# Patient Record
Sex: FEMALE | Race: WHITE | NOT HISPANIC OR LATINO | Employment: UNEMPLOYED | URBAN - METROPOLITAN AREA
[De-identification: names, ages, dates, MRNs, and addresses within clinical notes are randomized per-mention and may not be internally consistent; named-entity substitution may affect disease eponyms.]

---

## 2017-12-21 RX ORDER — FENOFIBRATE 160 MG/1
160 TABLET ORAL DAILY
Status: ON HOLD | COMMUNITY
End: 2022-10-13 | Stop reason: HOSPADM

## 2017-12-21 RX ORDER — ERGOCALCIFEROL 1.25 MG/1
50000 CAPSULE ORAL
Status: ON HOLD | COMMUNITY
End: 2022-01-24 | Stop reason: HOSPADM

## 2017-12-21 RX ORDER — OMEPRAZOLE 40 MG/1
40 CAPSULE, DELAYED RELEASE ORAL DAILY
COMMUNITY

## 2017-12-21 RX ORDER — DULOXETIN HYDROCHLORIDE 60 MG/1
120 CAPSULE, DELAYED RELEASE ORAL DAILY
Status: ON HOLD | COMMUNITY
End: 2021-12-07 | Stop reason: HOSPADM

## 2017-12-21 RX ORDER — ATORVASTATIN CALCIUM 80 MG/1
80 TABLET, FILM COATED ORAL DAILY
Status: ON HOLD | COMMUNITY
End: 2022-10-14 | Stop reason: HOSPADM

## 2017-12-21 RX ORDER — VARENICLINE TARTRATE 0.5 MG/1
1 TABLET, FILM COATED ORAL 2 TIMES DAILY
Status: ON HOLD | COMMUNITY
End: 2021-11-28 | Stop reason: ALTCHOICE

## 2017-12-21 RX ORDER — QUETIAPINE FUMARATE 200 MG/1
200 TABLET, FILM COATED ORAL NIGHTLY
COMMUNITY

## 2017-12-21 RX ORDER — NITROGLYCERIN 0.4 MG/1
0.4 TABLET SUBLINGUAL EVERY 5 MIN PRN
Status: ON HOLD | COMMUNITY
End: 2021-11-28 | Stop reason: ALTCHOICE

## 2017-12-21 RX ORDER — MONTELUKAST SODIUM 10 MG/1
10 TABLET ORAL DAILY
Status: ON HOLD | COMMUNITY
End: 2018-01-12

## 2017-12-21 RX ORDER — BUDESONIDE AND FORMOTEROL FUMARATE DIHYDRATE 160; 4.5 UG/1; UG/1
2 AEROSOL RESPIRATORY (INHALATION) EVERY 12 HOURS
Status: ON HOLD | COMMUNITY
End: 2021-11-28 | Stop reason: ALTCHOICE

## 2017-12-21 RX ORDER — HYDROCODONE BITARTRATE AND ACETAMINOPHEN 10; 325 MG/1; MG/1
1 TABLET ORAL EVERY 6 HOURS PRN
Status: ON HOLD | COMMUNITY
End: 2021-11-28 | Stop reason: ALTCHOICE

## 2017-12-21 RX ORDER — CARVEDILOL 6.25 MG/1
6.25 TABLET ORAL 2 TIMES DAILY WITH MEALS
Status: ON HOLD | COMMUNITY
End: 2018-01-12

## 2017-12-21 RX ORDER — TRAZODONE HYDROCHLORIDE 150 MG/1
150 TABLET ORAL NIGHTLY
Status: ON HOLD | COMMUNITY
End: 2018-01-12

## 2017-12-21 RX ORDER — GABAPENTIN 400 MG/1
400 CAPSULE ORAL 3 TIMES DAILY
Status: ON HOLD | COMMUNITY
End: 2021-11-28 | Stop reason: ALTCHOICE

## 2017-12-21 RX ORDER — NITROGLYCERIN 80 MG/1
1 PATCH TRANSDERMAL DAILY
Status: ON HOLD | COMMUNITY
End: 2021-11-28 | Stop reason: ALTCHOICE

## 2017-12-21 RX ORDER — LEFLUNOMIDE 20 MG/1
20 TABLET ORAL DAILY
COMMUNITY
End: 2022-02-16

## 2017-12-21 RX ORDER — SOTALOL HYDROCHLORIDE 120 MG/1
120 TABLET ORAL 2 TIMES DAILY
Status: ON HOLD | COMMUNITY
End: 2021-12-07 | Stop reason: HOSPADM

## 2017-12-21 RX ORDER — ASPIRIN 81 MG/1
81 TABLET ORAL DAILY
Status: ON HOLD | COMMUNITY
End: 2021-12-07 | Stop reason: SDUPTHER

## 2017-12-21 RX ORDER — DIAZEPAM 10 MG/1
10 TABLET ORAL 2 TIMES DAILY PRN
COMMUNITY

## 2017-12-21 RX ORDER — CYCLOBENZAPRINE HCL 10 MG
10 TABLET ORAL 2 TIMES DAILY PRN
Status: ON HOLD | COMMUNITY
End: 2021-12-07 | Stop reason: HOSPADM

## 2017-12-22 ENCOUNTER — OFFICE VISIT (OUTPATIENT)
Dept: CARDIOLOGY | Facility: CLINIC | Age: 47
End: 2017-12-22
Payer: COMMERCIAL

## 2017-12-22 VITALS — WEIGHT: 176 LBS | SYSTOLIC BLOOD PRESSURE: 112 MMHG | DIASTOLIC BLOOD PRESSURE: 79 MMHG | HEART RATE: 141 BPM

## 2017-12-22 DIAGNOSIS — M06.9 RHEUMATOID ARTHRITIS INVOLVING MULTIPLE SITES, UNSPECIFIED RHEUMATOID FACTOR PRESENCE: ICD-10-CM

## 2017-12-22 DIAGNOSIS — Z79.01 LONG TERM CURRENT USE OF ANTICOAGULANT: ICD-10-CM

## 2017-12-22 DIAGNOSIS — I47.10 SVT (SUPRAVENTRICULAR TACHYCARDIA): ICD-10-CM

## 2017-12-22 DIAGNOSIS — Z98.890 HISTORY OF LOOP RECORDER: ICD-10-CM

## 2017-12-22 DIAGNOSIS — R55 SYNCOPE, UNSPECIFIED SYNCOPE TYPE: ICD-10-CM

## 2017-12-22 DIAGNOSIS — Z79.899 LONG TERM CURRENT USE OF ANTIARRHYTHMIC DRUG: ICD-10-CM

## 2017-12-22 DIAGNOSIS — E78.2 MIXED HYPERLIPIDEMIA: ICD-10-CM

## 2017-12-22 DIAGNOSIS — G45.8 OTHER SPECIFIED TRANSIENT CEREBRAL ISCHEMIAS: ICD-10-CM

## 2017-12-22 DIAGNOSIS — I48.0 PAROXYSMAL ATRIAL FIBRILLATION: ICD-10-CM

## 2017-12-22 PROBLEM — E78.5 HYPERLIPIDEMIA: Status: ACTIVE | Noted: 2017-12-22

## 2017-12-22 PROBLEM — G45.9 TIA (TRANSIENT ISCHEMIC ATTACK): Status: ACTIVE | Noted: 2017-12-22

## 2017-12-22 PROCEDURE — 99205 OFFICE O/P NEW HI 60 MIN: CPT | Mod: S$GLB,,, | Performed by: INTERNAL MEDICINE

## 2017-12-22 NOTE — PROGRESS NOTES
"Subjective:     HPI    Cardiologist: Ale Gonzalez MD    I had the pleasure of seeing Deepti Gonzalez in consultation at your request for the evaluation of atrial fibrillation and SVT. She is a 47 year old female with a past medical history of HTN, HLD, paroxysmal atrial fibrillation for which she was on Xarelto at one time, and active tobacco use, who was admitted to [facility] in 9/2017 for TIA symptoms. This prompted a work-up which included a CT head/neck which were essentially normal, and an echo revealing a normal EF. In 10/2017, an ILR was placed given her history of cryptogenic TIAs (manifesting as left facial droop, confusion, and dysarthria). Although no AF has been seen on device checks to date, she has had documented SVT. At a recent cardiology visit, Coreg 6.25 mg bid was replaced with Sotalol 80 mg bid. She has noted an overall improvement in her palpitations, but admits to intermittent symptomatic bradycardia. She presents to me today to discuss arrhythmia management options.    Recent cardiac studies include an echo performed in 6/2016 which showed an EF of 60-65% with normal LA size, and no significant valvular disease. Carotid dopplers in 6/2016 were unremarkable.    I reviewed all ILR strips since implant which are in the EMR. An episode from 10/23/2017 at 730 pm showed a >7 second episode of sinus arrest (possibly as long as 30 seconds based on device rep although no "offset" strips are available), with no ventricular escape. Ms. Gonzalez was resting at the time. On 11/15/2017 at 11 pm showed SVT with an average rate of 181 bpm (atrial flutter and sinus tachycardia cannot be excluded). No AF has been detected since implant.    I reviewed today's ECG tracing, which shows sinus tachycardia vs atrial tachycardia at 118 bpm. The P-wave axis is changed compared to her ECG on 12/18/2017 when she was sinus rhythm at 83 bpm.    Review of Systems   Constitution: Negative for decreased appetite, " malaise/fatigue, weight gain and weight loss.   HENT: Negative for sore throat.    Eyes: Negative for blurred vision.   Cardiovascular: Positive for chest pain, irregular heartbeat, palpitations and syncope. Negative for dyspnea on exertion, leg swelling, near-syncope, orthopnea and paroxysmal nocturnal dyspnea.   Respiratory: Positive for shortness of breath.    Skin: Negative for rash.   Musculoskeletal: Positive for back pain and joint pain. Negative for arthritis.   Gastrointestinal: Negative for abdominal pain.   Neurological: Negative for focal weakness.   Psychiatric/Behavioral: Negative for altered mental status.        Objective:    Physical Exam   Constitutional: She is oriented to person, place, and time. She appears well-developed and well-nourished. No distress.   HENT:   Head: Normocephalic and atraumatic.   Mouth/Throat: Oropharynx is clear and moist.   Eyes: Conjunctivae are normal. Pupils are equal, round, and reactive to light. No scleral icterus.   Neck: Normal range of motion. Neck supple. No JVD present. No thyromegaly present.   Cardiovascular: Normal rate, regular rhythm, normal heart sounds and intact distal pulses.  Exam reveals no gallop and no friction rub.    No murmur heard.  Pulmonary/Chest: Effort normal and breath sounds normal. No respiratory distress. She has no rales.   Abdominal: Soft. Bowel sounds are normal. She exhibits no distension.   Musculoskeletal: She exhibits no edema.   Neurological: She is alert and oriented to person, place, and time.   Skin: Skin is warm and dry.   Psychiatric: She has a normal mood and affect. Her behavior is normal.   Vitals reviewed.        Assessment:       1. Paroxysmal atrial fibrillation    2. Long term current use of anticoagulant    3. Mixed hyperlipidemia    4. Other specified transient cerebral ischemias    5. Long term current use of antiarrhythmic drug    6. History of loop recorder    7. SVT (supraventricular tachycardia)         Plan:      In summary, Deepti Gonzalez is a 47 year old female with a complicated medical history including TIAs, possible PAF, RA, marked sinus pauses, syncope, and possible atrial tachycardia. We discussed the risks, benefits, indications, and alternatives to invasive EPS and possible AT ablation. She expressed understanding and wishes to proceed.    With regard to her prolonged pause, this is evidence of sick sinus syndrome and I would recommend dual chamber pacemaker implantation, which would be done locally. I will discuss this further with Dr. Gonzalez. This should be performed after her and not before her EPS.    Thank you for allowing me to participate in the care of this patient. Please do not hesitate to call me with any questions or concerns.

## 2017-12-22 NOTE — LETTER
December 22, 2017      Ale Gonzalez MD  [address] [place] [address] [place] [id number]           [facility] - Arrhythmia  [address] [place] [address] [place] [id number]  Phone: [phone]  Fax: [phone]          Patient: Deepti Gonzalez   MR Number: [id number]   YOB: 1970   Date of Visit: 12/22/2017       Dear Dr. Ale Gonzalez:    Thank you for referring Deepti Gonzalez to me for evaluation. Attached you will find relevant portions of my assessment and plan of care.    If you have questions, please do not hesitate to call me. I look forward to following Deepti Gonzalez along with you.    Sincerely,    Tapan Colón MD    Enclosure  CC:  No Recipients    If you would like to receive this communication electronically, please contact [email or web address] or [phone] to request more information on [employer] Link access.    For providers and/or their staff who would like to refer a patient to [facility], please contact us through our one-stop-shop provider referral line, [facility], at [phone].    If you feel you have received this communication in error or would no longer like to receive these types of communications, please e-mail [email or web address]

## 2018-01-04 DIAGNOSIS — I47.19 ATRIAL TACHYCARDIA: Primary | ICD-10-CM

## 2018-01-05 ENCOUNTER — TELEPHONE (OUTPATIENT)
Dept: ELECTROPHYSIOLOGY | Facility: CLINIC | Age: 48
End: 2018-01-05

## 2018-01-05 NOTE — PROGRESS NOTES
ABLATION EDUCATION CHECKLIST      1-05-18 - LAB WORK   PRE - PROCEDURE LABS HAVE BEEN ORDERED FOR YOU @ [facility]  BE SURE TO ARRIVE AT YOUR SCHEDULED TIME FOR THIS LAB WORK!  (YOU DO NOT HAVE TO FAST FOR THIS LABWORK!!!!)    1-12-18 @ 9:30 AM - POSSIBLE CARDIAC ABLATION  Report to Cardiology Waiting Room on 3rd floor of the Hospital    (Do not report to clinic)  Directions for Reporting to Cardiology Waiting Area in the Hospital  If you park in the Parking Garage:  Take elevators to the 2nd floor  Walk up ramp and turn right by Gold Elevators  Take elevator to the 3rd floor  Upon exiting the elevator, turn away from the clinic areas  Walk long lewis around to front of hospital to area with windows overlooking [facility]  Check in at Reception Desk  OR  If family is dropping you off:  Have them drop you off at the front of the Hospital  (Near the ER, where all the flags are hung).  Take the E elevators to the 3rd floor.  Check in at the Reception Desk in the waiting room.        Do not eat or drink anything after: 12 mn on the night before your procedure    Medications:   HOLD SOTALOL 5 DAYS PRIOR TO PROCEDURE. LAST DOSE 1/06/17.  HOLD XARELTO NIGHT PRIOR TO PROCEDURE. LAST DOSE 1/10/17  You may take ALL OTHER morning medications with a sip of water    You will be spending the night after your procedure  You will need someone to drive you home the day after your procedure.    Your pain during your procedure will be managed by the anesthesia team.     THE ABOVE INSTRUCTIONS WERE GIVEN TO THE PATIENT VERBALLY AND THEY VERBALIZED UNDERSTANDING.  THEY DO NOT REQUIRE ANY SPECIAL NEEDS AND DO NOT HAVE ANY LEARNING BARRIERS.    Any need to reschedule or cancel procedures, or any questions regarding your procedures should be addressed directly with the Arrhythmia Department Nurses at the following phone number: [phone]

## 2018-01-05 NOTE — TELEPHONE ENCOUNTER
Returned Pt's call. Just wanted to let us know she didn't make it to the lab today. She will be going on Monday.    ----- Message from Cydney Cho MA sent at 1/5/2018  3:39 PM CST -----  Contact: patient called  Please call the patient at [phone] she would like to talk to you about her blood work she is having done Monday. Thank you.

## 2018-01-12 ENCOUNTER — HOSPITAL ENCOUNTER (OUTPATIENT)
Facility: HOSPITAL | Age: 48
Discharge: HOME | End: 2018-01-12
Attending: INTERNAL MEDICINE | Admitting: INTERNAL MEDICINE
Payer: COMMERCIAL

## 2018-01-12 ENCOUNTER — ANESTHESIA (OUTPATIENT)
Dept: MEDSURG UNIT | Facility: HOSPITAL | Age: 48
End: 2018-01-12
Payer: COMMERCIAL

## 2018-01-12 ENCOUNTER — SURGERY (OUTPATIENT)
Age: 48
End: 2018-01-12

## 2018-01-12 ENCOUNTER — ANESTHESIA EVENT (OUTPATIENT)
Dept: MEDSURG UNIT | Facility: HOSPITAL | Age: 48
End: 2018-01-12
Payer: COMMERCIAL

## 2018-01-12 VITALS
RESPIRATION RATE: 19 BRPM | TEMPERATURE: 97 F | HEART RATE: 108 BPM | WEIGHT: 172 LBS | DIASTOLIC BLOOD PRESSURE: 59 MMHG | BODY MASS INDEX: 29.37 KG/M2 | HEIGHT: 64 IN | OXYGEN SATURATION: 96 % | SYSTOLIC BLOOD PRESSURE: 118 MMHG

## 2018-01-12 DIAGNOSIS — I48.0 PAROXYSMAL ATRIAL FIBRILLATION: ICD-10-CM

## 2018-01-12 DIAGNOSIS — I47.19 ATRIAL TACHYCARDIA: ICD-10-CM

## 2018-01-12 DIAGNOSIS — I47.10 SVT (SUPRAVENTRICULAR TACHYCARDIA): Primary | ICD-10-CM

## 2018-01-12 LAB
INR PPP: 1
PROTHROMBIN TIME: 11 SEC

## 2018-01-12 PROCEDURE — 25000003 PHARM REV CODE 250: Performed by: NURSE PRACTITIONER

## 2018-01-12 PROCEDURE — 93010 ELECTROCARDIOGRAM REPORT: CPT | Mod: ,,, | Performed by: INTERNAL MEDICINE

## 2018-01-12 PROCEDURE — 63600175 PHARM REV CODE 636 W HCPCS

## 2018-01-12 PROCEDURE — 37000008 HC ANESTHESIA 1ST 15 MINUTES: Performed by: INTERNAL MEDICINE

## 2018-01-12 PROCEDURE — 37000009 HC ANESTHESIA EA ADD 15 MINS: Performed by: INTERNAL MEDICINE

## 2018-01-12 PROCEDURE — D9220A PRA ANESTHESIA: Mod: ANES,,, | Performed by: ANESTHESIOLOGY

## 2018-01-12 PROCEDURE — 93621 COMP EP EVL L PAC&REC C SINS: CPT

## 2018-01-12 PROCEDURE — 27200049 EP LAB PROCEDURE

## 2018-01-12 PROCEDURE — 93623 PRGRMD STIMJ&PACG IV RX NFS: CPT | Mod: 26,,, | Performed by: INTERNAL MEDICINE

## 2018-01-12 PROCEDURE — 93005 ELECTROCARDIOGRAM TRACING: CPT

## 2018-01-12 PROCEDURE — 85610 PROTHROMBIN TIME: CPT

## 2018-01-12 PROCEDURE — 93620 COMP EP EVL R AT VEN PAC&REC: CPT | Mod: 26,,, | Performed by: INTERNAL MEDICINE

## 2018-01-12 PROCEDURE — D9220A PRA ANESTHESIA: Mod: CRNA,,, | Performed by: NURSE ANESTHETIST, CERTIFIED REGISTERED

## 2018-01-12 PROCEDURE — 25000003 PHARM REV CODE 250: Performed by: NURSE ANESTHETIST, CERTIFIED REGISTERED

## 2018-01-12 PROCEDURE — 93010 ELECTROCARDIOGRAM REPORT: CPT | Mod: 76,,, | Performed by: INTERNAL MEDICINE

## 2018-01-12 PROCEDURE — 25000003 PHARM REV CODE 250

## 2018-01-12 PROCEDURE — 63600175 PHARM REV CODE 636 W HCPCS: Performed by: NURSE ANESTHETIST, CERTIFIED REGISTERED

## 2018-01-12 PROCEDURE — 93621 COMP EP EVL L PAC&REC C SINS: CPT | Mod: 26,,, | Performed by: INTERNAL MEDICINE

## 2018-01-12 RX ORDER — DIAZEPAM 5 MG/1
5 TABLET ORAL ONCE
Status: COMPLETED | OUTPATIENT
Start: 2018-01-12 | End: 2018-01-12

## 2018-01-12 RX ORDER — SOTALOL HYDROCHLORIDE 80 MG/1
80 TABLET ORAL 2 TIMES DAILY
Status: DISCONTINUED | OUTPATIENT
Start: 2018-01-12 | End: 2018-01-12

## 2018-01-12 RX ORDER — PROPOFOL 10 MG/ML
VIAL (ML) INTRAVENOUS CONTINUOUS PRN
Status: DISCONTINUED | OUTPATIENT
Start: 2018-01-12 | End: 2018-01-12

## 2018-01-12 RX ORDER — ASPIRIN 81 MG/1
81 TABLET ORAL DAILY
Status: DISCONTINUED | OUTPATIENT
Start: 2018-01-12 | End: 2018-01-12 | Stop reason: HOSPADM

## 2018-01-12 RX ORDER — GABAPENTIN 400 MG/1
400 CAPSULE ORAL 3 TIMES DAILY
Status: DISCONTINUED | OUTPATIENT
Start: 2018-01-12 | End: 2018-01-12 | Stop reason: HOSPADM

## 2018-01-12 RX ORDER — SOTALOL HYDROCHLORIDE 80 MG/1
80 TABLET ORAL 2 TIMES DAILY
Status: DISCONTINUED | OUTPATIENT
Start: 2018-01-12 | End: 2018-01-12 | Stop reason: HOSPADM

## 2018-01-12 RX ORDER — PHENYLEPHRINE HYDROCHLORIDE 10 MG/ML
INJECTION INTRAVENOUS
Status: DISCONTINUED | OUTPATIENT
Start: 2018-01-12 | End: 2018-01-12

## 2018-01-12 RX ORDER — NITROGLYCERIN 80 MG/1
1 PATCH TRANSDERMAL DAILY
Status: DISCONTINUED | OUTPATIENT
Start: 2018-01-12 | End: 2018-01-12

## 2018-01-12 RX ORDER — PANTOPRAZOLE SODIUM 40 MG/1
40 TABLET, DELAYED RELEASE ORAL DAILY
Status: DISCONTINUED | OUTPATIENT
Start: 2018-01-13 | End: 2018-01-12 | Stop reason: HOSPADM

## 2018-01-12 RX ORDER — DULOXETIN HYDROCHLORIDE 60 MG/1
60 CAPSULE, DELAYED RELEASE ORAL DAILY
Status: DISCONTINUED | OUTPATIENT
Start: 2018-01-12 | End: 2018-01-12 | Stop reason: HOSPADM

## 2018-01-12 RX ORDER — LIDOCAINE HCL/PF 100 MG/5ML
SYRINGE (ML) INTRAVENOUS
Status: DISCONTINUED | OUTPATIENT
Start: 2018-01-12 | End: 2018-01-12

## 2018-01-12 RX ORDER — FENTANYL CITRATE 50 UG/ML
25 INJECTION, SOLUTION INTRAMUSCULAR; INTRAVENOUS EVERY 5 MIN PRN
Status: CANCELLED | OUTPATIENT
Start: 2018-01-12

## 2018-01-12 RX ORDER — LEFLUNOMIDE 10 MG/1
10 TABLET ORAL DAILY
Status: DISCONTINUED | OUTPATIENT
Start: 2018-01-12 | End: 2018-01-12 | Stop reason: HOSPADM

## 2018-01-12 RX ORDER — MIDAZOLAM HYDROCHLORIDE 1 MG/ML
INJECTION INTRAMUSCULAR; INTRAVENOUS
Status: DISCONTINUED | OUTPATIENT
Start: 2018-01-12 | End: 2018-01-12

## 2018-01-12 RX ORDER — SODIUM CHLORIDE 0.9 % (FLUSH) 0.9 %
3 SYRINGE (ML) INJECTION
Status: CANCELLED | OUTPATIENT
Start: 2018-01-12

## 2018-01-12 RX ORDER — QUETIAPINE FUMARATE 25 MG/1
50 TABLET, FILM COATED ORAL NIGHTLY
Status: DISCONTINUED | OUTPATIENT
Start: 2018-01-12 | End: 2018-01-12 | Stop reason: HOSPADM

## 2018-01-12 RX ORDER — SODIUM CHLORIDE 9 MG/ML
INJECTION, SOLUTION INTRAVENOUS CONTINUOUS
Status: DISCONTINUED | OUTPATIENT
Start: 2018-01-12 | End: 2018-01-12 | Stop reason: HOSPADM

## 2018-01-12 RX ORDER — PROPOFOL 10 MG/ML
VIAL (ML) INTRAVENOUS
Status: DISCONTINUED | OUTPATIENT
Start: 2018-01-12 | End: 2018-01-12

## 2018-01-12 RX ORDER — HYDROMORPHONE HYDROCHLORIDE 2 MG/ML
0.2 INJECTION, SOLUTION INTRAMUSCULAR; INTRAVENOUS; SUBCUTANEOUS EVERY 5 MIN PRN
Status: CANCELLED | OUTPATIENT
Start: 2018-01-12

## 2018-01-12 RX ORDER — FENOFIBRATE 160 MG/1
160 TABLET ORAL DAILY
Status: DISCONTINUED | OUTPATIENT
Start: 2018-01-12 | End: 2018-01-12 | Stop reason: HOSPADM

## 2018-01-12 RX ORDER — ACETAMINOPHEN 325 MG/1
650 TABLET ORAL EVERY 6 HOURS PRN
Status: DISCONTINUED | OUTPATIENT
Start: 2018-01-12 | End: 2018-01-12 | Stop reason: HOSPADM

## 2018-01-12 RX ADMIN — LIDOCAINE HYDROCHLORIDE 100 MG: 20 INJECTION, SOLUTION INTRAVENOUS at 11:01

## 2018-01-12 RX ADMIN — PROPOFOL 200 MCG/KG/MIN: 10 INJECTION, EMULSION INTRAVENOUS at 11:01

## 2018-01-12 RX ADMIN — DIAZEPAM 5 MG: 5 TABLET ORAL at 05:01

## 2018-01-12 RX ADMIN — ISOPROTERENOL HYDROCHLORIDE 1 MCG/MIN: 0.2 INJECTION, SOLUTION INTRACARDIAC; INTRAMUSCULAR; INTRAVENOUS; SUBCUTANEOUS at 12:01

## 2018-01-12 RX ADMIN — PHENYLEPHRINE HYDROCHLORIDE 100 MCG: 10 INJECTION INTRAVENOUS at 12:01

## 2018-01-12 RX ADMIN — SODIUM CHLORIDE 1000 ML: 0.9 INJECTION, SOLUTION INTRAVENOUS at 08:01

## 2018-01-12 RX ADMIN — SOTALOL HYDROCHLORIDE 80 MG: 80 TABLET ORAL at 05:01

## 2018-01-12 RX ADMIN — MIDAZOLAM HYDROCHLORIDE 2 MG: 1 INJECTION, SOLUTION INTRAMUSCULAR; INTRAVENOUS at 11:01

## 2018-01-12 RX ADMIN — ACETAMINOPHEN 650 MG: 325 TABLET ORAL at 04:01

## 2018-01-12 RX ADMIN — PROPOFOL 50 MG: 10 INJECTION, EMULSION INTRAVENOUS at 12:01

## 2018-01-12 NOTE — NURSING TRANSFER
Nursing Transfer Note      1/12/2018     Transfer To: short stay    Transfer via stretcher    Transfer with cardiac monitoring    Transported by rn    Medicines sent: none    Chart send with patient: Yes    Notified: nurse    Patient reassessed at: see epic (date, time)

## 2018-01-12 NOTE — ANESTHESIA POSTPROCEDURE EVALUATION
"Anesthesia Post Evaluation    Patient: Deepti Gonzalez    Procedure(s) Performed: Procedure(s) (LRB):  ABLATION (N/A)    Final Anesthesia Type: general  Patient location during evaluation: PACU  Patient participation: Yes- Able to Participate  Level of consciousness: awake and alert  Post-procedure vital signs: reviewed and stable  Pain management: adequate  Airway patency: patent  PONV status at discharge: No PONV  Anesthetic complications: no      Cardiovascular status: blood pressure returned to baseline  Respiratory status: unassisted  Hydration status: euvolemic  Follow-up not needed.        Visit Vitals  BP (!) 121/55 (BP Location: Left arm, Patient Position: Lying)   Pulse 101   Temp 36.8 °C (98.3 °F) (Oral)   Resp 18   Ht 5' 4" (1.626 m)   Wt 78 kg (172 lb)   SpO2 (!) 94%   Breastfeeding? No   BMI 29.52 kg/m²       Pain/Nicolas Score: Pain Assessment Performed: Yes (1/12/2018  2:00 PM)  Presence of Pain: denies (1/12/2018  2:00 PM)  Nicolas Score: 10 (1/12/2018  2:00 PM)      "

## 2018-01-12 NOTE — DISCHARGE SUMMARY
[facility]  Cardiac Electrophysiology  Discharge Summary      Patient Name: Deepti Gonzalez  MRN: [id number]  Admission Date: 1/12/2018  Hospital Length of Stay: 0 days  Discharge Date and Time:  01/12/2018 3:25 PM  Attending Physician: Tapan Colón MD    Discharging Provider: Simeon Dumont NP  Primary Care Physician: Wen Oliveros MD    HPI:47 year old female with a past medical history of HTN, HLD,  Past TIAs, possible paroxysmal atrial fibrillation, pt currently on  on sotalol and xarelto, s/p ILR was placed given her history of cryptogenic TIAs (manifesting as left facial droop, confusion, and dysarthria). Although no AF has been seen on device checks to date, she has had documented SVT. Pt referred to EP  MD Colón by cardiologist MD Dr. Gonzalez.  Pt planned for outpatient EPS and possible ablation.  Pt denied any acute symptoms on admission       Procedure(s) (LRB): EPS     Hospital Course: Pt underwent  EPS/ Noninducible for supraventricular arrhythmias.  ( see procedure note for details)  , tolerated procedure with no acute complications.  Bilateral groins with no bleeding or hematoma. Instructed patient to resume home xarelto starting tomorrow 1/13/18 with dinner.   . Pt to follow up with MD Katarzyna Phelan in 6- 8   weeks.   Discharge plans/instructions discussed with patient and family members ( daughter, spouse and pt's mother)   who verbalized understanding  and agreement of plans of care.  No further questions or concerns  voiced at this time     Consults:  Anesthesia        Final Active Diagnoses:    Diagnosis Date Noted POA    PRINCIPAL PROBLEM:  Atrial tachycardia [I47.1] 01/12/2018 Yes    TIA (transient ischemic attack) [G45.9] 12/22/2017 Yes    SVT (supraventricular tachycardia) [I47.1] 12/22/2017 Yes      Problems Resolved During this Admission:    Diagnosis Date Noted Date Resolved POA       Discharged Condition: good    Disposition: Home or Self Care    Follow  Up:  Follow-up Information     Tapan Colón MD In 6 weeks.    Specialties:  Electrophysiology, Cardiovascular Disease  Why:  follow up in 6- 8 weeks   Contact information:  Dave BUTLER  [facility] [id number] [phone]                 Patient Instructions:     Diet Cardiac     Lifting restrictions   Scheduling Instructions: No lifting more than 5- 10 pounds in 1 week     Notify your health care provider if you experience any of the following:  temperature >100.4     Notify your health care provider if you experience any of the following:  persistent nausea and vomiting or diarrhea     Notify your health care provider if you experience any of the following:  severe uncontrolled pain     Notify your health care provider if you experience any of the following:  redness, tenderness, or signs of infection (pain, swelling, redness, odor or green/yellow discharge around incision site)     Notify your health care provider if you experience any of the following:  difficulty breathing or increased cough     Notify your health care provider if you experience any of the following:  severe persistent headache     Notify your health care provider if you experience any of the following:  worsening rash     Notify your health care provider if you experience any of the following:  persistent dizziness, light-headedness, or visual disturbances     Notify your health care provider if you experience any of the following:  increased confusion or weakness     Notify your health care provider if you experience any of the following:   Scheduling Instructions: For any concerning medical symptoms     Remove dressing in 24 hours     Other restrictions (specify):   Scheduling Instructions: Resume blood thinner xarelto starting tomorrow ( 1/13/18) evening with dinner       Medications:  Reconciled Home Medications:   Current Discharge Medication List      CONTINUE these medications which have NOT CHANGED    Details   aspirin  (ECOTRIN) 81 MG EC tablet Take 81 mg by mouth once daily.      atorvastatin (LIPITOR) 40 MG tablet Take 40 mg by mouth once daily.      cyclobenzaprine (FLEXERIL) 10 MG tablet Take 10 mg by mouth 2 (two) times daily as needed for Muscle spasms.      diazePAM (VALIUM) 10 MG Tab Take 10 mg by mouth 2 (two) times daily as needed.      DULoxetine (CYMBALTA) 60 MG capsule Take 60 mg by mouth once daily.      fenofibrate 160 MG Tab Take 160 mg by mouth once daily.      gabapentin (NEURONTIN) 400 MG capsule Take 400 mg by mouth 3 (three) times daily.      hydrocodone-acetaminophen 10-325mg (NORCO)  mg Tab Take 1 tablet by mouth every 6 (six) hours as needed for Pain.      leflunomide (ARAVA) 10 MG Tab Take 10 mg by mouth once daily.      multivit,min52-folic-vitK-cQ10 (AQUADEKS) 100-700-10 mcg-mcg-mg Cap cap Take 1 capsule by mouth once daily.      omeprazole (PRILOSEC) 20 MG capsule Take 20 mg by mouth once daily.      QUEtiapine (SEROQUEL) 50 MG tablet Take 50 mg by mouth every evening.      varenicline (CHANTIX) 0.5 MG Tab Take 1 mg by mouth 2 (two) times daily.      budesonide-formoterol 160-4.5 mcg (SYMBICORT) 160-4.5 mcg/actuation HFAA Inhale 2 puffs into the lungs every 12 (twelve) hours. Controller      ergocalciferol (VITAMIN D2) 50,000 unit Cap Take 50,000 Units by mouth every 7 days.      ipratropium-albuterol (COMBIVENT RESPIMAT)  mcg/actuation inhaler Inhale 1 puff into the lungs every 6 (six) hours as needed for Wheezing. Rescue      nitroGLYCERIN (NITROSTAT) 0.4 MG SL tablet Place 0.4 mg under the tongue every 5 (five) minutes as needed for Chest pain.      nitroGLYCERIN 0.4 MG/HR TD PT24 (NITRODUR) 0.4 mg/hr Place 1 patch onto the skin once daily.      rivaroxaban (XARELTO) 20 mg Tab Take 20 mg by mouth daily with dinner or evening meal. > resume medication  starting  tomorrow 1/13/18       sotalol (SOTALOL AF) 80 MG tablet Take 80 mg by mouth 2 (two) times daily.         STOP taking these  medications       carvedilol (COREG) 6.25 MG tablet Comments:   Reason for Stopping: state no longer on this medication         montelukast (SINGULAIR) 10 mg tablet Comments:   Reason for Stopping: states no longer on this medication         traZODone (DESYREL) 150 MG tablet Comments:   Reason for Stopping: state no longer on this medication                 Simeon Dumont NP  Cardiac Electrophysiology  [facility]    STAFF: MD Tapan Colón

## 2018-01-12 NOTE — PROGRESS NOTES
Pt s/p negative EPS.   Called by RN, as patient was complaining chest discomfort.  Assessed patient  > per patient, she felt anxious while ordering her food. Per patient, she began to feel similar episodes  of chest discomfort ( as her prior episodes),  Following anxious event while on the phone.   Pt states her heart felt like it was fast.  CP does not radiate, nor having any shortness of breath, or other breathing issues. Groins with no bleeding or hematoma.     Vitals obtained and were stable > noted sinus  tachycardia low 100's BPM . Stable 02 sats 98 percent.     Physical Exam:   Gen: no acute distress, pleasant patient answering questions appropriately  CVS:tachycardia , no murmurs/rubs/gallops  CHEST: clear to auscultation bilaterally, no wheezes/rales/ronchi  ABD: Soft, non-tender, nondistended, bowel sounds present  GROINS: bilateral Soft, non tender, no bleeding no hematoma   EXT: No Edema  NEURO: awake, alert, and oriented      Plan: will give PRN valium ( reports  chronic daily use), and will resume her  Home sotalol now ( QTC on  ms)   - If VS remains stable, and no worsening symptoms > plan on DC home     -Case discussed with MD Katarzyna Phelan, who agrees with plans      SRAA Ruffin-BC  Cardiology Electrophysiology NP   [facility]     Attending: MD. Katarzyna Phelan

## 2018-01-12 NOTE — TRANSFER OF CARE
"Anesthesia Transfer of Care Note    Patient: Deepti Gonzalez    Procedure(s) Performed: Procedure(s) (LRB):  ABLATION (N/A)    Patient location: PACU    Anesthesia Type: general    Transport from OR: Transported from OR on room air with adequate spontaneous ventilation    Post pain: adequate analgesia    Post assessment: no apparent anesthetic complications    Post vital signs: stable    Level of consciousness: awake    Nausea/Vomiting: no nausea/vomiting    Complications: none    Transfer of care protocol was followed      Last vitals:   Visit Vitals  /60 (BP Location: Left arm, Patient Position: Lying)   Pulse 102   Temp 36.1 °C (97 °F) (Oral)   Resp 18   Ht 5' 4" (1.626 m)   Wt 78 kg (172 lb)   SpO2 96%   Breastfeeding? No   BMI 29.52 kg/m²     "

## 2018-01-12 NOTE — PLAN OF CARE
"Pt co chest "ache, squeezing, pressure", pain on a scale 1-10 at 5-6.Constant. VS as charted in epic. Pt states was agitated "about 30 min ago". Tim NP notified and at pt bedside. Will monitor.   "

## 2018-01-12 NOTE — ANESTHESIA PREPROCEDURE EVALUATION
01/12/2018  Deepti Gonzalez is a 47 y.o., female here for SVT ablation    Patient Active Problem List   Diagnosis    Paroxysmal atrial fibrillation    Long term current use of anticoagulant    Hyperlipidemia    TIA (transient ischemic attack)    Long term current use of antiarrhythmic drug    History of loop recorder    SVT (supraventricular tachycardia)    Rheumatoid arthritis involving multiple sites    Syncope    Atrial tachycardia         Anesthesia Evaluation    I have reviewed the Patient Summary Reports.    I have reviewed the Nursing Notes.   I have reviewed the Medications.     Review of Systems  Anesthesia Hx:  No problems with previous Anesthesia    Cardiovascular:   Hypertension Dysrhythmias atrial fibrillation    Pulmonary:   COPD    Hepatic/GI:  Hepatic/GI Normal    Neurological:   TIA, CVA    Endocrine:  Endocrine Normal        Physical Exam  General:  Well nourished    Airway/Jaw/Neck:  Airway Findings: Mouth Opening: Normal Tongue: Normal  General Airway Assessment: Adult  Mallampati: II  TM Distance: Normal, at least 6 cm       Chest/Lungs:  Chest/Lungs Findings: Clear to auscultation, Normal Respiratory Rate     Heart/Vascular:  Heart Findings: Rate: Normal  Rhythm: Regular Rhythm             Anesthesia Plan  Type of Anesthesia, risks & benefits discussed:  Anesthesia Type:  general, MAC  Patient's Preference:   Intra-op Monitoring Plan: standard ASA monitors  Intra-op Monitoring Plan Comments:   Post Op Pain Control Plan: IV/PO Opioids PRN  Post Op Pain Control Plan Comments:   Induction:    Beta Blocker:  Patient is not currently on a Beta-Blocker (No further documentation required).       Informed Consent: Patient understands risks and agrees with Anesthesia plan.  Questions answered. Anesthesia consent signed with patient.  ASA Score: 3     Day of Surgery Review of History &  Physical:            Ready For Surgery From Anesthesia Perspective.

## 2018-01-12 NOTE — PLAN OF CARE
Received report from WOO Howell. Patient s/p eps, AAOx3. VSS, no c/o pain or discomfort at this time, resp even and unlabored. Gauze/tegaderm dressing to sophie groin is CDI. No active bleeding. No hematoma noted. Post procedure protocol reviewed with patient and patient's family. Understanding verbalized. Family members at bedside. Nurse call bell within reach. Will continue to monitor per post procedure protocol.

## 2018-01-12 NOTE — INTERVAL H&P NOTE
The patient has been examined and the H&P has been reviewed:    I concur with the findings and no changes have occurred since H&P was written. pt denies any acute symptoms at present, such as fevers chills, bleeding, overt shortness of breath . EKG in NSR. Labs from [facility] reviewed from 1/8/18 >medications were held prior to procedure > sotalol last dose was on 1/6/18, and xarelto last dose was on 1/10/18.     EPS/ possible AT Ablation   Anesthesia for sedation     Prior to procedure, extensive discussion with patient by STAFF regarding risks and benefits of   EPS/ possible ablation, and patient  would like to proceed.  The patient/ spouse  voices understanding and all questions have been answered. No further questions/concerns voiced at this time.       SARA Ruffin-BC  Cardiology Electrophysiology  NP   [facility]     Attending: MD Tapan Coóln           Active Hospital Problems    Diagnosis  POA    *Atrial tachycardia [I47.1]  Yes    TIA (transient ischemic attack) [G45.9]  Yes    SVT (supraventricular tachycardia) [I47.1]  Yes      Resolved Hospital Problems    Diagnosis Date Resolved POA   No resolved problems to display.

## 2018-01-12 NOTE — PLAN OF CARE
Patient discharged per MD orders. Instructions given on medications, wound care, activity, signs of infection, when to call MD, and follow up appointments. Pt verbalized understanding. Telemetry and PIV removed. Patient and family rused wc to private vehicle.

## 2018-01-12 NOTE — H&P (VIEW-ONLY)
"Subjective:     HPI    Cardiologist: Ale Gonzalez MD    I had the pleasure of seeing Deepti Gonzalez in consultation at your request for the evaluation of atrial fibrillation and SVT. She is a 47 year old female with a past medical history of HTN, HLD, paroxysmal atrial fibrillation for which she was on Xarelto at one time, and active tobacco use, who was admitted to [facility] in 9/2017 for TIA symptoms. This prompted a work-up which included a CT head/neck which were essentially normal, and an echo revealing a normal EF. In 10/2017, an ILR was placed given her history of cryptogenic TIAs (manifesting as left facial droop, confusion, and dysarthria). Although no AF has been seen on device checks to date, she has had documented SVT. At a recent cardiology visit, Coreg 6.25 mg bid was replaced with Sotalol 80 mg bid. She has noted an overall improvement in her palpitations, but admits to intermittent symptomatic bradycardia. She presents to me today to discuss arrhythmia management options.    Recent cardiac studies include an echo performed in 6/2016 which showed an EF of 60-65% with normal LA size, and no significant valvular disease. Carotid dopplers in 6/2016 were unremarkable.    I reviewed all ILR strips since implant which are in the EMR. An episode from 10/23/2017 at 730 pm showed a >7 second episode of sinus arrest (possibly as long as 30 seconds based on device rep although no "offset" strips are available), with no ventricular escape. Ms. Gonzalez was resting at the time. On 11/15/2017 at 11 pm showed SVT with an average rate of 181 bpm (atrial flutter and sinus tachycardia cannot be excluded). No AF has been detected since implant.    I reviewed today's ECG tracing, which shows sinus tachycardia vs atrial tachycardia at 118 bpm. The P-wave axis is changed compared to her ECG on 12/18/2017 when she was sinus rhythm at 83 bpm.    Review of Systems   Constitution: Negative for decreased appetite, " malaise/fatigue, weight gain and weight loss.   HENT: Negative for sore throat.    Eyes: Negative for blurred vision.   Cardiovascular: Positive for chest pain, irregular heartbeat, palpitations and syncope. Negative for dyspnea on exertion, leg swelling, near-syncope, orthopnea and paroxysmal nocturnal dyspnea.   Respiratory: Positive for shortness of breath.    Skin: Negative for rash.   Musculoskeletal: Positive for back pain and joint pain. Negative for arthritis.   Gastrointestinal: Negative for abdominal pain.   Neurological: Negative for focal weakness.   Psychiatric/Behavioral: Negative for altered mental status.        Objective:    Physical Exam   Constitutional: She is oriented to person, place, and time. She appears well-developed and well-nourished. No distress.   HENT:   Head: Normocephalic and atraumatic.   Mouth/Throat: Oropharynx is clear and moist.   Eyes: Conjunctivae are normal. Pupils are equal, round, and reactive to light. No scleral icterus.   Neck: Normal range of motion. Neck supple. No JVD present. No thyromegaly present.   Cardiovascular: Normal rate, regular rhythm, normal heart sounds and intact distal pulses.  Exam reveals no gallop and no friction rub.    No murmur heard.  Pulmonary/Chest: Effort normal and breath sounds normal. No respiratory distress. She has no rales.   Abdominal: Soft. Bowel sounds are normal. She exhibits no distension.   Musculoskeletal: She exhibits no edema.   Neurological: She is alert and oriented to person, place, and time.   Skin: Skin is warm and dry.   Psychiatric: She has a normal mood and affect. Her behavior is normal.   Vitals reviewed.        Assessment:       1. Paroxysmal atrial fibrillation    2. Long term current use of anticoagulant    3. Mixed hyperlipidemia    4. Other specified transient cerebral ischemias    5. Long term current use of antiarrhythmic drug    6. History of loop recorder    7. SVT (supraventricular tachycardia)         Plan:      In summary, Deepti Gonzalez is a 47 year old female with a complicated medical history including TIAs, possible PAF, RA, marked sinus pauses, syncope, and possible atrial tachycardia. We discussed the risks, benefits, indications, and alternatives to invasive EPS and possible AT ablation. She expressed understanding and wishes to proceed.    With regard to her prolonged pause, this is evidence of sick sinus syndrome and I would recommend dual chamber pacemaker implantation, which would be done locally. I will discuss this further with Dr. Gonzalez. This should be performed after her and not before her EPS.    Thank you for allowing me to participate in the care of this patient. Please do not hesitate to call me with any questions or concerns.

## 2018-01-12 NOTE — PROGRESS NOTES
Patient admitted to recovery see [facility] for complete assessment pacu bcg's maintained safety measures verified patient instructed on pain scale and patient verbalized understanding. Called for ekg and called and updated  on patient location with the permission of patient.

## 2018-03-02 ENCOUNTER — OFFICE VISIT (OUTPATIENT)
Dept: CARDIOLOGY | Facility: CLINIC | Age: 48
End: 2018-03-02
Payer: COMMERCIAL

## 2018-03-02 VITALS — HEART RATE: 85 BPM | SYSTOLIC BLOOD PRESSURE: 123 MMHG | DIASTOLIC BLOOD PRESSURE: 75 MMHG

## 2018-03-02 DIAGNOSIS — I47.10 SVT (SUPRAVENTRICULAR TACHYCARDIA): Primary | ICD-10-CM

## 2018-03-02 DIAGNOSIS — I47.19 ATRIAL TACHYCARDIA: ICD-10-CM

## 2018-03-02 DIAGNOSIS — Z79.899 LONG TERM CURRENT USE OF ANTIARRHYTHMIC DRUG: ICD-10-CM

## 2018-03-02 DIAGNOSIS — M06.9 RHEUMATOID ARTHRITIS INVOLVING MULTIPLE SITES, UNSPECIFIED RHEUMATOID FACTOR PRESENCE: ICD-10-CM

## 2018-03-02 DIAGNOSIS — Z79.01 LONG TERM CURRENT USE OF ANTICOAGULANT: ICD-10-CM

## 2018-03-02 DIAGNOSIS — Z98.890 HISTORY OF LOOP RECORDER: ICD-10-CM

## 2018-03-02 DIAGNOSIS — G45.8 OTHER SPECIFIED TRANSIENT CEREBRAL ISCHEMIAS: ICD-10-CM

## 2018-03-02 DIAGNOSIS — E78.2 MIXED HYPERLIPIDEMIA: ICD-10-CM

## 2018-03-02 DIAGNOSIS — I48.0 PAROXYSMAL ATRIAL FIBRILLATION: ICD-10-CM

## 2018-03-02 DIAGNOSIS — Z95.0 PACEMAKER: ICD-10-CM

## 2018-03-02 DIAGNOSIS — R55 SYNCOPE, UNSPECIFIED SYNCOPE TYPE: ICD-10-CM

## 2018-03-02 PROCEDURE — 99214 OFFICE O/P EST MOD 30 MIN: CPT | Mod: S$GLB,,, | Performed by: INTERNAL MEDICINE

## 2018-03-02 NOTE — PROGRESS NOTES
"Subjective:     HPI    Cardiologist: Ale Gonzalez MD    I had the pleasure of seeing Deepti Gonzalez in follow up for her history of atrial fibrillation and SVT. She is a 47 year old female with HTN, HLD, paroxysmal atrial fibrillation for which she was on Xarelto at one time, and active tobacco use, who was admitted to [facility] in 9/2017 for TIA symptoms. This prompted a work-up which included a CT head/neck which was essentially normal, and an echo revealing a normal EF. In 10/2017, an ILR was placed given her history of cryptogenic TIAs (manifesting as left facial droop, confusion, and dysarthria). Although no AF had been seen on device checks to date, she did have documented SVT. At a recent cardiology visit, Coreg 6.25 mg bid was replaced with Sotalol 80 mg bid. She noted an overall improvement in her palpitations, but admitted to intermittent symptomatic bradycardia.   Recent cardiac studies include an echo performed in 6/2016 which showed an EF of 60-65% with normal LA size, and no significant valvular disease. Carotid dopplers in 6/2016 were unremarkable.    At her initial visitI reviewed all ILR strips which were in the EMR. An episode from 10/23/2017 at 730 pm showed a >7 second episode of sinus arrest (possibly as long as 30 seconds based on device rep although no "offset" strips are available), with no ventricular escape. Ms. Gonzalez was resting at the time. A strip from 11/15/2017 at 11 pm showed SVT with an average rate of 181 bpm (atrial flutter and sinus tachycardia could not be excluded). No AF was seen. At her initial visit with me, she was in sinus tachycardia vs AT at 118 bpm. This prompted an EP study done in 1/2018, where she was noninducible for SVT or AT. At my recommendation, a dual chamber pacemaker was implanted in 1/2018 for SSS. She is currently on Sotalol 80 mg bid, and Xarelto 20 mg daily.    I reviewed today's ECG Tracing, which shows sinus rhythm at 81 bpm.    Review of " Systems   Constitution: Negative for decreased appetite, malaise/fatigue, weight gain and weight loss.   HENT: Negative for sore throat.    Eyes: Negative for blurred vision.   Cardiovascular: Positive for chest pain, irregular heartbeat, palpitations and syncope. Negative for dyspnea on exertion, leg swelling, near-syncope, orthopnea and paroxysmal nocturnal dyspnea.   Respiratory: Positive for shortness of breath.    Skin: Negative for rash.   Musculoskeletal: Positive for back pain and joint pain. Negative for arthritis.   Gastrointestinal: Negative for abdominal pain.   Neurological: Negative for focal weakness.   Psychiatric/Behavioral: Negative for altered mental status.        Objective:    Physical Exam   Constitutional: She is oriented to person, place, and time. She appears well-developed and well-nourished. No distress.   HENT:   Head: Normocephalic and atraumatic.   Mouth/Throat: Oropharynx is clear and moist.   Eyes: Conjunctivae are normal. Pupils are equal, round, and reactive to light. No scleral icterus.   Neck: Normal range of motion. Neck supple. No JVD present. No thyromegaly present.   Cardiovascular: Normal rate, regular rhythm, normal heart sounds and intact distal pulses.  Exam reveals no gallop and no friction rub.    No murmur heard.  Pulmonary/Chest: Effort normal and breath sounds normal. No respiratory distress. She has no rales.   Abdominal: Soft. Bowel sounds are normal. She exhibits no distension.   Musculoskeletal: She exhibits no edema.   Neurological: She is alert and oriented to person, place, and time.   Skin: Skin is warm and dry.   Psychiatric: She has a normal mood and affect. Her behavior is normal.   Vitals reviewed.        Assessment:       1. SVT (supraventricular tachycardia)    2. Atrial tachycardia    3. Other specified transient cerebral ischemias    4. Paroxysmal atrial fibrillation    5. Mixed hyperlipidemia    6. Long term current use of anticoagulant    7. Long  term current use of antiarrhythmic drug    8. History of loop recorder    9. Rheumatoid arthritis involving multiple sites, unspecified rheumatoid factor presence    10. Syncope, unspecified syncope type         Plan:     In summary, Deepti Gonzalez is a 47 year old female with a complicated medical history including TIAs, possible PAF, RA, marked sinus pauses, syncope, and possible atrial tachycardia. She recently had a negative EP study, and remains on Sotalol and Xarelto. She is status-post dual chamber pacemaker implantation for SSS and a syncope history.    The plan is for regular device checks, and to see me again in 1 year.    Thank you for allowing me to participate in the care of this patient. Please do not hesitate to call me with any questions or concerns.

## 2019-01-09 ENCOUNTER — TELEPHONE (OUTPATIENT)
Dept: CARDIOLOGY | Facility: CLINIC | Age: 49
End: 2019-01-09

## 2019-01-09 NOTE — TELEPHONE ENCOUNTER
----- Message from Diamond Up sent at 1/9/2019  2:12 PM CST -----  Contact: Patient  The Pt is calling to see when does she see him again in [place]? Please call her back @ [phone]. Thanks, Diamond

## 2019-01-09 NOTE — TELEPHONE ENCOUNTER
Called pt back re message.  Adv pt Dr. Colón wants to see again in March;  Dr. Colón is @ [facility] on 3/15/19; which I scheduled pt then to see Dr. Colón.

## 2019-03-13 PROBLEM — I49.5 SICK SINUS SYNDROME: Status: ACTIVE | Noted: 2019-03-13

## 2019-03-13 PROBLEM — Z98.890 HISTORY OF LOOP RECORDER: Status: RESOLVED | Noted: 2017-12-22 | Resolved: 2019-03-13

## 2019-03-13 NOTE — PROGRESS NOTES
"Subjective:     HPI    I had the pleasure of seeing Deepti Gonzalez in follow up for her history of atrial fibrillation and SVT. She is a 48 year old female with HTN, HLD, paroxysmal atrial fibrillation for which she was on Xarelto at one time, and active tobacco use, who was admitted to [facility] in 9/2017 for TIA symptoms. This prompted a work-up which included a CT head/neck which was essentially normal, and an echo revealing a normal EF. In 10/2017, an ILR was placed given her history of cryptogenic TIAs (manifesting as left facial droop, confusion, and dysarthria). Although no AF had been seen on device checks to date, she did have documented SVT, and Sotalol 80 mg bid was started. She noted an overall improvement in her palpitations, but admitted to intermittent symptomatic bradycardia.     Recent cardiac studies include an echo performed in 6/2016 which showed an EF of 60-65% with normal LA size, and no significant valvular disease. Carotid dopplers in 6/2016 were unremarkable.    At her initial visit I reviewed all ILR strips which were in the EMR. An episode from 10/23/2017 at 730 pm showed a >7 second episode of sinus arrest (possibly as long as 30 seconds based on device rep although no "offset" strips are available), with no ventricular escape. Ms. Gonzalez was resting at the time. A strip from 11/15/2017 at 11 pm showed SVT with an average rate of 181 bpm (atrial flutter and sinus tachycardia could not be excluded). No AF was seen. At her initial visit with me, she was in sinus tachycardia vs AT at 118 bpm. This prompted an EP study done in 1/2018, where she was noninducible for SVT or AT. At my recommendation, a dual chamber pacemaker was implanted in 1/2018 for SSS. At her 3/2018 visit she was feeling well, and remained on Sotalol and Xarelto.    Today in the office Ms. Gonzalez reports overall doing well. She has been diagnosed with a seizure disorder, and is now on anti-seizure " medications.    My interpretation of today's ECG is sinus rhythm at 79 bpm.    Review of Systems   Constitution: Positive for malaise/fatigue. Negative for decreased appetite, weight gain and weight loss.   HENT: Negative for sore throat.    Eyes: Negative for blurred vision.   Cardiovascular: Positive for chest pain. Negative for dyspnea on exertion, irregular heartbeat, leg swelling, near-syncope, orthopnea, palpitations, paroxysmal nocturnal dyspnea and syncope.   Respiratory: Negative for shortness of breath.    Skin: Negative for rash.   Musculoskeletal: Positive for back pain and joint pain. Negative for arthritis.   Gastrointestinal: Negative for abdominal pain.   Neurological: Positive for seizures. Negative for focal weakness.   Psychiatric/Behavioral: Negative for altered mental status.        Objective:    Physical Exam   Constitutional: She is oriented to person, place, and time. She appears well-developed and well-nourished. No distress.   HENT:   Head: Normocephalic and atraumatic.   Mouth/Throat: Oropharynx is clear and moist.   Eyes: Conjunctivae are normal. Pupils are equal, round, and reactive to light. No scleral icterus.   Neck: Normal range of motion. Neck supple. No JVD present. No thyromegaly present.   Cardiovascular: Normal rate, regular rhythm, normal heart sounds and intact distal pulses. Exam reveals no gallop and no friction rub.   No murmur heard.  Pulmonary/Chest: Effort normal and breath sounds normal. No respiratory distress. She has no rales.   Abdominal: Soft. Bowel sounds are normal. She exhibits no distension.   Musculoskeletal: She exhibits no edema.   Neurological: She is alert and oriented to person, place, and time.   Skin: Skin is warm and dry.   Psychiatric: She has a normal mood and affect. Her behavior is normal.   Vitals reviewed.        Assessment:       1. SVT (supraventricular tachycardia)    2. Paroxysmal atrial fibrillation    3. Mixed hyperlipidemia    4. TIA  (transient ischemic attack)    5. Long term current use of anticoagulant    6. Long term current use of antiarrhythmic drug    7. Sick sinus syndrome    8. Pacemaker    9. Atrial tachycardia         Plan:     In summary, Deepti Gonzalez is a 47 year old female with a complicated medical history including TIAs, possible PAF, RA, marked sinus pauses, syncope, and possible atrial tachycardia. She had a negative EP study, and remains on Sotalol and Xarelto. She is status-post dual chamber pacemaker implantation for SSS and a syncope history.    The plan is to continue seeing her cardiologists at [facility], and to see me again as needed.    Thank you for allowing me to participate in the care of this patient. Please do not hesitate to call me with any questions or concerns.

## 2019-03-15 ENCOUNTER — OFFICE VISIT (OUTPATIENT)
Dept: CARDIOLOGY | Facility: CLINIC | Age: 49
End: 2019-03-15
Payer: COMMERCIAL

## 2019-03-15 DIAGNOSIS — Z79.899 LONG TERM CURRENT USE OF ANTIARRHYTHMIC DRUG: ICD-10-CM

## 2019-03-15 DIAGNOSIS — I48.0 PAROXYSMAL ATRIAL FIBRILLATION: ICD-10-CM

## 2019-03-15 DIAGNOSIS — G45.9 TIA (TRANSIENT ISCHEMIC ATTACK): ICD-10-CM

## 2019-03-15 DIAGNOSIS — Z95.0 PACEMAKER: ICD-10-CM

## 2019-03-15 DIAGNOSIS — Z79.01 LONG TERM CURRENT USE OF ANTICOAGULANT: ICD-10-CM

## 2019-03-15 DIAGNOSIS — I47.19 ATRIAL TACHYCARDIA: ICD-10-CM

## 2019-03-15 DIAGNOSIS — I49.5 SICK SINUS SYNDROME: ICD-10-CM

## 2019-03-15 DIAGNOSIS — I47.10 SVT (SUPRAVENTRICULAR TACHYCARDIA): Primary | ICD-10-CM

## 2019-03-15 DIAGNOSIS — E78.2 MIXED HYPERLIPIDEMIA: ICD-10-CM

## 2019-03-15 PROCEDURE — 99214 PR OFFICE/OUTPT VISIT, EST, LEVL IV, 30-39 MIN: ICD-10-PCS | Mod: ,,, | Performed by: INTERNAL MEDICINE

## 2019-03-15 PROCEDURE — 99214 OFFICE O/P EST MOD 30 MIN: CPT | Mod: ,,, | Performed by: INTERNAL MEDICINE

## 2021-11-24 ENCOUNTER — HOSPITAL ENCOUNTER (INPATIENT)
Facility: HOSPITAL | Age: 51
LOS: 13 days | Discharge: HOME HEALTH | DRG: 834 | End: 2021-12-07
Attending: INTERNAL MEDICINE | Admitting: INTERNAL MEDICINE
Payer: COMMERCIAL

## 2021-11-24 DIAGNOSIS — J81.1 PULMONARY EDEMA: ICD-10-CM

## 2021-11-24 DIAGNOSIS — Z51.81 THERAPEUTIC DRUG MONITORING: ICD-10-CM

## 2021-11-24 DIAGNOSIS — C91.00 B-CELL ACUTE LYMPHOBLASTIC LEUKEMIA: ICD-10-CM

## 2021-11-24 DIAGNOSIS — C95.00 ACUTE LEUKEMIA NOT HAVING ACHIEVED REMISSION: ICD-10-CM

## 2021-11-24 DIAGNOSIS — R07.9 CHEST PAIN: ICD-10-CM

## 2021-11-24 DIAGNOSIS — R79.89 ELEVATED LFTS: ICD-10-CM

## 2021-11-24 DIAGNOSIS — Z91.89 AT RISK FOR PROLONGED QT INTERVAL SYNDROME: ICD-10-CM

## 2021-11-24 DIAGNOSIS — E87.6 HYPOKALEMIA: ICD-10-CM

## 2021-11-24 DIAGNOSIS — R06.02 SOB (SHORTNESS OF BREATH): ICD-10-CM

## 2021-11-24 DIAGNOSIS — C95.90 LEUKEMIA: Primary | ICD-10-CM

## 2021-11-24 DIAGNOSIS — I49.9 ARRHYTHMIA: ICD-10-CM

## 2021-11-24 DIAGNOSIS — R78.81 BACTEREMIA: ICD-10-CM

## 2021-11-24 PROBLEM — F17.200 TOBACCO USE DISORDER: Status: ACTIVE | Noted: 2021-11-24

## 2021-11-24 PROBLEM — K21.9 GERD (GASTROESOPHAGEAL REFLUX DISEASE): Status: ACTIVE | Noted: 2021-11-24

## 2021-11-24 PROBLEM — G40.909 SEIZURE DISORDER: Status: ACTIVE | Noted: 2021-11-24

## 2021-11-24 PROBLEM — F41.9 ANXIETY: Status: ACTIVE | Noted: 2021-11-24

## 2021-11-24 PROBLEM — D69.6 THROMBOCYTOPENIA: Status: ACTIVE | Noted: 2021-11-24

## 2021-11-24 PROBLEM — G47.00 INSOMNIA: Status: ACTIVE | Noted: 2021-11-24

## 2021-11-24 PROBLEM — C92.10 CML (CHRONIC MYELOCYTIC LEUKEMIA): Status: ACTIVE | Noted: 2021-11-24

## 2021-11-24 PROBLEM — E11.9 TYPE 2 DIABETES MELLITUS, WITHOUT LONG-TERM CURRENT USE OF INSULIN: Status: ACTIVE | Noted: 2021-11-24

## 2021-11-24 PROBLEM — I10 HYPERTENSION: Status: ACTIVE | Noted: 2021-11-24

## 2021-11-24 PROBLEM — M79.10 MUSCLE PAIN: Status: ACTIVE | Noted: 2021-11-24

## 2021-11-24 LAB
ALBUMIN SERPL BCP-MCNC: 3.4 G/DL (ref 3.5–5.2)
ALP SERPL-CCNC: 140 U/L (ref 55–135)
ALT SERPL W/O P-5'-P-CCNC: 134 U/L (ref 10–44)
ANION GAP SERPL CALC-SCNC: 12 MMOL/L (ref 8–16)
ANISOCYTOSIS BLD QL SMEAR: SLIGHT
APTT BLDCRRT: 22.6 SEC (ref 21–32)
ASCENDING AORTA: 3.46 CM
AST SERPL-CCNC: 110 U/L (ref 10–40)
AV INDEX (PROSTH): 1.07
AV MEAN GRADIENT: 5 MMHG
AV PEAK GRADIENT: 9 MMHG
AV VALVE AREA: 3.35 CM2
AV VELOCITY RATIO: 0.82
B-HCG UR QL: NEGATIVE
BASOPHILS NFR BLD: 0 % (ref 0–1.9)
BILIRUB SERPL-MCNC: 0.4 MG/DL (ref 0.1–1)
BSA FOR ECHO PROCEDURE: 1.82 M2
BUN SERPL-MCNC: 7 MG/DL (ref 6–20)
CALCIUM SERPL-MCNC: 9.3 MG/DL (ref 8.7–10.5)
CHLORIDE SERPL-SCNC: 101 MMOL/L (ref 95–110)
CK SERPL-CCNC: 29 U/L (ref 20–180)
CO2 SERPL-SCNC: 24 MMOL/L (ref 23–29)
CREAT SERPL-MCNC: 0.6 MG/DL (ref 0.5–1.4)
CV ECHO LV RWT: 0.35 CM
DIFFERENTIAL METHOD: ABNORMAL
DOP CALC AO PEAK VEL: 1.48 M/S
DOP CALC AO VTI: 24.81 CM
DOP CALC LVOT AREA: 3.1 CM2
DOP CALC LVOT DIAMETER: 2 CM
DOP CALC LVOT PEAK VEL: 1.21 M/S
DOP CALC LVOT STROKE VOLUME: 83.21 CM3
DOP CALCLVOT PEAK VEL VTI: 26.5 CM
E WAVE DECELERATION TIME: 178.1 MSEC
E/A RATIO: 0.89
E/E' RATIO: 10 M/S
ECHO LV POSTERIOR WALL: 0.81 CM (ref 0.6–1.1)
EJECTION FRACTION: 65 %
EOSINOPHIL NFR BLD: 0.5 % (ref 0–8)
ERYTHROCYTE [DISTWIDTH] IN BLOOD BY AUTOMATED COUNT: 14.1 % (ref 11.5–14.5)
EST. GFR  (AFRICAN AMERICAN): >60 ML/MIN/1.73 M^2
EST. GFR  (NON AFRICAN AMERICAN): >60 ML/MIN/1.73 M^2
ESTIMATED AVG GLUCOSE: 105 MG/DL (ref 68–131)
FRACTIONAL SHORTENING: 35 % (ref 28–44)
GLUCOSE SERPL-MCNC: 95 MG/DL (ref 70–110)
HBA1C MFR BLD: 5.3 % (ref 4–5.6)
HBV CORE AB SERPL QL IA: NEGATIVE
HBV SURFACE AB SER-ACNC: NEGATIVE M[IU]/ML
HBV SURFACE AG SERPL QL IA: NEGATIVE
HCT VFR BLD AUTO: 34.2 % (ref 37–48.5)
HGB BLD-MCNC: 11.3 G/DL (ref 12–16)
HIV 1+2 AB+HIV1 P24 AG SERPL QL IA: NEGATIVE
HYPOCHROMIA BLD QL SMEAR: ABNORMAL
IMM GRANULOCYTES # BLD AUTO: ABNORMAL K/UL (ref 0–0.04)
IMM GRANULOCYTES NFR BLD AUTO: ABNORMAL % (ref 0–0.5)
INR PPP: 1.2 (ref 0.8–1.2)
INTERVENTRICULAR SEPTUM: 0.74 CM (ref 0.6–1.1)
LA MAJOR: 4.22 CM
LA MINOR: 4.86 CM
LA WIDTH: 3.45 CM
LACTATE SERPL-SCNC: 1.4 MMOL/L (ref 0.5–2.2)
LDH SERPL L TO P-CCNC: 3745 U/L (ref 110–260)
LEFT ATRIUM SIZE: 2.41 CM
LEFT ATRIUM VOLUME INDEX MOD: 19.5 ML/M2
LEFT ATRIUM VOLUME INDEX: 17.8 ML/M2
LEFT ATRIUM VOLUME MOD: 34.96 CM3
LEFT ATRIUM VOLUME: 31.93 CM3
LEFT INTERNAL DIMENSION IN SYSTOLE: 2.97 CM (ref 2.1–4)
LEFT VENTRICLE DIASTOLIC VOLUME INDEX: 54.5 ML/M2
LEFT VENTRICLE DIASTOLIC VOLUME: 97.56 ML
LEFT VENTRICLE MASS INDEX: 63 G/M2
LEFT VENTRICLE SYSTOLIC VOLUME INDEX: 19 ML/M2
LEFT VENTRICLE SYSTOLIC VOLUME: 34.03 ML
LEFT VENTRICULAR INTERNAL DIMENSION IN DIASTOLE: 4.6 CM (ref 3.5–6)
LEFT VENTRICULAR MASS: 113.15 G
LV LATERAL E/E' RATIO: 8.5 M/S
LV SEPTAL E/E' RATIO: 12.14 M/S
LYMPHOCYTES NFR BLD: 6 % (ref 18–48)
MAGNESIUM SERPL-MCNC: 1.6 MG/DL (ref 1.6–2.6)
MCH RBC QN AUTO: 29.4 PG (ref 27–31)
MCHC RBC AUTO-ENTMCNC: 33 G/DL (ref 32–36)
MCV RBC AUTO: 89 FL (ref 82–98)
MONOCYTES NFR BLD: 2 % (ref 4–15)
MV A" WAVE DURATION": 9.99 MSEC
MV PEAK A VEL: 0.96 M/S
MV PEAK E VEL: 0.85 M/S
MV STENOSIS PRESSURE HALF TIME: 51.65 MS
MV VALVE AREA P 1/2 METHOD: 4.26 CM2
NEUTROPHILS NFR BLD: 6.5 % (ref 38–73)
NEUTS BAND NFR BLD MANUAL: 0.5 %
NRBC BLD-RTO: 0 /100 WBC
PATH REV BLD -IMP: NORMAL
PHOSPHATE SERPL-MCNC: 4.9 MG/DL (ref 2.7–4.5)
PLATELET # BLD AUTO: 60 K/UL (ref 150–450)
PLATELET BLD QL SMEAR: ABNORMAL
PMV BLD AUTO: 9.7 FL (ref 9.2–12.9)
POCT GLUCOSE: 111 MG/DL (ref 70–110)
POCT GLUCOSE: 115 MG/DL (ref 70–110)
POCT GLUCOSE: 115 MG/DL (ref 70–110)
POCT GLUCOSE: 120 MG/DL (ref 70–110)
POTASSIUM SERPL-SCNC: 3.4 MMOL/L (ref 3.5–5.1)
PROCALCITONIN SERPL IA-MCNC: 0.07 NG/ML
PROT SERPL-MCNC: 6 G/DL (ref 6–8.4)
PROTHROMBIN TIME: 12.8 SEC (ref 9–12.5)
PULM VEIN S/D RATIO: 1.28
PV PEAK D VEL: 0.53 M/S
PV PEAK S VEL: 0.68 M/S
RA MAJOR: 3.98 CM
RA PRESSURE: 3 MMHG
RA WIDTH: 2.9 CM
RBC # BLD AUTO: 3.84 M/UL (ref 4–5.4)
RV TISSUE DOPPLER FREE WALL SYSTOLIC VELOCITY 1 (APICAL 4 CHAMBER VIEW): 15.02 CM/S
SINUS: 3.68 CM
SMUDGE CELLS BLD QL SMEAR: PRESENT
SODIUM SERPL-SCNC: 137 MMOL/L (ref 136–145)
STJ: 3.01 CM
T4 FREE SERPL-MCNC: 0.78 NG/DL (ref 0.71–1.51)
TDI LATERAL: 0.1 M/S
TDI SEPTAL: 0.07 M/S
TDI: 0.09 M/S
TRICUSPID ANNULAR PLANE SYSTOLIC EXCURSION: 1.65 CM
TSH SERPL DL<=0.005 MIU/L-ACNC: 7.04 UIU/ML (ref 0.4–4)
URATE SERPL-MCNC: 3.3 MG/DL (ref 2.4–5.7)
VANCOMYCIN SERPL-MCNC: 8 UG/ML
VANCOMYCIN SERPL-MCNC: <1.4 UG/ML
WBC # BLD AUTO: 111.1 K/UL (ref 3.9–12.7)
WBC OTHER NFR BLD MANUAL: 85 %

## 2021-11-24 PROCEDURE — 38222 PR BONE MARROW BIOPSY(IES) W/ASPIRATION(S); DIAGNOSTIC: ICD-10-PCS | Mod: RT,,, | Performed by: NURSE PRACTITIONER

## 2021-11-24 PROCEDURE — 85027 COMPLETE CBC AUTOMATED: CPT | Performed by: STUDENT IN AN ORGANIZED HEALTH CARE EDUCATION/TRAINING PROGRAM

## 2021-11-24 PROCEDURE — 82550 ASSAY OF CK (CPK): CPT | Performed by: STUDENT IN AN ORGANIZED HEALTH CARE EDUCATION/TRAINING PROGRAM

## 2021-11-24 PROCEDURE — 81382 HLA II TYPING 1 LOC HR: CPT | Mod: 91 | Performed by: INTERNAL MEDICINE

## 2021-11-24 PROCEDURE — 99223 1ST HOSP IP/OBS HIGH 75: CPT | Mod: ,,, | Performed by: INTERNAL MEDICINE

## 2021-11-24 PROCEDURE — 87389 HIV-1 AG W/HIV-1&-2 AB AG IA: CPT | Performed by: INTERNAL MEDICINE

## 2021-11-24 PROCEDURE — 81380 HLA I TYPING 1 LOCUS HR: CPT | Performed by: INTERNAL MEDICINE

## 2021-11-24 PROCEDURE — 36415 COLL VENOUS BLD VENIPUNCTURE: CPT | Performed by: INTERNAL MEDICINE

## 2021-11-24 PROCEDURE — 88185 FLOWCYTOMETRY/TC ADD-ON: CPT | Mod: 59 | Performed by: PATHOLOGY

## 2021-11-24 PROCEDURE — 88305 TISSUE EXAM BY PATHOLOGIST: CPT | Mod: 59 | Performed by: PATHOLOGY

## 2021-11-24 PROCEDURE — 83615 LACTATE (LD) (LDH) ENZYME: CPT | Performed by: STUDENT IN AN ORGANIZED HEALTH CARE EDUCATION/TRAINING PROGRAM

## 2021-11-24 PROCEDURE — 93010 ELECTROCARDIOGRAM REPORT: CPT | Mod: ,,, | Performed by: INTERNAL MEDICINE

## 2021-11-24 PROCEDURE — 25000003 PHARM REV CODE 250: Performed by: NURSE PRACTITIONER

## 2021-11-24 PROCEDURE — 83036 HEMOGLOBIN GLYCOSYLATED A1C: CPT | Performed by: STUDENT IN AN ORGANIZED HEALTH CARE EDUCATION/TRAINING PROGRAM

## 2021-11-24 PROCEDURE — 80053 COMPREHEN METABOLIC PANEL: CPT | Performed by: STUDENT IN AN ORGANIZED HEALTH CARE EDUCATION/TRAINING PROGRAM

## 2021-11-24 PROCEDURE — 87340 HEPATITIS B SURFACE AG IA: CPT | Performed by: INTERNAL MEDICINE

## 2021-11-24 PROCEDURE — 84100 ASSAY OF PHOSPHORUS: CPT | Performed by: STUDENT IN AN ORGANIZED HEALTH CARE EDUCATION/TRAINING PROGRAM

## 2021-11-24 PROCEDURE — 94760 N-INVAS EAR/PLS OXIMETRY 1: CPT

## 2021-11-24 PROCEDURE — 81025 URINE PREGNANCY TEST: CPT | Performed by: INTERNAL MEDICINE

## 2021-11-24 PROCEDURE — 38222 DX BONE MARROW BX & ASPIR: CPT | Mod: RT,,, | Performed by: NURSE PRACTITIONER

## 2021-11-24 PROCEDURE — 88299 UNLISTED CYTOGENETIC STUDY: CPT | Performed by: NURSE PRACTITIONER

## 2021-11-24 PROCEDURE — 88305 TISSUE EXAM BY PATHOLOGIST: CPT | Mod: 26,,, | Performed by: PATHOLOGY

## 2021-11-24 PROCEDURE — 81380 HLA I TYPING 1 LOCUS HR: CPT | Mod: 91 | Performed by: INTERNAL MEDICINE

## 2021-11-24 PROCEDURE — 80202 ASSAY OF VANCOMYCIN: CPT | Performed by: STUDENT IN AN ORGANIZED HEALTH CARE EDUCATION/TRAINING PROGRAM

## 2021-11-24 PROCEDURE — 63600175 PHARM REV CODE 636 W HCPCS: Performed by: STUDENT IN AN ORGANIZED HEALTH CARE EDUCATION/TRAINING PROGRAM

## 2021-11-24 PROCEDURE — 85060 PATHOLOGIST REVIEW: ICD-10-PCS | Mod: ,,, | Performed by: PATHOLOGY

## 2021-11-24 PROCEDURE — 81245 FLT3 GENE: CPT | Performed by: NURSE PRACTITIONER

## 2021-11-24 PROCEDURE — 88311 DECALCIFY TISSUE: CPT | Performed by: PATHOLOGY

## 2021-11-24 PROCEDURE — 80202 ASSAY OF VANCOMYCIN: CPT | Mod: 91 | Performed by: INTERNAL MEDICINE

## 2021-11-24 PROCEDURE — 88305 TISSUE EXAM BY PATHOLOGIST: ICD-10-PCS | Mod: 26,,, | Performed by: PATHOLOGY

## 2021-11-24 PROCEDURE — 63600175 PHARM REV CODE 636 W HCPCS: Performed by: INTERNAL MEDICINE

## 2021-11-24 PROCEDURE — 85730 THROMBOPLASTIN TIME PARTIAL: CPT | Performed by: STUDENT IN AN ORGANIZED HEALTH CARE EDUCATION/TRAINING PROGRAM

## 2021-11-24 PROCEDURE — 85060 BLOOD SMEAR INTERPRETATION: CPT | Mod: ,,, | Performed by: PATHOLOGY

## 2021-11-24 PROCEDURE — 88189 PR  FLOWCYTOMETRY/READ, 16 & > MARKERS: ICD-10-PCS | Mod: ,,, | Performed by: PATHOLOGY

## 2021-11-24 PROCEDURE — 38221 DX BONE MARROW BIOPSIES: CPT

## 2021-11-24 PROCEDURE — 81450 HL NEO GSAP 5-50DNA/DNA&RNA: CPT | Performed by: NURSE PRACTITIONER

## 2021-11-24 PROCEDURE — 93005 ELECTROCARDIOGRAM TRACING: CPT

## 2021-11-24 PROCEDURE — 99223 PR INITIAL HOSPITAL CARE,LEVL III: ICD-10-PCS | Mod: ,,, | Performed by: INTERNAL MEDICINE

## 2021-11-24 PROCEDURE — 87040 BLOOD CULTURE FOR BACTERIA: CPT | Performed by: STUDENT IN AN ORGANIZED HEALTH CARE EDUCATION/TRAINING PROGRAM

## 2021-11-24 PROCEDURE — 85007 BL SMEAR W/DIFF WBC COUNT: CPT | Performed by: STUDENT IN AN ORGANIZED HEALTH CARE EDUCATION/TRAINING PROGRAM

## 2021-11-24 PROCEDURE — 36415 COLL VENOUS BLD VENIPUNCTURE: CPT | Performed by: STUDENT IN AN ORGANIZED HEALTH CARE EDUCATION/TRAINING PROGRAM

## 2021-11-24 PROCEDURE — 85097 BONE MARROW INTERPRETATION: CPT | Mod: ,,, | Performed by: PATHOLOGY

## 2021-11-24 PROCEDURE — 81207 BCR/ABL1 GENE MINOR BP: CPT | Performed by: NURSE PRACTITIONER

## 2021-11-24 PROCEDURE — 88189 FLOWCYTOMETRY/READ 16 & >: CPT | Mod: ,,, | Performed by: PATHOLOGY

## 2021-11-24 PROCEDURE — 25000003 PHARM REV CODE 250: Performed by: INTERNAL MEDICINE

## 2021-11-24 PROCEDURE — 84443 ASSAY THYROID STIM HORMONE: CPT | Performed by: STUDENT IN AN ORGANIZED HEALTH CARE EDUCATION/TRAINING PROGRAM

## 2021-11-24 PROCEDURE — 88313 SPECIAL STAINS GROUP 2: CPT | Mod: 59 | Performed by: PATHOLOGY

## 2021-11-24 PROCEDURE — 86704 HEP B CORE ANTIBODY TOTAL: CPT | Performed by: INTERNAL MEDICINE

## 2021-11-24 PROCEDURE — 93010 EKG 12-LEAD: ICD-10-PCS | Mod: ,,, | Performed by: INTERNAL MEDICINE

## 2021-11-24 PROCEDURE — 84145 PROCALCITONIN (PCT): CPT | Performed by: STUDENT IN AN ORGANIZED HEALTH CARE EDUCATION/TRAINING PROGRAM

## 2021-11-24 PROCEDURE — 85610 PROTHROMBIN TIME: CPT | Performed by: STUDENT IN AN ORGANIZED HEALTH CARE EDUCATION/TRAINING PROGRAM

## 2021-11-24 PROCEDURE — 25000003 PHARM REV CODE 250: Performed by: STUDENT IN AN ORGANIZED HEALTH CARE EDUCATION/TRAINING PROGRAM

## 2021-11-24 PROCEDURE — 88313 SPECIAL STAINS GROUP 2: CPT | Mod: 26,,, | Performed by: PATHOLOGY

## 2021-11-24 PROCEDURE — 85097 PR  BONE MARROW,SMEAR INTERPRETATION: ICD-10-PCS | Mod: ,,, | Performed by: PATHOLOGY

## 2021-11-24 PROCEDURE — 20600001 HC STEP DOWN PRIVATE ROOM

## 2021-11-24 PROCEDURE — 88237 TISSUE CULTURE BONE MARROW: CPT | Performed by: NURSE PRACTITIONER

## 2021-11-24 PROCEDURE — 88311 DECALCIFY TISSUE: CPT | Mod: 26,,, | Performed by: PATHOLOGY

## 2021-11-24 PROCEDURE — 81208 BCR/ABL1 GENE OTHER BP: CPT | Performed by: NURSE PRACTITIONER

## 2021-11-24 PROCEDURE — 84439 ASSAY OF FREE THYROXINE: CPT | Performed by: STUDENT IN AN ORGANIZED HEALTH CARE EDUCATION/TRAINING PROGRAM

## 2021-11-24 PROCEDURE — 88271 CYTOGENETICS DNA PROBE: CPT | Mod: 59 | Performed by: NURSE PRACTITIONER

## 2021-11-24 PROCEDURE — 86706 HEP B SURFACE ANTIBODY: CPT | Performed by: INTERNAL MEDICINE

## 2021-11-24 PROCEDURE — 84550 ASSAY OF BLOOD/URIC ACID: CPT | Performed by: STUDENT IN AN ORGANIZED HEALTH CARE EDUCATION/TRAINING PROGRAM

## 2021-11-24 PROCEDURE — 83605 ASSAY OF LACTIC ACID: CPT | Performed by: STUDENT IN AN ORGANIZED HEALTH CARE EDUCATION/TRAINING PROGRAM

## 2021-11-24 PROCEDURE — 99900035 HC TECH TIME PER 15 MIN (STAT)

## 2021-11-24 PROCEDURE — 88311 PR  DECALCIFY TISSUE: ICD-10-PCS | Mod: 26,,, | Performed by: PATHOLOGY

## 2021-11-24 PROCEDURE — 88313 PR  SPECIAL STAINS,GROUP II: ICD-10-PCS | Mod: 26,,, | Performed by: PATHOLOGY

## 2021-11-24 PROCEDURE — 88184 FLOWCYTOMETRY/ TC 1 MARKER: CPT | Performed by: PATHOLOGY

## 2021-11-24 PROCEDURE — 83735 ASSAY OF MAGNESIUM: CPT | Performed by: STUDENT IN AN ORGANIZED HEALTH CARE EDUCATION/TRAINING PROGRAM

## 2021-11-24 PROCEDURE — 81382 HLA II TYPING 1 LOC HR: CPT | Performed by: INTERNAL MEDICINE

## 2021-11-24 RX ORDER — SODIUM CHLORIDE 9 MG/ML
INJECTION, SOLUTION INTRAVENOUS CONTINUOUS
Status: DISCONTINUED | OUTPATIENT
Start: 2021-11-24 | End: 2021-11-28

## 2021-11-24 RX ORDER — NALOXONE HCL 0.4 MG/ML
0.02 VIAL (ML) INJECTION
Status: DISCONTINUED | OUTPATIENT
Start: 2021-11-24 | End: 2021-12-07 | Stop reason: HOSPADM

## 2021-11-24 RX ORDER — ONDANSETRON 4 MG/1
4 TABLET, ORALLY DISINTEGRATING ORAL EVERY 8 HOURS PRN
Status: DISCONTINUED | OUTPATIENT
Start: 2021-11-24 | End: 2021-12-07 | Stop reason: HOSPADM

## 2021-11-24 RX ORDER — SODIUM CHLORIDE 0.9 % (FLUSH) 0.9 %
10 SYRINGE (ML) INJECTION EVERY 12 HOURS PRN
Status: DISCONTINUED | OUTPATIENT
Start: 2021-11-24 | End: 2021-12-05

## 2021-11-24 RX ORDER — GABAPENTIN 300 MG/1
300 CAPSULE ORAL 3 TIMES DAILY
Status: DISCONTINUED | OUTPATIENT
Start: 2021-11-24 | End: 2021-11-28

## 2021-11-24 RX ORDER — OXCARBAZEPINE 600 MG/1
600 TABLET, FILM COATED ORAL 2 TIMES DAILY
Status: DISCONTINUED | OUTPATIENT
Start: 2021-11-24 | End: 2021-12-07 | Stop reason: HOSPADM

## 2021-11-24 RX ORDER — POLYETHYLENE GLYCOL 3350 17 G/17G
17 POWDER, FOR SOLUTION ORAL 2 TIMES DAILY PRN
Status: DISCONTINUED | OUTPATIENT
Start: 2021-11-24 | End: 2021-11-27

## 2021-11-24 RX ORDER — HYDROCODONE BITARTRATE AND ACETAMINOPHEN 10; 325 MG/1; MG/1
1 TABLET ORAL EVERY 6 HOURS PRN
Status: DISCONTINUED | OUTPATIENT
Start: 2021-11-24 | End: 2021-11-24

## 2021-11-24 RX ORDER — SOTALOL HYDROCHLORIDE 120 MG/1
120 TABLET ORAL 2 TIMES DAILY
Status: DISCONTINUED | OUTPATIENT
Start: 2021-11-24 | End: 2021-11-26

## 2021-11-24 RX ORDER — LIDOCAINE HYDROCHLORIDE 20 MG/ML
10 INJECTION, SOLUTION EPIDURAL; INFILTRATION; INTRACAUDAL; PERINEURAL ONCE
Status: COMPLETED | OUTPATIENT
Start: 2021-11-24 | End: 2021-11-24

## 2021-11-24 RX ORDER — ACETAMINOPHEN 325 MG/1
650 TABLET ORAL EVERY 8 HOURS PRN
Status: DISCONTINUED | OUTPATIENT
Start: 2021-11-24 | End: 2021-11-26

## 2021-11-24 RX ORDER — GLUCAGON 1 MG
1 KIT INJECTION
Status: DISCONTINUED | OUTPATIENT
Start: 2021-11-24 | End: 2021-12-07 | Stop reason: HOSPADM

## 2021-11-24 RX ORDER — PANTOPRAZOLE SODIUM 40 MG/1
40 TABLET, DELAYED RELEASE ORAL DAILY
Status: DISCONTINUED | OUTPATIENT
Start: 2021-11-24 | End: 2021-11-28

## 2021-11-24 RX ORDER — HYDROXYUREA 500 MG/1
2500 CAPSULE ORAL 2 TIMES DAILY
Status: DISCONTINUED | OUTPATIENT
Start: 2021-11-24 | End: 2021-11-26

## 2021-11-24 RX ORDER — SOTALOL HYDROCHLORIDE 80 MG/1
80 TABLET ORAL 2 TIMES DAILY
Status: DISCONTINUED | OUTPATIENT
Start: 2021-11-24 | End: 2021-11-24

## 2021-11-24 RX ORDER — INSULIN ASPART 100 [IU]/ML
0-5 INJECTION, SOLUTION INTRAVENOUS; SUBCUTANEOUS
Status: DISCONTINUED | OUTPATIENT
Start: 2021-11-24 | End: 2021-11-29

## 2021-11-24 RX ORDER — TALC
6 POWDER (GRAM) TOPICAL NIGHTLY PRN
Status: DISCONTINUED | OUTPATIENT
Start: 2021-11-24 | End: 2021-12-07 | Stop reason: HOSPADM

## 2021-11-24 RX ORDER — MORPHINE SULFATE 2 MG/ML
1 INJECTION, SOLUTION INTRAMUSCULAR; INTRAVENOUS EVERY 6 HOURS PRN
Status: DISCONTINUED | OUTPATIENT
Start: 2021-11-24 | End: 2021-11-25

## 2021-11-24 RX ORDER — IBUPROFEN 200 MG
16 TABLET ORAL
Status: DISCONTINUED | OUTPATIENT
Start: 2021-11-24 | End: 2021-12-07 | Stop reason: HOSPADM

## 2021-11-24 RX ORDER — QUETIAPINE FUMARATE 25 MG/1
50 TABLET, FILM COATED ORAL NIGHTLY
Status: DISCONTINUED | OUTPATIENT
Start: 2021-11-24 | End: 2021-11-28

## 2021-11-24 RX ORDER — LORAZEPAM 2 MG/ML
1 INJECTION INTRAMUSCULAR ONCE AS NEEDED
Status: COMPLETED | OUTPATIENT
Start: 2021-11-24 | End: 2021-11-24

## 2021-11-24 RX ORDER — OXYCODONE HYDROCHLORIDE 5 MG/1
5 TABLET ORAL EVERY 4 HOURS PRN
Status: DISCONTINUED | OUTPATIENT
Start: 2021-11-24 | End: 2021-11-27

## 2021-11-24 RX ORDER — DIAZEPAM 5 MG/1
10 TABLET ORAL 2 TIMES DAILY PRN
Status: DISCONTINUED | OUTPATIENT
Start: 2021-11-24 | End: 2021-11-27

## 2021-11-24 RX ORDER — PANTOPRAZOLE SODIUM 40 MG/1
40 TABLET, DELAYED RELEASE ORAL DAILY
Status: DISCONTINUED | OUTPATIENT
Start: 2021-11-24 | End: 2021-11-24

## 2021-11-24 RX ORDER — POTASSIUM CHLORIDE 20 MEQ/1
40 TABLET, EXTENDED RELEASE ORAL ONCE
Status: COMPLETED | OUTPATIENT
Start: 2021-11-24 | End: 2021-11-24

## 2021-11-24 RX ORDER — GABAPENTIN 400 MG/1
400 CAPSULE ORAL 3 TIMES DAILY
Status: DISCONTINUED | OUTPATIENT
Start: 2021-11-24 | End: 2021-11-24

## 2021-11-24 RX ORDER — MUPIROCIN 20 MG/G
OINTMENT TOPICAL 2 TIMES DAILY
Status: COMPLETED | OUTPATIENT
Start: 2021-11-24 | End: 2021-11-28

## 2021-11-24 RX ORDER — HYDROXYUREA 500 MG/1
2000 CAPSULE ORAL 2 TIMES DAILY
Status: DISCONTINUED | OUTPATIENT
Start: 2021-11-24 | End: 2021-11-24

## 2021-11-24 RX ORDER — IBUPROFEN 200 MG
24 TABLET ORAL
Status: DISCONTINUED | OUTPATIENT
Start: 2021-11-24 | End: 2021-12-07 | Stop reason: HOSPADM

## 2021-11-24 RX ORDER — HYDROXYCHLOROQUINE SULFATE 200 MG/1
TABLET, FILM COATED ORAL
Status: ON HOLD | COMMUNITY
End: 2021-11-28 | Stop reason: ALTCHOICE

## 2021-11-24 RX ADMIN — HYDROXYUREA 2500 MG: 500 CAPSULE ORAL at 08:11

## 2021-11-24 RX ADMIN — GABAPENTIN 300 MG: 300 CAPSULE ORAL at 08:11

## 2021-11-24 RX ADMIN — OXCARBAZEPINE 600 MG: 600 TABLET, FILM COATED ORAL at 08:11

## 2021-11-24 RX ADMIN — QUETIAPINE FUMARATE 50 MG: 25 TABLET ORAL at 08:11

## 2021-11-24 RX ADMIN — OXYCODONE 5 MG: 5 TABLET ORAL at 08:11

## 2021-11-24 RX ADMIN — POTASSIUM BICARBONATE 40 MEQ: 391 TABLET, EFFERVESCENT ORAL at 05:11

## 2021-11-24 RX ADMIN — SOTALOL HYDROCHLORIDE 120 MG: 120 TABLET ORAL at 09:11

## 2021-11-24 RX ADMIN — SOTALOL HYDROCHLORIDE 120 MG: 120 TABLET ORAL at 08:11

## 2021-11-24 RX ADMIN — MUPIROCIN: 20 OINTMENT TOPICAL at 09:11

## 2021-11-24 RX ADMIN — OXYCODONE 5 MG: 5 TABLET ORAL at 02:11

## 2021-11-24 RX ADMIN — GABAPENTIN 300 MG: 300 CAPSULE ORAL at 02:11

## 2021-11-24 RX ADMIN — LIDOCAINE HYDROCHLORIDE 200 MG: 20 INJECTION, SOLUTION EPIDURAL; INFILTRATION; INTRACAUDAL at 02:11

## 2021-11-24 RX ADMIN — GABAPENTIN 300 MG: 300 CAPSULE ORAL at 09:11

## 2021-11-24 RX ADMIN — MUPIROCIN: 20 OINTMENT TOPICAL at 08:11

## 2021-11-24 RX ADMIN — GABAPENTIN 300 MG: 300 CAPSULE ORAL at 04:11

## 2021-11-24 RX ADMIN — POTASSIUM CHLORIDE 40 MEQ: 1500 TABLET, EXTENDED RELEASE ORAL at 09:11

## 2021-11-24 RX ADMIN — HYDROCODONE BITARTRATE AND ACETAMINOPHEN 1 TABLET: 10; 325 TABLET ORAL at 05:11

## 2021-11-24 RX ADMIN — OXCARBAZEPINE 600 MG: 600 TABLET, FILM COATED ORAL at 10:11

## 2021-11-24 RX ADMIN — PANTOPRAZOLE SODIUM 40 MG: 40 TABLET, DELAYED RELEASE ORAL at 09:11

## 2021-11-24 RX ADMIN — SODIUM CHLORIDE: 0.9 INJECTION, SOLUTION INTRAVENOUS at 09:11

## 2021-11-24 RX ADMIN — LORAZEPAM: 2 INJECTION INTRAMUSCULAR; INTRAVENOUS at 02:11

## 2021-11-24 RX ADMIN — VANCOMYCIN HYDROCHLORIDE 1250 MG: 1.25 INJECTION, POWDER, LYOPHILIZED, FOR SOLUTION INTRAVENOUS at 06:11

## 2021-11-24 RX ADMIN — HYDROXYUREA 2500 MG: 500 CAPSULE ORAL at 09:11

## 2021-11-24 RX ADMIN — VANCOMYCIN HYDROCHLORIDE 2000 MG: 10 INJECTION, POWDER, LYOPHILIZED, FOR SOLUTION INTRAVENOUS at 05:11

## 2021-11-25 PROBLEM — C95.00 ACUTE LEUKEMIA: Status: ACTIVE | Noted: 2021-11-24

## 2021-11-25 LAB
ABO + RH BLD: NORMAL
ALBUMIN SERPL BCP-MCNC: 3 G/DL (ref 3.5–5.2)
ALP SERPL-CCNC: 146 U/L (ref 55–135)
ALT SERPL W/O P-5'-P-CCNC: 117 U/L (ref 10–44)
ANION GAP SERPL CALC-SCNC: 11 MMOL/L (ref 8–16)
ANISOCYTOSIS BLD QL SMEAR: SLIGHT
APTT BLDCRRT: 21.6 SEC (ref 21–32)
AST SERPL-CCNC: 65 U/L (ref 10–40)
BASOPHILS # BLD AUTO: ABNORMAL K/UL (ref 0–0.2)
BASOPHILS NFR BLD: 0 % (ref 0–1.9)
BILIRUB SERPL-MCNC: 0.4 MG/DL (ref 0.1–1)
BLD GP AB SCN CELLS X3 SERPL QL: NORMAL
BLD PROD TYP BPU: NORMAL
BLOOD UNIT EXPIRATION DATE: NORMAL
BLOOD UNIT TYPE CODE: 5100
BLOOD UNIT TYPE: NORMAL
BUN SERPL-MCNC: 9 MG/DL (ref 6–20)
CALCIUM SERPL-MCNC: 8.7 MG/DL (ref 8.7–10.5)
CHLORIDE SERPL-SCNC: 101 MMOL/L (ref 95–110)
CO2 SERPL-SCNC: 23 MMOL/L (ref 23–29)
CODING SYSTEM: NORMAL
CREAT SERPL-MCNC: 0.7 MG/DL (ref 0.5–1.4)
DIFFERENTIAL METHOD: ABNORMAL
DISPENSE STATUS: NORMAL
EOSINOPHIL # BLD AUTO: ABNORMAL K/UL (ref 0–0.5)
EOSINOPHIL NFR BLD: 2 % (ref 0–8)
ERYTHROCYTE [DISTWIDTH] IN BLOOD BY AUTOMATED COUNT: 14.2 % (ref 11.5–14.5)
EST. GFR  (AFRICAN AMERICAN): >60 ML/MIN/1.73 M^2
EST. GFR  (NON AFRICAN AMERICAN): >60 ML/MIN/1.73 M^2
GLUCOSE SERPL-MCNC: 109 MG/DL (ref 70–110)
HCT VFR BLD AUTO: 31 % (ref 37–48.5)
HGB BLD-MCNC: 10.4 G/DL (ref 12–16)
HYPOCHROMIA BLD QL SMEAR: ABNORMAL
IMM GRANULOCYTES # BLD AUTO: ABNORMAL K/UL (ref 0–0.04)
IMM GRANULOCYTES NFR BLD AUTO: ABNORMAL % (ref 0–0.5)
INR PPP: 1.1 (ref 0.8–1.2)
LYMPHOCYTES # BLD AUTO: ABNORMAL K/UL (ref 1–4.8)
LYMPHOCYTES NFR BLD: 18 % (ref 18–48)
MAGNESIUM SERPL-MCNC: 1.7 MG/DL (ref 1.6–2.6)
MCH RBC QN AUTO: 29.2 PG (ref 27–31)
MCHC RBC AUTO-ENTMCNC: 33.5 G/DL (ref 32–36)
MCV RBC AUTO: 87 FL (ref 82–98)
MONOCYTES # BLD AUTO: ABNORMAL K/UL (ref 0.3–1)
MONOCYTES NFR BLD: 1 % (ref 4–15)
NEUTROPHILS NFR BLD: 10 % (ref 38–73)
NEUTS BAND NFR BLD MANUAL: 3 %
NRBC BLD-RTO: 0 /100 WBC
PHOSPHATE SERPL-MCNC: 4.6 MG/DL (ref 2.7–4.5)
PLATELET # BLD AUTO: 40 K/UL (ref 150–450)
PLATELET BLD QL SMEAR: ABNORMAL
PMV BLD AUTO: 9.4 FL (ref 9.2–12.9)
POCT GLUCOSE: 118 MG/DL (ref 70–110)
POCT GLUCOSE: 187 MG/DL (ref 70–110)
POTASSIUM SERPL-SCNC: 3.6 MMOL/L (ref 3.5–5.1)
PROT SERPL-MCNC: 5.4 G/DL (ref 6–8.4)
PROTHROMBIN TIME: 11.7 SEC (ref 9–12.5)
RBC # BLD AUTO: 3.56 M/UL (ref 4–5.4)
SODIUM SERPL-SCNC: 135 MMOL/L (ref 136–145)
UNIT NUMBER: NORMAL
VANCOMYCIN TROUGH SERPL-MCNC: 7.6 UG/ML (ref 10–22)
WBC # BLD AUTO: 71.61 K/UL (ref 3.9–12.7)
WBC OTHER NFR BLD MANUAL: 66 %

## 2021-11-25 PROCEDURE — 36415 COLL VENOUS BLD VENIPUNCTURE: CPT | Performed by: INTERNAL MEDICINE

## 2021-11-25 PROCEDURE — 63600175 PHARM REV CODE 636 W HCPCS: Performed by: STUDENT IN AN ORGANIZED HEALTH CARE EDUCATION/TRAINING PROGRAM

## 2021-11-25 PROCEDURE — 80053 COMPREHEN METABOLIC PANEL: CPT | Performed by: STUDENT IN AN ORGANIZED HEALTH CARE EDUCATION/TRAINING PROGRAM

## 2021-11-25 PROCEDURE — 20600001 HC STEP DOWN PRIVATE ROOM

## 2021-11-25 PROCEDURE — 85027 COMPLETE CBC AUTOMATED: CPT | Mod: 91 | Performed by: STUDENT IN AN ORGANIZED HEALTH CARE EDUCATION/TRAINING PROGRAM

## 2021-11-25 PROCEDURE — 36415 COLL VENOUS BLD VENIPUNCTURE: CPT | Performed by: STUDENT IN AN ORGANIZED HEALTH CARE EDUCATION/TRAINING PROGRAM

## 2021-11-25 PROCEDURE — 93010 EKG 12-LEAD: ICD-10-PCS | Mod: ,,, | Performed by: INTERNAL MEDICINE

## 2021-11-25 PROCEDURE — 25000003 PHARM REV CODE 250: Performed by: STUDENT IN AN ORGANIZED HEALTH CARE EDUCATION/TRAINING PROGRAM

## 2021-11-25 PROCEDURE — 83880 ASSAY OF NATRIURETIC PEPTIDE: CPT | Performed by: STUDENT IN AN ORGANIZED HEALTH CARE EDUCATION/TRAINING PROGRAM

## 2021-11-25 PROCEDURE — P9037 PLATE PHERES LEUKOREDU IRRAD: HCPCS | Performed by: STUDENT IN AN ORGANIZED HEALTH CARE EDUCATION/TRAINING PROGRAM

## 2021-11-25 PROCEDURE — 83735 ASSAY OF MAGNESIUM: CPT | Mod: 91 | Performed by: STUDENT IN AN ORGANIZED HEALTH CARE EDUCATION/TRAINING PROGRAM

## 2021-11-25 PROCEDURE — 99233 PR SUBSEQUENT HOSPITAL CARE,LEVL III: ICD-10-PCS | Mod: ,,, | Performed by: INTERNAL MEDICINE

## 2021-11-25 PROCEDURE — 80048 BASIC METABOLIC PNL TOTAL CA: CPT | Performed by: STUDENT IN AN ORGANIZED HEALTH CARE EDUCATION/TRAINING PROGRAM

## 2021-11-25 PROCEDURE — 99223 PR INITIAL HOSPITAL CARE,LEVL III: ICD-10-PCS | Mod: ,,, | Performed by: INTERNAL MEDICINE

## 2021-11-25 PROCEDURE — 94760 N-INVAS EAR/PLS OXIMETRY 1: CPT

## 2021-11-25 PROCEDURE — 85610 PROTHROMBIN TIME: CPT | Performed by: STUDENT IN AN ORGANIZED HEALTH CARE EDUCATION/TRAINING PROGRAM

## 2021-11-25 PROCEDURE — 93005 ELECTROCARDIOGRAM TRACING: CPT

## 2021-11-25 PROCEDURE — 94761 N-INVAS EAR/PLS OXIMETRY MLT: CPT

## 2021-11-25 PROCEDURE — 85730 THROMBOPLASTIN TIME PARTIAL: CPT | Performed by: STUDENT IN AN ORGANIZED HEALTH CARE EDUCATION/TRAINING PROGRAM

## 2021-11-25 PROCEDURE — 25000003 PHARM REV CODE 250: Performed by: INTERNAL MEDICINE

## 2021-11-25 PROCEDURE — 80202 ASSAY OF VANCOMYCIN: CPT | Performed by: INTERNAL MEDICINE

## 2021-11-25 PROCEDURE — 84484 ASSAY OF TROPONIN QUANT: CPT | Performed by: STUDENT IN AN ORGANIZED HEALTH CARE EDUCATION/TRAINING PROGRAM

## 2021-11-25 PROCEDURE — 99900035 HC TECH TIME PER 15 MIN (STAT)

## 2021-11-25 PROCEDURE — 99233 SBSQ HOSP IP/OBS HIGH 50: CPT | Mod: ,,, | Performed by: INTERNAL MEDICINE

## 2021-11-25 PROCEDURE — 85007 BL SMEAR W/DIFF WBC COUNT: CPT | Performed by: STUDENT IN AN ORGANIZED HEALTH CARE EDUCATION/TRAINING PROGRAM

## 2021-11-25 PROCEDURE — 25000242 PHARM REV CODE 250 ALT 637 W/ HCPCS: Performed by: STUDENT IN AN ORGANIZED HEALTH CARE EDUCATION/TRAINING PROGRAM

## 2021-11-25 PROCEDURE — 94640 AIRWAY INHALATION TREATMENT: CPT

## 2021-11-25 PROCEDURE — 84100 ASSAY OF PHOSPHORUS: CPT | Mod: 91 | Performed by: STUDENT IN AN ORGANIZED HEALTH CARE EDUCATION/TRAINING PROGRAM

## 2021-11-25 PROCEDURE — 86900 BLOOD TYPING SEROLOGIC ABO: CPT | Performed by: STUDENT IN AN ORGANIZED HEALTH CARE EDUCATION/TRAINING PROGRAM

## 2021-11-25 PROCEDURE — 87040 BLOOD CULTURE FOR BACTERIA: CPT | Performed by: STUDENT IN AN ORGANIZED HEALTH CARE EDUCATION/TRAINING PROGRAM

## 2021-11-25 PROCEDURE — 63600175 PHARM REV CODE 636 W HCPCS: Performed by: INTERNAL MEDICINE

## 2021-11-25 PROCEDURE — 87040 BLOOD CULTURE FOR BACTERIA: CPT | Mod: 59 | Performed by: STUDENT IN AN ORGANIZED HEALTH CARE EDUCATION/TRAINING PROGRAM

## 2021-11-25 PROCEDURE — 99223 1ST HOSP IP/OBS HIGH 75: CPT | Mod: ,,, | Performed by: INTERNAL MEDICINE

## 2021-11-25 PROCEDURE — 93010 ELECTROCARDIOGRAM REPORT: CPT | Mod: ,,, | Performed by: INTERNAL MEDICINE

## 2021-11-25 PROCEDURE — 27000221 HC OXYGEN, UP TO 24 HOURS

## 2021-11-25 RX ORDER — METRONIDAZOLE 500 MG/100ML
500 INJECTION, SOLUTION INTRAVENOUS
Status: CANCELLED | OUTPATIENT
Start: 2021-11-26

## 2021-11-25 RX ORDER — MAGNESIUM SULFATE HEPTAHYDRATE 40 MG/ML
2 INJECTION, SOLUTION INTRAVENOUS ONCE
Status: DISCONTINUED | OUTPATIENT
Start: 2021-11-25 | End: 2021-11-25

## 2021-11-25 RX ORDER — MAGNESIUM SULFATE HEPTAHYDRATE 40 MG/ML
2 INJECTION, SOLUTION INTRAVENOUS ONCE
Status: COMPLETED | OUTPATIENT
Start: 2021-11-25 | End: 2021-11-25

## 2021-11-25 RX ORDER — ONDANSETRON 2 MG/ML
4 INJECTION INTRAMUSCULAR; INTRAVENOUS EVERY 6 HOURS PRN
Status: DISCONTINUED | OUTPATIENT
Start: 2021-11-26 | End: 2021-12-05

## 2021-11-25 RX ORDER — HYDROMORPHONE HYDROCHLORIDE 1 MG/ML
0.5 INJECTION, SOLUTION INTRAMUSCULAR; INTRAVENOUS; SUBCUTANEOUS ONCE
Status: COMPLETED | OUTPATIENT
Start: 2021-11-25 | End: 2021-11-25

## 2021-11-25 RX ORDER — IPRATROPIUM BROMIDE AND ALBUTEROL SULFATE 2.5; .5 MG/3ML; MG/3ML
3 SOLUTION RESPIRATORY (INHALATION) ONCE
Status: COMPLETED | OUTPATIENT
Start: 2021-11-25 | End: 2021-11-25

## 2021-11-25 RX ORDER — HYDROCODONE BITARTRATE AND ACETAMINOPHEN 500; 5 MG/1; MG/1
TABLET ORAL
Status: DISCONTINUED | OUTPATIENT
Start: 2021-11-25 | End: 2021-12-01

## 2021-11-25 RX ORDER — VANCOMYCIN HCL IN 5 % DEXTROSE 1G/250ML
1000 PLASTIC BAG, INJECTION (ML) INTRAVENOUS
Status: DISCONTINUED | OUTPATIENT
Start: 2021-11-26 | End: 2021-11-27

## 2021-11-25 RX ORDER — HYDROMORPHONE HYDROCHLORIDE 1 MG/ML
1 INJECTION, SOLUTION INTRAMUSCULAR; INTRAVENOUS; SUBCUTANEOUS EVERY 6 HOURS PRN
Status: DISCONTINUED | OUTPATIENT
Start: 2021-11-25 | End: 2021-11-26

## 2021-11-25 RX ORDER — CEFEPIME HYDROCHLORIDE 2 G/1
2 INJECTION, POWDER, FOR SOLUTION INTRAVENOUS EVERY 8 HOURS
Status: DISCONTINUED | OUTPATIENT
Start: 2021-11-25 | End: 2021-11-29

## 2021-11-25 RX ADMIN — GABAPENTIN 300 MG: 300 CAPSULE ORAL at 02:11

## 2021-11-25 RX ADMIN — SOTALOL HYDROCHLORIDE 120 MG: 120 TABLET ORAL at 08:11

## 2021-11-25 RX ADMIN — PANTOPRAZOLE SODIUM 40 MG: 40 TABLET, DELAYED RELEASE ORAL at 09:11

## 2021-11-25 RX ADMIN — HYDROXYUREA 2500 MG: 500 CAPSULE ORAL at 08:11

## 2021-11-25 RX ADMIN — HYDROXYUREA 2500 MG: 500 CAPSULE ORAL at 09:11

## 2021-11-25 RX ADMIN — HYDROMORPHONE HYDROCHLORIDE 1 MG: 1 INJECTION, SOLUTION INTRAMUSCULAR; INTRAVENOUS; SUBCUTANEOUS at 08:11

## 2021-11-25 RX ADMIN — MUPIROCIN: 20 OINTMENT TOPICAL at 09:11

## 2021-11-25 RX ADMIN — VANCOMYCIN HYDROCHLORIDE 1250 MG: 1.25 INJECTION, POWDER, LYOPHILIZED, FOR SOLUTION INTRAVENOUS at 05:11

## 2021-11-25 RX ADMIN — GABAPENTIN 300 MG: 300 CAPSULE ORAL at 08:11

## 2021-11-25 RX ADMIN — ACETAMINOPHEN 650 MG: 325 TABLET ORAL at 11:11

## 2021-11-25 RX ADMIN — SOTALOL HYDROCHLORIDE 120 MG: 120 TABLET ORAL at 09:11

## 2021-11-25 RX ADMIN — CEFEPIME 2 G: 2 INJECTION, POWDER, FOR SOLUTION INTRAVENOUS at 10:11

## 2021-11-25 RX ADMIN — SODIUM CHLORIDE: 0.9 INJECTION, SOLUTION INTRAVENOUS at 11:11

## 2021-11-25 RX ADMIN — MUPIROCIN: 20 OINTMENT TOPICAL at 08:11

## 2021-11-25 RX ADMIN — IPRATROPIUM BROMIDE AND ALBUTEROL SULFATE 3 ML: 2.5; .5 SOLUTION RESPIRATORY (INHALATION) at 11:11

## 2021-11-25 RX ADMIN — ACETAMINOPHEN 650 MG: 325 TABLET ORAL at 03:11

## 2021-11-25 RX ADMIN — QUETIAPINE FUMARATE 50 MG: 25 TABLET ORAL at 08:11

## 2021-11-25 RX ADMIN — SODIUM CHLORIDE: 0.9 INJECTION, SOLUTION INTRAVENOUS at 09:11

## 2021-11-25 RX ADMIN — HYDROMORPHONE HYDROCHLORIDE 0.5 MG: 1 INJECTION, SOLUTION INTRAMUSCULAR; INTRAVENOUS; SUBCUTANEOUS at 09:11

## 2021-11-25 RX ADMIN — OXYCODONE 5 MG: 5 TABLET ORAL at 05:11

## 2021-11-25 RX ADMIN — MAGNESIUM SULFATE 2 G: 2 INJECTION INTRAVENOUS at 09:11

## 2021-11-25 RX ADMIN — OXCARBAZEPINE 600 MG: 600 TABLET, FILM COATED ORAL at 09:11

## 2021-11-25 RX ADMIN — ONDANSETRON 4 MG: 2 INJECTION INTRAMUSCULAR; INTRAVENOUS at 11:11

## 2021-11-25 RX ADMIN — OXCARBAZEPINE 600 MG: 600 TABLET, FILM COATED ORAL at 08:11

## 2021-11-25 RX ADMIN — HYDROMORPHONE HYDROCHLORIDE 1 MG: 1 INJECTION, SOLUTION INTRAMUSCULAR; INTRAVENOUS; SUBCUTANEOUS at 02:11

## 2021-11-25 RX ADMIN — POTASSIUM BICARBONATE 50 MEQ: 978 TABLET, EFFERVESCENT ORAL at 09:11

## 2021-11-25 RX ADMIN — CEFEPIME 2 G: 2 INJECTION, POWDER, FOR SOLUTION INTRAVENOUS at 02:11

## 2021-11-25 RX ADMIN — GABAPENTIN 300 MG: 300 CAPSULE ORAL at 09:11

## 2021-11-26 PROBLEM — J44.9 COPD (CHRONIC OBSTRUCTIVE PULMONARY DISEASE): Status: ACTIVE | Noted: 2021-11-26

## 2021-11-26 LAB
ALBUMIN SERPL BCP-MCNC: 2.9 G/DL (ref 3.5–5.2)
ALP SERPL-CCNC: 188 U/L (ref 55–135)
ALT SERPL W/O P-5'-P-CCNC: 136 U/L (ref 10–44)
ANION GAP SERPL CALC-SCNC: 11 MMOL/L (ref 8–16)
ANION GAP SERPL CALC-SCNC: 12 MMOL/L (ref 8–16)
ANISOCYTOSIS BLD QL SMEAR: SLIGHT
AST SERPL-CCNC: 86 U/L (ref 10–40)
BASOPHILS # BLD AUTO: ABNORMAL K/UL (ref 0–0.2)
BASOPHILS # BLD AUTO: ABNORMAL K/UL (ref 0–0.2)
BASOPHILS NFR BLD: 0 % (ref 0–1.9)
BASOPHILS NFR BLD: 0 % (ref 0–1.9)
BILIRUB SERPL-MCNC: 0.5 MG/DL (ref 0.1–1)
BNP SERPL-MCNC: 32 PG/ML (ref 0–99)
BODY SITE - BONE MARROW: NORMAL
BUN SERPL-MCNC: 7 MG/DL (ref 6–20)
BUN SERPL-MCNC: 9 MG/DL (ref 6–20)
CALCIUM SERPL-MCNC: 8.1 MG/DL (ref 8.7–10.5)
CALCIUM SERPL-MCNC: 9.1 MG/DL (ref 8.7–10.5)
CHLORIDE SERPL-SCNC: 96 MMOL/L (ref 95–110)
CHLORIDE SERPL-SCNC: 98 MMOL/L (ref 95–110)
CLINICAL DIAGNOSIS - BONE MARROW: NORMAL
CO2 SERPL-SCNC: 22 MMOL/L (ref 23–29)
CO2 SERPL-SCNC: 22 MMOL/L (ref 23–29)
CREAT SERPL-MCNC: 0.6 MG/DL (ref 0.5–1.4)
CREAT SERPL-MCNC: 0.7 MG/DL (ref 0.5–1.4)
DIFFERENTIAL METHOD: ABNORMAL
DIFFERENTIAL METHOD: ABNORMAL
DNA/RNA EXTRACT AND HOLD RESULT: NORMAL
DNA/RNA EXTRACTION: NORMAL
EOSINOPHIL # BLD AUTO: ABNORMAL K/UL (ref 0–0.5)
EOSINOPHIL # BLD AUTO: ABNORMAL K/UL (ref 0–0.5)
EOSINOPHIL NFR BLD: 2 % (ref 0–8)
EOSINOPHIL NFR BLD: 2 % (ref 0–8)
ERYTHROCYTE [DISTWIDTH] IN BLOOD BY AUTOMATED COUNT: 13.9 % (ref 11.5–14.5)
ERYTHROCYTE [DISTWIDTH] IN BLOOD BY AUTOMATED COUNT: 14.1 % (ref 11.5–14.5)
EST. GFR  (AFRICAN AMERICAN): >60 ML/MIN/1.73 M^2
EST. GFR  (AFRICAN AMERICAN): >60 ML/MIN/1.73 M^2
EST. GFR  (NON AFRICAN AMERICAN): >60 ML/MIN/1.73 M^2
EST. GFR  (NON AFRICAN AMERICAN): >60 ML/MIN/1.73 M^2
EXHR SPECIMEN TYPE: NORMAL
FLOW CYTOMETRY ANTIBODIES ANALYZED - BONE MARROW: NORMAL
FLOW CYTOMETRY COMMENT - BONE MARROW: NORMAL
FLOW CYTOMETRY INTERPRETATION - BONE MARROW: NORMAL
GLUCOSE SERPL-MCNC: 122 MG/DL (ref 70–110)
GLUCOSE SERPL-MCNC: 124 MG/DL (ref 70–110)
HCT VFR BLD AUTO: 28.6 % (ref 37–48.5)
HCT VFR BLD AUTO: 31.5 % (ref 37–48.5)
HGB BLD-MCNC: 10.5 G/DL (ref 12–16)
HGB BLD-MCNC: 9.5 G/DL (ref 12–16)
IMM GRANULOCYTES # BLD AUTO: ABNORMAL K/UL (ref 0–0.04)
IMM GRANULOCYTES # BLD AUTO: ABNORMAL K/UL (ref 0–0.04)
IMM GRANULOCYTES NFR BLD AUTO: ABNORMAL % (ref 0–0.5)
IMM GRANULOCYTES NFR BLD AUTO: ABNORMAL % (ref 0–0.5)
LYMPHOCYTES # BLD AUTO: ABNORMAL K/UL (ref 1–4.8)
LYMPHOCYTES # BLD AUTO: ABNORMAL K/UL (ref 1–4.8)
LYMPHOCYTES NFR BLD: 30 % (ref 18–48)
LYMPHOCYTES NFR BLD: 75.5 % (ref 18–48)
MAGNESIUM SERPL-MCNC: 2.1 MG/DL (ref 1.6–2.6)
MAGNESIUM SERPL-MCNC: 2.2 MG/DL (ref 1.6–2.6)
MCH RBC QN AUTO: 29.6 PG (ref 27–31)
MCH RBC QN AUTO: 29.6 PG (ref 27–31)
MCHC RBC AUTO-ENTMCNC: 33.2 G/DL (ref 32–36)
MCHC RBC AUTO-ENTMCNC: 33.3 G/DL (ref 32–36)
MCV RBC AUTO: 89 FL (ref 82–98)
MCV RBC AUTO: 89 FL (ref 82–98)
METAMYELOCYTES NFR BLD MANUAL: 1 %
MONOCYTES # BLD AUTO: ABNORMAL K/UL (ref 0.3–1)
MONOCYTES # BLD AUTO: ABNORMAL K/UL (ref 0.3–1)
MONOCYTES NFR BLD: 2 % (ref 4–15)
MONOCYTES NFR BLD: 3 % (ref 4–15)
NEUTROPHILS NFR BLD: 11 % (ref 38–73)
NEUTROPHILS NFR BLD: 3 % (ref 38–73)
NEUTS BAND NFR BLD MANUAL: 2 %
NEUTS BAND NFR BLD MANUAL: 2 %
NRBC BLD-RTO: 0 /100 WBC
NRBC BLD-RTO: 0 /100 WBC
PHOSPHATE SERPL-MCNC: 3.1 MG/DL (ref 2.7–4.5)
PHOSPHATE SERPL-MCNC: 3.8 MG/DL (ref 2.7–4.5)
PLATELET # BLD AUTO: 65 K/UL (ref 150–450)
PLATELET # BLD AUTO: 75 K/UL (ref 150–450)
PLATELET BLD QL SMEAR: ABNORMAL
PLATELET BLD QL SMEAR: ABNORMAL
PMV BLD AUTO: 11.1 FL (ref 9.2–12.9)
PMV BLD AUTO: 11.4 FL (ref 9.2–12.9)
POCT GLUCOSE: 130 MG/DL (ref 70–110)
POCT GLUCOSE: 143 MG/DL (ref 70–110)
POCT GLUCOSE: 158 MG/DL (ref 70–110)
POCT GLUCOSE: 211 MG/DL (ref 70–110)
POTASSIUM SERPL-SCNC: 3.8 MMOL/L (ref 3.5–5.1)
POTASSIUM SERPL-SCNC: 4.2 MMOL/L (ref 3.5–5.1)
PROMYELOCYTES NFR BLD MANUAL: 0.5 %
PROMYELOCYTES NFR BLD MANUAL: 1 %
PROT SERPL-MCNC: 5.3 G/DL (ref 6–8.4)
RBC # BLD AUTO: 3.21 M/UL (ref 4–5.4)
RBC # BLD AUTO: 3.55 M/UL (ref 4–5.4)
SMUDGE CELLS BLD QL SMEAR: PRESENT
SODIUM SERPL-SCNC: 130 MMOL/L (ref 136–145)
SODIUM SERPL-SCNC: 131 MMOL/L (ref 136–145)
TROPONIN I SERPL DL<=0.01 NG/ML-MCNC: <0.006 NG/ML (ref 0–0.03)
URATE SERPL-MCNC: 3.6 MG/DL (ref 2.4–5.7)
WBC # BLD AUTO: 71.38 K/UL (ref 3.9–12.7)
WBC # BLD AUTO: 89.93 K/UL (ref 3.9–12.7)
WBC OTHER NFR BLD MANUAL: 15 %
WBC OTHER NFR BLD MANUAL: 50 %

## 2021-11-26 PROCEDURE — 84100 ASSAY OF PHOSPHORUS: CPT | Performed by: INTERNAL MEDICINE

## 2021-11-26 PROCEDURE — 97530 THERAPEUTIC ACTIVITIES: CPT

## 2021-11-26 PROCEDURE — 94760 N-INVAS EAR/PLS OXIMETRY 1: CPT

## 2021-11-26 PROCEDURE — 25000003 PHARM REV CODE 250: Performed by: NURSE PRACTITIONER

## 2021-11-26 PROCEDURE — 84550 ASSAY OF BLOOD/URIC ACID: CPT | Performed by: INTERNAL MEDICINE

## 2021-11-26 PROCEDURE — 63600175 PHARM REV CODE 636 W HCPCS: Performed by: INTERNAL MEDICINE

## 2021-11-26 PROCEDURE — 25000003 PHARM REV CODE 250: Performed by: INTERNAL MEDICINE

## 2021-11-26 PROCEDURE — 99233 SBSQ HOSP IP/OBS HIGH 50: CPT | Mod: ,,, | Performed by: INTERNAL MEDICINE

## 2021-11-26 PROCEDURE — 97165 OT EVAL LOW COMPLEX 30 MIN: CPT

## 2021-11-26 PROCEDURE — 97161 PT EVAL LOW COMPLEX 20 MIN: CPT

## 2021-11-26 PROCEDURE — 63600175 PHARM REV CODE 636 W HCPCS: Performed by: STUDENT IN AN ORGANIZED HEALTH CARE EDUCATION/TRAINING PROGRAM

## 2021-11-26 PROCEDURE — 94640 AIRWAY INHALATION TREATMENT: CPT

## 2021-11-26 PROCEDURE — 36415 COLL VENOUS BLD VENIPUNCTURE: CPT | Performed by: INTERNAL MEDICINE

## 2021-11-26 PROCEDURE — 94761 N-INVAS EAR/PLS OXIMETRY MLT: CPT

## 2021-11-26 PROCEDURE — 25500020 PHARM REV CODE 255: Performed by: INTERNAL MEDICINE

## 2021-11-26 PROCEDURE — S0030 INJECTION, METRONIDAZOLE: HCPCS | Performed by: STUDENT IN AN ORGANIZED HEALTH CARE EDUCATION/TRAINING PROGRAM

## 2021-11-26 PROCEDURE — 20600001 HC STEP DOWN PRIVATE ROOM

## 2021-11-26 PROCEDURE — 99233 PR SUBSEQUENT HOSPITAL CARE,LEVL III: ICD-10-PCS | Mod: ,,, | Performed by: INTERNAL MEDICINE

## 2021-11-26 PROCEDURE — 63700000 PHARM REV CODE 250 ALT 637 W/O HCPCS: Performed by: STUDENT IN AN ORGANIZED HEALTH CARE EDUCATION/TRAINING PROGRAM

## 2021-11-26 PROCEDURE — 25000242 PHARM REV CODE 250 ALT 637 W/ HCPCS: Performed by: STUDENT IN AN ORGANIZED HEALTH CARE EDUCATION/TRAINING PROGRAM

## 2021-11-26 PROCEDURE — 27000221 HC OXYGEN, UP TO 24 HOURS

## 2021-11-26 PROCEDURE — 25000003 PHARM REV CODE 250: Performed by: STUDENT IN AN ORGANIZED HEALTH CARE EDUCATION/TRAINING PROGRAM

## 2021-11-26 PROCEDURE — 85025 COMPLETE CBC W/AUTO DIFF WBC: CPT | Performed by: INTERNAL MEDICINE

## 2021-11-26 PROCEDURE — 99900035 HC TECH TIME PER 15 MIN (STAT)

## 2021-11-26 PROCEDURE — 80053 COMPREHEN METABOLIC PANEL: CPT | Performed by: INTERNAL MEDICINE

## 2021-11-26 PROCEDURE — 83735 ASSAY OF MAGNESIUM: CPT | Performed by: INTERNAL MEDICINE

## 2021-11-26 RX ORDER — DILTIAZEM HYDROCHLORIDE 30 MG/1
30 TABLET, FILM COATED ORAL EVERY 6 HOURS
Status: DISCONTINUED | OUTPATIENT
Start: 2021-11-26 | End: 2021-11-28

## 2021-11-26 RX ORDER — AZITHROMYCIN 250 MG/1
500 TABLET, FILM COATED ORAL DAILY
Status: DISCONTINUED | OUTPATIENT
Start: 2021-11-26 | End: 2021-11-26

## 2021-11-26 RX ORDER — DIAZEPAM 5 MG/1
5 TABLET ORAL ONCE
Status: COMPLETED | OUTPATIENT
Start: 2021-11-26 | End: 2021-11-26

## 2021-11-26 RX ORDER — HYDROMORPHONE HYDROCHLORIDE 1 MG/ML
1 INJECTION, SOLUTION INTRAMUSCULAR; INTRAVENOUS; SUBCUTANEOUS EVERY 4 HOURS PRN
Status: DISCONTINUED | OUTPATIENT
Start: 2021-11-26 | End: 2021-11-27

## 2021-11-26 RX ORDER — METRONIDAZOLE 500 MG/100ML
500 INJECTION, SOLUTION INTRAVENOUS
Status: DISCONTINUED | OUTPATIENT
Start: 2021-11-26 | End: 2021-11-26

## 2021-11-26 RX ORDER — DEXAMETHASONE SODIUM PHOSPHATE 4 MG/ML
20 INJECTION, SOLUTION INTRA-ARTICULAR; INTRALESIONAL; INTRAMUSCULAR; INTRAVENOUS; SOFT TISSUE DAILY
Status: DISCONTINUED | OUTPATIENT
Start: 2021-11-26 | End: 2021-11-26

## 2021-11-26 RX ORDER — AZITHROMYCIN 250 MG/1
250 TABLET, FILM COATED ORAL DAILY
Status: COMPLETED | OUTPATIENT
Start: 2021-11-27 | End: 2021-11-30

## 2021-11-26 RX ORDER — IPRATROPIUM BROMIDE AND ALBUTEROL SULFATE 2.5; .5 MG/3ML; MG/3ML
3 SOLUTION RESPIRATORY (INHALATION) EVERY 6 HOURS PRN
Status: DISCONTINUED | OUTPATIENT
Start: 2021-11-26 | End: 2021-11-26

## 2021-11-26 RX ORDER — AZITHROMYCIN 250 MG/1
500 TABLET, FILM COATED ORAL DAILY
Status: COMPLETED | OUTPATIENT
Start: 2021-11-26 | End: 2021-11-26

## 2021-11-26 RX ORDER — ACETAMINOPHEN 325 MG/1
650 TABLET ORAL EVERY 6 HOURS PRN
Status: DISCONTINUED | OUTPATIENT
Start: 2021-11-26 | End: 2021-12-07 | Stop reason: HOSPADM

## 2021-11-26 RX ORDER — IPRATROPIUM BROMIDE AND ALBUTEROL SULFATE 2.5; .5 MG/3ML; MG/3ML
3 SOLUTION RESPIRATORY (INHALATION)
Status: DISCONTINUED | OUTPATIENT
Start: 2021-11-26 | End: 2021-11-26

## 2021-11-26 RX ORDER — DEXAMETHASONE SODIUM PHOSPHATE 4 MG/ML
20 INJECTION, SOLUTION INTRA-ARTICULAR; INTRALESIONAL; INTRAMUSCULAR; INTRAVENOUS; SOFT TISSUE DAILY
Status: DISCONTINUED | OUTPATIENT
Start: 2021-11-26 | End: 2021-11-28

## 2021-11-26 RX ORDER — IPRATROPIUM BROMIDE AND ALBUTEROL SULFATE 2.5; .5 MG/3ML; MG/3ML
3 SOLUTION RESPIRATORY (INHALATION) EVERY 4 HOURS
Status: DISCONTINUED | OUTPATIENT
Start: 2021-11-26 | End: 2021-12-07

## 2021-11-26 RX ORDER — METRONIDAZOLE 500 MG/1
500 TABLET ORAL EVERY 8 HOURS
Status: DISCONTINUED | OUTPATIENT
Start: 2021-11-26 | End: 2021-11-27

## 2021-11-26 RX ORDER — HYDROMORPHONE HYDROCHLORIDE 1 MG/ML
1 INJECTION, SOLUTION INTRAMUSCULAR; INTRAVENOUS; SUBCUTANEOUS ONCE
Status: COMPLETED | OUTPATIENT
Start: 2021-11-26 | End: 2021-11-26

## 2021-11-26 RX ADMIN — HYDROMORPHONE HYDROCHLORIDE 1 MG: 1 INJECTION, SOLUTION INTRAMUSCULAR; INTRAVENOUS; SUBCUTANEOUS at 08:11

## 2021-11-26 RX ADMIN — GABAPENTIN 300 MG: 300 CAPSULE ORAL at 02:11

## 2021-11-26 RX ADMIN — DILTIAZEM HYDROCHLORIDE 30 MG: 30 TABLET, FILM COATED ORAL at 11:11

## 2021-11-26 RX ADMIN — MUPIROCIN: 20 OINTMENT TOPICAL at 09:11

## 2021-11-26 RX ADMIN — DIAZEPAM 10 MG: 5 TABLET ORAL at 09:11

## 2021-11-26 RX ADMIN — INSULIN ASPART 1 UNITS: 100 INJECTION, SOLUTION INTRAVENOUS; SUBCUTANEOUS at 09:11

## 2021-11-26 RX ADMIN — IPRATROPIUM BROMIDE AND ALBUTEROL SULFATE 3 ML: 2.5; .5 SOLUTION RESPIRATORY (INHALATION) at 03:11

## 2021-11-26 RX ADMIN — IPRATROPIUM BROMIDE AND ALBUTEROL SULFATE 3 ML: 2.5; .5 SOLUTION RESPIRATORY (INHALATION) at 08:11

## 2021-11-26 RX ADMIN — DIAZEPAM 5 MG: 5 TABLET ORAL at 05:11

## 2021-11-26 RX ADMIN — PANTOPRAZOLE SODIUM 40 MG: 40 TABLET, DELAYED RELEASE ORAL at 08:11

## 2021-11-26 RX ADMIN — Medication 6 MG: at 09:11

## 2021-11-26 RX ADMIN — QUETIAPINE FUMARATE 50 MG: 25 TABLET ORAL at 09:11

## 2021-11-26 RX ADMIN — AZITHROMYCIN MONOHYDRATE 500 MG: 250 TABLET ORAL at 03:11

## 2021-11-26 RX ADMIN — HYDROMORPHONE HYDROCHLORIDE 1 MG: 1 INJECTION, SOLUTION INTRAMUSCULAR; INTRAVENOUS; SUBCUTANEOUS at 04:11

## 2021-11-26 RX ADMIN — METRONIDAZOLE 500 MG: 500 TABLET ORAL at 09:11

## 2021-11-26 RX ADMIN — DIAZEPAM 10 MG: 5 TABLET ORAL at 11:11

## 2021-11-26 RX ADMIN — IPRATROPIUM BROMIDE AND ALBUTEROL SULFATE 3 ML: 2.5; .5 SOLUTION RESPIRATORY (INHALATION) at 09:11

## 2021-11-26 RX ADMIN — SOTALOL HYDROCHLORIDE 120 MG: 120 TABLET ORAL at 08:11

## 2021-11-26 RX ADMIN — IOHEXOL 100 ML: 350 INJECTION, SOLUTION INTRAVENOUS at 01:11

## 2021-11-26 RX ADMIN — ONDANSETRON 4 MG: 4 TABLET, ORALLY DISINTEGRATING ORAL at 08:11

## 2021-11-26 RX ADMIN — VANCOMYCIN HYDROCHLORIDE 1000 MG: 1 INJECTION, POWDER, LYOPHILIZED, FOR SOLUTION INTRAVENOUS at 01:11

## 2021-11-26 RX ADMIN — HYDROMORPHONE HYDROCHLORIDE 1 MG: 1 INJECTION, SOLUTION INTRAMUSCULAR; INTRAVENOUS; SUBCUTANEOUS at 03:11

## 2021-11-26 RX ADMIN — DEXAMETHASONE SODIUM PHOSPHATE 20 MG: 4 INJECTION INTRA-ARTICULAR; INTRALESIONAL; INTRAMUSCULAR; INTRAVENOUS; SOFT TISSUE at 03:11

## 2021-11-26 RX ADMIN — OXYCODONE 5 MG: 5 TABLET ORAL at 11:11

## 2021-11-26 RX ADMIN — OXYCODONE 5 MG: 5 TABLET ORAL at 09:11

## 2021-11-26 RX ADMIN — HYDROXYUREA 2500 MG: 500 CAPSULE ORAL at 08:11

## 2021-11-26 RX ADMIN — OXCARBAZEPINE 600 MG: 600 TABLET, FILM COATED ORAL at 09:11

## 2021-11-26 RX ADMIN — GABAPENTIN 300 MG: 300 CAPSULE ORAL at 08:11

## 2021-11-26 RX ADMIN — DILTIAZEM HYDROCHLORIDE 30 MG: 30 TABLET, FILM COATED ORAL at 05:11

## 2021-11-26 RX ADMIN — CEFEPIME 2 G: 2 INJECTION, POWDER, FOR SOLUTION INTRAVENOUS at 09:11

## 2021-11-26 RX ADMIN — HYDROMORPHONE HYDROCHLORIDE 1 MG: 1 INJECTION, SOLUTION INTRAMUSCULAR; INTRAVENOUS; SUBCUTANEOUS at 07:11

## 2021-11-26 RX ADMIN — VANCOMYCIN HYDROCHLORIDE 1000 MG: 1 INJECTION, POWDER, LYOPHILIZED, FOR SOLUTION INTRAVENOUS at 05:11

## 2021-11-26 RX ADMIN — VANCOMYCIN HYDROCHLORIDE 1000 MG: 1 INJECTION, POWDER, LYOPHILIZED, FOR SOLUTION INTRAVENOUS at 09:11

## 2021-11-26 RX ADMIN — METRONIDAZOLE 500 MG: 500 INJECTION, SOLUTION INTRAVENOUS at 03:11

## 2021-11-26 RX ADMIN — CEFEPIME 2 G: 2 INJECTION, POWDER, FOR SOLUTION INTRAVENOUS at 05:11

## 2021-11-26 RX ADMIN — POTASSIUM BICARBONATE 25 MEQ: 978 TABLET, EFFERVESCENT ORAL at 08:11

## 2021-11-26 RX ADMIN — CEFEPIME 2 G: 2 INJECTION, POWDER, FOR SOLUTION INTRAVENOUS at 02:11

## 2021-11-26 RX ADMIN — GABAPENTIN 300 MG: 300 CAPSULE ORAL at 09:11

## 2021-11-26 RX ADMIN — ACETAMINOPHEN 650 MG: 325 TABLET ORAL at 08:11

## 2021-11-26 RX ADMIN — OXCARBAZEPINE 600 MG: 600 TABLET, FILM COATED ORAL at 08:11

## 2021-11-26 RX ADMIN — OXYCODONE 5 MG: 5 TABLET ORAL at 03:11

## 2021-11-27 PROBLEM — M54.9 BACK PAIN: Status: ACTIVE | Noted: 2021-11-24

## 2021-11-27 PROBLEM — C91.00 B-CELL ACUTE LYMPHOBLASTIC LEUKEMIA: Status: ACTIVE | Noted: 2021-11-24

## 2021-11-27 LAB
ALBUMIN SERPL BCP-MCNC: 2.9 G/DL (ref 3.5–5.2)
ALP SERPL-CCNC: 223 U/L (ref 55–135)
ALT SERPL W/O P-5'-P-CCNC: 136 U/L (ref 10–44)
ANION GAP SERPL CALC-SCNC: 15 MMOL/L (ref 8–16)
ANISOCYTOSIS BLD QL SMEAR: SLIGHT
APTT BLDCRRT: 23.6 SEC (ref 21–32)
AST SERPL-CCNC: 90 U/L (ref 10–40)
BASOPHILS # BLD AUTO: ABNORMAL K/UL (ref 0–0.2)
BASOPHILS NFR BLD: 0 % (ref 0–1.9)
BILIRUB SERPL-MCNC: 0.4 MG/DL (ref 0.1–1)
BUN SERPL-MCNC: 9 MG/DL (ref 6–20)
CALCIUM SERPL-MCNC: 8.6 MG/DL (ref 8.7–10.5)
CHLORIDE SERPL-SCNC: 95 MMOL/L (ref 95–110)
CO2 SERPL-SCNC: 21 MMOL/L (ref 23–29)
CREAT SERPL-MCNC: 0.7 MG/DL (ref 0.5–1.4)
DIFFERENTIAL METHOD: ABNORMAL
EOSINOPHIL # BLD AUTO: ABNORMAL K/UL (ref 0–0.5)
EOSINOPHIL NFR BLD: 2 % (ref 0–8)
ERYTHROCYTE [DISTWIDTH] IN BLOOD BY AUTOMATED COUNT: 13.7 % (ref 11.5–14.5)
EST. GFR  (AFRICAN AMERICAN): >60 ML/MIN/1.73 M^2
EST. GFR  (NON AFRICAN AMERICAN): >60 ML/MIN/1.73 M^2
FIBRINOGEN PPP-MCNC: 663 MG/DL (ref 182–400)
FLT3 RESULT: NORMAL
GLUCOSE SERPL-MCNC: 171 MG/DL (ref 70–110)
HCT VFR BLD AUTO: 30.4 % (ref 37–48.5)
HGB BLD-MCNC: 10 G/DL (ref 12–16)
HYPOCHROMIA BLD QL SMEAR: ABNORMAL
IMM GRANULOCYTES # BLD AUTO: ABNORMAL K/UL (ref 0–0.04)
IMM GRANULOCYTES NFR BLD AUTO: ABNORMAL % (ref 0–0.5)
INR PPP: 1.1 (ref 0.8–1.2)
LDH SERPL L TO P-CCNC: 2747 U/L (ref 110–260)
LYMPHOCYTES # BLD AUTO: ABNORMAL K/UL (ref 1–4.8)
LYMPHOCYTES NFR BLD: 26 % (ref 18–48)
MCH RBC QN AUTO: 29.5 PG (ref 27–31)
MCHC RBC AUTO-ENTMCNC: 32.9 G/DL (ref 32–36)
MCV RBC AUTO: 90 FL (ref 82–98)
MONOCYTES # BLD AUTO: ABNORMAL K/UL (ref 0.3–1)
MONOCYTES NFR BLD: 2 % (ref 4–15)
MYELOCYTES NFR BLD MANUAL: 1 %
NEUTROPHILS NFR BLD: 3 % (ref 38–73)
NRBC BLD-RTO: 0 /100 WBC
OVALOCYTES BLD QL SMEAR: ABNORMAL
PATH REPORT.FINAL DX SPEC: NORMAL
PHOSPHATE SERPL-MCNC: 3.3 MG/DL (ref 2.7–4.5)
PLATELET # BLD AUTO: 54 K/UL (ref 150–450)
PLATELET BLD QL SMEAR: ABNORMAL
PMV BLD AUTO: 11 FL (ref 9.2–12.9)
POCT GLUCOSE: 162 MG/DL (ref 70–110)
POCT GLUCOSE: 186 MG/DL (ref 70–110)
POCT GLUCOSE: 190 MG/DL (ref 70–110)
POCT GLUCOSE: 289 MG/DL (ref 70–110)
POIKILOCYTOSIS BLD QL SMEAR: SLIGHT
POTASSIUM SERPL-SCNC: 4.5 MMOL/L (ref 3.5–5.1)
PROMYELOCYTES NFR BLD MANUAL: 1 %
PROT SERPL-MCNC: 6.1 G/DL (ref 6–8.4)
PROTHROMBIN TIME: 12.4 SEC (ref 9–12.5)
RBC # BLD AUTO: 3.39 M/UL (ref 4–5.4)
SMUDGE CELLS BLD QL SMEAR: PRESENT
SODIUM SERPL-SCNC: 131 MMOL/L (ref 136–145)
SPECIMEN TYPE: NORMAL
URATE SERPL-MCNC: 3.4 MG/DL (ref 2.4–5.7)
VANCOMYCIN TROUGH SERPL-MCNC: 9.7 UG/ML (ref 10–22)
WBC # BLD AUTO: 39.69 K/UL (ref 3.9–12.7)
WBC OTHER NFR BLD MANUAL: 65 %

## 2021-11-27 PROCEDURE — 25000003 PHARM REV CODE 250: Performed by: STUDENT IN AN ORGANIZED HEALTH CARE EDUCATION/TRAINING PROGRAM

## 2021-11-27 PROCEDURE — 25000003 PHARM REV CODE 250: Performed by: INTERNAL MEDICINE

## 2021-11-27 PROCEDURE — 63600175 PHARM REV CODE 636 W HCPCS: Performed by: STUDENT IN AN ORGANIZED HEALTH CARE EDUCATION/TRAINING PROGRAM

## 2021-11-27 PROCEDURE — 84550 ASSAY OF BLOOD/URIC ACID: CPT | Performed by: STUDENT IN AN ORGANIZED HEALTH CARE EDUCATION/TRAINING PROGRAM

## 2021-11-27 PROCEDURE — 80053 COMPREHEN METABOLIC PANEL: CPT | Performed by: STUDENT IN AN ORGANIZED HEALTH CARE EDUCATION/TRAINING PROGRAM

## 2021-11-27 PROCEDURE — 93005 ELECTROCARDIOGRAM TRACING: CPT

## 2021-11-27 PROCEDURE — 85730 THROMBOPLASTIN TIME PARTIAL: CPT | Performed by: STUDENT IN AN ORGANIZED HEALTH CARE EDUCATION/TRAINING PROGRAM

## 2021-11-27 PROCEDURE — 63600175 PHARM REV CODE 636 W HCPCS: Performed by: INTERNAL MEDICINE

## 2021-11-27 PROCEDURE — 94760 N-INVAS EAR/PLS OXIMETRY 1: CPT

## 2021-11-27 PROCEDURE — 93010 ELECTROCARDIOGRAM REPORT: CPT | Mod: ,,, | Performed by: INTERNAL MEDICINE

## 2021-11-27 PROCEDURE — 85007 BL SMEAR W/DIFF WBC COUNT: CPT | Performed by: STUDENT IN AN ORGANIZED HEALTH CARE EDUCATION/TRAINING PROGRAM

## 2021-11-27 PROCEDURE — 20600001 HC STEP DOWN PRIVATE ROOM

## 2021-11-27 PROCEDURE — 27000221 HC OXYGEN, UP TO 24 HOURS

## 2021-11-27 PROCEDURE — 84100 ASSAY OF PHOSPHORUS: CPT | Performed by: STUDENT IN AN ORGANIZED HEALTH CARE EDUCATION/TRAINING PROGRAM

## 2021-11-27 PROCEDURE — 36415 COLL VENOUS BLD VENIPUNCTURE: CPT | Performed by: INTERNAL MEDICINE

## 2021-11-27 PROCEDURE — 85027 COMPLETE CBC AUTOMATED: CPT | Performed by: STUDENT IN AN ORGANIZED HEALTH CARE EDUCATION/TRAINING PROGRAM

## 2021-11-27 PROCEDURE — 85384 FIBRINOGEN ACTIVITY: CPT | Performed by: STUDENT IN AN ORGANIZED HEALTH CARE EDUCATION/TRAINING PROGRAM

## 2021-11-27 PROCEDURE — 99900035 HC TECH TIME PER 15 MIN (STAT)

## 2021-11-27 PROCEDURE — 94761 N-INVAS EAR/PLS OXIMETRY MLT: CPT

## 2021-11-27 PROCEDURE — 99233 SBSQ HOSP IP/OBS HIGH 50: CPT | Mod: ,,, | Performed by: INTERNAL MEDICINE

## 2021-11-27 PROCEDURE — 63700000 PHARM REV CODE 250 ALT 637 W/O HCPCS: Performed by: STUDENT IN AN ORGANIZED HEALTH CARE EDUCATION/TRAINING PROGRAM

## 2021-11-27 PROCEDURE — 99232 PR SUBSEQUENT HOSPITAL CARE,LEVL II: ICD-10-PCS | Mod: ,,, | Performed by: INTERNAL MEDICINE

## 2021-11-27 PROCEDURE — 99233 PR SUBSEQUENT HOSPITAL CARE,LEVL III: ICD-10-PCS | Mod: ,,, | Performed by: INTERNAL MEDICINE

## 2021-11-27 PROCEDURE — 94640 AIRWAY INHALATION TREATMENT: CPT

## 2021-11-27 PROCEDURE — 85610 PROTHROMBIN TIME: CPT | Performed by: STUDENT IN AN ORGANIZED HEALTH CARE EDUCATION/TRAINING PROGRAM

## 2021-11-27 PROCEDURE — 25000242 PHARM REV CODE 250 ALT 637 W/ HCPCS: Performed by: STUDENT IN AN ORGANIZED HEALTH CARE EDUCATION/TRAINING PROGRAM

## 2021-11-27 PROCEDURE — 93010 EKG 12-LEAD: ICD-10-PCS | Mod: ,,, | Performed by: INTERNAL MEDICINE

## 2021-11-27 PROCEDURE — 80202 ASSAY OF VANCOMYCIN: CPT | Performed by: INTERNAL MEDICINE

## 2021-11-27 PROCEDURE — 99232 SBSQ HOSP IP/OBS MODERATE 35: CPT | Mod: ,,, | Performed by: INTERNAL MEDICINE

## 2021-11-27 PROCEDURE — 83615 LACTATE (LD) (LDH) ENZYME: CPT | Performed by: STUDENT IN AN ORGANIZED HEALTH CARE EDUCATION/TRAINING PROGRAM

## 2021-11-27 RX ORDER — OXYCODONE HYDROCHLORIDE 10 MG/1
10 TABLET ORAL EVERY 4 HOURS PRN
Status: DISCONTINUED | OUTPATIENT
Start: 2021-11-27 | End: 2021-11-27

## 2021-11-27 RX ORDER — DIAZEPAM 5 MG/1
10 TABLET ORAL 2 TIMES DAILY
Status: DISCONTINUED | OUTPATIENT
Start: 2021-11-27 | End: 2021-11-28

## 2021-11-27 RX ORDER — AMOXICILLIN 250 MG
1 CAPSULE ORAL 2 TIMES DAILY
Status: DISCONTINUED | OUTPATIENT
Start: 2021-11-27 | End: 2021-12-07 | Stop reason: HOSPADM

## 2021-11-27 RX ORDER — HYDROMORPHONE HYDROCHLORIDE 1 MG/ML
1 INJECTION, SOLUTION INTRAMUSCULAR; INTRAVENOUS; SUBCUTANEOUS EVERY 4 HOURS PRN
Status: DISCONTINUED | OUTPATIENT
Start: 2021-11-27 | End: 2021-12-05

## 2021-11-27 RX ORDER — POLYETHYLENE GLYCOL 3350 17 G/17G
17 POWDER, FOR SOLUTION ORAL DAILY
Status: DISCONTINUED | OUTPATIENT
Start: 2021-11-27 | End: 2021-11-28

## 2021-11-27 RX ORDER — MORPHINE SULFATE 30 MG/1
30 TABLET, FILM COATED, EXTENDED RELEASE ORAL EVERY 12 HOURS
Status: DISCONTINUED | OUTPATIENT
Start: 2021-11-27 | End: 2021-11-28

## 2021-11-27 RX ORDER — OXYCODONE HYDROCHLORIDE 5 MG/1
5 TABLET ORAL ONCE
Status: COMPLETED | OUTPATIENT
Start: 2021-11-27 | End: 2021-11-27

## 2021-11-27 RX ADMIN — METRONIDAZOLE 500 MG: 500 TABLET ORAL at 05:11

## 2021-11-27 RX ADMIN — QUETIAPINE FUMARATE 50 MG: 25 TABLET ORAL at 08:11

## 2021-11-27 RX ADMIN — OXYCODONE 5 MG: 5 TABLET ORAL at 03:11

## 2021-11-27 RX ADMIN — MUPIROCIN: 20 OINTMENT TOPICAL at 08:11

## 2021-11-27 RX ADMIN — OXYCODONE 5 MG: 5 TABLET ORAL at 08:11

## 2021-11-27 RX ADMIN — DILTIAZEM HYDROCHLORIDE 30 MG: 30 TABLET, FILM COATED ORAL at 11:11

## 2021-11-27 RX ADMIN — OXCARBAZEPINE 600 MG: 600 TABLET, FILM COATED ORAL at 08:11

## 2021-11-27 RX ADMIN — HYDROMORPHONE HYDROCHLORIDE 1 MG: 1 INJECTION, SOLUTION INTRAMUSCULAR; INTRAVENOUS; SUBCUTANEOUS at 12:11

## 2021-11-27 RX ADMIN — ONDANSETRON 4 MG: 4 TABLET, ORALLY DISINTEGRATING ORAL at 08:11

## 2021-11-27 RX ADMIN — DIAZEPAM 10 MG: 5 TABLET ORAL at 08:11

## 2021-11-27 RX ADMIN — DILTIAZEM HYDROCHLORIDE 30 MG: 30 TABLET, FILM COATED ORAL at 05:11

## 2021-11-27 RX ADMIN — PANTOPRAZOLE SODIUM 40 MG: 40 TABLET, DELAYED RELEASE ORAL at 08:11

## 2021-11-27 RX ADMIN — HYDROMORPHONE HYDROCHLORIDE 1 MG: 1 INJECTION, SOLUTION INTRAMUSCULAR; INTRAVENOUS; SUBCUTANEOUS at 09:11

## 2021-11-27 RX ADMIN — HYDROMORPHONE HYDROCHLORIDE 1 MG: 1 INJECTION, SOLUTION INTRAMUSCULAR; INTRAVENOUS; SUBCUTANEOUS at 02:11

## 2021-11-27 RX ADMIN — ONDANSETRON 4 MG: 4 TABLET, ORALLY DISINTEGRATING ORAL at 12:11

## 2021-11-27 RX ADMIN — AZITHROMYCIN MONOHYDRATE 250 MG: 250 TABLET ORAL at 08:11

## 2021-11-27 RX ADMIN — INSULIN ASPART 1 UNITS: 100 INJECTION, SOLUTION INTRAVENOUS; SUBCUTANEOUS at 08:11

## 2021-11-27 RX ADMIN — IPRATROPIUM BROMIDE AND ALBUTEROL SULFATE 3 ML: 2.5; .5 SOLUTION RESPIRATORY (INHALATION) at 12:11

## 2021-11-27 RX ADMIN — MORPHINE SULFATE 30 MG: 30 TABLET, FILM COATED, EXTENDED RELEASE ORAL at 08:11

## 2021-11-27 RX ADMIN — IPRATROPIUM BROMIDE AND ALBUTEROL SULFATE 3 ML: 2.5; .5 SOLUTION RESPIRATORY (INHALATION) at 05:11

## 2021-11-27 RX ADMIN — IPRATROPIUM BROMIDE AND ALBUTEROL SULFATE 3 ML: 2.5; .5 SOLUTION RESPIRATORY (INHALATION) at 11:11

## 2021-11-27 RX ADMIN — SODIUM CHLORIDE: 0.9 INJECTION, SOLUTION INTRAVENOUS at 03:11

## 2021-11-27 RX ADMIN — VANCOMYCIN HYDROCHLORIDE 1250 MG: 1.25 INJECTION, POWDER, LYOPHILIZED, FOR SOLUTION INTRAVENOUS at 09:11

## 2021-11-27 RX ADMIN — VANCOMYCIN HYDROCHLORIDE 1000 MG: 1 INJECTION, POWDER, LYOPHILIZED, FOR SOLUTION INTRAVENOUS at 02:11

## 2021-11-27 RX ADMIN — GABAPENTIN 300 MG: 300 CAPSULE ORAL at 08:11

## 2021-11-27 RX ADMIN — DEXAMETHASONE SODIUM PHOSPHATE 20 MG: 4 INJECTION INTRA-ARTICULAR; INTRALESIONAL; INTRAMUSCULAR; INTRAVENOUS; SOFT TISSUE at 05:11

## 2021-11-27 RX ADMIN — Medication 6 MG: at 08:11

## 2021-11-27 RX ADMIN — CEFEPIME 2 G: 2 INJECTION, POWDER, FOR SOLUTION INTRAVENOUS at 05:11

## 2021-11-27 RX ADMIN — IPRATROPIUM BROMIDE AND ALBUTEROL SULFATE 3 ML: 2.5; .5 SOLUTION RESPIRATORY (INHALATION) at 08:11

## 2021-11-27 RX ADMIN — VANCOMYCIN HYDROCHLORIDE 1250 MG: 1.25 INJECTION, POWDER, LYOPHILIZED, FOR SOLUTION INTRAVENOUS at 05:11

## 2021-11-27 RX ADMIN — MORPHINE SULFATE 30 MG: 30 TABLET, FILM COATED, EXTENDED RELEASE ORAL at 11:11

## 2021-11-27 RX ADMIN — OXYCODONE 5 MG: 5 TABLET ORAL at 09:11

## 2021-11-27 RX ADMIN — IPRATROPIUM BROMIDE AND ALBUTEROL SULFATE 3 ML: 2.5; .5 SOLUTION RESPIRATORY (INHALATION) at 03:11

## 2021-11-27 RX ADMIN — HYDROMORPHONE HYDROCHLORIDE 1 MG: 1 INJECTION, SOLUTION INTRAMUSCULAR; INTRAVENOUS; SUBCUTANEOUS at 05:11

## 2021-11-27 RX ADMIN — ACETAMINOPHEN 650 MG: 325 TABLET ORAL at 11:11

## 2021-11-27 RX ADMIN — CEFEPIME 2 G: 2 INJECTION, POWDER, FOR SOLUTION INTRAVENOUS at 09:11

## 2021-11-27 RX ADMIN — SENNOSIDES AND DOCUSATE SODIUM 1 TABLET: 50; 8.6 TABLET ORAL at 08:11

## 2021-11-27 RX ADMIN — GABAPENTIN 300 MG: 300 CAPSULE ORAL at 02:11

## 2021-11-27 RX ADMIN — CEFEPIME 2 G: 2 INJECTION, POWDER, FOR SOLUTION INTRAVENOUS at 02:11

## 2021-11-27 RX ADMIN — DILTIAZEM HYDROCHLORIDE 30 MG: 30 TABLET, FILM COATED ORAL at 12:11

## 2021-11-28 ENCOUNTER — ANESTHESIA EVENT (OUTPATIENT)
Dept: ONCOLOGY | Facility: HOSPITAL | Age: 51
End: 2021-11-28

## 2021-11-28 ENCOUNTER — ANESTHESIA (OUTPATIENT)
Dept: ONCOLOGY | Facility: HOSPITAL | Age: 51
End: 2021-11-28

## 2021-11-28 PROBLEM — J96.01 ACUTE HYPOXEMIC RESPIRATORY FAILURE: Status: ACTIVE | Noted: 2021-11-28

## 2021-11-28 LAB
ALBUMIN SERPL BCP-MCNC: 2.4 G/DL (ref 3.5–5.2)
ALBUMIN SERPL BCP-MCNC: 2.5 G/DL (ref 3.5–5.2)
ALLENS TEST: ABNORMAL
ALP SERPL-CCNC: 187 U/L (ref 55–135)
ALP SERPL-CCNC: 213 U/L (ref 55–135)
ALT SERPL W/O P-5'-P-CCNC: 50 U/L (ref 10–44)
ALT SERPL W/O P-5'-P-CCNC: 65 U/L (ref 10–44)
AML FISH ADDITIONAL INFORMATION (BM): NORMAL
AML FISH DISCLAIMER (BM): NORMAL
AML FISH REASON FOR REFERRAL (BM): NORMAL
AML FISH RELEASED BY (BM): NORMAL
AML FISH RESULT (BM): NORMAL
AML FISH RESULT SUMMARY (BM): NORMAL
AML FISH RESULT TABLE (BM): NORMAL
ANION GAP SERPL CALC-SCNC: 10 MMOL/L (ref 8–16)
ANION GAP SERPL CALC-SCNC: 14 MMOL/L (ref 8–16)
ANISOCYTOSIS BLD QL SMEAR: SLIGHT
ANISOCYTOSIS BLD QL SMEAR: SLIGHT
APTT BLDCRRT: 22.5 SEC (ref 21–32)
AST SERPL-CCNC: 21 U/L (ref 10–40)
AST SERPL-CCNC: 29 U/L (ref 10–40)
BASOPHILS # BLD AUTO: ABNORMAL K/UL (ref 0–0.2)
BASOPHILS # BLD AUTO: ABNORMAL K/UL (ref 0–0.2)
BASOPHILS NFR BLD: 0 % (ref 0–1.9)
BASOPHILS NFR BLD: 0 % (ref 0–1.9)
BILIRUB SERPL-MCNC: 0.4 MG/DL (ref 0.1–1)
BILIRUB SERPL-MCNC: 0.5 MG/DL (ref 0.1–1)
BLASTS NFR BLD MANUAL: 60 %
BLASTS NFR BLD MANUAL: 60.5 %
BUN SERPL-MCNC: 11 MG/DL (ref 6–20)
BUN SERPL-MCNC: 7 MG/DL (ref 6–20)
CALCIUM SERPL-MCNC: 8.3 MG/DL (ref 8.7–10.5)
CALCIUM SERPL-MCNC: 9 MG/DL (ref 8.7–10.5)
CHLORIDE SERPL-SCNC: 91 MMOL/L (ref 95–110)
CHLORIDE SERPL-SCNC: 97 MMOL/L (ref 95–110)
CLINICAL CYTOGENETICIST REVIEW: NORMAL
CO2 SERPL-SCNC: 24 MMOL/L (ref 23–29)
CO2 SERPL-SCNC: 26 MMOL/L (ref 23–29)
CREAT SERPL-MCNC: 0.6 MG/DL (ref 0.5–1.4)
CREAT SERPL-MCNC: 0.7 MG/DL (ref 0.5–1.4)
DELSYS: ABNORMAL
DIFFERENTIAL METHOD: ABNORMAL
DIFFERENTIAL METHOD: ABNORMAL
EOSINOPHIL # BLD AUTO: ABNORMAL K/UL (ref 0–0.5)
EOSINOPHIL # BLD AUTO: ABNORMAL K/UL (ref 0–0.5)
EOSINOPHIL NFR BLD: 0.5 % (ref 0–8)
EOSINOPHIL NFR BLD: 1.5 % (ref 0–8)
EP: 5
ERYTHROCYTE [DISTWIDTH] IN BLOOD BY AUTOMATED COUNT: 13.7 % (ref 11.5–14.5)
ERYTHROCYTE [DISTWIDTH] IN BLOOD BY AUTOMATED COUNT: 13.9 % (ref 11.5–14.5)
ERYTHROCYTE [SEDIMENTATION RATE] IN BLOOD BY WESTERGREN METHOD: 12 MM/H
EST. GFR  (AFRICAN AMERICAN): >60 ML/MIN/1.73 M^2
EST. GFR  (AFRICAN AMERICAN): >60 ML/MIN/1.73 M^2
EST. GFR  (NON AFRICAN AMERICAN): >60 ML/MIN/1.73 M^2
EST. GFR  (NON AFRICAN AMERICAN): >60 ML/MIN/1.73 M^2
FAMLB SPECIMEN: NORMAL
FIBRINOGEN PPP-MCNC: 636 MG/DL (ref 182–400)
FIO2: 75
GLUCOSE SERPL-MCNC: 228 MG/DL (ref 70–110)
GLUCOSE SERPL-MCNC: 237 MG/DL (ref 70–110)
HCO3 UR-SCNC: 27.9 MMOL/L (ref 24–28)
HCT VFR BLD AUTO: 24.1 % (ref 37–48.5)
HCT VFR BLD AUTO: 28.2 % (ref 37–48.5)
HGB BLD-MCNC: 8.3 G/DL (ref 12–16)
HGB BLD-MCNC: 9.6 G/DL (ref 12–16)
IMM GRANULOCYTES # BLD AUTO: ABNORMAL K/UL (ref 0–0.04)
IMM GRANULOCYTES # BLD AUTO: ABNORMAL K/UL (ref 0–0.04)
IMM GRANULOCYTES NFR BLD AUTO: ABNORMAL % (ref 0–0.5)
IMM GRANULOCYTES NFR BLD AUTO: ABNORMAL % (ref 0–0.5)
INR PPP: 1.2 (ref 0.8–1.2)
IP: 14
LDH SERPL L TO P-CCNC: 2042 U/L (ref 110–260)
LYMPHOCYTES # BLD AUTO: ABNORMAL K/UL (ref 1–4.8)
LYMPHOCYTES # BLD AUTO: ABNORMAL K/UL (ref 1–4.8)
LYMPHOCYTES NFR BLD: 29.5 % (ref 18–48)
LYMPHOCYTES NFR BLD: 31.5 % (ref 18–48)
MAGNESIUM SERPL-MCNC: 2 MG/DL (ref 1.6–2.6)
MCH RBC QN AUTO: 29.6 PG (ref 27–31)
MCH RBC QN AUTO: 29.8 PG (ref 27–31)
MCHC RBC AUTO-ENTMCNC: 34 G/DL (ref 32–36)
MCHC RBC AUTO-ENTMCNC: 34.4 G/DL (ref 32–36)
MCV RBC AUTO: 86 FL (ref 82–98)
MCV RBC AUTO: 88 FL (ref 82–98)
METAMYELOCYTES NFR BLD MANUAL: 0.5 %
METAMYELOCYTES NFR BLD MANUAL: 0.5 %
MODE: ABNORMAL
MONOCYTES # BLD AUTO: ABNORMAL K/UL (ref 0.3–1)
MONOCYTES # BLD AUTO: ABNORMAL K/UL (ref 0.3–1)
MONOCYTES NFR BLD: 0.5 % (ref 4–15)
MONOCYTES NFR BLD: 1 % (ref 4–15)
MYELOCYTES NFR BLD MANUAL: 0.5 %
NEUTROPHILS # BLD AUTO: ABNORMAL K/UL (ref 1.8–7.7)
NEUTROPHILS NFR BLD: 4.5 % (ref 38–73)
NEUTROPHILS NFR BLD: 6 % (ref 38–73)
NEUTS BAND NFR BLD MANUAL: 1 %
NEUTS BAND NFR BLD MANUAL: 2 %
NRBC BLD-RTO: 0 /100 WBC
NRBC BLD-RTO: 0 /100 WBC
PCO2 BLDA: 41.4 MMHG (ref 35–45)
PH SMN: 7.44 [PH] (ref 7.35–7.45)
PHOSPHATE SERPL-MCNC: 2.5 MG/DL (ref 2.7–4.5)
PHOSPHATE SERPL-MCNC: 2.8 MG/DL (ref 2.7–4.5)
PLATELET # BLD AUTO: 25 K/UL (ref 150–450)
PLATELET # BLD AUTO: 28 K/UL (ref 150–450)
PLATELET BLD QL SMEAR: ABNORMAL
PLATELET BLD QL SMEAR: ABNORMAL
PMV BLD AUTO: 10.7 FL (ref 9.2–12.9)
PMV BLD AUTO: 11.3 FL (ref 9.2–12.9)
PO2 BLDA: 90 MMHG (ref 80–100)
POC BE: 4 MMOL/L
POC SATURATED O2: 97 % (ref 95–100)
POC TCO2: 29 MMOL/L (ref 23–27)
POCT GLUCOSE: 191 MG/DL (ref 70–110)
POCT GLUCOSE: 205 MG/DL (ref 70–110)
POCT GLUCOSE: 263 MG/DL (ref 70–110)
POCT GLUCOSE: 269 MG/DL (ref 70–110)
POCT GLUCOSE: 301 MG/DL (ref 70–110)
POCT GLUCOSE: >500 MG/DL (ref 70–110)
POTASSIUM SERPL-SCNC: 3.6 MMOL/L (ref 3.5–5.1)
POTASSIUM SERPL-SCNC: 4.7 MMOL/L (ref 3.5–5.1)
PROCALCITONIN SERPL IA-MCNC: 0.9 NG/ML
PROT SERPL-MCNC: 5.8 G/DL (ref 6–8.4)
PROT SERPL-MCNC: 6.3 G/DL (ref 6–8.4)
PROTHROMBIN TIME: 12.6 SEC (ref 9–12.5)
RBC # BLD AUTO: 2.8 M/UL (ref 4–5.4)
RBC # BLD AUTO: 3.22 M/UL (ref 4–5.4)
REF LAB TEST METHOD: NORMAL
SAMPLE: ABNORMAL
SARS-COV-2 RDRP RESP QL NAA+PROBE: NEGATIVE
SITE: ABNORMAL
SMUDGE CELLS BLD QL SMEAR: PRESENT
SODIUM SERPL-SCNC: 131 MMOL/L (ref 136–145)
SODIUM SERPL-SCNC: 131 MMOL/L (ref 136–145)
SPECIMEN SOURCE: NORMAL
TROPONIN I SERPL DL<=0.01 NG/ML-MCNC: 0.01 NG/ML (ref 0–0.03)
URATE SERPL-MCNC: 2.7 MG/DL (ref 2.4–5.7)
VANCOMYCIN TROUGH SERPL-MCNC: 18.5 UG/ML (ref 10–22)
WBC # BLD AUTO: 15.38 K/UL (ref 3.9–12.7)
WBC # BLD AUTO: 23.76 K/UL (ref 3.9–12.7)

## 2021-11-28 PROCEDURE — 84100 ASSAY OF PHOSPHORUS: CPT | Performed by: STUDENT IN AN ORGANIZED HEALTH CARE EDUCATION/TRAINING PROGRAM

## 2021-11-28 PROCEDURE — U0002 COVID-19 LAB TEST NON-CDC: HCPCS

## 2021-11-28 PROCEDURE — 84145 PROCALCITONIN (PCT): CPT | Performed by: STUDENT IN AN ORGANIZED HEALTH CARE EDUCATION/TRAINING PROGRAM

## 2021-11-28 PROCEDURE — 25000003 PHARM REV CODE 250: Performed by: INTERNAL MEDICINE

## 2021-11-28 PROCEDURE — 27100171 HC OXYGEN HIGH FLOW UP TO 24 HOURS

## 2021-11-28 PROCEDURE — 85007 BL SMEAR W/DIFF WBC COUNT: CPT | Performed by: INTERNAL MEDICINE

## 2021-11-28 PROCEDURE — 99233 SBSQ HOSP IP/OBS HIGH 50: CPT | Mod: ,,, | Performed by: INTERNAL MEDICINE

## 2021-11-28 PROCEDURE — 99291 PR CRITICAL CARE, E/M 30-74 MINUTES: ICD-10-PCS | Mod: ,,, | Performed by: INTERNAL MEDICINE

## 2021-11-28 PROCEDURE — 36600 WITHDRAWAL OF ARTERIAL BLOOD: CPT

## 2021-11-28 PROCEDURE — C9113 INJ PANTOPRAZOLE SODIUM, VIA: HCPCS

## 2021-11-28 PROCEDURE — 25000003 PHARM REV CODE 250

## 2021-11-28 PROCEDURE — 63600175 PHARM REV CODE 636 W HCPCS

## 2021-11-28 PROCEDURE — 93010 EKG 12-LEAD: ICD-10-PCS | Mod: ,,, | Performed by: INTERNAL MEDICINE

## 2021-11-28 PROCEDURE — 80053 COMPREHEN METABOLIC PANEL: CPT | Performed by: STUDENT IN AN ORGANIZED HEALTH CARE EDUCATION/TRAINING PROGRAM

## 2021-11-28 PROCEDURE — 80053 COMPREHEN METABOLIC PANEL: CPT | Mod: 91 | Performed by: INTERNAL MEDICINE

## 2021-11-28 PROCEDURE — 87449 NOS EACH ORGANISM AG IA: CPT

## 2021-11-28 PROCEDURE — 63600175 PHARM REV CODE 636 W HCPCS: Performed by: STUDENT IN AN ORGANIZED HEALTH CARE EDUCATION/TRAINING PROGRAM

## 2021-11-28 PROCEDURE — 85027 COMPLETE CBC AUTOMATED: CPT | Mod: 91 | Performed by: STUDENT IN AN ORGANIZED HEALTH CARE EDUCATION/TRAINING PROGRAM

## 2021-11-28 PROCEDURE — 25000242 PHARM REV CODE 250 ALT 637 W/ HCPCS: Performed by: STUDENT IN AN ORGANIZED HEALTH CARE EDUCATION/TRAINING PROGRAM

## 2021-11-28 PROCEDURE — 63600175 PHARM REV CODE 636 W HCPCS: Performed by: INTERNAL MEDICINE

## 2021-11-28 PROCEDURE — 36415 COLL VENOUS BLD VENIPUNCTURE: CPT | Performed by: STUDENT IN AN ORGANIZED HEALTH CARE EDUCATION/TRAINING PROGRAM

## 2021-11-28 PROCEDURE — 85384 FIBRINOGEN ACTIVITY: CPT | Performed by: STUDENT IN AN ORGANIZED HEALTH CARE EDUCATION/TRAINING PROGRAM

## 2021-11-28 PROCEDURE — 99233 PR SUBSEQUENT HOSPITAL CARE,LEVL III: ICD-10-PCS | Mod: ,,, | Performed by: INTERNAL MEDICINE

## 2021-11-28 PROCEDURE — 85027 COMPLETE CBC AUTOMATED: CPT | Performed by: INTERNAL MEDICINE

## 2021-11-28 PROCEDURE — 83735 ASSAY OF MAGNESIUM: CPT | Performed by: INTERNAL MEDICINE

## 2021-11-28 PROCEDURE — 99291 CRITICAL CARE FIRST HOUR: CPT | Mod: ,,, | Performed by: INTERNAL MEDICINE

## 2021-11-28 PROCEDURE — 25000003 PHARM REV CODE 250: Performed by: STUDENT IN AN ORGANIZED HEALTH CARE EDUCATION/TRAINING PROGRAM

## 2021-11-28 PROCEDURE — 99900035 HC TECH TIME PER 15 MIN (STAT)

## 2021-11-28 PROCEDURE — 94760 N-INVAS EAR/PLS OXIMETRY 1: CPT

## 2021-11-28 PROCEDURE — 27000221 HC OXYGEN, UP TO 24 HOURS

## 2021-11-28 PROCEDURE — 27000190 HC CPAP FULL FACE MASK W/VALVE

## 2021-11-28 PROCEDURE — 80202 ASSAY OF VANCOMYCIN: CPT | Performed by: INTERNAL MEDICINE

## 2021-11-28 PROCEDURE — 83615 LACTATE (LD) (LDH) ENZYME: CPT | Performed by: STUDENT IN AN ORGANIZED HEALTH CARE EDUCATION/TRAINING PROGRAM

## 2021-11-28 PROCEDURE — 82803 BLOOD GASES ANY COMBINATION: CPT

## 2021-11-28 PROCEDURE — 84484 ASSAY OF TROPONIN QUANT: CPT | Performed by: STUDENT IN AN ORGANIZED HEALTH CARE EDUCATION/TRAINING PROGRAM

## 2021-11-28 PROCEDURE — 94640 AIRWAY INHALATION TREATMENT: CPT

## 2021-11-28 PROCEDURE — 94761 N-INVAS EAR/PLS OXIMETRY MLT: CPT

## 2021-11-28 PROCEDURE — 99900031 HC PATIENT EDUCATION (STAT)

## 2021-11-28 PROCEDURE — S0039 INJECTION, SULFAMETHOXAZOLE: HCPCS | Performed by: STUDENT IN AN ORGANIZED HEALTH CARE EDUCATION/TRAINING PROGRAM

## 2021-11-28 PROCEDURE — 94660 CPAP INITIATION&MGMT: CPT

## 2021-11-28 PROCEDURE — 85610 PROTHROMBIN TIME: CPT | Performed by: STUDENT IN AN ORGANIZED HEALTH CARE EDUCATION/TRAINING PROGRAM

## 2021-11-28 PROCEDURE — 84550 ASSAY OF BLOOD/URIC ACID: CPT | Performed by: STUDENT IN AN ORGANIZED HEALTH CARE EDUCATION/TRAINING PROGRAM

## 2021-11-28 PROCEDURE — 20000000 HC ICU ROOM

## 2021-11-28 PROCEDURE — 85730 THROMBOPLASTIN TIME PARTIAL: CPT | Performed by: STUDENT IN AN ORGANIZED HEALTH CARE EDUCATION/TRAINING PROGRAM

## 2021-11-28 PROCEDURE — 63700000 PHARM REV CODE 250 ALT 637 W/O HCPCS: Performed by: STUDENT IN AN ORGANIZED HEALTH CARE EDUCATION/TRAINING PROGRAM

## 2021-11-28 PROCEDURE — 85007 BL SMEAR W/DIFF WBC COUNT: CPT | Mod: 91 | Performed by: STUDENT IN AN ORGANIZED HEALTH CARE EDUCATION/TRAINING PROGRAM

## 2021-11-28 PROCEDURE — 84100 ASSAY OF PHOSPHORUS: CPT | Mod: 91 | Performed by: INTERNAL MEDICINE

## 2021-11-28 PROCEDURE — 93005 ELECTROCARDIOGRAM TRACING: CPT

## 2021-11-28 PROCEDURE — 25500020 PHARM REV CODE 255: Performed by: INTERNAL MEDICINE

## 2021-11-28 PROCEDURE — 93010 ELECTROCARDIOGRAM REPORT: CPT | Mod: ,,, | Performed by: INTERNAL MEDICINE

## 2021-11-28 RX ORDER — IBUPROFEN 200 MG
24 TABLET ORAL
Status: CANCELLED | OUTPATIENT
Start: 2021-11-28

## 2021-11-28 RX ORDER — OXCARBAZEPINE 600 MG/1
1200 TABLET, FILM COATED ORAL 2 TIMES DAILY
Status: CANCELLED | OUTPATIENT
Start: 2021-11-28

## 2021-11-28 RX ORDER — EMPAGLIFLOZIN 10 MG/1
10 TABLET, FILM COATED ORAL EVERY MORNING
COMMUNITY
Start: 2021-11-12 | End: 2022-02-16

## 2021-11-28 RX ORDER — LOSARTAN POTASSIUM 25 MG/1
25 TABLET ORAL DAILY
Status: ON HOLD | COMMUNITY
Start: 2021-11-12 | End: 2022-10-14 | Stop reason: HOSPADM

## 2021-11-28 RX ORDER — METFORMIN HYDROCHLORIDE 500 MG/1
1000 TABLET, EXTENDED RELEASE ORAL 2 TIMES DAILY
COMMUNITY
Start: 2021-10-28 | End: 2022-02-16

## 2021-11-28 RX ORDER — SODIUM,POTASSIUM PHOSPHATES 280-250MG
2 POWDER IN PACKET (EA) ORAL EVERY 4 HOURS
Status: COMPLETED | OUTPATIENT
Start: 2021-11-28 | End: 2021-11-28

## 2021-11-28 RX ORDER — PANTOPRAZOLE SODIUM 40 MG/10ML
40 INJECTION, POWDER, LYOPHILIZED, FOR SOLUTION INTRAVENOUS DAILY
Status: DISCONTINUED | OUTPATIENT
Start: 2021-11-28 | End: 2021-12-03

## 2021-11-28 RX ORDER — FUROSEMIDE 10 MG/ML
40 INJECTION INTRAMUSCULAR; INTRAVENOUS
Status: DISCONTINUED | OUTPATIENT
Start: 2021-11-28 | End: 2021-11-28

## 2021-11-28 RX ORDER — SODIUM CHLORIDE 9 MG/ML
INJECTION, SOLUTION INTRAVENOUS
Status: DISCONTINUED | OUTPATIENT
Start: 2021-11-28 | End: 2021-12-07 | Stop reason: HOSPADM

## 2021-11-28 RX ORDER — PROPOFOL 10 MG/ML
100 VIAL (ML) INTRAVENOUS ONCE
Status: DISCONTINUED | OUTPATIENT
Start: 2021-11-28 | End: 2021-11-28

## 2021-11-28 RX ORDER — INSULIN ASPART 100 [IU]/ML
1-10 INJECTION, SOLUTION INTRAVENOUS; SUBCUTANEOUS
Status: CANCELLED | OUTPATIENT
Start: 2021-11-28

## 2021-11-28 RX ORDER — GLUCAGON 1 MG
1 KIT INJECTION
Status: CANCELLED | OUTPATIENT
Start: 2021-11-28

## 2021-11-28 RX ORDER — DEXMEDETOMIDINE HYDROCHLORIDE 4 UG/ML
0-1.4 INJECTION, SOLUTION INTRAVENOUS CONTINUOUS
Status: DISCONTINUED | OUTPATIENT
Start: 2021-11-28 | End: 2021-12-04

## 2021-11-28 RX ORDER — TRAMADOL HYDROCHLORIDE AND ACETAMINOPHEN 37.5; 325 MG/1; MG/1
1-2 TABLET, FILM COATED ORAL EVERY 4 HOURS PRN
Status: ON HOLD | COMMUNITY
Start: 2021-11-19 | End: 2021-12-07 | Stop reason: HOSPADM

## 2021-11-28 RX ORDER — IBUPROFEN 200 MG
16 TABLET ORAL
Status: CANCELLED | OUTPATIENT
Start: 2021-11-28

## 2021-11-28 RX ORDER — MIDAZOLAM HYDROCHLORIDE 1 MG/ML
2 INJECTION INTRAMUSCULAR; INTRAVENOUS ONCE
Status: DISCONTINUED | OUTPATIENT
Start: 2021-11-28 | End: 2021-11-28

## 2021-11-28 RX ORDER — ONDANSETRON 4 MG/1
4 TABLET, FILM COATED ORAL 3 TIMES DAILY PRN
COMMUNITY
Start: 2021-11-19 | End: 2021-12-17

## 2021-11-28 RX ORDER — FUROSEMIDE 10 MG/ML
40 INJECTION INTRAMUSCULAR; INTRAVENOUS ONCE
Status: COMPLETED | OUTPATIENT
Start: 2021-11-28 | End: 2021-11-28

## 2021-11-28 RX ORDER — PHENYLEPHRINE HCL IN 0.9% NACL 1 MG/10 ML
100 SYRINGE (ML) INTRAVENOUS ONCE
Status: DISCONTINUED | OUTPATIENT
Start: 2021-11-28 | End: 2021-11-28

## 2021-11-28 RX ORDER — OXCARBAZEPINE 600 MG/1
1200 TABLET, FILM COATED ORAL 2 TIMES DAILY
COMMUNITY
Start: 2021-10-26

## 2021-11-28 RX ORDER — FUROSEMIDE 10 MG/ML
40 INJECTION INTRAMUSCULAR; INTRAVENOUS 3 TIMES DAILY
Status: DISCONTINUED | OUTPATIENT
Start: 2021-11-28 | End: 2021-12-04

## 2021-11-28 RX ADMIN — HYDROMORPHONE HYDROCHLORIDE 1 MG: 1 INJECTION, SOLUTION INTRAMUSCULAR; INTRAVENOUS; SUBCUTANEOUS at 09:11

## 2021-11-28 RX ADMIN — PANTOPRAZOLE SODIUM 40 MG: 40 INJECTION, POWDER, FOR SOLUTION INTRAVENOUS at 11:11

## 2021-11-28 RX ADMIN — FUROSEMIDE 40 MG: 40 INJECTION, SOLUTION INTRAMUSCULAR; INTRAVENOUS at 08:11

## 2021-11-28 RX ADMIN — SULFAMETHOXAZOLE AND TRIMETHOPRIM 487 MG: 80; 16 INJECTION, SOLUTION, CONCENTRATE INTRAVENOUS at 12:11

## 2021-11-28 RX ADMIN — HYDROMORPHONE HYDROCHLORIDE 0.5 MG: 1 INJECTION, SOLUTION INTRAMUSCULAR; INTRAVENOUS; SUBCUTANEOUS at 03:11

## 2021-11-28 RX ADMIN — VANCOMYCIN HYDROCHLORIDE 1250 MG: 1.25 INJECTION, POWDER, LYOPHILIZED, FOR SOLUTION INTRAVENOUS at 07:11

## 2021-11-28 RX ADMIN — IPRATROPIUM BROMIDE AND ALBUTEROL SULFATE 3 ML: 2.5; .5 SOLUTION RESPIRATORY (INHALATION) at 08:11

## 2021-11-28 RX ADMIN — OXCARBAZEPINE 600 MG: 600 TABLET, FILM COATED ORAL at 08:11

## 2021-11-28 RX ADMIN — AZITHROMYCIN MONOHYDRATE 250 MG: 250 TABLET ORAL at 11:11

## 2021-11-28 RX ADMIN — HYDROMORPHONE HYDROCHLORIDE 0.5 MG: 1 INJECTION, SOLUTION INTRAMUSCULAR; INTRAVENOUS; SUBCUTANEOUS at 12:11

## 2021-11-28 RX ADMIN — FUROSEMIDE 40 MG: 10 INJECTION, SOLUTION INTRAVENOUS at 03:11

## 2021-11-28 RX ADMIN — CEFEPIME 2 G: 2 INJECTION, POWDER, FOR SOLUTION INTRAVENOUS at 02:11

## 2021-11-28 RX ADMIN — INSULIN ASPART 2 UNITS: 100 INJECTION, SOLUTION INTRAVENOUS; SUBCUTANEOUS at 08:11

## 2021-11-28 RX ADMIN — CEFEPIME 2 G: 2 INJECTION, POWDER, FOR SOLUTION INTRAVENOUS at 05:11

## 2021-11-28 RX ADMIN — POTASSIUM & SODIUM PHOSPHATES POWDER PACK 280-160-250 MG 2 PACKET: 280-160-250 PACK at 08:11

## 2021-11-28 RX ADMIN — SULFAMETHOXAZOLE AND TRIMETHOPRIM 487 MG: 80; 16 INJECTION, SOLUTION, CONCENTRATE INTRAVENOUS at 09:11

## 2021-11-28 RX ADMIN — VANCOMYCIN HYDROCHLORIDE 1250 MG: 1.25 INJECTION, POWDER, LYOPHILIZED, FOR SOLUTION INTRAVENOUS at 12:11

## 2021-11-28 RX ADMIN — SODIUM CHLORIDE: 0.9 INJECTION, SOLUTION INTRAVENOUS at 07:11

## 2021-11-28 RX ADMIN — DILTIAZEM HYDROCHLORIDE 30 MG: 30 TABLET, FILM COATED ORAL at 05:11

## 2021-11-28 RX ADMIN — FUROSEMIDE 40 MG: 40 INJECTION, SOLUTION INTRAMUSCULAR; INTRAVENOUS at 02:11

## 2021-11-28 RX ADMIN — OXCARBAZEPINE 600 MG: 600 TABLET, FILM COATED ORAL at 11:11

## 2021-11-28 RX ADMIN — DEXAMETHASONE SODIUM PHOSPHATE 20 MG: 4 INJECTION INTRA-ARTICULAR; INTRALESIONAL; INTRAMUSCULAR; INTRAVENOUS; SOFT TISSUE at 05:11

## 2021-11-28 RX ADMIN — IPRATROPIUM BROMIDE AND ALBUTEROL SULFATE 3 ML: 2.5; .5 SOLUTION RESPIRATORY (INHALATION) at 11:11

## 2021-11-28 RX ADMIN — IPRATROPIUM BROMIDE AND ALBUTEROL SULFATE 3 ML: 2.5; .5 SOLUTION RESPIRATORY (INHALATION) at 03:11

## 2021-11-28 RX ADMIN — INSULIN ASPART 2 UNITS: 100 INJECTION, SOLUTION INTRAVENOUS; SUBCUTANEOUS at 09:11

## 2021-11-28 RX ADMIN — POTASSIUM & SODIUM PHOSPHATES POWDER PACK 280-160-250 MG 2 PACKET: 280-160-250 PACK at 06:11

## 2021-11-28 RX ADMIN — MUPIROCIN: 20 OINTMENT TOPICAL at 08:11

## 2021-11-28 RX ADMIN — SENNOSIDES AND DOCUSATE SODIUM 1 TABLET: 50; 8.6 TABLET ORAL at 08:11

## 2021-11-28 RX ADMIN — SULFAMETHOXAZOLE AND TRIMETHOPRIM 487 MG: 80; 16 INJECTION, SOLUTION, CONCENTRATE INTRAVENOUS at 05:11

## 2021-11-28 RX ADMIN — IOHEXOL 100 ML: 350 INJECTION, SOLUTION INTRAVENOUS at 01:11

## 2021-11-28 RX ADMIN — VANCOMYCIN HYDROCHLORIDE 1250 MG: 1.25 INJECTION, POWDER, LYOPHILIZED, FOR SOLUTION INTRAVENOUS at 02:11

## 2021-11-28 RX ADMIN — DEXMEDETOMIDINE HYDROCHLORIDE 0.2 MCG/KG/HR: 4 INJECTION INTRAVENOUS at 04:11

## 2021-11-28 RX ADMIN — CEFEPIME 2 G: 2 INJECTION, POWDER, FOR SOLUTION INTRAVENOUS at 08:11

## 2021-11-28 RX ADMIN — SENNOSIDES AND DOCUSATE SODIUM 1 TABLET: 50; 8.6 TABLET ORAL at 11:11

## 2021-11-28 RX ADMIN — INSULIN ASPART 3 UNITS: 100 INJECTION, SOLUTION INTRAVENOUS; SUBCUTANEOUS at 04:11

## 2021-11-28 RX ADMIN — INSULIN ASPART 1 UNITS: 100 INJECTION, SOLUTION INTRAVENOUS; SUBCUTANEOUS at 04:11

## 2021-11-29 PROBLEM — F32.1 MODERATE MAJOR DEPRESSION: Status: ACTIVE | Noted: 2021-11-29

## 2021-11-29 LAB
ABO + RH BLD: NORMAL
ALBUMIN SERPL BCP-MCNC: 2.1 G/DL (ref 3.5–5.2)
ALL DISCLAIMER: NORMAL
ALL RELEASED BY: NORMAL
ALL RESULT SUMMARY: NORMAL
ALL RESULT TABLE: NORMAL
ALLENS TEST: ABNORMAL
ALLENS TEST: ABNORMAL
ALP SERPL-CCNC: 172 U/L (ref 55–135)
ALT SERPL W/O P-5'-P-CCNC: 44 U/L (ref 10–44)
ANION GAP SERPL CALC-SCNC: 11 MMOL/L (ref 8–16)
ANION GAP SERPL CALC-SCNC: 12 MMOL/L (ref 8–16)
ANION GAP SERPL CALC-SCNC: 13 MMOL/L (ref 8–16)
ANISOCYTOSIS BLD QL SMEAR: SLIGHT
APTT BLDCRRT: 24.3 SEC (ref 21–32)
AST SERPL-CCNC: 27 U/L (ref 10–40)
AV INDEX (PROSTH): 0.7
AV MEAN GRADIENT: 4 MMHG
AV PEAK GRADIENT: 8 MMHG
AV VALVE AREA: 2.23 CM2
AV VELOCITY RATIO: 0.7
BACTERIA BLD CULT: NORMAL
BACTERIA BLD CULT: NORMAL
BASOPHILS # BLD AUTO: ABNORMAL K/UL (ref 0–0.2)
BASOPHILS NFR BLD: 0 % (ref 0–1.9)
BILIRUB SERPL-MCNC: 0.3 MG/DL (ref 0.1–1)
BLASTS NFR BLD MANUAL: 40 %
BLD GP AB SCN CELLS X3 SERPL QL: NORMAL
BSA FOR ECHO PROCEDURE: 1.82 M2
BUN SERPL-MCNC: 12 MG/DL (ref 6–20)
BUN SERPL-MCNC: 14 MG/DL (ref 6–20)
BUN SERPL-MCNC: 14 MG/DL (ref 6–20)
CALCIUM SERPL-MCNC: 7.4 MG/DL (ref 8.7–10.5)
CALCIUM SERPL-MCNC: 7.7 MG/DL (ref 8.7–10.5)
CALCIUM SERPL-MCNC: 7.8 MG/DL (ref 8.7–10.5)
CHLORIDE SERPL-SCNC: 87 MMOL/L (ref 95–110)
CHLORIDE SERPL-SCNC: 90 MMOL/L (ref 95–110)
CHLORIDE SERPL-SCNC: 95 MMOL/L (ref 95–110)
CO2 SERPL-SCNC: 23 MMOL/L (ref 23–29)
CO2 SERPL-SCNC: 24 MMOL/L (ref 23–29)
CO2 SERPL-SCNC: 26 MMOL/L (ref 23–29)
CREAT SERPL-MCNC: 0.6 MG/DL (ref 0.5–1.4)
CREAT SERPL-MCNC: 0.6 MG/DL (ref 0.5–1.4)
CREAT SERPL-MCNC: 0.7 MG/DL (ref 0.5–1.4)
CREAT UR-MCNC: 39 MG/DL (ref 15–325)
CV ECHO LV RWT: 0.27 CM
DELSYS: ABNORMAL
DELSYS: ABNORMAL
DIFFERENTIAL METHOD: ABNORMAL
DOP CALC AO PEAK VEL: 1.42 M/S
DOP CALC AO VTI: 26.41 CM
DOP CALC LVOT AREA: 3.2 CM2
DOP CALC LVOT DIAMETER: 2.02 CM
DOP CALC LVOT PEAK VEL: 0.99 M/S
DOP CALC LVOT STROKE VOLUME: 58.81 CM3
DOP CALCLVOT PEAK VEL VTI: 18.36 CM
E WAVE DECELERATION TIME: 155.04 MSEC
E/A RATIO: 1.92
E/E' RATIO: 18.31 M/S
ECHO LV POSTERIOR WALL: 0.65 CM (ref 0.6–1.1)
EJECTION FRACTION: 55 %
EOSINOPHIL # BLD AUTO: ABNORMAL K/UL (ref 0–0.5)
EOSINOPHIL NFR BLD: 0 % (ref 0–8)
EP: 8
ERYTHROCYTE [DISTWIDTH] IN BLOOD BY AUTOMATED COUNT: 13.4 % (ref 11.5–14.5)
ERYTHROCYTE [SEDIMENTATION RATE] IN BLOOD BY WESTERGREN METHOD: 12 MM/H
EST. GFR  (AFRICAN AMERICAN): >60 ML/MIN/1.73 M^2
EST. GFR  (NON AFRICAN AMERICAN): >60 ML/MIN/1.73 M^2
FBALL INTERPRETATION: NORMAL
FBALL REASON FOR REFERRAL: NORMAL
FBALL RESULT: NORMAL
FBALL SPECIMEN: NORMAL
FIBRINOGEN PPP-MCNC: 355 MG/DL (ref 182–400)
FIO2: 80
FRACTIONAL SHORTENING: 28 % (ref 28–44)
GLUCOSE SERPL-MCNC: 122 MG/DL (ref 70–110)
GLUCOSE SERPL-MCNC: 232 MG/DL (ref 70–110)
GLUCOSE SERPL-MCNC: 285 MG/DL (ref 70–110)
HCO3 UR-SCNC: 30.2 MMOL/L (ref 24–28)
HCO3 UR-SCNC: 32.1 MMOL/L (ref 24–28)
HCT VFR BLD AUTO: 25.6 % (ref 37–48.5)
HCT VFR BLD CALC: 27 %PCV (ref 36–54)
HGB BLD-MCNC: 9 G/DL (ref 12–16)
HYPOCHROMIA BLD QL SMEAR: ABNORMAL
IMM GRANULOCYTES # BLD AUTO: ABNORMAL K/UL (ref 0–0.04)
IMM GRANULOCYTES NFR BLD AUTO: ABNORMAL % (ref 0–0.5)
INR PPP: 1.1 (ref 0.8–1.2)
INTERVENTRICULAR SEPTUM: 0.88 CM (ref 0.6–1.1)
IP: 16
LA MAJOR: 5.19 CM
LA MINOR: 5.66 CM
LA WIDTH: 3.93 CM
LDH SERPL L TO P-CCNC: 1476 U/L (ref 110–260)
LEFT ATRIUM SIZE: 3.79 CM
LEFT ATRIUM VOLUME INDEX MOD: 42.6 ML/M2
LEFT ATRIUM VOLUME INDEX: 38.5 ML/M2
LEFT ATRIUM VOLUME MOD: 75.75 CM3
LEFT ATRIUM VOLUME: 68.55 CM3
LEFT INTERNAL DIMENSION IN SYSTOLE: 3.52 CM (ref 2.1–4)
LEFT VENTRICLE DIASTOLIC VOLUME INDEX: 62.3 ML/M2
LEFT VENTRICLE DIASTOLIC VOLUME: 110.9 ML
LEFT VENTRICLE MASS INDEX: 69 G/M2
LEFT VENTRICLE SYSTOLIC VOLUME INDEX: 29.1 ML/M2
LEFT VENTRICLE SYSTOLIC VOLUME: 51.71 ML
LEFT VENTRICULAR INTERNAL DIMENSION IN DIASTOLE: 4.86 CM (ref 3.5–6)
LEFT VENTRICULAR MASS: 122.18 G
LV LATERAL E/E' RATIO: 17 M/S
LV SEPTAL E/E' RATIO: 19.83 M/S
LYMPHOCYTES # BLD AUTO: ABNORMAL K/UL (ref 1–4.8)
LYMPHOCYTES NFR BLD: 51 % (ref 18–48)
MAGNESIUM SERPL-MCNC: 2.1 MG/DL (ref 1.6–2.6)
MCH RBC QN AUTO: 30.2 PG (ref 27–31)
MCHC RBC AUTO-ENTMCNC: 35.2 G/DL (ref 32–36)
MCV RBC AUTO: 86 FL (ref 82–98)
MODE: ABNORMAL
MODE: ABNORMAL
MONOCYTES # BLD AUTO: ABNORMAL K/UL (ref 0.3–1)
MONOCYTES NFR BLD: 0 % (ref 4–15)
MV PEAK A VEL: 0.62 M/S
MV PEAK E VEL: 1.19 M/S
MV STENOSIS PRESSURE HALF TIME: 44.96 MS
MV VALVE AREA P 1/2 METHOD: 4.89 CM2
NEUTROPHILS NFR BLD: 9 % (ref 38–73)
NRBC BLD-RTO: 0 /100 WBC
OSMOLALITY UR: 408 MOSM/KG (ref 50–1200)
OVALOCYTES BLD QL SMEAR: ABNORMAL
PCO2 BLDA: 40.6 MMHG (ref 35–45)
PCO2 BLDA: 46.2 MMHG (ref 35–45)
PH SMN: 7.42 [PH] (ref 7.35–7.45)
PH SMN: 7.51 [PH] (ref 7.35–7.45)
PHOSPHATE SERPL-MCNC: 3.3 MG/DL (ref 2.7–4.5)
PHOSPHATE SERPL-MCNC: 3.8 MG/DL (ref 2.7–4.5)
PISA TR MAX VEL: 2.6 M/S
PLATELET # BLD AUTO: 17 K/UL (ref 150–450)
PLATELET BLD QL SMEAR: ABNORMAL
PMV BLD AUTO: 12.5 FL (ref 9.2–12.9)
PO2 BLDA: 35 MMHG (ref 40–60)
PO2 BLDA: 56 MMHG (ref 40–60)
POC BE: 6 MMOL/L
POC BE: 9 MMOL/L
POC SATURATED O2: 67 % (ref 95–100)
POC SATURATED O2: 91 % (ref 95–100)
POC TCO2: 32 MMOL/L (ref 24–29)
POC TCO2: 33 MMOL/L (ref 24–29)
POCT GLUCOSE: 195 MG/DL (ref 70–110)
POCT GLUCOSE: 224 MG/DL (ref 70–110)
POCT GLUCOSE: 309 MG/DL (ref 70–110)
POCT GLUCOSE: 361 MG/DL (ref 70–110)
POIKILOCYTOSIS BLD QL SMEAR: SLIGHT
POLYCHROMASIA BLD QL SMEAR: ABNORMAL
POTASSIUM SERPL-SCNC: 4.1 MMOL/L (ref 3.5–5.1)
POTASSIUM SERPL-SCNC: 4.2 MMOL/L (ref 3.5–5.1)
POTASSIUM SERPL-SCNC: 4.3 MMOL/L (ref 3.5–5.1)
PROT SERPL-MCNC: 5.5 G/DL (ref 6–8.4)
PROTHROMBIN TIME: 12.3 SEC (ref 9–12.5)
PULM VEIN S/D RATIO: 0.66
PV PEAK D VEL: 0.88 M/S
PV PEAK S VEL: 0.58 M/S
RA MAJOR: 4.74 CM
RA PRESSURE: 8 MMHG
RA WIDTH: 3.71 CM
RBC # BLD AUTO: 2.98 M/UL (ref 4–5.4)
REF LAB TEST METHOD: NORMAL
RIGHT VENTRICULAR END-DIASTOLIC DIMENSION: 2.58 CM
RV TISSUE DOPPLER FREE WALL SYSTOLIC VELOCITY 1 (APICAL 4 CHAMBER VIEW): 11.44 CM/S
SAMPLE: ABNORMAL
SAMPLE: ABNORMAL
SERVICE CMNT-IMP: NORMAL
SINUS: 3.21 CM
SITE: ABNORMAL
SITE: ABNORMAL
SODIUM SERPL-SCNC: 123 MMOL/L (ref 136–145)
SODIUM SERPL-SCNC: 127 MMOL/L (ref 136–145)
SODIUM SERPL-SCNC: 131 MMOL/L (ref 136–145)
SODIUM UR-SCNC: 54 MMOL/L (ref 20–250)
SPECIMEN SOURCE: NORMAL
STJ: 2.35 CM
TDI LATERAL: 0.07 M/S
TDI SEPTAL: 0.06 M/S
TDI: 0.07 M/S
TR MAX PG: 27 MMHG
TRICUSPID ANNULAR PLANE SYSTOLIC EXCURSION: 2.25 CM
TROPONIN I SERPL DL<=0.01 NG/ML-MCNC: <0.006 NG/ML (ref 0–0.03)
TV REST PULMONARY ARTERY PRESSURE: 35 MMHG
URATE SERPL-MCNC: 2.4 MG/DL (ref 2.4–5.7)
VANCOMYCIN TROUGH SERPL-MCNC: 19.4 UG/ML (ref 10–22)
WBC # BLD AUTO: 10.28 K/UL (ref 3.9–12.7)

## 2021-11-29 PROCEDURE — 85027 COMPLETE CBC AUTOMATED: CPT | Performed by: STUDENT IN AN ORGANIZED HEALTH CARE EDUCATION/TRAINING PROGRAM

## 2021-11-29 PROCEDURE — 80202 ASSAY OF VANCOMYCIN: CPT | Performed by: INTERNAL MEDICINE

## 2021-11-29 PROCEDURE — 84300 ASSAY OF URINE SODIUM: CPT

## 2021-11-29 PROCEDURE — 25000003 PHARM REV CODE 250: Performed by: STUDENT IN AN ORGANIZED HEALTH CARE EDUCATION/TRAINING PROGRAM

## 2021-11-29 PROCEDURE — 80053 COMPREHEN METABOLIC PANEL: CPT | Performed by: STUDENT IN AN ORGANIZED HEALTH CARE EDUCATION/TRAINING PROGRAM

## 2021-11-29 PROCEDURE — 94761 N-INVAS EAR/PLS OXIMETRY MLT: CPT

## 2021-11-29 PROCEDURE — 99900035 HC TECH TIME PER 15 MIN (STAT)

## 2021-11-29 PROCEDURE — 63700000 PHARM REV CODE 250 ALT 637 W/O HCPCS: Performed by: STUDENT IN AN ORGANIZED HEALTH CARE EDUCATION/TRAINING PROGRAM

## 2021-11-29 PROCEDURE — 85384 FIBRINOGEN ACTIVITY: CPT | Performed by: STUDENT IN AN ORGANIZED HEALTH CARE EDUCATION/TRAINING PROGRAM

## 2021-11-29 PROCEDURE — 63600175 PHARM REV CODE 636 W HCPCS: Performed by: INTERNAL MEDICINE

## 2021-11-29 PROCEDURE — 27000221 HC OXYGEN, UP TO 24 HOURS

## 2021-11-29 PROCEDURE — 90791 PR PSYCHIATRIC DIAGNOSTIC EVALUATION: ICD-10-PCS | Mod: ,,, | Performed by: PSYCHOLOGIST

## 2021-11-29 PROCEDURE — 82803 BLOOD GASES ANY COMBINATION: CPT

## 2021-11-29 PROCEDURE — S0039 INJECTION, SULFAMETHOXAZOLE: HCPCS | Performed by: STUDENT IN AN ORGANIZED HEALTH CARE EDUCATION/TRAINING PROGRAM

## 2021-11-29 PROCEDURE — 85007 BL SMEAR W/DIFF WBC COUNT: CPT | Performed by: STUDENT IN AN ORGANIZED HEALTH CARE EDUCATION/TRAINING PROGRAM

## 2021-11-29 PROCEDURE — 83935 ASSAY OF URINE OSMOLALITY: CPT

## 2021-11-29 PROCEDURE — 85730 THROMBOPLASTIN TIME PARTIAL: CPT | Performed by: STUDENT IN AN ORGANIZED HEALTH CARE EDUCATION/TRAINING PROGRAM

## 2021-11-29 PROCEDURE — 86900 BLOOD TYPING SEROLOGIC ABO: CPT | Performed by: INTERNAL MEDICINE

## 2021-11-29 PROCEDURE — 99291 CRITICAL CARE FIRST HOUR: CPT | Mod: ,,, | Performed by: INTERNAL MEDICINE

## 2021-11-29 PROCEDURE — 25000003 PHARM REV CODE 250

## 2021-11-29 PROCEDURE — 84550 ASSAY OF BLOOD/URIC ACID: CPT | Performed by: STUDENT IN AN ORGANIZED HEALTH CARE EDUCATION/TRAINING PROGRAM

## 2021-11-29 PROCEDURE — 94760 N-INVAS EAR/PLS OXIMETRY 1: CPT

## 2021-11-29 PROCEDURE — 63600175 PHARM REV CODE 636 W HCPCS: Performed by: STUDENT IN AN ORGANIZED HEALTH CARE EDUCATION/TRAINING PROGRAM

## 2021-11-29 PROCEDURE — 83735 ASSAY OF MAGNESIUM: CPT

## 2021-11-29 PROCEDURE — 20000000 HC ICU ROOM

## 2021-11-29 PROCEDURE — 27100171 HC OXYGEN HIGH FLOW UP TO 24 HOURS

## 2021-11-29 PROCEDURE — 27000190 HC CPAP FULL FACE MASK W/VALVE

## 2021-11-29 PROCEDURE — 99291 PR CRITICAL CARE, E/M 30-74 MINUTES: ICD-10-PCS | Mod: ,,, | Performed by: INTERNAL MEDICINE

## 2021-11-29 PROCEDURE — 94660 CPAP INITIATION&MGMT: CPT

## 2021-11-29 PROCEDURE — 25000003 PHARM REV CODE 250: Performed by: INTERNAL MEDICINE

## 2021-11-29 PROCEDURE — 90791 PSYCH DIAGNOSTIC EVALUATION: CPT | Mod: ,,, | Performed by: PSYCHOLOGIST

## 2021-11-29 PROCEDURE — 25000242 PHARM REV CODE 250 ALT 637 W/ HCPCS: Performed by: STUDENT IN AN ORGANIZED HEALTH CARE EDUCATION/TRAINING PROGRAM

## 2021-11-29 PROCEDURE — 85610 PROTHROMBIN TIME: CPT | Performed by: STUDENT IN AN ORGANIZED HEALTH CARE EDUCATION/TRAINING PROGRAM

## 2021-11-29 PROCEDURE — 84100 ASSAY OF PHOSPHORUS: CPT | Performed by: STUDENT IN AN ORGANIZED HEALTH CARE EDUCATION/TRAINING PROGRAM

## 2021-11-29 PROCEDURE — 84484 ASSAY OF TROPONIN QUANT: CPT

## 2021-11-29 PROCEDURE — 83615 LACTATE (LD) (LDH) ENZYME: CPT | Performed by: STUDENT IN AN ORGANIZED HEALTH CARE EDUCATION/TRAINING PROGRAM

## 2021-11-29 PROCEDURE — 99233 PR SUBSEQUENT HOSPITAL CARE,LEVL III: ICD-10-PCS | Mod: ,,, | Performed by: INTERNAL MEDICINE

## 2021-11-29 PROCEDURE — 84100 ASSAY OF PHOSPHORUS: CPT | Mod: 91

## 2021-11-29 PROCEDURE — 99233 SBSQ HOSP IP/OBS HIGH 50: CPT | Mod: ,,, | Performed by: INTERNAL MEDICINE

## 2021-11-29 PROCEDURE — 93010 ELECTROCARDIOGRAM REPORT: CPT | Mod: ,,, | Performed by: INTERNAL MEDICINE

## 2021-11-29 PROCEDURE — C9399 UNCLASSIFIED DRUGS OR BIOLOG: HCPCS

## 2021-11-29 PROCEDURE — 93010 EKG 12-LEAD: ICD-10-PCS | Mod: ,,, | Performed by: INTERNAL MEDICINE

## 2021-11-29 PROCEDURE — 99900031 HC PATIENT EDUCATION (STAT)

## 2021-11-29 PROCEDURE — C9113 INJ PANTOPRAZOLE SODIUM, VIA: HCPCS

## 2021-11-29 PROCEDURE — 94640 AIRWAY INHALATION TREATMENT: CPT

## 2021-11-29 PROCEDURE — 63600175 PHARM REV CODE 636 W HCPCS

## 2021-11-29 PROCEDURE — 93005 ELECTROCARDIOGRAM TRACING: CPT

## 2021-11-29 PROCEDURE — 80048 BASIC METABOLIC PNL TOTAL CA: CPT | Mod: 91

## 2021-11-29 PROCEDURE — 82570 ASSAY OF URINE CREATININE: CPT

## 2021-11-29 RX ORDER — QUETIAPINE FUMARATE 200 MG/1
200 TABLET, FILM COATED ORAL NIGHTLY
Status: DISCONTINUED | OUTPATIENT
Start: 2021-11-29 | End: 2021-12-07 | Stop reason: HOSPADM

## 2021-11-29 RX ORDER — IPRATROPIUM BROMIDE AND ALBUTEROL SULFATE 2.5; .5 MG/3ML; MG/3ML
3 SOLUTION RESPIRATORY (INHALATION) EVERY 4 HOURS PRN
Status: DISCONTINUED | OUTPATIENT
Start: 2021-11-29 | End: 2021-12-07 | Stop reason: HOSPADM

## 2021-11-29 RX ORDER — DILTIAZEM HYDROCHLORIDE 30 MG/1
60 TABLET, FILM COATED ORAL EVERY 6 HOURS
Status: DISCONTINUED | OUTPATIENT
Start: 2021-11-29 | End: 2021-12-06

## 2021-11-29 RX ORDER — HALOPERIDOL 5 MG/ML
5 INJECTION INTRAMUSCULAR EVERY 6 HOURS PRN
Status: DISCONTINUED | OUTPATIENT
Start: 2021-11-30 | End: 2021-12-05

## 2021-11-29 RX ORDER — INSULIN ASPART 100 [IU]/ML
1-10 INJECTION, SOLUTION INTRAVENOUS; SUBCUTANEOUS
Status: DISCONTINUED | OUTPATIENT
Start: 2021-11-29 | End: 2021-11-30

## 2021-11-29 RX ADMIN — SULFAMETHOXAZOLE AND TRIMETHOPRIM 487 MG: 80; 16 INJECTION, SOLUTION, CONCENTRATE INTRAVENOUS at 06:11

## 2021-11-29 RX ADMIN — QUETIAPINE FUMARATE 200 MG: 200 TABLET ORAL at 09:11

## 2021-11-29 RX ADMIN — IPRATROPIUM BROMIDE AND ALBUTEROL SULFATE 3 ML: 2.5; .5 SOLUTION RESPIRATORY (INHALATION) at 08:11

## 2021-11-29 RX ADMIN — SENNOSIDES AND DOCUSATE SODIUM 1 TABLET: 50; 8.6 TABLET ORAL at 08:11

## 2021-11-29 RX ADMIN — SULFAMETHOXAZOLE AND TRIMETHOPRIM 487 MG: 80; 16 INJECTION, SOLUTION, CONCENTRATE INTRAVENOUS at 10:11

## 2021-11-29 RX ADMIN — SENNOSIDES AND DOCUSATE SODIUM 1 TABLET: 50; 8.6 TABLET ORAL at 09:11

## 2021-11-29 RX ADMIN — HYDROMORPHONE HYDROCHLORIDE 1 MG: 1 INJECTION, SOLUTION INTRAMUSCULAR; INTRAVENOUS; SUBCUTANEOUS at 03:11

## 2021-11-29 RX ADMIN — FUROSEMIDE 40 MG: 40 INJECTION, SOLUTION INTRAMUSCULAR; INTRAVENOUS at 03:11

## 2021-11-29 RX ADMIN — DEXAMETHASONE SODIUM PHOSPHATE 20 MG: 4 INJECTION INTRA-ARTICULAR; INTRALESIONAL; INTRAMUSCULAR; INTRAVENOUS; SOFT TISSUE at 04:11

## 2021-11-29 RX ADMIN — INSULIN ASPART 4 UNITS: 100 INJECTION, SOLUTION INTRAVENOUS; SUBCUTANEOUS at 04:11

## 2021-11-29 RX ADMIN — INSULIN ASPART 4 UNITS: 100 INJECTION, SOLUTION INTRAVENOUS; SUBCUTANEOUS at 09:11

## 2021-11-29 RX ADMIN — DEXMEDETOMIDINE HYDROCHLORIDE 0.5 MCG/KG/HR: 4 INJECTION INTRAVENOUS at 02:11

## 2021-11-29 RX ADMIN — IPRATROPIUM BROMIDE AND ALBUTEROL SULFATE 3 ML: 2.5; .5 SOLUTION RESPIRATORY (INHALATION) at 04:11

## 2021-11-29 RX ADMIN — IPRATROPIUM BROMIDE AND ALBUTEROL SULFATE 3 ML: 2.5; .5 SOLUTION RESPIRATORY (INHALATION) at 12:11

## 2021-11-29 RX ADMIN — FUROSEMIDE 40 MG: 40 INJECTION, SOLUTION INTRAMUSCULAR; INTRAVENOUS at 09:11

## 2021-11-29 RX ADMIN — VANCOMYCIN HYDROCHLORIDE 1250 MG: 1.25 INJECTION, POWDER, LYOPHILIZED, FOR SOLUTION INTRAVENOUS at 05:11

## 2021-11-29 RX ADMIN — DEXMEDETOMIDINE HYDROCHLORIDE 1.2 MCG/KG/HR: 4 INJECTION INTRAVENOUS at 08:11

## 2021-11-29 RX ADMIN — INSULIN DETEMIR 5 UNITS: 100 INJECTION, SOLUTION SUBCUTANEOUS at 09:11

## 2021-11-29 RX ADMIN — PANTOPRAZOLE SODIUM 40 MG: 40 INJECTION, POWDER, FOR SOLUTION INTRAVENOUS at 08:11

## 2021-11-29 RX ADMIN — VANCOMYCIN HYDROCHLORIDE 1250 MG: 1.25 INJECTION, POWDER, LYOPHILIZED, FOR SOLUTION INTRAVENOUS at 08:11

## 2021-11-29 RX ADMIN — INSULIN ASPART 5 UNITS: 100 INJECTION, SOLUTION INTRAVENOUS; SUBCUTANEOUS at 07:11

## 2021-11-29 RX ADMIN — HYDROMORPHONE HYDROCHLORIDE 1 MG: 1 INJECTION, SOLUTION INTRAMUSCULAR; INTRAVENOUS; SUBCUTANEOUS at 09:11

## 2021-11-29 RX ADMIN — OXCARBAZEPINE 600 MG: 600 TABLET, FILM COATED ORAL at 09:11

## 2021-11-29 RX ADMIN — FUROSEMIDE 40 MG: 40 INJECTION, SOLUTION INTRAMUSCULAR; INTRAVENOUS at 08:11

## 2021-11-29 RX ADMIN — OXCARBAZEPINE 600 MG: 600 TABLET, FILM COATED ORAL at 08:11

## 2021-11-29 RX ADMIN — IPRATROPIUM BROMIDE AND ALBUTEROL SULFATE 3 ML: 2.5; .5 SOLUTION RESPIRATORY (INHALATION) at 07:11

## 2021-11-29 RX ADMIN — CEFEPIME 2 G: 2 INJECTION, POWDER, FOR SOLUTION INTRAVENOUS at 05:11

## 2021-11-29 RX ADMIN — AZITHROMYCIN MONOHYDRATE 250 MG: 250 TABLET ORAL at 08:11

## 2021-11-29 RX ADMIN — INSULIN ASPART 2 UNITS: 100 INJECTION, SOLUTION INTRAVENOUS; SUBCUTANEOUS at 11:11

## 2021-11-29 RX ADMIN — DILTIAZEM HYDROCHLORIDE 60 MG: 60 TABLET, FILM COATED ORAL at 11:11

## 2021-11-29 RX ADMIN — SULFAMETHOXAZOLE AND TRIMETHOPRIM 487 MG: 80; 16 INJECTION, SOLUTION, CONCENTRATE INTRAVENOUS at 01:11

## 2021-11-29 RX ADMIN — VANCOMYCIN HYDROCHLORIDE 1250 MG: 1.25 INJECTION, POWDER, LYOPHILIZED, FOR SOLUTION INTRAVENOUS at 12:11

## 2021-11-30 PROBLEM — R79.89 ELEVATED LFTS: Status: RESOLVED | Noted: 2021-11-24 | Resolved: 2021-11-30

## 2021-11-30 LAB
1,3 BETA GLUCAN SER-MCNC: <31 PG/ML
ALBUMIN SERPL BCP-MCNC: 2 G/DL (ref 3.5–5.2)
ALP SERPL-CCNC: 149 U/L (ref 55–135)
ALT SERPL W/O P-5'-P-CCNC: 27 U/L (ref 10–44)
ANION GAP SERPL CALC-SCNC: 12 MMOL/L (ref 8–16)
ANION GAP SERPL CALC-SCNC: 12 MMOL/L (ref 8–16)
ANISOCYTOSIS BLD QL SMEAR: SLIGHT
APTT BLDCRRT: <21 SEC (ref 21–32)
AST SERPL-CCNC: 15 U/L (ref 10–40)
BACTERIA BLD CULT: NORMAL
BACTERIA BLD CULT: NORMAL
BASOPHILS # BLD AUTO: ABNORMAL K/UL (ref 0–0.2)
BASOPHILS NFR BLD: 0 % (ref 0–1.9)
BILIRUB SERPL-MCNC: 0.2 MG/DL (ref 0.1–1)
BLASTS NFR BLD MANUAL: 31 %
BLD PROD TYP BPU: NORMAL
BLOOD UNIT EXPIRATION DATE: NORMAL
BLOOD UNIT TYPE CODE: 5100
BLOOD UNIT TYPE: NORMAL
BUN SERPL-MCNC: 16 MG/DL (ref 6–20)
BUN SERPL-MCNC: 18 MG/DL (ref 6–20)
CALCIUM SERPL-MCNC: 7.1 MG/DL (ref 8.7–10.5)
CALCIUM SERPL-MCNC: 8.3 MG/DL (ref 8.7–10.5)
CHLORIDE SERPL-SCNC: 87 MMOL/L (ref 95–110)
CHLORIDE SERPL-SCNC: 93 MMOL/L (ref 95–110)
CHROM BANDING METHOD: NORMAL
CHROMOSOME ANALYSIS BM ADDITIONAL INFORMATION: NORMAL
CHROMOSOME ANALYSIS BM RELEASED BY: NORMAL
CHROMOSOME ANALYSIS BM RESULT SUMMARY: NORMAL
CLINICAL CYTOGENETICIST REVIEW: NORMAL
CO2 SERPL-SCNC: 22 MMOL/L (ref 23–29)
CO2 SERPL-SCNC: 28 MMOL/L (ref 23–29)
CODING SYSTEM: NORMAL
CREAT SERPL-MCNC: 0.6 MG/DL (ref 0.5–1.4)
CREAT SERPL-MCNC: 0.7 MG/DL (ref 0.5–1.4)
DIAGNOSTIC BCR/ABL 1 RESULT: NORMAL
DIFFERENTIAL METHOD: ABNORMAL
DISPENSE STATUS: NORMAL
EOSINOPHIL # BLD AUTO: ABNORMAL K/UL (ref 0–0.5)
EOSINOPHIL NFR BLD: 0 % (ref 0–8)
ERYTHROCYTE [DISTWIDTH] IN BLOOD BY AUTOMATED COUNT: 12.8 % (ref 11.5–14.5)
EST. GFR  (AFRICAN AMERICAN): >60 ML/MIN/1.73 M^2
EST. GFR  (AFRICAN AMERICAN): >60 ML/MIN/1.73 M^2
EST. GFR  (NON AFRICAN AMERICAN): >60 ML/MIN/1.73 M^2
EST. GFR  (NON AFRICAN AMERICAN): >60 ML/MIN/1.73 M^2
FUNGITELL COMMENTS: NEGATIVE
GLUCOSE SERPL-MCNC: 110 MG/DL (ref 70–110)
GLUCOSE SERPL-MCNC: 262 MG/DL (ref 70–110)
HCT VFR BLD AUTO: 23.9 % (ref 37–48.5)
HGB BLD-MCNC: 8.5 G/DL (ref 12–16)
IMM GRANULOCYTES # BLD AUTO: ABNORMAL K/UL (ref 0–0.04)
IMM GRANULOCYTES NFR BLD AUTO: ABNORMAL % (ref 0–0.5)
INR PPP: 1.1 (ref 0.8–1.2)
KARYOTYP MAR: NORMAL
LDH SERPL L TO P-CCNC: 1108 U/L (ref 110–260)
LYMPHOCYTES # BLD AUTO: ABNORMAL K/UL (ref 1–4.8)
LYMPHOCYTES NFR BLD: 41 % (ref 18–48)
MAGNESIUM SERPL-MCNC: 1.9 MG/DL (ref 1.6–2.6)
MAGNESIUM SERPL-MCNC: 2.1 MG/DL (ref 1.6–2.6)
MCH RBC QN AUTO: 29.5 PG (ref 27–31)
MCHC RBC AUTO-ENTMCNC: 35.6 G/DL (ref 32–36)
MCV RBC AUTO: 83 FL (ref 82–98)
MONOCYTES # BLD AUTO: ABNORMAL K/UL (ref 0.3–1)
MONOCYTES NFR BLD: 1 % (ref 4–15)
NARRATIVE DIAGNOSTIC REPORT-IMP: NORMAL
NEUTROPHILS NFR BLD: 27 % (ref 38–73)
NRBC BLD-RTO: 0 /100 WBC
PHOSPHATE SERPL-MCNC: 3.5 MG/DL (ref 2.7–4.5)
PHOSPHATE SERPL-MCNC: 4.3 MG/DL (ref 2.7–4.5)
PLATELET # BLD AUTO: 11 K/UL (ref 150–450)
PLATELET BLD QL SMEAR: ABNORMAL
PMV BLD AUTO: 11.9 FL (ref 9.2–12.9)
POCT GLUCOSE: 100 MG/DL (ref 70–110)
POCT GLUCOSE: 123 MG/DL (ref 70–110)
POCT GLUCOSE: 126 MG/DL (ref 70–110)
POCT GLUCOSE: 127 MG/DL (ref 70–110)
POCT GLUCOSE: 133 MG/DL (ref 70–110)
POCT GLUCOSE: 205 MG/DL (ref 70–110)
POCT GLUCOSE: 378 MG/DL (ref 70–110)
POCT GLUCOSE: 98 MG/DL (ref 70–110)
POLYCHROMASIA BLD QL SMEAR: ABNORMAL
POTASSIUM SERPL-SCNC: 4.1 MMOL/L (ref 3.5–5.1)
POTASSIUM SERPL-SCNC: 4.2 MMOL/L (ref 3.5–5.1)
PROT SERPL-MCNC: 5.1 G/DL (ref 6–8.4)
PROTHROMBIN TIME: 12.4 SEC (ref 9–12.5)
RBC # BLD AUTO: 2.88 M/UL (ref 4–5.4)
REASON FOR REFERRAL (NARRATIVE): NORMAL
REF LAB TEST METHOD: NORMAL
SODIUM SERPL-SCNC: 127 MMOL/L (ref 136–145)
SODIUM SERPL-SCNC: 127 MMOL/L (ref 136–145)
SPECIMEN SOURCE: NORMAL
SPECIMEN SOURCE: NORMAL
SPECIMEN: NORMAL
UNIT NUMBER: NORMAL
URATE SERPL-MCNC: 2.6 MG/DL (ref 2.4–5.7)
WBC # BLD AUTO: 5.32 K/UL (ref 3.9–12.7)

## 2021-11-30 PROCEDURE — 76937 US GUIDE VASCULAR ACCESS: CPT

## 2021-11-30 PROCEDURE — 87040 BLOOD CULTURE FOR BACTERIA: CPT

## 2021-11-30 PROCEDURE — 27100171 HC OXYGEN HIGH FLOW UP TO 24 HOURS

## 2021-11-30 PROCEDURE — 99233 PR SUBSEQUENT HOSPITAL CARE,LEVL III: ICD-10-PCS | Mod: ,,, | Performed by: INTERNAL MEDICINE

## 2021-11-30 PROCEDURE — 86682 HELMINTH ANTIBODY: CPT

## 2021-11-30 PROCEDURE — C9399 UNCLASSIFIED DRUGS OR BIOLOG: HCPCS

## 2021-11-30 PROCEDURE — 86403 PARTICLE AGGLUT ANTBDY SCRN: CPT | Performed by: STUDENT IN AN ORGANIZED HEALTH CARE EDUCATION/TRAINING PROGRAM

## 2021-11-30 PROCEDURE — 99900031 HC PATIENT EDUCATION (STAT)

## 2021-11-30 PROCEDURE — 63600175 PHARM REV CODE 636 W HCPCS

## 2021-11-30 PROCEDURE — 83735 ASSAY OF MAGNESIUM: CPT | Mod: 91 | Performed by: INTERNAL MEDICINE

## 2021-11-30 PROCEDURE — 20000000 HC ICU ROOM

## 2021-11-30 PROCEDURE — 36573 INSJ PICC RS&I 5 YR+: CPT

## 2021-11-30 PROCEDURE — 27000221 HC OXYGEN, UP TO 24 HOURS

## 2021-11-30 PROCEDURE — 86635 COCCIDIOIDES ANTIBODY: CPT | Mod: 91

## 2021-11-30 PROCEDURE — 99233 SBSQ HOSP IP/OBS HIGH 50: CPT | Mod: ,,, | Performed by: INTERNAL MEDICINE

## 2021-11-30 PROCEDURE — 27100092 HC HIGH FLOW DELIVERY CANNULA

## 2021-11-30 PROCEDURE — 87385 HISTOPLASMA CAPSUL AG IA: CPT | Performed by: STUDENT IN AN ORGANIZED HEALTH CARE EDUCATION/TRAINING PROGRAM

## 2021-11-30 PROCEDURE — 85027 COMPLETE CBC AUTOMATED: CPT | Performed by: STUDENT IN AN ORGANIZED HEALTH CARE EDUCATION/TRAINING PROGRAM

## 2021-11-30 PROCEDURE — S0039 INJECTION, SULFAMETHOXAZOLE: HCPCS | Performed by: STUDENT IN AN ORGANIZED HEALTH CARE EDUCATION/TRAINING PROGRAM

## 2021-11-30 PROCEDURE — A4216 STERILE WATER/SALINE, 10 ML: HCPCS | Performed by: INTERNAL MEDICINE

## 2021-11-30 PROCEDURE — 84100 ASSAY OF PHOSPHORUS: CPT | Mod: 91 | Performed by: INTERNAL MEDICINE

## 2021-11-30 PROCEDURE — 25000003 PHARM REV CODE 250: Performed by: STUDENT IN AN ORGANIZED HEALTH CARE EDUCATION/TRAINING PROGRAM

## 2021-11-30 PROCEDURE — 85610 PROTHROMBIN TIME: CPT | Performed by: STUDENT IN AN ORGANIZED HEALTH CARE EDUCATION/TRAINING PROGRAM

## 2021-11-30 PROCEDURE — 87449 NOS EACH ORGANISM AG IA: CPT | Performed by: STUDENT IN AN ORGANIZED HEALTH CARE EDUCATION/TRAINING PROGRAM

## 2021-11-30 PROCEDURE — 63600175 PHARM REV CODE 636 W HCPCS: Performed by: STUDENT IN AN ORGANIZED HEALTH CARE EDUCATION/TRAINING PROGRAM

## 2021-11-30 PROCEDURE — 99291 CRITICAL CARE FIRST HOUR: CPT | Mod: ,,, | Performed by: INTERNAL MEDICINE

## 2021-11-30 PROCEDURE — 25000003 PHARM REV CODE 250: Performed by: INTERNAL MEDICINE

## 2021-11-30 PROCEDURE — C1751 CATH, INF, PER/CENT/MIDLINE: HCPCS

## 2021-11-30 PROCEDURE — 94760 N-INVAS EAR/PLS OXIMETRY 1: CPT

## 2021-11-30 PROCEDURE — 99223 1ST HOSP IP/OBS HIGH 75: CPT | Mod: ,,, | Performed by: INTERNAL MEDICINE

## 2021-11-30 PROCEDURE — 25000242 PHARM REV CODE 250 ALT 637 W/ HCPCS: Performed by: STUDENT IN AN ORGANIZED HEALTH CARE EDUCATION/TRAINING PROGRAM

## 2021-11-30 PROCEDURE — P9037 PLATE PHERES LEUKOREDU IRRAD: HCPCS

## 2021-11-30 PROCEDURE — 25000003 PHARM REV CODE 250

## 2021-11-30 PROCEDURE — 94761 N-INVAS EAR/PLS OXIMETRY MLT: CPT

## 2021-11-30 PROCEDURE — 85730 THROMBOPLASTIN TIME PARTIAL: CPT | Performed by: STUDENT IN AN ORGANIZED HEALTH CARE EDUCATION/TRAINING PROGRAM

## 2021-11-30 PROCEDURE — 63600175 PHARM REV CODE 636 W HCPCS: Performed by: INTERNAL MEDICINE

## 2021-11-30 PROCEDURE — 84550 ASSAY OF BLOOD/URIC ACID: CPT | Performed by: STUDENT IN AN ORGANIZED HEALTH CARE EDUCATION/TRAINING PROGRAM

## 2021-11-30 PROCEDURE — 83615 LACTATE (LD) (LDH) ENZYME: CPT | Performed by: STUDENT IN AN ORGANIZED HEALTH CARE EDUCATION/TRAINING PROGRAM

## 2021-11-30 PROCEDURE — 99291 PR CRITICAL CARE, E/M 30-74 MINUTES: ICD-10-PCS | Mod: ,,, | Performed by: INTERNAL MEDICINE

## 2021-11-30 PROCEDURE — 87449 NOS EACH ORGANISM AG IA: CPT | Mod: 91

## 2021-11-30 PROCEDURE — 80048 BASIC METABOLIC PNL TOTAL CA: CPT | Performed by: INTERNAL MEDICINE

## 2021-11-30 PROCEDURE — 99223 PR INITIAL HOSPITAL CARE,LEVL III: ICD-10-PCS | Mod: ,,, | Performed by: INTERNAL MEDICINE

## 2021-11-30 PROCEDURE — 83735 ASSAY OF MAGNESIUM: CPT | Performed by: STUDENT IN AN ORGANIZED HEALTH CARE EDUCATION/TRAINING PROGRAM

## 2021-11-30 PROCEDURE — 63700000 PHARM REV CODE 250 ALT 637 W/O HCPCS: Performed by: STUDENT IN AN ORGANIZED HEALTH CARE EDUCATION/TRAINING PROGRAM

## 2021-11-30 PROCEDURE — 94660 CPAP INITIATION&MGMT: CPT

## 2021-11-30 PROCEDURE — 87305 ASPERGILLUS AG IA: CPT | Performed by: STUDENT IN AN ORGANIZED HEALTH CARE EDUCATION/TRAINING PROGRAM

## 2021-11-30 PROCEDURE — 80053 COMPREHEN METABOLIC PANEL: CPT | Performed by: STUDENT IN AN ORGANIZED HEALTH CARE EDUCATION/TRAINING PROGRAM

## 2021-11-30 PROCEDURE — 85007 BL SMEAR W/DIFF WBC COUNT: CPT | Performed by: STUDENT IN AN ORGANIZED HEALTH CARE EDUCATION/TRAINING PROGRAM

## 2021-11-30 PROCEDURE — 84100 ASSAY OF PHOSPHORUS: CPT | Performed by: STUDENT IN AN ORGANIZED HEALTH CARE EDUCATION/TRAINING PROGRAM

## 2021-11-30 PROCEDURE — 94799 UNLISTED PULMONARY SVC/PX: CPT

## 2021-11-30 PROCEDURE — 99900035 HC TECH TIME PER 15 MIN (STAT)

## 2021-11-30 PROCEDURE — 94640 AIRWAY INHALATION TREATMENT: CPT

## 2021-11-30 PROCEDURE — C9113 INJ PANTOPRAZOLE SODIUM, VIA: HCPCS

## 2021-11-30 PROCEDURE — 27000190 HC CPAP FULL FACE MASK W/VALVE

## 2021-11-30 RX ORDER — SULFAMETHOXAZOLE AND TRIMETHOPRIM 400; 80 MG/1; MG/1
1 TABLET ORAL DAILY
Status: DISCONTINUED | OUTPATIENT
Start: 2021-12-01 | End: 2021-12-01

## 2021-11-30 RX ORDER — HEPARIN 100 UNIT/ML
500 SYRINGE INTRAVENOUS
Status: DISCONTINUED | OUTPATIENT
Start: 2021-11-30 | End: 2021-12-07

## 2021-11-30 RX ORDER — SULFAMETHOXAZOLE AND TRIMETHOPRIM 200; 40 MG/5ML; MG/5ML
20 SUSPENSION ORAL DAILY
Status: DISCONTINUED | OUTPATIENT
Start: 2021-12-01 | End: 2021-11-30

## 2021-11-30 RX ORDER — SODIUM CHLORIDE 0.9 % (FLUSH) 0.9 %
10 SYRINGE (ML) INJECTION EVERY 6 HOURS
Status: DISCONTINUED | OUTPATIENT
Start: 2021-11-30 | End: 2021-12-07 | Stop reason: HOSPADM

## 2021-11-30 RX ORDER — SULFAMETHOXAZOLE AND TRIMETHOPRIM 200; 40 MG/5ML; MG/5ML
60 SUSPENSION ORAL EVERY 8 HOURS
Status: DISCONTINUED | OUTPATIENT
Start: 2021-11-30 | End: 2021-11-30

## 2021-11-30 RX ORDER — SODIUM CHLORIDE 0.9 % (FLUSH) 0.9 %
10 SYRINGE (ML) INJECTION
Status: DISCONTINUED | OUTPATIENT
Start: 2021-11-30 | End: 2021-12-07 | Stop reason: HOSPADM

## 2021-11-30 RX ORDER — HYDROCODONE BITARTRATE AND ACETAMINOPHEN 500; 5 MG/1; MG/1
TABLET ORAL
Status: DISCONTINUED | OUTPATIENT
Start: 2021-11-30 | End: 2021-12-05

## 2021-11-30 RX ORDER — SODIUM CHLORIDE 0.9 % (FLUSH) 0.9 %
10 SYRINGE (ML) INJECTION
Status: DISCONTINUED | OUTPATIENT
Start: 2021-11-30 | End: 2021-12-06

## 2021-11-30 RX ADMIN — IPRATROPIUM BROMIDE AND ALBUTEROL SULFATE 3 ML: 2.5; .5 SOLUTION RESPIRATORY (INHALATION) at 04:11

## 2021-11-30 RX ADMIN — FUROSEMIDE 40 MG: 40 INJECTION, SOLUTION INTRAMUSCULAR; INTRAVENOUS at 02:11

## 2021-11-30 RX ADMIN — PANTOPRAZOLE SODIUM 40 MG: 40 INJECTION, POWDER, FOR SOLUTION INTRAVENOUS at 08:11

## 2021-11-30 RX ADMIN — VANCOMYCIN HYDROCHLORIDE 1250 MG: 1.25 INJECTION, POWDER, LYOPHILIZED, FOR SOLUTION INTRAVENOUS at 04:11

## 2021-11-30 RX ADMIN — DEXAMETHASONE SODIUM PHOSPHATE 40 MG: 10 INJECTION INTRAMUSCULAR; INTRAVENOUS at 08:11

## 2021-11-30 RX ADMIN — INSULIN HUMAN 2.5 UNITS/HR: 1 INJECTION, SOLUTION INTRAVENOUS at 11:11

## 2021-11-30 RX ADMIN — PIPERACILLIN SODIUM AND TAZOBACTAM SODIUM 4.5 G: 4; .5 INJECTION, POWDER, FOR SOLUTION INTRAVENOUS at 07:11

## 2021-11-30 RX ADMIN — IPRATROPIUM BROMIDE AND ALBUTEROL SULFATE 3 ML: 2.5; .5 SOLUTION RESPIRATORY (INHALATION) at 12:11

## 2021-11-30 RX ADMIN — HYDROMORPHONE HYDROCHLORIDE 1 MG: 1 INJECTION, SOLUTION INTRAMUSCULAR; INTRAVENOUS; SUBCUTANEOUS at 08:11

## 2021-11-30 RX ADMIN — SULFAMETHOXAZOLE AND TRIMETHOPRIM 60 ML: 200; 40 SUSPENSION ORAL at 05:11

## 2021-11-30 RX ADMIN — DEXMEDETOMIDINE HYDROCHLORIDE 1.4 MCG/KG/HR: 4 INJECTION INTRAVENOUS at 12:11

## 2021-11-30 RX ADMIN — VANCOMYCIN HYDROCHLORIDE 1250 MG: 1.25 INJECTION, POWDER, LYOPHILIZED, FOR SOLUTION INTRAVENOUS at 10:11

## 2021-11-30 RX ADMIN — VINCRISTINE SULFATE 2 MG: 1 INJECTION, SOLUTION INTRAVENOUS at 09:11

## 2021-11-30 RX ADMIN — DEXMEDETOMIDINE HYDROCHLORIDE 0.9 MCG/KG/HR: 4 INJECTION INTRAVENOUS at 09:11

## 2021-11-30 RX ADMIN — FUROSEMIDE 40 MG: 40 INJECTION, SOLUTION INTRAMUSCULAR; INTRAVENOUS at 08:11

## 2021-11-30 RX ADMIN — INSULIN ASPART 10 UNITS: 100 INJECTION, SOLUTION INTRAVENOUS; SUBCUTANEOUS at 08:11

## 2021-11-30 RX ADMIN — INSULIN DETEMIR 10 UNITS: 100 INJECTION, SOLUTION SUBCUTANEOUS at 08:11

## 2021-11-30 RX ADMIN — OXCARBAZEPINE 600 MG: 600 TABLET, FILM COATED ORAL at 08:11

## 2021-11-30 RX ADMIN — Medication 6 MG: at 11:11

## 2021-11-30 RX ADMIN — SENNOSIDES AND DOCUSATE SODIUM 1 TABLET: 50; 8.6 TABLET ORAL at 08:11

## 2021-11-30 RX ADMIN — HALOPERIDOL LACTATE 5 MG: 5 INJECTION, SOLUTION INTRAMUSCULAR at 12:11

## 2021-11-30 RX ADMIN — IPRATROPIUM BROMIDE AND ALBUTEROL SULFATE 3 ML: 2.5; .5 SOLUTION RESPIRATORY (INHALATION) at 08:11

## 2021-11-30 RX ADMIN — QUETIAPINE FUMARATE 200 MG: 200 TABLET ORAL at 08:11

## 2021-11-30 RX ADMIN — SULFAMETHOXAZOLE AND TRIMETHOPRIM 487 MG: 80; 16 INJECTION, SOLUTION, CONCENTRATE INTRAVENOUS at 05:11

## 2021-11-30 RX ADMIN — Medication 10 ML: at 06:11

## 2021-11-30 RX ADMIN — AZITHROMYCIN MONOHYDRATE 250 MG: 250 TABLET ORAL at 09:11

## 2021-11-30 RX ADMIN — DEXMEDETOMIDINE HYDROCHLORIDE 1.3 MCG/KG/HR: 4 INJECTION INTRAVENOUS at 04:11

## 2021-11-30 RX ADMIN — DEXMEDETOMIDINE HYDROCHLORIDE 1 MCG/KG/HR: 4 INJECTION INTRAVENOUS at 08:11

## 2021-11-30 RX ADMIN — PIPERACILLIN SODIUM AND TAZOBACTAM SODIUM 4.5 G: 4; .5 INJECTION, POWDER, FOR SOLUTION INTRAVENOUS at 12:11

## 2021-11-30 RX ADMIN — Medication 10 ML: at 12:11

## 2021-11-30 RX ADMIN — INSULIN DETEMIR 5 UNITS: 100 INJECTION, SOLUTION SUBCUTANEOUS at 08:11

## 2021-11-30 RX ADMIN — VANCOMYCIN HYDROCHLORIDE 1250 MG: 1.25 INJECTION, POWDER, LYOPHILIZED, FOR SOLUTION INTRAVENOUS at 01:11

## 2021-12-01 LAB
ALBUMIN SERPL BCP-MCNC: 2.4 G/DL (ref 3.5–5.2)
ALP SERPL-CCNC: 173 U/L (ref 55–135)
ALT SERPL W/O P-5'-P-CCNC: 31 U/L (ref 10–44)
ANION GAP SERPL CALC-SCNC: 13 MMOL/L (ref 8–16)
ANION GAP SERPL CALC-SCNC: 15 MMOL/L (ref 8–16)
APTT BLDCRRT: 21.2 SEC (ref 21–32)
AST SERPL-CCNC: 25 U/L (ref 10–40)
B DERMAT AG UR QL IA: NOT DETECTED
BACTERIA BLD CULT: NORMAL
BACTERIA BLD CULT: NORMAL
BASOPHILS NFR BLD: 0 % (ref 0–1.9)
BILIRUB SERPL-MCNC: 0.4 MG/DL (ref 0.1–1)
BLASTOMYCES AG RESULT: NOT DETECTED NG/ML
BLASTS NFR BLD MANUAL: 6 %
BUN SERPL-MCNC: 18 MG/DL (ref 6–20)
BUN SERPL-MCNC: 20 MG/DL (ref 6–20)
CALCIUM SERPL-MCNC: 8 MG/DL (ref 8.7–10.5)
CALCIUM SERPL-MCNC: 8.1 MG/DL (ref 8.7–10.5)
CHLORIDE SERPL-SCNC: 89 MMOL/L (ref 95–110)
CHLORIDE SERPL-SCNC: 91 MMOL/L (ref 95–110)
CO2 SERPL-SCNC: 22 MMOL/L (ref 23–29)
CO2 SERPL-SCNC: 24 MMOL/L (ref 23–29)
CREAT SERPL-MCNC: 0.6 MG/DL (ref 0.5–1.4)
CREAT SERPL-MCNC: 0.7 MG/DL (ref 0.5–1.4)
CRYPTOC AG SER QL LA: NEGATIVE
DACRYOCYTES BLD QL SMEAR: ABNORMAL
DIFFERENTIAL METHOD: ABNORMAL
EOSINOPHIL NFR BLD: 0 % (ref 0–8)
ERYTHROCYTE [DISTWIDTH] IN BLOOD BY AUTOMATED COUNT: 12.8 % (ref 11.5–14.5)
EST. GFR  (AFRICAN AMERICAN): >60 ML/MIN/1.73 M^2
EST. GFR  (AFRICAN AMERICAN): >60 ML/MIN/1.73 M^2
EST. GFR  (NON AFRICAN AMERICAN): >60 ML/MIN/1.73 M^2
EST. GFR  (NON AFRICAN AMERICAN): >60 ML/MIN/1.73 M^2
FIBRINOGEN PPP-MCNC: 325 MG/DL (ref 182–400)
GALACTOMANNAN AG SERPL IA-ACNC: <0.5 INDEX
GLUCOSE SERPL-MCNC: 117 MG/DL (ref 70–110)
GLUCOSE SERPL-MCNC: 272 MG/DL (ref 70–110)
HCT VFR BLD AUTO: 24.2 % (ref 37–48.5)
HGB BLD-MCNC: 8.6 G/DL (ref 12–16)
IMM GRANULOCYTES # BLD AUTO: ABNORMAL K/UL (ref 0–0.04)
IMM GRANULOCYTES NFR BLD AUTO: ABNORMAL % (ref 0–0.5)
INR PPP: 1.1 (ref 0.8–1.2)
LDH SERPL L TO P-CCNC: 1098 U/L (ref 110–260)
LYMPHOCYTES NFR BLD: 42 % (ref 18–48)
MAGNESIUM SERPL-MCNC: 2.1 MG/DL (ref 1.6–2.6)
MCH RBC QN AUTO: 29.8 PG (ref 27–31)
MCHC RBC AUTO-ENTMCNC: 35.5 G/DL (ref 32–36)
MCV RBC AUTO: 84 FL (ref 82–98)
METAMYELOCYTES NFR BLD MANUAL: 1 %
MONOCYTES NFR BLD: 4 % (ref 4–15)
MYELOCYTES NFR BLD MANUAL: 2 %
NEUTROPHILS NFR BLD: 39 % (ref 38–73)
NEUTS BAND NFR BLD MANUAL: 5 %
NRBC BLD-RTO: 0 /100 WBC
OVALOCYTES BLD QL SMEAR: ABNORMAL
PHOSPHATE SERPL-MCNC: 4.1 MG/DL (ref 2.7–4.5)
PLATELET # BLD AUTO: 14 K/UL (ref 150–450)
PLATELET BLD QL SMEAR: ABNORMAL
PMV BLD AUTO: 11.4 FL (ref 9.2–12.9)
POCT GLUCOSE: 129 MG/DL (ref 70–110)
POCT GLUCOSE: 159 MG/DL (ref 70–110)
POCT GLUCOSE: 176 MG/DL (ref 70–110)
POCT GLUCOSE: 289 MG/DL (ref 70–110)
POCT GLUCOSE: 320 MG/DL (ref 70–110)
POCT GLUCOSE: 97 MG/DL (ref 70–110)
POTASSIUM SERPL-SCNC: 4.5 MMOL/L (ref 3.5–5.1)
POTASSIUM SERPL-SCNC: 5.5 MMOL/L (ref 3.5–5.1)
PROMYELOCYTES NFR BLD MANUAL: 1 %
PROT SERPL-MCNC: 5.7 G/DL (ref 6–8.4)
PROTHROMBIN TIME: 11.7 SEC (ref 9–12.5)
RBC # BLD AUTO: 2.89 M/UL (ref 4–5.4)
SODIUM SERPL-SCNC: 126 MMOL/L (ref 136–145)
SODIUM SERPL-SCNC: 128 MMOL/L (ref 136–145)
URATE SERPL-MCNC: 2.6 MG/DL (ref 2.4–5.7)
VANCOMYCIN TROUGH SERPL-MCNC: 20.6 UG/ML (ref 10–22)
WBC # BLD AUTO: 3.89 K/UL (ref 3.9–12.7)

## 2021-12-01 PROCEDURE — 27100171 HC OXYGEN HIGH FLOW UP TO 24 HOURS

## 2021-12-01 PROCEDURE — 25000003 PHARM REV CODE 250: Performed by: STUDENT IN AN ORGANIZED HEALTH CARE EDUCATION/TRAINING PROGRAM

## 2021-12-01 PROCEDURE — 94640 AIRWAY INHALATION TREATMENT: CPT

## 2021-12-01 PROCEDURE — 85007 BL SMEAR W/DIFF WBC COUNT: CPT | Performed by: STUDENT IN AN ORGANIZED HEALTH CARE EDUCATION/TRAINING PROGRAM

## 2021-12-01 PROCEDURE — 25000242 PHARM REV CODE 250 ALT 637 W/ HCPCS: Performed by: STUDENT IN AN ORGANIZED HEALTH CARE EDUCATION/TRAINING PROGRAM

## 2021-12-01 PROCEDURE — 63600175 PHARM REV CODE 636 W HCPCS: Performed by: INTERNAL MEDICINE

## 2021-12-01 PROCEDURE — 94799 UNLISTED PULMONARY SVC/PX: CPT

## 2021-12-01 PROCEDURE — 25000003 PHARM REV CODE 250: Performed by: PHYSICIAN ASSISTANT

## 2021-12-01 PROCEDURE — C9399 UNCLASSIFIED DRUGS OR BIOLOG: HCPCS | Performed by: STUDENT IN AN ORGANIZED HEALTH CARE EDUCATION/TRAINING PROGRAM

## 2021-12-01 PROCEDURE — 99233 SBSQ HOSP IP/OBS HIGH 50: CPT | Mod: ,,, | Performed by: INTERNAL MEDICINE

## 2021-12-01 PROCEDURE — 99900035 HC TECH TIME PER 15 MIN (STAT)

## 2021-12-01 PROCEDURE — 25000003 PHARM REV CODE 250: Performed by: INTERNAL MEDICINE

## 2021-12-01 PROCEDURE — 80053 COMPREHEN METABOLIC PANEL: CPT | Performed by: STUDENT IN AN ORGANIZED HEALTH CARE EDUCATION/TRAINING PROGRAM

## 2021-12-01 PROCEDURE — 63600175 PHARM REV CODE 636 W HCPCS

## 2021-12-01 PROCEDURE — 20000000 HC ICU ROOM

## 2021-12-01 PROCEDURE — 27100092 HC HIGH FLOW DELIVERY CANNULA

## 2021-12-01 PROCEDURE — 25000003 PHARM REV CODE 250

## 2021-12-01 PROCEDURE — 83735 ASSAY OF MAGNESIUM: CPT | Performed by: STUDENT IN AN ORGANIZED HEALTH CARE EDUCATION/TRAINING PROGRAM

## 2021-12-01 PROCEDURE — 80202 ASSAY OF VANCOMYCIN: CPT | Performed by: INTERNAL MEDICINE

## 2021-12-01 PROCEDURE — 85384 FIBRINOGEN ACTIVITY: CPT | Performed by: STUDENT IN AN ORGANIZED HEALTH CARE EDUCATION/TRAINING PROGRAM

## 2021-12-01 PROCEDURE — 84100 ASSAY OF PHOSPHORUS: CPT | Performed by: STUDENT IN AN ORGANIZED HEALTH CARE EDUCATION/TRAINING PROGRAM

## 2021-12-01 PROCEDURE — 63600175 PHARM REV CODE 636 W HCPCS: Performed by: PHYSICIAN ASSISTANT

## 2021-12-01 PROCEDURE — 99233 PR SUBSEQUENT HOSPITAL CARE,LEVL III: ICD-10-PCS | Mod: ,,, | Performed by: INTERNAL MEDICINE

## 2021-12-01 PROCEDURE — 85730 THROMBOPLASTIN TIME PARTIAL: CPT | Performed by: STUDENT IN AN ORGANIZED HEALTH CARE EDUCATION/TRAINING PROGRAM

## 2021-12-01 PROCEDURE — 84550 ASSAY OF BLOOD/URIC ACID: CPT | Performed by: STUDENT IN AN ORGANIZED HEALTH CARE EDUCATION/TRAINING PROGRAM

## 2021-12-01 PROCEDURE — 80048 BASIC METABOLIC PNL TOTAL CA: CPT | Performed by: PHYSICIAN ASSISTANT

## 2021-12-01 PROCEDURE — A4216 STERILE WATER/SALINE, 10 ML: HCPCS | Performed by: INTERNAL MEDICINE

## 2021-12-01 PROCEDURE — 85610 PROTHROMBIN TIME: CPT | Performed by: STUDENT IN AN ORGANIZED HEALTH CARE EDUCATION/TRAINING PROGRAM

## 2021-12-01 PROCEDURE — 85027 COMPLETE CBC AUTOMATED: CPT | Performed by: STUDENT IN AN ORGANIZED HEALTH CARE EDUCATION/TRAINING PROGRAM

## 2021-12-01 PROCEDURE — 83615 LACTATE (LD) (LDH) ENZYME: CPT | Performed by: STUDENT IN AN ORGANIZED HEALTH CARE EDUCATION/TRAINING PROGRAM

## 2021-12-01 PROCEDURE — 94761 N-INVAS EAR/PLS OXIMETRY MLT: CPT

## 2021-12-01 PROCEDURE — C9113 INJ PANTOPRAZOLE SODIUM, VIA: HCPCS

## 2021-12-01 RX ORDER — OXCARBAZEPINE 600 MG/1
1200 TABLET, FILM COATED ORAL 2 TIMES DAILY
Status: CANCELLED | OUTPATIENT
Start: 2021-12-01

## 2021-12-01 RX ORDER — SULFAMETHOXAZOLE AND TRIMETHOPRIM 800; 160 MG/1; MG/1
2 TABLET ORAL 3 TIMES DAILY
Status: DISCONTINUED | OUTPATIENT
Start: 2021-12-01 | End: 2021-12-01

## 2021-12-01 RX ORDER — SULFAMETHOXAZOLE AND TRIMETHOPRIM 400; 80 MG/1; MG/1
1 TABLET ORAL DAILY
Status: DISCONTINUED | OUTPATIENT
Start: 2021-12-02 | End: 2021-12-07 | Stop reason: HOSPADM

## 2021-12-01 RX ORDER — INSULIN ASPART 100 [IU]/ML
1-10 INJECTION, SOLUTION INTRAVENOUS; SUBCUTANEOUS EVERY 6 HOURS PRN
Status: DISCONTINUED | OUTPATIENT
Start: 2021-12-01 | End: 2021-12-07 | Stop reason: HOSPADM

## 2021-12-01 RX ADMIN — PIPERACILLIN SODIUM AND TAZOBACTAM SODIUM 4.5 G: 4; .5 INJECTION, POWDER, FOR SOLUTION INTRAVENOUS at 07:12

## 2021-12-01 RX ADMIN — VANCOMYCIN HYDROCHLORIDE 1250 MG: 1.25 INJECTION, POWDER, LYOPHILIZED, FOR SOLUTION INTRAVENOUS at 04:12

## 2021-12-01 RX ADMIN — IPRATROPIUM BROMIDE AND ALBUTEROL SULFATE 3 ML: 2.5; .5 SOLUTION RESPIRATORY (INHALATION) at 07:12

## 2021-12-01 RX ADMIN — DILTIAZEM HYDROCHLORIDE 60 MG: 60 TABLET, FILM COATED ORAL at 11:12

## 2021-12-01 RX ADMIN — FUROSEMIDE 40 MG: 40 INJECTION, SOLUTION INTRAMUSCULAR; INTRAVENOUS at 08:12

## 2021-12-01 RX ADMIN — DEXAMETHASONE SODIUM PHOSPHATE 40 MG: 10 INJECTION INTRAMUSCULAR; INTRAVENOUS at 08:12

## 2021-12-01 RX ADMIN — Medication 10 ML: at 06:12

## 2021-12-01 RX ADMIN — DILTIAZEM HYDROCHLORIDE 60 MG: 60 TABLET, FILM COATED ORAL at 06:12

## 2021-12-01 RX ADMIN — PANTOPRAZOLE SODIUM 40 MG: 40 INJECTION, POWDER, FOR SOLUTION INTRAVENOUS at 08:12

## 2021-12-01 RX ADMIN — DEXMEDETOMIDINE HYDROCHLORIDE 0.5 MCG/KG/HR: 4 INJECTION INTRAVENOUS at 06:12

## 2021-12-01 RX ADMIN — SULFAMETHOXAZOLE AND TRIMETHOPRIM 1 TABLET: 400; 80 TABLET ORAL at 08:12

## 2021-12-01 RX ADMIN — IPRATROPIUM BROMIDE AND ALBUTEROL SULFATE 3 ML: 2.5; .5 SOLUTION RESPIRATORY (INHALATION) at 04:12

## 2021-12-01 RX ADMIN — SENNOSIDES AND DOCUSATE SODIUM 1 TABLET: 50; 8.6 TABLET ORAL at 08:12

## 2021-12-01 RX ADMIN — FUROSEMIDE 40 MG: 40 INJECTION, SOLUTION INTRAMUSCULAR; INTRAVENOUS at 03:12

## 2021-12-01 RX ADMIN — IPRATROPIUM BROMIDE AND ALBUTEROL SULFATE 3 ML: 2.5; .5 SOLUTION RESPIRATORY (INHALATION) at 11:12

## 2021-12-01 RX ADMIN — QUETIAPINE FUMARATE 200 MG: 200 TABLET ORAL at 08:12

## 2021-12-01 RX ADMIN — OXCARBAZEPINE 600 MG: 600 TABLET, FILM COATED ORAL at 08:12

## 2021-12-01 RX ADMIN — Medication 6 MG: at 09:12

## 2021-12-01 RX ADMIN — PIPERACILLIN SODIUM AND TAZOBACTAM SODIUM 4.5 G: 4; .5 INJECTION, POWDER, FOR SOLUTION INTRAVENOUS at 03:12

## 2021-12-01 RX ADMIN — INSULIN ASPART 2 UNITS: 100 INJECTION, SOLUTION INTRAVENOUS; SUBCUTANEOUS at 06:12

## 2021-12-01 RX ADMIN — Medication 10 ML: at 12:12

## 2021-12-01 RX ADMIN — VANCOMYCIN HYDROCHLORIDE 1250 MG: 1.25 INJECTION, POWDER, LYOPHILIZED, FOR SOLUTION INTRAVENOUS at 09:12

## 2021-12-01 RX ADMIN — DEXMEDETOMIDINE HYDROCHLORIDE 0.8 MCG/KG/HR: 4 INJECTION INTRAVENOUS at 03:12

## 2021-12-01 RX ADMIN — VANCOMYCIN HYDROCHLORIDE 1250 MG: 1.25 INJECTION, POWDER, LYOPHILIZED, FOR SOLUTION INTRAVENOUS at 02:12

## 2021-12-01 RX ADMIN — INSULIN DETEMIR 5 UNITS: 100 INJECTION, SOLUTION SUBCUTANEOUS at 08:12

## 2021-12-01 RX ADMIN — IPRATROPIUM BROMIDE AND ALBUTEROL SULFATE 3 ML: 2.5; .5 SOLUTION RESPIRATORY (INHALATION) at 12:12

## 2021-12-01 RX ADMIN — INSULIN ASPART 8 UNITS: 100 INJECTION, SOLUTION INTRAVENOUS; SUBCUTANEOUS at 04:12

## 2021-12-01 RX ADMIN — PIPERACILLIN SODIUM AND TAZOBACTAM SODIUM 4.5 G: 4; .5 INJECTION, POWDER, FOR SOLUTION INTRAVENOUS at 11:12

## 2021-12-02 PROBLEM — D61.818 PANCYTOPENIA: Status: ACTIVE | Noted: 2021-11-24

## 2021-12-02 PROBLEM — I49.5 SICK SINUS SYNDROME: Status: RESOLVED | Noted: 2019-03-13 | Resolved: 2021-12-02

## 2021-12-02 LAB
ALBUMIN SERPL BCP-MCNC: 2.5 G/DL (ref 3.5–5.2)
ALP SERPL-CCNC: 168 U/L (ref 55–135)
ALT SERPL W/O P-5'-P-CCNC: 19 U/L (ref 10–44)
ANION GAP SERPL CALC-SCNC: 15 MMOL/L (ref 8–16)
ANISOCYTOSIS BLD QL SMEAR: SLIGHT
APTT BLDCRRT: <21 SEC (ref 21–32)
AST SERPL-CCNC: 15 U/L (ref 10–40)
BASOPHILS NFR BLD: 0 % (ref 0–1.9)
BILIRUB SERPL-MCNC: 0.6 MG/DL (ref 0.1–1)
BLASTS NFR BLD MANUAL: 2 %
BUN SERPL-MCNC: 21 MG/DL (ref 6–20)
CALCIUM SERPL-MCNC: 8.1 MG/DL (ref 8.7–10.5)
CHLORIDE SERPL-SCNC: 88 MMOL/L (ref 95–110)
CO2 SERPL-SCNC: 22 MMOL/L (ref 23–29)
CREAT SERPL-MCNC: 0.7 MG/DL (ref 0.5–1.4)
DIFFERENTIAL METHOD: ABNORMAL
EOSINOPHIL NFR BLD: 1 % (ref 0–8)
ERYTHROCYTE [DISTWIDTH] IN BLOOD BY AUTOMATED COUNT: 12.6 % (ref 11.5–14.5)
EST. GFR  (AFRICAN AMERICAN): >60 ML/MIN/1.73 M^2
EST. GFR  (NON AFRICAN AMERICAN): >60 ML/MIN/1.73 M^2
GLUCOSE SERPL-MCNC: 288 MG/DL (ref 70–110)
H CAPSUL AG UR-MCNC: NOT DETECTED NG/ML
HCT VFR BLD AUTO: 24.6 % (ref 37–48.5)
HGB BLD-MCNC: 8.7 G/DL (ref 12–16)
HISTOPLASMA ANTIGEN URINE: NOT DETECTED
HYPOCHROMIA BLD QL SMEAR: ABNORMAL
IMM GRANULOCYTES # BLD AUTO: ABNORMAL K/UL (ref 0–0.04)
IMM GRANULOCYTES NFR BLD AUTO: ABNORMAL % (ref 0–0.5)
INR PPP: 1.1 (ref 0.8–1.2)
LDH SERPL L TO P-CCNC: 1042 U/L (ref 110–260)
LYMPHOCYTES NFR BLD: 45 % (ref 18–48)
MCH RBC QN AUTO: 29.6 PG (ref 27–31)
MCHC RBC AUTO-ENTMCNC: 35.4 G/DL (ref 32–36)
MCV RBC AUTO: 84 FL (ref 82–98)
MONOCYTES NFR BLD: 1 % (ref 4–15)
NEUTROPHILS NFR BLD: 48 % (ref 38–73)
NEUTS BAND NFR BLD MANUAL: 3 %
NRBC BLD-RTO: 0 /100 WBC
PHOSPHATE SERPL-MCNC: 4.5 MG/DL (ref 2.7–4.5)
PLATELET # BLD AUTO: 12 K/UL (ref 150–450)
PLATELET BLD QL SMEAR: ABNORMAL
PMV BLD AUTO: 11.9 FL (ref 9.2–12.9)
POCT GLUCOSE: 137 MG/DL (ref 70–110)
POCT GLUCOSE: 141 MG/DL (ref 70–110)
POCT GLUCOSE: 165 MG/DL (ref 70–110)
POCT GLUCOSE: 173 MG/DL (ref 70–110)
POCT GLUCOSE: 264 MG/DL (ref 70–110)
POCT GLUCOSE: 292 MG/DL (ref 70–110)
POCT GLUCOSE: 343 MG/DL (ref 70–110)
POIKILOCYTOSIS BLD QL SMEAR: SLIGHT
POLYCHROMASIA BLD QL SMEAR: ABNORMAL
POTASSIUM SERPL-SCNC: 4.5 MMOL/L (ref 3.5–5.1)
PROT SERPL-MCNC: 6 G/DL (ref 6–8.4)
PROTHROMBIN TIME: 11.9 SEC (ref 9–12.5)
RBC # BLD AUTO: 2.94 M/UL (ref 4–5.4)
SODIUM SERPL-SCNC: 125 MMOL/L (ref 136–145)
URATE SERPL-MCNC: 3.1 MG/DL (ref 2.4–5.7)
WBC # BLD AUTO: 2.67 K/UL (ref 3.9–12.7)

## 2021-12-02 PROCEDURE — C9113 INJ PANTOPRAZOLE SODIUM, VIA: HCPCS

## 2021-12-02 PROCEDURE — 85610 PROTHROMBIN TIME: CPT | Performed by: STUDENT IN AN ORGANIZED HEALTH CARE EDUCATION/TRAINING PROGRAM

## 2021-12-02 PROCEDURE — 63600175 PHARM REV CODE 636 W HCPCS

## 2021-12-02 PROCEDURE — 83615 LACTATE (LD) (LDH) ENZYME: CPT | Performed by: STUDENT IN AN ORGANIZED HEALTH CARE EDUCATION/TRAINING PROGRAM

## 2021-12-02 PROCEDURE — 25000003 PHARM REV CODE 250: Performed by: INTERNAL MEDICINE

## 2021-12-02 PROCEDURE — 85730 THROMBOPLASTIN TIME PARTIAL: CPT | Performed by: STUDENT IN AN ORGANIZED HEALTH CARE EDUCATION/TRAINING PROGRAM

## 2021-12-02 PROCEDURE — 25000003 PHARM REV CODE 250

## 2021-12-02 PROCEDURE — 99900035 HC TECH TIME PER 15 MIN (STAT)

## 2021-12-02 PROCEDURE — 99233 PR SUBSEQUENT HOSPITAL CARE,LEVL III: ICD-10-PCS | Mod: ,,, | Performed by: INTERNAL MEDICINE

## 2021-12-02 PROCEDURE — 63600175 PHARM REV CODE 636 W HCPCS: Performed by: INTERNAL MEDICINE

## 2021-12-02 PROCEDURE — 94760 N-INVAS EAR/PLS OXIMETRY 1: CPT

## 2021-12-02 PROCEDURE — 27000221 HC OXYGEN, UP TO 24 HOURS

## 2021-12-02 PROCEDURE — 84550 ASSAY OF BLOOD/URIC ACID: CPT | Performed by: STUDENT IN AN ORGANIZED HEALTH CARE EDUCATION/TRAINING PROGRAM

## 2021-12-02 PROCEDURE — 85025 COMPLETE CBC W/AUTO DIFF WBC: CPT | Performed by: STUDENT IN AN ORGANIZED HEALTH CARE EDUCATION/TRAINING PROGRAM

## 2021-12-02 PROCEDURE — 94761 N-INVAS EAR/PLS OXIMETRY MLT: CPT

## 2021-12-02 PROCEDURE — 94640 AIRWAY INHALATION TREATMENT: CPT

## 2021-12-02 PROCEDURE — 99233 SBSQ HOSP IP/OBS HIGH 50: CPT | Mod: ,,, | Performed by: INTERNAL MEDICINE

## 2021-12-02 PROCEDURE — 25000003 PHARM REV CODE 250: Performed by: STUDENT IN AN ORGANIZED HEALTH CARE EDUCATION/TRAINING PROGRAM

## 2021-12-02 PROCEDURE — 84100 ASSAY OF PHOSPHORUS: CPT | Performed by: STUDENT IN AN ORGANIZED HEALTH CARE EDUCATION/TRAINING PROGRAM

## 2021-12-02 PROCEDURE — 25000242 PHARM REV CODE 250 ALT 637 W/ HCPCS: Performed by: STUDENT IN AN ORGANIZED HEALTH CARE EDUCATION/TRAINING PROGRAM

## 2021-12-02 PROCEDURE — 27100171 HC OXYGEN HIGH FLOW UP TO 24 HOURS

## 2021-12-02 PROCEDURE — 94799 UNLISTED PULMONARY SVC/PX: CPT

## 2021-12-02 PROCEDURE — 20000000 HC ICU ROOM

## 2021-12-02 PROCEDURE — A4216 STERILE WATER/SALINE, 10 ML: HCPCS | Performed by: INTERNAL MEDICINE

## 2021-12-02 PROCEDURE — 27100092 HC HIGH FLOW DELIVERY CANNULA

## 2021-12-02 PROCEDURE — 80053 COMPREHEN METABOLIC PANEL: CPT | Performed by: STUDENT IN AN ORGANIZED HEALTH CARE EDUCATION/TRAINING PROGRAM

## 2021-12-02 RX ORDER — DIAZEPAM 5 MG/1
5 TABLET ORAL 2 TIMES DAILY
Status: DISCONTINUED | OUTPATIENT
Start: 2021-12-02 | End: 2021-12-07 | Stop reason: HOSPADM

## 2021-12-02 RX ORDER — DIAZEPAM 5 MG/1
10 TABLET ORAL 2 TIMES DAILY
Status: DISCONTINUED | OUTPATIENT
Start: 2021-12-02 | End: 2021-12-02

## 2021-12-02 RX ADMIN — IPRATROPIUM BROMIDE AND ALBUTEROL SULFATE 3 ML: 2.5; .5 SOLUTION RESPIRATORY (INHALATION) at 11:12

## 2021-12-02 RX ADMIN — Medication 10 ML: at 06:12

## 2021-12-02 RX ADMIN — VANCOMYCIN HYDROCHLORIDE 1250 MG: 1.25 INJECTION, POWDER, LYOPHILIZED, FOR SOLUTION INTRAVENOUS at 09:12

## 2021-12-02 RX ADMIN — DIAZEPAM 5 MG: 5 TABLET ORAL at 08:12

## 2021-12-02 RX ADMIN — FUROSEMIDE 40 MG: 40 INJECTION, SOLUTION INTRAMUSCULAR; INTRAVENOUS at 08:12

## 2021-12-02 RX ADMIN — IPRATROPIUM BROMIDE AND ALBUTEROL SULFATE 3 ML: 2.5; .5 SOLUTION RESPIRATORY (INHALATION) at 03:12

## 2021-12-02 RX ADMIN — VANCOMYCIN HYDROCHLORIDE 1250 MG: 1.25 INJECTION, POWDER, LYOPHILIZED, FOR SOLUTION INTRAVENOUS at 01:12

## 2021-12-02 RX ADMIN — SENNOSIDES AND DOCUSATE SODIUM 1 TABLET: 50; 8.6 TABLET ORAL at 08:12

## 2021-12-02 RX ADMIN — FUROSEMIDE 40 MG: 40 INJECTION, SOLUTION INTRAMUSCULAR; INTRAVENOUS at 09:12

## 2021-12-02 RX ADMIN — PIPERACILLIN SODIUM AND TAZOBACTAM SODIUM 4.5 G: 4; .5 INJECTION, POWDER, FOR SOLUTION INTRAVENOUS at 03:12

## 2021-12-02 RX ADMIN — OXCARBAZEPINE 600 MG: 600 TABLET, FILM COATED ORAL at 08:12

## 2021-12-02 RX ADMIN — QUETIAPINE FUMARATE 200 MG: 200 TABLET ORAL at 08:12

## 2021-12-02 RX ADMIN — SULFAMETHOXAZOLE AND TRIMETHOPRIM 1 TABLET: 400; 80 TABLET ORAL at 09:12

## 2021-12-02 RX ADMIN — DIAZEPAM 10 MG: 5 TABLET ORAL at 09:12

## 2021-12-02 RX ADMIN — PIPERACILLIN SODIUM AND TAZOBACTAM SODIUM 4.5 G: 4; .5 INJECTION, POWDER, FOR SOLUTION INTRAVENOUS at 08:12

## 2021-12-02 RX ADMIN — FUROSEMIDE 40 MG: 40 INJECTION, SOLUTION INTRAMUSCULAR; INTRAVENOUS at 03:12

## 2021-12-02 RX ADMIN — SODIUM CHLORIDE: 0.9 INJECTION, SOLUTION INTRAVENOUS at 09:12

## 2021-12-02 RX ADMIN — IPRATROPIUM BROMIDE AND ALBUTEROL SULFATE 3 ML: 2.5; .5 SOLUTION RESPIRATORY (INHALATION) at 07:12

## 2021-12-02 RX ADMIN — Medication 10 ML: at 12:12

## 2021-12-02 RX ADMIN — VANCOMYCIN HYDROCHLORIDE 1250 MG: 1.25 INJECTION, POWDER, LYOPHILIZED, FOR SOLUTION INTRAVENOUS at 06:12

## 2021-12-02 RX ADMIN — DILTIAZEM HYDROCHLORIDE 60 MG: 60 TABLET, FILM COATED ORAL at 11:12

## 2021-12-02 RX ADMIN — DILTIAZEM HYDROCHLORIDE 60 MG: 60 TABLET, FILM COATED ORAL at 06:12

## 2021-12-02 RX ADMIN — INSULIN ASPART 1 UNITS: 100 INJECTION, SOLUTION INTRAVENOUS; SUBCUTANEOUS at 11:12

## 2021-12-02 RX ADMIN — PIPERACILLIN SODIUM AND TAZOBACTAM SODIUM 4.5 G: 4; .5 INJECTION, POWDER, FOR SOLUTION INTRAVENOUS at 11:12

## 2021-12-02 RX ADMIN — INSULIN ASPART 6 UNITS: 100 INJECTION, SOLUTION INTRAVENOUS; SUBCUTANEOUS at 06:12

## 2021-12-02 RX ADMIN — INSULIN ASPART 4 UNITS: 100 INJECTION, SOLUTION INTRAVENOUS; SUBCUTANEOUS at 12:12

## 2021-12-02 RX ADMIN — PANTOPRAZOLE SODIUM 40 MG: 40 INJECTION, POWDER, FOR SOLUTION INTRAVENOUS at 09:12

## 2021-12-02 RX ADMIN — INSULIN ASPART 2 UNITS: 100 INJECTION, SOLUTION INTRAVENOUS; SUBCUTANEOUS at 11:12

## 2021-12-03 LAB
ALBUMIN SERPL BCP-MCNC: 2.6 G/DL (ref 3.5–5.2)
ALBUMIN SERPL BCP-MCNC: 2.8 G/DL (ref 3.5–5.2)
ALP SERPL-CCNC: 154 U/L (ref 55–135)
ALP SERPL-CCNC: 165 U/L (ref 55–135)
ALT SERPL W/O P-5'-P-CCNC: 17 U/L (ref 10–44)
ALT SERPL W/O P-5'-P-CCNC: 17 U/L (ref 10–44)
ANION GAP SERPL CALC-SCNC: 14 MMOL/L (ref 8–16)
ANION GAP SERPL CALC-SCNC: 16 MMOL/L (ref 8–16)
ANISOCYTOSIS BLD QL SMEAR: SLIGHT
APTT BLDCRRT: <21 SEC (ref 21–32)
AST SERPL-CCNC: 11 U/L (ref 10–40)
AST SERPL-CCNC: 13 U/L (ref 10–40)
BASOPHILS # BLD AUTO: 0 K/UL (ref 0–0.2)
BASOPHILS NFR BLD: 0 % (ref 0–1.9)
BILIRUB SERPL-MCNC: 0.5 MG/DL (ref 0.1–1)
BILIRUB SERPL-MCNC: 0.6 MG/DL (ref 0.1–1)
BUN SERPL-MCNC: 18 MG/DL (ref 6–20)
BUN SERPL-MCNC: 22 MG/DL (ref 6–20)
CALCIUM SERPL-MCNC: 8.4 MG/DL (ref 8.7–10.5)
CALCIUM SERPL-MCNC: 8.4 MG/DL (ref 8.7–10.5)
CHLORIDE SERPL-SCNC: 93 MMOL/L (ref 95–110)
CHLORIDE SERPL-SCNC: 94 MMOL/L (ref 95–110)
CO2 SERPL-SCNC: 23 MMOL/L (ref 23–29)
CO2 SERPL-SCNC: 23 MMOL/L (ref 23–29)
CREAT SERPL-MCNC: 0.6 MG/DL (ref 0.5–1.4)
CREAT SERPL-MCNC: 0.6 MG/DL (ref 0.5–1.4)
DIFFERENTIAL METHOD: ABNORMAL
EOSINOPHIL # BLD AUTO: 0.1 K/UL (ref 0–0.5)
EOSINOPHIL NFR BLD: 1.5 % (ref 0–8)
ERYTHROCYTE [DISTWIDTH] IN BLOOD BY AUTOMATED COUNT: 12.7 % (ref 11.5–14.5)
EST. GFR  (AFRICAN AMERICAN): >60 ML/MIN/1.73 M^2
EST. GFR  (AFRICAN AMERICAN): >60 ML/MIN/1.73 M^2
EST. GFR  (NON AFRICAN AMERICAN): >60 ML/MIN/1.73 M^2
EST. GFR  (NON AFRICAN AMERICAN): >60 ML/MIN/1.73 M^2
FIBRINOGEN PPP-MCNC: 305 MG/DL (ref 182–400)
GLUCOSE SERPL-MCNC: 126 MG/DL (ref 70–110)
GLUCOSE SERPL-MCNC: 149 MG/DL (ref 70–110)
HCT VFR BLD AUTO: 25.4 % (ref 37–48.5)
HGB BLD-MCNC: 9 G/DL (ref 12–16)
HYPOCHROMIA BLD QL SMEAR: ABNORMAL
IMM GRANULOCYTES # BLD AUTO: 0.05 K/UL (ref 0–0.04)
IMM GRANULOCYTES NFR BLD AUTO: 1.5 % (ref 0–0.5)
INR PPP: 1.1 (ref 0.8–1.2)
L PNEUMO AG UR QL IA: NEGATIVE
LDH SERPL L TO P-CCNC: 858 U/L (ref 110–260)
LYMPHOCYTES # BLD AUTO: 1.9 K/UL (ref 1–4.8)
LYMPHOCYTES NFR BLD: 56.1 % (ref 18–48)
MAGNESIUM SERPL-MCNC: 2.1 MG/DL (ref 1.6–2.6)
MCH RBC QN AUTO: 29.9 PG (ref 27–31)
MCHC RBC AUTO-ENTMCNC: 35.4 G/DL (ref 32–36)
MCV RBC AUTO: 84 FL (ref 82–98)
MONOCYTES # BLD AUTO: 0.1 K/UL (ref 0.3–1)
MONOCYTES NFR BLD: 2.4 % (ref 4–15)
NEUTROPHILS # BLD AUTO: 1.3 K/UL (ref 1.8–7.7)
NEUTROPHILS NFR BLD: 38.5 % (ref 38–73)
NRBC BLD-RTO: 0 /100 WBC
PHOSPHATE SERPL-MCNC: 5.2 MG/DL (ref 2.7–4.5)
PLATELET # BLD AUTO: 14 K/UL (ref 150–450)
PLATELET BLD QL SMEAR: ABNORMAL
PMV BLD AUTO: 11.9 FL (ref 9.2–12.9)
POCT GLUCOSE: 171 MG/DL (ref 70–110)
POCT GLUCOSE: 198 MG/DL (ref 70–110)
POCT GLUCOSE: 228 MG/DL (ref 70–110)
POTASSIUM SERPL-SCNC: 2.8 MMOL/L (ref 3.5–5.1)
POTASSIUM SERPL-SCNC: 3.8 MMOL/L (ref 3.5–5.1)
PROT SERPL-MCNC: 5.9 G/DL (ref 6–8.4)
PROT SERPL-MCNC: 6.2 G/DL (ref 6–8.4)
PROTHROMBIN TIME: 12.4 SEC (ref 9–12.5)
RBC # BLD AUTO: 3.01 M/UL (ref 4–5.4)
SODIUM SERPL-SCNC: 131 MMOL/L (ref 136–145)
SODIUM SERPL-SCNC: 132 MMOL/L (ref 136–145)
STRONGYLOIDES ANTIBODY IGG: NEGATIVE
URATE SERPL-MCNC: 3.6 MG/DL (ref 2.4–5.7)
VANCOMYCIN TROUGH SERPL-MCNC: 20.9 UG/ML (ref 10–22)
WBC # BLD AUTO: 3.35 K/UL (ref 3.9–12.7)

## 2021-12-03 PROCEDURE — 99233 PR SUBSEQUENT HOSPITAL CARE,LEVL III: ICD-10-PCS | Mod: ,,, | Performed by: INTERNAL MEDICINE

## 2021-12-03 PROCEDURE — 94799 UNLISTED PULMONARY SVC/PX: CPT

## 2021-12-03 PROCEDURE — 63600175 PHARM REV CODE 636 W HCPCS: Performed by: INTERNAL MEDICINE

## 2021-12-03 PROCEDURE — 63600175 PHARM REV CODE 636 W HCPCS: Performed by: STUDENT IN AN ORGANIZED HEALTH CARE EDUCATION/TRAINING PROGRAM

## 2021-12-03 PROCEDURE — 94640 AIRWAY INHALATION TREATMENT: CPT

## 2021-12-03 PROCEDURE — 85384 FIBRINOGEN ACTIVITY: CPT | Performed by: STUDENT IN AN ORGANIZED HEALTH CARE EDUCATION/TRAINING PROGRAM

## 2021-12-03 PROCEDURE — 27100171 HC OXYGEN HIGH FLOW UP TO 24 HOURS

## 2021-12-03 PROCEDURE — 94761 N-INVAS EAR/PLS OXIMETRY MLT: CPT

## 2021-12-03 PROCEDURE — 93005 ELECTROCARDIOGRAM TRACING: CPT

## 2021-12-03 PROCEDURE — 85610 PROTHROMBIN TIME: CPT | Performed by: STUDENT IN AN ORGANIZED HEALTH CARE EDUCATION/TRAINING PROGRAM

## 2021-12-03 PROCEDURE — C9113 INJ PANTOPRAZOLE SODIUM, VIA: HCPCS

## 2021-12-03 PROCEDURE — 25000242 PHARM REV CODE 250 ALT 637 W/ HCPCS: Performed by: STUDENT IN AN ORGANIZED HEALTH CARE EDUCATION/TRAINING PROGRAM

## 2021-12-03 PROCEDURE — 80202 ASSAY OF VANCOMYCIN: CPT | Performed by: INTERNAL MEDICINE

## 2021-12-03 PROCEDURE — 83615 LACTATE (LD) (LDH) ENZYME: CPT | Performed by: STUDENT IN AN ORGANIZED HEALTH CARE EDUCATION/TRAINING PROGRAM

## 2021-12-03 PROCEDURE — 25000003 PHARM REV CODE 250: Performed by: STUDENT IN AN ORGANIZED HEALTH CARE EDUCATION/TRAINING PROGRAM

## 2021-12-03 PROCEDURE — 85025 COMPLETE CBC W/AUTO DIFF WBC: CPT | Performed by: STUDENT IN AN ORGANIZED HEALTH CARE EDUCATION/TRAINING PROGRAM

## 2021-12-03 PROCEDURE — 20000000 HC ICU ROOM

## 2021-12-03 PROCEDURE — 63600175 PHARM REV CODE 636 W HCPCS

## 2021-12-03 PROCEDURE — 25000003 PHARM REV CODE 250

## 2021-12-03 PROCEDURE — 25000003 PHARM REV CODE 250: Performed by: INTERNAL MEDICINE

## 2021-12-03 PROCEDURE — 84100 ASSAY OF PHOSPHORUS: CPT | Performed by: STUDENT IN AN ORGANIZED HEALTH CARE EDUCATION/TRAINING PROGRAM

## 2021-12-03 PROCEDURE — A4216 STERILE WATER/SALINE, 10 ML: HCPCS | Performed by: INTERNAL MEDICINE

## 2021-12-03 PROCEDURE — 80053 COMPREHEN METABOLIC PANEL: CPT | Performed by: STUDENT IN AN ORGANIZED HEALTH CARE EDUCATION/TRAINING PROGRAM

## 2021-12-03 PROCEDURE — 83735 ASSAY OF MAGNESIUM: CPT | Performed by: INTERNAL MEDICINE

## 2021-12-03 PROCEDURE — 27000221 HC OXYGEN, UP TO 24 HOURS

## 2021-12-03 PROCEDURE — 99900035 HC TECH TIME PER 15 MIN (STAT)

## 2021-12-03 PROCEDURE — 94760 N-INVAS EAR/PLS OXIMETRY 1: CPT

## 2021-12-03 PROCEDURE — 80053 COMPREHEN METABOLIC PANEL: CPT | Mod: 91

## 2021-12-03 PROCEDURE — 93010 ELECTROCARDIOGRAM REPORT: CPT | Mod: ,,, | Performed by: INTERNAL MEDICINE

## 2021-12-03 PROCEDURE — 84550 ASSAY OF BLOOD/URIC ACID: CPT | Performed by: STUDENT IN AN ORGANIZED HEALTH CARE EDUCATION/TRAINING PROGRAM

## 2021-12-03 PROCEDURE — 99233 SBSQ HOSP IP/OBS HIGH 50: CPT | Mod: ,,, | Performed by: INTERNAL MEDICINE

## 2021-12-03 PROCEDURE — 93010 EKG 12-LEAD: ICD-10-PCS | Mod: ,,, | Performed by: INTERNAL MEDICINE

## 2021-12-03 PROCEDURE — 85730 THROMBOPLASTIN TIME PARTIAL: CPT | Performed by: STUDENT IN AN ORGANIZED HEALTH CARE EDUCATION/TRAINING PROGRAM

## 2021-12-03 RX ORDER — SEVELAMER CARBONATE 800 MG/1
800 TABLET, FILM COATED ORAL
Status: COMPLETED | OUTPATIENT
Start: 2021-12-03 | End: 2021-12-04

## 2021-12-03 RX ORDER — POTASSIUM CHLORIDE 29.8 MG/ML
40 INJECTION INTRAVENOUS ONCE
Status: COMPLETED | OUTPATIENT
Start: 2021-12-03 | End: 2021-12-03

## 2021-12-03 RX ORDER — PANTOPRAZOLE SODIUM 40 MG/1
40 TABLET, DELAYED RELEASE ORAL DAILY
Status: DISCONTINUED | OUTPATIENT
Start: 2021-12-04 | End: 2021-12-07 | Stop reason: HOSPADM

## 2021-12-03 RX ORDER — VANCOMYCIN HCL IN 5 % DEXTROSE 1G/250ML
1000 PLASTIC BAG, INJECTION (ML) INTRAVENOUS
Status: DISCONTINUED | OUTPATIENT
Start: 2021-12-03 | End: 2021-12-05

## 2021-12-03 RX ORDER — POTASSIUM CHLORIDE 20 MEQ/1
40 TABLET, EXTENDED RELEASE ORAL ONCE
Status: COMPLETED | OUTPATIENT
Start: 2021-12-03 | End: 2021-12-03

## 2021-12-03 RX ADMIN — SENNOSIDES AND DOCUSATE SODIUM 1 TABLET: 50; 8.6 TABLET ORAL at 08:12

## 2021-12-03 RX ADMIN — IPRATROPIUM BROMIDE AND ALBUTEROL SULFATE 3 ML: 2.5; .5 SOLUTION RESPIRATORY (INHALATION) at 07:12

## 2021-12-03 RX ADMIN — Medication 10 ML: at 06:12

## 2021-12-03 RX ADMIN — IPRATROPIUM BROMIDE AND ALBUTEROL SULFATE 3 ML: 2.5; .5 SOLUTION RESPIRATORY (INHALATION) at 05:12

## 2021-12-03 RX ADMIN — VANCOMYCIN HYDROCHLORIDE 1250 MG: 1.25 INJECTION, POWDER, LYOPHILIZED, FOR SOLUTION INTRAVENOUS at 04:12

## 2021-12-03 RX ADMIN — FUROSEMIDE 40 MG: 40 INJECTION, SOLUTION INTRAMUSCULAR; INTRAVENOUS at 08:12

## 2021-12-03 RX ADMIN — DILTIAZEM HYDROCHLORIDE 60 MG: 60 TABLET, FILM COATED ORAL at 05:12

## 2021-12-03 RX ADMIN — DILTIAZEM HYDROCHLORIDE 60 MG: 60 TABLET, FILM COATED ORAL at 11:12

## 2021-12-03 RX ADMIN — OXCARBAZEPINE 600 MG: 600 TABLET, FILM COATED ORAL at 08:12

## 2021-12-03 RX ADMIN — ONDANSETRON 4 MG: 2 INJECTION INTRAMUSCULAR; INTRAVENOUS at 08:12

## 2021-12-03 RX ADMIN — INSULIN ASPART 2 UNITS: 100 INJECTION, SOLUTION INTRAVENOUS; SUBCUTANEOUS at 11:12

## 2021-12-03 RX ADMIN — FUROSEMIDE 40 MG: 40 INJECTION, SOLUTION INTRAMUSCULAR; INTRAVENOUS at 03:12

## 2021-12-03 RX ADMIN — QUETIAPINE FUMARATE 200 MG: 200 TABLET ORAL at 08:12

## 2021-12-03 RX ADMIN — SEVELAMER CARBONATE 800 MG: 800 TABLET, FILM COATED ORAL at 05:12

## 2021-12-03 RX ADMIN — SEVELAMER CARBONATE 800 MG: 800 TABLET, FILM COATED ORAL at 11:12

## 2021-12-03 RX ADMIN — Medication 10 ML: at 12:12

## 2021-12-03 RX ADMIN — IPRATROPIUM BROMIDE AND ALBUTEROL SULFATE 3 ML: 2.5; .5 SOLUTION RESPIRATORY (INHALATION) at 12:12

## 2021-12-03 RX ADMIN — PIPERACILLIN SODIUM AND TAZOBACTAM SODIUM 4.5 G: 4; .5 INJECTION, POWDER, FOR SOLUTION INTRAVENOUS at 03:12

## 2021-12-03 RX ADMIN — Medication 6 MG: at 08:12

## 2021-12-03 RX ADMIN — VANCOMYCIN HYDROCHLORIDE 1250 MG: 1.25 INJECTION, POWDER, LYOPHILIZED, FOR SOLUTION INTRAVENOUS at 01:12

## 2021-12-03 RX ADMIN — PIPERACILLIN SODIUM AND TAZOBACTAM SODIUM 4.5 G: 4; .5 INJECTION, POWDER, FOR SOLUTION INTRAVENOUS at 11:12

## 2021-12-03 RX ADMIN — DIAZEPAM 5 MG: 5 TABLET ORAL at 08:12

## 2021-12-03 RX ADMIN — POTASSIUM CHLORIDE 40 MEQ: 400 INJECTION, SOLUTION INTRAVENOUS at 05:12

## 2021-12-03 RX ADMIN — IPRATROPIUM BROMIDE AND ALBUTEROL SULFATE 3 ML: 2.5; .5 SOLUTION RESPIRATORY (INHALATION) at 11:12

## 2021-12-03 RX ADMIN — VANCOMYCIN HYDROCHLORIDE 1000 MG: 1 INJECTION, POWDER, LYOPHILIZED, FOR SOLUTION INTRAVENOUS at 08:12

## 2021-12-03 RX ADMIN — HYDROMORPHONE HYDROCHLORIDE 1 MG: 1 INJECTION, SOLUTION INTRAMUSCULAR; INTRAVENOUS; SUBCUTANEOUS at 08:12

## 2021-12-03 RX ADMIN — IPRATROPIUM BROMIDE AND ALBUTEROL SULFATE 3 ML: 2.5; .5 SOLUTION RESPIRATORY (INHALATION) at 04:12

## 2021-12-03 RX ADMIN — Medication 10 ML: at 11:12

## 2021-12-03 RX ADMIN — POTASSIUM CHLORIDE 40 MEQ: 1500 TABLET, EXTENDED RELEASE ORAL at 05:12

## 2021-12-03 RX ADMIN — SULFAMETHOXAZOLE AND TRIMETHOPRIM 1 TABLET: 400; 80 TABLET ORAL at 08:12

## 2021-12-03 RX ADMIN — PANTOPRAZOLE SODIUM 40 MG: 40 INJECTION, POWDER, FOR SOLUTION INTRAVENOUS at 08:12

## 2021-12-03 RX ADMIN — INSULIN ASPART 2 UNITS: 100 INJECTION, SOLUTION INTRAVENOUS; SUBCUTANEOUS at 05:12

## 2021-12-03 RX ADMIN — PIPERACILLIN SODIUM AND TAZOBACTAM SODIUM 4.5 G: 4; .5 INJECTION, POWDER, FOR SOLUTION INTRAVENOUS at 08:12

## 2021-12-03 RX ADMIN — HYDROMORPHONE HYDROCHLORIDE 1 MG: 1 INJECTION, SOLUTION INTRAMUSCULAR; INTRAVENOUS; SUBCUTANEOUS at 04:12

## 2021-12-04 LAB
ALBUMIN SERPL BCP-MCNC: 2.7 G/DL (ref 3.5–5.2)
ALP SERPL-CCNC: 145 U/L (ref 55–135)
ALT SERPL W/O P-5'-P-CCNC: 14 U/L (ref 10–44)
ANION GAP SERPL CALC-SCNC: 13 MMOL/L (ref 8–16)
ANISOCYTOSIS BLD QL SMEAR: SLIGHT
APTT BLDCRRT: <21 SEC (ref 21–32)
AST SERPL-CCNC: 8 U/L (ref 10–40)
BASOPHILS # BLD AUTO: 0.01 K/UL (ref 0–0.2)
BASOPHILS NFR BLD: 0.4 % (ref 0–1.9)
BILIRUB SERPL-MCNC: 0.6 MG/DL (ref 0.1–1)
BUN SERPL-MCNC: 18 MG/DL (ref 6–20)
CALCIUM SERPL-MCNC: 8.6 MG/DL (ref 8.7–10.5)
CHLORIDE SERPL-SCNC: 92 MMOL/L (ref 95–110)
CO2 SERPL-SCNC: 25 MMOL/L (ref 23–29)
CREAT SERPL-MCNC: 0.7 MG/DL (ref 0.5–1.4)
DIFFERENTIAL METHOD: ABNORMAL
EOSINOPHIL # BLD AUTO: 0 K/UL (ref 0–0.5)
EOSINOPHIL NFR BLD: 0.8 % (ref 0–8)
ERYTHROCYTE [DISTWIDTH] IN BLOOD BY AUTOMATED COUNT: 12.9 % (ref 11.5–14.5)
EST. GFR  (AFRICAN AMERICAN): >60 ML/MIN/1.73 M^2
EST. GFR  (NON AFRICAN AMERICAN): >60 ML/MIN/1.73 M^2
GLUCOSE SERPL-MCNC: 160 MG/DL (ref 70–110)
HCT VFR BLD AUTO: 24.1 % (ref 37–48.5)
HGB BLD-MCNC: 8.3 G/DL (ref 12–16)
HYPOCHROMIA BLD QL SMEAR: ABNORMAL
IMM GRANULOCYTES # BLD AUTO: 0.02 K/UL (ref 0–0.04)
IMM GRANULOCYTES NFR BLD AUTO: 0.8 % (ref 0–0.5)
INR PPP: 1.1 (ref 0.8–1.2)
LDH SERPL L TO P-CCNC: 648 U/L (ref 110–260)
LYMPHOCYTES # BLD AUTO: 1.7 K/UL (ref 1–4.8)
LYMPHOCYTES NFR BLD: 62 % (ref 18–48)
MCH RBC QN AUTO: 29.9 PG (ref 27–31)
MCHC RBC AUTO-ENTMCNC: 34.4 G/DL (ref 32–36)
MCV RBC AUTO: 87 FL (ref 82–98)
MONOCYTES # BLD AUTO: 0.1 K/UL (ref 0.3–1)
MONOCYTES NFR BLD: 2.6 % (ref 4–15)
NEUTROPHILS # BLD AUTO: 0.9 K/UL (ref 1.8–7.7)
NEUTROPHILS NFR BLD: 33.4 % (ref 38–73)
NRBC BLD-RTO: 0 /100 WBC
PHOSPHATE SERPL-MCNC: 5.5 MG/DL (ref 2.7–4.5)
PLATELET # BLD AUTO: 11 K/UL (ref 150–450)
PLATELET BLD QL SMEAR: ABNORMAL
PMV BLD AUTO: 12 FL (ref 9.2–12.9)
POCT GLUCOSE: 160 MG/DL (ref 70–110)
POCT GLUCOSE: 179 MG/DL (ref 70–110)
POCT GLUCOSE: 201 MG/DL (ref 70–110)
POCT GLUCOSE: 222 MG/DL (ref 70–110)
POCT GLUCOSE: 258 MG/DL (ref 70–110)
POCT GLUCOSE: 267 MG/DL (ref 70–110)
POCT GLUCOSE: >500 MG/DL (ref 70–110)
POTASSIUM SERPL-SCNC: 3.5 MMOL/L (ref 3.5–5.1)
PROT SERPL-MCNC: 5.9 G/DL (ref 6–8.4)
PROTHROMBIN TIME: 12 SEC (ref 9–12.5)
RBC # BLD AUTO: 2.78 M/UL (ref 4–5.4)
RESPIRATORY INFECTION PANEL SOURCE: NORMAL
SODIUM SERPL-SCNC: 130 MMOL/L (ref 136–145)
URATE SERPL-MCNC: 3.1 MG/DL (ref 2.4–5.7)
WBC # BLD AUTO: 2.66 K/UL (ref 3.9–12.7)

## 2021-12-04 PROCEDURE — 63600175 PHARM REV CODE 636 W HCPCS: Performed by: INTERNAL MEDICINE

## 2021-12-04 PROCEDURE — 27000221 HC OXYGEN, UP TO 24 HOURS

## 2021-12-04 PROCEDURE — 99900035 HC TECH TIME PER 15 MIN (STAT)

## 2021-12-04 PROCEDURE — 63600175 PHARM REV CODE 636 W HCPCS: Performed by: STUDENT IN AN ORGANIZED HEALTH CARE EDUCATION/TRAINING PROGRAM

## 2021-12-04 PROCEDURE — 97164 PT RE-EVAL EST PLAN CARE: CPT

## 2021-12-04 PROCEDURE — 83615 LACTATE (LD) (LDH) ENZYME: CPT | Performed by: STUDENT IN AN ORGANIZED HEALTH CARE EDUCATION/TRAINING PROGRAM

## 2021-12-04 PROCEDURE — 94799 UNLISTED PULMONARY SVC/PX: CPT

## 2021-12-04 PROCEDURE — 25000003 PHARM REV CODE 250: Performed by: STUDENT IN AN ORGANIZED HEALTH CARE EDUCATION/TRAINING PROGRAM

## 2021-12-04 PROCEDURE — 99233 PR SUBSEQUENT HOSPITAL CARE,LEVL III: ICD-10-PCS | Mod: ,,, | Performed by: INTERNAL MEDICINE

## 2021-12-04 PROCEDURE — 25000003 PHARM REV CODE 250: Performed by: INTERNAL MEDICINE

## 2021-12-04 PROCEDURE — 80053 COMPREHEN METABOLIC PANEL: CPT | Performed by: STUDENT IN AN ORGANIZED HEALTH CARE EDUCATION/TRAINING PROGRAM

## 2021-12-04 PROCEDURE — 94640 AIRWAY INHALATION TREATMENT: CPT

## 2021-12-04 PROCEDURE — 97116 GAIT TRAINING THERAPY: CPT

## 2021-12-04 PROCEDURE — 85025 COMPLETE CBC W/AUTO DIFF WBC: CPT | Performed by: STUDENT IN AN ORGANIZED HEALTH CARE EDUCATION/TRAINING PROGRAM

## 2021-12-04 PROCEDURE — 85730 THROMBOPLASTIN TIME PARTIAL: CPT | Performed by: STUDENT IN AN ORGANIZED HEALTH CARE EDUCATION/TRAINING PROGRAM

## 2021-12-04 PROCEDURE — 25000003 PHARM REV CODE 250

## 2021-12-04 PROCEDURE — 94761 N-INVAS EAR/PLS OXIMETRY MLT: CPT

## 2021-12-04 PROCEDURE — 85610 PROTHROMBIN TIME: CPT | Performed by: STUDENT IN AN ORGANIZED HEALTH CARE EDUCATION/TRAINING PROGRAM

## 2021-12-04 PROCEDURE — 99233 SBSQ HOSP IP/OBS HIGH 50: CPT | Mod: ,,, | Performed by: INTERNAL MEDICINE

## 2021-12-04 PROCEDURE — 25000242 PHARM REV CODE 250 ALT 637 W/ HCPCS: Performed by: STUDENT IN AN ORGANIZED HEALTH CARE EDUCATION/TRAINING PROGRAM

## 2021-12-04 PROCEDURE — 84100 ASSAY OF PHOSPHORUS: CPT | Performed by: STUDENT IN AN ORGANIZED HEALTH CARE EDUCATION/TRAINING PROGRAM

## 2021-12-04 PROCEDURE — 20000000 HC ICU ROOM

## 2021-12-04 PROCEDURE — 84550 ASSAY OF BLOOD/URIC ACID: CPT | Performed by: STUDENT IN AN ORGANIZED HEALTH CARE EDUCATION/TRAINING PROGRAM

## 2021-12-04 PROCEDURE — A4216 STERILE WATER/SALINE, 10 ML: HCPCS | Performed by: INTERNAL MEDICINE

## 2021-12-04 RX ORDER — FUROSEMIDE 10 MG/ML
40 INJECTION INTRAMUSCULAR; INTRAVENOUS ONCE
Status: DISCONTINUED | OUTPATIENT
Start: 2021-12-04 | End: 2021-12-04

## 2021-12-04 RX ORDER — FUROSEMIDE 10 MG/ML
40 INJECTION INTRAMUSCULAR; INTRAVENOUS 2 TIMES DAILY
Status: DISCONTINUED | OUTPATIENT
Start: 2021-12-04 | End: 2021-12-04

## 2021-12-04 RX ORDER — SEVELAMER CARBONATE 800 MG/1
800 TABLET, FILM COATED ORAL
Status: COMPLETED | OUTPATIENT
Start: 2021-12-04 | End: 2021-12-05

## 2021-12-04 RX ORDER — POTASSIUM CHLORIDE 20 MEQ/1
40 TABLET, EXTENDED RELEASE ORAL ONCE
Status: COMPLETED | OUTPATIENT
Start: 2021-12-04 | End: 2021-12-04

## 2021-12-04 RX ADMIN — DILTIAZEM HYDROCHLORIDE 60 MG: 60 TABLET, FILM COATED ORAL at 11:12

## 2021-12-04 RX ADMIN — QUETIAPINE FUMARATE 200 MG: 200 TABLET ORAL at 08:12

## 2021-12-04 RX ADMIN — DILTIAZEM HYDROCHLORIDE 60 MG: 60 TABLET, FILM COATED ORAL at 05:12

## 2021-12-04 RX ADMIN — IPRATROPIUM BROMIDE AND ALBUTEROL SULFATE 3 ML: 2.5; .5 SOLUTION RESPIRATORY (INHALATION) at 07:12

## 2021-12-04 RX ADMIN — IPRATROPIUM BROMIDE AND ALBUTEROL SULFATE 3 ML: 2.5; .5 SOLUTION RESPIRATORY (INHALATION) at 12:12

## 2021-12-04 RX ADMIN — INSULIN ASPART 4 UNITS: 100 INJECTION, SOLUTION INTRAVENOUS; SUBCUTANEOUS at 09:12

## 2021-12-04 RX ADMIN — SEVELAMER CARBONATE 800 MG: 800 TABLET, FILM COATED ORAL at 05:12

## 2021-12-04 RX ADMIN — PIPERACILLIN SODIUM AND TAZOBACTAM SODIUM 4.5 G: 4; .5 INJECTION, POWDER, FOR SOLUTION INTRAVENOUS at 08:12

## 2021-12-04 RX ADMIN — PANTOPRAZOLE SODIUM 40 MG: 40 TABLET, DELAYED RELEASE ORAL at 08:12

## 2021-12-04 RX ADMIN — VANCOMYCIN HYDROCHLORIDE 1000 MG: 1 INJECTION, POWDER, LYOPHILIZED, FOR SOLUTION INTRAVENOUS at 02:12

## 2021-12-04 RX ADMIN — OXCARBAZEPINE 600 MG: 600 TABLET, FILM COATED ORAL at 08:12

## 2021-12-04 RX ADMIN — FUROSEMIDE 40 MG: 40 INJECTION, SOLUTION INTRAMUSCULAR; INTRAVENOUS at 08:12

## 2021-12-04 RX ADMIN — INSULIN ASPART 6 UNITS: 100 INJECTION, SOLUTION INTRAVENOUS; SUBCUTANEOUS at 05:12

## 2021-12-04 RX ADMIN — VANCOMYCIN HYDROCHLORIDE 1000 MG: 1 INJECTION, POWDER, LYOPHILIZED, FOR SOLUTION INTRAVENOUS at 08:12

## 2021-12-04 RX ADMIN — Medication 10 ML: at 05:12

## 2021-12-04 RX ADMIN — PIPERACILLIN SODIUM AND TAZOBACTAM SODIUM 4.5 G: 4; .5 INJECTION, POWDER, FOR SOLUTION INTRAVENOUS at 04:12

## 2021-12-04 RX ADMIN — PIPERACILLIN SODIUM AND TAZOBACTAM SODIUM 4.5 G: 4; .5 INJECTION, POWDER, FOR SOLUTION INTRAVENOUS at 11:12

## 2021-12-04 RX ADMIN — POTASSIUM CHLORIDE 40 MEQ: 1500 TABLET, EXTENDED RELEASE ORAL at 05:12

## 2021-12-04 RX ADMIN — SENNOSIDES AND DOCUSATE SODIUM 1 TABLET: 50; 8.6 TABLET ORAL at 08:12

## 2021-12-04 RX ADMIN — Medication 10 ML: at 11:12

## 2021-12-04 RX ADMIN — OXCARBAZEPINE 600 MG: 600 TABLET, FILM COATED ORAL at 09:12

## 2021-12-04 RX ADMIN — HYDROMORPHONE HYDROCHLORIDE 1 MG: 1 INJECTION, SOLUTION INTRAMUSCULAR; INTRAVENOUS; SUBCUTANEOUS at 03:12

## 2021-12-04 RX ADMIN — DIAZEPAM 5 MG: 5 TABLET ORAL at 08:12

## 2021-12-04 RX ADMIN — IPRATROPIUM BROMIDE AND ALBUTEROL SULFATE 3 ML: 2.5; .5 SOLUTION RESPIRATORY (INHALATION) at 04:12

## 2021-12-04 RX ADMIN — SEVELAMER CARBONATE 800 MG: 800 TABLET, FILM COATED ORAL at 08:12

## 2021-12-04 RX ADMIN — HYDROMORPHONE HYDROCHLORIDE 1 MG: 1 INJECTION, SOLUTION INTRAMUSCULAR; INTRAVENOUS; SUBCUTANEOUS at 08:12

## 2021-12-04 RX ADMIN — IPRATROPIUM BROMIDE AND ALBUTEROL SULFATE 3 ML: 2.5; .5 SOLUTION RESPIRATORY (INHALATION) at 11:12

## 2021-12-04 RX ADMIN — HYDROMORPHONE HYDROCHLORIDE 1 MG: 1 INJECTION, SOLUTION INTRAMUSCULAR; INTRAVENOUS; SUBCUTANEOUS at 09:12

## 2021-12-04 RX ADMIN — SULFAMETHOXAZOLE AND TRIMETHOPRIM 1 TABLET: 400; 80 TABLET ORAL at 08:12

## 2021-12-04 RX ADMIN — VANCOMYCIN HYDROCHLORIDE 1000 MG: 1 INJECTION, POWDER, LYOPHILIZED, FOR SOLUTION INTRAVENOUS at 04:12

## 2021-12-04 RX ADMIN — INSULIN ASPART 6 UNITS: 100 INJECTION, SOLUTION INTRAVENOUS; SUBCUTANEOUS at 11:12

## 2021-12-04 RX ADMIN — INSULIN ASPART 2 UNITS: 100 INJECTION, SOLUTION INTRAVENOUS; SUBCUTANEOUS at 08:12

## 2021-12-04 RX ADMIN — IPRATROPIUM BROMIDE AND ALBUTEROL SULFATE 3 ML: 2.5; .5 SOLUTION RESPIRATORY (INHALATION) at 03:12

## 2021-12-04 RX ADMIN — SEVELAMER CARBONATE 800 MG: 800 TABLET, FILM COATED ORAL at 11:12

## 2021-12-05 PROBLEM — M54.9 BACK PAIN: Status: RESOLVED | Noted: 2021-11-24 | Resolved: 2021-12-05

## 2021-12-05 LAB
ABO + RH BLD: NORMAL
ALBUMIN SERPL BCP-MCNC: 2.4 G/DL (ref 3.5–5.2)
ALP SERPL-CCNC: 138 U/L (ref 55–135)
ALT SERPL W/O P-5'-P-CCNC: 13 U/L (ref 10–44)
ANION GAP SERPL CALC-SCNC: 11 MMOL/L (ref 8–16)
ANISOCYTOSIS BLD QL SMEAR: SLIGHT
APTT BLDCRRT: <21 SEC (ref 21–32)
AST SERPL-CCNC: 8 U/L (ref 10–40)
BACTERIA BLD CULT: NORMAL
BACTERIA BLD CULT: NORMAL
BASOPHILS # BLD AUTO: 0 K/UL (ref 0–0.2)
BASOPHILS NFR BLD: 0 % (ref 0–1.9)
BILIRUB SERPL-MCNC: 0.4 MG/DL (ref 0.1–1)
BLD GP AB SCN CELLS X3 SERPL QL: NORMAL
BLD PROD TYP BPU: NORMAL
BLOOD UNIT EXPIRATION DATE: NORMAL
BLOOD UNIT TYPE CODE: 6200
BLOOD UNIT TYPE: NORMAL
BUN SERPL-MCNC: 11 MG/DL (ref 6–20)
C IMMITIS AB TITR SER CF: NEGATIVE {TITER}
C IMMITIS IGG SER QL: NEGATIVE
C IMMITIS IGM SER QL: NEGATIVE
CALCIUM SERPL-MCNC: 8.7 MG/DL (ref 8.7–10.5)
CHLORIDE SERPL-SCNC: 96 MMOL/L (ref 95–110)
CO2 SERPL-SCNC: 23 MMOL/L (ref 23–29)
CODING SYSTEM: NORMAL
CREAT SERPL-MCNC: 0.7 MG/DL (ref 0.5–1.4)
DIFFERENTIAL METHOD: ABNORMAL
DISPENSE STATUS: NORMAL
EOSINOPHIL # BLD AUTO: 0 K/UL (ref 0–0.5)
EOSINOPHIL NFR BLD: 0.9 % (ref 0–8)
ERYTHROCYTE [DISTWIDTH] IN BLOOD BY AUTOMATED COUNT: 12.9 % (ref 11.5–14.5)
EST. GFR  (AFRICAN AMERICAN): >60 ML/MIN/1.73 M^2
EST. GFR  (NON AFRICAN AMERICAN): >60 ML/MIN/1.73 M^2
FIBRINOGEN PPP-MCNC: 265 MG/DL (ref 182–400)
GLUCOSE SERPL-MCNC: 135 MG/DL (ref 70–110)
HCT VFR BLD AUTO: 20.7 % (ref 37–48.5)
HGB BLD-MCNC: 7.1 G/DL (ref 12–16)
IMM GRANULOCYTES # BLD AUTO: 0.01 K/UL (ref 0–0.04)
IMM GRANULOCYTES NFR BLD AUTO: 0.4 % (ref 0–0.5)
INR PPP: 1.1 (ref 0.8–1.2)
LDH SERPL L TO P-CCNC: 522 U/L (ref 110–260)
LYMPHOCYTES # BLD AUTO: 1.4 K/UL (ref 1–4.8)
LYMPHOCYTES NFR BLD: 59.6 % (ref 18–48)
MCH RBC QN AUTO: 29.2 PG (ref 27–31)
MCHC RBC AUTO-ENTMCNC: 34.3 G/DL (ref 32–36)
MCV RBC AUTO: 85 FL (ref 82–98)
MONOCYTES # BLD AUTO: 0.1 K/UL (ref 0.3–1)
MONOCYTES NFR BLD: 3.5 % (ref 4–15)
NEUTROPHILS # BLD AUTO: 0.8 K/UL (ref 1.8–7.7)
NEUTROPHILS NFR BLD: 35.6 % (ref 38–73)
NRBC BLD-RTO: 0 /100 WBC
PHOSPHATE SERPL-MCNC: 4 MG/DL (ref 2.7–4.5)
PLATELET # BLD AUTO: 10 K/UL (ref 150–450)
PLATELET BLD QL SMEAR: ABNORMAL
PMV BLD AUTO: 10.9 FL (ref 9.2–12.9)
POCT GLUCOSE: 158 MG/DL (ref 70–110)
POCT GLUCOSE: 183 MG/DL (ref 70–110)
POCT GLUCOSE: 201 MG/DL (ref 70–110)
POTASSIUM SERPL-SCNC: 3.6 MMOL/L (ref 3.5–5.1)
PROT SERPL-MCNC: 5.4 G/DL (ref 6–8.4)
PROTHROMBIN TIME: 11.5 SEC (ref 9–12.5)
RBC # BLD AUTO: 2.43 M/UL (ref 4–5.4)
SODIUM SERPL-SCNC: 130 MMOL/L (ref 136–145)
UNIT NUMBER: NORMAL
URATE SERPL-MCNC: 2.3 MG/DL (ref 2.4–5.7)
VANCOMYCIN TROUGH SERPL-MCNC: 24.2 UG/ML (ref 10–22)
WBC # BLD AUTO: 2.28 K/UL (ref 3.9–12.7)

## 2021-12-05 PROCEDURE — 25000003 PHARM REV CODE 250: Performed by: INTERNAL MEDICINE

## 2021-12-05 PROCEDURE — 63600175 PHARM REV CODE 636 W HCPCS: Performed by: STUDENT IN AN ORGANIZED HEALTH CARE EDUCATION/TRAINING PROGRAM

## 2021-12-05 PROCEDURE — 25000003 PHARM REV CODE 250: Performed by: STUDENT IN AN ORGANIZED HEALTH CARE EDUCATION/TRAINING PROGRAM

## 2021-12-05 PROCEDURE — 20600001 HC STEP DOWN PRIVATE ROOM

## 2021-12-05 PROCEDURE — 25000003 PHARM REV CODE 250

## 2021-12-05 PROCEDURE — 99233 PR SUBSEQUENT HOSPITAL CARE,LEVL III: ICD-10-PCS | Mod: ,,, | Performed by: INTERNAL MEDICINE

## 2021-12-05 PROCEDURE — P9037 PLATE PHERES LEUKOREDU IRRAD: HCPCS | Performed by: STUDENT IN AN ORGANIZED HEALTH CARE EDUCATION/TRAINING PROGRAM

## 2021-12-05 PROCEDURE — 99233 SBSQ HOSP IP/OBS HIGH 50: CPT | Mod: ,,, | Performed by: INTERNAL MEDICINE

## 2021-12-05 PROCEDURE — 94640 AIRWAY INHALATION TREATMENT: CPT

## 2021-12-05 PROCEDURE — 80053 COMPREHEN METABOLIC PANEL: CPT | Performed by: STUDENT IN AN ORGANIZED HEALTH CARE EDUCATION/TRAINING PROGRAM

## 2021-12-05 PROCEDURE — 85025 COMPLETE CBC W/AUTO DIFF WBC: CPT | Performed by: STUDENT IN AN ORGANIZED HEALTH CARE EDUCATION/TRAINING PROGRAM

## 2021-12-05 PROCEDURE — 83615 LACTATE (LD) (LDH) ENZYME: CPT | Performed by: STUDENT IN AN ORGANIZED HEALTH CARE EDUCATION/TRAINING PROGRAM

## 2021-12-05 PROCEDURE — 63600175 PHARM REV CODE 636 W HCPCS: Performed by: INTERNAL MEDICINE

## 2021-12-05 PROCEDURE — 85384 FIBRINOGEN ACTIVITY: CPT | Performed by: STUDENT IN AN ORGANIZED HEALTH CARE EDUCATION/TRAINING PROGRAM

## 2021-12-05 PROCEDURE — 94799 UNLISTED PULMONARY SVC/PX: CPT

## 2021-12-05 PROCEDURE — 85730 THROMBOPLASTIN TIME PARTIAL: CPT | Performed by: STUDENT IN AN ORGANIZED HEALTH CARE EDUCATION/TRAINING PROGRAM

## 2021-12-05 PROCEDURE — 25000242 PHARM REV CODE 250 ALT 637 W/ HCPCS: Performed by: STUDENT IN AN ORGANIZED HEALTH CARE EDUCATION/TRAINING PROGRAM

## 2021-12-05 PROCEDURE — 86920 COMPATIBILITY TEST SPIN: CPT | Performed by: STUDENT IN AN ORGANIZED HEALTH CARE EDUCATION/TRAINING PROGRAM

## 2021-12-05 PROCEDURE — 94761 N-INVAS EAR/PLS OXIMETRY MLT: CPT

## 2021-12-05 PROCEDURE — 84550 ASSAY OF BLOOD/URIC ACID: CPT | Performed by: STUDENT IN AN ORGANIZED HEALTH CARE EDUCATION/TRAINING PROGRAM

## 2021-12-05 PROCEDURE — 84100 ASSAY OF PHOSPHORUS: CPT | Performed by: STUDENT IN AN ORGANIZED HEALTH CARE EDUCATION/TRAINING PROGRAM

## 2021-12-05 PROCEDURE — 99900035 HC TECH TIME PER 15 MIN (STAT)

## 2021-12-05 PROCEDURE — 85610 PROTHROMBIN TIME: CPT | Performed by: STUDENT IN AN ORGANIZED HEALTH CARE EDUCATION/TRAINING PROGRAM

## 2021-12-05 PROCEDURE — 80202 ASSAY OF VANCOMYCIN: CPT | Performed by: INTERNAL MEDICINE

## 2021-12-05 PROCEDURE — A4216 STERILE WATER/SALINE, 10 ML: HCPCS | Performed by: INTERNAL MEDICINE

## 2021-12-05 PROCEDURE — 86900 BLOOD TYPING SEROLOGIC ABO: CPT | Performed by: STUDENT IN AN ORGANIZED HEALTH CARE EDUCATION/TRAINING PROGRAM

## 2021-12-05 RX ORDER — IMATINIB MESYLATE 400 MG/1
400 TABLET, FILM COATED ORAL DAILY
Qty: 90 TABLET | Refills: 3 | Status: SHIPPED | OUTPATIENT
Start: 2021-12-05 | End: 2022-03-29 | Stop reason: SDUPTHER

## 2021-12-05 RX ORDER — METHOCARBAMOL 500 MG/1
500 TABLET, FILM COATED ORAL 3 TIMES DAILY PRN
Status: DISCONTINUED | OUTPATIENT
Start: 2021-12-05 | End: 2021-12-07 | Stop reason: HOSPADM

## 2021-12-05 RX ORDER — POTASSIUM CHLORIDE 20 MEQ/1
40 TABLET, EXTENDED RELEASE ORAL ONCE
Status: COMPLETED | OUTPATIENT
Start: 2021-12-05 | End: 2021-12-05

## 2021-12-05 RX ORDER — GABAPENTIN 300 MG/1
300 CAPSULE ORAL 3 TIMES DAILY
Status: DISCONTINUED | OUTPATIENT
Start: 2021-12-05 | End: 2021-12-07 | Stop reason: HOSPADM

## 2021-12-05 RX ORDER — HYDROCODONE BITARTRATE AND ACETAMINOPHEN 500; 5 MG/1; MG/1
TABLET ORAL
Status: DISCONTINUED | OUTPATIENT
Start: 2021-12-05 | End: 2021-12-07 | Stop reason: HOSPADM

## 2021-12-05 RX ORDER — IMATINIB MESYLATE 100 MG/1
200 TABLET, FILM COATED ORAL DAILY
Qty: 180 TABLET | Refills: 3 | Status: SHIPPED | OUTPATIENT
Start: 2021-12-05 | End: 2022-03-29 | Stop reason: SDUPTHER

## 2021-12-05 RX ADMIN — GABAPENTIN 300 MG: 300 CAPSULE ORAL at 11:12

## 2021-12-05 RX ADMIN — IPRATROPIUM BROMIDE AND ALBUTEROL SULFATE 3 ML: 2.5; .5 SOLUTION RESPIRATORY (INHALATION) at 11:12

## 2021-12-05 RX ADMIN — OXCARBAZEPINE 600 MG: 600 TABLET, FILM COATED ORAL at 09:12

## 2021-12-05 RX ADMIN — IPRATROPIUM BROMIDE AND ALBUTEROL SULFATE 3 ML: 2.5; .5 SOLUTION RESPIRATORY (INHALATION) at 09:12

## 2021-12-05 RX ADMIN — SENNOSIDES AND DOCUSATE SODIUM 1 TABLET: 50; 8.6 TABLET ORAL at 09:12

## 2021-12-05 RX ADMIN — POTASSIUM CHLORIDE 40 MEQ: 1500 TABLET, EXTENDED RELEASE ORAL at 06:12

## 2021-12-05 RX ADMIN — INSULIN ASPART 2 UNITS: 100 INJECTION, SOLUTION INTRAVENOUS; SUBCUTANEOUS at 12:12

## 2021-12-05 RX ADMIN — INSULIN ASPART 2 UNITS: 100 INJECTION, SOLUTION INTRAVENOUS; SUBCUTANEOUS at 08:12

## 2021-12-05 RX ADMIN — HYDROMORPHONE HYDROCHLORIDE 1 MG: 1 INJECTION, SOLUTION INTRAMUSCULAR; INTRAVENOUS; SUBCUTANEOUS at 07:12

## 2021-12-05 RX ADMIN — IPRATROPIUM BROMIDE AND ALBUTEROL SULFATE 3 ML: 2.5; .5 SOLUTION RESPIRATORY (INHALATION) at 03:12

## 2021-12-05 RX ADMIN — Medication 10 ML: at 12:12

## 2021-12-05 RX ADMIN — VANCOMYCIN HYDROCHLORIDE 1000 MG: 1 INJECTION, POWDER, LYOPHILIZED, FOR SOLUTION INTRAVENOUS at 04:12

## 2021-12-05 RX ADMIN — DILTIAZEM HYDROCHLORIDE 60 MG: 60 TABLET, FILM COATED ORAL at 06:12

## 2021-12-05 RX ADMIN — QUETIAPINE FUMARATE 200 MG: 200 TABLET ORAL at 09:12

## 2021-12-05 RX ADMIN — PANTOPRAZOLE SODIUM 40 MG: 40 TABLET, DELAYED RELEASE ORAL at 08:12

## 2021-12-05 RX ADMIN — GABAPENTIN 300 MG: 300 CAPSULE ORAL at 02:12

## 2021-12-05 RX ADMIN — PIPERACILLIN SODIUM AND TAZOBACTAM SODIUM 4.5 G: 4; .5 INJECTION, POWDER, FOR SOLUTION INTRAVENOUS at 12:12

## 2021-12-05 RX ADMIN — DILTIAZEM HYDROCHLORIDE 60 MG: 60 TABLET, FILM COATED ORAL at 05:12

## 2021-12-05 RX ADMIN — SULFAMETHOXAZOLE AND TRIMETHOPRIM 1 TABLET: 400; 80 TABLET ORAL at 09:12

## 2021-12-05 RX ADMIN — PIPERACILLIN SODIUM AND TAZOBACTAM SODIUM 4.5 G: 4; .5 INJECTION, POWDER, FOR SOLUTION INTRAVENOUS at 04:12

## 2021-12-05 RX ADMIN — Medication 10 ML: at 06:12

## 2021-12-05 RX ADMIN — DIAZEPAM 5 MG: 5 TABLET ORAL at 09:12

## 2021-12-05 RX ADMIN — DIAZEPAM 5 MG: 5 TABLET ORAL at 08:12

## 2021-12-05 RX ADMIN — SENNOSIDES AND DOCUSATE SODIUM 1 TABLET: 50; 8.6 TABLET ORAL at 08:12

## 2021-12-05 RX ADMIN — DILTIAZEM HYDROCHLORIDE 60 MG: 60 TABLET, FILM COATED ORAL at 11:12

## 2021-12-05 RX ADMIN — SEVELAMER CARBONATE 800 MG: 800 TABLET, FILM COATED ORAL at 08:12

## 2021-12-05 RX ADMIN — GABAPENTIN 300 MG: 300 CAPSULE ORAL at 09:12

## 2021-12-05 RX ADMIN — PIPERACILLIN SODIUM AND TAZOBACTAM SODIUM 4.5 G: 4; .5 INJECTION, POWDER, FOR SOLUTION INTRAVENOUS at 09:12

## 2021-12-05 RX ADMIN — METHOCARBAMOL 500 MG: 500 TABLET ORAL at 02:12

## 2021-12-05 RX ADMIN — IPRATROPIUM BROMIDE AND ALBUTEROL SULFATE 3 ML: 2.5; .5 SOLUTION RESPIRATORY (INHALATION) at 07:12

## 2021-12-05 RX ADMIN — INSULIN ASPART 2 UNITS: 100 INJECTION, SOLUTION INTRAVENOUS; SUBCUTANEOUS at 03:12

## 2021-12-05 RX ADMIN — INSULIN ASPART 2 UNITS: 100 INJECTION, SOLUTION INTRAVENOUS; SUBCUTANEOUS at 09:12

## 2021-12-05 RX ADMIN — IPRATROPIUM BROMIDE AND ALBUTEROL SULFATE 3 ML: 2.5; .5 SOLUTION RESPIRATORY (INHALATION) at 04:12

## 2021-12-06 LAB
ALBUMIN SERPL BCP-MCNC: 2.5 G/DL (ref 3.5–5.2)
ALP SERPL-CCNC: 135 U/L (ref 55–135)
ALT SERPL W/O P-5'-P-CCNC: 12 U/L (ref 10–44)
ANION GAP SERPL CALC-SCNC: 11 MMOL/L (ref 8–16)
ANISOCYTOSIS BLD QL SMEAR: SLIGHT
ANISOCYTOSIS BLD QL SMEAR: SLIGHT
APTT BLDCRRT: <21 SEC (ref 21–32)
AST SERPL-CCNC: 9 U/L (ref 10–40)
BASOPHILS # BLD AUTO: 0 K/UL (ref 0–0.2)
BASOPHILS # BLD AUTO: 0.01 K/UL (ref 0–0.2)
BASOPHILS NFR BLD: 0 % (ref 0–1.9)
BASOPHILS NFR BLD: 0.4 % (ref 0–1.9)
BILIRUB SERPL-MCNC: 0.4 MG/DL (ref 0.1–1)
BLD PROD TYP BPU: NORMAL
BLOOD UNIT EXPIRATION DATE: NORMAL
BLOOD UNIT TYPE CODE: 1700
BLOOD UNIT TYPE: NORMAL
BUN SERPL-MCNC: 11 MG/DL (ref 6–20)
CALCIUM SERPL-MCNC: 8.8 MG/DL (ref 8.7–10.5)
CHLORIDE SERPL-SCNC: 100 MMOL/L (ref 95–110)
CO2 SERPL-SCNC: 24 MMOL/L (ref 23–29)
CODING SYSTEM: NORMAL
CREAT SERPL-MCNC: 0.8 MG/DL (ref 0.5–1.4)
DIFFERENTIAL METHOD: ABNORMAL
DIFFERENTIAL METHOD: ABNORMAL
DISPENSE STATUS: NORMAL
EOSINOPHIL # BLD AUTO: 0 K/UL (ref 0–0.5)
EOSINOPHIL # BLD AUTO: 0 K/UL (ref 0–0.5)
EOSINOPHIL NFR BLD: 0.7 % (ref 0–8)
EOSINOPHIL NFR BLD: 1.2 % (ref 0–8)
ERYTHROCYTE [DISTWIDTH] IN BLOOD BY AUTOMATED COUNT: 13 % (ref 11.5–14.5)
ERYTHROCYTE [DISTWIDTH] IN BLOOD BY AUTOMATED COUNT: 13.6 % (ref 11.5–14.5)
EST. GFR  (AFRICAN AMERICAN): >60 ML/MIN/1.73 M^2
EST. GFR  (NON AFRICAN AMERICAN): >60 ML/MIN/1.73 M^2
GLUCOSE SERPL-MCNC: 126 MG/DL (ref 70–110)
HCT VFR BLD AUTO: 19.4 % (ref 37–48.5)
HCT VFR BLD AUTO: 22.8 % (ref 37–48.5)
HGB BLD-MCNC: 6.4 G/DL (ref 12–16)
HGB BLD-MCNC: 7.8 G/DL (ref 12–16)
HYPOCHROMIA BLD QL SMEAR: ABNORMAL
IMM GRANULOCYTES # BLD AUTO: 0.02 K/UL (ref 0–0.04)
IMM GRANULOCYTES # BLD AUTO: 0.02 K/UL (ref 0–0.04)
IMM GRANULOCYTES NFR BLD AUTO: 0.7 % (ref 0–0.5)
IMM GRANULOCYTES NFR BLD AUTO: 0.8 % (ref 0–0.5)
INR PPP: 1 (ref 0.8–1.2)
LDH SERPL L TO P-CCNC: 462 U/L (ref 110–260)
LYMPHOCYTES # BLD AUTO: 1.7 K/UL (ref 1–4.8)
LYMPHOCYTES # BLD AUTO: 1.8 K/UL (ref 1–4.8)
LYMPHOCYTES NFR BLD: 65.2 % (ref 18–48)
LYMPHOCYTES NFR BLD: 66 % (ref 18–48)
MCH RBC QN AUTO: 29.1 PG (ref 27–31)
MCH RBC QN AUTO: 29.2 PG (ref 27–31)
MCHC RBC AUTO-ENTMCNC: 33 G/DL (ref 32–36)
MCHC RBC AUTO-ENTMCNC: 34.2 G/DL (ref 32–36)
MCV RBC AUTO: 85 FL (ref 82–98)
MCV RBC AUTO: 88 FL (ref 82–98)
MONOCYTES # BLD AUTO: 0 K/UL (ref 0.3–1)
MONOCYTES # BLD AUTO: 0.1 K/UL (ref 0.3–1)
MONOCYTES NFR BLD: 1.6 % (ref 4–15)
MONOCYTES NFR BLD: 1.9 % (ref 4–15)
NEUTROPHILS # BLD AUTO: 0.8 K/UL (ref 1.8–7.7)
NEUTROPHILS # BLD AUTO: 0.8 K/UL (ref 1.8–7.7)
NEUTROPHILS NFR BLD: 30.7 % (ref 38–73)
NEUTROPHILS NFR BLD: 30.8 % (ref 38–73)
NRBC BLD-RTO: 0 /100 WBC
NRBC BLD-RTO: 0 /100 WBC
NUM UNITS TRANS PACKED RBC: NORMAL
OVALOCYTES BLD QL SMEAR: ABNORMAL
PHOSPHATE SERPL-MCNC: 4.4 MG/DL (ref 2.7–4.5)
PLATELET # BLD AUTO: 19 K/UL (ref 150–450)
PLATELET # BLD AUTO: 22 K/UL (ref 150–450)
PLATELET BLD QL SMEAR: ABNORMAL
PLATELET BLD QL SMEAR: ABNORMAL
PMV BLD AUTO: 10.7 FL (ref 9.2–12.9)
PMV BLD AUTO: 11.2 FL (ref 9.2–12.9)
POCT GLUCOSE: 119 MG/DL (ref 70–110)
POCT GLUCOSE: 158 MG/DL (ref 70–110)
POCT GLUCOSE: 163 MG/DL (ref 70–110)
POCT GLUCOSE: 195 MG/DL (ref 70–110)
POCT GLUCOSE: 196 MG/DL (ref 70–110)
POIKILOCYTOSIS BLD QL SMEAR: SLIGHT
POLYCHROMASIA BLD QL SMEAR: ABNORMAL
POLYCHROMASIA BLD QL SMEAR: ABNORMAL
POTASSIUM SERPL-SCNC: 4.2 MMOL/L (ref 3.5–5.1)
PROT SERPL-MCNC: 5.3 G/DL (ref 6–8.4)
PROTHROMBIN TIME: 11.4 SEC (ref 9–12.5)
RBC # BLD AUTO: 2.2 M/UL (ref 4–5.4)
RBC # BLD AUTO: 2.67 M/UL (ref 4–5.4)
SMUDGE CELLS BLD QL SMEAR: ABNORMAL
SODIUM SERPL-SCNC: 135 MMOL/L (ref 136–145)
URATE SERPL-MCNC: 2.1 MG/DL (ref 2.4–5.7)
VANCOMYCIN SERPL-MCNC: 9.1 UG/ML
WBC # BLD AUTO: 2.56 K/UL (ref 3.9–12.7)
WBC # BLD AUTO: 2.68 K/UL (ref 3.9–12.7)

## 2021-12-06 PROCEDURE — 85610 PROTHROMBIN TIME: CPT | Performed by: STUDENT IN AN ORGANIZED HEALTH CARE EDUCATION/TRAINING PROGRAM

## 2021-12-06 PROCEDURE — 83615 LACTATE (LD) (LDH) ENZYME: CPT | Performed by: STUDENT IN AN ORGANIZED HEALTH CARE EDUCATION/TRAINING PROGRAM

## 2021-12-06 PROCEDURE — 84550 ASSAY OF BLOOD/URIC ACID: CPT | Performed by: STUDENT IN AN ORGANIZED HEALTH CARE EDUCATION/TRAINING PROGRAM

## 2021-12-06 PROCEDURE — 94799 UNLISTED PULMONARY SVC/PX: CPT

## 2021-12-06 PROCEDURE — A4216 STERILE WATER/SALINE, 10 ML: HCPCS | Performed by: INTERNAL MEDICINE

## 2021-12-06 PROCEDURE — 81206 BCR/ABL1 GENE MAJOR BP: CPT | Performed by: STUDENT IN AN ORGANIZED HEALTH CARE EDUCATION/TRAINING PROGRAM

## 2021-12-06 PROCEDURE — 85025 COMPLETE CBC W/AUTO DIFF WBC: CPT | Performed by: STUDENT IN AN ORGANIZED HEALTH CARE EDUCATION/TRAINING PROGRAM

## 2021-12-06 PROCEDURE — 25000003 PHARM REV CODE 250: Performed by: STUDENT IN AN ORGANIZED HEALTH CARE EDUCATION/TRAINING PROGRAM

## 2021-12-06 PROCEDURE — 25000003 PHARM REV CODE 250

## 2021-12-06 PROCEDURE — 25000242 PHARM REV CODE 250 ALT 637 W/ HCPCS: Performed by: STUDENT IN AN ORGANIZED HEALTH CARE EDUCATION/TRAINING PROGRAM

## 2021-12-06 PROCEDURE — 20600001 HC STEP DOWN PRIVATE ROOM

## 2021-12-06 PROCEDURE — 63600175 PHARM REV CODE 636 W HCPCS: Performed by: STUDENT IN AN ORGANIZED HEALTH CARE EDUCATION/TRAINING PROGRAM

## 2021-12-06 PROCEDURE — 25000003 PHARM REV CODE 250: Performed by: INTERNAL MEDICINE

## 2021-12-06 PROCEDURE — 80202 ASSAY OF VANCOMYCIN: CPT | Performed by: INTERNAL MEDICINE

## 2021-12-06 PROCEDURE — 63600175 PHARM REV CODE 636 W HCPCS: Performed by: INTERNAL MEDICINE

## 2021-12-06 PROCEDURE — 81207 BCR/ABL1 GENE MINOR BP: CPT | Performed by: STUDENT IN AN ORGANIZED HEALTH CARE EDUCATION/TRAINING PROGRAM

## 2021-12-06 PROCEDURE — 99233 SBSQ HOSP IP/OBS HIGH 50: CPT | Mod: ,,, | Performed by: INTERNAL MEDICINE

## 2021-12-06 PROCEDURE — P9040 RBC LEUKOREDUCED IRRADIATED: HCPCS | Performed by: STUDENT IN AN ORGANIZED HEALTH CARE EDUCATION/TRAINING PROGRAM

## 2021-12-06 PROCEDURE — 36430 TRANSFUSION BLD/BLD COMPNT: CPT

## 2021-12-06 PROCEDURE — 97530 THERAPEUTIC ACTIVITIES: CPT | Mod: CQ

## 2021-12-06 PROCEDURE — 97168 OT RE-EVAL EST PLAN CARE: CPT

## 2021-12-06 PROCEDURE — 94761 N-INVAS EAR/PLS OXIMETRY MLT: CPT

## 2021-12-06 PROCEDURE — 94640 AIRWAY INHALATION TREATMENT: CPT

## 2021-12-06 PROCEDURE — 97116 GAIT TRAINING THERAPY: CPT | Mod: CQ

## 2021-12-06 PROCEDURE — 85730 THROMBOPLASTIN TIME PARTIAL: CPT | Performed by: STUDENT IN AN ORGANIZED HEALTH CARE EDUCATION/TRAINING PROGRAM

## 2021-12-06 PROCEDURE — 84100 ASSAY OF PHOSPHORUS: CPT | Performed by: STUDENT IN AN ORGANIZED HEALTH CARE EDUCATION/TRAINING PROGRAM

## 2021-12-06 PROCEDURE — 97530 THERAPEUTIC ACTIVITIES: CPT

## 2021-12-06 PROCEDURE — 80053 COMPREHEN METABOLIC PANEL: CPT | Performed by: STUDENT IN AN ORGANIZED HEALTH CARE EDUCATION/TRAINING PROGRAM

## 2021-12-06 PROCEDURE — 99233 PR SUBSEQUENT HOSPITAL CARE,LEVL III: ICD-10-PCS | Mod: ,,, | Performed by: INTERNAL MEDICINE

## 2021-12-06 PROCEDURE — 85025 COMPLETE CBC W/AUTO DIFF WBC: CPT | Mod: 91 | Performed by: STUDENT IN AN ORGANIZED HEALTH CARE EDUCATION/TRAINING PROGRAM

## 2021-12-06 PROCEDURE — 99900035 HC TECH TIME PER 15 MIN (STAT)

## 2021-12-06 RX ORDER — DILTIAZEM HYDROCHLORIDE 30 MG/1
90 TABLET, FILM COATED ORAL EVERY 6 HOURS
Status: DISCONTINUED | OUTPATIENT
Start: 2021-12-06 | End: 2021-12-07 | Stop reason: HOSPADM

## 2021-12-06 RX ORDER — HYDROMORPHONE HYDROCHLORIDE 1 MG/ML
1 INJECTION, SOLUTION INTRAMUSCULAR; INTRAVENOUS; SUBCUTANEOUS ONCE
Status: COMPLETED | OUTPATIENT
Start: 2021-12-06 | End: 2021-12-06

## 2021-12-06 RX ORDER — IMATINIB MESYLATE 100 MG/1
600 TABLET, FILM COATED ORAL DAILY
Status: DISCONTINUED | OUTPATIENT
Start: 2021-12-06 | End: 2021-12-07 | Stop reason: HOSPADM

## 2021-12-06 RX ORDER — POLYETHYLENE GLYCOL 3350 17 G/17G
17 POWDER, FOR SOLUTION ORAL DAILY PRN
Status: DISCONTINUED | OUTPATIENT
Start: 2021-12-06 | End: 2021-12-07 | Stop reason: HOSPADM

## 2021-12-06 RX ADMIN — DILTIAZEM HYDROCHLORIDE 60 MG: 60 TABLET, FILM COATED ORAL at 06:12

## 2021-12-06 RX ADMIN — IPRATROPIUM BROMIDE AND ALBUTEROL SULFATE 3 ML: 2.5; .5 SOLUTION RESPIRATORY (INHALATION) at 11:12

## 2021-12-06 RX ADMIN — IPRATROPIUM BROMIDE AND ALBUTEROL SULFATE 3 ML: 2.5; .5 SOLUTION RESPIRATORY (INHALATION) at 02:12

## 2021-12-06 RX ADMIN — GABAPENTIN 300 MG: 300 CAPSULE ORAL at 08:12

## 2021-12-06 RX ADMIN — IPRATROPIUM BROMIDE AND ALBUTEROL SULFATE 3 ML: 2.5; .5 SOLUTION RESPIRATORY (INHALATION) at 07:12

## 2021-12-06 RX ADMIN — IMATINIB MESYLATE 600 MG: 100 TABLET, FILM COATED ORAL at 10:12

## 2021-12-06 RX ADMIN — DILTIAZEM HYDROCHLORIDE 60 MG: 60 TABLET, FILM COATED ORAL at 12:12

## 2021-12-06 RX ADMIN — OXCARBAZEPINE 600 MG: 600 TABLET, FILM COATED ORAL at 08:12

## 2021-12-06 RX ADMIN — IPRATROPIUM BROMIDE AND ALBUTEROL SULFATE 3 ML: 2.5; .5 SOLUTION RESPIRATORY (INHALATION) at 03:12

## 2021-12-06 RX ADMIN — Medication 10 ML: at 12:12

## 2021-12-06 RX ADMIN — DIAZEPAM 5 MG: 5 TABLET ORAL at 08:12

## 2021-12-06 RX ADMIN — ACETAMINOPHEN 650 MG: 325 TABLET ORAL at 12:12

## 2021-12-06 RX ADMIN — INSULIN ASPART 2 UNITS: 100 INJECTION, SOLUTION INTRAVENOUS; SUBCUTANEOUS at 05:12

## 2021-12-06 RX ADMIN — DILTIAZEM HYDROCHLORIDE 90 MG: 60 TABLET, FILM COATED ORAL at 05:12

## 2021-12-06 RX ADMIN — SENNOSIDES AND DOCUSATE SODIUM 1 TABLET: 50; 8.6 TABLET ORAL at 08:12

## 2021-12-06 RX ADMIN — INSULIN ASPART 2 UNITS: 100 INJECTION, SOLUTION INTRAVENOUS; SUBCUTANEOUS at 06:12

## 2021-12-06 RX ADMIN — QUETIAPINE FUMARATE 200 MG: 200 TABLET ORAL at 08:12

## 2021-12-06 RX ADMIN — Medication 6 MG: at 08:12

## 2021-12-06 RX ADMIN — VANCOMYCIN HYDROCHLORIDE 750 MG: 750 INJECTION, POWDER, LYOPHILIZED, FOR SOLUTION INTRAVENOUS at 05:12

## 2021-12-06 RX ADMIN — SULFAMETHOXAZOLE AND TRIMETHOPRIM 1 TABLET: 400; 80 TABLET ORAL at 08:12

## 2021-12-06 RX ADMIN — PIPERACILLIN SODIUM AND TAZOBACTAM SODIUM 4.5 G: 4; .5 INJECTION, POWDER, FOR SOLUTION INTRAVENOUS at 12:12

## 2021-12-06 RX ADMIN — METHOCARBAMOL 500 MG: 500 TABLET ORAL at 09:12

## 2021-12-06 RX ADMIN — DILTIAZEM HYDROCHLORIDE 90 MG: 60 TABLET, FILM COATED ORAL at 12:12

## 2021-12-06 RX ADMIN — VANCOMYCIN HYDROCHLORIDE 750 MG: 750 INJECTION, POWDER, LYOPHILIZED, FOR SOLUTION INTRAVENOUS at 10:12

## 2021-12-06 RX ADMIN — PANTOPRAZOLE SODIUM 40 MG: 40 TABLET, DELAYED RELEASE ORAL at 08:12

## 2021-12-06 RX ADMIN — Medication 10 ML: at 06:12

## 2021-12-06 RX ADMIN — HYDROMORPHONE HYDROCHLORIDE 1 MG: 1 INJECTION, SOLUTION INTRAMUSCULAR; INTRAVENOUS; SUBCUTANEOUS at 08:12

## 2021-12-06 RX ADMIN — PIPERACILLIN SODIUM AND TAZOBACTAM SODIUM 4.5 G: 4; .5 INJECTION, POWDER, FOR SOLUTION INTRAVENOUS at 09:12

## 2021-12-06 RX ADMIN — METHOCARBAMOL 500 MG: 500 TABLET ORAL at 08:12

## 2021-12-06 RX ADMIN — PIPERACILLIN SODIUM AND TAZOBACTAM SODIUM 4.5 G: 4; .5 INJECTION, POWDER, FOR SOLUTION INTRAVENOUS at 05:12

## 2021-12-06 RX ADMIN — GABAPENTIN 300 MG: 300 CAPSULE ORAL at 03:12

## 2021-12-07 ENCOUNTER — TELEPHONE (OUTPATIENT)
Dept: HEMATOLOGY/ONCOLOGY | Facility: CLINIC | Age: 51
End: 2021-12-07
Payer: COMMERCIAL

## 2021-12-07 ENCOUNTER — SPECIALTY PHARMACY (OUTPATIENT)
Dept: PHARMACY | Facility: CLINIC | Age: 51
End: 2021-12-07
Payer: COMMERCIAL

## 2021-12-07 VITALS
DIASTOLIC BLOOD PRESSURE: 63 MMHG | WEIGHT: 158.31 LBS | BODY MASS INDEX: 27.03 KG/M2 | RESPIRATION RATE: 20 BRPM | HEART RATE: 98 BPM | HEIGHT: 64 IN | OXYGEN SATURATION: 98 % | TEMPERATURE: 98 F | SYSTOLIC BLOOD PRESSURE: 130 MMHG

## 2021-12-07 LAB
ALBUMIN SERPL BCP-MCNC: 2.2 G/DL (ref 3.5–5.2)
ALP SERPL-CCNC: 113 U/L (ref 55–135)
ALT SERPL W/O P-5'-P-CCNC: 15 U/L (ref 10–44)
ANION GAP SERPL CALC-SCNC: 9 MMOL/L (ref 8–16)
ANISOCYTOSIS BLD QL SMEAR: SLIGHT
APTT BLDCRRT: <21 SEC (ref 21–32)
AST SERPL-CCNC: 10 U/L (ref 10–40)
BASOPHILS NFR BLD: 0 % (ref 0–1.9)
BILIRUB SERPL-MCNC: 0.3 MG/DL (ref 0.1–1)
BUN SERPL-MCNC: 13 MG/DL (ref 6–20)
CALCIUM SERPL-MCNC: 8 MG/DL (ref 8.7–10.5)
CHLORIDE SERPL-SCNC: 101 MMOL/L (ref 95–110)
CO2 SERPL-SCNC: 23 MMOL/L (ref 23–29)
CREAT SERPL-MCNC: 0.8 MG/DL (ref 0.5–1.4)
DIFFERENTIAL METHOD: ABNORMAL
EOSINOPHIL NFR BLD: 1 % (ref 0–8)
ERYTHROCYTE [DISTWIDTH] IN BLOOD BY AUTOMATED COUNT: 14.6 % (ref 11.5–14.5)
EST. GFR  (AFRICAN AMERICAN): >60 ML/MIN/1.73 M^2
EST. GFR  (NON AFRICAN AMERICAN): >60 ML/MIN/1.73 M^2
FIBRINOGEN PPP-MCNC: 382 MG/DL (ref 182–400)
GLUCOSE SERPL-MCNC: 137 MG/DL (ref 70–110)
HCT VFR BLD AUTO: 21 % (ref 37–48.5)
HGB BLD-MCNC: 7.1 G/DL (ref 12–16)
HYPOCHROMIA BLD QL SMEAR: ABNORMAL
IMM GRANULOCYTES # BLD AUTO: ABNORMAL K/UL (ref 0–0.04)
IMM GRANULOCYTES NFR BLD AUTO: ABNORMAL % (ref 0–0.5)
INR PPP: 1.1 (ref 0.8–1.2)
LDH SERPL L TO P-CCNC: 441 U/L (ref 110–260)
LYMPHOCYTES NFR BLD: 67 % (ref 18–48)
MCH RBC QN AUTO: 28.9 PG (ref 27–31)
MCHC RBC AUTO-ENTMCNC: 33.8 G/DL (ref 32–36)
MCV RBC AUTO: 85 FL (ref 82–98)
MONOCYTES NFR BLD: 1 % (ref 4–15)
MYELOCYTES NFR BLD MANUAL: 1 %
NEUTROPHILS NFR BLD: 30 % (ref 38–73)
NRBC BLD-RTO: 0 /100 WBC
OVALOCYTES BLD QL SMEAR: ABNORMAL
PHOSPHATE SERPL-MCNC: 4.7 MG/DL (ref 2.7–4.5)
PLATELET # BLD AUTO: 17 K/UL (ref 150–450)
PLATELET BLD QL SMEAR: ABNORMAL
PMV BLD AUTO: 12.4 FL (ref 9.2–12.9)
POCT GLUCOSE: 136 MG/DL (ref 70–110)
POCT GLUCOSE: 203 MG/DL (ref 70–110)
POIKILOCYTOSIS BLD QL SMEAR: SLIGHT
POLYCHROMASIA BLD QL SMEAR: ABNORMAL
POTASSIUM SERPL-SCNC: 4.2 MMOL/L (ref 3.5–5.1)
PROT SERPL-MCNC: 4.8 G/DL (ref 6–8.4)
PROTHROMBIN TIME: 11.9 SEC (ref 9–12.5)
RBC # BLD AUTO: 2.46 M/UL (ref 4–5.4)
SODIUM SERPL-SCNC: 133 MMOL/L (ref 136–145)
URATE SERPL-MCNC: 3.4 MG/DL (ref 2.4–5.7)
WBC # BLD AUTO: 1.96 K/UL (ref 3.9–12.7)

## 2021-12-07 PROCEDURE — 85027 COMPLETE CBC AUTOMATED: CPT | Performed by: STUDENT IN AN ORGANIZED HEALTH CARE EDUCATION/TRAINING PROGRAM

## 2021-12-07 PROCEDURE — 88264 CHROMOSOME ANALYSIS 20-25: CPT | Performed by: NURSE PRACTITIONER

## 2021-12-07 PROCEDURE — 85384 FIBRINOGEN ACTIVITY: CPT | Performed by: STUDENT IN AN ORGANIZED HEALTH CARE EDUCATION/TRAINING PROGRAM

## 2021-12-07 PROCEDURE — 99238 PR HOSPITAL DISCHARGE DAY,<30 MIN: ICD-10-PCS | Mod: ,,, | Performed by: INTERNAL MEDICINE

## 2021-12-07 PROCEDURE — 80053 COMPREHEN METABOLIC PANEL: CPT | Performed by: STUDENT IN AN ORGANIZED HEALTH CARE EDUCATION/TRAINING PROGRAM

## 2021-12-07 PROCEDURE — 88271 CYTOGENETICS DNA PROBE: CPT | Performed by: NURSE PRACTITIONER

## 2021-12-07 PROCEDURE — 97116 GAIT TRAINING THERAPY: CPT | Mod: CQ

## 2021-12-07 PROCEDURE — 25000003 PHARM REV CODE 250: Performed by: INTERNAL MEDICINE

## 2021-12-07 PROCEDURE — 85730 THROMBOPLASTIN TIME PARTIAL: CPT | Performed by: STUDENT IN AN ORGANIZED HEALTH CARE EDUCATION/TRAINING PROGRAM

## 2021-12-07 PROCEDURE — 25000003 PHARM REV CODE 250: Performed by: STUDENT IN AN ORGANIZED HEALTH CARE EDUCATION/TRAINING PROGRAM

## 2021-12-07 PROCEDURE — 83615 LACTATE (LD) (LDH) ENZYME: CPT | Performed by: STUDENT IN AN ORGANIZED HEALTH CARE EDUCATION/TRAINING PROGRAM

## 2021-12-07 PROCEDURE — 85007 BL SMEAR W/DIFF WBC COUNT: CPT | Performed by: STUDENT IN AN ORGANIZED HEALTH CARE EDUCATION/TRAINING PROGRAM

## 2021-12-07 PROCEDURE — 25000242 PHARM REV CODE 250 ALT 637 W/ HCPCS: Performed by: STUDENT IN AN ORGANIZED HEALTH CARE EDUCATION/TRAINING PROGRAM

## 2021-12-07 PROCEDURE — 63600175 PHARM REV CODE 636 W HCPCS: Performed by: INTERNAL MEDICINE

## 2021-12-07 PROCEDURE — 88275 CYTOGENETICS 100-300: CPT | Mod: 59 | Performed by: NURSE PRACTITIONER

## 2021-12-07 PROCEDURE — 99238 HOSP IP/OBS DSCHRG MGMT 30/<: CPT | Mod: ,,, | Performed by: INTERNAL MEDICINE

## 2021-12-07 PROCEDURE — 94640 AIRWAY INHALATION TREATMENT: CPT

## 2021-12-07 PROCEDURE — 99900035 HC TECH TIME PER 15 MIN (STAT)

## 2021-12-07 PROCEDURE — 84100 ASSAY OF PHOSPHORUS: CPT | Performed by: STUDENT IN AN ORGANIZED HEALTH CARE EDUCATION/TRAINING PROGRAM

## 2021-12-07 PROCEDURE — 84550 ASSAY OF BLOOD/URIC ACID: CPT | Performed by: STUDENT IN AN ORGANIZED HEALTH CARE EDUCATION/TRAINING PROGRAM

## 2021-12-07 PROCEDURE — 25000003 PHARM REV CODE 250

## 2021-12-07 PROCEDURE — A4216 STERILE WATER/SALINE, 10 ML: HCPCS | Performed by: INTERNAL MEDICINE

## 2021-12-07 PROCEDURE — 94761 N-INVAS EAR/PLS OXIMETRY MLT: CPT

## 2021-12-07 PROCEDURE — 85610 PROTHROMBIN TIME: CPT | Performed by: STUDENT IN AN ORGANIZED HEALTH CARE EDUCATION/TRAINING PROGRAM

## 2021-12-07 RX ORDER — METHOCARBAMOL 500 MG/1
500 TABLET, FILM COATED ORAL 3 TIMES DAILY PRN
Qty: 90 TABLET | Refills: 2 | Status: ON HOLD | OUTPATIENT
Start: 2021-12-07 | End: 2022-04-13

## 2021-12-07 RX ORDER — ACYCLOVIR 400 MG/1
400 TABLET ORAL 2 TIMES DAILY
Qty: 60 TABLET | Refills: 11 | Status: ON HOLD | OUTPATIENT
Start: 2021-12-07 | End: 2022-10-14 | Stop reason: HOSPADM

## 2021-12-07 RX ORDER — DEXAMETHASONE 4 MG/1
40 TABLET ORAL
Status: COMPLETED | OUTPATIENT
Start: 2021-12-07 | End: 2021-12-07

## 2021-12-07 RX ORDER — LEVOFLOXACIN 250 MG/1
250 TABLET ORAL DAILY
Qty: 30 TABLET | Refills: 11 | Status: SHIPPED | OUTPATIENT
Start: 2021-12-07 | End: 2022-01-03

## 2021-12-07 RX ORDER — DEXAMETHASONE 4 MG/1
TABLET ORAL
Qty: 50 TABLET | Refills: 0 | Status: SHIPPED | OUTPATIENT
Start: 2021-12-07 | End: 2022-01-19 | Stop reason: ALTCHOICE

## 2021-12-07 RX ORDER — FLUCONAZOLE 200 MG/1
400 TABLET ORAL DAILY
Qty: 60 TABLET | Refills: 11 | Status: SHIPPED | OUTPATIENT
Start: 2021-12-07 | End: 2022-01-03

## 2021-12-07 RX ORDER — VORTIOXETINE 10 MG/1
1 TABLET, FILM COATED ORAL DAILY
Status: ON HOLD | COMMUNITY
Start: 2021-10-20 | End: 2022-04-13

## 2021-12-07 RX ORDER — DILTIAZEM HYDROCHLORIDE 90 MG/1
90 TABLET, FILM COATED ORAL EVERY 6 HOURS
Qty: 120 TABLET | Refills: 11 | Status: SHIPPED | OUTPATIENT
Start: 2021-12-07 | End: 2022-12-07

## 2021-12-07 RX ORDER — GABAPENTIN 300 MG/1
300 CAPSULE ORAL 3 TIMES DAILY
Qty: 90 CAPSULE | Refills: 11 | Status: SHIPPED | OUTPATIENT
Start: 2021-12-07 | End: 2022-12-07

## 2021-12-07 RX ORDER — SODIUM CHLORIDE 0.9 % (FLUSH) 0.9 %
10 SYRINGE (ML) INJECTION
Status: DISCONTINUED | OUTPATIENT
Start: 2021-12-07 | End: 2021-12-07

## 2021-12-07 RX ORDER — IPRATROPIUM BROMIDE AND ALBUTEROL SULFATE 2.5; .5 MG/3ML; MG/3ML
3 SOLUTION RESPIRATORY (INHALATION) EVERY 8 HOURS
Status: DISCONTINUED | OUTPATIENT
Start: 2021-12-07 | End: 2021-12-07

## 2021-12-07 RX ORDER — SULFAMETHOXAZOLE AND TRIMETHOPRIM 800; 160 MG/1; MG/1
1 TABLET ORAL
Qty: 12 TABLET | Refills: 11 | Status: SHIPPED | OUTPATIENT
Start: 2021-12-08 | End: 2022-01-03

## 2021-12-07 RX ORDER — AMOXICILLIN 250 MG
1 CAPSULE ORAL 2 TIMES DAILY
Qty: 60 TABLET | Refills: 3 | Status: ON HOLD | OUTPATIENT
Start: 2021-12-07 | End: 2022-03-19 | Stop reason: SDUPTHER

## 2021-12-07 RX ORDER — HEPARIN 100 UNIT/ML
500 SYRINGE INTRAVENOUS
Status: DISCONTINUED | OUTPATIENT
Start: 2021-12-07 | End: 2021-12-07 | Stop reason: HOSPADM

## 2021-12-07 RX ORDER — OXYCODONE HYDROCHLORIDE 5 MG/1
5 TABLET ORAL EVERY 6 HOURS PRN
Qty: 21 TABLET | Refills: 0 | Status: SHIPPED | OUTPATIENT
Start: 2021-12-07 | End: 2021-12-14

## 2021-12-07 RX ORDER — POLYETHYLENE GLYCOL 3350 17 G/17G
17 POWDER, FOR SOLUTION ORAL DAILY PRN
Qty: 510 G | Refills: 0 | Status: SHIPPED | OUTPATIENT
Start: 2021-12-07

## 2021-12-07 RX ORDER — ASPIRIN 81 MG/1
81 TABLET ORAL DAILY
Refills: 0 | Status: ON HOLD
Start: 2021-12-07 | End: 2022-02-21 | Stop reason: SDUPTHER

## 2021-12-07 RX ORDER — DEXAMETHASONE 4 MG/1
40 TABLET ORAL
Status: CANCELLED
Start: 2021-12-07

## 2021-12-07 RX ADMIN — VINCRISTINE SULFATE 2 MG: 1 INJECTION, SOLUTION INTRAVENOUS at 03:12

## 2021-12-07 RX ADMIN — OXCARBAZEPINE 600 MG: 600 TABLET, FILM COATED ORAL at 06:12

## 2021-12-07 RX ADMIN — IPRATROPIUM BROMIDE AND ALBUTEROL SULFATE 3 ML: 2.5; .5 SOLUTION RESPIRATORY (INHALATION) at 04:12

## 2021-12-07 RX ADMIN — IMATINIB MESYLATE 600 MG: 100 TABLET, FILM COATED ORAL at 09:12

## 2021-12-07 RX ADMIN — SULFAMETHOXAZOLE AND TRIMETHOPRIM 1 TABLET: 400; 80 TABLET ORAL at 09:12

## 2021-12-07 RX ADMIN — GABAPENTIN 300 MG: 300 CAPSULE ORAL at 09:12

## 2021-12-07 RX ADMIN — ONDANSETRON 4 MG: 4 TABLET, ORALLY DISINTEGRATING ORAL at 05:12

## 2021-12-07 RX ADMIN — DILTIAZEM HYDROCHLORIDE 90 MG: 60 TABLET, FILM COATED ORAL at 06:12

## 2021-12-07 RX ADMIN — GABAPENTIN 300 MG: 300 CAPSULE ORAL at 02:12

## 2021-12-07 RX ADMIN — OXCARBAZEPINE 600 MG: 600 TABLET, FILM COATED ORAL at 09:12

## 2021-12-07 RX ADMIN — INSULIN ASPART 4 UNITS: 100 INJECTION, SOLUTION INTRAVENOUS; SUBCUTANEOUS at 06:12

## 2021-12-07 RX ADMIN — DIAZEPAM 5 MG: 5 TABLET ORAL at 09:12

## 2021-12-07 RX ADMIN — Medication 10 ML: at 12:12

## 2021-12-07 RX ADMIN — DILTIAZEM HYDROCHLORIDE 90 MG: 60 TABLET, FILM COATED ORAL at 01:12

## 2021-12-07 RX ADMIN — METHOCARBAMOL 500 MG: 500 TABLET ORAL at 06:12

## 2021-12-07 RX ADMIN — DILTIAZEM HYDROCHLORIDE 90 MG: 60 TABLET, FILM COATED ORAL at 12:12

## 2021-12-07 RX ADMIN — VANCOMYCIN HYDROCHLORIDE 750 MG: 750 INJECTION, POWDER, LYOPHILIZED, FOR SOLUTION INTRAVENOUS at 01:12

## 2021-12-07 RX ADMIN — PANTOPRAZOLE SODIUM 40 MG: 40 TABLET, DELAYED RELEASE ORAL at 09:12

## 2021-12-07 RX ADMIN — SENNOSIDES AND DOCUSATE SODIUM 1 TABLET: 50; 8.6 TABLET ORAL at 09:12

## 2021-12-07 RX ADMIN — DEXAMETHASONE 40 MG: 4 TABLET ORAL at 02:12

## 2021-12-08 ENCOUNTER — TELEPHONE (OUTPATIENT)
Dept: HEMATOLOGY/ONCOLOGY | Facility: CLINIC | Age: 51
End: 2021-12-08
Payer: COMMERCIAL

## 2021-12-08 LAB
BCR/ABL,P210 RESULT: NORMAL
PATH REPORT.FINAL DX SPEC: NORMAL
SPECIMEN TYPE: NORMAL

## 2021-12-09 ENCOUNTER — TELEPHONE (OUTPATIENT)
Dept: HEMATOLOGY/ONCOLOGY | Facility: CLINIC | Age: 51
End: 2021-12-09
Payer: COMMERCIAL

## 2021-12-09 DIAGNOSIS — C91.00 B-CELL ACUTE LYMPHOBLASTIC LEUKEMIA: Primary | ICD-10-CM

## 2021-12-09 LAB
BCR/ABL RESULT, P190, QUANT, BLD: NORMAL
HLA HI RES SEQUNCE BASED TYPING TEST DATE: NORMAL
HLA HR DPA1: NORMAL
HLA HR DPA2: NORMAL
HLA HR DPB1: NORMAL
HLA HR DPB2: NORMAL
HLA HR DQA1: NORMAL
HLA HR DQA2: NORMAL
HLA-A1 HI RES: NORMAL
HLA-A2 HI RES: NORMAL
HLA-B1 HI RES: NORMAL
HLA-B2 HI RES: NORMAL
HLA-C1 HI RES: NORMAL
HLA-C2 HI RES: NORMAL
HLA-DQB1 1 HI RES: NORMAL
HLA-DQB1 2 HI RES: NORMAL
HLA-DRB1 1 HI RES: NORMAL
HLA-DRB1 2 HI RES: NORMAL
HLA-DRB3 1 HI RES: NORMAL
HLA-DRB3 2 HI RES: NORMAL
HLA-DRB4 1 HI RES: NORMAL
HLA-DRB4 2 HI RES: NORMAL
HLA-DRB5 1 HI RES: NORMAL
HLA-DRB5 2 HI RES: NORMAL
PATH REPORT.FINAL DX SPEC: NORMAL
SPECIMEN TYPE, P190, QUANT, BLD: NORMAL

## 2021-12-09 RX ORDER — HEPARIN 100 UNIT/ML
500 SYRINGE INTRAVENOUS
Status: CANCELLED | OUTPATIENT
Start: 2021-12-14

## 2021-12-09 RX ORDER — SODIUM CHLORIDE 0.9 % (FLUSH) 0.9 %
10 SYRINGE (ML) INJECTION
Status: CANCELLED | OUTPATIENT
Start: 2021-12-14

## 2021-12-10 ENCOUNTER — TELEPHONE (OUTPATIENT)
Dept: HEMATOLOGY/ONCOLOGY | Facility: CLINIC | Age: 51
End: 2021-12-10
Payer: COMMERCIAL

## 2021-12-14 LAB
ANNOTATION COMMENT IMP: NORMAL
FINAL PATHOLOGIC DIAGNOSIS: NORMAL
GROSS: NORMAL
Lab: NORMAL
MICROSCOPIC EXAM: NORMAL
NGS CLINCIAL TRIALS: NORMAL
NGS INDICATION OF TEST: NORMAL
NGS INTERPRETATION: NORMAL
NGS ONCOHEME PANEL GENE LIST: NORMAL
NGS PATHOGENIC MUTATIONS DETECTED: NORMAL
NGS REVIEWED BY:: NORMAL
NGS VARIANTS OF UNKNOWN SIGNIFICANCE: NORMAL
NGSHM RESULT, BONE MARROW: NORMAL
REF LAB TEST METHOD: NORMAL
SPECIMEN SOURCE: NORMAL
SUPPLEMENTAL DIAGNOSIS: NORMAL
TEST PERFORMANCE INFO SPEC: NORMAL

## 2021-12-15 ENCOUNTER — DOCUMENTATION ONLY (OUTPATIENT)
Dept: HEMATOLOGY/ONCOLOGY | Facility: CLINIC | Age: 51
End: 2021-12-15
Payer: COMMERCIAL

## 2021-12-15 ENCOUNTER — SPECIALTY PHARMACY (OUTPATIENT)
Dept: PHARMACY | Facility: CLINIC | Age: 51
End: 2021-12-15
Payer: COMMERCIAL

## 2021-12-15 ENCOUNTER — TELEPHONE (OUTPATIENT)
Dept: HEMATOLOGY/ONCOLOGY | Facility: CLINIC | Age: 51
End: 2021-12-15
Payer: COMMERCIAL

## 2021-12-17 ENCOUNTER — INFUSION (OUTPATIENT)
Dept: INFUSION THERAPY | Facility: HOSPITAL | Age: 51
End: 2021-12-17
Payer: COMMERCIAL

## 2021-12-17 ENCOUNTER — LAB VISIT (OUTPATIENT)
Dept: LAB | Facility: HOSPITAL | Age: 51
End: 2021-12-17
Payer: COMMERCIAL

## 2021-12-17 VITALS
OXYGEN SATURATION: 96 % | HEART RATE: 109 BPM | RESPIRATION RATE: 18 BRPM | TEMPERATURE: 99 F | SYSTOLIC BLOOD PRESSURE: 128 MMHG | DIASTOLIC BLOOD PRESSURE: 64 MMHG

## 2021-12-17 DIAGNOSIS — C91.00 B-CELL ACUTE LYMPHOBLASTIC LEUKEMIA: Primary | ICD-10-CM

## 2021-12-17 DIAGNOSIS — C91.00 B-CELL ACUTE LYMPHOBLASTIC LEUKEMIA: ICD-10-CM

## 2021-12-17 LAB
ABO + RH BLD: NORMAL
ALBUMIN SERPL BCP-MCNC: 3.4 G/DL (ref 3.5–5.2)
ALP SERPL-CCNC: 87 U/L (ref 55–135)
ALT SERPL W/O P-5'-P-CCNC: 19 U/L (ref 10–44)
ANION GAP SERPL CALC-SCNC: 10 MMOL/L (ref 8–16)
AST SERPL-CCNC: 18 U/L (ref 10–40)
BASOPHILS # BLD AUTO: 0.01 K/UL (ref 0–0.2)
BASOPHILS NFR BLD: 0.2 % (ref 0–1.9)
BILIRUB SERPL-MCNC: 0.4 MG/DL (ref 0.1–1)
BLD GP AB SCN CELLS X3 SERPL QL: NORMAL
BUN SERPL-MCNC: 10 MG/DL (ref 6–20)
CALCIUM SERPL-MCNC: 9.8 MG/DL (ref 8.7–10.5)
CHLORIDE SERPL-SCNC: 103 MMOL/L (ref 95–110)
CO2 SERPL-SCNC: 27 MMOL/L (ref 23–29)
CREAT SERPL-MCNC: 0.8 MG/DL (ref 0.5–1.4)
DIFFERENTIAL METHOD: ABNORMAL
EOSINOPHIL # BLD AUTO: 0 K/UL (ref 0–0.5)
EOSINOPHIL NFR BLD: 0.2 % (ref 0–8)
ERYTHROCYTE [DISTWIDTH] IN BLOOD BY AUTOMATED COUNT: 17.3 % (ref 11.5–14.5)
EST. GFR  (AFRICAN AMERICAN): >60 ML/MIN/1.73 M^2
EST. GFR  (NON AFRICAN AMERICAN): >60 ML/MIN/1.73 M^2
GLUCOSE SERPL-MCNC: 149 MG/DL (ref 70–110)
HCT VFR BLD AUTO: 27.8 % (ref 37–48.5)
HGB BLD-MCNC: 8.8 G/DL (ref 12–16)
IMM GRANULOCYTES # BLD AUTO: 0.06 K/UL (ref 0–0.04)
IMM GRANULOCYTES NFR BLD AUTO: 1.2 % (ref 0–0.5)
LDH SERPL L TO P-CCNC: 373 U/L (ref 110–260)
LYMPHOCYTES # BLD AUTO: 1 K/UL (ref 1–4.8)
LYMPHOCYTES NFR BLD: 20.6 % (ref 18–48)
MAGNESIUM SERPL-MCNC: 1.9 MG/DL (ref 1.6–2.6)
MCH RBC QN AUTO: 29.6 PG (ref 27–31)
MCHC RBC AUTO-ENTMCNC: 31.7 G/DL (ref 32–36)
MCV RBC AUTO: 94 FL (ref 82–98)
MONOCYTES # BLD AUTO: 0.1 K/UL (ref 0.3–1)
MONOCYTES NFR BLD: 1.8 % (ref 4–15)
NEUTROPHILS # BLD AUTO: 3.7 K/UL (ref 1.8–7.7)
NEUTROPHILS NFR BLD: 76 % (ref 38–73)
NRBC BLD-RTO: 0 /100 WBC
PHOSPHATE SERPL-MCNC: 3.2 MG/DL (ref 2.7–4.5)
PLATELET # BLD AUTO: 267 K/UL (ref 150–450)
PMV BLD AUTO: 9.2 FL (ref 9.2–12.9)
POTASSIUM SERPL-SCNC: 5.1 MMOL/L (ref 3.5–5.1)
PROT SERPL-MCNC: 7.1 G/DL (ref 6–8.4)
RBC # BLD AUTO: 2.97 M/UL (ref 4–5.4)
SODIUM SERPL-SCNC: 140 MMOL/L (ref 136–145)
URATE SERPL-MCNC: 2 MG/DL (ref 2.4–5.7)
WBC # BLD AUTO: 4.9 K/UL (ref 3.9–12.7)

## 2021-12-17 PROCEDURE — 83735 ASSAY OF MAGNESIUM: CPT | Performed by: INTERNAL MEDICINE

## 2021-12-17 PROCEDURE — 63600175 PHARM REV CODE 636 W HCPCS: Performed by: INTERNAL MEDICINE

## 2021-12-17 PROCEDURE — 96409 CHEMO IV PUSH SNGL DRUG: CPT

## 2021-12-17 PROCEDURE — 36415 COLL VENOUS BLD VENIPUNCTURE: CPT | Performed by: INTERNAL MEDICINE

## 2021-12-17 PROCEDURE — 25000003 PHARM REV CODE 250: Performed by: INTERNAL MEDICINE

## 2021-12-17 PROCEDURE — 85025 COMPLETE CBC W/AUTO DIFF WBC: CPT | Performed by: INTERNAL MEDICINE

## 2021-12-17 PROCEDURE — 86900 BLOOD TYPING SEROLOGIC ABO: CPT | Performed by: INTERNAL MEDICINE

## 2021-12-17 PROCEDURE — 84550 ASSAY OF BLOOD/URIC ACID: CPT | Performed by: INTERNAL MEDICINE

## 2021-12-17 PROCEDURE — 84100 ASSAY OF PHOSPHORUS: CPT | Performed by: INTERNAL MEDICINE

## 2021-12-17 PROCEDURE — 83615 LACTATE (LD) (LDH) ENZYME: CPT | Performed by: INTERNAL MEDICINE

## 2021-12-17 PROCEDURE — A4216 STERILE WATER/SALINE, 10 ML: HCPCS | Performed by: INTERNAL MEDICINE

## 2021-12-17 PROCEDURE — 80053 COMPREHEN METABOLIC PANEL: CPT | Performed by: INTERNAL MEDICINE

## 2021-12-17 RX ORDER — ONDANSETRON 8 MG/1
8 TABLET, ORALLY DISINTEGRATING ORAL EVERY 12 HOURS PRN
Qty: 30 TABLET | Refills: 1 | Status: SHIPPED | OUTPATIENT
Start: 2021-12-17 | End: 2022-03-29 | Stop reason: SDUPTHER

## 2021-12-17 RX ORDER — HEPARIN 100 UNIT/ML
500 SYRINGE INTRAVENOUS
Status: DISCONTINUED | OUTPATIENT
Start: 2021-12-17 | End: 2021-12-17 | Stop reason: HOSPADM

## 2021-12-17 RX ORDER — SODIUM CHLORIDE 0.9 % (FLUSH) 0.9 %
10 SYRINGE (ML) INJECTION
Status: DISCONTINUED | OUTPATIENT
Start: 2021-12-17 | End: 2021-12-17 | Stop reason: HOSPADM

## 2021-12-17 RX ADMIN — VINCRISTINE SULFATE 2 MG: 1 INJECTION, SOLUTION INTRAVENOUS at 03:12

## 2021-12-17 RX ADMIN — Medication 10 ML: at 04:12

## 2021-12-20 ENCOUNTER — TELEPHONE (OUTPATIENT)
Dept: HEMATOLOGY/ONCOLOGY | Facility: CLINIC | Age: 51
End: 2021-12-20
Payer: COMMERCIAL

## 2021-12-23 ENCOUNTER — PATIENT MESSAGE (OUTPATIENT)
Dept: INFUSION THERAPY | Facility: HOSPITAL | Age: 51
End: 2021-12-23

## 2021-12-23 ENCOUNTER — INFUSION (OUTPATIENT)
Dept: INFUSION THERAPY | Facility: HOSPITAL | Age: 51
End: 2021-12-23
Payer: COMMERCIAL

## 2021-12-23 VITALS
OXYGEN SATURATION: 97 % | SYSTOLIC BLOOD PRESSURE: 123 MMHG | TEMPERATURE: 99 F | HEART RATE: 106 BPM | RESPIRATION RATE: 18 BRPM | DIASTOLIC BLOOD PRESSURE: 58 MMHG

## 2021-12-23 DIAGNOSIS — C91.00 B-CELL ACUTE LYMPHOBLASTIC LEUKEMIA: Primary | ICD-10-CM

## 2021-12-23 PROCEDURE — A4216 STERILE WATER/SALINE, 10 ML: HCPCS | Performed by: INTERNAL MEDICINE

## 2021-12-23 PROCEDURE — 63600175 PHARM REV CODE 636 W HCPCS: Performed by: INTERNAL MEDICINE

## 2021-12-23 PROCEDURE — 25000003 PHARM REV CODE 250: Performed by: INTERNAL MEDICINE

## 2021-12-23 PROCEDURE — 96409 CHEMO IV PUSH SNGL DRUG: CPT

## 2021-12-23 PROCEDURE — 96413 CHEMO IV INFUSION 1 HR: CPT

## 2021-12-23 RX ORDER — HEPARIN 100 UNIT/ML
500 SYRINGE INTRAVENOUS
Status: CANCELLED | OUTPATIENT
Start: 2021-12-23

## 2021-12-23 RX ORDER — SODIUM CHLORIDE 0.9 % (FLUSH) 0.9 %
10 SYRINGE (ML) INJECTION
Status: CANCELLED | OUTPATIENT
Start: 2021-12-23

## 2021-12-23 RX ORDER — SODIUM CHLORIDE 0.9 % (FLUSH) 0.9 %
10 SYRINGE (ML) INJECTION
Status: DISCONTINUED | OUTPATIENT
Start: 2021-12-23 | End: 2021-12-23 | Stop reason: HOSPADM

## 2021-12-23 RX ORDER — HEPARIN 100 UNIT/ML
500 SYRINGE INTRAVENOUS
Status: DISCONTINUED | OUTPATIENT
Start: 2021-12-23 | End: 2021-12-23 | Stop reason: HOSPADM

## 2021-12-23 RX ADMIN — Medication 10 ML: at 04:12

## 2021-12-23 RX ADMIN — VINCRISTINE SULFATE 2 MG: 1 INJECTION, SOLUTION INTRAVENOUS at 03:12

## 2021-12-28 ENCOUNTER — TELEPHONE (OUTPATIENT)
Dept: HEMATOLOGY/ONCOLOGY | Facility: CLINIC | Age: 51
End: 2021-12-28
Payer: COMMERCIAL

## 2021-12-28 DIAGNOSIS — C91.00 B-CELL ACUTE LYMPHOBLASTIC LEUKEMIA: Primary | ICD-10-CM

## 2021-12-28 RX ORDER — OXYCODONE HYDROCHLORIDE 5 MG/1
5 TABLET ORAL EVERY 8 HOURS PRN
Qty: 20 TABLET | Refills: 0 | OUTPATIENT
Start: 2021-12-28 | End: 2022-12-28

## 2021-12-29 ENCOUNTER — PATIENT MESSAGE (OUTPATIENT)
Dept: HEMATOLOGY/ONCOLOGY | Facility: CLINIC | Age: 51
End: 2021-12-29
Payer: COMMERCIAL

## 2021-12-29 NOTE — TELEPHONE ENCOUNTER
----- Message from Nithya Johnson sent at 12/29/2021 10:41 AM CST -----  Who Called:  Mannie      Refill or New Rx: refill     RX Name and Strength: oxyCODONE (ROXICODONE) 5 MG immediate release tablet     How is the patient currently taking it? (ex. 1XDay)     Is this a 30 day or 90 day RX:     Preferred Pharmacy with phone number:      [id number] - [place], [place] - [address] AT [address]  [address] [address]  Phone: [phone] Fax: [phone]           Best Call Back Number: [phone]     Additional Information:

## 2021-12-30 ENCOUNTER — SPECIALTY PHARMACY (OUTPATIENT)
Dept: PHARMACY | Facility: CLINIC | Age: 51
End: 2021-12-30
Payer: COMMERCIAL

## 2022-01-03 ENCOUNTER — PROCEDURE VISIT (OUTPATIENT)
Dept: HEMATOLOGY/ONCOLOGY | Facility: CLINIC | Age: 52
End: 2022-01-03
Payer: COMMERCIAL

## 2022-01-03 ENCOUNTER — OFFICE VISIT (OUTPATIENT)
Dept: HEMATOLOGY/ONCOLOGY | Facility: CLINIC | Age: 52
End: 2022-01-03
Payer: COMMERCIAL

## 2022-01-03 ENCOUNTER — CLINICAL SUPPORT (OUTPATIENT)
Dept: HEMATOLOGY/ONCOLOGY | Facility: CLINIC | Age: 52
End: 2022-01-03
Payer: COMMERCIAL

## 2022-01-03 ENCOUNTER — HOSPITAL ENCOUNTER (OUTPATIENT)
Dept: RADIOLOGY | Facility: HOSPITAL | Age: 52
Discharge: HOME | End: 2022-01-03
Attending: INTERNAL MEDICINE
Payer: COMMERCIAL

## 2022-01-03 VITALS
TEMPERATURE: 99 F | BODY MASS INDEX: 27.49 KG/M2 | TEMPERATURE: 99 F | HEART RATE: 110 BPM | DIASTOLIC BLOOD PRESSURE: 67 MMHG | SYSTOLIC BLOOD PRESSURE: 155 MMHG | WEIGHT: 160.19 LBS | RESPIRATION RATE: 18 BRPM | OXYGEN SATURATION: 99 % | OXYGEN SATURATION: 99 % | DIASTOLIC BLOOD PRESSURE: 67 MMHG | SYSTOLIC BLOOD PRESSURE: 155 MMHG | WEIGHT: 160.19 LBS | RESPIRATION RATE: 18 BRPM | HEART RATE: 110 BPM | BODY MASS INDEX: 27.49 KG/M2

## 2022-01-03 DIAGNOSIS — T45.1X5A ANEMIA ASSOCIATED WITH CHEMOTHERAPY: ICD-10-CM

## 2022-01-03 DIAGNOSIS — D64.81 ANEMIA ASSOCIATED WITH CHEMOTHERAPY: ICD-10-CM

## 2022-01-03 DIAGNOSIS — C91.00 B-CELL ACUTE LYMPHOBLASTIC LEUKEMIA: Primary | ICD-10-CM

## 2022-01-03 DIAGNOSIS — R07.81 RIB PAIN: ICD-10-CM

## 2022-01-03 DIAGNOSIS — C91.00 B-CELL ACUTE LYMPHOBLASTIC LEUKEMIA: ICD-10-CM

## 2022-01-03 PROCEDURE — 88342 IMHCHEM/IMCYTCHM 1ST ANTB: CPT | Mod: 26,59,, | Performed by: PATHOLOGY

## 2022-01-03 PROCEDURE — 71110 X-RAY EXAM RIBS BIL 3 VIEWS: CPT | Mod: 26,,, | Performed by: RADIOLOGY

## 2022-01-03 PROCEDURE — 88275 CYTOGENETICS 100-300: CPT | Performed by: INTERNAL MEDICINE

## 2022-01-03 PROCEDURE — 88305 TISSUE EXAM BY PATHOLOGIST: CPT | Mod: 26,,, | Performed by: PATHOLOGY

## 2022-01-03 PROCEDURE — 88311 DECALCIFY TISSUE: CPT | Mod: 26,,, | Performed by: PATHOLOGY

## 2022-01-03 PROCEDURE — 88342 IMHCHEM/IMCYTCHM 1ST ANTB: CPT | Performed by: PATHOLOGY

## 2022-01-03 PROCEDURE — 3078F DIAST BP <80 MM HG: CPT | Mod: CPTII,S$GLB,, | Performed by: INTERNAL MEDICINE

## 2022-01-03 PROCEDURE — 3008F BODY MASS INDEX DOCD: CPT | Mod: CPTII,S$GLB,, | Performed by: INTERNAL MEDICINE

## 2022-01-03 PROCEDURE — 99215 OFFICE O/P EST HI 40 MIN: CPT | Mod: S$GLB,,, | Performed by: INTERNAL MEDICINE

## 2022-01-03 PROCEDURE — 71110 XR RIBS 3 VIEWS BILATERAL: ICD-10-PCS | Mod: 26,,, | Performed by: RADIOLOGY

## 2022-01-03 PROCEDURE — 88311 DECALCIFY TISSUE: CPT | Performed by: PATHOLOGY

## 2022-01-03 PROCEDURE — 88313 SPECIAL STAINS GROUP 2: CPT | Performed by: PATHOLOGY

## 2022-01-03 PROCEDURE — 88305 TISSUE EXAM BY PATHOLOGIST: CPT | Mod: 59 | Performed by: PATHOLOGY

## 2022-01-03 PROCEDURE — 1159F MED LIST DOCD IN RCRD: CPT | Mod: CPTII,S$GLB,, | Performed by: INTERNAL MEDICINE

## 2022-01-03 PROCEDURE — 81207 BCR/ABL1 GENE MINOR BP: CPT | Performed by: INTERNAL MEDICINE

## 2022-01-03 PROCEDURE — 88184 FLOWCYTOMETRY/ TC 1 MARKER: CPT | Performed by: PATHOLOGY

## 2022-01-03 PROCEDURE — 3078F PR MOST RECENT DIASTOLIC BLOOD PRESSURE < 80 MM HG: ICD-10-PCS | Mod: CPTII,S$GLB,, | Performed by: INTERNAL MEDICINE

## 2022-01-03 PROCEDURE — 88189 FLOWCYTOMETRY/READ 16 & >: CPT | Mod: ,,, | Performed by: PATHOLOGY

## 2022-01-03 PROCEDURE — 3077F SYST BP >= 140 MM HG: CPT | Mod: CPTII,S$GLB,, | Performed by: INTERNAL MEDICINE

## 2022-01-03 PROCEDURE — 85097 PR  BONE MARROW,SMEAR INTERPRETATION: ICD-10-PCS | Mod: ,,, | Performed by: PATHOLOGY

## 2022-01-03 PROCEDURE — 1111F DSCHRG MED/CURRENT MED MERGE: CPT | Mod: CPTII,S$GLB,, | Performed by: INTERNAL MEDICINE

## 2022-01-03 PROCEDURE — 99999 PR PBB SHADOW E&M-EST. PATIENT-LVL III: CPT | Mod: PBBFAC,,, | Performed by: INTERNAL MEDICINE

## 2022-01-03 PROCEDURE — 1160F RVW MEDS BY RX/DR IN RCRD: CPT | Mod: CPTII,S$GLB,, | Performed by: INTERNAL MEDICINE

## 2022-01-03 PROCEDURE — 88305 TISSUE EXAM BY PATHOLOGIST: ICD-10-PCS | Mod: 26,,, | Performed by: PATHOLOGY

## 2022-01-03 PROCEDURE — 38222 BONE MARROW: ICD-10-PCS | Mod: LT,S$GLB,, | Performed by: NURSE PRACTITIONER

## 2022-01-03 PROCEDURE — 99215 PR OFFICE/OUTPT VISIT, EST, LEVL V, 40-54 MIN: ICD-10-PCS | Mod: S$GLB,,, | Performed by: INTERNAL MEDICINE

## 2022-01-03 PROCEDURE — 88264 CHROMOSOME ANALYSIS 20-25: CPT | Performed by: INTERNAL MEDICINE

## 2022-01-03 PROCEDURE — 1111F PR DISCHARGE MEDS RECONCILED W/ CURRENT OUTPATIENT MED LIST: ICD-10-PCS | Mod: CPTII,S$GLB,, | Performed by: INTERNAL MEDICINE

## 2022-01-03 PROCEDURE — 85097 BONE MARROW INTERPRETATION: CPT | Mod: ,,, | Performed by: PATHOLOGY

## 2022-01-03 PROCEDURE — U0002 COVID-19 LAB TEST NON-CDC: HCPCS | Performed by: INTERNAL MEDICINE

## 2022-01-03 PROCEDURE — 3077F PR MOST RECENT SYSTOLIC BLOOD PRESSURE >= 140 MM HG: ICD-10-PCS | Mod: CPTII,S$GLB,, | Performed by: INTERNAL MEDICINE

## 2022-01-03 PROCEDURE — 1159F PR MEDICATION LIST DOCUMENTED IN MEDICAL RECORD: ICD-10-PCS | Mod: CPTII,S$GLB,, | Performed by: INTERNAL MEDICINE

## 2022-01-03 PROCEDURE — 88189 PR  FLOWCYTOMETRY/READ, 16 & > MARKERS: ICD-10-PCS | Mod: ,,, | Performed by: PATHOLOGY

## 2022-01-03 PROCEDURE — 88185 FLOWCYTOMETRY/TC ADD-ON: CPT | Mod: 59 | Performed by: PATHOLOGY

## 2022-01-03 PROCEDURE — 71110 X-RAY EXAM RIBS BIL 3 VIEWS: CPT | Mod: TC

## 2022-01-03 PROCEDURE — 1160F PR REVIEW ALL MEDS BY PRESCRIBER/CLIN PHARMACIST DOCUMENTED: ICD-10-PCS | Mod: CPTII,S$GLB,, | Performed by: INTERNAL MEDICINE

## 2022-01-03 PROCEDURE — 88299 UNLISTED CYTOGENETIC STUDY: CPT | Performed by: INTERNAL MEDICINE

## 2022-01-03 PROCEDURE — 88275 CYTOGENETICS 100-300: CPT | Mod: 59 | Performed by: INTERNAL MEDICINE

## 2022-01-03 PROCEDURE — 88311 PR  DECALCIFY TISSUE: ICD-10-PCS | Mod: 26,,, | Performed by: PATHOLOGY

## 2022-01-03 PROCEDURE — 88313 SPECIAL STAINS GROUP 2: CPT | Mod: 26,,, | Performed by: PATHOLOGY

## 2022-01-03 PROCEDURE — 38222 DX BONE MARROW BX & ASPIR: CPT | Mod: LT,S$GLB,, | Performed by: NURSE PRACTITIONER

## 2022-01-03 PROCEDURE — 88313 PR  SPECIAL STAINS,GROUP II: ICD-10-PCS | Mod: 26,,, | Performed by: PATHOLOGY

## 2022-01-03 PROCEDURE — 88237 TISSUE CULTURE BONE MARROW: CPT | Performed by: INTERNAL MEDICINE

## 2022-01-03 PROCEDURE — 88342 CHG IMMUNOCYTOCHEMISTRY: ICD-10-PCS | Mod: 26,59,, | Performed by: PATHOLOGY

## 2022-01-03 PROCEDURE — 99999 PR PBB SHADOW E&M-EST. PATIENT-LVL III: ICD-10-PCS | Mod: PBBFAC,,, | Performed by: INTERNAL MEDICINE

## 2022-01-03 PROCEDURE — 3008F PR BODY MASS INDEX (BMI) DOCUMENTED: ICD-10-PCS | Mod: CPTII,S$GLB,, | Performed by: INTERNAL MEDICINE

## 2022-01-03 RX ORDER — LIDOCAINE HYDROCHLORIDE 20 MG/ML
10 INJECTION, SOLUTION INFILTRATION; PERINEURAL
Status: COMPLETED | OUTPATIENT
Start: 2022-01-03 | End: 2022-01-03

## 2022-01-03 RX ADMIN — LIDOCAINE HYDROCHLORIDE 10 ML: 20 INJECTION, SOLUTION INFILTRATION; PERINEURAL at 02:01

## 2022-01-03 NOTE — PROCEDURES
Bone marrow    Date/Time: 1/3/2022 2:30 PM  Performed by: Shanta Love NP  Authorized by: Shanta Love NP     Consent Done?: Yes (Written)      Position: prone  Aspiration?: Yes   Biopsy?: Yes        PROCEDURE NOTE:  Date of Procedure: 01/03/2022  Bone Marrow Biopsy and Aspiration  Indication: ALL s/p EWALL induction  Consent: Informed consent was obtained from patient.  Timeout: Done and documented.  Position: prone  Site: Left posterior illiac crest.  Prep: Betadine.  Needle used: 11 gauge Jamshidi needle.  Anesthetic: 2% lidocaine 10 cc.  Biopsy: The biopsy needle was introduced into the marrow cavity and an aspirate was obtained without complications and sent for flow cytometry, ALL fish, BCR ABL p190, DNA/RNA hold, and cytogenetics. Core biopsy obtained without difficulty and sent for routine histologic examination.  Complications: None.  Disposition: The patient was placed supine for 15min following procedure. RN to assess bandaid for bleeding prior to discharge home.  Blood loss: Minimal.     Shanta Love NP  Hematology/Oncology/BMT

## 2022-01-03 NOTE — PROGRESS NOTES
HEMATOLOGIC MALIGNANCIES PROGRESS NOTE    IDENTIFYING STATEMENT   Deepti Gonzalez (Deepti) is a 51 y.o. female with a  of 1970 from [place], [place] with the diagnosis of B-ALL.      ONCOLOGY HISTORY:    1. Precursor B-cell acute lymphoblastic leukemia (Ph+)   A. 2021: Transferred to [facility] [place], [place] from outside hospital for evaluation for acute leukemia   B. 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    C. 2021 - 2021: Vincristine + imatinib induction; hospital course was complicated by acute hypoxemic respiratory failure which resolved with diuresis and treatment of ALL    2. History of TIA  3. Seizure disorder  4. Anxiety and depression  5. Chronic obstructive pulmonary disease  6. Paroxysmal atrial fibrillation  7. Implantable loop recorder and pacemaker in place  8. Peripheral artery disease  9. Diabetes mellitus, type 2  10. Gastroesophageal reflux disease  11. Gastroparesis  12. Rheumatoid arthritis  13. Osteoporosis  14. History of tobacco use     INTERVAL HISTORY:      Ms. Gonzalez comes to clinic in hospital follow-up of recently diagnosed B-ALL. She had a complicated hospital course involving neutropenic fever, acute hypoxemic respiratory failure, and prolonged immobilization due to illness. She is at home and ambulating around the house, but she is struggling with swelling in her feet and ankles, rib pain, and what she describes as stiffness of the abdomen. No recurrent fever or chills. She states she feels winded with relatively little activity.     Past Medical History, Past Social History and Past Family History have been reviewed and are unchanged except as noted in the interval history.    MEDICATIONS:      Current Outpatient Medications on File Prior to Visit   Medication Sig Dispense Refill    acyclovir (ZOVIRAX) 400 MG tablet Take 1 tablet (400 mg total) by mouth 2 (two) times daily. 60 tablet 11    aspirin (ECOTRIN) 81 MG EC tablet Take 1 tablet (81 mg total) by mouth once daily. HOLD until okayed to resume during chemotherapy.  0    atorvastatin (LIPITOR) 80 MG tablet Take 80 mg by mouth once daily.      dexAMETHasone (DECADRON) 4 MG Tab Take 10 tablets (40mg) by mouth on days 9, 15, 16, 22, and 23 of chemotherapy cycle.   [Day 15 and 22 will be infusion chemo day.] 50 tablet 0    diazePAM (VALIUM) 10 MG Tab Take 10 mg by mouth 2 (two) times daily as needed.      diltiaZEM (CARDIZEM) 90 MG tablet Take 1 tablet (90 mg total) by mouth every 6 (six) hours. 120 tablet 11    fenofibrate 160 MG Tab Take 160 mg by mouth once daily.      gabapentin (NEURONTIN) 300 MG capsule Take 1 capsule (300 mg total) by mouth 3 (three) times daily. 90 capsule 11    imatinib (GLEEVEC) 100 MG Tab Take 2 tablets (200 mg total) by mouth once daily with 1 other imatinib prescription for 600 mg total.Take with a meal and large glass of water 180 tablet 3    imatinib (GLEEVEC) 400 MG Tab Take 1 tablet (400 mg total) by mouth once daily with 1 other imatinib prescription for 600 mg total. Take with a meal and large glass of water 90 tablet 3    JARDIANCE 10 mg tablet Take 10 mg by mouth every morning.      leflunomide (ARAVA) 20 MG Tab Take 20 mg by mouth once daily.      losartan (COZAAR) 25 MG tablet Take 25 mg by mouth once daily.      metFORMIN (GLUCOPHAGE-XR) 500 MG ER 24hr tablet Take 1,000 mg by mouth 2 (two) times daily.      methocarbamoL (ROBAXIN) 500 MG Tab Take 1 tablet (500 mg total) by mouth 3 (three) times daily as needed (muscle spasms). 90 tablet 2    omeprazole (PRILOSEC) 40 MG capsule Take 40 mg by mouth once daily.      ondansetron (ZOFRAN-ODT) 8 MG TbDL Dissolve 1 tablet (8 mg total) by mouth every 12  (twelve) hours as needed (in case of chemo induced nausea). 30 tablet 1    OXcarbazepine (TRILEPTAL) 600 MG Tab Take 1,200 mg by mouth 2 (two) times daily.      polyethylene glycol (GLYCOLAX) 17 gram/dose powder Dissolve one capful (17 g) in liquid and take by mouth daily as needed (constipation). 510 g 0    QUEtiapine (SEROQUEL) 200 MG Tab Take 200 mg by mouth every evening.      rivaroxaban (XARELTO) 20 mg Tab Take 1 tablet (20 mg total) by mouth daily with dinner or evening meal. HOLD until okayed to resume while platelets are low.      senna-docusate 8.6-50 mg (PERICOLACE) 8.6-50 mg per tablet Take 1 tablet by mouth 2 (two) times daily. 60 tablet 3    ergocalciferol (ERGOCALCIFEROL) 50,000 unit Cap Take 50,000 Units by mouth every 7 days.      multivit,min52-folic-vitK-cQ10 (AQUADEKS) 100-700-10 mcg-mcg-mg Cap cap Take 1 capsule by mouth once daily.      TRINTELLIX 10 mg Tab Take 1 tablet by mouth once daily.       Current Facility-Administered Medications on File Prior to Visit   Medication Dose Route Frequency Provider Last Rate Last Admin    [COMPLETED] LIDOcaine HCL 20 mg/ml (2%) injection 10 mL  10 mL Intradermal 1 time in Clinic/HOD Shanta Love NP   10 mL at 01/03/22 0444       ALLERGIES:   Review of patient's allergies indicates:   Allergen Reactions    Levetiracetam      Other reaction(s): Unknown        ROS:       Review of Systems   Constitutional: Positive for fatigue. Negative for diaphoresis, fever and unexpected weight change.   HENT:   Negative for lump/mass and sore throat.    Eyes: Negative for icterus.   Respiratory: Positive for shortness of breath (with exertion). Negative for cough.    Cardiovascular: Positive for chest pain (in ribs). Negative for palpitations.   Gastrointestinal: Positive for abdominal pain. Negative for abdominal distention, constipation, diarrhea, nausea and vomiting.   Genitourinary: Negative for dysuria and frequency.    Musculoskeletal: Negative for  arthralgias, gait problem and myalgias.        Muscle weakness - diffuse   Skin: Negative for rash.   Neurological: Negative for dizziness, gait problem and headaches.   Hematological: Negative for adenopathy. Does not bruise/bleed easily.   Psychiatric/Behavioral: The patient is not nervous/anxious.        PHYSICAL EXAM:  Vitals:    01/03/22 1348   BP: (!) 155/67   Pulse: 110   Resp: 18   Temp: 98.5 °F (36.9 °C)   SpO2: 99%   Weight: 72.7 kg (160 lb 2.6 oz)   PainSc:   9   PainLoc: Abdomen       KARNOFSKY PERFORMANCE STATUS 50%  ECOG 3    Physical Exam  Constitutional:       General: She is not in acute distress.     Appearance: She is well-developed and well-nourished.   HENT:      Head: Normocephalic and atraumatic.      Mouth/Throat:      Mouth: Mucous membranes are normal. No oral lesions.   Eyes:      Conjunctiva/sclera: Conjunctivae normal.   Neck:      Thyroid: No thyromegaly.   Cardiovascular:      Rate and Rhythm: Normal rate and regular rhythm.      Heart sounds: Normal heart sounds. No murmur heard.      Pulmonary:      Breath sounds: Wheezing (end-expiratory wheeze) present. No rales.   Abdominal:      General: There is no distension.      Palpations: Abdomen is soft. There is no hepatomegaly, splenomegaly or mass.      Tenderness: There is no abdominal tenderness.   Lymphadenopathy:      Cervical: No cervical adenopathy.      Right cervical: No deep cervical adenopathy.     Left cervical: No deep cervical adenopathy.      Upper Body:   No axillary adenopathy present.     Lower Body: No right inguinal adenopathy. No left inguinal adenopathy.   Skin:     Findings: No rash.   Neurological:      Mental Status: She is alert and oriented to person, place, and time.      Cranial Nerves: No cranial nerve deficit.      Coordination: Coordination normal.      Deep Tendon Reflexes: Strength normal and reflexes are normal and symmetric.         LAB:   Results for orders placed or performed in visit on 01/03/22    COVID-19 Rapid Screening   Result Value Ref Range    SARS-CoV-2 RNA, Amplification, Qual Negative Negative       PROBLEMS ASSESSED THIS VISIT:    1. B-cell acute lymphoblastic leukemia    2. Rib pain    3. Anemia associated with chemotherapy        PLAN:       Ph+ B-ALL  Ms. Gonzalez has normalization of her WBC and platelet count after induction therapy with vincristine and imatinib for Ph+ B-ALL. She has moderate-severe anemia that will need close monitoring.     We will obtain post-induction bone marrow biopsy to assess response. If she is in remission, we will proceed to consolidation as per EWALL-Ph-01 protocol.     Anemia  Likely secondary to chemotherapy and imatinib at this point. Monitor for transfusion need.    Rib pain  Plain films obtained and negative for fracture. Possibly musculoskeletal pain from imatinib.     Follow-up  Pending bone marrow biopsy results.     Dayron Jenkins MD  Hematology and Stem Cell Transplant

## 2022-01-03 NOTE — PROGRESS NOTES
Ms. Gonzalez is a 51 y.o. female, patient of Dr. Jenkins, who presents today for a bone marrow biopsy to evaluate her ALL s/p EWALL induction chemotherapy. Procedure explained, consent obtained. See procedure note.    Picc line dressing changed today in clinic by ROLANDO Marie RN    Patient to have chest xray following bone marrow to evaluate rib pain    Shanta Love NP  Hematology/Oncology/BMT

## 2022-01-04 PROBLEM — K44.9 HIATAL HERNIA: Status: ACTIVE | Noted: 2022-01-04

## 2022-01-04 PROBLEM — I47.19 ATRIAL TACHYCARDIA: Status: RESOLVED | Noted: 2018-01-12 | Resolved: 2022-01-04

## 2022-01-04 PROBLEM — M81.0 OSTEOPOROSIS: Status: ACTIVE | Noted: 2022-01-04

## 2022-01-04 PROBLEM — I77.9 PERIPHERAL ARTERIAL OCCLUSIVE DISEASE: Status: ACTIVE | Noted: 2022-01-04

## 2022-01-04 PROBLEM — J96.01 ACUTE HYPOXEMIC RESPIRATORY FAILURE: Status: RESOLVED | Noted: 2021-11-28 | Resolved: 2022-01-04

## 2022-01-04 PROBLEM — Z86.73 HISTORY OF TIA (TRANSIENT ISCHEMIC ATTACK): Status: ACTIVE | Noted: 2017-12-22

## 2022-01-04 PROBLEM — Q21.10 ATRIAL SEPTAL DEFECT WITHIN OVAL FOSSA: Status: ACTIVE | Noted: 2022-01-04

## 2022-01-04 PROBLEM — D61.818 PANCYTOPENIA: Status: RESOLVED | Noted: 2021-11-24 | Resolved: 2022-01-04

## 2022-01-04 PROBLEM — R78.81 BACTEREMIA: Status: RESOLVED | Noted: 2021-11-24 | Resolved: 2022-01-04

## 2022-01-04 PROBLEM — K58.9 IRRITABLE BOWEL SYNDROME: Status: ACTIVE | Noted: 2022-01-04

## 2022-01-04 PROBLEM — I47.10 SVT (SUPRAVENTRICULAR TACHYCARDIA): Status: RESOLVED | Noted: 2017-12-22 | Resolved: 2022-01-04

## 2022-01-04 PROBLEM — K31.84 GASTROPARESIS: Status: ACTIVE | Noted: 2022-01-04

## 2022-01-04 LAB — SARS-COV-2 RDRP RESP QL NAA+PROBE: NEGATIVE

## 2022-01-05 ENCOUNTER — PATIENT MESSAGE (OUTPATIENT)
Dept: ADMINISTRATIVE | Facility: OTHER | Age: 52
End: 2022-01-05
Payer: COMMERCIAL

## 2022-01-05 ENCOUNTER — PATIENT MESSAGE (OUTPATIENT)
Dept: HEMATOLOGY/ONCOLOGY | Facility: CLINIC | Age: 52
End: 2022-01-05
Payer: COMMERCIAL

## 2022-01-05 LAB
DNA/RNA EXTRACT AND HOLD RESULT: NORMAL
DNA/RNA EXTRACTION: NORMAL
EXHR SPECIMEN TYPE: NORMAL

## 2022-01-06 LAB
ALL (BM) RESULT TABLE: NORMAL
ANNOTATION COMMENT IMP: NORMAL
CHROM ANALY RESULT (ISCN): NORMAL
FBALB INTERPRETATION: NORMAL
FBALB REASON FOR REFERRAL: NORMAL
MOL DX INTERP BLD/T QL: NORMAL
PROVIDER SIGNING NAME: NORMAL
REF LAB TEST METHOD: NORMAL
SPECIMEN SOURCE: NORMAL
TEST PERFORMANCE INFO SPEC: NORMAL

## 2022-01-07 LAB
BCR/ABL RESULT, P190, QUANT, BM: NORMAL
BODY SITE - BONE MARROW: NORMAL
CLINICAL DIAGNOSIS - BONE MARROW: NORMAL
FLOW CYTOMETRY ANTIBODIES ANALYZED - BONE MARROW: NORMAL
FLOW CYTOMETRY COMMENT - BONE MARROW: NORMAL
FLOW CYTOMETRY INTERPRETATION - BONE MARROW: NORMAL
PATH REPORT.FINAL DX SPEC: NORMAL
SPECIMEN TYPE, P190, QUANT, BM: NORMAL

## 2022-01-10 ENCOUNTER — TELEPHONE (OUTPATIENT)
Dept: HEMATOLOGY/ONCOLOGY | Facility: CLINIC | Age: 52
End: 2022-01-10
Payer: COMMERCIAL

## 2022-01-10 DIAGNOSIS — C91.00 B-CELL ACUTE LYMPHOBLASTIC LEUKEMIA: Primary | ICD-10-CM

## 2022-01-10 LAB
CHROM BANDING METHOD: NORMAL
CHROMOSOME ANALYSIS BM ADDITIONAL INFORMATION: NORMAL
CHROMOSOME ANALYSIS BM RELEASED BY: NORMAL
CHROMOSOME ANALYSIS BM RESULT SUMMARY: NORMAL
CLINICAL CYTOGENETICIST REVIEW: NORMAL
KARYOTYP MAR: NORMAL
REASON FOR REFERRAL (NARRATIVE): NORMAL
REF LAB TEST METHOD: NORMAL
SPECIMEN SOURCE: NORMAL
SPECIMEN: NORMAL

## 2022-01-11 ENCOUNTER — DOCUMENTATION ONLY (OUTPATIENT)
Dept: HEMATOLOGY/ONCOLOGY | Facility: CLINIC | Age: 52
End: 2022-01-11
Payer: COMMERCIAL

## 2022-01-11 ENCOUNTER — SPECIALTY PHARMACY (OUTPATIENT)
Dept: PHARMACY | Facility: CLINIC | Age: 52
End: 2022-01-11
Payer: COMMERCIAL

## 2022-01-11 DIAGNOSIS — C91.00 B-CELL ACUTE LYMPHOBLASTIC LEUKEMIA: Primary | ICD-10-CM

## 2022-01-11 LAB
COMMENT: NORMAL
FINAL PATHOLOGIC DIAGNOSIS: NORMAL
GROSS: NORMAL
Lab: NORMAL
MICROSCOPIC EXAM: NORMAL
SUPPLEMENTAL DIAGNOSIS: NORMAL

## 2022-01-11 NOTE — TELEPHONE ENCOUNTER
Specialty Pharmacy - Refill Coordination    Specialty Medication Orders Linked to Encounter    Flowsheet Row Most Recent Value   Medication #1 imatinib (GLEEVEC) 100 MG Tab (Order#[id number], Rx#[id number])   Medication #2 imatinib (GLEEVEC) 400 MG Tab (Order#[id number], Rx#[id number])          Refill Questions - Documented Responses    Flowsheet Row Most Recent Value   Patient Availability and HIPAA Verification    Does patient want to proceed with activity? Yes   HIPAA/medical authority confirmed? Yes   Relationship to patient of person spoken to? Spouse/Significant Other   Refill Screening Questions    Changes to allergies? No   Changes to medications? No   New conditions since last clinic visit? No   Unplanned office visit, urgent care, ED, or hospital admission in the last 4 weeks? No   How does patient/caregiver feel medication is working? Good   Financial problems or insurance changes? No   How many doses of your specialty medications were missed in the last 4 weeks? 1   Why were doses missed? Felt ill or sick   Would patient like to speak to a pharmacist? No   When does the patient need to receive the medication? 01/16/22   Refill Delivery Questions    How will the patient receive the medication? Mail   When does the patient need to receive the medication? 01/16/22   Shipping Address Home   Address in [facility] confirmed and updated if neccessary? Yes   Expected Copay ($) 201.7   Is the patient able to afford the medication copay? Yes   Payment Method invoice (approval required)  [[employer]I requested]   Days supply of Refill 30   Supplies needed? No supplies needed   Refill activity completed? Yes   Refill activity plan Refill scheduled   Shipment/Pickup Date: 01/13/22          Current Outpatient Medications   Medication Sig    acyclovir (ZOVIRAX) 400 MG tablet Take 1 tablet (400 mg total) by mouth 2 (two) times daily.    aspirin (ECOTRIN) 81 MG EC tablet Take 1 tablet (81 mg total) by mouth once daily.  HOLD until okayed to resume during chemotherapy.    atorvastatin (LIPITOR) 80 MG tablet Take 80 mg by mouth once daily.    dexAMETHasone (DECADRON) 4 MG Tab Take 10 tablets (40mg) by mouth on days 9, 15, 16, 22, and 23 of chemotherapy cycle.   [Day 15 and 22 will be infusion chemo day.]    diazePAM (VALIUM) 10 MG Tab Take 10 mg by mouth 2 (two) times daily as needed.    diltiaZEM (CARDIZEM) 90 MG tablet Take 1 tablet (90 mg total) by mouth every 6 (six) hours.    ergocalciferol (ERGOCALCIFEROL) 50,000 unit Cap Take 50,000 Units by mouth every 7 days.    fenofibrate 160 MG Tab Take 160 mg by mouth once daily.    gabapentin (NEURONTIN) 300 MG capsule Take 1 capsule (300 mg total) by mouth 3 (three) times daily.    imatinib (GLEEVEC) 100 MG Tab Take 2 tablets (200 mg total) by mouth once daily with 1 other imatinib prescription for 600 mg total.Take with a meal and large glass of water    imatinib (GLEEVEC) 400 MG Tab Take 1 tablet (400 mg total) by mouth once daily with 1 other imatinib prescription for 600 mg total. Take with a meal and large glass of water    JARDIANCE 10 mg tablet Take 10 mg by mouth every morning.    leflunomide (ARAVA) 20 MG Tab Take 20 mg by mouth once daily.    losartan (COZAAR) 25 MG tablet Take 25 mg by mouth once daily.    metFORMIN (GLUCOPHAGE-XR) 500 MG ER 24hr tablet Take 1,000 mg by mouth 2 (two) times daily.    methocarbamoL (ROBAXIN) 500 MG Tab Take 1 tablet (500 mg total) by mouth 3 (three) times daily as needed (muscle spasms).    multivit,min52-folic-vitK-cQ10 (AQUADEKS) 100-700-10 mcg-mcg-mg Cap cap Take 1 capsule by mouth once daily.    omeprazole (PRILOSEC) 40 MG capsule Take 40 mg by mouth once daily.    ondansetron (ZOFRAN-ODT) 8 MG TbDL Dissolve 1 tablet (8 mg total) by mouth every 12 (twelve) hours as needed (in case of chemo induced nausea).    OXcarbazepine (TRILEPTAL) 600 MG Tab Take 1,200 mg by mouth 2 (two) times daily.    polyethylene glycol  (GLYCOLAX) 17 gram/dose powder Dissolve one capful (17 g) in liquid and take by mouth daily as needed (constipation).    QUEtiapine (SEROQUEL) 200 MG Tab Take 200 mg by mouth every evening.    rivaroxaban (XARELTO) 20 mg Tab Take 1 tablet (20 mg total) by mouth daily with dinner or evening meal. HOLD until okayed to resume while platelets are low.    senna-docusate 8.6-50 mg (PERICOLACE) 8.6-50 mg per tablet Take 1 tablet by mouth 2 (two) times daily.    TRINTELLIX 10 mg Tab Take 1 tablet by mouth once daily.   Last reviewed on 1/11/2022  2:55 PM by Tameka eVlez, Danielle    Review of patient's allergies indicates:   Allergen Reactions    Levetiracetam      Other reaction(s): Unknown    Last reviewed on  1/11/2022 2:55 PM by Tameka Velez      Tasks added this encounter   No tasks added.   Tasks due within next 3 months   1/9/2022 - Refill Call (Auto Added)  3/9/2022 - Clinical - Follow Up Assesement (90 day)     Tameka Velez, PharmD  [facility]  [address] [place], [place] [address]  [facility] [address]  Phone: [phone]  Fax: [phone]

## 2022-01-11 NOTE — PROGRESS NOTES
Received request to assist with pharmacy costs for Imatinib.  Cost transfer for $201.70 sent to Tameka in specialty pharmacy.  Patient has $540.91 remaining in regular [employer] patient assistance.  Will notify following Robby Melo.

## 2022-01-13 NOTE — TELEPHONE ENCOUNTER
[employer]I approved cost transfer in the amount of $201.70 for patients Gleevec refill. AR form received.

## 2022-01-19 ENCOUNTER — PATIENT MESSAGE (OUTPATIENT)
Dept: ADMINISTRATIVE | Facility: OTHER | Age: 52
End: 2022-01-19
Payer: COMMERCIAL

## 2022-01-19 ENCOUNTER — OFFICE VISIT (OUTPATIENT)
Dept: HEMATOLOGY/ONCOLOGY | Facility: CLINIC | Age: 52
End: 2022-01-19
Payer: COMMERCIAL

## 2022-01-19 ENCOUNTER — CLINICAL SUPPORT (OUTPATIENT)
Dept: HEMATOLOGY/ONCOLOGY | Facility: CLINIC | Age: 52
DRG: 838 | End: 2022-01-19
Payer: COMMERCIAL

## 2022-01-19 ENCOUNTER — HOSPITAL ENCOUNTER (INPATIENT)
Facility: HOSPITAL | Age: 52
LOS: 5 days | Discharge: HOME | DRG: 838 | End: 2022-01-24
Attending: INTERNAL MEDICINE | Admitting: INTERNAL MEDICINE
Payer: COMMERCIAL

## 2022-01-19 VITALS
WEIGHT: 159.63 LBS | TEMPERATURE: 99 F | RESPIRATION RATE: 18 BRPM | BODY MASS INDEX: 27.25 KG/M2 | HEIGHT: 64 IN | DIASTOLIC BLOOD PRESSURE: 70 MMHG | SYSTOLIC BLOOD PRESSURE: 144 MMHG | OXYGEN SATURATION: 99 % | HEART RATE: 112 BPM

## 2022-01-19 DIAGNOSIS — C91.00 B-CELL ACUTE LYMPHOBLASTIC LEUKEMIA: Primary | ICD-10-CM

## 2022-01-19 DIAGNOSIS — C91.00 ALL (ACUTE LYMPHOBLASTIC LEUKEMIA): ICD-10-CM

## 2022-01-19 DIAGNOSIS — I48.0 PAROXYSMAL ATRIAL FIBRILLATION: ICD-10-CM

## 2022-01-19 DIAGNOSIS — K31.84 GASTROPARESIS: ICD-10-CM

## 2022-01-19 DIAGNOSIS — D63.0 ANEMIA IN NEOPLASTIC DISEASE: ICD-10-CM

## 2022-01-19 LAB
APTT BLDCRRT: 24.8 SEC (ref 21–32)
BILIRUB SERPL-MCNC: NORMAL MG/DL
BLOOD URINE, POC: NORMAL
COLOR, POC UA: NORMAL
FIBRINOGEN PPP-MCNC: 271 MG/DL (ref 182–400)
GLUCOSE UR QL STRIP: NORMAL
INR PPP: 1 (ref 0.8–1.2)
KETONES UR QL STRIP: NORMAL
LEUKOCYTE ESTERASE URINE, POC: NORMAL
LIPASE SERPL-CCNC: 21 U/L (ref 4–60)
NITRITE, POC UA: NORMAL
PH, POC UA: 7
POCT GLUCOSE: 97 MG/DL (ref 70–110)
PROTEIN, POC: NORMAL
PROTHROMBIN TIME: 11.4 SEC (ref 9–12.5)
SARS-COV-2 RDRP RESP QL NAA+PROBE: NEGATIVE
SPECIFIC GRAVITY, POC UA: NORMAL
UROBILINOGEN, POC UA: NORMAL

## 2022-01-19 PROCEDURE — 99223 PR INITIAL HOSPITAL CARE,LEVL III: ICD-10-PCS | Mod: ,,, | Performed by: INTERNAL MEDICINE

## 2022-01-19 PROCEDURE — 1159F MED LIST DOCD IN RCRD: CPT | Mod: CPTII,S$GLB,, | Performed by: NURSE PRACTITIONER

## 2022-01-19 PROCEDURE — 3077F PR MOST RECENT SYSTOLIC BLOOD PRESSURE >= 140 MM HG: ICD-10-PCS | Mod: CPTII,S$GLB,, | Performed by: NURSE PRACTITIONER

## 2022-01-19 PROCEDURE — 25000003 PHARM REV CODE 250: Performed by: INTERNAL MEDICINE

## 2022-01-19 PROCEDURE — 20600001 HC STEP DOWN PRIVATE ROOM

## 2022-01-19 PROCEDURE — 25000003 PHARM REV CODE 250: Performed by: NURSE PRACTITIONER

## 2022-01-19 PROCEDURE — 85730 THROMBOPLASTIN TIME PARTIAL: CPT | Performed by: NURSE PRACTITIONER

## 2022-01-19 PROCEDURE — 99999 PR PBB SHADOW E&M-EST. PATIENT-LVL V: ICD-10-PCS | Mod: PBBFAC,,, | Performed by: NURSE PRACTITIONER

## 2022-01-19 PROCEDURE — 99999 PR PBB SHADOW E&M-EST. PATIENT-LVL I: ICD-10-PCS | Mod: PBBFAC,,,

## 2022-01-19 PROCEDURE — 99499 NO LOS: ICD-10-PCS | Mod: S$GLB,,, | Performed by: NURSE PRACTITIONER

## 2022-01-19 PROCEDURE — 3077F SYST BP >= 140 MM HG: CPT | Mod: CPTII,S$GLB,, | Performed by: NURSE PRACTITIONER

## 2022-01-19 PROCEDURE — 25000003 PHARM REV CODE 250: Performed by: STUDENT IN AN ORGANIZED HEALTH CARE EDUCATION/TRAINING PROGRAM

## 2022-01-19 PROCEDURE — 99999 PR PBB SHADOW E&M-EST. PATIENT-LVL I: CPT | Mod: PBBFAC,,,

## 2022-01-19 PROCEDURE — 1160F PR REVIEW ALL MEDS BY PRESCRIBER/CLIN PHARMACIST DOCUMENTED: ICD-10-PCS | Mod: CPTII,S$GLB,, | Performed by: NURSE PRACTITIONER

## 2022-01-19 PROCEDURE — 1160F RVW MEDS BY RX/DR IN RCRD: CPT | Mod: CPTII,S$GLB,, | Performed by: NURSE PRACTITIONER

## 2022-01-19 PROCEDURE — 3008F PR BODY MASS INDEX (BMI) DOCUMENTED: ICD-10-PCS | Mod: CPTII,S$GLB,, | Performed by: NURSE PRACTITIONER

## 2022-01-19 PROCEDURE — 3078F PR MOST RECENT DIASTOLIC BLOOD PRESSURE < 80 MM HG: ICD-10-PCS | Mod: CPTII,S$GLB,, | Performed by: NURSE PRACTITIONER

## 2022-01-19 PROCEDURE — 99223 1ST HOSP IP/OBS HIGH 75: CPT | Mod: ,,, | Performed by: INTERNAL MEDICINE

## 2022-01-19 PROCEDURE — 36415 COLL VENOUS BLD VENIPUNCTURE: CPT | Performed by: NURSE PRACTITIONER

## 2022-01-19 PROCEDURE — 99999 PR PBB SHADOW E&M-EST. PATIENT-LVL V: CPT | Mod: PBBFAC,,, | Performed by: NURSE PRACTITIONER

## 2022-01-19 PROCEDURE — 85384 FIBRINOGEN ACTIVITY: CPT | Performed by: NURSE PRACTITIONER

## 2022-01-19 PROCEDURE — 3008F BODY MASS INDEX DOCD: CPT | Mod: CPTII,S$GLB,, | Performed by: NURSE PRACTITIONER

## 2022-01-19 PROCEDURE — 1159F PR MEDICATION LIST DOCUMENTED IN MEDICAL RECORD: ICD-10-PCS | Mod: CPTII,S$GLB,, | Performed by: NURSE PRACTITIONER

## 2022-01-19 PROCEDURE — 99499 UNLISTED E&M SERVICE: CPT | Mod: S$GLB,,, | Performed by: NURSE PRACTITIONER

## 2022-01-19 PROCEDURE — 83690 ASSAY OF LIPASE: CPT | Performed by: NURSE PRACTITIONER

## 2022-01-19 PROCEDURE — U0002 COVID-19 LAB TEST NON-CDC: HCPCS | Performed by: INTERNAL MEDICINE

## 2022-01-19 PROCEDURE — 3078F DIAST BP <80 MM HG: CPT | Mod: CPTII,S$GLB,, | Performed by: NURSE PRACTITIONER

## 2022-01-19 PROCEDURE — 85610 PROTHROMBIN TIME: CPT | Performed by: NURSE PRACTITIONER

## 2022-01-19 RX ORDER — PROCHLORPERAZINE EDISYLATE 5 MG/ML
10 INJECTION INTRAMUSCULAR; INTRAVENOUS EVERY 6 HOURS PRN
Status: DISCONTINUED | OUTPATIENT
Start: 2022-01-19 | End: 2022-01-24 | Stop reason: HOSPADM

## 2022-01-19 RX ORDER — METHYLPREDNISOLONE SOD SUCC 125 MG
125 VIAL (EA) INJECTION DAILY PRN
Status: ACTIVE | OUTPATIENT
Start: 2022-01-21 | End: 2022-01-22

## 2022-01-19 RX ORDER — ACETAMINOPHEN 325 MG/1
650 TABLET ORAL
Status: COMPLETED | OUTPATIENT
Start: 2022-01-21 | End: 2022-01-21

## 2022-01-19 RX ORDER — GABAPENTIN 300 MG/1
300 CAPSULE ORAL 3 TIMES DAILY
Status: DISCONTINUED | OUTPATIENT
Start: 2022-01-19 | End: 2022-01-24 | Stop reason: HOSPADM

## 2022-01-19 RX ORDER — CYCLOBENZAPRINE HCL 10 MG
10 TABLET ORAL 3 TIMES DAILY PRN
COMMUNITY
Start: 2022-01-10 | End: 2022-03-16

## 2022-01-19 RX ORDER — QUETIAPINE FUMARATE 200 MG/1
200 TABLET, FILM COATED ORAL NIGHTLY
Status: DISCONTINUED | OUTPATIENT
Start: 2022-01-19 | End: 2022-01-24 | Stop reason: HOSPADM

## 2022-01-19 RX ORDER — ENOXAPARIN SODIUM 100 MG/ML
40 INJECTION SUBCUTANEOUS EVERY 24 HOURS
Status: DISCONTINUED | OUTPATIENT
Start: 2022-01-19 | End: 2022-01-19

## 2022-01-19 RX ORDER — PROCHLORPERAZINE EDISYLATE 5 MG/ML
10 INJECTION INTRAMUSCULAR; INTRAVENOUS EVERY 6 HOURS PRN
Status: CANCELLED | OUTPATIENT
Start: 2022-01-19

## 2022-01-19 RX ORDER — DIPHENHYDRAMINE HYDROCHLORIDE 50 MG/ML
25 INJECTION INTRAMUSCULAR; INTRAVENOUS EVERY 10 MIN PRN
Status: ACTIVE | OUTPATIENT
Start: 2022-01-21 | End: 2022-01-22

## 2022-01-19 RX ORDER — EPINEPHRINE 0.3 MG/.3ML
0.3 INJECTION SUBCUTANEOUS DAILY PRN
Status: CANCELLED | OUTPATIENT
Start: 2022-01-20

## 2022-01-19 RX ORDER — EPINEPHRINE 1 MG/ML
0.3 INJECTION, SOLUTION INTRACARDIAC; INTRAMUSCULAR; INTRAVENOUS; SUBCUTANEOUS DAILY PRN
Status: ACTIVE | OUTPATIENT
Start: 2022-01-21 | End: 2022-01-22

## 2022-01-19 RX ORDER — ONDANSETRON 2 MG/ML
8 INJECTION INTRAMUSCULAR; INTRAVENOUS
Status: CANCELLED | OUTPATIENT
Start: 2022-01-19

## 2022-01-19 RX ORDER — PANTOPRAZOLE SODIUM 40 MG/1
40 TABLET, DELAYED RELEASE ORAL DAILY
Status: DISCONTINUED | OUTPATIENT
Start: 2022-01-19 | End: 2022-01-19

## 2022-01-19 RX ORDER — HEPARIN 100 UNIT/ML
500 SYRINGE INTRAVENOUS
Status: DISCONTINUED | OUTPATIENT
Start: 2022-01-19 | End: 2022-01-24 | Stop reason: HOSPADM

## 2022-01-19 RX ORDER — DILTIAZEM HYDROCHLORIDE 30 MG/1
90 TABLET, FILM COATED ORAL EVERY 6 HOURS
Status: DISCONTINUED | OUTPATIENT
Start: 2022-01-20 | End: 2022-01-24 | Stop reason: HOSPADM

## 2022-01-19 RX ORDER — POLYETHYLENE GLYCOL 3350 17 G/17G
17 POWDER, FOR SOLUTION ORAL DAILY PRN
Status: DISCONTINUED | OUTPATIENT
Start: 2022-01-19 | End: 2022-01-24 | Stop reason: HOSPADM

## 2022-01-19 RX ORDER — SODIUM CHLORIDE 0.9 % (FLUSH) 0.9 %
10 SYRINGE (ML) INJECTION EVERY 12 HOURS PRN
Status: DISCONTINUED | OUTPATIENT
Start: 2022-01-19 | End: 2022-01-24 | Stop reason: HOSPADM

## 2022-01-19 RX ORDER — HEPARIN 100 UNIT/ML
500 SYRINGE INTRAVENOUS
Status: CANCELLED | OUTPATIENT
Start: 2022-01-19

## 2022-01-19 RX ORDER — NALOXONE HCL 0.4 MG/ML
0.02 VIAL (ML) INJECTION
Status: DISCONTINUED | OUTPATIENT
Start: 2022-01-19 | End: 2022-01-24 | Stop reason: HOSPADM

## 2022-01-19 RX ORDER — LEUCOVORIN CALCIUM 25 MG/1
25 TABLET ORAL
Status: CANCELLED | OUTPATIENT
Start: 2022-01-20

## 2022-01-19 RX ORDER — FAMOTIDINE 20 MG/1
20 TABLET, FILM COATED ORAL 2 TIMES DAILY
Status: DISCONTINUED | OUTPATIENT
Start: 2022-01-19 | End: 2022-01-24 | Stop reason: HOSPADM

## 2022-01-19 RX ORDER — SODIUM BICARBONATE IN D5W 150/1000ML
PLASTIC BAG, INJECTION (ML) INTRAVENOUS CONTINUOUS
Status: DISPENSED | OUTPATIENT
Start: 2022-01-19 | End: 2022-01-21

## 2022-01-19 RX ORDER — ONDANSETRON 8 MG/1
8 TABLET, ORALLY DISINTEGRATING ORAL EVERY 8 HOURS PRN
Status: CANCELLED | OUTPATIENT
Start: 2022-01-19

## 2022-01-19 RX ORDER — IBUPROFEN 200 MG
24 TABLET ORAL
Status: DISCONTINUED | OUTPATIENT
Start: 2022-01-19 | End: 2022-01-24 | Stop reason: HOSPADM

## 2022-01-19 RX ORDER — SODIUM BICARBONATE IN D5W 150/1000ML
PLASTIC BAG, INJECTION (ML) INTRAVENOUS CONTINUOUS
Status: CANCELLED | OUTPATIENT
Start: 2022-01-19

## 2022-01-19 RX ORDER — DEXAMETHASONE 4 MG/1
8 TABLET ORAL
Status: CANCELLED | OUTPATIENT
Start: 2022-01-20

## 2022-01-19 RX ORDER — CYCLOBENZAPRINE HCL 5 MG
10 TABLET ORAL 3 TIMES DAILY PRN
Status: DISCONTINUED | OUTPATIENT
Start: 2022-01-19 | End: 2022-01-22

## 2022-01-19 RX ORDER — ACYCLOVIR 200 MG/1
400 CAPSULE ORAL 2 TIMES DAILY
Status: DISCONTINUED | OUTPATIENT
Start: 2022-01-19 | End: 2022-01-24 | Stop reason: HOSPADM

## 2022-01-19 RX ORDER — ONDANSETRON 2 MG/ML
8 INJECTION INTRAMUSCULAR; INTRAVENOUS
Status: COMPLETED | OUTPATIENT
Start: 2022-01-20 | End: 2022-01-20

## 2022-01-19 RX ORDER — LEUCOVORIN CALCIUM 25 MG/1
25 TABLET ORAL
Status: DISCONTINUED | OUTPATIENT
Start: 2022-01-21 | End: 2022-01-24 | Stop reason: HOSPADM

## 2022-01-19 RX ORDER — IBUPROFEN 200 MG
16 TABLET ORAL
Status: DISCONTINUED | OUTPATIENT
Start: 2022-01-19 | End: 2022-01-19

## 2022-01-19 RX ORDER — ACETAMINOPHEN 325 MG/1
650 TABLET ORAL
Status: CANCELLED | OUTPATIENT
Start: 2022-01-20

## 2022-01-19 RX ORDER — ONDANSETRON 8 MG/1
8 TABLET, ORALLY DISINTEGRATING ORAL EVERY 12 HOURS PRN
Status: DISCONTINUED | OUTPATIENT
Start: 2022-01-19 | End: 2022-01-24 | Stop reason: HOSPADM

## 2022-01-19 RX ORDER — OXCARBAZEPINE 600 MG/1
1200 TABLET, FILM COATED ORAL 2 TIMES DAILY
Status: DISCONTINUED | OUTPATIENT
Start: 2022-01-19 | End: 2022-01-24 | Stop reason: HOSPADM

## 2022-01-19 RX ORDER — IBUPROFEN 200 MG
16 TABLET ORAL
Status: DISCONTINUED | OUTPATIENT
Start: 2022-01-19 | End: 2022-01-24 | Stop reason: HOSPADM

## 2022-01-19 RX ORDER — AMOXICILLIN 250 MG
1 CAPSULE ORAL 2 TIMES DAILY
Status: DISCONTINUED | OUTPATIENT
Start: 2022-01-19 | End: 2022-01-19

## 2022-01-19 RX ORDER — DIAZEPAM 5 MG/1
10 TABLET ORAL 2 TIMES DAILY PRN
Status: DISCONTINUED | OUTPATIENT
Start: 2022-01-19 | End: 2022-01-24 | Stop reason: HOSPADM

## 2022-01-19 RX ORDER — SODIUM CHLORIDE 0.9 % (FLUSH) 0.9 %
10 SYRINGE (ML) INJECTION
Status: CANCELLED | OUTPATIENT
Start: 2022-01-19

## 2022-01-19 RX ORDER — DIPHENHYDRAMINE HYDROCHLORIDE 50 MG/ML
25 INJECTION INTRAMUSCULAR; INTRAVENOUS EVERY 10 MIN PRN
Status: CANCELLED | OUTPATIENT
Start: 2022-01-20

## 2022-01-19 RX ORDER — IBUPROFEN 200 MG
24 TABLET ORAL
Status: DISCONTINUED | OUTPATIENT
Start: 2022-01-19 | End: 2022-01-19

## 2022-01-19 RX ORDER — LOSARTAN POTASSIUM 25 MG/1
25 TABLET ORAL DAILY
Status: DISCONTINUED | OUTPATIENT
Start: 2022-01-20 | End: 2022-01-24 | Stop reason: HOSPADM

## 2022-01-19 RX ORDER — INSULIN ASPART 100 [IU]/ML
0-5 INJECTION, SOLUTION INTRAVENOUS; SUBCUTANEOUS
Status: DISCONTINUED | OUTPATIENT
Start: 2022-01-19 | End: 2022-01-24 | Stop reason: HOSPADM

## 2022-01-19 RX ORDER — ONDANSETRON 8 MG/1
8 TABLET, ORALLY DISINTEGRATING ORAL EVERY 8 HOURS PRN
Status: DISCONTINUED | OUTPATIENT
Start: 2022-01-19 | End: 2022-01-24 | Stop reason: HOSPADM

## 2022-01-19 RX ORDER — DEXAMETHASONE 4 MG/1
8 TABLET ORAL
Status: COMPLETED | OUTPATIENT
Start: 2022-01-21 | End: 2022-01-22

## 2022-01-19 RX ORDER — LOPERAMIDE HYDROCHLORIDE 2 MG/1
2 CAPSULE ORAL 4 TIMES DAILY PRN
Status: DISCONTINUED | OUTPATIENT
Start: 2022-01-19 | End: 2022-01-20

## 2022-01-19 RX ORDER — GLUCAGON 1 MG
1 KIT INJECTION
Status: DISCONTINUED | OUTPATIENT
Start: 2022-01-19 | End: 2022-01-19

## 2022-01-19 RX ORDER — GLUCAGON 1 MG
1 KIT INJECTION
Status: DISCONTINUED | OUTPATIENT
Start: 2022-01-19 | End: 2022-01-24 | Stop reason: HOSPADM

## 2022-01-19 RX ORDER — METHYLPREDNISOLONE SOD SUCC 125 MG
125 VIAL (EA) INJECTION DAILY PRN
Status: CANCELLED | OUTPATIENT
Start: 2022-01-20

## 2022-01-19 RX ORDER — MUPIROCIN 20 MG/G
OINTMENT TOPICAL 2 TIMES DAILY
Status: COMPLETED | OUTPATIENT
Start: 2022-01-19 | End: 2022-01-24

## 2022-01-19 RX ADMIN — LOPERAMIDE HYDROCHLORIDE 2 MG: 2 CAPSULE ORAL at 11:01

## 2022-01-19 RX ADMIN — MUPIROCIN: 20 OINTMENT TOPICAL at 09:01

## 2022-01-19 RX ADMIN — QUETIAPINE FUMARATE 200 MG: 200 TABLET ORAL at 09:01

## 2022-01-19 RX ADMIN — GABAPENTIN 300 MG: 300 CAPSULE ORAL at 09:01

## 2022-01-19 RX ADMIN — ACYCLOVIR 400 MG: 200 CAPSULE ORAL at 09:01

## 2022-01-19 RX ADMIN — FAMOTIDINE 20 MG: 20 TABLET ORAL at 09:01

## 2022-01-19 RX ADMIN — SODIUM BICARBONATE: 84 INJECTION, SOLUTION INTRAVENOUS at 05:01

## 2022-01-19 RX ADMIN — OXCARBAZEPINE 1200 MG: 600 TABLET, FILM COATED ORAL at 09:01

## 2022-01-19 NOTE — ASSESSMENT & PLAN NOTE
- hold home PPI until MTX clearance as it can interfere with MTX clearance  - receive famotidine while inpatient

## 2022-01-19 NOTE — H&P
[facility] ([facility])  Hematology  Bone Marrow Transplant  H&P    Subjective:     Principal Problem: B-cell acute lymphoblastic leukemia    HPI: Ms. Gonzalez is a 51-y-o patient of Dr. Jenkins with ALL. Other medical history includes COPD, HTN, HLD, DM2, TIA, anxiety, depression, GERD, seizure disorder PAD, gastroparesis, RA, osteoporosis, and pacermaker placement. She is admitting today for C1 of consolidation with EWALL. She is s/p induction with Vincristine + imatinib, which she recieved inpatient at [facility] [place]. That hospitalization was complicated by acute hypoxic respiratory failure requiring ICU transfer. Post induction BMBx was performed 1/3/22. Showing CR. She is feeling well today. Denies fevers, chills, aches, cough, SOB, chest pain, bleeding or bruising, and n/v/d/c. She denies any recent sick contacts. She is COVID neg.      Patient information was obtained from patient and past medical records.     Oncology History (From Dr. Jenkins's most recent clinic note):    Precursor B-cell acute lymphoblastic leukemia (Ph+)              A. 11/23/2021: Transferred to [facility] [place] from outside hospital for evaluation for acute leukemia              B. 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               C. 11/30/2021 - 12/23/2021: Vincristine + imatinib induction; hospital course was complicated by acute hypoxemic respiratory failure which resolved with diuresis and treatment of ALL     No medications prior to admission.       Levetiracetam     Past Medical History:   Diagnosis Date    Arthritis     COPD (chronic obstructive pulmonary disease)     Hypertension     Stroke     TIA x 4     Past Surgical History:   Procedure Laterality Date     APPENDECTOMY      HYSTERECTOMY      ROTATOR CUFF REPAIR Bilateral     TONSILLECTOMY      TUBAL LIGATION       Family History    None       Tobacco Use    Smoking status: Current Every Day Smoker     Packs/day: 1.00     Types: Cigarettes    Smokeless tobacco: Never Used   Substance and Sexual Activity    Alcohol use: No    Drug use: Not on file    Sexual activity: Not on file       Review of Systems   Constitutional: Positive for fatigue. Negative for activity change, appetite change, chills, fever and unexpected weight change.   HENT: Negative for congestion, mouth sores, nosebleeds, rhinorrhea, sinus pressure, sinus pain, sore throat and trouble swallowing.    Eyes: Negative for photophobia, discharge, redness, itching and visual disturbance.   Respiratory: Negative for cough, chest tightness, shortness of breath and wheezing.    Cardiovascular: Negative for chest pain, palpitations and leg swelling.   Gastrointestinal: Negative for abdominal distention, abdominal pain, constipation, diarrhea, nausea and vomiting.   Endocrine: Negative for cold intolerance, heat intolerance, polydipsia, polyphagia and polyuria.   Genitourinary: Negative for decreased urine volume, difficulty urinating, dysuria, frequency, hematuria, urgency, vaginal bleeding and vaginal discharge.   Musculoskeletal: Negative for arthralgias, back pain, gait problem, joint swelling, myalgias, neck pain and neck stiffness.   Skin: Negative for pallor, rash and wound.   Allergic/Immunologic: Negative for environmental allergies, food allergies and immunocompromised state.   Neurological: Negative for dizziness, tremors, seizures, syncope, weakness, light-headedness, numbness and headaches.   Hematological: Negative for adenopathy. Does not bruise/bleed easily.   Psychiatric/Behavioral: Negative for agitation, confusion, hallucinations, sleep disturbance and suicidal ideas. The patient is not nervous/anxious.      Objective:     Vital Signs  (Most Recent):    Vital Signs (24h Range):  Temp:  [98.5 °F (36.9 °C)] 98.5 °F (36.9 °C)  Pulse:  [112] 112  Resp:  [18] 18  SpO2:  [99 %] 99 %  BP: (144)/(70) 144/70        There is no height or weight on file to calculate BMI.  There is no height or weight on file to calculate BSA.    ECOG SCORE         [unfilled]    Lines/Drains/Airways     Peripherally Inserted Central Catheter Line            PICC Double Lumen 11/30/21 1033 right basilic 50 days                Physical Exam  Constitutional:       Appearance: She is well-developed and well-nourished.   HENT:      Head: Normocephalic and atraumatic.      Mouth/Throat:      Pharynx: No oropharyngeal exudate.   Eyes:      Conjunctiva/sclera: Conjunctivae normal.      Pupils: Pupils are equal, round, and reactive to light.   Cardiovascular:      Rate and Rhythm: Normal rate and regular rhythm.      Heart sounds: Normal heart sounds. No murmur heard.      Pulmonary:      Effort: Pulmonary effort is normal.      Breath sounds: Normal breath sounds.   Abdominal:      General: Bowel sounds are normal. There is no distension.      Palpations: Abdomen is soft.      Tenderness: There is no abdominal tenderness.   Musculoskeletal:         General: No deformity. Normal range of motion.      Cervical back: Normal range of motion and neck supple.      Right lower leg: Edema (+1 pedal edema) present.      Left lower leg: Edema (+1 pedal edema) present.   Skin:     General: Skin is warm and dry.      Findings: No erythema or rash.      Comments: RUE PICC. Dressing outdated.   Neurological:      Mental Status: She is alert and oriented to person, place, and time.   Psychiatric:         Mood and Affect: Mood and affect normal.         Behavior: Behavior normal.         Thought Content: Thought content normal.         Judgment: Judgment normal.         Significant Labs:   CBC:   Recent Labs   Lab 01/19/22  1202   WBC 8.80   HGB 9.8*   HCT 31.5*       and CMP:   Recent Labs    Lab 01/19/22  1202      K 4.1      CO2 25   *   BUN 7   CREATININE 0.9   CALCIUM 9.6   PROT 6.6   ALBUMIN 3.8   BILITOT 0.3   ALKPHOS 96   AST 15   ALT 15   ANIONGAP 12   EGFRNONAA >60.0       Diagnostic Results:  I have reviewed all pertinent imaging results/findings within the past 24 hours.    Assessment/Plan:     * B-cell acute lymphoblastic leukemia  From Dr. Jenkins's most recent clinic note  Precursor B-cell acute lymphoblastic leukemia (Ph+)              A. 11/23/2021: Transferred to [facility] [place] outside hospital for evaluation for acute leukemia              B. 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               C. 11/30/2021 - 12/23/2021: Vincristine + imatinib induction; hospital course was complicated by acute hypoxemic respiratory failure which resolved with diuresis and treatment of ALL    - BMBx from 1/3/22 showing CR  - admitting today for C1 of consolidation with EWALL  - take home Gleevec days 15-28 of consolidation cycles  - will check baseline lipase, PT/INR, aptt, and fibrinogen prior to patient receiving Pegaspariginase. Will need to monitor closely for lipase and DIC while receiving PEG  - will get IT triple therapy during this admission    Moderate major depression  - continue home trintellix while inpatient    Insomnia  - continue home seroquel while inpatient    Anxiety  - continue home trintellix and valium while inpatient    GERD (gastroesophageal reflux disease)  - hold home PPI until MTX clearance as it can interfere with MTX clearance  - receive famotidine while inpatient    Hypertension  - continue home losartan while inpatient    Seizure disorder  - continue home oxcarbazepine while  inpatient    Type 2 diabetes mellitus, without long-term current use of insulin  - hold home jardiance and metformin while inpatient. Can resume at discharge.  - low dose SSI, achs blood glucose monitoring, and diabetic diet while inpatient    Rheumatoid arthritis involving multiple sites  - hold home leflunomide until MTX clearance as this can increase hepatotoxicity    History of TIA (transient ischemic attack)  - hold home ASA unitl MTX clearance as it can interfere with MTX clearance    Hyperlipidemia  - hold home statin and fenofribrate while receiving chemo    Long term current use of anticoagulant  - continue home xarelto. Hold with plts < 50K.    Paroxysmal atrial fibrillation  - continue home diltiazem and xarelto. Hold xarelto with plt count < 50K.        VTE Risk Mitigation (From admission, onward)         Ordered     IP VTE HIGH RISK PATIENT  Once         01/19/22 1555     Place sequential compression device  Until discontinued         01/19/22 1555                Disposition: Inpatient for chemo.    Mirtha Antonio, NP  Bone Marrow Transplant  Hematology  [facility] - Oncology ([facility])

## 2022-01-19 NOTE — HPI
Ms. Gonzalez is a 51-y-o patient of Dr. Jenkins with ALL. Other medical history includes COPD, HTN, HLD, DM2, TIA, anxiety, depression, GERD, seizure disorder PAD, gastroparesis, RA, osteoporosis, and pacermaker placement. She is admitting today for C1 of consolidation with EWALL. She is s/p induction with Vincristine + imatinib, which she recieved inpatient at [facility]. That hospitalization was complicated by acute hypoxic respiratory failure requiring ICU transfer. Post induction BMBx was performed 1/3/22. Showing CR. She is feeling well today. Denies fevers, chills, aches, cough, SOB, chest pain, bleeding or bruising, and n/v/d/c. She denies any recent sick contacts. She is COVID neg.

## 2022-01-19 NOTE — ASSESSMENT & PLAN NOTE
From Dr. Jenkins's most recent clinic note  Precursor B-cell acute lymphoblastic leukemia (Ph+)              A. 11/23/2021: Transferred to [facility] [place] [place] outside hospital for evaluation for acute leukemia              B. 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               C. 11/30/2021 - 12/23/2021: Vincristine + imatinib induction; hospital course was complicated by acute hypoxemic respiratory failure which resolved with diuresis and treatment of ALL    - BMBx from 1/3/22 showing CR  - admitting today for C1 of consolidation with EWALL  - take home Gleevec days 15-28 of consolidation cycles  - will check baseline lipase, PT/INR, aptt, and fibrinogen prior to patient receiving Pegaspariginase. Will need to monitor closely for lipase and DIC while receiving PEG  - will get IT triple therapy during this admission

## 2022-01-19 NOTE — PROGRESS NOTES
"Subjective   Dalila Valerio is a 68 y.o. female who is here for swelling in her left wrist/hand. Patient was also seen at the clinic for dental infection and was told to follow up with you.     History of Present Illness   L hand pain, occ swelling and pain radial wrist, wrist wll snap and thumb base gets painful. Applying a cream to address the pain. Dr Velazco recently gave TAVON for low back    On clindamycin for L cheek dental infection, seen in [facility] yesterday. Plans to be [facility]. Seen for parotiditis yesterday in ER [facility]  The following portions of the patient's history were reviewed and updated as appropriate: allergies, current medications, past family history, past medical history, past social history, past surgical history and problem list.    Review of Systems   Constitutional: Negative for chills and fever.   HENT: Positive for dental problem.    Eyes: Negative.    Respiratory: Negative.    Cardiovascular: Negative.    Gastrointestinal: Negative.    Musculoskeletal: Positive for arthralgias (NSAIDS cause esophagus and GI inflammation).   Hematological: Negative.    Psychiatric/Behavioral: Negative.      Visit Vitals   • /68 (BP Location: Right arm, Patient Position: Sitting, Cuff Size: Adult)   • Pulse 59   • Temp 98.6 °F (37 °C) (Oral)   • Ht 62\" (157.5 cm)   • Wt 186 lb (84.4 kg)   • SpO2 98%   • BMI 34.02 kg/m2       Objective   Physical Exam   Constitutional: She appears well-developed and well-nourished.   HENT:   Mouth/Throat:       Musculoskeletal:        Left wrist: She exhibits tenderness and crepitus. She exhibits normal range of motion, no bony tenderness, no swelling, no effusion, no deformity and no laceration.        Left hand: She exhibits bony tenderness (thenar base tenderness). She exhibits normal capillary refill. Normal sensation noted. Normal strength noted.   Skin: Skin is warm and dry. No erythema.   Psychiatric: She has a normal mood and affect. Her behavior is " Placed chemo care enrollment orders.    Valerie Slade NP  Hematology/Oncology/BMT       normal. Judgment and thought content normal.   Nursing note and vitals reviewed.    Assessment/Plan   Problems Addressed this Visit     None      Visit Diagnoses     Gingivitis    -  Primary    Primary osteoarthritis of first carpometacarpal joint of left hand        Other bursal cyst of wrist, left            Favor thumb arthritis, apply cream to wrist for soft cyst possible ganglion. Continue clindamycin for gum infection. FU if not settling. Schedule with dental for gum repair for retraction

## 2022-01-19 NOTE — PROGRESS NOTES
HEMATOLOGIC MALIGNANCIES PROGRESS NOTE    IDENTIFYING STATEMENT   Deepti Gonzalez (Deepti) is a 51 y.o. female with a  of 1970 from [place], [place] with the diagnosis of B-ALL.      ONCOLOGY HISTORY:    1. Precursor B-cell acute lymphoblastic leukemia (Ph+)   A. 2021: Transferred to [place] [facility] [place] from outside hospital for evaluation for acute leukemia   B. 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    C. 2021 - 2021: Vincristine + imatinib induction; hospital course was complicated by acute hypoxemic respiratory failure which resolved with diuresis and treatment of ALL    2. History of TIA  3. Seizure disorder  4. Anxiety and depression  5. Chronic obstructive pulmonary disease  6. Paroxysmal atrial fibrillation  7. Implantable loop recorder and pacemaker in place  8. Peripheral artery disease  9. Diabetes mellitus, type 2  10. Gastroesophageal reflux disease  11. Gastroparesis  12. Rheumatoid arthritis  13. Osteoporosis  14. History of tobacco use     INTERVAL HISTORY:      Ms. Gonzalez comes to clinic prior to her hospital admission for consolidation. Admitting today for EWALL consolidation for B-ALL. Post induction marrow wnl, no evidence of disease.   She had a complicated hospital course involving neutropenic fever, acute hypoxemic respiratory failure, and prolonged immobilization due to illness.  She is feeling better now, walking with minimal support of cane.    No recurrent fever or chills. Her exertional dyspnea significantly improved. Patient was discharged with picc line, missing home health f/u. Require dressing change at inpatient. Today's covid test is negative.     Past Medical History, Past Social History and  Past Family History have been reviewed and are unchanged except as noted in the interval history.    MEDICATIONS:     Current Outpatient Medications on File Prior to Visit   Medication Sig Dispense Refill    acyclovir (ZOVIRAX) 400 MG tablet Take 1 tablet (400 mg total) by mouth 2 (two) times daily. 60 tablet 11    aspirin (ECOTRIN) 81 MG EC tablet Take 1 tablet (81 mg total) by mouth once daily. HOLD until okayed to resume during chemotherapy.  0    atorvastatin (LIPITOR) 80 MG tablet Take 80 mg by mouth once daily.      cyclobenzaprine (FLEXERIL) 10 MG tablet Take 10 mg by mouth 3 (three) times daily as needed.      diazePAM (VALIUM) 10 MG Tab Take 10 mg by mouth 2 (two) times daily as needed.      diltiaZEM (CARDIZEM) 90 MG tablet Take 1 tablet (90 mg total) by mouth every 6 (six) hours. 120 tablet 11    ergocalciferol (ERGOCALCIFEROL) 50,000 unit Cap Take 50,000 Units by mouth every 7 days.      fenofibrate 160 MG Tab Take 160 mg by mouth once daily.      gabapentin (NEURONTIN) 300 MG capsule Take 1 capsule (300 mg total) by mouth 3 (three) times daily. 90 capsule 11    imatinib (GLEEVEC) 100 MG Tab Take 2 tablets (200 mg total) by mouth once daily with 1 other imatinib prescription for 600 mg total.Take with a meal and large glass of water 180 tablet 3    imatinib (GLEEVEC) 400 MG Tab Take 1 tablet (400 mg total) by mouth once daily with 1 other imatinib prescription for 600 mg total. Take with a meal and large glass of water 90 tablet 3    JARDIANCE 10 mg tablet Take 10 mg by mouth every morning.      leflunomide (ARAVA) 20 MG Tab Take 20 mg by mouth once daily.      losartan (COZAAR) 25 MG tablet Take 25 mg by mouth once daily.      metFORMIN (GLUCOPHAGE-XR) 500 MG ER 24hr tablet Take 1,000 mg by mouth 2 (two) times daily.      methocarbamoL (ROBAXIN) 500 MG Tab Take 1 tablet (500 mg total) by mouth 3 (three) times daily as needed (muscle spasms). 90 tablet 2    multivit,min52-folic-vitK-cQ10  (AQUADEKS) 100-700-10 mcg-mcg-mg Cap cap Take 1 capsule by mouth once daily.      omeprazole (PRILOSEC) 40 MG capsule Take 40 mg by mouth once daily.      ondansetron (ZOFRAN-ODT) 8 MG TbDL Dissolve 1 tablet (8 mg total) by mouth every 12 (twelve) hours as needed (in case of chemo induced nausea). 30 tablet 1    OXcarbazepine (TRILEPTAL) 600 MG Tab Take 1,200 mg by mouth 2 (two) times daily.      polyethylene glycol (GLYCOLAX) 17 gram/dose powder Dissolve one capful (17 g) in liquid and take by mouth daily as needed (constipation). 510 g 0    QUEtiapine (SEROQUEL) 200 MG Tab Take 200 mg by mouth every evening.      rivaroxaban (XARELTO) 20 mg Tab Take 1 tablet (20 mg total) by mouth daily with dinner or evening meal. HOLD until okayed to resume while platelets are low.      senna-docusate 8.6-50 mg (PERICOLACE) 8.6-50 mg per tablet Take 1 tablet by mouth 2 (two) times daily. 60 tablet 3    TRINTELLIX 10 mg Tab Take 1 tablet by mouth once daily.      [DISCONTINUED] dexAMETHasone (DECADRON) 4 MG Tab Take 10 tablets (40mg) by mouth on days 9, 15, 16, 22, and 23 of chemotherapy cycle.   [Day 15 and 22 will be infusion chemo day.] 50 tablet 0     Current Facility-Administered Medications on File Prior to Visit   Medication Dose Route Frequency Provider Last Rate Last Admin    [DISCONTINUED] enoxaparin injection 40 mg  40 mg Subcutaneous Daily Mirtha Antonio NP        [DISCONTINUED] pantoprazole EC tablet 40 mg  40 mg Oral Daily Mirtha Antonio NP           ALLERGIES:   Review of patient's allergies indicates:   Allergen Reactions    Levetiracetam      Other reaction(s): Unknown        ROS:       Review of Systems   Constitutional: Positive for fatigue (improved significantly). Negative for diaphoresis, fever and unexpected weight change.   HENT:   Negative for lump/mass and sore throat.    Eyes: Negative for icterus.   Respiratory: Positive for shortness of breath (exertional dyspnea at times). Negative for  "cough.    Cardiovascular: Negative for palpitations.   Gastrointestinal: Positive for abdominal pain (resolved). Negative for abdominal distention, constipation, diarrhea, nausea and vomiting.   Genitourinary: Negative for dysuria and frequency.    Musculoskeletal: Negative for arthralgias, gait problem and myalgias.        Muscle weakness - diffuse   Skin: Negative for rash.   Neurological: Negative for dizziness, gait problem and headaches.   Hematological: Negative for adenopathy. Does not bruise/bleed easily.   Psychiatric/Behavioral: The patient is not nervous/anxious.        PHYSICAL EXAM:  Vitals:    01/19/22 1258   BP: (!) 144/70   Pulse: (!) 112   Resp: 18   Temp: 98.5 °F (36.9 °C)   SpO2: 99%   Weight: 72.4 kg (159 lb 9.8 oz)   Height: 5' 4" (1.626 m)   PainSc: 0-No pain       KARNOFSKY PERFORMANCE STATUS 50%  ECOG 3    Physical Exam  Constitutional:       General: She is not in acute distress.     Appearance: She is well-developed.   HENT:      Head: Normocephalic and atraumatic.      Mouth/Throat:      Mouth: No oral lesions.   Eyes:      Conjunctiva/sclera: Conjunctivae normal.   Neck:      Thyroid: No thyromegaly.   Cardiovascular:      Rate and Rhythm: Normal rate and regular rhythm.      Heart sounds: Normal heart sounds. No murmur heard.      Pulmonary:      Breath sounds: No wheezing or rales.   Abdominal:      General: There is no distension.      Palpations: Abdomen is soft. There is no hepatomegaly, splenomegaly or mass.      Tenderness: There is no abdominal tenderness.   Lymphadenopathy:      Cervical: No cervical adenopathy.      Right cervical: No deep cervical adenopathy.     Left cervical: No deep cervical adenopathy.      Lower Body: No right inguinal adenopathy. No left inguinal adenopathy.   Skin:     Findings: No rash.      Comments: RUE [facility]   Neurological:      Mental Status: She is alert and oriented to person, place, and time.      Cranial Nerves: No cranial nerve deficit.      " Coordination: Coordination normal.      Deep Tendon Reflexes: Reflexes are normal and symmetric.         LAB:   Results for orders placed or performed in visit on 01/19/22   CBC auto differential   Result Value Ref Range    WBC 8.80 3.90 - 12.70 K/uL    RBC 3.01 (L) 4.00 - 5.40 M/uL    Hemoglobin 9.8 (L) 12.0 - 16.0 g/dL    Hematocrit 31.5 (L) 37.0 - 48.5 %     (H) 82 - 98 fL    MCH 32.6 (H) 27.0 - 31.0 pg    MCHC 31.1 (L) 32.0 - 36.0 g/dL    RDW 19.0 (H) 11.5 - 14.5 %    Platelets 395 150 - 450 K/uL    MPV 8.9 (L) 9.2 - 12.9 fL    Immature Granulocytes 0.5 0.0 - 0.5 %    Gran # (ANC) 5.0 1.8 - 7.7 K/uL    Immature Grans (Abs) 0.04 0.00 - 0.04 K/uL    Lymph # 3.1 1.0 - 4.8 K/uL    Mono # 0.6 0.3 - 1.0 K/uL    Eos # 0.1 0.0 - 0.5 K/uL    Baso # 0.01 0.00 - 0.20 K/uL    nRBC 0 0 /100 WBC    Gran % 56.8 38.0 - 73.0 %    Lymph % 35.2 18.0 - 48.0 %    Mono % 6.8 4.0 - 15.0 %    Eosinophil % 0.6 0.0 - 8.0 %    Basophil % 0.1 0.0 - 1.9 %    Differential Method Automated    Comprehensive Metabolic Panel   Result Value Ref Range    Sodium 142 136 - 145 mmol/L    Potassium 4.1 3.5 - 5.1 mmol/L    Chloride 105 95 - 110 mmol/L    CO2 25 23 - 29 mmol/L    Glucose 111 (H) 70 - 110 mg/dL    BUN 7 6 - 20 mg/dL    Creatinine 0.9 0.5 - 1.4 mg/dL    Calcium 9.6 8.7 - 10.5 mg/dL    Total Protein 6.6 6.0 - 8.4 g/dL    Albumin 3.8 3.5 - 5.2 g/dL    Total Bilirubin 0.3 0.1 - 1.0 mg/dL    Alkaline Phosphatase 96 55 - 135 U/L    AST 15 10 - 40 U/L    ALT 15 10 - 44 U/L    Anion Gap 12 8 - 16 mmol/L    eGFR if African American >60.0 >60 mL/min/1.73 m^2    eGFR if non African American >60.0 >60 mL/min/1.73 m^2       PROBLEMS ASSESSED THIS VISIT:    1. B-cell acute lymphoblastic leukemia    2. Anemia in neoplastic disease        PLAN:       Ph+ B-ALL  Ms. Gonzalez has normalization of her WBC and platelet count after induction therapy with vincristine and imatinib for Ph+ B-ALL. She has moderate-severe anemia that will need close  monitoring.      Post-induction bone marrow biopsy on 01/03/22 in remission, no residual disease  Admitting today for consolidation as per EWALL-Ph-01 protocol.   Day 2 - pegasparagase  Discharge once methotrexate levels are cleared  Day 15-28 - imatinib daily as prescribed  PICC line on RUE - dressing need to be changed at inpatient. Need home health care support at discharge.   Chemo education given and consent scanned to media.    Anemia  Likely secondary to chemotherapy and imatinib at this point. Monitor for transfusion need.    Rib pain  Plain films obtained and negative for fracture. Possibly musculoskeletal pain from imatinib.   Resolved    Disposition: Admit to inpatient for consolidation    Valerie Slade NP  Hematology and Stem Cell Transplant    Last Picc changed on 01/03  Xarelto was on hold since last admission, never resumed back

## 2022-01-19 NOTE — SUBJECTIVE & OBJECTIVE
Patient information was obtained from patient and past medical records.     Oncology History (From Dr. Jenkins's most recent clinic note):    Precursor B-cell acute lymphoblastic leukemia (Ph+)              A. 11/23/2021: [place] [facility] [place] for acute leukemia              B. 11/24/2021: Bone marrow biopsy shows % cellular marrow nearly completely replaced by B-ALL; ALL FISH shows bcr-abl fusion and monosomy 9; cytogenetics 45,XX,-9,t(9;22)(q34;q11.2)[3]/46,sl,+isaisa(22)t(9;22)[15]/46,XX[2]; BCR/ABL1 PCR shows p190 kD protein (e1-a2 fusion form), estiamted to represent 57.7% of total abl.               C. 11/30/2021 - 12/23/2021: Vincristine + imatinib induction; hospital course was complicated by acute hypoxemic respiratory failure which resolved with diuresis and treatment of ALL     No medications prior to admission.       Levetiracetam     Past Medical History:   Diagnosis Date    Arthritis     COPD (chronic obstructive pulmonary disease)     Hypertension     Stroke     TIA x 4     Past Surgical History:   Procedure Laterality Date    APPENDECTOMY      HYSTERECTOMY      ROTATOR CUFF REPAIR Bilateral     TONSILLECTOMY      TUBAL LIGATION       Family History    None       Tobacco Use    Smoking status: Current Every Day Smoker     Packs/day: 1.00     Types: Cigarettes    Smokeless tobacco: Never Used   Substance and Sexual Activity    Alcohol use: No    Drug use: Not on file    Sexual activity: Not on file       Review of Systems   Constitutional: Positive for fatigue. Negative for activity change, appetite change, chills, fever and unexpected weight change.   HENT: Negative for congestion, mouth sores, nosebleeds, rhinorrhea, sinus pressure, sinus pain, sore throat and trouble swallowing.    Eyes: Negative for photophobia, discharge, redness, itching and visual disturbance.   Respiratory: Negative for cough, chest tightness, shortness of breath and wheezing.     Cardiovascular: Negative for chest pain, palpitations and leg swelling.   Gastrointestinal: Negative for abdominal distention, abdominal pain, constipation, diarrhea, nausea and vomiting.   Endocrine: Negative for cold intolerance, heat intolerance, polydipsia, polyphagia and polyuria.   Genitourinary: Negative for decreased urine volume, difficulty urinating, dysuria, frequency, hematuria, urgency, vaginal bleeding and vaginal discharge.   Musculoskeletal: Negative for arthralgias, back pain, gait problem, joint swelling, myalgias, neck pain and neck stiffness.   Skin: Negative for pallor, rash and wound.   Allergic/Immunologic: Negative for environmental allergies, food allergies and immunocompromised state.   Neurological: Negative for dizziness, tremors, seizures, syncope, weakness, light-headedness, numbness and headaches.   Hematological: Negative for adenopathy. Does not bruise/bleed easily.   Psychiatric/Behavioral: Negative for agitation, confusion, hallucinations, sleep disturbance and suicidal ideas. The patient is not nervous/anxious.      Objective:     Vital Signs (Most Recent):    Vital Signs (24h Range):  Temp:  [98.5 °F (36.9 °C)] 98.5 °F (36.9 °C)  Pulse:  [112] 112  Resp:  [18] 18  SpO2:  [99 %] 99 %  BP: (144)/(70) 144/70        There is no height or weight on file to calculate BMI.  There is no height or weight on file to calculate BSA.    ECOG SCORE         [unfilled]    Lines/Drains/Airways     Peripherally Inserted Central Catheter Line            PICC Double Lumen 11/30/21 1033 right basilic 50 days                Physical Exam  Constitutional:       Appearance: She is well-developed and well-nourished.   HENT:      Head: Normocephalic and atraumatic.      Mouth/Throat:      Pharynx: No oropharyngeal exudate.   Eyes:      Conjunctiva/sclera: Conjunctivae normal.      Pupils: Pupils are equal, round, and reactive to light.   Cardiovascular:      Rate and Rhythm: Normal rate and regular  rhythm.      Heart sounds: Normal heart sounds. No murmur heard.      Pulmonary:      Effort: Pulmonary effort is normal.      Breath sounds: Normal breath sounds.   Abdominal:      General: Bowel sounds are normal. There is no distension.      Palpations: Abdomen is soft.      Tenderness: There is no abdominal tenderness.   Musculoskeletal:         General: No deformity. Normal range of motion.      Cervical back: Normal range of motion and neck supple.      Right lower leg: Edema (+1 pedal edema) present.      Left lower leg: Edema (+1 pedal edema) present.   Skin:     General: Skin is warm and dry.      Findings: No erythema or rash.      Comments: RUE PICC. Dressing outdated.   Neurological:      Mental Status: She is alert and oriented to person, place, and time.   Psychiatric:         Mood and Affect: Mood and affect normal.         Behavior: Behavior normal.         Thought Content: Thought content normal.         Judgment: Judgment normal.         Significant Labs:   CBC:   Recent Labs   Lab 01/19/22  1202   WBC 8.80   HGB 9.8*   HCT 31.5*       and CMP:   Recent Labs   Lab 01/19/22  1202      K 4.1      CO2 25   *   BUN 7   CREATININE 0.9   CALCIUM 9.6   PROT 6.6   ALBUMIN 3.8   BILITOT 0.3   ALKPHOS 96   AST 15   ALT 15   ANIONGAP 12   EGFRNONAA >60.0       Diagnostic Results:  I have reviewed all pertinent imaging results/findings within the past 24 hours.

## 2022-01-19 NOTE — ASSESSMENT & PLAN NOTE
- hold home jardiance and metformin while inpatient. Can resume at discharge.  - low dose SSI, achs blood glucose monitoring, and diabetic diet while inpatient

## 2022-01-20 PROBLEM — R19.7 DIARRHEA: Status: ACTIVE | Noted: 2022-01-20

## 2022-01-20 LAB
ALBUMIN SERPL BCP-MCNC: 3 G/DL (ref 3.5–5.2)
ALP SERPL-CCNC: 90 U/L (ref 55–135)
ALT SERPL W/O P-5'-P-CCNC: 14 U/L (ref 10–44)
ANION GAP SERPL CALC-SCNC: 11 MMOL/L (ref 8–16)
AST SERPL-CCNC: 13 U/L (ref 10–40)
BASOPHILS # BLD AUTO: 0.01 K/UL (ref 0–0.2)
BASOPHILS NFR BLD: 0.2 % (ref 0–1.9)
BILIRUB SERPL-MCNC: 0.2 MG/DL (ref 0.1–1)
BILIRUB SERPL-MCNC: NORMAL MG/DL
BLOOD URINE, POC: NORMAL
BUN SERPL-MCNC: 9 MG/DL (ref 6–20)
CALCIUM SERPL-MCNC: 8 MG/DL (ref 8.7–10.5)
CHLORIDE SERPL-SCNC: 105 MMOL/L (ref 95–110)
CO2 SERPL-SCNC: 23 MMOL/L (ref 23–29)
COLOR, POC UA: NORMAL
CREAT SERPL-MCNC: 0.7 MG/DL (ref 0.5–1.4)
DIFFERENTIAL METHOD: ABNORMAL
EOSINOPHIL # BLD AUTO: 0 K/UL (ref 0–0.5)
EOSINOPHIL NFR BLD: 0.6 % (ref 0–8)
ERYTHROCYTE [DISTWIDTH] IN BLOOD BY AUTOMATED COUNT: 18.2 % (ref 11.5–14.5)
EST. GFR  (AFRICAN AMERICAN): >60 ML/MIN/1.73 M^2
EST. GFR  (NON AFRICAN AMERICAN): >60 ML/MIN/1.73 M^2
GLUCOSE SERPL-MCNC: 161 MG/DL (ref 70–110)
GLUCOSE UR QL STRIP: NORMAL
HCT VFR BLD AUTO: 28.7 % (ref 37–48.5)
HGB BLD-MCNC: 8.7 G/DL (ref 12–16)
IMM GRANULOCYTES # BLD AUTO: 0.02 K/UL (ref 0–0.04)
IMM GRANULOCYTES NFR BLD AUTO: 0.4 % (ref 0–0.5)
KETONES UR QL STRIP: NORMAL
LEUKOCYTE ESTERASE URINE, POC: NORMAL
LYMPHOCYTES # BLD AUTO: 1.1 K/UL (ref 1–4.8)
LYMPHOCYTES NFR BLD: 20.7 % (ref 18–48)
MAGNESIUM SERPL-MCNC: 1.4 MG/DL (ref 1.6–2.6)
MCH RBC QN AUTO: 32.3 PG (ref 27–31)
MCHC RBC AUTO-ENTMCNC: 30.3 G/DL (ref 32–36)
MCV RBC AUTO: 107 FL (ref 82–98)
MONOCYTES # BLD AUTO: 0.2 K/UL (ref 0.3–1)
MONOCYTES NFR BLD: 2.9 % (ref 4–15)
NEUTROPHILS # BLD AUTO: 3.9 K/UL (ref 1.8–7.7)
NEUTROPHILS NFR BLD: 75.2 % (ref 38–73)
NITRITE, POC UA: NORMAL
NRBC BLD-RTO: 0 /100 WBC
PH, POC UA: 7
PH, POC UA: 8
PHOSPHATE SERPL-MCNC: 2 MG/DL (ref 2.7–4.5)
PLATELET # BLD AUTO: 341 K/UL (ref 150–450)
PMV BLD AUTO: 8.8 FL (ref 9.2–12.9)
POCT GLUCOSE: 209 MG/DL (ref 70–110)
POCT GLUCOSE: 220 MG/DL (ref 70–110)
POCT GLUCOSE: 226 MG/DL (ref 70–110)
POCT GLUCOSE: 250 MG/DL (ref 70–110)
POTASSIUM SERPL-SCNC: 3.6 MMOL/L (ref 3.5–5.1)
PROT SERPL-MCNC: 5.3 G/DL (ref 6–8.4)
PROTEIN, POC: NORMAL
RBC # BLD AUTO: 2.69 M/UL (ref 4–5.4)
SODIUM SERPL-SCNC: 139 MMOL/L (ref 136–145)
SPECIFIC GRAVITY, POC UA: NORMAL
UROBILINOGEN, POC UA: NORMAL
WBC # BLD AUTO: 5.21 K/UL (ref 3.9–12.7)

## 2022-01-20 PROCEDURE — 99232 SBSQ HOSP IP/OBS MODERATE 35: CPT | Mod: ,,, | Performed by: INTERNAL MEDICINE

## 2022-01-20 PROCEDURE — 63600175 PHARM REV CODE 636 W HCPCS: Performed by: NURSE PRACTITIONER

## 2022-01-20 PROCEDURE — 99232 PR SUBSEQUENT HOSPITAL CARE,LEVL II: ICD-10-PCS | Mod: ,,, | Performed by: INTERNAL MEDICINE

## 2022-01-20 PROCEDURE — 80053 COMPREHEN METABOLIC PANEL: CPT | Performed by: NURSE PRACTITIONER

## 2022-01-20 PROCEDURE — 85025 COMPLETE CBC W/AUTO DIFF WBC: CPT | Performed by: NURSE PRACTITIONER

## 2022-01-20 PROCEDURE — 20600001 HC STEP DOWN PRIVATE ROOM

## 2022-01-20 PROCEDURE — 84100 ASSAY OF PHOSPHORUS: CPT | Performed by: NURSE PRACTITIONER

## 2022-01-20 PROCEDURE — 25000003 PHARM REV CODE 250: Performed by: NURSE PRACTITIONER

## 2022-01-20 PROCEDURE — 25000003 PHARM REV CODE 250: Performed by: STUDENT IN AN ORGANIZED HEALTH CARE EDUCATION/TRAINING PROGRAM

## 2022-01-20 PROCEDURE — 63600175 PHARM REV CODE 636 W HCPCS: Performed by: INTERNAL MEDICINE

## 2022-01-20 PROCEDURE — 25000003 PHARM REV CODE 250: Performed by: INTERNAL MEDICINE

## 2022-01-20 PROCEDURE — 83735 ASSAY OF MAGNESIUM: CPT | Performed by: NURSE PRACTITIONER

## 2022-01-20 RX ORDER — EPINEPHRINE 0.1 MG/ML
INJECTION INTRAVENOUS
Status: DISPENSED
Start: 2022-01-20 | End: 2022-01-21

## 2022-01-20 RX ORDER — METHYLPREDNISOLONE SOD SUCC 125 MG
VIAL (EA) INJECTION
Status: DISPENSED
Start: 2022-01-20 | End: 2022-01-21

## 2022-01-20 RX ADMIN — OXCARBAZEPINE 1200 MG: 600 TABLET, FILM COATED ORAL at 08:01

## 2022-01-20 RX ADMIN — DILTIAZEM HYDROCHLORIDE 90 MG: 30 TABLET, FILM COATED ORAL at 11:01

## 2022-01-20 RX ADMIN — GABAPENTIN 300 MG: 300 CAPSULE ORAL at 08:01

## 2022-01-20 RX ADMIN — ACYCLOVIR 400 MG: 200 CAPSULE ORAL at 08:01

## 2022-01-20 RX ADMIN — QUETIAPINE FUMARATE 200 MG: 200 TABLET ORAL at 08:01

## 2022-01-20 RX ADMIN — INSULIN ASPART 2 UNITS: 100 INJECTION, SOLUTION INTRAVENOUS; SUBCUTANEOUS at 05:01

## 2022-01-20 RX ADMIN — DILTIAZEM HYDROCHLORIDE 90 MG: 30 TABLET, FILM COATED ORAL at 05:01

## 2022-01-20 RX ADMIN — LOSARTAN POTASSIUM 25 MG: 25 TABLET, FILM COATED ORAL at 08:01

## 2022-01-20 RX ADMIN — FAMOTIDINE 20 MG: 20 TABLET ORAL at 08:01

## 2022-01-20 RX ADMIN — INSULIN ASPART 2 UNITS: 100 INJECTION, SOLUTION INTRAVENOUS; SUBCUTANEOUS at 08:01

## 2022-01-20 RX ADMIN — INSULIN ASPART 1 UNITS: 100 INJECTION, SOLUTION INTRAVENOUS; SUBCUTANEOUS at 08:01

## 2022-01-20 RX ADMIN — SODIUM BICARBONATE: 84 INJECTION, SOLUTION INTRAVENOUS at 03:01

## 2022-01-20 RX ADMIN — METHOTREXATE 1820 MG: 25 INJECTION INTRA-ARTERIAL; INTRAMUSCULAR; INTRATHECAL; INTRAVENOUS at 01:01

## 2022-01-20 RX ADMIN — GABAPENTIN 300 MG: 300 CAPSULE ORAL at 12:01

## 2022-01-20 RX ADMIN — SODIUM BICARBONATE: 84 INJECTION, SOLUTION INTRAVENOUS at 08:01

## 2022-01-20 RX ADMIN — MUPIROCIN: 20 OINTMENT TOPICAL at 08:01

## 2022-01-20 RX ADMIN — DEXAMETHASONE SODIUM PHOSPHATE 12 MG: 4 INJECTION INTRA-ARTICULAR; INTRALESIONAL; INTRAMUSCULAR; INTRAVENOUS; SOFT TISSUE at 12:01

## 2022-01-20 RX ADMIN — SODIUM BICARBONATE: 84 INJECTION, SOLUTION INTRAVENOUS at 10:01

## 2022-01-20 RX ADMIN — DILTIAZEM HYDROCHLORIDE 90 MG: 30 TABLET, FILM COATED ORAL at 12:01

## 2022-01-20 RX ADMIN — ONDANSETRON 8 MG: 2 INJECTION INTRAMUSCULAR; INTRAVENOUS at 12:01

## 2022-01-20 RX ADMIN — INSULIN ASPART 2 UNITS: 100 INJECTION, SOLUTION INTRAVENOUS; SUBCUTANEOUS at 12:01

## 2022-01-20 RX ADMIN — LOPERAMIDE HYDROCHLORIDE 2 MG: 2 CAPSULE ORAL at 08:01

## 2022-01-20 NOTE — ASSESSMENT & PLAN NOTE
- continue home diltiazem and xarelto. Hold xarelto with plt count < 50K.  - Xarelto held today in anticipation of LP tomorrow, will resume following IT chemo

## 2022-01-20 NOTE — NURSING
Administered methotrexate (PF) 1,820 mg in sodium chloride 0.9% 1,000 mL chemo infusion over 4 hours at this time through JANY PICC noted for having positive blood return. Chemotherapy consent signed and on chart. Chemotherapy dosage and checked by two chemotherapy certified nurses prior to administration. Premedications Zofran and Prednisone given prior to infusion. Chemotherapy education performed with pt and spouse.  They verbalized understanding.  Chemotherapeutic precautions in place throughout therapy.  Will continue to monitor.

## 2022-01-20 NOTE — HOSPITAL COURSE
01/20/2022 Today is C1D2 of EWALL regimen for B cell ALL. Tolerated chemo well yesterday. +1.7L today, complaining of 'eye puffiness' this AM with blurry vision, pt/ report this is typical when gabapentin and oxcarbazepine are given concurrently. Eye puffiness/blurry vision self resolved, not associated with any other symptoms. Will monitor closely. Diarrhea overnight which pt reports is normal for her, was given Imodium, pt reports 1x dose and no further BMs. Will hold and if diarrhea returns will r/o C Diff. Otherwise, AF and VSS.   01/21/2022: C1D3 of consolidation with EWALL for B-ALL. Tolerating chemo well thus far. Afebrile. VSS. MTX level 1.67 today. Will discharge home with MTX clearance. She will get weekly lab monitoring and PICC dressing changes at Dr. Garcia's office in [place] between chemo cycles. Electrolytes deranged, likely 2/2 Na+bicarb gtt. Weight up 3 lbs since admission. Also likely 2/2 IVF. Will give 20 mg IV lasix and initiate prn electrolytes replacement order set.  01/22/2022 no events overnight. 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 MTX levels down to 0.31.   01/24/2022: C1D5 of EWALL. Continues to be afebrile. VSS. C/o mild buccal mucositis today. [place] ordered. Otherwise, tolerating chemo well. No c/o abdominal pain. No evidence of bleeding. MTX level 0.08 today. Will discharge home. She will follow locally with Dr. Garcia for lab monitoring and PICC line care and will f/u with Dr. Jenkins on 2/16/22 prior to readmission for C2.

## 2022-01-20 NOTE — ASSESSMENT & PLAN NOTE
-Precursor B-cell acute lymphoblastic leukemia (Ph+)              A. 11/23/2021: Transferred to [facility] [place] from outside hospital for evaluation for acute leukemia              B. 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               C. 11/30/2021 - 12/23/2021: Vincristine + imatinib induction; hospital course was complicated by acute hypoxemic respiratory failure which resolved with diuresis and treatment of ALL    - BMBx from 1/3/22 showing CR  - Admitted 1/19 for C1 of consolidation with EWALL, today is C1D2  - Plan to take home Gleevec days 15-28 of consolidation cycles  - Tolerated chemo well yesterday, lipase/coags WNL.   - Scheduled for IT triple therapy tomorrow, held Xarelto

## 2022-01-20 NOTE — PROGRESS NOTES
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 Tolerated chemo well yesterday. +1.7L today, complaining of 'eye puffiness' this AM with blurry vision, pt/ report this is typical when gabapentin and oxcarbazepine are given concurrently. Eye puffiness/blurry vision self resolved, not associated with any other symptoms. Will monitor closely. Diarrhea overnight which pt reports is normal for her, was given Imodium, pt reports 1x dose and no further BMs. Will hold and if diarrhea returns will r/o C Diff. Otherwise, AF and VSS.     Objective:     Vital Signs (Most Recent):  Temp: 98.7 °F (37.1 °C) (01/20/22 1114)  Pulse: 106 (01/20/22 1114)  Resp: 19 (01/20/22 1114)  BP: 136/71 (01/20/22 1114)  SpO2: 97 % (01/20/22 1114) Vital Signs (24h Range):  Temp:  [97.7 °F (36.5 °C)-99.5 °F (37.5 °C)] 98.7 °F (37.1 °C)  Pulse:  [] 106  Resp:  [17-20] 19  SpO2:  [92 %-99 %] 97 %  BP: (109-145)/(59-71) 136/71     Weight: 72.6 kg (160 lb 0.9 oz)  Body mass index is 27.47 kg/m².  Body surface area is 1.81 meters squared.    ECOG SCORE 1          Intake/Output - Last 3 Shifts       01/18 0700  01/19 0659 01/19 0700 01/20 0659 01/20 0700  01/21 0659    P.O.  120 480    I.V. (mL/kg)  1704.5 (23.5) 720 (9.9)    IV Piggyback  638.8 405    Total Intake(mL/kg)  2463.3 (33.9) 1605 (22.1)    Urine (mL/kg/hr)  500 1600 (5)    Stool  0 0    Total Output  500 1600    Net  +1963.3 +5           Urine Occurrence  1 x     Stool Occurrence  4 x 1 x          Physical Exam  Vitals and nursing note reviewed.   Constitutional:       General: She is not in acute distress.     Appearance: She is well-developed.   HENT:      Head: Normocephalic and atraumatic.      Mouth/Throat:      Pharynx: No oropharyngeal  exudate.      Comments: Poor dentition  Eyes:      Extraocular Movements: Extraocular movements intact.      Conjunctiva/sclera: Conjunctivae normal.      Pupils: Pupils are equal, round, and reactive to light.   Cardiovascular:      Rate and Rhythm: Normal rate and regular rhythm.      Heart sounds: Normal heart sounds. No murmur heard.      Pulmonary:      Effort: Pulmonary effort is normal.      Breath sounds: Normal breath sounds.   Abdominal:      General: Bowel sounds are normal. There is no distension.      Palpations: Abdomen is soft.      Tenderness: There is no abdominal tenderness.   Musculoskeletal:         General: No deformity. Normal range of motion.      Cervical back: Normal range of motion and neck supple.      Right lower leg: Edema (+1 pedal edema) present.      Left lower leg: Edema (+1 pedal edema) present.   Skin:     General: Skin is warm and dry.      Findings: No erythema or rash.      Comments: RUE PICC. Dressing outdated.   Neurological:      General: No focal deficit present.      Mental Status: She is alert and oriented to person, place, and time.   Psychiatric:         Behavior: Behavior normal.         Thought Content: Thought content normal.         Judgment: Judgment normal.         Significant Labs:   CBC:   Recent Labs   Lab 01/19/22  1202 01/20/22  0528   WBC 8.80 5.21   HGB 9.8* 8.7*   HCT 31.5* 28.7*    341    and CMP:   Recent Labs   Lab 01/19/22  1202 01/20/22  0528    139   K 4.1 3.6    105   CO2 25 23   * 161*   BUN 7 9   CREATININE 0.9 0.7   CALCIUM 9.6 8.0*   PROT 6.6 5.3*   ALBUMIN 3.8 3.0*   BILITOT 0.3 0.2   ALKPHOS 96 90   AST 15 13   ALT 15 14   ANIONGAP 12 11   EGFRNONAA >60.0 >60.0       Diagnostic Results:  None    Assessment/Plan:     * B-cell acute lymphoblastic leukemia  -Precursor B-cell acute lymphoblastic leukemia (Ph+)              A. 11/23/2021: Transferred to [facility] [place] from outside hospital for evaluation for acute leukemia               B. 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               C. 11/30/2021 - 12/23/2021: Vincristine + imatinib induction; hospital course was complicated by acute hypoxemic respiratory failure which resolved with diuresis and treatment of ALL    - BMBx from 1/3/22 showing CR  - Admitted 1/19 for C1 of consolidation with EWALL, today is C1D2  - Plan to take home Gleevec days 15-28 of consolidation cycles  - Tolerated chemo well yesterday, lipase/coags WNL.   - Scheduled for IT triple therapy tomorrow, held Xarelto    Diarrhea  - Pt with diarrhea, evident prior to admission  - 4 episodes overnight, was started on imodium  - Will hold imodium, if diarrhea persists will r/o C Diff    Moderate major depression  - continue home trintellix while inpatient    Insomnia  - continue home seroquel while inpatient    Anxiety  - continue home trintellix and valium while inpatient    GERD (gastroesophageal reflux disease)  - hold home PPI until MTX clearance as it can interfere with MTX clearance  - receive famotidine while inpatient    Hypertension  - continue home losartan while inpatient    Seizure disorder  - continue home oxcarbazepine while inpatient    Type 2 diabetes mellitus, without long-term current use of insulin  - hold home jardiance and metformin while inpatient. Can resume at discharge.  - low dose SSI, achs blood glucose monitoring, and diabetic diet while inpatient    Rheumatoid arthritis involving multiple sites  - hold home leflunomide until MTX clearance as this can increase hepatotoxicity    History of TIA (transient ischemic attack)  - hold home ASA unitl MTX clearance as it can interfere with MTX clearance    Hyperlipidemia  - hold  home statin and fenofribrate while receiving chemo    Long term current use of anticoagulant  - continue home xarelto. Hold with plts < 50K.  - Holding for LP as above    Paroxysmal atrial fibrillation  - continue home diltiazem and xarelto. Hold xarelto with plt count < 50K.  - Xarelto held today in anticipation of LP tomorrow, will resume following IT chemo        VTE Risk Mitigation (From admission, onward)         Ordered     heparin, porcine (PF) 100 unit/mL injection flush 500 Units  As needed (PRN)         01/19/22 1644     IP VTE HIGH RISK PATIENT  Once         01/19/22 1555     Place sequential compression device  Until discontinued         01/19/22 1555                Disposition: To remain inpatient.    Jackie Guardado PA-C  Bone Marrow Transplant  [facility] - Oncology ([facility])

## 2022-01-20 NOTE — PLAN OF CARE
POC reviewed w patient and spouse at beginning of shift and PRN. Day 1 of Chemo initiated overnight w/o complication to central line noted + for blood return.Na+Bicarb infusing at 150 cc/h. MTX  initiated thisd morning. Independent, voids per hat in BR. No acute events overnight. Glucose monitoring continued. Bed locked in low position call light in reach. Will CTM.

## 2022-01-20 NOTE — ASSESSMENT & PLAN NOTE
- Pt with diarrhea, evident prior to admission  - 4 episodes overnight, was started on imodium  - Will hold imodium, if diarrhea persists will r/o C Diff

## 2022-01-21 LAB
ALBUMIN SERPL BCP-MCNC: 2.9 G/DL (ref 3.5–5.2)
ALP SERPL-CCNC: 78 U/L (ref 55–135)
ALT SERPL W/O P-5'-P-CCNC: 12 U/L (ref 10–44)
ANION GAP SERPL CALC-SCNC: 12 MMOL/L (ref 8–16)
AST SERPL-CCNC: 14 U/L (ref 10–40)
BASOPHILS # BLD AUTO: 0.01 K/UL (ref 0–0.2)
BASOPHILS NFR BLD: 0.2 % (ref 0–1.9)
BILIRUB SERPL-MCNC: 0.2 MG/DL (ref 0.1–1)
BILIRUB SERPL-MCNC: NORMAL MG/DL
BLOOD URINE, POC: NORMAL
BUN SERPL-MCNC: 9 MG/DL (ref 6–20)
CALCIUM SERPL-MCNC: 7.6 MG/DL (ref 8.7–10.5)
CHLORIDE SERPL-SCNC: 97 MMOL/L (ref 95–110)
CLARITY CSF: CLEAR
CO2 SERPL-SCNC: 29 MMOL/L (ref 23–29)
COLOR CSF: COLORLESS
COLOR, POC UA: NORMAL
CREAT SERPL-MCNC: 0.7 MG/DL (ref 0.5–1.4)
DIFFERENTIAL METHOD: ABNORMAL
EOSINOPHIL # BLD AUTO: 0 K/UL (ref 0–0.5)
EOSINOPHIL NFR BLD: 0.2 % (ref 0–8)
ERYTHROCYTE [DISTWIDTH] IN BLOOD BY AUTOMATED COUNT: 18.2 % (ref 11.5–14.5)
EST. GFR  (AFRICAN AMERICAN): >60 ML/MIN/1.73 M^2
EST. GFR  (NON AFRICAN AMERICAN): >60 ML/MIN/1.73 M^2
GLUCOSE CSF-MCNC: 124 MG/DL (ref 40–70)
GLUCOSE SERPL-MCNC: 339 MG/DL (ref 70–110)
GLUCOSE UR QL STRIP: NORMAL
HCT VFR BLD AUTO: 23.3 % (ref 37–48.5)
HGB BLD-MCNC: 7.6 G/DL (ref 12–16)
IMM GRANULOCYTES # BLD AUTO: 0.01 K/UL (ref 0–0.04)
IMM GRANULOCYTES NFR BLD AUTO: 0.2 % (ref 0–0.5)
KETONES UR QL STRIP: NORMAL
LEUKOCYTE ESTERASE URINE, POC: NORMAL
LYMPHOCYTES # BLD AUTO: 1 K/UL (ref 1–4.8)
LYMPHOCYTES NFR BLD: 21.7 % (ref 18–48)
LYMPHOCYTES NFR CSF MANUAL: 23 % (ref 40–80)
MAGNESIUM SERPL-MCNC: 1.5 MG/DL (ref 1.6–2.6)
MCH RBC QN AUTO: 33.3 PG (ref 27–31)
MCHC RBC AUTO-ENTMCNC: 32.6 G/DL (ref 32–36)
MCV RBC AUTO: 102 FL (ref 82–98)
MONOCYTES # BLD AUTO: 0.2 K/UL (ref 0.3–1)
MONOCYTES NFR BLD: 4.6 % (ref 4–15)
MONOS+MACROS NFR CSF MANUAL: 77 % (ref 15–45)
MTX SERPL-SCNC: 1.67 UMOL/L (ref 0.5–5)
NEUTROPHILS # BLD AUTO: 3.5 K/UL (ref 1.8–7.7)
NEUTROPHILS NFR BLD: 73.1 % (ref 38–73)
NITRITE, POC UA: NORMAL
NRBC BLD-RTO: 0 /100 WBC
PH, POC UA: 8
PHOSPHATE SERPL-MCNC: 2.2 MG/DL (ref 2.7–4.5)
PLATELET # BLD AUTO: 277 K/UL (ref 150–450)
PMV BLD AUTO: 9.3 FL (ref 9.2–12.9)
POCT GLUCOSE: 142 MG/DL (ref 70–110)
POCT GLUCOSE: 175 MG/DL (ref 70–110)
POCT GLUCOSE: 178 MG/DL (ref 70–110)
POCT GLUCOSE: 299 MG/DL (ref 70–110)
POTASSIUM SERPL-SCNC: 3 MMOL/L (ref 3.5–5.1)
PROT CSF-MCNC: 54 MG/DL (ref 15–40)
PROT SERPL-MCNC: 5 G/DL (ref 6–8.4)
PROTEIN, POC: NORMAL
RBC # BLD AUTO: 2.28 M/UL (ref 4–5.4)
RBC # CSF: 0 /CU MM
SODIUM SERPL-SCNC: 138 MMOL/L (ref 136–145)
SPECIFIC GRAVITY, POC UA: NORMAL
SPECIMEN VOL CSF: 2 ML
UROBILINOGEN, POC UA: NORMAL
WBC # BLD AUTO: 4.79 K/UL (ref 3.9–12.7)
WBC # CSF: 1 /CU MM (ref 0–5)

## 2022-01-21 PROCEDURE — 99233 SBSQ HOSP IP/OBS HIGH 50: CPT | Mod: 25,,, | Performed by: INTERNAL MEDICINE

## 2022-01-21 PROCEDURE — 25000003 PHARM REV CODE 250: Performed by: NURSE PRACTITIONER

## 2022-01-21 PROCEDURE — 63600175 PHARM REV CODE 636 W HCPCS: Mod: JG | Performed by: INTERNAL MEDICINE

## 2022-01-21 PROCEDURE — 99233 PR SUBSEQUENT HOSPITAL CARE,LEVL III: ICD-10-PCS | Mod: 25,,, | Performed by: INTERNAL MEDICINE

## 2022-01-21 PROCEDURE — 96450 PR CHEMOTHER,CNS,W/LUMBAR PUNCTURE: ICD-10-PCS | Mod: 52,,, | Performed by: NURSE PRACTITIONER

## 2022-01-21 PROCEDURE — 80204 DRUG ASSAY METHOTREXATE: CPT | Performed by: INTERNAL MEDICINE

## 2022-01-21 PROCEDURE — 25000003 PHARM REV CODE 250: Performed by: INTERNAL MEDICINE

## 2022-01-21 PROCEDURE — 84100 ASSAY OF PHOSPHORUS: CPT | Performed by: NURSE PRACTITIONER

## 2022-01-21 PROCEDURE — 25000003 PHARM REV CODE 250: Performed by: PHYSICIAN ASSISTANT

## 2022-01-21 PROCEDURE — 89051 BODY FLUID CELL COUNT: CPT | Performed by: NURSE PRACTITIONER

## 2022-01-21 PROCEDURE — 80053 COMPREHEN METABOLIC PANEL: CPT | Performed by: NURSE PRACTITIONER

## 2022-01-21 PROCEDURE — 63600175 PHARM REV CODE 636 W HCPCS: Performed by: INTERNAL MEDICINE

## 2022-01-21 PROCEDURE — 96450 CHEMOTHERAPY INTO CNS: CPT | Mod: 52,,, | Performed by: NURSE PRACTITIONER

## 2022-01-21 PROCEDURE — 20600001 HC STEP DOWN PRIVATE ROOM

## 2022-01-21 PROCEDURE — 83615 LACTATE (LD) (LDH) ENZYME: CPT | Performed by: NURSE PRACTITIONER

## 2022-01-21 PROCEDURE — 83735 ASSAY OF MAGNESIUM: CPT | Performed by: NURSE PRACTITIONER

## 2022-01-21 PROCEDURE — 63600175 PHARM REV CODE 636 W HCPCS: Performed by: NURSE PRACTITIONER

## 2022-01-21 PROCEDURE — 84157 ASSAY OF PROTEIN OTHER: CPT | Performed by: NURSE PRACTITIONER

## 2022-01-21 PROCEDURE — 85025 COMPLETE CBC W/AUTO DIFF WBC: CPT | Performed by: NURSE PRACTITIONER

## 2022-01-21 PROCEDURE — 82945 GLUCOSE OTHER FLUID: CPT | Performed by: NURSE PRACTITIONER

## 2022-01-21 RX ORDER — POTASSIUM CHLORIDE 20 MEQ/1
20 TABLET, EXTENDED RELEASE ORAL
Status: DISCONTINUED | OUTPATIENT
Start: 2022-01-21 | End: 2022-01-24 | Stop reason: HOSPADM

## 2022-01-21 RX ORDER — SODIUM BICARBONATE 650 MG/1
1300 TABLET ORAL
Status: DISCONTINUED | OUTPATIENT
Start: 2022-01-21 | End: 2022-01-24 | Stop reason: HOSPADM

## 2022-01-21 RX ORDER — INSULIN ASPART 100 [IU]/ML
1-10 INJECTION, SOLUTION INTRAVENOUS; SUBCUTANEOUS
Status: DISCONTINUED | OUTPATIENT
Start: 2022-01-21 | End: 2022-01-24 | Stop reason: HOSPADM

## 2022-01-21 RX ORDER — LANOLIN ALCOHOL/MO/W.PET/CERES
400 CREAM (GRAM) TOPICAL EVERY 4 HOURS PRN
Status: DISCONTINUED | OUTPATIENT
Start: 2022-01-21 | End: 2022-01-24 | Stop reason: HOSPADM

## 2022-01-21 RX ORDER — DIPHENHYDRAMINE HYDROCHLORIDE 50 MG/ML
INJECTION INTRAMUSCULAR; INTRAVENOUS
Status: DISPENSED
Start: 2022-01-21 | End: 2022-01-21

## 2022-01-21 RX ORDER — LANOLIN ALCOHOL/MO/W.PET/CERES
800 CREAM (GRAM) TOPICAL EVERY 4 HOURS PRN
Status: DISCONTINUED | OUTPATIENT
Start: 2022-01-21 | End: 2022-01-24 | Stop reason: HOSPADM

## 2022-01-21 RX ORDER — SODIUM,POTASSIUM PHOSPHATES 280-250MG
1 POWDER IN PACKET (EA) ORAL EVERY 4 HOURS PRN
Status: DISCONTINUED | OUTPATIENT
Start: 2022-01-21 | End: 2022-01-24 | Stop reason: HOSPADM

## 2022-01-21 RX ORDER — FUROSEMIDE 10 MG/ML
20 INJECTION INTRAMUSCULAR; INTRAVENOUS ONCE
Status: COMPLETED | OUTPATIENT
Start: 2022-01-21 | End: 2022-01-21

## 2022-01-21 RX ORDER — SODIUM,POTASSIUM PHOSPHATES 280-250MG
2 POWDER IN PACKET (EA) ORAL EVERY 4 HOURS PRN
Status: DISCONTINUED | OUTPATIENT
Start: 2022-01-21 | End: 2022-01-24 | Stop reason: HOSPADM

## 2022-01-21 RX ORDER — ACETAMINOPHEN 325 MG/1
650 TABLET ORAL ONCE
Status: COMPLETED | OUTPATIENT
Start: 2022-01-21 | End: 2022-01-21

## 2022-01-21 RX ORDER — SODIUM CHLORIDE, SODIUM LACTATE, POTASSIUM CHLORIDE, CALCIUM CHLORIDE 600; 310; 30; 20 MG/100ML; MG/100ML; MG/100ML; MG/100ML
INJECTION, SOLUTION INTRAVENOUS CONTINUOUS
Status: DISCONTINUED | OUTPATIENT
Start: 2022-01-21 | End: 2022-01-24 | Stop reason: HOSPADM

## 2022-01-21 RX ADMIN — DILTIAZEM HYDROCHLORIDE 90 MG: 30 TABLET, FILM COATED ORAL at 05:01

## 2022-01-21 RX ADMIN — Medication 400 MG: at 05:01

## 2022-01-21 RX ADMIN — ACYCLOVIR 400 MG: 200 CAPSULE ORAL at 09:01

## 2022-01-21 RX ADMIN — ACETAMINOPHEN 650 MG: 325 TABLET ORAL at 05:01

## 2022-01-21 RX ADMIN — INSULIN ASPART 3 UNITS: 100 INJECTION, SOLUTION INTRAVENOUS; SUBCUTANEOUS at 09:01

## 2022-01-21 RX ADMIN — PEGASPARGASE 2730 UNITS: 750 INJECTION, SOLUTION INTRAMUSCULAR; INTRAVENOUS at 05:01

## 2022-01-21 RX ADMIN — FUROSEMIDE 20 MG: 10 INJECTION, SOLUTION INTRAVENOUS at 12:01

## 2022-01-21 RX ADMIN — GABAPENTIN 300 MG: 300 CAPSULE ORAL at 05:01

## 2022-01-21 RX ADMIN — LOSARTAN POTASSIUM 25 MG: 25 TABLET, FILM COATED ORAL at 08:01

## 2022-01-21 RX ADMIN — Medication 400 MG: at 12:01

## 2022-01-21 RX ADMIN — LEUCOVORIN CALCIUM 25 MG: 25 TABLET ORAL at 09:01

## 2022-01-21 RX ADMIN — POTASSIUM CHLORIDE 20 MEQ: 1500 TABLET, EXTENDED RELEASE ORAL at 05:01

## 2022-01-21 RX ADMIN — SODIUM CHLORIDE, SODIUM LACTATE, POTASSIUM CHLORIDE, AND CALCIUM CHLORIDE: .6; .31; .03; .02 INJECTION, SOLUTION INTRAVENOUS at 12:01

## 2022-01-21 RX ADMIN — SODIUM CHLORIDE, SODIUM LACTATE, POTASSIUM CHLORIDE, AND CALCIUM CHLORIDE: .6; .31; .03; .02 INJECTION, SOLUTION INTRAVENOUS at 10:01

## 2022-01-21 RX ADMIN — OXCARBAZEPINE 1200 MG: 600 TABLET, FILM COATED ORAL at 08:01

## 2022-01-21 RX ADMIN — POTASSIUM CHLORIDE 20 MEQ: 1500 TABLET, EXTENDED RELEASE ORAL at 12:01

## 2022-01-21 RX ADMIN — LEUCOVORIN CALCIUM 25 MG: 25 TABLET ORAL at 12:01

## 2022-01-21 RX ADMIN — POTASSIUM & SODIUM PHOSPHATES POWDER PACK 280-160-250 MG 1 PACKET: 280-160-250 PACK at 05:01

## 2022-01-21 RX ADMIN — MUPIROCIN: 20 OINTMENT TOPICAL at 09:01

## 2022-01-21 RX ADMIN — ALTEPLASE 2 MG: 2.2 INJECTION, POWDER, LYOPHILIZED, FOR SOLUTION INTRAVENOUS at 05:01

## 2022-01-21 RX ADMIN — MUPIROCIN: 20 OINTMENT TOPICAL at 08:01

## 2022-01-21 RX ADMIN — FAMOTIDINE 20 MG: 20 TABLET ORAL at 09:01

## 2022-01-21 RX ADMIN — GABAPENTIN 300 MG: 300 CAPSULE ORAL at 12:01

## 2022-01-21 RX ADMIN — SODIUM BICARBONATE 650 MG TABLET 1300 MG: at 07:01

## 2022-01-21 RX ADMIN — QUETIAPINE FUMARATE 200 MG: 200 TABLET ORAL at 09:01

## 2022-01-21 RX ADMIN — GABAPENTIN 300 MG: 300 CAPSULE ORAL at 09:01

## 2022-01-21 RX ADMIN — DEXAMETHASONE 8 MG: 4 TABLET ORAL at 12:01

## 2022-01-21 RX ADMIN — OXCARBAZEPINE 1200 MG: 600 TABLET, FILM COATED ORAL at 09:01

## 2022-01-21 RX ADMIN — ACYCLOVIR 400 MG: 200 CAPSULE ORAL at 08:01

## 2022-01-21 RX ADMIN — FAMOTIDINE 20 MG: 20 TABLET ORAL at 08:01

## 2022-01-21 RX ADMIN — POTASSIUM & SODIUM PHOSPHATES POWDER PACK 280-160-250 MG 1 PACKET: 280-160-250 PACK at 09:01

## 2022-01-21 RX ADMIN — SODIUM BICARBONATE 650 MG TABLET 1300 MG: at 12:01

## 2022-01-21 RX ADMIN — DILTIAZEM HYDROCHLORIDE 90 MG: 30 TABLET, FILM COATED ORAL at 12:01

## 2022-01-21 RX ADMIN — POTASSIUM & SODIUM PHOSPHATES POWDER PACK 280-160-250 MG 1 PACKET: 280-160-250 PACK at 12:01

## 2022-01-21 RX ADMIN — SODIUM BICARBONATE: 84 INJECTION, SOLUTION INTRAVENOUS at 05:01

## 2022-01-21 RX ADMIN — CYTARABINE 1 SYRINGE: 20 INJECTION, SOLUTION INTRATHECAL; INTRAVENOUS; SUBCUTANEOUS at 01:01

## 2022-01-21 RX ADMIN — DIPHENHYDRAMINE HYDROCHLORIDE 50 MG: 50 INJECTION INTRAMUSCULAR; INTRAVENOUS at 05:01

## 2022-01-21 RX ADMIN — Medication 400 MG: at 09:01

## 2022-01-21 NOTE — PLAN OF CARE
[facility] - Oncology (Hospital)      HOME HEALTH ORDERS  FACE TO FACE ENCOUNTER    Patient Name: Deepti Gonzalez  YOB: 1970    PCP: Wen Oliveros MD   PCP Address: [address] [address] / [place] [address]  PCP Phone Number: [phone]  PCP Fax: [phone]    Encounter Date: 1/14/22    Admit to Home Health    Diagnoses:  Active Hospital Problems    Diagnosis  POA    *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]  Yes     Priority: 1 - High    Diarrhea [R19.7]  Yes    Moderate major depression [F32.1]  Yes     Chronic depression x 8-10 years  Many years on Cymbalta, without recent perceived benefit      Type 2 diabetes mellitus, without long-term current use of insulin [E11.9]  Yes    Seizure disorder [G40.909]  Yes    Hypertension [I10]  Yes    GERD (gastroesophageal reflux disease) [K21.9]  Yes    Anxiety [F41.9]  Yes     Long-term generalized anxiety disorder  Potential PTSD (not comprehensively assessed)      Insomnia [G47.00]  Yes    Paroxysmal atrial fibrillation [I48.0]  Yes    Long term current use of anticoagulant [Z79.01]  Not Applicable    Hyperlipidemia [E78.5]  Yes    History of TIA (transient ischemic attack) [Z86.73]  Not Applicable    Rheumatoid arthritis involving multiple sites [M06.9]  Yes     Hx of seronegative RA. Previously on Adalimumab / HCQ / Leflunomab.        Resolved Hospital Problems   No resolved problems to display.       Follow Up Appointments:  Future Appointments   Date Time Provider Department Center   1/21/2022  1:00 PM [facility] FLIP2 500 LB LIMIT NOM XRAY IP [facility]       Allergies:  Review of patient's allergies indicates:   Allergen Reactions    Levetiracetam      Other reaction(s): Unknown       Medications: Review discharge medications with patient and family and provide education.    Current Facility-Administered Medications   Medication Dose Route Frequency Provider Last Rate Last Admin    acyclovir capsule 400 mg  400 mg Oral BID Mirtha Antonio,  NP   400 mg at 01/21/22 0815    alteplase injection 2 mg  2 mg Intra-Catheter PRN Dayron Jenkins MD        cyclobenzaprine tablet 10 mg  10 mg Oral TID PRN Mirtha Antonio NP        dexAMETHasone tablet 8 mg  8 mg Oral Q24H Dayron Jenkins MD   8 mg at 01/21/22 0021    dextrose 50% injection 12.5 g  12.5 g Intravenous PRN Mirtha Antonio, NP        dextrose 50% injection 25 g  25 g Intravenous PRN Mirtha Antonio, NP        diazePAM tablet 10 mg  10 mg Oral BID PRN Mirtha Antonio, NP        diltiaZEM tablet 90 mg  90 mg Oral Q6H Mirtha Antonio NP   90 mg at 01/21/22 0516    diphenhydrAMINE (BENADRYL) 50 mg/mL injection             diphenhydrAMINE injection 25 mg  25 mg Intravenous Q10 Min PRN Dayron Jenkins MD        EPINEPHrine injection 0.3 mg  0.3 mg Intramuscular Daily PRN Dayron Jenkins MD        famotidine tablet 20 mg  20 mg Oral BID Mirtha Antonio, TERRY   20 mg at 01/21/22 0815    gabapentin capsule 300 mg  300 mg Oral TID Mirtha Antonio NP   300 mg at 01/21/22 0516    glucagon (human recombinant) injection 1 mg  1 mg Intramuscular PRN Mirtha Antonio, NP        glucose chewable tablet 16 g  16 g Oral PRN Mirtha Antonio, NP        glucose chewable tablet 24 g  24 g Oral PRN Mirtha Antonio, TERRY        heparin, porcine (PF) 100 unit/mL injection flush 500 Units  500 Units Intravenous PRN Dayron Jenkins MD        hydrocortisone sod succ (PF) 40 mg, cytarabine (PF) (REED-C) 40 mg, methotrexate (PF) 15 mg in sodium chloride 0.9% 6 mL INTRATHECAL chemo injection   Intrathecal Q24H Loco Armendariz MD        insulin aspart U-100 pen 0-5 Units  0-5 Units Subcutaneous QID (AC + HS) PRN Mirtha Antonio NP   3 Units at 01/21/22 0923    lactated ringers infusion   Intravenous Continuous Loco Armendariz MD        leucovorin tablet 25 mg  25 mg Oral Q6H Dayron Jenkins MD        losartan tablet 25 mg  25 mg Oral Daily Mirtha Antonio, TERRY   25 mg at 01/21/22 0815    methylPREDNISolone  sodium succinate injection 125 mg  125 mg Intravenous Daily PRN Dayron Jenkins MD        mupirocin 2 % ointment   Nasal BID Loco Armendariz MD   Given at 01/21/22 0815    naloxone 0.4 mg/mL injection 0.02 mg  0.02 mg Intravenous PRN Mirtha Antonio NP        ondansetron disintegrating tablet 8 mg  8 mg Oral Q8H PRN Dayron Jenkins MD        ondansetron disintegrating tablet 8 mg  8 mg Oral Q12H PRN Mirtha Antonio NP        OXcarbazepine tablet 1,200 mg  1,200 mg Oral BID Mirtha Antonio NP   1,200 mg at 01/21/22 0815    polyethylene glycol packet 17 g  17 g Oral Daily PRN Mirtha Antonio NP        prochlorperazine injection Soln 10 mg  10 mg Intravenous Q6H PRN Dayron Jenkins MD        QUEtiapine tablet 200 mg  200 mg Oral QHS Mirtha Antonio NP   200 mg at 01/20/22 2039    sodium bicarbonate 8.4% 150 meq in dextrose 5% 1,000 mL drip   Intravenous Continuous Loco Armendariz  mL/hr at 01/21/22 0531 New Bag at 01/21/22 0531    sodium bicarbonate tablet 1,300 mg  1,300 mg Oral Q6H Loco Armendariz MD        sodium chloride 0.9% flush 10 mL  10 mL Intravenous Q12H PRN Mirtha Antonio NP         Current Discharge Medication List      CONTINUE these medications which have NOT CHANGED    Details   acyclovir (ZOVIRAX) 400 MG tablet Take 1 tablet (400 mg total) by mouth 2 (two) times daily.  Qty: 60 tablet, Refills: 11      aspirin (ECOTRIN) 81 MG EC tablet Take 1 tablet (81 mg total) by mouth once daily. HOLD until okayed to resume during chemotherapy.  Refills: 0      atorvastatin (LIPITOR) 80 MG tablet Take 80 mg by mouth once daily.      cyclobenzaprine (FLEXERIL) 10 MG tablet Take 10 mg by mouth 3 (three) times daily as needed.      diazePAM (VALIUM) 10 MG Tab Take 10 mg by mouth 2 (two) times daily as needed.      diltiaZEM (CARDIZEM) 90 MG tablet Take 1 tablet (90 mg total) by mouth every 6 (six) hours.  Qty: 120 tablet, Refills: 11      ergocalciferol (ERGOCALCIFEROL) 50,000 unit Cap  Take 50,000 Units by mouth every 7 days.      fenofibrate 160 MG Tab Take 160 mg by mouth once daily.      gabapentin (NEURONTIN) 300 MG capsule Take 1 capsule (300 mg total) by mouth 3 (three) times daily.  Qty: 90 capsule, Refills: 11      !! imatinib (GLEEVEC) 100 MG Tab Take 2 tablets (200 mg total) by mouth once daily with 1 other imatinib prescription for 600 mg total.Take with a meal and large glass of water  Qty: 180 tablet, Refills: 3    Associated Diagnoses: Acute leukemia not having achieved remission      !! imatinib (GLEEVEC) 400 MG Tab Take 1 tablet (400 mg total) by mouth once daily with 1 other imatinib prescription for 600 mg total. Take with a meal and large glass of water  Qty: 90 tablet, Refills: 3    Associated Diagnoses: Acute leukemia not having achieved remission      JARDIANCE 10 mg tablet Take 10 mg by mouth every morning.      leflunomide (ARAVA) 20 MG Tab Take 20 mg by mouth once daily.      losartan (COZAAR) 25 MG tablet Take 25 mg by mouth once daily.      metFORMIN (GLUCOPHAGE-XR) 500 MG ER 24hr tablet Take 1,000 mg by mouth 2 (two) times daily.      methocarbamoL (ROBAXIN) 500 MG Tab Take 1 tablet (500 mg total) by mouth 3 (three) times daily as needed (muscle spasms).  Qty: 90 tablet, Refills: 2      multivit,min52-folic-vitK-cQ10 (AQUADEKS) 100-700-10 mcg-mcg-mg Cap cap Take 1 capsule by mouth once daily.      omeprazole (PRILOSEC) 40 MG capsule Take 40 mg by mouth once daily.      ondansetron (ZOFRAN-ODT) 8 MG TbDL Dissolve 1 tablet (8 mg total) by mouth every 12 (twelve) hours as needed (in case of chemo induced nausea).  Qty: 30 tablet, Refills: 1    Associated Diagnoses: B-cell acute lymphoblastic leukemia      OXcarbazepine (TRILEPTAL) 600 MG Tab Take 1,200 mg by mouth 2 (two) times daily.      polyethylene glycol (GLYCOLAX) 17 gram/dose powder Dissolve one capful (17 g) in liquid and take by mouth daily as needed (constipation).  Qty: 510 g, Refills: 0      QUEtiapine  (SEROQUEL) 200 MG Tab Take 200 mg by mouth every evening.      rivaroxaban (XARELTO) 20 mg Tab Take 1 tablet (20 mg total) by mouth daily with dinner or evening meal. HOLD until okayed to resume while platelets are low.      senna-docusate 8.6-50 mg (PERICOLACE) 8.6-50 mg per tablet Take 1 tablet by mouth 2 (two) times daily.  Qty: 60 tablet, Refills: 3      TRINTELLIX 10 mg Tab Take 1 tablet by mouth once daily.       !! - Potential duplicate medications found. Please discuss with provider.            I have seen and examined this patient within the last 30 days. My clinical findings that support the need for the home health skilled services and home bound status are the following:no   Weakness/numbness causing balance and gait disturbance due to Malignancy/Cancer making it taxing to leave home.     Diet:   diabetic diet 2000 calorie    Activities:   activity as tolerated    Nursing:   Agency to admit patient within 24 hours of hospital discharge unless specified on physician order or at patient request    SN to complete comprehensive assessment including routine vital signs. Instruct on disease process and s/s of complications to report to MD. Review/verify medication list sent home with the patient at time of discharge  and instruct patient/caregiver as needed. Frequency may be adjusted depending on start of care date.     Skilled nurse to perform up to 3 visits PRN for symptoms related to diagnosis    Notify MD if SBP > 160 or < 90; DBP > 90 or < 50; HR > 120 or < 50; Temp > 100.4; O2 < 88%    Ok to schedule additional visits based on staff availability and patient request on consecutive days within the home health episode.    When multiple disciplines ordered:    Start of Care occurs on Sunday - Wednesday schedule remaining discipline evaluations as ordered on separate consecutive days following the start of care.    Thursday SOC -schedule subsequent evaluations Friday and Monday the following week.     Friday  - Saturday SOC - schedule subsequent discipline evaluations on consecutive days starting Monday of the following week.    For all post-discharge communication and subsequent orders please contact patient's primary care physician. If unable to reach primary care physician or do not receive response within 30 minutes, please contact [facility] inpatient BMT for clinical staff order clarification    Miscellaneous   Home Infusion Therapy:   SN to perform Infusion Therapy/Central Line Care.  Review Central Line Care & Central Line Flush with patient.    Scrub the Hub: Prior to accessing the line, always perform a 30 second alcohol scrub  Each lumen of the central line is to be flushed at least daily with 10 mL Normal Saline and 3 mL Heparin flush (10 units/mL)  Skilled Nurse (SN) may draw blood from IV access  Blood Draw Procedure:   - Aspirate at least 5 mL of blood   - Discard   - Obtain specimen   - Change injection cap   - Flush with 20 mL Normal Saline followed by a                 3-5 mL Heparin flush (10 units/mL)  Central :   - Sterile dressing changes are done weekly and as needed.   - Use chlor-hexadine scrub to cleanse site, apply Biopatch to insertion site,       apply securement device dressing   - Injection caps are changed weekly and after EVERY lab draw.   - If sterile gauze is under dressing to control oozing,                 dressing change must be performed every 24 hours until gauze is not needed.    Home Health Aide:  Nursing Weekly    I certify that this patient is confined to her home and needs intermittent skilled nursing care.

## 2022-01-21 NOTE — ASSESSMENT & PLAN NOTE
- Pt with diarrhea, evident prior to admission  - 4 episodes overnight, was started on imodium  - Will hold imodium, if diarrhea persists will r/o C Diff. No indication to r/o c-diff at this time.

## 2022-01-21 NOTE — ASSESSMENT & PLAN NOTE
- hold home jardiance and metformin while inpatient. Can resume at discharge.  - started on low dose SSI, achs blood glucose monitoring, and diabetic diet on admission  - blood glucose in 200s. Received dexamethasone with chemo, so may be contributing to hyperglycemia  - increased to moderate dose SSI

## 2022-01-21 NOTE — PROGRESS NOTES
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 Tolerating chemo well thus far. Afebrile. VSS. MTX level 1.67 today. Will discharge home with MTX clearance. She will get weekly lab monitoring and PICC dressing changes at Dr. Garcia's office in [place] between chemo cycles. Electrolytes deranged, likely 2/2 Na+bicarb gtt. Weight up 3 lbs since admission. Also likely 2/2 IVF. Will give 20 mg IV lasix and initiate prn electrolytes replacement order set.    Objective:     Vital Signs (Most Recent):  Temp: 98.6 °F (37 °C) (01/21/22 1115)  Pulse: 92 (01/21/22 1115)  Resp: 19 (01/21/22 1115)  BP: 132/60 (01/21/22 1115)  SpO2: 98 % (01/21/22 1115) Vital Signs (24h Range):  Temp:  [98.1 °F (36.7 °C)-98.8 °F (37.1 °C)] 98.6 °F (37 °C)  Pulse:  [] 92  Resp:  [16-20] 19  SpO2:  [92 %-98 %] 98 %  BP: (118-162)/(56-76) 132/60     Weight: 73.9 kg (162 lb 14.7 oz)  Body mass index is 27.97 kg/m².  Body surface area is 1.83 meters squared.    ECOG SCORE         [unfilled]    Intake/Output - Last 3 Shifts       01/19 0700  01/20 0659 01/20 0700 01/21 0659 01/21 0700 01/22 0659    P.O. 120 480     I.V. (mL/kg) 1704.5 (23.5) 3616.4 (48.9)     IV Piggyback 638.8 405     Total Intake(mL/kg) 2463.3 (33.9) 4501.4 (60.9)     Urine (mL/kg/hr) 500 3900 (2.2)     Stool 0 0     Total Output 500 3900     Net +1963.3 +601.4            Urine Occurrence 1 x      Stool Occurrence 4 x 1 x           Physical Exam  Constitutional:       Appearance: She is well-developed and well-nourished.   HENT:      Head: Normocephalic and atraumatic.      Mouth/Throat:      Pharynx: No oropharyngeal exudate.   Eyes:      Conjunctiva/sclera: Conjunctivae normal.      Pupils: Pupils are equal, round, and reactive to light.    Cardiovascular:      Rate and Rhythm: Normal rate and regular rhythm.      Heart sounds: Normal heart sounds. No murmur heard.      Pulmonary:      Effort: Pulmonary effort is normal.      Breath sounds: Normal breath sounds.      Comments: Clr/diminished breath sounds  Abdominal:      General: Bowel sounds are normal. There is no distension.      Palpations: Abdomen is soft.      Tenderness: There is no abdominal tenderness.   Musculoskeletal:         General: No deformity. Normal range of motion.      Cervical back: Normal range of motion and neck supple.      Right lower leg: Edema (trace pedal edema) present.      Left lower leg: Edema (trace pedal edema) present.   Skin:     General: Skin is warm and dry.      Findings: No erythema or rash.      Comments: RUE PICC. Dressing c/d/i. No sign of infection to site.   Neurological:      Mental Status: She is alert and oriented to person, place, and time.   Psychiatric:         Mood and Affect: Mood and affect normal.         Behavior: Behavior normal.         Thought Content: Thought content normal.         Judgment: Judgment normal.         Significant Labs:   CBC:   Recent Labs   Lab 01/20/22  0528 01/21/22  0508   WBC 5.21 4.79   HGB 8.7* 7.6*   HCT 28.7* 23.3*    277    and CMP:   Recent Labs   Lab 01/20/22  0528 01/21/22  0508    138   K 3.6 3.0*    97   CO2 23 29   * 339*   BUN 9 9   CREATININE 0.7 0.7   CALCIUM 8.0* 7.6*   PROT 5.3* 5.0*   ALBUMIN 3.0* 2.9*   BILITOT 0.2 0.2   ALKPHOS 90 78   AST 13 14   ALT 14 12   ANIONGAP 11 12   EGFRNONAA >60.0 >60.0       Diagnostic Results:  I have reviewed all pertinent imaging results/findings within the past 24 hours.    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 11/23/2021: Transferred to [facility] from outside hospital for evaluation for acute leukemia              B. 11/24/2021: Bone marrow biopsy shows  % cellular marrow nearly completely replaced by B-ALL; ALL FISH shows bcr-abl fusion and monosomy 9; cytogenetics 45,XX,-9,t(9;22)(q34;q11.2)[3]/46,sl,+isaias(22)t(9;22)[15]/46,XX[2]; BCR/ABL1 PCR shows p190 kD protein (e1-a2 fusion form), estiamted to represent 57.7% of total abl.               C. 11/30/2021 - 12/23/2021: Vincristine + imatinib induction; hospital course was complicated by acute hypoxemic respiratory failure which resolved with diuresis and treatment of ALL    - BMBx from 1/3/22 showing CR  - Admitted 1/19 for C1 of consolidation with EWALL, today is C1D3  - Plan to take home Gleevec days 15-28 of consolidation cycles  - Baseline DIC and lipase levels checked prior to receiving PEG. Levels were wnl  - Scheduled for IT triple therapy in [place] today, held Xarelto    Diarrhea  - Pt with diarrhea, evident prior to admission  - 4 episodes overnight, was started on imodium  - Will hold imodium, if diarrhea persists will r/o C Diff. No indication to r/o c-diff at this time.    Moderate major depression  - continue home trintellix while inpatient    Insomnia  - continue home seroquel while inpatient    Anxiety  - continue home trintellix and valium while inpatient    GERD (gastroesophageal reflux disease)  - hold home PPI until MTX clearance as it can interfere with MTX clearance  - receiving famotidine while inpatient    Hypertension  - continue home losartan while inpatient    Seizure disorder  - continue home oxcarbazepine while inpatient    Type 2 diabetes mellitus, without long-term current use of insulin  - hold home jardiance and metformin while inpatient. Can resume at discharge.  - started on low dose SSI, achs blood glucose monitoring, and diabetic diet on admission  - blood glucose in 200s. Received dexamethasone with chemo, so may be contributing to hyperglycemia  - increased to moderate dose SSI    Rheumatoid arthritis involving multiple sites  - hold home leflunomide until MTX clearance  as this can increase hepatotoxicity    History of TIA (transient ischemic attack)  - hold home ASA unitl MTX clearance as it can interfere with MTX clearance    Hyperlipidemia  - hold home statin and fenofribrate while receiving chemo    Long term current use of anticoagulant  - continue home xarelto. Hold with plts < 50K.  - Holding for LP as above    Paroxysmal atrial fibrillation  - continue home diltiazem and xarelto. Hold xarelto with plt count < 50K.  - Xarelto held in anticipation of LP today. Will resume following IT chemo.        VTE Risk Mitigation (From admission, onward)         Ordered     heparin, porcine (PF) 100 unit/mL injection flush 500 Units  As needed (PRN)         01/19/22 1644     IP VTE HIGH RISK PATIENT  Once         01/19/22 1555     Place sequential compression device  Until discontinued         01/19/22 1555                Disposition: Inpatient for chemo.    Mirtha Antonio, NP  Bone Marrow Transplant  Roberto Yepez - Oncology ([facility])

## 2022-01-21 NOTE — PROGRESS NOTES
received the home health orders for the patient to include nursing comprehensive assessment. Home Infusion therapy/central line care & central line flushes.   Patient's spouse stated that he can perform the daily flushes on his own and denied the need for home health services.  referred the patient to several home infusion agencies in [place] that cited  there is a nation wide shortage on Sterle Saline flushes. [facility] would not accept the referral for only line care. [facility] would not accept the referral due to the inability to send Sterile saline across state lines.   located, [facility] ([phone]) that were willing to provide the supplies provided that the patient would have weekly dressing changes by a clinic or other facility.  was able to provide the agency with the name for the physician that the treatment team arranged weekly dressing changes. The physician is Dr. Royal Garcia MD. He can be reached by calling [phone].   messaged Mirtha Antonio, if the patient discharges on Sunday, will the nursing staff be able to go over the teaching with Mr. Gonzalez. The Infusion company ask if [facility] can provide enough supplies to get the patient through a couple of days before they can get the deliveries of supplies started. Mirtha asked the  to contact the charge nurse at [phone].  will comply.  The Infusion company called back to state that they spoke to Mr. Gonzalez and that  Mr. Gonzalez and he refused their service and stated that he can get the flushes at his local pharmacy.  spoke with the charge nurse Carlos. Carlos agreed to provide the patient with a couple of days of PICC-Line supplies and flushes until the patient can secure a supply through his resources.  No other needs noted.

## 2022-01-21 NOTE — SUBJECTIVE & OBJECTIVE
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 Tolerating chemo well thus far. Afebrile. VSS. MTX level 1.67 today. Will discharge home with MTX clearance. She will get weekly lab monitoring and PICC dressing changes at Dr. Garcia's office in [place] between chemo cycles. Electrolytes deranged, likely 2/2 Na+bicarb gtt. Weight up 3 lbs since admission. Also likely 2/2 IVF. Will give 20 mg IV lasix and initiate prn electrolytes replacement order set.    Objective:     Vital Signs (Most Recent):  Temp: 98.6 °F (37 °C) (01/21/22 1115)  Pulse: 92 (01/21/22 1115)  Resp: 19 (01/21/22 1115)  BP: 132/60 (01/21/22 1115)  SpO2: 98 % (01/21/22 1115) Vital Signs (24h Range):  Temp:  [98.1 °F (36.7 °C)-98.8 °F (37.1 °C)] 98.6 °F (37 °C)  Pulse:  [] 92  Resp:  [16-20] 19  SpO2:  [92 %-98 %] 98 %  BP: (118-162)/(56-76) 132/60     Weight: 73.9 kg (162 lb 14.7 oz)  Body mass index is 27.97 kg/m².  Body surface area is 1.83 meters squared.    ECOG SCORE         [unfilled]    Intake/Output - Last 3 Shifts       01/19 0700  01/20 0659 01/20 0700  01/21 0659 01/21 0700  01/22 0659    P.O. 120 480     I.V. (mL/kg) 1704.5 (23.5) 3616.4 (48.9)     IV Piggyback 638.8 405     Total Intake(mL/kg) 2463.3 (33.9) 4501.4 (60.9)     Urine (mL/kg/hr) 500 3900 (2.2)     Stool 0 0     Total Output 500 3900     Net +1963.3 +601.4            Urine Occurrence 1 x      Stool Occurrence 4 x 1 x           Physical Exam  Constitutional:       Appearance: She is well-developed and well-nourished.   HENT:      Head: Normocephalic and atraumatic.      Mouth/Throat:      Pharynx: No oropharyngeal exudate.   Eyes:      Conjunctiva/sclera: Conjunctivae normal.      Pupils: Pupils are equal, round, and reactive to light.   Cardiovascular:      Rate and Rhythm: Normal rate and regular rhythm.      Heart sounds: Normal heart sounds. No murmur heard.      Pulmonary:      Effort: Pulmonary effort is normal.      Breath sounds:  Normal breath sounds.      Comments: Clr/diminished breath sounds  Abdominal:      General: Bowel sounds are normal. There is no distension.      Palpations: Abdomen is soft.      Tenderness: There is no abdominal tenderness.   Musculoskeletal:         General: No deformity. Normal range of motion.      Cervical back: Normal range of motion and neck supple.      Right lower leg: Edema (trace pedal edema) present.      Left lower leg: Edema (trace pedal edema) present.   Skin:     General: Skin is warm and dry.      Findings: No erythema or rash.      Comments: RUE PICC. Dressing c/d/i. No sign of infection to site.   Neurological:      Mental Status: She is alert and oriented to person, place, and time.   Psychiatric:         Mood and Affect: Mood and affect normal.         Behavior: Behavior normal.         Thought Content: Thought content normal.         Judgment: Judgment normal.         Significant Labs:   CBC:   Recent Labs   Lab 01/20/22  0528 01/21/22  0508   WBC 5.21 4.79   HGB 8.7* 7.6*   HCT 28.7* 23.3*    277    and CMP:   Recent Labs   Lab 01/20/22  0528 01/21/22  0508    138   K 3.6 3.0*    97   CO2 23 29   * 339*   BUN 9 9   CREATININE 0.7 0.7   CALCIUM 8.0* 7.6*   PROT 5.3* 5.0*   ALBUMIN 3.0* 2.9*   BILITOT 0.2 0.2   ALKPHOS 90 78   AST 13 14   ALT 14 12   ANIONGAP 11 12   EGFRNONAA >60.0 >60.0       Diagnostic Results:  I have reviewed all pertinent imaging results/findings within the past 24 hours.

## 2022-01-21 NOTE — ASSESSMENT & PLAN NOTE
- continue home diltiazem and xarelto. Hold xarelto with plt count < 50K.  - Xarelto held in anticipation of LP today. Will resume following IT chemo.

## 2022-01-21 NOTE — ASSESSMENT & PLAN NOTE
-Precursor B-cell acute lymphoblastic leukemia (Ph+)              A. 11/23/2021: Transferred [place] [facility] [place] outside hospital for evaluation for acute leukemia              B. 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               C. 11/30/2021 - 12/23/2021: Vincristine + imatinib induction; hospital course was complicated by acute hypoxemic respiratory failure which resolved with diuresis and treatment of ALL    - BMBx from 1/3/22 showing CR  - Admitted 1/19 for C1 of consolidation with EWALL, today is C1D3  - Plan to take home Gleevec days 15-28 of consolidation cycles  - Baseline DIC and lipase levels checked prior to receiving PEG. Levels were wnl  - Scheduled for IT triple therapy in [facility] today, held Xarelto

## 2022-01-21 NOTE — ASSESSMENT & PLAN NOTE
- hold home PPI until MTX clearance as it can interfere with MTX clearance  - receiving famotidine while inpatient

## 2022-01-21 NOTE — PROCEDURES
Radiology Post-Procedure Note    Pre Op Diagnosis: B Cell ALL    Post Op Diagnosis: Same    Procedure: lumbar puncture    Procedure performed by: Renzo Noel MD    Written Informed Consent Obtained: Yes    Specimen Removed: 10 mL CSF    Estimated Blood Loss: Minimal    Findings:   Following written informed consent and sterile prep and drape, a 22 gauge spinal needle was inserted at L3 - L4 intralaminar space under fluoroscopic surveillance.  10 mL clear CSF removed and sent to the lab for further analysis. A member of the Oncology service then injected intrathecal chemotherapy.    Opening pressure: 23 cmH2O  Closing pressure not measured after chemo administration.    There were no complications.  Patient tolerated procedure well.    Lorne Garduno MD PGYII  Radiology  [facility]

## 2022-01-21 NOTE — PLAN OF CARE
POC reviewed w patient and spouse at beginning of shift and PRN. Day 2 of Chemo this morning w/o complication to central line noted + for blood return.Na+Bicarb infusing at 150. Standby assist to aMBULATE USES WALKER. voids per hat in BR. No acute events overnight. Glucose monitoring continued. Bed locked in low position call light in reach. Will CTM.

## 2022-01-21 NOTE — PLAN OF CARE
Administered pegaspargase (ONCASPAR) 1,500 Units/m2 = 2,730 Units in sodium chloride 0.9% 100 mL chemo infusion over hours at this time through JANY PICC noted for having positive blood return. Chemotherapy consent signed and on chart. Chemotherapy dosage checked by two chemotherapy certified nurses prior to administration. Premedication w PO tylenol and IVPB Benadryl prior to infusion. Chemotherapy education performed with pt and family.  They verbalized understanding.  Chemotherapeutic precautions in place throughout therapy.  Will continue to monitor.

## 2022-01-21 NOTE — H&P
Inpatient Radiology Pre-procedure Note    History of Present Illness:  Deepti Gonzalez is a 51 y.o. female who presents for LP with intrathecal chemo.  Admission H&P reviewed.  Past Medical History:   Diagnosis Date    Arthritis     COPD (chronic obstructive pulmonary disease)     Hypertension     Stroke     TIA x 4     Past Surgical History:   Procedure Laterality Date    APPENDECTOMY      HYSTERECTOMY      ROTATOR CUFF REPAIR Bilateral     TONSILLECTOMY      TUBAL LIGATION         Review of Systems:   As documented in primary team H&P    Home Meds:   Prior to Admission medications    Medication Sig Start Date End Date Taking? Authorizing Provider   acyclovir (ZOVIRAX) 400 MG tablet Take 1 tablet (400 mg total) by mouth 2 (two) times daily. 12/7/21   Kathrine Posey MD   aspirin (ECOTRIN) 81 MG EC tablet Take 1 tablet (81 mg total) by mouth once daily. HOLD until okayed to resume during chemotherapy. 12/7/21   Mary Max DO   atorvastatin (LIPITOR) 80 MG tablet Take 80 mg by mouth once daily.    Historical Provider   cyclobenzaprine (FLEXERIL) 10 MG tablet Take 10 mg by mouth 3 (three) times daily as needed. 1/10/22   Historical Provider   diazePAM (VALIUM) 10 MG Tab Take 10 mg by mouth 2 (two) times daily as needed.    Historical Provider   diltiaZEM (CARDIZEM) 90 MG tablet Take 1 tablet (90 mg total) by mouth every 6 (six) hours. 12/7/21 12/7/22  Kathrine Posey MD   ergocalciferol (ERGOCALCIFEROL) 50,000 unit Cap Take 50,000 Units by mouth every 7 days.    Historical Provider   fenofibrate 160 MG Tab Take 160 mg by mouth once daily.    Historical Provider   gabapentin (NEURONTIN) 300 MG capsule Take 1 capsule (300 mg total) by mouth 3 (three) times daily. 12/7/21 12/7/22  Kathrine Posey MD   imatinib (GLEEVEC) 100 MG Tab Take 2 tablets (200 mg total) by mouth once daily with 1 other imatinib prescription for 600 mg total.Take with a meal and large glass of water 12/5/21   Daina Gonzales MD    imatinib (GLEEVEC) 400 MG Tab Take 1 tablet (400 mg total) by mouth once daily with 1 other imatinib prescription for 600 mg total. Take with a meal and large glass of water 12/5/21   Daina Gonzales MD   JARDIANCE 10 mg tablet Take 10 mg by mouth every morning. 11/12/21   Historical Provider   leflunomide (ARAVA) 20 MG Tab Take 20 mg by mouth once daily.    Historical Provider   losartan (COZAAR) 25 MG tablet Take 25 mg by mouth once daily. 11/12/21   Historical Provider   metFORMIN (GLUCOPHAGE-XR) 500 MG ER 24hr tablet Take 1,000 mg by mouth 2 (two) times daily. 10/28/21   Historical Provider   methocarbamoL (ROBAXIN) 500 MG Tab Take 1 tablet (500 mg total) by mouth 3 (three) times daily as needed (muscle spasms). 12/7/21   Kathrine Posey MD   multivit,min52-folic-vitK-cQ10 (AQUADEKS) 100-700-10 mcg-mcg-mg Cap cap Take 1 capsule by mouth once daily.    Historical Provider   omeprazole (PRILOSEC) 40 MG capsule Take 40 mg by mouth once daily.    Historical Provider   ondansetron (ZOFRAN-ODT) 8 MG TbDL Dissolve 1 tablet (8 mg total) by mouth every 12 (twelve) hours as needed (in case of chemo induced nausea). 12/17/21 12/17/22  Dayron Jenkins MD   OXcarbazepine (TRILEPTAL) 600 MG Tab Take 1,200 mg by mouth 2 (two) times daily. 10/26/21   Historical Provider   polyethylene glycol (GLYCOLAX) 17 gram/dose powder Dissolve one capful (17 g) in liquid and take by mouth daily as needed (constipation). 12/7/21   Kathrine Posey MD   QUEtiapine (SEROQUEL) 200 MG Tab Take 200 mg by mouth every evening.    Historical Provider   rivaroxaban (XARELTO) 20 mg Tab Take 1 tablet (20 mg total) by mouth daily with dinner or evening meal. HOLD until okayed to resume while platelets are low. 12/7/21   Mary Max, DO   senna-docusate 8.6-50 mg (PERICOLACE) 8.6-50 mg per tablet Take 1 tablet by mouth 2 (two) times daily. 12/7/21   Mary Max DO   TRINTELLIX 10 mg Tab Take 1 tablet by mouth once daily. 10/20/21   Historical  Provider     Scheduled Meds:    acyclovir  400 mg Oral BID    dexAMETHasone  8 mg Oral Q24H    diltiaZEM  90 mg Oral Q6H    diphenhydrAMINE        famotidine  20 mg Oral BID    gabapentin  300 mg Oral TID    cytarabine with hydrocortisone and methotrexate INTRATHECAL chemo injection (PEDS)   Intrathecal Q24H    leucovorin  25 mg Oral Q6H    losartan  25 mg Oral Daily    mupirocin   Nasal BID    OXcarbazepine  1,200 mg Oral BID    QUEtiapine  200 mg Oral QHS    sodium bicarbonate  1,300 mg Oral Q6H     Continuous Infusions:    lactated ringers 100 mL/hr at 01/21/22 1220     PRN Meds:alteplase, cyclobenzaprine, dextrose 50%, dextrose 50%, diazePAM, diphenhydrAMINE, EPINEPHrine, glucagon (human recombinant), glucose, glucose, heparin, porcine (PF), insulin aspart U-100, insulin aspart U-100, magnesium oxide, magnesium oxide, magnesium oxide, methylPREDNISolone sodium succinate, naloxone, ondansetron, ondansetron, polyethylene glycol, potassium chloride, potassium chloride, potassium chloride, potassium, sodium phosphates, potassium, sodium phosphates, prochlorperazine, sodium chloride 0.9%  Anticoagulants/Antiplatelets: Rivaroxaban, being held    Allergies:   Review of patient's allergies indicates:   Allergen Reactions    Levetiracetam      Other reaction(s): Unknown     Sedation Hx: have not been any systemic reactions    Labs:  Recent Labs   Lab 01/19/22  1651   INR 1.0       Recent Labs   Lab 01/21/22  0508   WBC 4.79   HGB 7.6*   HCT 23.3*   *         Recent Labs   Lab 01/21/22  0508   *      K 3.0*   CL 97   CO2 29   BUN 9   CREATININE 0.7   CALCIUM 7.6*   MG 1.5*   ALT 12   AST 14   ALBUMIN 2.9*   BILITOT 0.2         Vitals:  Temp: 98.6 °F (37 °C) (01/21/22 1115)  Pulse: 92 (01/21/22 1115)  Resp: 19 (01/21/22 1115)  BP: 132/60 (01/21/22 1115)  SpO2: 98 % (01/21/22 1115)     Physical Exam:  ASA: 3  Mallampati: 2    General: no acute distress  Mental Status: alert and  oriented to person, place and time  HEENT: normocephalic, atraumatic  Chest: unlabored breathing  Heart: regular heart rate  Abdomen: nondistended  Extremity: moves all extremities    Plan: LP with chemotherapy   Sedation Plan: Local anesthesia    Lorne Garduno MD PGYII  Radiology  [facility]

## 2022-01-21 NOTE — PROGRESS NOTES
Patient seen at Fluoroscopy. LP done per radiology. CSF studies sent. Timeout done. Chemo checked with MD. 15 mg Methotrexate, 40 mg hydrocortisone, and 40 mg cytarabine given intrathecally without difficulty.    Mirtha Antonio, SKIPP  Hematology/Oncology/Bone Marrow Transplant

## 2022-01-22 LAB
ABO + RH BLD: NORMAL
ALBUMIN SERPL BCP-MCNC: 3 G/DL (ref 3.5–5.2)
ALP SERPL-CCNC: 81 U/L (ref 55–135)
ALT SERPL W/O P-5'-P-CCNC: 23 U/L (ref 10–44)
ANION GAP SERPL CALC-SCNC: 10 MMOL/L (ref 8–16)
AST SERPL-CCNC: 21 U/L (ref 10–40)
BASOPHILS # BLD AUTO: 0 K/UL (ref 0–0.2)
BASOPHILS NFR BLD: 0 % (ref 0–1.9)
BILIRUB SERPL-MCNC: 0.2 MG/DL (ref 0.1–1)
BILIRUB SERPL-MCNC: NORMAL MG/DL
BILIRUB SERPL-MCNC: NORMAL MG/DL
BLD GP AB SCN CELLS X3 SERPL QL: NORMAL
BLOOD URINE, POC: NORMAL
BLOOD URINE, POC: NORMAL
BUN SERPL-MCNC: 21 MG/DL (ref 6–20)
CALCIUM SERPL-MCNC: 8.3 MG/DL (ref 8.7–10.5)
CHLORIDE SERPL-SCNC: 105 MMOL/L (ref 95–110)
CO2 SERPL-SCNC: 22 MMOL/L (ref 23–29)
COLOR, POC UA: NORMAL
COLOR, POC UA: NORMAL
CREAT SERPL-MCNC: 0.6 MG/DL (ref 0.5–1.4)
DIFFERENTIAL METHOD: ABNORMAL
EOSINOPHIL # BLD AUTO: 0 K/UL (ref 0–0.5)
EOSINOPHIL NFR BLD: 0 % (ref 0–8)
ERYTHROCYTE [DISTWIDTH] IN BLOOD BY AUTOMATED COUNT: 18.2 % (ref 11.5–14.5)
EST. GFR  (AFRICAN AMERICAN): >60 ML/MIN/1.73 M^2
EST. GFR  (NON AFRICAN AMERICAN): >60 ML/MIN/1.73 M^2
GLUCOSE SERPL-MCNC: 137 MG/DL (ref 70–110)
GLUCOSE UR QL STRIP: NORMAL
GLUCOSE UR QL STRIP: NORMAL
HCT VFR BLD AUTO: 23.5 % (ref 37–48.5)
HGB BLD-MCNC: 7.5 G/DL (ref 12–16)
IMM GRANULOCYTES # BLD AUTO: 0.02 K/UL (ref 0–0.04)
IMM GRANULOCYTES NFR BLD AUTO: 0.4 % (ref 0–0.5)
KETONES UR QL STRIP: NORMAL
KETONES UR QL STRIP: NORMAL
LACTATE DEHYDROGENASE FLUID: 30 U/L
LEUKOCYTE ESTERASE URINE, POC: NORMAL
LEUKOCYTE ESTERASE URINE, POC: NORMAL
LYMPHOCYTES # BLD AUTO: 1.2 K/UL (ref 1–4.8)
LYMPHOCYTES NFR BLD: 21.6 % (ref 18–48)
MAGNESIUM SERPL-MCNC: 1.8 MG/DL (ref 1.6–2.6)
MCH RBC QN AUTO: 32.5 PG (ref 27–31)
MCHC RBC AUTO-ENTMCNC: 31.9 G/DL (ref 32–36)
MCV RBC AUTO: 102 FL (ref 82–98)
MONOCYTES # BLD AUTO: 0.1 K/UL (ref 0.3–1)
MONOCYTES NFR BLD: 2.4 % (ref 4–15)
MTX SERPL-SCNC: 0.31 UMOL/L (ref 0.5–5)
NEUTROPHILS # BLD AUTO: 4.1 K/UL (ref 1.8–7.7)
NEUTROPHILS NFR BLD: 75.6 % (ref 38–73)
NITRITE, POC UA: NORMAL
NITRITE, POC UA: NORMAL
NRBC BLD-RTO: 0 /100 WBC
PH, POC UA: 7
PH, POC UA: 7
PHOSPHATE SERPL-MCNC: 3.5 MG/DL (ref 2.7–4.5)
PLATELET # BLD AUTO: 270 K/UL (ref 150–450)
PMV BLD AUTO: 9 FL (ref 9.2–12.9)
POCT GLUCOSE: 177 MG/DL (ref 70–110)
POCT GLUCOSE: 222 MG/DL (ref 70–110)
POCT GLUCOSE: 238 MG/DL (ref 70–110)
POCT GLUCOSE: 299 MG/DL (ref 70–110)
POTASSIUM SERPL-SCNC: 4.1 MMOL/L (ref 3.5–5.1)
PROT SERPL-MCNC: 5.1 G/DL (ref 6–8.4)
PROTEIN, POC: NORMAL
PROTEIN, POC: NORMAL
RBC # BLD AUTO: 2.31 M/UL (ref 4–5.4)
SODIUM SERPL-SCNC: 137 MMOL/L (ref 136–145)
SPECIFIC GRAVITY, POC UA: NORMAL
SPECIFIC GRAVITY, POC UA: NORMAL
UROBILINOGEN, POC UA: NORMAL
UROBILINOGEN, POC UA: NORMAL
WBC # BLD AUTO: 5.37 K/UL (ref 3.9–12.7)

## 2022-01-22 PROCEDURE — 25000003 PHARM REV CODE 250: Performed by: INTERNAL MEDICINE

## 2022-01-22 PROCEDURE — 20600001 HC STEP DOWN PRIVATE ROOM

## 2022-01-22 PROCEDURE — 25000003 PHARM REV CODE 250: Performed by: NURSE PRACTITIONER

## 2022-01-22 PROCEDURE — 85025 COMPLETE CBC W/AUTO DIFF WBC: CPT | Performed by: NURSE PRACTITIONER

## 2022-01-22 PROCEDURE — 86901 BLOOD TYPING SEROLOGIC RH(D): CPT | Performed by: NURSE PRACTITIONER

## 2022-01-22 PROCEDURE — 80053 COMPREHEN METABOLIC PANEL: CPT | Performed by: NURSE PRACTITIONER

## 2022-01-22 PROCEDURE — 80204 DRUG ASSAY METHOTREXATE: CPT | Performed by: INTERNAL MEDICINE

## 2022-01-22 PROCEDURE — 99233 PR SUBSEQUENT HOSPITAL CARE,LEVL III: ICD-10-PCS | Mod: ,,, | Performed by: INTERNAL MEDICINE

## 2022-01-22 PROCEDURE — 83735 ASSAY OF MAGNESIUM: CPT | Performed by: NURSE PRACTITIONER

## 2022-01-22 PROCEDURE — 63600175 PHARM REV CODE 636 W HCPCS: Performed by: INTERNAL MEDICINE

## 2022-01-22 PROCEDURE — 94761 N-INVAS EAR/PLS OXIMETRY MLT: CPT

## 2022-01-22 PROCEDURE — 99233 SBSQ HOSP IP/OBS HIGH 50: CPT | Mod: ,,, | Performed by: INTERNAL MEDICINE

## 2022-01-22 PROCEDURE — 84100 ASSAY OF PHOSPHORUS: CPT | Performed by: NURSE PRACTITIONER

## 2022-01-22 RX ADMIN — Medication 400 MG: at 06:01

## 2022-01-22 RX ADMIN — SODIUM BICARBONATE 650 MG TABLET 1300 MG: at 12:01

## 2022-01-22 RX ADMIN — FAMOTIDINE 20 MG: 20 TABLET ORAL at 09:01

## 2022-01-22 RX ADMIN — RIVAROXABAN 20 MG: 20 TABLET, FILM COATED ORAL at 05:01

## 2022-01-22 RX ADMIN — Medication 400 MG: at 09:01

## 2022-01-22 RX ADMIN — ACYCLOVIR 400 MG: 200 CAPSULE ORAL at 09:01

## 2022-01-22 RX ADMIN — MUPIROCIN 1 TUBE: 20 OINTMENT TOPICAL at 09:01

## 2022-01-22 RX ADMIN — DILTIAZEM HYDROCHLORIDE 90 MG: 30 TABLET, FILM COATED ORAL at 07:01

## 2022-01-22 RX ADMIN — GABAPENTIN 300 MG: 300 CAPSULE ORAL at 12:01

## 2022-01-22 RX ADMIN — OXCARBAZEPINE 1200 MG: 600 TABLET, FILM COATED ORAL at 09:01

## 2022-01-22 RX ADMIN — DILTIAZEM HYDROCHLORIDE 90 MG: 30 TABLET, FILM COATED ORAL at 11:01

## 2022-01-22 RX ADMIN — LEUCOVORIN CALCIUM 25 MG: 25 TABLET ORAL at 06:01

## 2022-01-22 RX ADMIN — DILTIAZEM HYDROCHLORIDE 90 MG: 30 TABLET, FILM COATED ORAL at 06:01

## 2022-01-22 RX ADMIN — GABAPENTIN 300 MG: 300 CAPSULE ORAL at 06:01

## 2022-01-22 RX ADMIN — LOSARTAN POTASSIUM 25 MG: 25 TABLET, FILM COATED ORAL at 09:01

## 2022-01-22 RX ADMIN — DEXAMETHASONE 8 MG: 4 TABLET ORAL at 12:01

## 2022-01-22 RX ADMIN — SODIUM CHLORIDE, SODIUM LACTATE, POTASSIUM CHLORIDE, AND CALCIUM CHLORIDE: .6; .31; .03; .02 INJECTION, SOLUTION INTRAVENOUS at 07:01

## 2022-01-22 RX ADMIN — POTASSIUM & SODIUM PHOSPHATES POWDER PACK 280-160-250 MG 1 PACKET: 280-160-250 PACK at 12:01

## 2022-01-22 RX ADMIN — LEUCOVORIN CALCIUM 25 MG: 25 TABLET ORAL at 12:01

## 2022-01-22 RX ADMIN — SODIUM BICARBONATE 650 MG TABLET 1300 MG: at 06:01

## 2022-01-22 RX ADMIN — QUETIAPINE FUMARATE 200 MG: 200 TABLET ORAL at 09:01

## 2022-01-22 RX ADMIN — INSULIN ASPART 2 UNITS: 100 INJECTION, SOLUTION INTRAVENOUS; SUBCUTANEOUS at 05:01

## 2022-01-22 RX ADMIN — DILTIAZEM HYDROCHLORIDE 90 MG: 30 TABLET, FILM COATED ORAL at 12:01

## 2022-01-22 RX ADMIN — GABAPENTIN 300 MG: 300 CAPSULE ORAL at 09:01

## 2022-01-22 RX ADMIN — LEUCOVORIN CALCIUM 25 MG: 25 TABLET ORAL at 07:01

## 2022-01-22 RX ADMIN — LEUCOVORIN CALCIUM 25 MG: 25 TABLET ORAL at 02:01

## 2022-01-22 RX ADMIN — SODIUM CHLORIDE, SODIUM LACTATE, POTASSIUM CHLORIDE, AND CALCIUM CHLORIDE: .6; .31; .03; .02 INJECTION, SOLUTION INTRAVENOUS at 09:01

## 2022-01-22 RX ADMIN — SODIUM BICARBONATE 650 MG TABLET 1300 MG: at 05:01

## 2022-01-22 RX ADMIN — MUPIROCIN: 20 OINTMENT TOPICAL at 08:01

## 2022-01-22 NOTE — ASSESSMENT & PLAN NOTE
-Precursor B-cell acute lymphoblastic leukemia (Ph+)              A. 11/23/2021: [place] [facility] [place] for [place] leukemia              B. 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               C. 11/30/2021 - 12/23/2021: Vincristine + imatinib induction; hospital course was complicated by acute hypoxemic respiratory failure which resolved with diuresis and treatment of ALL    - BMBx from 1/3/22 showing CR  - Admitted 1/19 for C1 of consolidation with EWALL, today is C1D4  - Plan to take home Gleevec days 15-28 of consolidation cycles  - Baseline DIC and lipase levels checked prior to receiving PEG. Levels were wnl  - Scheduled for IT triple therapy in [facility] today, held Xarelto

## 2022-01-22 NOTE — PLAN OF CARE
POC reviewed with patient. VS stable. Ambulates with stand by assist to the bathroom.  is at the bedside assisting her. No complaints during the night. Urine is tested each time for pH levels which has been 7/8 during the night. PT still on fluids and getting meds to clear the methotrexate. Pt has remained free from injury this shift. Bed in low locked position. Call light and personal belongings within reach. Side rails up x2. Nonskid socks in place. Pt instructed to call for any needs. No acute events during the night. Will continue to monitor.     This morning the patient is complaining of LUQ abdomen pain. Will let day shift RN know.

## 2022-01-22 NOTE — ASSESSMENT & PLAN NOTE
- continue home diltiazem and xarelto. Hold xarelto with plt count < 50K.  - resuming xarelto 1/22

## 2022-01-22 NOTE — CONSULTS
Consultation Report  Ophthalmology Service    Date: 01/22/2022    Chief complaint/Reason for Consult: Diplopia     History of Present Illness: Deepti Gonzalez is a 51 y.o. female with PMHx significant for COPD, HTN, HLD, DM2, TIA, anxiety, depression, GERD, seizure disorder PAD, gastroparesis, RA, and osteoporosis admitted to bone marrow team for C1 of consolidation with EWALL. Patient complained of double vision. Ophthalmology consulted to evaluate. Patient states that this happens occasionally. Patient states that it has improved from this morning. Patient states that when symptoms first begin it occurs in both eyes individually (with one eye closed) and as the symptoms improve it happens only with both eyes open. Patient states that the objects are nearly overlapping, but lie side by side. After instillation of eye drops during examination she reported improvement in symptoms. Patient states that symptoms seem to occur when medications are taken all at once rather than individually.     Patient denies any visual changes, visual disturbances, such as flashes, floaters, or curtain-veil in visual field, and ocular discomfort OU.    POcularHx: Hx of traumatic hyphema, unknown eye    Current eye gtts: Denies     Family Hx: Denies family history of glaucoma, macular degeneration, or blindness. family history is not on file.     PMHx:  has a past medical history of Arthritis, COPD (chronic obstructive pulmonary disease), Hypertension, and Stroke.     PSurgHx:  has a past surgical history that includes Appendectomy; Tonsillectomy; Rotator cuff repair (Bilateral); Tubal ligation; and Hysterectomy.     Home Medications:   Prior to Admission medications    Medication Sig Start Date End Date Taking? Authorizing Provider   acyclovir (ZOVIRAX) 400 MG tablet Take 1 tablet (400 mg total) by mouth 2 (two) times daily. 12/7/21   Kathrine Posey MD   aspirin (ECOTRIN) 81 MG EC tablet Take 1 tablet (81 mg total) by mouth once daily.  HOLD until okayed to resume during chemotherapy. 12/7/21   Mary Max DO   atorvastatin (LIPITOR) 80 MG tablet Take 80 mg by mouth once daily.    Historical Provider   cyclobenzaprine (FLEXERIL) 10 MG tablet Take 10 mg by mouth 3 (three) times daily as needed. 1/10/22   Historical Provider   diazePAM (VALIUM) 10 MG Tab Take 10 mg by mouth 2 (two) times daily as needed.    Historical Provider   diltiaZEM (CARDIZEM) 90 MG tablet Take 1 tablet (90 mg total) by mouth every 6 (six) hours. 12/7/21 12/7/22  Kathrine Posey MD   ergocalciferol (ERGOCALCIFEROL) 50,000 unit Cap Take 50,000 Units by mouth every 7 days.    Historical Provider   fenofibrate 160 MG Tab Take 160 mg by mouth once daily.    Historical Provider   gabapentin (NEURONTIN) 300 MG capsule Take 1 capsule (300 mg total) by mouth 3 (three) times daily. 12/7/21 12/7/22  Kathrine Posey MD   imatinib (GLEEVEC) 100 MG Tab Take 2 tablets (200 mg total) by mouth once daily with 1 other imatinib prescription for 600 mg total.Take with a meal and large glass of water 12/5/21   Daina Gonzales MD   imatinib (GLEEVEC) 400 MG Tab Take 1 tablet (400 mg total) by mouth once daily with 1 other imatinib prescription for 600 mg total. Take with a meal and large glass of water 12/5/21   Daina Gonzales MD   JARDIANCE 10 mg tablet Take 10 mg by mouth every morning. 11/12/21   Historical Provider   leflunomide (ARAVA) 20 MG Tab Take 20 mg by mouth once daily.    Historical Provider   losartan (COZAAR) 25 MG tablet Take 25 mg by mouth once daily. 11/12/21   Historical Provider   metFORMIN (GLUCOPHAGE-XR) 500 MG ER 24hr tablet Take 1,000 mg by mouth 2 (two) times daily. 10/28/21   Historical Provider   methocarbamoL (ROBAXIN) 500 MG Tab Take 1 tablet (500 mg total) by mouth 3 (three) times daily as needed (muscle spasms). 12/7/21   Kathrine Posey MD   multivit,min52-folic-vitK-cQ10 (AQUADEKS) 100-700-10 mcg-mcg-mg Cap cap Take 1 capsule by mouth once daily.    Historical  Provider   omeprazole (PRILOSEC) 40 MG capsule Take 40 mg by mouth once daily.    Historical Provider   ondansetron (ZOFRAN-ODT) 8 MG TbDL Dissolve 1 tablet (8 mg total) by mouth every 12 (twelve) hours as needed (in case of chemo induced nausea). 12/17/21 12/17/22  Dayron Jenkins MD   OXcarbazepine (TRILEPTAL) 600 MG Tab Take 1,200 mg by mouth 2 (two) times daily. 10/26/21   Historical Provider   polyethylene glycol (GLYCOLAX) 17 gram/dose powder Dissolve one capful (17 g) in liquid and take by mouth daily as needed (constipation). 12/7/21   Kathrine Posey MD   QUEtiapine (SEROQUEL) 200 MG Tab Take 200 mg by mouth every evening.    Historical Provider   rivaroxaban (XARELTO) 20 mg Tab Take 1 tablet (20 mg total) by mouth daily with dinner or evening meal. HOLD until okayed to resume while platelets are low. 12/7/21   Mary Max DO   senna-docusate 8.6-50 mg (PERICOLACE) 8.6-50 mg per tablet Take 1 tablet by mouth 2 (two) times daily. 12/7/21   Mary Max DO   TRINTELLIX 10 mg Tab Take 1 tablet by mouth once daily. 10/20/21   Historical Provider        Medications this encounter:    acyclovir  400 mg Oral BID    diltiaZEM  90 mg Oral Q6H    famotidine  20 mg Oral BID    gabapentin  300 mg Oral TID    leucovorin  25 mg Oral Q6H    losartan  25 mg Oral Daily    mupirocin   Nasal BID    OXcarbazepine  1,200 mg Oral BID    QUEtiapine  200 mg Oral QHS    rivaroxaban  20 mg Oral Daily with dinner    sodium bicarbonate  1,300 mg Oral Q6H       Allergies: is allergic to levetiracetam.     Social:  reports that she has been smoking cigarettes. She has been smoking about 1.00 pack per day. She has never used smokeless tobacco. She reports that she does not drink alcohol.     ROS: As per HPI    Ocular examination/Dilated fundus examination:  Base Eye Exam     Visual Acuity (Snellen - Linear)       Right Left    Dist cc 20/25 20/25          Tonometry (Tonopen, 3:17 PM)       Right Left    Pressure 20 21           Pupils       Pupils Dark Light Shape React APD    Right PERRL 4 2 Round Brisk None    Left PERRL 4 2 Round Brisk None          Visual Fields       Right Left     Full Full          Extraocular Movement       Right Left     Full, Ortho Full, Ortho          Dilation     Both eyes: 1% Mydriacyl, 2.5% Phenylephrine @ 3:13 PM            Slit Lamp and Fundus Exam     External Exam       Right Left    External Normal Normal          Pen Light Exam       Right Left    Lids/Lashes Normal Normal    Conjunctiva/Sclera White and quiet White and quiet    Cornea Clear Clear    Anterior Chamber Deep and quiet Deep and quiet    Iris Round and reactive Round and reactive    Lens Clear Clear    Vitreous Normal Normal          Fundus Exam       Right Left    Disc pink and sharp pink and sharp    C/D Ratio 0.2 0.2    Macula flat, no heme or exudates flat, no heme or exudates    Vessels Normal Normal    Periphery flat 360, no h/t/d flat 360, no h/t/d                  Assessment/Plan:     1. Monocular Diplopia  - etiology likely 2/2 to dry eye rather than cataract or other refractive error.  - unremarkable eye exam  - No need for imaging at this time.  - No ophthalmic intervention necessary at this time.  - Start artificial tears QID in both eyes.      Paco Youssef MD PGY-2  [facility] Ophthalmology Resident  01/22/2022  3:18 PM

## 2022-01-22 NOTE — SUBJECTIVE & OBJECTIVE
Subjective:     Interval History:no events overnight. 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 MTX levels down to 0.31.     Objective:     Vital Signs (Most Recent):  Temp: 98.4 °F (36.9 °C) (01/22/22 0756)  Pulse: 93 (01/22/22 0756)  Resp: 18 (01/22/22 0756)  BP: (!) 140/68 (01/22/22 0756)  SpO2: 97 % (01/22/22 0756) Vital Signs (24h Range):  Temp:  [97.9 °F (36.6 °C)-99 °F (37.2 °C)] 98.4 °F (36.9 °C)  Pulse:  [82-97] 93  Resp:  [18-19] 18  SpO2:  [94 %-99 %] 97 %  BP: (117-159)/(60-75) 140/68     Weight: 73.2 kg (161 lb 6 oz)  Body mass index is 27.7 kg/m².  Body surface area is 1.82 meters squared.    ECOG SCORE         [unfilled]    Intake/Output - Last 3 Shifts       01/20 0700  01/21 0659 01/21 0700  01/22 0659 01/22 0700  01/23 0659    P.O. 480      I.V. (mL/kg) 3616.4 (48.9)      IV Piggyback 405      Total Intake(mL/kg) 4501.4 (60.9)      Urine (mL/kg/hr) 3900 (2.2) 4600 (2.6)     Stool 0      Total Output 3900 4600     Net +601.4 -4600            Stool Occurrence 1 x            Physical Exam  Constitutional:       Appearance: She is well-developed.   HENT:      Head: Normocephalic and atraumatic.      Mouth/Throat:      Pharynx: No oropharyngeal exudate.   Eyes:      Conjunctiva/sclera: Conjunctivae normal.      Pupils: Pupils are equal, round, and reactive to light.   Cardiovascular:      Rate and Rhythm: Normal rate and regular rhythm.      Heart sounds: Normal heart sounds. No murmur heard.      Pulmonary:      Effort: Pulmonary effort is normal.      Breath sounds: Normal breath sounds.      Comments: Clr/diminished breath sounds  Abdominal:      General: Bowel sounds are normal. There is no distension.      Palpations: Abdomen is soft.      Tenderness: There is no abdominal tenderness.   Musculoskeletal:         General: No deformity. Normal range of motion.      Cervical back: Normal range of motion and neck supple.      Right lower leg: Edema (trace pedal edema) present.      Left lower leg: Edema  (trace pedal edema) present.   Skin:     General: Skin is warm and dry.      Findings: No erythema or rash.      Comments: RUE PICC. Dressing c/d/i. No sign of infection to site.   Neurological:      Mental Status: She is alert and oriented to person, place, and time.   Psychiatric:         Behavior: Behavior normal.         Thought Content: Thought content normal.         Judgment: Judgment normal.         Significant Labs:   CBC:   Recent Labs   Lab 01/21/22  0508 01/22/22  0457   WBC 4.79 5.37   HGB 7.6* 7.5*   HCT 23.3* 23.5*    270    and CMP:   Recent Labs   Lab 01/21/22  0508 01/22/22  0457    137   K 3.0* 4.1   CL 97 105   CO2 29 22*   * 137*   BUN 9 21*   CREATININE 0.7 0.6   CALCIUM 7.6* 8.3*   PROT 5.0* 5.1*   ALBUMIN 2.9* 3.0*   BILITOT 0.2 0.2   ALKPHOS 78 81   AST 14 21   ALT 12 23   ANIONGAP 12 10   EGFRNONAA >60.0 >60.0       Diagnostic Results:  I have reviewed all pertinent imaging results/findings within the past 24 hours.

## 2022-01-22 NOTE — PLAN OF CARE
No acute events overnight. Na bicarb changed to PO. Started LR @ 100. 20 mg lasix admin IV. Pt voiding without difficulty. Urine pH remains >7. K, mag, and phos replacements given. Pt underwent LP with IT chemo this shift. Pt with c/o HA, given tylenol x1 for relief.  Accuchecks AC HS. SSI admin as ordered. Pt remains afebrile and VSS. WCTM.

## 2022-01-22 NOTE — PROGRESS NOTES
[facility] ([facility])  Hematology  Bone Marrow Transplant  Progress Note    Patient Name: Deepti Gonzalez  Admission Date: 1/19/2022  Hospital Length of Stay: 3 days  Code Status: Full Code    Subjective:     Interval History:no events overnight. 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 MTX levels down to 0.31.     Objective:     Vital Signs (Most Recent):  Temp: 98.4 °F (36.9 °C) (01/22/22 0756)  Pulse: 93 (01/22/22 0756)  Resp: 18 (01/22/22 0756)  BP: (!) 140/68 (01/22/22 0756)  SpO2: 97 % (01/22/22 0756) Vital Signs (24h Range):  Temp:  [97.9 °F (36.6 °C)-99 °F (37.2 °C)] 98.4 °F (36.9 °C)  Pulse:  [82-97] 93  Resp:  [18-19] 18  SpO2:  [94 %-99 %] 97 %  BP: (117-159)/(60-75) 140/68     Weight: 73.2 kg (161 lb 6 oz)  Body mass index is 27.7 kg/m².  Body surface area is 1.82 meters squared.    ECOG SCORE         [unfilled]    Intake/Output - Last 3 Shifts       01/20 0700  01/21 0659 01/21 0700  01/22 0659 01/22 0700  01/23 0659    P.O. 480      I.V. (mL/kg) 3616.4 (48.9)      IV Piggyback 405      Total Intake(mL/kg) 4501.4 (60.9)      Urine (mL/kg/hr) 3900 (2.2) 4600 (2.6)     Stool 0      Total Output 3900 4600     Net +601.4 -4600            Stool Occurrence 1 x            Physical Exam  Constitutional:       Appearance: She is well-developed.   HENT:      Head: Normocephalic and atraumatic.      Mouth/Throat:      Pharynx: No oropharyngeal exudate.   Eyes:      Conjunctiva/sclera: Conjunctivae normal.      Pupils: Pupils are equal, round, and reactive to light.   Cardiovascular:      Rate and Rhythm: Normal rate and regular rhythm.      Heart sounds: Normal heart sounds. No murmur heard.      Pulmonary:      Effort: Pulmonary effort is normal.      Breath sounds: Normal breath sounds.      Comments: Clr/diminished breath sounds  Abdominal:      General: Bowel sounds are normal. There is no distension.      Palpations: Abdomen is soft.      Tenderness: There is no abdominal tenderness.    Musculoskeletal:         General: No deformity. Normal range of motion.      Cervical back: Normal range of motion and neck supple.      Right lower leg: Edema (trace pedal edema) present.      Left lower leg: Edema (trace pedal edema) present.   Skin:     General: Skin is warm and dry.      Findings: No erythema or rash.      Comments: RUE PICC. Dressing c/d/i. No sign of infection to site.   Neurological:      Mental Status: She is alert and oriented to person, place, and time.   Psychiatric:         Behavior: Behavior normal.         Thought Content: Thought content normal.         Judgment: Judgment normal.         Significant Labs:   CBC:   Recent Labs   Lab 01/21/22  0508 01/22/22 0457   WBC 4.79 5.37   HGB 7.6* 7.5*   HCT 23.3* 23.5*    270    and CMP:   Recent Labs   Lab 01/21/22  0508 01/22/22 0457    137   K 3.0* 4.1   CL 97 105   CO2 29 22*   * 137*   BUN 9 21*   CREATININE 0.7 0.6   CALCIUM 7.6* 8.3*   PROT 5.0* 5.1*   ALBUMIN 2.9* 3.0*   BILITOT 0.2 0.2   ALKPHOS 78 81   AST 14 21   ALT 12 23   ANIONGAP 12 10   EGFRNONAA >60.0 >60.0       Diagnostic Results:  I have reviewed all pertinent imaging results/findings within the past 24 hours.    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 11/23/2021: Transferred to [facility] from outside hospital for evaluation for acute leukemia              B. 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               C. 11/30/2021 - 12/23/2021: Vincristine + imatinib induction; hospital course was complicated by acute hypoxemic respiratory failure which resolved with diuresis and treatment of  ALL    - BMBx from 1/3/22 showing CR  - Admitted 1/19 for C1 of consolidation with EWALL, today is C1D4  - Plan to take home Gleevec days 15-28 of consolidation cycles  - Baseline DIC and lipase levels checked prior to receiving PEG. Levels were wnl  - Scheduled for IT triple therapy in fluoro today, held Xarelto    Diarrhea  - Pt with diarrhea, evident prior to admission  - immodium prn    Moderate major depression  - continue home trintellix while inpatient    Insomnia  - continue home seroquel while inpatient    Anxiety  - continue home trintellix and valium while inpatient    GERD (gastroesophageal reflux disease)  - hold home PPI until MTX clearance as it can interfere with MTX clearance  - receiving famotidine while inpatient    Hypertension  - continue home losartan while inpatient    Seizure disorder  - continue home oxcarbazepine while inpatient    Type 2 diabetes mellitus, without long-term current use of insulin  - hold home jardiance and metformin while inpatient. Can resume at discharge.  - started on low dose SSI, achs blood glucose monitoring, and diabetic diet on admission  - blood glucose in 200s. Received dexamethasone with chemo, so may be contributing to hyperglycemia  - increased to moderate dose SSI    Rheumatoid arthritis involving multiple sites  - hold home leflunomide until MTX clearance as this can increase hepatotoxicity    History of TIA (transient ischemic attack)  - hold home ASA unitl MTX clearance as it can interfere with MTX clearance    Hyperlipidemia  - hold home statin and fenofribrate while receiving chemo    Long term current use of anticoagulant  - continue home xarelto. Hold with plts < 50K.      Paroxysmal atrial fibrillation  - continue home diltiazem and xarelto. Hold xarelto with plt count < 50K.  - resuming xarelto 1/22        VTE Risk Mitigation (From admission, onward)         Ordered     rivaroxaban tablet 20 mg  With dinner         01/22/22 0655     heparin, porcine  (PF) 100 unit/mL injection flush 500 Units  As needed (PRN)         01/19/22 1644     IP VTE HIGH RISK PATIENT  Once         01/19/22 1555     Place sequential compression device  Until discontinued         01/19/22 1555                  Sunil Amos MD  Bone Marrow Transplant  [facility] - Oncology ([facility])

## 2022-01-23 LAB
BASOPHILS # BLD AUTO: 0 K/UL (ref 0–0.2)
BASOPHILS NFR BLD: 0 % (ref 0–1.9)
BILIRUB SERPL-MCNC: NORMAL MG/DL
BLOOD URINE, POC: NORMAL
COLOR, POC UA: NORMAL
DIFFERENTIAL METHOD: ABNORMAL
EOSINOPHIL # BLD AUTO: 0 K/UL (ref 0–0.5)
EOSINOPHIL NFR BLD: 0.4 % (ref 0–8)
ERYTHROCYTE [DISTWIDTH] IN BLOOD BY AUTOMATED COUNT: 17.8 % (ref 11.5–14.5)
GLUCOSE UR QL STRIP: NORMAL
HCT VFR BLD AUTO: 23.2 % (ref 37–48.5)
HGB BLD-MCNC: 7.6 G/DL (ref 12–16)
IMM GRANULOCYTES # BLD AUTO: 0.02 K/UL (ref 0–0.04)
IMM GRANULOCYTES NFR BLD AUTO: 0.3 % (ref 0–0.5)
KETONES UR QL STRIP: NORMAL
LEUKOCYTE ESTERASE URINE, POC: NORMAL
LYMPHOCYTES # BLD AUTO: 2.6 K/UL (ref 1–4.8)
LYMPHOCYTES NFR BLD: 39.1 % (ref 18–48)
MCH RBC QN AUTO: 32.8 PG (ref 27–31)
MCHC RBC AUTO-ENTMCNC: 32.8 G/DL (ref 32–36)
MCV RBC AUTO: 100 FL (ref 82–98)
MONOCYTES # BLD AUTO: 0.1 K/UL (ref 0.3–1)
MONOCYTES NFR BLD: 0.9 % (ref 4–15)
MTX SERPL-SCNC: 0.13 UMOL/L (ref 0.5–5)
MTX SERPL-SCNC: 0.13 UMOL/L (ref 0.5–5)
NEUTROPHILS # BLD AUTO: 4 K/UL (ref 1.8–7.7)
NEUTROPHILS NFR BLD: 59.3 % (ref 38–73)
NITRITE, POC UA: NORMAL
NRBC BLD-RTO: 0 /100 WBC
PH, POC UA: 7
PLATELET # BLD AUTO: 296 K/UL (ref 150–450)
PMV BLD AUTO: 9 FL (ref 9.2–12.9)
POCT GLUCOSE: 127 MG/DL (ref 70–110)
POCT GLUCOSE: 185 MG/DL (ref 70–110)
POCT GLUCOSE: 216 MG/DL (ref 70–110)
POCT GLUCOSE: 223 MG/DL (ref 70–110)
PROTEIN, POC: NORMAL
RBC # BLD AUTO: 2.32 M/UL (ref 4–5.4)
SPECIFIC GRAVITY, POC UA: NORMAL
UROBILINOGEN, POC UA: NORMAL
WBC # BLD AUTO: 6.67 K/UL (ref 3.9–12.7)

## 2022-01-23 PROCEDURE — 25000003 PHARM REV CODE 250: Performed by: NURSE PRACTITIONER

## 2022-01-23 PROCEDURE — 20600001 HC STEP DOWN PRIVATE ROOM

## 2022-01-23 PROCEDURE — 80204 DRUG ASSAY METHOTREXATE: CPT | Mod: 91 | Performed by: INTERNAL MEDICINE

## 2022-01-23 PROCEDURE — 99238 PR HOSPITAL DISCHARGE DAY,<30 MIN: ICD-10-PCS | Mod: ,,, | Performed by: INTERNAL MEDICINE

## 2022-01-23 PROCEDURE — 99238 HOSP IP/OBS DSCHRG MGMT 30/<: CPT | Mod: ,,, | Performed by: INTERNAL MEDICINE

## 2022-01-23 PROCEDURE — 80204 DRUG ASSAY METHOTREXATE: CPT | Performed by: INTERNAL MEDICINE

## 2022-01-23 PROCEDURE — 25000003 PHARM REV CODE 250: Performed by: INTERNAL MEDICINE

## 2022-01-23 PROCEDURE — 85025 COMPLETE CBC W/AUTO DIFF WBC: CPT | Performed by: INTERNAL MEDICINE

## 2022-01-23 PROCEDURE — 94761 N-INVAS EAR/PLS OXIMETRY MLT: CPT

## 2022-01-23 PROCEDURE — 63600175 PHARM REV CODE 636 W HCPCS: Performed by: INTERNAL MEDICINE

## 2022-01-23 RX ADMIN — SODIUM BICARBONATE 650 MG TABLET 1300 MG: at 05:01

## 2022-01-23 RX ADMIN — ACYCLOVIR 400 MG: 200 CAPSULE ORAL at 08:01

## 2022-01-23 RX ADMIN — MUPIROCIN 1 TUBE: 20 OINTMENT TOPICAL at 08:01

## 2022-01-23 RX ADMIN — MUPIROCIN: 20 OINTMENT TOPICAL at 08:01

## 2022-01-23 RX ADMIN — QUETIAPINE FUMARATE 200 MG: 200 TABLET ORAL at 08:01

## 2022-01-23 RX ADMIN — LEUCOVORIN CALCIUM 25 MG: 25 TABLET ORAL at 12:01

## 2022-01-23 RX ADMIN — GABAPENTIN 300 MG: 300 CAPSULE ORAL at 12:01

## 2022-01-23 RX ADMIN — LEUCOVORIN CALCIUM 25 MG: 25 TABLET ORAL at 01:01

## 2022-01-23 RX ADMIN — FAMOTIDINE 20 MG: 20 TABLET ORAL at 08:01

## 2022-01-23 RX ADMIN — SODIUM CHLORIDE, SODIUM LACTATE, POTASSIUM CHLORIDE, AND CALCIUM CHLORIDE: .6; .31; .03; .02 INJECTION, SOLUTION INTRAVENOUS at 04:01

## 2022-01-23 RX ADMIN — GABAPENTIN 300 MG: 300 CAPSULE ORAL at 08:01

## 2022-01-23 RX ADMIN — GABAPENTIN 300 MG: 300 CAPSULE ORAL at 05:01

## 2022-01-23 RX ADMIN — SODIUM BICARBONATE 650 MG TABLET 1300 MG: at 12:01

## 2022-01-23 RX ADMIN — DILTIAZEM HYDROCHLORIDE 90 MG: 30 TABLET, FILM COATED ORAL at 12:01

## 2022-01-23 RX ADMIN — DILTIAZEM HYDROCHLORIDE 90 MG: 30 TABLET, FILM COATED ORAL at 05:01

## 2022-01-23 RX ADMIN — LEUCOVORIN CALCIUM 25 MG: 25 TABLET ORAL at 07:01

## 2022-01-23 RX ADMIN — LOSARTAN POTASSIUM 25 MG: 25 TABLET, FILM COATED ORAL at 09:01

## 2022-01-23 RX ADMIN — OXCARBAZEPINE 1200 MG: 600 TABLET, FILM COATED ORAL at 08:01

## 2022-01-23 RX ADMIN — RIVAROXABAN 20 MG: 20 TABLET, FILM COATED ORAL at 05:01

## 2022-01-23 RX ADMIN — INSULIN ASPART 2 UNITS: 100 INJECTION, SOLUTION INTRAVENOUS; SUBCUTANEOUS at 12:01

## 2022-01-23 RX ADMIN — LEUCOVORIN CALCIUM 25 MG: 25 TABLET ORAL at 08:01

## 2022-01-23 NOTE — PLAN OF CARE
POC reviewed with patient; understanding verbalized. MTX at 0.13. Pt remains up with assist. Diabetic diet with good appetite; ACHS with coverage required. She voids clear, yellow urine; urine pH at 7. LR at 100.  to remain at the bedside. Pt. with nonskid footwear on, bed in lowest position, and locked with bed rails up x2.  Pt. instructed to call prior to getting OOB.  Pt. has call light and personal items within reach. VSS and afebrile this shift. All questions and concerns addressed at this time. Will continue to monitor.

## 2022-01-23 NOTE — DISCHARGE SUMMARY
[facility] (Hospital)  Discharge Summary      Admit Date: 1/19/2022    Discharge Date and Time:  01/23/2022 2:13 PM    Attending Physician: Yusuf Joseph MD     Reason for Admission: chemotherapy     Procedures Performed: * No surgery found *    Hospital Course (synopsis of major diagnoses, care, treatment, and services provided during the course of the hospital stay): tolerated EWAL chemotherapy with no issues.   mtx cleared and dc'dSydnie  Has local fu with Dr. Garcia this week for lab check.      Goals of Care Treatment Preferences:  Code Status: Full Code      Consults: ophthalmology for diplopia resolved with artifical tears    Significant Diagnostic Studies: see results tab    Final Diagnoses:    Principal Problem: B-cell acute lymphoblastic leukemia        Discharged Condition: good    Disposition: Home or Self Care    Follow Up/Patient Instructions:     Medications:  Reconciled Home Medications:      Medication List      ASK your doctor about these medications    acyclovir 400 MG tablet  Commonly known as: ZOVIRAX  Take 1 tablet (400 mg total) by mouth 2 (two) times daily.     aspirin 81 MG EC tablet  Commonly known as: ECOTRIN  Take 1 tablet (81 mg total) by mouth once daily. HOLD until okayed to resume during chemotherapy.     atorvastatin 80 MG tablet  Commonly known as: LIPITOR  Take 80 mg by mouth once daily.     cyclobenzaprine 10 MG tablet  Commonly known as: FLEXERIL  Take 10 mg by mouth 3 (three) times daily as needed.     diazePAM 10 MG Tab  Commonly known as: VALIUM  Take 10 mg by mouth 2 (two) times daily as needed.     diltiaZEM 90 MG tablet  Commonly known as: CARDIZEM  Take 1 tablet (90 mg total) by mouth every 6 (six) hours.     ergocalciferol 50,000 unit Cap  Commonly known as: ERGOCALCIFEROL  Take 50,000 Units by mouth every 7 days.     fenofibrate 160 MG Tab  Take 160 mg by mouth once daily.     gabapentin 300 MG capsule  Commonly known as: NEURONTIN  Take 1 capsule (300 mg  total) by mouth 3 (three) times daily.     * imatinib 100 MG Tab  Commonly known as: GLEEVEC  Take 2 tablets (200 mg total) by mouth once daily with 1 other imatinib prescription for 600 mg total.Take with a meal and large glass of water     * imatinib 400 MG Tab  Commonly known as: GLEEVEC  Take 1 tablet (400 mg total) by mouth once daily with 1 other imatinib prescription for 600 mg total. Take with a meal and large glass of water     JARDIANCE 10 mg tablet  Generic drug: empagliflozin  Take 10 mg by mouth every morning.     leflunomide 20 MG Tab  Commonly known as: ARAVA  Take 20 mg by mouth once daily.     losartan 25 MG tablet  Commonly known as: COZAAR  Take 25 mg by mouth once daily.     metFORMIN 500 MG ER 24hr tablet  Commonly known as: GLUCOPHAGE-XR  Take 1,000 mg by mouth 2 (two) times daily.     methocarbamoL 500 MG Tab  Commonly known as: ROBAXIN  Take 1 tablet (500 mg total) by mouth 3 (three) times daily as needed (muscle spasms).     multivit,min52-folic-vitK-cQ10 100-700-10 mcg-mcg-mg Cap cap  Commonly known as: AQUADEKS  Take 1 capsule by mouth once daily.     omeprazole 40 MG capsule  Commonly known as: PRILOSEC  Take 40 mg by mouth once daily.     ondansetron 8 MG Tbdl  Commonly known as: ZOFRAN-ODT  Dissolve 1 tablet (8 mg total) by mouth every 12 (twelve) hours as needed (in case of chemo induced nausea).     OXcarbazepine 600 MG Tab  Commonly known as: TRILEPTAL  Take 1,200 mg by mouth 2 (two) times daily.     polyethylene glycol 17 gram/dose powder  Commonly known as: GLYCOLAX  Dissolve one capful (17 g) in liquid and take by mouth daily as needed (constipation).     QUEtiapine 200 MG Tab  Commonly known as: SEROQUEL  Take 200 mg by mouth every evening.     rivaroxaban 20 mg Tab  Commonly known as: XARELTO  Take 1 tablet (20 mg total) by mouth daily with dinner or evening meal. HOLD until okayed to resume while platelets are low.     SENNA PLUS 8.6-50 mg per tablet  Generic drug:  senna-docusate 8.6-50 mg  Take 1 tablet by mouth 2 (two) times daily.     TRINTELLIX 10 mg Tab  Generic drug: vortioxetine  Take 1 tablet by mouth once daily.         * This list has 2 medication(s) that are the same as other medications prescribed for you. Read the directions carefully, and ask your doctor or other care provider to review them with you.              Discharge Procedure Orders   CBC Auto Differential   Standing Status: Future Standing Exp. Date: 03/22/23   Order Comments:       Comprehensive Metabolic Panel   Standing Status: Future Standing Exp. Date: 03/22/23   Order Comments:       Magnesium   Standing Status: Future Standing Exp. Date: 03/22/23   Order Comments:       Phosphorus   Standing Status: Future Standing Exp. Date: 03/22/23   Order Comments:       Type & Screen   Standing Status: Future Standing Exp. Date: 03/22/23

## 2022-01-23 NOTE — PLAN OF CARE
Patient A/O x4, POC reviewed with patient spouse, questions answered and concerns addressed. Patient denied pain at this time. No acute events present, urine PH remains within range, Vs stable, will continue to monitor.

## 2022-01-24 VITALS
TEMPERATURE: 99 F | OXYGEN SATURATION: 97 % | DIASTOLIC BLOOD PRESSURE: 77 MMHG | HEART RATE: 80 BPM | BODY MASS INDEX: 27.63 KG/M2 | SYSTOLIC BLOOD PRESSURE: 147 MMHG | HEIGHT: 64 IN | RESPIRATION RATE: 16 BRPM | WEIGHT: 161.81 LBS

## 2022-01-24 LAB — MTX SERPL-SCNC: 0.08 UMOL/L (ref 0.5–5)

## 2022-01-24 PROCEDURE — 25000003 PHARM REV CODE 250: Performed by: INTERNAL MEDICINE

## 2022-01-24 PROCEDURE — 99238 HOSP IP/OBS DSCHRG MGMT 30/<: CPT | Mod: ,,, | Performed by: INTERNAL MEDICINE

## 2022-01-24 PROCEDURE — 99238 PR HOSPITAL DISCHARGE DAY,<30 MIN: ICD-10-PCS | Mod: ,,, | Performed by: INTERNAL MEDICINE

## 2022-01-24 PROCEDURE — 25000003 PHARM REV CODE 250: Performed by: NURSE PRACTITIONER

## 2022-01-24 PROCEDURE — 80204 DRUG ASSAY METHOTREXATE: CPT | Performed by: INTERNAL MEDICINE

## 2022-01-24 RX ORDER — LEUCOVORIN CALCIUM 25 MG/1
25 TABLET ORAL EVERY 6 HOURS
Qty: 12 TABLET | Refills: 0 | Status: SHIPPED | OUTPATIENT
Start: 2022-01-24 | End: 2022-01-27

## 2022-01-24 RX ADMIN — SODIUM BICARBONATE 650 MG TABLET 1300 MG: at 06:01

## 2022-01-24 RX ADMIN — LEUCOVORIN CALCIUM 25 MG: 25 TABLET ORAL at 01:01

## 2022-01-24 RX ADMIN — ACYCLOVIR 400 MG: 200 CAPSULE ORAL at 09:01

## 2022-01-24 RX ADMIN — FAMOTIDINE 20 MG: 20 TABLET ORAL at 09:01

## 2022-01-24 RX ADMIN — DILTIAZEM HYDROCHLORIDE 90 MG: 30 TABLET, FILM COATED ORAL at 09:01

## 2022-01-24 RX ADMIN — GABAPENTIN 300 MG: 300 CAPSULE ORAL at 06:01

## 2022-01-24 RX ADMIN — MUPIROCIN: 20 OINTMENT TOPICAL at 09:01

## 2022-01-24 RX ADMIN — ALUMINUM HYDROXIDE, MAGNESIUM HYDROXIDE, AND DIMETHICONE 10 ML: 400; 400; 40 SUSPENSION ORAL at 10:01

## 2022-01-24 RX ADMIN — SODIUM BICARBONATE 650 MG TABLET 1300 MG: at 01:01

## 2022-01-24 RX ADMIN — DILTIAZEM HYDROCHLORIDE 90 MG: 30 TABLET, FILM COATED ORAL at 01:01

## 2022-01-24 RX ADMIN — LEUCOVORIN CALCIUM 25 MG: 25 TABLET ORAL at 09:01

## 2022-01-24 RX ADMIN — OXCARBAZEPINE 1200 MG: 600 TABLET, FILM COATED ORAL at 09:01

## 2022-01-24 RX ADMIN — LOSARTAN POTASSIUM 25 MG: 25 TABLET, FILM COATED ORAL at 09:01

## 2022-01-24 NOTE — ASSESSMENT & PLAN NOTE
-Precursor B-cell acute lymphoblastic leukemia (Ph+)              A. 11/23/2021: Transferred to [facility] from outside hospital for evaluation for acute leukemia              B. 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               C. 11/30/2021 - 12/23/2021: Vincristine + imatinib induction; hospital course was complicated by acute hypoxemic respiratory failure which resolved with diuresis and treatment of ALL    - BMBx from 1/3/22 showing CR  - Admitted 1/19 for C1 of consolidation with EWALL, today is C1D5  - Plan to take home Gleevec days 15-28 of consolidation cycles  - Baseline DIC and lipase levels checked prior to receiving PEG. Levels were wnl  - Received IT triple therapy in fluoro 1/21/22

## 2022-01-24 NOTE — PROGRESS NOTES
Roberto Yepez - Oncology ([facility])  Hematology  Bone Marrow Transplant  Progress Note    Patient Name: Deepti Gonzalez  Admission Date: 1/19/2022  Hospital Length of Stay: 5 days  Code Status: Full Code    Subjective:     Interval History: C1D5 of EWALL. Continues to be afebrile. VSS. C/o mild buccal mucositis today. [place] ordered. Otherwise, tolerating chemo well. No c/o abdominal pain. No evidence of bleeding. MTX level 0.08 today. Will discharge home. She will follow locally with Dr. Garcia for lab monitoring and PICC line care and will f/u with Dr. Jenkins on 2/16/22 prior to readmission for C2.    Objective:     Vital Signs (Most Recent):  Temp: 98.8 °F (37.1 °C) (01/24/22 0802)  Pulse: 80 (01/24/22 0802)  Resp: 16 (01/24/22 0802)  BP: (!) 147/77 (01/24/22 0802)  SpO2: 97 % (01/24/22 0802) Vital Signs (24h Range):  Temp:  [98.2 °F (36.8 °C)-100.1 °F (37.8 °C)] 98.8 °F (37.1 °C)  Pulse:  [] 80  Resp:  [16-20] 16  SpO2:  [94 %-99 %] 97 %  BP: (107-147)/(51-77) 147/77     Weight: 73.4 kg (161 lb 13.1 oz)  Body mass index is 27.78 kg/m².  Body surface area is 1.82 meters squared.    ECOG SCORE         [unfilled]    Intake/Output - Last 3 Shifts       01/22 0700  01/23 0659 01/23 0700  01/24 0659 01/24 0700  01/25 0659    P.O.  1000     I.V. (mL/kg) 3062 (41.7) 852 (11.6)     Total Intake(mL/kg) 3062 (41.7) 1852 (25.2)     Urine (mL/kg/hr) 4400 (2.5) 2900 (1.6)     Stool  0     Total Output 4400 2900     Net -1338 -1048            Stool Occurrence  0 x           Physical Exam  Constitutional:       Appearance: She is well-developed and well-nourished.   HENT:      Head: Normocephalic and atraumatic.      Mouth/Throat:      Pharynx: No oropharyngeal exudate.      Comments: Mild buccal mucositis  Eyes:      Conjunctiva/sclera: Conjunctivae normal.      Pupils: Pupils are equal, round, and reactive to light.   Cardiovascular:      Rate and Rhythm: Normal rate and regular rhythm.      Heart sounds: Normal heart sounds.  No murmur heard.      Pulmonary:      Effort: Pulmonary effort is normal.      Breath sounds: Normal breath sounds.   Abdominal:      General: Bowel sounds are normal. There is no distension.      Palpations: Abdomen is soft.      Tenderness: There is no abdominal tenderness.   Musculoskeletal:         General: No deformity. Normal range of motion.      Cervical back: Normal range of motion and neck supple.      Right lower leg: Edema (trace pedal edema) present.      Left lower leg: Edema (trace pedal edema) present.   Skin:     General: Skin is warm and dry.      Findings: No erythema or rash.   Neurological:      Mental Status: She is alert and oriented to person, place, and time.   Psychiatric:         Mood and Affect: Mood and affect normal.         Behavior: Behavior normal.         Thought Content: Thought content normal.         Judgment: Judgment normal.         Significant Labs:   CBC:   Recent Labs   Lab 01/23/22  0434   WBC 6.67   HGB 7.6*   HCT 23.2*         Diagnostic Results:  I have reviewed all pertinent imaging results/findings within the past 24 hours.    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 [place]23[place]20[place] [facility] [place] for [place]              B. 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               C. 11/30/2021 - 12/23/2021: Vincristine + imatinib induction; hospital course was complicated by acute hypoxemic respiratory failure which resolved with diuresis and treatment of ALL    - BMBx from 1/3/22 showing CR  - Admitted 1/19 for C1 of consolidation with EWALL, today is  C1D5  - Plan to take home Gleevec days 15-28 of consolidation cycles  - Baseline DIC and lipase levels checked prior to receiving PEG. Levels were wnl  - Received IT triple therapy in fluoro 1/21/22    Diarrhea  - Pt with diarrhea, evident prior to admission  - immodium prn    Moderate major depression  - continue home trintellix while inpatient    Insomnia  - continue home seroquel while inpatient    Anxiety  - continue home trintellix and valium while inpatient    GERD (gastroesophageal reflux disease)  - hold home PPI until MTX clearance as it can interfere with MTX clearance  - receiving famotidine while inpatient    Hypertension  - continue home losartan while inpatient    Seizure disorder  - continue home oxcarbazepine while inpatient    Type 2 diabetes mellitus, without long-term current use of insulin  - hold home jardiance and metformin while inpatient. Can resume at discharge.  - started on low dose SSI, achs blood glucose monitoring, and diabetic diet on admission  - blood glucose in 200s. Received dexamethasone with chemo, so may be contributing to hyperglycemia  - increased to moderate dose SSI  - resuming home meds at discharge    Rheumatoid arthritis involving multiple sites  - hold home leflunomide until MTX clearance as this can increase hepatotoxicity    History of TIA (transient ischemic attack)  - hold home ASA unitl MTX clearance as it can interfere with MTX clearance    Hyperlipidemia  - hold home statin and fenofribrate while receiving chemo    Long term current use of anticoagulant  - continue home xarelto. Hold with plts < 50K.      Paroxysmal atrial fibrillation  - continue home diltiazem and xarelto. Hold xarelto with plt count < 50K.          VTE Risk Mitigation (From admission, onward)         Ordered     rivaroxaban tablet 20 mg  With dinner         01/22/22 0655     heparin, porcine (PF) 100 unit/mL injection flush 500 Units  As needed (PRN)         01/19/22 1644     IP VTE HIGH RISK  PATIENT  Once         01/19/22 1555     Place sequential compression device  Until discontinued         01/19/22 1555                Disposition: Inpatient for chemo. Will discharge home today.    Mirtha Antonio, NP  Bone Marrow Transplant  Roberto britta - Oncology ([facility])

## 2022-01-24 NOTE — PLAN OF CARE
POC and discharge instructions reviewed with patient and  at bedside. PICC left in place, per orders. AVS reviewed; discharge education provided; prescriptions delivered to bedside prior to d/c home. Transportation provided by ; assisted to garage via wheelchair by hospital transport. All questions answered and needs addressed.

## 2022-01-24 NOTE — PLAN OF CARE
POC reviewed with patient; all questions answered and addressed. Pt denied pain at this time; voiding adequately clear, yellow urine. Urine  PH remains 7,LR infusing at 100 ml/hr. Patient ambulates with assistance,;non skid socks applied, bed in low locked position; all personal items and call bell in reach. Patient instructed to call for assistance prior getting OOB, understanding verbalized. Vs stable at this time-will continue to monitor. At approximately 0630 when reported to patient room to administer AM medications and noted patient to be drowsy and writer mentioned current  Diazepam dose of 90 mg TID with patient . According to ,  patient is taking Diazepam 10 twice  daily as needed. Medication clarified with  and bottle label matches the  information. 0600 dose held BMT team notified.

## 2022-01-24 NOTE — SUBJECTIVE & OBJECTIVE
Subjective:     Interval History: C1D5 of EWALL. Continues to be afebrile. VSS. C/o mild buccal mucositis today. [place] ordered. Otherwise, tolerating chemo well. No c/o abdominal pain. No evidence of bleeding. MTX level 0.08 today. Will discharge home. She will follow locally with Dr. Garcia for lab monitoring and PICC line care and will f/u with Dr. Jenkins on 2/16/22 prior to readmission for C2.    Objective:     Vital Signs (Most Recent):  Temp: 98.8 °F (37.1 °C) (01/24/22 0802)  Pulse: 80 (01/24/22 0802)  Resp: 16 (01/24/22 0802)  BP: (!) 147/77 (01/24/22 0802)  SpO2: 97 % (01/24/22 0802) Vital Signs (24h Range):  Temp:  [98.2 °F (36.8 °C)-100.1 °F (37.8 °C)] 98.8 °F (37.1 °C)  Pulse:  [] 80  Resp:  [16-20] 16  SpO2:  [94 %-99 %] 97 %  BP: (107-147)/(51-77) 147/77     Weight: 73.4 kg (161 lb 13.1 oz)  Body mass index is 27.78 kg/m².  Body surface area is 1.82 meters squared.    ECOG SCORE         [unfilled]    Intake/Output - Last 3 Shifts       01/22 0700  01/23 0659 01/23 0700  01/24 0659 01/24 0700  01/25 0659    P.O.  1000     I.V. (mL/kg) 3062 (41.7) 852 (11.6)     Total Intake(mL/kg) 3062 (41.7) 1852 (25.2)     Urine (mL/kg/hr) 4400 (2.5) 2900 (1.6)     Stool  0     Total Output 4400 2900     Net -1338 -1048            Stool Occurrence  0 x           Physical Exam  Constitutional:       Appearance: She is well-developed and well-nourished.   HENT:      Head: Normocephalic and atraumatic.      Mouth/Throat:      Pharynx: No oropharyngeal exudate.      Comments: Mild buccal mucositis  Eyes:      Conjunctiva/sclera: Conjunctivae normal.      Pupils: Pupils are equal, round, and reactive to light.   Cardiovascular:      Rate and Rhythm: Normal rate and regular rhythm.      Heart sounds: Normal heart sounds. No murmur heard.      Pulmonary:      Effort: Pulmonary effort is normal.      Breath sounds: Normal breath sounds.   Abdominal:      General: Bowel sounds are normal. There is no distension.       Palpations: Abdomen is soft.      Tenderness: There is no abdominal tenderness.   Musculoskeletal:         General: No deformity. Normal range of motion.      Cervical back: Normal range of motion and neck supple.      Right lower leg: Edema (trace pedal edema) present.      Left lower leg: Edema (trace pedal edema) present.   Skin:     General: Skin is warm and dry.      Findings: No erythema or rash.   Neurological:      Mental Status: She is alert and oriented to person, place, and time.   Psychiatric:         Mood and Affect: Mood and affect normal.         Behavior: Behavior normal.         Thought Content: Thought content normal.         Judgment: Judgment normal.         Significant Labs:   CBC:   Recent Labs   Lab 01/23/22  0434   WBC 6.67   HGB 7.6*   HCT 23.2*         Diagnostic Results:  I have reviewed all pertinent imaging results/findings within the past 24 hours.

## 2022-01-24 NOTE — ASSESSMENT & PLAN NOTE
- hold home jardiance and metformin while inpatient. Can resume at discharge.  - started on low dose SSI, achs blood glucose monitoring, and diabetic diet on admission  - blood glucose in 200s. Received dexamethasone with chemo, so may be contributing to hyperglycemia  - increased to moderate dose SSI  - resuming home meds at discharge

## 2022-01-24 NOTE — DISCHARGE SUMMARY
Roberto Yepez - Oncology ([facility])  Hematology  Bone Marrow Transplant  Discharge Summary      Patient Name: Deepti Gonzalez  MRN: [id number]  Admission Date: 1/19/2022  Hospital Length of Stay: 5 days  Discharge Date and Time: 1/24/2022 11:59 AM  Attending Physician: No att. providers found   Discharging Provider: Mirtha Antonio NP  Primary Care Provider: Wen Oliveros MD    HPI: Ms. Gonzalez is a 51-y-o patient of Dr. Jenkins with ALL. Other medical history includes COPD, HTN, HLD, DM2, TIA, anxiety, depression, GERD, seizure disorder PAD, gastroparesis, RA, osteoporosis, and pacermaker placement. She is admitting today for C1 of consolidation with EWALL. She is s/p induction with Vincristine + imatinib, which she recieved inpatient at [facility] [place]. That hospitalization was complicated by acute hypoxic respiratory failure requiring ICU transfer. Post induction BMBx was performed 1/3/22. Showing CR. She is feeling well today. Denies fevers, chills, aches, cough, SOB, chest pain, bleeding or bruising, and n/v/d/c. She denies any recent sick contacts. She is COVID neg.      * No surgery found *     Hospital Course: Admitted 1/24/22 for C1 of consolidation with EWALL for B-ALL. Lipase and coags normal prior to receiving Peg. Tolerated chemo well. No evidence of pancreatitis or DIC post Peg administration. Admission complicated by chronic diarrhea relieved by imodium, blurry vision, which resolved with natural tears per ophtho rec, mild fluid overload likely 2/2 IV fluids and chemo for which she was diuresed, and mild buccal mucositis, likely 2/2 HD MTX. Discharged with prn [facility]. Discharged home 1/24/22 with MTX clearance. She will continue leukovorin for 72 hours post discharge. Labs will be monitored weekly and line care will be performed locally with Dr. Garcia in [place]. She will f/u with Dr. Jenkins on 2/16/21 prior to admission for C2 of EWALL.     B-cell acute lymphoblastic leukemia  -Precursor B-cell acute lymphoblastic  leukemia (Ph+)              A. 11/23/2021: [place] [facility] [place] for acute leukemia              B. 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               C. 11/30/2021 - 12/23/2021: Vincristine + imatinib induction; hospital course was complicated by acute hypoxemic respiratory failure which resolved with diuresis and treatment of ALL     - BMBx from 1/3/22 showing CR  - Admitted 1/19 for C1 of consolidation with EWALL, today is C1D5  - Plan to take home Gleevec days 15-28 of consolidation cycles  - Baseline DIC and lipase levels checked prior to receiving PEG. Levels were wnl  - Received IT triple therapy in fluoro 1/21/22     Diarrhea  - Pt with diarrhea, evident prior to admission  - immodium prn     Moderate major depression  - continue home trintellix while inpatient     Insomnia  - continue home seroquel while inpatient     Anxiety  - continue home trintellix and valium while inpatient     GERD (gastroesophageal reflux disease)  - hold home PPI until MTX clearance as it can interfere with MTX clearance  - receiving famotidine while inpatient     Hypertension  - continue home losartan while inpatient     Seizure disorder  - continue home oxcarbazepine while inpatient     Type 2 diabetes mellitus, without long-term current use of insulin  - hold home jardiance and metformin while inpatient. Can resume at discharge.  - started on low dose SSI, achs blood glucose monitoring, and diabetic diet on admission  - blood glucose in 200s. Received dexamethasone with chemo, so may be contributing to hyperglycemia  - increased to moderate dose SSI  - resuming home meds at discharge     Rheumatoid arthritis  involving multiple sites  - hold home leflunomide until MTX clearance as this can increase hepatotoxicity     History of TIA (transient ischemic attack)  - hold home ASA unitl MTX clearance as it can interfere with MTX clearance     Hyperlipidemia  - hold home statin and fenofribrate while receiving chemo     Long term current use of anticoagulant  - continue home xarelto. Hold with plts < 50K.      Paroxysmal atrial fibrillation  - continue home diltiazem and xarelto. Hold xarelto with plt count < 50K.    Goals of Care Treatment Preferences:  Code Status: Full Code      Consults (From admission, onward)        Status Ordering Provider     Inpatient consult to Ophthalmology  Once        Provider:  (Not yet assigned)    Completed TY MCKEE          Significant Diagnostic Studies: Labs:   CBC   Recent Labs   Lab 01/23/22  0434   WBC 6.67   HGB 7.6*   HCT 23.2*          Pending Diagnostic Studies:     Procedure Component Value Units Date/Time    Comprehensive metabolic panel [[id number]] Collected: 01/23/22 0430    Order Status: Sent Lab Status: In process Updated: 01/23/22 0447    Specimen: Blood     FL Chemo Administration Intrathecal With LP, HEMONC Injected [[id number]] Resulted: 01/21/22 1456    Order Status: Sent Lab Status: In process Updated: 01/21/22 1621    Flow Cytometry Analysis (CSF) [[id number]] Collected: 01/21/22 1523    Order Status: Sent Lab Status: In process Updated: 01/22/22 0625    Specimen: CSF (Spinal Fluid) from Cerebrospinal Fluid         Final Active Diagnoses:    Diagnosis Date Noted POA    PRINCIPAL PROBLEM:  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] 11/24/2021 Yes    Diarrhea [R19.7] 01/20/2022 Yes    Moderate major depression [F32.1] 11/29/2021 Yes    Type 2 diabetes mellitus, without long-term current use of insulin [E11.9] 11/24/2021 Yes    Seizure disorder [G40.909] 11/24/2021 Yes    Hypertension [I10] 11/24/2021 Yes    GERD (gastroesophageal reflux disease)  [K21.9] 11/24/2021 Yes    Anxiety [F41.9] 11/24/2021 Yes    Insomnia [G47.00] 11/24/2021 Yes    Paroxysmal atrial fibrillation [I48.0] 12/22/2017 Yes    Long term current use of anticoagulant [Z79.01] 12/22/2017 Not Applicable    Hyperlipidemia [E78.5] 12/22/2017 Yes    History of TIA (transient ischemic attack) [Z86.73] 12/22/2017 Not Applicable    Rheumatoid arthritis involving multiple sites [M06.9] 12/22/2017 Yes      Problems Resolved During this Admission:      Discharged Condition: stable    Disposition: Home or Self Care    Follow Up:    Patient Instructions:      CBC Auto Differential   Standing Status: Future Standing Exp. Date: 03/22/23   Order Comments:       Comprehensive Metabolic Panel   Standing Status: Future Standing Exp. Date: 03/22/23   Order Comments:       Magnesium   Standing Status: Future Standing Exp. Date: 03/22/23   Order Comments:       Phosphorus   Standing Status: Future Standing Exp. Date: 03/22/23   Order Comments:       Diet diabetic     Notify your health care provider if you experience any of the following:  temperature >100.4     Notify your health care provider if you experience any of the following:  persistent nausea and vomiting or diarrhea     Notify your health care provider if you experience any of the following:  severe uncontrolled pain     Notify your health care provider if you experience any of the following:  redness, tenderness, or signs of infection (pain, swelling, redness, odor or green/yellow discharge around incision site)     Notify your health care provider if you experience any of the following:  difficulty breathing or increased cough     Notify your health care provider if you experience any of the following:  severe persistent headache     Notify your health care provider if you experience any of the following:  persistent dizziness, light-headedness, or visual disturbances     Notify your health care provider if you experience any of the  following:  increased confusion or weakness     Type & Screen   Standing Status: Future Standing Exp. Date: 03/22/23     Activity as tolerated     Medications:  Reconciled Home Medications:      Medication List      START taking these medications    artificial tears 0.5 % ophthalmic solution  Commonly known as: ISOPTO TEARS  Place 1 drop into both eyes 4 (four) times daily as needed.     DUKE'S SOLUTION (BENADRYL 30 ML, MYLANTA 30 ML, LIDOCAINE 30 ML, NYSTATIN 30 ML)  Take 10 mLs by mouth 4 (four) times daily.     leucovorin 25 MG Tab  Commonly known as: WELLCOVORIN  Take 1 tablet (25 mg total) by mouth every 6 (six) hours. for 3 days        CONTINUE taking these medications    acyclovir 400 MG tablet  Commonly known as: ZOVIRAX  Take 1 tablet (400 mg total) by mouth 2 (two) times daily.     aspirin 81 MG EC tablet  Commonly known as: ECOTRIN  Take 1 tablet (81 mg total) by mouth once daily. HOLD until okayed to resume during chemotherapy.     atorvastatin 80 MG tablet  Commonly known as: LIPITOR  Take 80 mg by mouth once daily.     cyclobenzaprine 10 MG tablet  Commonly known as: FLEXERIL  Take 10 mg by mouth 3 (three) times daily as needed.     diazePAM 10 MG Tab  Commonly known as: VALIUM  Take 10 mg by mouth 2 (two) times daily as needed.     diltiaZEM 90 MG tablet  Commonly known as: CARDIZEM  Take 1 tablet (90 mg total) by mouth every 6 (six) hours.     fenofibrate 160 MG Tab  Take 160 mg by mouth once daily.     gabapentin 300 MG capsule  Commonly known as: NEURONTIN  Take 1 capsule (300 mg total) by mouth 3 (three) times daily.     * imatinib 100 MG Tab  Commonly known as: GLEEVEC  Take 2 tablets (200 mg total) by mouth once daily with 1 other imatinib prescription for 600 mg total.Take with a meal and large glass of water     * imatinib 400 MG Tab  Commonly known as: GLEEVEC  Take 1 tablet (400 mg total) by mouth once daily with 1 other imatinib prescription for 600 mg total. Take with a meal and large  glass of water     JARDIANCE 10 mg tablet  Generic drug: empagliflozin  Take 10 mg by mouth every morning.     leflunomide 20 MG Tab  Commonly known as: ARAVA  Take 20 mg by mouth once daily.     losartan 25 MG tablet  Commonly known as: COZAAR  Take 25 mg by mouth once daily.     metFORMIN 500 MG ER 24hr tablet  Commonly known as: GLUCOPHAGE-XR  Take 1,000 mg by mouth 2 (two) times daily.     methocarbamoL 500 MG Tab  Commonly known as: ROBAXIN  Take 1 tablet (500 mg total) by mouth 3 (three) times daily as needed (muscle spasms).     omeprazole 40 MG capsule  Commonly known as: PRILOSEC  Take 40 mg by mouth once daily.     ondansetron 8 MG Tbdl  Commonly known as: ZOFRAN-ODT  Dissolve 1 tablet (8 mg total) by mouth every 12 (twelve) hours as needed (in case of chemo induced nausea).     OXcarbazepine 600 MG Tab  Commonly known as: TRILEPTAL  Take 1,200 mg by mouth 2 (two) times daily.     polyethylene glycol 17 gram/dose powder  Commonly known as: GLYCOLAX  Dissolve one capful (17 g) in liquid and take by mouth daily as needed (constipation).     QUEtiapine 200 MG Tab  Commonly known as: SEROQUEL  Take 200 mg by mouth every evening.     rivaroxaban 20 mg Tab  Commonly known as: XARELTO  Take 1 tablet (20 mg total) by mouth daily with dinner or evening meal. HOLD until okayed to resume while platelets are low.     SENNA PLUS 8.6-50 mg per tablet  Generic drug: senna-docusate 8.6-50 mg  Take 1 tablet by mouth 2 (two) times daily.     TRINTELLIX 10 mg Tab  Generic drug: vortioxetine  Take 1 tablet by mouth once daily.         * This list has 2 medication(s) that are the same as other medications prescribed for you. Read the directions carefully, and ask your doctor or other care provider to review them with you.            STOP taking these medications    ergocalciferol 50,000 unit Cap  Commonly known as: ERGOCALCIFEROL     multivit,min52-folic-vitK-cQ10 100-700-10 mcg-mcg-mg Cap cap  Commonly known as: ZHANG             Mirtha Antonio, NP  Bone Marrow Transplant  Roberto britta - Oncology ([facility])

## 2022-02-08 ENCOUNTER — TELEPHONE (OUTPATIENT)
Dept: HEMATOLOGY/ONCOLOGY | Facility: CLINIC | Age: 52
End: 2022-02-08
Payer: COMMERCIAL

## 2022-02-08 NOTE — TELEPHONE ENCOUNTER
spoke to spouse, pt c/o clear sinus drainage. -fever/chills/cough. Also c/o nausea being more frequent. Will f/u with patient. Planning to try otc meds and zofran

## 2022-02-08 NOTE — TELEPHONE ENCOUNTER
"----- Message from Nithya Johnson sent at 2/8/2022  2:37 PM CST -----         Consult/Advisory:      Name Of Caller:Mannie Ceballos Preference?:[phone]      Provider Name:   Does patient feel the need to be seen today?      What is the nature of the call?:He wants to know what the patient can use over the counter for sinus       Additional Notes:  "Thank you for all that you do for our patients'"                           "

## 2022-02-09 ENCOUNTER — SPECIALTY PHARMACY (OUTPATIENT)
Dept: PHARMACY | Facility: CLINIC | Age: 52
End: 2022-02-09
Payer: COMMERCIAL

## 2022-02-09 NOTE — TELEPHONE ENCOUNTER
Contacted pt's  Mannie. He states patient recently held imatinib due to hospitalization. Patient restarted on 02/02/2022. She has ~20 day supply on hand. He agreed to be contacted for refill on 02/21/2022.

## 2022-02-11 DIAGNOSIS — C91.00 B-CELL ACUTE LYMPHOBLASTIC LEUKEMIA: ICD-10-CM

## 2022-02-11 NOTE — TELEPHONE ENCOUNTER
----- Message from April Marlow sent at 2/11/2022 12:59 PM CST -----  Patient Call Back    Who Called: Mannie Gonzalez (Spouse)    What is the request in detail: Pt  calling to speak with someone regarding the medication she was prescribed for the nausea and vomiting. The pt  stated that the medication is not helping. Please call the pt  regarding his concerns.    Can the clinic reply by [facility]?    Best Call Back Number: [phone]

## 2022-02-16 ENCOUNTER — HOSPITAL ENCOUNTER (INPATIENT)
Facility: HOSPITAL | Age: 52
LOS: 5 days | Discharge: HOME | DRG: 838 | End: 2022-02-21
Attending: INTERNAL MEDICINE | Admitting: INTERNAL MEDICINE
Payer: COMMERCIAL

## 2022-02-16 ENCOUNTER — OFFICE VISIT (OUTPATIENT)
Dept: HEMATOLOGY/ONCOLOGY | Facility: CLINIC | Age: 52
DRG: 838 | End: 2022-02-16
Payer: COMMERCIAL

## 2022-02-16 VITALS
HEART RATE: 106 BPM | SYSTOLIC BLOOD PRESSURE: 134 MMHG | OXYGEN SATURATION: 100 % | DIASTOLIC BLOOD PRESSURE: 87 MMHG | TEMPERATURE: 99 F | WEIGHT: 152.25 LBS | BODY MASS INDEX: 25.99 KG/M2 | HEIGHT: 64 IN | RESPIRATION RATE: 16 BRPM

## 2022-02-16 DIAGNOSIS — D64.81 ANEMIA ASSOCIATED WITH CHEMOTHERAPY: ICD-10-CM

## 2022-02-16 DIAGNOSIS — R11.0 NAUSEA: ICD-10-CM

## 2022-02-16 DIAGNOSIS — T45.1X5A ANEMIA ASSOCIATED WITH CHEMOTHERAPY: ICD-10-CM

## 2022-02-16 DIAGNOSIS — R07.9 CHEST PAIN: ICD-10-CM

## 2022-02-16 DIAGNOSIS — C91.00 B-CELL ACUTE LYMPHOBLASTIC LEUKEMIA: Primary | ICD-10-CM

## 2022-02-16 DIAGNOSIS — C91.00 ACUTE LYMPHOBLASTIC LEUKEMIA (ALL): ICD-10-CM

## 2022-02-16 DIAGNOSIS — R19.7 DIARRHEA, UNSPECIFIED TYPE: ICD-10-CM

## 2022-02-16 LAB
POCT GLUCOSE: 120 MG/DL (ref 70–110)
POCT GLUCOSE: 129 MG/DL (ref 70–110)
POCT GLUCOSE: 221 MG/DL (ref 70–110)
SARS-COV-2 RDRP RESP QL NAA+PROBE: NEGATIVE

## 2022-02-16 PROCEDURE — 27000207 HC ISOLATION

## 2022-02-16 PROCEDURE — 99233 PR SUBSEQUENT HOSPITAL CARE,LEVL III: ICD-10-PCS | Mod: ,,, | Performed by: INTERNAL MEDICINE

## 2022-02-16 PROCEDURE — 97530 THERAPEUTIC ACTIVITIES: CPT

## 2022-02-16 PROCEDURE — 3075F PR MOST RECENT SYSTOLIC BLOOD PRESS GE 130-139MM HG: ICD-10-PCS | Mod: CPTII,S$GLB,, | Performed by: INTERNAL MEDICINE

## 2022-02-16 PROCEDURE — 99999 PR PBB SHADOW E&M-EST. PATIENT-LVL V: CPT | Mod: PBBFAC,,, | Performed by: INTERNAL MEDICINE

## 2022-02-16 PROCEDURE — 20600001 HC STEP DOWN PRIVATE ROOM

## 2022-02-16 PROCEDURE — 99999 PR PBB SHADOW E&M-EST. PATIENT-LVL V: ICD-10-PCS | Mod: PBBFAC,,, | Performed by: INTERNAL MEDICINE

## 2022-02-16 PROCEDURE — 3008F PR BODY MASS INDEX (BMI) DOCUMENTED: ICD-10-PCS | Mod: CPTII,S$GLB,, | Performed by: INTERNAL MEDICINE

## 2022-02-16 PROCEDURE — 1160F RVW MEDS BY RX/DR IN RCRD: CPT | Mod: CPTII,S$GLB,, | Performed by: INTERNAL MEDICINE

## 2022-02-16 PROCEDURE — 99233 SBSQ HOSP IP/OBS HIGH 50: CPT | Mod: ,,, | Performed by: INTERNAL MEDICINE

## 2022-02-16 PROCEDURE — 63600175 PHARM REV CODE 636 W HCPCS: Performed by: INTERNAL MEDICINE

## 2022-02-16 PROCEDURE — 25000003 PHARM REV CODE 250: Performed by: INTERNAL MEDICINE

## 2022-02-16 PROCEDURE — 1159F PR MEDICATION LIST DOCUMENTED IN MEDICAL RECORD: ICD-10-PCS | Mod: CPTII,S$GLB,, | Performed by: INTERNAL MEDICINE

## 2022-02-16 PROCEDURE — 99499 UNLISTED E&M SERVICE: CPT | Mod: S$GLB,,, | Performed by: INTERNAL MEDICINE

## 2022-02-16 PROCEDURE — 3079F DIAST BP 80-89 MM HG: CPT | Mod: CPTII,S$GLB,, | Performed by: INTERNAL MEDICINE

## 2022-02-16 PROCEDURE — 1111F DSCHRG MED/CURRENT MED MERGE: CPT | Mod: CPTII,S$GLB,, | Performed by: INTERNAL MEDICINE

## 2022-02-16 PROCEDURE — U0002 COVID-19 LAB TEST NON-CDC: HCPCS | Performed by: INTERNAL MEDICINE

## 2022-02-16 PROCEDURE — 25000003 PHARM REV CODE 250: Performed by: NURSE PRACTITIONER

## 2022-02-16 PROCEDURE — 1160F PR REVIEW ALL MEDS BY PRESCRIBER/CLIN PHARMACIST DOCUMENTED: ICD-10-PCS | Mod: CPTII,S$GLB,, | Performed by: INTERNAL MEDICINE

## 2022-02-16 PROCEDURE — 1111F PR DISCHARGE MEDS RECONCILED W/ CURRENT OUTPATIENT MED LIST: ICD-10-PCS | Mod: CPTII,S$GLB,, | Performed by: INTERNAL MEDICINE

## 2022-02-16 PROCEDURE — 97165 OT EVAL LOW COMPLEX 30 MIN: CPT

## 2022-02-16 PROCEDURE — 1159F MED LIST DOCD IN RCRD: CPT | Mod: CPTII,S$GLB,, | Performed by: INTERNAL MEDICINE

## 2022-02-16 PROCEDURE — 3079F PR MOST RECENT DIASTOLIC BLOOD PRESSURE 80-89 MM HG: ICD-10-PCS | Mod: CPTII,S$GLB,, | Performed by: INTERNAL MEDICINE

## 2022-02-16 PROCEDURE — 99499 NO LOS: ICD-10-PCS | Mod: S$GLB,,, | Performed by: INTERNAL MEDICINE

## 2022-02-16 PROCEDURE — 3008F BODY MASS INDEX DOCD: CPT | Mod: CPTII,S$GLB,, | Performed by: INTERNAL MEDICINE

## 2022-02-16 PROCEDURE — 3075F SYST BP GE 130 - 139MM HG: CPT | Mod: CPTII,S$GLB,, | Performed by: INTERNAL MEDICINE

## 2022-02-16 PROCEDURE — 63600175 PHARM REV CODE 636 W HCPCS: Performed by: NURSE PRACTITIONER

## 2022-02-16 PROCEDURE — 25000242 PHARM REV CODE 250 ALT 637 W/ HCPCS: Performed by: NURSE PRACTITIONER

## 2022-02-16 RX ORDER — AMOXICILLIN 250 MG
1 CAPSULE ORAL 2 TIMES DAILY
Status: DISCONTINUED | OUTPATIENT
Start: 2022-02-16 | End: 2022-02-16

## 2022-02-16 RX ORDER — ONDANSETRON 2 MG/ML
8 INJECTION INTRAMUSCULAR; INTRAVENOUS
Status: COMPLETED | OUTPATIENT
Start: 2022-02-20 | End: 2022-02-21

## 2022-02-16 RX ORDER — DIAZEPAM 5 MG/1
10 TABLET ORAL 2 TIMES DAILY PRN
Status: DISCONTINUED | OUTPATIENT
Start: 2022-02-16 | End: 2022-02-21 | Stop reason: HOSPADM

## 2022-02-16 RX ORDER — LOSARTAN POTASSIUM 25 MG/1
25 TABLET ORAL DAILY
Status: DISCONTINUED | OUTPATIENT
Start: 2022-02-16 | End: 2022-02-21 | Stop reason: HOSPADM

## 2022-02-16 RX ORDER — CYCLOBENZAPRINE HCL 5 MG
10 TABLET ORAL 3 TIMES DAILY PRN
Status: DISCONTINUED | OUTPATIENT
Start: 2022-02-16 | End: 2022-02-21 | Stop reason: HOSPADM

## 2022-02-16 RX ORDER — GABAPENTIN 300 MG/1
300 CAPSULE ORAL 3 TIMES DAILY
Status: DISCONTINUED | OUTPATIENT
Start: 2022-02-16 | End: 2022-02-21 | Stop reason: HOSPADM

## 2022-02-16 RX ORDER — ACYCLOVIR 200 MG/1
400 CAPSULE ORAL 2 TIMES DAILY
Status: DISCONTINUED | OUTPATIENT
Start: 2022-02-16 | End: 2022-02-21 | Stop reason: HOSPADM

## 2022-02-16 RX ORDER — ONDANSETRON 8 MG/1
8 TABLET, ORALLY DISINTEGRATING ORAL EVERY 8 HOURS PRN
Status: CANCELLED | OUTPATIENT
Start: 2022-02-16

## 2022-02-16 RX ORDER — DAPAGLIFLOZIN 10 MG/1
10 TABLET, FILM COATED ORAL DAILY
Status: ON HOLD | COMMUNITY
End: 2022-10-14 | Stop reason: HOSPADM

## 2022-02-16 RX ORDER — QUETIAPINE FUMARATE 200 MG/1
200 TABLET, FILM COATED ORAL NIGHTLY
Status: DISCONTINUED | OUTPATIENT
Start: 2022-02-16 | End: 2022-02-21 | Stop reason: HOSPADM

## 2022-02-16 RX ORDER — PANTOPRAZOLE SODIUM 40 MG/1
40 TABLET, DELAYED RELEASE ORAL DAILY
Status: DISCONTINUED | OUTPATIENT
Start: 2022-02-16 | End: 2022-02-21 | Stop reason: HOSPADM

## 2022-02-16 RX ORDER — SODIUM CHLORIDE 9 MG/ML
INJECTION, SOLUTION INTRAVENOUS CONTINUOUS
Status: DISCONTINUED | OUTPATIENT
Start: 2022-02-16 | End: 2022-02-21 | Stop reason: HOSPADM

## 2022-02-16 RX ORDER — HYDROXYCHLOROQUINE SULFATE 200 MG/1
200 TABLET, FILM COATED ORAL DAILY
COMMUNITY
End: 2022-05-13

## 2022-02-16 RX ORDER — HEPARIN 100 UNIT/ML
500 SYRINGE INTRAVENOUS
Status: DISCONTINUED | OUTPATIENT
Start: 2022-02-16 | End: 2022-02-21 | Stop reason: HOSPADM

## 2022-02-16 RX ORDER — ONDANSETRON 8 MG/1
8 TABLET, ORALLY DISINTEGRATING ORAL EVERY 8 HOURS PRN
Status: DISCONTINUED | OUTPATIENT
Start: 2022-02-16 | End: 2022-02-21 | Stop reason: HOSPADM

## 2022-02-16 RX ORDER — FENOFIBRATE 160 MG/1
160 TABLET ORAL DAILY
Status: DISCONTINUED | OUTPATIENT
Start: 2022-02-16 | End: 2022-02-21 | Stop reason: HOSPADM

## 2022-02-16 RX ORDER — POLYETHYLENE GLYCOL 3350 17 G/17G
17 POWDER, FOR SOLUTION ORAL DAILY PRN
Status: DISCONTINUED | OUTPATIENT
Start: 2022-02-16 | End: 2022-02-21 | Stop reason: HOSPADM

## 2022-02-16 RX ORDER — SODIUM CHLORIDE 0.9 % (FLUSH) 0.9 %
10 SYRINGE (ML) INJECTION
Status: CANCELLED | OUTPATIENT
Start: 2022-02-16

## 2022-02-16 RX ORDER — DEXAMETHASONE SODIUM PHOSPHATE 4 MG/ML
8 INJECTION, SOLUTION INTRA-ARTICULAR; INTRALESIONAL; INTRAMUSCULAR; INTRAVENOUS; SOFT TISSUE
Status: CANCELLED | OUTPATIENT
Start: 2022-02-20

## 2022-02-16 RX ORDER — LEFLUNOMIDE 20 MG/1
20 TABLET ORAL DAILY
Status: DISCONTINUED | OUTPATIENT
Start: 2022-02-16 | End: 2022-02-16

## 2022-02-16 RX ORDER — PROCHLORPERAZINE EDISYLATE 5 MG/ML
10 INJECTION INTRAMUSCULAR; INTRAVENOUS EVERY 6 HOURS PRN
Status: CANCELLED | OUTPATIENT
Start: 2022-02-16

## 2022-02-16 RX ORDER — IBUPROFEN 200 MG
24 TABLET ORAL
Status: DISCONTINUED | OUTPATIENT
Start: 2022-02-16 | End: 2022-02-21 | Stop reason: HOSPADM

## 2022-02-16 RX ORDER — INSULIN ASPART 100 [IU]/ML
0-5 INJECTION, SOLUTION INTRAVENOUS; SUBCUTANEOUS
Status: DISCONTINUED | OUTPATIENT
Start: 2022-02-16 | End: 2022-02-21 | Stop reason: HOSPADM

## 2022-02-16 RX ORDER — NALOXONE HCL 0.4 MG/ML
0.02 VIAL (ML) INJECTION
Status: DISCONTINUED | OUTPATIENT
Start: 2022-02-16 | End: 2022-02-21 | Stop reason: HOSPADM

## 2022-02-16 RX ORDER — ONDANSETRON 2 MG/ML
8 INJECTION INTRAMUSCULAR; INTRAVENOUS
Status: CANCELLED | OUTPATIENT
Start: 2022-02-20

## 2022-02-16 RX ORDER — DEXAMETHASONE SODIUM PHOSPHATE 4 MG/ML
8 INJECTION, SOLUTION INTRA-ARTICULAR; INTRALESIONAL; INTRAMUSCULAR; INTRAVENOUS; SOFT TISSUE
Status: COMPLETED | OUTPATIENT
Start: 2022-02-16 | End: 2022-02-17

## 2022-02-16 RX ORDER — DILTIAZEM HYDROCHLORIDE 30 MG/1
90 TABLET, FILM COATED ORAL EVERY 6 HOURS
Status: DISCONTINUED | OUTPATIENT
Start: 2022-02-16 | End: 2022-02-21 | Stop reason: HOSPADM

## 2022-02-16 RX ORDER — SODIUM CHLORIDE 9 MG/ML
INJECTION, SOLUTION INTRAVENOUS CONTINUOUS
Status: CANCELLED | OUTPATIENT
Start: 2022-02-16

## 2022-02-16 RX ORDER — ASPIRIN 81 MG/1
81 TABLET ORAL DAILY
Status: DISCONTINUED | OUTPATIENT
Start: 2022-02-16 | End: 2022-02-21 | Stop reason: HOSPADM

## 2022-02-16 RX ORDER — PROCHLORPERAZINE EDISYLATE 5 MG/ML
10 INJECTION INTRAMUSCULAR; INTRAVENOUS EVERY 6 HOURS PRN
Status: DISCONTINUED | OUTPATIENT
Start: 2022-02-16 | End: 2022-02-21 | Stop reason: HOSPADM

## 2022-02-16 RX ORDER — DEXAMETHASONE SODIUM PHOSPHATE 1 MG/ML
1 SOLUTION/ DROPS OPHTHALMIC
Status: DISCONTINUED | OUTPATIENT
Start: 2022-02-16 | End: 2022-02-21 | Stop reason: HOSPADM

## 2022-02-16 RX ORDER — OXCARBAZEPINE 600 MG/1
1200 TABLET, FILM COATED ORAL 2 TIMES DAILY
Status: DISCONTINUED | OUTPATIENT
Start: 2022-02-16 | End: 2022-02-21 | Stop reason: HOSPADM

## 2022-02-16 RX ORDER — ONDANSETRON 2 MG/ML
8 INJECTION INTRAMUSCULAR; INTRAVENOUS
Status: CANCELLED | OUTPATIENT
Start: 2022-02-16

## 2022-02-16 RX ORDER — ONDANSETRON 2 MG/ML
8 INJECTION INTRAMUSCULAR; INTRAVENOUS
Status: COMPLETED | OUTPATIENT
Start: 2022-02-16 | End: 2022-02-17

## 2022-02-16 RX ORDER — ONDANSETRON 2 MG/ML
8 INJECTION INTRAMUSCULAR; INTRAVENOUS
Status: COMPLETED | OUTPATIENT
Start: 2022-02-18 | End: 2022-02-19

## 2022-02-16 RX ORDER — DEXAMETHASONE SODIUM PHOSPHATE 1 MG/ML
1 SOLUTION/ DROPS OPHTHALMIC
Status: CANCELLED | OUTPATIENT
Start: 2022-02-16

## 2022-02-16 RX ORDER — DEXAMETHASONE SODIUM PHOSPHATE 4 MG/ML
8 INJECTION, SOLUTION INTRA-ARTICULAR; INTRALESIONAL; INTRAMUSCULAR; INTRAVENOUS; SOFT TISSUE
Status: COMPLETED | OUTPATIENT
Start: 2022-02-18 | End: 2022-02-19

## 2022-02-16 RX ORDER — ONDANSETRON 8 MG/1
8 TABLET, ORALLY DISINTEGRATING ORAL EVERY 12 HOURS PRN
Status: DISCONTINUED | OUTPATIENT
Start: 2022-02-16 | End: 2022-02-18

## 2022-02-16 RX ORDER — GLUCAGON 1 MG
1 KIT INJECTION
Status: DISCONTINUED | OUTPATIENT
Start: 2022-02-16 | End: 2022-02-21 | Stop reason: HOSPADM

## 2022-02-16 RX ORDER — HYDROXYCHLOROQUINE SULFATE 200 MG/1
200 TABLET, FILM COATED ORAL DAILY
Status: DISCONTINUED | OUTPATIENT
Start: 2022-02-16 | End: 2022-02-21 | Stop reason: HOSPADM

## 2022-02-16 RX ORDER — IBUPROFEN 200 MG
16 TABLET ORAL
Status: DISCONTINUED | OUTPATIENT
Start: 2022-02-16 | End: 2022-02-21 | Stop reason: HOSPADM

## 2022-02-16 RX ORDER — HEPARIN 100 UNIT/ML
500 SYRINGE INTRAVENOUS
Status: CANCELLED | OUTPATIENT
Start: 2022-02-16

## 2022-02-16 RX ORDER — DEXAMETHASONE SODIUM PHOSPHATE 4 MG/ML
8 INJECTION, SOLUTION INTRA-ARTICULAR; INTRALESIONAL; INTRAMUSCULAR; INTRAVENOUS; SOFT TISSUE
Status: COMPLETED | OUTPATIENT
Start: 2022-02-20 | End: 2022-02-21

## 2022-02-16 RX ORDER — DEXAMETHASONE SODIUM PHOSPHATE 4 MG/ML
8 INJECTION, SOLUTION INTRA-ARTICULAR; INTRALESIONAL; INTRAMUSCULAR; INTRAVENOUS; SOFT TISSUE
Status: CANCELLED | OUTPATIENT
Start: 2022-02-16

## 2022-02-16 RX ORDER — ONDANSETRON 2 MG/ML
8 INJECTION INTRAMUSCULAR; INTRAVENOUS
Status: CANCELLED | OUTPATIENT
Start: 2022-02-18

## 2022-02-16 RX ORDER — SODIUM CHLORIDE 0.9 % (FLUSH) 0.9 %
10 SYRINGE (ML) INJECTION EVERY 12 HOURS PRN
Status: DISCONTINUED | OUTPATIENT
Start: 2022-02-16 | End: 2022-02-21 | Stop reason: HOSPADM

## 2022-02-16 RX ORDER — DEXAMETHASONE SODIUM PHOSPHATE 4 MG/ML
8 INJECTION, SOLUTION INTRA-ARTICULAR; INTRALESIONAL; INTRAMUSCULAR; INTRAVENOUS; SOFT TISSUE
Status: CANCELLED | OUTPATIENT
Start: 2022-02-18

## 2022-02-16 RX ADMIN — RIVAROXABAN 20 MG: 20 TABLET, FILM COATED ORAL at 04:02

## 2022-02-16 RX ADMIN — LOSARTAN POTASSIUM 25 MG: 25 TABLET, FILM COATED ORAL at 02:02

## 2022-02-16 RX ADMIN — DILTIAZEM HYDROCHLORIDE 90 MG: 30 TABLET, FILM COATED ORAL at 05:02

## 2022-02-16 RX ADMIN — ACYCLOVIR 400 MG: 200 CAPSULE ORAL at 08:02

## 2022-02-16 RX ADMIN — GABAPENTIN 300 MG: 300 CAPSULE ORAL at 02:02

## 2022-02-16 RX ADMIN — SODIUM CHLORIDE: 0.9 INJECTION, SOLUTION INTRAVENOUS at 03:02

## 2022-02-16 RX ADMIN — DEXAMETHASONE 1 DROP: 1 SUSPENSION OPHTHALMIC at 03:02

## 2022-02-16 RX ADMIN — CYTARABINE 1770 MG: 100 INJECTION, SOLUTION INTRATHECAL; INTRAVENOUS; SUBCUTANEOUS at 04:02

## 2022-02-16 RX ADMIN — DEXAMETHASONE SODIUM PHOSPHATE 8 MG: 4 INJECTION INTRA-ARTICULAR; INTRALESIONAL; INTRAMUSCULAR; INTRAVENOUS; SOFT TISSUE at 03:02

## 2022-02-16 RX ADMIN — GABAPENTIN 300 MG: 300 CAPSULE ORAL at 08:02

## 2022-02-16 RX ADMIN — QUETIAPINE FUMARATE 200 MG: 200 TABLET ORAL at 08:02

## 2022-02-16 RX ADMIN — ASPIRIN 81 MG: 81 TABLET, COATED ORAL at 02:02

## 2022-02-16 RX ADMIN — DEXAMETHASONE 1 DROP: 1 SUSPENSION OPHTHALMIC at 08:02

## 2022-02-16 RX ADMIN — OXCARBAZEPINE 1200 MG: 600 TABLET, FILM COATED ORAL at 08:02

## 2022-02-16 RX ADMIN — FENOFIBRATE 160 MG: 160 TABLET ORAL at 02:02

## 2022-02-16 RX ADMIN — CYCLOBENZAPRINE HYDROCHLORIDE 10 MG: 5 TABLET, FILM COATED ORAL at 08:02

## 2022-02-16 RX ADMIN — PANTOPRAZOLE SODIUM 40 MG: 40 TABLET, DELAYED RELEASE ORAL at 02:02

## 2022-02-16 RX ADMIN — INSULIN ASPART 1 UNITS: 100 INJECTION, SOLUTION INTRAVENOUS; SUBCUTANEOUS at 09:02

## 2022-02-16 RX ADMIN — ONDANSETRON 8 MG: 2 INJECTION INTRAMUSCULAR; INTRAVENOUS at 03:02

## 2022-02-16 RX ADMIN — VORTIOXETINE 10 MG: 10 TABLET, FILM COATED ORAL at 02:02

## 2022-02-16 RX ADMIN — HYDROXYCHLOROQUINE SULFATE 200 MG: 200 TABLET, FILM COATED ORAL at 02:02

## 2022-02-16 NOTE — ASSESSMENT & PLAN NOTE
- hold jardiance and metformin recently discontinued by endocrinologist.   - hold home farxiga while inpatient. Can resume at discharge.  - low dose SSI, achs blood glucose monitoring, and diabetic diet while inpatient

## 2022-02-16 NOTE — ASSESSMENT & PLAN NOTE
From Dr. Jenkins's most recent clinic note  Precursor B-cell acute lymphoblastic leukemia (Ph+)              A. 11/23/2021: Transferred to [facility] [place] from outside hospital for evaluation for acute leukemia              B. 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               C. 11/30/2021 - 12/23/2021: Vincristine + imatinib induction; hospital course was complicated by acute hypoxemic respiratory failure which resolved with diuresis and treatment of ALL     - BMBx from 1/3/22 showing CR  - admitting today for C2 of consolidation with EWALL (IDAC)  - tolerated C1 of consolidation well  - take home Gleevec days 15-28 of consolidation cycles  - monitor closely for neuro toxicity with cytarabine

## 2022-02-16 NOTE — PROGRESS NOTES
Pharmacist Patient Education Note    Cytarabine chemotherapy regimen was discussed with the patient and her . Medication handouts for each agent were provided to the patient.    Administration instructions were discussed including Cytarabine IV over 2 hours every 12 hours on day 1, 3, and 5 (Cycles 2, 4, 6).    Supportive Care:  · Acyclovir 400mg by mouth twice daily  · Fluconazole 400mg by mouth daily  · Levofloxacin 500mg by mouth daily    The following side effects were reviewed:   - Bone marrow suppression and infection prevention  - Prevention and treatment of nausea/vomiting  - Mucositis and diarrhea  - Neurotoxicity and cerebellar syndrome  - Chemical conjunctivitis and use of steroid eye drops  - Monitoring and potential changes of hepatic function while receiving chemotherapy   - Cytarabine syndrome (muscle and bone pain, rash, eye irritation, fever, chest pain) occurring 6-12 hours after infusion    Drug-drug interactions: none    The patient's concerns of diarrhea and mucositis and nausea/vomiting were addressed. All questions were answered.    Kaylah Molina PharmD  PGY2 Oncology Pharmacy Resident  Ext. [phone]

## 2022-02-16 NOTE — SUBJECTIVE & OBJECTIVE
Patient information was obtained from patient, spouse/SO and past medical records.     Oncology History (From Dr. Jenkins's most recent clinic note):   Precursor B-cell acute lymphoblastic leukemia (Ph+)              A. 11/23/2021: Transferred to [facility] [place] from outside hospital for evaluation for acute leukemia              B. 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               C. 11/30/2021 - 12/23/2021: Vincristine + imatinib induction; hospital course was complicated by acute hypoxemic respiratory failure which resolved with diuresis and treatment of ALL     Medications Prior to Admission   Medication Sig Dispense Refill Last Dose    acyclovir (ZOVIRAX) 400 MG tablet Take 1 tablet (400 mg total) by mouth 2 (two) times daily. 60 tablet 11     artificial tears (ISOPTO TEARS) 0.5 % ophthalmic solution Place 1 drop into both eyes 4 (four) times daily as needed.       aspirin (ECOTRIN) 81 MG EC tablet Take 1 tablet (81 mg total) by mouth once daily. HOLD until okayed to resume during chemotherapy.  0     atorvastatin (LIPITOR) 80 MG tablet Take 80 mg by mouth once daily.       cyclobenzaprine (FLEXERIL) 10 MG tablet Take 10 mg by mouth 3 (three) times daily as needed.       dapagliflozin (FARXIGA) 10 mg tablet Take 10 mg by mouth once daily.       diazePAM (VALIUM) 10 MG Tab Take 10 mg by mouth 2 (two) times daily as needed.       diltiaZEM (CARDIZEM) 90 MG tablet Take 1 tablet (90 mg total) by mouth every 6 (six) hours. 120 tablet 11     duke's soln (benadryl 30 mL, mylanta 30 mL, LIDOcaine 30 mL, nystatin 30 mL) 120mL Take 10 mLs by mouth 4 (four) times daily. (Patient not taking: Reported on 2/16/2022) 120 mL 0     fenofibrate 160 MG  Tab Take 160 mg by mouth once daily.       gabapentin (NEURONTIN) 300 MG capsule Take 1 capsule (300 mg total) by mouth 3 (three) times daily. 90 capsule 11     hydrOXYchloroQUINE (PLAQUENIL) 200 mg tablet Take 200 mg by mouth once daily.       imatinib (GLEEVEC) 100 MG Tab Take 2 tablets (200 mg total) by mouth once daily with 1 other imatinib prescription for 600 mg total.Take with a meal and large glass of water (Patient not taking: Reported on 2/16/2022.) 180 tablet 3     imatinib (GLEEVEC) 400 MG Tab Take 1 tablet (400 mg total) by mouth once daily with 1 other imatinib prescription for 600 mg total. Take with a meal and large glass of water 90 tablet 3     losartan (COZAAR) 25 MG tablet Take 25 mg by mouth once daily.       methocarbamoL (ROBAXIN) 500 MG Tab Take 1 tablet (500 mg total) by mouth 3 (three) times daily as needed (muscle spasms). 90 tablet 2     omeprazole (PRILOSEC) 40 MG capsule Take 40 mg by mouth once daily.       ondansetron (ZOFRAN-ODT) 8 MG TbDL Dissolve 1 tablet (8 mg total) by mouth every 12 (twelve) hours as needed (in case of chemo induced nausea). 30 tablet 1     OXcarbazepine (TRILEPTAL) 600 MG Tab Take 1,200 mg by mouth 2 (two) times daily.       polyethylene glycol (GLYCOLAX) 17 gram/dose powder Dissolve one capful (17 g) in liquid and take by mouth daily as needed (constipation). (Patient not taking: Reported on 2/16/2022) 510 g 0     QUEtiapine (SEROQUEL) 200 MG Tab Take 200 mg by mouth every evening.       rivaroxaban (XARELTO) 20 mg Tab Take 1 tablet (20 mg total) by mouth daily with dinner or evening meal. HOLD until okayed to resume while platelets are low.       senna-docusate 8.6-50 mg (PERICOLACE) 8.6-50 mg per tablet Take 1 tablet by mouth 2 (two) times daily. 60 tablet 3     TRINTELLIX 10 mg Tab Take 1 tablet by mouth once daily.          Levetiracetam     Past Medical History:   Diagnosis Date    Arthritis     COPD (chronic obstructive pulmonary disease)      Hypertension     Stroke     TIA x 4     Past Surgical History:   Procedure Laterality Date    APPENDECTOMY      HYSTERECTOMY      ROTATOR CUFF REPAIR Bilateral     TONSILLECTOMY      TUBAL LIGATION       Family History    None       Tobacco Use    Smoking status: Current Every Day Smoker     Packs/day: 1.00     Types: Cigarettes    Smokeless tobacco: Never Used   Substance and Sexual Activity    Alcohol use: No    Drug use: Not on file    Sexual activity: Not on file       Review of Systems   Constitutional: Positive for fatigue. Negative for activity change, appetite change, chills, fever and unexpected weight change.   HENT: Negative for congestion, mouth sores, nosebleeds, rhinorrhea, sinus pressure, sinus pain, sore throat and trouble swallowing.    Eyes: Negative for photophobia, discharge, redness, itching and visual disturbance.   Respiratory: Negative for cough, chest tightness, shortness of breath and wheezing.    Cardiovascular: Negative for chest pain, palpitations and leg swelling.   Gastrointestinal: Positive for diarrhea, nausea and vomiting. Negative for abdominal distention, abdominal pain and constipation.   Endocrine: Negative for cold intolerance, heat intolerance, polydipsia, polyphagia and polyuria.   Genitourinary: Negative for decreased urine volume, difficulty urinating, dysuria, frequency, hematuria, urgency, vaginal bleeding and vaginal discharge.   Musculoskeletal: Negative for arthralgias, back pain, gait problem, joint swelling, myalgias, neck pain and neck stiffness.   Skin: Negative for pallor, rash and wound.   Allergic/Immunologic: Negative for environmental allergies, food allergies and immunocompromised state.   Neurological: Negative for dizziness, tremors, seizures, syncope, weakness, light-headedness, numbness and headaches.   Hematological: Negative for adenopathy. Does not bruise/bleed easily.   Psychiatric/Behavioral: Negative for agitation, confusion,  hallucinations, sleep disturbance and suicidal ideas. The patient is not nervous/anxious.      Objective:     Vital Signs (Most Recent):  Temp: 98.4 °F (36.9 °C) (02/16/22 1142)  Pulse: (!) 114 (02/16/22 1142)  Resp: 16 (02/16/22 1142)  BP: (!) 162/72 (02/16/22 1142)  SpO2: 96 % (02/16/22 1142) Vital Signs (24h Range):  Temp:  [98.4 °F (36.9 °C)-98.9 °F (37.2 °C)] 98.4 °F (36.9 °C)  Pulse:  [106-114] 114  Resp:  [16] 16  SpO2:  [96 %-100 %] 96 %  BP: (134-162)/(72-87) 162/72     Weight: 64.4 kg (141 lb 15.6 oz)  Body mass index is 24.37 kg/m².  Body surface area is 1.71 meters squared.    ECOG SCORE         [unfilled]    Lines/Drains/Airways     Peripherally Inserted Central Catheter Line            PICC Double Lumen 11/30/21 1033 right basilic 78 days                Physical Exam  Constitutional:       Appearance: She is well-developed and well-nourished.   HENT:      Head: Normocephalic and atraumatic.      Mouth/Throat:      Pharynx: No oropharyngeal exudate.   Eyes:      Conjunctiva/sclera: Conjunctivae normal.      Pupils: Pupils are equal, round, and reactive to light.   Cardiovascular:      Rate and Rhythm: Normal rate and regular rhythm.      Heart sounds: Normal heart sounds. No murmur heard.      Pulmonary:      Effort: Pulmonary effort is normal.      Breath sounds: Normal breath sounds.   Abdominal:      General: Bowel sounds are normal. There is no distension.      Palpations: Abdomen is soft.      Tenderness: There is no abdominal tenderness.   Musculoskeletal:         General: No deformity. Normal range of motion.      Cervical back: Normal range of motion and neck supple.      Right lower leg: Edema (trace to +1 to ankle and foot) present.      Left lower leg: Edema (trace to +1 to ankle and foot) present.   Skin:     General: Skin is warm and dry.      Findings: No erythema or rash.      Comments: RUE PICC. Dressing c/d/i. No sign of infection to site.   Neurological:      Mental Status: She is alert  and oriented to person, place, and time.   Psychiatric:         Mood and Affect: Mood and affect normal.         Behavior: Behavior normal.         Thought Content: Thought content normal.         Judgment: Judgment normal.         Significant Labs:   CBC:   Recent Labs   Lab 02/16/22  0816   WBC 13.91*   HGB 10.0*   HCT 32.1*       and CMP:   Recent Labs   Lab 02/16/22  0816      K 3.6      CO2 26      BUN 7   CREATININE 0.7   CALCIUM 8.7   PROT 5.4*   ALBUMIN 2.6*   BILITOT 0.3   ALKPHOS 151*   AST 26   ALT 30   ANIONGAP 11   EGFRNONAA >60.0       Diagnostic Results:  I have reviewed all pertinent imaging results/findings within the past 24 hours.

## 2022-02-16 NOTE — PLAN OF CARE
Plan of care reviewed with patient and family this shift.  VS stable. Maintaining saturations on room air. IVFs @50ml/hr. Cytarabine infusing. blood return noted on PICC. C-Diff collection pending. All questions and concerns addressed. Will continue to monitor.

## 2022-02-16 NOTE — PROGRESS NOTES
HEMATOLOGIC MALIGNANCIES PROGRESS NOTE    IDENTIFYING STATEMENT   Deepti Gonzalez (Deepti) is a 51 y.o. female with a  of 1970 from [place], [place] with the diagnosis of B-ALL.      ONCOLOGY HISTORY:    1. Precursor B-cell acute lymphoblastic leukemia (Ph+)   A. 2021: Transferred to [facility] [place] from outside hospital for evaluation for acute leukemia   B. 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    C. 2021 - 2021: Vincristine + imatinib induction; hospital course was complicated by acute hypoxemic respiratory failure which resolved with diuresis and treatment of ALL    2. History of TIA  3. Seizure disorder  4. Anxiety and depression  5. Chronic obstructive pulmonary disease  6. Paroxysmal atrial fibrillation  7. Implantable loop recorder and pacemaker in place  8. Peripheral artery disease  9. Diabetes mellitus, type 2  10. Gastroesophageal reflux disease  11. Gastroparesis  12. Rheumatoid arthritis  13. Osteoporosis  14. History of tobacco use     INTERVAL HISTORY:      Ms. Gonzalez returns to clinic for follow-up of Ph+ B-ALL. She reports the following symptoms:  - Had fever two nights ago (100.7) and none since  - Nausea x 4 days  - Diarrhea  - Unclear if related to imatinib  - Blood in stool at 2AM (diverticulosis as cause?)  - Stopped metformin yesterday after seeing endocrinologist    Past Medical History, Past Social History and Past Family History have been reviewed and are unchanged except as noted in the interval history.    MEDICATIONS:     Current Outpatient Medications on File Prior to Visit   Medication Sig Dispense Refill    acyclovir (ZOVIRAX) 400 MG tablet Take 1 tablet (400 mg total) by mouth 2 (two)  times daily. 60 tablet 11    artificial tears (ISOPTO TEARS) 0.5 % ophthalmic solution Place 1 drop into both eyes 4 (four) times daily as needed.      aspirin (ECOTRIN) 81 MG EC tablet Take 1 tablet (81 mg total) by mouth once daily. HOLD until okayed to resume during chemotherapy.  0    atorvastatin (LIPITOR) 80 MG tablet Take 80 mg by mouth once daily.      dapagliflozin (FARXIGA) 10 mg tablet Take 10 mg by mouth once daily.      diazePAM (VALIUM) 10 MG Tab Take 10 mg by mouth 2 (two) times daily as needed.      diltiaZEM (CARDIZEM) 90 MG tablet Take 1 tablet (90 mg total) by mouth every 6 (six) hours. 120 tablet 11    fenofibrate 160 MG Tab Take 160 mg by mouth once daily.      gabapentin (NEURONTIN) 300 MG capsule Take 1 capsule (300 mg total) by mouth 3 (three) times daily. 90 capsule 11    hydrOXYchloroQUINE (PLAQUENIL) 200 mg tablet Take 200 mg by mouth once daily.      imatinib (GLEEVEC) 400 MG Tab Take 1 tablet (400 mg total) by mouth once daily with 1 other imatinib prescription for 600 mg total. Take with a meal and large glass of water 90 tablet 3    losartan (COZAAR) 25 MG tablet Take 25 mg by mouth once daily.      methocarbamoL (ROBAXIN) 500 MG Tab Take 1 tablet (500 mg total) by mouth 3 (three) times daily as needed (muscle spasms). 90 tablet 2    omeprazole (PRILOSEC) 40 MG capsule Take 40 mg by mouth once daily.      ondansetron (ZOFRAN-ODT) 8 MG TbDL Dissolve 1 tablet (8 mg total) by mouth every 12 (twelve) hours as needed (in case of chemo induced nausea). 30 tablet 1    OXcarbazepine (TRILEPTAL) 600 MG Tab Take 1,200 mg by mouth 2 (two) times daily.      QUEtiapine (SEROQUEL) 200 MG Tab Take 200 mg by mouth every evening.      rivaroxaban (XARELTO) 20 mg Tab Take 1 tablet (20 mg total) by mouth daily with dinner or evening meal. HOLD until okayed to resume while platelets are low.      senna-docusate 8.6-50 mg (PERICOLACE) 8.6-50 mg per tablet Take 1 tablet by mouth 2 (two)  times daily. 60 tablet 3    TRINTELLIX 10 mg Tab Take 1 tablet by mouth once daily.      cyclobenzaprine (FLEXERIL) 10 MG tablet Take 10 mg by mouth 3 (three) times daily as needed.      duke's soln (benadryl 30 mL, mylanta 30 mL, LIDOcaine 30 mL, nystatin 30 mL) 120mL Take 10 mLs by mouth 4 (four) times daily. (Patient not taking: Reported on 2/16/2022) 120 mL 0    imatinib (GLEEVEC) 100 MG Tab Take 2 tablets (200 mg total) by mouth once daily with 1 other imatinib prescription for 600 mg total.Take with a meal and large glass of water (Patient not taking: Reported on 2/16/2022.) 180 tablet 3    JARDIANCE 10 mg tablet Take 10 mg by mouth every morning.      leflunomide (ARAVA) 20 MG Tab Take 20 mg by mouth once daily.      metFORMIN (GLUCOPHAGE-XR) 500 MG ER 24hr tablet Take 1,000 mg by mouth 2 (two) times daily.      polyethylene glycol (GLYCOLAX) 17 gram/dose powder Dissolve one capful (17 g) in liquid and take by mouth daily as needed (constipation). (Patient not taking: Reported on 2/16/2022) 510 g 0     No current facility-administered medications on file prior to visit.       ALLERGIES:   Review of patient's allergies indicates:   Allergen Reactions    Levetiracetam      Other reaction(s): Unknown        ROS:       Review of Systems   Constitutional: Positive for fatigue (improved significantly) and fever. Negative for diaphoresis and unexpected weight change.   HENT:   Negative for lump/mass and sore throat.    Eyes: Negative for icterus.   Respiratory: Negative for cough and shortness of breath.    Cardiovascular: Negative for palpitations.   Gastrointestinal: Positive for abdominal pain, diarrhea and nausea. Negative for abdominal distention, constipation and vomiting.   Genitourinary: Negative for dysuria and frequency.    Musculoskeletal: Negative for arthralgias, gait problem and myalgias.        Muscle weakness - diffuse   Skin: Negative for rash.   Neurological: Negative for dizziness, gait  "problem and headaches.   Hematological: Negative for adenopathy. Bruises/bleeds easily.   Psychiatric/Behavioral: The patient is not nervous/anxious.        PHYSICAL EXAM:  Vitals:    02/16/22 0827   BP: 134/87   Pulse: 106   Resp: 16   Temp: 98.9 °F (37.2 °C)   TempSrc: Oral   SpO2: 100%   Weight: 69 kg (152 lb 3.6 oz)   Height: 5' 4" (1.626 m)   PainSc: 0-No pain       KARNOFSKY PERFORMANCE STATUS 60%  ECOG 2    Physical Exam  Constitutional:       General: She is not in acute distress.     Appearance: She is well-developed.   HENT:      Head: Normocephalic and atraumatic.      Mouth/Throat:      Mouth: No oral lesions.   Eyes:      Conjunctiva/sclera: Conjunctivae normal.   Neck:      Thyroid: No thyromegaly.   Cardiovascular:      Rate and Rhythm: Normal rate and regular rhythm.      Heart sounds: Normal heart sounds. No murmur heard.      Pulmonary:      Breath sounds: No wheezing or rales.   Abdominal:      General: There is no distension.      Palpations: Abdomen is soft. There is no hepatomegaly, splenomegaly or mass.      Tenderness: There is no abdominal tenderness.   Lymphadenopathy:      Cervical: No cervical adenopathy.      Right cervical: No deep cervical adenopathy.     Left cervical: No deep cervical adenopathy.      Lower Body: No right inguinal adenopathy. No left inguinal adenopathy.   Skin:     Findings: No rash.      Comments: RUE pic   Neurological:      Mental Status: She is alert and oriented to person, place, and time.      Cranial Nerves: No cranial nerve deficit.      Coordination: Coordination normal.      Deep Tendon Reflexes: Reflexes are normal and symmetric.         LAB:   Results for orders placed or performed in visit on 02/16/22   CBC Auto Differential   Result Value Ref Range    WBC 13.91 (H) 3.90 - 12.70 K/uL    RBC 2.97 (L) 4.00 - 5.40 M/uL    Hemoglobin 10.0 (L) 12.0 - 16.0 g/dL    Hematocrit 32.1 (L) 37.0 - 48.5 %     (H) 82 - 98 fL    MCH 33.7 (H) 27.0 - 31.0 pg    " MCHC 31.2 (L) 32.0 - 36.0 g/dL    RDW 14.9 (H) 11.5 - 14.5 %    Platelets 271 150 - 450 K/uL    MPV 9.6 9.2 - 12.9 fL   Comprehensive Metabolic Panel   Result Value Ref Range    Sodium 140 136 - 145 mmol/L    Potassium 3.6 3.5 - 5.1 mmol/L    Chloride 103 95 - 110 mmol/L    CO2 26 23 - 29 mmol/L    Glucose 109 70 - 110 mg/dL    BUN 7 6 - 20 mg/dL    Creatinine 0.7 0.5 - 1.4 mg/dL    Calcium 8.7 8.7 - 10.5 mg/dL    Total Protein 5.4 (L) 6.0 - 8.4 g/dL    Albumin 2.6 (L) 3.5 - 5.2 g/dL    Total Bilirubin 0.3 0.1 - 1.0 mg/dL    Alkaline Phosphatase 151 (H) 55 - 135 U/L    AST 26 10 - 40 U/L    ALT 30 10 - 44 U/L    Anion Gap 11 8 - 16 mmol/L    eGFR if African American >60.0 >60 mL/min/1.73 m^2    eGFR if non African American >60.0 >60 mL/min/1.73 m^2   Magnesium   Result Value Ref Range    Magnesium 1.5 (L) 1.6 - 2.6 mg/dL   Phosphorus   Result Value Ref Range    Phosphorus 2.7 2.7 - 4.5 mg/dL       PROBLEMS ASSESSED THIS VISIT:    1. B-cell acute lymphoblastic leukemia        PLAN:       Ph+ B-ALL  Ms. Gonzalez achieved MRD-negative CR after induction chemotherapy on [facility]-Ph-01 protocol (with imatinib substituted for dasatinib). She presents for admission for Cycle 2 of consolidation therapy.     WBC is elevated, and this appears reactive in nature.     Otherwise, she is well and appropriate to proceed with consolidation therapy.     Anemia  Likely secondary to chemotherapy and imatinib at this point. Improving. Monitor for transfusion need.    Nausea  Diarrhea  Possibly related to imatinib, though endocrinologist stopped metformin to rule this out as a confounding factor.    She denies these symptoms in the office today. Monitor while admitted.    Follow-up  Will need twice weekly labs with Dr. Garcia in [place] (inpatient team to ensure this is set up).  Return here for admission for Cycle 3 consolidation    Dayron Jenkins MD  Hematology and Stem Cell Transplant

## 2022-02-16 NOTE — H&P
Roberto Yepez - Oncology ([facility])  Hematology  Bone Marrow Transplant  H&P    Subjective:     Principal Problem: B-cell acute lymphoblastic leukemia    HPI: Ms. 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 Other medical history includes COPD, HTN, HLD, DM2, TIA, anxiety, depression, GERD, seizure disorder, PAD, gastroparesis, RA, osteoporosis, and pacermaker placement. She is admitting today for C2 of consolidation with EWALL (IDA). She was induced with Vincristine + imatinib, which she recieved [place] [facility] [place]. That hospitalization was complicated by acute hypoxic respiratory failure requiring ICU transfer. Post induction BMBx was performed 1/3/22. Showing CR. She tolerated C1 of consolidation well. She is feeling well today. Denies fevers, chills, aches, cough, SOB, chest pain, bleeding or bruising, and constipation. She denies any recent sick contacts. She is COVID neg. Reports fatigue, and c/o n/v/d since starting Gleevec. Also reports fall without injury at home.      Patient information was obtained from patient, spouse/SO and past medical records.     Oncology History (From Dr. Jenkins's most recent clinic note):   Precursor B-cell acute lymphoblastic leukemia (Ph+)              A. 11/23/2021: [place] [facility] [place] hospital for evaluation for acute leukemia              B. 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               C. 11/30/2021 - 12/23/2021: Vincristine + imatinib induction; hospital course was complicated by acute hypoxemic respiratory failure which resolved with diuresis and treatment of ALL     Medications Prior to Admission   Medication Sig Dispense Refill Last Dose    acyclovir (ZOVIRAX) 400 MG tablet  Take 1 tablet (400 mg total) by mouth 2 (two) times daily. 60 tablet 11     artificial tears (ISOPTO TEARS) 0.5 % ophthalmic solution Place 1 drop into both eyes 4 (four) times daily as needed.       aspirin (ECOTRIN) 81 MG EC tablet Take 1 tablet (81 mg total) by mouth once daily. HOLD until okayed to resume during chemotherapy.  0     atorvastatin (LIPITOR) 80 MG tablet Take 80 mg by mouth once daily.       cyclobenzaprine (FLEXERIL) 10 MG tablet Take 10 mg by mouth 3 (three) times daily as needed.       dapagliflozin (FARXIGA) 10 mg tablet Take 10 mg by mouth once daily.       diazePAM (VALIUM) 10 MG Tab Take 10 mg by mouth 2 (two) times daily as needed.       diltiaZEM (CARDIZEM) 90 MG tablet Take 1 tablet (90 mg total) by mouth every 6 (six) hours. 120 tablet 11     duke's soln (benadryl 30 mL, mylanta 30 mL, LIDOcaine 30 mL, nystatin 30 mL) 120mL Take 10 mLs by mouth 4 (four) times daily. (Patient not taking: Reported on 2/16/2022) 120 mL 0     fenofibrate 160 MG Tab Take 160 mg by mouth once daily.       gabapentin (NEURONTIN) 300 MG capsule Take 1 capsule (300 mg total) by mouth 3 (three) times daily. 90 capsule 11     hydrOXYchloroQUINE (PLAQUENIL) 200 mg tablet Take 200 mg by mouth once daily.       imatinib (GLEEVEC) 100 MG Tab Take 2 tablets (200 mg total) by mouth once daily with 1 other imatinib prescription for 600 mg total.Take with a meal and large glass of water (Patient not taking: Reported on 2/16/2022.) 180 tablet 3     imatinib (GLEEVEC) 400 MG Tab Take 1 tablet (400 mg total) by mouth once daily with 1 other imatinib prescription for 600 mg total. Take with a meal and large glass of water 90 tablet 3     losartan (COZAAR) 25 MG tablet Take 25 mg by mouth once daily.       methocarbamoL (ROBAXIN) 500 MG Tab Take 1 tablet (500 mg total) by mouth 3 (three) times daily as needed (muscle spasms). 90 tablet 2     omeprazole (PRILOSEC) 40 MG capsule Take 40 mg by mouth once daily.        ondansetron (ZOFRAN-ODT) 8 MG TbDL Dissolve 1 tablet (8 mg total) by mouth every 12 (twelve) hours as needed (in case of chemo induced nausea). 30 tablet 1     OXcarbazepine (TRILEPTAL) 600 MG Tab Take 1,200 mg by mouth 2 (two) times daily.       polyethylene glycol (GLYCOLAX) 17 gram/dose powder Dissolve one capful (17 g) in liquid and take by mouth daily as needed (constipation). (Patient not taking: Reported on 2/16/2022) 510 g 0     QUEtiapine (SEROQUEL) 200 MG Tab Take 200 mg by mouth every evening.       rivaroxaban (XARELTO) 20 mg Tab Take 1 tablet (20 mg total) by mouth daily with dinner or evening meal. HOLD until okayed to resume while platelets are low.       senna-docusate 8.6-50 mg (PERICOLACE) 8.6-50 mg per tablet Take 1 tablet by mouth 2 (two) times daily. 60 tablet 3     TRINTELLIX 10 mg Tab Take 1 tablet by mouth once daily.          Levetiracetam     Past Medical History:   Diagnosis Date    Arthritis     COPD (chronic obstructive pulmonary disease)     Hypertension     Stroke     TIA x 4     Past Surgical History:   Procedure Laterality Date    APPENDECTOMY      HYSTERECTOMY      ROTATOR CUFF REPAIR Bilateral     TONSILLECTOMY      TUBAL LIGATION       Family History    None       Tobacco Use    Smoking status: Current Every Day Smoker     Packs/day: 1.00     Types: Cigarettes    Smokeless tobacco: Never Used   Substance and Sexual Activity    Alcohol use: No    Drug use: Not on file    Sexual activity: Not on file       Review of Systems   Constitutional: Positive for fatigue. Negative for activity change, appetite change, chills, fever and unexpected weight change.   HENT: Negative for congestion, mouth sores, nosebleeds, rhinorrhea, sinus pressure, sinus pain, sore throat and trouble swallowing.    Eyes: Negative for photophobia, discharge, redness, itching and visual disturbance.   Respiratory: Negative for cough, chest tightness, shortness of breath and wheezing.     Cardiovascular: Negative for chest pain, palpitations and leg swelling.   Gastrointestinal: Positive for diarrhea, nausea and vomiting. Negative for abdominal distention, abdominal pain and constipation.   Endocrine: Negative for cold intolerance, heat intolerance, polydipsia, polyphagia and polyuria.   Genitourinary: Negative for decreased urine volume, difficulty urinating, dysuria, frequency, hematuria, urgency, vaginal bleeding and vaginal discharge.   Musculoskeletal: Negative for arthralgias, back pain, gait problem, joint swelling, myalgias, neck pain and neck stiffness.   Skin: Negative for pallor, rash and wound.   Allergic/Immunologic: Negative for environmental allergies, food allergies and immunocompromised state.   Neurological: Negative for dizziness, tremors, seizures, syncope, weakness, light-headedness, numbness and headaches.   Hematological: Negative for adenopathy. Does not bruise/bleed easily.   Psychiatric/Behavioral: Negative for agitation, confusion, hallucinations, sleep disturbance and suicidal ideas. The patient is not nervous/anxious.      Objective:     Vital Signs (Most Recent):  Temp: 98.4 °F (36.9 °C) (02/16/22 1142)  Pulse: (!) 114 (02/16/22 1142)  Resp: 16 (02/16/22 1142)  BP: (!) 162/72 (02/16/22 1142)  SpO2: 96 % (02/16/22 1142) Vital Signs (24h Range):  Temp:  [98.4 °F (36.9 °C)-98.9 °F (37.2 °C)] 98.4 °F (36.9 °C)  Pulse:  [106-114] 114  Resp:  [16] 16  SpO2:  [96 %-100 %] 96 %  BP: (134-162)/(72-87) 162/72     Weight: 64.4 kg (141 lb 15.6 oz)  Body mass index is 24.37 kg/m².  Body surface area is 1.71 meters squared.    ECOG SCORE         [unfilled]    Lines/Drains/Airways     Peripherally Inserted Central Catheter Line            PICC Double Lumen 11/30/21 1033 right basilic 78 days                Physical Exam  Constitutional:       Appearance: She is well-developed and well-nourished.   HENT:      Head: Normocephalic and atraumatic.      Mouth/Throat:      Pharynx: No  oropharyngeal exudate.   Eyes:      Conjunctiva/sclera: Conjunctivae normal.      Pupils: Pupils are equal, round, and reactive to light.   Cardiovascular:      Rate and Rhythm: Normal rate and regular rhythm.      Heart sounds: Normal heart sounds. No murmur heard.      Pulmonary:      Effort: Pulmonary effort is normal.      Breath sounds: Normal breath sounds.   Abdominal:      General: Bowel sounds are normal. There is no distension.      Palpations: Abdomen is soft.      Tenderness: There is no abdominal tenderness.   Musculoskeletal:         General: No deformity. Normal range of motion.      Cervical back: Normal range of motion and neck supple.      Right lower leg: Edema (trace to +1 to ankle and foot) present.      Left lower leg: Edema (trace to +1 to ankle and foot) present.   Skin:     General: Skin is warm and dry.      Findings: No erythema or rash.      Comments: RUE PICC. Dressing c/d/i. No sign of infection to site.   Neurological:      Mental Status: She is alert and oriented to person, place, and time.   Psychiatric:         Mood and Affect: Mood and affect normal.         Behavior: Behavior normal.         Thought Content: Thought content normal.         Judgment: Judgment normal.         Significant Labs:   CBC:   Recent Labs   Lab 02/16/22  0816   WBC 13.91*   HGB 10.0*   HCT 32.1*       and CMP:   Recent Labs   Lab 02/16/22  0816      K 3.6      CO2 26      BUN 7   CREATININE 0.7   CALCIUM 8.7   PROT 5.4*   ALBUMIN 2.6*   BILITOT 0.3   ALKPHOS 151*   AST 26   ALT 30   ANIONGAP 11   EGFRNONAA >60.0       Diagnostic Results:  I have reviewed all pertinent imaging results/findings within the past 24 hours.    Assessment/Plan:     * B-cell acute lymphoblastic leukemia  From Dr. Jenkins's most recent clinic note  Precursor B-cell acute lymphoblastic leukemia (Ph+)              A. [place]23[place]2021[place] [facility] [place]uk[place]              B. 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               C. 11/30/2021 - 12/23/2021: Vincristine + imatinib induction; hospital course was complicated by acute hypoxemic respiratory failure which resolved with diuresis and treatment of ALL     - BMBx from 1/3/22 showing CR  - admitting today for C2 of consolidation with EWALL (IDAC)  - tolerated C1 of consolidation well  - take home Gleevec days 15-28 of consolidation cycles  - monitor closely for neuro toxicity with cytarabine         Moderate major depression  - continue home trintellix while inpatient    Insomnia  - continue home seroquel while inpatient    Anxiety  - continue home trintellix and valium while inpatient    GERD (gastroesophageal reflux disease)  - home prilosec not on formulary. Will receive protonix while inpatient.    Hypertension  - continue home losartan while inpatient    Seizure disorder  - continue home oxcarbazepine while inpatient    Type 2 diabetes mellitus, without long-term current use of insulin  - hold jardiance and metformin recently discontinued by endocrinologist.   - hold home farxiga while inpatient. Can resume at discharge.  - low dose SSI, achs blood glucose monitoring, and diabetic diet while inpatient    Rheumatoid arthritis involving multiple sites  - continue home plaquenil.   - home leflunomide was recently discontinued by her rhemotologist    History of TIA (transient ischemic attack)  - Continue home ASA and fenofibrate. Holding home statin while inpatient.    Hyperlipidemia  - holding home statin while inpatient    Long term current use of anticoagulant  - continue home xarelto. Hold with plts < 50K.    Paroxysmal  atrial fibrillation  - continue home diltiazem and xarelto. Hold xarelto with plt count < 50K.        VTE Risk Mitigation (From admission, onward)         Ordered     rivaroxaban tablet 20 mg  With dinner         02/16/22 1301     IP VTE HIGH RISK PATIENT  Once         02/16/22 1206     Place sequential compression device  Until discontinued         02/16/22 1206                Disposition: Inpatient for chemo.    Mirtha Antonio, NP  Bone Marrow Transplant  Hematology  [facility] ([facility])

## 2022-02-16 NOTE — HPI
Ms. 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 Other medical history includes COPD, HTN, HLD, DM2, TIA, anxiety, depression, GERD, seizure disorder, PAD, gastroparesis, RA, osteoporosis, and pacermaker placement. She is admitting today for C2 of consolidation with EWALL ([facility]). She was induced with Vincristine + imatinib, which she recieved inpatient at [facility] [place]. That hospitalization was complicated by acute hypoxic respiratory failure requiring ICU transfer. Post induction BMBx was performed 1/3/22. Showing CR. She tolerated C1 of consolidation well. She is feeling well today. Denies fevers, chills, aches, cough, SOB, chest pain, bleeding or bruising, and constipation. She denies any recent sick contacts. She is COVID neg. Reports fatigue, and c/o n/v/d since starting Gleevec. Also reports fall without injury at home.

## 2022-02-16 NOTE — Clinical Note
Please schedule follow-up MD/DANUTA visit, labs (CBC, CMP, type and screen, mag, phos, LDH, uric acid, bcr-abl p190), and hospital admission appt for 3/16.

## 2022-02-17 PROBLEM — E83.39 HYPOPHOSPHATEMIA: Status: ACTIVE | Noted: 2022-02-17

## 2022-02-17 PROBLEM — R51.9 HEADACHE: Status: ACTIVE | Noted: 2022-02-17

## 2022-02-17 LAB
ALBUMIN SERPL BCP-MCNC: 2.3 G/DL (ref 3.5–5.2)
ALP SERPL-CCNC: 108 U/L (ref 55–135)
ALT SERPL W/O P-5'-P-CCNC: 26 U/L (ref 10–44)
ANION GAP SERPL CALC-SCNC: 9 MMOL/L (ref 8–16)
ANISOCYTOSIS BLD QL SMEAR: SLIGHT
AST SERPL-CCNC: 21 U/L (ref 10–40)
BASOPHILS # BLD AUTO: 0 K/UL (ref 0–0.2)
BASOPHILS NFR BLD: 0 % (ref 0–1.9)
BILIRUB SERPL-MCNC: 0.2 MG/DL (ref 0.1–1)
BUN SERPL-MCNC: 4 MG/DL (ref 6–20)
CALCIUM SERPL-MCNC: 8.1 MG/DL (ref 8.7–10.5)
CHLORIDE SERPL-SCNC: 105 MMOL/L (ref 95–110)
CO2 SERPL-SCNC: 27 MMOL/L (ref 23–29)
CREAT SERPL-MCNC: 0.6 MG/DL (ref 0.5–1.4)
DIFFERENTIAL METHOD: ABNORMAL
EOSINOPHIL # BLD AUTO: 0 K/UL (ref 0–0.5)
EOSINOPHIL NFR BLD: 0 % (ref 0–8)
ERYTHROCYTE [DISTWIDTH] IN BLOOD BY AUTOMATED COUNT: 14.8 % (ref 11.5–14.5)
EST. GFR  (AFRICAN AMERICAN): >60 ML/MIN/1.73 M^2
EST. GFR  (NON AFRICAN AMERICAN): >60 ML/MIN/1.73 M^2
GLUCOSE SERPL-MCNC: 183 MG/DL (ref 70–110)
HCT VFR BLD AUTO: 26.4 % (ref 37–48.5)
HGB BLD-MCNC: 8.3 G/DL (ref 12–16)
HYPOCHROMIA BLD QL SMEAR: ABNORMAL
IMM GRANULOCYTES # BLD AUTO: 0.07 K/UL (ref 0–0.04)
IMM GRANULOCYTES NFR BLD AUTO: 1.3 % (ref 0–0.5)
LYMPHOCYTES # BLD AUTO: 1.5 K/UL (ref 1–4.8)
LYMPHOCYTES NFR BLD: 27.9 % (ref 18–48)
MAGNESIUM SERPL-MCNC: 1.6 MG/DL (ref 1.6–2.6)
MCH RBC QN AUTO: 33.6 PG (ref 27–31)
MCHC RBC AUTO-ENTMCNC: 31.4 G/DL (ref 32–36)
MCV RBC AUTO: 107 FL (ref 82–98)
MONOCYTES # BLD AUTO: 0.1 K/UL (ref 0.3–1)
MONOCYTES NFR BLD: 2.1 % (ref 4–15)
NEUTROPHILS # BLD AUTO: 3.6 K/UL (ref 1.8–7.7)
NEUTROPHILS NFR BLD: 68.7 % (ref 38–73)
NRBC BLD-RTO: 0 /100 WBC
OVALOCYTES BLD QL SMEAR: ABNORMAL
PHOSPHATE SERPL-MCNC: 2.2 MG/DL (ref 2.7–4.5)
PLATELET # BLD AUTO: 210 K/UL (ref 150–450)
PMV BLD AUTO: 10 FL (ref 9.2–12.9)
POCT GLUCOSE: 181 MG/DL (ref 70–110)
POCT GLUCOSE: 217 MG/DL (ref 70–110)
POCT GLUCOSE: 255 MG/DL (ref 70–110)
POCT GLUCOSE: 261 MG/DL (ref 70–110)
POIKILOCYTOSIS BLD QL SMEAR: SLIGHT
POLYCHROMASIA BLD QL SMEAR: ABNORMAL
POTASSIUM SERPL-SCNC: 3.9 MMOL/L (ref 3.5–5.1)
PROT SERPL-MCNC: 4.7 G/DL (ref 6–8.4)
RBC # BLD AUTO: 2.47 M/UL (ref 4–5.4)
SODIUM SERPL-SCNC: 141 MMOL/L (ref 136–145)
WBC # BLD AUTO: 5.23 K/UL (ref 3.9–12.7)

## 2022-02-17 PROCEDURE — 83735 ASSAY OF MAGNESIUM: CPT | Performed by: NURSE PRACTITIONER

## 2022-02-17 PROCEDURE — 25000003 PHARM REV CODE 250: Performed by: INTERNAL MEDICINE

## 2022-02-17 PROCEDURE — 99233 SBSQ HOSP IP/OBS HIGH 50: CPT | Mod: ,,, | Performed by: INTERNAL MEDICINE

## 2022-02-17 PROCEDURE — 25000242 PHARM REV CODE 250 ALT 637 W/ HCPCS: Performed by: NURSE PRACTITIONER

## 2022-02-17 PROCEDURE — 25000003 PHARM REV CODE 250: Performed by: NURSE PRACTITIONER

## 2022-02-17 PROCEDURE — 84100 ASSAY OF PHOSPHORUS: CPT | Performed by: NURSE PRACTITIONER

## 2022-02-17 PROCEDURE — 85025 COMPLETE CBC W/AUTO DIFF WBC: CPT | Performed by: NURSE PRACTITIONER

## 2022-02-17 PROCEDURE — 97530 THERAPEUTIC ACTIVITIES: CPT

## 2022-02-17 PROCEDURE — 99233 PR SUBSEQUENT HOSPITAL CARE,LEVL III: ICD-10-PCS | Mod: ,,, | Performed by: INTERNAL MEDICINE

## 2022-02-17 PROCEDURE — 20600001 HC STEP DOWN PRIVATE ROOM

## 2022-02-17 PROCEDURE — 63600175 PHARM REV CODE 636 W HCPCS: Performed by: INTERNAL MEDICINE

## 2022-02-17 PROCEDURE — 97162 PT EVAL MOD COMPLEX 30 MIN: CPT

## 2022-02-17 PROCEDURE — 80053 COMPREHEN METABOLIC PANEL: CPT | Performed by: NURSE PRACTITIONER

## 2022-02-17 RX ORDER — POTASSIUM CHLORIDE 20 MEQ/1
20 TABLET, EXTENDED RELEASE ORAL
Status: DISCONTINUED | OUTPATIENT
Start: 2022-02-17 | End: 2022-02-21 | Stop reason: HOSPADM

## 2022-02-17 RX ORDER — LANOLIN ALCOHOL/MO/W.PET/CERES
400 CREAM (GRAM) TOPICAL EVERY 4 HOURS PRN
Status: DISCONTINUED | OUTPATIENT
Start: 2022-02-17 | End: 2022-02-21 | Stop reason: HOSPADM

## 2022-02-17 RX ORDER — LANOLIN ALCOHOL/MO/W.PET/CERES
800 CREAM (GRAM) TOPICAL EVERY 4 HOURS PRN
Status: DISCONTINUED | OUTPATIENT
Start: 2022-02-17 | End: 2022-02-21 | Stop reason: HOSPADM

## 2022-02-17 RX ORDER — SODIUM,POTASSIUM PHOSPHATES 280-250MG
1 POWDER IN PACKET (EA) ORAL EVERY 4 HOURS PRN
Status: DISCONTINUED | OUTPATIENT
Start: 2022-02-17 | End: 2022-02-21 | Stop reason: HOSPADM

## 2022-02-17 RX ORDER — SODIUM,POTASSIUM PHOSPHATES 280-250MG
2 POWDER IN PACKET (EA) ORAL EVERY 4 HOURS PRN
Status: DISCONTINUED | OUTPATIENT
Start: 2022-02-17 | End: 2022-02-21 | Stop reason: HOSPADM

## 2022-02-17 RX ORDER — IBUPROFEN 400 MG/1
800 TABLET ORAL ONCE
Status: COMPLETED | OUTPATIENT
Start: 2022-02-17 | End: 2022-02-17

## 2022-02-17 RX ADMIN — VORTIOXETINE 10 MG: 10 TABLET, FILM COATED ORAL at 08:02

## 2022-02-17 RX ADMIN — DEXAMETHASONE SODIUM PHOSPHATE 8 MG: 4 INJECTION INTRA-ARTICULAR; INTRALESIONAL; INTRAMUSCULAR; INTRAVENOUS; SOFT TISSUE at 03:02

## 2022-02-17 RX ADMIN — FENOFIBRATE 160 MG: 160 TABLET ORAL at 08:02

## 2022-02-17 RX ADMIN — LOSARTAN POTASSIUM 25 MG: 25 TABLET, FILM COATED ORAL at 08:02

## 2022-02-17 RX ADMIN — DILTIAZEM HYDROCHLORIDE 90 MG: 30 TABLET, FILM COATED ORAL at 12:02

## 2022-02-17 RX ADMIN — QUETIAPINE FUMARATE 200 MG: 200 TABLET ORAL at 09:02

## 2022-02-17 RX ADMIN — DEXAMETHASONE 1 DROP: 1 SUSPENSION OPHTHALMIC at 09:02

## 2022-02-17 RX ADMIN — DILTIAZEM HYDROCHLORIDE 90 MG: 30 TABLET, FILM COATED ORAL at 05:02

## 2022-02-17 RX ADMIN — CYTARABINE 1770 MG: 100 INJECTION, SOLUTION INTRATHECAL; INTRAVENOUS; SUBCUTANEOUS at 04:02

## 2022-02-17 RX ADMIN — OXCARBAZEPINE 1200 MG: 600 TABLET, FILM COATED ORAL at 08:02

## 2022-02-17 RX ADMIN — ONDANSETRON 8 MG: 2 INJECTION INTRAMUSCULAR; INTRAVENOUS at 03:02

## 2022-02-17 RX ADMIN — ASPIRIN 81 MG: 81 TABLET, COATED ORAL at 08:02

## 2022-02-17 RX ADMIN — DIAZEPAM 10 MG: 5 TABLET ORAL at 09:02

## 2022-02-17 RX ADMIN — DILTIAZEM HYDROCHLORIDE 90 MG: 30 TABLET, FILM COATED ORAL at 11:02

## 2022-02-17 RX ADMIN — CYCLOBENZAPRINE HYDROCHLORIDE 10 MG: 5 TABLET, FILM COATED ORAL at 09:02

## 2022-02-17 RX ADMIN — HYDROXYCHLOROQUINE SULFATE 200 MG: 200 TABLET, FILM COATED ORAL at 08:02

## 2022-02-17 RX ADMIN — DEXAMETHASONE 1 DROP: 1 SUSPENSION OPHTHALMIC at 03:02

## 2022-02-17 RX ADMIN — DILTIAZEM HYDROCHLORIDE 90 MG: 30 TABLET, FILM COATED ORAL at 06:02

## 2022-02-17 RX ADMIN — Medication 400 MG: at 11:02

## 2022-02-17 RX ADMIN — INSULIN ASPART 3 UNITS: 100 INJECTION, SOLUTION INTRAVENOUS; SUBCUTANEOUS at 05:02

## 2022-02-17 RX ADMIN — IBUPROFEN 800 MG: 400 TABLET, FILM COATED ORAL at 10:02

## 2022-02-17 RX ADMIN — GABAPENTIN 300 MG: 300 CAPSULE ORAL at 03:02

## 2022-02-17 RX ADMIN — DEXAMETHASONE 1 DROP: 1 SUSPENSION OPHTHALMIC at 08:02

## 2022-02-17 RX ADMIN — INSULIN ASPART 1 UNITS: 100 INJECTION, SOLUTION INTRAVENOUS; SUBCUTANEOUS at 09:02

## 2022-02-17 RX ADMIN — INSULIN ASPART 3 UNITS: 100 INJECTION, SOLUTION INTRAVENOUS; SUBCUTANEOUS at 11:02

## 2022-02-17 RX ADMIN — PANTOPRAZOLE SODIUM 40 MG: 40 TABLET, DELAYED RELEASE ORAL at 08:02

## 2022-02-17 RX ADMIN — ACYCLOVIR 400 MG: 200 CAPSULE ORAL at 08:02

## 2022-02-17 RX ADMIN — GABAPENTIN 300 MG: 300 CAPSULE ORAL at 08:02

## 2022-02-17 RX ADMIN — ACYCLOVIR 400 MG: 200 CAPSULE ORAL at 09:02

## 2022-02-17 RX ADMIN — OXCARBAZEPINE 1200 MG: 600 TABLET, FILM COATED ORAL at 09:02

## 2022-02-17 RX ADMIN — RIVAROXABAN 20 MG: 20 TABLET, FILM COATED ORAL at 05:02

## 2022-02-17 RX ADMIN — SODIUM CHLORIDE: 0.9 INJECTION, SOLUTION INTRAVENOUS at 03:02

## 2022-02-17 RX ADMIN — POTASSIUM & SODIUM PHOSPHATES POWDER PACK 280-160-250 MG 1 PACKET: 280-160-250 PACK at 10:02

## 2022-02-17 RX ADMIN — GABAPENTIN 300 MG: 300 CAPSULE ORAL at 09:02

## 2022-02-17 NOTE — SUBJECTIVE & OBJECTIVE
Subjective:     Interval History: Day 2 of consolidation 2 of EWALL. Tolerating IDAC well thus far. No evidence of neurotoxicity on exam. Replacing phos per prn electrolyte replacement order set. C-diff stool sample collection discontinued per protocol as stool is not liquid. C/o headache and RA pain. Giving one time dose fo ibuprofen. SW assisting with insurance issues.     Objective:     Vital Signs (Most Recent):  Temp: 98 °F (36.7 °C) (02/17/22 0820)  Pulse: 100 (02/17/22 0820)  Resp: 16 (02/17/22 0820)  BP: (!) 119/59 (02/17/22 0820)  SpO2: (!) 94 % (02/17/22 0820) Vital Signs (24h Range):  Temp:  [98 °F (36.7 °C)-98.6 °F (37 °C)] 98 °F (36.7 °C)  Pulse:  [] 100  Resp:  [16-18] 16  SpO2:  [94 %-97 %] 94 %  BP: (106-162)/(55-72) 119/59     Weight: 64.4 kg (141 lb 15.6 oz)  Body mass index is 24.37 kg/m².  Body surface area is 1.71 meters squared.    ECOG SCORE         [unfilled]    Intake/Output - Last 3 Shifts       02/15 0700  02/16 0659 02/16 0700  02/17 0659 02/17 0700  02/18 0659    I.V. (mL/kg)  55.9 (0.9)     IV Piggyback  240.9     Total Intake(mL/kg)  296.8 (4.6)     Net  +296.8            Urine Occurrence  1 x     Stool Occurrence  1 x           Physical Exam  Constitutional:       Appearance: She is well-developed and well-nourished.   HENT:      Head: Normocephalic and atraumatic.      Mouth/Throat:      Pharynx: No oropharyngeal exudate.   Eyes:      Conjunctiva/sclera: Conjunctivae normal.      Pupils: Pupils are equal, round, and reactive to light.   Cardiovascular:      Rate and Rhythm: Normal rate and regular rhythm.      Heart sounds: Normal heart sounds. No murmur heard.      Pulmonary:      Effort: Pulmonary effort is normal.      Breath sounds: Normal breath sounds.   Abdominal:      General: Bowel sounds are normal. There is no distension.      Palpations: Abdomen is soft.      Tenderness: There is no abdominal tenderness.   Musculoskeletal:         General: No deformity or edema.  Normal range of motion.      Cervical back: Normal range of motion and neck supple.   Skin:     General: Skin is warm and dry.      Findings: Bruising present. No erythema or rash.      Comments: RUE PICC. Dressing c/d/i. No sign of infection to site.   Neurological:      Mental Status: She is alert and oriented to person, place, and time.   Psychiatric:         Mood and Affect: Mood and affect normal.         Behavior: Behavior normal.         Thought Content: Thought content normal.         Judgment: Judgment normal.         Significant Labs:   CBC:   Recent Labs   Lab 02/16/22  0816 02/17/22  0333   WBC 13.91* 5.23   HGB 10.0* 8.3*   HCT 32.1* 26.4*    210    and CMP:   Recent Labs   Lab 02/16/22  0816 02/17/22  0333    141   K 3.6 3.9    105   CO2 26 27    183*   BUN 7 4*   CREATININE 0.7 0.6   CALCIUM 8.7 8.1*   PROT 5.4* 4.7*   ALBUMIN 2.6* 2.3*   BILITOT 0.3 0.2   ALKPHOS 151* 108   AST 26 21   ALT 30 26   ANIONGAP 11 9   EGFRNONAA >60.0 >60.0       Diagnostic Results:  I have reviewed all pertinent imaging results/findings within the past 24 hours.

## 2022-02-17 NOTE — ASSESSMENT & PLAN NOTE
From Dr. Jenkins's most recent clinic note  Precursor B-cell acute lymphoblastic leukemia (Ph+)              A. 11/23/2021: Transferred to [facility] [place] from outside hospital for evaluation for acute leukemia              B. 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               C. 11/30/2021 - 12/23/2021: Vincristine + imatinib induction; hospital course was complicated by acute hypoxemic respiratory failure which resolved with diuresis and treatment of ALL     - BMBx from 1/3/22 showing CR  - admitted for C2 of consolidation with EWALL (IDAC). Today is day 2. Will complete chemo and discharge home on 2/21/22.  - tolerated C1 of consolidation well  - take home Gleevec days 15-28 of consolidation cycles  - monitor closely for neuro toxicity with cytarabine. None evident on exam today.

## 2022-02-17 NOTE — PLAN OF CARE
Problem: Occupational Therapy Goal  Goal: Occupational Therapy Goal  Description: Goals to be met by: 3/3/2022    Patient will increase functional independence with ADLs by performing:    UE Dressing with [place].  LE Dressing with Modified [place].  Grooming while standing at sink with Stand-by Assistance.  Toileting from toilet with Stand-by Assistance for hygiene and clothing management.     Outcome: Ongoing, Progressing   BELÉN Cueva

## 2022-02-17 NOTE — ASSESSMENT & PLAN NOTE
- hold jardiance and metformin recently discontinued by endocrinologist.   - hold home farxiga while inpatient. Can resume at discharge.  - low dose SSI, achs blood glucose monitoring, and diabetic diet while inpatient  - will add basal insulin if indicated

## 2022-02-17 NOTE — PLAN OF CARE
Problem: Physical Therapy Goal  Goal: Physical Therapy Goal  Description: Goals to be met by: 22    Patient will increase functional independence with mobility by performin. Pt will perform supine<>sit with independence to improve independence with mobility  2. Pt will perform functional transfers with supervision   3. Pt will ambulate 150 ft feet with LRAD and supervision to safely perform household distance ambulation   Outcome: Ongoing, Progressing     Evaluation completed and goals currently appropriate at this time.    Shanta Whitehead, PT, DPT, GCS  2022

## 2022-02-17 NOTE — PT/OT/SLP EVAL
"Physical Therapy Evaluation and Treatment    Patient Name:  Deepti Gonzalez   MRN:  [id number]    Recommendations:     Discharge Recommendations:  home health PT   Discharge Equipment Recommendations: none   Barriers to discharge: None    Assessment:       Deepti Gonzalez is a 51 y.o. female admitted with a medical diagnosis of B-cell acute lymphoblastic leukemia.  She presents with the following impairments/functional limitations:  weakness,impaired endurance,impaired functional mobilty,gait instability,impaired balance,decreased safety awareness.  Pt participated in functional mobility training on this date, able to perform bed mobility, transfers, ADLs, and ambulate ~150 ft with RW. Pt requires CGA at all times for safety and had at least 5 significant posterior LOB throughout various tasks with impaired righting reactions. Pt continues to present below their independent prior functional baseline. Pt would benefit from continued, skilled PT while in house to address the above listed impairments, further progress mobility as able, return towards highest level of function.      Rehab Prognosis: Good; patient would benefit from acute skilled PT services 3 x/week to address these deficits and reach maximum level of function.  Patient is most appropriate to go to home health PT .  Recent Surgery: * No surgery found *      Plan:     During this hospitalization, patient to be seen 3 x/week to address the identified rehab impairments via gait training,therapeutic activities,therapeutic exercises,neuromuscular re-education and progress toward the following goals:    · Plan of Care Expires:  03/17/22    Subjective     Subjective:"I lose my balance all the time, really I do"  Pt goal: d/c home  Pain/Comfort:  · Pain Rating 1: 0/10    Patients cultural, spiritual, [employer] conflicts given the current situation: no    Living Environment: Pt has been staying with her brother as she is able to have 24/7 supervision at his " house since he works from home. Home is a [facility] with no LUZ  Prior level of function:  Reports independence, owns a RW however she says she 'is too prideful to use it all the time', does not drive due to concern for seizures  Falls within the last 90 days: endorse fall last week when attempting to get up from a chair, in which she wasn't able to and slid out of the chair. Required total assistance from brother to recover.  Equipment owned: walker, rolling  Support available upon discharge: family    Objective:     Communicated with nursing prior to session.  Patient found supine with peripheral IV  upon PT entry to room.     General Precautions: Standard, fall   Orthopedic Precautions:N/A   Braces: N/A     Exams:  · RLE ROM: WFL  · RLE Strength: WFL  · LLE ROM: WFL  · LLE Strength: WFL  · Cognitive Exam: appropriate and cooperative  · Integumentary: intact  · Sensation: grossly intact  · Coordination: impaired finger opposition, finger to nose    Functional Mobility:  · Bed Mobility: modified independence for supine<>sit with HOB elevated ~30 degrees  · Transfers: CGA/stand by assist for sit<>stand from EOB and toilet. Upon standing from EOB, pt had immediate posterior LOB, requiring total assistance and use of furniture in close proximity to assist with recovery.   · Gait: Pt ambulated ~150 ft with RW and CGA/stand by assist. Pt demo's inconsistent and chaotic use of RW with impaired foot clearance, safety awareness, and RW management. Pt frequently letting go of RW and subsequently had small LOB with each step. Cueing to maintain attention to task at hand to improve overall safety awareness.   · Balance:   · Sitting: supervision for safety  · Standing: significantly impaired; pt had immediate posterior LOB when standing from EOB and toilet with delayed righting reactions, ultimately requiring mod to max assist from therapist to assist with recovery. Pt demo's frequent LOB in all directions throughout all standing  tasks, no consistent recovery options despite cueing and external assistance at all times.    Therapeutic activities and education:  Education provided to pt re: d/c planning, PT POC, safety in mobility, benefits of mobility, use of RW. Pt endorses understanding.     AM-PAC 6 CLICK MOBILITY  Total Score:19     Patient left supine with all lines intact and call button in reach.    GOALS:   Multidisciplinary Problems     Physical Therapy Goals        Problem: Physical Therapy Goal    Goal Priority Disciplines Outcome Goal Variances Interventions   Physical Therapy Goal     PT, PT/OT Ongoing, Progressing     Description: Goals to be met by: 22    Patient will increase functional independence with mobility by performin. Pt will perform supine<>sit with independence to improve independence with mobility  2. Pt will perform functional transfers with supervision   3. Pt will ambulate 150 ft feet with LRAD and supervision to safely perform household distance ambulation                    History:     Past Medical History:   Diagnosis Date    Arthritis     COPD (chronic obstructive pulmonary disease)     Hypertension     Stroke     TIA x 4       Past Surgical History:   Procedure Laterality Date    APPENDECTOMY      HYSTERECTOMY      ROTATOR CUFF REPAIR Bilateral     TONSILLECTOMY      TUBAL LIGATION         Time Tracking:     PT Received On: 22  PT Start Time: 1006     PT Stop Time: 1025  PT Total Time (min): 19 min     Billable Minutes: Evaluation 10 min and Therapeutic Activity 9 min      Shanta Whitehead, PT, DPT, GCS  2022

## 2022-02-17 NOTE — PROGRESS NOTES
[facility] - Oncology ([facility])  Hematology  Bone Marrow Transplant  Progress Note    Patient Name: Deepti Gonzalez  Admission Date: 2/16/2022  Hospital Length of Stay: 1 days  Code Status: Full Code    Subjective:     Interval History: Day 2 of consolidation 2 of EWALL. Tolerating IDAC well thus far. No evidence of neurotoxicity on exam. Replacing phos per prn electrolyte replacement order set. C-diff stool sample collection discontinued per protocol as stool is not liquid. C/o headache and RA pain. Giving one time dose fo ibuprofen. SW assisting with insurance issues.     Objective:     Vital Signs (Most Recent):  Temp: 98 °F (36.7 °C) (02/17/22 0820)  Pulse: 100 (02/17/22 0820)  Resp: 16 (02/17/22 0820)  BP: (!) 119/59 (02/17/22 0820)  SpO2: (!) 94 % (02/17/22 0820) Vital Signs (24h Range):  Temp:  [98 °F (36.7 °C)-98.6 °F (37 °C)] 98 °F (36.7 °C)  Pulse:  [] 100  Resp:  [16-18] 16  SpO2:  [94 %-97 %] 94 %  BP: (106-162)/(55-72) 119/59     Weight: 64.4 kg (141 lb 15.6 oz)  Body mass index is 24.37 kg/m².  Body surface area is 1.71 meters squared.    ECOG SCORE         [unfilled]    Intake/Output - Last 3 Shifts       02/15 0700  02/16 0659 02/16 0700  02/17 0659 02/17 0700  02/18 0659    I.V. (mL/kg)  55.9 (0.9)     IV Piggyback  240.9     Total Intake(mL/kg)  296.8 (4.6)     Net  +296.8            Urine Occurrence  1 x     Stool Occurrence  1 x           Physical Exam  Constitutional:       Appearance: She is well-developed and well-nourished.   HENT:      Head: Normocephalic and atraumatic.      Mouth/Throat:      Pharynx: No oropharyngeal exudate.   Eyes:      Conjunctiva/sclera: Conjunctivae normal.      Pupils: Pupils are equal, round, and reactive to light.   Cardiovascular:      Rate and Rhythm: Normal rate and regular rhythm.      Heart sounds: Normal heart sounds. No murmur heard.      Pulmonary:      Effort: Pulmonary effort is normal.      Breath sounds: Normal breath sounds.   Abdominal:       General: Bowel sounds are normal. There is no distension.      Palpations: Abdomen is soft.      Tenderness: There is no abdominal tenderness.   Musculoskeletal:         General: No deformity or edema. Normal range of motion.      Cervical back: Normal range of motion and neck supple.   Skin:     General: Skin is warm and dry.      Findings: Bruising present. No erythema or rash.      Comments: RUE PICC. Dressing c/d/i. No sign of infection to site.   Neurological:      Mental Status: She is alert and oriented to person, place, and time.   Psychiatric:         Mood and Affect: Mood and affect normal.         Behavior: Behavior normal.         Thought Content: Thought content normal.         Judgment: Judgment normal.         Significant Labs:   CBC:   Recent Labs   Lab 02/16/22  0816 02/17/22  0333   WBC 13.91* 5.23   HGB 10.0* 8.3*   HCT 32.1* 26.4*    210    and CMP:   Recent Labs   Lab 02/16/22  0816 02/17/22  0333    141   K 3.6 3.9    105   CO2 26 27    183*   BUN 7 4*   CREATININE 0.7 0.6   CALCIUM 8.7 8.1*   PROT 5.4* 4.7*   ALBUMIN 2.6* 2.3*   BILITOT 0.3 0.2   ALKPHOS 151* 108   AST 26 21   ALT 30 26   ANIONGAP 11 9   EGFRNONAA >60.0 >60.0       Diagnostic Results:  I have reviewed all pertinent imaging results/findings within the past 24 hours.    Assessment/Plan:     * B-cell acute lymphoblastic leukemia  From Dr. Jenkins's most recent clinic note  Precursor B-cell acute lymphoblastic leukemia (Ph+)              A. [place]2021[place] [facility] [place] for [place]              B. 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               C. 11/30/2021 - 12/23/2021:  Vincristine + imatinib induction; hospital course was complicated by acute hypoxemic respiratory failure which resolved with diuresis and treatment of ALL     - BMBx from 1/3/22 showing CR  - admitted for C2 of consolidation with EWALL (IDAC). Today is day 2. Will complete chemo and discharge home on 2/21/22.  - tolerated C1 of consolidation well  - take home Gleevec days 15-28 of consolidation cycles  - monitor closely for neuro toxicity with cytarabine. None evident on exam today.         Headache  - c/o of headache this morning (2/17)  - one time dose of ibuprofen ordered as patient is currently not neutropenic nor thrombocytopenic    Hypophosphatemia  - phos 2.2  - replace per prn electrolyte order set  - daily phos level while inpatient      Moderate major depression  - continue home trintellix while inpatient    Insomnia  - continue home seroquel while inpatient    Anxiety  - continue home trintellix and valium while inpatient    GERD (gastroesophageal reflux disease)  - home prilosec not on formulary. Will receive protonix while inpatient.    Hypertension  - continue home losartan while inpatient    Seizure disorder  - continue home oxcarbazepine while inpatient    Type 2 diabetes mellitus, without long-term current use of insulin  - hold jardiance and metformin recently discontinued by endocrinologist.   - hold home farxiga while inpatient. Can resume at discharge.  - low dose SSI, achs blood glucose monitoring, and diabetic diet while inpatient  - will add basal insulin if indicated    Rheumatoid arthritis involving multiple sites  - continue home plaquenil.   - home leflunomide was recently discontinued by her rhemotologist    History of TIA (transient ischemic attack)  - Continue home ASA and fenofibrate. Holding home statin while inpatient.    Hyperlipidemia  - holding home statin while inpatient    Long term current use of anticoagulant  - continue home xarelto. Hold with plts < 50K.    Paroxysmal  atrial fibrillation  - continue home diltiazem and xarelto. Hold xarelto with plt count < 50K.        VTE Risk Mitigation (From admission, onward)         Ordered     rivaroxaban tablet 20 mg  With dinner         02/16/22 1301     heparin, porcine (PF) 100 unit/mL injection flush 500 Units  As needed (PRN)         02/16/22 1438     IP VTE HIGH RISK PATIENT  Once         02/16/22 1206     Place sequential compression device  Until discontinued         02/16/22 1206                Disposition: Inpatient for chemo.    Mirtha Antonio, NP  Bone Marrow Transplant  [facility] - Oncology ([facility])

## 2022-02-17 NOTE — PT/OT/SLP EVAL
Occupational Therapy   Evaluation    Name: Deepti Gonzalez  MRN: [id number]  Admitting Diagnosis:  B-cell acute lymphoblastic leukemia  Recent Surgery: * No surgery found *      Recommendations:     Discharge Recommendations: home health OT  Discharge Equipment Recommendations:  bedside commode,bath bench  Barriers to discharge:  None    Assessment:     Deepti Gonzalez is a 51 y.o. female with a medical diagnosis of B-cell acute lymphoblastic leukemia.  She presents with limitations listed below. She was agreeable and pleasant throughout session. She was able to perform bed mobility, LB dressing, walk functional household distances with RW. She required CGA for walking due to LE weakness. She is currently limited in independence in ADLs due to global weakness and low task tolerance. She would benefit from additional therapy services to build strength, maintain functional mobility and increase independence in ADLs.  Performance deficits affecting function: weakness,impaired self care skills,impaired functional mobilty,impaired balance,decreased lower extremity function,edema,impaired cardiopulmonary response to activity.      Rehab Prognosis: Good; patient would benefit from acute skilled OT services to address these deficits and reach maximum level of function.       Plan:     Patient to be seen 3 x/week to address the above listed problems via self-care/home management,therapeutic activities,therapeutic exercises  · Plan of Care Expires: 03/17/22  · Plan of Care Reviewed with: spouse    Subjective     Chief Complaint: weakness  Patient/Family Comments/goals: PLOF    Occupational Profile:  Living Environment: [facility] no stairs.   Previous level of function: Independent prior to November. Since November, pt requires increased assistance from  for transfers and used RW for functional mobility.  Roles and Routines: wife, sister, mother  Equipment Used at Home:  none,walker, rolling  Assistance upon Discharge:  , brother, daughter    Pain/Comfort:  No pain reported    Patients cultural, spiritual, Syn[employer] conflicts given the current situation: no    Objective:     Communicated with: RN prior to session.  Patient found supine with peripheral IV upon OT entry to room.    General Precautions: Standard, fall   Orthopedic Precautions:    Braces:    Respiratory Status: Room air    Occupational Performance:    Bed Mobility:    · Patient completed Scooting/Bridging with independence  · Patient completed Supine to Sit with independence    Functional Mobility/Transfers:  · Patient completed Sit <> Stand Transfer with stand by assistance  with  rolling walker   · Functional Mobility: Pt ambulated functional household distances with RW with CGA.    Activities of Daily Living:  · Lower Body Dressing: stand by assistance  to don socks/slippers while laying in bed    Cognitive/Visual Perceptual:  Cognitive/Psychosocial Skills:     -       Oriented to: Person, Place, Time and Situation   -       Follows Commands/attention:Follows multistep  commands  -       Communication: clear/fluent  -       Memory: No Deficits noted  -       Safety awareness/insight to disability: intact   -       Mood/Affect/Coping skills/emotional control: Appropriate to situation, Cooperative and Pleasant  Visual/Perceptual:      -Intact      Physical Exam:  Balance:    -       intact  Edema:  Mild on BLE  Sensation: -       Impaired  pt stated slight imapirement in sensation when touching hands. She stated she has RA which might be the cause. RUE more than LUE  Motor Planning:    -       Intact  Dominant hand:    -       R  Upper Extremity Range of Motion:     -       Right Upper Extremity: WNL  -       Left Upper Extremity: WNL  Upper Extremity Strength:    -       Right Upper Extremity: WNL  -       Left Upper Extremity: WNL   Strength:    -       Right Upper Extremity: Weakness ntoed  -       Left Upper Extremity: Weakness noted  Fine Motor  Coordination: -       Impaired  not assessed but pt reports slight trouble with FM tasks due to RA  Gross motor coordination:   WFL  Neurological:    -       Intact  BLE hip MMT 3/5. Knee and ankle WNL.    AMPAC 6 Click ADL:  AMPAC Total Score:      Treatment & Education:  Pt and spouse educated on OT POC/goals for session  Education:    Patient left supine with all lines intact and  present    GOALS:   Multidisciplinary Problems     Occupational Therapy Goals        Problem: Occupational Therapy Goal    Goal Priority Disciplines Outcome Interventions   Occupational Therapy Goal     OT, PT/OT Ongoing, Progressing    Description: Goals to be met by: 3/3/2022    Patient will increase functional independence with ADLs by performing:    UE Dressing with [place].  LE Dressing with Modified [place].  Grooming while standing at sink with Stand-by Assistance.  Toileting from toilet with Stand-by Assistance for hygiene and clothing management.                      History:     Past Medical History:   Diagnosis Date    Arthritis     COPD (chronic obstructive pulmonary disease)     Hypertension     Stroke     TIA x 4         Past Surgical History:   Procedure Laterality Date    APPENDECTOMY      HYSTERECTOMY      ROTATOR CUFF REPAIR Bilateral     TONSILLECTOMY      TUBAL LIGATION         Time Tracking:     OT Date of Treatment: 2/16/2022  OT Start Time: 1625  OT Stop Time: 1647  OT Total Time (min): 22 minutes    Billable Minutes:Evaluation 10  Self Care/Home Management 12    2/17/2022

## 2022-02-17 NOTE — PLAN OF CARE
Day 2 of IDAC. Pt tolerated Cytarabine well. Patient is progressing and involved with plan of care, communicating needs throughout shift. Up ad osito. Tolerating diet, voiding without difficulty. No c/o pain. Pt w/ sm formed BM this shift. C-Diff precautions removed.  CBG checked at HS. SSI given as ordered. All vitals stable; no acute events this shift. Pt. Remaining free from falls or injury throughout shift; bed in lowest position; side rails up X2; call light within reach; pt instructed to call for assistance as needed - verbalized understanding. Q2h rounding on patient. Will continue to monitor.

## 2022-02-17 NOTE — ASSESSMENT & PLAN NOTE
- c/o of headache this morning (2/17)  - one time dose of ibuprofen ordered as patient is currently not neutropenic nor thrombocytopenic

## 2022-02-18 DIAGNOSIS — C91.00 B-CELL ACUTE LYMPHOBLASTIC LEUKEMIA: Primary | ICD-10-CM

## 2022-02-18 PROBLEM — E83.42 HYPOMAGNESEMIA: Status: ACTIVE | Noted: 2022-02-18

## 2022-02-18 LAB
ALBUMIN SERPL BCP-MCNC: 2.4 G/DL (ref 3.5–5.2)
ALP SERPL-CCNC: 104 U/L (ref 55–135)
ALT SERPL W/O P-5'-P-CCNC: 20 U/L (ref 10–44)
ANION GAP SERPL CALC-SCNC: 10 MMOL/L (ref 8–16)
AST SERPL-CCNC: 14 U/L (ref 10–40)
BASOPHILS # BLD AUTO: 0.01 K/UL (ref 0–0.2)
BASOPHILS NFR BLD: 0.1 % (ref 0–1.9)
BILIRUB SERPL-MCNC: 0.3 MG/DL (ref 0.1–1)
BUN SERPL-MCNC: 8 MG/DL (ref 6–20)
CALCIUM SERPL-MCNC: 8 MG/DL (ref 8.7–10.5)
CHLORIDE SERPL-SCNC: 106 MMOL/L (ref 95–110)
CO2 SERPL-SCNC: 24 MMOL/L (ref 23–29)
CREAT SERPL-MCNC: 0.6 MG/DL (ref 0.5–1.4)
DIFFERENTIAL METHOD: ABNORMAL
EOSINOPHIL # BLD AUTO: 0 K/UL (ref 0–0.5)
EOSINOPHIL NFR BLD: 0 % (ref 0–8)
ERYTHROCYTE [DISTWIDTH] IN BLOOD BY AUTOMATED COUNT: 15.1 % (ref 11.5–14.5)
EST. GFR  (AFRICAN AMERICAN): >60 ML/MIN/1.73 M^2
EST. GFR  (NON AFRICAN AMERICAN): >60 ML/MIN/1.73 M^2
GLUCOSE SERPL-MCNC: 160 MG/DL (ref 70–110)
HCT VFR BLD AUTO: 25.4 % (ref 37–48.5)
HGB BLD-MCNC: 7.9 G/DL (ref 12–16)
IMM GRANULOCYTES # BLD AUTO: 0.06 K/UL (ref 0–0.04)
IMM GRANULOCYTES NFR BLD AUTO: 0.6 % (ref 0–0.5)
LYMPHOCYTES # BLD AUTO: 2.2 K/UL (ref 1–4.8)
LYMPHOCYTES NFR BLD: 23.4 % (ref 18–48)
MAGNESIUM SERPL-MCNC: 1.5 MG/DL (ref 1.6–2.6)
MCH RBC QN AUTO: 33.3 PG (ref 27–31)
MCHC RBC AUTO-ENTMCNC: 31.1 G/DL (ref 32–36)
MCV RBC AUTO: 107 FL (ref 82–98)
MONOCYTES # BLD AUTO: 0.5 K/UL (ref 0.3–1)
MONOCYTES NFR BLD: 5 % (ref 4–15)
NEUTROPHILS # BLD AUTO: 6.7 K/UL (ref 1.8–7.7)
NEUTROPHILS NFR BLD: 70.9 % (ref 38–73)
NRBC BLD-RTO: 0 /100 WBC
PHOSPHATE SERPL-MCNC: 2.4 MG/DL (ref 2.7–4.5)
PLATELET # BLD AUTO: 209 K/UL (ref 150–450)
PMV BLD AUTO: 10 FL (ref 9.2–12.9)
POCT GLUCOSE: 143 MG/DL (ref 70–110)
POCT GLUCOSE: 146 MG/DL (ref 70–110)
POCT GLUCOSE: 245 MG/DL (ref 70–110)
POCT GLUCOSE: 91 MG/DL (ref 70–110)
POTASSIUM SERPL-SCNC: 4.2 MMOL/L (ref 3.5–5.1)
PROT SERPL-MCNC: 4.7 G/DL (ref 6–8.4)
RBC # BLD AUTO: 2.37 M/UL (ref 4–5.4)
SODIUM SERPL-SCNC: 140 MMOL/L (ref 136–145)
WBC # BLD AUTO: 9.42 K/UL (ref 3.9–12.7)

## 2022-02-18 PROCEDURE — 63600175 PHARM REV CODE 636 W HCPCS: Performed by: INTERNAL MEDICINE

## 2022-02-18 PROCEDURE — 97530 THERAPEUTIC ACTIVITIES: CPT

## 2022-02-18 PROCEDURE — 25000242 PHARM REV CODE 250 ALT 637 W/ HCPCS: Performed by: NURSE PRACTITIONER

## 2022-02-18 PROCEDURE — 25000003 PHARM REV CODE 250: Performed by: INTERNAL MEDICINE

## 2022-02-18 PROCEDURE — 20600001 HC STEP DOWN PRIVATE ROOM

## 2022-02-18 PROCEDURE — 99233 SBSQ HOSP IP/OBS HIGH 50: CPT | Mod: ,,, | Performed by: INTERNAL MEDICINE

## 2022-02-18 PROCEDURE — 84100 ASSAY OF PHOSPHORUS: CPT | Performed by: NURSE PRACTITIONER

## 2022-02-18 PROCEDURE — 25000003 PHARM REV CODE 250: Performed by: NURSE PRACTITIONER

## 2022-02-18 PROCEDURE — 80053 COMPREHEN METABOLIC PANEL: CPT | Performed by: NURSE PRACTITIONER

## 2022-02-18 PROCEDURE — 97535 SELF CARE MNGMENT TRAINING: CPT

## 2022-02-18 PROCEDURE — 99233 PR SUBSEQUENT HOSPITAL CARE,LEVL III: ICD-10-PCS | Mod: ,,, | Performed by: INTERNAL MEDICINE

## 2022-02-18 PROCEDURE — 85025 COMPLETE CBC W/AUTO DIFF WBC: CPT | Performed by: NURSE PRACTITIONER

## 2022-02-18 PROCEDURE — 83735 ASSAY OF MAGNESIUM: CPT | Performed by: NURSE PRACTITIONER

## 2022-02-18 RX ADMIN — CYTARABINE 1770 MG: 100 INJECTION, SOLUTION INTRATHECAL; INTRAVENOUS; SUBCUTANEOUS at 04:02

## 2022-02-18 RX ADMIN — ACYCLOVIR 400 MG: 200 CAPSULE ORAL at 09:02

## 2022-02-18 RX ADMIN — DEXAMETHASONE 1 DROP: 1 SUSPENSION OPHTHALMIC at 03:02

## 2022-02-18 RX ADMIN — POTASSIUM & SODIUM PHOSPHATES POWDER PACK 280-160-250 MG 1 PACKET: 280-160-250 PACK at 05:02

## 2022-02-18 RX ADMIN — GABAPENTIN 300 MG: 300 CAPSULE ORAL at 09:02

## 2022-02-18 RX ADMIN — ONDANSETRON 8 MG: 2 INJECTION INTRAMUSCULAR; INTRAVENOUS at 03:02

## 2022-02-18 RX ADMIN — Medication 400 MG: at 03:02

## 2022-02-18 RX ADMIN — DILTIAZEM HYDROCHLORIDE 90 MG: 30 TABLET, FILM COATED ORAL at 05:02

## 2022-02-18 RX ADMIN — Medication 400 MG: at 11:02

## 2022-02-18 RX ADMIN — GABAPENTIN 300 MG: 300 CAPSULE ORAL at 03:02

## 2022-02-18 RX ADMIN — POTASSIUM & SODIUM PHOSPHATES POWDER PACK 280-160-250 MG 1 PACKET: 280-160-250 PACK at 09:02

## 2022-02-18 RX ADMIN — OXCARBAZEPINE 1200 MG: 600 TABLET, FILM COATED ORAL at 09:02

## 2022-02-18 RX ADMIN — DEXAMETHASONE 1 DROP: 1 SUSPENSION OPHTHALMIC at 09:02

## 2022-02-18 RX ADMIN — QUETIAPINE FUMARATE 200 MG: 200 TABLET ORAL at 09:02

## 2022-02-18 RX ADMIN — DILTIAZEM HYDROCHLORIDE 90 MG: 30 TABLET, FILM COATED ORAL at 11:02

## 2022-02-18 RX ADMIN — POTASSIUM & SODIUM PHOSPHATES POWDER PACK 280-160-250 MG 1 PACKET: 280-160-250 PACK at 03:02

## 2022-02-18 RX ADMIN — LOSARTAN POTASSIUM 25 MG: 25 TABLET, FILM COATED ORAL at 09:02

## 2022-02-18 RX ADMIN — HYDROXYCHLOROQUINE SULFATE 200 MG: 200 TABLET, FILM COATED ORAL at 10:02

## 2022-02-18 RX ADMIN — INSULIN ASPART 1 UNITS: 100 INJECTION, SOLUTION INTRAVENOUS; SUBCUTANEOUS at 09:02

## 2022-02-18 RX ADMIN — DIAZEPAM 10 MG: 5 TABLET ORAL at 10:02

## 2022-02-18 RX ADMIN — Medication 400 MG: at 07:02

## 2022-02-18 RX ADMIN — RIVAROXABAN 20 MG: 20 TABLET, FILM COATED ORAL at 05:02

## 2022-02-18 RX ADMIN — ASPIRIN 81 MG: 81 TABLET, COATED ORAL at 09:02

## 2022-02-18 RX ADMIN — DEXAMETHASONE SODIUM PHOSPHATE 8 MG: 4 INJECTION INTRA-ARTICULAR; INTRALESIONAL; INTRAMUSCULAR; INTRAVENOUS; SOFT TISSUE at 03:02

## 2022-02-18 RX ADMIN — VORTIOXETINE 10 MG: 10 TABLET, FILM COATED ORAL at 09:02

## 2022-02-18 RX ADMIN — FENOFIBRATE 160 MG: 160 TABLET ORAL at 09:02

## 2022-02-18 RX ADMIN — PANTOPRAZOLE SODIUM 40 MG: 40 TABLET, DELAYED RELEASE ORAL at 09:02

## 2022-02-18 RX ADMIN — DIAZEPAM 10 MG: 5 TABLET ORAL at 09:02

## 2022-02-18 NOTE — PROGRESS NOTES
received a call from [facility], their response is that they have a very short supply and can not accept the referral.  is waiting on a response from [facility].   will provide an update as the information becomes available.

## 2022-02-18 NOTE — PLAN OF CARE
POC reviewed w pt at beginning of shift and PRN. Day 3 HiDAC begins w 4pm dose.   Patient participating incare, communicating needs throughout shift. Ind.  voiding without difficulty. Denies pain. CBG checked at HS. SSI given as ordered. VSS; no acute events this shift. Remains free from falls or injury throughout shift; bed in lowest position; side rails up X2; call light within reach; pt instructed to call for assistance as needed - verbalized understanding.  Will continue to monitor

## 2022-02-18 NOTE — ASSESSMENT & PLAN NOTE
- c/o of headache morning of 2/17  - one time dose of ibuprofen given as patient is currently not neutropenic nor thrombocytopenic

## 2022-02-18 NOTE — PT/OT/SLP PROGRESS
Occupational Therapy   Treatment    Name: Deepti Gonzalez  MRN: [id number]  Admitting Diagnosis:  B-cell acute lymphoblastic leukemia       Recommendations:     Discharge Recommendations: home health OT  Discharge Equipment Recommendations:  raised toilet,rollator,hip kit, rollator   Barriers to discharge:  None    Assessment:     Deepti Gonzalez is a 51 y.o. female with a medical diagnosis of B-cell acute lymphoblastic leukemia.  She presents decline in ADLs and functional mobility. Pt was educated on use of hip kit for LB dressing due to minimal ability to lift her legs for LB dressing from hip weakness. Following skilled instruction pt was able to ziggy underwear and socks using AE. Pt discussed barriers to d/c and DME needs.  Agree that pt needs a tub transfer bench in order to use her tub. Her LE strength is not adequate to clear the step over her tub. Pt also needs a raised toilet seat with handles and a 3:1 will not work due to small space in bathroom.  Pt would benefit from a rollator walker due to low endurance and standing tolerance.  Provided resources for where she can obtain the DME recommended and collaborated with  as CM was not assigned at the time of visit. Performance deficits affecting function are weakness,impaired self care skills,impaired functional mobilty,impaired endurance,gait instability,impaired balance,decreased lower extremity function,decreased ROM.     Rehab Prognosis:  Good; patient would benefit from acute skilled OT services to address these deficits and reach maximum level of function.       Plan:     Patient to be seen 3 x/week to address the above listed problems via self-care/home management,therapeutic activities,therapeutic exercises,neuromuscular re-education  · Plan of Care Expires: 03/17/22  · Plan of Care Reviewed with: spouse    Subjective     Pain/Comfort:  · Pain Rating 1: 0/10    Objective:     Communicated with: RN prior to session.  Patient found HOB  elevated with peripheral IV upon OT entry to room.    General Precautions: Standard, fall   Orthopedic Precautions:N/A   Braces: N/A  Respiratory Status: Room air     Occupational Performance:     Bed Mobility:    · Patient completed Supine to Sit with modified independence  · Patient completed Sit to Supine with modified independence     Functional Mobility/Transfers:  · Functional Mobility:  Pt declined secondary to just completing PT and walking in the hallway.    Activities of Daily Living:  · Feeding:  independence    · Grooming: independence    · Bathing: independence    · Upper Body Dressing: independence    · Lower Body Dressing: modified independence and sock aide and LHR    · Toileting: DNT but pt cannot stand from low surfaces would benefit from 3:1 commode        [facility] 6 Click ADL: 20    Treatment & Education:  · Pt educated on role of OT in acute care setting.   · Assisted with ADLs and functional mobility with assist levels noted above    Patient left HOB elevated with all lines intactEducation:      GOALS:   Multidisciplinary Problems     Occupational Therapy Goals        Problem: Occupational Therapy Goal    Goal Priority Disciplines Outcome Interventions   Occupational Therapy Goal     OT, PT/OT Ongoing, Progressing    Description: Goals to be met by: 3/3/2022    Patient will increase functional independence with ADLs by performing:    UE Dressing with [place].  LE Dressing with Modified [place].  Grooming while standing at sink with Stand-by Assistance.  Toileting from toilet with Stand-by Assistance for hygiene and clothing management.                      Time Tracking:     OT Date of Treatment: 02/18/22  OT Start Time: 1422 (1414)  OT Stop Time: 1453 (1421)  OT Total Time (min): 31 min    Billable Minutes:Self Care/Home Management 31               2/18/2022

## 2022-02-18 NOTE — PROGRESS NOTES
delivered to the patient 3 months of their hospital bills for the month of November, December and January.  was informed by Edson Paredes, financial counselor that he will contact the patient and her spouse for financial assistance.   faxed the patient's supply order to [facility] at [phone] and [facility] order to [facility] for supplies and other home health services.

## 2022-02-18 NOTE — PROGRESS NOTES
received a update that the patient's  would accept home health services for his spouse.  will search the home health agencies that accepts the patient's insurance in [place] and has available staff.

## 2022-02-18 NOTE — PT/OT/SLP PROGRESS
"Physical Therapy Treatment    Patient Name:  Deepti Gonzalez   MRN:  [id number]    Recommendations:     Discharge Recommendations:  home health PT   Discharge Equipment Recommendations: rollator   Barriers to discharge: None    Assessment:     Deepti Gonzalez is a 51 y.o. female admitted with a medical diagnosis of B-cell acute lymphoblastic leukemia.  She presents with the following impairments/functional limitations:  weakness,impaired balance,decreased safety awareness,impaired endurance,impaired functional mobilty,gait instability.  Pt demo's improving overall mobility on this date, able to ambulate further distances and have significantly less LOB than prior PT session. Pt continues to benefit from stand by assist for safety at all times when standing. Significant education provided to pt and spouse re: DME recommendations as they have multiple questions and have reported limited ability to obtain HH therapies once discharged for further home assessments. Pt continues to present below their independent prior functional baseline. Pt would benefit from continued, skilled PT while in house to address the above listed impairments, further progress mobility as able, return towards highest level of function.      Rehab Prognosis: Good; patient continues to benefit from acute skilled PT services to address these deficits and reach maximum level of function.  Patient remains most appropriate to discharge to home health PT  Recent Surgery: * No surgery found *      Plan:     During this hospitalization, patient to be seen 3 x/week to address the identified rehab impairments via gait training,therapeutic activities,therapeutic exercises,neuromuscular re-education and progress toward the following goals:    · Plan of Care Expires:  03/17/22    Subjective     Subjective: "I've been up since like 4!"   Pain/Comfort:   · Pain Rating 1: 0/10      Objective:     Communicated with RN prior to session.  Patient found supine with " peripheral IV upon PT entry to room.      General Precautions: Standard, fall   Orthopedic Precautions:N/A   Braces: N/A     Functional Mobility:  · Bed Mobility: supervision for supine<>sit with HOB elevated  · Transfers: supervision for sit<>stand with RW anteriorly  · Gait: Patient ambulated ~190 ft with RW and stand by assist, regressing to CGA with further distances as pt reported having 'jelly legs'. Pt demo's conversational pace, however also distracted throughout gait. Pt with inconsistent gait pathway, veering both left and right frequently. Cueing for RW management and problem solving throughout.  · Balance:   · Sitting: intact  · Standing: stand by assist with use of RW, slight trunk sway when pt having variable/limited UE support for standing balance, no overt LOB noted on this date      AM-PAC 6 CLICK MOBILITY  Turning over in bed (including adjusting bedclothes, sheets and blankets)?: 4  Sitting down on and standing up from a chair with arms (e.g., wheelchair, bedside commode, etc.): 3  Moving from lying on back to sitting on the side of the bed?: 4  Moving to and from a bed to a chair (including a wheelchair)?: 3  Need to walk in hospital room?: 3  Climbing 3-5 steps with a railing?: 3  Basic Mobility Total Score: 20       Education:  Education provided re: d/c planning, PT POC, DME recommendations (rollator, grab bars, transport chair if needed for longer distances), walking program to continue to improve balance, activity tolerance, pt and spouse concerns re: mobility and eventual d/c. Pt endorsed understanding.     Patient left supine with all lines intact and call button in reach.    GOALS:   Multidisciplinary Problems     Physical Therapy Goals        Problem: Physical Therapy Goal    Goal Priority Disciplines Outcome Goal Variances Interventions   Physical Therapy Goal     PT, PT/OT Ongoing, Progressing     Description: Goals to be met by: 2/27/22    Patient will increase functional independence  with mobility by performin. Pt will perform supine<>sit with independence to improve independence with mobility  2. Pt will perform functional transfers with supervision   3. Pt will ambulate 150 ft feet with LRAD and supervision to safely perform household distance ambulation                    Time Tracking:     PT Received On: 22  PT Start Time: 0754     PT Stop Time: 0820  PT Total Time (min): 26 min     Billable Minutes: Therapeutic Activity 25 min    Treatment Type: Treatment  PT/PTA: PT           Shanta Whitehead PT, DPT  2022

## 2022-02-18 NOTE — PROGRESS NOTES
Patient is accepted for Home Health services through [facility], formally known as [facility].  The home health agency does not provide the supplies. Usually, the agency get the supplies from [facility]. In my previous note [facility] could not accept the referral due to their short supply.  will suggest that the patient obtain the PICC-line supplies, as he did, previously by obtaining a prescription form the treatment team and getting the supplies from his local pharmacy. Mr. Gonzalez stated that he currently has supplies on hand but it would be a good idea if he obtained a prescription for more. Mr. Gonzalez agreed to a plan to be reimbursed for the $100.00 cost of the supplies through patient and family assistance.   spoke with Mr. Gonzalez and he is agreeable to accepting home health services and to obtain a prescription for his wife's supplies and flushes and to be reimbursed, for the costs.

## 2022-02-18 NOTE — PROGRESS NOTES
received patient's Medicaid letter and forwarded the letter to Edson Paredes, the financial counselor for [facility]. Patient also requested a form UB04 or HCFA 1500 for his bills for the month of November, December and January from the financial department.  emailed this request to the financial counselor.

## 2022-02-18 NOTE — SUBJECTIVE & OBJECTIVE
Subjective:     Interval History: Day 3 of consolidation 2 of SHAYAN. Continues to tolerate IDAC well. Afebrile. VSS. Will attempt to get port placement for future cycles given patient's inability to afford PICC line supplies in the long term. SW to pursue patient assistance for PICC supplies following this cycle, but this is no a viable long term solution. Replacing mag and phos.    Objective:     Vital Signs (Most Recent):  Temp: 98 °F (36.7 °C) (02/18/22 0434)  Pulse: 101 (02/18/22 0434)  Resp: 17 (02/18/22 0434)  BP: (!) 124/56 (02/18/22 0434)  SpO2: 100 % (02/18/22 0434) Vital Signs (24h Range):  Temp:  [97.9 °F (36.6 °C)-98.6 °F (37 °C)] 98 °F (36.7 °C)  Pulse:  [] 101  Resp:  [16-19] 17  SpO2:  [95 %-100 %] 100 %  BP: (120-137)/(56-75) 124/56     Weight: 64.6 kg (142 lb 6.7 oz)  Body mass index is 24.45 kg/m².  Body surface area is 1.71 meters squared.    ECOG SCORE         [unfilled]    Intake/Output - Last 3 Shifts       02/16 0700  02/17 0659 02/17 0700  02/18 0659 02/18 0700  02/19 0659    P.O.  1180     I.V. (mL/kg) 55.9 (0.9) 1451.8 (22.5) 245.3 (3.8)    IV Piggyback 240.9 249.2     Total Intake(mL/kg) 296.8 (4.6) 2881 (44.6) 245.3 (3.8)    Urine (mL/kg/hr)  1200 (0.8)     Total Output  1200     Net +296.8 +1681 +245.3           Urine Occurrence 1 x 1 x     Stool Occurrence 1 x            Physical Exam  Constitutional:       Appearance: She is well-developed.   HENT:      Head: Normocephalic and atraumatic.      Mouth/Throat:      Pharynx: No oropharyngeal exudate.   Eyes:      Conjunctiva/sclera: Conjunctivae normal.      Pupils: Pupils are equal, round, and reactive to light.   Cardiovascular:      Rate and Rhythm: Normal rate and regular rhythm.      Heart sounds: Normal heart sounds. No murmur heard.      Pulmonary:      Effort: Pulmonary effort is normal.      Breath sounds: Normal breath sounds.   Abdominal:      General: Bowel sounds are normal. There is no distension.      Palpations:  Abdomen is soft.      Tenderness: There is no abdominal tenderness.   Musculoskeletal:         General: No deformity. Normal range of motion.      Cervical back: Normal range of motion and neck supple.   Skin:     General: Skin is warm and dry.      Findings: Bruising present. No erythema or rash.      Comments: RUE PICC. Dressing c/d/i. No sign of infection to site.   Neurological:      Mental Status: She is alert and oriented to person, place, and time.   Psychiatric:         Behavior: Behavior normal.         Thought Content: Thought content normal.         Judgment: Judgment normal.         Significant Labs:   CBC:   Recent Labs   Lab 02/17/22 0333 02/18/22 0439   WBC 5.23 9.42   HGB 8.3* 7.9*   HCT 26.4* 25.4*    209    and CMP:   Recent Labs   Lab 02/17/22 0333 02/18/22 0439    140   K 3.9 4.2    106   CO2 27 24   * 160*   BUN 4* 8   CREATININE 0.6 0.6   CALCIUM 8.1* 8.0*   PROT 4.7* 4.7*   ALBUMIN 2.3* 2.4*   BILITOT 0.2 0.3   ALKPHOS 108 104   AST 21 14   ALT 26 20   ANIONGAP 9 10   EGFRNONAA >60.0 >60.0       Diagnostic Results:  I have reviewed all pertinent imaging results/findings within the past 24 hours.

## 2022-02-18 NOTE — PROGRESS NOTES
[facility] ([facility])  Hematology  Bone Marrow Transplant  Progress Note    Patient Name: Deepti Gonzalez  Admission Date: 2/16/2022  Hospital Length of Stay: 2 days  Code Status: Full Code    Subjective:     Interval History: Day 3 of consolidation 2 of EWALL. Continues to tolerate IDAC well. Afebrile. VSS. Will attempt to get port placement for future cycles given patient's inability to afford PICC line supplies in the long term. SW to pursue patient assistance for PICC supplies following this cycle, but this is no a viable long term solution. Replacing mag and phos.    Objective:     Vital Signs (Most Recent):  Temp: 98 °F (36.7 °C) (02/18/22 0434)  Pulse: 101 (02/18/22 0434)  Resp: 17 (02/18/22 0434)  BP: (!) 124/56 (02/18/22 0434)  SpO2: 100 % (02/18/22 0434) Vital Signs (24h Range):  Temp:  [97.9 °F (36.6 °C)-98.6 °F (37 °C)] 98 °F (36.7 °C)  Pulse:  [] 101  Resp:  [16-19] 17  SpO2:  [95 %-100 %] 100 %  BP: (120-137)/(56-75) 124/56     Weight: 64.6 kg (142 lb 6.7 oz)  Body mass index is 24.45 kg/m².  Body surface area is 1.71 meters squared.    ECOG SCORE         [unfilled]    Intake/Output - Last 3 Shifts       02/16 0700  02/17 0659 02/17 0700  02/18 0659 02/18 0700  02/19 0659    P.O.  1180     I.V. (mL/kg) 55.9 (0.9) 1451.8 (22.5) 245.3 (3.8)    IV Piggyback 240.9 249.2     Total Intake(mL/kg) 296.8 (4.6) 2881 (44.6) 245.3 (3.8)    Urine (mL/kg/hr)  1200 (0.8)     Total Output  1200     Net +296.8 +1681 +245.3           Urine Occurrence 1 x 1 x     Stool Occurrence 1 x            Physical Exam  Constitutional:       Appearance: She is well-developed.   HENT:      Head: Normocephalic and atraumatic.      Mouth/Throat:      Pharynx: No oropharyngeal exudate.   Eyes:      Conjunctiva/sclera: Conjunctivae normal.      Pupils: Pupils are equal, round, and reactive to light.   Cardiovascular:      Rate and Rhythm: Normal rate and regular rhythm.      Heart sounds: Normal heart sounds. No murmur  heard.      Pulmonary:      Effort: Pulmonary effort is normal.      Breath sounds: Normal breath sounds.   Abdominal:      General: Bowel sounds are normal. There is no distension.      Palpations: Abdomen is soft.      Tenderness: There is no abdominal tenderness.   Musculoskeletal:         General: No deformity. Normal range of motion.      Cervical back: Normal range of motion and neck supple.   Skin:     General: Skin is warm and dry.      Findings: Bruising present. No erythema or rash.      Comments: RUE PICC. Dressing c/d/i. No sign of infection to site.   Neurological:      Mental Status: She is alert and oriented to person, place, and time.   Psychiatric:         Behavior: Behavior normal.         Thought Content: Thought content normal.         Judgment: Judgment normal.         Significant Labs:   CBC:   Recent Labs   Lab 02/17/22 0333 02/18/22 0439   WBC 5.23 9.42   HGB 8.3* 7.9*   HCT 26.4* 25.4*    209    and CMP:   Recent Labs   Lab 02/17/22 0333 02/18/22 0439    140   K 3.9 4.2    106   CO2 27 24   * 160*   BUN 4* 8   CREATININE 0.6 0.6   CALCIUM 8.1* 8.0*   PROT 4.7* 4.7*   ALBUMIN 2.3* 2.4*   BILITOT 0.2 0.3   ALKPHOS 108 104   AST 21 14   ALT 26 20   ANIONGAP 9 10   EGFRNONAA >60.0 >60.0       Diagnostic Results:  I have reviewed all pertinent imaging results/findings within the past 24 hours.    Assessment/Plan:     * B-cell acute lymphoblastic leukemia  From Dr. Jenkins's most recent clinic note  Precursor B-cell acute lymphoblastic leukemia (Ph+)              A. 11/23/2021: [place] [facility] [place] hospital for evaluation for acute leukemia              B. 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               C. 11/30/2021 - 12/23/2021: Vincristine + imatinib induction; hospital course was complicated by acute hypoxemic respiratory failure which resolved with diuresis and treatment of ALL     - BMBx from 1/3/22 showing CR  - admitted for C2 of consolidation with EWALL (IDAC). Today is day 2. Will complete chemo and discharge home on 2/21/22.  - tolerated C1 of consolidation well  - take home Gleevec days 15-28 of consolidation cycles  - monitor closely for neuro toxicity with cytarabine. None evident on exam today.  - will attempt to get port placement for future cycles  - will follow with Dr. Garcia for twice weekly labs prior to next cycle  - will need to discharge on ppx LVQ and fluc          Hypomagnesemia  - mag 1.5  - replace per prn electrolyte order set  - daily phos level while inpatient    Headache  - c/o of headache morning of 2/17  - one time dose of ibuprofen given as patient is currently not neutropenic nor thrombocytopenic    Hypophosphatemia  - phos 2.4  - replace per prn electrolyte order set  - daily phos level while inpatient      Moderate major depression  - continue home trintellix while inpatient    Insomnia  - continue home seroquel while inpatient    Anxiety  - continue home trintellix and valium while inpatient    GERD (gastroesophageal reflux disease)  - home prilosec not on formulary. Will receive protonix while inpatient.    Hypertension  - continue home losartan while inpatient    Seizure disorder  - continue home oxcarbazepine while inpatient    Type 2 diabetes mellitus, without long-term current use of insulin  - hold jardiance and metformin recently discontinued by endocrinologist.   - hold home farxiga while inpatient. Can resume at discharge.  - low dose SSI, achs blood glucose monitoring, and diabetic diet while inpatient  - will add basal insulin if indicated    Rheumatoid arthritis involving multiple sites  - continue home plaquenil.   - home leflunomide  was recently discontinued by her rhemotologist    History of TIA (transient ischemic attack)  - Continue home ASA and fenofibrate. Holding home statin while inpatient.    Hyperlipidemia  - holding home statin while inpatient    Long term current use of anticoagulant  - continue home xarelto. Hold with plts < 50K.    Paroxysmal atrial fibrillation  - continue home diltiazem and xarelto. Hold xarelto with plt count < 50K.        VTE Risk Mitigation (From admission, onward)         Ordered     rivaroxaban tablet 20 mg  With dinner         02/16/22 1301     heparin, porcine (PF) 100 unit/mL injection flush 500 Units  As needed (PRN)         02/16/22 1438     IP VTE HIGH RISK PATIENT  Once         02/16/22 1206     Place sequential compression device  Until discontinued         02/16/22 1206                Disposition: Inpatient for chemo.    Mirtha Antonio, NP  Bone Marrow Transplant  [facility] - On[facility] ([facility])

## 2022-02-18 NOTE — PLAN OF CARE
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]  Yes     Priority: 1 - High    Hypophosphatemia [E83.39]  No    Headache [R51.9]  No    Moderate major depression [F32.1]  Yes     Chronic depression x 8-10 years  Many years on Cymbalta, without recent perceived benefit      Insomnia [G47.00]  Yes    Anxiety [F41.9]  Yes     Long-term generalized anxiety disorder  Potential PTSD (not comprehensively assessed)      GERD (gastroesophageal reflux disease) [K21.9]  Yes    Hypertension [I10]  Yes    Seizure disorder [G40.909]  Yes    Type 2 diabetes mellitus, without long-term current use of insulin [E11.9]  Yes    History of TIA (transient ischemic attack) [Z86.73]  Not Applicable    Long term current use of anticoagulant [Z79.01]  Not Applicable    Paroxysmal atrial fibrillation [I48.0]  Yes    Rheumatoid arthritis involving multiple sites [M06.9]  Yes     Hx of seronegative RA. Previously on Adalimumab / HCQ / Leflunomab.      Hyperlipidemia [E78.5]  Yes      Resolved Hospital Problems   No resolved problems to display.       Follow Up Appointments:  Future Appointments   Date Time Provider Department Center   3/16/2022  1:00 PM [facility] LAB [facility] LAB[facility] Alex Naranjo   3/16/2022  2:00 PM Valerie Slade NP [facility] BMT Johnson Cance       Allergies:  Review of patient's allergies indicates:   Allergen Reactions    Levetiracetam      Other reaction(s): Unknown       Medications: Review discharge medications with patient and family and provide education.    Current Facility-Administered Medications   Medication Dose Route Frequency  Provider Last Rate Last Admin    0.9%  NaCl infusion   Intravenous Continuous Dayron Jenkins MD 50 mL/hr at 02/17/22 1816 Rate Verify at 02/17/22 1816    acyclovir capsule 400 mg  400 mg Oral BID Mirtha Antonio NP   400 mg at 02/17/22 2110    alteplase injection 2 mg  2 mg Intra-Catheter PRN Dayron Jenkins MD        artificial tears 0.5 % ophthalmic solution 1 drop  1 drop Both Eyes QID PRN Mirtha Antonio NP        aspirin EC tablet 81 mg  81 mg Oral Daily Mirtha Antonio NP   81 mg at 02/17/22 0858    cyclobenzaprine tablet 10 mg  10 mg Oral TID PRN Mirtha Antonio NP   10 mg at 02/17/22 2108    cytarabine (PF) (REED-C) 1,000 mg/m2 = 1,770 mg in sodium chloride 0.9% 250 mL chemo infusion  1,000 mg/m2 (Order-Specific) Intravenous Q12H Dayron Jenkins MD        [START ON 2/20/2022] cytarabine (PF) (REED-C) 1,000 mg/m2 = 1,770 mg in sodium chloride 0.9% 250 mL chemo infusion  1,000 mg/m2 (Order-Specific) Intravenous Q12H Dayron Jenkins MD        dexamethasone 0.1 % ophthalmic solution 1 drop  1 drop Both Eyes Q6H Dayron Jenkins MD   1 drop at 02/18/22 0359    dexamethasone injection 8 mg  8 mg Intravenous Q12H Dayron Jenkins MD        [START ON 2/20/2022] dexamethasone injection 8 mg  8 mg Intravenous Q12H Dayron Jenkins MD        dextrose 10% bolus 125 mL  12.5 g Intravenous PRN Mirtha Antonio NP        dextrose 10% bolus 250 mL  25 g Intravenous PRN Mirtha Antonio NP        diazePAM tablet 10 mg  10 mg Oral BID PRN Mirtha Antonio NP   10 mg at 02/17/22 2108    diltiaZEM tablet 90 mg  90 mg Oral Q6H Mirtha Antonio NP   90 mg at 02/18/22 0550    fenofibrate tablet 160 mg  160 mg Oral Daily Mirtha Antonio NP   160 mg at 02/17/22 0858    gabapentin capsule 300 mg  300 mg Oral TID Mirtha Antonio NP   300 mg at 02/17/22 2111    glucagon (human recombinant) injection 1 mg  1 mg Intramuscular PRN Mirtha Antonio NP        glucose chewable tablet 16 g  16 g Oral PRN Mirtha Antonio, TERRY         glucose chewable tablet 24 g  24 g Oral PRN Mirtha Antonio NP        heparin, porcine (PF) 100 unit/mL injection flush 500 Units  500 Units Intravenous PRN Dayron Jenkins MD        hydrOXYchloroQUINE tablet 200 mg  200 mg Oral Daily Mirtha Antonio NP   200 mg at 02/17/22 0858    insulin aspart U-100 pen 0-5 Units  0-5 Units Subcutaneous QID (AC + HS) PRN Mirtha Antonio NP   1 Units at 02/17/22 2118    losartan tablet 25 mg  25 mg Oral Daily Mirtha Antonio NP   25 mg at 02/17/22 0858    magnesium oxide tablet 400 mg  400 mg Oral Q4H PRN Mirtha Antonio NP   400 mg at 02/17/22 2308    magnesium oxide tablet 400 mg  400 mg Oral Q4H PRN Mirtha Antonio NP   400 mg at 02/18/22 0710    magnesium oxide tablet 800 mg  800 mg Oral Q4H PRN Mirtha Antonio NP        naloxone 0.4 mg/mL injection 0.02 mg  0.02 mg Intravenous PRN Mirtha Antonio NP        ondansetron disintegrating tablet 8 mg  8 mg Oral Q12H PRN Mirtha Antonio NP        ondansetron disintegrating tablet 8 mg  8 mg Oral Q8H PRN Dayron Jenkins MD        ondansetron injection 8 mg  8 mg Intravenous Q12H Dayron Jenkins MD        [START ON 2/20/2022] ondansetron injection 8 mg  8 mg Intravenous Q12H Dayron Jenkins MD        OXcarbazepine tablet 1,200 mg  1,200 mg Oral BID Mirtha Antonio NP   1,200 mg at 02/17/22 2110    pantoprazole EC tablet 40 mg  40 mg Oral Daily Mirtha Antonio NP   40 mg at 02/17/22 0858    polyethylene glycol packet 17 g  17 g Oral Daily PRN Mirtha Antonio NP        potassium chloride SA CR tablet 20 mEq  20 mEq Oral PRN Mirtha Antonio NP        potassium chloride SA CR tablet 20 mEq  20 mEq Oral Q2H PRN Mirtha Antonio NP        potassium chloride SA CR tablet 20 mEq  20 mEq Oral Q2H PRN Mirtha Antonio, TERRY        potassium, sodium phosphates 280-160-250 mg packet 1 packet  1 packet Oral Q4H PRN Mirtha Antonio NP   1 packet at 02/18/22 0515    potassium, sodium phosphates 280-160-250 mg packet 2 packet   2 packet Oral Q4H PRN Mirtha Antonio NP        prochlorperazine injection Soln 10 mg  10 mg Intravenous Q6H PRN Dayron Jenkins MD        QUEtiapine tablet 200 mg  200 mg Oral QHS Mirtha Antonio NP   200 mg at 02/17/22 2108    rivaroxaban tablet 20 mg  20 mg Oral Daily with dinner Mirtha Antonio NP   20 mg at 02/17/22 1739    sodium chloride 0.9% flush 10 mL  10 mL Intravenous Q12H PRN Mirtha Antonio NP        vortioxetine Tab 10 mg  10 mg Oral Daily Mirtha Antonio NP   10 mg at 02/17/22 0857     Current Discharge Medication List      CONTINUE these medications which have NOT CHANGED    Details   acyclovir (ZOVIRAX) 400 MG tablet Take 1 tablet (400 mg total) by mouth 2 (two) times daily.  Qty: 60 tablet, Refills: 11      artificial tears (ISOPTO TEARS) 0.5 % ophthalmic solution Place 1 drop into both eyes 4 (four) times daily as needed.      aspirin (ECOTRIN) 81 MG EC tablet Take 1 tablet (81 mg total) by mouth once daily. HOLD until okayed to resume during chemotherapy.  Refills: 0      atorvastatin (LIPITOR) 80 MG tablet Take 80 mg by mouth once daily.      cyclobenzaprine (FLEXERIL) 10 MG tablet Take 10 mg by mouth 3 (three) times daily as needed.      dapagliflozin (FARXIGA) 10 mg tablet Take 10 mg by mouth once daily.      diazePAM (VALIUM) 10 MG Tab Take 10 mg by mouth 2 (two) times daily as needed.      diltiaZEM (CARDIZEM) 90 MG tablet Take 1 tablet (90 mg total) by mouth every 6 (six) hours.  Qty: 120 tablet, Refills: 11      duke's soln (benadryl 30 mL, mylanta 30 mL, LIDOcaine 30 mL, nystatin 30 mL) 120mL Take 10 mLs by mouth 4 (four) times daily.  Qty: 120 mL, Refills: 0      fenofibrate 160 MG Tab Take 160 mg by mouth once daily.      gabapentin (NEURONTIN) 300 MG capsule Take 1 capsule (300 mg total) by mouth 3 (three) times daily.  Qty: 90 capsule, Refills: 11      hydrOXYchloroQUINE (PLAQUENIL) 200 mg tablet Take 200 mg by mouth once daily.      !! imatinib (GLEEVEC) 100 MG Tab Take 2  tablets (200 mg total) by mouth once daily with 1 other imatinib prescription for 600 mg total.Take with a meal and large glass of water  Qty: 180 tablet, Refills: 3    Associated Diagnoses: Acute leukemia not having achieved remission      !! imatinib (GLEEVEC) 400 MG Tab Take 1 tablet (400 mg total) by mouth once daily with 1 other imatinib prescription for 600 mg total. Take with a meal and large glass of water  Qty: 90 tablet, Refills: 3    Associated Diagnoses: Acute leukemia not having achieved remission      losartan (COZAAR) 25 MG tablet Take 25 mg by mouth once daily.      methocarbamoL (ROBAXIN) 500 MG Tab Take 1 tablet (500 mg total) by mouth 3 (three) times daily as needed (muscle spasms).  Qty: 90 tablet, Refills: 2      omeprazole (PRILOSEC) 40 MG capsule Take 40 mg by mouth once daily.      ondansetron (ZOFRAN-ODT) 8 MG TbDL Dissolve 1 tablet (8 mg total) by mouth every 12 (twelve) hours as needed (in case of chemo induced nausea).  Qty: 30 tablet, Refills: 1    Associated Diagnoses: B-cell acute lymphoblastic leukemia      OXcarbazepine (TRILEPTAL) 600 MG Tab Take 1,200 mg by mouth 2 (two) times daily.      polyethylene glycol (GLYCOLAX) 17 gram/dose powder Dissolve one capful (17 g) in liquid and take by mouth daily as needed (constipation).  Qty: 510 g, Refills: 0      QUEtiapine (SEROQUEL) 200 MG Tab Take 200 mg by mouth every evening.      rivaroxaban (XARELTO) 20 mg Tab Take 1 tablet (20 mg total) by mouth daily with dinner or evening meal. HOLD until okayed to resume while platelets are low.      senna-docusate 8.6-50 mg (PERICOLACE) 8.6-50 mg per tablet Take 1 tablet by mouth 2 (two) times daily.  Qty: 60 tablet, Refills: 3      TRINTELLIX 10 mg Tab Take 1 tablet by mouth once daily.       !! - Potential duplicate medications found. Please discuss with provider.      STOP taking these medications       JARDIANCE 10 mg tablet Comments:   Reason for Stopping:         leflunomide (ARAVA) 20 MG  Tab Comments:   Reason for Stopping:         metFORMIN (GLUCOPHAGE-XR) 500 MG ER 24hr tablet Comments:   Reason for Stopping:                 I have seen and examined this patient within the last 30 days. My clinical findings that support the need for the home health skilled services and home bound status are the following:no   Weakness/numbness causing balance and gait disturbance due to Malignancy/Cancer making it taxing to leave home.     Diet:   diabetic diet 2000 calorie    Referrals/ Consults  Physical Therapy to evaluate and treat. Evaluate for home safety and equipment needs; Establish/upgrade home exercise program. Perform / instruct on therapeutic exercises, gait training, transfer training, and Range of Motion.  Occupational Therapy to evaluate and treat. Evaluate home environment for safety and equipment needs. Perform/Instruct on transfers, ADL training, ROM, and therapeutic exercises.    Activities:   activity as tolerated    Nursing:   Agency to admit patient within 24 hours of hospital discharge unless specified on physician order or at patient request    SN to complete comprehensive assessment including routine vital signs. Instruct on disease process and s/s of complications to report to MD. Review/verify medication list sent home with the patient at time of discharge  and instruct patient/caregiver as needed. Frequency may be adjusted depending on start of care date.     Skilled nurse to perform up to 3 visits PRN for symptoms related to diagnosis    Notify MD if SBP > 160 or < 90; DBP > 90 or < 50; HR > 120 or < 50; Temp > 100.4; O2 < 88%  Ok to schedule additional visits based on staff availability and patient request on consecutive days within the home health episode.    When multiple disciplines ordered:    Start of Care occurs on Sunday - Wednesday schedule remaining discipline evaluations as ordered on separate consecutive days following the start of care.    Thursday SOC -schedule  subsequent evaluations Friday and Monday the following week.     Friday - Saturday SOC - schedule subsequent discipline evaluations on consecutive days starting Monday of the following week.    For all post-discharge communication and subsequent orders please contact patient's primary care physician. If unable to reach primary care physician or do not receive response within 30 minutes, please contact [facility] for clinical staff order clarification    Miscellaneous   Home Infusion Therapy:   SN to perform Infusion Therapy/Central Line Care.  Review Central Line Care & Central Line Flush with patient.    Scrub the Hub: Prior to accessing the line, always perform a 30 second alcohol scrub  Each lumen of the central line is to be flushed at least daily with 10 mL Normal Saline and 3 mL Heparin flush (10 units/mL)  Skilled Nurse (SN) may draw blood from IV access  Blood Draw Procedure:   - Aspirate at least 5 mL of blood   - Discard   - Obtain specimen   - Change injection cap   - Flush with 20 mL Normal Saline followed by a                 3-5 mL Heparin flush (10 units/mL)  Central :   - Sterile dressing changes are done weekly and as needed.   - Use chlor-hexadine scrub to cleanse site, apply Biopatch to insertion site,       apply securement device dressing   - Injection caps are changed weekly and after EVERY lab draw.   - If sterile gauze is under dressing to control oozing,                 dressing change must be performed every 24 hours until gauze is not needed.    I certify that this patient is confined to her home and needs intermittent skilled nursing care, physical therapy and occupational therapy.

## 2022-02-18 NOTE — NURSING
Ibuprofen given for HA. Pt instructed not to take Ibuprofen when at home. Pt verbalized understanding.

## 2022-02-18 NOTE — PROGRESS NOTES
sent a referral to [facility] to determine the cost of the patients' PICC-Line supplies and flushes in the event that the patient's insurance does not fully cover the supplies or only covers the cost partially.   will provide an update as the information becomes available

## 2022-02-18 NOTE — PLAN OF CARE
Side rails up x2; call bell in place; bed in lowest, locked position; skid proof socks on; no evidence of skin breakdown; care plan explained to patient; pt remains free of injury.  Pt tolerated po, voids, ambulates with walker in room. Pt worked with PT/OT. C/o anxiety valium given, c/o joint pain and HA, Ibuprofen given. Pt on day 2 of IDAC, IVF infusing, CBG monitored insulin given. VSS and afebrile.

## 2022-02-18 NOTE — PROGRESS NOTES
spoke with the patient's  to ask if there were a preference for a home health provider.   Mr. Gonzalez declined Home Health Services. He stated that he has adequate family at home during the day to assist with his wife's needs. He stated that the lab draws will be set up by Mirtha Cruz NP twice weekly in [place]. The only thing that the patient wants is a supply of line care needs and flushes prior to discharge until he gets the prescription for the line care supplies filled by his local [facility]. He wants assistance with the cost of getting the prescription filled for$100.00 which we can do.

## 2022-02-18 NOTE — ASSESSMENT & PLAN NOTE
From Dr. Jenkins's most recent clinic note  Precursor B-cell acute lymphoblastic leukemia (Ph+)              A. 11/23/2021: Transferred to [facility] from outside hospital for evaluation for acute leukemia              B. 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               C. 11/30/2021 - 12/23/2021: Vincristine + imatinib induction; hospital course was complicated by acute hypoxemic respiratory failure which resolved with diuresis and treatment of ALL     - BMBx from 1/3/22 showing CR  - admitted for C2 of consolidation with EWALL (IDAC). Today is day 2. Will complete chemo and discharge home on 2/21/22.  - tolerated C1 of consolidation well  - take home Gleevec days 15-28 of consolidation cycles  - monitor closely for neuro toxicity with cytarabine. None evident on exam today.  - will attempt to get port placement for future cycles  - will follow with Dr. Garcia for twice weekly labs prior to next cycle  - will need to discharge on ppx LVQ and fluc

## 2022-02-19 LAB
ALBUMIN SERPL BCP-MCNC: 2.3 G/DL (ref 3.5–5.2)
ALP SERPL-CCNC: 71 U/L (ref 55–135)
ALT SERPL W/O P-5'-P-CCNC: 17 U/L (ref 10–44)
ANION GAP SERPL CALC-SCNC: 8 MMOL/L (ref 8–16)
AST SERPL-CCNC: 12 U/L (ref 10–40)
BASOPHILS # BLD AUTO: 0 K/UL (ref 0–0.2)
BASOPHILS NFR BLD: 0 % (ref 0–1.9)
BILIRUB SERPL-MCNC: 0.2 MG/DL (ref 0.1–1)
BUN SERPL-MCNC: 7 MG/DL (ref 6–20)
CALCIUM SERPL-MCNC: 7.6 MG/DL (ref 8.7–10.5)
CHLORIDE SERPL-SCNC: 108 MMOL/L (ref 95–110)
CO2 SERPL-SCNC: 25 MMOL/L (ref 23–29)
CREAT SERPL-MCNC: 0.6 MG/DL (ref 0.5–1.4)
DIFFERENTIAL METHOD: ABNORMAL
EOSINOPHIL # BLD AUTO: 0 K/UL (ref 0–0.5)
EOSINOPHIL NFR BLD: 0 % (ref 0–8)
ERYTHROCYTE [DISTWIDTH] IN BLOOD BY AUTOMATED COUNT: 14.9 % (ref 11.5–14.5)
EST. GFR  (AFRICAN AMERICAN): >60 ML/MIN/1.73 M^2
EST. GFR  (NON AFRICAN AMERICAN): >60 ML/MIN/1.73 M^2
GLUCOSE SERPL-MCNC: 193 MG/DL (ref 70–110)
HCT VFR BLD AUTO: 24.2 % (ref 37–48.5)
HGB BLD-MCNC: 7.5 G/DL (ref 12–16)
IMM GRANULOCYTES # BLD AUTO: 0.04 K/UL (ref 0–0.04)
IMM GRANULOCYTES NFR BLD AUTO: 0.8 % (ref 0–0.5)
LYMPHOCYTES # BLD AUTO: 0.8 K/UL (ref 1–4.8)
LYMPHOCYTES NFR BLD: 16.7 % (ref 18–48)
MAGNESIUM SERPL-MCNC: 1.6 MG/DL (ref 1.6–2.6)
MCH RBC QN AUTO: 33.9 PG (ref 27–31)
MCHC RBC AUTO-ENTMCNC: 31 G/DL (ref 32–36)
MCV RBC AUTO: 110 FL (ref 82–98)
MONOCYTES # BLD AUTO: 0.1 K/UL (ref 0.3–1)
MONOCYTES NFR BLD: 2 % (ref 4–15)
NEUTROPHILS # BLD AUTO: 4 K/UL (ref 1.8–7.7)
NEUTROPHILS NFR BLD: 80.5 % (ref 38–73)
NRBC BLD-RTO: 0 /100 WBC
PHOSPHATE SERPL-MCNC: 2.4 MG/DL (ref 2.7–4.5)
PLATELET # BLD AUTO: 197 K/UL (ref 150–450)
PMV BLD AUTO: 9.2 FL (ref 9.2–12.9)
POCT GLUCOSE: 153 MG/DL (ref 70–110)
POCT GLUCOSE: 169 MG/DL (ref 70–110)
POCT GLUCOSE: 184 MG/DL (ref 70–110)
POCT GLUCOSE: 208 MG/DL (ref 70–110)
POTASSIUM SERPL-SCNC: 4 MMOL/L (ref 3.5–5.1)
PROT SERPL-MCNC: 4.5 G/DL (ref 6–8.4)
RBC # BLD AUTO: 2.21 M/UL (ref 4–5.4)
SODIUM SERPL-SCNC: 141 MMOL/L (ref 136–145)
WBC # BLD AUTO: 4.92 K/UL (ref 3.9–12.7)

## 2022-02-19 PROCEDURE — 63600175 PHARM REV CODE 636 W HCPCS: Performed by: INTERNAL MEDICINE

## 2022-02-19 PROCEDURE — 99233 SBSQ HOSP IP/OBS HIGH 50: CPT | Mod: ,,, | Performed by: INTERNAL MEDICINE

## 2022-02-19 PROCEDURE — 83735 ASSAY OF MAGNESIUM: CPT | Performed by: NURSE PRACTITIONER

## 2022-02-19 PROCEDURE — 99233 PR SUBSEQUENT HOSPITAL CARE,LEVL III: ICD-10-PCS | Mod: ,,, | Performed by: INTERNAL MEDICINE

## 2022-02-19 PROCEDURE — 85025 COMPLETE CBC W/AUTO DIFF WBC: CPT | Performed by: NURSE PRACTITIONER

## 2022-02-19 PROCEDURE — 25000003 PHARM REV CODE 250: Performed by: NURSE PRACTITIONER

## 2022-02-19 PROCEDURE — 25000242 PHARM REV CODE 250 ALT 637 W/ HCPCS: Performed by: NURSE PRACTITIONER

## 2022-02-19 PROCEDURE — 25000003 PHARM REV CODE 250: Performed by: INTERNAL MEDICINE

## 2022-02-19 PROCEDURE — 84100 ASSAY OF PHOSPHORUS: CPT | Performed by: NURSE PRACTITIONER

## 2022-02-19 PROCEDURE — 80053 COMPREHEN METABOLIC PANEL: CPT | Performed by: NURSE PRACTITIONER

## 2022-02-19 PROCEDURE — 20600001 HC STEP DOWN PRIVATE ROOM

## 2022-02-19 RX ADMIN — DIAZEPAM 10 MG: 5 TABLET ORAL at 09:02

## 2022-02-19 RX ADMIN — DILTIAZEM HYDROCHLORIDE 90 MG: 30 TABLET, FILM COATED ORAL at 11:02

## 2022-02-19 RX ADMIN — DEXAMETHASONE 1 DROP: 1 SUSPENSION OPHTHALMIC at 03:02

## 2022-02-19 RX ADMIN — VORTIOXETINE 10 MG: 10 TABLET, FILM COATED ORAL at 11:02

## 2022-02-19 RX ADMIN — HYDROXYCHLOROQUINE SULFATE 200 MG: 200 TABLET, FILM COATED ORAL at 08:02

## 2022-02-19 RX ADMIN — RIVAROXABAN 20 MG: 20 TABLET, FILM COATED ORAL at 05:02

## 2022-02-19 RX ADMIN — CYTARABINE 1770 MG: 100 INJECTION, SOLUTION INTRATHECAL; INTRAVENOUS; SUBCUTANEOUS at 04:02

## 2022-02-19 RX ADMIN — ASPIRIN 81 MG: 81 TABLET, COATED ORAL at 08:02

## 2022-02-19 RX ADMIN — GABAPENTIN 300 MG: 300 CAPSULE ORAL at 08:02

## 2022-02-19 RX ADMIN — CYCLOBENZAPRINE HYDROCHLORIDE 10 MG: 5 TABLET, FILM COATED ORAL at 08:02

## 2022-02-19 RX ADMIN — DIAZEPAM 10 MG: 5 TABLET ORAL at 08:02

## 2022-02-19 RX ADMIN — QUETIAPINE FUMARATE 200 MG: 200 TABLET ORAL at 08:02

## 2022-02-19 RX ADMIN — PANTOPRAZOLE SODIUM 40 MG: 40 TABLET, DELAYED RELEASE ORAL at 09:02

## 2022-02-19 RX ADMIN — SODIUM CHLORIDE 50 ML/HR: 0.9 INJECTION, SOLUTION INTRAVENOUS at 02:02

## 2022-02-19 RX ADMIN — DEXAMETHASONE 1 DROP: 1 SUSPENSION OPHTHALMIC at 09:02

## 2022-02-19 RX ADMIN — DILTIAZEM HYDROCHLORIDE 90 MG: 30 TABLET, FILM COATED ORAL at 05:02

## 2022-02-19 RX ADMIN — FENOFIBRATE 160 MG: 160 TABLET ORAL at 08:02

## 2022-02-19 RX ADMIN — ACYCLOVIR 400 MG: 200 CAPSULE ORAL at 08:02

## 2022-02-19 RX ADMIN — OXCARBAZEPINE 1200 MG: 600 TABLET, FILM COATED ORAL at 08:02

## 2022-02-19 RX ADMIN — LOSARTAN POTASSIUM 25 MG: 25 TABLET, FILM COATED ORAL at 08:02

## 2022-02-19 RX ADMIN — Medication 400 MG: at 11:02

## 2022-02-19 RX ADMIN — DEXAMETHASONE SODIUM PHOSPHATE 8 MG: 4 INJECTION INTRA-ARTICULAR; INTRALESIONAL; INTRAMUSCULAR; INTRAVENOUS; SOFT TISSUE at 03:02

## 2022-02-19 RX ADMIN — DILTIAZEM HYDROCHLORIDE 90 MG: 30 TABLET, FILM COATED ORAL at 12:02

## 2022-02-19 RX ADMIN — GABAPENTIN 300 MG: 300 CAPSULE ORAL at 03:02

## 2022-02-19 RX ADMIN — Medication 400 MG: at 08:02

## 2022-02-19 RX ADMIN — DILTIAZEM HYDROCHLORIDE 90 MG: 30 TABLET, FILM COATED ORAL at 06:02

## 2022-02-19 RX ADMIN — ONDANSETRON 8 MG: 2 INJECTION INTRAMUSCULAR; INTRAVENOUS at 03:02

## 2022-02-19 RX ADMIN — INSULIN ASPART 1 UNITS: 100 INJECTION, SOLUTION INTRAVENOUS; SUBCUTANEOUS at 10:02

## 2022-02-19 NOTE — PROGRESS NOTES
[facility] - Oncology ([facility])  Hematology  Bone Marrow Transplant  Progress Note    Patient Name: Deepti Gonzalez  Admission Date: 2/16/2022  Hospital Length of Stay: 3 days  Code Status: Full Code    Subjective:     Interval History: Day 4 of Consolidation 2 of RODGER MADSEN. She has no complaints today. Denies headaches, dizziness, or unstable gait. VSS, afebrile.    Objective:     Vital Signs (Most Recent):  Temp: 98.3 °F (36.8 °C) (02/19/22 0415)  Pulse: (!) 115 (02/19/22 0415)  Resp: 16 (02/19/22 0415)  BP: 128/61 (02/19/22 0415)  SpO2: 98 % (02/19/22 0415) Vital Signs (24h Range):  Temp:  [98 °F (36.7 °C)-99 °F (37.2 °C)] 98.3 °F (36.8 °C)  Pulse:  [] 115  Resp:  [16-19] 16  SpO2:  [94 %-98 %] 98 %  BP: (115-133)/(56-85) 128/61     Weight: 71.5 kg (157 lb 8.3 oz)  Body mass index is 27.04 kg/m².  Body surface area is 1.8 meters squared.    ECOG SCORE           [unfilled]    Intake/Output - Last 3 Shifts         02/17 0700  02/18 0659 02/18 0700  02/19 0659 02/19 0700  02/20 0659    P.O. 1180 2220     I.V. (mL/kg) 1451.8 (19.5) 1156.6 (16.2)     IV Piggyback 249.2 339.8     Total Intake(mL/kg) 2881 (38.6) 3716.3 (52)     Urine (mL/kg/hr) 1200 (0.7) 5500 (3.2)     Total Output 1200 5500     Net +1681 -1783.7            Urine Occurrence 1 x              Physical Exam  Constitutional:       Appearance: She is well-developed.   HENT:      Head: Normocephalic and atraumatic.      Mouth/Throat:      Pharynx: No oropharyngeal exudate.   Eyes:      Conjunctiva/sclera: Conjunctivae normal.      Pupils: Pupils are equal, round, and reactive to light.   Cardiovascular:      Rate and Rhythm: Normal rate and regular rhythm.      Heart sounds: Normal heart sounds. No murmur heard.  Pulmonary:      Effort: Pulmonary effort is normal.      Breath sounds: Normal breath sounds.   Abdominal:      General: Bowel sounds are normal. There is no distension.      Palpations: Abdomen is soft.      Tenderness: There is no abdominal  tenderness.   Musculoskeletal:         General: No deformity. Normal range of motion.      Cervical back: Normal range of motion and neck supple.   Skin:     General: Skin is warm and dry.      Findings: Bruising present. No erythema or rash.      Comments: RUE PICC. Dressing c/d/i. No sign of infection to site.   Neurological:      Mental Status: She is alert and oriented to person, place, and time.   Psychiatric:         Behavior: Behavior normal.         Thought Content: Thought content normal.         Judgment: Judgment normal.       Significant Labs:   CBC:   Recent Labs   Lab 02/18/22 0439 02/19/22 0351   WBC 9.42 4.92   HGB 7.9* 7.5*   HCT 25.4* 24.2*    197      and CMP:   Recent Labs   Lab 02/18/22 0439 02/19/22 0351    141   K 4.2 4.0    108   CO2 24 25   * 193*   BUN 8 7   CREATININE 0.6 0.6   CALCIUM 8.0* 7.6*   PROT 4.7* 4.5*   ALBUMIN 2.4* 2.3*   BILITOT 0.3 0.2   ALKPHOS 104 71   AST 14 12   ALT 20 17   ANIONGAP 10 8   EGFRNONAA >60.0 >60.0         Diagnostic Results:  I have reviewed all pertinent imaging results/findings within the past 24 hours.    Assessment/Plan:     * B-cell acute lymphoblastic leukemia  From Dr. Jenkins's most recent clinic note  Precursor B-cell acute lymphoblastic leukemia (Ph+)              A. 11/23/2021: Transferred to [facility] from outside hospital for evaluation for acute leukemia              B. 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               C. 11/30/2021 - 12/23/2021: Vincristine + imatinib induction; hospital course was complicated by acute hypoxemic respiratory failure which resolved with diuresis and treatment of ALL     - BMBx from 1/3/22 showing CR  -  admitted for C2 of consolidation with SHAYAN (IDAC). Today is day 4. Will complete chemo and discharge home on 2/21/22.  - tolerated C1 of consolidation well  - take home Gleevec days 15-28 of consolidation cycles  - monitor closely for neuro toxicity with cytarabine. None evident on exam today.  - will attempt to get port placement for future cycles  - will follow with Dr. Garcia for twice weekly labs prior to next cycle  - will need to discharge on ppx LVQ and fluc          Hypomagnesemia  - replace per prn electrolyte order set  - daily Mg level while inpatient    Headache  - c/o of headache morning of 2/17  - one time dose of ibuprofen given as patient is currently not neutropenic nor thrombocytopenic  Resolved now    Hypophosphatemia  - replace per prn electrolyte order set  - daily phos level while inpatient      Moderate major depression  - continue home trintellix while inpatient    Insomnia  - continue home seroquel while inpatient    Anxiety  - continue home trintellix and valium while inpatient    GERD (gastroesophageal reflux disease)  - home prilosec not on formulary. Will receive protonix while inpatient.    Hypertension  - continue home losartan while inpatient    Seizure disorder  - continue home oxcarbazepine while inpatient    Type 2 diabetes mellitus, without long-term current use of insulin  - hold jardiance and metformin recently discontinued by endocrinologist.   - hold home farxiga while inpatient. Can resume at discharge.  - low dose SSI, achs blood glucose monitoring, and diabetic diet while inpatient  - will add basal insulin if indicated    Rheumatoid arthritis involving multiple sites  - continue home plaquenil.   - home leflunomide was recently discontinued by her rhemotologist    History of TIA (transient ischemic attack)  - Continue home ASA and fenofibrate. Holding home statin while inpatient.    Hyperlipidemia  - holding home statin while inpatient    Long term current use of  anticoagulant  - continue home xarelto. Hold with plts < 50K.    Paroxysmal atrial fibrillation  - continue home diltiazem and xarelto. Hold xarelto with plt count < 50K.        VTE Risk Mitigation (From admission, onward)         Ordered     rivaroxaban tablet 20 mg  With dinner         02/16/22 1301     heparin, porcine (PF) 100 unit/mL injection flush 500 Units  As needed (PRN)         02/16/22 1438     IP VTE HIGH RISK PATIENT  Once         02/16/22 1206     Place sequential compression device  Until discontinued         02/16/22 1206                Disposition: Home on 02/21    Mason Murillo MD  Bone Marrow Transplant  [facility] - Oncology ([facility])

## 2022-02-19 NOTE — PLAN OF CARE
POC reviewed w pt at beginning of shift and PRN. Day 3 HiDAC complete w 4am dose.   Patient participating in care, communicating needs throughout shift.   voiding without difficulty. Denies pain. CBG checked at HS. SSI given as ordered. VSS; no acute events this shift.Neuro at Baseline. Remains free from falls or injury throughout shift; bed in lowest position; side rails up X2; call light within reach; pt instructed to call for assistance as needed - verbalized understanding.  Will continue to monitor

## 2022-02-19 NOTE — SUBJECTIVE & OBJECTIVE
Subjective:     Interval History: Day 4 of Consolidation 2 of RODGER MADSEN. She has no complaints today. Denies headaches, dizziness, or unstable gait. VSS, afebrile.    Objective:     Vital Signs (Most Recent):  Temp: 98.3 °F (36.8 °C) (02/19/22 0415)  Pulse: (!) 115 (02/19/22 0415)  Resp: 16 (02/19/22 0415)  BP: 128/61 (02/19/22 0415)  SpO2: 98 % (02/19/22 0415) Vital Signs (24h Range):  Temp:  [98 °F (36.7 °C)-99 °F (37.2 °C)] 98.3 °F (36.8 °C)  Pulse:  [] 115  Resp:  [16-19] 16  SpO2:  [94 %-98 %] 98 %  BP: (115-133)/(56-85) 128/61     Weight: 71.5 kg (157 lb 8.3 oz)  Body mass index is 27.04 kg/m².  Body surface area is 1.8 meters squared.    ECOG SCORE           [unfilled]    Intake/Output - Last 3 Shifts         02/17 0700  02/18 0659 02/18 0700  02/19 0659 02/19 0700  02/20 0659    P.O. 1180 2220     I.V. (mL/kg) 1451.8 (19.5) 1156.6 (16.2)     IV Piggyback 249.2 339.8     Total Intake(mL/kg) 2881 (38.6) 3716.3 (52)     Urine (mL/kg/hr) 1200 (0.7) 5500 (3.2)     Total Output 1200 5500     Net +1681 -1783.7            Urine Occurrence 1 x              Physical Exam  Constitutional:       Appearance: She is well-developed.   HENT:      Head: Normocephalic and atraumatic.      Mouth/Throat:      Pharynx: No oropharyngeal exudate.   Eyes:      Conjunctiva/sclera: Conjunctivae normal.      Pupils: Pupils are equal, round, and reactive to light.   Cardiovascular:      Rate and Rhythm: Normal rate and regular rhythm.      Heart sounds: Normal heart sounds. No murmur heard.  Pulmonary:      Effort: Pulmonary effort is normal.      Breath sounds: Normal breath sounds.   Abdominal:      General: Bowel sounds are normal. There is no distension.      Palpations: Abdomen is soft.      Tenderness: There is no abdominal tenderness.   Musculoskeletal:         General: No deformity. Normal range of motion.      Cervical back: Normal range of motion and neck supple.   Skin:     General: Skin is warm and dry.       Findings: Bruising present. No erythema or rash.      Comments: RUE PICC. Dressing c/d/i. No sign of infection to site.   Neurological:      Mental Status: She is alert and oriented to person, place, and time.   Psychiatric:         Behavior: Behavior normal.         Thought Content: Thought content normal.         Judgment: Judgment normal.       Significant Labs:   CBC:   Recent Labs   Lab 02/18/22 0439 02/19/22  0351   WBC 9.42 4.92   HGB 7.9* 7.5*   HCT 25.4* 24.2*    197      and CMP:   Recent Labs   Lab 02/18/22 0439 02/19/22  0351    141   K 4.2 4.0    108   CO2 24 25   * 193*   BUN 8 7   CREATININE 0.6 0.6   CALCIUM 8.0* 7.6*   PROT 4.7* 4.5*   ALBUMIN 2.4* 2.3*   BILITOT 0.3 0.2   ALKPHOS 104 71   AST 14 12   ALT 20 17   ANIONGAP 10 8   EGFRNONAA >60.0 >60.0         Diagnostic Results:  I have reviewed all pertinent imaging results/findings within the past 24 hours.

## 2022-02-19 NOTE — PLAN OF CARE
Side rails up x2; call bell in place; bed in lowest, locked position; skid proof socks on; no evidence of skin breakdown; care plan explained to patient; pt remains free of injury.  Pt tolerated po, voids, ambulates, worked with PT/OT. Pt with c/o anxiety valium po given pt stated she had relief. IVF infusing 50 cc/hr. Pt is on day 3 of IDAC. Premedications given. Cytarabine verified with 2 nurses. PICC with good blood return, all connections secured. Chemo precautions maintained. Pt encouraged to call as needed. VSS and afebrile.

## 2022-02-19 NOTE — ASSESSMENT & PLAN NOTE
From Dr. Jenkins's most recent clinic note  Precursor B-cell acute lymphoblastic leukemia (Ph+)              A. 11/23/2021: [place] [facility] [place] hospital for evaluation for acute leukemia              B. 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               C. 11/30/2021 - 12/23/2021: Vincristine + imatinib induction; hospital course was complicated by acute hypoxemic respiratory failure which resolved with diuresis and treatment of ALL     - BMBx from 1/3/22 showing CR  - admitted for C2 of consolidation with EWALL (IDAC). Today is day 4. Will complete chemo and discharge home on 2/21/22.  - tolerated C1 of consolidation well  - take home Gleevec days 15-28 of consolidation cycles  - monitor closely for neuro toxicity with cytarabine. None evident on exam today.  - will attempt to get port placement for future cycles  - will follow with Dr. Garcia for twice weekly labs prior to next cycle  - will need to discharge on ppx LVQ and fluc

## 2022-02-19 NOTE — ASSESSMENT & PLAN NOTE
- c/o of headache morning of 2/17  - one time dose of ibuprofen given as patient is currently not neutropenic nor thrombocytopenic  Resolved now

## 2022-02-20 LAB
ALBUMIN SERPL BCP-MCNC: 2.6 G/DL (ref 3.5–5.2)
ALP SERPL-CCNC: 76 U/L (ref 55–135)
ALT SERPL W/O P-5'-P-CCNC: 19 U/L (ref 10–44)
ANION GAP SERPL CALC-SCNC: 8 MMOL/L (ref 8–16)
AST SERPL-CCNC: 11 U/L (ref 10–40)
BASOPHILS # BLD AUTO: 0 K/UL (ref 0–0.2)
BASOPHILS NFR BLD: 0 % (ref 0–1.9)
BILIRUB SERPL-MCNC: 0.3 MG/DL (ref 0.1–1)
BUN SERPL-MCNC: 12 MG/DL (ref 6–20)
CALCIUM SERPL-MCNC: 8.5 MG/DL (ref 8.7–10.5)
CHLORIDE SERPL-SCNC: 99 MMOL/L (ref 95–110)
CO2 SERPL-SCNC: 29 MMOL/L (ref 23–29)
CREAT SERPL-MCNC: 0.6 MG/DL (ref 0.5–1.4)
DIFFERENTIAL METHOD: ABNORMAL
EOSINOPHIL # BLD AUTO: 0 K/UL (ref 0–0.5)
EOSINOPHIL NFR BLD: 0 % (ref 0–8)
ERYTHROCYTE [DISTWIDTH] IN BLOOD BY AUTOMATED COUNT: 14.3 % (ref 11.5–14.5)
EST. GFR  (AFRICAN AMERICAN): >60 ML/MIN/1.73 M^2
EST. GFR  (NON AFRICAN AMERICAN): >60 ML/MIN/1.73 M^2
GLUCOSE SERPL-MCNC: 117 MG/DL (ref 70–110)
HCT VFR BLD AUTO: 23.2 % (ref 37–48.5)
HGB BLD-MCNC: 7.4 G/DL (ref 12–16)
IMM GRANULOCYTES # BLD AUTO: 0.04 K/UL (ref 0–0.04)
IMM GRANULOCYTES NFR BLD AUTO: 0.6 % (ref 0–0.5)
LYMPHOCYTES # BLD AUTO: 1.8 K/UL (ref 1–4.8)
LYMPHOCYTES NFR BLD: 25.7 % (ref 18–48)
MAGNESIUM SERPL-MCNC: 1.6 MG/DL (ref 1.6–2.6)
MCH RBC QN AUTO: 32.7 PG (ref 27–31)
MCHC RBC AUTO-ENTMCNC: 31.9 G/DL (ref 32–36)
MCV RBC AUTO: 103 FL (ref 82–98)
MONOCYTES # BLD AUTO: 0.2 K/UL (ref 0.3–1)
MONOCYTES NFR BLD: 2.7 % (ref 4–15)
NEUTROPHILS # BLD AUTO: 5 K/UL (ref 1.8–7.7)
NEUTROPHILS NFR BLD: 71 % (ref 38–73)
NRBC BLD-RTO: 0 /100 WBC
PHOSPHATE SERPL-MCNC: 2.9 MG/DL (ref 2.7–4.5)
PLATELET # BLD AUTO: 207 K/UL (ref 150–450)
PMV BLD AUTO: 9 FL (ref 9.2–12.9)
POCT GLUCOSE: 138 MG/DL (ref 70–110)
POCT GLUCOSE: 177 MG/DL (ref 70–110)
POCT GLUCOSE: 265 MG/DL (ref 70–110)
POCT GLUCOSE: 91 MG/DL (ref 70–110)
POTASSIUM SERPL-SCNC: 4 MMOL/L (ref 3.5–5.1)
PROT SERPL-MCNC: 5.1 G/DL (ref 6–8.4)
RBC # BLD AUTO: 2.26 M/UL (ref 4–5.4)
SODIUM SERPL-SCNC: 136 MMOL/L (ref 136–145)
WBC # BLD AUTO: 7.04 K/UL (ref 3.9–12.7)

## 2022-02-20 PROCEDURE — 25000003 PHARM REV CODE 250: Performed by: STUDENT IN AN ORGANIZED HEALTH CARE EDUCATION/TRAINING PROGRAM

## 2022-02-20 PROCEDURE — 80053 COMPREHEN METABOLIC PANEL: CPT | Performed by: NURSE PRACTITIONER

## 2022-02-20 PROCEDURE — 25000242 PHARM REV CODE 250 ALT 637 W/ HCPCS: Performed by: NURSE PRACTITIONER

## 2022-02-20 PROCEDURE — 25000003 PHARM REV CODE 250: Performed by: NURSE PRACTITIONER

## 2022-02-20 PROCEDURE — 99233 PR SUBSEQUENT HOSPITAL CARE,LEVL III: ICD-10-PCS | Mod: ,,, | Performed by: INTERNAL MEDICINE

## 2022-02-20 PROCEDURE — 63600175 PHARM REV CODE 636 W HCPCS: Performed by: INTERNAL MEDICINE

## 2022-02-20 PROCEDURE — 25000003 PHARM REV CODE 250: Performed by: INTERNAL MEDICINE

## 2022-02-20 PROCEDURE — 99233 SBSQ HOSP IP/OBS HIGH 50: CPT | Mod: ,,, | Performed by: INTERNAL MEDICINE

## 2022-02-20 PROCEDURE — 83735 ASSAY OF MAGNESIUM: CPT | Performed by: NURSE PRACTITIONER

## 2022-02-20 PROCEDURE — 20600001 HC STEP DOWN PRIVATE ROOM

## 2022-02-20 PROCEDURE — 85025 COMPLETE CBC W/AUTO DIFF WBC: CPT | Performed by: NURSE PRACTITIONER

## 2022-02-20 PROCEDURE — 84100 ASSAY OF PHOSPHORUS: CPT | Performed by: NURSE PRACTITIONER

## 2022-02-20 RX ORDER — HYDROCODONE BITARTRATE AND ACETAMINOPHEN 5; 325 MG/1; MG/1
1 TABLET ORAL ONCE
Status: COMPLETED | OUTPATIENT
Start: 2022-02-20 | End: 2022-02-20

## 2022-02-20 RX ORDER — IBUPROFEN 400 MG/1
400 TABLET ORAL ONCE AS NEEDED
Status: COMPLETED | OUTPATIENT
Start: 2022-02-20 | End: 2022-02-20

## 2022-02-20 RX ORDER — ACETAMINOPHEN 500 MG
1000 TABLET ORAL ONCE AS NEEDED
Status: COMPLETED | OUTPATIENT
Start: 2022-02-20 | End: 2022-02-20

## 2022-02-20 RX ADMIN — DEXAMETHASONE 1 DROP: 1 SUSPENSION OPHTHALMIC at 08:02

## 2022-02-20 RX ADMIN — FENOFIBRATE 160 MG: 160 TABLET ORAL at 08:02

## 2022-02-20 RX ADMIN — DEXAMETHASONE 1 DROP: 1 SUSPENSION OPHTHALMIC at 03:02

## 2022-02-20 RX ADMIN — DILTIAZEM HYDROCHLORIDE 90 MG: 30 TABLET, FILM COATED ORAL at 11:02

## 2022-02-20 RX ADMIN — GABAPENTIN 300 MG: 300 CAPSULE ORAL at 08:02

## 2022-02-20 RX ADMIN — ACYCLOVIR 400 MG: 200 CAPSULE ORAL at 08:02

## 2022-02-20 RX ADMIN — ONDANSETRON 8 MG: 2 INJECTION INTRAMUSCULAR; INTRAVENOUS at 03:02

## 2022-02-20 RX ADMIN — Medication 400 MG: at 09:02

## 2022-02-20 RX ADMIN — ASPIRIN 81 MG: 81 TABLET, COATED ORAL at 08:02

## 2022-02-20 RX ADMIN — SODIUM CHLORIDE 50 ML/HR: 0.9 INJECTION, SOLUTION INTRAVENOUS at 09:02

## 2022-02-20 RX ADMIN — Medication 400 MG: at 05:02

## 2022-02-20 RX ADMIN — VORTIOXETINE 10 MG: 10 TABLET, FILM COATED ORAL at 08:02

## 2022-02-20 RX ADMIN — OXCARBAZEPINE 1200 MG: 600 TABLET, FILM COATED ORAL at 08:02

## 2022-02-20 RX ADMIN — Medication 400 MG: at 11:02

## 2022-02-20 RX ADMIN — INSULIN ASPART 1 UNITS: 100 INJECTION, SOLUTION INTRAVENOUS; SUBCUTANEOUS at 08:02

## 2022-02-20 RX ADMIN — DIAZEPAM 10 MG: 5 TABLET ORAL at 08:02

## 2022-02-20 RX ADMIN — RIVAROXABAN 20 MG: 20 TABLET, FILM COATED ORAL at 05:02

## 2022-02-20 RX ADMIN — CYCLOBENZAPRINE HYDROCHLORIDE 10 MG: 5 TABLET, FILM COATED ORAL at 08:02

## 2022-02-20 RX ADMIN — HYDROCODONE BITARTRATE AND ACETAMINOPHEN 1 TABLET: 5; 325 TABLET ORAL at 05:02

## 2022-02-20 RX ADMIN — QUETIAPINE FUMARATE 200 MG: 200 TABLET ORAL at 08:02

## 2022-02-20 RX ADMIN — DILTIAZEM HYDROCHLORIDE 90 MG: 30 TABLET, FILM COATED ORAL at 05:02

## 2022-02-20 RX ADMIN — GABAPENTIN 300 MG: 300 CAPSULE ORAL at 03:02

## 2022-02-20 RX ADMIN — HYDROXYCHLOROQUINE SULFATE 200 MG: 200 TABLET, FILM COATED ORAL at 08:02

## 2022-02-20 RX ADMIN — IBUPROFEN 400 MG: 400 TABLET, FILM COATED ORAL at 09:02

## 2022-02-20 RX ADMIN — LOSARTAN POTASSIUM 25 MG: 25 TABLET, FILM COATED ORAL at 08:02

## 2022-02-20 RX ADMIN — DEXAMETHASONE SODIUM PHOSPHATE 8 MG: 4 INJECTION INTRA-ARTICULAR; INTRALESIONAL; INTRAMUSCULAR; INTRAVENOUS; SOFT TISSUE at 03:02

## 2022-02-20 RX ADMIN — HYPROMELLOSE 2910 1 DROP: 5 SOLUTION OPHTHALMIC at 03:02

## 2022-02-20 RX ADMIN — PANTOPRAZOLE SODIUM 40 MG: 40 TABLET, DELAYED RELEASE ORAL at 08:02

## 2022-02-20 RX ADMIN — DILTIAZEM HYDROCHLORIDE 90 MG: 30 TABLET, FILM COATED ORAL at 06:02

## 2022-02-20 RX ADMIN — ACETAMINOPHEN 1000 MG: 500 TABLET ORAL at 10:02

## 2022-02-20 RX ADMIN — Medication 400 MG: at 03:02

## 2022-02-20 RX ADMIN — CYTARABINE 1770 MG: 100 INJECTION, SOLUTION INTRATHECAL; INTRAVENOUS; SUBCUTANEOUS at 04:02

## 2022-02-20 NOTE — PLAN OF CARE
Problem: Adult Inpatient Plan of Care  Goal: Plan of Care Review  Outcome: Ongoing, Progressing  POC reviewed with patient Today was rest day for IDAC plan for next dose on 2/21. Blood sugars ae stable , vss and afebrile No complaints of pain or nausea  Dex drops given  IV fluids remain as ordered. Bed is in low locked position , spouse is at bedside  and attentive to patient

## 2022-02-20 NOTE — ASSESSMENT & PLAN NOTE
From Dr. Jenkins's most recent clinic note  Precursor B-cell acute lymphoblastic leukemia (Ph+)              A. 11/23/2021: Transferred to [facility] [place] from outside hospital for evaluation for acute leukemia              B. 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               C. 11/30/2021 - 12/23/2021: Vincristine + imatinib induction; hospital course was complicated by acute hypoxemic respiratory failure which resolved with diuresis and treatment of ALL     - BMBx from 1/3/22 showing CR  - admitted for C2 of consolidation with EWALL (IDAC). Today is day 5. Will complete chemo and discharge home on 2/21/22.  - tolerated C1 of consolidation well  - take home Gleevec days 15-28 of consolidation cycles  - monitor closely for neuro toxicity with cytarabine. None evident on exam today.  - will attempt to get port placement for future cycles  - will follow with Dr. Garcia for twice weekly labs prior to next cycle  - will need to discharge on ppx LVQ and fluc

## 2022-02-20 NOTE — PLAN OF CARE
Plan of care reviewed with patient and  this shift. New new complaints. VS stable. Maintaining saturations on room air. Electrolyte replacement initiated. NS @ 50/hr. BG monitoring, 1u Aspart at bedtime. Good clear, yellow output. qShift neuro check.  All questions and concerns addressed. Will continue to monitor.

## 2022-02-20 NOTE — NURSING
Cytarabine  started through PICC noted for having positive blood return over 2 hours.  Chemotherapy dosage and BSA checked by two chemotherapy certified nurses prior to administration.  Chemotherapy education done prior to hanging of chemotherapy.  Chemotherapeutic precautions in place throughout therapy.  Will continue to monitor.

## 2022-02-20 NOTE — PROGRESS NOTES
[facility] ([facility])  Hematology  Bone Marrow Transplant  Progress Note    Patient Name: Deepti Gonzalez  Admission Date: 2/16/2022  Hospital Length of Stay: 4 days  Code Status: Full Code    Subjective:     Interval History: Day 5 of consolidation 2 of RODGER MADSEN. Has no complaints. Will receive D5 of IDAC this evening. Anticipate discharge tomorrow  morning after her last dose.     Objective:     Vital Signs (Most Recent):  Temp: 99 °F (37.2 °C) (02/20/22 0737)  Pulse: 91 (02/20/22 0737)  Resp: 14 (02/20/22 0737)  BP: 135/70 (02/20/22 0737)  SpO2: 98 % (02/20/22 0737) Vital Signs (24h Range):  Temp:  [98.3 °F (36.8 °C)-99 °F (37.2 °C)] 99 °F (37.2 °C)  Pulse:  [] 91  Resp:  [14-22] 14  SpO2:  [96 %-99 %] 98 %  BP: (122-152)/(60-70) 135/70     Weight: 71.7 kg (158 lb 2.9 oz)  Body mass index is 27.15 kg/m².  Body surface area is 1.8 meters squared.    ECOG SCORE           [unfilled]    Intake/Output - Last 3 Shifts         02/18 0700  02/19 0659 02/19 0700  02/20 0659 02/20 0700  02/21 0659    P.O. 2220  120    I.V. (mL/kg) 1156.6 (16.2)      IV Piggyback 339.8      Total Intake(mL/kg) 3716.3 (52)  120 (1.7)    Urine (mL/kg/hr) 5500 (3.2) 3600 (2.1) 500 (4.7)    Stool  0     Total Output 5500 3600 500    Net -1783.7 -3600 -380           Stool Occurrence  1 x             Physical Exam  Constitutional:       Appearance: She is well-developed.   HENT:      Head: Normocephalic and atraumatic.      Mouth/Throat:      Pharynx: No oropharyngeal exudate.   Eyes:      Conjunctiva/sclera: Conjunctivae normal.      Pupils: Pupils are equal, round, and reactive to light.   Cardiovascular:      Rate and Rhythm: Normal rate and regular rhythm.      Heart sounds: Normal heart sounds. No murmur heard.  Pulmonary:      Effort: Pulmonary effort is normal.      Breath sounds: Normal breath sounds.   Abdominal:      General: Bowel sounds are normal. There is no distension.      Palpations: Abdomen is soft.       Tenderness: There is no abdominal tenderness.   Musculoskeletal:         General: No deformity. Normal range of motion.      Cervical back: Normal range of motion and neck supple.   Skin:     General: Skin is warm and dry.      Findings: Bruising present. No erythema or rash.      Comments: RUE PICC. Dressing c/d/i. No sign of infection to site.   Neurological:      Mental Status: She is alert and oriented to person, place, and time.   Psychiatric:         Behavior: Behavior normal.         Thought Content: Thought content normal.         Judgment: Judgment normal.       Significant Labs:   CBC:   Recent Labs   Lab 02/19/22  0351 02/20/22  0400   WBC 4.92 7.04   HGB 7.5* 7.4*   HCT 24.2* 23.2*    207      and CMP:   Recent Labs   Lab 02/19/22  0351 02/20/22  0400    136   K 4.0 4.0    99   CO2 25 29   * 117*   BUN 7 12   CREATININE 0.6 0.6   CALCIUM 7.6* 8.5*   PROT 4.5* 5.1*   ALBUMIN 2.3* 2.6*   BILITOT 0.2 0.3   ALKPHOS 71 76   AST 12 11   ALT 17 19   ANIONGAP 8 8   EGFRNONAA >60.0 >60.0         Diagnostic Results:  I have reviewed all pertinent imaging results/findings within the past 24 hours.    Assessment/Plan:     * B-cell acute lymphoblastic leukemia  From Dr. Jenkins's most recent clinic note  Precursor B-cell acute lymphoblastic leukemia (Ph+)              A. 11/23[place]2021: [place] [facility] [place]              B. 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               C. 11/30/2021 - 12/23/2021: Vincristine + imatinib induction; hospital course was complicated by acute hypoxemic respiratory failure which resolved with diuresis and treatment of ALL     - BMBx  from 1/3/22 showing CR  - admitted for C2 of consolidation with EWALL (IDAC). Today is day 5. Will complete chemo and discharge home on 2/21/22.  - tolerated C1 of consolidation well  - take home Gleevec days 15-28 of consolidation cycles  - monitor closely for neuro toxicity with cytarabine. None evident on exam today.  - will attempt to get port placement for future cycles  - will follow with Dr. Garcia for twice weekly labs prior to next cycle  - will need to discharge on ppx LVQ and fluc          Hypomagnesemia  - replace per prn electrolyte order set  - daily Mg level while inpatient    Headache  - c/o of headache morning of 2/17  - one time dose of ibuprofen given as patient is currently not neutropenic nor thrombocytopenic  Resolved now    Hypophosphatemia  - replace per prn electrolyte order set  - daily phos level while inpatient      Moderate major depression  - continue home trintellix while inpatient    Insomnia  - continue home seroquel while inpatient    Anxiety  - continue home trintellix and valium while inpatient    GERD (gastroesophageal reflux disease)  - home prilosec not on formulary. Will receive protonix while inpatient.    Hypertension  - continue home losartan while inpatient    Seizure disorder  - continue home oxcarbazepine while inpatient    Type 2 diabetes mellitus, without long-term current use of insulin  - hold jardiance and metformin recently discontinued by endocrinologist.   - hold home farxiga while inpatient. Can resume at discharge.  - low dose SSI, achs blood glucose monitoring, and diabetic diet while inpatient  - will add basal insulin if indicated    Rheumatoid arthritis involving multiple sites  - continue home plaquenil.   - home leflunomide was recently discontinued by her rhemotologist    History of TIA (transient ischemic attack)  - Continue home ASA and fenofibrate. Holding home statin while inpatient.    Hyperlipidemia  - holding home statin while inpatient    Long  term current use of anticoagulant  - continue home xarelto. Hold with plts < 50K.    Paroxysmal atrial fibrillation  - continue home diltiazem and xarelto. Hold xarelto with plt count < 50K.        VTE Risk Mitigation (From admission, onward)         Ordered     rivaroxaban tablet 20 mg  With dinner         02/16/22 1301     heparin, porcine (PF) 100 unit/mL injection flush 500 Units  As needed (PRN)         02/16/22 1438     IP VTE HIGH RISK PATIENT  Once         02/16/22 1206     Place sequential compression device  Until discontinued         02/16/22 1206                Disposition: Home after chemotherapy tomorrow morning     Mason Murillo MD  Bone Marrow Transplant  [facility] - Oncology ([facility])

## 2022-02-20 NOTE — SUBJECTIVE & OBJECTIVE
Subjective:     Interval History: Day 5 of consolidation 2 of RODGER PA Has no complaints. Will receive D5 of IDAC this evening. Anticipate discharge tomorrow  morning after her last dose.     Objective:     Vital Signs (Most Recent):  Temp: 99 °F (37.2 °C) (02/20/22 0737)  Pulse: 91 (02/20/22 0737)  Resp: 14 (02/20/22 0737)  BP: 135/70 (02/20/22 0737)  SpO2: 98 % (02/20/22 0737) Vital Signs (24h Range):  Temp:  [98.3 °F (36.8 °C)-99 °F (37.2 °C)] 99 °F (37.2 °C)  Pulse:  [] 91  Resp:  [14-22] 14  SpO2:  [96 %-99 %] 98 %  BP: (122-152)/(60-70) 135/70     Weight: 71.7 kg (158 lb 2.9 oz)  Body mass index is 27.15 kg/m².  Body surface area is 1.8 meters squared.    ECOG SCORE           [unfilled]    Intake/Output - Last 3 Shifts         02/18 0700  02/19 0659 02/19 0700  02/20 0659 02/20 0700  02/21 0659    P.O. 2220  120    I.V. (mL/kg) 1156.6 (16.2)      IV Piggyback 339.8      Total Intake(mL/kg) 3716.3 (52)  120 (1.7)    Urine (mL/kg/hr) 5500 (3.2) 3600 (2.1) 500 (4.7)    Stool  0     Total Output 5500 3600 500    Net -1783.7 -3600 -380           Stool Occurrence  1 x             Physical Exam  Constitutional:       Appearance: She is well-developed.   HENT:      Head: Normocephalic and atraumatic.      Mouth/Throat:      Pharynx: No oropharyngeal exudate.   Eyes:      Conjunctiva/sclera: Conjunctivae normal.      Pupils: Pupils are equal, round, and reactive to light.   Cardiovascular:      Rate and Rhythm: Normal rate and regular rhythm.      Heart sounds: Normal heart sounds. No murmur heard.  Pulmonary:      Effort: Pulmonary effort is normal.      Breath sounds: Normal breath sounds.   Abdominal:      General: Bowel sounds are normal. There is no distension.      Palpations: Abdomen is soft.      Tenderness: There is no abdominal tenderness.   Musculoskeletal:         General: No deformity. Normal range of motion.      Cervical back: Normal range of motion and neck supple.   Skin:     General: Skin  is warm and dry.      Findings: Bruising present. No erythema or rash.      Comments: RUE PICC. Dressing c/d/i. No sign of infection to site.   Neurological:      Mental Status: She is alert and oriented to person, place, and time.   Psychiatric:         Behavior: Behavior normal.         Thought Content: Thought content normal.         Judgment: Judgment normal.       Significant Labs:   CBC:   Recent Labs   Lab 02/19/22  0351 02/20/22  0400   WBC 4.92 7.04   HGB 7.5* 7.4*   HCT 24.2* 23.2*    207      and CMP:   Recent Labs   Lab 02/19/22  0351 02/20/22  0400    136   K 4.0 4.0    99   CO2 25 29   * 117*   BUN 7 12   CREATININE 0.6 0.6   CALCIUM 7.6* 8.5*   PROT 4.5* 5.1*   ALBUMIN 2.3* 2.6*   BILITOT 0.2 0.3   ALKPHOS 71 76   AST 12 11   ALT 17 19   ANIONGAP 8 8   EGFRNONAA >60.0 >60.0         Diagnostic Results:  I have reviewed all pertinent imaging results/findings within the past 24 hours.

## 2022-02-21 VITALS
TEMPERATURE: 99 F | SYSTOLIC BLOOD PRESSURE: 108 MMHG | WEIGHT: 156.19 LBS | RESPIRATION RATE: 16 BRPM | BODY MASS INDEX: 26.67 KG/M2 | HEART RATE: 110 BPM | HEIGHT: 64 IN | OXYGEN SATURATION: 96 % | DIASTOLIC BLOOD PRESSURE: 61 MMHG

## 2022-02-21 PROBLEM — D64.81 ANTINEOPLASTIC CHEMOTHERAPY INDUCED ANEMIA: Status: ACTIVE | Noted: 2022-02-21

## 2022-02-21 PROBLEM — T45.1X5A ANTINEOPLASTIC CHEMOTHERAPY INDUCED ANEMIA: Status: ACTIVE | Noted: 2022-02-21

## 2022-02-21 LAB
ALBUMIN SERPL BCP-MCNC: 2.8 G/DL (ref 3.5–5.2)
ALP SERPL-CCNC: 94 U/L (ref 55–135)
ALT SERPL W/O P-5'-P-CCNC: 17 U/L (ref 10–44)
ANION GAP SERPL CALC-SCNC: 10 MMOL/L (ref 8–16)
AST SERPL-CCNC: 12 U/L (ref 10–40)
BASOPHILS # BLD AUTO: 0.01 K/UL (ref 0–0.2)
BASOPHILS NFR BLD: 0.1 % (ref 0–1.9)
BILIRUB SERPL-MCNC: 0.2 MG/DL (ref 0.1–1)
BUN SERPL-MCNC: 15 MG/DL (ref 6–20)
CALCIUM SERPL-MCNC: 8.7 MG/DL (ref 8.7–10.5)
CHLORIDE SERPL-SCNC: 99 MMOL/L (ref 95–110)
CO2 SERPL-SCNC: 29 MMOL/L (ref 23–29)
CREAT SERPL-MCNC: 0.8 MG/DL (ref 0.5–1.4)
DIFFERENTIAL METHOD: ABNORMAL
EOSINOPHIL # BLD AUTO: 0 K/UL (ref 0–0.5)
EOSINOPHIL NFR BLD: 0 % (ref 0–8)
ERYTHROCYTE [DISTWIDTH] IN BLOOD BY AUTOMATED COUNT: 14.4 % (ref 11.5–14.5)
EST. GFR  (AFRICAN AMERICAN): >60 ML/MIN/1.73 M^2
EST. GFR  (NON AFRICAN AMERICAN): >60 ML/MIN/1.73 M^2
GLUCOSE SERPL-MCNC: 173 MG/DL (ref 70–110)
HCT VFR BLD AUTO: 24.2 % (ref 37–48.5)
HGB BLD-MCNC: 7.8 G/DL (ref 12–16)
IMM GRANULOCYTES # BLD AUTO: 0.04 K/UL (ref 0–0.04)
IMM GRANULOCYTES NFR BLD AUTO: 0.5 % (ref 0–0.5)
LYMPHOCYTES # BLD AUTO: 1.1 K/UL (ref 1–4.8)
LYMPHOCYTES NFR BLD: 12.6 % (ref 18–48)
MAGNESIUM SERPL-MCNC: 2 MG/DL (ref 1.6–2.6)
MCH RBC QN AUTO: 33.8 PG (ref 27–31)
MCHC RBC AUTO-ENTMCNC: 32.2 G/DL (ref 32–36)
MCV RBC AUTO: 105 FL (ref 82–98)
MONOCYTES # BLD AUTO: 0.1 K/UL (ref 0.3–1)
MONOCYTES NFR BLD: 1.4 % (ref 4–15)
NEUTROPHILS # BLD AUTO: 7.3 K/UL (ref 1.8–7.7)
NEUTROPHILS NFR BLD: 85.4 % (ref 38–73)
NRBC BLD-RTO: 0 /100 WBC
PHOSPHATE SERPL-MCNC: 3.2 MG/DL (ref 2.7–4.5)
PLATELET # BLD AUTO: 211 K/UL (ref 150–450)
PMV BLD AUTO: 8.9 FL (ref 9.2–12.9)
POCT GLUCOSE: 284 MG/DL (ref 70–110)
POTASSIUM SERPL-SCNC: 4.1 MMOL/L (ref 3.5–5.1)
PROT SERPL-MCNC: 5.6 G/DL (ref 6–8.4)
RBC # BLD AUTO: 2.31 M/UL (ref 4–5.4)
SODIUM SERPL-SCNC: 138 MMOL/L (ref 136–145)
WBC # BLD AUTO: 8.5 K/UL (ref 3.9–12.7)

## 2022-02-21 PROCEDURE — 63600175 PHARM REV CODE 636 W HCPCS: Performed by: INTERNAL MEDICINE

## 2022-02-21 PROCEDURE — 84100 ASSAY OF PHOSPHORUS: CPT | Performed by: NURSE PRACTITIONER

## 2022-02-21 PROCEDURE — 83735 ASSAY OF MAGNESIUM: CPT | Performed by: NURSE PRACTITIONER

## 2022-02-21 PROCEDURE — 85025 COMPLETE CBC W/AUTO DIFF WBC: CPT | Performed by: NURSE PRACTITIONER

## 2022-02-21 PROCEDURE — 80053 COMPREHEN METABOLIC PANEL: CPT | Performed by: NURSE PRACTITIONER

## 2022-02-21 PROCEDURE — 94761 N-INVAS EAR/PLS OXIMETRY MLT: CPT

## 2022-02-21 PROCEDURE — 25000003 PHARM REV CODE 250: Performed by: INTERNAL MEDICINE

## 2022-02-21 PROCEDURE — 99233 PR SUBSEQUENT HOSPITAL CARE,LEVL III: ICD-10-PCS | Mod: ,,, | Performed by: INTERNAL MEDICINE

## 2022-02-21 PROCEDURE — 25000242 PHARM REV CODE 250 ALT 637 W/ HCPCS: Performed by: NURSE PRACTITIONER

## 2022-02-21 PROCEDURE — 99233 SBSQ HOSP IP/OBS HIGH 50: CPT | Mod: ,,, | Performed by: INTERNAL MEDICINE

## 2022-02-21 PROCEDURE — 25000003 PHARM REV CODE 250: Performed by: NURSE PRACTITIONER

## 2022-02-21 RX ORDER — ASPIRIN 81 MG/1
81 TABLET ORAL DAILY
Refills: 0 | Status: ON HOLD
Start: 2022-02-21 | End: 2022-04-19 | Stop reason: HOSPADM

## 2022-02-21 RX ORDER — LEVOFLOXACIN 500 MG/1
500 TABLET, FILM COATED ORAL DAILY
Qty: 30 TABLET | Refills: 2 | Status: SHIPPED | OUTPATIENT
Start: 2022-02-21 | End: 2022-06-11

## 2022-02-21 RX ORDER — FLUCONAZOLE 200 MG/1
400 TABLET ORAL DAILY
Qty: 60 TABLET | Refills: 2 | Status: SHIPPED | OUTPATIENT
Start: 2022-02-21 | End: 2022-08-03 | Stop reason: SDUPTHER

## 2022-02-21 RX ORDER — PROCHLORPERAZINE MALEATE 5 MG
5 TABLET ORAL EVERY 6 HOURS PRN
Qty: 30 TABLET | Refills: 2 | Status: SHIPPED | OUTPATIENT
Start: 2022-02-21 | End: 2022-07-11 | Stop reason: SDUPTHER

## 2022-02-21 RX ORDER — DEXAMETHASONE SODIUM PHOSPHATE 1 MG/ML
1 SOLUTION/ DROPS OPHTHALMIC EVERY 6 HOURS
Qty: 5 ML | Refills: 0
Start: 2022-02-21 | End: 2022-02-24

## 2022-02-21 RX ORDER — FLUCONAZOLE 200 MG/1
200 TABLET ORAL DAILY
Qty: 30 TABLET | Refills: 2 | Status: SHIPPED | OUTPATIENT
Start: 2022-02-21 | End: 2022-02-21 | Stop reason: SDUPTHER

## 2022-02-21 RX ADMIN — DILTIAZEM HYDROCHLORIDE 90 MG: 30 TABLET, FILM COATED ORAL at 06:02

## 2022-02-21 RX ADMIN — DEXAMETHASONE 1 DROP: 1 SUSPENSION OPHTHALMIC at 03:02

## 2022-02-21 RX ADMIN — DIAZEPAM 10 MG: 5 TABLET ORAL at 10:02

## 2022-02-21 RX ADMIN — ACYCLOVIR 400 MG: 200 CAPSULE ORAL at 09:02

## 2022-02-21 RX ADMIN — OXCARBAZEPINE 1200 MG: 600 TABLET, FILM COATED ORAL at 09:02

## 2022-02-21 RX ADMIN — HYDROXYCHLOROQUINE SULFATE 200 MG: 200 TABLET, FILM COATED ORAL at 09:02

## 2022-02-21 RX ADMIN — DEXAMETHASONE SODIUM PHOSPHATE 8 MG: 4 INJECTION INTRA-ARTICULAR; INTRALESIONAL; INTRAMUSCULAR; INTRAVENOUS; SOFT TISSUE at 03:02

## 2022-02-21 RX ADMIN — PANTOPRAZOLE SODIUM 40 MG: 40 TABLET, DELAYED RELEASE ORAL at 09:02

## 2022-02-21 RX ADMIN — FENOFIBRATE 160 MG: 160 TABLET ORAL at 09:02

## 2022-02-21 RX ADMIN — ASPIRIN 81 MG: 81 TABLET, COATED ORAL at 09:02

## 2022-02-21 RX ADMIN — ONDANSETRON 8 MG: 2 INJECTION INTRAMUSCULAR; INTRAVENOUS at 03:02

## 2022-02-21 RX ADMIN — VORTIOXETINE 10 MG: 10 TABLET, FILM COATED ORAL at 09:02

## 2022-02-21 RX ADMIN — LOSARTAN POTASSIUM 25 MG: 25 TABLET, FILM COATED ORAL at 09:02

## 2022-02-21 RX ADMIN — DEXAMETHASONE 1 DROP: 1 SUSPENSION OPHTHALMIC at 09:02

## 2022-02-21 RX ADMIN — GABAPENTIN 300 MG: 300 CAPSULE ORAL at 09:02

## 2022-02-21 RX ADMIN — CYTARABINE 1770 MG: 100 INJECTION, SOLUTION INTRATHECAL; INTRAVENOUS; SUBCUTANEOUS at 04:02

## 2022-02-21 RX ADMIN — INSULIN ASPART 3 UNITS: 100 INJECTION, SOLUTION INTRAVENOUS; SUBCUTANEOUS at 08:02

## 2022-02-21 NOTE — ASSESSMENT & PLAN NOTE
From Dr. Jenkins's most recent clinic note  Precursor B-cell acute lymphoblastic leukemia (Ph+)              A. 11/23/2021: [place] [facility] [place] hospital for evaluation for acute leukemia              B. 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               C. 11/30/2021 - 12/23/2021: Vincristine + imatinib induction; hospital course was complicated by acute hypoxemic respiratory failure which resolved with diuresis and treatment of ALL     - BMBx from 1/3/22 showing CR  - admitted for C2 of consolidation with EWALL (IDAC). Today is day 6. Completed chemo and will discharge home today.  - tolerated C1 of consolidation well  - take home Gleevec days 15-28 of consolidation cycles  - monitor closely for neuro toxicity with cytarabine. None evident on exam today.  - will attempt to get port placement for future cycles  - will follow with Dr. Garcia for twice weekly labs prior to next cycle  - discharging on ppx LVQ and fluc

## 2022-02-21 NOTE — DISCHARGE SUMMARY
Roberto Yepez - Oncology ([facility])  Hematology  Bone Marrow Transplant  Discharge Summary      Patient Name: Deepti Gonzalez  MRN: [id number]  Admission Date: 2/16/2022  Hospital Length of Stay: 5 days  Discharge Date and Time: 2/21/2022  3:21 PM  Attending Physician: No att. providers found   Discharging Provider: Mirtha Antonio NP  Primary Care Provider: Wen Oliveros MD    HPI: Ms. 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 Other medical history includes COPD, HTN, HLD, DM2, TIA, anxiety, depression, GERD, seizure disorder, PAD, gastroparesis, RA, osteoporosis, and pacermaker placement. She is admitting today for C2 of consolidation with EWALL ([facility]). She was induced with Vincristine + imatinib, which she recieved inpatient at [facility]. That hospitalization was complicated by acute hypoxic respiratory failure requiring ICU transfer. Post induction BMBx was performed 1/3/22. Showing CR. She tolerated C1 of consolidation well. She is feeling well today. Denies fevers, chills, aches, cough, SOB, chest pain, bleeding or bruising, and constipation. She denies any recent sick contacts. She is COVID neg. Reports fatigue, and c/o n/v/d since starting Gleevec. Also reports fall without injury at home.      * No surgery found *     Hospital Course: Admitted 2/16/22 for consolidation #2 of EWALL ([facility]). Tolerated chemo well. No evidence of neurologic toxicities. C/o intermittent headaches relieved with sleep and pain medications. Discharged home 2/21/22. PICC left in place. Patient assistance for line care supplies arranged by [facility]. Order for outpatient port placement at [facility] entered prior to discharged. Requested placement on 3/16/22 since she will be here for readmission on that date. Had not been scheduled by time of discharge. Labs will be monitored twice weekly outpatient with Dr. Garcia. Asked Dr. Garcia's nurse to stop ASA and Xarelto once plts are < 50K. Discharged on dex eye drops x 3 days and antimicrobial ppx. She  will readmit 3/16/22 for consolidation #3.    B-cell acute lymphoblastic leukemia  From Dr. Jenkins's most recent clinic note  Precursor B-cell acute lymphoblastic leukemia (Ph+)              A. 11/23/2021: Transferred to [facility] [place] outside hospital for evaluation for acute leukemia              B. 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               C. 11/30/2021 - 12/23/2021: Vincristine + imatinib induction; hospital course was complicated by acute hypoxemic respiratory failure which resolved with diuresis and treatment of ALL     - BMBx from 1/3/22 showing CR  - admitted for C2 of consolidation with EWALL (IDAC). Today is day 6. Completed chemo and will discharge home today.  - tolerated C1 of consolidation well  - take home Gleevec days 15-28 of consolidation cycles  - monitor closely for neuro toxicity with cytarabine. None evident on exam today.  - will attempt to get port placement for future cycles  - will follow with Dr. Garcia for twice weekly labs prior to next cycle  - discharging on ppx LVQ and fluc            Antineoplastic chemotherapy induced anemia  - 2/2 chemo and ALL  - transfuse for hgb < 7.8  - twice weekly lab monitoring arranged with Dr. Garcia outpatient     Hypomagnesemia  - replace per prn electrolyte order set  - daily Mg level while inpatient     Headache  - c/o of headache morning of 2/17  - ibuprofen and tylenol given as patient is currently not neutropenic nor thrombocytopenic. Patient understands that she cannot take these meds after discharged due to anticipated drop in blood counts.        Hypophosphatemia  - replace per prn electrolyte order set  - daily phos level while inpatient        Moderate major  depression  - continue home trintellix while inpatient     Insomnia  - continue home seroquel while inpatient     Anxiety  - continue home trintellix and valium while inpatient     GERD (gastroesophageal reflux disease)  - home prilosec not on formulary. Will receive protonix while inpatient.     Hypertension  - continue home losartan while inpatient     Seizure disorder  - continue home oxcarbazepine while inpatient     Type 2 diabetes mellitus, without long-term current use of insulin  - hold jardiance and metformin recently discontinued by endocrinologist.   - hold home farxiga while inpatient. Can resume at discharge.  - low dose SSI, achs blood glucose monitoring, and diabetic diet while inpatient  - will add basal insulin if indicated     Rheumatoid arthritis involving multiple sites  - continue home plaquenil.   - home leflunomide was recently discontinued by her rhemotologist     History of TIA (transient ischemic attack)  - Continue home ASA and fenofibrate. Holding home statin while inpatient.     Hyperlipidemia  - holding home statin while inpatient     Long term current use of anticoagulant  - continue home xarelto. Hold with plts < 50K.     Paroxysmal atrial fibrillation  - continue home diltiazem and xarelto. Hold xarelto with plt count < 50K.    Goals of Care Treatment Preferences:  Code Status: Full Code      Significant Diagnostic Studies: Labs:   CMP   Recent Labs   Lab 02/20/22  0400 02/21/22  0320    138   K 4.0 4.1   CL 99 99   CO2 29 29   * 173*   BUN 12 15   CREATININE 0.6 0.8   CALCIUM 8.5* 8.7   PROT 5.1* 5.6*   ALBUMIN 2.6* 2.8*   BILITOT 0.3 0.2   ALKPHOS 76 94   AST 11 12   ALT 19 17   ANIONGAP 8 10   ESTGFRAFRICA >60.0 >60.0   EGFRNONAA >60.0 >60.0    and CBC   Recent Labs   Lab 02/20/22  0400 02/21/22  0320   WBC 7.04 8.50   HGB 7.4* 7.8*   HCT 23.2* 24.2*    211       Pending Diagnostic Studies:     Procedure Component Value Units Date/Time    IR Tunneled Cath  "Placement With Port [[id number]]     Order Status: Sent Lab Status: No result         Final Active Diagnoses:    Diagnosis Date Noted POA    PRINCIPAL PROBLEM:  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] 11/24/2021 Yes    Antineoplastic chemotherapy induced anemia [D64.81, T45.1X5A] 02/21/2022 No    Hypomagnesemia [E83.42] 02/18/2022 No    Hypophosphatemia [E83.39] 02/17/2022 No    Headache [R51.9] 02/17/2022 No    Moderate major depression [F32.1] 11/29/2021 Yes    Insomnia [G47.00] 11/24/2021 Yes    Anxiety [F41.9] 11/24/2021 Yes    GERD (gastroesophageal reflux disease) [K21.9] 11/24/2021 Yes    Hypertension [I10] 11/24/2021 Yes    Seizure disorder [G40.909] 11/24/2021 Yes    Type 2 diabetes mellitus, without long-term current use of insulin [E11.9] 11/24/2021 Yes    History of TIA (transient ischemic attack) [Z86.73] 12/22/2017 Not Applicable    Long term current use of anticoagulant [Z79.01] 12/22/2017 Not Applicable    Paroxysmal atrial fibrillation [I48.0] 12/22/2017 Yes    Rheumatoid arthritis involving multiple sites [M06.9] 12/22/2017 Yes    Hyperlipidemia [E78.5] 12/22/2017 Yes      Problems Resolved During this Admission:      Discharged Condition: stable    Disposition: Home or Self Care    Follow Up:   Follow-up Information     Wen Oliveros MD Follow up on 2/24/2022.    Specialty: Family Medicine  Why: Scheduled appointment on 2/24/22 @ 9:30am. Please bring your discharge summary, insurance card, ID, and medication list.  Contact information:  [address]  [place] [address] [phone]                       Patient Instructions:      WALKER FOR HOME USE     Order Specific Question Answer Comments   Type of Walker: Adult (5'4"-6'6")    With wheels? Yes    Height: 5' 4" (1.626 m)    Weight: 70.8 kg (156 lb 3.1 oz)    Length of need (1-99 months): 99    Does patient have medical equipment at home? walker, rolling    Please check all that apply: Patient's condition impairs ambulation.  " "  Please check all that apply: Patient needs help to get in and out of chair.    Please check all that apply: Patient is unable to safely ambulate without equipment.      HIP KIT FOR HOME USE     Order Specific Question Answer Comments   Height: 5' 4" (1.626 m)    Weight: 70.8 kg (156 lb 3.1 oz)    Does patient have medical equipment at home? walker, rolling    Length of need (1-99 months): 99    Type: [place] Hip Kit      Ambulatory referral/consult to Interventional Radiology   Standing Status: Future   Referral Priority: Routine Referral Type: Interventional Radiology   Referral Reason: Specialty Services Required   Requested Specialty: Interventional Radiology   Number of Visits Requested: 1     Diet diabetic     Notify your health care provider if you experience any of the following:  temperature >100.4     Notify your health care provider if you experience any of the following:  persistent nausea and vomiting or diarrhea     Notify your health care provider if you experience any of the following:  severe uncontrolled pain     Notify your health care provider if you experience any of the following:  redness, tenderness, or signs of infection (pain, swelling, redness, odor or green/yellow discharge around incision site)     Notify your health care provider if you experience any of the following:  difficulty breathing or increased cough     Notify your health care provider if you experience any of the following:  severe persistent headache     Notify your health care provider if you experience any of the following:  persistent dizziness, light-headedness, or visual disturbances     Notify your health care provider if you experience any of the following:  increased confusion or weakness     Activity as tolerated     Medications:  Reconciled Home Medications:      Medication List      START taking these medications    dexamethasone 0.1 % ophthalmic solution  Commonly known as: DECADRON  Place 1 drop into both " eyes every 6 (six) hours. for 3 days     fluconazole 200 MG Tab  Commonly known as: DIFLUCAN  Take 2 tablets (400 mg total) by mouth once daily.     levoFLOXacin 500 MG tablet  Commonly known as: LEVAQUIN  Take 1 tablet (500 mg total) by mouth once daily.     prochlorperazine 5 MG tablet  Commonly known as: COMPAZINE  Take 1 tablet (5 mg total) by mouth every 6 (six) hours as needed for Nausea.        CHANGE how you take these medications    aspirin 81 MG EC tablet  Commonly known as: ECOTRIN  Take 1 tablet (81 mg total) by mouth once daily. Hold for platelet count less than 50 thousand.  What changed: additional instructions     rivaroxaban 20 mg Tab  Commonly known as: XARELTO  Take 1 tablet (20 mg total) by mouth daily with dinner or evening meal. Hold for platelets less than 50 thousand.  What changed: additional instructions        CONTINUE taking these medications    acyclovir 400 MG tablet  Commonly known as: ZOVIRAX  Take 1 tablet (400 mg total) by mouth 2 (two) times daily.     artificial tears 0.5 % ophthalmic solution  Commonly known as: ISOPTO TEARS  Place 1 drop into both eyes 4 (four) times daily as needed.     atorvastatin 80 MG tablet  Commonly known as: LIPITOR  Take 80 mg by mouth once daily.     cyclobenzaprine 10 MG tablet  Commonly known as: FLEXERIL  Take 10 mg by mouth 3 (three) times daily as needed.     diazePAM 10 MG Tab  Commonly known as: VALIUM  Take 10 mg by mouth 2 (two) times daily as needed.     diltiaZEM 90 MG tablet  Commonly known as: CARDIZEM  Take 1 tablet (90 mg total) by mouth every 6 (six) hours.     DUKE'S SOLUTION (BENADRYL 30 ML, MYLANTA 30 ML, LIDOCAINE 30 ML, NYSTATIN 30 ML)  Take 10 mLs by mouth 4 (four) times daily.     FARXIGA 10 mg tablet  Generic drug: dapagliflozin  Take 10 mg by mouth once daily.     fenofibrate 160 MG Tab  Take 160 mg by mouth once daily.     gabapentin 300 MG capsule  Commonly known as: NEURONTIN  Take 1 capsule (300 mg total) by mouth 3 (three)  times daily.     hydrOXYchloroQUINE 200 mg tablet  Commonly known as: PLAQUENIL  Take 200 mg by mouth once daily.     * imatinib 100 MG Tab  Commonly known as: GLEEVEC  Take 2 tablets (200 mg total) by mouth once daily with 1 other imatinib prescription for 600 mg total.Take with a meal and large glass of water     * imatinib 400 MG Tab  Commonly known as: GLEEVEC  Take 1 tablet (400 mg total) by mouth once daily with 1 other imatinib prescription for 600 mg total. Take with a meal and large glass of water     losartan 25 MG tablet  Commonly known as: COZAAR  Take 25 mg by mouth once daily.     methocarbamoL 500 MG Tab  Commonly known as: ROBAXIN  Take 1 tablet (500 mg total) by mouth 3 (three) times daily as needed (muscle spasms).     omeprazole 40 MG capsule  Commonly known as: PRILOSEC  Take 40 mg by mouth once daily.     ondansetron 8 MG Tbdl  Commonly known as: ZOFRAN-ODT  Dissolve 1 tablet (8 mg total) by mouth every 12 (twelve) hours as needed (in case of chemo induced nausea).     OXcarbazepine 600 MG Tab  Commonly known as: TRILEPTAL  Take 1,200 mg by mouth 2 (two) times daily.     polyethylene glycol 17 gram/dose powder  Commonly known as: GLYCOLAX  Dissolve one capful (17 g) in liquid and take by mouth daily as needed (constipation).     QUEtiapine 200 MG Tab  Commonly known as: SEROQUEL  Take 200 mg by mouth every evening.     SENNA PLUS 8.6-50 mg per tablet  Generic drug: senna-docusate 8.6-50 mg  Take 1 tablet by mouth 2 (two) times daily.     TRINTELLIX 10 mg Tab  Generic drug: vortioxetine  Take 1 tablet by mouth once daily.         * This list has 2 medication(s) that are the same as other medications prescribed for you. Read the directions carefully, and ask your doctor or other care provider to review them with you.            STOP taking these medications    JARDIANCE 10 mg tablet  Generic drug: empagliflozin     leflunomide 20 MG Tab  Commonly known as: ARAVA     metFORMIN 500 MG ER 24hr  tablet  Commonly known as: GLUCOPHAGE-XR            Mirtha Antonio NP  Bone Marrow Transplant  Roberto Yepez - Oncology ([facility])

## 2022-02-21 NOTE — PLAN OF CARE
Plan of care reviewed with patient and  this shift. Pt c/o persistent HA pain and had 400mg Ibuprofen and 1000mg Tylenol. VS stable. Maintaining saturations on room air. NS @ 50/hr. Cytarabine @ 125/hr over 2hrs. Signature sheet signed, no remarkable changes. BG monitoring, Aspart at bedtime. Good clear, yellow output. qShift neuro check.  All questions and concerns addressed. Expected D/C today.  Will continue to monitor.

## 2022-02-21 NOTE — PROGRESS NOTES
Admit Assessment    Patient Identification  Deepti Gonzalez   :  1970  Admit Date:  2022  Attending Provider:  Yusuf Joseph MD              Referral:   Pt was admitted to the BMT-Unit with a diagnosis of B-cell acute lymphoblastic leukemia, and was admitted this hospital stay due to Acute lymphoblastic leukemia (ALL) [C91.00].     is involved by a routine referral to the Social Work Department.    Patient presents as a 51 y.o. year old  female. Patient stated that this is the second time she and her  are  to each other. This time time they have been remarried for 7.5 years and they have been together for a total of 32 years. Patient stated that she has a 12 year old daughter and a 30 year old daughter. The 30 year old lives independently but resides very close and is very supportive.    Persons interviewed: Patient and spouse with the patient's permission.    Living Situation:    Patient lives with her  and their 12 year old daughter in a single family home. Patient resides at [address] [place] [address] [facility] [address],     Patient's phone: [phone] (home).    Phone: [phone]     (RETIRED) Functional Status Prior  Ambulation Prior: 1-->assistive equipment  Transferrin-->assistive equipment  Toiletin-->assistive equipment  Bathin-->independent  Dressin-->independent  Eatin-->independent  Communication: understands/communicates without difficulty  Swallowing: swallows foods/liquids without difficulty    Current or Past Agencies and Description of Services/Supplies    DME  Agency Name: [facility]  Agency Phone Number: [phone]  Equipment Currently Used at Home: walker, rolling    Home Health  Agency Name:  [facility]  Agency Phone Number:  [phone]  Services: Nursing, PT/OT    IV Infusion  Agency Name: None  Agency Phone Number: None    Nutrition: Diabetic  diet.    Outpatient Pharmacy:     [facility]  [address]  [facility] [address]  Phone: [phone] Fax: [phone]    [id number] [place], [place] - [address] AT [address] OF [address]  [address] [place] [address]  Phone: [phone] Fax: [phone]      Patient Preference of agencies include: None    Patient/Caregiver informed of right to choose providers or agencies.  Yes. Patient provides permission to release any necessary information to [facility] and to Non-[facility] agencies as needed to facilitate patient care, treatment planning, and patient discharge planning. Yes. Written and verbal resources provided. Yes.      Coping:  discussed with the patient her score on the Distress Screening. Patient stated that her stress was related to financial issues. The issues that she was concerned with are in the process of being resolved and the patient feels less stress regarding those concerns.    Adjustment to Diagnosis and Treatment: Patient stated that she has multiple medical issues, and is becoming more adjusted to her diagnosis and treatment .      Emotional: Emotions have improved.    Behavioral: None reported    Cognitive Issues: Patient stated that she is having issues with her short-term memory but currently can't attribute it to anything specific.    Employment: Currently unemployed but in the past she worked as a  for a . Patient stated that she had to stop due to heart problems. Patient stated that she has not worked since 2006.    Finance: Patient reports that they were having difficulty paying the hospital bills.  connected the patient to the appropriate services for assistance.      Housing: Stable    Support System: Patient reports having a good support system with her  and her 30 year old daughter.    Recreation/Leisure: Patient reports, watching her daughter play baseball,  watching movies, and spending time with the family.    [place]: [employer] (Denominational).     Transportation: Patient's  will provide transportation whenever it is needed.    History/Current Symptoms of Anxiety/Depression:  No  History/Current Substance Use:   Social History     Tobacco Use    Smoking status: Current Every Day Smoker     Packs/day: 1.00     Types: Cigarettes    Smokeless tobacco: Never Used   Substance and Sexual Activity    Alcohol use: No    Drug use: Not on file    Sexual activity: Not on file       Indications of Abuse/Neglect: No  Abuse Screen (yes response referral indicated)  Feels Unsafe at Home or Work/School: No  Feels Threatened by Someone: No  Does Anyone Try to Keep You From Having Contact with Others or Doing Things Outside Your Home?: No  Physical Signs of Abuse Present: No    Financial:  Payer/Plan Subscr  Sex Relation Sub. Ins. ID Effective Group Num   1. BLUE CROSS BL* SHIRLENE NARANJO 1900 Male Spouse [id number]* 3/22/[birth year] [id number]                                   [address] [address]       Other identified concerns/needs: PICC -Line Supplies    Plan: Discharge the patient with home health services with a prescription for additional PICC-Line supplies with (DME) brought to the patient's room prior to discharge.  Transportation home by spouse.    Interventions/Referrals: [facility] health [facility].    Patient/caregiver engaged in treatment planning process. Yes.     providing psychosocial and supportive counseling, resources, education, assistance and discharge planning as appropriate. Yes.  Patient/caregiver state understanding of  available resources,  following, remains available. Yes.

## 2022-02-21 NOTE — PLAN OF CARE
"  Problem: Adult Inpatient Plan of Care  Goal: Plan of Care Review  Outcome: Ongoing, Progressing patient  care plan reviewed at the beginning of shift . Today is day 5 of her chemo therapy. She received cytarabine per order. Complaints of feeling" more tired and sleepy" this shift . Vss and afebrile One dose of pain med given for generalized pain /soreness. Bed is in low locked position call light is in place , spouse is at bedside and attentive to her .       "

## 2022-02-21 NOTE — NURSING
Patient disharged home. PICC line supplies given for care for a few days. All prescriptions from pharmacy delivered to bedside. AVS reviewed with patient  and spouse. [facility]

## 2022-02-21 NOTE — ASSESSMENT & PLAN NOTE
- 2/2 chemo and ALL  - transfuse for hgb < 7.8  - twice weekly lab monitoring arranged with Dr. Jose mclean

## 2022-02-21 NOTE — PROGRESS NOTES
[facility] - Oncology ([facility])  Hematology  Bone Marrow Transplant  Progress Note    Patient Name: Deepti Gonzalez  Admission Date: 2/16/2022  Hospital Length of Stay: 5 days  Code Status: Full Code    Subjective:     Interval History: C2D6 of consolidation #2 of EWALL (IDAC). She completed chemo early this morning and will discharge home today. Tolerating chemo well thus far. Main complaint was headache over night for which she was given Norco, followed by Ibuprofen, followed by Tylenol. Patient states that headache resolved after sleeping. Explained to her that she cannont take any of these meds after discharge given anticipated neutropenia and thrombocytopenia. Afebrile. VSS. She will get twice weekly labs and dressing changes outpatient with Dr. Garcia.    Objective:     Vital Signs (Most Recent):  Temp: 98.9 °F (37.2 °C) (02/21/22 0800)  Pulse: 110 (02/21/22 0800)  Resp: 16 (02/21/22 0800)  BP: 108/61 (02/21/22 0800)  SpO2: 96 % (02/21/22 0800) Vital Signs (24h Range):  Temp:  [98.1 °F (36.7 °C)-99 °F (37.2 °C)] 98.9 °F (37.2 °C)  Pulse:  [] 110  Resp:  [16-22] 16  SpO2:  [94 %-99 %] 96 %  BP: (108-138)/(59-94) 108/61     Weight: 70.8 kg (156 lb 3.1 oz)  Body mass index is 26.81 kg/m².  Body surface area is 1.79 meters squared.    ECOG SCORE           [unfilled]    Intake/Output - Last 3 Shifts         02/19 0700  02/20 0659 02/20 0700 02/21 0659 02/21 0700 02/22 0659    P.O.  120     I.V. (mL/kg)       IV Piggyback       Total Intake(mL/kg)  120 (1.7)     Urine (mL/kg/hr) 3600 (2.1) 6050 (3.6) 600 (2.3)    Stool 0 0     Total Output 3600 6050 600    Net -3600 -5930 -600           Stool Occurrence 1 x 1 x             Physical Exam  Constitutional:       Appearance: She is well-developed.   HENT:      Head: Normocephalic and atraumatic.      Mouth/Throat:      Pharynx: No oropharyngeal exudate.   Eyes:      Conjunctiva/sclera: Conjunctivae normal.      Pupils: Pupils are equal, round, and reactive to  light.   Cardiovascular:      Rate and Rhythm: Normal rate and regular rhythm.      Heart sounds: Normal heart sounds. No murmur heard.  Pulmonary:      Effort: Pulmonary effort is normal.      Breath sounds: Normal breath sounds.   Abdominal:      General: Bowel sounds are normal. There is no distension.      Palpations: Abdomen is soft.      Tenderness: There is no abdominal tenderness.   Musculoskeletal:         General: No deformity. Normal range of motion.      Cervical back: Normal range of motion and neck supple.   Skin:     General: Skin is warm and dry.      Findings: Bruising present. No erythema or rash.      Comments: RUE PICC. Dressing c/d/i. No sign of infection to site.   Neurological:      Mental Status: She is alert and oriented to person, place, and time.   Psychiatric:         Behavior: Behavior normal.         Thought Content: Thought content normal.         Judgment: Judgment normal.       Significant Labs:   CBC:   Recent Labs   Lab 02/20/22  0400 02/21/22  0320   WBC 7.04 8.50   HGB 7.4* 7.8*   HCT 23.2* 24.2*    211      and CMP:   Recent Labs   Lab 02/20/22  0400 02/21/22  0320    138   K 4.0 4.1   CL 99 99   CO2 29 29   * 173*   BUN 12 15   CREATININE 0.6 0.8   CALCIUM 8.5* 8.7   PROT 5.1* 5.6*   ALBUMIN 2.6* 2.8*   BILITOT 0.3 0.2   ALKPHOS 76 94   AST 11 12   ALT 19 17   ANIONGAP 8 10   EGFRNONAA >60.0 >60.0         Diagnostic Results:  I have reviewed all pertinent imaging results/findings within the past 24 hours.    Assessment/Plan:     * B-cell acute lymphoblastic leukemia  From Dr. Jenkins's most recent clinic note  Precursor B-cell acute lymphoblastic leukemia (Ph+)              A. 11/23/2021: Transferred to [facility] [place] from outside hospital for evaluation for acute leukemia              B. 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               C. 11/30/2021 - 12/23/2021: Vincristine + imatinib induction; hospital course was complicated by acute hypoxemic respiratory failure which resolved with diuresis and treatment of ALL     - BMBx from 1/3/22 showing CR  - admitted for C2 of consolidation with EWALL (IDAC). Today is day 6. Completed chemo and will discharge home today.  - tolerated C1 of consolidation well  - take home Gleevec days 15-28 of consolidation cycles  - monitor closely for neuro toxicity with cytarabine. None evident on exam today.  - will attempt to get port placement for future cycles  - will follow with Dr. Garcia for twice weekly labs prior to next cycle  - discharging on ppx LVQ and fluc          Antineoplastic chemotherapy induced anemia  - 2/2 chemo and ALL  - transfuse for hgb < 7.8  - twice weekly lab monitoring arranged with Dr. Garcia outpatient    Hypomagnesemia  - replace per prn electrolyte order set  - daily Mg level while inpatient    Headache  - c/o of headache morning of 2/17  - ibuprofen and tylenol given as patient is currently not neutropenic nor thrombocytopenic. Patient understands that she cannot take these meds after discharged due to anticipated drop in blood counts.      Hypophosphatemia  - replace per prn electrolyte order set  - daily phos level while inpatient      Moderate major depression  - continue home trintellix while inpatient    Insomnia  - continue home seroquel while inpatient    Anxiety  - continue home trintellix and valium while inpatient    GERD (gastroesophageal reflux disease)  - home prilosec not on formulary. Will receive protonix while inpatient.    Hypertension  - continue home losartan while inpatient    Seizure disorder  - continue home oxcarbazepine while inpatient    Type 2 diabetes mellitus, without long-term  current use of insulin  - hold jardiance and metformin recently discontinued by endocrinologist.   - hold home farxiga while inpatient. Can resume at discharge.  - low dose SSI, achs blood glucose monitoring, and diabetic diet while inpatient  - will add basal insulin if indicated    Rheumatoid arthritis involving multiple sites  - continue home plaquenil.   - home leflunomide was recently discontinued by her rhemotologist    History of TIA (transient ischemic attack)  - Continue home ASA and fenofibrate. Holding home statin while inpatient.    Hyperlipidemia  - holding home statin while inpatient    Long term current use of anticoagulant  - continue home xarelto. Hold with plts < 50K.    Paroxysmal atrial fibrillation  - continue home diltiazem and xarelto. Hold xarelto with plt count < 50K.        VTE Risk Mitigation (From admission, onward)         Ordered     rivaroxaban tablet 20 mg  With dinner         02/16/22 1301     heparin, porcine (PF) 100 unit/mL injection flush 500 Units  As needed (PRN)         02/16/22 1438     IP VTE HIGH RISK PATIENT  Once         02/16/22 1206     Place sequential compression device  Until discontinued         02/16/22 1206                Disposition: Inpatient for chemo. Plan to discharge home today.    Mirtha Antonio, NP  Bone Marrow Transplant  [facility] ([facility])

## 2022-02-21 NOTE — ASSESSMENT & PLAN NOTE
- c/o of headache morning of 2/17  - ibuprofen and tylenol given as patient is currently not neutropenic nor thrombocytopenic. Patient understands that she cannot take these meds after discharged due to anticipated drop in blood counts.

## 2022-02-21 NOTE — NURSING
Cytarabine started through PICC noted for having positive blood return over 2hrs.  Chemotherapy dosage and BSA checked by two chemotherapy certified nurses prior to administration.  Chemotherapy education done prior to hanging of chemotherapy. Signature sheet signed with unremarkable changes. Chemotherapeutic precautions in place throughout therapy.  Will continue to monitor.

## 2022-02-21 NOTE — SUBJECTIVE & OBJECTIVE
Subjective:     Interval History: C2D6 of consolidation #2 of SHAYAN (IDAC). She completed chemo early this morning and will discharge home today. Tolerating chemo well thus far. Main complaint was headache over night for which she was given Norco, followed by Ibuprofen, followed by Tylenol. Patient states that headache resolved after sleeping. Explained to her that she cannont take any of these meds after discharge given anticipated neutropenia and thrombocytopenia. Afebrile. VSS. She will get twice weekly labs and dressing changes outpatient with Dr. Garcia.    Objective:     Vital Signs (Most Recent):  Temp: 98.9 °F (37.2 °C) (02/21/22 0800)  Pulse: 110 (02/21/22 0800)  Resp: 16 (02/21/22 0800)  BP: 108/61 (02/21/22 0800)  SpO2: 96 % (02/21/22 0800) Vital Signs (24h Range):  Temp:  [98.1 °F (36.7 °C)-99 °F (37.2 °C)] 98.9 °F (37.2 °C)  Pulse:  [] 110  Resp:  [16-22] 16  SpO2:  [94 %-99 %] 96 %  BP: (108-138)/(59-94) 108/61     Weight: 70.8 kg (156 lb 3.1 oz)  Body mass index is 26.81 kg/m².  Body surface area is 1.79 meters squared.    ECOG SCORE           [unfilled]    Intake/Output - Last 3 Shifts         02/19 0700 02/20 0659 02/20 0700 02/21 0659 02/21 0700 02/22 0659    P.O.  120     I.V. (mL/kg)       IV Piggyback       Total Intake(mL/kg)  120 (1.7)     Urine (mL/kg/hr) 3600 (2.1) 6050 (3.6) 600 (2.3)    Stool 0 0     Total Output 3600 6050 600    Net -3600 -5930 -600           Stool Occurrence 1 x 1 x             Physical Exam  Constitutional:       Appearance: She is well-developed.   HENT:      Head: Normocephalic and atraumatic.      Mouth/Throat:      Pharynx: No oropharyngeal exudate.   Eyes:      Conjunctiva/sclera: Conjunctivae normal.      Pupils: Pupils are equal, round, and reactive to light.   Cardiovascular:      Rate and Rhythm: Normal rate and regular rhythm.      Heart sounds: Normal heart sounds. No murmur heard.  Pulmonary:      Effort: Pulmonary effort is normal.      Breath  sounds: Normal breath sounds.   Abdominal:      General: Bowel sounds are normal. There is no distension.      Palpations: Abdomen is soft.      Tenderness: There is no abdominal tenderness.   Musculoskeletal:         General: No deformity. Normal range of motion.      Cervical back: Normal range of motion and neck supple.   Skin:     General: Skin is warm and dry.      Findings: Bruising present. No erythema or rash.      Comments: RUE PICC. Dressing c/d/i. No sign of infection to site.   Neurological:      Mental Status: She is alert and oriented to person, place, and time.   Psychiatric:         Behavior: Behavior normal.         Thought Content: Thought content normal.         Judgment: Judgment normal.       Significant Labs:   CBC:   Recent Labs   Lab 02/20/22  0400 02/21/22  0320   WBC 7.04 8.50   HGB 7.4* 7.8*   HCT 23.2* 24.2*    211      and CMP:   Recent Labs   Lab 02/20/22  0400 02/21/22  0320    138   K 4.0 4.1   CL 99 99   CO2 29 29   * 173*   BUN 12 15   CREATININE 0.6 0.8   CALCIUM 8.5* 8.7   PROT 5.1* 5.6*   ALBUMIN 2.6* 2.8*   BILITOT 0.3 0.2   ALKPHOS 76 94   AST 11 12   ALT 19 17   ANIONGAP 8 10   EGFRNONAA >60.0 >60.0         Diagnostic Results:  I have reviewed all pertinent imaging results/findings within the past 24 hours.

## 2022-02-21 NOTE — ASSESSMENT & PLAN NOTE
History and Physical       Patient:  Sandra Dalton   Date:   9/1/2019  Adm. Date: 9/1/2019  MRN:   [id number]   Attending: Hanna Sandra MD  PCP:  Verify Pcp    Chief Complaint:  Chief Complaint   Patient presents with   • Vomiting   • Cough        HPI:  The patient is a 60 year old female with a PMHx significant for squamous cell carcinoma of head and neck, small bowel obstruction  presenting with fever, cough, vomiting, shaking, R lower ribs hurting for last few days. Also complaining of diffuse abdominal pain The patient denies chest pain or palpitations. No dyspnea, cough or sputum production. No abdominal pain, nausea, vomiting, diarrhea or constipation. No urinary frequency, urgency or dysuria. No fever or chills.   Previous medical record reviewed, history of squamous cell carcinoma of head and neck of unknown etiology status post treatment.  History of bowel obstruction in the past status post exploratory laparotomy    REVIEW OF SYSTEMS:   Constitutional. The patient has fever and chills.  Feeling dizzy  Eyes. The patient denies any blurry vision or double vision.   Ears, nose, throat. The patient denies any soreness in his throat or ear ache.   Neck. The patient denies any neck ache.   Cardiac. As above.    Respiratory. As above   GI. As above  . The patient denies any dysuria or burning micturition.   Musculoskeletal. Generalized body aches and pain.   Endocrine. The patient denies any excessive sensation of warmth or cold.   Psychiatric. The patient denies any mood swings.   Skin. The patient denies any skin rashes.       PMH:  Past Medical History:   Diagnosis Date   • Adenomyosis    • Cervicalgia    • Chronic bronchitis (CMS/HCC)    • Constipation    • Depression    • Generalized headaches    • GERD (gastroesophageal reflux disease)    • HTN (hypertension)    • Hypertension    • Intramural leiomyoma of uterus     benign, s/p total lap hysterectomy and right  - replace per prn electrolyte order set  - daily Mg level while inpatient   SO with retention of normal left ovary   • Low back pain    • Migraines    • Rotator cuff syndrome of left shoulder    • SBO (small bowel obstruction) (CMS/HCC)     partial   • 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)    • Sinus congestion    • TB (tuberculosis)    • Tobacco user        PSH:  Past Surgical History:   Procedure Laterality Date   • Abdominal hernia repair     • Appendectomy     • Partial hysterectomy  2012   • Shoulder surg proc unlisted Left 2011    rotator cuff   • Tonsillectomy  2016       MEDICATIONS:  As per medication list    ALLERGIES:  ALLERGIES:   Allergen Reactions   • Morphine DIZZINESS, PRURITUS and Nausea & Vomiting       FAMILY HISTORY:  Family History   Problem Relation Age of Onset   • Cancer Brother    • Cancer Brother    • Cancer Brother    • Cirrhosis Mother    • * Father          from MVA   • Diabetes Sister    • Myocardial Infarction Sister    • * Sister          from gunshot   • Heart Other    • Hypertension Other        SOCIAL HISTORY:  Social History     Tobacco Use   • Smoking status: Former Smoker     Packs/day: 0.05     Types: Cigarettes     Last attempt to quit: 10/17/2016     Years since quittin.8   • Smokeless tobacco: Never Used   • Tobacco comment: Down to 1/2 of cigarette per day   Substance Use Topics   • Alcohol use: No     Frequency: Never     Drinks per session: 1 or 2     Binge frequency: Never   • Drug use: No       CODE STATUS: Prior    PHYSICALEXAM:      Vital Last Value 24 Hour Range   Temperature 101.2 °F (38.4 °C) (190) Temp  Min: 101.2 °F (38.4 °C)  Max: 101.2 °F (38.4 °C)   Pulse 93 (19) Pulse  Min: 93  Max: 109   Respiratory 16 (19) Resp  Min: 16  Max: 24   Non-Invasive  Blood Pressure (!) 135/93 (19) BP  Min: 135/93  Max: 189/112   Pulse Oximetry 98 % (19) SpO2  Min: 97 %  Max: 98 %   Arterial   Blood Pressure   No data recorded         General:  NAD, lethargic appearing female no cute distress  HEENT:  PERRL, conjunctivae clear, ear canals clear,  no nasal discharge, turbinates normal, mouth mucosa moist, no tonsillar enlargement.  Neck:  No masses, no thyromegaly.  Lungs:  Decrease air entry to the bases no dullness to percussion.  Cardiovascular:  Normal S 1 and S 2, no S 3, S 4, no murmurs or gallops.  Abdomen:   +BS, soft, mild tenderness in the midline, non-distended, no hepatosplenomegaly. Scar of previous surgery  Musculoskeletal:   Muscle strength.4/5 all 4 extremities with normal ROM.   Skin:   No rashes or open sores.  Neuro:  CN 2-12 intact sensation intact in all 4 extremities and face.  Psych:  A+O X 3, mood and affect anxious.  Lymph:  No cervical, supraclavicular or axillary lymphadenopathy.    LABS:  Recent Labs   Lab 09/01/19  1841   WBC 9.6   HGB 15.4   HCT 47.0*      SEG 79   SODIUM 138   POTASSIUM 3.7   CHLORIDE 100   BUN 8   CREATININE 0.89   GLUCOSE 100*   ALBUMIN 4.6   AST 26   BILIRUBIN 0.7       IMAGING:  CT abdomen  IMPRESSION:      1. Multiple new pulmonary nodules in the right lung base and new  tree-in-bud type infiltrative changes at the left lung base. Several  nodules are prominent measuring between 1 and 1.5 cm. Metastatic disease  should be considered. A site of primary with malignancy is not identified  in the abdomen or pelvis.  2. Fatty infiltrative changes liver. No hepatic masses.  3. Essentially normal adrenal glands kidneys pancreas and spleen.  4. Mildly prominent small bowel loops throughout the abdomen without overt  obstruction. No free air. No ascites. No inflammatory change the bowel  seen..  EKG: Sinus tachycardia     ASSESSMENT AND PLAN:  This patient is a 60 year old female       #1 fever, cough, likely CAP pneumonia in a patient with multiple lung nodules. Admit, telemetry, sputum and blood culture, viral respiratory panel. IV antibiotics  #2 multiple lung nodules concern secondary to  metastasis, oncology consult  #3 squamous cell carcinoma of head and neck region status post treatment   #4 sepsis likely secondary to #1 repeat lactate level normal        Diet General    DVT Prophylaxis SCD, heparin    I discussed the patient's medical condition, proposed management plan, risks, benefits and alternatives with the patient in detail and is agreeable. In my assessment patient will require more than 2 midnight stay in the hospital         Jerald Olguin MD  9/1/2019

## 2022-02-22 ENCOUNTER — PATIENT OUTREACH (OUTPATIENT)
Dept: ADMINISTRATIVE | Facility: CLINIC | Age: 52
End: 2022-02-22
Payer: COMMERCIAL

## 2022-02-22 DIAGNOSIS — C91.00 B-CELL ACUTE LYMPHOBLASTIC LEUKEMIA: Primary | ICD-10-CM

## 2022-02-22 NOTE — TELEPHONE ENCOUNTER
Outgoing call to patient's  for refill. He states he is at work. He is unsure of how many doses of Imatinib Ms. Gonzalez has on hand. He agreed to contact us once he gets home with an exact on hand qty. Patient received C2D6 of consolidation #2 of SHAYAN (IDA). Notes indicate she should take Gleevec days 15-28 of consolidation cycles. Pt's  states she was instructed to restart imatinib on 03/02/2022.    I will follow up on 02/25 to ensure refill is appropriate.

## 2022-02-23 LAB
ALL (BM) RESULT TABLE: NORMAL
ANNOTATION COMMENT IMP: NORMAL
CHROM ANALY RESULT (ISCN): NORMAL
CLINICAL CYTOGENETICIST REVIEW: NORMAL
FBALB REASON FOR REFERRAL: NORMAL
LAB TEST METHOD: NORMAL
MOL DX INTERP BLD/T QL: NORMAL
PROVIDER SIGNING NAME: NORMAL
SPECIMEN SOURCE: NORMAL
TEST PERFORMANCE INFO SPEC: NORMAL

## 2022-02-24 DIAGNOSIS — C91.00 B-CELL ACUTE LYMPHOBLASTIC LEUKEMIA: Primary | ICD-10-CM

## 2022-02-25 ENCOUNTER — TELEPHONE (OUTPATIENT)
Dept: HEMATOLOGY/ONCOLOGY | Facility: CLINIC | Age: 52
End: 2022-02-25
Payer: COMMERCIAL

## 2022-02-25 DIAGNOSIS — M25.50 ARTHRALGIA, UNSPECIFIED JOINT: Primary | ICD-10-CM

## 2022-02-25 DIAGNOSIS — R51.9 INTRACTABLE EPISODIC HEADACHE, UNSPECIFIED HEADACHE TYPE: ICD-10-CM

## 2022-02-25 DIAGNOSIS — R51.9 INTRACTABLE EPISODIC HEADACHE, UNSPECIFIED HEADACHE TYPE: Primary | ICD-10-CM

## 2022-02-25 RX ORDER — SUMATRIPTAN 50 MG/1
50 TABLET, FILM COATED ORAL DAILY PRN
Qty: 30 TABLET | Refills: 0 | OUTPATIENT
Start: 2022-02-25 | End: 2022-02-25 | Stop reason: SDUPTHER

## 2022-02-25 RX ORDER — SUMATRIPTAN 50 MG/1
50 TABLET, FILM COATED ORAL DAILY PRN
Qty: 30 TABLET | Refills: 0 | Status: SHIPPED | OUTPATIENT
Start: 2022-02-25 | End: 2022-04-21 | Stop reason: SDUPTHER

## 2022-02-25 RX ORDER — TRAMADOL HYDROCHLORIDE 50 MG/1
50 TABLET ORAL EVERY 6 HOURS PRN
Qty: 30 TABLET | Refills: 0 | Status: SHIPPED | OUTPATIENT
Start: 2022-02-25 | End: 2022-05-13

## 2022-02-25 NOTE — TELEPHONE ENCOUNTER
"----- Message from Milli Flores sent at 2/25/2022  4:12 PM CST -----  Regarding: Consult/Advisory:  Name Of Caller: Deepti    Contact Preference?: [phone]    What is the nature of the call?: inquiring about prescription for headaches that was supposed to be called in yesterday           Additional Notes:  "Thank you for all that you do for our patients'"     "

## 2022-02-26 NOTE — TELEPHONE ENCOUNTER
Patient's  confirmed she has about 15 days on hand. She will restart on 03/02 as advised by her provider.I reached out to [facility] to confirm there has not been any changes to Ms. Gonzalez script for Gleevec. Her current order is written to take daily. According to the patient's  and chart notes, she may be taking the medication differently. Notes indicate she should take Gleevec on days 15-28 of consolidation cycles.     Awaiting response.  Dennis

## 2022-02-27 ENCOUNTER — PATIENT MESSAGE (OUTPATIENT)
Dept: HEMATOLOGY/ONCOLOGY | Facility: CLINIC | Age: 52
End: 2022-02-27
Payer: COMMERCIAL

## 2022-03-03 NOTE — TELEPHONE ENCOUNTER
Outgoing call to pt's . He states pt started Gleevec on yesterday. He states she will take 400 mg by mouth once daily until her appt on 03/16.     I explained to him that I have reached out Dr. Jenkins to request a new order with directions to take Gleevec once daily on days 15-28 of consolidation cycles. Awaiting response.     Dr. Jenkins is out so I will follow up in 1 week.

## 2022-03-09 RX ORDER — MIDAZOLAM HYDROCHLORIDE 1 MG/ML
2 INJECTION INTRAMUSCULAR; INTRAVENOUS ONCE
Status: CANCELLED | OUTPATIENT
Start: 2022-03-09 | End: 2022-03-09

## 2022-03-09 RX ORDER — FENTANYL CITRATE 50 UG/ML
100 INJECTION, SOLUTION INTRAMUSCULAR; INTRAVENOUS ONCE
Status: CANCELLED | OUTPATIENT
Start: 2022-03-09 | End: 2022-03-09

## 2022-03-10 ENCOUNTER — DOCUMENTATION ONLY (OUTPATIENT)
Dept: HEMATOLOGY/ONCOLOGY | Facility: CLINIC | Age: 52
End: 2022-03-10
Payer: COMMERCIAL

## 2022-03-10 NOTE — PROGRESS NOTES
receive a request for a one night stay at the [facility] for the patient.  emailed the authorization to [facility] for a one night stay from 03/15/2022 and check-out 03/16/2022. No other needs noted.

## 2022-03-15 ENCOUNTER — TELEPHONE (OUTPATIENT)
Dept: HEMATOLOGY/ONCOLOGY | Facility: CLINIC | Age: 52
End: 2022-03-15
Payer: COMMERCIAL

## 2022-03-15 ENCOUNTER — TELEPHONE (OUTPATIENT)
Dept: INTERVENTIONAL RADIOLOGY/VASCULAR | Facility: HOSPITAL | Age: 52
End: 2022-03-15
Payer: COMMERCIAL

## 2022-03-15 NOTE — NURSING
Pre-procedure call complete.  Pt instructed not to eat or drink anything after midnight the night before procedure.  Pt aware will need someone to provide transport home and monitor pt 8 hours post procedure.  No driving for at least 24 hours after procedure.   Medications reviewed.  Arrival time and location given.  Expected length of stay reviewed.  Covid screening completed.  Pt verbalized understanding.  
44.4

## 2022-03-15 NOTE — TELEPHONE ENCOUNTER
Phoned pt to reschedule nutrition appt 3/16, provider out of clinic. Spoke to pts , confirmed virtual appt 3/23 at 330.

## 2022-03-15 NOTE — PROGRESS NOTES
HEMATOLOGIC MALIGNANCIES PROGRESS NOTE    IDENTIFYING STATEMENT   Deepti Gonzalez (Deepti) is a 51 y.o. female with a  of 1970 from [place], [place] with the diagnosis of B-ALL.      ONCOLOGY HISTORY:    1. Precursor B-cell acute lymphoblastic leukemia (Ph+)   A. [place]: [place] [facility] [place] for [place] leukemia   B. 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    C. 2021 - 2021: Vincristine + imatinib induction; hospital course was complicated by acute hypoxemic respiratory failure which resolved with diuresis and treatment of ALL    2. History of TIA  3. Seizure disorder  4. Anxiety and depression  5. Chronic obstructive pulmonary disease  6. Paroxysmal atrial fibrillation  7. Implantable loop recorder and pacemaker in place  8. Peripheral artery disease  9. Diabetes mellitus, type 2  10. Gastroesophageal reflux disease  11. Gastroparesis  12. Rheumatoid arthritis  13. Osteoporosis  14. History of tobacco use     INTERVAL HISTORY:    Ms. Gonzalez returns to clinic for follow-up of Ph+ B-ALL. She is admitting today for C3 of consolidation with EWALL (IDAC). She was induced with Vincristine + imatinib, which [place] [facility] [place] hospitalization was complicated by acute hypoxic respiratory failure requiring ICU transfer. Post induction BMBx was performed 1/3/22. Showing CR. She tolerated C1 and C2 of consolidation well. port placed prior to today's visit. Soreness at insertion site. Will keep PICC now. She received transfusion support following last cycle chemo. Her ASA/Xarelto currently on hold per . Denies fevers, chills, aches, cough, SOB, chest pain, bleeding  or bruising, and constipation. She denies any recent sick contacts. She is COVID neg.     Past Medical History, Past Social History and Past Family History have been reviewed and are unchanged except as noted in the interval history.    MEDICATIONS:     Current Outpatient Medications on File Prior to Visit   Medication Sig Dispense Refill    acyclovir (ZOVIRAX) 400 MG tablet Take 1 tablet (400 mg total) by mouth 2 (two) times daily. 60 tablet 11    artificial tears (ISOPTO TEARS) 0.5 % ophthalmic solution Place 1 drop into both eyes 4 (four) times daily as needed.      atorvastatin (LIPITOR) 80 MG tablet Take 80 mg by mouth once daily.      dapagliflozin (FARXIGA) 10 mg tablet Take 10 mg by mouth once daily.      diltiaZEM (CARDIZEM) 90 MG tablet Take 1 tablet (90 mg total) by mouth every 6 (six) hours. 120 tablet 11    fenofibrate 160 MG Tab Take 160 mg by mouth once daily.      fluconazole (DIFLUCAN) 200 MG Tab Take 2 tablets (400 mg total) by mouth once daily. 60 tablet 2    gabapentin (NEURONTIN) 300 MG capsule Take 1 capsule (300 mg total) by mouth 3 (three) times daily. 90 capsule 11    hydrOXYchloroQUINE (PLAQUENIL) 200 mg tablet Take 200 mg by mouth once daily.      imatinib (GLEEVEC) 100 MG Tab Take 2 tablets (200 mg total) by mouth once daily with 1 other imatinib prescription for 600 mg total.Take with a meal and large glass of water 180 tablet 3    imatinib (GLEEVEC) 400 MG Tab Take 1 tablet (400 mg total) by mouth once daily with 1 other imatinib prescription for 600 mg total. Take with a meal and large glass of water 90 tablet 3    levoFLOXacin (LEVAQUIN) 500 MG tablet Take 1 tablet (500 mg total) by mouth once daily. 30 tablet 2    losartan (COZAAR) 25 MG tablet Take 25 mg by mouth once daily.      omeprazole (PRILOSEC) 40 MG capsule Take 40 mg by mouth once daily.      OXcarbazepine (TRILEPTAL) 600 MG Tab Take 1,200 mg by mouth 2 (two) times daily.      potassium chloride SA  (K-DUR,KLOR-CON) 20 MEQ tablet Take 20 mEq by mouth once daily.      prochlorperazine (COMPAZINE) 5 MG tablet Take 1 tablet (5 mg total) by mouth every 6 (six) hours as needed for Nausea. 30 tablet 2    QUEtiapine (SEROQUEL) 200 MG Tab Take 200 mg by mouth every evening.      senna-docusate 8.6-50 mg (PERICOLACE) 8.6-50 mg per tablet Take 1 tablet by mouth 2 (two) times daily. 60 tablet 3    sumatriptan (IMITREX) 50 MG tablet Take 1 tablet (50 mg total) by mouth daily as needed (headache). 30 tablet 0    traMADoL (ULTRAM) 50 mg tablet Take 1 tablet (50 mg total) by mouth every 6 (six) hours as needed for Pain. 30 tablet 0    TRINTELLIX 10 mg Tab Take 1 tablet by mouth once daily.      VISTARIL 50 mg Cap Take by mouth.      aspirin (ECOTRIN) 81 MG EC tablet Take 1 tablet (81 mg total) by mouth once daily. Hold for platelet count less than 50 thousand. (Patient not taking: Reported on 3/16/2022)  0    diazePAM (VALIUM) 10 MG Tab Take 10 mg by mouth 2 (two) times daily as needed.      duke's soln (benadryl 30 mL, mylanta 30 mL, LIDOcaine 30 mL, nystatin 30 mL) 120mL Take 10 mLs by mouth 4 (four) times daily. (Patient not taking: No sig reported) 120 mL 0    methocarbamoL (ROBAXIN) 500 MG Tab Take 1 tablet (500 mg total) by mouth 3 (three) times daily as needed (muscle spasms). (Patient not taking: Reported on 3/16/2022) 90 tablet 2    ondansetron (ZOFRAN-ODT) 8 MG TbDL Dissolve 1 tablet (8 mg total) by mouth every 12 (twelve) hours as needed (in case of chemo induced nausea). (Patient not taking: Reported on 3/16/2022) 30 tablet 1    polyethylene glycol (GLYCOLAX) 17 gram/dose powder Dissolve one capful (17 g) in liquid and take by mouth daily as needed (constipation). (Patient not taking: Reported on 3/16/2022) 510 g 0    rivaroxaban (XARELTO) 20 mg Tab Take 1 tablet (20 mg total) by mouth daily with dinner or evening meal. Hold for platelets less than 50 thousand. (Patient not taking: Reported on  "3/16/2022)      [DISCONTINUED] cyclobenzaprine (FLEXERIL) 10 MG tablet Take 10 mg by mouth 3 (three) times daily as needed.       No current facility-administered medications on file prior to visit.       ALLERGIES:   Review of patient's allergies indicates:   Allergen Reactions    Levetiracetam      Other reaction(s): Unknown        ROS:       Review of Systems   Constitutional: Positive for fatigue (improved significantly). Negative for diaphoresis, fever and unexpected weight change.   HENT:   Negative for lump/mass and sore throat.    Eyes: Negative for icterus.   Respiratory: Negative for cough and shortness of breath.    Cardiovascular: Negative for palpitations.   Gastrointestinal: Negative for abdominal distention, abdominal pain, constipation, diarrhea, nausea and vomiting.   Genitourinary: Negative for dysuria and frequency.    Musculoskeletal: Negative for arthralgias, gait problem and myalgias.        Muscle weakness - diffuse   Skin: Negative for rash.   Neurological: Negative for dizziness, gait problem and headaches.   Hematological: Negative for adenopathy. Bruises/bleeds easily.   Psychiatric/Behavioral: The patient is not nervous/anxious.        PHYSICAL EXAM:  Vitals:    03/16/22 1400   BP: 131/70   Pulse: 108   Resp: 16   Temp: 98.6 °F (37 °C)   TempSrc: Oral   SpO2: 97%   Weight: 69.2 kg (152 lb 7.2 oz)   Height: 5' 4" (1.626 m)   PainSc:   6   PainLoc: Leg       KARNOFSKY PERFORMANCE STATUS 60%  ECOG 2    Physical Exam  Constitutional:       General: She is not in acute distress.     Appearance: She is well-developed.   HENT:      Head: Normocephalic and atraumatic.      Mouth/Throat:      Mouth: No oral lesions.   Eyes:      Conjunctiva/sclera: Conjunctivae normal.   Neck:      Thyroid: No thyromegaly.   Cardiovascular:      Rate and Rhythm: Normal rate and regular rhythm.      Heart sounds: Normal heart sounds. No murmur heard.  Pulmonary:      Breath sounds: No wheezing or rales. "   Abdominal:      General: There is no distension.      Palpations: Abdomen is soft. There is no hepatomegaly, splenomegaly or mass.      Tenderness: There is no abdominal tenderness.   Lymphadenopathy:      Cervical: No cervical adenopathy.      Right cervical: No deep cervical adenopathy.     Left cervical: No deep cervical adenopathy.      Lower Body: No right inguinal adenopathy. No left inguinal adenopathy.   Skin:     Findings: No rash.      Comments: RUE picc   Neurological:      Mental Status: She is alert and oriented to person, place, and time.      Cranial Nerves: No cranial nerve deficit.      Coordination: Coordination normal.      Deep Tendon Reflexes: Reflexes are normal and symmetric.         LAB:   Results for orders placed or performed in visit on 03/16/22   COVID-19 Rapid Screening   Result Value Ref Range    SARS-CoV-2 RNA, Amplification, Qual Negative Negative       PROBLEMS ASSESSED THIS VISIT:    1. B-cell acute lymphoblastic leukemia    2. Anemia in neoplastic disease    3. Nausea    4. Diarrhea, unspecified type    5. Thrombocytopenia    6. Moderate major depression    7. Anxiety    8. Gastroesophageal reflux disease with esophagitis without hemorrhage    9. Essential hypertension    10. Seizure disorder    11. Arthralgia, unspecified joint    12. History of TIA (transient ischemic attack)    13. Type 2 diabetes mellitus with hyperosmolarity without coma, without long-term current use of insulin    14. Paroxysmal atrial fibrillation        PLAN:       Ph+ B-ALL  Ms. Gonzalez achieved MRD-negative CR after induction chemotherapy on [facility] protocol (with imatinib substituted for dasatinib). She presents for admission for Cycle 3 of consolidation therapy.     Port placed today, soreness present. Requested to keep picc now.   Platelet  is elevated, and this appears reactive in nature.     Otherwise, she is well and appropriate to proceed with consolidation therapy.     Anti neoplastic therapy  related Anemia  Likely secondary to chemotherapy and imatinib at this point. Improving. Monitor for transfusion need.      Moderate major depression  - continue home trintellix while inpatient     Insomnia  - continue home seroquel while inpatient     Anxiety  - continue home trintellix and valium while inpatient     GERD (gastroesophageal reflux disease)  - home prilosec not on formulary. Will receive protonix while inpatient.     Hypertension  - continue home losartan while inpatient     Seizure disorder  - continue home oxcarbazepine while inpatient     Type 2 diabetes mellitus, without long-term current use of insulin  - hold jardiance and metformin recently discontinued by endocrinologist.   - hold home farxiga while inpatient. Can resume at discharge.  - low dose SSI, achs blood glucose monitoring, and diabetic diet while inpatient  - will add basal insulin if indicated     Rheumatoid arthritis involving multiple sites  - continue home plaquenil.   - home leflunomide was recently discontinued by her rheumatologist     History of TIA (transient ischemic attack)  - Continue home ASA and fenofibrate. Holding home statin while inpatient.     Hyperlipidemia  - holding home statin while inpatient     Long term current use of anticoagulant  - continue home xarelto. Hold with plts < 50K.     Paroxysmal atrial fibrillation  - continue home diltiazem and resume xarelto. Hold xarelto with plt count < 50K.       Disposition : Admitting inpatient for C3 Consolidation  Will need twice weekly labs with Dr. Garcia in [place] (inpatient team to ensure this is set up).  Return here for admission for Cycle 4 consolidation      Valerie Slade NP  Hematology and Stem Cell Transplant

## 2022-03-16 ENCOUNTER — HOSPITAL ENCOUNTER (OUTPATIENT)
Dept: INTERVENTIONAL RADIOLOGY/VASCULAR | Facility: HOSPITAL | Age: 52
Discharge: HOME | DRG: 838 | End: 2022-03-16
Attending: FAMILY MEDICINE
Payer: COMMERCIAL

## 2022-03-16 ENCOUNTER — HOSPITAL ENCOUNTER (INPATIENT)
Facility: HOSPITAL | Age: 52
LOS: 3 days | Discharge: HOME | DRG: 838 | End: 2022-03-19
Attending: INTERNAL MEDICINE | Admitting: INTERNAL MEDICINE
Payer: COMMERCIAL

## 2022-03-16 ENCOUNTER — OFFICE VISIT (OUTPATIENT)
Dept: HEMATOLOGY/ONCOLOGY | Facility: CLINIC | Age: 52
End: 2022-03-16
Payer: COMMERCIAL

## 2022-03-16 ENCOUNTER — CLINICAL SUPPORT (OUTPATIENT)
Dept: HEMATOLOGY/ONCOLOGY | Facility: CLINIC | Age: 52
DRG: 838 | End: 2022-03-16
Payer: COMMERCIAL

## 2022-03-16 VITALS
DIASTOLIC BLOOD PRESSURE: 70 MMHG | TEMPERATURE: 99 F | HEIGHT: 64 IN | BODY MASS INDEX: 26.03 KG/M2 | HEART RATE: 108 BPM | RESPIRATION RATE: 16 BRPM | WEIGHT: 152.44 LBS | SYSTOLIC BLOOD PRESSURE: 131 MMHG | OXYGEN SATURATION: 97 %

## 2022-03-16 VITALS
RESPIRATION RATE: 18 BRPM | WEIGHT: 150 LBS | OXYGEN SATURATION: 96 % | TEMPERATURE: 97 F | HEART RATE: 97 BPM | BODY MASS INDEX: 25.61 KG/M2 | HEIGHT: 64 IN | SYSTOLIC BLOOD PRESSURE: 116 MMHG | DIASTOLIC BLOOD PRESSURE: 56 MMHG

## 2022-03-16 DIAGNOSIS — M25.50 ARTHRALGIA, UNSPECIFIED JOINT: ICD-10-CM

## 2022-03-16 DIAGNOSIS — C91.00 B-CELL ACUTE LYMPHOBLASTIC LEUKEMIA (ALL): Primary | ICD-10-CM

## 2022-03-16 DIAGNOSIS — F32.1 MODERATE MAJOR DEPRESSION: ICD-10-CM

## 2022-03-16 DIAGNOSIS — K21.00 GASTROESOPHAGEAL REFLUX DISEASE WITH ESOPHAGITIS WITHOUT HEMORRHAGE: ICD-10-CM

## 2022-03-16 DIAGNOSIS — I48.0 PAROXYSMAL ATRIAL FIBRILLATION: ICD-10-CM

## 2022-03-16 DIAGNOSIS — C91.00 B-CELL ACUTE LYMPHOBLASTIC LEUKEMIA: ICD-10-CM

## 2022-03-16 DIAGNOSIS — C91.00 B-CELL ACUTE LYMPHOBLASTIC LEUKEMIA: Primary | ICD-10-CM

## 2022-03-16 DIAGNOSIS — R19.7 DIARRHEA, UNSPECIFIED TYPE: ICD-10-CM

## 2022-03-16 DIAGNOSIS — Z51.5 PALLIATIVE CARE ENCOUNTER: ICD-10-CM

## 2022-03-16 DIAGNOSIS — R52 PAIN: ICD-10-CM

## 2022-03-16 DIAGNOSIS — E11.00 TYPE 2 DIABETES MELLITUS WITH HYPEROSMOLARITY WITHOUT COMA, WITHOUT LONG-TERM CURRENT USE OF INSULIN: ICD-10-CM

## 2022-03-16 DIAGNOSIS — E87.6 HYPOKALEMIA: ICD-10-CM

## 2022-03-16 DIAGNOSIS — R11.0 NAUSEA: ICD-10-CM

## 2022-03-16 DIAGNOSIS — G40.909 SEIZURE DISORDER: ICD-10-CM

## 2022-03-16 DIAGNOSIS — F41.9 ANXIETY: ICD-10-CM

## 2022-03-16 DIAGNOSIS — I10 ESSENTIAL HYPERTENSION: ICD-10-CM

## 2022-03-16 DIAGNOSIS — D63.0 ANEMIA IN NEOPLASTIC DISEASE: ICD-10-CM

## 2022-03-16 DIAGNOSIS — D69.6 THROMBOCYTOPENIA: ICD-10-CM

## 2022-03-16 DIAGNOSIS — Z86.73 HISTORY OF TIA (TRANSIENT ISCHEMIC ATTACK): ICD-10-CM

## 2022-03-16 LAB
BILIRUB SERPL-MCNC: NORMAL MG/DL
BLOOD URINE, POC: NORMAL
COLOR, POC UA: NORMAL
GLUCOSE UR QL STRIP: NORMAL
KETONES UR QL STRIP: NORMAL
LEUKOCYTE ESTERASE URINE, POC: NORMAL
NITRITE, POC UA: NORMAL
PH, POC UA: 7
POCT GLUCOSE: 119 MG/DL (ref 70–110)
PROTEIN, POC: NORMAL
SARS-COV-2 RDRP RESP QL NAA+PROBE: NEGATIVE
SPECIFIC GRAVITY, POC UA: NORMAL
UROBILINOGEN, POC UA: NORMAL

## 2022-03-16 PROCEDURE — 99153 MOD SED SAME PHYS/QHP EA: CPT | Performed by: RADIOLOGY

## 2022-03-16 PROCEDURE — 99152 MOD SED SAME PHYS/QHP 5/>YRS: CPT | Performed by: RADIOLOGY

## 2022-03-16 PROCEDURE — 1159F PR MEDICATION LIST DOCUMENTED IN MEDICAL RECORD: ICD-10-PCS | Mod: CPTII,S$GLB,, | Performed by: NURSE PRACTITIONER

## 2022-03-16 PROCEDURE — 4010F PR ACE/ARB THEARPY RXD/TAKEN: ICD-10-PCS | Mod: CPTII,S$GLB,, | Performed by: NURSE PRACTITIONER

## 2022-03-16 PROCEDURE — 3078F DIAST BP <80 MM HG: CPT | Mod: CPTII,S$GLB,, | Performed by: NURSE PRACTITIONER

## 2022-03-16 PROCEDURE — 1111F DSCHRG MED/CURRENT MED MERGE: CPT | Mod: CPTII,S$GLB,, | Performed by: NURSE PRACTITIONER

## 2022-03-16 PROCEDURE — 1111F PR DISCHARGE MEDS RECONCILED W/ CURRENT OUTPATIENT MED LIST: ICD-10-PCS | Mod: CPTII,S$GLB,, | Performed by: NURSE PRACTITIONER

## 2022-03-16 PROCEDURE — 20600001 HC STEP DOWN PRIVATE ROOM

## 2022-03-16 PROCEDURE — 25000003 PHARM REV CODE 250: Performed by: STUDENT IN AN ORGANIZED HEALTH CARE EDUCATION/TRAINING PROGRAM

## 2022-03-16 PROCEDURE — 99215 OFFICE O/P EST HI 40 MIN: CPT | Mod: S$GLB,,, | Performed by: NURSE PRACTITIONER

## 2022-03-16 PROCEDURE — 3075F PR MOST RECENT SYSTOLIC BLOOD PRESS GE 130-139MM HG: ICD-10-PCS | Mod: CPTII,S$GLB,, | Performed by: NURSE PRACTITIONER

## 2022-03-16 PROCEDURE — 36561 INSERT TUNNELED CV CATH: CPT | Mod: RT,,, | Performed by: RADIOLOGY

## 2022-03-16 PROCEDURE — 76937 PR  US GUIDE, VASCULAR ACCESS: ICD-10-PCS | Mod: 26,,, | Performed by: RADIOLOGY

## 2022-03-16 PROCEDURE — 25000003 PHARM REV CODE 250: Performed by: INTERNAL MEDICINE

## 2022-03-16 PROCEDURE — 77001 FLUOROGUIDE FOR VEIN DEVICE: CPT | Mod: 26,,, | Performed by: RADIOLOGY

## 2022-03-16 PROCEDURE — 3008F PR BODY MASS INDEX (BMI) DOCUMENTED: ICD-10-PCS | Mod: CPTII,S$GLB,, | Performed by: NURSE PRACTITIONER

## 2022-03-16 PROCEDURE — 1160F RVW MEDS BY RX/DR IN RCRD: CPT | Mod: CPTII,S$GLB,, | Performed by: NURSE PRACTITIONER

## 2022-03-16 PROCEDURE — 99999 PR PBB SHADOW E&M-EST. PATIENT-LVL V: CPT | Mod: PBBFAC,,, | Performed by: NURSE PRACTITIONER

## 2022-03-16 PROCEDURE — 76937 US GUIDE VASCULAR ACCESS: CPT | Mod: TC | Performed by: RADIOLOGY

## 2022-03-16 PROCEDURE — U0002 COVID-19 LAB TEST NON-CDC: HCPCS | Performed by: INTERNAL MEDICINE

## 2022-03-16 PROCEDURE — 3008F BODY MASS INDEX DOCD: CPT | Mod: CPTII,S$GLB,, | Performed by: NURSE PRACTITIONER

## 2022-03-16 PROCEDURE — 77001 FLUOROGUIDE FOR VEIN DEVICE: CPT | Mod: TC | Performed by: RADIOLOGY

## 2022-03-16 PROCEDURE — 77001 CHG FLUOROGUIDE CNTRL VEN ACCESS,PLACE,REPLACE,REMOVE: ICD-10-PCS | Mod: 26,,, | Performed by: RADIOLOGY

## 2022-03-16 PROCEDURE — 1160F PR REVIEW ALL MEDS BY PRESCRIBER/CLIN PHARMACIST DOCUMENTED: ICD-10-PCS | Mod: CPTII,S$GLB,, | Performed by: NURSE PRACTITIONER

## 2022-03-16 PROCEDURE — A4550 SURGICAL TRAYS: HCPCS

## 2022-03-16 PROCEDURE — 76937 US GUIDE VASCULAR ACCESS: CPT | Mod: 26,,, | Performed by: RADIOLOGY

## 2022-03-16 PROCEDURE — 3078F PR MOST RECENT DIASTOLIC BLOOD PRESSURE < 80 MM HG: ICD-10-PCS | Mod: CPTII,S$GLB,, | Performed by: NURSE PRACTITIONER

## 2022-03-16 PROCEDURE — 36561 IR TUNNELED CATH PLACEMENT WITH PORT: ICD-10-PCS | Mod: RT,,, | Performed by: RADIOLOGY

## 2022-03-16 PROCEDURE — 36561 INSERT TUNNELED CV CATH: CPT | Mod: RT | Performed by: RADIOLOGY

## 2022-03-16 PROCEDURE — 25000003 PHARM REV CODE 250: Performed by: RADIOLOGY

## 2022-03-16 PROCEDURE — 99999 PR PBB SHADOW E&M-EST. PATIENT-LVL I: CPT | Mod: PBBFAC,,,

## 2022-03-16 PROCEDURE — 25000003 PHARM REV CODE 250: Performed by: NURSE PRACTITIONER

## 2022-03-16 PROCEDURE — 99215 PR OFFICE/OUTPT VISIT, EST, LEVL V, 40-54 MIN: ICD-10-PCS | Mod: S$GLB,,, | Performed by: NURSE PRACTITIONER

## 2022-03-16 PROCEDURE — 1159F MED LIST DOCD IN RCRD: CPT | Mod: CPTII,S$GLB,, | Performed by: NURSE PRACTITIONER

## 2022-03-16 PROCEDURE — 99152 PR MOD CONSCIOUS SEDATION, SAME PHYS, 5+ YRS, FIRST 15 MIN: ICD-10-PCS | Mod: ,,, | Performed by: RADIOLOGY

## 2022-03-16 PROCEDURE — 3075F SYST BP GE 130 - 139MM HG: CPT | Mod: CPTII,S$GLB,, | Performed by: NURSE PRACTITIONER

## 2022-03-16 PROCEDURE — 99999 PR PBB SHADOW E&M-EST. PATIENT-LVL V: ICD-10-PCS | Mod: PBBFAC,,, | Performed by: NURSE PRACTITIONER

## 2022-03-16 PROCEDURE — 99152 MOD SED SAME PHYS/QHP 5/>YRS: CPT | Mod: ,,, | Performed by: RADIOLOGY

## 2022-03-16 PROCEDURE — 99999 PR PBB SHADOW E&M-EST. PATIENT-LVL I: ICD-10-PCS | Mod: PBBFAC,,,

## 2022-03-16 PROCEDURE — 4010F ACE/ARB THERAPY RXD/TAKEN: CPT | Mod: CPTII,S$GLB,, | Performed by: NURSE PRACTITIONER

## 2022-03-16 PROCEDURE — 63600175 PHARM REV CODE 636 W HCPCS: Performed by: RADIOLOGY

## 2022-03-16 RX ORDER — AMOXICILLIN 250 MG
1 CAPSULE ORAL 2 TIMES DAILY PRN
Status: DISCONTINUED | OUTPATIENT
Start: 2022-03-16 | End: 2022-03-19

## 2022-03-16 RX ORDER — IBUPROFEN 200 MG
24 TABLET ORAL
Status: DISCONTINUED | OUTPATIENT
Start: 2022-03-16 | End: 2022-03-19 | Stop reason: HOSPADM

## 2022-03-16 RX ORDER — ONDANSETRON 2 MG/ML
8 INJECTION INTRAMUSCULAR; INTRAVENOUS
Status: COMPLETED | OUTPATIENT
Start: 2022-03-17 | End: 2022-03-17

## 2022-03-16 RX ORDER — POTASSIUM CHLORIDE 20 MEQ/1
20 TABLET, EXTENDED RELEASE ORAL
Status: DISCONTINUED | OUTPATIENT
Start: 2022-03-16 | End: 2022-03-19 | Stop reason: HOSPADM

## 2022-03-16 RX ORDER — ACETAMINOPHEN 325 MG/1
650 TABLET ORAL
Status: COMPLETED | OUTPATIENT
Start: 2022-03-18 | End: 2022-03-18

## 2022-03-16 RX ORDER — IBUPROFEN 200 MG
16 TABLET ORAL
Status: DISCONTINUED | OUTPATIENT
Start: 2022-03-16 | End: 2022-03-19 | Stop reason: HOSPADM

## 2022-03-16 RX ORDER — ENOXAPARIN SODIUM 100 MG/ML
40 INJECTION SUBCUTANEOUS EVERY 24 HOURS
Status: DISCONTINUED | OUTPATIENT
Start: 2022-03-16 | End: 2022-03-16

## 2022-03-16 RX ORDER — ONDANSETRON 8 MG/1
8 TABLET, ORALLY DISINTEGRATING ORAL EVERY 12 HOURS PRN
Status: DISCONTINUED | OUTPATIENT
Start: 2022-03-16 | End: 2022-03-16

## 2022-03-16 RX ORDER — POLYETHYLENE GLYCOL 3350 17 G/17G
17 POWDER, FOR SOLUTION ORAL DAILY PRN
Status: DISCONTINUED | OUTPATIENT
Start: 2022-03-16 | End: 2022-03-19 | Stop reason: HOSPADM

## 2022-03-16 RX ORDER — PROCHLORPERAZINE MALEATE 5 MG
5 TABLET ORAL EVERY 6 HOURS PRN
Status: DISCONTINUED | OUTPATIENT
Start: 2022-03-16 | End: 2022-03-16

## 2022-03-16 RX ORDER — HEPARIN 100 UNIT/ML
500 SYRINGE INTRAVENOUS
Status: CANCELLED | OUTPATIENT
Start: 2022-03-16

## 2022-03-16 RX ORDER — OXCARBAZEPINE 600 MG/1
1200 TABLET, FILM COATED ORAL 2 TIMES DAILY
Status: DISCONTINUED | OUTPATIENT
Start: 2022-03-16 | End: 2022-03-19 | Stop reason: HOSPADM

## 2022-03-16 RX ORDER — ONDANSETRON 2 MG/ML
8 INJECTION INTRAMUSCULAR; INTRAVENOUS
Status: CANCELLED | OUTPATIENT
Start: 2022-03-16

## 2022-03-16 RX ORDER — ACYCLOVIR 200 MG/1
400 CAPSULE ORAL 2 TIMES DAILY
Status: DISCONTINUED | OUTPATIENT
Start: 2022-03-16 | End: 2022-03-19 | Stop reason: HOSPADM

## 2022-03-16 RX ORDER — METHYLPREDNISOLONE SOD SUCC 125 MG
125 VIAL (EA) INJECTION DAILY PRN
Status: DISPENSED | OUTPATIENT
Start: 2022-03-18 | End: 2022-03-19

## 2022-03-16 RX ORDER — EPINEPHRINE 0.3 MG/.3ML
0.3 INJECTION SUBCUTANEOUS DAILY PRN
Status: CANCELLED | OUTPATIENT
Start: 2022-03-17

## 2022-03-16 RX ORDER — PROCHLORPERAZINE EDISYLATE 5 MG/ML
10 INJECTION INTRAMUSCULAR; INTRAVENOUS EVERY 6 HOURS PRN
Status: CANCELLED | OUTPATIENT
Start: 2022-03-16

## 2022-03-16 RX ORDER — ACETAMINOPHEN 325 MG/1
650 TABLET ORAL
Status: CANCELLED | OUTPATIENT
Start: 2022-03-17

## 2022-03-16 RX ORDER — LIDOCAINE HYDROCHLORIDE 10 MG/ML
INJECTION INFILTRATION; PERINEURAL CODE/TRAUMA/SEDATION MEDICATION
Status: COMPLETED | OUTPATIENT
Start: 2022-03-16 | End: 2022-03-16

## 2022-03-16 RX ORDER — ONDANSETRON 4 MG/1
4 TABLET, FILM COATED ORAL ONCE
Status: COMPLETED | OUTPATIENT
Start: 2022-03-16 | End: 2022-03-16

## 2022-03-16 RX ORDER — NALOXONE HCL 0.4 MG/ML
0.02 VIAL (ML) INJECTION
Status: DISCONTINUED | OUTPATIENT
Start: 2022-03-16 | End: 2022-03-19 | Stop reason: HOSPADM

## 2022-03-16 RX ORDER — DILTIAZEM HYDROCHLORIDE 30 MG/1
90 TABLET, FILM COATED ORAL EVERY 6 HOURS
Status: DISCONTINUED | OUTPATIENT
Start: 2022-03-16 | End: 2022-03-19 | Stop reason: HOSPADM

## 2022-03-16 RX ORDER — SUMATRIPTAN 50 MG/1
50 TABLET, FILM COATED ORAL DAILY PRN
Status: DISCONTINUED | OUTPATIENT
Start: 2022-03-16 | End: 2022-03-19 | Stop reason: HOSPADM

## 2022-03-16 RX ORDER — SODIUM BICARBONATE IN D5W 150/1000ML
PLASTIC BAG, INJECTION (ML) INTRAVENOUS CONTINUOUS
Status: DISCONTINUED | OUTPATIENT
Start: 2022-03-16 | End: 2022-03-17

## 2022-03-16 RX ORDER — EPINEPHRINE 1 MG/ML
0.3 INJECTION, SOLUTION INTRACARDIAC; INTRAMUSCULAR; INTRAVENOUS; SUBCUTANEOUS DAILY PRN
Status: DISPENSED | OUTPATIENT
Start: 2022-03-18 | End: 2022-03-19

## 2022-03-16 RX ORDER — DEXAMETHASONE 4 MG/1
8 TABLET ORAL
Status: CANCELLED | OUTPATIENT
Start: 2022-03-17

## 2022-03-16 RX ORDER — HYDROXYCHLOROQUINE SULFATE 200 MG/1
200 TABLET, FILM COATED ORAL DAILY
Status: DISCONTINUED | OUTPATIENT
Start: 2022-03-17 | End: 2022-03-19 | Stop reason: HOSPADM

## 2022-03-16 RX ORDER — LANOLIN ALCOHOL/MO/W.PET/CERES
400 CREAM (GRAM) TOPICAL EVERY 4 HOURS PRN
Status: DISCONTINUED | OUTPATIENT
Start: 2022-03-16 | End: 2022-03-19 | Stop reason: HOSPADM

## 2022-03-16 RX ORDER — FENTANYL CITRATE 50 UG/ML
INJECTION, SOLUTION INTRAMUSCULAR; INTRAVENOUS CODE/TRAUMA/SEDATION MEDICATION
Status: COMPLETED | OUTPATIENT
Start: 2022-03-16 | End: 2022-03-16

## 2022-03-16 RX ORDER — DIPHENHYDRAMINE HYDROCHLORIDE 50 MG/ML
25 INJECTION INTRAMUSCULAR; INTRAVENOUS EVERY 10 MIN PRN
Status: CANCELLED | OUTPATIENT
Start: 2022-03-17

## 2022-03-16 RX ORDER — TRAMADOL HYDROCHLORIDE 50 MG/1
50 TABLET ORAL EVERY 6 HOURS PRN
Status: DISCONTINUED | OUTPATIENT
Start: 2022-03-16 | End: 2022-03-19 | Stop reason: HOSPADM

## 2022-03-16 RX ORDER — BUSPIRONE HYDROCHLORIDE 10 MG/1
10 TABLET ORAL 2 TIMES DAILY
Status: DISCONTINUED | OUTPATIENT
Start: 2022-03-16 | End: 2022-03-19 | Stop reason: HOSPADM

## 2022-03-16 RX ORDER — GABAPENTIN 300 MG/1
300 CAPSULE ORAL 3 TIMES DAILY
Status: DISCONTINUED | OUTPATIENT
Start: 2022-03-16 | End: 2022-03-19 | Stop reason: HOSPADM

## 2022-03-16 RX ORDER — LEUCOVORIN CALCIUM 25 MG/1
25 TABLET ORAL
Status: CANCELLED | OUTPATIENT
Start: 2022-03-17

## 2022-03-16 RX ORDER — METHOCARBAMOL 500 MG/1
500 TABLET, FILM COATED ORAL 3 TIMES DAILY PRN
Status: DISCONTINUED | OUTPATIENT
Start: 2022-03-16 | End: 2022-03-19 | Stop reason: HOSPADM

## 2022-03-16 RX ORDER — POTASSIUM CHLORIDE 20 MEQ/1
20 TABLET, EXTENDED RELEASE ORAL DAILY
Status: ON HOLD | COMMUNITY
Start: 2022-03-10 | End: 2022-04-19 | Stop reason: HOSPADM

## 2022-03-16 RX ORDER — MUPIROCIN 20 MG/G
OINTMENT TOPICAL 2 TIMES DAILY
Status: DISCONTINUED | OUTPATIENT
Start: 2022-03-16 | End: 2022-03-19 | Stop reason: HOSPADM

## 2022-03-16 RX ORDER — LIDOCAINE HYDROCHLORIDE 10 MG/ML
1 INJECTION, SOLUTION EPIDURAL; INFILTRATION; INTRACAUDAL; PERINEURAL ONCE
Status: DISCONTINUED | OUTPATIENT
Start: 2022-03-16 | End: 2022-03-17 | Stop reason: HOSPADM

## 2022-03-16 RX ORDER — ONDANSETRON 8 MG/1
8 TABLET, ORALLY DISINTEGRATING ORAL EVERY 8 HOURS PRN
Status: DISCONTINUED | OUTPATIENT
Start: 2022-03-16 | End: 2022-03-19 | Stop reason: HOSPADM

## 2022-03-16 RX ORDER — MIDAZOLAM HYDROCHLORIDE 1 MG/ML
INJECTION INTRAMUSCULAR; INTRAVENOUS CODE/TRAUMA/SEDATION MEDICATION
Status: COMPLETED | OUTPATIENT
Start: 2022-03-16 | End: 2022-03-16

## 2022-03-16 RX ORDER — GLUCAGON 1 MG
1 KIT INJECTION
Status: DISCONTINUED | OUTPATIENT
Start: 2022-03-16 | End: 2022-03-19 | Stop reason: HOSPADM

## 2022-03-16 RX ORDER — METHYLPREDNISOLONE SOD SUCC 125 MG
125 VIAL (EA) INJECTION DAILY PRN
Status: CANCELLED | OUTPATIENT
Start: 2022-03-17

## 2022-03-16 RX ORDER — INSULIN ASPART 100 [IU]/ML
0-5 INJECTION, SOLUTION INTRAVENOUS; SUBCUTANEOUS
Status: DISCONTINUED | OUTPATIENT
Start: 2022-03-16 | End: 2022-03-18

## 2022-03-16 RX ORDER — HEPARIN 100 UNIT/ML
500 SYRINGE INTRAVENOUS
Status: DISCONTINUED | OUTPATIENT
Start: 2022-03-16 | End: 2022-03-19 | Stop reason: HOSPADM

## 2022-03-16 RX ORDER — CEFAZOLIN SODIUM 1 G/50ML
SOLUTION INTRAVENOUS
Status: COMPLETED | OUTPATIENT
Start: 2022-03-16 | End: 2022-03-16

## 2022-03-16 RX ORDER — DIAZEPAM 5 MG/1
10 TABLET ORAL 2 TIMES DAILY PRN
Status: DISCONTINUED | OUTPATIENT
Start: 2022-03-16 | End: 2022-03-19 | Stop reason: HOSPADM

## 2022-03-16 RX ORDER — SODIUM CHLORIDE 0.9 % (FLUSH) 0.9 %
10 SYRINGE (ML) INJECTION
Status: CANCELLED | OUTPATIENT
Start: 2022-03-16

## 2022-03-16 RX ORDER — ONDANSETRON 8 MG/1
8 TABLET, ORALLY DISINTEGRATING ORAL EVERY 8 HOURS PRN
Status: CANCELLED | OUTPATIENT
Start: 2022-03-16

## 2022-03-16 RX ORDER — HYDROXYZINE PAMOATE 50 MG/1
CAPSULE ORAL
Status: ON HOLD | COMMUNITY
Start: 2022-03-08 | End: 2022-04-19 | Stop reason: HOSPADM

## 2022-03-16 RX ORDER — DIPHENHYDRAMINE HYDROCHLORIDE 50 MG/ML
25 INJECTION INTRAMUSCULAR; INTRAVENOUS EVERY 10 MIN PRN
Status: DISPENSED | OUTPATIENT
Start: 2022-03-18 | End: 2022-03-19

## 2022-03-16 RX ORDER — PROCHLORPERAZINE EDISYLATE 5 MG/ML
10 INJECTION INTRAMUSCULAR; INTRAVENOUS EVERY 6 HOURS PRN
Status: DISCONTINUED | OUTPATIENT
Start: 2022-03-16 | End: 2022-03-19 | Stop reason: HOSPADM

## 2022-03-16 RX ORDER — LEUCOVORIN CALCIUM 25 MG/1
25 TABLET ORAL
Status: DISCONTINUED | OUTPATIENT
Start: 2022-03-18 | End: 2022-03-19 | Stop reason: HOSPADM

## 2022-03-16 RX ORDER — SODIUM BICARBONATE IN D5W 150/1000ML
PLASTIC BAG, INJECTION (ML) INTRAVENOUS CONTINUOUS
Status: CANCELLED | OUTPATIENT
Start: 2022-03-16

## 2022-03-16 RX ORDER — ASPIRIN 81 MG/1
81 TABLET ORAL DAILY
Status: DISCONTINUED | OUTPATIENT
Start: 2022-03-17 | End: 2022-03-17

## 2022-03-16 RX ORDER — BUSPIRONE HYDROCHLORIDE 10 MG/1
10 TABLET ORAL 2 TIMES DAILY
COMMUNITY

## 2022-03-16 RX ORDER — SODIUM,POTASSIUM PHOSPHATES 280-250MG
1 POWDER IN PACKET (EA) ORAL EVERY 4 HOURS PRN
Status: DISCONTINUED | OUTPATIENT
Start: 2022-03-16 | End: 2022-03-19 | Stop reason: HOSPADM

## 2022-03-16 RX ORDER — LOSARTAN POTASSIUM 25 MG/1
25 TABLET ORAL DAILY
Status: DISCONTINUED | OUTPATIENT
Start: 2022-03-17 | End: 2022-03-19 | Stop reason: HOSPADM

## 2022-03-16 RX ORDER — SODIUM CHLORIDE 9 MG/ML
INJECTION, SOLUTION INTRAVENOUS CONTINUOUS
Status: DISCONTINUED | OUTPATIENT
Start: 2022-03-16 | End: 2022-03-17 | Stop reason: HOSPADM

## 2022-03-16 RX ORDER — SODIUM,POTASSIUM PHOSPHATES 280-250MG
2 POWDER IN PACKET (EA) ORAL EVERY 4 HOURS PRN
Status: DISCONTINUED | OUTPATIENT
Start: 2022-03-16 | End: 2022-03-19 | Stop reason: HOSPADM

## 2022-03-16 RX ORDER — SODIUM CHLORIDE 0.9 % (FLUSH) 0.9 %
10 SYRINGE (ML) INJECTION EVERY 12 HOURS PRN
Status: DISCONTINUED | OUTPATIENT
Start: 2022-03-16 | End: 2022-03-19 | Stop reason: HOSPADM

## 2022-03-16 RX ORDER — HYDROXYZINE HYDROCHLORIDE 25 MG/1
50 TABLET, FILM COATED ORAL 2 TIMES DAILY
Status: DISCONTINUED | OUTPATIENT
Start: 2022-03-16 | End: 2022-03-19 | Stop reason: HOSPADM

## 2022-03-16 RX ORDER — DEXAMETHASONE 4 MG/1
8 TABLET ORAL
Status: COMPLETED | OUTPATIENT
Start: 2022-03-18 | End: 2022-03-19

## 2022-03-16 RX ORDER — QUETIAPINE FUMARATE 200 MG/1
200 TABLET, FILM COATED ORAL NIGHTLY
Status: DISCONTINUED | OUTPATIENT
Start: 2022-03-16 | End: 2022-03-19 | Stop reason: HOSPADM

## 2022-03-16 RX ORDER — HYDROXYZINE HYDROCHLORIDE 50 MG/1
50 TABLET, FILM COATED ORAL 2 TIMES DAILY
Status: ON HOLD | COMMUNITY
End: 2022-05-19 | Stop reason: HOSPADM

## 2022-03-16 RX ORDER — SODIUM CHLORIDE 0.9 % (FLUSH) 0.9 %
10 SYRINGE (ML) INJECTION
Status: DISCONTINUED | OUTPATIENT
Start: 2022-03-16 | End: 2022-03-17

## 2022-03-16 RX ORDER — LANOLIN ALCOHOL/MO/W.PET/CERES
800 CREAM (GRAM) TOPICAL EVERY 4 HOURS PRN
Status: DISCONTINUED | OUTPATIENT
Start: 2022-03-16 | End: 2022-03-19 | Stop reason: HOSPADM

## 2022-03-16 RX ADMIN — MUPIROCIN: 20 OINTMENT TOPICAL at 09:03

## 2022-03-16 RX ADMIN — ONDANSETRON 8 MG: 8 TABLET, ORALLY DISINTEGRATING ORAL at 06:03

## 2022-03-16 RX ADMIN — FENTANYL CITRATE 25 MCG: 50 INJECTION, SOLUTION INTRAMUSCULAR; INTRAVENOUS at 11:03

## 2022-03-16 RX ADMIN — RIVAROXABAN 20 MG: 20 TABLET, FILM COATED ORAL at 05:03

## 2022-03-16 RX ADMIN — BUSPIRONE HYDROCHLORIDE 10 MG: 10 TABLET ORAL at 09:03

## 2022-03-16 RX ADMIN — MIDAZOLAM HYDROCHLORIDE 0.5 MG: 1 INJECTION INTRAMUSCULAR; INTRAVENOUS at 11:03

## 2022-03-16 RX ADMIN — OXCARBAZEPINE 1200 MG: 600 TABLET, FILM COATED ORAL at 09:03

## 2022-03-16 RX ADMIN — QUETIAPINE FUMARATE 200 MG: 200 TABLET ORAL at 09:03

## 2022-03-16 RX ADMIN — ACYCLOVIR 400 MG: 200 CAPSULE ORAL at 09:03

## 2022-03-16 RX ADMIN — FENTANYL CITRATE 50 MCG: 50 INJECTION, SOLUTION INTRAMUSCULAR; INTRAVENOUS at 11:03

## 2022-03-16 RX ADMIN — DILTIAZEM HYDROCHLORIDE 90 MG: 30 TABLET, FILM COATED ORAL at 11:03

## 2022-03-16 RX ADMIN — Medication: at 06:03

## 2022-03-16 RX ADMIN — TRAMADOL HYDROCHLORIDE 50 MG: 50 TABLET, COATED ORAL at 06:03

## 2022-03-16 RX ADMIN — DILTIAZEM HYDROCHLORIDE 90 MG: 30 TABLET, FILM COATED ORAL at 05:03

## 2022-03-16 RX ADMIN — Medication: at 11:03

## 2022-03-16 RX ADMIN — CEFAZOLIN SODIUM 1 G: 1 SOLUTION INTRAVENOUS at 10:03

## 2022-03-16 RX ADMIN — HYDROXYZINE HYDROCHLORIDE 50 MG: 25 TABLET, FILM COATED ORAL at 09:03

## 2022-03-16 RX ADMIN — MIDAZOLAM HYDROCHLORIDE 1 MG: 1 INJECTION INTRAMUSCULAR; INTRAVENOUS at 11:03

## 2022-03-16 RX ADMIN — TRAMADOL HYDROCHLORIDE 50 MG: 50 TABLET, COATED ORAL at 11:03

## 2022-03-16 RX ADMIN — ONDANSETRON HYDROCHLORIDE 4 MG: 4 TABLET, FILM COATED ORAL at 08:03

## 2022-03-16 RX ADMIN — LIDOCAINE HYDROCHLORIDE 5 ML: 10 INJECTION, SOLUTION INFILTRATION; PERINEURAL at 11:03

## 2022-03-16 NOTE — PROGRESS NOTES
Pt sitting up in bed AAO w/ NAD / called  and will P/U pt on 3rd floor garage / pt review D/C inst and S&S of infections / pt is going to apt today and then plan for admit later / questions answered and pt has very good understanding of when to contact MD or go to ED due to medical history / waiting on transport

## 2022-03-16 NOTE — H&P
Radiology History & Physical      SUBJECTIVE:     History of Present Illness:  Deepti Gonzalez is a 51 y.o. female w/ pmhx of COPD, HTN, and B-ALL who presents for Port placement.     Past Medical History:   Diagnosis Date    Arthritis     COPD (chronic obstructive pulmonary disease)     Hypertension     Stroke     TIA x 4     Past Surgical History:   Procedure Laterality Date    APPENDECTOMY      HYSTERECTOMY      ROTATOR CUFF REPAIR Bilateral     TONSILLECTOMY      TUBAL LIGATION         Home Meds:   Prior to Admission medications    Medication Sig Start Date End Date Taking? Authorizing Provider   acyclovir (ZOVIRAX) 400 MG tablet Take 1 tablet (400 mg total) by mouth 2 (two) times daily. 12/7/21   Kathrine Posey MD   artificial tears (ISOPTO TEARS) 0.5 % ophthalmic solution Place 1 drop into both eyes 4 (four) times daily as needed. 1/24/22   Mirtha Antonio NP   aspirin (ECOTRIN) 81 MG EC tablet Take 1 tablet (81 mg total) by mouth once daily. Hold for platelet count less than 50 thousand. 2/21/22   Mirtha Antonio NP   atorvastatin (LIPITOR) 80 MG tablet Take 80 mg by mouth once daily.    Historical Provider   cyclobenzaprine (FLEXERIL) 10 MG tablet Take 10 mg by mouth 3 (three) times daily as needed. 1/10/22   Historical Provider   dapagliflozin (FARXIGA) 10 mg tablet Take 10 mg by mouth once daily.    Historical Provider   diazePAM (VALIUM) 10 MG Tab Take 10 mg by mouth 2 (two) times daily as needed.    Historical Provider   diltiaZEM (CARDIZEM) 90 MG tablet Take 1 tablet (90 mg total) by mouth every 6 (six) hours. 12/7/21 12/7/22  Kathrine Posey MD   Story's soln (benadryl 30 mL, mylanta 30 mL, LIDOcaine 30 mL, nystatin 30 mL) 120mL Take 10 mLs by mouth 4 (four) times daily.  Patient not taking: Reported on 2/16/2022 1/24/22   Mirtha Antonio NP   fenofibrate 160 MG Tab Take 160 mg by mouth once daily.    Historical Provider   fluconazole (DIFLUCAN) 200 MG Tab Take 2 tablets (400 mg total) by mouth  once daily. 2/21/22   Mirtha Antonio NP   gabapentin (NEURONTIN) 300 MG capsule Take 1 capsule (300 mg total) by mouth 3 (three) times daily. 12/7/21 12/7/22  Kathrine Posey MD   hydrOXYchloroQUINE (PLAQUENIL) 200 mg tablet Take 200 mg by mouth once daily.    Historical Provider   imatinib (GLEEVEC) 100 MG Tab Take 2 tablets (200 mg total) by mouth once daily with 1 other imatinib prescription for 600 mg total.Take with a meal and large glass of water  Patient not taking: Reported on 2/16/2022. 12/5/21   Daina Gonzales MD   imatinib (GLEEVEC) 400 MG Tab Take 1 tablet (400 mg total) by mouth once daily with 1 other imatinib prescription for 600 mg total. Take with a meal and large glass of water 12/5/21   Daina Gonzales MD   levoFLOXacin (LEVAQUIN) 500 MG tablet Take 1 tablet (500 mg total) by mouth once daily. 2/21/22   Mirtha Antonio NP   losartan (COZAAR) 25 MG tablet Take 25 mg by mouth once daily. 11/12/21   Historical Provider   methocarbamoL (ROBAXIN) 500 MG Tab Take 1 tablet (500 mg total) by mouth 3 (three) times daily as needed (muscle spasms). 12/7/21   Kathrine Posey MD   omeprazole (PRILOSEC) 40 MG capsule Take 40 mg by mouth once daily.    Historical Provider   ondansetron (ZOFRAN-ODT) 8 MG TbDL Dissolve 1 tablet (8 mg total) by mouth every 12 (twelve) hours as needed (in case of chemo induced nausea). 12/17/21 12/17/22  Dayron Jenkins MD   OXcarbazepine (TRILEPTAL) 600 MG Tab Take 1,200 mg by mouth 2 (two) times daily. 10/26/21   Historical Provider   polyethylene glycol (GLYCOLAX) 17 gram/dose powder Dissolve one capful (17 g) in liquid and take by mouth daily as needed (constipation).  Patient not taking: Reported on 2/16/2022 12/7/21   Kathrine Posey MD   prochlorperazine (COMPAZINE) 5 MG tablet Take 1 tablet (5 mg total) by mouth every 6 (six) hours as needed for Nausea. 2/21/22 2/21/23  Mirtha Antonio NP   QUEtiapine (SEROQUEL) 200 MG Tab Take 200 mg by mouth every evening.    Historical  Provider   rivaroxaban (XARELTO) 20 mg Tab Take 1 tablet (20 mg total) by mouth daily with dinner or evening meal. Hold for platelets less than 50 thousand. 2/21/22   Mirtha Antonio NP   senna-docusate 8.6-50 mg (PERICOLACE) 8.6-50 mg per tablet Take 1 tablet by mouth 2 (two) times daily. 12/7/21   Mary Max DO   sumatriptan (IMITREX) 50 MG tablet Take 1 tablet (50 mg total) by mouth daily as needed (headache). 2/25/22 3/27/22  Dayron Jenkins MD   traMADoL (ULTRAM) 50 mg tablet Take 1 tablet (50 mg total) by mouth every 6 (six) hours as needed for Pain. 2/25/22   Dayron Jenkins MD   TRINTELLIX 10 mg Tab Take 1 tablet by mouth once daily. 10/20/21   Historical Provider     Anticoagulants/Antiplatelets: no anticoagulation    Allergies:   Review of patient's allergies indicates:   Allergen Reactions    Levetiracetam      Other reaction(s): Unknown       Labs:  Lab Results   Component Value Date    INR 1.1 03/16/2022       Lab Results   Component Value Date    WBC 11.88 03/16/2022    HGB 9.4 (L) 03/16/2022    HCT 29.5 (L) 03/16/2022     (H) 03/16/2022     (H) 03/16/2022     Lab Results   Component Value Date     (H) 03/16/2022     03/16/2022    K 3.8 03/16/2022     03/16/2022    CO2 22 (L) 03/16/2022    BUN 12 03/16/2022    CREATININE 0.8 03/16/2022    CALCIUM 9.0 03/16/2022    MG 2.0 02/21/2022    ALT 14 03/16/2022    AST 13 03/16/2022    ALBUMIN 3.6 03/16/2022    BILITOT 0.3 03/16/2022       Review of Systems:   Hematological: no known coagulopathies  Respiratory: no shortness of breath  Cardiovascular: no chest pain  Gastrointestinal: no abdominal pain  Genito-Urinary: no dysuria  Musculoskeletal: negative  Neurological: no TIA or stroke symptoms         OBJECTIVE:     Vital Signs (Most Recent)       Physical Exam:  ASA: 2  Mallampati: 2    General: no acute distress  Mental Status: alert and oriented to person, place and time  HEENT: normocephalic, atraumatic  Chest:  unlabored breathing  Heart: regular heart rate  Abdomen: nondistended  Extremity: moves all extremities    ASSESSMENT/PLAN:     Sedation Plan: up to moderate sedation.  Patient will undergo port placement.    Shilpa Khalil

## 2022-03-16 NOTE — PLAN OF CARE
Patient arrived to room. PIV placed. Admit assessment completed. Plan of care discussed with patient.  at bedside. Nurse call bell within reach. Will monitor

## 2022-03-16 NOTE — ASSESSMENT & PLAN NOTE
(Most info from Dr. Jenkins's most recent clinic note):   Precursor B-cell acute lymphoblastic leukemia (Ph+)              A. 11/23/2021: [place] [facility] [place] evaluation for acute leukemia              B. 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               C. 11/30/2021 - 12/23/2021: Vincristine + imatinib induction; hospital course was complicated by acute hypoxemic respiratory failure which resolved with diuresis and treatment of ALL                D. 1/19/2022 - 1/24/2022: Admitted for C1 of consolidation chemotherapy with EWALL               E. 2/16/2022 - 2/22/2022: Admitted for C2 of consolidation chemotherapy with EWALL    - BMBx from 1/3/22 showing CR  - admitting today for C3 of consolidation with EWALL (HD MTX and pegapargase)  - tolerated cycles 1 and 2 of consolidation well  - take home Gleevec days 15-28 of consolidation cycles  - port-a-cath placed today (3/16/22) prior to admission

## 2022-03-16 NOTE — PLAN OF CARE
Pt arrived to MPU bay 7 for one hour post port placement recovery. Bedside handoff received from Harriet RN. Pt denies pain/discomfort. Dressing CDI to right upper chest and right lower neck. Pt connected to continuous telemetry per policy. VSS. Alarms are audible and active to the individualized needs of the recovery patient. No acute events. RN to bedside. See flow sheets for post procedure monitoring.

## 2022-03-16 NOTE — DISCHARGE INSTRUCTIONS
Please call with any questions or concerns.      Monday thru Friday 8:00 am - 4:30 pm    Interventional Radiology   [phone]    After Hours    Ask for the Radiology Intern on call  [phone]

## 2022-03-16 NOTE — PLAN OF CARE
Pt arrived to  for port placement. Pt oriented to unit and staff. Plan of care reviewed with patient. Comfort measures utilized. Pt safely transferred from stretcher to procedural table. Safety strap applied, positioner pillows utilized to minimize pressure points. Blankets applied. Patient placed on continuous monitoring. RN to remain at bedside. Education accepted. Consents reviewed. See flow sheets for monitoring, medication administration, and updates.

## 2022-03-16 NOTE — H&P
Roberto Yepez - Oncology ([facility])  Hematology  Bone Marrow Transplant  H&P    Subjective:     Principal Problem: B-cell acute lymphoblastic leukemia    HPI: Ms. 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 Other medical history includes COPD, HTN, HLD, DM2, TIA, anxiety, depression, GERD, seizure disorder, PAD, gastroparesis, RA, osteoporosis, and pacermaker placement. She is admitting today for C3 of consolidation with EWALL (HD MTX and pegaspargase). She was induced with Vincristine + imatinib, which she recieved inpatient at [facility] [place]. That hospitalization was complicated by acute hypoxic respiratory failure requiring ICU transfer. Post induction BMBx was performed 1/3/22. Showing CR. She tolerated cycles 1 and 2 of consolidation well. She is feeling well today. Denies fevers, chills, aches, cough, SOB, chest pain, bleeding or bruising, and n/v/d/c. Reports fatigue. She denies any recent sick contacts. She is COVID neg. A port-a-cath was placed today prior to admission. C/o tenderness to site.      Patient information was obtained from patient and past medical records.     Oncology History (Most info from Dr. Jenkins's most recent clinic note):   Precursor B-cell acute lymphoblastic leukemia (Ph+)              A. 11/23/2021: Transferred to [facility] [place] from outside hospital for evaluation for acute leukemia              B. 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               C. 11/30/2021 - 12/23/2021: Vincristine + imatinib induction; hospital course was complicated by acute hypoxemic respiratory failure which resolved with diuresis and treatment of ALL                D. 1/19/2022 - 1/24/2022: Admitted for C1 of consolidation chemotherapy with  SHAYAN MURPHY. 2/16/2022 - 2/22/2022: Admitted for C2 of consolidation chemotherapy with SHAYAN    Medications Prior to Admission   Medication Sig Dispense Refill Last Dose    acyclovir (ZOVIRAX) 400 MG tablet Take 1 tablet (400 mg total) by mouth 2 (two) times daily. 60 tablet 11     artificial tears (ISOPTO TEARS) 0.5 % ophthalmic solution Place 1 drop into both eyes 4 (four) times daily as needed.       aspirin (ECOTRIN) 81 MG EC tablet Take 1 tablet (81 mg total) by mouth once daily. Hold for platelet count less than 50 thousand. (Patient not taking: Reported on 3/16/2022)  0     atorvastatin (LIPITOR) 80 MG tablet Take 80 mg by mouth once daily.       busPIRone (BUSPAR) 10 MG tablet Take 10 mg by mouth 2 (two) times daily.       dapagliflozin (FARXIGA) 10 mg tablet Take 10 mg by mouth once daily.       diazePAM (VALIUM) 10 MG Tab Take 10 mg by mouth 2 (two) times daily as needed.       diltiaZEM (CARDIZEM) 90 MG tablet Take 1 tablet (90 mg total) by mouth every 6 (six) hours. 120 tablet 11     duke's soln (benadryl 30 mL, mylanta 30 mL, LIDOcaine 30 mL, nystatin 30 mL) 120mL Take 10 mLs by mouth 4 (four) times daily. (Patient not taking: No sig reported) 120 mL 0     fenofibrate 160 MG Tab Take 160 mg by mouth once daily.       fluconazole (DIFLUCAN) 200 MG Tab Take 2 tablets (400 mg total) by mouth once daily. 60 tablet 2     gabapentin (NEURONTIN) 300 MG capsule Take 1 capsule (300 mg total) by mouth 3 (three) times daily. 90 capsule 11     hydrOXYchloroQUINE (PLAQUENIL) 200 mg tablet Take 200 mg by mouth once daily.       hydrOXYzine (ATARAX) 50 MG tablet Take 50 mg by mouth 2 (two) times a day.       imatinib (GLEEVEC) 100 MG Tab Take 2 tablets (200 mg total) by mouth once daily with 1 other imatinib prescription for 600 mg total.Take with a meal and large glass of water 180 tablet 3     imatinib (GLEEVEC) 400 MG Tab Take 1 tablet (400 mg total) by mouth once daily with 1 other  imatinib prescription for 600 mg total. Take with a meal and large glass of water 90 tablet 3     levoFLOXacin (LEVAQUIN) 500 MG tablet Take 1 tablet (500 mg total) by mouth once daily. 30 tablet 2     losartan (COZAAR) 25 MG tablet Take 25 mg by mouth once daily.       methocarbamoL (ROBAXIN) 500 MG Tab Take 1 tablet (500 mg total) by mouth 3 (three) times daily as needed (muscle spasms). (Patient not taking: Reported on 3/16/2022) 90 tablet 2     omeprazole (PRILOSEC) 40 MG capsule Take 40 mg by mouth once daily.       ondansetron (ZOFRAN-ODT) 8 MG TbDL Dissolve 1 tablet (8 mg total) by mouth every 12 (twelve) hours as needed (in case of chemo induced nausea). (Patient not taking: Reported on 3/16/2022) 30 tablet 1     OXcarbazepine (TRILEPTAL) 600 MG Tab Take 1,200 mg by mouth 2 (two) times daily.       polyethylene glycol (GLYCOLAX) 17 gram/dose powder Dissolve one capful (17 g) in liquid and take by mouth daily as needed (constipation). (Patient not taking: Reported on 3/16/2022) 510 g 0     potassium chloride SA (K-DUR,KLOR-CON) 20 MEQ tablet Take 20 mEq by mouth once daily.       prochlorperazine (COMPAZINE) 5 MG tablet Take 1 tablet (5 mg total) by mouth every 6 (six) hours as needed for Nausea. 30 tablet 2     QUEtiapine (SEROQUEL) 200 MG Tab Take 200 mg by mouth every evening.       rivaroxaban (XARELTO) 20 mg Tab Take 1 tablet (20 mg total) by mouth daily with dinner or evening meal. Hold for platelets less than 50 thousand. (Patient not taking: Reported on 3/16/2022)       senna-docusate 8.6-50 mg (PERICOLACE) 8.6-50 mg per tablet Take 1 tablet by mouth 2 (two) times daily. 60 tablet 3     sumatriptan (IMITREX) 50 MG tablet Take 1 tablet (50 mg total) by mouth daily as needed (headache). 30 tablet 0     traMADoL (ULTRAM) 50 mg tablet Take 1 tablet (50 mg total) by mouth every 6 (six) hours as needed for Pain. 30 tablet 0     TRINTELLIX 10 mg Tab Take 1 tablet by mouth once daily.        VISTARIL 50 mg Cap Take by mouth.          Levetiracetam     Past Medical History:   Diagnosis Date    Arthritis     Cancer     COPD (chronic obstructive pulmonary disease)     Hypertension     Stroke     TIA x 4     Past Surgical History:   Procedure Laterality Date    APPENDECTOMY      HYSTERECTOMY      ROTATOR CUFF REPAIR Bilateral     TONSILLECTOMY      TUBAL LIGATION       Family History    None       Tobacco Use    Smoking status: Current Every Day Smoker     Packs/day: 0.50     Types: Cigarettes    Smokeless tobacco: Never Used   Substance and Sexual Activity    Alcohol use: No    Drug use: Never    Sexual activity: Not on file       Review of Systems   Constitutional:  Positive for fatigue. Negative for activity change, appetite change, chills, fever and unexpected weight change.   HENT:  Negative for congestion, mouth sores, nosebleeds, rhinorrhea, sinus pressure, sinus pain, sore throat and trouble swallowing.    Eyes:  Negative for photophobia, discharge, redness, itching and visual disturbance.   Respiratory:  Negative for cough, chest tightness, shortness of breath and wheezing.    Cardiovascular:  Negative for chest pain, palpitations and leg swelling.   Gastrointestinal:  Negative for abdominal distention, abdominal pain, constipation, diarrhea, nausea and vomiting.   Endocrine: Negative for cold intolerance, heat intolerance, polydipsia, polyphagia and polyuria.   Genitourinary:  Negative for decreased urine volume, difficulty urinating, dysuria, frequency, hematuria, urgency, vaginal bleeding and vaginal discharge.   Musculoskeletal:  Negative for arthralgias, back pain, gait problem, joint swelling, myalgias, neck pain and neck stiffness.   Skin:  Negative for pallor, rash and wound.   Allergic/Immunologic: Negative for environmental allergies, food allergies and immunocompromised state.   Neurological:  Negative for dizziness, tremors, seizures, syncope, weakness, light-headedness,  numbness and headaches.   Hematological:  Negative for adenopathy. Does not bruise/bleed easily.   Psychiatric/Behavioral:  Negative for agitation, confusion, hallucinations, sleep disturbance and suicidal ideas. The patient is not nervous/anxious.    Objective:     Vital Signs (Most Recent):  Temp: 98.2 °F (36.8 °C) (03/16/22 1634)  Pulse: 98 (03/16/22 1634)  Resp: 17 (03/16/22 1634)  BP: (Abnormal) 148/73 (03/16/22 1634)  SpO2: 95 % (03/16/22 1634) Vital Signs (24h Range):  Temp:  [97.5 °F (36.4 °C)-98.6 °F (37 °C)] 98.2 °F (36.8 °C)  Pulse:  [] 98  Resp:  [11-20] 17  SpO2:  [95 %-99 %] 95 %  BP: (131-148)/(61-79) 148/73     Weight: 69.2 kg (152 lb 9.3 oz)  Body mass index is 26.19 kg/m².  Body surface area is 1.77 meters squared.    ECOG SCORE             Lines/Drains/Airways       Peripherally Inserted Central Catheter Line       Name Duration    PICC Double Lumen 11/30/21 1033 right basilic 106 days              Central Venous Catheter Line       Name Duration         PowerPort A Cath Single Lumen 03/16/22 1128 right internal jugular <1 day                    Physical Exam  Constitutional:       Appearance: She is well-developed.   HENT:      Head: Normocephalic and atraumatic.      Mouth/Throat:      Pharynx: No oropharyngeal exudate.   Eyes:      Conjunctiva/sclera: Conjunctivae normal.      Pupils: Pupils are equal, round, and reactive to light.   Cardiovascular:      Rate and Rhythm: Normal rate and regular rhythm.      Heart sounds: Normal heart sounds. No murmur heard.  Pulmonary:      Effort: Pulmonary effort is normal.      Breath sounds: Normal breath sounds.   Abdominal:      General: Bowel sounds are normal. There is no distension.      Palpations: Abdomen is soft.      Tenderness: There is no abdominal tenderness.   Musculoskeletal:         General: No deformity. Normal range of motion.      Cervical back: Normal range of motion and neck supple.   Skin:     General: Skin is warm and dry.       Findings: No erythema or rash.      Comments: RUE PICC. Dressing c/d/i. No sign of infection to site.  Right chest wall port-a-cath. Dressing c/d/i. No sign of infection to site.   Neurological:      Mental Status: She is alert and oriented to person, place, and time.   Psychiatric:         Behavior: Behavior normal.         Thought Content: Thought content normal.         Judgment: Judgment normal.       Significant Labs:   CBC:   Recent Labs   Lab 03/16/22  0739   WBC 11.88   HGB 9.4*   HCT 29.5*   *    and CMP:   Recent Labs   Lab 03/16/22  0739      K 3.8      CO2 22*   *   BUN 12   CREATININE 0.8   CALCIUM 9.0   PROT 6.7   ALBUMIN 3.6   BILITOT 0.3   ALKPHOS 72   AST 13   ALT 14   ANIONGAP 12   EGFRNONAA >60.0       Diagnostic Results:  I have reviewed all pertinent imaging results/findings within the past 24 hours.    Assessment/Plan:     * B-cell acute lymphoblastic leukemia  (Most info from Dr. Jenkins's most recent clinic note):   Precursor B-cell acute lymphoblastic leukemia (Ph+)              A. 11/23/2021: Transferred to [facility] [place] from outside hospital for evaluation for acute leukemia              B. 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               C. 11/30/2021 - 12/23/2021: Vincristine + imatinib induction; hospital course was complicated by acute hypoxemic respiratory failure which resolved with diuresis and treatment of ALL                D. 1/19/2022 - 1/24/2022: Admitted for C1 of consolidation chemotherapy with EWALL               E. 2/16/2022 - 2/22/2022: Admitted for C2 of consolidation chemotherapy with EWALL    - BMBx from 1/3/22 showing CR  - admitting today for C3 of consolidation with  SHAYAN (HD MTX and pegapargase)  - tolerated cycles 1 and 2 of consolidation well  - take home Gleevec days 15-28 of consolidation cycles  - port-a-cath placed today (3/16/22) prior to admission    Anemia associated with chemotherapy  - hgb 9.4  - transfuse for hgb <7    Paroxysmal atrial fibrillation  - continue home diltiazem and xarelto. Hold xarelto with plt count < 50K.    Long term current use of anticoagulant  - continue home xarelto. Hold with plts < 50K.    History of TIA (transient ischemic attack)  - Continue home ASA and fenofibrate. Holding home statin while inpatient.    Moderate major depression  - continue home trintellix while inpatient      Insomnia  - continue home seroquel while inpatient    Anxiety  - continue home trintellix and valium while inpatient    GERD (gastroesophageal reflux disease)  - home prilosec not on formulary. Will receive protonix while inpatient.    Hypertension  - continue home losartan while inpatient    Seizure disorder  - continue home oxcarbazepine while inpatient    Type 2 diabetes mellitus, without long-term current use of insulin  - hold jardiance and metformin recently discontinued by endocrinologist.   - hold home farxiga while inpatient. Can resume at discharge.  - low dose SSI, achs blood glucose monitoring, and diabetic diet while inpatient    Rheumatoid arthritis involving multiple sites  - continue home plaquenil.   - home leflunomide was recently discontinued by her rhemotologist    Hyperlipidemia  - holding home statin while inpatient        VTE Risk Mitigation (From admission, onward)         Ordered     rivaroxaban tablet 20 mg  With dinner         03/16/22 1530     heparin, porcine (PF) 100 unit/mL injection flush 500 Units  As needed (PRN)         03/16/22 1631     IP VTE HIGH RISK PATIENT  Once         03/16/22 1504     Place sequential compression device  Until discontinued         03/16/22 1504                Disposition: admit to inpatient     Carolyn RAMOS  TERRY Mchugh  Bone Marrow Transplant  Hematology  [facility] - On[facility] ([facility])

## 2022-03-16 NOTE — PLAN OF CARE
Pt admitted to unit. M.D. notified. Pt was direct admit and came to unit in wheelchair accompanied by spouse - oriented to unit. Initial assessment done; questions encouraged and answered. Pt skin is intact; no wounds noted. Right chest port procedure done today; site is sore and red. Right double lumen PICC in upper arm; site clean, dry, and intact.Pt remaining free from falls or injury throughout shift; bed locked and in lowest position; call light within reach.  Pt instructed to call for assistance as needed.  Q1H rounding done on pt. WCTM.

## 2022-03-16 NOTE — HPI
Ms. 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 Other medical history includes COPD, HTN, HLD, DM2, TIA, anxiety, depression, GERD, seizure disorder, PAD, gastroparesis, RA, osteoporosis, and pacermaker placement. She is admitting today for C3 of consolidation with EWALL (HD MTX and pegaspargase). She was induced with Vincristine + imatinib, which [place] [facility] [place] hospitalization was complicated by acute hypoxic respiratory failure requiring ICU transfer. Post induction BMBx was performed 1/3/22. Showing CR. She tolerated cycles 1 and 2 of consolidation well. She is feeling well today. Denies fevers, chills, aches, cough, SOB, chest pain, bleeding or bruising, and n/v/d/c. Reports fatigue. She denies any recent sick contacts. She is COVID neg. A port-a-cath was placed today prior to admission. C/o tenderness to site.

## 2022-03-17 PROBLEM — E87.6 HYPOKALEMIA: Status: ACTIVE | Noted: 2022-03-17

## 2022-03-17 LAB
ALBUMIN SERPL BCP-MCNC: 3 G/DL (ref 3.5–5.2)
ALP SERPL-CCNC: 60 U/L (ref 55–135)
ALT SERPL W/O P-5'-P-CCNC: 11 U/L (ref 10–44)
ANION GAP SERPL CALC-SCNC: 7 MMOL/L (ref 8–16)
ANISOCYTOSIS BLD QL SMEAR: SLIGHT
AST SERPL-CCNC: 11 U/L (ref 10–40)
BASOPHILS # BLD AUTO: ABNORMAL K/UL (ref 0–0.2)
BASOPHILS NFR BLD: 1 % (ref 0–1.9)
BILIRUB SERPL-MCNC: 0.2 MG/DL (ref 0.1–1)
BILIRUB SERPL-MCNC: NORMAL MG/DL
BLOOD URINE, POC: NORMAL
BUN SERPL-MCNC: 9 MG/DL (ref 6–20)
CALCIUM SERPL-MCNC: 8.6 MG/DL (ref 8.7–10.5)
CHLORIDE SERPL-SCNC: 102 MMOL/L (ref 95–110)
CLARITY CSF: CLEAR
CO2 SERPL-SCNC: 30 MMOL/L (ref 23–29)
COLOR CSF: COLORLESS
COLOR, POC UA: NORMAL
CREAT SERPL-MCNC: 0.7 MG/DL (ref 0.5–1.4)
DIFFERENTIAL METHOD: ABNORMAL
EOSINOPHIL # BLD AUTO: ABNORMAL K/UL (ref 0–0.5)
EOSINOPHIL NFR BLD: 0 % (ref 0–8)
ERYTHROCYTE [DISTWIDTH] IN BLOOD BY AUTOMATED COUNT: 17.8 % (ref 11.5–14.5)
EST. GFR  (AFRICAN AMERICAN): >60 ML/MIN/1.73 M^2
EST. GFR  (NON AFRICAN AMERICAN): >60 ML/MIN/1.73 M^2
GLUCOSE SERPL-MCNC: 97 MG/DL (ref 70–110)
GLUCOSE UR QL STRIP: NORMAL
HCT VFR BLD AUTO: 26.1 % (ref 37–48.5)
HGB BLD-MCNC: 8.6 G/DL (ref 12–16)
HYPOCHROMIA BLD QL SMEAR: ABNORMAL
IMM GRANULOCYTES # BLD AUTO: ABNORMAL K/UL (ref 0–0.04)
IMM GRANULOCYTES NFR BLD AUTO: ABNORMAL % (ref 0–0.5)
KETONES UR QL STRIP: NORMAL
LEUKOCYTE ESTERASE URINE, POC: 7
LEUKOCYTE ESTERASE URINE, POC: NORMAL
LYMPHOCYTES # BLD AUTO: ABNORMAL K/UL (ref 1–4.8)
LYMPHOCYTES NFR BLD: 16 % (ref 18–48)
LYMPHOCYTES NFR CSF MANUAL: 75 % (ref 40–80)
MAGNESIUM SERPL-MCNC: 2 MG/DL (ref 1.6–2.6)
MCH RBC QN AUTO: 33.6 PG (ref 27–31)
MCHC RBC AUTO-ENTMCNC: 33 G/DL (ref 32–36)
MCV RBC AUTO: 102 FL (ref 82–98)
MONOCYTES # BLD AUTO: ABNORMAL K/UL (ref 0.3–1)
MONOCYTES NFR BLD: 6 % (ref 4–15)
MONOS+MACROS NFR CSF MANUAL: 25 % (ref 15–45)
MYELOCYTES NFR BLD MANUAL: 1 %
NEUTROPHILS NFR BLD: 76 % (ref 38–73)
NITRITE, POC UA: NORMAL
NRBC BLD-RTO: 0 /100 WBC
OVALOCYTES BLD QL SMEAR: ABNORMAL
PH, POC UA: 7
PH, POC UA: NORMAL
PHOSPHATE SERPL-MCNC: 3.4 MG/DL (ref 2.7–4.5)
PLATELET # BLD AUTO: 383 K/UL (ref 150–450)
PMV BLD AUTO: 8.3 FL (ref 9.2–12.9)
POCT GLUCOSE: 131 MG/DL (ref 70–110)
POCT GLUCOSE: 166 MG/DL (ref 70–110)
POCT GLUCOSE: 190 MG/DL (ref 70–110)
POCT GLUCOSE: 203 MG/DL (ref 70–110)
POIKILOCYTOSIS BLD QL SMEAR: SLIGHT
POLYCHROMASIA BLD QL SMEAR: ABNORMAL
POTASSIUM SERPL-SCNC: 3.3 MMOL/L (ref 3.5–5.1)
PROT SERPL-MCNC: 5.6 G/DL (ref 6–8.4)
PROTEIN, POC: NORMAL
RBC # BLD AUTO: 2.56 M/UL (ref 4–5.4)
RBC # CSF: 9 /CU MM
SODIUM SERPL-SCNC: 139 MMOL/L (ref 136–145)
SPECIFIC GRAVITY, POC UA: NORMAL
SPECIMEN VOL CSF: 1 ML
SPHEROCYTES BLD QL SMEAR: ABNORMAL
UROBILINOGEN, POC UA: NORMAL
WBC # BLD AUTO: 10.2 K/UL (ref 3.9–12.7)
WBC # CSF: 1 /CU MM (ref 0–5)

## 2022-03-17 PROCEDURE — 96450 PR CHEMOTHER,CNS,W/LUMBAR PUNCTURE: ICD-10-PCS | Mod: 52,,, | Performed by: NURSE PRACTITIONER

## 2022-03-17 PROCEDURE — 88108 CYTOPATH CONCENTRATE TECH: CPT | Performed by: PATHOLOGY

## 2022-03-17 PROCEDURE — 25000003 PHARM REV CODE 250: Performed by: INTERNAL MEDICINE

## 2022-03-17 PROCEDURE — 63600175 PHARM REV CODE 636 W HCPCS: Performed by: INTERNAL MEDICINE

## 2022-03-17 PROCEDURE — 96450 CHEMOTHERAPY INTO CNS: CPT | Mod: 52,,, | Performed by: NURSE PRACTITIONER

## 2022-03-17 PROCEDURE — 20600001 HC STEP DOWN PRIVATE ROOM

## 2022-03-17 PROCEDURE — 25000003 PHARM REV CODE 250: Performed by: NURSE PRACTITIONER

## 2022-03-17 PROCEDURE — 84100 ASSAY OF PHOSPHORUS: CPT | Performed by: NURSE PRACTITIONER

## 2022-03-17 PROCEDURE — 99223 PR INITIAL HOSPITAL CARE,LEVL III: ICD-10-PCS | Mod: 25,,, | Performed by: INTERNAL MEDICINE

## 2022-03-17 PROCEDURE — 85027 COMPLETE CBC AUTOMATED: CPT | Performed by: NURSE PRACTITIONER

## 2022-03-17 PROCEDURE — 88108 CYTOPATH CONCENTRATE TECH: CPT | Mod: 26,,, | Performed by: PATHOLOGY

## 2022-03-17 PROCEDURE — 88108 PR  CYTOPATH FLUIDS,CONCENTRATN,INTERP: ICD-10-PCS | Mod: 26,,, | Performed by: PATHOLOGY

## 2022-03-17 PROCEDURE — 89051 BODY FLUID CELL COUNT: CPT | Performed by: NURSE PRACTITIONER

## 2022-03-17 PROCEDURE — 99223 1ST HOSP IP/OBS HIGH 75: CPT | Mod: 25,,, | Performed by: INTERNAL MEDICINE

## 2022-03-17 PROCEDURE — 85007 BL SMEAR W/DIFF WBC COUNT: CPT | Performed by: NURSE PRACTITIONER

## 2022-03-17 PROCEDURE — 83735 ASSAY OF MAGNESIUM: CPT | Performed by: NURSE PRACTITIONER

## 2022-03-17 PROCEDURE — 80053 COMPREHEN METABOLIC PANEL: CPT | Performed by: NURSE PRACTITIONER

## 2022-03-17 PROCEDURE — 63600175 PHARM REV CODE 636 W HCPCS: Performed by: NURSE PRACTITIONER

## 2022-03-17 RX ORDER — PANTOPRAZOLE SODIUM 40 MG/1
40 TABLET, DELAYED RELEASE ORAL DAILY
Status: DISCONTINUED | OUTPATIENT
Start: 2022-03-17 | End: 2022-03-19 | Stop reason: HOSPADM

## 2022-03-17 RX ORDER — LIDOCAINE HYDROCHLORIDE 10 MG/ML
3 INJECTION INFILTRATION; PERINEURAL ONCE
Status: COMPLETED | OUTPATIENT
Start: 2022-03-17 | End: 2022-03-17

## 2022-03-17 RX ORDER — SODIUM CHLORIDE, SODIUM LACTATE, POTASSIUM CHLORIDE, CALCIUM CHLORIDE 600; 310; 30; 20 MG/100ML; MG/100ML; MG/100ML; MG/100ML
INJECTION, SOLUTION INTRAVENOUS CONTINUOUS
Status: DISCONTINUED | OUTPATIENT
Start: 2022-03-17 | End: 2022-03-19

## 2022-03-17 RX ORDER — SODIUM BICARBONATE 650 MG/1
1300 TABLET ORAL EVERY 6 HOURS
Status: DISCONTINUED | OUTPATIENT
Start: 2022-03-17 | End: 2022-03-19

## 2022-03-17 RX ADMIN — RIVAROXABAN 20 MG: 20 TABLET, FILM COATED ORAL at 05:03

## 2022-03-17 RX ADMIN — HYPROMELLOSE 2910 1 DROP: 5 SOLUTION OPHTHALMIC at 10:03

## 2022-03-17 RX ADMIN — TRAMADOL HYDROCHLORIDE 50 MG: 50 TABLET, COATED ORAL at 12:03

## 2022-03-17 RX ADMIN — METHOCARBAMOL 500 MG: 500 TABLET ORAL at 03:03

## 2022-03-17 RX ADMIN — HYDROXYZINE HYDROCHLORIDE 50 MG: 25 TABLET, FILM COATED ORAL at 09:03

## 2022-03-17 RX ADMIN — TRAMADOL HYDROCHLORIDE 50 MG: 50 TABLET, COATED ORAL at 05:03

## 2022-03-17 RX ADMIN — VORTIOXETINE 10 MG: 10 TABLET, FILM COATED ORAL at 10:03

## 2022-03-17 RX ADMIN — SODIUM BICARBONATE 650 MG TABLET 1300 MG: at 05:03

## 2022-03-17 RX ADMIN — SODIUM CHLORIDE, SODIUM LACTATE, POTASSIUM CHLORIDE, AND CALCIUM CHLORIDE: .6; .31; .03; .02 INJECTION, SOLUTION INTRAVENOUS at 09:03

## 2022-03-17 RX ADMIN — LIDOCAINE HYDROCHLORIDE 3 ML: 10 INJECTION, SOLUTION INFILTRATION; PERINEURAL at 03:03

## 2022-03-17 RX ADMIN — DILTIAZEM HYDROCHLORIDE 90 MG: 30 TABLET, FILM COATED ORAL at 05:03

## 2022-03-17 RX ADMIN — OXCARBAZEPINE 1200 MG: 600 TABLET, FILM COATED ORAL at 09:03

## 2022-03-17 RX ADMIN — ASPIRIN 81 MG: 81 TABLET, COATED ORAL at 09:03

## 2022-03-17 RX ADMIN — SODIUM CHLORIDE, SODIUM LACTATE, POTASSIUM CHLORIDE, AND CALCIUM CHLORIDE: .6; .31; .03; .02 INJECTION, SOLUTION INTRAVENOUS at 12:03

## 2022-03-17 RX ADMIN — MUPIROCIN: 20 OINTMENT TOPICAL at 09:03

## 2022-03-17 RX ADMIN — BUSPIRONE HYDROCHLORIDE 10 MG: 10 TABLET ORAL at 08:03

## 2022-03-17 RX ADMIN — HYDROXYCHLOROQUINE SULFATE 200 MG: 200 TABLET, FILM COATED ORAL at 09:03

## 2022-03-17 RX ADMIN — GABAPENTIN 300 MG: 300 CAPSULE ORAL at 12:03

## 2022-03-17 RX ADMIN — METHOCARBAMOL 500 MG: 500 TABLET ORAL at 09:03

## 2022-03-17 RX ADMIN — SODIUM BICARBONATE 650 MG TABLET 1300 MG: at 11:03

## 2022-03-17 RX ADMIN — ONDANSETRON 8 MG: 2 INJECTION INTRAMUSCULAR; INTRAVENOUS at 03:03

## 2022-03-17 RX ADMIN — BUSPIRONE HYDROCHLORIDE 10 MG: 10 TABLET ORAL at 09:03

## 2022-03-17 RX ADMIN — LOSARTAN POTASSIUM 25 MG: 25 TABLET, FILM COATED ORAL at 09:03

## 2022-03-17 RX ADMIN — SODIUM BICARBONATE 650 MG TABLET 1300 MG: at 12:03

## 2022-03-17 RX ADMIN — CYTARABINE 1 SYRINGE: 20 INJECTION, SOLUTION INTRATHECAL; INTRAVENOUS; SUBCUTANEOUS at 01:03

## 2022-03-17 RX ADMIN — INSULIN ASPART 1 UNITS: 100 INJECTION, SOLUTION INTRAVENOUS; SUBCUTANEOUS at 09:03

## 2022-03-17 RX ADMIN — DIAZEPAM 10 MG: 5 TABLET ORAL at 09:03

## 2022-03-17 RX ADMIN — ACYCLOVIR 400 MG: 200 CAPSULE ORAL at 08:03

## 2022-03-17 RX ADMIN — POTASSIUM CHLORIDE 20 MEQ: 1500 TABLET, EXTENDED RELEASE ORAL at 11:03

## 2022-03-17 RX ADMIN — DILTIAZEM HYDROCHLORIDE 90 MG: 30 TABLET, FILM COATED ORAL at 06:03

## 2022-03-17 RX ADMIN — PANTOPRAZOLE SODIUM 40 MG: 40 TABLET, DELAYED RELEASE ORAL at 02:03

## 2022-03-17 RX ADMIN — GABAPENTIN 300 MG: 300 CAPSULE ORAL at 08:03

## 2022-03-17 RX ADMIN — ACYCLOVIR 400 MG: 200 CAPSULE ORAL at 09:03

## 2022-03-17 RX ADMIN — SUMATRIPTAN SUCCINATE 50 MG: 50 TABLET ORAL at 04:03

## 2022-03-17 RX ADMIN — METHOTREXATE 1820 MG: 25 INJECTION INTRA-ARTERIAL; INTRAMUSCULAR; INTRATHECAL; INTRAVENOUS at 04:03

## 2022-03-17 RX ADMIN — QUETIAPINE FUMARATE 200 MG: 200 TABLET ORAL at 09:03

## 2022-03-17 RX ADMIN — HYDROXYZINE HYDROCHLORIDE 50 MG: 25 TABLET, FILM COATED ORAL at 08:03

## 2022-03-17 RX ADMIN — TRAMADOL HYDROCHLORIDE 50 MG: 50 TABLET, COATED ORAL at 11:03

## 2022-03-17 RX ADMIN — TRAMADOL HYDROCHLORIDE 50 MG: 50 TABLET, COATED ORAL at 06:03

## 2022-03-17 RX ADMIN — DILTIAZEM HYDROCHLORIDE 90 MG: 30 TABLET, FILM COATED ORAL at 12:03

## 2022-03-17 RX ADMIN — POTASSIUM CHLORIDE 20 MEQ: 1500 TABLET, EXTENDED RELEASE ORAL at 06:03

## 2022-03-17 RX ADMIN — DILTIAZEM HYDROCHLORIDE 90 MG: 30 TABLET, FILM COATED ORAL at 11:03

## 2022-03-17 RX ADMIN — DEXAMETHASONE SODIUM PHOSPHATE 12 MG: 4 INJECTION, SOLUTION INTRA-ARTICULAR; INTRALESIONAL; INTRAMUSCULAR; INTRAVENOUS; SOFT TISSUE at 03:03

## 2022-03-17 RX ADMIN — ONDANSETRON 8 MG: 8 TABLET, ORALLY DISINTEGRATING ORAL at 08:03

## 2022-03-17 NOTE — HOSPITAL COURSE
03/17/2022: Admitted yesterday afternoon for C3 of consolidation with EWALL for B-ALL. Received HD MTX ~ 4:20 this morning. Will receive PEG tomorrow morning. Will check lipase, amylase, and fibrinogen prior to PEG administration. She will receive triple therapy IT chemo during this admission. Port-a-cath placed yesterday prior to admission. C/o tenderness to site. Mild redness noted at insertion site. Using PICC for now but plan to remove PICC prior to discharge. Replacing K+ today.  03/18/2022: C3D3 of EWALL consolidation for B-ALL. MTX level 1.34 today. Received IT triple therapy in fluoro yesterday. Tolerated well. Will receive Peg today. Baseline amylase, lipase, and fibrinogen wnl. Port site less tender and no longer red. Will pull PICC prior to discharge. Continues to c/o joint/bone pain. Palliative care consulted and recommending prn oxy. Ordered accordingly. Patient will follow outpatient with Dr. Mccord with palliative care. Will likely clear MTX and discharge this weekend. She will f/u with Dr. Jenkins for readmission on 4/13.  will arrange lodging at the [facility] the night before f/u. Will arrange lab monitoring with Dr. Garcia today.  03/19/2022 C3D4 of EWALL consolidation for B-ALL. MTX level 0.16 this morning, repeated and was 0.12 in the afternoon. She was discharged home with leucovorin

## 2022-03-17 NOTE — PROGRESS NOTES
Patient seen at [facility]. LP done per radiology. CSF studies sent. Timeout done. Chemo checked with MD. Methotrexate 15 mg,hydrocortisone 40 mg, and cytarabine 40 mg given intrathecally without difficulty.    Mirtha Antonio, SKIPP  Hematology/Oncology/Bone Marrow Transplant

## 2022-03-17 NOTE — PROGRESS NOTES
Roberto Yepez - Oncology ([facility])  Hematology  Bone Marrow Transplant  Progress Note    Patient Name: Deepti Gonzalez  Admission Date: 3/16/2022  Hospital Length of Stay: 1 days  Code Status: Full Code    Subjective:     Interval History: Admitted yesterday afternoon for C3 of consolidation with EWALL for B-ALL. Received HD MTX ~ 4:20 this morning. Will receive PEG tomorrow morning. Will check lipase, amylase, and fibrinogen prior to PEG administration. She will receive triple therapy IT chemo during this admission. Port-a-cath placed yesterday prior to admission. C/o tenderness to site. Mild redness noted at insertion site. Using PICC for now but plan to remove PICC prior to discharge. Replacing K+ today.    Objective:     Vital Signs (Most Recent):  Temp: 98.3 °F (36.8 °C) (03/17/22 0809)  Pulse: 95 (03/17/22 0809)  Resp: 18 (03/17/22 0809)  BP: 126/67 (03/17/22 0809)  SpO2: 97 % (03/17/22 0809) Vital Signs (24h Range):  Temp:  [97.4 °F (36.3 °C)-98.6 °F (37 °C)] 98.3 °F (36.8 °C)  Pulse:  [] 95  Resp:  [11-18] 18  SpO2:  [95 %-99 %] 97 %  BP: (116-148)/(56-79) 126/67     Weight: 69.8 kg (153 lb 15.9 oz)  Body mass index is 26.43 kg/m².  Body surface area is 1.78 meters squared.    ECOG SCORE           [unfilled]    Intake/Output - Last 3 Shifts         03/15 0700  03/16 0659 03/16 0700  03/17 0659 03/17 0700  03/18 0659    P.O.  720     I.V. (mL/kg)  1387.6 (19.9)     IV Piggyback  466.3     Total Intake(mL/kg)  2573.9 (36.9)     Urine (mL/kg/hr)  1400     Total Output  1400     Net  +1173.9            Urine Occurrence  1 x     Stool Occurrence  0 x             Physical Exam  Constitutional:       Appearance: She is well-developed.   HENT:      Head: Normocephalic and atraumatic.      Mouth/Throat:      Pharynx: No oropharyngeal exudate.   Eyes:      Conjunctiva/sclera: Conjunctivae normal.      Pupils: Pupils are equal, round, and reactive to light.   Cardiovascular:      Rate and Rhythm: Normal rate and  regular rhythm.      Heart sounds: Normal heart sounds. No murmur heard.  Pulmonary:      Effort: Pulmonary effort is normal.      Breath sounds: Normal breath sounds.   Abdominal:      General: Bowel sounds are normal. There is no distension.      Palpations: Abdomen is soft.      Tenderness: There is no abdominal tenderness.   Musculoskeletal:         General: No deformity. Normal range of motion.      Cervical back: Normal range of motion and neck supple.   Skin:     General: Skin is warm and dry.      Findings: No erythema or rash.      Comments: RUE PICC. Dressing c/d/I. No sign of infection to site.  Newly placed right chest wall port-a-cath. Mild redness noted to site.   Neurological:      Mental Status: She is alert and oriented to person, place, and time.   Psychiatric:         Behavior: Behavior normal.         Thought Content: Thought content normal.         Judgment: Judgment normal.       Significant Labs:   CBC:   Recent Labs   Lab 03/16/22  0739 03/17/22  0333   WBC 11.88 10.20   HGB 9.4* 8.6*   HCT 29.5* 26.1*   * 383    and CMP:   Recent Labs   Lab 03/16/22  0739 03/17/22  0333    139   K 3.8 3.3*    102   CO2 22* 30*   * 97   BUN 12 9   CREATININE 0.8 0.7   CALCIUM 9.0 8.6*   PROT 6.7 5.6*   ALBUMIN 3.6 3.0*   BILITOT 0.3 0.2   ALKPHOS 72 60   AST 13 11   ALT 14 11   ANIONGAP 12 7*   EGFRNONAA >60.0 >60.0       Diagnostic Results:  I have reviewed all pertinent imaging results/findings within the past 24 hours.    Assessment/Plan:     * B-cell acute lymphoblastic leukemia  (Most info from Dr. Jenkins's most recent clinic note):   Precursor B-cell acute lymphoblastic leukemia (Ph+)              A. 11/23/2021: [place] [facility] [place] for [place] leukemia              B. 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               C. 11/30/2021 - 12/23/2021: Vincristine + imatinib induction; hospital course was complicated by acute hypoxemic respiratory failure which resolved with diuresis and treatment of ALL                D. 1/19/2022 - 1/24/2022: Admitted for C1 of consolidation chemotherapy with EWALL               E. 2/16/2022 - 2/22/2022: Admitted for C2 of consolidation chemotherapy with EWALL    - BMBx from 1/3/22 showing CR  - admitting today for C3 of consolidation with EWALL (HD MTX and pegapargase)  - tolerated cycles 1 and 2 of consolidation well  - takes Gleevec days 15-28 of consolidation cycles  - port-a-cath placed 3/16/22 prior to admission. Plan to removed PICC prior to discharge.  - follows outpatient with Dr. Garcia for lab monitoring and transfusions    Hypokalemia  - K+ 3.3  - replace per prn electrolyte replacement order set  - daily CMP while inpatient    Anemia associated with chemotherapy  - hgb 8.6  - transfuse for hgb <7  - daily cbc while inpatient    Moderate major depression  - continue home trintellix while inpatient      Insomnia  - continue home seroquel while inpatient    Anxiety  - continue home trintellix and valium while inpatient    GERD (gastroesophageal reflux disease)  - home prilosec not on formulary. Will receive protonix while inpatient.    Hypertension  - continue home losartan while inpatient    Seizure disorder  - continue home oxcarbazepine while inpatient    Type 2 diabetes mellitus, without long-term current use of insulin  - hold jardiance and metformin recently discontinued by endocrinologist.   - hold home farxiga while inpatient. Can resume at discharge.  - low dose SSI, achs blood glucose monitoring, and diabetic diet while inpatient    Rheumatoid arthritis involving multiple sites  - continue home plaquenil.    - home leflunomide was recently discontinued by her rhemotologist    History of TIA (transient ischemic attack)  - Continue home ASA and fenofibrate. Holding home statin while inpatient.    Hyperlipidemia  - holding home statin while inpatient    Long term current use of anticoagulant  - continue home xarelto. Hold with plts < 50K.    Paroxysmal atrial fibrillation  - continue home diltiazem and xarelto. Hold xarelto with plt count < 50K.        VTE Risk Mitigation (From admission, onward)         Ordered     rivaroxaban tablet 20 mg  With dinner         03/16/22 1530     heparin, porcine (PF) 100 unit/mL injection flush 500 Units  As needed (PRN)         03/16/22 1631     IP VTE HIGH RISK PATIENT  Once         03/16/22 1504     Place sequential compression device  Until discontinued         03/16/22 1504                Disposition: Inpatient for chemo.     Mirtha Antonio, NP  Bone Marrow Transplant  Roberto Yepez - Oncology ([facility])

## 2022-03-17 NOTE — PLAN OF CARE
Plan of care reviewed with patient. Afebrile. Free from falls or injury. Tramadol given for port pain. HD MTX currently infusing at 250. Cardizem given as scheduled. Imitrex given for a headache. Dexamethasone and Zofran given as premeds. Urine ph 7. Bed locked in lowest position, non skid socks on, call light within reach. Pt instructed to call if any assistance is needed. Vitals stable.  at bedside. Will cont to ariel pt.

## 2022-03-17 NOTE — ASSESSMENT & PLAN NOTE
(Most info from Dr. Jenkins's most recent clinic note):   Precursor B-cell acute lymphoblastic leukemia (Ph+)              A. 11/23/2021: Transferred to [facility] [place] from outside hospital for evaluation for acute leukemia              B. 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               C. 11/30/2021 - 12/23/2021: Vincristine + imatinib induction; hospital course was complicated by acute hypoxemic respiratory failure which resolved with diuresis and treatment of ALL                D. 1/19/2022 - 1/24/2022: Admitted for C1 of consolidation chemotherapy with EWALL               E. 2/16/2022 - 2/22/2022: Admitted for C2 of consolidation chemotherapy with EWALL    - BMBx from 1/3/22 showing CR  - admitting today for C3 of consolidation with EWALL (HD MTX and pegapargase)  - tolerated cycles 1 and 2 of consolidation well  - takes Gleevec days 15-28 of consolidation cycles  - port-a-cath placed 3/16/22 prior to admission. Plan to removed PICC prior to discharge.  - follows outpatient with Dr. Garcia for lab monitoring and transfusions

## 2022-03-17 NOTE — PROGRESS NOTES
methotrexate (PF) 1,820 mg in sodium chloride 0.9% 1,000 mL chemo infusion started through right arm PICC noted for having positive blood return.  Chemotherapy dosage and BSA checked by two chemotherapy certified nurses prior to administration.  Chemotherapy education done prior to hanging of chemotherapy.  Chemotherapeutic precautions in place throughout therapy.  Will continue to monitor.

## 2022-03-17 NOTE — PLAN OF CARE
Plan of care reviewed of patient and . Ambulating with guard assistance 2/2 tethers. VSS, afebrile. Pt denies any chest pain or palpitations, pt has pacemaker, no s/s of respiratory distress noted. HD MTX completed at 0845, +blood return via PICC line, chemo ppx maintained. New right chest port with mild erythema at insertion site, NP Marisela aware, no other s/s of infection. Tolerating diabetic diet without issue, accuchecks WNL this shift. Denies any nausea or vomiting. Voiding large amounts of clear yellow urine, urine pH >7 throughout shift. PO sodium bicarb tabs given, LR continuous at 100ml/hr infusing via right PICC. Pt complains of joint pain. Tramadol, gabapentin and methocarbamol given with minimal effect, palliative care consulted this shift. LP with IT cytarabine completed in radiology this shift, site c/d/I with bandage intact. Pt has remained free from injury this shift. Bed in low locked position. Call light and personal belongings within reach. Side rails up x3. Nonskid socks in place. Pt instructed to call with any needs. Will continue to monitor. 24 hours MTX level due to be drawn at 0430.

## 2022-03-17 NOTE — SUBJECTIVE & OBJECTIVE
Subjective:     Interval History: Admitted yesterday afternoon for C3 of consolidation with EWALL for B-ALL. Received HD MTX ~ 4:20 this morning. Will receive PEG tomorrow morning. Will check lipase, amylase, and fibrinogen prior to PEG administration. She will receive triple therapy IT chemo during this admission. Port-a-cath placed yesterday prior to admission. C/o tenderness to site. Mild redness noted at insertion site. Using PICC for now but plan to remove PICC prior to discharge. Replacing K+ today.    Objective:     Vital Signs (Most Recent):  Temp: 98.3 °F (36.8 °C) (03/17/22 0809)  Pulse: 95 (03/17/22 0809)  Resp: 18 (03/17/22 0809)  BP: 126/67 (03/17/22 0809)  SpO2: 97 % (03/17/22 0809) Vital Signs (24h Range):  Temp:  [97.4 °F (36.3 °C)-98.6 °F (37 °C)] 98.3 °F (36.8 °C)  Pulse:  [] 95  Resp:  [11-18] 18  SpO2:  [95 %-99 %] 97 %  BP: (116-148)/(56-79) 126/67     Weight: 69.8 kg (153 lb 15.9 oz)  Body mass index is 26.43 kg/m².  Body surface area is 1.78 meters squared.    ECOG SCORE           [unfilled]    Intake/Output - Last 3 Shifts         03/15 0700  03/16 0659 03/16 0700  03/17 0659 03/17 0700  03/18 0659    P.O.  720     I.V. (mL/kg)  1387.6 (19.9)     IV Piggyback  466.3     Total Intake(mL/kg)  2573.9 (36.9)     Urine (mL/kg/hr)  1400     Total Output  1400     Net  +1173.9            Urine Occurrence  1 x     Stool Occurrence  0 x             Physical Exam  Constitutional:       Appearance: She is well-developed.   HENT:      Head: Normocephalic and atraumatic.      Mouth/Throat:      Pharynx: No oropharyngeal exudate.   Eyes:      Conjunctiva/sclera: Conjunctivae normal.      Pupils: Pupils are equal, round, and reactive to light.   Cardiovascular:      Rate and Rhythm: Normal rate and regular rhythm.      Heart sounds: Normal heart sounds. No murmur heard.  Pulmonary:      Effort: Pulmonary effort is normal.      Breath sounds: Normal breath sounds.   Abdominal:      General: Bowel  sounds are normal. There is no distension.      Palpations: Abdomen is soft.      Tenderness: There is no abdominal tenderness.   Musculoskeletal:         General: No deformity. Normal range of motion.      Cervical back: Normal range of motion and neck supple.   Skin:     General: Skin is warm and dry.      Findings: No erythema or rash.      Comments: RUE PICC. Dressing c/d/I. No sign of infection to site.  Newly placed right chest wall port-a-cath. Mild redness noted to site.   Neurological:      Mental Status: She is alert and oriented to person, place, and time.   Psychiatric:         Behavior: Behavior normal.         Thought Content: Thought content normal.         Judgment: Judgment normal.       Significant Labs:   CBC:   Recent Labs   Lab 03/16/22  0739 03/17/22  0333   WBC 11.88 10.20   HGB 9.4* 8.6*   HCT 29.5* 26.1*   * 383    and CMP:   Recent Labs   Lab 03/16/22  0739 03/17/22  0333    139   K 3.8 3.3*    102   CO2 22* 30*   * 97   BUN 12 9   CREATININE 0.8 0.7   CALCIUM 9.0 8.6*   PROT 6.7 5.6*   ALBUMIN 3.6 3.0*   BILITOT 0.3 0.2   ALKPHOS 72 60   AST 13 11   ALT 14 11   ANIONGAP 12 7*   EGFRNONAA >60.0 >60.0       Diagnostic Results:  I have reviewed all pertinent imaging results/findings within the past 24 hours.

## 2022-03-17 NOTE — PROCEDURES
Radiology Post-Procedure Note    Pre Op Diagnosis: B-ALL    Post Op Diagnosis: Same    Procedure: lumbar puncture with intrathecal chemotherapy administration     Procedure performed by: Joan Duenas MD; Renzo Noel MD    Written Informed Consent Obtained: Yes    Specimen Removed: 6 mL CSF    Estimated Blood Loss: Minimal    Findings: Following written informed consent and sterile prep and drape, a 22 gauge 9 cm spinal needle was inserted at L2-L3 intralaminar space under fluoroscopic surveillance.  6 mL clear CSF removed and sent to the lab for further analysis. Subsequently, the heme-onc department administered intrathecal chemotherapy.  There were no complications.    Patient tolerated procedure well.    Joan Duenas, PGY-5

## 2022-03-17 NOTE — H&P
Inpatient Radiology Pre-procedure Note    History of Present Illness:  Deepti Gonzalez is a 51 y.o. female who presents for lumbar puncture with intrathecal chemotherapy for B-ALL.  Admission H&P reviewed.  Past Medical History:   Diagnosis Date    Arthritis     Cancer     COPD (chronic obstructive pulmonary disease)     Hypertension     Stroke     TIA x 4     Past Surgical History:   Procedure Laterality Date    APPENDECTOMY      HYSTERECTOMY      ROTATOR CUFF REPAIR Bilateral     TONSILLECTOMY      TUBAL LIGATION         Review of Systems:   As documented in primary team H&P    Home Meds:   Prior to Admission medications    Medication Sig Start Date End Date Taking? Authorizing Provider   acyclovir (ZOVIRAX) 400 MG tablet Take 1 tablet (400 mg total) by mouth 2 (two) times daily. 12/7/21   Kathrine Posey MD   artificial tears (ISOPTO TEARS) 0.5 % ophthalmic solution Place 1 drop into both eyes 4 (four) times daily as needed. 1/24/22   Mirtha Antonio NP   aspirin (ECOTRIN) 81 MG EC tablet Take 1 tablet (81 mg total) by mouth once daily. Hold for platelet count less than 50 thousand.  Patient not taking: No sig reported 2/21/22   Mirtha Antonio NP   atorvastatin (LIPITOR) 80 MG tablet Take 80 mg by mouth once daily.    Historical Provider   busPIRone (BUSPAR) 10 MG tablet Take 10 mg by mouth 2 (two) times daily.    Historical Provider   dapagliflozin (FARXIGA) 10 mg tablet Take 10 mg by mouth once daily.    Historical Provider   diazePAM (VALIUM) 10 MG Tab Take 10 mg by mouth 2 (two) times daily as needed.    Historical Provider   diltiaZEM (CARDIZEM) 90 MG tablet Take 1 tablet (90 mg total) by mouth every 6 (six) hours. 12/7/21 12/7/22  Kathrine Posey MD   almaraz's soln (benadryl 30 mL, mylanta 30 mL, LIDOcaine 30 mL, nystatin 30 mL) 120mL Take 10 mLs by mouth 4 (four) times daily.  Patient not taking: No sig reported 1/24/22   Mirtha Antonio NP   fenofibrate 160 MG Tab Take 160 mg by mouth once daily.     Historical Provider   fluconazole (DIFLUCAN) 200 MG Tab Take 2 tablets (400 mg total) by mouth once daily. 2/21/22   Mirtha Antonio NP   gabapentin (NEURONTIN) 300 MG capsule Take 1 capsule (300 mg total) by mouth 3 (three) times daily. 12/7/21 12/7/22  Kathrine Posey MD   hydrOXYchloroQUINE (PLAQUENIL) 200 mg tablet Take 200 mg by mouth once daily.    Historical Provider   hydrOXYzine (ATARAX) 50 MG tablet Take 50 mg by mouth 2 (two) times a day.    Historical Provider   imatinib (GLEEVEC) 100 MG Tab Take 2 tablets (200 mg total) by mouth once daily with 1 other imatinib prescription for 600 mg total.Take with a meal and large glass of water 12/5/21   Daina Gonzales MD   imatinib (GLEEVEC) 400 MG Tab Take 1 tablet (400 mg total) by mouth once daily with 1 other imatinib prescription for 600 mg total. Take with a meal and large glass of water 12/5/21   Daina Gonzales MD   levoFLOXacin (LEVAQUIN) 500 MG tablet Take 1 tablet (500 mg total) by mouth once daily. 2/21/22   Mirtha Antonio NP   losartan (COZAAR) 25 MG tablet Take 25 mg by mouth once daily. 11/12/21   Historical Provider   methocarbamoL (ROBAXIN) 500 MG Tab Take 1 tablet (500 mg total) by mouth 3 (three) times daily as needed (muscle spasms).  Patient not taking: Reported on 3/16/2022 12/7/21   Kathrine Posey MD   omeprazole (PRILOSEC) 40 MG capsule Take 40 mg by mouth once daily.    Historical Provider   ondansetron (ZOFRAN-ODT) 8 MG TbDL Dissolve 1 tablet (8 mg total) by mouth every 12 (twelve) hours as needed (in case of chemo induced nausea).  Patient not taking: Reported on 3/16/2022 12/17/21 12/17/22  Dayron Jenkins MD   OXcarbazepine (TRILEPTAL) 600 MG Tab Take 1,200 mg by mouth 2 (two) times daily. 10/26/21   Historical Provider   polyethylene glycol (GLYCOLAX) 17 gram/dose powder Dissolve one capful (17 g) in liquid and take by mouth daily as needed (constipation).  Patient not taking: Reported on 3/16/2022 12/7/21   Kathrine Posey MD    potassium chloride SA (K-DUR,KLOR-CON) 20 MEQ tablet Take 20 mEq by mouth once daily. 3/10/22   Historical Provider   prochlorperazine (COMPAZINE) 5 MG tablet Take 1 tablet (5 mg total) by mouth every 6 (six) hours as needed for Nausea. 2/21/22 2/21/23  Mirtha Antonio NP   QUEtiapine (SEROQUEL) 200 MG Tab Take 200 mg by mouth every evening.    Historical Provider   rivaroxaban (XARELTO) 20 mg Tab Take 1 tablet (20 mg total) by mouth daily with dinner or evening meal. Hold for platelets less than 50 thousand.  Patient not taking: Reported on 3/16/2022 2/21/22   Mirtha Antonio NP   senna-docusate 8.6-50 mg (PERICOLACE) 8.6-50 mg per tablet Take 1 tablet by mouth 2 (two) times daily. 12/7/21   Mary Max DO   sumatriptan (IMITREX) 50 MG tablet Take 1 tablet (50 mg total) by mouth daily as needed (headache). 2/25/22 3/27/22  Dayron Jenkins MD   traMADoL (ULTRAM) 50 mg tablet Take 1 tablet (50 mg total) by mouth every 6 (six) hours as needed for Pain. 2/25/22   Dayron Jenkins MD   TRINTELLIX 10 mg Tab Take 1 tablet by mouth once daily. 10/20/21   Historical Provider   VISTARIL 50 mg Cap Take by mouth. 3/8/22   Historical Provider     Scheduled Meds:    [START ON 3/18/2022] acetaminophen  650 mg Oral Q24H    acyclovir  400 mg Oral BID    busPIRone  10 mg Oral BID    cytarabine with hydrocortisone and methotrexate INTRATHECAL chemo injection (PEDS)   Intrathecal Q24H    [START ON 3/18/2022] dexAMETHasone  8 mg Oral Q24H    diltiaZEM  90 mg Oral Q6H    [START ON 3/18/2022] diphenhydramine (BENADRYL) IV  50 mg Intravenous Q24H    gabapentin  300 mg Oral TID    hydrOXYchloroQUINE  200 mg Oral Daily    hydrOXYzine  50 mg Oral BID    [START ON 3/18/2022] leucovorin  25 mg Oral Q6H    losartan  25 mg Oral Daily    mupirocin   Nasal BID    OXcarbazepine  1,200 mg Oral BID    pantoprazole  40 mg Oral Daily    [START ON 3/18/2022] pegaspargase (ONCASPAR) chemo infusion  1,500 Units/m2 (Treatment Plan  Recorded) Intravenous Q24H    QUEtiapine  200 mg Oral QHS    rivaroxaban  20 mg Oral Daily with dinner    sodium bicarbonate  1,300 mg Oral Q6H    vortioxetine  1 tablet Oral Daily     Continuous Infusions:    lactated ringers 100 mL/hr at 03/17/22 1218     PRN Meds:alteplase, artificial tears, diazePAM, [START ON 3/18/2022] diphenhydrAMINE, [START ON 3/18/2022] EPINEPHrine, glucagon (human recombinant), glucose, glucose, heparin, porcine (PF), insulin aspart U-100, magnesium oxide, magnesium oxide, magnesium oxide, methocarbamoL, [START ON 3/18/2022] methylPREDNISolone sodium succinate, naloxone, ondansetron, polyethylene glycol, potassium chloride, potassium chloride, potassium chloride, potassium, sodium phosphates, potassium, sodium phosphates, prochlorperazine, senna-docusate 8.6-50 mg, sodium chloride 0.9%, sumatriptan, traMADoL  Anticoagulants/Antiplatelets: no anticoagulation    Allergies:   Review of patient's allergies indicates:   Allergen Reactions    Levetiracetam      Other reaction(s): Unknown     Sedation Hx: have not been any systemic reactions    Labs:  Recent Labs   Lab 03/16/22  0739   INR 1.1       Recent Labs   Lab 03/17/22  0333   WBC 10.20   HGB 8.6*   HCT 26.1*   *         Recent Labs   Lab 03/17/22  0333   GLU 97      K 3.3*      CO2 30*   BUN 9   CREATININE 0.7   CALCIUM 8.6*   MG 2.0   ALT 11   AST 11   ALBUMIN 3.0*   BILITOT 0.2         Vitals:  Temp: 98.7 °F (37.1 °C) (03/17/22 1209)  Pulse: 89 (03/17/22 1209)  Resp: 18 (03/17/22 1211)  BP: 129/66 (03/17/22 1209)  SpO2: 95 % (03/17/22 1209)     Physical Exam:    General: no acute distress  Mental Status: alert and oriented to person, place and time  HEENT: normocephalic, atraumatic  Chest: unlabored breathing  Heart: regular heart rate  Abdomen: nondistended  Extremity: moves all extremities    Plan: Patient will undergo lumbar puncture with intrathecal chemotherapy.  Sedation Plan: steven Franco  Henrique, PGY-5

## 2022-03-18 DIAGNOSIS — C91.00 B-CELL ACUTE LYMPHOBLASTIC LEUKEMIA: Primary | ICD-10-CM

## 2022-03-18 PROBLEM — Z51.5 PALLIATIVE CARE ENCOUNTER: Status: ACTIVE | Noted: 2022-03-18

## 2022-03-18 LAB
ALBUMIN SERPL BCP-MCNC: 3.1 G/DL (ref 3.5–5.2)
ALP SERPL-CCNC: 64 U/L (ref 55–135)
ALT SERPL W/O P-5'-P-CCNC: 11 U/L (ref 10–44)
AMYLASE SERPL-CCNC: 22 U/L (ref 20–110)
ANION GAP SERPL CALC-SCNC: 9 MMOL/L (ref 8–16)
AST SERPL-CCNC: 11 U/L (ref 10–40)
BASOPHILS # BLD AUTO: 0.01 K/UL (ref 0–0.2)
BASOPHILS NFR BLD: 0.1 % (ref 0–1.9)
BILIRUB SERPL-MCNC: 0.2 MG/DL (ref 0.1–1)
BILIRUB SERPL-MCNC: NORMAL MG/DL
BILIRUB SERPL-MCNC: NORMAL MG/DL
BLOOD URINE, POC: NORMAL
BLOOD URINE, POC: NORMAL
BUN SERPL-MCNC: 11 MG/DL (ref 6–20)
CALCIUM SERPL-MCNC: 8.3 MG/DL (ref 8.7–10.5)
CHLORIDE SERPL-SCNC: 107 MMOL/L (ref 95–110)
CO2 SERPL-SCNC: 24 MMOL/L (ref 23–29)
COLOR, POC UA: NORMAL
COLOR, POC UA: NORMAL
CREAT SERPL-MCNC: 0.6 MG/DL (ref 0.5–1.4)
DIFFERENTIAL METHOD: ABNORMAL
EOSINOPHIL # BLD AUTO: 0 K/UL (ref 0–0.5)
EOSINOPHIL NFR BLD: 0 % (ref 0–8)
ERYTHROCYTE [DISTWIDTH] IN BLOOD BY AUTOMATED COUNT: 17.4 % (ref 11.5–14.5)
EST. GFR  (AFRICAN AMERICAN): >60 ML/MIN/1.73 M^2
EST. GFR  (NON AFRICAN AMERICAN): >60 ML/MIN/1.73 M^2
FIBRINOGEN PPP-MCNC: 298 MG/DL (ref 182–400)
GLUCOSE SERPL-MCNC: 142 MG/DL (ref 70–110)
GLUCOSE UR QL STRIP: NORMAL
GLUCOSE UR QL STRIP: NORMAL
HCT VFR BLD AUTO: 25.6 % (ref 37–48.5)
HGB BLD-MCNC: 8.1 G/DL (ref 12–16)
IMM GRANULOCYTES # BLD AUTO: 0.43 K/UL (ref 0–0.04)
IMM GRANULOCYTES NFR BLD AUTO: 4 % (ref 0–0.5)
KETONES UR QL STRIP: NORMAL
KETONES UR QL STRIP: NORMAL
LEUKOCYTE ESTERASE URINE, POC: NORMAL
LEUKOCYTE ESTERASE URINE, POC: NORMAL
LIPASE SERPL-CCNC: 27 U/L (ref 4–60)
LYMPHOCYTES # BLD AUTO: 1.5 K/UL (ref 1–4.8)
LYMPHOCYTES NFR BLD: 14.2 % (ref 18–48)
MAGNESIUM SERPL-MCNC: 2 MG/DL (ref 1.6–2.6)
MCH RBC QN AUTO: 32.8 PG (ref 27–31)
MCHC RBC AUTO-ENTMCNC: 31.6 G/DL (ref 32–36)
MCV RBC AUTO: 104 FL (ref 82–98)
MONOCYTES # BLD AUTO: 1.1 K/UL (ref 0.3–1)
MONOCYTES NFR BLD: 9.8 % (ref 4–15)
MTX SERPL-SCNC: 1.34 UMOL/L (ref 0.5–5)
NEUTROPHILS # BLD AUTO: 7.8 K/UL (ref 1.8–7.7)
NEUTROPHILS NFR BLD: 71.9 % (ref 38–73)
NITRITE, POC UA: NORMAL
NITRITE, POC UA: NORMAL
NRBC BLD-RTO: 0 /100 WBC
PH, POC UA: 7
PH, POC UA: 7
PHOSPHATE SERPL-MCNC: 3 MG/DL (ref 2.7–4.5)
PLATELET # BLD AUTO: 428 K/UL (ref 150–450)
PMV BLD AUTO: 8.9 FL (ref 9.2–12.9)
POCT GLUCOSE: 150 MG/DL (ref 70–110)
POCT GLUCOSE: 159 MG/DL (ref 70–110)
POCT GLUCOSE: 192 MG/DL (ref 70–110)
POCT GLUCOSE: 204 MG/DL (ref 70–110)
POTASSIUM SERPL-SCNC: 4.3 MMOL/L (ref 3.5–5.1)
PROT SERPL-MCNC: 5.7 G/DL (ref 6–8.4)
PROTEIN, POC: NORMAL
PROTEIN, POC: NORMAL
RBC # BLD AUTO: 2.47 M/UL (ref 4–5.4)
SODIUM SERPL-SCNC: 140 MMOL/L (ref 136–145)
SPECIFIC GRAVITY, POC UA: NORMAL
SPECIFIC GRAVITY, POC UA: NORMAL
UROBILINOGEN, POC UA: NORMAL
UROBILINOGEN, POC UA: NORMAL
WBC # BLD AUTO: 10.86 K/UL (ref 3.9–12.7)

## 2022-03-18 PROCEDURE — 20600001 HC STEP DOWN PRIVATE ROOM

## 2022-03-18 PROCEDURE — 85384 FIBRINOGEN ACTIVITY: CPT | Performed by: NURSE PRACTITIONER

## 2022-03-18 PROCEDURE — 85025 COMPLETE CBC W/AUTO DIFF WBC: CPT | Performed by: NURSE PRACTITIONER

## 2022-03-18 PROCEDURE — 25000003 PHARM REV CODE 250: Performed by: NURSE PRACTITIONER

## 2022-03-18 PROCEDURE — 83735 ASSAY OF MAGNESIUM: CPT | Performed by: NURSE PRACTITIONER

## 2022-03-18 PROCEDURE — 99223 PR INITIAL HOSPITAL CARE,LEVL III: ICD-10-PCS | Mod: ,,, | Performed by: CLINICAL NURSE SPECIALIST

## 2022-03-18 PROCEDURE — 80204 DRUG ASSAY METHOTREXATE: CPT | Performed by: INTERNAL MEDICINE

## 2022-03-18 PROCEDURE — 25000003 PHARM REV CODE 250: Performed by: INTERNAL MEDICINE

## 2022-03-18 PROCEDURE — 99233 SBSQ HOSP IP/OBS HIGH 50: CPT | Mod: ,,, | Performed by: INTERNAL MEDICINE

## 2022-03-18 PROCEDURE — 80053 COMPREHEN METABOLIC PANEL: CPT | Performed by: NURSE PRACTITIONER

## 2022-03-18 PROCEDURE — 63600175 PHARM REV CODE 636 W HCPCS: Performed by: INTERNAL MEDICINE

## 2022-03-18 PROCEDURE — 99223 1ST HOSP IP/OBS HIGH 75: CPT | Mod: ,,, | Performed by: CLINICAL NURSE SPECIALIST

## 2022-03-18 PROCEDURE — 82150 ASSAY OF AMYLASE: CPT | Performed by: NURSE PRACTITIONER

## 2022-03-18 PROCEDURE — 99233 PR SUBSEQUENT HOSPITAL CARE,LEVL III: ICD-10-PCS | Mod: ,,, | Performed by: INTERNAL MEDICINE

## 2022-03-18 PROCEDURE — 84100 ASSAY OF PHOSPHORUS: CPT | Performed by: NURSE PRACTITIONER

## 2022-03-18 PROCEDURE — 83690 ASSAY OF LIPASE: CPT | Performed by: NURSE PRACTITIONER

## 2022-03-18 PROCEDURE — 63600175 PHARM REV CODE 636 W HCPCS: Performed by: NURSE PRACTITIONER

## 2022-03-18 RX ORDER — INSULIN ASPART 100 [IU]/ML
1-10 INJECTION, SOLUTION INTRAVENOUS; SUBCUTANEOUS
Status: DISCONTINUED | OUTPATIENT
Start: 2022-03-18 | End: 2022-03-19 | Stop reason: HOSPADM

## 2022-03-18 RX ORDER — OXYCODONE HYDROCHLORIDE 5 MG/1
5 TABLET ORAL EVERY 4 HOURS PRN
Status: DISCONTINUED | OUTPATIENT
Start: 2022-03-18 | End: 2022-03-19 | Stop reason: HOSPADM

## 2022-03-18 RX ADMIN — OXCARBAZEPINE 1200 MG: 600 TABLET, FILM COATED ORAL at 09:03

## 2022-03-18 RX ADMIN — DEXAMETHASONE 8 MG: 4 TABLET ORAL at 03:03

## 2022-03-18 RX ADMIN — MUPIROCIN: 20 OINTMENT TOPICAL at 09:03

## 2022-03-18 RX ADMIN — ACYCLOVIR 400 MG: 200 CAPSULE ORAL at 08:03

## 2022-03-18 RX ADMIN — SODIUM BICARBONATE 650 MG TABLET 1300 MG: at 11:03

## 2022-03-18 RX ADMIN — SUMATRIPTAN SUCCINATE 50 MG: 50 TABLET ORAL at 12:03

## 2022-03-18 RX ADMIN — HYDROXYZINE HYDROCHLORIDE 50 MG: 25 TABLET, FILM COATED ORAL at 09:03

## 2022-03-18 RX ADMIN — SODIUM CHLORIDE, SODIUM LACTATE, POTASSIUM CHLORIDE, AND CALCIUM CHLORIDE: .6; .31; .03; .02 INJECTION, SOLUTION INTRAVENOUS at 07:03

## 2022-03-18 RX ADMIN — INSULIN ASPART 4 UNITS: 100 INJECTION, SOLUTION INTRAVENOUS; SUBCUTANEOUS at 01:03

## 2022-03-18 RX ADMIN — GABAPENTIN 300 MG: 300 CAPSULE ORAL at 07:03

## 2022-03-18 RX ADMIN — LEUCOVORIN CALCIUM 25 MG: 25 TABLET ORAL at 04:03

## 2022-03-18 RX ADMIN — LOSARTAN POTASSIUM 25 MG: 25 TABLET, FILM COATED ORAL at 09:03

## 2022-03-18 RX ADMIN — PEGASPARGASE 2730 UNITS: 750 INJECTION, SOLUTION INTRAMUSCULAR; INTRAVENOUS at 10:03

## 2022-03-18 RX ADMIN — BUSPIRONE HYDROCHLORIDE 10 MG: 10 TABLET ORAL at 08:03

## 2022-03-18 RX ADMIN — RIVAROXABAN 20 MG: 20 TABLET, FILM COATED ORAL at 06:03

## 2022-03-18 RX ADMIN — GABAPENTIN 300 MG: 300 CAPSULE ORAL at 03:03

## 2022-03-18 RX ADMIN — SODIUM BICARBONATE 650 MG TABLET 1300 MG: at 06:03

## 2022-03-18 RX ADMIN — PANTOPRAZOLE SODIUM 40 MG: 40 TABLET, DELAYED RELEASE ORAL at 09:03

## 2022-03-18 RX ADMIN — TRAMADOL HYDROCHLORIDE 50 MG: 50 TABLET, COATED ORAL at 06:03

## 2022-03-18 RX ADMIN — HYDROXYCHLOROQUINE SULFATE 200 MG: 200 TABLET, FILM COATED ORAL at 09:03

## 2022-03-18 RX ADMIN — DIPHENHYDRAMINE HYDROCHLORIDE 50 MG: 50 INJECTION INTRAMUSCULAR; INTRAVENOUS at 10:03

## 2022-03-18 RX ADMIN — ACYCLOVIR 400 MG: 200 CAPSULE ORAL at 09:03

## 2022-03-18 RX ADMIN — GABAPENTIN 300 MG: 300 CAPSULE ORAL at 11:03

## 2022-03-18 RX ADMIN — ONDANSETRON 8 MG: 8 TABLET, ORALLY DISINTEGRATING ORAL at 06:03

## 2022-03-18 RX ADMIN — DILTIAZEM HYDROCHLORIDE 90 MG: 30 TABLET, FILM COATED ORAL at 01:03

## 2022-03-18 RX ADMIN — BUSPIRONE HYDROCHLORIDE 10 MG: 10 TABLET ORAL at 09:03

## 2022-03-18 RX ADMIN — VORTIOXETINE 10 MG: 10 TABLET, FILM COATED ORAL at 09:03

## 2022-03-18 RX ADMIN — OXCARBAZEPINE 1200 MG: 600 TABLET, FILM COATED ORAL at 08:03

## 2022-03-18 RX ADMIN — ACETAMINOPHEN 650 MG: 325 TABLET ORAL at 09:03

## 2022-03-18 RX ADMIN — DILTIAZEM HYDROCHLORIDE 90 MG: 30 TABLET, FILM COATED ORAL at 06:03

## 2022-03-18 RX ADMIN — DIAZEPAM 10 MG: 5 TABLET ORAL at 08:03

## 2022-03-18 RX ADMIN — HYPROMELLOSE 2910 1 DROP: 5 SOLUTION OPHTHALMIC at 09:03

## 2022-03-18 RX ADMIN — QUETIAPINE FUMARATE 200 MG: 200 TABLET ORAL at 08:03

## 2022-03-18 RX ADMIN — OXYCODONE 5 MG: 5 TABLET ORAL at 11:03

## 2022-03-18 RX ADMIN — DILTIAZEM HYDROCHLORIDE 90 MG: 30 TABLET, FILM COATED ORAL at 07:03

## 2022-03-18 RX ADMIN — INSULIN ASPART 1 UNITS: 100 INJECTION, SOLUTION INTRAVENOUS; SUBCUTANEOUS at 09:03

## 2022-03-18 RX ADMIN — HYDROXYZINE HYDROCHLORIDE 50 MG: 25 TABLET, FILM COATED ORAL at 08:03

## 2022-03-18 RX ADMIN — METHOCARBAMOL 500 MG: 500 TABLET ORAL at 09:03

## 2022-03-18 RX ADMIN — LEUCOVORIN CALCIUM 25 MG: 25 TABLET ORAL at 09:03

## 2022-03-18 RX ADMIN — OXYCODONE 5 MG: 5 TABLET ORAL at 06:03

## 2022-03-18 NOTE — PROGRESS NOTES
[facility] - Oncology ([facility])  Hematology  Bone Marrow Transplant  Progress Note    Patient Name: Deepti Gonzalez  Admission Date: 3/16/2022  Hospital Length of Stay: 2 days  Code Status: Full Code    Subjective:     Interval History: C3D3 of EWALL consolidation for B-ALL. MTX level 1.34 today. Received IT triple therapy in fluoro yesterday. Tolerated well. Will receive Peg today. Baseline amylase, lipase, and fibrinogen wnl. Port site less tender and no longer red. Will pull PICC prior to discharge. Continues to c/o joint/bone pain. Palliative care consulted and recommending prn oxy. Ordered accordingly. Patient will follow outpatient with Dr. Mccord with palliative care. Will likely clear MTX and discharge this weekend. She will f/u with Dr. Jenkins for readmission on 4/13.  will arrange lodging at the [facility] the night before f/u. Will arrange lab monitoring with Dr. Garcia today.    Objective:     Vital Signs (Most Recent):  Temp: 98.6 °F (37 °C) (03/18/22 1056)  Pulse: 92 (03/18/22 1056)  Resp: 18 (03/18/22 1115)  BP: 138/65 (03/18/22 1056)  SpO2: 95 % (03/18/22 1056) Vital Signs (24h Range):  Temp:  [97.1 °F (36.2 °C)-98.6 °F (37 °C)] 98.6 °F (37 °C)  Pulse:  [87-98] 92  Resp:  [18-20] 18  SpO2:  [95 %-98 %] 95 %  BP: (109-139)/(56-71) 138/65     Weight: 70.5 kg (155 lb 6.8 oz)  Body mass index is 26.68 kg/m².  Body surface area is 1.78 meters squared.    ECOG SCORE           [unfilled]    Intake/Output - Last 3 Shifts         03/16 0700  03/17 0659 03/17 0700 03/18 0659 03/18 0700 03/19 0659    P.O. 720      I.V. (mL/kg) 1387.6 (19.9) 910.6 (12.9)     IV Piggyback 466.3 539.5     Total Intake(mL/kg) 2573.9 (36.9) 1450 (20.6)     Urine (mL/kg/hr) 1400 4300 (2.5)     Stool  0     Total Output 1400 4300     Net +1173.9 -2850            Urine Occurrence 1 x      Stool Occurrence 0 x 1 x             Physical Exam  Constitutional:       Appearance: She is well-developed.   HENT:      Head: Normocephalic and  atraumatic.      Mouth/Throat:      Pharynx: No oropharyngeal exudate.   Eyes:      Conjunctiva/sclera: Conjunctivae normal.      Pupils: Pupils are equal, round, and reactive to light.   Cardiovascular:      Rate and Rhythm: Normal rate and regular rhythm.      Heart sounds: Normal heart sounds. No murmur heard.  Pulmonary:      Effort: Pulmonary effort is normal.      Breath sounds: Normal breath sounds.   Abdominal:      General: Bowel sounds are normal. There is no distension.      Palpations: Abdomen is soft.      Tenderness: There is no abdominal tenderness.   Musculoskeletal:         General: No deformity. Normal range of motion.      Cervical back: Normal range of motion and neck supple.   Skin:     General: Skin is warm and dry.      Findings: No erythema or rash.      Comments: RUE PICC. Dressing c/d/I. No sign of infection to site.  Newly placed right chest wall port-a-cath. Not accessed.   Neurological:      Mental Status: She is alert and oriented to person, place, and time.   Psychiatric:         Behavior: Behavior normal.         Thought Content: Thought content normal.         Judgment: Judgment normal.       Significant Labs:   CBC:   Recent Labs   Lab 03/17/22  0333 03/18/22  0315   WBC 10.20 10.86   HGB 8.6* 8.1*   HCT 26.1* 25.6*    428      and CMP:   Recent Labs   Lab 03/17/22  0333 03/18/22  0311    140   K 3.3* 4.3    107   CO2 30* 24   GLU 97 142*   BUN 9 11   CREATININE 0.7 0.6   CALCIUM 8.6* 8.3*   PROT 5.6* 5.7*   ALBUMIN 3.0* 3.1*   BILITOT 0.2 0.2   ALKPHOS 60 64   AST 11 11   ALT 11 11   ANIONGAP 7* 9   EGFRNONAA >60.0 >60.0         Diagnostic Results:  I have reviewed all pertinent imaging results/findings within the past 24 hours.    Assessment/Plan:     * B-cell acute lymphoblastic leukemia  (Most info from Dr. Jenkins's most recent clinic note):   Precursor B-cell acute lymphoblastic leukemia (Ph+)              A. 11/23/2021: Transferred to [facility] [place]  hospital for evaluation for acute leukemia              B. 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               C. 11/30/2021 - 12/23/2021: Vincristine + imatinib induction; hospital course was complicated by acute hypoxemic respiratory failure which resolved with diuresis and treatment of ALL                D. 1/19/2022 - 1/24/2022: Admitted for C1 of consolidation chemotherapy with EWALL               E. 2/16/2022 - 2/22/2022: Admitted for C2 of consolidation chemotherapy with EWALL    - BMBx from 1/3/22 showing CR  - admitting today for C3 of consolidation with EWALL (HD MTX and pegapargase). Today is C3D3.  - tolerated cycles 1 and 2 of consolidation well  - MTX level 1.34 today  - received triple therapy IT chemo 3/17  - will get Peg today. Baseline amylase, lipase, and fibrinogen wnl.  - takes Gleevec days 15-28 of consolidation cycles  - port-a-cath placed 3/16/22 prior to admission. Plan to removed PICC prior to discharge.  - follows outpatient with Dr. Garcia for lab monitoring and transfusions    Pain  - c/o ongoing joint/bone pain  - palliative care consulted and rec adding prn oxy 5 mg q4 prn. Ordered accordingly.  - patient will f/u outpatient with Dr. Mccord    Hypokalemia  - K+ wnl at 4.3 today  - replace per prn electrolyte replacement order set  - daily CMP while inpatient    Anemia associated with chemotherapy  - hgb 8.1  - transfuse for hgb <7  - daily cbc while inpatient  - weekly lab monitoring outpatient with Dr. Garcia    Moderate major depression  - continue home trintellix while inpatient      Insomnia  - continue home seroquel while inpatient    Anxiety  - continue home trintellix and valium while  inpatient    GERD (gastroesophageal reflux disease)  - home prilosec not on formulary. Will receive protonix while inpatient.    Hypertension  - continue home losartan while inpatient    Seizure disorder  - continue home oxcarbazepine while inpatient    Type 2 diabetes mellitus, without long-term current use of insulin  - hold jardiance and metformin recently discontinued by endocrinologist.   - hold home farxiga while inpatient. Can resume at discharge.  - low dose SSI, achs blood glucose monitoring, and diabetic diet while inpatient  - blood glucose as high as low 200. Will switch to moderate dose sliding scale.      Rheumatoid arthritis involving multiple sites  - continue home plaquenil.   - home leflunomide was recently discontinued by her rhemotologist    History of TIA (transient ischemic attack)  - Continue home fenofibrate. Holding home statin and ASA while inpatient. Can resume at discharge.    Hyperlipidemia  - holding home statin while inpatient    Long term current use of anticoagulant  - continue home xarelto. Hold with plts < 50K.    Paroxysmal atrial fibrillation  - continue home diltiazem and xarelto. Hold xarelto with plt count < 50K.        VTE Risk Mitigation (From admission, onward)         Ordered     rivaroxaban tablet 20 mg  With dinner         03/16/22 1530     heparin, porcine (PF) 100 unit/mL injection flush 500 Units  As needed (PRN)         03/16/22 1631     IP VTE HIGH RISK PATIENT  Once         03/16/22 1504     Place sequential compression device  Until discontinued         03/16/22 1504                Disposition: Inpatient for chemo.    Mirtha Antonio, NP  Bone Marrow Transplant  [facility] - Oncology ([facility])

## 2022-03-18 NOTE — ASSESSMENT & PLAN NOTE
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 Pt also has hx of COPD, HTN, HLD, DM2, TIA, anxiety, depression, GERD, seizure disorder, PAD, gastroparesis, RA, osteoporosis, and pacermaker placement. Pt admitted for C3 of consolidation with  EWALL (HD MTX and pegaspargase).  Pt is alert, oriented to person, place, time, situation. Pt is a Full code. Pt endorses back pain, body aches.     Reason for consult: Pain Management: Communicated with Mirtha Antonio .    Goals of care/advance care planning:   Pt is receiving medical management for B-ALL, RA and multiple other medial issues. Goal is to continue aggressive treatment.   Pt follows up with her specialists in [place] and at [facility] with Hem/Onc.     Pt is  and has a 15 y/o and a 30 y/o. Pt lives with her  and her daughter. She will be staying at her brother's house at discharge due to pt being unsteady in gait at times. Due to TIA's per pt.     Symptom management:     Pain r/t to cancer and treatment/RA/back issues.  Pt reports body aches all over, especially back area with stabbing pain intermittently.     Pt reports pain 7/10 at this time.   Pt reports she has constant body aches and sharp pains intermittently to back area.     Meds:   Pt is on tramadol 50 mg q 6 hrs prn for moderate pain/   Pt reports this has not been helping with this pain.     Recs:   Considering starting on oxycodone 5 mg q 4 hrs prn severe pain.     Anxiety:   Pt sees a psychiatrist in [place] who manages meds.     Meds:   Pt is on Valium 90 mg po q 6 hrs prn.   Pt has Valium 10 mg po bid prn.   Pt is on trintellix 10 mg daily.   Pt on Quetiapine 200 mg nightly.     Nausea/Vomitting:   No nausea noted at this time.     Meds:   Pt has zofran 8 mg q 8 hrs prn (first choice)  Pt has prochlorperazine 10 mg q 6 hrs prn (second choice).     Plan:   See above symptom recs for pain.  Dr. Mccord to see pt in clinic May 4th at 1100 for f/u for symptom management.   Communicated with Mirtha  Marisela.   Will follow.

## 2022-03-18 NOTE — ASSESSMENT & PLAN NOTE
- K+ wnl at 4.3 today  - replace per prn electrolyte replacement order set  - daily CMP while inpatient

## 2022-03-18 NOTE — ASSESSMENT & PLAN NOTE
(Most info from Dr. Jenkins's most recent clinic note):   Precursor B-cell acute lymphoblastic leukemia (Ph+)              A. 11/23/2021: Transferred to [facility] from outside hospital for evaluation for acute leukemia              B. 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               C. 11/30/2021 - 12/23/2021: Vincristine + imatinib induction; hospital course was complicated by acute hypoxemic respiratory failure which resolved with diuresis and treatment of ALL                D. 1/19/2022 - 1/24/2022: Admitted for C1 of consolidation chemotherapy with EWALL               E. 2/16/2022 - 2/22/2022: Admitted for C2 of consolidation chemotherapy with EWALL    - BMBx from 1/3/22 showing CR  - admitting today for C3 of consolidation with EWALL (HD MTX and pegapargase). Today is C3D3.  - tolerated cycles 1 and 2 of consolidation well  - MTX level 1.34 today  - received triple therapy IT chemo 3/17  - will get Peg today. Baseline amylase, lipase, and fibrinogen wnl.  - takes Gleevec days 15-28 of consolidation cycles  - port-a-cath placed 3/16/22 prior to admission. Plan to removed PICC prior to discharge.  - follows outpatient with Dr. Garcia for lab monitoring and transfusions

## 2022-03-18 NOTE — SUBJECTIVE & OBJECTIVE
Interval History: Pt on chemo for consolidation of B-ALL.     Past Medical History:   Diagnosis Date    Arthritis     Cancer     COPD (chronic obstructive pulmonary disease)     Hypertension     Stroke     TIA x 4       Past Surgical History:   Procedure Laterality Date    APPENDECTOMY      HYSTERECTOMY      ROTATOR CUFF REPAIR Bilateral     TONSILLECTOMY      TUBAL LIGATION         Review of patient's allergies indicates:   Allergen Reactions    Levetiracetam      Other reaction(s): Unknown       Medications:  Continuous Infusions:   lactated ringers 100 mL/hr at 03/17/22 2144     Scheduled Meds:   acyclovir  400 mg Oral BID    busPIRone  10 mg Oral BID    dexAMETHasone  8 mg Oral Q24H    diltiaZEM  90 mg Oral Q6H    gabapentin  300 mg Oral TID    hydrOXYchloroQUINE  200 mg Oral Daily    hydrOXYzine  50 mg Oral BID    leucovorin  25 mg Oral Q6H    losartan  25 mg Oral Daily    mupirocin   Nasal BID    OXcarbazepine  1,200 mg Oral BID    pantoprazole  40 mg Oral Daily    pegaspargase (ONCASPAR) chemo infusion  1,500 Units/m2 (Treatment Plan Recorded) Intravenous Q24H    QUEtiapine  200 mg Oral QHS    rivaroxaban  20 mg Oral Daily with dinner    sodium bicarbonate  1,300 mg Oral Q6H    vortioxetine  1 tablet Oral Daily     PRN Meds:alteplase, artificial tears, diazePAM, diphenhydrAMINE, EPINEPHrine, glucagon (human recombinant), glucose, glucose, heparin, porcine (PF), insulin aspart U-100, magnesium oxide, magnesium oxide, magnesium oxide, methocarbamoL, methylPREDNISolone sodium succinate, naloxone, ondansetron, oxyCODONE, polyethylene glycol, potassium chloride, potassium chloride, potassium chloride, potassium, sodium phosphates, potassium, sodium phosphates, prochlorperazine, senna-docusate 8.6-50 mg, sodium chloride 0.9%, sumatriptan, traMADoL    Family History    None       Tobacco Use    Smoking status: Current Every Day Smoker     Packs/day: 0.50     Types: Cigarettes    Smokeless tobacco: Never Used    Substance and Sexual Activity    Alcohol use: No    Drug use: Never    Sexual activity: Not on file       Review of Systems   Constitutional:  Positive for fatigue.   HENT:  Negative for trouble swallowing.    Respiratory:  Positive for shortness of breath.    Cardiovascular:  Negative for chest pain.   Gastrointestinal:  Negative for nausea and vomiting.   Musculoskeletal:  Positive for back pain, joint swelling and neck pain.   Skin: Negative.    Neurological:  Positive for weakness. Negative for headaches.   Psychiatric/Behavioral:  Positive for sleep disturbance. Negative for agitation. The patient is not nervous/anxious.    Objective:     Vital Signs (Most Recent):  Temp: 98.6 °F (37 °C) (03/18/22 1056)  Pulse: 92 (03/18/22 1056)  Resp: 18 (03/18/22 1056)  BP: 138/65 (03/18/22 1056)  SpO2: 95 % (03/18/22 1056) Vital Signs (24h Range):  Temp:  [97.1 °F (36.2 °C)-98.7 °F (37.1 °C)] 98.6 °F (37 °C)  Pulse:  [87-98] 92  Resp:  [18-20] 18  SpO2:  [95 %-98 %] 95 %  BP: (109-139)/(56-71) 138/65     Weight: 70.5 kg (155 lb 6.8 oz)  Body mass index is 26.68 kg/m².    Physical Exam  Constitutional:       Appearance: She is well-developed.   HENT:      Head: Normocephalic and atraumatic.   Eyes:      General: Lids are normal.      Conjunctiva/sclera: Conjunctivae normal.   Pulmonary:      Effort: Pulmonary effort is normal. No respiratory distress.   Abdominal:      General: Abdomen is flat.   Musculoskeletal:      Cervical back: Normal range of motion.      Comments: ROM normal   Skin:     General: Skin is warm and dry.   Neurological:      Mental Status: She is alert and oriented to person, place, and time.   Psychiatric:         Attention and Perception: Attention normal.         Mood and Affect: Mood normal.         Speech: Speech normal.         Behavior: Behavior is cooperative.       Review of Symptoms      Symptom Assessment (ESAS 0-10 Scale)  Pain:  7  Dyspnea:  0  Anxiety:  0  Nausea:  0  Depression:   0  Anorexia:  3  Insomnia:  0  Restlessness:  0  Agitation:  0       Pain Assessment:  OME in 24 hours:  0  Location(s): back    Back       Location: generalized        Quality: Aching and sharp        Quantity: 7/10 in intensity        Chronicity: Onset 0 (years) year(s) ago, gradually worsening        Aggravating Factors: Activity        Alleviating Factors: None       Associated Symptoms: Arthralgias    Performance Status:  60    Living Arrangements:  Lives with spouse    Psychosocial/Cultural: Pt lives with spouse and 15 y/o. Pt has a 32 y/o and two grandchildren. Pt will be moving in with her brother at discharge since he works at home and pt is unsteady with gait at times.  Per pt, this has been from TIAs.      Advance Care Planning   Advance Directives:   Living Will: No    Do Not Resuscitate Status: No    Medical Power of : No    Agent's Name:   is NOK.    Decision Making:  Patient answered questions       Significant Labs: All pertinent labs within the past 24 hours have been reviewed.  CBC:   Recent Labs   Lab 03/18/22 0315   WBC 10.86   HGB 8.1*   HCT 25.6*   *        BMP:  Recent Labs   Lab 03/18/22 0311   *      K 4.3      CO2 24   BUN 11   CREATININE 0.6   CALCIUM 8.3*   MG 2.0     LFT:  Lab Results   Component Value Date    AST 11 03/18/2022    ALKPHOS 64 03/18/2022    BILITOT 0.2 03/18/2022     Albumin:   Albumin   Date Value Ref Range Status   03/18/2022 3.1 (L) 3.5 - 5.2 g/dL Final     Protein:   Total Protein   Date Value Ref Range Status   03/18/2022 5.7 (L) 6.0 - 8.4 g/dL Final     Lactic acid:   Lab Results   Component Value Date    LACTATE 1.4 11/24/2021       Significant Imaging: I have reviewed all pertinent imaging results/findings within the past 24 hours.

## 2022-03-18 NOTE — ASSESSMENT & PLAN NOTE
- c/o ongoing joint/bone pain  - palliative care consulted and rec adding prn oxy 5 mg q4 prn. Ordered accordingly.  - patient will f/u outpatient with Dr. Mccord

## 2022-03-18 NOTE — ASSESSMENT & PLAN NOTE
- hgb 8.1  - transfuse for hgb <7  - daily cbc while inpatient  - weekly lab monitoring outpatient with Dr. Garcia

## 2022-03-18 NOTE — PROGRESS NOTES
Admit Assessment    Patient Identification  Deepti Gonzalez   :  1970  Admit Date:  3/16/2022  Attending Provider:  Dayron Jenkins MD              Referral:   Pt was admitted to the BMT-Unit with a diagnosis of B-cell acute lymphoblastic leukemia, and was admitted this hospital stay due to Acute lymphoblastic leukemia not having achieved remission [C91.00]  Encounter for antineoplastic chemotherapy [Z51.11]  B-cell acute lymphoblastic leukemia (ALL) [C91.00].       is involved by a routine referred to the Social Work Department.    Patient presents as a 51 y.o. year old  female. Patient stated that this is the second time she and her  are  to each other. This time time they have been remarried for 7.5 years and they have been together for a total of 32 years. Patient stated that she has a 12 year old daughter and a 30 year old daughter. The 30 year old lives independently but resides very close and is very supportive.    Persons interviewed: Patient and spouse with the patient's permission.     Living Situation:    Patient lives with her  and their 12 year old daughter in a single family home. Patient resides at [address] [place] [address] [facility] [address],     Patient's phone: [phone].     Phone: [phone]     (RETIRED) Functional Status Prior  Ambulation Prior: 1-->assistive equipment  Transferrin-->assistive equipment  Toiletin-->assistive equipment  Bathin-->independent  Dressin-->independent  Eatin-->independent  Communication: understands/communicates without difficulty  Swallowing: swallows foods/liquids without difficulty    Current or Past Agencies and Description of Services/Supplies    DME  Agency Name: [facility]  Agency Phone Number: [phone].  Equipment Currently Used at Home: rolling walker.    Home Health  Agency Name: [facility].   Agency Phone Number:[phone].    Services: Nursing, PT/OT.    IV Infusion  Agency Name: None.  Agency Phone Number: None.    Nutrition: Diabetic diet.    Outpatient Pharmacy:     [facility]  [address]  [facility] [address]  Phone: [phone] Fax: [phone]    [id number] - [place], [place] - [address]  [address] [facility] [place] [address]  Phone: [phone] Fax: [phone]      Patient Preference of agencies include: None.    Patient/Caregiver informed of right to choose providers or agencies.  Patient provides permission to release any necessary information to [facility] and to Non-[facility] agencies as needed to facilitate patient care, treatment planning, and patient discharge planning.  Written and verbal resources provided.      Coping:  discussed with the patient her score of (7) on the Distress Screening. Patient stated that her stress was related to financial issues. The issues that she was concerned with are in the process of being resolved and the patient feels less stress regarding those concerns.       Adjustment to Diagnosis and Treatment: Patient stated that she has multiple medical issues, and is becoming more adjusted to her diagnosis and treatment .    Emotional: Emotions have improved.    Behavioral: None reported.    Cognitive Issues: Patient stated that she is having issues with her short-term memory but currently can't attribute it to anything specific.    Employment: Currently unemployed but in the past she worked as a  for a . Patient stated that she had to stop due to heart problems. Patient stated that she has not worked since 2006.    Finance: Patient reports that they were having difficulty paying the hospital bills.  connected the patient to the appropriate services for assistance.     Housing: Stable.    Support System: Patient reports having a good support system with her   and her 30 year old daughter.    Recreation/Leisure: Patient reports, watching her daughter play baseball, watching movies, and spending time with the family.    [place]sey: Faith ([employer]orationism).    Transportation: Patient's  will provide transportation whenever it is needed.    History/Current Symptoms of Anxiety/Depression: No   History/Current Substance Use:   Social History     Tobacco Use    Smoking status: Current Every Day Smoker     Packs/day: 0.50     Types: Cigarettes    Smokeless tobacco: Never Used   Substance and Sexual Activity    Alcohol use: No    Drug use: Never    Sexual activity: Not on file       Indications of Abuse/Neglect: No  Abuse Screen (yes response referral indicated)  Feels Unsafe at Home or Work/School: No  Feels Threatened by Someone: No  Does Anyone Try to Keep You From Having Contact with Others or Doing Things Outside Your Home?: No  Physical Signs of Abuse Present: No    Financial:  Payer/Plan Subscr  Sex Relation Sub. Ins. ID Effective Group Num   1. BLUE CROSS BL* SHIRLENE NARANJO [address] Male Spouse [id number]* 3/22/14 [id number]                                    [address] [id number]        Other identified concerns/needs: Home health.    Plan: discharge home with home health and schedule reservation for the [facility] starting on 2022.    Interventions/Referrals: home health and lodging.    Patient/caregiver engaged in treatment planning process. Yes.     providing psychosocial and supportive counseling, resources, education, assistance and discharge planning as appropriate.  Patient/caregiver state understanding of  available resources,  following, remains available.

## 2022-03-18 NOTE — PLAN OF CARE
Reviewed plan of care with pt at the beginning of the shift. Pt reported pain and  n/v throughout the shift. PRN given. Vital signs were stable throughout the shift.Fall precautions continued for pt. Bed locked and at lowest position. Call light within reach. Pt knows to call if assistance is needed.

## 2022-03-18 NOTE — CONSULTS
[facility] - Oncology ([facility])  Palliative Medicine  Consult Note    Patient Name: Deepti Gonzalez  MRN: [id number]  Admission Date: 3/16/2022  Hospital Length of Stay: 2 days  Code Status: Full Code   Attending Provider: Dayron Jenkins MD  Consulting Provider: LAKSHMI Mulligan  Primary Care Physician: No primary care provider on file.  Principal Problem:B-cell acute lymphoblastic leukemia    Patient information was obtained from patient and primary team.      Inpatient consult to Palliative Care  Consult performed by: LAKSHMI Fournier  Consult ordered by: Mirtha Antonio, TERRY        Assessment/Plan:     Palliative care encounter  Impression: Pt is a 52 y/o female with B-ALL. Pt also has hx of COPD, HTN, HLD, DM2, TIA, anxiety, depression, GERD, seizure disorder, PAD, gastroparesis, RA, osteoporosis, and pacermaker placement. Pt admitted for C3 of consolidation with  EWALL (HD MTX and pegaspargase).  Pt is alert, oriented to person, place, time, situation. Pt is a Full code. Pt endorses back pain, body aches.     Reason for consult: Pain Management: Communicated with Mirtha Antonio .    Goals of care/advance care planning:   Pt is receiving medical management for B-ALL, RA and multiple other medial issues. Goal is to continue aggressive treatment.   Pt follows up with her specialists in [place] and at [facility] with Hem/Onc.     Pt is  and has a 13 y/o and a 32 y/o. Pt lives with her  and her daughter. She will be staying at her brother's house at discharge due to pt being unsteady in gait at times. Due to TIA's per pt.     Symptom management:     Pain r/t to cancer and treatment/RA/back issues.  Pt reports body aches all over, especially back area with stabbing pain intermittently.     Pt reports pain 7/10 at this time.   Pt reports she has constant body aches and sharp pains intermittently to back area.     Meds:   Pt is on tramadol 50 mg q 6 hrs prn for moderate pain/   Pt reports this has not  been helping with this pain.     Recs:   Considering starting on oxycodone 5 mg q 4 hrs prn severe pain.     Anxiety:   Pt sees a psychiatrist in [place] who manages meds.     Meds:   Pt is on Valium 10 mg bid prn.    Pt is on trintellix 10 mg daily.   Pt on Quetiapine 200 mg nightly.     Nausea/Vomitting:   No nausea noted at this time.     Meds:   Pt has zofran 8 mg PO q 8 hrs prn (first choice)  Pt has prochlorperazine 10 mg IV q 6 hrs prn (second choice).     Plan:   See above symptom recs for pain.  Dr. Mccord to see pt in clinic May 4th at 1100 for f/u for symptom management.   Communicated with Mirtha Antonio.   Will follow.                 Thank you for your consult. I will follow-up with patient. Please contact us if you have any additional questions.    Subjective:     HPI:     Pt is a 51-y/o patient of Dr. Jenkins with B-ALL. Other medical history includes COPD, HTN, HLD, DM2, TIA, anxiety, depression, GERD, seizure disorder, PAD, gastroparesis, RA, osteoporosis, and pacermaker placement. Per chart review, she was admitted for C3 of consolidation with EWALL (HD MTX and pegaspargase). She was induced with Vincristine + imatinib, which she [place] [facility] That hospitalization was complicated by acute hypoxic respiratory failure requiring ICU transfer. Post induction BMBx was performed 1/3/22. Showing CR. She tolerated cycles 1 and 2 of consolidation well. She is feeling well today. Denies fevers, chills, aches, cough, SOB, chest pain, bleeding or bruising, and n/v/d/c. Reports fatigue. She denies any recent sick contacts. She is COVID neg. A port-a-cath was placed today prior to admission. C/o tenderness to site.        Patient information was obtained from patient and past medical records.      Oncology History  Per chart review,   (Most info from Dr. Jenkins's most recent clinic note):   Precursor B-cell acute lymphoblastic leukemia (Ph+)              A. 11/23/2021: [place] [facility] [place]  hospital for evaluation for acute leukemia              B. 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               C. 11/30/2021 - 12/23/2021: Vincristine + imatinib induction; hospital course was complicated by acute hypoxemic respiratory failure which resolved with diuresis and treatment of ALL                D. 1/19/2022 - 1/24/2022: Admitted for C1 of consolidation chemotherapy with EWALL               E. 2/16/2022 - 2/22/2022: Admitted for C2 of consolidation chemotherapy with [facility]         Hospital Course:  No notes on file    Interval History: Pt on chemo for consolidation of B-ALL.     Past Medical History:   Diagnosis Date    Arthritis     Cancer     COPD (chronic obstructive pulmonary disease)     Hypertension     Stroke     TIA x 4       Past Surgical History:   Procedure Laterality Date    APPENDECTOMY      HYSTERECTOMY      ROTATOR CUFF REPAIR Bilateral     TONSILLECTOMY      TUBAL LIGATION         Review of patient's allergies indicates:   Allergen Reactions    Levetiracetam      Other reaction(s): Unknown       Medications:  Continuous Infusions:   lactated ringers 100 mL/hr at 03/17/22 2144     Scheduled Meds:   acyclovir  400 mg Oral BID    busPIRone  10 mg Oral BID    dexAMETHasone  8 mg Oral Q24H    diltiaZEM  90 mg Oral Q6H    gabapentin  300 mg Oral TID    hydrOXYchloroQUINE  200 mg Oral Daily    hydrOXYzine  50 mg Oral BID    leucovorin  25 mg Oral Q6H    losartan  25 mg Oral Daily    mupirocin   Nasal BID    OXcarbazepine  1,200 mg Oral BID    pantoprazole  40 mg Oral Daily    pegaspargase (ONCASPAR) chemo infusion  1,500 Units/m2 (Treatment Plan Recorded) Intravenous Q24H    QUEtiapine  200  mg Oral QHS    rivaroxaban  20 mg Oral Daily with dinner    sodium bicarbonate  1,300 mg Oral Q6H    vortioxetine  1 tablet Oral Daily     PRN Meds:alteplase, artificial tears, diazePAM, diphenhydrAMINE, EPINEPHrine, glucagon (human recombinant), glucose, glucose, heparin, porcine (PF), insulin aspart U-100, magnesium oxide, magnesium oxide, magnesium oxide, methocarbamoL, methylPREDNISolone sodium succinate, naloxone, ondansetron, oxyCODONE, polyethylene glycol, potassium chloride, potassium chloride, potassium chloride, potassium, sodium phosphates, potassium, sodium phosphates, prochlorperazine, senna-docusate 8.6-50 mg, sodium chloride 0.9%, sumatriptan, traMADoL    Family History    None       Tobacco Use    Smoking status: Current Every Day Smoker     Packs/day: 0.50     Types: Cigarettes    Smokeless tobacco: Never Used   Substance and Sexual Activity    Alcohol use: No    Drug use: Never    Sexual activity: Not on file       Review of Systems   Constitutional:  Positive for fatigue.   HENT:  Negative for trouble swallowing.    Respiratory:  Positive for shortness of breath.    Cardiovascular:  Negative for chest pain.   Gastrointestinal:  Negative for nausea and vomiting.   Musculoskeletal:  Positive for back pain, joint swelling and neck pain.   Skin: Negative.    Neurological:  Positive for weakness. Negative for headaches.   Psychiatric/Behavioral:  Positive for sleep disturbance. Negative for agitation. The patient is not nervous/anxious.    Objective:     Vital Signs (Most Recent):  Temp: 98.6 °F (37 °C) (03/18/22 1056)  Pulse: 92 (03/18/22 1056)  Resp: 18 (03/18/22 1056)  BP: 138/65 (03/18/22 1056)  SpO2: 95 % (03/18/22 1056) Vital Signs (24h Range):  Temp:  [97.1 °F (36.2 °C)-98.7 °F (37.1 °C)] 98.6 °F (37 °C)  Pulse:  [87-98] 92  Resp:  [18-20] 18  SpO2:  [95 %-98 %] 95 %  BP: (109-139)/(56-71) 138/65     Weight: 70.5 kg (155 lb 6.8 oz)  Body mass index is 26.68 kg/m².    Physical  Exam  Constitutional:       Appearance: She is well-developed.   HENT:      Head: Normocephalic and atraumatic.   Eyes:      General: Lids are normal.      Conjunctiva/sclera: Conjunctivae normal.   Pulmonary:      Effort: Pulmonary effort is normal. No respiratory distress.   Abdominal:      General: Abdomen is flat.   Musculoskeletal:      Cervical back: Normal range of motion.      Comments: ROM normal   Skin:     General: Skin is warm and dry.   Neurological:      Mental Status: She is alert and oriented to person, place, and time.   Psychiatric:         Attention and Perception: Attention normal.         Mood and Affect: Mood normal.         Speech: Speech normal.         Behavior: Behavior is cooperative.       Review of Symptoms      Symptom Assessment (ESAS 0-10 Scale)  Pain:  7  Dyspnea:  0  Anxiety:  0  Nausea:  0  Depression:  0  Anorexia:  3  Insomnia:  0  Restlessness:  0  Agitation:  0       Pain Assessment:  OME in 24 hours:  0  Location(s): back    Back       Location: generalized        Quality: Aching and sharp        Quantity: 7/10 in intensity        Chronicity: Onset 0 (years) year(s) ago, gradually worsening        Aggravating Factors: Activity        Alleviating Factors: None       Associated Symptoms: Arthralgias    Performance Status:  60    Living Arrangements:  Lives with spouse    Psychosocial/Cultural: Pt lives with spouse and 15 y/o. Pt has a 32 y/o and two grandchildren. Pt will be moving in with her brother at discharge since he works at home and pt is unsteady with gait at times.  Per pt, this has been from TIAs.      Advance Care Planning   Advance Directives:   Living Will: No    Do Not Resuscitate Status: No    Medical Power of : No    Agent's Name:   is NOK.    Decision Making:  Patient answered questions       Significant Labs: All pertinent labs within the past 24 hours have been reviewed.  CBC:   Recent Labs   Lab 03/18/22  0315   WBC 10.86   HGB 8.1*   HCT  25.6*   *        BMP:  Recent Labs   Lab 03/18/22  0311   *      K 4.3      CO2 24   BUN 11   CREATININE 0.6   CALCIUM 8.3*   MG 2.0     LFT:  Lab Results   Component Value Date    AST 11 03/18/2022    ALKPHOS 64 03/18/2022    BILITOT 0.2 03/18/2022     Albumin:   Albumin   Date Value Ref Range Status   03/18/2022 3.1 (L) 3.5 - 5.2 g/dL Final     Protein:   Total Protein   Date Value Ref Range Status   03/18/2022 5.7 (L) 6.0 - 8.4 g/dL Final     Lactic acid:   Lab Results   Component Value Date    LACTATE 1.4 11/24/2021       Significant Imaging: I have reviewed all pertinent imaging results/findings within the past 24 hours.        > 50% of 70 min visit spent in chart review, face to face discussion of goals of care,  symptom assessment, coordination of care and emotional support.    Akilah Malik, CNS  Palliative Medicine  [facility] - Oncology ([facility])

## 2022-03-18 NOTE — PROGRESS NOTES
scheduled patient's reservation at the [facility] for 04/12/2022 with check out 04/13/2022.  informed patient's , Mr. Gonzalez with the reservation date.

## 2022-03-18 NOTE — ASSESSMENT & PLAN NOTE
- hold jardiance and metformin recently discontinued by endocrinologist.   - hold home farxiga while inpatient. Can resume at discharge.  - low dose SSI, achs blood glucose monitoring, and diabetic diet while inpatient  - blood glucose as high as low 200. Will switch to moderate dose sliding scale.

## 2022-03-18 NOTE — ASSESSMENT & PLAN NOTE
- Continue home fenofibrate. Holding home statin and ASA while inpatient. Can resume at discharge.

## 2022-03-18 NOTE — PLAN OF CARE
Plan of care reviewed of patient and . Ambulating with guard assistance 2/2 tethers. VSS, afebrile. Pt denies any chest pain or palpitations, pt has pacemaker, no s/s of respiratory distress noted. IV pegapargase given without issue, +blood return via PICC line, chemo ppx maintained. New right chest port with mild erythema at insertion site, NP Marisela aware, no other s/s of infection. Pt able to shower today without issue. Tolerating diabetic diet, accuchecks ACHS. One episode of nausea, sublingual zofran given with +effect. Voiding large amounts of clear yellow urine, urine pH >7 throughout shift. PO sodium bicarb tabs given, LR continuous at 100ml/hr infusing via right PICC, Po leucavorin given as ordered. Joint pain well controlled with 5mg PO oxycodone, palliative care following. Methocarbamol and Imitrex also given with positive effect. LP site WNL. Pt has remained free from injury this shift. Bed in low locked position. Call light and personal belongings within reach. Side rails up x3. Nonskid socks in place. Pt instructed to call with any needs. Will continue to monitor. 48 hours MTX level due to be drawn at 0430.

## 2022-03-18 NOTE — PROGRESS NOTES
received confirmation of the reservation scheduled for 04/12/2022 at the [facility]. The reservation number is [phone].  to provide Mr. Gonzalez with the reservation number.

## 2022-03-18 NOTE — SUBJECTIVE & OBJECTIVE
Subjective:     Interval History: C3D3 of EWALL consolidation for B-ALL. MTX level 1.34 today. Received IT triple therapy in fluoro yesterday. Tolerated well. Will receive Peg today. Baseline amylase, lipase, and fibrinogen wnl. Port site less tender and no longer red. Will pull PICC prior to discharge. Continues to c/o joint/bone pain. Palliative care consulted and recommending prn oxy. Ordered accordingly. Patient will follow outpatient with Dr. Mccord with palliative care. Will likely clear MTX and discharge this weekend. She will f/u with Dr. Jenkins for readmission on 4/13. SW will arrange lodging at the [facility] the night before f/u. Will arrange lab monitoring with Dr. Garcia today.    Objective:     Vital Signs (Most Recent):  Temp: 98.6 °F (37 °C) (03/18/22 1056)  Pulse: 92 (03/18/22 1056)  Resp: 18 (03/18/22 1115)  BP: 138/65 (03/18/22 1056)  SpO2: 95 % (03/18/22 1056) Vital Signs (24h Range):  Temp:  [97.1 °F (36.2 °C)-98.6 °F (37 °C)] 98.6 °F (37 °C)  Pulse:  [87-98] 92  Resp:  [18-20] 18  SpO2:  [95 %-98 %] 95 %  BP: (109-139)/(56-71) 138/65     Weight: 70.5 kg (155 lb 6.8 oz)  Body mass index is 26.68 kg/m².  Body surface area is 1.78 meters squared.    ECOG SCORE           [unfilled]    Intake/Output - Last 3 Shifts         03/16 0700  03/17 0659 03/17 0700  03/18 0659 03/18 0700  03/19 0659    P.O. 720      I.V. (mL/kg) 1387.6 (19.9) 910.6 (12.9)     IV Piggyback 466.3 539.5     Total Intake(mL/kg) 2573.9 (36.9) 1450 (20.6)     Urine (mL/kg/hr) 1400 4300 (2.5)     Stool  0     Total Output 1400 4300     Net +1173.9 -2850            Urine Occurrence 1 x      Stool Occurrence 0 x 1 x             Physical Exam  Constitutional:       Appearance: She is well-developed.   HENT:      Head: Normocephalic and atraumatic.      Mouth/Throat:      Pharynx: No oropharyngeal exudate.   Eyes:      Conjunctiva/sclera: Conjunctivae normal.      Pupils: Pupils are equal, round, and reactive to light.   Cardiovascular:       Rate and Rhythm: Normal rate and regular rhythm.      Heart sounds: Normal heart sounds. No murmur heard.  Pulmonary:      Effort: Pulmonary effort is normal.      Breath sounds: Normal breath sounds.   Abdominal:      General: Bowel sounds are normal. There is no distension.      Palpations: Abdomen is soft.      Tenderness: There is no abdominal tenderness.   Musculoskeletal:         General: No deformity. Normal range of motion.      Cervical back: Normal range of motion and neck supple.   Skin:     General: Skin is warm and dry.      Findings: No erythema or rash.      Comments: RUE PICC. Dressing c/d/I. No sign of infection to site.  Newly placed right chest wall port-a-cath. Not accessed.   Neurological:      Mental Status: She is alert and oriented to person, place, and time.   Psychiatric:         Behavior: Behavior normal.         Thought Content: Thought content normal.         Judgment: Judgment normal.       Significant Labs:   CBC:   Recent Labs   Lab 03/17/22  0333 03/18/22  0315   WBC 10.20 10.86   HGB 8.6* 8.1*   HCT 26.1* 25.6*    428      and CMP:   Recent Labs   Lab 03/17/22  0333 03/18/22  0311    140   K 3.3* 4.3    107   CO2 30* 24   GLU 97 142*   BUN 9 11   CREATININE 0.7 0.6   CALCIUM 8.6* 8.3*   PROT 5.6* 5.7*   ALBUMIN 3.0* 3.1*   BILITOT 0.2 0.2   ALKPHOS 60 64   AST 11 11   ALT 11 11   ANIONGAP 7* 9   EGFRNONAA >60.0 >60.0         Diagnostic Results:  I have reviewed all pertinent imaging results/findings within the past 24 hours.

## 2022-03-18 NOTE — HPI
Pt is a 51-y/o patient of Dr. Jenkins with B-ALL. Other medical history includes COPD, HTN, HLD, DM2, TIA, anxiety, depression, GERD, seizure disorder, PAD, gastroparesis, RA, osteoporosis, and pacermaker placement. Per chart review, she was admitted for C3 of consolidation with EWALL (HD MTX and pegaspargase). She was induced with Vincristine + imatinib, which she recieved inpatient at [facility]. That hospitalization was complicated by acute hypoxic respiratory failure requiring ICU transfer. Post induction BMBx was performed 1/3/22. Showing CR. She tolerated cycles 1 and 2 of consolidation well. She is feeling well today. Denies fevers, chills, aches, cough, SOB, chest pain, bleeding or bruising, and n/v/d/c. Reports fatigue. She denies any recent sick contacts. She is COVID neg. A port-a-cath was placed today prior to admission. C/o tenderness to site.        Patient information was obtained from patient and past medical records.      Oncology History  Per chart review,   (Most info from Dr. Jenkins's most recent clinic note):   Precursor B-cell acute lymphoblastic leukemia (Ph+)              A. 11/23/2021: Transferred to [facility] from outside hospital for evaluation for acute leukemia              B. 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               C. 11/30/2021 - 12/23/2021: Vincristine + imatinib induction; hospital course was complicated by acute hypoxemic respiratory failure which resolved with diuresis and treatment of ALL                D. 1/19/2022 - 1/24/2022: Admitted for C1 of consolidation chemotherapy with EWALL               E. 2/16/2022 - 2/22/2022: Admitted for C2 of consolidation chemotherapy with EWALL

## 2022-03-19 VITALS
HEART RATE: 90 BPM | DIASTOLIC BLOOD PRESSURE: 60 MMHG | OXYGEN SATURATION: 98 % | WEIGHT: 161.06 LBS | TEMPERATURE: 99 F | SYSTOLIC BLOOD PRESSURE: 142 MMHG | RESPIRATION RATE: 17 BRPM | HEIGHT: 64 IN | BODY MASS INDEX: 27.5 KG/M2

## 2022-03-19 LAB
ALBUMIN SERPL BCP-MCNC: 2.8 G/DL (ref 3.5–5.2)
ALP SERPL-CCNC: 64 U/L (ref 55–135)
ALT SERPL W/O P-5'-P-CCNC: 11 U/L (ref 10–44)
ANION GAP SERPL CALC-SCNC: 9 MMOL/L (ref 8–16)
AST SERPL-CCNC: 11 U/L (ref 10–40)
BASOPHILS # BLD AUTO: 0.01 K/UL (ref 0–0.2)
BASOPHILS NFR BLD: 0.1 % (ref 0–1.9)
BILIRUB SERPL-MCNC: 0.1 MG/DL (ref 0.1–1)
BILIRUB SERPL-MCNC: NORMAL MG/DL
BLOOD URINE, POC: NORMAL
BUN SERPL-MCNC: 24 MG/DL (ref 6–20)
CALCIUM SERPL-MCNC: 8.7 MG/DL (ref 8.7–10.5)
CHLORIDE SERPL-SCNC: 109 MMOL/L (ref 95–110)
CO2 SERPL-SCNC: 24 MMOL/L (ref 23–29)
COLOR, POC UA: NORMAL
CREAT SERPL-MCNC: 0.6 MG/DL (ref 0.5–1.4)
DIFFERENTIAL METHOD: ABNORMAL
EOSINOPHIL # BLD AUTO: 0 K/UL (ref 0–0.5)
EOSINOPHIL NFR BLD: 0 % (ref 0–8)
ERYTHROCYTE [DISTWIDTH] IN BLOOD BY AUTOMATED COUNT: 18 % (ref 11.5–14.5)
EST. GFR  (AFRICAN AMERICAN): >60 ML/MIN/1.73 M^2
EST. GFR  (NON AFRICAN AMERICAN): >60 ML/MIN/1.73 M^2
GLUCOSE SERPL-MCNC: 173 MG/DL (ref 70–110)
GLUCOSE UR QL STRIP: NORMAL
HCT VFR BLD AUTO: 23.7 % (ref 37–48.5)
HGB BLD-MCNC: 7.6 G/DL (ref 12–16)
IMM GRANULOCYTES # BLD AUTO: 0.26 K/UL (ref 0–0.04)
IMM GRANULOCYTES NFR BLD AUTO: 2.2 % (ref 0–0.5)
KETONES UR QL STRIP: NORMAL
LEUKOCYTE ESTERASE URINE, POC: NORMAL
LYMPHOCYTES # BLD AUTO: 2 K/UL (ref 1–4.8)
LYMPHOCYTES NFR BLD: 17.1 % (ref 18–48)
MAGNESIUM SERPL-MCNC: 1.9 MG/DL (ref 1.6–2.6)
MCH RBC QN AUTO: 33.3 PG (ref 27–31)
MCHC RBC AUTO-ENTMCNC: 32.1 G/DL (ref 32–36)
MCV RBC AUTO: 104 FL (ref 82–98)
MONOCYTES # BLD AUTO: 0.9 K/UL (ref 0.3–1)
MONOCYTES NFR BLD: 7.5 % (ref 4–15)
MTX SERPL-SCNC: 0.12 UMOL/L (ref 0.5–5)
MTX SERPL-SCNC: 0.16 UMOL/L (ref 0.5–5)
NEUTROPHILS # BLD AUTO: 8.6 K/UL (ref 1.8–7.7)
NEUTROPHILS NFR BLD: 73.1 % (ref 38–73)
NITRITE, POC UA: NORMAL
NRBC BLD-RTO: 0 /100 WBC
PH, POC UA: 6
PHOSPHATE SERPL-MCNC: 3.3 MG/DL (ref 2.7–4.5)
PLATELET # BLD AUTO: 394 K/UL (ref 150–450)
PMV BLD AUTO: 8.9 FL (ref 9.2–12.9)
POCT GLUCOSE: 139 MG/DL (ref 70–110)
POTASSIUM SERPL-SCNC: 3.6 MMOL/L (ref 3.5–5.1)
PROT SERPL-MCNC: 5.3 G/DL (ref 6–8.4)
PROTEIN, POC: NORMAL
RBC # BLD AUTO: 2.28 M/UL (ref 4–5.4)
SODIUM SERPL-SCNC: 142 MMOL/L (ref 136–145)
SPECIFIC GRAVITY, POC UA: NORMAL
UROBILINOGEN, POC UA: NORMAL
WBC # BLD AUTO: 11.71 K/UL (ref 3.9–12.7)

## 2022-03-19 PROCEDURE — 80053 COMPREHEN METABOLIC PANEL: CPT | Performed by: NURSE PRACTITIONER

## 2022-03-19 PROCEDURE — 85025 COMPLETE CBC W/AUTO DIFF WBC: CPT | Performed by: NURSE PRACTITIONER

## 2022-03-19 PROCEDURE — 25000003 PHARM REV CODE 250: Performed by: NURSE PRACTITIONER

## 2022-03-19 PROCEDURE — 63600175 PHARM REV CODE 636 W HCPCS: Performed by: INTERNAL MEDICINE

## 2022-03-19 PROCEDURE — 99239 PR HOSPITAL DISCHARGE DAY,>30 MIN: ICD-10-PCS | Mod: ,,, | Performed by: INTERNAL MEDICINE

## 2022-03-19 PROCEDURE — 80204 DRUG ASSAY METHOTREXATE: CPT | Mod: 91 | Performed by: STUDENT IN AN ORGANIZED HEALTH CARE EDUCATION/TRAINING PROGRAM

## 2022-03-19 PROCEDURE — 84100 ASSAY OF PHOSPHORUS: CPT | Performed by: NURSE PRACTITIONER

## 2022-03-19 PROCEDURE — 25000003 PHARM REV CODE 250: Performed by: INTERNAL MEDICINE

## 2022-03-19 PROCEDURE — 80204 DRUG ASSAY METHOTREXATE: CPT | Performed by: INTERNAL MEDICINE

## 2022-03-19 PROCEDURE — 83735 ASSAY OF MAGNESIUM: CPT | Performed by: NURSE PRACTITIONER

## 2022-03-19 PROCEDURE — 99239 HOSP IP/OBS DSCHRG MGMT >30: CPT | Mod: ,,, | Performed by: INTERNAL MEDICINE

## 2022-03-19 PROCEDURE — 25000003 PHARM REV CODE 250: Performed by: STUDENT IN AN ORGANIZED HEALTH CARE EDUCATION/TRAINING PROGRAM

## 2022-03-19 RX ORDER — LEUCOVORIN CALCIUM 25 MG/1
25 TABLET ORAL EVERY 6 HOURS
Qty: 20 TABLET | Refills: 0 | Status: SHIPPED | OUTPATIENT
Start: 2022-03-19 | End: 2022-03-24

## 2022-03-19 RX ORDER — AMOXICILLIN 250 MG
1 CAPSULE ORAL 2 TIMES DAILY
Qty: 60 TABLET | Refills: 3 | Status: SHIPPED | OUTPATIENT
Start: 2022-03-19

## 2022-03-19 RX ORDER — AMOXICILLIN 250 MG
1 CAPSULE ORAL DAILY
Status: DISCONTINUED | OUTPATIENT
Start: 2022-03-19 | End: 2022-03-19 | Stop reason: HOSPADM

## 2022-03-19 RX ORDER — SODIUM BICARBONATE 650 MG/1
1300 TABLET ORAL
Status: DISCONTINUED | OUTPATIENT
Start: 2022-03-19 | End: 2022-03-19 | Stop reason: HOSPADM

## 2022-03-19 RX ORDER — FUROSEMIDE 10 MG/ML
40 INJECTION INTRAMUSCULAR; INTRAVENOUS ONCE
Status: CANCELLED | OUTPATIENT
Start: 2022-03-19

## 2022-03-19 RX ORDER — OXYCODONE HYDROCHLORIDE 5 MG/1
5 TABLET ORAL EVERY 4 HOURS PRN
Qty: 45 TABLET | Refills: 0 | Status: SHIPPED | OUTPATIENT
Start: 2022-03-19 | End: 2022-06-08 | Stop reason: ALTCHOICE

## 2022-03-19 RX ADMIN — VORTIOXETINE 10 MG: 10 TABLET, FILM COATED ORAL at 08:03

## 2022-03-19 RX ADMIN — HYDROXYZINE HYDROCHLORIDE 50 MG: 25 TABLET, FILM COATED ORAL at 08:03

## 2022-03-19 RX ADMIN — DOCUSATE SODIUM 50 MG AND SENNOSIDES 8.6 MG 1 TABLET: 8.6; 5 TABLET, FILM COATED ORAL at 12:03

## 2022-03-19 RX ADMIN — Medication 400 MG: at 05:03

## 2022-03-19 RX ADMIN — LEUCOVORIN CALCIUM 25 MG: 25 TABLET ORAL at 09:03

## 2022-03-19 RX ADMIN — GABAPENTIN 300 MG: 300 CAPSULE ORAL at 03:03

## 2022-03-19 RX ADMIN — POTASSIUM CHLORIDE 20 MEQ: 1500 TABLET, EXTENDED RELEASE ORAL at 05:03

## 2022-03-19 RX ADMIN — HYDROXYCHLOROQUINE SULFATE 200 MG: 200 TABLET, FILM COATED ORAL at 08:03

## 2022-03-19 RX ADMIN — DIAZEPAM 10 MG: 5 TABLET ORAL at 09:03

## 2022-03-19 RX ADMIN — SODIUM BICARBONATE 650 MG TABLET 1300 MG: at 12:03

## 2022-03-19 RX ADMIN — LEUCOVORIN CALCIUM 25 MG: 25 TABLET ORAL at 03:03

## 2022-03-19 RX ADMIN — SODIUM BICARBONATE 650 MG TABLET 1300 MG: at 05:03

## 2022-03-19 RX ADMIN — OXYCODONE 5 MG: 5 TABLET ORAL at 12:03

## 2022-03-19 RX ADMIN — PANTOPRAZOLE SODIUM 40 MG: 40 TABLET, DELAYED RELEASE ORAL at 08:03

## 2022-03-19 RX ADMIN — DILTIAZEM HYDROCHLORIDE 90 MG: 30 TABLET, FILM COATED ORAL at 05:03

## 2022-03-19 RX ADMIN — SODIUM CHLORIDE, SODIUM LACTATE, POTASSIUM CHLORIDE, AND CALCIUM CHLORIDE: .6; .31; .03; .02 INJECTION, SOLUTION INTRAVENOUS at 05:03

## 2022-03-19 RX ADMIN — LOSARTAN POTASSIUM 25 MG: 25 TABLET, FILM COATED ORAL at 08:03

## 2022-03-19 RX ADMIN — GABAPENTIN 300 MG: 300 CAPSULE ORAL at 12:03

## 2022-03-19 RX ADMIN — MUPIROCIN: 20 OINTMENT TOPICAL at 08:03

## 2022-03-19 RX ADMIN — DILTIAZEM HYDROCHLORIDE 90 MG: 30 TABLET, FILM COATED ORAL at 12:03

## 2022-03-19 RX ADMIN — ONDANSETRON 8 MG: 8 TABLET, ORALLY DISINTEGRATING ORAL at 08:03

## 2022-03-19 RX ADMIN — BUSPIRONE HYDROCHLORIDE 10 MG: 10 TABLET ORAL at 08:03

## 2022-03-19 RX ADMIN — ACYCLOVIR 400 MG: 200 CAPSULE ORAL at 08:03

## 2022-03-19 RX ADMIN — OXCARBAZEPINE 1200 MG: 600 TABLET, FILM COATED ORAL at 08:03

## 2022-03-19 RX ADMIN — TRAMADOL HYDROCHLORIDE 50 MG: 50 TABLET, COATED ORAL at 01:03

## 2022-03-19 RX ADMIN — SODIUM BICARBONATE 650 MG TABLET 1300 MG: at 03:03

## 2022-03-19 RX ADMIN — Medication 400 MG: at 10:03

## 2022-03-19 RX ADMIN — OXYCODONE 5 MG: 5 TABLET ORAL at 08:03

## 2022-03-19 RX ADMIN — DEXAMETHASONE 8 MG: 4 TABLET ORAL at 03:03

## 2022-03-19 NOTE — DISCHARGE SUMMARY
[facility] - Oncology ([facility])  Hematology  Bone Marrow Transplant  Discharge Summary      Patient Name: Deepti Gonzalez  MRN: [id number]  Admission Date: 3/16/2022  Hospital Length of Stay: 3 days  Discharge Date and Time:  03/19/2022 3:06 PM  Attending Physician: April Aldridge MD   Discharging Provider: Felisha Ludwig DO  Primary Care Provider: No primary care provider on file.    HPI: Ms. 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 Other medical history includes COPD, HTN, HLD, DM2, TIA, anxiety, depression, GERD, seizure disorder, PAD, gastroparesis, RA, osteoporosis, and pacermaker placement. She is admitting today for C3 of consolidation with EWALL (HD MTX and pegaspargase). She was induced with Vincristine + imatinib, which she recieved inpatient at [facility]. That hospitalization was complicated by acute hypoxic respiratory failure requiring ICU transfer. Post induction BMBx was performed 1/3/22. Showing CR. She tolerated cycles 1 and 2 of consolidation well. She is feeling well today. Denies fevers, chills, aches, cough, SOB, chest pain, bleeding or bruising, and n/v/d/c. Reports fatigue. She denies any recent sick contacts. She is COVID neg. A port-a-cath was placed today prior to admission. C/o tenderness to site.      * No surgery found *     Hospital Course: 03/17/2022: Admitted yesterday afternoon for C3 of consolidation with EWALL for B-ALL. Received HD MTX ~ 4:20 this morning. Will receive PEG tomorrow morning. Will check lipase, amylase, and fibrinogen prior to PEG administration. She will receive triple therapy IT chemo during this admission. Port-a-cath placed yesterday prior to admission. C/o tenderness to site. Mild redness noted at insertion site. Using PICC for now but plan to remove PICC prior to discharge. Replacing K+ today.  03/18/2022: C3D3 of EWALL consolidation for B-ALL. MTX level 1.34 today. Received IT triple therapy in fluoro yesterday. Tolerated well. Will receive Peg  today. Baseline amylase, lipase, and fibrinogen wnl. Port site less tender and no longer red. Will pull PICC prior to discharge. Continues to c/o joint/bone pain. Palliative care consulted and recommending prn oxy. Ordered accordingly. Patient will follow outpatient with Dr. Mccord with palliative care. Will likely clear MTX and discharge this weekend. She will f/u with Dr. Jenkins for readmission on 4/13.  will arrange lodging at the [facility] the night before f/u. Will arrange lab monitoring with Dr. Garcia today.  03/19/2022 C3D4 of EWALL consolidation for B-ALL. MTX level 0.16 this morning, repeated and was 0.12 in the afternoon. She was discharged home with leucovorin      Goals of Care Treatment Preferences:  Code Status: Full Code    Health care agent:  is NOK.  Health care agent number: No value filed.                   Consults (From admission, onward)        Status Ordering Provider     Inpatient consult to Palliative Care  Once        Provider:  (Not yet assigned)    Completed LISA VILLELA          Significant Diagnostic Studies: Labs:   CMP   Recent Labs   Lab 03/18/22  0311 03/19/22  0307    142   K 4.3 3.6    109   CO2 24 24   * 173*   BUN 11 24*   CREATININE 0.6 0.6   CALCIUM 8.3* 8.7   PROT 5.7* 5.3*   ALBUMIN 3.1* 2.8*   BILITOT 0.2 0.1   ALKPHOS 64 64   AST 11 11   ALT 11 11   ANIONGAP 9 9   ESTGFRAFRICA >60.0 >60.0   EGFRNONAA >60.0 >60.0    and CBC   Recent Labs   Lab 03/18/22  0315 03/19/22  0307   WBC 10.86 11.71   HGB 8.1* 7.6*   HCT 25.6* 23.7*    394       Pending Diagnostic Studies:     Procedure Component Value Units Date/Time    Cytology, Fluid/Wash/Brush [[id number]] Collected: 03/17/22 1510    Order Status: Sent Lab Status: In process Updated: 03/18/22 0823    Flow Cytometry Analysis (CSF) [[id number]] Collected: 03/17/22 1510    Order Status: Sent Lab Status: In process Updated: 03/18/22 0644    Specimen: CSF (Spinal Fluid) from Cerebrospinal Fluid          Final Active Diagnoses:    Diagnosis Date Noted POA    PRINCIPAL PROBLEM:  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] 11/24/2021 Yes    Palliative care encounter [Z51.5] 03/18/2022 Not Applicable    Pain [R52]  Unknown    Hypokalemia [E87.6] 03/17/2022 No    Anemia associated with chemotherapy [D64.81, T45.1X5A] 02/21/2022 Yes    Moderate major depression [F32.1] 11/29/2021 Yes    Insomnia [G47.00] 11/24/2021 Yes    Anxiety [F41.9] 11/24/2021 Yes    GERD (gastroesophageal reflux disease) [K21.9] 11/24/2021 Yes    Hypertension [I10] 11/24/2021 Yes    Seizure disorder [G40.909] 11/24/2021 Yes    Type 2 diabetes mellitus, without long-term current use of insulin [E11.9] 11/24/2021 Yes    Rheumatoid arthritis involving multiple sites [M06.9] 12/22/2017 Yes    History of TIA (transient ischemic attack) [Z86.73] 12/22/2017 Not Applicable    Hyperlipidemia [E78.5] 12/22/2017 Yes    Long term current use of anticoagulant [Z79.01] 12/22/2017 Not Applicable    Paroxysmal atrial fibrillation [I48.0] 12/22/2017 Yes      Problems Resolved During this Admission:      Discharged Condition: good    Disposition: Home or Self Care    Follow Up:    Patient Instructions:      BCR/ABL, p190, Quant, Monitor, blood   Standing Status: Future Standing Exp. Date: 05/16/23     Order Specific Question Answer Comments   Specimen Source Blood      CBC Auto Differential   Standing Status: Future Standing Exp. Date: 05/16/23     Comprehensive Metabolic Panel   Standing Status: Future Standing Exp. Date: 05/16/23     Magnesium   Standing Status: Future Standing Exp. Date: 05/16/23     Phosphorus   Standing Status: Future Standing Exp. Date: 05/16/23     Type & Screen   Standing Status: Future Standing Exp. Date: 05/16/23     Medications:  Reconciled Home Medications:      Medication List      START taking these medications    leucovorin 25 MG Tab  Commonly known as: WELLCOVORIN  Take 1 tablet (25 mg total) by mouth every 6  (six) hours. for 5 days     oxyCODONE 5 MG immediate release tablet  Commonly known as: ROXICODONE  Take 1 tablet (5 mg total) by mouth every 4 (four) hours as needed for Pain.        CONTINUE taking these medications    acyclovir 400 MG tablet  Commonly known as: ZOVIRAX  Take 1 tablet (400 mg total) by mouth 2 (two) times daily.     artificial tears 0.5 % ophthalmic solution  Commonly known as: ISOPTO TEARS  Place 1 drop into both eyes 4 (four) times daily as needed.     aspirin 81 MG EC tablet  Commonly known as: ECOTRIN  Take 1 tablet (81 mg total) by mouth once daily. Hold for platelet count less than 50 thousand.     atorvastatin 80 MG tablet  Commonly known as: LIPITOR  Take 80 mg by mouth once daily.     busPIRone 10 MG tablet  Commonly known as: BUSPAR  Take 10 mg by mouth 2 (two) times daily.     diazePAM 10 MG Tab  Commonly known as: VALIUM  Take 10 mg by mouth 2 (two) times daily as needed.     diltiaZEM 90 MG tablet  Commonly known as: CARDIZEM  Take 1 tablet (90 mg total) by mouth every 6 (six) hours.     DUKE'S SOLUTION (BENADRYL 30 ML, MYLANTA 30 ML, LIDOCAINE 30 ML, NYSTATIN 30 ML)  Take 10 mLs by mouth 4 (four) times daily.     FARXIGA 10 mg tablet  Generic drug: dapagliflozin  Take 10 mg by mouth once daily.     fenofibrate 160 MG Tab  Take 160 mg by mouth once daily.     fluconazole 200 MG Tab  Commonly known as: DIFLUCAN  Take 2 tablets (400 mg total) by mouth once daily.     gabapentin 300 MG capsule  Commonly known as: NEURONTIN  Take 1 capsule (300 mg total) by mouth 3 (three) times daily.     hydrOXYchloroQUINE 200 mg tablet  Commonly known as: PLAQUENIL  Take 200 mg by mouth once daily.     hydrOXYzine 50 MG tablet  Commonly known as: ATARAX  Take 50 mg by mouth 2 (two) times a day.     * imatinib 100 MG Tab  Commonly known as: GLEEVEC  Take 2 tablets (200 mg total) by mouth once daily with 1 other imatinib prescription for 600 mg total.Take with a meal and large glass of water     *  imatinib 400 MG Tab  Commonly known as: GLEEVEC  Take 1 tablet (400 mg total) by mouth once daily with 1 other imatinib prescription for 600 mg total. Take with a meal and large glass of water     levoFLOXacin 500 MG tablet  Commonly known as: LEVAQUIN  Take 1 tablet (500 mg total) by mouth once daily.     losartan 25 MG tablet  Commonly known as: COZAAR  Take 25 mg by mouth once daily.     methocarbamoL 500 MG Tab  Commonly known as: ROBAXIN  Take 1 tablet (500 mg total) by mouth 3 (three) times daily as needed (muscle spasms).     omeprazole 40 MG capsule  Commonly known as: PRILOSEC  Take 40 mg by mouth once daily.     ondansetron 8 MG Tbdl  Commonly known as: ZOFRAN-ODT  Dissolve 1 tablet (8 mg total) by mouth every 12 (twelve) hours as needed (in case of chemo induced nausea).     OXcarbazepine 600 MG Tab  Commonly known as: TRILEPTAL  Take 1,200 mg by mouth 2 (two) times daily.     polyethylene glycol 17 gram/dose powder  Commonly known as: GLYCOLAX  Dissolve one capful (17 g) in liquid and take by mouth daily as needed (constipation).     potassium chloride SA 20 MEQ tablet  Commonly known as: K-DUR,KLOR-CON  Take 20 mEq by mouth once daily.     prochlorperazine 5 MG tablet  Commonly known as: COMPAZINE  Take 1 tablet (5 mg total) by mouth every 6 (six) hours as needed for Nausea.     QUEtiapine 200 MG Tab  Commonly known as: SEROQUEL  Take 200 mg by mouth every evening.     rivaroxaban 20 mg Tab  Commonly known as: XARELTO  Take 1 tablet (20 mg total) by mouth daily with dinner or evening meal. Hold for platelets less than 50 thousand.     senna-docusate 8.6-50 mg 8.6-50 mg per tablet  Commonly known as: PERICOLACE  Take 1 tablet by mouth 2 (two) times daily.     sumatriptan 50 MG tablet  Commonly known as: IMITREX  Take 1 tablet (50 mg total) by mouth daily as needed (headache).     traMADoL 50 mg tablet  Commonly known as: ULTRAM  Take 1 tablet (50 mg total) by mouth every 6 (six) hours as needed for  Pain.     TRINTELLIX 10 mg Tab  Generic drug: vortioxetine  Take 1 tablet by mouth once daily.     VistariL 50 MG Cap  Generic drug: hydrOXYzine pamoate  Take by mouth.         * This list has 2 medication(s) that are the same as other medications prescribed for you. Read the directions carefully, and ask your doctor or other care provider to review them with you.                Felisha Ludwig, DO  Bone Marrow Transplant  [facility] - Oncology ([facility])

## 2022-03-19 NOTE — PROGRESS NOTES
Patient tolerates room air, ambulates with no assistance,diabetic diet, voiding without difficulty, and is going home with no devices. PICC line removed; pressure dressing in place, no bleeding present. Instructed patient to lie flat following removal for 20 minutes. Patient going home with family. Discharge paperwork discussed. Medications reviewed and awaiting delivery to bedside. Patient has all belongings and has no questions at this time.

## 2022-03-21 ENCOUNTER — PATIENT OUTREACH (OUTPATIENT)
Dept: ADMINISTRATIVE | Facility: CLINIC | Age: 52
End: 2022-03-21
Payer: COMMERCIAL

## 2022-03-21 ENCOUNTER — TELEPHONE (OUTPATIENT)
Dept: HEMATOLOGY/ONCOLOGY | Facility: CLINIC | Age: 52
End: 2022-03-21
Payer: COMMERCIAL

## 2022-03-21 LAB
FINAL PATHOLOGIC DIAGNOSIS: NORMAL
Lab: NORMAL

## 2022-03-21 NOTE — TELEPHONE ENCOUNTER
"----- Message from Enedelia Lopez sent at 3/21/2022 10:37 AM CDT -----  Regarding: Speak with office  Contact: Mannie  Consult/Advisory:       Name Of Caller: Mannie Gonzalez()      Contact Preference?: [phone]       Does patient feel the need to be seen today? No      What is the nature of the call?: Calling to speak with office about schedule of medication. Pt was released from hospital.       Additional Notes:  "Thank you for all that you do for our patients'"      "

## 2022-03-23 ENCOUNTER — CLINICAL SUPPORT (OUTPATIENT)
Dept: HEMATOLOGY/ONCOLOGY | Facility: CLINIC | Age: 52
End: 2022-03-23
Payer: COMMERCIAL

## 2022-03-23 DIAGNOSIS — Z71.3 NUTRITIONAL COUNSELING: ICD-10-CM

## 2022-03-23 DIAGNOSIS — C91.00 B-CELL ACUTE LYMPHOBLASTIC LEUKEMIA: Primary | ICD-10-CM

## 2022-03-23 PROCEDURE — 97802 PR MED NUTR THER, 1ST, INDIV, EA 15 MIN: ICD-10-PCS | Mod: 95,,, | Performed by: DIETITIAN, REGISTERED

## 2022-03-23 PROCEDURE — 97802 MEDICAL NUTRITION INDIV IN: CPT | Mod: 95,,, | Performed by: DIETITIAN, REGISTERED

## 2022-03-23 NOTE — PROGRESS NOTES
Oncology Nutrition Assessment for Medical Nutrition Therapy  Initial Visit    Deepti Gonzalez   1970    Referring Provider: Dayron Jenkins MD      Reason for Visit: nutrition counseling and education    The patient location is: home  The chief complaint leading to consultation is: nutrition referral    Visit type: audiovisual    Face to Face time with patient: 15 minutes  20 minutes of total time spent on the encounter, which includes face to face time and non-face to face time preparing to see the patient (eg, review of tests), Obtaining and/or reviewing separately obtained history, Documenting clinical information in the electronic or other health record, Independently interpreting results (not separately reported) and communicating results to the patient/family/caregiver, or Care coordination (not separately reported).     Each patient to whom he or she provides medical services by telemedicine is:  (1) informed of the relationship between the physician and patient and the respective role of any other health care provider with respect to management of the patient; and (2) notified that he or she may decline to receive medical services by telemedicine and may withdraw from such care at any time.    PMHx:   Past Medical History:   Diagnosis Date    Arthritis     Cancer     COPD (chronic obstructive pulmonary disease)     Hypertension     Stroke     TIA x 4       Nutrition Assessment    This is a 51 y.o.female with a medical diagnosis of B-cell ALL s/p recent admission for cycle 3 EWALL. She reports that her appetite fluctuates, some good days and some not as good. She struggles with nausea at times, controlled with medications. She eats 3 meals/day most days, also trying to snack more. She has protein with lunch & dinner every day. Usually has a Boost or yogurt with breakfast. Her sister is trying to help with her intake. She has started to make her smoothies which she likes. She doesn't do as well  "with water intake, states it sometimes makes her feel nauseous. She does like to drink Body Armor, orange juice, and V8.    Weight: 73.05 kg (161 lb 0.7 oz)  Height: 5' 4" (1.626 m)  BMI: 27.6    Usual BW: 155-160lb  Weight Change: 5-10lb loss over 1-2 months, since regained    Allergies: Levetiracetam    Current Medications:  Current Outpatient Medications:     acyclovir (ZOVIRAX) 400 MG tablet, Take 1 tablet (400 mg total) by mouth 2 (two) times daily., Disp: 60 tablet, Rfl: 11    artificial tears (ISOPTO TEARS) 0.5 % ophthalmic solution, Place 1 drop into both eyes 4 (four) times daily as needed., Disp: , Rfl:     aspirin (ECOTRIN) 81 MG EC tablet, Take 1 tablet (81 mg total) by mouth once daily. Hold for platelet count less than 50 thousand. (Patient not taking: No sig reported), Disp: , Rfl: 0    atorvastatin (LIPITOR) 80 MG tablet, Take 80 mg by mouth once daily., Disp: , Rfl:     busPIRone (BUSPAR) 10 MG tablet, Take 10 mg by mouth 2 (two) times daily., Disp: , Rfl:     dapagliflozin (FARXIGA) 10 mg tablet, Take 10 mg by mouth once daily., Disp: , Rfl:     diazePAM (VALIUM) 10 MG Tab, Take 10 mg by mouth 2 (two) times daily as needed., Disp: , Rfl:     diltiaZEM (CARDIZEM) 90 MG tablet, Take 1 tablet (90 mg total) by mouth every 6 (six) hours., Disp: 120 tablet, Rfl: 11    duke's soln (benadryl 30 mL, mylanta 30 mL, LIDOcaine 30 mL, nystatin 30 mL) 120mL, Take 10 mLs by mouth 4 (four) times daily. (Patient not taking: No sig reported), Disp: 120 mL, Rfl: 0    fenofibrate 160 MG Tab, Take 160 mg by mouth once daily., Disp: , Rfl:     fluconazole (DIFLUCAN) 200 MG Tab, Take 2 tablets (400 mg total) by mouth once daily., Disp: 60 tablet, Rfl: 2    gabapentin (NEURONTIN) 300 MG capsule, Take 1 capsule (300 mg total) by mouth 3 (three) times daily., Disp: 90 capsule, Rfl: 11    hydrOXYchloroQUINE (PLAQUENIL) 200 mg tablet, Take 200 mg by mouth once daily., Disp: , Rfl:     hydrOXYzine (ATARAX) 50 MG " tablet, Take 50 mg by mouth 2 (two) times a day., Disp: , Rfl:     imatinib (GLEEVEC) 100 MG Tab, Take 2 tablets (200 mg total) by mouth once daily with 1 other imatinib prescription for 600 mg total.Take with a meal and large glass of water, Disp: 180 tablet, Rfl: 3    imatinib (GLEEVEC) 400 MG Tab, Take 1 tablet (400 mg total) by mouth once daily with 1 other imatinib prescription for 600 mg total. Take with a meal and large glass of water, Disp: 90 tablet, Rfl: 3    leucovorin (WELLCOVORIN) 25 MG Tab, Take 1 tablet (25 mg total) by mouth every 6 (six) hours. for 5 days, Disp: 20 tablet, Rfl: 0    levoFLOXacin (LEVAQUIN) 500 MG tablet, Take 1 tablet (500 mg total) by mouth once daily., Disp: 30 tablet, Rfl: 2    losartan (COZAAR) 25 MG tablet, Take 25 mg by mouth once daily., Disp: , Rfl:     methocarbamoL (ROBAXIN) 500 MG Tab, Take 1 tablet (500 mg total) by mouth 3 (three) times daily as needed (muscle spasms). (Patient not taking: Reported on 3/16/2022), Disp: 90 tablet, Rfl: 2    omeprazole (PRILOSEC) 40 MG capsule, Take 40 mg by mouth once daily., Disp: , Rfl:     ondansetron (ZOFRAN-ODT) 8 MG TbDL, Dissolve 1 tablet (8 mg total) by mouth every 12 (twelve) hours as needed (in case of chemo induced nausea). (Patient not taking: Reported on 3/16/2022), Disp: 30 tablet, Rfl: 1    OXcarbazepine (TRILEPTAL) 600 MG Tab, Take 1,200 mg by mouth 2 (two) times daily., Disp: , Rfl:     oxyCODONE (ROXICODONE) 5 MG immediate release tablet, Take 1 tablet (5 mg total) by mouth every 4 (four) hours as needed for Pain., Disp: 45 tablet, Rfl: 0    polyethylene glycol (GLYCOLAX) 17 gram/dose powder, Dissolve one capful (17 g) in liquid and take by mouth daily as needed (constipation). (Patient not taking: Reported on 3/16/2022), Disp: 510 g, Rfl: 0    potassium chloride SA (K-DUR,KLOR-CON) 20 MEQ tablet, Take 20 mEq by mouth once daily., Disp: , Rfl:     prochlorperazine (COMPAZINE) 5 MG tablet, Take 1 tablet (5  mg total) by mouth every 6 (six) hours as needed for Nausea., Disp: 30 tablet, Rfl: 2    QUEtiapine (SEROQUEL) 200 MG Tab, Take 200 mg by mouth every evening., Disp: , Rfl:     rivaroxaban (XARELTO) 20 mg Tab, Take 1 tablet (20 mg total) by mouth daily with dinner or evening meal. Hold for platelets less than 50 thousand. (Patient not taking: Reported on 3/16/2022), Disp: , Rfl:     senna-docusate 8.6-50 mg (PERICOLACE) 8.6-50 mg per tablet, Take 1 tablet by mouth 2 (two) times daily., Disp: 60 tablet, Rfl: 3    sumatriptan (IMITREX) 50 MG tablet, Take 1 tablet (50 mg total) by mouth daily as needed (headache)., Disp: 30 tablet, Rfl: 0    traMADoL (ULTRAM) 50 mg tablet, Take 1 tablet (50 mg total) by mouth every 6 (six) hours as needed for Pain., Disp: 30 tablet, Rfl: 0    TRINTELLIX 10 mg Tab, Take 1 tablet by mouth once daily., Disp: , Rfl:     VISTARIL 50 mg Cap, Take by mouth., Disp: , Rfl:     Food/medication interactions noted: none    Vitamins/Supplements: none    Labs: Reviewed    Nutrition Diagnosis    Problem: nutrition-related knowledge deficit  Etiology (related to): lack of prior need for nutrition education  Signs/Symptoms (as evidenced by): B-cell ALL diagnosis    Nutrition Intervention    Nutrition Prescription   1826 kcals (25kcal/kg)  73g protein (1g/kg)   1826mL fluid (25mL/kg)    Recommendations:  Continue to eat 3 meals/day and add snacks in between  Include protein with all meals/snacks (examples reviewed)  Continue to supplement with Boost - can use as base of smoothies  Increase fluid intake to at least 64oz/day - try flavored hemphill and other fluids if water triggers nausea    Nutrition Monitoring and Evaluation    Monitor: diet education needs, energy intake and weight status    Goals: weight maintenance    Follow up: Patient provided with dietitian contact information and advised to call/message with questions or to make future appointment if further intervention is  needed.    Communication to referring provider/care team: note available in chart    April Castaneda MS, RD, LDN  [phone]

## 2022-03-24 ENCOUNTER — TELEPHONE (OUTPATIENT)
Dept: INFUSION THERAPY | Facility: HOSPITAL | Age: 52
End: 2022-03-24
Payer: COMMERCIAL

## 2022-03-29 DIAGNOSIS — C95.00 ACUTE LEUKEMIA NOT HAVING ACHIEVED REMISSION: ICD-10-CM

## 2022-03-29 DIAGNOSIS — C91.00 B-CELL ACUTE LYMPHOBLASTIC LEUKEMIA: ICD-10-CM

## 2022-03-29 RX ORDER — ONDANSETRON 8 MG/1
8 TABLET, ORALLY DISINTEGRATING ORAL EVERY 12 HOURS PRN
Qty: 30 TABLET | Refills: 1 | Status: SHIPPED | OUTPATIENT
Start: 2022-03-29 | End: 2022-06-13 | Stop reason: SDUPTHER

## 2022-03-29 NOTE — TELEPHONE ENCOUNTER
Patient's  called for a Gleevec refill. Patient's  did not have an on hand count but thought they were going to run out of doses in the next 1-2 days. Per leyda [facility] reached out for dose clarification. Routing assigned [facility] to follow up on correct dose.     Patient's  stated they would call the office tomorrow morning to verify the dose as well. Currently she is taking Gleevec 600 mg daily.

## 2022-03-29 NOTE — TELEPHONE ENCOUNTER
Assigned PharmD was awaiting dosage clarification and waiting new prescription with updated directions.    Mr. Gonzalez called on 3/29 requesting refill and reported 1-2 day supply.     InBasket message sent to Dr. Jenkins' office on 3/29 to confirm she is to take imatinib 600 mg po daily on days 15-28 of each consolidation cycle and requesting they approve electronic refill request. Routed to assigned PharmD.

## 2022-03-31 RX ORDER — IMATINIB MESYLATE 400 MG/1
400 TABLET, FILM COATED ORAL DAILY
Qty: 90 TABLET | Refills: 3 | Status: ON HOLD | OUTPATIENT
Start: 2022-03-31 | End: 2022-05-16

## 2022-03-31 RX ORDER — IMATINIB MESYLATE 100 MG/1
200 TABLET, FILM COATED ORAL DAILY
Qty: 180 TABLET | Refills: 3 | Status: ON HOLD | OUTPATIENT
Start: 2022-03-31 | End: 2022-05-16

## 2022-04-01 ENCOUNTER — SPECIALTY PHARMACY (OUTPATIENT)
Dept: PHARMACY | Facility: CLINIC | Age: 52
End: 2022-04-01
Payer: COMMERCIAL

## 2022-04-01 NOTE — TELEPHONE ENCOUNTER
Specialty Pharmacy - Medication/Referral Authorization  Specialty Pharmacy - Refill Coordination  Specialty Pharmacy - Clinical Reassessment    Specialty Medication Orders Linked to Encounter    Flowsheet Row Most Recent Value   Medication #1 imatinib (GLEEVEC) 100 MG Tab (Order#[id number], Rx#)   Medication #2 imatinib (GLEEVEC) 400 MG Tab (Order#[id number], Rx#)          Refill Questions - Documented Responses    Flowsheet Row Most Recent Value   Patient Availability and HIPAA Verification    Does patient want to proceed with activity? Yes   HIPAA/medical authority confirmed? Yes   Relationship to patient of person spoken to? Spouse/Significant Other   Refill Screening Questions    Changes to allergies? No   Changes to medications? No   New conditions since last clinic visit? No   Unplanned office visit, urgent care, ED, or hospital admission in the last 4 weeks? No   How does patient/caregiver feel medication is working? Good   Financial problems or insurance changes? No   How many doses of your specialty medications were missed in the last 4 weeks? 0   Would patient like to speak to a pharmacist? No   When does the patient need to receive the medication? 04/04/22   Refill Delivery Questions    How will the patient receive the medication? Mail   When does the patient need to receive the medication? 04/04/22   Shipping Address Home   Address in [facility] confirmed and updated if neccessary? Yes   Expected Copay ($) 82.93   Is the patient able to afford the medication copay? Yes   Payment Method CC on file   Days supply of Refill 28   Supplies needed? No supplies needed   Refill activity completed? Yes   Refill activity plan Refill scheduled   Shipment/Pickup Date: 04/04/22          Current Outpatient Medications   Medication Sig    acyclovir (ZOVIRAX) 400 MG tablet Take 1 tablet (400 mg total) by mouth 2 (two) times daily.    artificial tears (ISOPTO TEARS) 0.5 % ophthalmic solution Place 1 drop into both  eyes 4 (four) times daily as needed.    aspirin (ECOTRIN) 81 MG EC tablet Take 1 tablet (81 mg total) by mouth once daily. Hold for platelet count less than 50 thousand. (Patient not taking: No sig reported)    atorvastatin (LIPITOR) 80 MG tablet Take 80 mg by mouth once daily.    busPIRone (BUSPAR) 10 MG tablet Take 10 mg by mouth 2 (two) times daily.    dapagliflozin (FARXIGA) 10 mg tablet Take 10 mg by mouth once daily.    diazePAM (VALIUM) 10 MG Tab Take 10 mg by mouth 2 (two) times daily as needed.    diltiaZEM (CARDIZEM) 90 MG tablet Take 1 tablet (90 mg total) by mouth every 6 (six) hours.    duke's soln (benadryl 30 mL, mylanta 30 mL, LIDOcaine 30 mL, nystatin 30 mL) 120mL Take 10 mLs by mouth 4 (four) times daily. (Patient not taking: No sig reported)    fenofibrate 160 MG Tab Take 160 mg by mouth once daily.    fluconazole (DIFLUCAN) 200 MG Tab Take 2 tablets (400 mg total) by mouth once daily.    gabapentin (NEURONTIN) 300 MG capsule Take 1 capsule (300 mg total) by mouth 3 (three) times daily.    hydrOXYchloroQUINE (PLAQUENIL) 200 mg tablet Take 200 mg by mouth once daily.    hydrOXYzine (ATARAX) 50 MG tablet Take 50 mg by mouth 2 (two) times a day.    imatinib (GLEEVEC) 100 MG Tab Take 2 tablets (200 mg total) by mouth once daily with 1 other imatinib prescription for 600 mg total on days 15-28 of each cycle during consolidation.Take with a meal and large glass of water    imatinib (GLEEVEC) 400 MG Tab Take 1 tablet (400 mg total) by mouth once daily with 1 other imatinib prescription for 600 mg total on days 15-28 of each cycle during consolidation. Take with a meal and large glass of water    levoFLOXacin (LEVAQUIN) 500 MG tablet Take 1 tablet (500 mg total) by mouth once daily.    losartan (COZAAR) 25 MG tablet Take 25 mg by mouth once daily.    methocarbamoL (ROBAXIN) 500 MG Tab Take 1 tablet (500 mg total) by mouth 3 (three) times daily as needed (muscle spasms). (Patient not  taking: Reported on 3/16/2022)    omeprazole (PRILOSEC) 40 MG capsule Take 40 mg by mouth once daily.    ondansetron (ZOFRAN-ODT) 8 MG TbDL Dissolve 1 tablet (8 mg total) by mouth every 12 (twelve) hours as needed (in case of chemo induced nausea).    OXcarbazepine (TRILEPTAL) 600 MG Tab Take 1,200 mg by mouth 2 (two) times daily.    oxyCODONE (ROXICODONE) 5 MG immediate release tablet Take 1 tablet (5 mg total) by mouth every 4 (four) hours as needed for Pain.    polyethylene glycol (GLYCOLAX) 17 gram/dose powder Dissolve one capful (17 g) in liquid and take by mouth daily as needed (constipation). (Patient not taking: Reported on 3/16/2022)    potassium chloride SA (K-DUR,KLOR-CON) 20 MEQ tablet Take 20 mEq by mouth once daily.    prochlorperazine (COMPAZINE) 5 MG tablet Take 1 tablet (5 mg total) by mouth every 6 (six) hours as needed for Nausea.    QUEtiapine (SEROQUEL) 200 MG Tab Take 200 mg by mouth every evening.    rivaroxaban (XARELTO) 20 mg Tab Take 1 tablet (20 mg total) by mouth daily with dinner or evening meal. Hold for platelets less than 50 thousand. (Patient not taking: Reported on 3/16/2022)    senna-docusate 8.6-50 mg (PERICOLACE) 8.6-50 mg per tablet Take 1 tablet by mouth 2 (two) times daily.    sumatriptan (IMITREX) 50 MG tablet Take 1 tablet (50 mg total) by mouth daily as needed (headache).    traMADoL (ULTRAM) 50 mg tablet Take 1 tablet (50 mg total) by mouth every 6 (six) hours as needed for Pain.    TRINTELLIX 10 mg Tab Take 1 tablet by mouth once daily.    VISTARIL 50 mg Cap Take by mouth.   Last reviewed on 3/16/2022  4:38 PM by Joann Linares RN    Review of patient's allergies indicates:   Allergen Reactions    Levetiracetam      Other reaction(s): Unknown    Last reviewed on  3/30/2022 8:23 AM by Rose Marie Patrick added this encounter   4/1/2022 - Welcome Call  4/1/2022 - Referral Authorization   Tasks due within next 3 months   3/10/2022 - Clinical - Follow Up  James (90 day)  3/10/2022 - Refill Call (Auto Added)     KAMILA ULLOA, PharmD  [facility]  [address]  [facility] [address]  Phone: [phone]  Fax: [phone]

## 2022-04-01 NOTE — TELEPHONE ENCOUNTER
Deepti Gonzalez is a 51 y.o. female, who is followed by the specialty pharmacy service for management and education of her Gleevec.  She has been on therapy with since .  I have reviewed her electronic medical record and current medication list and determined that specialty medication adjustment Is not needed at this time.    We reviewed current administration directions during the call. Pt should take Gleevec 600 mg total on days 15-28 of consolidation cycles. Patient's  confirmed Day 15 begins on 04/02. Patient has 2 doses and will require refill by 04/04. Unfortunately, see will not receive Gleevec until 04/05. Messaged provider to make him aware.    Patient has experienced adverse events such as nausea, and is managed in the following way(s): antiemetics.  She Is adherent reporting 0 missed doses since last review.  Adherence has been encouraged with the following mechanism(s): Pill box and daily routine.  She is on target to meet goals of therapy and will continue treatment.    Follow Up Review 4/1/2022 1/11/2022   # of missed doses 0 1   Reason - Felt ill or sick   New Medications? No No   New Conditions? No No   New Allergies? No No   Medication Effective? Good Good   Some recent data might be hidden       Therapy is appropriate to continue.    Therapy is effective: Yes  Recommendations: none at this time.  Review Method: Patient Contact    3/19 labs reviewed. No adjustments needed. Patient's next treatment is on 4/13.     KAMILA ULLOA, PharmD  [facility]  [address] [place] [id number]  Phone: [phone]  Fax: [phone]

## 2022-04-01 NOTE — TELEPHONE ENCOUNTER
Imatinib orders received with current instructions of 600 mg once daily on days 15-28 of consolidation cycle.

## 2022-04-13 ENCOUNTER — HOSPITAL ENCOUNTER (INPATIENT)
Facility: HOSPITAL | Age: 52
LOS: 6 days | Discharge: HOME | DRG: 838 | End: 2022-04-19
Attending: INTERNAL MEDICINE | Admitting: INTERNAL MEDICINE
Payer: COMMERCIAL

## 2022-04-13 ENCOUNTER — OFFICE VISIT (OUTPATIENT)
Dept: HEMATOLOGY/ONCOLOGY | Facility: CLINIC | Age: 52
End: 2022-04-13
Payer: COMMERCIAL

## 2022-04-13 ENCOUNTER — CLINICAL SUPPORT (OUTPATIENT)
Dept: HEMATOLOGY/ONCOLOGY | Facility: CLINIC | Age: 52
DRG: 838 | End: 2022-04-13
Payer: COMMERCIAL

## 2022-04-13 ENCOUNTER — TELEPHONE (OUTPATIENT)
Dept: HEMATOLOGY/ONCOLOGY | Facility: CLINIC | Age: 52
End: 2022-04-13

## 2022-04-13 ENCOUNTER — TELEPHONE (OUTPATIENT)
Dept: HEMATOLOGY/ONCOLOGY | Facility: CLINIC | Age: 52
End: 2022-04-13
Payer: COMMERCIAL

## 2022-04-13 VITALS
RESPIRATION RATE: 16 BRPM | HEART RATE: 108 BPM | BODY MASS INDEX: 26.71 KG/M2 | WEIGHT: 156.44 LBS | SYSTOLIC BLOOD PRESSURE: 135 MMHG | DIASTOLIC BLOOD PRESSURE: 71 MMHG | OXYGEN SATURATION: 97 % | HEIGHT: 64 IN

## 2022-04-13 DIAGNOSIS — G43.909 MIGRAINE WITHOUT STATUS MIGRAINOSUS, NOT INTRACTABLE, UNSPECIFIED MIGRAINE TYPE: ICD-10-CM

## 2022-04-13 DIAGNOSIS — G43.709 CHRONIC MIGRAINE WITHOUT AURA WITHOUT STATUS MIGRAINOSUS, NOT INTRACTABLE: ICD-10-CM

## 2022-04-13 DIAGNOSIS — C91.00 B-CELL ACUTE LYMPHOBLASTIC LEUKEMIA (ALL): Primary | ICD-10-CM

## 2022-04-13 DIAGNOSIS — R07.9 CHEST PAIN: ICD-10-CM

## 2022-04-13 DIAGNOSIS — C91.00 B-CELL ACUTE LYMPHOBLASTIC LEUKEMIA: Primary | ICD-10-CM

## 2022-04-13 DIAGNOSIS — T45.1X5A ANEMIA ASSOCIATED WITH CHEMOTHERAPY: ICD-10-CM

## 2022-04-13 DIAGNOSIS — D64.81 ANEMIA ASSOCIATED WITH CHEMOTHERAPY: ICD-10-CM

## 2022-04-13 DIAGNOSIS — C91.00 B-CELL ACUTE LYMPHOBLASTIC LEUKEMIA: ICD-10-CM

## 2022-04-13 DIAGNOSIS — Z11.52 ENCOUNTER FOR SCREENING FOR COVID-19: Primary | ICD-10-CM

## 2022-04-13 LAB
ESTIMATED AVG GLUCOSE: 80 MG/DL (ref 68–131)
HBA1C MFR BLD: 4.4 % (ref 4–5.6)
SARS-COV-2 RDRP RESP QL NAA+PROBE: NEGATIVE

## 2022-04-13 PROCEDURE — U0002 COVID-19 LAB TEST NON-CDC: HCPCS | Performed by: INTERNAL MEDICINE

## 2022-04-13 PROCEDURE — 4010F ACE/ARB THERAPY RXD/TAKEN: CPT | Mod: CPTII,S$GLB,, | Performed by: INTERNAL MEDICINE

## 2022-04-13 PROCEDURE — 36415 COLL VENOUS BLD VENIPUNCTURE: CPT | Performed by: PHYSICIAN ASSISTANT

## 2022-04-13 PROCEDURE — 99999 PR PBB SHADOW E&M-EST. PATIENT-LVL I: ICD-10-PCS | Mod: PBBFAC,,,

## 2022-04-13 PROCEDURE — 3078F DIAST BP <80 MM HG: CPT | Mod: CPTII,S$GLB,, | Performed by: INTERNAL MEDICINE

## 2022-04-13 PROCEDURE — 1160F RVW MEDS BY RX/DR IN RCRD: CPT | Mod: CPTII,S$GLB,, | Performed by: INTERNAL MEDICINE

## 2022-04-13 PROCEDURE — 83036 HEMOGLOBIN GLYCOSYLATED A1C: CPT | Performed by: PHYSICIAN ASSISTANT

## 2022-04-13 PROCEDURE — 3008F PR BODY MASS INDEX (BMI) DOCUMENTED: ICD-10-PCS | Mod: CPTII,S$GLB,, | Performed by: INTERNAL MEDICINE

## 2022-04-13 PROCEDURE — 3075F SYST BP GE 130 - 139MM HG: CPT | Mod: CPTII,S$GLB,, | Performed by: INTERNAL MEDICINE

## 2022-04-13 PROCEDURE — 1159F PR MEDICATION LIST DOCUMENTED IN MEDICAL RECORD: ICD-10-PCS | Mod: CPTII,S$GLB,, | Performed by: INTERNAL MEDICINE

## 2022-04-13 PROCEDURE — 63600175 PHARM REV CODE 636 W HCPCS: Performed by: INTERNAL MEDICINE

## 2022-04-13 PROCEDURE — 99223 PR INITIAL HOSPITAL CARE,LEVL III: ICD-10-PCS | Mod: ,,, | Performed by: INTERNAL MEDICINE

## 2022-04-13 PROCEDURE — 3008F BODY MASS INDEX DOCD: CPT | Mod: CPTII,S$GLB,, | Performed by: INTERNAL MEDICINE

## 2022-04-13 PROCEDURE — 25000003 PHARM REV CODE 250: Performed by: PHYSICIAN ASSISTANT

## 2022-04-13 PROCEDURE — 1111F DSCHRG MED/CURRENT MED MERGE: CPT | Mod: CPTII,S$GLB,, | Performed by: INTERNAL MEDICINE

## 2022-04-13 PROCEDURE — 1111F PR DISCHARGE MEDS RECONCILED W/ CURRENT OUTPATIENT MED LIST: ICD-10-PCS | Mod: CPTII,S$GLB,, | Performed by: INTERNAL MEDICINE

## 2022-04-13 PROCEDURE — 1159F MED LIST DOCD IN RCRD: CPT | Mod: CPTII,S$GLB,, | Performed by: INTERNAL MEDICINE

## 2022-04-13 PROCEDURE — 25000003 PHARM REV CODE 250: Performed by: INTERNAL MEDICINE

## 2022-04-13 PROCEDURE — 3078F PR MOST RECENT DIASTOLIC BLOOD PRESSURE < 80 MM HG: ICD-10-PCS | Mod: CPTII,S$GLB,, | Performed by: INTERNAL MEDICINE

## 2022-04-13 PROCEDURE — 99499 NO LOS: ICD-10-PCS | Mod: S$GLB,,, | Performed by: INTERNAL MEDICINE

## 2022-04-13 PROCEDURE — 20600001 HC STEP DOWN PRIVATE ROOM

## 2022-04-13 PROCEDURE — 99223 1ST HOSP IP/OBS HIGH 75: CPT | Mod: ,,, | Performed by: INTERNAL MEDICINE

## 2022-04-13 PROCEDURE — 1160F PR REVIEW ALL MEDS BY PRESCRIBER/CLIN PHARMACIST DOCUMENTED: ICD-10-PCS | Mod: CPTII,S$GLB,, | Performed by: INTERNAL MEDICINE

## 2022-04-13 PROCEDURE — 99999 PR PBB SHADOW E&M-EST. PATIENT-LVL I: CPT | Mod: PBBFAC,,,

## 2022-04-13 PROCEDURE — 99999 PR PBB SHADOW E&M-EST. PATIENT-LVL V: CPT | Mod: PBBFAC,,, | Performed by: INTERNAL MEDICINE

## 2022-04-13 PROCEDURE — 3075F PR MOST RECENT SYSTOLIC BLOOD PRESS GE 130-139MM HG: ICD-10-PCS | Mod: CPTII,S$GLB,, | Performed by: INTERNAL MEDICINE

## 2022-04-13 PROCEDURE — 99999 PR PBB SHADOW E&M-EST. PATIENT-LVL V: ICD-10-PCS | Mod: PBBFAC,,, | Performed by: INTERNAL MEDICINE

## 2022-04-13 PROCEDURE — 99499 UNLISTED E&M SERVICE: CPT | Mod: S$GLB,,, | Performed by: INTERNAL MEDICINE

## 2022-04-13 PROCEDURE — 4010F PR ACE/ARB THEARPY RXD/TAKEN: ICD-10-PCS | Mod: CPTII,S$GLB,, | Performed by: INTERNAL MEDICINE

## 2022-04-13 RX ORDER — ONDANSETRON 2 MG/ML
8 INJECTION INTRAMUSCULAR; INTRAVENOUS
Status: CANCELLED | OUTPATIENT
Start: 2022-04-13

## 2022-04-13 RX ORDER — POTASSIUM CHLORIDE 20 MEQ/1
20 TABLET, EXTENDED RELEASE ORAL
Status: DISCONTINUED | OUTPATIENT
Start: 2022-04-13 | End: 2022-04-19 | Stop reason: HOSPADM

## 2022-04-13 RX ORDER — IBUPROFEN 200 MG
24 TABLET ORAL
Status: DISCONTINUED | OUTPATIENT
Start: 2022-04-13 | End: 2022-04-19 | Stop reason: HOSPADM

## 2022-04-13 RX ORDER — PROCHLORPERAZINE EDISYLATE 5 MG/ML
10 INJECTION INTRAMUSCULAR; INTRAVENOUS EVERY 6 HOURS PRN
Status: DISCONTINUED | OUTPATIENT
Start: 2022-04-13 | End: 2022-04-19 | Stop reason: HOSPADM

## 2022-04-13 RX ORDER — GLUCAGON 1 MG
1 KIT INJECTION
Status: DISCONTINUED | OUTPATIENT
Start: 2022-04-13 | End: 2022-04-19 | Stop reason: HOSPADM

## 2022-04-13 RX ORDER — SODIUM CHLORIDE 9 MG/ML
INJECTION, SOLUTION INTRAVENOUS CONTINUOUS
Status: DISCONTINUED | OUTPATIENT
Start: 2022-04-13 | End: 2022-04-15

## 2022-04-13 RX ORDER — SODIUM CHLORIDE 0.9 % (FLUSH) 0.9 %
10 SYRINGE (ML) INJECTION
Status: DISCONTINUED | OUTPATIENT
Start: 2022-04-13 | End: 2022-04-19 | Stop reason: HOSPADM

## 2022-04-13 RX ORDER — OXYCODONE HYDROCHLORIDE 5 MG/1
5 TABLET ORAL EVERY 4 HOURS PRN
Status: DISCONTINUED | OUTPATIENT
Start: 2022-04-13 | End: 2022-04-19 | Stop reason: HOSPADM

## 2022-04-13 RX ORDER — POLYETHYLENE GLYCOL 3350 17 G/17G
17 POWDER, FOR SOLUTION ORAL DAILY PRN
Status: DISCONTINUED | OUTPATIENT
Start: 2022-04-13 | End: 2022-04-19 | Stop reason: HOSPADM

## 2022-04-13 RX ORDER — FLUCONAZOLE 200 MG/1
400 TABLET ORAL DAILY
Status: DISCONTINUED | OUTPATIENT
Start: 2022-04-14 | End: 2022-04-14

## 2022-04-13 RX ORDER — DILTIAZEM HYDROCHLORIDE 30 MG/1
90 TABLET, FILM COATED ORAL EVERY 6 HOURS
Status: DISCONTINUED | OUTPATIENT
Start: 2022-04-13 | End: 2022-04-19 | Stop reason: HOSPADM

## 2022-04-13 RX ORDER — ONDANSETRON 2 MG/ML
8 INJECTION INTRAMUSCULAR; INTRAVENOUS
Status: COMPLETED | OUTPATIENT
Start: 2022-04-18 | End: 2022-04-18

## 2022-04-13 RX ORDER — ONDANSETRON 2 MG/ML
8 INJECTION INTRAMUSCULAR; INTRAVENOUS
Status: CANCELLED | OUTPATIENT
Start: 2022-04-15

## 2022-04-13 RX ORDER — TALC
6 POWDER (GRAM) TOPICAL NIGHTLY PRN
Status: DISCONTINUED | OUTPATIENT
Start: 2022-04-13 | End: 2022-04-19 | Stop reason: HOSPADM

## 2022-04-13 RX ORDER — SODIUM CHLORIDE 9 MG/ML
INJECTION, SOLUTION INTRAVENOUS CONTINUOUS
Status: CANCELLED | OUTPATIENT
Start: 2022-04-13

## 2022-04-13 RX ORDER — LANOLIN ALCOHOL/MO/W.PET/CERES
400 CREAM (GRAM) TOPICAL EVERY 4 HOURS PRN
Status: DISCONTINUED | OUTPATIENT
Start: 2022-04-13 | End: 2022-04-19 | Stop reason: HOSPADM

## 2022-04-13 RX ORDER — BUSPIRONE HYDROCHLORIDE 5 MG/1
10 TABLET ORAL 2 TIMES DAILY
Status: DISCONTINUED | OUTPATIENT
Start: 2022-04-13 | End: 2022-04-19 | Stop reason: HOSPADM

## 2022-04-13 RX ORDER — ONDANSETRON 8 MG/1
8 TABLET, ORALLY DISINTEGRATING ORAL EVERY 8 HOURS PRN
Status: DISCONTINUED | OUTPATIENT
Start: 2022-04-13 | End: 2022-04-19 | Stop reason: HOSPADM

## 2022-04-13 RX ORDER — HYDROXYCHLOROQUINE SULFATE 200 MG/1
200 TABLET, FILM COATED ORAL DAILY
Status: DISCONTINUED | OUTPATIENT
Start: 2022-04-14 | End: 2022-04-19 | Stop reason: HOSPADM

## 2022-04-13 RX ORDER — LOSARTAN POTASSIUM 25 MG/1
25 TABLET ORAL DAILY
Status: DISCONTINUED | OUTPATIENT
Start: 2022-04-14 | End: 2022-04-19 | Stop reason: HOSPADM

## 2022-04-13 RX ORDER — TRAMADOL HYDROCHLORIDE 50 MG/1
50 TABLET ORAL EVERY 6 HOURS PRN
Status: DISCONTINUED | OUTPATIENT
Start: 2022-04-13 | End: 2022-04-19 | Stop reason: HOSPADM

## 2022-04-13 RX ORDER — ONDANSETRON 2 MG/ML
8 INJECTION INTRAMUSCULAR; INTRAVENOUS
Status: CANCELLED | OUTPATIENT
Start: 2022-04-17

## 2022-04-13 RX ORDER — NALOXONE HCL 0.4 MG/ML
0.02 VIAL (ML) INJECTION
Status: DISCONTINUED | OUTPATIENT
Start: 2022-04-13 | End: 2022-04-19 | Stop reason: HOSPADM

## 2022-04-13 RX ORDER — SODIUM,POTASSIUM PHOSPHATES 280-250MG
2 POWDER IN PACKET (EA) ORAL EVERY 4 HOURS PRN
Status: DISCONTINUED | OUTPATIENT
Start: 2022-04-13 | End: 2022-04-19 | Stop reason: HOSPADM

## 2022-04-13 RX ORDER — DEXAMETHASONE SODIUM PHOSPHATE 4 MG/ML
8 INJECTION, SOLUTION INTRA-ARTICULAR; INTRALESIONAL; INTRAMUSCULAR; INTRAVENOUS; SOFT TISSUE
Status: COMPLETED | OUTPATIENT
Start: 2022-04-16 | End: 2022-04-16

## 2022-04-13 RX ORDER — SIMETHICONE 80 MG
1 TABLET,CHEWABLE ORAL 4 TIMES DAILY PRN
Status: DISCONTINUED | OUTPATIENT
Start: 2022-04-13 | End: 2022-04-19 | Stop reason: HOSPADM

## 2022-04-13 RX ORDER — PANTOPRAZOLE SODIUM 40 MG/1
40 TABLET, DELAYED RELEASE ORAL DAILY
Status: DISCONTINUED | OUTPATIENT
Start: 2022-04-14 | End: 2022-04-19 | Stop reason: HOSPADM

## 2022-04-13 RX ORDER — ACYCLOVIR 200 MG/1
400 CAPSULE ORAL 2 TIMES DAILY
Status: DISCONTINUED | OUTPATIENT
Start: 2022-04-13 | End: 2022-04-19 | Stop reason: HOSPADM

## 2022-04-13 RX ORDER — ONDANSETRON 2 MG/ML
8 INJECTION INTRAMUSCULAR; INTRAVENOUS
Status: COMPLETED | OUTPATIENT
Start: 2022-04-16 | End: 2022-04-16

## 2022-04-13 RX ORDER — DEXAMETHASONE SODIUM PHOSPHATE 1 MG/ML
1 SOLUTION/ DROPS OPHTHALMIC
Status: DISCONTINUED | OUTPATIENT
Start: 2022-04-13 | End: 2022-04-19 | Stop reason: HOSPADM

## 2022-04-13 RX ORDER — LANOLIN ALCOHOL/MO/W.PET/CERES
800 CREAM (GRAM) TOPICAL EVERY 4 HOURS PRN
Status: DISCONTINUED | OUTPATIENT
Start: 2022-04-13 | End: 2022-04-19 | Stop reason: HOSPADM

## 2022-04-13 RX ORDER — DIAZEPAM 5 MG/1
10 TABLET ORAL 2 TIMES DAILY PRN
Status: DISCONTINUED | OUTPATIENT
Start: 2022-04-13 | End: 2022-04-19 | Stop reason: HOSPADM

## 2022-04-13 RX ORDER — ONDANSETRON 2 MG/ML
8 INJECTION INTRAMUSCULAR; INTRAVENOUS
Status: COMPLETED | OUTPATIENT
Start: 2022-04-13 | End: 2022-04-14

## 2022-04-13 RX ORDER — IBUPROFEN 200 MG
16 TABLET ORAL
Status: DISCONTINUED | OUTPATIENT
Start: 2022-04-13 | End: 2022-04-19 | Stop reason: HOSPADM

## 2022-04-13 RX ORDER — DEXAMETHASONE SODIUM PHOSPHATE 4 MG/ML
8 INJECTION, SOLUTION INTRA-ARTICULAR; INTRALESIONAL; INTRAMUSCULAR; INTRAVENOUS; SOFT TISSUE
Status: CANCELLED | OUTPATIENT
Start: 2022-04-17

## 2022-04-13 RX ORDER — DEXAMETHASONE SODIUM PHOSPHATE 4 MG/ML
8 INJECTION, SOLUTION INTRA-ARTICULAR; INTRALESIONAL; INTRAMUSCULAR; INTRAVENOUS; SOFT TISSUE
Status: CANCELLED | OUTPATIENT
Start: 2022-04-13

## 2022-04-13 RX ORDER — DEXAMETHASONE SODIUM PHOSPHATE 1 MG/ML
1 SOLUTION/ DROPS OPHTHALMIC
Status: CANCELLED | OUTPATIENT
Start: 2022-04-13

## 2022-04-13 RX ORDER — SODIUM,POTASSIUM PHOSPHATES 280-250MG
1 POWDER IN PACKET (EA) ORAL EVERY 4 HOURS PRN
Status: DISCONTINUED | OUTPATIENT
Start: 2022-04-13 | End: 2022-04-19 | Stop reason: HOSPADM

## 2022-04-13 RX ORDER — SODIUM CHLORIDE 0.9 % (FLUSH) 0.9 %
10 SYRINGE (ML) INJECTION
Status: CANCELLED | OUTPATIENT
Start: 2022-04-13

## 2022-04-13 RX ORDER — PROCHLORPERAZINE EDISYLATE 5 MG/ML
5 INJECTION INTRAMUSCULAR; INTRAVENOUS EVERY 6 HOURS PRN
Status: DISCONTINUED | OUTPATIENT
Start: 2022-04-13 | End: 2022-04-14

## 2022-04-13 RX ORDER — HYDROXYZINE HYDROCHLORIDE 25 MG/1
50 TABLET, FILM COATED ORAL 2 TIMES DAILY
Status: DISCONTINUED | OUTPATIENT
Start: 2022-04-13 | End: 2022-04-19 | Stop reason: HOSPADM

## 2022-04-13 RX ORDER — DEXAMETHASONE SODIUM PHOSPHATE 4 MG/ML
8 INJECTION, SOLUTION INTRA-ARTICULAR; INTRALESIONAL; INTRAMUSCULAR; INTRAVENOUS; SOFT TISSUE
Status: COMPLETED | OUTPATIENT
Start: 2022-04-18 | End: 2022-04-18

## 2022-04-13 RX ORDER — LIDOCAINE AND PRILOCAINE 25; 25 MG/G; MG/G
CREAM TOPICAL ONCE
Status: COMPLETED | OUTPATIENT
Start: 2022-04-13 | End: 2022-04-13

## 2022-04-13 RX ORDER — GABAPENTIN 300 MG/1
300 CAPSULE ORAL 3 TIMES DAILY
Status: DISCONTINUED | OUTPATIENT
Start: 2022-04-13 | End: 2022-04-19 | Stop reason: HOSPADM

## 2022-04-13 RX ORDER — DEXAMETHASONE SODIUM PHOSPHATE 4 MG/ML
8 INJECTION, SOLUTION INTRA-ARTICULAR; INTRALESIONAL; INTRAMUSCULAR; INTRAVENOUS; SOFT TISSUE
Status: COMPLETED | OUTPATIENT
Start: 2022-04-13 | End: 2022-04-14

## 2022-04-13 RX ORDER — QUETIAPINE FUMARATE 200 MG/1
200 TABLET, FILM COATED ORAL NIGHTLY
Status: DISCONTINUED | OUTPATIENT
Start: 2022-04-13 | End: 2022-04-19 | Stop reason: HOSPADM

## 2022-04-13 RX ORDER — SODIUM CHLORIDE 0.9 % (FLUSH) 0.9 %
10 SYRINGE (ML) INJECTION EVERY 12 HOURS PRN
Status: DISCONTINUED | OUTPATIENT
Start: 2022-04-13 | End: 2022-04-14

## 2022-04-13 RX ORDER — HEPARIN 100 UNIT/ML
500 SYRINGE INTRAVENOUS
Status: CANCELLED | OUTPATIENT
Start: 2022-04-13

## 2022-04-13 RX ORDER — AMOXICILLIN 250 MG
1 CAPSULE ORAL DAILY PRN
Status: DISCONTINUED | OUTPATIENT
Start: 2022-04-13 | End: 2022-04-19 | Stop reason: HOSPADM

## 2022-04-13 RX ORDER — ONDANSETRON 8 MG/1
8 TABLET, ORALLY DISINTEGRATING ORAL EVERY 8 HOURS PRN
Status: CANCELLED | OUTPATIENT
Start: 2022-04-13

## 2022-04-13 RX ORDER — PROCHLORPERAZINE EDISYLATE 5 MG/ML
10 INJECTION INTRAMUSCULAR; INTRAVENOUS EVERY 6 HOURS PRN
Status: CANCELLED | OUTPATIENT
Start: 2022-04-13

## 2022-04-13 RX ORDER — DEXAMETHASONE SODIUM PHOSPHATE 4 MG/ML
8 INJECTION, SOLUTION INTRA-ARTICULAR; INTRALESIONAL; INTRAMUSCULAR; INTRAVENOUS; SOFT TISSUE
Status: CANCELLED | OUTPATIENT
Start: 2022-04-15

## 2022-04-13 RX ORDER — OXCARBAZEPINE 600 MG/1
1200 TABLET, FILM COATED ORAL 2 TIMES DAILY
Status: DISCONTINUED | OUTPATIENT
Start: 2022-04-13 | End: 2022-04-19 | Stop reason: HOSPADM

## 2022-04-13 RX ORDER — ONDANSETRON 8 MG/1
8 TABLET, ORALLY DISINTEGRATING ORAL EVERY 8 HOURS PRN
Status: DISCONTINUED | OUTPATIENT
Start: 2022-04-13 | End: 2022-04-14

## 2022-04-13 RX ORDER — HEPARIN 100 UNIT/ML
500 SYRINGE INTRAVENOUS
Status: DISCONTINUED | OUTPATIENT
Start: 2022-04-13 | End: 2022-04-19 | Stop reason: HOSPADM

## 2022-04-13 RX ADMIN — DEXAMETHASONE 1 DROP: 1 SUSPENSION OPHTHALMIC at 09:04

## 2022-04-13 RX ADMIN — DEXAMETHASONE SODIUM PHOSPHATE 8 MG: 4 INJECTION INTRA-ARTICULAR; INTRALESIONAL; INTRAMUSCULAR; INTRAVENOUS; SOFT TISSUE at 08:04

## 2022-04-13 RX ADMIN — BUSPIRONE HYDROCHLORIDE 10 MG: 5 TABLET ORAL at 08:04

## 2022-04-13 RX ADMIN — LIDOCAINE AND PRILOCAINE: 25; 25 CREAM TOPICAL at 04:04

## 2022-04-13 RX ADMIN — DILTIAZEM HYDROCHLORIDE 90 MG: 30 TABLET, FILM COATED ORAL at 05:04

## 2022-04-13 RX ADMIN — ALUMINUM HYDROXIDE, MAGNESIUM HYDROXIDE, AND DIMETHICONE 10 ML: 400; 400; 40 SUSPENSION ORAL at 08:04

## 2022-04-13 RX ADMIN — OXYCODONE 5 MG: 5 TABLET ORAL at 06:04

## 2022-04-13 RX ADMIN — ONDANSETRON 8 MG: 8 TABLET, ORALLY DISINTEGRATING ORAL at 07:04

## 2022-04-13 RX ADMIN — CYTARABINE 885 MG: 100 INJECTION, SOLUTION INTRATHECAL; INTRAVENOUS; SUBCUTANEOUS at 09:04

## 2022-04-13 RX ADMIN — ONDANSETRON 8 MG: 2 INJECTION INTRAMUSCULAR; INTRAVENOUS at 08:04

## 2022-04-13 RX ADMIN — OXCARBAZEPINE 1200 MG: 600 TABLET, FILM COATED ORAL at 08:04

## 2022-04-13 RX ADMIN — HYPROMELLOSE 2910 1 DROP: 5 SOLUTION OPHTHALMIC at 08:04

## 2022-04-13 RX ADMIN — GABAPENTIN 300 MG: 300 CAPSULE ORAL at 08:04

## 2022-04-13 RX ADMIN — GABAPENTIN 300 MG: 300 CAPSULE ORAL at 04:04

## 2022-04-13 RX ADMIN — DILTIAZEM HYDROCHLORIDE 90 MG: 30 TABLET, FILM COATED ORAL at 11:04

## 2022-04-13 RX ADMIN — ALUMINUM HYDROXIDE, MAGNESIUM HYDROXIDE, AND DIMETHICONE 10 ML: 400; 400; 40 SUSPENSION ORAL at 05:04

## 2022-04-13 RX ADMIN — SODIUM CHLORIDE: 0.9 INJECTION, SOLUTION INTRAVENOUS at 08:04

## 2022-04-13 RX ADMIN — ACYCLOVIR 400 MG: 200 CAPSULE ORAL at 08:04

## 2022-04-13 RX ADMIN — HYDROXYZINE HYDROCHLORIDE 50 MG: 25 TABLET, FILM COATED ORAL at 08:04

## 2022-04-13 RX ADMIN — QUETIAPINE FUMARATE 200 MG: 200 TABLET ORAL at 08:04

## 2022-04-13 RX ADMIN — DIAZEPAM 10 MG: 5 TABLET ORAL at 08:04

## 2022-04-13 RX ADMIN — RIVAROXABAN 20 MG: 20 TABLET, FILM COATED ORAL at 04:04

## 2022-04-13 NOTE — ASSESSMENT & PLAN NOTE
- hold home farxiga while inpatient. Can resume at discharge.  - low dose SSI, achs blood glucose monitoring, and diabetic diet while inpatient

## 2022-04-13 NOTE — ASSESSMENT & PLAN NOTE
From Dr. Jenkins's most recent clinic note  Precursor B-cell acute lymphoblastic leukemia (Ph+)              A. 11/23/2021: [place] [facility] [place] for acute leukemia              B. 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               C. 11/30/2021 - 12/23/2021: Vincristine + imatinib induction; hospital course was complicated by acute hypoxemic respiratory failure which resolved with diuresis and treatment of ALL     - BMBx from 1/3/22 showing CR  - admitting today for C4 of consolidation with EWALL ([facility])  - tolerated previous cycles of consolidation well  - take home Gleevec days 15-28 of consolidation cycles  - follows outpatient with Dr. Garcia for lab monitoring and transfusions  - monitor closely for neuro toxicity with cytarabine

## 2022-04-13 NOTE — HPI
Ms. 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 Other medical history includes COPD, HTN, HLD, DM2, TIA, anxiety, depression, GERD, seizure disorder, PAD, gastroparesis, RA, osteoporosis, a-fib, and pacermaker placement. She is admitting today for C4 of consolidation with EWALL ([facility]). She was induced with Vincristine + imatinib, which [place]ie[place] [facility] That hospitalization was complicated by acute hypoxic respiratory failure requiring ICU transfer. Post induction BMBx was performed 1/3/22. Showing CR. She tolerated previous cycles of consolidation well. She is feeling well today. Denies fevers, chills, aches, cough, SOB, chest pain, bleeding or bruising, and constipation. She denies any recent sick contacts. She is COVID neg. Reports fatigue and intermittent nausea and dry heaving at home.

## 2022-04-13 NOTE — TELEPHONE ENCOUNTER
"----- Message from Edgar Birmingham sent at 4/13/2022 10:35 AM CDT -----  Consult/Advisory:          Name Of Caller: Amanda ([facility])      Contact Preference?: [phone]      Provider Name: Gregory      Does patient feel the need to be seen today? No      What is the nature of the call?: Calling to speak w/ nurse. Stating pt is currently at [facility] waiting to be admitted.          Additional Notes:  "Thank you for all that you do for our patients"      "

## 2022-04-13 NOTE — SUBJECTIVE & OBJECTIVE
Patient information was obtained from patient, spouse/SO, and past medical records.     Oncology History (from Dr. Jenkins's most recent clinic note):   Precursor B-cell acute lymphoblastic leukemia (Ph+)              A. [place]23[place]2021[place] [facility] [place]              B. 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               C. 11/30/2021 - 12/23/2021: Vincristine + imatinib induction; hospital course was complicated by acute hypoxemic respiratory failure which resolved with diuresis and treatment of ALL     Medications Prior to Admission   Medication Sig Dispense Refill Last Dose    acyclovir (ZOVIRAX) 400 MG tablet Take 1 tablet (400 mg total) by mouth 2 (two) times daily. 60 tablet 11     artificial tears (ISOPTO TEARS) 0.5 % ophthalmic solution Place 1 drop into both eyes 4 (four) times daily as needed.       aspirin (ECOTRIN) 81 MG EC tablet Take 1 tablet (81 mg total) by mouth once daily. Hold for platelet count less than 50 thousand. (Patient not taking: No sig reported)  0     atorvastatin (LIPITOR) 80 MG tablet Take 80 mg by mouth once daily.       busPIRone (BUSPAR) 10 MG tablet Take 10 mg by mouth 2 (two) times daily.       dapagliflozin (FARXIGA) 10 mg tablet Take 10 mg by mouth once daily.       diazePAM (VALIUM) 10 MG Tab Take 10 mg by mouth 2 (two) times daily as needed.       diltiaZEM (CARDIZEM) 90 MG tablet Take 1 tablet (90 mg total) by mouth every 6 (six) hours. 120 tablet 11     duke's soln (benadryl 30 mL, mylanta 30 mL, LIDOcaine 30 mL, nystatin 30 mL) 120mL Take 10 mLs by mouth 4 (four) times daily. (Patient not taking: No sig reported) 120 mL 0     fenofibrate 160  MG Tab Take 160 mg by mouth once daily.       fluconazole (DIFLUCAN) 200 MG Tab Take 2 tablets (400 mg total) by mouth once daily. 60 tablet 2     gabapentin (NEURONTIN) 300 MG capsule Take 1 capsule (300 mg total) by mouth 3 (three) times daily. 90 capsule 11     hydrOXYchloroQUINE (PLAQUENIL) 200 mg tablet Take 200 mg by mouth once daily.       hydrOXYzine (ATARAX) 50 MG tablet Take 50 mg by mouth 2 (two) times a day.       imatinib (GLEEVEC) 100 MG Tab Take 2 tablets (200 mg total) by mouth once daily with 1 other imatinib prescription for 600 mg total on days 15-28 of each cycle during consolidation.Take with a meal and large glass of water 180 tablet 3     imatinib (GLEEVEC) 400 MG Tab Take 1 tablet (400 mg total) by mouth once daily with 1 other imatinib prescription for 600 mg total on days 15-28 of each cycle during consolidation. Take with a meal and large glass of water 90 tablet 3     levoFLOXacin (LEVAQUIN) 500 MG tablet Take 1 tablet (500 mg total) by mouth once daily. 30 tablet 2     losartan (COZAAR) 25 MG tablet Take 25 mg by mouth once daily.       methocarbamoL (ROBAXIN) 500 MG Tab Take 1 tablet (500 mg total) by mouth 3 (three) times daily as needed (muscle spasms). (Patient not taking: No sig reported) 90 tablet 2     omeprazole (PRILOSEC) 40 MG capsule Take 40 mg by mouth once daily.       ondansetron (ZOFRAN-ODT) 8 MG TbDL Dissolve 1 tablet (8 mg total) by mouth every 12 (twelve) hours as needed (in case of chemo induced nausea). 30 tablet 1     OXcarbazepine (TRILEPTAL) 600 MG Tab Take 1,200 mg by mouth 2 (two) times daily.       oxyCODONE (ROXICODONE) 5 MG immediate release tablet Take 1 tablet (5 mg total) by mouth every 4 (four) hours as needed for Pain. 45 tablet 0     polyethylene glycol (GLYCOLAX) 17 gram/dose powder Dissolve one capful (17 g) in liquid and take by mouth daily as needed (constipation). (Patient not taking: No sig reported) 510 g 0     potassium chloride SA  (K-DUR,KLOR-CON) 20 MEQ tablet Take 20 mEq by mouth once daily.       prochlorperazine (COMPAZINE) 5 MG tablet Take 1 tablet (5 mg total) by mouth every 6 (six) hours as needed for Nausea. 30 tablet 2     QUEtiapine (SEROQUEL) 200 MG Tab Take 200 mg by mouth every evening.       rivaroxaban (XARELTO) 20 mg Tab Take 1 tablet (20 mg total) by mouth daily with dinner or evening meal. Hold for platelets less than 50 thousand. (Patient not taking: No sig reported)       senna-docusate 8.6-50 mg (PERICOLACE) 8.6-50 mg per tablet Take 1 tablet by mouth 2 (two) times daily. 60 tablet 3     sumatriptan (IMITREX) 50 MG tablet Take 1 tablet (50 mg total) by mouth daily as needed (headache). 30 tablet 0     traMADoL (ULTRAM) 50 mg tablet Take 1 tablet (50 mg total) by mouth every 6 (six) hours as needed for Pain. 30 tablet 0     TRINTELLIX 10 mg Tab Take 1 tablet by mouth once daily.       VISTARIL 50 mg Cap Take by mouth.          Levetiracetam     Past Medical History:   Diagnosis Date    Arthritis     Cancer     COPD (chronic obstructive pulmonary disease)     Hypertension     Stroke     TIA x 4     Past Surgical History:   Procedure Laterality Date    APPENDECTOMY      HYSTERECTOMY      ROTATOR CUFF REPAIR Bilateral     TONSILLECTOMY      TUBAL LIGATION       Family History    None       Tobacco Use    Smoking status: Current Every Day Smoker     Packs/day: 0.50     Types: Cigarettes    Smokeless tobacco: Never Used   Substance and Sexual Activity    Alcohol use: No    Drug use: Never    Sexual activity: Not on file       Review of Systems   Constitutional:  Positive for fatigue. Negative for activity change, appetite change, chills, fever and unexpected weight change.   HENT:  Negative for congestion, mouth sores, nosebleeds, rhinorrhea, sinus pressure, sinus pain, sore throat and trouble swallowing.    Eyes:  Negative for photophobia, discharge, redness, itching and visual disturbance.   Respiratory:  Negative for cough,  chest tightness, shortness of breath and wheezing.    Cardiovascular:  Negative for chest pain, palpitations and leg swelling.   Gastrointestinal:  Positive for constipation and nausea. Negative for abdominal distention, abdominal pain, diarrhea and vomiting.   Endocrine: Negative for cold intolerance, heat intolerance, polydipsia, polyphagia and polyuria.   Genitourinary:  Negative for decreased urine volume, difficulty urinating, dysuria, frequency, hematuria, urgency, vaginal bleeding and vaginal discharge.   Musculoskeletal:  Negative for arthralgias, back pain, gait problem, joint swelling, myalgias, neck pain and neck stiffness.   Skin:  Negative for pallor, rash and wound.   Allergic/Immunologic: Negative for environmental allergies, food allergies and immunocompromised state.   Neurological:  Negative for dizziness, tremors, seizures, syncope, weakness, light-headedness, numbness and headaches.   Hematological:  Negative for adenopathy. Does not bruise/bleed easily.   Psychiatric/Behavioral:  Negative for agitation, confusion, hallucinations, sleep disturbance and suicidal ideas. The patient is not nervous/anxious.    Objective:     Vital Signs (Most Recent):  Temp: 97.9 °F (36.6 °C) (04/13/22 1418)  Pulse: 104 (04/13/22 1418)  Resp: 17 (04/13/22 1418)  BP: 134/76 (04/13/22 1418)  SpO2: 99 % (04/13/22 1418) Vital Signs (24h Range):  Temp:  [97.9 °F (36.6 °C)] 97.9 °F (36.6 °C)  Pulse:  [104-108] 104  Resp:  [16-17] 17  SpO2:  [97 %-99 %] 99 %  BP: (134-135)/(71-76) 134/76     Weight: 65.9 kg (145 lb 6.3 oz)  Body mass index is 24.96 kg/m².  Body surface area is 1.73 meters squared.    ECOG SCORE           [unfilled]    Lines/Drains/Airways       Central Venous Catheter Line  Duration                  PowerPort A Cath Single Lumen 03/16/22 1128 right internal jugular 28 days                    Physical Exam  Constitutional:       Appearance: She is well-developed.   HENT:      Head: Normocephalic and  atraumatic.      Mouth/Throat:      Pharynx: No oropharyngeal exudate.   Eyes:      Conjunctiva/sclera: Conjunctivae normal.      Pupils: Pupils are equal, round, and reactive to light.   Cardiovascular:      Rate and Rhythm: Normal rate and regular rhythm.      Heart sounds: Normal heart sounds. No murmur heard.  Pulmonary:      Effort: Pulmonary effort is normal.      Breath sounds: Normal breath sounds.   Abdominal:      General: Bowel sounds are normal. There is no distension.      Palpations: Abdomen is soft.      Tenderness: There is no abdominal tenderness.   Musculoskeletal:         General: No deformity. Normal range of motion.      Cervical back: Normal range of motion and neck supple.   Skin:     General: Skin is warm and dry.      Findings: No erythema or rash.      Comments: Right chest wall port. Not accessed.   Neurological:      Mental Status: She is alert and oriented to person, place, and time.   Psychiatric:         Behavior: Behavior normal.         Thought Content: Thought content normal.         Judgment: Judgment normal.       Significant Labs:   CBC:   Recent Labs   Lab 04/13/22  0750   WBC 22.39*   HGB 10.9*   HCT 34.1*       and CMP:   Recent Labs   Lab 04/13/22  0750      K 4.3      CO2 24   GLU 79   BUN 15   CREATININE 0.7   CALCIUM 8.2*   PROT 5.4*   ALBUMIN 2.5*   BILITOT 0.6   ALKPHOS 382*   *   *   ANIONGAP 10   EGFRNONAA >60.0       Diagnostic Results:  I have reviewed all pertinent imaging results/findings within the past 24 hours.

## 2022-04-13 NOTE — H&P
Roberto Yepez - Oncology ([facility])  Hematology  Bone Marrow Transplant  H&P    Subjective:     Principal Problem: B-cell acute lymphoblastic leukemia    HPI: Ms. 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 Other medical history includes COPD, HTN, HLD, DM2, TIA, anxiety, depression, GERD, seizure disorder, PAD, gastroparesis, RA, osteoporosis, a-fib, and pacermaker placement. She is admitting today for C4 of consolidation with EWALL ([facility]). She was induced with Vincristine + imatinib, which she recieved inpatient at [facility]. That hospitalization was complicated by acute hypoxic respiratory failure requiring ICU transfer. Post induction BMBx was performed 1/3/22. Showing CR. She tolerated previous cycles of consolidation well. She is feeling well today. Denies fevers, chills, aches, cough, SOB, chest pain, bleeding or bruising, and constipation. She denies any recent sick contacts. She is COVID neg. Reports fatigue and intermittent nausea and dry heaving at home.      Patient information was obtained from patient, spouse/SO, and past medical records.     Oncology History (from Dr. Jenkins's most recent clinic note):   Precursor B-cell acute lymphoblastic leukemia (Ph+)              A. 11/23/2021: Transferred to [facility] from outside hospital for evaluation for acute leukemia              B. 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               C. 11/30/2021 - 12/23/2021: Vincristine + imatinib induction; hospital course was complicated by acute hypoxemic respiratory failure which resolved with diuresis and treatment of ALL     Medications Prior to Admission   Medication Sig Dispense Refill Last Dose    acyclovir (ZOVIRAX) 400 MG tablet Take 1  tablet (400 mg total) by mouth 2 (two) times daily. 60 tablet 11     artificial tears (ISOPTO TEARS) 0.5 % ophthalmic solution Place 1 drop into both eyes 4 (four) times daily as needed.       aspirin (ECOTRIN) 81 MG EC tablet Take 1 tablet (81 mg total) by mouth once daily. Hold for platelet count less than 50 thousand. (Patient not taking: No sig reported)  0     atorvastatin (LIPITOR) 80 MG tablet Take 80 mg by mouth once daily.       busPIRone (BUSPAR) 10 MG tablet Take 10 mg by mouth 2 (two) times daily.       dapagliflozin (FARXIGA) 10 mg tablet Take 10 mg by mouth once daily.       diazePAM (VALIUM) 10 MG Tab Take 10 mg by mouth 2 (two) times daily as needed.       diltiaZEM (CARDIZEM) 90 MG tablet Take 1 tablet (90 mg total) by mouth every 6 (six) hours. 120 tablet 11     duke's soln (benadryl 30 mL, mylanta 30 mL, LIDOcaine 30 mL, nystatin 30 mL) 120mL Take 10 mLs by mouth 4 (four) times daily. (Patient not taking: No sig reported) 120 mL 0     fenofibrate 160 MG Tab Take 160 mg by mouth once daily.       fluconazole (DIFLUCAN) 200 MG Tab Take 2 tablets (400 mg total) by mouth once daily. 60 tablet 2     gabapentin (NEURONTIN) 300 MG capsule Take 1 capsule (300 mg total) by mouth 3 (three) times daily. 90 capsule 11     hydrOXYchloroQUINE (PLAQUENIL) 200 mg tablet Take 200 mg by mouth once daily.       hydrOXYzine (ATARAX) 50 MG tablet Take 50 mg by mouth 2 (two) times a day.       imatinib (GLEEVEC) 100 MG Tab Take 2 tablets (200 mg total) by mouth once daily with 1 other imatinib prescription for 600 mg total on days 15-28 of each cycle during consolidation.Take with a meal and large glass of water 180 tablet 3     imatinib (GLEEVEC) 400 MG Tab Take 1 tablet (400 mg total) by mouth once daily with 1 other imatinib prescription for 600 mg total on days 15-28 of each cycle during consolidation. Take with a meal and large glass of water 90 tablet 3     levoFLOXacin (LEVAQUIN) 500 MG tablet  Take 1 tablet (500 mg total) by mouth once daily. 30 tablet 2     losartan (COZAAR) 25 MG tablet Take 25 mg by mouth once daily.       methocarbamoL (ROBAXIN) 500 MG Tab Take 1 tablet (500 mg total) by mouth 3 (three) times daily as needed (muscle spasms). (Patient not taking: No sig reported) 90 tablet 2     omeprazole (PRILOSEC) 40 MG capsule Take 40 mg by mouth once daily.       ondansetron (ZOFRAN-ODT) 8 MG TbDL Dissolve 1 tablet (8 mg total) by mouth every 12 (twelve) hours as needed (in case of chemo induced nausea). 30 tablet 1     OXcarbazepine (TRILEPTAL) 600 MG Tab Take 1,200 mg by mouth 2 (two) times daily.       oxyCODONE (ROXICODONE) 5 MG immediate release tablet Take 1 tablet (5 mg total) by mouth every 4 (four) hours as needed for Pain. 45 tablet 0     polyethylene glycol (GLYCOLAX) 17 gram/dose powder Dissolve one capful (17 g) in liquid and take by mouth daily as needed (constipation). (Patient not taking: No sig reported) 510 g 0     potassium chloride SA (K-DUR,KLOR-CON) 20 MEQ tablet Take 20 mEq by mouth once daily.       prochlorperazine (COMPAZINE) 5 MG tablet Take 1 tablet (5 mg total) by mouth every 6 (six) hours as needed for Nausea. 30 tablet 2     QUEtiapine (SEROQUEL) 200 MG Tab Take 200 mg by mouth every evening.       rivaroxaban (XARELTO) 20 mg Tab Take 1 tablet (20 mg total) by mouth daily with dinner or evening meal. Hold for platelets less than 50 thousand. (Patient not taking: No sig reported)       senna-docusate 8.6-50 mg (PERICOLACE) 8.6-50 mg per tablet Take 1 tablet by mouth 2 (two) times daily. 60 tablet 3     sumatriptan (IMITREX) 50 MG tablet Take 1 tablet (50 mg total) by mouth daily as needed (headache). 30 tablet 0     traMADoL (ULTRAM) 50 mg tablet Take 1 tablet (50 mg total) by mouth every 6 (six) hours as needed for Pain. 30 tablet 0     TRINTELLIX 10 mg Tab Take 1 tablet by mouth once daily.       VISTARIL 50 mg Cap Take by mouth.           Levetiracetam     Past Medical History:   Diagnosis Date    Arthritis     Cancer     COPD (chronic obstructive pulmonary disease)     Hypertension     Stroke     TIA x 4     Past Surgical History:   Procedure Laterality Date    APPENDECTOMY      HYSTERECTOMY      ROTATOR CUFF REPAIR Bilateral     TONSILLECTOMY      TUBAL LIGATION       Family History    None       Tobacco Use    Smoking status: Current Every Day Smoker     Packs/day: 0.50     Types: Cigarettes    Smokeless tobacco: Never Used   Substance and Sexual Activity    Alcohol use: No    Drug use: Never    Sexual activity: Not on file       Review of Systems   Constitutional:  Positive for fatigue. Negative for activity change, appetite change, chills, fever and unexpected weight change.   HENT:  Negative for congestion, mouth sores, nosebleeds, rhinorrhea, sinus pressure, sinus pain, sore throat and trouble swallowing.    Eyes:  Negative for photophobia, discharge, redness, itching and visual disturbance.   Respiratory:  Negative for cough, chest tightness, shortness of breath and wheezing.    Cardiovascular:  Negative for chest pain, palpitations and leg swelling.   Gastrointestinal:  Positive for constipation and nausea. Negative for abdominal distention, abdominal pain, diarrhea and vomiting.   Endocrine: Negative for cold intolerance, heat intolerance, polydipsia, polyphagia and polyuria.   Genitourinary:  Negative for decreased urine volume, difficulty urinating, dysuria, frequency, hematuria, urgency, vaginal bleeding and vaginal discharge.   Musculoskeletal:  Negative for arthralgias, back pain, gait problem, joint swelling, myalgias, neck pain and neck stiffness.   Skin:  Negative for pallor, rash and wound.   Allergic/Immunologic: Negative for environmental allergies, food allergies and immunocompromised state.   Neurological:  Negative for dizziness, tremors, seizures, syncope, weakness, light-headedness, numbness and  headaches.   Hematological:  Negative for adenopathy. Does not bruise/bleed easily.   Psychiatric/Behavioral:  Negative for agitation, confusion, hallucinations, sleep disturbance and suicidal ideas. The patient is not nervous/anxious.    Objective:     Vital Signs (Most Recent):  Temp: 97.9 °F (36.6 °C) (04/13/22 1418)  Pulse: 104 (04/13/22 1418)  Resp: 17 (04/13/22 1418)  BP: 134/76 (04/13/22 1418)  SpO2: 99 % (04/13/22 1418) Vital Signs (24h Range):  Temp:  [97.9 °F (36.6 °C)] 97.9 °F (36.6 °C)  Pulse:  [104-108] 104  Resp:  [16-17] 17  SpO2:  [97 %-99 %] 99 %  BP: (134-135)/(71-76) 134/76     Weight: 65.9 kg (145 lb 6.3 oz)  Body mass index is 24.96 kg/m².  Body surface area is 1.73 meters squared.    ECOG SCORE           [unfilled]    Lines/Drains/Airways       Central Venous Catheter Line  Duration                  PowerPort A Cath Single Lumen 03/16/22 1128 right internal jugular 28 days                    Physical Exam  Constitutional:       Appearance: She is well-developed.   HENT:      Head: Normocephalic and atraumatic.      Mouth/Throat:      Pharynx: No oropharyngeal exudate.   Eyes:      Conjunctiva/sclera: Conjunctivae normal.      Pupils: Pupils are equal, round, and reactive to light.   Cardiovascular:      Rate and Rhythm: Normal rate and regular rhythm.      Heart sounds: Normal heart sounds. No murmur heard.  Pulmonary:      Effort: Pulmonary effort is normal.      Breath sounds: Normal breath sounds.   Abdominal:      General: Bowel sounds are normal. There is no distension.      Palpations: Abdomen is soft.      Tenderness: There is no abdominal tenderness.   Musculoskeletal:         General: No deformity. Normal range of motion.      Cervical back: Normal range of motion and neck supple.   Skin:     General: Skin is warm and dry.      Findings: No erythema or rash.      Comments: Right chest wall port. Not accessed.   Neurological:      Mental Status: She is alert and oriented to person, place,  and time.   Psychiatric:         Behavior: Behavior normal.         Thought Content: Thought content normal.         Judgment: Judgment normal.       Significant Labs:   CBC:   Recent Labs   Lab 04/13/22  0750   WBC 22.39*   HGB 10.9*   HCT 34.1*       and CMP:   Recent Labs   Lab 04/13/22  0750      K 4.3      CO2 24   GLU 79   BUN 15   CREATININE 0.7   CALCIUM 8.2*   PROT 5.4*   ALBUMIN 2.5*   BILITOT 0.6   ALKPHOS 382*   *   *   ANIONGAP 10   EGFRNONAA >60.0       Diagnostic Results:  I have reviewed all pertinent imaging results/findings within the past 24 hours.    Assessment/Plan:     * B-cell acute lymphoblastic leukemia  From Dr. Jenkins's most recent clinic note  Precursor B-cell acute lymphoblastic leukemia (Ph+)              A. 11[place]23[place]2021: [place] [facility] [place] for acute leukemia              B. 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               C. 11/30/2021 - 12/23/2021: Vincristine + imatinib induction; hospital course was complicated by acute hypoxemic respiratory failure which resolved with diuresis and treatment of ALL     - BMBx from 1/3/22 showing CR  - admitting today for C4 of consolidation with EWALL ([facility])  - tolerated previous cycles of consolidation well  - take home Gleevec days 15-28 of consolidation cycles  - follows outpatient with Dr. Garcia for lab monitoring and transfusions  - monitor closely for neuro toxicity with cytarabine    Moderate major depression  - continue home trintellix while inpatient    Insomnia  - continue home seroquel while inpatient    Anxiety  - continue home trintellix and valium while inpatient    GERD  (gastroesophageal reflux disease)  - home prilosec not on formulary. Will receive protonix while inpatient.    Hypertension  - continue home losartan while inpatient    Seizure disorder  - continue home oxcarbazepine while inpatient    Type 2 diabetes mellitus, without long-term current use of insulin  - hold home farxiga while inpatient. Can resume at discharge.  - low dose SSI, achs blood glucose monitoring, and diabetic diet while inpatient    Rheumatoid arthritis involving multiple sites  - continue home plaquenil.   - home leflunomide was recently discontinued by her rhemotologist    History of TIA (transient ischemic attack)  - Continue home ASA and fenofibrate. Holding home statin while inpatient.    Hyperlipidemia  - holding home statin while inpatient    Long term current use of anticoagulant  - continue home xarelto. Hold with plts < 50K.    Paroxysmal atrial fibrillation  - continue home diltiazem and xarelto. Hold xarelto with plt count < 50K.        VTE Risk Mitigation (From admission, onward)    None          Disposition: Inpatient    Mirtha Antonio, NP  Bone Marrow Transplant  Hematology  Roberto Yepez - Oncology ([facility])

## 2022-04-13 NOTE — PROGRESS NOTES
HEMATOLOGIC MALIGNANCIES PROGRESS NOTE    IDENTIFYING STATEMENT   Deepti Gonzalez (Deepti) is a 51 y.o. female with a  of 1970 from [place], [place] with the diagnosis of B-ALL.      ONCOLOGY HISTORY:    1. Precursor B-cell acute lymphoblastic leukemia (Ph+)   A. 2021: [place] [facility] [place] for acute leukemia   B. 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    C. 2021 - 2021: Vincristine + imatinib induction; hospital course was complicated by acute hypoxemic respiratory failure which resolved with diuresis and treatment of ALL    2. History of TIA  3. Seizure disorder  4. Anxiety and depression  5. Chronic obstructive pulmonary disease  6. Paroxysmal atrial fibrillation  7. Implantable loop recorder and pacemaker in place  8. Peripheral artery disease  9. Diabetes mellitus, type 2  10. Gastroesophageal reflux disease  11. Gastroparesis  12. Rheumatoid arthritis  13. Osteoporosis  14. History of tobacco use     INTERVAL HISTORY:      Ms. Gonzalez returns to clinic for follow-up of Ph+ B-ALL. She is admitting today for C4 of consolidation with EWALL-Ph-01 (with imatinib substituted for dasatinib). She reports nausea/vomiting/dry heaving today. Reports that this happens on occasion.    Following cycle 3, she reports only low grade fevers, intermittent nausea, and a single mouth sore. These have mostly resolved (save for nausea today).     Past Medical History, Past Social History and Past Family History have been reviewed and are unchanged except as noted in the interval history.    MEDICATIONS:     Current Outpatient Medications on File Prior to Visit   Medication Sig Dispense Refill     acyclovir (ZOVIRAX) 400 MG tablet Take 1 tablet (400 mg total) by mouth 2 (two) times daily. 60 tablet 11    artificial tears (ISOPTO TEARS) 0.5 % ophthalmic solution Place 1 drop into both eyes 4 (four) times daily as needed.      atorvastatin (LIPITOR) 80 MG tablet Take 80 mg by mouth once daily.      busPIRone (BUSPAR) 10 MG tablet Take 10 mg by mouth 2 (two) times daily.      dapagliflozin (FARXIGA) 10 mg tablet Take 10 mg by mouth once daily.      diazePAM (VALIUM) 10 MG Tab Take 10 mg by mouth 2 (two) times daily as needed.      diltiaZEM (CARDIZEM) 90 MG tablet Take 1 tablet (90 mg total) by mouth every 6 (six) hours. 120 tablet 11    fenofibrate 160 MG Tab Take 160 mg by mouth once daily.      fluconazole (DIFLUCAN) 200 MG Tab Take 2 tablets (400 mg total) by mouth once daily. 60 tablet 2    gabapentin (NEURONTIN) 300 MG capsule Take 1 capsule (300 mg total) by mouth 3 (three) times daily. 90 capsule 11    hydrOXYchloroQUINE (PLAQUENIL) 200 mg tablet Take 200 mg by mouth once daily.      hydrOXYzine (ATARAX) 50 MG tablet Take 50 mg by mouth 2 (two) times a day.      imatinib (GLEEVEC) 100 MG Tab Take 2 tablets (200 mg total) by mouth once daily with 1 other imatinib prescription for 600 mg total on days 15-28 of each cycle during consolidation.Take with a meal and large glass of water 180 tablet 3    imatinib (GLEEVEC) 400 MG Tab Take 1 tablet (400 mg total) by mouth once daily with 1 other imatinib prescription for 600 mg total on days 15-28 of each cycle during consolidation. Take with a meal and large glass of water 90 tablet 3    levoFLOXacin (LEVAQUIN) 500 MG tablet Take 1 tablet (500 mg total) by mouth once daily. 30 tablet 2    losartan (COZAAR) 25 MG tablet Take 25 mg by mouth once daily.      omeprazole (PRILOSEC) 40 MG capsule Take 40 mg by mouth once daily.      ondansetron (ZOFRAN-ODT) 8 MG TbDL Dissolve 1 tablet (8 mg total) by mouth every 12 (twelve) hours as needed (in  case of chemo induced nausea). 30 tablet 1    OXcarbazepine (TRILEPTAL) 600 MG Tab Take 1,200 mg by mouth 2 (two) times daily.      oxyCODONE (ROXICODONE) 5 MG immediate release tablet Take 1 tablet (5 mg total) by mouth every 4 (four) hours as needed for Pain. 45 tablet 0    potassium chloride SA (K-DUR,KLOR-CON) 20 MEQ tablet Take 20 mEq by mouth once daily.      prochlorperazine (COMPAZINE) 5 MG tablet Take 1 tablet (5 mg total) by mouth every 6 (six) hours as needed for Nausea. 30 tablet 2    QUEtiapine (SEROQUEL) 200 MG Tab Take 200 mg by mouth every evening.      senna-docusate 8.6-50 mg (PERICOLACE) 8.6-50 mg per tablet Take 1 tablet by mouth 2 (two) times daily. 60 tablet 3    traMADoL (ULTRAM) 50 mg tablet Take 1 tablet (50 mg total) by mouth every 6 (six) hours as needed for Pain. 30 tablet 0    TRINTELLIX 10 mg Tab Take 1 tablet by mouth once daily.      VISTARIL 50 mg Cap Take by mouth.      aspirin (ECOTRIN) 81 MG EC tablet Take 1 tablet (81 mg total) by mouth once daily. Hold for platelet count less than 50 thousand. (Patient not taking: No sig reported)  0    duke's soln (benadryl 30 mL, mylanta 30 mL, LIDOcaine 30 mL, nystatin 30 mL) 120mL Take 10 mLs by mouth 4 (four) times daily. (Patient not taking: No sig reported) 120 mL 0    methocarbamoL (ROBAXIN) 500 MG Tab Take 1 tablet (500 mg total) by mouth 3 (three) times daily as needed (muscle spasms). (Patient not taking: No sig reported) 90 tablet 2    polyethylene glycol (GLYCOLAX) 17 gram/dose powder Dissolve one capful (17 g) in liquid and take by mouth daily as needed (constipation). (Patient not taking: No sig reported) 510 g 0    rivaroxaban (XARELTO) 20 mg Tab Take 1 tablet (20 mg total) by mouth daily with dinner or evening meal. Hold for platelets less than 50 thousand. (Patient not taking: No sig reported)      sumatriptan (IMITREX) 50 MG tablet Take 1 tablet (50 mg total) by mouth daily as needed (headache). 30 tablet 0  "    No current facility-administered medications on file prior to visit.       ALLERGIES:   Review of patient's allergies indicates:   Allergen Reactions    Levetiracetam      Other reaction(s): Unknown        ROS:       Review of Systems   Constitutional: Positive for fatigue (improved significantly). Negative for diaphoresis, fever and unexpected weight change.   HENT:   Negative for lump/mass and sore throat.    Eyes: Negative for icterus.   Respiratory: Negative for cough and shortness of breath.    Cardiovascular: Negative for palpitations.   Gastrointestinal: Positive for nausea and vomiting. Negative for abdominal distention, abdominal pain, constipation and diarrhea.   Genitourinary: Negative for dysuria and frequency.    Musculoskeletal: Negative for arthralgias, gait problem and myalgias.        Muscle weakness - diffuse   Skin: Negative for rash.   Neurological: Negative for dizziness, gait problem and headaches.   Hematological: Negative for adenopathy. Does not bruise/bleed easily.   Psychiatric/Behavioral: The patient is not nervous/anxious.        PHYSICAL EXAM:  Vitals:    04/13/22 0827   BP: 135/71   Pulse: 108   Resp: 16   SpO2: 97%   Weight: 70.9 kg (156 lb 6.7 oz)   Height: 5' 4" (1.626 m)   PainSc: 0-No pain       KARNOFSKY PERFORMANCE STATUS 60%  ECOG 2    Physical Exam  Constitutional:       General: She is not in acute distress.     Appearance: She is well-developed.   HENT:      Head: Normocephalic and atraumatic.      Mouth/Throat:      Mouth: No oral lesions.   Eyes:      Conjunctiva/sclera: Conjunctivae normal.   Neck:      Thyroid: No thyromegaly.   Cardiovascular:      Rate and Rhythm: Normal rate and regular rhythm.      Heart sounds: Normal heart sounds. No murmur heard.     Comments: Port in place in R chest wall  Pulmonary:      Breath sounds: No wheezing or rales.   Abdominal:      General: There is no distension.      Palpations: Abdomen is soft. There is no hepatomegaly, " splenomegaly or mass.      Tenderness: There is no abdominal tenderness.   Lymphadenopathy:      Cervical: No cervical adenopathy.      Right cervical: No deep cervical adenopathy.     Left cervical: No deep cervical adenopathy.      Lower Body: No right inguinal adenopathy. No left inguinal adenopathy.   Skin:     Findings: No rash.   Neurological:      Mental Status: She is alert and oriented to person, place, and time.      Cranial Nerves: No cranial nerve deficit.      Coordination: Coordination normal.      Deep Tendon Reflexes: Reflexes are normal and symmetric.         LAB:   Results for orders placed or performed in visit on 04/13/22   BCR/ABL, p190, Quant, Monitor, blood   Result Value Ref Range    Specimen Type, p190, Quant, bld Blood    CBC Auto Differential   Result Value Ref Range    WBC 22.39 (H) 3.90 - 12.70 K/uL    RBC 3.16 (L) 4.00 - 5.40 M/uL    Hemoglobin 10.9 (L) 12.0 - 16.0 g/dL    Hematocrit 34.1 (L) 37.0 - 48.5 %     (H) 82 - 98 fL    MCH 34.5 (H) 27.0 - 31.0 pg    MCHC 32.0 32.0 - 36.0 g/dL    RDW 18.1 (H) 11.5 - 14.5 %    Platelets 157 150 - 450 K/uL    MPV 9.8 9.2 - 12.9 fL    Immature Granulocytes 4.1 (H) 0.0 - 0.5 %    Gran # (ANC) 14.7 (H) 1.8 - 7.7 K/uL    Immature Grans (Abs) 0.92 (H) 0.00 - 0.04 K/uL    Lymph # 5.4 (H) 1.0 - 4.8 K/uL    Mono # 1.2 (H) 0.3 - 1.0 K/uL    Eos # 0.1 0.0 - 0.5 K/uL    Baso # 0.14 0.00 - 0.20 K/uL    nRBC 0 0 /100 WBC    Gran % 65.9 38.0 - 73.0 %    Lymph % 24.0 18.0 - 48.0 %    Mono % 5.2 4.0 - 15.0 %    Eosinophil % 0.2 0.0 - 8.0 %    Basophil % 0.6 0.0 - 1.9 %    Differential Method Automated    Comprehensive Metabolic Panel   Result Value Ref Range    Sodium 138 136 - 145 mmol/L    Potassium 4.3 3.5 - 5.1 mmol/L    Chloride 104 95 - 110 mmol/L    CO2 24 23 - 29 mmol/L    Glucose 79 70 - 110 mg/dL    BUN 15 6 - 20 mg/dL    Creatinine 0.7 0.5 - 1.4 mg/dL    Calcium 8.2 (L) 8.7 - 10.5 mg/dL    Total Protein 5.4 (L) 6.0 - 8.4 g/dL    Albumin 2.5 (L)  3.5 - 5.2 g/dL    Total Bilirubin 0.6 0.1 - 1.0 mg/dL    Alkaline Phosphatase 382 (H) 55 - 135 U/L     (H) 10 - 40 U/L     (H) 10 - 44 U/L    Anion Gap 10 8 - 16 mmol/L    eGFR if African American >60.0 >60 mL/min/1.73 m^2    eGFR if non African American >60.0 >60 mL/min/1.73 m^2   Magnesium   Result Value Ref Range    Magnesium 2.0 1.6 - 2.6 mg/dL   Phosphorus   Result Value Ref Range    Phosphorus 3.0 2.7 - 4.5 mg/dL   Type & Screen   Result Value Ref Range    Group & Rh B POS     Indirect Deepika NEG        PROBLEMS ASSESSED THIS VISIT:    1. B-cell acute lymphoblastic leukemia    2. Anemia associated with chemotherapy        PLAN:       Ph+ B-ALL  Ms. Gonzalez achieved MRD-negative CR after induction chemotherapy on EWALL-Ph-01 protocol (with imatinib substituted for dasatinib). She presents for admission for Cycle 4 of consolidation therapy.     Molecular testing prior to Cycle 3 show complete molecular remission with respect to bcr-abl. Prior bone marrow testing was consistent with CR. Molecular testing from today is pending.    Admit for Cycle 4 of consolidation. Will dose reduce cyatarabine by 50% for transaminitis.     Anti neoplastic therapy related Anemia  Likely secondary to chemotherapy and imatinib at this point. Mild. Monitor for transfusion need.    Follow-up  Will need twice weekly labs with Dr. Garcia in [place] (inpatient team to ensure this is set up).  Return here for admission for Cycle 5 consolidation      Dayron Jenkins MD  Hematology and Stem Cell Transplant

## 2022-04-13 NOTE — PLAN OF CARE
Pt ambulated on floor accompanied by spouse. Pt directly admitted for C4 of consolidation. Initial assessment performed - questions encouraged and answered. No wounds upon initial skin assessment. Right chest port accessed; site clean, dry, and intact. Pt involved in plan of care and communicating needs throughout shift. Up in room and to bathroom independently; no c/o pain. Tolerating diet, voiding without difficulty. All VSS; no acute events so far this shift.  Pt remaining free from falls or injury throughout shift; bed locked and in lowest position; call light within reach.  Pt instructed to call for assistance as needed.  Q1H rounding done on pt. WCTM.

## 2022-04-14 PROBLEM — R29.90 MULTIPLE NEUROLOGICAL SYMPTOMS: Status: ACTIVE | Noted: 2022-04-14

## 2022-04-14 LAB
ALBUMIN SERPL BCP-MCNC: 2.4 G/DL (ref 3.5–5.2)
ALP SERPL-CCNC: 327 U/L (ref 55–135)
ALT SERPL W/O P-5'-P-CCNC: 105 U/L (ref 10–44)
ANION GAP SERPL CALC-SCNC: 10 MMOL/L (ref 8–16)
AST SERPL-CCNC: 65 U/L (ref 10–40)
BASOPHILS NFR BLD: 0 % (ref 0–1.9)
BILIRUB SERPL-MCNC: 0.5 MG/DL (ref 0.1–1)
BUN SERPL-MCNC: 13 MG/DL (ref 6–20)
CALCIUM SERPL-MCNC: 8.3 MG/DL (ref 8.7–10.5)
CHLORIDE SERPL-SCNC: 104 MMOL/L (ref 95–110)
CO2 SERPL-SCNC: 22 MMOL/L (ref 23–29)
CREAT SERPL-MCNC: 0.6 MG/DL (ref 0.5–1.4)
DIFFERENTIAL METHOD: ABNORMAL
EOSINOPHIL NFR BLD: 0 % (ref 0–8)
ERYTHROCYTE [DISTWIDTH] IN BLOOD BY AUTOMATED COUNT: 17.7 % (ref 11.5–14.5)
EST. GFR  (AFRICAN AMERICAN): >60 ML/MIN/1.73 M^2
EST. GFR  (NON AFRICAN AMERICAN): >60 ML/MIN/1.73 M^2
GLUCOSE SERPL-MCNC: 151 MG/DL (ref 70–110)
HCT VFR BLD AUTO: 29.5 % (ref 37–48.5)
HGB BLD-MCNC: 9.6 G/DL (ref 12–16)
IMM GRANULOCYTES # BLD AUTO: ABNORMAL K/UL (ref 0–0.04)
IMM GRANULOCYTES NFR BLD AUTO: ABNORMAL % (ref 0–0.5)
LYMPHOCYTES NFR BLD: 10 % (ref 18–48)
MAGNESIUM SERPL-MCNC: 1.9 MG/DL (ref 1.6–2.6)
MCH RBC QN AUTO: 35 PG (ref 27–31)
MCHC RBC AUTO-ENTMCNC: 32.5 G/DL (ref 32–36)
MCV RBC AUTO: 108 FL (ref 82–98)
MONOCYTES NFR BLD: 0 % (ref 4–15)
NEUTROPHILS NFR BLD: 88 % (ref 38–73)
NEUTS BAND NFR BLD MANUAL: 2 %
NRBC BLD-RTO: 0 /100 WBC
PHOSPHATE SERPL-MCNC: 3.7 MG/DL (ref 2.7–4.5)
PLATELET # BLD AUTO: 108 K/UL (ref 150–450)
PLATELET BLD QL SMEAR: ABNORMAL
PMV BLD AUTO: 9.5 FL (ref 9.2–12.9)
POCT GLUCOSE: 140 MG/DL (ref 70–110)
POCT GLUCOSE: 182 MG/DL (ref 70–110)
POLYCHROMASIA BLD QL SMEAR: ABNORMAL
POTASSIUM SERPL-SCNC: 3.8 MMOL/L (ref 3.5–5.1)
PROT SERPL-MCNC: 5 G/DL (ref 6–8.4)
RBC # BLD AUTO: 2.74 M/UL (ref 4–5.4)
SODIUM SERPL-SCNC: 136 MMOL/L (ref 136–145)
WBC # BLD AUTO: 13.04 K/UL (ref 3.9–12.7)

## 2022-04-14 PROCEDURE — 85027 COMPLETE CBC AUTOMATED: CPT | Performed by: PHYSICIAN ASSISTANT

## 2022-04-14 PROCEDURE — 80053 COMPREHEN METABOLIC PANEL: CPT | Performed by: PHYSICIAN ASSISTANT

## 2022-04-14 PROCEDURE — 84100 ASSAY OF PHOSPHORUS: CPT | Performed by: PHYSICIAN ASSISTANT

## 2022-04-14 PROCEDURE — 99232 PR SUBSEQUENT HOSPITAL CARE,LEVL II: ICD-10-PCS | Mod: ,,, | Performed by: INTERNAL MEDICINE

## 2022-04-14 PROCEDURE — 99232 SBSQ HOSP IP/OBS MODERATE 35: CPT | Mod: ,,, | Performed by: INTERNAL MEDICINE

## 2022-04-14 PROCEDURE — 99223 PR INITIAL HOSPITAL CARE,LEVL III: ICD-10-PCS | Mod: ,,, | Performed by: PSYCHIATRY & NEUROLOGY

## 2022-04-14 PROCEDURE — 99223 1ST HOSP IP/OBS HIGH 75: CPT | Mod: ,,, | Performed by: PSYCHIATRY & NEUROLOGY

## 2022-04-14 PROCEDURE — 63600175 PHARM REV CODE 636 W HCPCS: Performed by: INTERNAL MEDICINE

## 2022-04-14 PROCEDURE — 25000003 PHARM REV CODE 250: Performed by: PHYSICIAN ASSISTANT

## 2022-04-14 PROCEDURE — 20600001 HC STEP DOWN PRIVATE ROOM

## 2022-04-14 PROCEDURE — 85007 BL SMEAR W/DIFF WBC COUNT: CPT | Performed by: PHYSICIAN ASSISTANT

## 2022-04-14 PROCEDURE — 25500020 PHARM REV CODE 255: Performed by: INTERNAL MEDICINE

## 2022-04-14 PROCEDURE — 83735 ASSAY OF MAGNESIUM: CPT | Performed by: PHYSICIAN ASSISTANT

## 2022-04-14 RX ORDER — BLOOD-GLUCOSE METER
EACH MISCELLANEOUS
Status: ON HOLD | COMMUNITY
Start: 2022-04-06 | End: 2022-10-14 | Stop reason: HOSPADM

## 2022-04-14 RX ORDER — VORTIOXETINE 20 MG/1
1 TABLET, FILM COATED ORAL DAILY
COMMUNITY
Start: 2022-03-08

## 2022-04-14 RX ORDER — GABAPENTIN 400 MG/1
400 CAPSULE ORAL 3 TIMES DAILY
Status: ON HOLD | COMMUNITY
Start: 2022-03-08 | End: 2022-04-14 | Stop reason: SDUPTHER

## 2022-04-14 RX ADMIN — OXYCODONE 5 MG: 5 TABLET ORAL at 09:04

## 2022-04-14 RX ADMIN — DILTIAZEM HYDROCHLORIDE 90 MG: 30 TABLET, FILM COATED ORAL at 05:04

## 2022-04-14 RX ADMIN — ACYCLOVIR 400 MG: 200 CAPSULE ORAL at 08:04

## 2022-04-14 RX ADMIN — Medication 400 MG: at 09:04

## 2022-04-14 RX ADMIN — HYDROXYZINE HYDROCHLORIDE 50 MG: 25 TABLET, FILM COATED ORAL at 04:04

## 2022-04-14 RX ADMIN — ALUMINUM HYDROXIDE, MAGNESIUM HYDROXIDE, AND DIMETHICONE 10 ML: 400; 400; 40 SUSPENSION ORAL at 09:04

## 2022-04-14 RX ADMIN — LOSARTAN POTASSIUM 25 MG: 25 TABLET, FILM COATED ORAL at 08:04

## 2022-04-14 RX ADMIN — Medication 400 MG: at 05:04

## 2022-04-14 RX ADMIN — OXCARBAZEPINE 1200 MG: 600 TABLET, FILM COATED ORAL at 08:04

## 2022-04-14 RX ADMIN — ALUMINUM HYDROXIDE, MAGNESIUM HYDROXIDE, AND DIMETHICONE 10 ML: 400; 400; 40 SUSPENSION ORAL at 01:04

## 2022-04-14 RX ADMIN — ONDANSETRON 8 MG: 2 INJECTION INTRAMUSCULAR; INTRAVENOUS at 08:04

## 2022-04-14 RX ADMIN — DILTIAZEM HYDROCHLORIDE 90 MG: 30 TABLET, FILM COATED ORAL at 09:04

## 2022-04-14 RX ADMIN — ACYCLOVIR 400 MG: 200 CAPSULE ORAL at 09:04

## 2022-04-14 RX ADMIN — PANTOPRAZOLE SODIUM 40 MG: 40 TABLET, DELAYED RELEASE ORAL at 08:04

## 2022-04-14 RX ADMIN — ALUMINUM HYDROXIDE, MAGNESIUM HYDROXIDE, AND DIMETHICONE 10 ML: 400; 400; 40 SUSPENSION ORAL at 08:04

## 2022-04-14 RX ADMIN — ALUMINUM HYDROXIDE, MAGNESIUM HYDROXIDE, AND DIMETHICONE 10 ML: 400; 400; 40 SUSPENSION ORAL at 04:04

## 2022-04-14 RX ADMIN — DEXAMETHASONE 1 DROP: 1 SUSPENSION OPHTHALMIC at 09:04

## 2022-04-14 RX ADMIN — GABAPENTIN 300 MG: 300 CAPSULE ORAL at 08:04

## 2022-04-14 RX ADMIN — HYDROXYZINE HYDROCHLORIDE 50 MG: 25 TABLET, FILM COATED ORAL at 08:04

## 2022-04-14 RX ADMIN — QUETIAPINE FUMARATE 200 MG: 200 TABLET ORAL at 09:04

## 2022-04-14 RX ADMIN — HYDROXYCHLOROQUINE SULFATE 200 MG: 200 TABLET, FILM COATED ORAL at 08:04

## 2022-04-14 RX ADMIN — OXCARBAZEPINE 1200 MG: 600 TABLET, FILM COATED ORAL at 09:04

## 2022-04-14 RX ADMIN — DILTIAZEM HYDROCHLORIDE 90 MG: 30 TABLET, FILM COATED ORAL at 01:04

## 2022-04-14 RX ADMIN — IOHEXOL 100 ML: 350 INJECTION, SOLUTION INTRAVENOUS at 10:04

## 2022-04-14 RX ADMIN — RIVAROXABAN 20 MG: 20 TABLET, FILM COATED ORAL at 04:04

## 2022-04-14 RX ADMIN — DEXAMETHASONE 1 DROP: 1 SUSPENSION OPHTHALMIC at 03:04

## 2022-04-14 RX ADMIN — VORTIOXETINE 20 MG: 10 TABLET, FILM COATED ORAL at 09:04

## 2022-04-14 RX ADMIN — DIAZEPAM 10 MG: 5 TABLET ORAL at 09:04

## 2022-04-14 RX ADMIN — HYPROMELLOSE 2910 1 DROP: 5 SOLUTION OPHTHALMIC at 08:04

## 2022-04-14 RX ADMIN — DEXAMETHASONE 1 DROP: 1 SUSPENSION OPHTHALMIC at 04:04

## 2022-04-14 RX ADMIN — DEXAMETHASONE 1 DROP: 1 SUSPENSION OPHTHALMIC at 08:04

## 2022-04-14 RX ADMIN — BUSPIRONE HYDROCHLORIDE 10 MG: 5 TABLET ORAL at 09:04

## 2022-04-14 RX ADMIN — BUSPIRONE HYDROCHLORIDE 10 MG: 5 TABLET ORAL at 08:04

## 2022-04-14 RX ADMIN — FLUCONAZOLE 400 MG: 200 TABLET ORAL at 08:04

## 2022-04-14 RX ADMIN — DEXAMETHASONE SODIUM PHOSPHATE 8 MG: 4 INJECTION INTRA-ARTICULAR; INTRALESIONAL; INTRAMUSCULAR; INTRAVENOUS; SOFT TISSUE at 08:04

## 2022-04-14 RX ADMIN — POTASSIUM CHLORIDE 20 MEQ: 1500 TABLET, EXTENDED RELEASE ORAL at 05:04

## 2022-04-14 NOTE — PROGRESS NOTES
[facility] - [facility] ([facility])  Hematology  Bone Marrow Transplant  Progress Note    Patient Name: Deepti Gonzalez  Admission Date: 4/13/2022  Hospital Length of Stay: 1 days  Code Status: Full Code    Subjective:     Interval History: Admitted on 4/13 for C4 of EWALL consolidation (IDAC). Tolerated cytarabine well yesterday. This AM, pt reported blurred vision and R sided weakness which was new,  also noted some confusion, though at bedside pt A&Ox4, though with notable R sided weakness and endorsing R sided headache.  reports these episodes have happened at home, pt follows with neurology and this is reported to be a seizure, however he reports the patient has never had right sided weakness, instead it is typically left sided. CT Stroke called and fortunately CT head negative. MRI not obtained d/t pacemaker. Symptoms resolved soon after pt returned to floor. Some concern for polypharmacy as pt's  reports scheduled meds at home vary from how meds given here (see pharmacy note). We have adjusted timing of medications. Not pursuing EEG at this time and doubtful this is cytarabine toxicity given quick resolution and known history of similar episodes. Otherwise, pt is doing well upon return to floor. AF/VSS.     Objective:     Vital Signs (Most Recent):  Temp: 98.6 °F (37 °C) (04/14/22 1516)  Pulse: 94 (04/14/22 1516)  Resp: 19 (04/14/22 1516)  BP: 127/70 (04/14/22 1516)  SpO2: 95 % (04/14/22 1516) Vital Signs (24h Range):  Temp:  [97.7 °F (36.5 °C)-98.6 °F (37 °C)] 98.6 °F (37 °C)  Pulse:  [85-98] 94  Resp:  [16-19] 19  SpO2:  [93 %-97 %] 95 %  BP: (107-157)/(59-75) 127/70     Weight: 67.3 kg (148 lb 5.9 oz)  Body mass index is 25.47 kg/m².  Body surface area is 1.74 meters squared.    ECOG SCORE           Intake/Output - Last 3 Shifts         04/12 0700  04/13 0659 04/13 0700  04/14 0659 04/14 0700  04/15 0659    P.O.  480     I.V. (mL/kg)  261 (3.9)     IV Piggyback  249.3     Total  Intake(mL/kg)  990.3 (14.7)     Urine (mL/kg/hr)  600     Stool  0     Total Output  600     Net  +390.3            Urine Occurrence  2 x     Stool Occurrence  2 x             Physical Exam  Constitutional:       General: She is not in acute distress.     Appearance: She is well-developed.   HENT:      Head: Normocephalic and atraumatic.      Mouth/Throat:      Pharynx: No oropharyngeal exudate.   Eyes:      Extraocular Movements: Extraocular movements intact.      Conjunctiva/sclera: Conjunctivae normal.      Pupils: Pupils are equal, round, and reactive to light.   Cardiovascular:      Rate and Rhythm: Normal rate and regular rhythm.      Heart sounds: Normal heart sounds. No murmur heard.  Pulmonary:      Effort: Pulmonary effort is normal.      Breath sounds: Normal breath sounds.   Abdominal:      General: Bowel sounds are normal. There is no distension.      Palpations: Abdomen is soft.      Tenderness: There is no abdominal tenderness.   Musculoskeletal:         General: No deformity. Normal range of motion.      Cervical back: Normal range of motion and neck supple.   Skin:     General: Skin is warm and dry.      Findings: No erythema or rash.      Comments: Right chest wall port. Not accessed.   Neurological:      Mental Status: She is alert and oriented to person, place, and time.      Cranial Nerves: No cranial nerve deficit.      Motor: Weakness (right sided weakness, 2/5) present.   Psychiatric:         Behavior: Behavior normal.         Thought Content: Thought content normal.         Judgment: Judgment normal.       Significant Labs:   CBC:   Recent Labs   Lab 04/13/22  0750 04/14/22  0346   WBC 22.39* 13.04*   HGB 10.9* 9.6*   HCT 34.1* 29.5*    108*    and CMP:   Recent Labs   Lab 04/13/22  0750 04/14/22  0346    136   K 4.3 3.8    104   CO2 24 22*   GLU 79 151*   BUN 15 13   CREATININE 0.7 0.6   CALCIUM 8.2* 8.3*   PROT 5.4* 5.0*   ALBUMIN 2.5* 2.4*   BILITOT 0.6 0.5   ALKPHOS  382* 327*   * 65*   * 105*   ANIONGAP 10 10   EGFRNONAA >60.0 >60.0       Diagnostic Results:  I have reviewed and interpreted all pertinent imaging results/findings within the past 24 hours.    CTA neck: No significant arterial stenosis throughout the neck.     CT head: No evidence for acute intracranial hemorrhage or definite abnormal parenchymal enhancement.  No sulcal effacement to suggest large territory recent infarction.  Clinical correlation and follow-up as warranted.    Assessment/Plan:     * B-cell acute lymphoblastic leukemia  From Dr. Jenkins's most recent clinic note  Precursor B-cell acute lymphoblastic leukemia (Ph+)              A. [place]23[place]2021: [place] [facility] [place]              B. 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               C. 11/30/2021 - 12/23/2021: Vincristine + imatinib induction; hospital course was complicated by acute hypoxemic respiratory failure which resolved with diuresis and treatment of ALL     - BMBx from 1/3/22 showing CR  - admitted 4/14 for C4 of consolidation with EWALL (IDAC), today is C4D2  - tolerated previous cycles of consolidation well  - take home Gleevec days 15-28 of consolidation cycles  - follows outpatient with Dr. Garcia for lab monitoring and transfusions  - monitor closely for neuro toxicity with cytarabine    Multiple neurological symptoms  - Pt with sudden R sided weakness, blurred vision, headache  - Code stroke called and pt evaluated by neuro  - CT head/neck w/o large vessel occlusion or ICH  - Improved shortly after returning to room after imaging, pt's  endorses similar episodes in past but L sided. He  reports this can happen with polypharmacy, but the R sided weakness was very strange. We adjusted medications to match home regimen, see pharmacy note    Moderate major depression  - continue home trintellix while inpatient    Insomnia  - continue home seroquel while inpatient    Anxiety  - continue home trintellix and valium while inpatient    GERD (gastroesophageal reflux disease)  - home prilosec not on formulary. Will receive protonix while inpatient.    Hypertension  - continue home losartan while inpatient    Seizure disorder  - continue home oxcarbazepine while inpatient    Type 2 diabetes mellitus, without long-term current use of insulin  - hold home farxiga while inpatient. Can resume at discharge.  - low dose SSI, achs blood glucose monitoring, and diabetic diet while inpatient    Rheumatoid arthritis involving multiple sites  - continue home plaquenil.   - home leflunomide was recently discontinued by her rheumatologist    History of TIA (transient ischemic attack)  - Continue home ASA and fenofibrate. Holding home statin while inpatient.    Hyperlipidemia  - holding home statin while inpatient    Long term current use of anticoagulant  - continue home xarelto. Hold with plts < 50K.    Paroxysmal atrial fibrillation  - continue home diltiazem and xarelto. Hold xarelto with plt count < 50K.        VTE Risk Mitigation (From admission, onward)         Ordered     rivaroxaban tablet 20 mg  With dinner         04/13/22 1535     heparin, porcine (PF) 100 unit/mL injection flush 500 Units  As needed (PRN)         04/13/22 1634     IP VTE HIGH RISK PATIENT  Once         04/13/22 1535     Place sequential compression device  Until discontinued         04/13/22 1535     Reason for No Pharmacological VTE Prophylaxis  Once        Question:  Reasons:  Answer:  Already adequately anticoagulated on oral Anticoagulants    04/13/22 1535                Disposition: Remain inpatient    Jackie Sharma PA-C  Bone Marrow  Transplant  [facility] - [facility] ([facility])

## 2022-04-14 NOTE — PROGRESS NOTES
Pharmacy Note:    I have retimed all of the patient's currently ordered medications to mimic how she takes her medications at home.     For future admissions, the BMT pharmacist will work to adjust times to stay as close to patient's home schedule as possible.       0400 Meds:   - Fluconazole 400mg (held during admission as long as not neutropenic)   - Levofloxacin 500mg (held during admission as long as not neutropenic)   - Diltiazem 90mg   - Gabapentin 300mg   - Hydroxyzine pamoate 50mg    1000 Meds:   - Acyclovir 400mg   - Diazepam 10mg (ordered as PRN while admitted)   - Diltiazem 90mg   - Omeprazole 40mg (substituted with pantoprazole while admitted)   - Oxcarbazepine 600mg   - Vortioxetine (Trintellix) 20mg   - Buspirone 10mg    1200 Meds:   - Atorvastatin 80mg (held during admission)   - Fenofibrate 160mg (held during admission)   - Gabapentin 300mg   - Losartan 25mg    1300 Meds:   - Imatinib 600mg (starts on day 15 of chemo cycle)    1600 Meds:   - Diltiazem 90mg   - Farxiga 10mg (held during admission)   - Hydroxyzine pamoate 50mg    2000 Meds:   - Gabapentin 300mg    2200 Meds:   - Acyclovir 400mg   - Diazepam 10mg (ordered as PRN while admitted)   - Diltiazem 90mg   - Oxcarbazepine 1200mg   - Quetiapine 200mg   - Buspirone 10mg      Kiki Escalona, Pharm.D., BCOP  Clinical Pharmacy Specialist, Bone Marrow Transplant  [facility]  SpectraLink: [phone]

## 2022-04-14 NOTE — PLAN OF CARE
Plan of care discussed at start of shift. Free from falls and injuries. Resting quietly with eyes closed. Respirations even, unlabored. Skin warm and dry. Pain managed with oxycodone 5 mg PO x's 1. Nausea managed with Zofran 8 mg PO x's 1 and scheduled antiemetics. Anxiety managed with Valium 10 mg PO x's 1. Cytarabine administered as ordered. Frequent checks for pain and safety maintained. Bed in lowest position, wheels locked, side rails up x's 2, call light in reach. Instructed to call for assistance as needed, verbalizes understanding. Will continue to monitor.

## 2022-04-14 NOTE — CODE/ RAPID DOCUMENTATION
"  RAPID RESPONSE NURSE STROKE CODE NOTE         Admit Date: 2022  LOS: 1  Code Status: Full Code   Date of Consult: 2022  : 1970  Age: 51 y.o.  Weight:   Wt Readings from Last 1 Encounters:   22 67.3 kg (148 lb 5.9 oz)     Sex: female  Race: White   Bed: [address] [place] [address] A:   MRN: [id number]  Time Rapid Response Team page Received: 1002  Time Rapid Response Team at Bedside: 1005  Time Rapid Response Team left Bedside: 1035  Was the patient discharged from an ICU this admission? No   Was the patient discharged from a PACU within last 24 hours? No   Did the patient receive conscious sedation/general anesthesia in last 24 hours? No  Was the patient in the ED within the past 24 hours? No  Was the patient on NIPPV within the past 24 hours? No   Did this progress into an ARC or CPA: no  Attending Physician: Loco Armendariz MD  [place] Service[place] [facility] [place]       SITUATION    Notified by code pager.  Called to evaluate the patient for Neuro    BACKGROUND    Why is the patient in the hospital?: B-cell acute lymphoblastic leukemia  Patient has a past medical history of Arthritis, Cancer, COPD (chronic obstructive pulmonary disease), Hypertension, and Stroke.    ASSESSMENT    Last VS: BP (!) 140/71 (Patient Position: Lying)   Pulse 90   Temp 98.1 °F (36.7 °C) (Oral)   Resp 16   Ht 5' 4" (1.626 m)   Wt 67.3 kg (148 lb 5.9 oz)   SpO2 96%   BMI 25.47 kg/m²     24H Vital Sign Range:  Temp:  [97.7 °F (36.5 °C)-98.4 °F (36.9 °C)]   Pulse:  []   Resp:  [16-18]   BP: (107-157)/(60-76)   SpO2:  [93 %-99 %]     Last know well time: 0915    Glucose time: 1010   Glucose result: 165    Physical Exam  Constitutional:       Appearance: She is ill-appearing.         Time Stroke Code initiated:   Stroke team Arrival time:  Stroke Code activation triggers:   Vascular Neurology provider you spoke with: Dr. Lolis Rasmussen    Time arrived at CT:  1015  Time CT completed: 1027    VAN positive or negative: " positive    tPA decision: no  tPA bolus (mg and time):  tPA infusion (mg and time):  tPA end time:    IR decision: no  IR arrival:  IR end time:     RECOMMENDATIONS    We recommend:     Continue to monitor neuro status    FOLLOW-UP/PLAN    Call the Rapid Response Nurse, Alicia Archuleta, RN at [phone] for additional questions or concerns.    PHYSICIAN ESCALATION    Orders received and case discussed with Dr. Villa     Disposition: Remain in room 803.

## 2022-04-14 NOTE — SUBJECTIVE & OBJECTIVE
Past Medical History:   Diagnosis Date    Arthritis     Cancer     COPD (chronic obstructive pulmonary disease)     Hypertension     Stroke     TIA x 4     Past Surgical History:   Procedure Laterality Date    APPENDECTOMY      HYSTERECTOMY      ROTATOR CUFF REPAIR Bilateral     TONSILLECTOMY      TUBAL LIGATION       History reviewed. No pertinent family history.  Social History     Tobacco Use    Smoking status: Current Every Day Smoker     Packs/day: 0.50     Types: Cigarettes    Smokeless tobacco: Never Used   Substance Use Topics    Alcohol use: No    Drug use: Never     Review of patient's allergies indicates:   Allergen Reactions    Levetiracetam      Other reaction(s): Unknown       Medications: I have reviewed the current medication administration record.    Medications Prior to Admission   Medication Sig Dispense Refill Last Dose    blood sugar diagnostic (ONETOUCH VERIO TEST STRIPS) Strp SMARTSIG:Via Meter 1 to 2 Times Daily       acyclovir (ZOVIRAX) 400 MG tablet Take 1 tablet (400 mg total) by mouth 2 (two) times daily. 60 tablet 11     artificial tears (ISOPTO TEARS) 0.5 % ophthalmic solution Place 1 drop into both eyes 4 (four) times daily as needed.       aspirin (ECOTRIN) 81 MG EC tablet Take 1 tablet (81 mg total) by mouth once daily. Hold for platelet count less than 50 thousand. (Patient not taking: No sig reported)  0     atorvastatin (LIPITOR) 80 MG tablet Take 80 mg by mouth once daily.       busPIRone (BUSPAR) 10 MG tablet Take 10 mg by mouth 2 (two) times daily.       dapagliflozin (FARXIGA) 10 mg tablet Take 10 mg by mouth once daily.       diazePAM (VALIUM) 10 MG Tab Take 10 mg by mouth 2 (two) times daily as needed.       diltiaZEM (CARDIZEM) 90 MG tablet Take 1 tablet (90 mg total) by mouth every 6 (six) hours. 120 tablet 11     duke's soln (benadryl 30 mL, mylanta 30 mL, LIDOcaine 30 mL, nystatin 30 mL) 120mL Take 10 mLs by mouth 4 (four) times daily. (Patient not taking: No sig  reported) 120 mL 0     fenofibrate 160 MG Tab Take 160 mg by mouth once daily.       fluconazole (DIFLUCAN) 200 MG Tab Take 2 tablets (400 mg total) by mouth once daily. 60 tablet 2     gabapentin (NEURONTIN) 300 MG capsule Take 1 capsule (300 mg total) by mouth 3 (three) times daily. 90 capsule 11     hydrOXYchloroQUINE (PLAQUENIL) 200 mg tablet Take 200 mg by mouth once daily.       hydrOXYzine (ATARAX) 50 MG tablet Take 50 mg by mouth 2 (two) times a day.       imatinib (GLEEVEC) 100 MG Tab Take 2 tablets (200 mg total) by mouth once daily with 1 other imatinib prescription for 600 mg total on days 15-28 of each cycle during consolidation.Take with a meal and large glass of water 180 tablet 3     imatinib (GLEEVEC) 400 MG Tab Take 1 tablet (400 mg total) by mouth once daily with 1 other imatinib prescription for 600 mg total on days 15-28 of each cycle during consolidation. Take with a meal and large glass of water 90 tablet 3     levoFLOXacin (LEVAQUIN) 500 MG tablet Take 1 tablet (500 mg total) by mouth once daily. 30 tablet 2     losartan (COZAAR) 25 MG tablet Take 25 mg by mouth once daily.       omeprazole (PRILOSEC) 40 MG capsule Take 40 mg by mouth once daily.       ondansetron (ZOFRAN-ODT) 8 MG TbDL Dissolve 1 tablet (8 mg total) by mouth every 12 (twelve) hours as needed (in case of chemo induced nausea). 30 tablet 1     ONETOUCH VERIO TEST STRIPS Strp SMARTSIG:Via Meter 1 to 2 Times Daily       OXcarbazepine (TRILEPTAL) 600 MG Tab Take 1,200 mg by mouth 2 (two) times daily.       oxyCODONE (ROXICODONE) 5 MG immediate release tablet Take 1 tablet (5 mg total) by mouth every 4 (four) hours as needed for Pain. 45 tablet 0     polyethylene glycol (GLYCOLAX) 17 gram/dose powder Dissolve one capful (17 g) in liquid and take by mouth daily as needed (constipation). (Patient not taking: No sig reported) 510 g 0     potassium chloride SA (K-DUR,KLOR-CON) 20 MEQ tablet Take 20 mEq by mouth once daily.        prochlorperazine (COMPAZINE) 5 MG tablet Take 1 tablet (5 mg total) by mouth every 6 (six) hours as needed for Nausea. 30 tablet 2     QUEtiapine (SEROQUEL) 200 MG Tab Take 200 mg by mouth every evening.       rivaroxaban (XARELTO) 20 mg Tab Take 1 tablet (20 mg total) by mouth daily with dinner or evening meal. Hold for platelets less than 50 thousand. (Patient not taking: No sig reported)       senna-docusate 8.6-50 mg (PERICOLACE) 8.6-50 mg per tablet Take 1 tablet by mouth 2 (two) times daily. 60 tablet 3     sumatriptan (IMITREX) 50 MG tablet Take 1 tablet (50 mg total) by mouth daily as needed (headache). 30 tablet 0     traMADoL (ULTRAM) 50 mg tablet Take 1 tablet (50 mg total) by mouth every 6 (six) hours as needed for Pain. 30 tablet 0     TRINTELLIX 20 mg Tab Take 1 tablet by mouth once daily.       VISTARIL 50 mg Cap Take by mouth.       [DISCONTINUED] methocarbamoL (ROBAXIN) 500 MG Tab Take 1 tablet (500 mg total) by mouth 3 (three) times daily as needed (muscle spasms). (Patient not taking: No sig reported) 90 tablet 2     [DISCONTINUED] TRINTELLIX 10 mg Tab Take 1 tablet by mouth once daily.          Review of Systems   Constitutional:  Positive for activity change. Negative for appetite change, chills, diaphoresis, fatigue and fever.   HENT:  Negative for congestion and dental problem.    Eyes:  Negative for pain, discharge and itching.   Cardiovascular:  Negative for chest pain and leg swelling.   Gastrointestinal:  Negative for abdominal distention and abdominal pain.   Endocrine: Negative for polydipsia and polyphagia.   Genitourinary:  Negative for difficulty urinating, dyspareunia and dysuria.   Musculoskeletal:  Negative for arthralgias and back pain.   Allergic/Immunologic: Negative for food allergies and immunocompromised state.   Neurological:  Positive for weakness. Negative for tremors.   Objective:     Vital Signs (Most Recent):  Temp: 98.1 °F (36.7 °C) (04/14/22 0743)  Pulse: 90 (04/14/22  1042)  Resp: 16 (04/14/22 1042)  BP: (!) 140/71 (04/14/22 1042)  SpO2: 96 % (04/14/22 1042)    Vital Signs Range (Last 24H):  Temp:  [97.7 °F (36.5 °C)-98.4 °F (36.9 °C)]   Pulse:  []   Resp:  [16-18]   BP: (107-157)/(60-76)   SpO2:  [93 %-99 %]     Physical Exam  Constitutional:       Appearance: Normal appearance. She is obese. She is not ill-appearing.   HENT:      Head: Atraumatic. Macrocephalic.   Eyes:      General: Lids are normal.   Neck:      Thyroid: No thyroid mass.      Meningeal: Brudzinski's sign and Kernig's sign absent.   Cardiovascular:      Rate and Rhythm: Normal rate.      Pulses: Normal pulses.      Heart sounds: Normal heart sounds.   Pulmonary:      Breath sounds: Normal air entry.   Musculoskeletal:      Cervical back: Normal range of motion.   Lymphadenopathy:      Cervical: No cervical adenopathy.   Skin:     General: Skin is warm.   Neurological:      Mental Status: She is alert.       Neurological Exam:   LOC: alert  Attention Span: Good   Language: No aphasia  Articulation: No dysarthria  Orientation: Person, Place, Time   Visual Fields: Full  EOM (CN III, IV, VI): Full/intact  Pupils (CN II, III): PERRL  Facial Sensation (CN V): Normal  Facial Movement (CN VII): Symmetric facial expression    Reflexes: 1+ reflexes   Motor: Arm left  Normal 5/5  Leg left  Normal 5/5  Arm right  Normal 5/5  Leg right Paresis: 3/5- strength with variation and is effort dependent   Sensation: Decreased sensation in the left lower extremity   Tone: Normal tone throughout      Laboratory:  CMP:   Recent Labs   Lab 04/14/22  0346   CALCIUM 8.3*   ALBUMIN 2.4*   PROT 5.0*      K 3.8   CO2 22*      BUN 13   CREATININE 0.6   ALKPHOS 327*   *   AST 65*   BILITOT 0.5     CBC:   Recent Labs   Lab 04/14/22  0346   WBC 13.04*   RBC 2.74*   HGB 9.6*   HCT 29.5*   *   *   MCH 35.0*   MCHC 32.5     Lipid Panel: No results for input(s): CHOL, LDLCALC, HDL, TRIG in the last 168  hours.  Coagulation: No results for input(s): PT, INR, APTT in the last 168 hours.  Hgb A1C:   Recent Labs   Lab 04/13/22  1609   HGBA1C 4.4     TSH: No results for input(s): TSH in the last 168 hours.    Diagnostic Results:      Brain imaging:  CTH 4/14/22  No acute intracranial abnormalities     Vessel Imaging:  CTA Head and Neck 4/14/22   No LVO or high grade stenosis     Cardiac Evaluation:   EKG Pending

## 2022-04-14 NOTE — SUBJECTIVE & OBJECTIVE
Subjective:     Interval History: Admitted on 4/13 for C4 of EWALL consolidation (IDAC). Tolerated cytarabine well yesterday. This AM, pt reported blurred vision and R sided weakness which was new,  also noted some confusion, though at bedside pt A&Ox4, though with notable R sided weakness and endorsing R sided headache.  reports these episodes have happened at home, pt follows with neurology and this is reported to be a seizure, however he reports the patient has never had right sided weakness, instead it is typically left sided. CT Stroke called and fortunately CT head negative. MRI not obtained d/t pacemaker. Symptoms resolved soon after pt returned to floor. Some concern for polypharmacy as pt's  reports scheduled meds at home vary from how meds given here (see pharmacy note). We have adjusted timing of medications. Not pursuing EEG at this time and doubtful this is cytarabine toxicity given quick resolution and known history of similar episodes. Otherwise, pt is doing well upon return to floor. AF/VSS.     Objective:     Vital Signs (Most Recent):  Temp: 98.6 °F (37 °C) (04/14/22 1516)  Pulse: 94 (04/14/22 1516)  Resp: 19 (04/14/22 1516)  BP: 127/70 (04/14/22 1516)  SpO2: 95 % (04/14/22 1516) Vital Signs (24h Range):  Temp:  [97.7 °F (36.5 °C)-98.6 °F (37 °C)] 98.6 °F (37 °C)  Pulse:  [85-98] 94  Resp:  [16-19] 19  SpO2:  [93 %-97 %] 95 %  BP: (107-157)/(59-75) 127/70     Weight: 67.3 kg (148 lb 5.9 oz)  Body mass index is 25.47 kg/m².  Body surface area is 1.74 meters squared.    ECOG SCORE           Intake/Output - Last 3 Shifts         04/12 0700  04/13 0659 04/13 0700  04/14 0659 04/14 0700  04/15 0659    P.O.  480     I.V. (mL/kg)  261 (3.9)     IV Piggyback  249.3     Total Intake(mL/kg)  990.3 (14.7)     Urine (mL/kg/hr)  600     Stool  0     Total Output  600     Net  +390.3            Urine Occurrence  2 x     Stool Occurrence  2 x             Physical Exam  Constitutional:        General: She is not in acute distress.     Appearance: She is well-developed.   HENT:      Head: Normocephalic and atraumatic.      Mouth/Throat:      Pharynx: No oropharyngeal exudate.   Eyes:      Extraocular Movements: Extraocular movements intact.      Conjunctiva/sclera: Conjunctivae normal.      Pupils: Pupils are equal, round, and reactive to light.   Cardiovascular:      Rate and Rhythm: Normal rate and regular rhythm.      Heart sounds: Normal heart sounds. No murmur heard.  Pulmonary:      Effort: Pulmonary effort is normal.      Breath sounds: Normal breath sounds.   Abdominal:      General: Bowel sounds are normal. There is no distension.      Palpations: Abdomen is soft.      Tenderness: There is no abdominal tenderness.   Musculoskeletal:         General: No deformity. Normal range of motion.      Cervical back: Normal range of motion and neck supple.   Skin:     General: Skin is warm and dry.      Findings: No erythema or rash.      Comments: Right chest wall port. Not accessed.   Neurological:      Mental Status: She is alert and oriented to person, place, and time.      Cranial Nerves: No cranial nerve deficit.      Motor: Weakness (right sided weakness, 2/5) present.   Psychiatric:         Behavior: Behavior normal.         Thought Content: Thought content normal.         Judgment: Judgment normal.       Significant Labs:   CBC:   Recent Labs   Lab 04/13/22  0750 04/14/22  0346   WBC 22.39* 13.04*   HGB 10.9* 9.6*   HCT 34.1* 29.5*    108*    and CMP:   Recent Labs   Lab 04/13/22  0750 04/14/22  0346    136   K 4.3 3.8    104   CO2 24 22*   GLU 79 151*   BUN 15 13   CREATININE 0.7 0.6   CALCIUM 8.2* 8.3*   PROT 5.4* 5.0*   ALBUMIN 2.5* 2.4*   BILITOT 0.6 0.5   ALKPHOS 382* 327*   * 65*   * 105*   ANIONGAP 10 10   EGFRNONAA >60.0 >60.0       Diagnostic Results:  I have reviewed and interpreted all pertinent imaging results/findings within the past 24 hours.    CTA  neck: No significant arterial stenosis throughout the neck.     CT head: No evidence for acute intracranial hemorrhage or definite abnormal parenchymal enhancement.  No sulcal effacement to suggest large territory recent infarction.  Clinical correlation and follow-up as warranted.

## 2022-04-14 NOTE — ASSESSMENT & PLAN NOTE
- Pt with sudden R sided weakness, blurred vision, headache  - Code stroke called and pt evaluated by neuro  - CT head/neck w/o large vessel occlusion or ICH  - Improved shortly after returning to room after imaging, pt's  endorses similar episodes in past but L sided. He reports this can happen with polypharmacy, but the R sided weakness was very strange. We adjusted medications to match home regimen, see pharmacy note  - Given quick resolution and history of similar episodes in past, doubtful this is related to cytarabine. We will continue daily neuro monitoring given risk of neurotoxicity with cytarabine.

## 2022-04-14 NOTE — NURSING
Cytarabine 885 mg in 250 ml normal saline started through right Port noted for having positive blood return to infuse over 2 hours at 125 ml/hr .  Chemotherapy dosage and BSA checked by two chemotherapy certified nurses prior to administration. Chemotherapy education done prior to hanging of chemotherapy. Chemotherapeutic precautions in place throughout therapy. Will continue to monitor.

## 2022-04-14 NOTE — ASSESSMENT & PLAN NOTE
From Dr. Jenkins's most recent clinic note  Precursor B-cell acute lymphoblastic leukemia (Ph+)              A. 11/23/2021[place] [facility] [place]              B. 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               C. 11/30/2021 - 12/23/2021: Vincristine + imatinib induction; hospital course was complicated by acute hypoxemic respiratory failure which resolved with diuresis and treatment of ALL     - BMBx from 1/3/22 showing CR  - admitted 4/14 for C4 of consolidation with EWALL (IDAC), today is C4D2  - tolerated previous cycles of consolidation well  - take home Gleevec days 15-28 of consolidation cycles  - follows outpatient with Dr. Garcia for lab monitoring and transfusions  - monitor closely for neuro toxicity with cytarabine

## 2022-04-14 NOTE — PLAN OF CARE
Upon initial assessment patient was A+Ox4, no neuro deficits and was able to complete the Cytarabine signature without any issue. PO valium and oxycodone given for pain in addition to morning meds and IV zofran and dexamethasone premedication. At 0930 prior to Cytarabine administration pt c/o of blurred vision, double vision, 7/10 headache and right shoulder pain. Cytarabine was held. GENIA Sharma called to bedside 2/2 neuro changes. Code stroke was called at 1001. Pt safely transported to STAT head CT.     Upon return to floor patients neuro assessment had returned to baseline with VSS. No further interventions. Pt denied any chest pain, palpitations, respiratory distress, nausea or vomiting. Cytarabine given per orders at 1554. Chemo ppx maintained. Neuro assessment intact. Trace BLEd edema, legs elevated on pills as tolerated. Ambulating to bathroom independently. Right port flushing well, +BR, dsg c/d/i.      Pt has remained free from injury this shift. Bed in low locked position. Call light and personal belongings within reach. Side rails up x3. Nonskid socks in place. Pt instructed to call with any needs. Will continue to monitor.

## 2022-04-14 NOTE — HPI
51 year old woman with a medical history of ALL (on cytarabine), COPD, HTN, HLD, DM2, anxiety/depression, seizure disorder, and atrial fibrillation (on xarelto) presents to the hospital for cycle four of cytarabine. Stroke code called on 4/14/22 at 1001 for right sided weakness and change in mental status.     Patient's last known normal at 915 AM. She was scheduled to receive her cytarabine when the nurse noticed that she began complaining of a 10/10 right sided headache and blurry vision. She became less communicative, and developed right lower extremity weakness. Prior to this event, she had just received valium and oxycodone. STAT CTH did not reveal any acute abnormalities. CTA Head and Neck did not reveal any LVO or high grade stenosis. On examination, strength is effort dependent with variation. No change in vision noted.

## 2022-04-14 NOTE — CONSULTS
[facility]to [facility]pez - Oncology ([facility])  Vascular Neurology  Comprehensive Stroke Center  Consult Note    Inpatient consult to Vascular (Stroke) Neurology  Consult performed by: Jairo Casey MD  Consult ordered by: Jos Sharma PA-C    Inpatient consult to Vascular (Stroke) Neurology  Consult performed by: Jairo Casey MD  Consult ordered by: Jhonny Kumar MD        Assessment/Plan:     Patient is a 51 y.o. year old female with:    Multiple neurological symptoms  51 year old woman with a medical history of ALL currently admitted to the hospital for treatment with cytarabine. Stroke code called after patient had sudden onset of headache with right lower legg weakness and sensation changes. CTH and CTA MP did not reveal a vascular etiology for her symptoms. Her use of anticoagulation disqualifies her from getting tPA. Differential includes complicated migraines versus seizures. However, seizures less likely as  states she has Todds Paralysis on the left side. Patient was just given valium and oxycodone prior to the onset of symptoms, which can make her weakness worse.    Recommendation  -Can consider an MRI brain without contrast if symptoms persist  · Stroke Risk Factors Addressed:  · Hypertension - goal BP < 130/80 mmHg   · Hyperlipidemia - goal LDL [< 70] continue atorvastatin 80 mg   · Diabetes Mellitus - goal A1c < 7, continue current therapy  · Atrial Fibrillation - continue anticoagulation (xarelto)      We will sign off. Please call [phone]     Hypertension  Stroke RF   Currently being managed by primary- on diltiazem and losartan     Type 2 diabetes mellitus, without long-term current use of insulin  Stroke risk factor  Sliding scale while inpatient     Hyperlipidemia  Continue atorvastatin 80 mg     Paroxysmal atrial fibrillation  On Xarelto 20 mg nightly thus wouldn't qualify for tPA         STROKE DOCUMENTATION     Acute Stroke Times   Last Known Normal Date: 04/14/22  Last Known Normal Time:  0915  Symptom Onset Date: 04/14/22  Symptom Onset Time: 0915  Stroke Team Called Date: 04/14/22  Stroke Team Called Time: 1001  Stroke Team Arrival Date: 04/14/22  Stroke Team Arrival Time: 1005  CT Interpretation Time: 1015  Alteplase Recommended: No  Thrombectomy Recommended: No    NIH Scale:  1a. Level of Consciousness: 0-->Alert, keenly responsive  1b. LOC Questions: 0-->Answers both questions correctly  1c. LOC Commands: 0-->Performs both tasks correctly  2. Best Gaze: 0-->Normal  3. Visual: 0-->No visual loss  4. Facial Palsy: 0-->Normal symmetrical movements  5a. Motor Arm, Left: 0-->No drift, limb holds 90 (or 45) degrees for full 10 secs  5b. Motor Arm, Right: 0-->No drift, limb holds 90 (or 45) degrees for full 10 secs  6a. Motor Leg, Left: 0-->No drift, leg holds 30 degree position for full 5 secs  6b. Motor Leg, Right: 1-->Drift, leg falls by the end of the 5-sec period but does not hit bed (Variation in examination )  7. Limb Ataxia: 0-->Absent  8. Sensory: 1-->Mild-to-moderate sensory loss, patient feels pinprick is less sharp or is dull on the affected side, or there is a loss of superficial pain with pinprick, but patient is aware of being touched  9. Best Language: 0-->No aphasia, normal  10. Dysarthria: 0-->Normal  11. Extinction and Inattention (formerly Neglect): 0-->No abnormality  Total (NIH Stroke Scale): 2    Modified Elie    Jahaira Coma Scale:15   ABCD2 Score:    [id number] Score:6  HAS -BLED Score:   ICH Score:   Hunt & Amado Classification:       Thrombolysis Candidate? No, Current use of direct thrombin inhibitors (dabigatran) or direct factor Xa inhibitors (rivaroxaban, apixaban, edoxaban) with elevated sensitive laboratory tests     Delays to Thrombolysis?  Not Applicable    Interventional Revascularization Candidate?   Is the patient eligible for mechanical endovascular reperfusion (YAMILKA)?  No; No large vessel occlusion identified on imaging     Delays to Thrombectomy? Not  Applicable    Hemorrhagic change of an Ischemic Stroke: Does this patient have an ischemic stroke with hemorrhagic changes? No     Subjective:     History of Present Illness:  51 year old woman with a medical history of ALL (on cytarabine), COPD, HTN, HLD, DM2, anxiety/depression, seizure disorder, and atrial fibrillation (on xarelto) presents to the hospital for cycle four of cytarabine. Stroke code called on 4/14/22 at 1001 for right sided weakness and change in mental status.     Patient's last known normal at 915 AM. She was scheduled to receive her cytarabine when the nurse noticed that she began complaining of a 10/10 right sided headache and blurry vision. She became less communicative, and developed right lower extremity weakness. Prior to this event, she had just received valium and oxycodone. STAT CTH did not reveal any acute abnormalities. CTA Head and Neck did not reveal any LVO or high grade stenosis. On examination, strength is effort dependent with variation. No change in vision noted.           Past Medical History:   Diagnosis Date    Arthritis     Cancer     COPD (chronic obstructive pulmonary disease)     Hypertension     Stroke     TIA x 4     Past Surgical History:   Procedure Laterality Date    APPENDECTOMY      HYSTERECTOMY      ROTATOR CUFF REPAIR Bilateral     TONSILLECTOMY      TUBAL LIGATION       History reviewed. No pertinent family history.  Social History     Tobacco Use    Smoking status: Current Every Day Smoker     Packs/day: 0.50     Types: Cigarettes    Smokeless tobacco: Never Used   Substance Use Topics    Alcohol use: No    Drug use: Never     Review of patient's allergies indicates:   Allergen Reactions    Levetiracetam      Other reaction(s): Unknown       Medications: I have reviewed the current medication administration record.    Medications Prior to Admission   Medication Sig Dispense Refill Last Dose    blood sugar diagnostic (ONETOUCH VERIO TEST STRIPS)  Strp SMARTSIG:Via Meter 1 to 2 Times Daily       acyclovir (ZOVIRAX) 400 MG tablet Take 1 tablet (400 mg total) by mouth 2 (two) times daily. 60 tablet 11     artificial tears (ISOPTO TEARS) 0.5 % ophthalmic solution Place 1 drop into both eyes 4 (four) times daily as needed.       aspirin (ECOTRIN) 81 MG EC tablet Take 1 tablet (81 mg total) by mouth once daily. Hold for platelet count less than 50 thousand. (Patient not taking: No sig reported)  0     atorvastatin (LIPITOR) 80 MG tablet Take 80 mg by mouth once daily.       busPIRone (BUSPAR) 10 MG tablet Take 10 mg by mouth 2 (two) times daily.       dapagliflozin (FARXIGA) 10 mg tablet Take 10 mg by mouth once daily.       diazePAM (VALIUM) 10 MG Tab Take 10 mg by mouth 2 (two) times daily as needed.       diltiaZEM (CARDIZEM) 90 MG tablet Take 1 tablet (90 mg total) by mouth every 6 (six) hours. 120 tablet 11     duke's soln (benadryl 30 mL, mylanta 30 mL, LIDOcaine 30 mL, nystatin 30 mL) 120mL Take 10 mLs by mouth 4 (four) times daily. (Patient not taking: No sig reported) 120 mL 0     fenofibrate 160 MG Tab Take 160 mg by mouth once daily.       fluconazole (DIFLUCAN) 200 MG Tab Take 2 tablets (400 mg total) by mouth once daily. 60 tablet 2     gabapentin (NEURONTIN) 300 MG capsule Take 1 capsule (300 mg total) by mouth 3 (three) times daily. 90 capsule 11     hydrOXYchloroQUINE (PLAQUENIL) 200 mg tablet Take 200 mg by mouth once daily.       hydrOXYzine (ATARAX) 50 MG tablet Take 50 mg by mouth 2 (two) times a day.       imatinib (GLEEVEC) 100 MG Tab Take 2 tablets (200 mg total) by mouth once daily with 1 other imatinib prescription for 600 mg total on days 15-28 of each cycle during consolidation.Take with a meal and large glass of water 180 tablet 3     imatinib (GLEEVEC) 400 MG Tab Take 1 tablet (400 mg total) by mouth once daily with 1 other imatinib prescription for 600 mg total on days 15-28 of each cycle during consolidation. Take  with a meal and large glass of water 90 tablet 3     levoFLOXacin (LEVAQUIN) 500 MG tablet Take 1 tablet (500 mg total) by mouth once daily. 30 tablet 2     losartan (COZAAR) 25 MG tablet Take 25 mg by mouth once daily.       omeprazole (PRILOSEC) 40 MG capsule Take 40 mg by mouth once daily.       ondansetron (ZOFRAN-ODT) 8 MG TbDL Dissolve 1 tablet (8 mg total) by mouth every 12 (twelve) hours as needed (in case of chemo induced nausea). 30 tablet 1     ONETOUCH VERIO TEST STRIPS Strp SMARTSIG:Via Meter 1 to 2 Times Daily       OXcarbazepine (TRILEPTAL) 600 MG Tab Take 1,200 mg by mouth 2 (two) times daily.       oxyCODONE (ROXICODONE) 5 MG immediate release tablet Take 1 tablet (5 mg total) by mouth every 4 (four) hours as needed for Pain. 45 tablet 0     polyethylene glycol (GLYCOLAX) 17 gram/dose powder Dissolve one capful (17 g) in liquid and take by mouth daily as needed (constipation). (Patient not taking: No sig reported) 510 g 0     potassium chloride SA (K-DUR,KLOR-CON) 20 MEQ tablet Take 20 mEq by mouth once daily.       prochlorperazine (COMPAZINE) 5 MG tablet Take 1 tablet (5 mg total) by mouth every 6 (six) hours as needed for Nausea. 30 tablet 2     QUEtiapine (SEROQUEL) 200 MG Tab Take 200 mg by mouth every evening.       rivaroxaban (XARELTO) 20 mg Tab Take 1 tablet (20 mg total) by mouth daily with dinner or evening meal. Hold for platelets less than 50 thousand. (Patient not taking: No sig reported)       senna-docusate 8.6-50 mg (PERICOLACE) 8.6-50 mg per tablet Take 1 tablet by mouth 2 (two) times daily. 60 tablet 3     sumatriptan (IMITREX) 50 MG tablet Take 1 tablet (50 mg total) by mouth daily as needed (headache). 30 tablet 0     traMADoL (ULTRAM) 50 mg tablet Take 1 tablet (50 mg total) by mouth every 6 (six) hours as needed for Pain. 30 tablet 0     TRINTELLIX 20 mg Tab Take 1 tablet by mouth once daily.       VISTARIL 50 mg Cap Take by mouth.       [DISCONTINUED]  methocarbamoL (ROBAXIN) 500 MG Tab Take 1 tablet (500 mg total) by mouth 3 (three) times daily as needed (muscle spasms). (Patient not taking: No sig reported) 90 tablet 2     [DISCONTINUED] TRINTELLIX 10 mg Tab Take 1 tablet by mouth once daily.          Review of Systems   Constitutional:  Positive for activity change. Negative for appetite change, chills, diaphoresis, fatigue and fever.   HENT:  Negative for congestion and dental problem.    Eyes:  Negative for pain, discharge and itching.   Cardiovascular:  Negative for chest pain and leg swelling.   Gastrointestinal:  Negative for abdominal distention and abdominal pain.   Endocrine: Negative for polydipsia and polyphagia.   Genitourinary:  Negative for difficulty urinating, dyspareunia and dysuria.   Musculoskeletal:  Negative for arthralgias and back pain.   Allergic/Immunologic: Negative for food allergies and immunocompromised state.   Neurological:  Positive for weakness. Negative for tremors.   Objective:     Vital Signs (Most Recent):  Temp: 98.1 °F (36.7 °C) (04/14/22 0743)  Pulse: 90 (04/14/22 1042)  Resp: 16 (04/14/22 1042)  BP: (!) 140/71 (04/14/22 1042)  SpO2: 96 % (04/14/22 1042)    Vital Signs Range (Last 24H):  Temp:  [97.7 °F (36.5 °C)-98.4 °F (36.9 °C)]   Pulse:  []   Resp:  [16-18]   BP: (107-157)/(60-76)   SpO2:  [93 %-99 %]     Physical Exam  Constitutional:       Appearance: Normal appearance. She is obese. She is not ill-appearing.   HENT:      Head: Atraumatic. Macrocephalic.   Eyes:      General: Lids are normal.   Neck:      Thyroid: No thyroid mass.      Meningeal: Brudzinski's sign and Kernig's sign absent.   Cardiovascular:      Rate and Rhythm: Normal rate.      Pulses: Normal pulses.      Heart sounds: Normal heart sounds.   Pulmonary:      Breath sounds: Normal air entry.   Musculoskeletal:      Cervical back: Normal range of motion.   Lymphadenopathy:      Cervical: No cervical adenopathy.   Skin:     General: Skin is  warm.   Neurological:      Mental Status: She is alert.       Neurological Exam:   LOC: alert  Attention Span: Good   Language: No aphasia  Articulation: No dysarthria  Orientation: Person, Place, Time   Visual Fields: Full  EOM (CN III, IV, VI): Full/intact  Pupils (CN II, III): PERRL  Facial Sensation (CN V): Normal  Facial Movement (CN VII): Symmetric facial expression    Reflexes: 1+ reflexes   Motor: Arm left  Normal 5/5  Leg left  Normal 5/5  Arm right  Normal 5/5  Leg right Paresis: 3/5- strength with variation and is effort dependent   Sensation: Decreased sensation in the left lower extremity   Tone: Normal tone throughout      Laboratory:  CMP:   Recent Labs   Lab 04/14/22  0346   CALCIUM 8.3*   ALBUMIN 2.4*   PROT 5.0*      K 3.8   CO2 22*      BUN 13   CREATININE 0.6   ALKPHOS 327*   *   AST 65*   BILITOT 0.5     CBC:   Recent Labs   Lab 04/14/22  0346   WBC 13.04*   RBC 2.74*   HGB 9.6*   HCT 29.5*   *   *   MCH 35.0*   MCHC 32.5     Lipid Panel: No results for input(s): CHOL, LDLCALC, HDL, TRIG in the last 168 hours.  Coagulation: No results for input(s): PT, INR, APTT in the last 168 hours.  Hgb A1C:   Recent Labs   Lab 04/13/22  1609   HGBA1C 4.4     TSH: No results for input(s): TSH in the last 168 hours.    Diagnostic Results:      Brain imaging:  CTH 4/14/22  No acute intracranial abnormalities     Vessel Imaging:  CTA Head and Neck 4/14/22   No LVO or high grade stenosis     Cardiac Evaluation:   EKG Pending         Jairo Casey MD  Comprehensive Stroke Center  Department of Vascular Neurology   [facility] - Oncology ([facility])

## 2022-04-14 NOTE — ASSESSMENT & PLAN NOTE
51 year old woman with a medical history of ALL currently admitted to the hospital for treatment with cytarabine. Stroke code called after patient had sudden onset of headache with right lower legg weakness and sensation changes. CTH and CTA MP did not reveal a vascular etiology for her symptoms. Her use of anticoagulation disqualifies her from getting tPA. Differential includes complicated migraines versus seizures. However, seizures less likely as  states she has Todds Paralysis on the left side. Patient was just given valium and oxycodone prior to the onset of symptoms, which can make her weakness worse.    Recommendation  -Can consider an MRI brain without contrast if symptoms persist  · Stroke Risk Factors Addressed:  · Hypertension - goal BP < 130/80 mmHg   · Hyperlipidemia - goal LDL [< 70] continue atorvastatin 80 mg   · Diabetes Mellitus - goal A1c < 7, continue current therapy  · Atrial Fibrillation - continue anticoagulation (xarelto)      We will sign off. Please call [phone]

## 2022-04-15 PROBLEM — G43.909 MIGRAINE: Status: ACTIVE | Noted: 2022-04-15

## 2022-04-15 LAB
ALBUMIN SERPL BCP-MCNC: 2.3 G/DL (ref 3.5–5.2)
ALP SERPL-CCNC: 281 U/L (ref 55–135)
ALT SERPL W/O P-5'-P-CCNC: 86 U/L (ref 10–44)
ANION GAP SERPL CALC-SCNC: 10 MMOL/L (ref 8–16)
AST SERPL-CCNC: 44 U/L (ref 10–40)
BASOPHILS # BLD AUTO: 0.01 K/UL (ref 0–0.2)
BASOPHILS NFR BLD: 0.1 % (ref 0–1.9)
BILIRUB SERPL-MCNC: 0.4 MG/DL (ref 0.1–1)
BUN SERPL-MCNC: 12 MG/DL (ref 6–20)
CALCIUM SERPL-MCNC: 8.1 MG/DL (ref 8.7–10.5)
CHLORIDE SERPL-SCNC: 104 MMOL/L (ref 95–110)
CO2 SERPL-SCNC: 23 MMOL/L (ref 23–29)
CREAT SERPL-MCNC: 0.7 MG/DL (ref 0.5–1.4)
DIFFERENTIAL METHOD: ABNORMAL
EOSINOPHIL # BLD AUTO: 0 K/UL (ref 0–0.5)
EOSINOPHIL NFR BLD: 0 % (ref 0–8)
ERYTHROCYTE [DISTWIDTH] IN BLOOD BY AUTOMATED COUNT: 18.1 % (ref 11.5–14.5)
EST. GFR  (AFRICAN AMERICAN): >60 ML/MIN/1.73 M^2
EST. GFR  (NON AFRICAN AMERICAN): >60 ML/MIN/1.73 M^2
GLUCOSE SERPL-MCNC: 169 MG/DL (ref 70–110)
HCT VFR BLD AUTO: 27 % (ref 37–48.5)
HGB BLD-MCNC: 9 G/DL (ref 12–16)
IMM GRANULOCYTES # BLD AUTO: 0.35 K/UL (ref 0–0.04)
IMM GRANULOCYTES NFR BLD AUTO: 2.9 % (ref 0–0.5)
LYMPHOCYTES # BLD AUTO: 1.6 K/UL (ref 1–4.8)
LYMPHOCYTES NFR BLD: 13.4 % (ref 18–48)
MAGNESIUM SERPL-MCNC: 1.9 MG/DL (ref 1.6–2.6)
MCH RBC QN AUTO: 35.3 PG (ref 27–31)
MCHC RBC AUTO-ENTMCNC: 33.3 G/DL (ref 32–36)
MCV RBC AUTO: 106 FL (ref 82–98)
MONOCYTES # BLD AUTO: 0.6 K/UL (ref 0.3–1)
MONOCYTES NFR BLD: 4.6 % (ref 4–15)
NEUTROPHILS # BLD AUTO: 9.7 K/UL (ref 1.8–7.7)
NEUTROPHILS NFR BLD: 79 % (ref 38–73)
NRBC BLD-RTO: 0 /100 WBC
PHOSPHATE SERPL-MCNC: 3 MG/DL (ref 2.7–4.5)
PLATELET # BLD AUTO: 92 K/UL (ref 150–450)
PMV BLD AUTO: 9.5 FL (ref 9.2–12.9)
POCT GLUCOSE: 117 MG/DL (ref 70–110)
POCT GLUCOSE: 127 MG/DL (ref 70–110)
POCT GLUCOSE: 158 MG/DL (ref 70–110)
POTASSIUM SERPL-SCNC: 4.4 MMOL/L (ref 3.5–5.1)
PROT SERPL-MCNC: 5.1 G/DL (ref 6–8.4)
RBC # BLD AUTO: 2.55 M/UL (ref 4–5.4)
SODIUM SERPL-SCNC: 137 MMOL/L (ref 136–145)
WBC # BLD AUTO: 12.27 K/UL (ref 3.9–12.7)

## 2022-04-15 PROCEDURE — 80053 COMPREHEN METABOLIC PANEL: CPT | Performed by: PHYSICIAN ASSISTANT

## 2022-04-15 PROCEDURE — 20600001 HC STEP DOWN PRIVATE ROOM

## 2022-04-15 PROCEDURE — 84100 ASSAY OF PHOSPHORUS: CPT | Performed by: PHYSICIAN ASSISTANT

## 2022-04-15 PROCEDURE — 83735 ASSAY OF MAGNESIUM: CPT | Performed by: PHYSICIAN ASSISTANT

## 2022-04-15 PROCEDURE — 25000003 PHARM REV CODE 250: Performed by: INTERNAL MEDICINE

## 2022-04-15 PROCEDURE — 25000003 PHARM REV CODE 250: Performed by: PHYSICIAN ASSISTANT

## 2022-04-15 PROCEDURE — 94761 N-INVAS EAR/PLS OXIMETRY MLT: CPT

## 2022-04-15 PROCEDURE — 99233 PR SUBSEQUENT HOSPITAL CARE,LEVL III: ICD-10-PCS | Mod: ,,, | Performed by: INTERNAL MEDICINE

## 2022-04-15 PROCEDURE — 99233 SBSQ HOSP IP/OBS HIGH 50: CPT | Mod: ,,, | Performed by: INTERNAL MEDICINE

## 2022-04-15 PROCEDURE — 85025 COMPLETE CBC W/AUTO DIFF WBC: CPT | Performed by: PHYSICIAN ASSISTANT

## 2022-04-15 RX ORDER — SUMATRIPTAN 50 MG/1
50 TABLET, FILM COATED ORAL ONCE
Status: COMPLETED | OUTPATIENT
Start: 2022-04-15 | End: 2022-04-15

## 2022-04-15 RX ADMIN — GABAPENTIN 300 MG: 300 CAPSULE ORAL at 12:04

## 2022-04-15 RX ADMIN — DILTIAZEM HYDROCHLORIDE 90 MG: 30 TABLET, FILM COATED ORAL at 04:04

## 2022-04-15 RX ADMIN — DEXAMETHASONE 1 DROP: 1 SUSPENSION OPHTHALMIC at 04:04

## 2022-04-15 RX ADMIN — OXYCODONE 5 MG: 5 TABLET ORAL at 09:04

## 2022-04-15 RX ADMIN — LOSARTAN POTASSIUM 25 MG: 25 TABLET, FILM COATED ORAL at 12:04

## 2022-04-15 RX ADMIN — DILTIAZEM HYDROCHLORIDE 90 MG: 30 TABLET, FILM COATED ORAL at 09:04

## 2022-04-15 RX ADMIN — GABAPENTIN 300 MG: 300 CAPSULE ORAL at 07:04

## 2022-04-15 RX ADMIN — GABAPENTIN 300 MG: 300 CAPSULE ORAL at 04:04

## 2022-04-15 RX ADMIN — Medication 400 MG: at 05:04

## 2022-04-15 RX ADMIN — RIVAROXABAN 20 MG: 20 TABLET, FILM COATED ORAL at 05:04

## 2022-04-15 RX ADMIN — DEXAMETHASONE 1 DROP: 1 SUSPENSION OPHTHALMIC at 09:04

## 2022-04-15 RX ADMIN — ACYCLOVIR 400 MG: 200 CAPSULE ORAL at 09:04

## 2022-04-15 RX ADMIN — DIAZEPAM 10 MG: 5 TABLET ORAL at 12:04

## 2022-04-15 RX ADMIN — DEXAMETHASONE 1 DROP: 1 SUSPENSION OPHTHALMIC at 03:04

## 2022-04-15 RX ADMIN — Medication 400 MG: at 12:04

## 2022-04-15 RX ADMIN — HYDROXYCHLOROQUINE SULFATE 200 MG: 200 TABLET, FILM COATED ORAL at 09:04

## 2022-04-15 RX ADMIN — QUETIAPINE FUMARATE 200 MG: 200 TABLET ORAL at 09:04

## 2022-04-15 RX ADMIN — HYDROXYZINE HYDROCHLORIDE 50 MG: 25 TABLET, FILM COATED ORAL at 04:04

## 2022-04-15 RX ADMIN — DIAZEPAM 10 MG: 5 TABLET ORAL at 09:04

## 2022-04-15 RX ADMIN — HYDROXYZINE HYDROCHLORIDE 50 MG: 25 TABLET, FILM COATED ORAL at 03:04

## 2022-04-15 RX ADMIN — OXCARBAZEPINE 1200 MG: 600 TABLET, FILM COATED ORAL at 09:04

## 2022-04-15 RX ADMIN — PANTOPRAZOLE SODIUM 40 MG: 40 TABLET, DELAYED RELEASE ORAL at 09:04

## 2022-04-15 RX ADMIN — VORTIOXETINE 20 MG: 10 TABLET, FILM COATED ORAL at 09:04

## 2022-04-15 RX ADMIN — BUSPIRONE HYDROCHLORIDE 10 MG: 5 TABLET ORAL at 09:04

## 2022-04-15 RX ADMIN — SUMATRIPTAN SUCCINATE 50 MG: 50 TABLET ORAL at 01:04

## 2022-04-15 RX ADMIN — DILTIAZEM HYDROCHLORIDE 90 MG: 30 TABLET, FILM COATED ORAL at 03:04

## 2022-04-15 RX ADMIN — SODIUM CHLORIDE: 0.9 INJECTION, SOLUTION INTRAVENOUS at 12:04

## 2022-04-15 RX ADMIN — OXCARBAZEPINE 1200 MG: 600 TABLET, FILM COATED ORAL at 01:04

## 2022-04-15 NOTE — PLAN OF CARE
Reviewed plan of care with pt at the beginning of the shift. Pt reported no  n/v throughout the shift. Vital signs were stable throughout the shift.Fall precautions continued for pt. Bed locked and at lowest position. Call light within reach. Pt knows to call if assistance is needed. Notified MD about increase weight gain. No new orders.

## 2022-04-15 NOTE — HPI
Ms. 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 Other medical history includes COPD, HTN, HLD, DM2, TIA, anxiety, depression, GERD, seizure disorder, PAD, gastroparesis, RA, osteoporosis, a-fib, and pacermaker placement. She is admitting today for C4 of consolidation with EWALL ([facility]). She was induced with Vincristine + imatinib, which she recieved inpatient at [facility]. That hospitalization was complicated by acute hypoxic respiratory failure requiring ICU transfer. Post induction BMBx was performed 1/3/22. Showing CR. She tolerated previous cycles of consolidation well. She is feeling well today. Denies fevers, chills, aches, cough, SOB, chest pain, bleeding or bruising, and constipation. She denies any recent sick contacts. She is COVID neg. Reports fatigue and intermittent nausea and dry heaving at home.        Patient information was obtained from patient, spouse/SO, and past medical records.      Oncology History (from Dr. Jenkins's most recent clinic note):   Precursor B-cell acute lymphoblastic leukemia (Ph+)              A. 11/23/2021: Transferred to [facility] from outside hospital for evaluation for acute leukemia              B. 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               C. 11/30/2021 - 12/23/2021: Vincristine + imatinib induction; hospital course was complicated by acute hypoxemic respiratory failure which resolved with diuresis and treatment of ALL

## 2022-04-15 NOTE — ASSESSMENT & PLAN NOTE
- Pt with sudden R sided weakness, blurred vision, headache on 4/14  - Code stroke called and pt evaluated by neuro  - CT head/neck w/o large vessel occlusion or ICH  - Improved shortly after returning to room after imaging, pt's  endorses similar episodes in past but L sided. He reports this can happen with polypharmacy, but the R sided weakness was very strange. We adjusted medications to match home regimen, see pharmacy note  - Given quick resolution and history of similar episodes in past, doubtful this is related to cytarabine. We will continue daily neuro monitoring given risk of neurotoxicity with cytarabine.     RESOLVED

## 2022-04-15 NOTE — ASSESSMENT & PLAN NOTE
From Dr. Jenkins's most recent clinic note  Precursor B-cell acute lymphoblastic leukemia (Ph+)              A. 11/23/2021: Transferred to [facility] from outside hospital for evaluation for acute leukemia              B. 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               C. 11/30/2021 - 12/23/2021: Vincristine + imatinib induction; hospital course was complicated by acute hypoxemic respiratory failure which resolved with diuresis and treatment of ALL     - BMBx from 1/3/22 showing CR  - admitted 4/14 for C4 of consolidation with EWALL ([facility]), today is C4D2  - tolerated previous cycles of consolidation well  - take home Gleevec days 15-28 of consolidation cycles  - follows outpatient with Dr. Garcia for lab monitoring and transfusions  - monitor closely for neuro toxicity with cytarabine

## 2022-04-15 NOTE — PROGRESS NOTES
Roberto Yepez - Oncology ([facility])  Hematology  Bone Marrow Transplant  Progress Note    Patient Name: Deepti Gonzalez  Admission Date: 4/13/2022  Hospital Length of Stay: 2 days  Code Status: Full Code    Subjective:     Interval History: Admitted on 4/13 for C4 of EWALL consolidation (IDAC).     CTA head and neck negative for stenosis. Blurry vision/weakness resolved. Tolerated cytarabine yesterday's afternoon. No concerns this morning.     Objective:     Vital Signs (Most Recent):  Temp: 98.5 °F (36.9 °C) (04/15/22 0332)  Pulse: 78 (04/15/22 0332)  Resp: 17 (04/15/22 0332)  BP: 105/61 (04/15/22 0332)  SpO2: (!) 92 % (04/15/22 0332) Vital Signs (24h Range):  Temp:  [97.3 °F (36.3 °C)-98.6 °F (37 °C)] 98.5 °F (36.9 °C)  Pulse:  [62-94] 78  Resp:  [16-20] 17  SpO2:  [92 %-97 %] 92 %  BP: (105-140)/(55-71) 105/61     Weight: 71.9 kg (158 lb 8.2 oz)  Body mass index is 27.21 kg/m².  Body surface area is 1.8 meters squared.    ECOG SCORE           Intake/Output - Last 3 Shifts         04/13 0700  04/14 0659 04/14 0700  04/15 0659 04/15 0700  04/16 0659    P.O. 480      I.V. (mL/kg) 261 (3.9) 1013.3 (14.1)     IV Piggyback 249.3 0     Total Intake(mL/kg) 990.3 (14.7) 1013.3 (14.1)     Urine (mL/kg/hr) 600 1200 (0.7)     Stool 0 0     Total Output 600 1200     Net +390.3 -186.7            Urine Occurrence 2 x      Stool Occurrence 2 x 1 x             Physical Exam  Constitutional:       General: She is not in acute distress.     Appearance: She is well-developed.   HENT:      Head: Normocephalic and atraumatic.      Mouth/Throat:      Pharynx: No oropharyngeal exudate.   Eyes:      Extraocular Movements: Extraocular movements intact.      Conjunctiva/sclera: Conjunctivae normal.      Pupils: Pupils are equal, round, and reactive to light.   Cardiovascular:      Rate and Rhythm: Normal rate and regular rhythm.      Heart sounds: Normal heart sounds. No murmur heard.  Pulmonary:      Effort: Pulmonary effort is normal.       Breath sounds: Normal breath sounds.   Abdominal:      General: Bowel sounds are normal. There is no distension.      Palpations: Abdomen is soft.      Tenderness: There is no abdominal tenderness.   Musculoskeletal:         General: No deformity. Normal range of motion.      Cervical back: Normal range of motion and neck supple.   Skin:     General: Skin is warm and dry.      Findings: No erythema or rash.   Neurological:      Mental Status: She is alert and oriented to person, place, and time.      Cranial Nerves: No cranial nerve deficit.      Motor: No weakness.   Psychiatric:         Behavior: Behavior normal.         Thought Content: Thought content normal.         Judgment: Judgment normal.       Significant Labs:   CBC:   Recent Labs   Lab 04/14/22  0346 04/15/22  0410   WBC 13.04* 12.27   HGB 9.6* 9.0*   HCT 29.5* 27.0*   * 92*      and CMP:   Recent Labs   Lab 04/14/22 0346 04/15/22  0410    137   K 3.8 4.4    104   CO2 22* 23   * 169*   BUN 13 12   CREATININE 0.6 0.7   CALCIUM 8.3* 8.1*   PROT 5.0* 5.1*   ALBUMIN 2.4* 2.3*   BILITOT 0.5 0.4   ALKPHOS 327* 281*   AST 65* 44*   * 86*   ANIONGAP 10 10   EGFRNONAA >60.0 >60.0         Diagnostic Results:  I have reviewed and interpreted all pertinent imaging results/findings within the past 24 hours.    CTA neck: No significant arterial stenosis throughout the neck.     CT head: No evidence for acute intracranial hemorrhage or definite abnormal parenchymal enhancement.  No sulcal effacement to suggest large territory recent infarction.  Clinical correlation and follow-up as warranted.    Assessment/Plan:     * B-cell acute lymphoblastic leukemia  From Dr. Jenkins's most recent clinic note  Precursor B-cell acute lymphoblastic leukemia (Ph+)              A. 11/23/2021: Transfer[place] to [facility] [place] hospital for evaluation for acute leukemia              B. 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               C. 11/30/2021 - 12/23/2021: Vincristine + imatinib induction; hospital course was complicated by acute hypoxemic respiratory failure which resolved with diuresis and treatment of ALL     - BMBx from 1/3/22 showing CR  - admitted 4/14 for C4 of consolidation with EWALL (IDAC), today is C4D2  - tolerated previous cycles of consolidation well  - take home Gleevec days 15-28 of consolidation cycles  - follows outpatient with Dr. Garcia for lab monitoring and transfusions  - monitor closely for neuro toxicity with cytarabine    Multiple neurological symptoms  - Pt with sudden R sided weakness, blurred vision, headache on 4/14  - Code stroke called and pt evaluated by neuro  - CT head/neck w/o large vessel occlusion or ICH  - Improved shortly after returning to room after imaging, pt's  endorses similar episodes in past but L sided. He reports this can happen with polypharmacy, but the R sided weakness was very strange. We adjusted medications to match home regimen, see pharmacy note  - Given quick resolution and history of similar episodes in past, doubtful this is related to cytarabine. We will continue daily neuro monitoring given risk of neurotoxicity with cytarabine.     RESOLVED    Moderate major depression  - continue home trintellix while inpatient    Insomnia  - continue home seroquel while inpatient    Anxiety  - continue home trintellix and valium while inpatient    GERD (gastroesophageal reflux disease)  - home prilosec not on formulary. Will receive protonix while inpatient.    Hypertension  - continue home losartan while inpatient    Seizure disorder  - continue home oxcarbazepine while inpatient    Type 2 diabetes mellitus, without  long-term current use of insulin  - hold home farxiga while inpatient. Can resume at discharge.  - low dose SSI, achs blood glucose monitoring, and diabetic diet while inpatient    Rheumatoid arthritis involving multiple sites  - continue home plaquenil.   - home leflunomide was recently discontinued by her rheumatologist    History of TIA (transient ischemic attack)  - Continue home ASA and fenofibrate. Holding home statin while inpatient.    Hyperlipidemia  - holding home statin while inpatient    Long term current use of anticoagulant  - continue home xarelto. Hold with plts < 50K.    Paroxysmal atrial fibrillation  - continue home diltiazem and xarelto. Hold xarelto with plt count < 50K.        VTE Risk Mitigation (From admission, onward)         Ordered     rivaroxaban tablet 20 mg  With dinner         04/13/22 1535     heparin, porcine (PF) 100 unit/mL injection flush 500 Units  As needed (PRN)         04/13/22 1634     IP VTE HIGH RISK PATIENT  Once         04/13/22 1535     Place sequential compression device  Until discontinued         04/13/22 1535     Reason for No Pharmacological VTE Prophylaxis  Once        Question:  Reasons:  Answer:  Already adequately anticoagulated on oral Anticoagulants    04/13/22 1535                Sunil Amos MD  Bone Marrow Transplant  Roberto britta - Oncology ([facility])

## 2022-04-15 NOTE — SUBJECTIVE & OBJECTIVE
Subjective:     Interval History: Admitted on 4/13 for C4 of EWALL consolidation (IDAC).     CTA head and neck negative for stenosis. Blurry vision/weakness resolved. Tolerated cytarabine yesterday's afternoon. No concerns this morning.     Objective:     Vital Signs (Most Recent):  Temp: 98.5 °F (36.9 °C) (04/15/22 0332)  Pulse: 78 (04/15/22 0332)  Resp: 17 (04/15/22 0332)  BP: 105/61 (04/15/22 0332)  SpO2: (!) 92 % (04/15/22 0332) Vital Signs (24h Range):  Temp:  [97.3 °F (36.3 °C)-98.6 °F (37 °C)] 98.5 °F (36.9 °C)  Pulse:  [62-94] 78  Resp:  [16-20] 17  SpO2:  [92 %-97 %] 92 %  BP: (105-140)/(55-71) 105/61     Weight: 71.9 kg (158 lb 8.2 oz)  Body mass index is 27.21 kg/m².  Body surface area is 1.8 meters squared.    ECOG SCORE           Intake/Output - Last 3 Shifts         04/13 0700  04/14 0659 04/14 0700  04/15 0659 04/15 0700  04/16 0659    P.O. 480      I.V. (mL/kg) 261 (3.9) 1013.3 (14.1)     IV Piggyback 249.3 0     Total Intake(mL/kg) 990.3 (14.7) 1013.3 (14.1)     Urine (mL/kg/hr) 600 1200 (0.7)     Stool 0 0     Total Output 600 1200     Net +390.3 -186.7            Urine Occurrence 2 x      Stool Occurrence 2 x 1 x             Physical Exam  Constitutional:       General: She is not in acute distress.     Appearance: She is well-developed.   HENT:      Head: Normocephalic and atraumatic.      Mouth/Throat:      Pharynx: No oropharyngeal exudate.   Eyes:      Extraocular Movements: Extraocular movements intact.      Conjunctiva/sclera: Conjunctivae normal.      Pupils: Pupils are equal, round, and reactive to light.   Cardiovascular:      Rate and Rhythm: Normal rate and regular rhythm.      Heart sounds: Normal heart sounds. No murmur heard.  Pulmonary:      Effort: Pulmonary effort is normal.      Breath sounds: Normal breath sounds.   Abdominal:      General: Bowel sounds are normal. There is no distension.      Palpations: Abdomen is soft.      Tenderness: There is no abdominal tenderness.    Musculoskeletal:         General: No deformity. Normal range of motion.      Cervical back: Normal range of motion and neck supple.   Skin:     General: Skin is warm and dry.      Findings: No erythema or rash.   Neurological:      Mental Status: She is alert and oriented to person, place, and time.      Cranial Nerves: No cranial nerve deficit.      Motor: No weakness.   Psychiatric:         Behavior: Behavior normal.         Thought Content: Thought content normal.         Judgment: Judgment normal.       Significant Labs:   CBC:   Recent Labs   Lab 04/14/22  0346 04/15/22  0410   WBC 13.04* 12.27   HGB 9.6* 9.0*   HCT 29.5* 27.0*   * 92*      and CMP:   Recent Labs   Lab 04/14/22  0346 04/15/22  0410    137   K 3.8 4.4    104   CO2 22* 23   * 169*   BUN 13 12   CREATININE 0.6 0.7   CALCIUM 8.3* 8.1*   PROT 5.0* 5.1*   ALBUMIN 2.4* 2.3*   BILITOT 0.5 0.4   ALKPHOS 327* 281*   AST 65* 44*   * 86*   ANIONGAP 10 10   EGFRNONAA >60.0 >60.0         Diagnostic Results:  I have reviewed and interpreted all pertinent imaging results/findings within the past 24 hours.    CTA neck: No significant arterial stenosis throughout the neck.     CT head: No evidence for acute intracranial hemorrhage or definite abnormal parenchymal enhancement.  No sulcal effacement to suggest large territory recent infarction.  Clinical correlation and follow-up as warranted.

## 2022-04-16 LAB
ALBUMIN SERPL BCP-MCNC: 2.1 G/DL (ref 3.5–5.2)
ALP SERPL-CCNC: 221 U/L (ref 55–135)
ALT SERPL W/O P-5'-P-CCNC: 79 U/L (ref 10–44)
ANION GAP SERPL CALC-SCNC: 9 MMOL/L (ref 8–16)
AST SERPL-CCNC: 50 U/L (ref 10–40)
BASOPHILS # BLD AUTO: 0 K/UL (ref 0–0.2)
BASOPHILS NFR BLD: 0 % (ref 0–1.9)
BILIRUB SERPL-MCNC: 0.3 MG/DL (ref 0.1–1)
BUN SERPL-MCNC: 14 MG/DL (ref 6–20)
CALCIUM SERPL-MCNC: 7.8 MG/DL (ref 8.7–10.5)
CHLORIDE SERPL-SCNC: 101 MMOL/L (ref 95–110)
CO2 SERPL-SCNC: 27 MMOL/L (ref 23–29)
CREAT SERPL-MCNC: 0.7 MG/DL (ref 0.5–1.4)
DIFFERENTIAL METHOD: ABNORMAL
EOSINOPHIL # BLD AUTO: 0 K/UL (ref 0–0.5)
EOSINOPHIL NFR BLD: 0.3 % (ref 0–8)
ERYTHROCYTE [DISTWIDTH] IN BLOOD BY AUTOMATED COUNT: 17.7 % (ref 11.5–14.5)
EST. GFR  (AFRICAN AMERICAN): >60 ML/MIN/1.73 M^2
EST. GFR  (NON AFRICAN AMERICAN): >60 ML/MIN/1.73 M^2
GLUCOSE SERPL-MCNC: 93 MG/DL (ref 70–110)
HCT VFR BLD AUTO: 25.2 % (ref 37–48.5)
HGB BLD-MCNC: 8.1 G/DL (ref 12–16)
IMM GRANULOCYTES # BLD AUTO: 0.09 K/UL (ref 0–0.04)
IMM GRANULOCYTES NFR BLD AUTO: 0.9 % (ref 0–0.5)
LYMPHOCYTES # BLD AUTO: 3.1 K/UL (ref 1–4.8)
LYMPHOCYTES NFR BLD: 30.9 % (ref 18–48)
MAGNESIUM SERPL-MCNC: 1.9 MG/DL (ref 1.6–2.6)
MCH RBC QN AUTO: 34.5 PG (ref 27–31)
MCHC RBC AUTO-ENTMCNC: 32.1 G/DL (ref 32–36)
MCV RBC AUTO: 107 FL (ref 82–98)
MONOCYTES # BLD AUTO: 0.5 K/UL (ref 0.3–1)
MONOCYTES NFR BLD: 5.1 % (ref 4–15)
NEUTROPHILS # BLD AUTO: 6.3 K/UL (ref 1.8–7.7)
NEUTROPHILS NFR BLD: 62.8 % (ref 38–73)
NRBC BLD-RTO: 0 /100 WBC
PHOSPHATE SERPL-MCNC: 3.1 MG/DL (ref 2.7–4.5)
PLATELET # BLD AUTO: 84 K/UL (ref 150–450)
PMV BLD AUTO: 9.3 FL (ref 9.2–12.9)
POCT GLUCOSE: 173 MG/DL (ref 70–110)
POCT GLUCOSE: 184 MG/DL (ref 70–110)
POTASSIUM SERPL-SCNC: 4.3 MMOL/L (ref 3.5–5.1)
PROT SERPL-MCNC: 4.7 G/DL (ref 6–8.4)
RBC # BLD AUTO: 2.35 M/UL (ref 4–5.4)
SODIUM SERPL-SCNC: 137 MMOL/L (ref 136–145)
WBC # BLD AUTO: 10.03 K/UL (ref 3.9–12.7)

## 2022-04-16 PROCEDURE — 85025 COMPLETE CBC W/AUTO DIFF WBC: CPT | Performed by: PHYSICIAN ASSISTANT

## 2022-04-16 PROCEDURE — 25000003 PHARM REV CODE 250: Performed by: INTERNAL MEDICINE

## 2022-04-16 PROCEDURE — 25000003 PHARM REV CODE 250: Performed by: PHYSICIAN ASSISTANT

## 2022-04-16 PROCEDURE — 99233 PR SUBSEQUENT HOSPITAL CARE,LEVL III: ICD-10-PCS | Mod: ,,, | Performed by: INTERNAL MEDICINE

## 2022-04-16 PROCEDURE — 63600175 PHARM REV CODE 636 W HCPCS: Performed by: INTERNAL MEDICINE

## 2022-04-16 PROCEDURE — 84100 ASSAY OF PHOSPHORUS: CPT | Performed by: PHYSICIAN ASSISTANT

## 2022-04-16 PROCEDURE — 80053 COMPREHEN METABOLIC PANEL: CPT | Performed by: PHYSICIAN ASSISTANT

## 2022-04-16 PROCEDURE — 99233 SBSQ HOSP IP/OBS HIGH 50: CPT | Mod: ,,, | Performed by: INTERNAL MEDICINE

## 2022-04-16 PROCEDURE — 20600001 HC STEP DOWN PRIVATE ROOM

## 2022-04-16 PROCEDURE — 63600175 PHARM REV CODE 636 W HCPCS: Performed by: STUDENT IN AN ORGANIZED HEALTH CARE EDUCATION/TRAINING PROGRAM

## 2022-04-16 PROCEDURE — 83735 ASSAY OF MAGNESIUM: CPT | Performed by: PHYSICIAN ASSISTANT

## 2022-04-16 RX ORDER — INSULIN ASPART 100 [IU]/ML
0-5 INJECTION, SOLUTION INTRAVENOUS; SUBCUTANEOUS
Status: DISCONTINUED | OUTPATIENT
Start: 2022-04-16 | End: 2022-04-19 | Stop reason: HOSPADM

## 2022-04-16 RX ADMIN — GABAPENTIN 300 MG: 300 CAPSULE ORAL at 11:04

## 2022-04-16 RX ADMIN — DILTIAZEM HYDROCHLORIDE 90 MG: 30 TABLET, FILM COATED ORAL at 09:04

## 2022-04-16 RX ADMIN — Medication 400 MG: at 05:04

## 2022-04-16 RX ADMIN — DEXAMETHASONE SODIUM PHOSPHATE 8 MG: 4 INJECTION INTRA-ARTICULAR; INTRALESIONAL; INTRAMUSCULAR; INTRAVENOUS; SOFT TISSUE at 03:04

## 2022-04-16 RX ADMIN — PANTOPRAZOLE SODIUM 40 MG: 40 TABLET, DELAYED RELEASE ORAL at 10:04

## 2022-04-16 RX ADMIN — DEXAMETHASONE 1 DROP: 1 SUSPENSION OPHTHALMIC at 03:04

## 2022-04-16 RX ADMIN — DIAZEPAM 10 MG: 5 TABLET ORAL at 09:04

## 2022-04-16 RX ADMIN — Medication 400 MG: at 10:04

## 2022-04-16 RX ADMIN — BUSPIRONE HYDROCHLORIDE 10 MG: 5 TABLET ORAL at 09:04

## 2022-04-16 RX ADMIN — OXCARBAZEPINE 1200 MG: 600 TABLET, FILM COATED ORAL at 09:04

## 2022-04-16 RX ADMIN — DEXAMETHASONE 1 DROP: 1 SUSPENSION OPHTHALMIC at 09:04

## 2022-04-16 RX ADMIN — GABAPENTIN 300 MG: 300 CAPSULE ORAL at 03:04

## 2022-04-16 RX ADMIN — DILTIAZEM HYDROCHLORIDE 90 MG: 30 TABLET, FILM COATED ORAL at 03:04

## 2022-04-16 RX ADMIN — QUETIAPINE FUMARATE 200 MG: 200 TABLET ORAL at 09:04

## 2022-04-16 RX ADMIN — LOSARTAN POTASSIUM 25 MG: 25 TABLET, FILM COATED ORAL at 11:04

## 2022-04-16 RX ADMIN — HYDROXYZINE HYDROCHLORIDE 50 MG: 25 TABLET, FILM COATED ORAL at 03:04

## 2022-04-16 RX ADMIN — ONDANSETRON 8 MG: 2 INJECTION INTRAMUSCULAR; INTRAVENOUS at 03:04

## 2022-04-16 RX ADMIN — OXCARBAZEPINE 1200 MG: 600 TABLET, FILM COATED ORAL at 10:04

## 2022-04-16 RX ADMIN — VORTIOXETINE 20 MG: 10 TABLET, FILM COATED ORAL at 10:04

## 2022-04-16 RX ADMIN — INSULIN ASPART 1 UNITS: 100 INJECTION, SOLUTION INTRAVENOUS; SUBCUTANEOUS at 09:04

## 2022-04-16 RX ADMIN — OXYCODONE 5 MG: 5 TABLET ORAL at 09:04

## 2022-04-16 RX ADMIN — CYTARABINE 885 MG: 100 INJECTION, SOLUTION INTRATHECAL; INTRAVENOUS; SUBCUTANEOUS at 03:04

## 2022-04-16 RX ADMIN — DEXAMETHASONE 1 DROP: 1 SUSPENSION OPHTHALMIC at 08:04

## 2022-04-16 RX ADMIN — DILTIAZEM HYDROCHLORIDE 90 MG: 30 TABLET, FILM COATED ORAL at 10:04

## 2022-04-16 RX ADMIN — HYDROXYCHLOROQUINE SULFATE 200 MG: 200 TABLET, FILM COATED ORAL at 08:04

## 2022-04-16 RX ADMIN — DIAZEPAM 10 MG: 5 TABLET ORAL at 08:04

## 2022-04-16 RX ADMIN — ACYCLOVIR 400 MG: 200 CAPSULE ORAL at 10:04

## 2022-04-16 RX ADMIN — RIVAROXABAN 20 MG: 20 TABLET, FILM COATED ORAL at 04:04

## 2022-04-16 RX ADMIN — ACYCLOVIR 400 MG: 200 CAPSULE ORAL at 09:04

## 2022-04-16 RX ADMIN — GABAPENTIN 300 MG: 300 CAPSULE ORAL at 07:04

## 2022-04-16 RX ADMIN — BUSPIRONE HYDROCHLORIDE 10 MG: 5 TABLET ORAL at 10:04

## 2022-04-16 NOTE — PLAN OF CARE
Reviewed plan of care with pt at the beginning of the shift. Pt reported no pain or n/v throughout the shift. Vital signs were stable throughout the shift.Fall precautions continued for pt. Bed locked and at lowest position. Call light within reach. Pt knows to call if assistance is needed.     Cytarabine started through [facility] noted for having positive blood return .  Chemotherapy dosage and BSA checked by two chemotherapy certified nurses prior to administration.  Chemotherapy education done prior to hanging of chemotherapy.  Chemotherapeutic precautions in place throughout therapy.  Will continue to monitor.

## 2022-04-16 NOTE — PLAN OF CARE
POC reviewed at bedside with patient and her spouse this morning.Assessment completed & no alarming findings noted.  Patient remained awake,alert, and oriented throughout shift. Neuro check completed once today per orders.VS remained stable.Blood glucose assessed AC/HS per orders; no insulin coverage needed throughout day.Patient complained of chronic migraine headache today:imitrex and oxycodone given once.Tolerating a diabetic diet without any difficulty; good intake.Ambulate independently; voids per hat; adequate output noted;no BM noted today.Fall/safety precautions maintained throughout day. Will continue routine care.

## 2022-04-16 NOTE — SUBJECTIVE & OBJECTIVE
Subjective:     Interval History: Admitted on 4/13 for C4 of EWALL consolidation (IDAC).     Doing well. No recurrent neurological s/s.     Objective:     Vital Signs (Most Recent):  Temp: 98.4 °F (36.9 °C) (04/16/22 0717)  Pulse: 91 (04/16/22 0717)  Resp: 16 (04/16/22 0717)  BP: (!) 109/58 (04/16/22 0717)  SpO2: 95 % (04/16/22 0717) Vital Signs (24h Range):  Temp:  [97 °F (36.1 °C)-98.4 °F (36.9 °C)] 98.4 °F (36.9 °C)  Pulse:  [80-94] 91  Resp:  [16-20] 16  SpO2:  [93 %-100 %] 95 %  BP: (101-137)/(52-72) 109/58     Weight: 72.5 kg (159 lb 13.3 oz)  Body mass index is 27.44 kg/m².  Body surface area is 1.81 meters squared.    ECOG SCORE           Intake/Output - Last 3 Shifts         04/14 0700  04/15 0659 04/15 0700  04/16 0659 04/16 0700  04/17 0659    P.O.  1081     I.V. (mL/kg) 1013.3 (14.1) 108.4 (1.5)     IV Piggyback 0      Total Intake(mL/kg) 1013.3 (14.1) 1189.4 (16.4)     Urine (mL/kg/hr) 1200 (0.7) 3000 (1.7)     Stool 0      Total Output 1200 3000     Net -186.7 -1810.6            Stool Occurrence 1 x              Physical Exam  Constitutional:       General: She is not in acute distress.     Appearance: She is well-developed.   HENT:      Head: Normocephalic and atraumatic.      Mouth/Throat:      Pharynx: No oropharyngeal exudate.   Eyes:      Extraocular Movements: Extraocular movements intact.      Conjunctiva/sclera: Conjunctivae normal.      Pupils: Pupils are equal, round, and reactive to light.   Cardiovascular:      Rate and Rhythm: Normal rate and regular rhythm.      Heart sounds: Normal heart sounds. No murmur heard.  Pulmonary:      Effort: Pulmonary effort is normal.      Breath sounds: Normal breath sounds.   Abdominal:      General: Bowel sounds are normal. There is no distension.      Palpations: Abdomen is soft.      Tenderness: There is no abdominal tenderness.   Musculoskeletal:         General: No deformity. Normal range of motion.      Cervical back: Normal range of motion and  neck supple.   Skin:     General: Skin is warm and dry.      Findings: No erythema or rash.   Neurological:      Mental Status: She is alert and oriented to person, place, and time.      Cranial Nerves: No cranial nerve deficit.      Motor: No weakness.   Psychiatric:         Behavior: Behavior normal.         Thought Content: Thought content normal.         Judgment: Judgment normal.       Significant Labs:   CBC:   Recent Labs   Lab 04/15/22  0410 04/16/22  0316   WBC 12.27 10.03   HGB 9.0* 8.1*   HCT 27.0* 25.2*   PLT 92* 84*      and CMP:   Recent Labs   Lab 04/15/22  0410 04/16/22 0316    137   K 4.4 4.3    101   CO2 23 27   * 93   BUN 12 14   CREATININE 0.7 0.7   CALCIUM 8.1* 7.8*   PROT 5.1* 4.7*   ALBUMIN 2.3* 2.1*   BILITOT 0.4 0.3   ALKPHOS 281* 221*   AST 44* 50*   ALT 86* 79*   ANIONGAP 10 9   EGFRNONAA >60.0 >60.0         Diagnostic Results:  I have reviewed and interpreted all pertinent imaging results/findings within the past 24 hours.    CTA neck: No significant arterial stenosis throughout the neck.     CT head: No evidence for acute intracranial hemorrhage or definite abnormal parenchymal enhancement.  No sulcal effacement to suggest large territory recent infarction.  Clinical correlation and follow-up as warranted.

## 2022-04-16 NOTE — PLAN OF CARE
Plan of care reviewed with the patient at the beginning of shift. The patient is alert and oriented. GCS 15. Denying complaints at this time. Chemo administered without issue. The patient is independent and ambulatory. Remained free from falls and injuries throughout shift. VSS. Bed in low locked position. Call bell and personal items within reach. Will continue to monitor.

## 2022-04-16 NOTE — ASSESSMENT & PLAN NOTE
From Dr. Jenkins's most recent clinic note  Precursor B-cell acute lymphoblastic leukemia (Ph+)              A. 11/23[place]2021: [place] [facility] [place] for acute leukemia              B. 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               C. 11/30/2021 - 12/23/2021: Vincristine + imatinib induction; hospital course was complicated by acute hypoxemic respiratory failure which resolved with diuresis and treatment of ALL     - BMBx from 1/3/22 showing CR  - admitted 4/14 for C4 of consolidation with EWALL (IDAC), today is C4D2  - tolerated previous cycles of consolidation well  - take home Gleevec days 15-28 of consolidation cycles  - follows outpatient with Dr. Garcia for lab monitoring and transfusions  - monitor closely for neuro toxicity with cytarabine

## 2022-04-16 NOTE — PROGRESS NOTES
Roberto Yepez - Oncology ([facility])  Hematology  Bone Marrow Transplant  Progress Note    Patient Name: Deepti Gonzalez  Admission Date: 4/13/2022  Hospital Length of Stay: 3 days  Code Status: Full Code    Subjective:     Interval History: Admitted on 4/13 for C4 of EWALL consolidation (IDAC).     Doing well. No recurrent neurological s/s.     Objective:     Vital Signs (Most Recent):  Temp: 98.4 °F (36.9 °C) (04/16/22 0717)  Pulse: 91 (04/16/22 0717)  Resp: 16 (04/16/22 0717)  BP: (!) 109/58 (04/16/22 0717)  SpO2: 95 % (04/16/22 0717) Vital Signs (24h Range):  Temp:  [97 °F (36.1 °C)-98.4 °F (36.9 °C)] 98.4 °F (36.9 °C)  Pulse:  [80-94] 91  Resp:  [16-20] 16  SpO2:  [93 %-100 %] 95 %  BP: (101-137)/(52-72) 109/58     Weight: 72.5 kg (159 lb 13.3 oz)  Body mass index is 27.44 kg/m².  Body surface area is 1.81 meters squared.    ECOG SCORE           Intake/Output - Last 3 Shifts         04/14 0700  04/15 0659 04/15 0700  04/16 0659 04/16 0700  04/17 0659    P.O.  1081     I.V. (mL/kg) 1013.3 (14.1) 108.4 (1.5)     IV Piggyback 0      Total Intake(mL/kg) 1013.3 (14.1) 1189.4 (16.4)     Urine (mL/kg/hr) 1200 (0.7) 3000 (1.7)     Stool 0      Total Output 1200 3000     Net -186.7 -1810.6            Stool Occurrence 1 x              Physical Exam  Constitutional:       General: She is not in acute distress.     Appearance: She is well-developed.   HENT:      Head: Normocephalic and atraumatic.      Mouth/Throat:      Pharynx: No oropharyngeal exudate.   Eyes:      Extraocular Movements: Extraocular movements intact.      Conjunctiva/sclera: Conjunctivae normal.      Pupils: Pupils are equal, round, and reactive to light.   Cardiovascular:      Rate and Rhythm: Normal rate and regular rhythm.      Heart sounds: Normal heart sounds. No murmur heard.  Pulmonary:      Effort: Pulmonary effort is normal.      Breath sounds: Normal breath sounds.   Abdominal:      General: Bowel sounds are normal. There is no distension.       Palpations: Abdomen is soft.      Tenderness: There is no abdominal tenderness.   Musculoskeletal:         General: No deformity. Normal range of motion.      Cervical back: Normal range of motion and neck supple.   Skin:     General: Skin is warm and dry.      Findings: No erythema or rash.   Neurological:      Mental Status: She is alert and oriented to person, place, and time.      Cranial Nerves: No cranial nerve deficit.      Motor: No weakness.   Psychiatric:         Behavior: Behavior normal.         Thought Content: Thought content normal.         Judgment: Judgment normal.       Significant Labs:   CBC:   Recent Labs   Lab 04/15/22  0410 04/16/22  0316   WBC 12.27 10.03   HGB 9.0* 8.1*   HCT 27.0* 25.2*   PLT 92* 84*      and CMP:   Recent Labs   Lab 04/15/22  0410 04/16/22  0316    137   K 4.4 4.3    101   CO2 23 27   * 93   BUN 12 14   CREATININE 0.7 0.7   CALCIUM 8.1* 7.8*   PROT 5.1* 4.7*   ALBUMIN 2.3* 2.1*   BILITOT 0.4 0.3   ALKPHOS 281* 221*   AST 44* 50*   ALT 86* 79*   ANIONGAP 10 9   EGFRNONAA >60.0 >60.0         Diagnostic Results:  I have reviewed and interpreted all pertinent imaging results/findings within the past 24 hours.    CTA neck: No significant arterial stenosis throughout the neck.     CT head: No evidence for acute intracranial hemorrhage or definite abnormal parenchymal enhancement.  No sulcal effacement to suggest large territory recent infarction.  Clinical correlation and follow-up as warranted.    Assessment/Plan:     * B-cell acute lymphoblastic leukemia  From Dr. Jenkins's most recent clinic note  Precursor B-cell acute lymphoblastic leukemia (Ph+)              A. 11/23/2021: Transferred to [facility] [place] from outside hospital for evaluation for acute leukemia              B. 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               C. 11/30/2021 - 12/23/2021: Vincristine + imatinib induction; hospital course was complicated by acute hypoxemic respiratory failure which resolved with diuresis and treatment of ALL     - BMBx from 1/3/22 showing CR  - admitted 4/14 for C4 of consolidation with EWALL (IDAC), today is C4D2  - tolerated previous cycles of consolidation well  - take home Gleevec days 15-28 of consolidation cycles  - follows outpatient with Dr. Garcia for lab monitoring and transfusions  - monitor closely for neuro toxicity with cytarabine    Migraine  imitrex as needed    Multiple neurological symptoms  - Pt with sudden R sided weakness, blurred vision, headache on 4/14  - Code stroke called and pt evaluated by neuro  - CT head/neck w/o large vessel occlusion or ICH  - Improved shortly after returning to room after imaging, pt's  endorses similar episodes in past but L sided. He reports this can happen with polypharmacy, but the R sided weakness was very strange. We adjusted medications to match home regimen, see pharmacy note  - Given quick resolution and history of similar episodes in past, doubtful this is related to cytarabine. We will continue daily neuro monitoring given risk of neurotoxicity with cytarabine.     RESOLVED    Moderate major depression  - continue home trintellix while inpatient    Insomnia  - continue home seroquel while inpatient    Anxiety  - continue home trintellix and valium while inpatient    GERD (gastroesophageal reflux disease)  - home prilosec not on formulary. Will receive protonix while inpatient.    Hypertension  - continue home losartan while inpatient    Seizure disorder  - continue home oxcarbazepine while inpatient    Type 2 diabetes mellitus, without long-term current use of insulin  - hold home farxiga while  inpatient. Can resume at discharge.  - low dose SSI, achs blood glucose monitoring, and diabetic diet while inpatient    Rheumatoid arthritis involving multiple sites  - continue home plaquenil.   - home leflunomide was recently discontinued by her rheumatologist    History of TIA (transient ischemic attack)  - Continue home ASA and fenofibrate. Holding home statin while inpatient.    Hyperlipidemia  - holding home statin while inpatient    Long term current use of anticoagulant  - continue home xarelto. Hold with plts < 50K.    Paroxysmal atrial fibrillation  - continue home diltiazem and xarelto. Hold xarelto with plt count < 50K.        VTE Risk Mitigation (From admission, onward)         Ordered     rivaroxaban tablet 20 mg  With dinner         04/13/22 1535     heparin, porcine (PF) 100 unit/mL injection flush 500 Units  As needed (PRN)         04/13/22 1634     IP VTE HIGH RISK PATIENT  Once         04/13/22 1535     Place sequential compression device  Until discontinued         04/13/22 1535     Reason for No Pharmacological VTE Prophylaxis  Once        Question:  Reasons:  Answer:  Already adequately anticoagulated on oral Anticoagulants    04/13/22 1535                Sunil Amos MD  Bone Marrow Transplant  Roberto britta - Oncology ([facility])

## 2022-04-16 NOTE — NURSING
cytarabine (PF) (REED-C) 500 mg/m2 = 885 mg in sodium chloride 0.9% 250 mL chemo infusion started through right chest port noted for having positive blood return over 2 hours.  Chemotherapy dosage and BSA checked by two chemotherapy certified nurses prior to administration.  Chemotherapy education done prior to hanging of chemotherapy.  Chemotherapeutic precautions in place throughout therapy.  Will continue to monitor.

## 2022-04-17 LAB
ALBUMIN SERPL BCP-MCNC: 2.4 G/DL (ref 3.5–5.2)
ALP SERPL-CCNC: 210 U/L (ref 55–135)
ALT SERPL W/O P-5'-P-CCNC: 67 U/L (ref 10–44)
ANION GAP SERPL CALC-SCNC: 10 MMOL/L (ref 8–16)
AST SERPL-CCNC: 29 U/L (ref 10–40)
BASOPHILS # BLD AUTO: 0.01 K/UL (ref 0–0.2)
BASOPHILS NFR BLD: 0.1 % (ref 0–1.9)
BILIRUB SERPL-MCNC: 0.4 MG/DL (ref 0.1–1)
BUN SERPL-MCNC: 13 MG/DL (ref 6–20)
CALCIUM SERPL-MCNC: 8.3 MG/DL (ref 8.7–10.5)
CHLORIDE SERPL-SCNC: 102 MMOL/L (ref 95–110)
CO2 SERPL-SCNC: 25 MMOL/L (ref 23–29)
CREAT SERPL-MCNC: 0.6 MG/DL (ref 0.5–1.4)
DIFFERENTIAL METHOD: ABNORMAL
EOSINOPHIL # BLD AUTO: 0 K/UL (ref 0–0.5)
EOSINOPHIL NFR BLD: 0 % (ref 0–8)
ERYTHROCYTE [DISTWIDTH] IN BLOOD BY AUTOMATED COUNT: 16.7 % (ref 11.5–14.5)
EST. GFR  (AFRICAN AMERICAN): >60 ML/MIN/1.73 M^2
EST. GFR  (NON AFRICAN AMERICAN): >60 ML/MIN/1.73 M^2
GLUCOSE SERPL-MCNC: 175 MG/DL (ref 70–110)
HCT VFR BLD AUTO: 24.7 % (ref 37–48.5)
HGB BLD-MCNC: 8.2 G/DL (ref 12–16)
IMM GRANULOCYTES # BLD AUTO: 0.05 K/UL (ref 0–0.04)
IMM GRANULOCYTES NFR BLD AUTO: 0.7 % (ref 0–0.5)
LYMPHOCYTES # BLD AUTO: 1 K/UL (ref 1–4.8)
LYMPHOCYTES NFR BLD: 14.4 % (ref 18–48)
MAGNESIUM SERPL-MCNC: 1.9 MG/DL (ref 1.6–2.6)
MCH RBC QN AUTO: 34.9 PG (ref 27–31)
MCHC RBC AUTO-ENTMCNC: 33.2 G/DL (ref 32–36)
MCV RBC AUTO: 105 FL (ref 82–98)
MONOCYTES # BLD AUTO: 0.2 K/UL (ref 0.3–1)
MONOCYTES NFR BLD: 2.6 % (ref 4–15)
NEUTROPHILS # BLD AUTO: 5.6 K/UL (ref 1.8–7.7)
NEUTROPHILS NFR BLD: 82.2 % (ref 38–73)
NRBC BLD-RTO: 0 /100 WBC
PHOSPHATE SERPL-MCNC: 3.2 MG/DL (ref 2.7–4.5)
PLATELET # BLD AUTO: 89 K/UL (ref 150–450)
PMV BLD AUTO: 9.2 FL (ref 9.2–12.9)
POCT GLUCOSE: 139 MG/DL (ref 70–110)
POCT GLUCOSE: 191 MG/DL (ref 70–110)
POCT GLUCOSE: 96 MG/DL (ref 70–110)
POTASSIUM SERPL-SCNC: 4.2 MMOL/L (ref 3.5–5.1)
PROT SERPL-MCNC: 5.1 G/DL (ref 6–8.4)
RBC # BLD AUTO: 2.35 M/UL (ref 4–5.4)
SODIUM SERPL-SCNC: 137 MMOL/L (ref 136–145)
WBC # BLD AUTO: 6.86 K/UL (ref 3.9–12.7)

## 2022-04-17 PROCEDURE — 20600001 HC STEP DOWN PRIVATE ROOM

## 2022-04-17 PROCEDURE — 85025 COMPLETE CBC W/AUTO DIFF WBC: CPT | Performed by: PHYSICIAN ASSISTANT

## 2022-04-17 PROCEDURE — 99233 SBSQ HOSP IP/OBS HIGH 50: CPT | Mod: ,,, | Performed by: INTERNAL MEDICINE

## 2022-04-17 PROCEDURE — 83735 ASSAY OF MAGNESIUM: CPT | Performed by: PHYSICIAN ASSISTANT

## 2022-04-17 PROCEDURE — 99233 PR SUBSEQUENT HOSPITAL CARE,LEVL III: ICD-10-PCS | Mod: ,,, | Performed by: INTERNAL MEDICINE

## 2022-04-17 PROCEDURE — 25000003 PHARM REV CODE 250: Performed by: STUDENT IN AN ORGANIZED HEALTH CARE EDUCATION/TRAINING PROGRAM

## 2022-04-17 PROCEDURE — 80053 COMPREHEN METABOLIC PANEL: CPT | Performed by: PHYSICIAN ASSISTANT

## 2022-04-17 PROCEDURE — 25000003 PHARM REV CODE 250: Performed by: PHYSICIAN ASSISTANT

## 2022-04-17 PROCEDURE — 84100 ASSAY OF PHOSPHORUS: CPT | Performed by: PHYSICIAN ASSISTANT

## 2022-04-17 RX ORDER — SUMATRIPTAN 50 MG/1
50 TABLET, FILM COATED ORAL ONCE
Status: COMPLETED | OUTPATIENT
Start: 2022-04-17 | End: 2022-04-17

## 2022-04-17 RX ADMIN — ACYCLOVIR 400 MG: 200 CAPSULE ORAL at 09:04

## 2022-04-17 RX ADMIN — DEXAMETHASONE 1 DROP: 1 SUSPENSION OPHTHALMIC at 03:04

## 2022-04-17 RX ADMIN — OXYCODONE 5 MG: 5 TABLET ORAL at 03:04

## 2022-04-17 RX ADMIN — HYDROXYCHLOROQUINE SULFATE 200 MG: 200 TABLET, FILM COATED ORAL at 09:04

## 2022-04-17 RX ADMIN — Medication 400 MG: at 09:04

## 2022-04-17 RX ADMIN — DILTIAZEM HYDROCHLORIDE 90 MG: 30 TABLET, FILM COATED ORAL at 09:04

## 2022-04-17 RX ADMIN — GABAPENTIN 300 MG: 300 CAPSULE ORAL at 03:04

## 2022-04-17 RX ADMIN — DIAZEPAM 10 MG: 5 TABLET ORAL at 10:04

## 2022-04-17 RX ADMIN — SUMATRIPTAN SUCCINATE 50 MG: 50 TABLET ORAL at 09:04

## 2022-04-17 RX ADMIN — PANTOPRAZOLE SODIUM 40 MG: 40 TABLET, DELAYED RELEASE ORAL at 09:04

## 2022-04-17 RX ADMIN — Medication 400 MG: at 06:04

## 2022-04-17 RX ADMIN — VORTIOXETINE 20 MG: 10 TABLET, FILM COATED ORAL at 12:04

## 2022-04-17 RX ADMIN — OXYCODONE 5 MG: 5 TABLET ORAL at 08:04

## 2022-04-17 RX ADMIN — DIAZEPAM 10 MG: 5 TABLET ORAL at 09:04

## 2022-04-17 RX ADMIN — DILTIAZEM HYDROCHLORIDE 90 MG: 30 TABLET, FILM COATED ORAL at 03:04

## 2022-04-17 RX ADMIN — DEXAMETHASONE 1 DROP: 1 SUSPENSION OPHTHALMIC at 09:04

## 2022-04-17 RX ADMIN — GABAPENTIN 300 MG: 300 CAPSULE ORAL at 11:04

## 2022-04-17 RX ADMIN — BUSPIRONE HYDROCHLORIDE 10 MG: 5 TABLET ORAL at 09:04

## 2022-04-17 RX ADMIN — OXCARBAZEPINE 1200 MG: 600 TABLET, FILM COATED ORAL at 09:04

## 2022-04-17 RX ADMIN — HYDROXYZINE HYDROCHLORIDE 50 MG: 25 TABLET, FILM COATED ORAL at 03:04

## 2022-04-17 RX ADMIN — RIVAROXABAN 20 MG: 20 TABLET, FILM COATED ORAL at 04:04

## 2022-04-17 RX ADMIN — QUETIAPINE FUMARATE 200 MG: 200 TABLET ORAL at 09:04

## 2022-04-17 RX ADMIN — LOSARTAN POTASSIUM 25 MG: 25 TABLET, FILM COATED ORAL at 11:04

## 2022-04-17 NOTE — PLAN OF CARE
Plan of care reviewed with the patient at the beginning of shift. The patient is alert and oriented. GCS 15. Pt had one complaint of pain, PRN pain medication administered. Independent and ambulatory. Remained free from falls and injuries throughout shift. VSS. Bed in low locked position. Call bell and personal items within reach. Will continue to monitor.

## 2022-04-17 NOTE — SUBJECTIVE & OBJECTIVE
Subjective:     Interval History: Admitted on 4/13 for C4 of EWALL consolidation (IDAC).     Doing well. No recurrent neurological s/s.     Objective:     Vital Signs (Most Recent):  Temp: 98.1 °F (36.7 °C) (04/17/22 1116)  Pulse: 92 (04/17/22 1116)  Resp: 15 (04/17/22 1116)  BP: (!) 114/59 (04/17/22 1116)  SpO2: 96 % (04/17/22 1116) Vital Signs (24h Range):  Temp:  [97.9 °F (36.6 °C)-99.5 °F (37.5 °C)] 98.1 °F (36.7 °C)  Pulse:  [81-94] 92  Resp:  [14-20] 15  SpO2:  [94 %-98 %] 96 %  BP: (114-150)/(59-77) 114/59     Weight: 71.9 kg (158 lb 8.2 oz)  Body mass index is 27.21 kg/m².  Body surface area is 1.8 meters squared.    ECOG SCORE           Intake/Output - Last 3 Shifts         04/15 0700  04/16 0659 04/16 0700  04/17 0659 04/17 0700  04/18 0659    P.O. 1081 480     I.V. (mL/kg) 108.4 (1.5)      IV Piggyback       Total Intake(mL/kg) 1189.4 (16.4) 480 (6.7)     Urine (mL/kg/hr) 3000 (1.7) 2800 (1.6)     Stool       Total Output 3000 2800     Net -1810.6 -2320                    Physical Exam  Constitutional:       General: She is not in acute distress.     Appearance: She is well-developed.   HENT:      Head: Normocephalic and atraumatic.      Mouth/Throat:      Pharynx: No oropharyngeal exudate.   Eyes:      Extraocular Movements: Extraocular movements intact.      Conjunctiva/sclera: Conjunctivae normal.      Pupils: Pupils are equal, round, and reactive to light.   Cardiovascular:      Rate and Rhythm: Normal rate and regular rhythm.      Heart sounds: Normal heart sounds. No murmur heard.  Pulmonary:      Effort: Pulmonary effort is normal.      Breath sounds: Normal breath sounds.   Abdominal:      General: Bowel sounds are normal. There is no distension.      Palpations: Abdomen is soft.      Tenderness: There is no abdominal tenderness.   Musculoskeletal:         General: No deformity. Normal range of motion.      Cervical back: Normal range of motion and neck supple.   Skin:     General: Skin is warm  and dry.      Findings: No erythema or rash.   Neurological:      Mental Status: She is alert and oriented to person, place, and time.      Cranial Nerves: No cranial nerve deficit.      Motor: No weakness.   Psychiatric:         Behavior: Behavior normal.         Thought Content: Thought content normal.         Judgment: Judgment normal.       Significant Labs:   CBC:   Recent Labs   Lab 04/16/22 0316 04/17/22  0353   WBC 10.03 6.86   HGB 8.1* 8.2*   HCT 25.2* 24.7*   PLT 84* 89*      and CMP:   Recent Labs   Lab 04/16/22 0316 04/17/22  0353    137   K 4.3 4.2    102   CO2 27 25   GLU 93 175*   BUN 14 13   CREATININE 0.7 0.6   CALCIUM 7.8* 8.3*   PROT 4.7* 5.1*   ALBUMIN 2.1* 2.4*   BILITOT 0.3 0.4   ALKPHOS 221* 210*   AST 50* 29   ALT 79* 67*   ANIONGAP 9 10   EGFRNONAA >60.0 >60.0         Diagnostic Results:  I have reviewed and interpreted all pertinent imaging results/findings within the past 24 hours.    CTA neck: No significant arterial stenosis throughout the neck.     CT head: No evidence for acute intracranial hemorrhage or definite abnormal parenchymal enhancement.  No sulcal effacement to suggest large territory recent infarction.  Clinical correlation and follow-up as warranted.

## 2022-04-17 NOTE — PROGRESS NOTES
[facility] - On[facility] ([facility])  Hematology  Bone Marrow Transplant  Progress Note    Patient Name: Deepti Gonzalez  Admission Date: 4/13/2022  Hospital Length of Stay: 4 days  Code Status: Full Code    Subjective:     Interval History: Admitted on 4/13 for C4 of EWALL consolidation (IDAC).     Doing well. No recurrent neurological s/s.     Objective:     Vital Signs (Most Recent):  Temp: 98.1 °F (36.7 °C) (04/17/22 1116)  Pulse: 92 (04/17/22 1116)  Resp: 15 (04/17/22 1116)  BP: (!) 114/59 (04/17/22 1116)  SpO2: 96 % (04/17/22 1116) Vital Signs (24h Range):  Temp:  [97.9 °F (36.6 °C)-99.5 °F (37.5 °C)] 98.1 °F (36.7 °C)  Pulse:  [81-94] 92  Resp:  [14-20] 15  SpO2:  [94 %-98 %] 96 %  BP: (114-150)/(59-77) 114/59     Weight: 71.9 kg (158 lb 8.2 oz)  Body mass index is 27.21 kg/m².  Body surface area is 1.8 meters squared.    ECOG SCORE           Intake/Output - Last 3 Shifts         04/15 0700  04/16 0659 04/16 0700  04/17 0659 04/17 0700  04/18 0659    P.O. 1081 480     I.V. (mL/kg) 108.4 (1.5)      IV Piggyback       Total Intake(mL/kg) 1189.4 (16.4) 480 (6.7)     Urine (mL/kg/hr) 3000 (1.7) 2800 (1.6)     Stool       Total Output 3000 2800     Net -1810.6 -2320                    Physical Exam  Constitutional:       General: She is not in acute distress.     Appearance: She is well-developed.   HENT:      Head: Normocephalic and atraumatic.      Mouth/Throat:      Pharynx: No oropharyngeal exudate.   Eyes:      Extraocular Movements: Extraocular movements intact.      Conjunctiva/sclera: Conjunctivae normal.      Pupils: Pupils are equal, round, and reactive to light.   Cardiovascular:      Rate and Rhythm: Normal rate and regular rhythm.      Heart sounds: Normal heart sounds. No murmur heard.  Pulmonary:      Effort: Pulmonary effort is normal.      Breath sounds: Normal breath sounds.   Abdominal:      General: Bowel sounds are normal. There is no distension.      Palpations: Abdomen is soft.      Tenderness:  There is no abdominal tenderness.   Musculoskeletal:         General: No deformity. Normal range of motion.      Cervical back: Normal range of motion and neck supple.   Skin:     General: Skin is warm and dry.      Findings: No erythema or rash.   Neurological:      Mental Status: She is alert and oriented to person, place, and time.      Cranial Nerves: No cranial nerve deficit.      Motor: No weakness.   Psychiatric:         Behavior: Behavior normal.         Thought Content: Thought content normal.         Judgment: Judgment normal.       Significant Labs:   CBC:   Recent Labs   Lab 04/16/22 0316 04/17/22  0353   WBC 10.03 6.86   HGB 8.1* 8.2*   HCT 25.2* 24.7*   PLT 84* 89*      and CMP:   Recent Labs   Lab 04/16/22 0316 04/17/22  0353    137   K 4.3 4.2    102   CO2 27 25   GLU 93 175*   BUN 14 13   CREATININE 0.7 0.6   CALCIUM 7.8* 8.3*   PROT 4.7* 5.1*   ALBUMIN 2.1* 2.4*   BILITOT 0.3 0.4   ALKPHOS 221* 210*   AST 50* 29   ALT 79* 67*   ANIONGAP 9 10   EGFRNONAA >60.0 >60.0         Diagnostic Results:  I have reviewed and interpreted all pertinent imaging results/findings within the past 24 hours.    CTA neck: No significant arterial stenosis throughout the neck.     CT head: No evidence for acute intracranial hemorrhage or definite abnormal parenchymal enhancement.  No sulcal effacement to suggest large territory recent infarction.  Clinical correlation and follow-up as warranted.    Assessment/Plan:     * B-cell acute lymphoblastic leukemia  From Dr. Jenkins's most recent clinic note  Precursor B-cell acute lymphoblastic leukemia (Ph+)              A. [place] [facility] [place] for [place]              B. 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               C. 11/30/2021 - 12/23/2021: Vincristine + imatinib induction; hospital course was complicated by acute hypoxemic respiratory failure which resolved with diuresis and treatment of ALL     - BMBx from 1/3/22 showing CR  - admitted 4/14 for C4 of consolidation with EWALL (IDAC), today is C4D2  - tolerated previous cycles of consolidation well  - take home Gleevec days 15-28 of consolidation cycles  - follows outpatient with Dr. Garcia for lab monitoring and transfusions  - monitor closely for neuro toxicity with cytarabine    Migraine  imitrex as needed    Multiple neurological symptoms  - Pt with sudden R sided weakness, blurred vision, headache on 4/14  - Code stroke called and pt evaluated by neuro  - CT head/neck w/o large vessel occlusion or ICH  - Improved shortly after returning to room after imaging, pt's  endorses similar episodes in past but L sided. He reports this can happen with polypharmacy, but the R sided weakness was very strange. We adjusted medications to match home regimen, see pharmacy note  - Given quick resolution and history of similar episodes in past, doubtful this is related to cytarabine. We will continue daily neuro monitoring given risk of neurotoxicity with cytarabine.     RESOLVED    Moderate major depression  - continue home trintellix while inpatient    Insomnia  - continue home seroquel while inpatient    Anxiety  - continue home trintellix and valium while inpatient    GERD (gastroesophageal reflux disease)  - home prilosec not on formulary. Will receive protonix while inpatient.    Hypertension  - continue home losartan while inpatient    Seizure disorder  - continue home oxcarbazepine while inpatient    Type 2 diabetes mellitus, without long-term current use of insulin  - hold home farxiga while inpatient. Can resume at discharge.  - low dose SSI, achs blood glucose monitoring, and  diabetic diet while inpatient    Rheumatoid arthritis involving multiple sites  - continue home plaquenil.   - home leflunomide was recently discontinued by her rheumatologist    History of TIA (transient ischemic attack)  - Continue home ASA and fenofibrate. Holding home statin while inpatient.    Hyperlipidemia  - holding home statin while inpatient    Long term current use of anticoagulant  - continue home xarelto. Hold with plts < 50K.    Paroxysmal atrial fibrillation  - continue home diltiazem and xarelto. Hold xarelto with plt count < 50K.        VTE Risk Mitigation (From admission, onward)         Ordered     rivaroxaban tablet 20 mg  With dinner         04/13/22 1535     heparin, porcine (PF) 100 unit/mL injection flush 500 Units  As needed (PRN)         04/13/22 1634     IP VTE HIGH RISK PATIENT  Once         04/13/22 1535     Place sequential compression device  Until discontinued         04/13/22 1535     Reason for No Pharmacological VTE Prophylaxis  Once        Question:  Reasons:  Answer:  Already adequately anticoagulated on oral Anticoagulants    04/13/22 1535                Sunil Amos MD  Bone Marrow Transplant  [facility] - Oncology ([facility])

## 2022-04-18 ENCOUNTER — PATIENT MESSAGE (OUTPATIENT)
Dept: ADMINISTRATIVE | Facility: OTHER | Age: 52
End: 2022-04-18
Payer: COMMERCIAL

## 2022-04-18 LAB
ALBUMIN SERPL BCP-MCNC: 2.4 G/DL (ref 3.5–5.2)
ALP SERPL-CCNC: 161 U/L (ref 55–135)
ALT SERPL W/O P-5'-P-CCNC: 59 U/L (ref 10–44)
ANION GAP SERPL CALC-SCNC: 7 MMOL/L (ref 8–16)
AST SERPL-CCNC: 25 U/L (ref 10–40)
BASOPHILS # BLD AUTO: 0 K/UL (ref 0–0.2)
BASOPHILS NFR BLD: 0 % (ref 0–1.9)
BILIRUB SERPL-MCNC: 0.4 MG/DL (ref 0.1–1)
BUN SERPL-MCNC: 16 MG/DL (ref 6–20)
CALCIUM SERPL-MCNC: 8.4 MG/DL (ref 8.7–10.5)
CHLORIDE SERPL-SCNC: 100 MMOL/L (ref 95–110)
CO2 SERPL-SCNC: 28 MMOL/L (ref 23–29)
CREAT SERPL-MCNC: 0.7 MG/DL (ref 0.5–1.4)
DIFFERENTIAL METHOD: ABNORMAL
EOSINOPHIL # BLD AUTO: 0 K/UL (ref 0–0.5)
EOSINOPHIL NFR BLD: 0.1 % (ref 0–8)
ERYTHROCYTE [DISTWIDTH] IN BLOOD BY AUTOMATED COUNT: 16.6 % (ref 11.5–14.5)
EST. GFR  (AFRICAN AMERICAN): >60 ML/MIN/1.73 M^2
EST. GFR  (NON AFRICAN AMERICAN): >60 ML/MIN/1.73 M^2
GLUCOSE SERPL-MCNC: 97 MG/DL (ref 70–110)
HCT VFR BLD AUTO: 24.6 % (ref 37–48.5)
HGB BLD-MCNC: 7.9 G/DL (ref 12–16)
IMM GRANULOCYTES # BLD AUTO: 0.05 K/UL (ref 0–0.04)
IMM GRANULOCYTES NFR BLD AUTO: 0.5 % (ref 0–0.5)
LYMPHOCYTES # BLD AUTO: 3.2 K/UL (ref 1–4.8)
LYMPHOCYTES NFR BLD: 32.3 % (ref 18–48)
MAGNESIUM SERPL-MCNC: 1.8 MG/DL (ref 1.6–2.6)
MCH RBC QN AUTO: 33.9 PG (ref 27–31)
MCHC RBC AUTO-ENTMCNC: 32.1 G/DL (ref 32–36)
MCV RBC AUTO: 106 FL (ref 82–98)
MONOCYTES # BLD AUTO: 0.4 K/UL (ref 0.3–1)
MONOCYTES NFR BLD: 3.6 % (ref 4–15)
NEUTROPHILS # BLD AUTO: 6.3 K/UL (ref 1.8–7.7)
NEUTROPHILS NFR BLD: 63.5 % (ref 38–73)
NRBC BLD-RTO: 0 /100 WBC
PHOSPHATE SERPL-MCNC: 3.3 MG/DL (ref 2.7–4.5)
PLATELET # BLD AUTO: 59 K/UL (ref 150–450)
PMV BLD AUTO: 9 FL (ref 9.2–12.9)
POCT GLUCOSE: 181 MG/DL (ref 70–110)
POTASSIUM SERPL-SCNC: 4.2 MMOL/L (ref 3.5–5.1)
PROT SERPL-MCNC: 5 G/DL (ref 6–8.4)
RBC # BLD AUTO: 2.33 M/UL (ref 4–5.4)
SODIUM SERPL-SCNC: 135 MMOL/L (ref 136–145)
WBC # BLD AUTO: 9.99 K/UL (ref 3.9–12.7)

## 2022-04-18 PROCEDURE — 63600175 PHARM REV CODE 636 W HCPCS: Performed by: INTERNAL MEDICINE

## 2022-04-18 PROCEDURE — 20600001 HC STEP DOWN PRIVATE ROOM

## 2022-04-18 PROCEDURE — 25000003 PHARM REV CODE 250: Performed by: INTERNAL MEDICINE

## 2022-04-18 PROCEDURE — 25000003 PHARM REV CODE 250: Performed by: STUDENT IN AN ORGANIZED HEALTH CARE EDUCATION/TRAINING PROGRAM

## 2022-04-18 PROCEDURE — 85025 COMPLETE CBC W/AUTO DIFF WBC: CPT | Performed by: PHYSICIAN ASSISTANT

## 2022-04-18 PROCEDURE — 83735 ASSAY OF MAGNESIUM: CPT | Performed by: PHYSICIAN ASSISTANT

## 2022-04-18 PROCEDURE — 25000003 PHARM REV CODE 250: Performed by: PHYSICIAN ASSISTANT

## 2022-04-18 PROCEDURE — 99233 PR SUBSEQUENT HOSPITAL CARE,LEVL III: ICD-10-PCS | Mod: ,,, | Performed by: INTERNAL MEDICINE

## 2022-04-18 PROCEDURE — 84100 ASSAY OF PHOSPHORUS: CPT | Performed by: PHYSICIAN ASSISTANT

## 2022-04-18 PROCEDURE — 80053 COMPREHEN METABOLIC PANEL: CPT | Performed by: PHYSICIAN ASSISTANT

## 2022-04-18 PROCEDURE — 63600175 PHARM REV CODE 636 W HCPCS: Performed by: STUDENT IN AN ORGANIZED HEALTH CARE EDUCATION/TRAINING PROGRAM

## 2022-04-18 PROCEDURE — 99233 SBSQ HOSP IP/OBS HIGH 50: CPT | Mod: ,,, | Performed by: INTERNAL MEDICINE

## 2022-04-18 RX ORDER — SUMATRIPTAN 50 MG/1
50 TABLET, FILM COATED ORAL ONCE
Status: COMPLETED | OUTPATIENT
Start: 2022-04-18 | End: 2022-04-18

## 2022-04-18 RX ADMIN — QUETIAPINE FUMARATE 200 MG: 200 TABLET ORAL at 09:04

## 2022-04-18 RX ADMIN — CYTARABINE 885 MG: 100 INJECTION, SOLUTION INTRATHECAL; INTRAVENOUS; SUBCUTANEOUS at 03:04

## 2022-04-18 RX ADMIN — ONDANSETRON 8 MG: 2 INJECTION INTRAMUSCULAR; INTRAVENOUS at 03:04

## 2022-04-18 RX ADMIN — DIAZEPAM 10 MG: 5 TABLET ORAL at 09:04

## 2022-04-18 RX ADMIN — RIVAROXABAN 20 MG: 20 TABLET, FILM COATED ORAL at 05:04

## 2022-04-18 RX ADMIN — OXYCODONE 5 MG: 5 TABLET ORAL at 09:04

## 2022-04-18 RX ADMIN — BUSPIRONE HYDROCHLORIDE 10 MG: 5 TABLET ORAL at 09:04

## 2022-04-18 RX ADMIN — LOSARTAN POTASSIUM 25 MG: 25 TABLET, FILM COATED ORAL at 01:04

## 2022-04-18 RX ADMIN — DEXAMETHASONE 1 DROP: 1 SUSPENSION OPHTHALMIC at 03:04

## 2022-04-18 RX ADMIN — ACYCLOVIR 400 MG: 200 CAPSULE ORAL at 09:04

## 2022-04-18 RX ADMIN — HYPROMELLOSE 2910 1 DROP: 5 SOLUTION OPHTHALMIC at 09:04

## 2022-04-18 RX ADMIN — OXYCODONE 5 MG: 5 TABLET ORAL at 03:04

## 2022-04-18 RX ADMIN — HYDROXYCHLOROQUINE SULFATE 200 MG: 200 TABLET, FILM COATED ORAL at 09:04

## 2022-04-18 RX ADMIN — SUMATRIPTAN SUCCINATE 50 MG: 50 TABLET ORAL at 06:04

## 2022-04-18 RX ADMIN — DEXAMETHASONE 1 DROP: 1 SUSPENSION OPHTHALMIC at 09:04

## 2022-04-18 RX ADMIN — DEXAMETHASONE SODIUM PHOSPHATE 8 MG: 4 INJECTION INTRA-ARTICULAR; INTRALESIONAL; INTRAMUSCULAR; INTRAVENOUS; SOFT TISSUE at 03:04

## 2022-04-18 RX ADMIN — OXYCODONE 5 MG: 5 TABLET ORAL at 04:04

## 2022-04-18 RX ADMIN — GABAPENTIN 300 MG: 300 CAPSULE ORAL at 01:04

## 2022-04-18 RX ADMIN — DILTIAZEM HYDROCHLORIDE 90 MG: 30 TABLET, FILM COATED ORAL at 03:04

## 2022-04-18 RX ADMIN — VORTIOXETINE 20 MG: 10 TABLET, FILM COATED ORAL at 09:04

## 2022-04-18 RX ADMIN — PANTOPRAZOLE SODIUM 40 MG: 40 TABLET, DELAYED RELEASE ORAL at 09:04

## 2022-04-18 RX ADMIN — GABAPENTIN 300 MG: 300 CAPSULE ORAL at 08:04

## 2022-04-18 RX ADMIN — GABAPENTIN 300 MG: 300 CAPSULE ORAL at 03:04

## 2022-04-18 RX ADMIN — OXCARBAZEPINE 1200 MG: 600 TABLET, FILM COATED ORAL at 09:04

## 2022-04-18 RX ADMIN — DILTIAZEM HYDROCHLORIDE 90 MG: 30 TABLET, FILM COATED ORAL at 09:04

## 2022-04-18 RX ADMIN — HYDROXYZINE HYDROCHLORIDE 50 MG: 25 TABLET, FILM COATED ORAL at 03:04

## 2022-04-18 RX ADMIN — INSULIN ASPART 2 UNITS: 100 INJECTION, SOLUTION INTRAVENOUS; SUBCUTANEOUS at 05:04

## 2022-04-18 RX ADMIN — INSULIN ASPART 1 UNITS: 100 INJECTION, SOLUTION INTRAVENOUS; SUBCUTANEOUS at 09:04

## 2022-04-18 NOTE — ASSESSMENT & PLAN NOTE
From Dr. Jenkins's most recent clinic note  Precursor B-cell acute lymphoblastic leukemia (Ph+)              A. 11/23/2021: Transferred to [facility] [place] outside hospital for evaluation for acute leukemia              B. 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               C. 11/30/2021 - 12/23/2021: Vincristine + imatinib induction; hospital course was complicated by acute hypoxemic respiratory failure which resolved with diuresis and treatment of ALL     - BMBx from 1/3/22 showing CR  - admitted 4/14 for C4 of consolidation with EWALL (IDAC), today is C4D6  - tolerated previous cycles of consolidation well  - take home Gleevec days 15-28 of consolidation cycles  - follows outpatient with Dr. Garcia for lab monitoring and transfusions  - monitor closely for neuro toxicity with cytarabine

## 2022-04-18 NOTE — PROGRESS NOTES
CHEMO NOTE    Verified chemo consent signed and in chart.    Blood return positive via: R chest port    BSA and dose double checked(agree with orders as written) with: silvio Tim certified RN    Chemo regimen (Drug/Cycle/Day): C4D5    cytarabine (PF) (REED-C) 500 mg/m2 = 885 mg in sodium chloride 0.9% 250 mL chemo infusionOrdered Dose: 500 mg/m2 × 1.77 m2 (Order-Specific) : Admin Dose: 885 mg : 125 mL/hr : Intravenous : Every 12 hours (non-standard times) :       Rate verified and armband double check with second RN: yes    Labs checked: CBC, CMP    Pateitn education offered and stated understanding. Denies questions at this time

## 2022-04-18 NOTE — PROGRESS NOTES
[facility] - Oncology ([facility])  Hematology  Bone Marrow Transplant  Progress Note    Patient Name: Deepti Gonzalez  Admission Date: 4/13/2022  Hospital Length of Stay: 5 days  Code Status: Full Code    Subjective:     Interval History: D6 of C4 of consolidation IDAC per SHAYAN protocol. MISA MADSEN. She will complete her chemotherapy tonight and be discharged in the morning     Objective:     Vital Signs (Most Recent):  Temp: 98.5 °F (36.9 °C) (04/18/22 1600)  Pulse: 97 (04/18/22 1600)  Resp: 18 (04/18/22 1620)  BP: 136/61 (04/18/22 1600)  SpO2: (!) 94 % (04/18/22 1600) Vital Signs (24h Range):  Temp:  [98 °F (36.7 °C)-98.5 °F (36.9 °C)] 98.5 °F (36.9 °C)  Pulse:  [82-97] 97  Resp:  [16-21] 18  SpO2:  [93 %-97 %] 94 %  BP: (107-141)/(51-75) 136/61     Weight: 71.9 kg (158 lb 8.2 oz)  Body mass index is 27.21 kg/m².  Body surface area is 1.8 meters squared.    ECOG SCORE           Intake/Output - Last 3 Shifts         04/16 0700  04/17 0659 04/17 0700  04/18 0659 04/18 0700  04/19 0659    P.O. 480 360     I.V. (mL/kg)       Total Intake(mL/kg) 480 (6.7) 360 (5)     Urine (mL/kg/hr) 2800 (1.6) 1450 (0.8) 2000 (2.8)    Total Output 2800 1450 2000    Net -2320 -1090 -2000                   Physical Exam  Constitutional:       General: She is not in acute distress.     Appearance: She is well-developed.   HENT:      Head: Normocephalic and atraumatic.      Mouth/Throat:      Pharynx: No oropharyngeal exudate.   Eyes:      Extraocular Movements: Extraocular movements intact.      Conjunctiva/sclera: Conjunctivae normal.      Pupils: Pupils are equal, round, and reactive to light.   Cardiovascular:      Rate and Rhythm: Normal rate and regular rhythm.      Heart sounds: Normal heart sounds. No murmur heard.  Pulmonary:      Effort: Pulmonary effort is normal.      Breath sounds: Normal breath sounds.   Abdominal:      General: Bowel sounds are normal. There is no distension.      Palpations: Abdomen is soft.       Tenderness: There is no abdominal tenderness.   Musculoskeletal:         General: No deformity. Normal range of motion.      Cervical back: Normal range of motion and neck supple.   Skin:     General: Skin is warm and dry.      Findings: No erythema or rash.   Neurological:      Mental Status: She is alert and oriented to person, place, and time.      Cranial Nerves: No cranial nerve deficit.      Motor: No weakness.   Psychiatric:         Behavior: Behavior normal.         Thought Content: Thought content normal.         Judgment: Judgment normal.       Significant Labs:   CBC:   Recent Labs   Lab 04/17/22 0353 04/18/22  0343   WBC 6.86 9.99   HGB 8.2* 7.9*   HCT 24.7* 24.6*   PLT 89* 59*      and CMP:   Recent Labs   Lab 04/17/22 0353 04/18/22  0343    135*   K 4.2 4.2    100   CO2 25 28   * 97   BUN 13 16   CREATININE 0.6 0.7   CALCIUM 8.3* 8.4*   PROT 5.1* 5.0*   ALBUMIN 2.4* 2.4*   BILITOT 0.4 0.4   ALKPHOS 210* 161*   AST 29 25   ALT 67* 59*   ANIONGAP 10 7*   EGFRNONAA >60.0 >60.0         Diagnostic Results:  I have reviewed and interpreted all pertinent imaging results/findings within the past 24 hours.    CTA neck: No significant arterial stenosis throughout the neck.     CT head: No evidence for acute intracranial hemorrhage or definite abnormal parenchymal enhancement.  No sulcal effacement to suggest large territory recent infarction.  Clinical correlation and follow-up as warranted.    Assessment/Plan:     * B-cell acute lymphoblastic leukemia  From Dr. Jenkins's most recent clinic note  Precursor B-cell acute lymphoblastic leukemia (Ph+)              A. [place]21[place] [facility] [place]              B. 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               C. 11/30/2021 - 12/23/2021: Vincristine + imatinib induction; hospital course was complicated by acute hypoxemic respiratory failure which resolved with diuresis and treatment of ALL     - BMBx from 1/3/22 showing CR  - admitted 4/14 for C4 of consolidation with EWALL (IDAC), today is C4D6  - tolerated previous cycles of consolidation well  - take home Gleevec days 15-28 of consolidation cycles  - follows outpatient with Dr. Garcia for lab monitoring and transfusions  - monitor closely for neuro toxicity with cytarabine    Migraine  imitrex as needed    Multiple neurological symptoms  - Pt with sudden R sided weakness, blurred vision, headache on 4/14  - Code stroke called and pt evaluated by neuro  - CT head/neck w/o large vessel occlusion or ICH  - Improved shortly after returning to room after imaging, pt's  endorses similar episodes in past but L sided. He reports this can happen with polypharmacy, but the R sided weakness was very strange. We adjusted medications to match home regimen, see pharmacy note  - Given quick resolution and history of similar episodes in past, doubtful this is related to cytarabine. We will continue daily neuro monitoring given risk of neurotoxicity with cytarabine.     RESOLVED    Moderate major depression  - continue home trintellix while inpatient    Insomnia  - continue home seroquel while inpatient    Anxiety  - continue home trintellix and valium while inpatient    GERD (gastroesophageal reflux disease)  - home prilosec not on formulary. Will receive protonix while inpatient.    Hypertension  - continue home losartan while inpatient    Seizure disorder  - continue home oxcarbazepine while inpatient    Type 2 diabetes mellitus, without long-term current use of insulin  - hold home farxiga while inpatient. Can resume at discharge.  - low dose SSI, achs blood glucose  monitoring, and diabetic diet while inpatient    Rheumatoid arthritis involving multiple sites  - continue home plaquenil.   - home leflunomide was recently discontinued by her rheumatologist    History of TIA (transient ischemic attack)  - Continue home ASA and fenofibrate. Holding home statin while inpatient.    Hyperlipidemia  - holding home statin while inpatient    Long term current use of anticoagulant  - continue home xarelto. Hold with plts < 50K.    Paroxysmal atrial fibrillation  - continue home diltiazem and xarelto. Hold xarelto with plt count < 50K.        VTE Risk Mitigation (From admission, onward)         Ordered     rivaroxaban tablet 20 mg  With dinner         04/13/22 1535     heparin, porcine (PF) 100 unit/mL injection flush 500 Units  As needed (PRN)         04/13/22 1634     IP VTE HIGH RISK PATIENT  Once         04/13/22 1535     Place sequential compression device  Until discontinued         04/13/22 1535     Reason for No Pharmacological VTE Prophylaxis  Once        Question:  Reasons:  Answer:  Already adequately anticoagulated on oral Anticoagulants    04/13/22 1535                Disposition: home    Mason Murillo MD  Bone Marrow Transplant  [facility] - On[facility] ([facility])

## 2022-04-18 NOTE — SUBJECTIVE & OBJECTIVE
Subjective:     Interval History: D6 of C4 of consolidation IDAC per EWALL protocol. MISA MADSEN. She will complete her chemotherapy tonight and be discharged in the morning     Objective:     Vital Signs (Most Recent):  Temp: 98.5 °F (36.9 °C) (04/18/22 1600)  Pulse: 97 (04/18/22 1600)  Resp: 18 (04/18/22 1620)  BP: 136/61 (04/18/22 1600)  SpO2: (!) 94 % (04/18/22 1600) Vital Signs (24h Range):  Temp:  [98 °F (36.7 °C)-98.5 °F (36.9 °C)] 98.5 °F (36.9 °C)  Pulse:  [82-97] 97  Resp:  [16-21] 18  SpO2:  [93 %-97 %] 94 %  BP: (107-141)/(51-75) 136/61     Weight: 71.9 kg (158 lb 8.2 oz)  Body mass index is 27.21 kg/m².  Body surface area is 1.8 meters squared.    ECOG SCORE           Intake/Output - Last 3 Shifts         04/16 0700  04/17 0659 04/17 0700  04/18 0659 04/18 0700  04/19 0659    P.O. 480 360     I.V. (mL/kg)       Total Intake(mL/kg) 480 (6.7) 360 (5)     Urine (mL/kg/hr) 2800 (1.6) 1450 (0.8) 2000 (2.8)    Total Output 2800 1450 2000    Net -2320 -1090 -2000                   Physical Exam  Constitutional:       General: She is not in acute distress.     Appearance: She is well-developed.   HENT:      Head: Normocephalic and atraumatic.      Mouth/Throat:      Pharynx: No oropharyngeal exudate.   Eyes:      Extraocular Movements: Extraocular movements intact.      Conjunctiva/sclera: Conjunctivae normal.      Pupils: Pupils are equal, round, and reactive to light.   Cardiovascular:      Rate and Rhythm: Normal rate and regular rhythm.      Heart sounds: Normal heart sounds. No murmur heard.  Pulmonary:      Effort: Pulmonary effort is normal.      Breath sounds: Normal breath sounds.   Abdominal:      General: Bowel sounds are normal. There is no distension.      Palpations: Abdomen is soft.      Tenderness: There is no abdominal tenderness.   Musculoskeletal:         General: No deformity. Normal range of motion.      Cervical back: Normal range of motion and neck supple.   Skin:     General: Skin is  warm and dry.      Findings: No erythema or rash.   Neurological:      Mental Status: She is alert and oriented to person, place, and time.      Cranial Nerves: No cranial nerve deficit.      Motor: No weakness.   Psychiatric:         Behavior: Behavior normal.         Thought Content: Thought content normal.         Judgment: Judgment normal.       Significant Labs:   CBC:   Recent Labs   Lab 04/17/22  0353 04/18/22  0343   WBC 6.86 9.99   HGB 8.2* 7.9*   HCT 24.7* 24.6*   PLT 89* 59*      and CMP:   Recent Labs   Lab 04/17/22 0353 04/18/22  0343    135*   K 4.2 4.2    100   CO2 25 28   * 97   BUN 13 16   CREATININE 0.6 0.7   CALCIUM 8.3* 8.4*   PROT 5.1* 5.0*   ALBUMIN 2.4* 2.4*   BILITOT 0.4 0.4   ALKPHOS 210* 161*   AST 29 25   ALT 67* 59*   ANIONGAP 10 7*   EGFRNONAA >60.0 >60.0         Diagnostic Results:  I have reviewed and interpreted all pertinent imaging results/findings within the past 24 hours.    CTA neck: No significant arterial stenosis throughout the neck.     CT head: No evidence for acute intracranial hemorrhage or definite abnormal parenchymal enhancement.  No sulcal effacement to suggest large territory recent infarction.  Clinical correlation and follow-up as warranted.

## 2022-04-19 VITALS
RESPIRATION RATE: 18 BRPM | WEIGHT: 158.5 LBS | DIASTOLIC BLOOD PRESSURE: 60 MMHG | BODY MASS INDEX: 27.06 KG/M2 | HEART RATE: 88 BPM | OXYGEN SATURATION: 96 % | SYSTOLIC BLOOD PRESSURE: 129 MMHG | TEMPERATURE: 98 F | HEIGHT: 64 IN

## 2022-04-19 LAB
ABO + RH BLD: NORMAL
ALBUMIN SERPL BCP-MCNC: 2.6 G/DL (ref 3.5–5.2)
ALP SERPL-CCNC: 143 U/L (ref 55–135)
ALT SERPL W/O P-5'-P-CCNC: 50 U/L (ref 10–44)
ANION GAP SERPL CALC-SCNC: 9 MMOL/L (ref 8–16)
AST SERPL-CCNC: 18 U/L (ref 10–40)
BASOPHILS # BLD AUTO: 0 K/UL (ref 0–0.2)
BASOPHILS NFR BLD: 0 % (ref 0–1.9)
BILIRUB SERPL-MCNC: 0.4 MG/DL (ref 0.1–1)
BLD GP AB SCN CELLS X3 SERPL QL: NORMAL
BLD PROD TYP BPU: NORMAL
BLOOD UNIT EXPIRATION DATE: NORMAL
BLOOD UNIT TYPE CODE: 7300
BLOOD UNIT TYPE: NORMAL
BUN SERPL-MCNC: 14 MG/DL (ref 6–20)
CALCIUM SERPL-MCNC: 8.4 MG/DL (ref 8.7–10.5)
CHLORIDE SERPL-SCNC: 99 MMOL/L (ref 95–110)
CO2 SERPL-SCNC: 28 MMOL/L (ref 23–29)
CODING SYSTEM: NORMAL
CREAT SERPL-MCNC: 0.5 MG/DL (ref 0.5–1.4)
DIFFERENTIAL METHOD: ABNORMAL
DISPENSE STATUS: NORMAL
EOSINOPHIL # BLD AUTO: 0 K/UL (ref 0–0.5)
EOSINOPHIL NFR BLD: 0 % (ref 0–8)
ERYTHROCYTE [DISTWIDTH] IN BLOOD BY AUTOMATED COUNT: 15.9 % (ref 11.5–14.5)
EST. GFR  (AFRICAN AMERICAN): >60 ML/MIN/1.73 M^2
EST. GFR  (NON AFRICAN AMERICAN): >60 ML/MIN/1.73 M^2
GLUCOSE SERPL-MCNC: 146 MG/DL (ref 70–110)
HCT VFR BLD AUTO: 20.8 % (ref 37–48.5)
HGB BLD-MCNC: 7 G/DL (ref 12–16)
IMM GRANULOCYTES # BLD AUTO: 0.05 K/UL (ref 0–0.04)
IMM GRANULOCYTES NFR BLD AUTO: 0.7 % (ref 0–0.5)
LYMPHOCYTES # BLD AUTO: 1.3 K/UL (ref 1–4.8)
LYMPHOCYTES NFR BLD: 18.8 % (ref 18–48)
MAGNESIUM SERPL-MCNC: 2 MG/DL (ref 1.6–2.6)
MCH RBC QN AUTO: 35.2 PG (ref 27–31)
MCHC RBC AUTO-ENTMCNC: 33.7 G/DL (ref 32–36)
MCV RBC AUTO: 105 FL (ref 82–98)
MONOCYTES # BLD AUTO: 0.2 K/UL (ref 0.3–1)
MONOCYTES NFR BLD: 2.5 % (ref 4–15)
NEUTROPHILS # BLD AUTO: 5.3 K/UL (ref 1.8–7.7)
NEUTROPHILS NFR BLD: 78 % (ref 38–73)
NRBC BLD-RTO: 0 /100 WBC
NUM UNITS TRANS PACKED RBC: NORMAL
PHOSPHATE SERPL-MCNC: 3.4 MG/DL (ref 2.7–4.5)
PLATELET # BLD AUTO: 47 K/UL (ref 150–450)
PMV BLD AUTO: 9.4 FL (ref 9.2–12.9)
POCT GLUCOSE: 108 MG/DL (ref 70–110)
POCT GLUCOSE: 137 MG/DL (ref 70–110)
POTASSIUM SERPL-SCNC: 4.2 MMOL/L (ref 3.5–5.1)
PROT SERPL-MCNC: 4.7 G/DL (ref 6–8.4)
RBC # BLD AUTO: 1.99 M/UL (ref 4–5.4)
SODIUM SERPL-SCNC: 136 MMOL/L (ref 136–145)
WBC # BLD AUTO: 6.85 K/UL (ref 3.9–12.7)

## 2022-04-19 PROCEDURE — P9040 RBC LEUKOREDUCED IRRADIATED: HCPCS | Performed by: NURSE PRACTITIONER

## 2022-04-19 PROCEDURE — 80053 COMPREHEN METABOLIC PANEL: CPT | Performed by: PHYSICIAN ASSISTANT

## 2022-04-19 PROCEDURE — 25000003 PHARM REV CODE 250: Performed by: PHYSICIAN ASSISTANT

## 2022-04-19 PROCEDURE — 84100 ASSAY OF PHOSPHORUS: CPT | Performed by: PHYSICIAN ASSISTANT

## 2022-04-19 PROCEDURE — 32555 ASPIRATE PLEURA W/ IMAGING: CPT

## 2022-04-19 PROCEDURE — 85025 COMPLETE CBC W/AUTO DIFF WBC: CPT | Performed by: PHYSICIAN ASSISTANT

## 2022-04-19 PROCEDURE — 63600175 PHARM REV CODE 636 W HCPCS: Performed by: INTERNAL MEDICINE

## 2022-04-19 PROCEDURE — 86850 RBC ANTIBODY SCREEN: CPT | Performed by: NURSE PRACTITIONER

## 2022-04-19 PROCEDURE — 25000003 PHARM REV CODE 250: Performed by: NURSE PRACTITIONER

## 2022-04-19 PROCEDURE — 99238 PR HOSPITAL DISCHARGE DAY,<30 MIN: ICD-10-PCS | Mod: ,,, | Performed by: INTERNAL MEDICINE

## 2022-04-19 PROCEDURE — 36430 TRANSFUSION BLD/BLD COMPNT: CPT

## 2022-04-19 PROCEDURE — 99238 HOSP IP/OBS DSCHRG MGMT 30/<: CPT | Mod: ,,, | Performed by: INTERNAL MEDICINE

## 2022-04-19 PROCEDURE — 83735 ASSAY OF MAGNESIUM: CPT | Performed by: PHYSICIAN ASSISTANT

## 2022-04-19 PROCEDURE — 86920 COMPATIBILITY TEST SPIN: CPT | Performed by: NURSE PRACTITIONER

## 2022-04-19 RX ORDER — DIPHENHYDRAMINE HCL 25 MG
25 CAPSULE ORAL ONCE AS NEEDED
Status: COMPLETED | OUTPATIENT
Start: 2022-04-19 | End: 2022-04-19

## 2022-04-19 RX ORDER — DEXAMETHASONE SODIUM PHOSPHATE 1 MG/ML
1 SOLUTION/ DROPS OPHTHALMIC EVERY 6 HOURS
Start: 2022-04-19 | End: 2022-04-22

## 2022-04-19 RX ORDER — NYSTATIN 100000 [USP'U]/ML
500000 SUSPENSION ORAL 4 TIMES DAILY
Status: DISCONTINUED | OUTPATIENT
Start: 2022-04-19 | End: 2022-04-19 | Stop reason: HOSPADM

## 2022-04-19 RX ORDER — HYDROCODONE BITARTRATE AND ACETAMINOPHEN 500; 5 MG/1; MG/1
TABLET ORAL
Status: DISCONTINUED | OUTPATIENT
Start: 2022-04-19 | End: 2022-04-19 | Stop reason: HOSPADM

## 2022-04-19 RX ORDER — ACETAMINOPHEN 325 MG/1
650 TABLET ORAL ONCE AS NEEDED
Status: COMPLETED | OUTPATIENT
Start: 2022-04-19 | End: 2022-04-19

## 2022-04-19 RX ADMIN — ACYCLOVIR 400 MG: 200 CAPSULE ORAL at 09:04

## 2022-04-19 RX ADMIN — HYDROXYZINE HYDROCHLORIDE 50 MG: 25 TABLET, FILM COATED ORAL at 03:04

## 2022-04-19 RX ADMIN — DILTIAZEM HYDROCHLORIDE 90 MG: 30 TABLET, FILM COATED ORAL at 09:04

## 2022-04-19 RX ADMIN — DILTIAZEM HYDROCHLORIDE 90 MG: 30 TABLET, FILM COATED ORAL at 03:04

## 2022-04-19 RX ADMIN — HYDROXYCHLOROQUINE SULFATE 200 MG: 200 TABLET, FILM COATED ORAL at 09:04

## 2022-04-19 RX ADMIN — DIPHENHYDRAMINE HYDROCHLORIDE 25 MG: 25 CAPSULE ORAL at 11:04

## 2022-04-19 RX ADMIN — OXYCODONE 5 MG: 5 TABLET ORAL at 09:04

## 2022-04-19 RX ADMIN — GABAPENTIN 300 MG: 300 CAPSULE ORAL at 11:04

## 2022-04-19 RX ADMIN — PANTOPRAZOLE SODIUM 40 MG: 40 TABLET, DELAYED RELEASE ORAL at 09:04

## 2022-04-19 RX ADMIN — ACETAMINOPHEN 650 MG: 325 TABLET ORAL at 11:04

## 2022-04-19 RX ADMIN — HYDROXYZINE HYDROCHLORIDE 50 MG: 25 TABLET, FILM COATED ORAL at 04:04

## 2022-04-19 RX ADMIN — DEXAMETHASONE 1 DROP: 1 SUSPENSION OPHTHALMIC at 02:04

## 2022-04-19 RX ADMIN — GABAPENTIN 300 MG: 300 CAPSULE ORAL at 03:04

## 2022-04-19 RX ADMIN — DEXAMETHASONE 1 DROP: 1 SUSPENSION OPHTHALMIC at 09:04

## 2022-04-19 RX ADMIN — DIAZEPAM 10 MG: 5 TABLET ORAL at 09:04

## 2022-04-19 RX ADMIN — BUSPIRONE HYDROCHLORIDE 10 MG: 5 TABLET ORAL at 09:04

## 2022-04-19 RX ADMIN — VORTIOXETINE 20 MG: 10 TABLET, FILM COATED ORAL at 09:04

## 2022-04-19 RX ADMIN — HEPARIN 500 UNITS: 100 SYRINGE at 04:04

## 2022-04-19 RX ADMIN — DILTIAZEM HYDROCHLORIDE 90 MG: 30 TABLET, FILM COATED ORAL at 04:04

## 2022-04-19 RX ADMIN — OXCARBAZEPINE 1200 MG: 600 TABLET, FILM COATED ORAL at 09:04

## 2022-04-19 RX ADMIN — LOSARTAN POTASSIUM 25 MG: 25 TABLET, FILM COATED ORAL at 11:04

## 2022-04-19 RX ADMIN — DEXAMETHASONE 1 DROP: 1 SUSPENSION OPHTHALMIC at 04:04

## 2022-04-19 RX ADMIN — NYSTATIN 500000 UNITS: 500000 SUSPENSION ORAL at 09:04

## 2022-04-19 NOTE — NURSING
Pt. Will be getting her 1 unit of PRBC shortly with no problems noted or verbalized. Waiting for blood to come from bloodbank.

## 2022-04-19 NOTE — DISCHARGE SUMMARY
[facility] - Oncology ([facility])  Hematology  Bone Marrow Transplant  Discharge Summary      Patient Name: Deepti Gonzalez  MRN: [id number]  Admission Date: 4/13/2022  Hospital Length of Stay: 6 days  Discharge Date and Time: 4/19/2022  5:08 PM  Attending Physician: Dayron Jenkins MD   Discharging Provider: Mirtha Antonio NP  Primary Care Provider: Primary Doctor No    HPI: Ms. 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 Other medical history includes COPD, HTN, HLD, DM2, TIA, anxiety, depression, GERD, seizure disorder, PAD, gastroparesis, RA, osteoporosis, a-fib, and pacermaker placement. She is admitting today for C4 of consolidation with EWALL (IDAC). She was induced with Vincristine + imatinib, which she recieved inpatient at [facility]. That hospitalization was complicated by acute hypoxic respiratory failure requiring ICU transfer. Post induction BMBx was performed 1/3/22. Showing CR. She tolerated previous cycles of consolidation well. She is feeling well today. Denies fevers, chills, aches, cough, SOB, chest pain, bleeding or bruising, and constipation. She denies any recent sick contacts. She is COVID neg. Reports fatigue and intermittent nausea and dry heaving at home.      * No surgery found *     Hospital Course:  Admitted 4/13/22 for C4 (IDAC) of consolidation with EWALL for B-ALL. On C4D2 experienced blurry vision and right side weakness. Treatment held due to concern for cytarabine neuro toxicity.  reported similar episodes at home with left side weakness, which were thought to be seizure activity (hx of seizure disorder). CT head was neg. Symptoms resolved spontaneously shortly after presentation. Cytarabine resumed, and no further neuro symptoms noted or reported. Patient discharged home 4/19/22. She will get twice weekly labs with Dr. Garcia. Spoke with NP at Dr. Garcia's office and discussed our recommendation for neulasta or neupogen to prevent prolonged neutropenia. They are seeking  insurance approval. Ms. Gonzalez will f/u with Dr. Jenkins for readmission on 5/10/22.    B-cell acute lymphoblastic leukemia  From Dr. Jenkins's most recent clinic note  Precursor B-cell acute lymphoblastic leukemia (Ph+)              A. 11/23/2021: [place] [facility] [place] evaluation for acute leukemia              B. 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               C. 11/30/2021 - 12/23/2021: Vincristine + imatinib induction; hospital course was complicated by acute hypoxemic respiratory failure which resolved with diuresis and treatment of ALL     - BMBx from 1/3/22 showing CR  - admitted 4/14 for C4 of consolidation with EWALL (IDAC), today is C4D7  - tolerated previous cycles of consolidation well  - take home Gleevec days 15-28 of consolidation cycles  - follows outpatient with Dr. Garcia for lab monitoring and transfusions. Will discuss starting gcsf outpatient following this cycle with Dr. Garcia's staff.  - monitor closely for neuro toxicity with cytarabine     Migraine  imitrex as needed     Multiple neurological symptoms  - Pt with sudden R sided weakness, blurred vision, headache on 4/14  - Code stroke called and pt evaluated by neuro  - CT head/neck w/o large vessel occlusion or ICH  - Improved shortly after returning to room after imaging, pt's  endorses similar episodes in past but L sided. He reports this can happen with polypharmacy, but the R sided weakness was very strange. We adjusted medications to match home regimen, see pharmacy note  - Given quick resolution and history of similar episodes in past, doubtful this is related to cytarabine. We will continue daily neuro monitoring given risk  of neurotoxicity with cytarabine.      RESOLVED     Moderate major depression  - continue home trintellix while inpatient     Insomnia  - continue home seroquel while inpatient     Anxiety  - continue home trintellix and valium while inpatient     GERD (gastroesophageal reflux disease)  - home prilosec not on formulary. Will receive protonix while inpatient.     Hypertension  - continue home losartan while inpatient     Seizure disorder  - continue home oxcarbazepine while inpatient     Type 2 diabetes mellitus, without long-term current use of insulin  - hold home farxiga while inpatient. Can resume at discharge.  - low dose SSI, achs blood glucose monitoring, and diabetic diet while inpatient     Rheumatoid arthritis involving multiple sites  - continue home plaquenil.   - home leflunomide was recently discontinued by her rheumatologist     History of TIA (transient ischemic attack)  - Continue home ASA and fenofibrate. Holding home statin while inpatient.     Hyperlipidemia  - holding home statin while inpatient     Long term current use of anticoagulant  - continue home xarelto. Hold with plts < 50K.     Paroxysmal atrial fibrillation  - continue home diltiazem and xarelto. Hold xarelto with plt count < 50K.    Goals of Care Treatment Preferences:  Code Status: Full Code    Health care agent:  is NOK.  Health care agent number: No value filed.                   Consults (From admission, onward)        Status Ordering Provider     Inpatient consult to Vascular (Stroke) Neurology  Once        Provider:  (Not yet assigned)    Completed GERTRUDE DENIS     Inpatient consult to Vascular (Stroke) Neurology  Once        Provider:  (Not yet assigned)    Completed TRISH MONTES          Significant Diagnostic Studies: Labs:   CMP   Recent Labs   Lab 04/18/22  0343 04/19/22  0340   * 136   K 4.2 4.2    99   CO2 28 28   GLU 97 146*   BUN 16 14   CREATININE 0.7 0.5   CALCIUM 8.4* 8.4*   PROT 5.0*  4.7*   ALBUMIN 2.4* 2.6*   BILITOT 0.4 0.4   ALKPHOS 161* 143*   AST 25 18   ALT 59* 50*   ANIONGAP 7* 9   ESTGFRAFRICA >60.0 >60.0   EGFRNONAA >60.0 >60.0    and CBC   Recent Labs   Lab 04/18/22  0343 04/19/22  0340   WBC 9.99 6.85   HGB 7.9* 7.0*   HCT 24.6* 20.8*   PLT 59* 47*       Pending Diagnostic Studies:     None        Final Active Diagnoses:    Diagnosis Date Noted POA    PRINCIPAL PROBLEM:  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] 11/24/2021 Yes    Migraine [G43.909] 04/15/2022 Yes    Multiple neurological symptoms [R29.90] 04/14/2022 Unknown    Moderate major depression [F32.1] 11/29/2021 Yes    Insomnia [G47.00] 11/24/2021 Yes    Anxiety [F41.9] 11/24/2021 Yes    GERD (gastroesophageal reflux disease) [K21.9] 11/24/2021 Yes    Hypertension [I10] 11/24/2021 Yes    Seizure disorder [G40.909] 11/24/2021 Yes    Type 2 diabetes mellitus, without long-term current use of insulin [E11.9] 11/24/2021 Yes    Rheumatoid arthritis involving multiple sites [M06.9] 12/22/2017 Yes    History of TIA (transient ischemic attack) [Z86.73] 12/22/2017 Not Applicable    Hyperlipidemia [E78.5] 12/22/2017 Yes    Paroxysmal atrial fibrillation [I48.0] 12/22/2017 Yes    Long term current use of anticoagulant [Z79.01] 12/22/2017 Not Applicable      Problems Resolved During this Admission:      Discharged Condition: stable    Disposition: Home or Self Care    Follow Up:    Patient Instructions:      Diet diabetic     Diet Cardiac     Notify your health care provider if you experience any of the following:  temperature >100.4     Notify your health care provider if you experience any of the following:  persistent nausea and vomiting or diarrhea     Notify your health care provider if you experience any of the following:  severe uncontrolled pain     Notify your health care provider if you experience any of the following:  redness, tenderness, or signs of infection (pain, swelling, redness, odor or green/yellow  discharge around incision site)     Notify your health care provider if you experience any of the following:  difficulty breathing or increased cough     Notify your health care provider if you experience any of the following:  severe persistent headache     Notify your health care provider if you experience any of the following:  persistent dizziness, light-headedness, or visual disturbances     Notify your health care provider if you experience any of the following:  increased confusion or weakness     Activity as tolerated     Medications:  Reconciled Home Medications:      Medication List      START taking these medications    dexamethasone 0.1 % ophthalmic solution  Commonly known as: DECADRON  Place 1 drop into both eyes every 6 (six) hours. for 3 days        CHANGE how you take these medications    DUKE'S SOLUTION (BENADRYL 30 ML, MYLANTA 30 ML, LIDOCAINE 30 ML, NYSTATIN 30 ML)  Take 10 mLs by mouth 4 (four) times daily as needed (mouth pain).  What changed:   · when to take this  · reasons to take this     rivaroxaban 20 mg Tab  Commonly known as: XARELTO  Take 1 tablet (20 mg total) by mouth daily with dinner or evening meal. Due to low platelet count, hold until instructed to resume by provider.  What changed: additional instructions     TRINTELLIX 20 mg Tab  Generic drug: vortioxetine  Take 1 tablet by mouth once daily.  What changed: Another medication with the same name was removed. Continue taking this medication, and follow the directions you see here.        CONTINUE taking these medications    acyclovir 400 MG tablet  Commonly known as: ZOVIRAX  Take 1 tablet (400 mg total) by mouth 2 (two) times daily.     artificial tears 0.5 % ophthalmic solution  Commonly known as: ISOPTO TEARS  Place 1 drop into both eyes 4 (four) times daily as needed.     atorvastatin 80 MG tablet  Commonly known as: LIPITOR  Take 80 mg by mouth once daily.     busPIRone 10 MG tablet  Commonly known as: BUSPAR  Take 10 mg by  mouth 2 (two) times daily.     diazePAM 10 MG Tab  Commonly known as: VALIUM  Take 10 mg by mouth 2 (two) times daily as needed.     diltiaZEM 90 MG tablet  Commonly known as: CARDIZEM  Take 1 tablet (90 mg total) by mouth every 6 (six) hours.     FARXIGA 10 mg tablet  Generic drug: dapagliflozin  Take 10 mg by mouth once daily.     fenofibrate 160 MG Tab  Take 160 mg by mouth once daily.     fluconazole 200 MG Tab  Commonly known as: DIFLUCAN  Take 2 tablets (400 mg total) by mouth once daily.     gabapentin 300 MG capsule  Commonly known as: NEURONTIN  Take 1 capsule (300 mg total) by mouth 3 (three) times daily.     hydrOXYchloroQUINE 200 mg tablet  Commonly known as: PLAQUENIL  Take 200 mg by mouth once daily.     hydrOXYzine 50 MG tablet  Commonly known as: ATARAX  Take 50 mg by mouth 2 (two) times a day.     * imatinib 100 MG Tab  Commonly known as: GLEEVEC  Take 2 tablets (200 mg total) by mouth once daily with 1 other imatinib prescription for 600 mg total on days 15-28 of each cycle during consolidation.Take with a meal and large glass of water     * imatinib 400 MG Tab  Commonly known as: GLEEVEC  Take 1 tablet (400 mg total) by mouth once daily with 1 other imatinib prescription for 600 mg total on days 15-28 of each cycle during consolidation. Take with a meal and large glass of water     levoFLOXacin 500 MG tablet  Commonly known as: LEVAQUIN  Take 1 tablet (500 mg total) by mouth once daily.     losartan 25 MG tablet  Commonly known as: COZAAR  Take 25 mg by mouth once daily.     omeprazole 40 MG capsule  Commonly known as: PRILOSEC  Take 40 mg by mouth once daily.     ondansetron 8 MG Tbdl  Commonly known as: ZOFRAN-ODT  Dissolve 1 tablet (8 mg total) by mouth every 12 (twelve) hours as needed (in case of chemo induced nausea).     * ONETOUCH VERIO TEST STRIPS Strp  Generic drug: blood sugar diagnostic  SMARTSIG:Via Meter 1 to 2 Times Daily     * ONETOUCH VERIO TEST STRIPS Strp  Generic drug: blood  sugar diagnostic  SMARTSIG:Via Meter 1 to 2 Times Daily     OXcarbazepine 600 MG Tab  Commonly known as: TRILEPTAL  Take 1,200 mg by mouth 2 (two) times daily.     oxyCODONE 5 MG immediate release tablet  Commonly known as: ROXICODONE  Take 1 tablet (5 mg total) by mouth every 4 (four) hours as needed for Pain.     polyethylene glycol 17 gram/dose powder  Commonly known as: GLYCOLAX  Dissolve one capful (17 g) in liquid and take by mouth daily as needed (constipation).     prochlorperazine 5 MG tablet  Commonly known as: COMPAZINE  Take 1 tablet (5 mg total) by mouth every 6 (six) hours as needed for Nausea.     QUEtiapine 200 MG Tab  Commonly known as: SEROQUEL  Take 200 mg by mouth every evening.     STOOL SOFTENER-LAXATIVE 8.6-50 mg per tablet  Generic drug: senna-docusate 8.6-50 mg  Take 1 tablet by mouth 2 (two) times daily.     sumatriptan 50 MG tablet  Commonly known as: IMITREX  Take 1 tablet (50 mg total) by mouth daily as needed (headache).     traMADoL 50 mg tablet  Commonly known as: ULTRAM  Take 1 tablet (50 mg total) by mouth every 6 (six) hours as needed for Pain.         * This list has 4 medication(s) that are the same as other medications prescribed for you. Read the directions carefully, and ask your doctor or other care provider to review them with you.            STOP taking these medications    aspirin 81 MG EC tablet  Commonly known as: ECOTRIN     methocarbamoL 500 MG Tab  Commonly known as: ROBAXIN     potassium chloride SA 20 MEQ tablet  Commonly known as: K-KIAN ROJASOR-CON     VistariL 50 MG Cap  Generic drug: hydrOXYzine pamoate            Mirtha Antonio NP  Bone Marrow Transplant  [facility] - Oncology ([facility])

## 2022-04-19 NOTE — NURSING
Pt. Left via w/c with no distress noted or verbalized. Pt. Given D/C instructions and both she and her spouse verbalized understanding of medications,f/u appts., and

## 2022-04-19 NOTE — ASSESSMENT & PLAN NOTE
From Dr. Jenkins's most recent clinic note  Precursor B-cell acute lymphoblastic leukemia (Ph+)              A. 11/23/2021: Transferred to [facility] from outside hospital for evaluation for acute leukemia              B. 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               C. 11/30/2021 - 12/23/2021: Vincristine + imatinib induction; hospital course was complicated by acute hypoxemic respiratory failure which resolved with diuresis and treatment of ALL     - BMBx from 1/3/22 showing CR  - admitted 4/14 for C4 of consolidation with EWALL (IDAC), today is C4D7  - tolerated previous cycles of consolidation well  - take home Gleevec days 15-28 of consolidation cycles  - follows outpatient with Dr. Garcia for lab monitoring and transfusions. Will discuss starting gcsf outpatient following this cycle with Dr. Garcia's staff.  - monitor closely for neuro toxicity with cytarabine

## 2022-04-19 NOTE — NURSING
Pt. Received blood with no reactions noted pt. Tolerated well. Pt. Will be discharged to home after speaking with NP.

## 2022-04-19 NOTE — SUBJECTIVE & OBJECTIVE
Subjective:     Interval History: C4D7 (IDAC) of SHAYAN. Continues to be afebrile. VSS. No neuro symptoms. Transfusing 1 unit prbc for hgb of 7.0. 2 greenish spots noted to tongue. Suspect fungal starting oral nystatin. Will discharge on antifungal ppx per protocol. Will discharge home today upon completion of chemo. Will set up twice weekly labs with Dr. Garcia.    Objective:     Vital Signs (Most Recent):  Temp: 98.4 °F (36.9 °C) (04/19/22 0757)  Pulse: 83 (04/19/22 0757)  Resp: 16 (04/19/22 0757)  BP: 138/69 (04/19/22 0757)  SpO2: 97 % (04/19/22 0757) Vital Signs (24h Range):  Temp:  [97.9 °F (36.6 °C)-98.5 °F (36.9 °C)] 98.4 °F (36.9 °C)  Pulse:  [69-97] 83  Resp:  [16-21] 16  SpO2:  [94 %-98 %] 97 %  BP: (117-150)/(57-72) 138/69     Weight: 71.9 kg (158 lb 8.2 oz)  Body mass index is 27.21 kg/m².  Body surface area is 1.8 meters squared.    ECOG SCORE           [unfilled]    Intake/Output - Last 3 Shifts         04/17 0700  04/18 0659 04/18 0700  04/19 0659 04/19 0700  04/20 0659    P.O. 360      Total Intake(mL/kg) 360 (5)      Urine (mL/kg/hr) 1450 (0.8) 44914 (6.7)     Stool  0     Total Output 1450 06809     Net -[phone] -[id number]            Stool Occurrence  1 x             Physical Exam  Constitutional:       Appearance: She is well-developed.   HENT:      Head: Normocephalic and atraumatic.      Mouth/Throat:      Pharynx: No oropharyngeal exudate.   Eyes:      Conjunctiva/sclera: Conjunctivae normal.      Pupils: Pupils are equal, round, and reactive to light.   Cardiovascular:      Rate and Rhythm: Normal rate and regular rhythm.      Heart sounds: Normal heart sounds. No murmur heard.  Pulmonary:      Effort: Pulmonary effort is normal.      Breath sounds: Normal breath sounds.   Abdominal:      General: Bowel sounds are normal. There is no distension.      Palpations: Abdomen is soft.      Tenderness: There is no abdominal tenderness.   Musculoskeletal:         General: No deformity. Normal range of motion.       Cervical back: Normal range of motion and neck supple.   Skin:     General: Skin is warm and dry.      Findings: Bruising present. No erythema or rash.      Comments: Right chest wall port. Dressing c/d/i. No sign of infection to site.    Neurological:      Mental Status: She is alert and oriented to person, place, and time.   Psychiatric:         Behavior: Behavior normal.         Thought Content: Thought content normal.         Judgment: Judgment normal.       Significant Labs:   CBC:   Recent Labs   Lab 04/18/22  0343 04/19/22  0340   WBC 9.99 6.85   HGB 7.9* 7.0*   HCT 24.6* 20.8*   PLT 59* 47*    and CMP:   Recent Labs   Lab 04/18/22 0343 04/19/22  0340   * 136   K 4.2 4.2    99   CO2 28 28   GLU 97 146*   BUN 16 14   CREATININE 0.7 0.5   CALCIUM 8.4* 8.4*   PROT 5.0* 4.7*   ALBUMIN 2.4* 2.6*   BILITOT 0.4 0.4   ALKPHOS 161* 143*   AST 25 18   ALT 59* 50*   ANIONGAP 7* 9   EGFRNONAA >60.0 >60.0       Diagnostic Results:  I have reviewed all pertinent imaging results/findings within the past 24 hours.

## 2022-04-19 NOTE — PROGRESS NOTES
[facility] ([facility])  Hematology  Bone Marrow Transplant  Progress Note    Patient Name: Deepti Gonzalez  Admission Date: 4/13/2022  Hospital Length of Stay: 6 days  Code Status: Full Code    Subjective:     Interval History: C4D7 (IDAC) of SHAYAN. Continues to be afebrile. VSS. No neuro symptoms. Transfusing 1 unit prbc for hgb of 7.0. Will discharge home today upon completion of chemo. Will set up twice weekly labs with Dr. Garcia.    Objective:     Vital Signs (Most Recent):  Temp: 98.4 °F (36.9 °C) (04/19/22 0757)  Pulse: 83 (04/19/22 0757)  Resp: 16 (04/19/22 0757)  BP: 138/69 (04/19/22 0757)  SpO2: 97 % (04/19/22 0757) Vital Signs (24h Range):  Temp:  [97.9 °F (36.6 °C)-98.5 °F (36.9 °C)] 98.4 °F (36.9 °C)  Pulse:  [69-97] 83  Resp:  [16-21] 16  SpO2:  [94 %-98 %] 97 %  BP: (117-150)/(57-72) 138/69     Weight: 71.9 kg (158 lb 8.2 oz)  Body mass index is 27.21 kg/m².  Body surface area is 1.8 meters squared.    ECOG SCORE           [unfilled]    Intake/Output - Last 3 Shifts         04/17 0700  04/18 0659 04/18 0700  04/19 0659 04/19 0700  04/20 0659    P.O. 360      Total Intake(mL/kg) 360 (5)      Urine (mL/kg/hr) 1450 (0.8) 26129 (6.7)     Stool  0     Total Output 1450 44745     Net -1090 -26079            Stool Occurrence  1 x             Physical Exam  Constitutional:       Appearance: She is well-developed.   HENT:      Head: Normocephalic and atraumatic.      Mouth/Throat:      Pharynx: No oropharyngeal exudate.   Eyes:      Conjunctiva/sclera: Conjunctivae normal.      Pupils: Pupils are equal, round, and reactive to light.   Cardiovascular:      Rate and Rhythm: Normal rate and regular rhythm.      Heart sounds: Normal heart sounds. No murmur heard.  Pulmonary:      Effort: Pulmonary effort is normal.      Breath sounds: Normal breath sounds.   Abdominal:      General: Bowel sounds are normal. There is no distension.      Palpations: Abdomen is soft.      Tenderness: There is no abdominal  tenderness.   Musculoskeletal:         General: No deformity. Normal range of motion.      Cervical back: Normal range of motion and neck supple.   Skin:     General: Skin is warm and dry.      Findings: Bruising present. No erythema or rash.      Comments: Right chest wall port. Dressing c/d/i. No sign of infection to site.    Neurological:      Mental Status: She is alert and oriented to person, place, and time.   Psychiatric:         Behavior: Behavior normal.         Thought Content: Thought content normal.         Judgment: Judgment normal.       Significant Labs:   CBC:   Recent Labs   Lab 04/18/22 0343 04/19/22  0340   WBC 9.99 6.85   HGB 7.9* 7.0*   HCT 24.6* 20.8*   PLT 59* 47*    and CMP:   Recent Labs   Lab 04/18/22 0343 04/19/22 0340   * 136   K 4.2 4.2    99   CO2 28 28   GLU 97 146*   BUN 16 14   CREATININE 0.7 0.5   CALCIUM 8.4* 8.4*   PROT 5.0* 4.7*   ALBUMIN 2.4* 2.6*   BILITOT 0.4 0.4   ALKPHOS 161* 143*   AST 25 18   ALT 59* 50*   ANIONGAP 7* 9   EGFRNONAA >60.0 >60.0       Diagnostic Results:  I have reviewed all pertinent imaging results/findings within the past 24 hours.    Assessment/Plan:     * B-cell acute lymphoblastic leukemia  From Dr. Jenkins's most recent clinic note  Precursor B-cell acute lymphoblastic leukemia (Ph+)              A. 11/23/2021: Transferred to [facility] from outside hospital for evaluation for acute leukemia              B. 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               C. 11/30/2021 - 12/23/2021: Vincristine + imatinib induction; hospital course was complicated by acute hypoxemic respiratory failure which resolved with diuresis and treatment of ALL     - BMBx from 1/3/22  showing CR  - admitted 4/14 for C4 of consolidation with EWALL (IDAC), today is C4D7  - tolerated previous cycles of consolidation well  - take home Gleevec days 15-28 of consolidation cycles  - follows outpatient with Dr. Garcia for lab monitoring and transfusions. Will discuss starting gcsf outpatient following this cycle with Dr. Garcia's staff.  - monitor closely for neuro toxicity with cytarabine    Migraine  imitrex as needed    Multiple neurological symptoms  - Pt with sudden R sided weakness, blurred vision, headache on 4/14  - Code stroke called and pt evaluated by neuro  - CT head/neck w/o large vessel occlusion or ICH  - Improved shortly after returning to room after imaging, pt's  endorses similar episodes in past but L sided. He reports this can happen with polypharmacy, but the R sided weakness was very strange. We adjusted medications to match home regimen, see pharmacy note  - Given quick resolution and history of similar episodes in past, doubtful this is related to cytarabine. We will continue daily neuro monitoring given risk of neurotoxicity with cytarabine.     RESOLVED    Moderate major depression  - continue home trintellix while inpatient    Insomnia  - continue home seroquel while inpatient    Anxiety  - continue home trintellix and valium while inpatient    GERD (gastroesophageal reflux disease)  - home prilosec not on formulary. Will receive protonix while inpatient.    Hypertension  - continue home losartan while inpatient    Seizure disorder  - continue home oxcarbazepine while inpatient    Type 2 diabetes mellitus, without long-term current use of insulin  - hold home farxiga while inpatient. Can resume at discharge.  - low dose SSI, achs blood glucose monitoring, and diabetic diet while inpatient    Rheumatoid arthritis involving multiple sites  - continue home plaquenil.   - home leflunomide was recently discontinued by her rheumatologist    History of TIA (transient ischemic  attack)  - Continue home ASA and fenofibrate. Holding home statin while inpatient.    Hyperlipidemia  - holding home statin while inpatient    Long term current use of anticoagulant  - continue home xarelto. Hold with plts < 50K.    Paroxysmal atrial fibrillation  - continue home diltiazem and xarelto. Hold xarelto with plt count < 50K.        VTE Risk Mitigation (From admission, onward)         Ordered     rivaroxaban tablet 20 mg  With dinner         04/13/22 1535     heparin, porcine (PF) 100 unit/mL injection flush 500 Units  As needed (PRN)         04/13/22 1634     IP VTE HIGH RISK PATIENT  Once         04/13/22 1535     Place sequential compression device  Until discontinued         04/13/22 1535     Reason for No Pharmacological VTE Prophylaxis  Once        Question:  Reasons:  Answer:  Already adequately anticoagulated on oral Anticoagulants    04/13/22 1535                Disposition: Inpatient for chemo. Plan to discharge home today.    Mirtha Antonio, NP  Bone Marrow Transplant  [facility] - On[facility] ([facility])

## 2022-04-21 DIAGNOSIS — R51.9 INTRACTABLE EPISODIC HEADACHE, UNSPECIFIED HEADACHE TYPE: ICD-10-CM

## 2022-04-21 NOTE — TELEPHONE ENCOUNTER
----- Message from Nithya Johnson sent at 4/21/2022  3:23 PM CDT -----  Pt calling in regards to medication    Needs refill: sumatriptan (IMITREX) 50 MG tablet  Pharmacy: [id number] [place] - [id number] [facility] [address]  Phone: [phone] Fax: [phone]

## 2022-04-22 RX ORDER — SUMATRIPTAN 50 MG/1
50 TABLET, FILM COATED ORAL DAILY PRN
Qty: 30 TABLET | Refills: 0 | Status: SHIPPED | OUTPATIENT
Start: 2022-04-22 | End: 2022-08-29 | Stop reason: SDUPTHER

## 2022-04-25 ENCOUNTER — SPECIALTY PHARMACY (OUTPATIENT)
Dept: PHARMACY | Facility: CLINIC | Age: 52
End: 2022-04-25
Payer: COMMERCIAL

## 2022-04-25 NOTE — TELEPHONE ENCOUNTER
Specialty Pharmacy - Refill Coordination    Patient's  states cycle day 1 is on 4/27      Specialty Medication Orders Linked to Encounter    Flowsheet Row Most Recent Value   Medication #1 imatinib (GLEEVEC) 100 MG Tab (Order#[id number], Rx#[id number])   Medication #2 imatinib (GLEEVEC) 400 MG Tab (Order#[id number], Rx#[id number])          Refill Questions - Documented Responses    Flowsheet Row Most Recent Value   Patient Availability and HIPAA Verification    Does patient want to proceed with activity? Yes   HIPAA/medical authority confirmed? Yes   Relationship to patient of person spoken to? Spouse/Significant Other   Refill Screening Questions    Changes to allergies? No   Changes to medications? No   New conditions since last clinic visit? No   Unplanned office visit, urgent care, ED, or hospital admission in the last 4 weeks? No   How does patient/caregiver feel medication is working? Good   How many doses of your specialty medications were missed in the last 4 weeks? 0   Would patient like to speak to a pharmacist? No   When does the patient need to receive the medication? 04/27/22   Refill Delivery Questions    How will the patient receive the medication? Mail   When does the patient need to receive the medication? 04/27/22   Shipping Address Home   Address in [facility] confirmed and updated if neccessary? Yes   Expected Copay ($) 82.93   Is the patient able to afford the medication copay? Yes   Payment Method CC on file   Days supply of Refill 28   Supplies needed? No supplies needed   Refill activity completed? Yes   Refill activity plan Refill scheduled   Shipment/Pickup Date: 04/26/22          Current Outpatient Medications   Medication Sig    acyclovir (ZOVIRAX) 400 MG tablet Take 1 tablet (400 mg total) by mouth 2 (two) times daily.    artificial tears (ISOPTO TEARS) 0.5 % ophthalmic solution Place 1 drop into both eyes 4 (four) times daily as needed.    atorvastatin (LIPITOR) 80 MG  tablet Take 80 mg by mouth once daily.    blood sugar diagnostic (ONETOUCH VERIO TEST STRIPS) Strp SMARTSIG:Via Meter 1 to 2 Times Daily    busPIRone (BUSPAR) 10 MG tablet Take 10 mg by mouth 2 (two) times daily.    dapagliflozin (FARXIGA) 10 mg tablet Take 10 mg by mouth once daily.    diazePAM (VALIUM) 10 MG Tab Take 10 mg by mouth 2 (two) times daily as needed.    diltiaZEM (CARDIZEM) 90 MG tablet Take 1 tablet (90 mg total) by mouth every 6 (six) hours.    duke's soln (benadryl 30 mL, mylanta 30 mL, LIDOcaine 30 mL, nystatin 30 mL) 120mL Take 10 mLs by mouth 4 (four) times daily as needed (mouth pain).    fenofibrate 160 MG Tab Take 160 mg by mouth once daily.    fluconazole (DIFLUCAN) 200 MG Tab Take 2 tablets (400 mg total) by mouth once daily.    gabapentin (NEURONTIN) 300 MG capsule Take 1 capsule (300 mg total) by mouth 3 (three) times daily.    hydrOXYchloroQUINE (PLAQUENIL) 200 mg tablet Take 200 mg by mouth once daily.    hydrOXYzine (ATARAX) 50 MG tablet Take 50 mg by mouth 2 (two) times a day.    imatinib (GLEEVEC) 100 MG Tab Take 2 tablets (200 mg total) by mouth once daily with 1 other imatinib prescription for 600 mg total on days 15-28 of each cycle during consolidation.Take with a meal and large glass of water    imatinib (GLEEVEC) 400 MG Tab Take 1 tablet (400 mg total) by mouth once daily with 1 other imatinib prescription for 600 mg total on days 15-28 of each cycle during consolidation. Take with a meal and large glass of water    levoFLOXacin (LEVAQUIN) 500 MG tablet Take 1 tablet (500 mg total) by mouth once daily.    losartan (COZAAR) 25 MG tablet Take 25 mg by mouth once daily.    omeprazole (PRILOSEC) 40 MG capsule Take 40 mg by mouth once daily.    ondansetron (ZOFRAN-ODT) 8 MG TbDL Dissolve 1 tablet (8 mg total) by mouth every 12 (twelve) hours as needed (in case of chemo induced nausea).    ONETOUCH VERIO TEST STRIPS Strp SMARTSIG:Via Meter 1 to 2 Times Daily     OXcarbazepine (TRILEPTAL) 600 MG Tab Take 1,200 mg by mouth 2 (two) times daily.    oxyCODONE (ROXICODONE) 5 MG immediate release tablet Take 1 tablet (5 mg total) by mouth every 4 (four) hours as needed for Pain.    polyethylene glycol (GLYCOLAX) 17 gram/dose powder Dissolve one capful (17 g) in liquid and take by mouth daily as needed (constipation). (Patient not taking: No sig reported)    prochlorperazine (COMPAZINE) 5 MG tablet Take 1 tablet (5 mg total) by mouth every 6 (six) hours as needed for Nausea.    QUEtiapine (SEROQUEL) 200 MG Tab Take 200 mg by mouth every evening.    rivaroxaban (XARELTO) 20 mg Tab Take 1 tablet (20 mg total) by mouth daily with dinner or evening meal. Due to low platelet count, hold until instructed to resume by provider.    senna-docusate 8.6-50 mg (PERICOLACE) 8.6-50 mg per tablet Take 1 tablet by mouth 2 (two) times daily.    sumatriptan (IMITREX) 50 MG tablet Take 1 tablet (50 mg total) by mouth daily as needed (headache).    traMADoL (ULTRAM) 50 mg tablet Take 1 tablet (50 mg total) by mouth every 6 (six) hours as needed for Pain.    TRINTELLIX 20 mg Tab Take 1 tablet by mouth once daily.   Last reviewed on 4/13/2022  3:25 PM by Joann Linares RN    Review of patient's allergies indicates:   Allergen Reactions    Levetiracetam      Other reaction(s): Unknown    Last reviewed on  4/14/2022 10:26 AM by Scotty Gonzalez      Tasks added this encounter   No tasks added.   Tasks due within next 3 months   4/25/2022 - Refill Call (Auto Added)     KAMILA ULLOA, PharmD  [facility]  [address] [place] [id number]  Phone: [phone]  Fax: [phone]

## 2022-04-27 ENCOUNTER — TELEPHONE (OUTPATIENT)
Dept: HEMATOLOGY/ONCOLOGY | Facility: CLINIC | Age: 52
End: 2022-04-27
Payer: COMMERCIAL

## 2022-04-27 NOTE — TELEPHONE ENCOUNTER
"----- Message from Edgar Birmingham sent at 4/27/2022  2:08 PM CDT -----  Consult/Advisory:          Name Of Caller: Mannie (Spouse)      Contact Preference?: [phone]         Provider Name: Gregory      Does patient feel the need to be seen today? No      What is the nature of the call?: Calling to speak w/ nurse about pt being prescribed mouthwash for sores.          Additional Notes:  "Thank you for all that you do for our patients"      "

## 2022-04-27 NOTE — TELEPHONE ENCOUNTER
"Spoke with patient . Reviewed that previous bcr/abl was negative and that most recent bcr abl was 0.63%. Explained that best next step is to complete a bone marrow biopsy to better understand current disease process which will inform us if we need to change therapy regimen. Patient  understood. Explained that I was waiting to hear back if the Monday at 3:30 spot is open to schedule as they travel far away and they would prefer this time. If not they know the Wednesday at 8am bmbx spot is being held for them and they are willing to drive in early. Patient  is aware we will be calling back to schedule bmbx labs, and follow up with dr. Jenkins to review results.     "Her labs are concerning for relapse. We need to arrange follow-up appointment with labs (CBC, CMP, LDH, mag, phos, type and screen, uric acid) and bone marrow biopsy in clinic as soon as possible. "- Dr. Jenkins     "

## 2022-04-27 NOTE — TELEPHONE ENCOUNTER
Patient called due to patient having mucositis and being low on dukes solution. Helped refill dukes solution to local pharmacy

## 2022-04-28 ENCOUNTER — TELEPHONE (OUTPATIENT)
Dept: HEMATOLOGY/ONCOLOGY | Facility: CLINIC | Age: 52
End: 2022-04-28
Payer: COMMERCIAL

## 2022-04-28 NOTE — TELEPHONE ENCOUNTER
Spoke to Mannie, will work on getting patient scheduled for 8am on Monday. Will reach out with anything additional needed.

## 2022-04-28 NOTE — TELEPHONE ENCOUNTER
"----- Message from Edgar Birmingham sent at 4/28/2022  1:34 PM CDT -----  Consult/Advisory:          Name Of Caller:  Mannie (Spouse)      Contact Preference?: [phone]        Provider Name: Gregory      Does patient feel the need to be seen today? No      What is the nature of the call?: Calling to inform office that the pt can make the Wednsday appt in question (as opposed to the Monday appt)          Additional Notes:  "Thank you for all that you do for our patients"      "

## 2022-05-02 ENCOUNTER — TELEPHONE (OUTPATIENT)
Dept: HEMATOLOGY/ONCOLOGY | Facility: CLINIC | Age: 52
End: 2022-05-02
Payer: COMMERCIAL

## 2022-05-02 NOTE — TELEPHONE ENCOUNTER
Reviewed patient symptoms and recent labs and vitals from discharge. Advised medications she can take per dr. trimble

## 2022-05-02 NOTE — TELEPHONE ENCOUNTER
"----- Message from Nithya Johnson sent at 5/2/2022 12:53 PM CDT -----         Consult/Advisory:      Name Of Caller:Mannie Shaikh?:[phone]         What is the nature of the call?:He wants to know what she can take for cold and congestion       Additional Notes:  "Thank you for all that you do for our patients'"                           "

## 2022-05-03 DIAGNOSIS — C91.00 B-CELL ACUTE LYMPHOBLASTIC LEUKEMIA: Primary | ICD-10-CM

## 2022-05-04 ENCOUNTER — PROCEDURE VISIT (OUTPATIENT)
Dept: HEMATOLOGY/ONCOLOGY | Facility: CLINIC | Age: 52
End: 2022-05-04
Payer: COMMERCIAL

## 2022-05-04 ENCOUNTER — TELEPHONE (OUTPATIENT)
Dept: PALLIATIVE MEDICINE | Facility: CLINIC | Age: 52
End: 2022-05-04
Payer: COMMERCIAL

## 2022-05-04 VITALS
HEART RATE: 120 BPM | DIASTOLIC BLOOD PRESSURE: 63 MMHG | SYSTOLIC BLOOD PRESSURE: 137 MMHG | TEMPERATURE: 99 F | RESPIRATION RATE: 18 BRPM | OXYGEN SATURATION: 95 %

## 2022-05-04 DIAGNOSIS — C91.00 B-CELL ACUTE LYMPHOBLASTIC LEUKEMIA: ICD-10-CM

## 2022-05-04 PROCEDURE — 88305 TISSUE EXAM BY PATHOLOGIST: CPT | Mod: 59 | Performed by: PATHOLOGY

## 2022-05-04 PROCEDURE — 88311 DECALCIFY TISSUE: CPT | Performed by: PATHOLOGY

## 2022-05-04 PROCEDURE — 88237 TISSUE CULTURE BONE MARROW: CPT | Performed by: NURSE PRACTITIONER

## 2022-05-04 PROCEDURE — 88185 FLOWCYTOMETRY/TC ADD-ON: CPT | Mod: 59 | Performed by: NURSE PRACTITIONER

## 2022-05-04 PROCEDURE — 88185 FLOWCYTOMETRY/TC ADD-ON: CPT | Mod: 59 | Performed by: PATHOLOGY

## 2022-05-04 PROCEDURE — 88305 TISSUE EXAM BY PATHOLOGIST: CPT | Mod: 26,,, | Performed by: PATHOLOGY

## 2022-05-04 PROCEDURE — 38222 PR BONE MARROW BIOPSY(IES) W/ASPIRATION(S); DIAGNOSTIC: ICD-10-PCS | Mod: LT,S$GLB,, | Performed by: NURSE PRACTITIONER

## 2022-05-04 PROCEDURE — 88189 PR  FLOWCYTOMETRY/READ, 16 & > MARKERS: ICD-10-PCS | Mod: ,,, | Performed by: PATHOLOGY

## 2022-05-04 PROCEDURE — 88313 PR  SPECIAL STAINS,GROUP II: ICD-10-PCS | Mod: 26,,, | Performed by: PATHOLOGY

## 2022-05-04 PROCEDURE — 88313 SPECIAL STAINS GROUP 2: CPT | Performed by: PATHOLOGY

## 2022-05-04 PROCEDURE — 88275 CYTOGENETICS 100-300: CPT | Performed by: NURSE PRACTITIONER

## 2022-05-04 PROCEDURE — 88311 PR  DECALCIFY TISSUE: ICD-10-PCS | Mod: 26,,, | Performed by: PATHOLOGY

## 2022-05-04 PROCEDURE — 88299 UNLISTED CYTOGENETIC STUDY: CPT | Performed by: NURSE PRACTITIONER

## 2022-05-04 PROCEDURE — 38222 DX BONE MARROW BX & ASPIR: CPT | Mod: LT,S$GLB,, | Performed by: NURSE PRACTITIONER

## 2022-05-04 PROCEDURE — 88188 FLOWCYTOMETRY/READ 9-15: CPT

## 2022-05-04 PROCEDURE — 88313 SPECIAL STAINS GROUP 2: CPT | Mod: 26,,, | Performed by: PATHOLOGY

## 2022-05-04 PROCEDURE — 88311 DECALCIFY TISSUE: CPT | Mod: 26,,, | Performed by: PATHOLOGY

## 2022-05-04 PROCEDURE — 81207 BCR/ABL1 GENE MINOR BP: CPT | Performed by: NURSE PRACTITIONER

## 2022-05-04 PROCEDURE — 88184 FLOWCYTOMETRY/ TC 1 MARKER: CPT

## 2022-05-04 PROCEDURE — 88189 FLOWCYTOMETRY/READ 16 & >: CPT | Mod: ,,, | Performed by: PATHOLOGY

## 2022-05-04 PROCEDURE — 88184 FLOWCYTOMETRY/ TC 1 MARKER: CPT | Performed by: PATHOLOGY

## 2022-05-04 PROCEDURE — 88305 TISSUE EXAM BY PATHOLOGIST: ICD-10-PCS | Mod: 26,,, | Performed by: PATHOLOGY

## 2022-05-04 PROCEDURE — 85097 PR  BONE MARROW,SMEAR INTERPRETATION: ICD-10-PCS | Mod: ,,, | Performed by: PATHOLOGY

## 2022-05-04 PROCEDURE — 85097 BONE MARROW INTERPRETATION: CPT | Mod: ,,, | Performed by: PATHOLOGY

## 2022-05-04 RX ORDER — LIDOCAINE HYDROCHLORIDE 20 MG/ML
10 INJECTION, SOLUTION INFILTRATION; PERINEURAL
Status: COMPLETED | OUTPATIENT
Start: 2022-05-04 | End: 2022-05-04

## 2022-05-04 RX ADMIN — LIDOCAINE HYDROCHLORIDE 10 ML: 20 INJECTION, SOLUTION INFILTRATION; PERINEURAL at 10:05

## 2022-05-04 NOTE — TELEPHONE ENCOUNTER
Mr Chand called and wanted to know if Ms Gonzalez could be seen today because they are already here. I explained to the pt that Dr Mccord is not in clinic today and I offered them her next available appt which is 05/12. Mr Chand declined that appt date and wanted an appt for 05/09 but I explained that Dr Mccord is not in clinic on that day either. I offered them our next available appt which is 06/02 and they accepted that date. Mrs Gonzalez appt was rescheduled to 06/02 at 11 am

## 2022-05-04 NOTE — PROCEDURES
Bone marrow    Date/Time: 5/4/2022 8:00 AM  Performed by: Carolyn Mchugh NP  Authorized by: Carolyn Mchugh NP     Aspiration?: Yes   Biopsy?: Yes      PROCEDURE NOTE:  Bone Marrow aspiration and biopsy  Indication: restaging AML post salvage induction  Consent: Informed consent was obtained from patient.  Timeout: Done and documented.  Position: Prone  Site: Left posterior illiac crest.  Prep: Betadine.  Needle used: 11 gauge Jamshidi needle.  Anesthetic: 2% lidocaine 8 cc.  Biopsy: The biopsy needle was introduced into the marrow cavity and 30 cc's of  aspirate was obtained without complications and sent for flow, cytogenetics, ALL FISH, BCR/ABL p190 and MRD. Core biopsy obtained without difficulty and sent for routine histologic examination.  Complications: None.  EBL: minimal  Disposition: The patient was discharged home after laying supine for 20 minutes.    Caorlyn Mchugh NP  Hematology/BMT

## 2022-05-06 ENCOUNTER — TELEPHONE (OUTPATIENT)
Dept: HEMATOLOGY/ONCOLOGY | Facility: CLINIC | Age: 52
End: 2022-05-06
Payer: COMMERCIAL

## 2022-05-06 LAB
ANNOTATION COMMENT IMP: NORMAL
CELLS W CYTOGENETIC ABNL BLD/T: NORMAL
CHROM ANALY RESULT (ISCN): NORMAL
DNA/RNA EXTRACT AND HOLD RESULT: NORMAL
DNA/RNA EXTRACTION: NORMAL
EXHR SPECIMEN TYPE: NORMAL
FBALB INTERPRETATION: NORMAL
FBALB REASON FOR REFERRAL: NORMAL
MOL DX INTERP BLD/T QL: NORMAL
PROVIDER SIGNING NAME: NORMAL
REF LAB TEST METHOD: NORMAL
SPECIMEN SOURCE: NORMAL
TEST PERFORMANCE INFO SPEC: NORMAL

## 2022-05-06 NOTE — TELEPHONE ENCOUNTER
"----- Message from Edgar Birmingham sent at 5/6/2022  2:48 PM CDT -----  Consult/Advisory:          Name Of Caller: Mannie (Spouse)      Contact Preference?: [phone]         Provider Name: Gregory      Does patient feel the need to be seen today? No      What is the nature of the call?: Calling to speak w/ nurse about results.          Additional Notes:  "Thank you for all that you do for our patients"      "

## 2022-05-09 LAB
BCR/ABL RESULT, P190, QUANT, BM: NORMAL
BODY SITE - BONE MARROW: NORMAL
CHROM BANDING METHOD: NORMAL
CHROMOSOME ANALYSIS BM ADDITIONAL INFORMATION: NORMAL
CHROMOSOME ANALYSIS BM RELEASED BY: NORMAL
CHROMOSOME ANALYSIS BM RESULT SUMMARY: NORMAL
CLINICAL CYTOGENETICIST REVIEW: NORMAL
CLINICAL DIAGNOSIS - BONE MARROW: NORMAL
COMMENT: NORMAL
FINAL PATHOLOGIC DIAGNOSIS: NORMAL
FLOW CYTOMETRY ANTIBODIES ANALYZED - BONE MARROW: NORMAL
FLOW CYTOMETRY COMMENT - BONE MARROW: NORMAL
FLOW CYTOMETRY INTERPRETATION - BONE MARROW: NORMAL
GROSS: NORMAL
KARYOTYP MAR: NORMAL
Lab: NORMAL
MICROSCOPIC EXAM: NORMAL
MINIMAL RESIDUAL DISEASE DETECTION (MRD), BONE MARROW: NORMAL
PATH REPORT.FINAL DX SPEC: NORMAL
POCT GLUCOSE: 117 MG/DL (ref 70–110)
POCT GLUCOSE: 128 MG/DL (ref 70–110)
POCT GLUCOSE: 144 MG/DL (ref 70–110)
POCT GLUCOSE: 157 MG/DL (ref 70–110)
POCT GLUCOSE: 173 MG/DL (ref 70–110)
POCT GLUCOSE: 227 MG/DL (ref 70–110)
POCT GLUCOSE: 228 MG/DL (ref 70–110)
POCT GLUCOSE: 294 MG/DL (ref 70–110)
POCT GLUCOSE: 82 MG/DL (ref 70–110)
REASON FOR REFERRAL (NARRATIVE): NORMAL
REF LAB TEST METHOD: NORMAL
SPECIMEN SOURCE: NORMAL
SPECIMEN TYPE, P190, QUANT, BM: NORMAL
SPECIMEN: NORMAL

## 2022-05-10 ENCOUNTER — OFFICE VISIT (OUTPATIENT)
Dept: HEMATOLOGY/ONCOLOGY | Facility: CLINIC | Age: 52
End: 2022-05-10
Payer: COMMERCIAL

## 2022-05-10 DIAGNOSIS — D63.0 ANEMIA IN NEOPLASTIC DISEASE: ICD-10-CM

## 2022-05-10 DIAGNOSIS — C91.00 B-CELL ACUTE LYMPHOBLASTIC LEUKEMIA: Primary | ICD-10-CM

## 2022-05-10 DIAGNOSIS — D69.6 THROMBOCYTOPENIA: ICD-10-CM

## 2022-05-10 PROCEDURE — 3044F HG A1C LEVEL LT 7.0%: CPT | Mod: CPTII,95,, | Performed by: INTERNAL MEDICINE

## 2022-05-10 PROCEDURE — 1159F MED LIST DOCD IN RCRD: CPT | Mod: CPTII,95,, | Performed by: INTERNAL MEDICINE

## 2022-05-10 PROCEDURE — 1160F RVW MEDS BY RX/DR IN RCRD: CPT | Mod: CPTII,95,, | Performed by: INTERNAL MEDICINE

## 2022-05-10 PROCEDURE — 1160F PR REVIEW ALL MEDS BY PRESCRIBER/CLIN PHARMACIST DOCUMENTED: ICD-10-PCS | Mod: CPTII,95,, | Performed by: INTERNAL MEDICINE

## 2022-05-10 PROCEDURE — 99215 OFFICE O/P EST HI 40 MIN: CPT | Mod: 95,,, | Performed by: INTERNAL MEDICINE

## 2022-05-10 PROCEDURE — 4010F PR ACE/ARB THEARPY RXD/TAKEN: ICD-10-PCS | Mod: CPTII,95,, | Performed by: INTERNAL MEDICINE

## 2022-05-10 PROCEDURE — 1159F PR MEDICATION LIST DOCUMENTED IN MEDICAL RECORD: ICD-10-PCS | Mod: CPTII,95,, | Performed by: INTERNAL MEDICINE

## 2022-05-10 PROCEDURE — 3044F PR MOST RECENT HEMOGLOBIN A1C LEVEL <7.0%: ICD-10-PCS | Mod: CPTII,95,, | Performed by: INTERNAL MEDICINE

## 2022-05-10 PROCEDURE — 4010F ACE/ARB THERAPY RXD/TAKEN: CPT | Mod: CPTII,95,, | Performed by: INTERNAL MEDICINE

## 2022-05-10 PROCEDURE — 99215 PR OFFICE/OUTPT VISIT, EST, LEVL V, 40-54 MIN: ICD-10-PCS | Mod: 95,,, | Performed by: INTERNAL MEDICINE

## 2022-05-11 ENCOUNTER — TELEPHONE (OUTPATIENT)
Dept: HEMATOLOGY/ONCOLOGY | Facility: CLINIC | Age: 52
End: 2022-05-11
Payer: COMMERCIAL

## 2022-05-11 NOTE — TELEPHONE ENCOUNTER
----- Message from Abiola Barcenas sent at 5/11/2022  4:19 PM CDT -----  Regarding: Questions and Concerns  Contact: [phone]  Patient Deepti is calling. Patient would like to speak with the office to confirm arrival time and additional questions in ref to her procedure. Please call patient at [phone]    Thank You

## 2022-05-13 ENCOUNTER — CLINICAL SUPPORT (OUTPATIENT)
Dept: HEMATOLOGY/ONCOLOGY | Facility: CLINIC | Age: 52
End: 2022-05-13
Payer: COMMERCIAL

## 2022-05-13 ENCOUNTER — OFFICE VISIT (OUTPATIENT)
Dept: HEMATOLOGY/ONCOLOGY | Facility: CLINIC | Age: 52
End: 2022-05-13
Payer: COMMERCIAL

## 2022-05-13 ENCOUNTER — CLINICAL SUPPORT (OUTPATIENT)
Dept: HEMATOLOGY/ONCOLOGY | Facility: CLINIC | Age: 52
DRG: 834 | End: 2022-05-13
Payer: COMMERCIAL

## 2022-05-13 ENCOUNTER — HOSPITAL ENCOUNTER (INPATIENT)
Facility: HOSPITAL | Age: 52
LOS: 6 days | Discharge: HOME | DRG: 834 | End: 2022-05-19
Attending: INTERNAL MEDICINE | Admitting: INTERNAL MEDICINE
Payer: COMMERCIAL

## 2022-05-13 VITALS
HEIGHT: 64 IN | WEIGHT: 155.88 LBS | RESPIRATION RATE: 20 BRPM | TEMPERATURE: 99 F | DIASTOLIC BLOOD PRESSURE: 57 MMHG | OXYGEN SATURATION: 99 % | SYSTOLIC BLOOD PRESSURE: 115 MMHG | BODY MASS INDEX: 26.61 KG/M2 | HEART RATE: 106 BPM

## 2022-05-13 DIAGNOSIS — C91.00 B-CELL ACUTE LYMPHOBLASTIC LEUKEMIA: Primary | ICD-10-CM

## 2022-05-13 DIAGNOSIS — Z11.52 ENCOUNTER FOR SCREENING FOR COVID-19: Primary | ICD-10-CM

## 2022-05-13 DIAGNOSIS — R07.9 CHEST PAIN: ICD-10-CM

## 2022-05-13 DIAGNOSIS — I48.20 CHRONIC A-FIB: ICD-10-CM

## 2022-05-13 DIAGNOSIS — C91.02 B-CELL ACUTE LYMPHOBLASTIC LEUKEMIA (ALL) IN RELAPSE: Primary | ICD-10-CM

## 2022-05-13 DIAGNOSIS — C91.00 B-CELL ACUTE LYMPHOBLASTIC LEUKEMIA: ICD-10-CM

## 2022-05-13 PROBLEM — D69.6 THROMBOCYTOPENIA: Status: ACTIVE | Noted: 2022-05-13

## 2022-05-13 PROBLEM — R33.9 URINARY RETENTION: Status: ACTIVE | Noted: 2022-05-13

## 2022-05-13 PROBLEM — J44.9 COPD (CHRONIC OBSTRUCTIVE PULMONARY DISEASE): Status: RESOLVED | Noted: 2021-11-26 | Resolved: 2022-05-13

## 2022-05-13 LAB — SARS-COV-2 RDRP RESP QL NAA+PROBE: NEGATIVE

## 2022-05-13 PROCEDURE — 4010F PR ACE/ARB THEARPY RXD/TAKEN: ICD-10-PCS | Mod: CPTII,S$GLB,, | Performed by: NURSE PRACTITIONER

## 2022-05-13 PROCEDURE — 3074F PR MOST RECENT SYSTOLIC BLOOD PRESSURE < 130 MM HG: ICD-10-PCS | Mod: CPTII,S$GLB,, | Performed by: NURSE PRACTITIONER

## 2022-05-13 PROCEDURE — U0002 COVID-19 LAB TEST NON-CDC: HCPCS | Performed by: INTERNAL MEDICINE

## 2022-05-13 PROCEDURE — 99999 PR PBB SHADOW E&M-EST. PATIENT-LVL V: ICD-10-PCS | Mod: PBBFAC,,, | Performed by: NURSE PRACTITIONER

## 2022-05-13 PROCEDURE — 20600001 HC STEP DOWN PRIVATE ROOM

## 2022-05-13 PROCEDURE — 93010 ELECTROCARDIOGRAM REPORT: CPT | Mod: ,,, | Performed by: INTERNAL MEDICINE

## 2022-05-13 PROCEDURE — 1111F PR DISCHARGE MEDS RECONCILED W/ CURRENT OUTPATIENT MED LIST: ICD-10-PCS | Mod: CPTII,S$GLB,, | Performed by: NURSE PRACTITIONER

## 2022-05-13 PROCEDURE — 99214 PR OFFICE/OUTPT VISIT, EST, LEVL IV, 30-39 MIN: ICD-10-PCS | Mod: S$GLB,,, | Performed by: NURSE PRACTITIONER

## 2022-05-13 PROCEDURE — 99999 PR PBB SHADOW E&M-EST. PATIENT-LVL I: CPT | Mod: PBBFAC,,,

## 2022-05-13 PROCEDURE — 25000003 PHARM REV CODE 250: Performed by: STUDENT IN AN ORGANIZED HEALTH CARE EDUCATION/TRAINING PROGRAM

## 2022-05-13 PROCEDURE — 3008F PR BODY MASS INDEX (BMI) DOCUMENTED: ICD-10-PCS | Mod: CPTII,S$GLB,, | Performed by: NURSE PRACTITIONER

## 2022-05-13 PROCEDURE — 1159F MED LIST DOCD IN RCRD: CPT | Mod: CPTII,S$GLB,, | Performed by: NURSE PRACTITIONER

## 2022-05-13 PROCEDURE — 3044F PR MOST RECENT HEMOGLOBIN A1C LEVEL <7.0%: ICD-10-PCS | Mod: CPTII,S$GLB,, | Performed by: NURSE PRACTITIONER

## 2022-05-13 PROCEDURE — 3074F SYST BP LT 130 MM HG: CPT | Mod: CPTII,S$GLB,, | Performed by: NURSE PRACTITIONER

## 2022-05-13 PROCEDURE — 99999 PR PBB SHADOW E&M-EST. PATIENT-LVL I: ICD-10-PCS | Mod: PBBFAC,,,

## 2022-05-13 PROCEDURE — 3008F BODY MASS INDEX DOCD: CPT | Mod: CPTII,S$GLB,, | Performed by: NURSE PRACTITIONER

## 2022-05-13 PROCEDURE — 25000003 PHARM REV CODE 250: Performed by: NURSE PRACTITIONER

## 2022-05-13 PROCEDURE — 4010F ACE/ARB THERAPY RXD/TAKEN: CPT | Mod: CPTII,S$GLB,, | Performed by: NURSE PRACTITIONER

## 2022-05-13 PROCEDURE — 99999 PR PBB SHADOW E&M-EST. PATIENT-LVL V: CPT | Mod: PBBFAC,,, | Performed by: NURSE PRACTITIONER

## 2022-05-13 PROCEDURE — 1159F PR MEDICATION LIST DOCUMENTED IN MEDICAL RECORD: ICD-10-PCS | Mod: CPTII,S$GLB,, | Performed by: NURSE PRACTITIONER

## 2022-05-13 PROCEDURE — 3044F HG A1C LEVEL LT 7.0%: CPT | Mod: CPTII,S$GLB,, | Performed by: NURSE PRACTITIONER

## 2022-05-13 PROCEDURE — 3078F DIAST BP <80 MM HG: CPT | Mod: CPTII,S$GLB,, | Performed by: NURSE PRACTITIONER

## 2022-05-13 PROCEDURE — 93010 EKG 12-LEAD: ICD-10-PCS | Mod: ,,, | Performed by: INTERNAL MEDICINE

## 2022-05-13 PROCEDURE — 99214 OFFICE O/P EST MOD 30 MIN: CPT | Mod: S$GLB,,, | Performed by: NURSE PRACTITIONER

## 2022-05-13 PROCEDURE — 93005 ELECTROCARDIOGRAM TRACING: CPT

## 2022-05-13 PROCEDURE — 1111F DSCHRG MED/CURRENT MED MERGE: CPT | Mod: CPTII,S$GLB,, | Performed by: NURSE PRACTITIONER

## 2022-05-13 PROCEDURE — 3078F PR MOST RECENT DIASTOLIC BLOOD PRESSURE < 80 MM HG: ICD-10-PCS | Mod: CPTII,S$GLB,, | Performed by: NURSE PRACTITIONER

## 2022-05-13 RX ORDER — ASPIRIN 81 MG/1
81 TABLET ORAL
Status: DISCONTINUED | OUTPATIENT
Start: 2022-05-14 | End: 2022-05-17

## 2022-05-13 RX ORDER — DILTIAZEM HYDROCHLORIDE 30 MG/1
90 TABLET, FILM COATED ORAL EVERY 6 HOURS SCHEDULED
Status: DISCONTINUED | OUTPATIENT
Start: 2022-05-13 | End: 2022-05-18

## 2022-05-13 RX ORDER — NALOXONE HCL 0.4 MG/ML
0.02 VIAL (ML) INJECTION
Status: DISCONTINUED | OUTPATIENT
Start: 2022-05-13 | End: 2022-05-19 | Stop reason: HOSPADM

## 2022-05-13 RX ORDER — OXCARBAZEPINE 600 MG/1
1200 TABLET, FILM COATED ORAL
Status: DISCONTINUED | OUTPATIENT
Start: 2022-05-13 | End: 2022-05-19 | Stop reason: HOSPADM

## 2022-05-13 RX ORDER — ACYCLOVIR 200 MG/1
400 CAPSULE ORAL
Status: DISCONTINUED | OUTPATIENT
Start: 2022-05-13 | End: 2022-05-19 | Stop reason: HOSPADM

## 2022-05-13 RX ORDER — GABAPENTIN 300 MG/1
300 CAPSULE ORAL
Status: DISCONTINUED | OUTPATIENT
Start: 2022-05-13 | End: 2022-05-19 | Stop reason: HOSPADM

## 2022-05-13 RX ORDER — QUETIAPINE FUMARATE 200 MG/1
200 TABLET, FILM COATED ORAL
Status: DISCONTINUED | OUTPATIENT
Start: 2022-05-13 | End: 2022-05-19 | Stop reason: HOSPADM

## 2022-05-13 RX ORDER — INSULIN ASPART 100 [IU]/ML
1-10 INJECTION, SOLUTION INTRAVENOUS; SUBCUTANEOUS
Status: DISCONTINUED | OUTPATIENT
Start: 2022-05-13 | End: 2022-05-19 | Stop reason: HOSPADM

## 2022-05-13 RX ORDER — LOSARTAN POTASSIUM 25 MG/1
25 TABLET ORAL
Status: DISCONTINUED | OUTPATIENT
Start: 2022-05-14 | End: 2022-05-18

## 2022-05-13 RX ORDER — HYDROXYZINE PAMOATE 50 MG/1
50 CAPSULE ORAL 2 TIMES DAILY PRN
Status: ON HOLD | COMMUNITY
Start: 2022-04-29 | End: 2022-10-13 | Stop reason: HOSPADM

## 2022-05-13 RX ORDER — OXYCODONE HYDROCHLORIDE 5 MG/1
5 TABLET ORAL ONCE
Status: COMPLETED | OUTPATIENT
Start: 2022-05-13 | End: 2022-05-13

## 2022-05-13 RX ORDER — AMOXICILLIN 250 MG
1 CAPSULE ORAL 2 TIMES DAILY PRN
Status: DISCONTINUED | OUTPATIENT
Start: 2022-05-13 | End: 2022-05-19 | Stop reason: HOSPADM

## 2022-05-13 RX ORDER — IBUPROFEN 200 MG
16 TABLET ORAL
Status: DISCONTINUED | OUTPATIENT
Start: 2022-05-13 | End: 2022-05-19 | Stop reason: HOSPADM

## 2022-05-13 RX ORDER — GLUCAGON 1 MG
1 KIT INJECTION
Status: DISCONTINUED | OUTPATIENT
Start: 2022-05-13 | End: 2022-05-19 | Stop reason: HOSPADM

## 2022-05-13 RX ORDER — SULFAMETHOXAZOLE AND TRIMETHOPRIM 800; 160 MG/1; MG/1
1 TABLET ORAL
Status: DISCONTINUED | OUTPATIENT
Start: 2022-05-16 | End: 2022-05-19 | Stop reason: HOSPADM

## 2022-05-13 RX ORDER — POLYETHYLENE GLYCOL 3350 17 G/17G
17 POWDER, FOR SOLUTION ORAL DAILY PRN
Status: DISCONTINUED | OUTPATIENT
Start: 2022-05-13 | End: 2022-05-19 | Stop reason: HOSPADM

## 2022-05-13 RX ORDER — IBUPROFEN 200 MG
24 TABLET ORAL
Status: DISCONTINUED | OUTPATIENT
Start: 2022-05-13 | End: 2022-05-19 | Stop reason: HOSPADM

## 2022-05-13 RX ORDER — HYDROXYZINE HYDROCHLORIDE 25 MG/1
50 TABLET, FILM COATED ORAL
Status: DISCONTINUED | OUTPATIENT
Start: 2022-05-13 | End: 2022-05-19 | Stop reason: HOSPADM

## 2022-05-13 RX ORDER — DIAZEPAM 5 MG/1
10 TABLET ORAL 2 TIMES DAILY PRN
Status: DISCONTINUED | OUTPATIENT
Start: 2022-05-13 | End: 2022-05-19 | Stop reason: HOSPADM

## 2022-05-13 RX ORDER — BUSPIRONE HYDROCHLORIDE 10 MG/1
10 TABLET ORAL
Status: DISCONTINUED | OUTPATIENT
Start: 2022-05-13 | End: 2022-05-19 | Stop reason: HOSPADM

## 2022-05-13 RX ORDER — OXYCODONE HYDROCHLORIDE 5 MG/1
5 TABLET ORAL EVERY 4 HOURS PRN
Status: DISCONTINUED | OUTPATIENT
Start: 2022-05-13 | End: 2022-05-19 | Stop reason: HOSPADM

## 2022-05-13 RX ORDER — SODIUM CHLORIDE 0.9 % (FLUSH) 0.9 %
10 SYRINGE (ML) INJECTION EVERY 12 HOURS PRN
Status: DISCONTINUED | OUTPATIENT
Start: 2022-05-13 | End: 2022-05-16

## 2022-05-13 RX ORDER — PANTOPRAZOLE SODIUM 40 MG/1
40 TABLET, DELAYED RELEASE ORAL
Status: DISCONTINUED | OUTPATIENT
Start: 2022-05-14 | End: 2022-05-19 | Stop reason: HOSPADM

## 2022-05-13 RX ORDER — SUMATRIPTAN 50 MG/1
50 TABLET, FILM COATED ORAL DAILY PRN
Status: DISCONTINUED | OUTPATIENT
Start: 2022-05-13 | End: 2022-05-19 | Stop reason: HOSPADM

## 2022-05-13 RX ADMIN — BUSPIRONE HYDROCHLORIDE 10 MG: 10 TABLET ORAL at 09:05

## 2022-05-13 RX ADMIN — OXYCODONE 5 MG: 5 TABLET ORAL at 06:05

## 2022-05-13 RX ADMIN — GABAPENTIN 300 MG: 300 CAPSULE ORAL at 09:05

## 2022-05-13 RX ADMIN — DILTIAZEM HYDROCHLORIDE 90 MG: 30 TABLET, FILM COATED ORAL at 11:05

## 2022-05-13 RX ADMIN — RIVAROXABAN 20 MG: 20 TABLET, FILM COATED ORAL at 06:05

## 2022-05-13 RX ADMIN — OXYCODONE HYDROCHLORIDE 5 MG: 5 TABLET ORAL at 10:05

## 2022-05-13 RX ADMIN — DILTIAZEM HYDROCHLORIDE 90 MG: 30 TABLET, FILM COATED ORAL at 06:05

## 2022-05-13 RX ADMIN — OXCARBAZEPINE 1200 MG: 600 TABLET, FILM COATED ORAL at 09:05

## 2022-05-13 RX ADMIN — HYDROXYZINE HYDROCHLORIDE 50 MG: 25 TABLET, FILM COATED ORAL at 06:05

## 2022-05-13 RX ADMIN — ACYCLOVIR 400 MG: 200 CAPSULE ORAL at 09:05

## 2022-05-13 RX ADMIN — QUETIAPINE FUMARATE 200 MG: 200 TABLET ORAL at 09:05

## 2022-05-13 NOTE — PROGRESS NOTES
HEMATOLOGIC MALIGNANCIES PROGRESS NOTE    IDENTIFYING STATEMENT   Deepti Davison) is a 51 y.o. female with a  of 1970 from [place], [place] with the diagnosis of B-ALL.     ONCOLOGY HISTORY:    1. Precursor B-cell acute lymphoblastic leukemia (Ph+)   A. 2021: Transferred to [facility] [place] from outside hospital for evaluation for acute leukemia   B. 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    C. 2021 - 2021: Vincristine + imatinib induction; hospital course was complicated by acute hypoxemic respiratory failure which resolved with diuresis and treatment of ALL   D. 1/3/2022: Bone marrow biopsy after induction shows normocellular marrow (60%) with no definite evidence of B-ALL; bcr-abl p190 fusion detected at 0.02% of total ABL1; FISH normal; MRD testing not sent   E. 2022: Begin consolidation therapy with EWALL-Ph-01 protocol   F. 3/16/2022: BCR-ABL p190 transcript on peripheral blood negative   G. 2022: BCR-ABL p190 transcript on peripheral blood 0.63%   H. 2022: Bone marrow biopsy shows 50% cellular marrow with relapsed B-ALL; bone marrow aspirate shows 23.5% blasts; cytogenetics 46,XX,-9,t(9;22)(q34;q11.2),+isaias(22)t(9;22)[5] /46,XX[15]; FISH shows 9.5% nuclei with bcr/abl1 fusion; bcr/abl1 quantitative PCR shows 47% of total ABL1    2. History of TIA  3. Seizure disorder  4. Anxiety and depression  5. Chronic obstructive pulmonary disease  6. Paroxysmal atrial fibrillation  7. Implantable loop recorder and pacemaker in place  8. Peripheral artery disease  9. Diabetes mellitus, type 2  10. Gastroesophageal reflux disease  11. Gastroparesis  12. Rheumatoid arthritis  13. Osteoporosis  14. History of  tobacco use     INTERVAL HISTORY:      Ms. Gonzalez presents today for admission for Blinatumumab for relapsed Ph+ B-ALL. She is seen with her  for consent signing.      Past Medical History, Past Social History and Past Family History have been reviewed and are unchanged except as noted in the interval history.    MEDICATIONS:     Current Outpatient Medications on File Prior to Visit   Medication Sig Dispense Refill    acyclovir (ZOVIRAX) 400 MG tablet Take 1 tablet (400 mg total) by mouth 2 (two) times daily. 60 tablet 11    artificial tears (ISOPTO TEARS) 0.5 % ophthalmic solution Place 1 drop into both eyes 4 (four) times daily as needed.      atorvastatin (LIPITOR) 80 MG tablet Take 80 mg by mouth once daily.      blood sugar diagnostic Strp SMARTSIG:Via Meter 1 to 2 Times Daily      busPIRone (BUSPAR) 10 MG tablet Take 10 mg by mouth 2 (two) times daily.      dapagliflozin (FARXIGA) 10 mg tablet Take 10 mg by mouth once daily.      diazePAM (VALIUM) 10 MG Tab Take 10 mg by mouth 2 (two) times daily as needed.      diltiaZEM (CARDIZEM) 90 MG tablet Take 1 tablet (90 mg total) by mouth every 6 (six) hours. 120 tablet 11    fenofibrate 160 MG Tab Take 160 mg by mouth once daily.      fluconazole (DIFLUCAN) 200 MG Tab Take 2 tablets (400 mg total) by mouth once daily. 60 tablet 2    gabapentin (NEURONTIN) 300 MG capsule Take 1 capsule (300 mg total) by mouth 3 (three) times daily. 90 capsule 11    hydrOXYzine (ATARAX) 50 MG tablet Take 50 mg by mouth 2 (two) times a day.      hydrOXYzine pamoate (VISTARIL) 50 MG Cap Take 50 mg by mouth 2 (two) times daily as needed.      imatinib (GLEEVEC) 100 MG Tab Take 2 tablets (200 mg total) by mouth once daily with 1 other imatinib prescription for 600 mg total on days 15-28 of each cycle during consolidation.Take with a meal and large glass of water 180 tablet 3    imatinib (GLEEVEC) 400 MG Tab Take 1 tablet (400 mg total) by mouth once daily with 1 other  imatinib prescription for 600 mg total on days 15-28 of each cycle during consolidation. Take with a meal and large glass of water 90 tablet 3    levoFLOXacin (LEVAQUIN) 500 MG tablet Take 1 tablet (500 mg total) by mouth once daily. 30 tablet 2    losartan (COZAAR) 25 MG tablet Take 25 mg by mouth once daily.      omeprazole (PRILOSEC) 40 MG capsule Take 40 mg by mouth once daily.      ondansetron (ZOFRAN-ODT) 8 MG TbDL Dissolve 1 tablet (8 mg total) by mouth every 12 (twelve) hours as needed (in case of chemo induced nausea). 30 tablet 1    ONETOUCH VERIO TEST STRIPS Strp SMARTSIG:Via Meter 1 to 2 Times Daily      OXcarbazepine (TRILEPTAL) 600 MG Tab Take 1,200 mg by mouth 2 (two) times daily.      oxyCODONE (ROXICODONE) 5 MG immediate release tablet Take 1 tablet (5 mg total) by mouth every 4 (four) hours as needed for Pain. 45 tablet 0    prochlorperazine (COMPAZINE) 5 MG tablet Take 1 tablet (5 mg total) by mouth every 6 (six) hours as needed for Nausea. 30 tablet 2    QUEtiapine (SEROQUEL) 200 MG Tab Take 200 mg by mouth every evening.      senna-docusate 8.6-50 mg (PERICOLACE) 8.6-50 mg per tablet Take 1 tablet by mouth 2 (two) times daily. 60 tablet 3    sumatriptan (IMITREX) 50 MG tablet Take 1 tablet (50 mg total) by mouth daily as needed (headache). 30 tablet 0    TRINTELLIX 20 mg Tab Take 1 tablet by mouth once daily.      [DISCONTINUED] hydrOXYchloroQUINE (PLAQUENIL) 200 mg tablet Take 200 mg by mouth once daily.      duke's soln (benadryl 30 mL, mylanta 30 mL, LIDOcaine 30 mL, nystatin 30 mL) 120mL Take 10 mLs by mouth 4 (four) times daily as needed (mouth pain). (Patient not taking: Reported on 5/13/2022) 120 mL 0    polyethylene glycol (GLYCOLAX) 17 gram/dose powder Dissolve one capful (17 g) in liquid and take by mouth daily as needed (constipation). (Patient not taking: No sig reported) 510 g 0    rivaroxaban (XARELTO) 20 mg Tab Take 1 tablet (20 mg total) by mouth daily with dinner  "or evening meal. Due to low platelet count, hold until instructed to resume by provider. (Patient not taking: Reported on 5/13/2022)      [DISCONTINUED] aspirin (ECOTRIN) 81 MG EC tablet Take 1 tablet (81 mg total) by mouth once daily. Hold for platelet count less than 50 thousand. (Patient not taking: No sig reported)  0    [DISCONTINUED] traMADoL (ULTRAM) 50 mg tablet Take 1 tablet (50 mg total) by mouth every 6 (six) hours as needed for Pain. (Patient not taking: Reported on 5/13/2022) 30 tablet 0     No current facility-administered medications on file prior to visit.       ALLERGIES:   Review of patient's allergies indicates:   Allergen Reactions    Levetiracetam      Other reaction(s): Unknown  Other reaction(s): Unknown  Other reaction(s): Unknown        ROS:       Review of Systems   Constitutional: Positive for fatigue (improved significantly). Negative for diaphoresis, fever and unexpected weight change.   HENT:   Negative for lump/mass and sore throat.    Eyes: Negative for icterus.   Respiratory: Negative for cough and shortness of breath.    Cardiovascular: Negative for palpitations.   Gastrointestinal: Positive for nausea and vomiting. Negative for abdominal distention, abdominal pain, constipation and diarrhea.   Genitourinary: Negative for dysuria and frequency.    Musculoskeletal: Negative for arthralgias, gait problem and myalgias.        Muscle weakness - diffuse   Skin: Negative for rash.   Neurological: Negative for dizziness, gait problem and headaches.   Hematological: Negative for adenopathy. Does not bruise/bleed easily.   Psychiatric/Behavioral: The patient is not nervous/anxious.        PHYSICAL EXAM:  Vitals:    05/13/22 1354   BP: (Abnormal) 115/57   Pulse: 106   Resp: 20   Temp: 98.7 °F (37.1 °C)   TempSrc: Oral   SpO2: 99%   Weight: 70.7 kg (155 lb 13.8 oz)   Height: 5' 4" (1.626 m)   PainSc: 0-No pain       KARNOFSKY PERFORMANCE STATUS 60%  ECOG 2    Physical Exam  Constitutional:  "      Appearance: Normal appearance.   HENT:      Head: Normocephalic and atraumatic.   Pulmonary:      Effort: Pulmonary effort is normal.   Musculoskeletal:         General: Normal range of motion.      Cervical back: Normal range of motion and neck supple.   Skin:     Findings: No rash.   Neurological:      General: No focal deficit present.      Mental Status: She is alert and oriented to person, place, and time.   Psychiatric:         Mood and Affect: Mood normal.         Thought Content: Thought content normal.          LAB:   Results for orders placed or performed in visit on 05/13/22   COVID-19 Rapid Screening   Result Value Ref Range    SARS-CoV-2 RNA, Amplification, Qual Negative Negative       PROBLEMS ASSESSED THIS VISIT:    1. B-cell acute lymphoblastic leukemia        PLAN:       Ph+ B-ALL in relapse  - Per Dr. Jenkins, blinatumomab therapy with an alternative TKI. Mutation testing for bcr-abl to help guide therapy in process. She was initially on imatinib as she presented with bilateral pleural effusions and pulmonary edema. Consider either a 2nd generation TKI (e.g. dasatinib) or 3rd generation (ponatinib) pending results of testing.   - has PAC in place  - patient met with our pharmacist today for chemo teaching  - consent signed in clinic today. Written information on Blinatumomab previously given to patient. Risk vs benefit explained. All of the patients questions were answered to her satisfaction.  - admit to inpatient today      Route Chart for Scheduling    BMT Chart Routing      Follow up with physician No follow up needed.   Follow up with [facility]    Labs    Imaging    Pharmacy appointment    Other referrals              Carolyn Mchugh NP  Hematology and Stem Cell Transplant

## 2022-05-13 NOTE — ASSESSMENT & PLAN NOTE
- hold home farxiga while inpatient. Can resume at discharge.  - low dose SSI, achs blood glucose monitoring, and diabetic diet while

## 2022-05-13 NOTE — ASSESSMENT & PLAN NOTE
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 Jenkins considering 2nd/3rd generation TKI pending results  - Blincyto consent obtained in clinic  - Bactrim DS M-W-F OI ppx  - qshift neuro monitoring and vitals, close monitoring for CRS  - Daily CBC, CMP, Mag, Phos

## 2022-05-13 NOTE — SUBJECTIVE & OBJECTIVE
Patient information was obtained from patient, spouse/SO, and past medical records.     Oncology History:   1. Precursor B-cell acute lymphoblastic leukemia (Ph+)              A. 11/23/2021: Transferred to [facility] [place] from outside hospital for evaluation for acute leukemia              B. 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               C. 11/30/2021 - 12/23/2021: Vincristine + imatinib induction; hospital course was complicated by acute hypoxemic respiratory failure which resolved with diuresis and treatment of ALL              D. 1/3/2022: Bone marrow biopsy after induction shows normocellular marrow (60%) with no definite evidence of B-ALL; bcr-abl p190 fusion detected at 0.02% of total ABL1; FISH normal; MRD testing not sent              E. 1/20/2022: Begin consolidation therapy with EWALL-Ph-01 protocol              F. 3/16/2022: BCR-ABL p190 transcript on peripheral blood negative              G. 4/13/2022: BCR-ABL p190 transcript on peripheral blood 0.63%              H. 5/4/2022: Bone marrow biopsy shows 50% cellular marrow with relapsed B-ALL; bone marrow aspirate shows 23.5% blasts; cytogenetics 46,XX,-9,t(9;22)(q34;q11.2),+isaias(22)t(9;22)[5] /46,XX[15]; FISH shows 9.5% nuclei with bcr/abl1 fusion; bcr/abl1 quantitative PCR shows 47% of total ABL1    No medications prior to admission.       Levetiracetam     Past Medical History:   Diagnosis Date    Arthritis     Cancer     COPD (chronic obstructive pulmonary disease)     Hypertension     Stroke     TIA x 4     Past Surgical History:   Procedure Laterality Date    APPENDECTOMY      HYSTERECTOMY      ROTATOR CUFF REPAIR Bilateral     TONSILLECTOMY      TUBAL LIGATION       Family  History    None       Tobacco Use    Smoking status: Current Every Day Smoker     Packs/day: 0.50     Types: Cigarettes    Smokeless tobacco: Never Used   Substance and Sexual Activity    Alcohol use: No    Drug use: Never    Sexual activity: Not on file       Review of Systems   Constitutional:  Positive for activity change and fatigue. Negative for chills and fever.   HENT:  Positive for congestion and sinus pressure. Negative for rhinorrhea and sore throat.    Respiratory:  Negative for cough and shortness of breath.    Cardiovascular:  Negative for chest pain and leg swelling.   Gastrointestinal:  Positive for nausea. Negative for abdominal pain, constipation and diarrhea.   Genitourinary:  Positive for difficulty urinating. Negative for decreased urine volume, dysuria, flank pain and frequency.   Musculoskeletal:  Positive for arthralgias and myalgias.   Neurological:  Positive for weakness (general) and headaches (chronic). Negative for seizures.   Psychiatric/Behavioral:  The patient is nervous/anxious.    Objective:     Vital Signs (Most Recent):    Vital Signs (24h Range):  Temp:  [98.7 °F (37.1 °C)] 98.7 °F (37.1 °C)  Pulse:  [106] 106  Resp:  [20] 20  SpO2:  [99 %] 99 %  BP: (115)/(57) 115/57        There is no height or weight on file to calculate BMI.  There is no height or weight on file to calculate BSA.    ECOG SCORE           [unfilled]    Lines/Drains/Airways       Central Venous Catheter Line  Duration                  PowerPort A Cath Single Lumen 03/16/22 1128 right internal jugular 58 days                    Physical Exam  Vitals and nursing note reviewed.   Constitutional:       General: She is not in acute distress.     Appearance: Normal appearance.   HENT:      Head: Normocephalic.      Right Ear: External ear normal.      Left Ear: External ear normal.      Nose: No congestion.      Mouth/Throat:      Mouth: Mucous membranes are moist.      Pharynx: Oropharynx is clear.   Eyes:       Extraocular Movements: Extraocular movements intact.      Pupils: Pupils are equal, round, and reactive to light.   Cardiovascular:      Rate and Rhythm: Normal rate and regular rhythm.      Pulses: Normal pulses.   Pulmonary:      Effort: Pulmonary effort is normal.      Breath sounds: Normal breath sounds.   Abdominal:      General: Abdomen is flat. There is no distension.      Palpations: Abdomen is soft.      Tenderness: There is no abdominal tenderness.   Musculoskeletal:         General: No swelling, tenderness or signs of injury. Normal range of motion.      Cervical back: Neck supple.   Skin:     General: Skin is warm.   Neurological:      General: No focal deficit present.      Mental Status: She is alert and oriented to person, place, and time.      Motor: No weakness.      Gait: Gait normal.   Psychiatric:         Mood and Affect: Mood normal.       Significant Labs:    Latest Reference Range & Units 05/10/22 11:48   WBC 3.90 - 12.70 K/uL 29.05 (H)   RBC 4.00 - 5.40 M/uL 2.75 (L)   Hemoglobin 12.0 - 16.0 g/dL 9.1 (L)   Hematocrit 37.0 - 48.5 % 27.5 (L)   MCV 82 - 98 fL 100 (H)   MCH 27.0 - 31.0 pg 33.1 (H)   MCHC 32.0 - 36.0 g/dL 33.1   RDW 11.5 - 14.5 % 17.4 (H)   Platelets 150 - 450 K/uL 97 (L)   MPV 9.2 - 12.9 fL 9.7   Platelet Estimate  Decreased !   Gran % 38.0 - 73.0 % 78.0 (H) (C) [1]   Lymph % 18.0 - 48.0 % 9.0 (L) (C) [2]   Mono % 4.0 - 15.0 % 4.0 (C) [3]   Eosinophil % 0.0 - 8.0 % 0.0   Basophil % 0.0 - 1.9 % 0.0 (C) [4]   Immature Granulocytes 0.0 - 0.5 % Test Not Performed (C) [5]   Other % 3   Immature Grans (Abs) 0.00 - 0.04 K/uL Test Not Performed (C) [6]   Bands % 3.0   Metamyelocytes % 2.0   Myelocytes % 1.0   NRBC 0 /100 WBC 1 !   Differential Method  Manual (C) [7]   Aniso  Slight   Poikilocytosis  Slight   Poly  Occasional   Hypo  Occasional   Teardrop Cells  Occasional   Pathologist Review Peripheral Smear  REVIEWED [8]   Sodium 136 - 145 mmol/L 138   Potassium 3.5 - 5.1 mmol/L 3.9    Chloride 95 - 110 mmol/L 102   CO2 23 - 29 mmol/L 24   Anion Gap 8 - 16 mmol/L 12   BUN 6 - 20 mg/dL 6   Creatinine 0.5 - 1.4 mg/dL 0.9   EGFR if non African American >60 mL/min/1.73 m^2 >60.0 [9]   EGFR if African American >60 mL/min/1.73 m^2 >60.0   Glucose 70 - 110 mg/dL 164 (H)   Calcium 8.7 - 10.5 mg/dL 9.0   Phosphorus 2.7 - 4.5 mg/dL 2.8   Magnesium 1.6 - 2.6 mg/dL 1.8   Alkaline Phosphatase 55 - 135 U/L 156 (H)   PROTEIN TOTAL 6.0 - 8.4 g/dL 6.6   Albumin 3.5 - 5.2 g/dL 3.7   BILIRUBIN TOTAL 0.1 - 1.0 mg/dL 0.2 [10]   AST 10 - 40 U/L 14   ALT 10 - 44 U/L 15     Diagnostic Results:  None

## 2022-05-13 NOTE — HPI
Mrs. Gonzalez is a 51-y-o patient of Dr. Jenkins with relapsed, refractory B-ALL. She was received consolidation therapy with EWALL-Ph-01 protocol beginning in January 2022, unfortunately BMBx in May showed relapsed B-ALL with 23.5% blasts. She is admitting today for C1 Blincyto, consent obtained in clinic. Other medical history includes COPD, HTN, HLD, DM2, TIA, anxiety, depression, GERD, seizure disorder, PAD, gastroparesis, RA, osteoporosis, a-fib, and pacermaker placement. She is admitting today for C1 Blincyto.  She is feeling relatively well today. She reports low grade temp of 100.2 last night, one time, and has felt generally weak and easily fatigued with intermittent nausea. She reports a cold ~7 days ago that has now resolved. Reports some urinary retention, feels like she needs to use bathroom but has to wait 3-4 minutes before urinating, no foul urine/dysuria. No saddle anesthesia/incontinence/isolated lower extremity weakness/trauma. Getting UA and bladder scan to start. Ongoing generalized joint pains. Otherwise denies fevers, chills, cough, SOB, chest pain, bleeding or bruising, and constipation. She is COVID neg.

## 2022-05-13 NOTE — H&P
Roberto [facility]pez - Oncology ([facility])  Hematology  Bone Marrow Transplant  H&P    Subjective:     Principal Problem: B-cell acute lymphoblastic leukemia    HPI: Mrs. Gonzalez is a 51-y-o patient of Dr. Jenkins with relapsed, refractory B-ALL. She was received consolidation therapy with EWALL-Ph-01 protocol beginning in January 2022, unfortunately BMBx in May showed relapsed B-ALL with 23.5% blasts. She is admitting today for C1 Blincyto, consent obtained in clinic. Other medical history includes COPD, HTN, HLD, DM2, TIA, anxiety, depression, GERD, seizure disorder, PAD, gastroparesis, RA, osteoporosis, a-fib, and pacermaker placement. She is admitting today for C1 Blincyto.  She is feeling relatively well today. She reports low grade temp of 100.2 last night, one time, and has felt generally weak and easily fatigued with intermittent nausea. She reports a cold ~7 days ago that has now resolved. Reports some urinary retention, feels like she needs to use bathroom but has to wait 3-4 minutes before urinating, no foul urine/dysuria. No saddle anesthesia/incontinence/isolated lower extremity weakness/trauma. Getting UA and bladder scan to start. Ongoing generalized joint pains. Otherwise denies fevers, chills, cough, SOB, chest pain, bleeding or bruising, and constipation. She is COVID neg.       Patient information was obtained from patient, spouse/SO, and past medical records.     Oncology History:   1. Precursor B-cell acute lymphoblastic leukemia (Ph+)              A. 11/23/2021: Transferred to [facility] [place] from outside hospital for evaluation for acute leukemia              B. 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               C. 11/30/2021 - 12/23/2021:  Vincristine + imatinib induction; hospital course was complicated by acute hypoxemic respiratory failure which resolved with diuresis and treatment of ALL              D. 1/3/2022: Bone marrow biopsy after induction shows normocellular marrow (60%) with no definite evidence of B-ALL; bcr-abl p190 fusion detected at 0.02% of total ABL1; FISH normal; MRD testing not sent              E. 1/20/2022: Begin consolidation therapy with EWALL-Ph-01 protocol              F. 3/16/2022: BCR-ABL p190 transcript on peripheral blood negative              G. 4/13/2022: BCR-ABL p190 transcript on peripheral blood 0.63%              H. 5/4/2022: Bone marrow biopsy shows 50% cellular marrow with relapsed B-ALL; bone marrow aspirate shows 23.5% blasts; cytogenetics 46,XX,-9,t(9;22)(q34;q11.2),+isaias(22)t(9;22)[5] /46,XX[15]; FISH shows 9.5% nuclei with bcr/abl1 fusion; bcr/abl1 quantitative PCR shows 47% of total ABL1    No medications prior to admission.       Levetiracetam     Past Medical History:   Diagnosis Date    Arthritis     Cancer     COPD (chronic obstructive pulmonary disease)     Hypertension     Stroke     TIA x 4     Past Surgical History:   Procedure Laterality Date    APPENDECTOMY      HYSTERECTOMY      ROTATOR CUFF REPAIR Bilateral     TONSILLECTOMY      TUBAL LIGATION       Family History    None       Tobacco Use    Smoking status: Current Every Day Smoker     Packs/day: 0.50     Types: Cigarettes    Smokeless tobacco: Never Used   Substance and Sexual Activity    Alcohol use: No    Drug use: Never    Sexual activity: Not on file       Review of Systems   Constitutional:  Positive for activity change and fatigue. Negative for chills and fever.   HENT:  Positive for congestion and sinus pressure. Negative for rhinorrhea and sore throat.    Respiratory:  Negative for cough and shortness of breath.    Cardiovascular:  Negative for chest pain and leg swelling.   Gastrointestinal:  Positive for nausea.  Negative for abdominal pain, constipation and diarrhea.   Genitourinary:  Positive for difficulty urinating. Negative for decreased urine volume, dysuria, flank pain and frequency.   Musculoskeletal:  Positive for arthralgias and myalgias.   Neurological:  Positive for weakness (general) and headaches (chronic). Negative for seizures.   Psychiatric/Behavioral:  The patient is nervous/anxious.    Objective:     Vital Signs (Most Recent):    Vital Signs (24h Range):  Temp:  [98.7 °F (37.1 °C)] 98.7 °F (37.1 °C)  Pulse:  [106] 106  Resp:  [20] 20  SpO2:  [99 %] 99 %  BP: (115)/(57) 115/57        There is no height or weight on file to calculate BMI.  There is no height or weight on file to calculate BSA.    ECOG SCORE           [unfilled]    Lines/Drains/Airways       Central Venous Catheter Line  Duration                  PowerPort A Cath Single Lumen 03/16/22 1128 right internal jugular 58 days                    Physical Exam  Vitals and nursing note reviewed.   Constitutional:       General: She is not in acute distress.     Appearance: Normal appearance.   HENT:      Head: Normocephalic.      Right Ear: External ear normal.      Left Ear: External ear normal.      Nose: No congestion.      Mouth/Throat:      Mouth: Mucous membranes are moist.      Pharynx: Oropharynx is clear.   Eyes:      Extraocular Movements: Extraocular movements intact.      Pupils: Pupils are equal, round, and reactive to light.   Cardiovascular:      Rate and Rhythm: Normal rate and regular rhythm.      Pulses: Normal pulses.   Pulmonary:      Effort: Pulmonary effort is normal.      Breath sounds: Normal breath sounds.   Abdominal:      General: Abdomen is flat. There is no distension.      Palpations: Abdomen is soft.      Tenderness: There is no abdominal tenderness.   Musculoskeletal:         General: No swelling, tenderness or signs of injury. Normal range of motion.      Cervical back: Neck supple.   Skin:     General: Skin is warm.    Neurological:      General: No focal deficit present.      Mental Status: She is alert and oriented to person, place, and time.      Motor: No weakness.      Gait: Gait normal.   Psychiatric:         Mood and Affect: Mood normal.       Significant Labs:    Latest Reference Range & Units 05/10/22 11:48   WBC 3.90 - 12.70 K/uL 29.05 (H)   RBC 4.00 - 5.40 M/uL 2.75 (L)   Hemoglobin 12.0 - 16.0 g/dL 9.1 (L)   Hematocrit 37.0 - 48.5 % 27.5 (L)   MCV 82 - 98 fL 100 (H)   MCH 27.0 - 31.0 pg 33.1 (H)   MCHC 32.0 - 36.0 g/dL 33.1   RDW 11.5 - 14.5 % 17.4 (H)   Platelets 150 - 450 K/uL 97 (L)   MPV 9.2 - 12.9 fL 9.7   Platelet Estimate  Decreased !   Gran % 38.0 - 73.0 % 78.0 (H) (C) [1]   Lymph % 18.0 - 48.0 % 9.0 (L) (C) [2]   Mono % 4.0 - 15.0 % 4.0 (C) [3]   Eosinophil % 0.0 - 8.0 % 0.0   Basophil % 0.0 - 1.9 % 0.0 (C) [4]   Immature Granulocytes 0.0 - 0.5 % Test Not Performed (C) [5]   Other % 3   Immature Grans (Abs) 0.00 - 0.04 K/uL Test Not Performed (C) [6]   Bands % 3.0   Metamyelocytes % 2.0   Myelocytes % 1.0   NRBC 0 /100 WBC 1 !   Differential Method  Manual (C) [7]   Aniso  Slight   Poikilocytosis  Slight   Poly  Occasional   Hypo  Occasional   Teardrop Cells  Occasional   Pathologist Review Peripheral Smear  REVIEWED [8]   Sodium 136 - 145 mmol/L 138   Potassium 3.5 - 5.1 mmol/L 3.9   Chloride 95 - 110 mmol/L 102   CO2 23 - 29 mmol/L 24   Anion Gap 8 - 16 mmol/L 12   BUN 6 - 20 mg/dL 6   Creatinine 0.5 - 1.4 mg/dL 0.9   EGFR if non African American >60 mL/min/1.73 m^2 >60.0 [9]   EGFR if African American >60 mL/min/1.73 m^2 >60.0   Glucose 70 - 110 mg/dL 164 (H)   Calcium 8.7 - 10.5 mg/dL 9.0   Phosphorus 2.7 - 4.5 mg/dL 2.8   Magnesium 1.6 - 2.6 mg/dL 1.8   Alkaline Phosphatase 55 - 135 U/L 156 (H)   PROTEIN TOTAL 6.0 - 8.4 g/dL 6.6   Albumin 3.5 - 5.2 g/dL 3.7   BILIRUBIN TOTAL 0.1 - 1.0 mg/dL 0.2 [10]   AST 10 - 40 U/L 14   ALT 10 - 44 U/L 15     Diagnostic Results:  None    Assessment/Plan:     * B-cell  acute lymphoblastic leukemia  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 Jenkins considering 2nd/3rd generation TKI pending results  - Blincyto consent obtained in clinic  - Bactrim DS M-W-F OI ppx  - qshift neuro monitoring and vitals, close monitoring for CRS  - Daily CBC, CMP, Mag, Phos    Thrombocytopenia  - In setting of recent chemo / disease state  - Plt count 97k on 5/10  - Daily CBC, plan to transfuse to maintain plt >10k  - Hold Xarelto for platelets <50k     Urinary retention  - Relatively recent onsent urinary retention, otherwise without urinary sx  - Denies new drugs/new anticholinergics. Denies saddle anesthesia/incontinence/lower ext weakness  - Plan for UA, post-void bladder scan with further work up to follow as warranted    Anemia associated with chemotherapy  - in setting of antineoplastic therapy/disease state  - plan to transfuse to maintain Hgb > 7    Moderate major depression  - Continue home trintellix    Anxiety  - continue home trintellix and valium while inpatient    GERD (gastroesophageal reflux disease)  - home prilosec not on formulary. Will receive protonix while inpatient.    Hypertension  - continue home losartan while inpatient    Seizure disorder  - continue home oxcarbazepine while inpatient    Type 2 diabetes mellitus, without long-term current use of insulin  - hold home farxiga while inpatient. Can resume at discharge.  - low dose SSI, achs blood glucose monitoring, and diabetic diet while     Rheumatoid arthritis involving multiple sites  - continue home plaquenil.   - home leflunomide was recently discontinued by her rhemotologist    History of TIA (transient  ischemic attack)  - Continue home ASA and fenofibrate. Holding home statin while inpatient.    Hyperlipidemia  - holding home statin while inpatient    Paroxysmal atrial fibrillation  - continue home diltiazem and xarelto. Hold xarelto with plt count < 50K.      VTE Risk Mitigation (From admission, onward)    None          Disposition: Admit to inpatient    Jackie Sharma PA-C  Bone Marrow Transplant  Hematology  [facility] - Oncology ([facility])

## 2022-05-13 NOTE — PROGRESS NOTES
Pharmacist Patient Education Note    Blinatumumab chemotherapy regimen was discussed with the patient and her . Medication handouts for each agent were provided to the patient.    Administration instructions were discussed including IV continuous infusion over 28 days with hospital admission for the first 9 days of chemotherapy followed by outpatient infusion center visits for pump exchanges.    The following side effects were reviewed:   - myelosuppression including infection prevention and fevers  - infusion related reactions including fever, flushing, dizziness, headache, itching, chest tightness/pain  - cytokine release syndrome including fever, headache, nausea, and blood pressure changes  - neurologic changes including tremors, agitation, confusion, hallucinations, eye sight changes, seizures, and coma  - tumor lysis syndrome  - pancreatitis  - fatigue  - headaches  - changes to liver and renal function      Drug-drug interactions: none identified with blinatumumab    Please prescribe Bactrim DS 1 tablet MWF for infection prevention.    The patient concerns of possible urinary retention were addressed. I passed off this information to the admitting to team for potential workup.  All questions were answered.      Annel Horne, PharmD, BCPS, BCOP  Clinical Pharmacy Specialist   BMT/Hematology Oncology  SpectraLink: [id number]

## 2022-05-13 NOTE — PLAN OF CARE
POC reviewed with patient, no questions at this time. No acute events. VSS on room air, afebrile. Ambulates with x1 assist with rolling walker. Plan to start chemotherapy tomorrow. Port accessed. Calls appropriately for assistance.Will continue to monitor with frequent rounds and clustered care to promote rest.

## 2022-05-13 NOTE — ASSESSMENT & PLAN NOTE
- In setting of recent chemo / disease state  - Plt count 97k on 5/10  - Daily CBC, plan to transfuse to maintain plt >10k  - Hold Xarelto for platelets <50k

## 2022-05-13 NOTE — PROGRESS NOTES
HEMATOLOGIC MALIGNANCIES PROGRESS NOTE    IDENTIFYING STATEMENT   Deepti Davison) is a 51 y.o. female with a  of 1970 from [place], [place] with the diagnosis of B-ALL.     TELEMEDICINE DOCUMENTATION    The patient location is: [place] - patient traveled to [facility] for virtual visit  The chief complaint leading to consultation is: B-ALL    Visit type: audiovisual    Face to Face time with patient: 20 minutes  40 minutes of total time spent on the encounter, which includes face to face time and non-face to face time preparing to see the patient (eg, review of tests), Obtaining and/or reviewing separately obtained history, Documenting clinical information in the electronic or other health record, Independently interpreting results (not separately reported) and communicating results to the patient/family/caregiver, or Care coordination (not separately reported).         Each patient to whom he or she provides medical services by telemedicine is:  (1) informed of the relationship between the physician and patient and the respective role of any other health care provider with respect to management of the patient; and (2) notified that he or she may decline to receive medical services by telemedicine and may withdraw from such care at any time.    Notes:        ONCOLOGY HISTORY:    1. Precursor B-cell acute lymphoblastic leukemia (Ph+)   A. 2021: [place] [facility] [place] for [place]   B. 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    C. 2021 - 2021: Vincristine + imatinib induction; hospital course was complicated by acute hypoxemic respiratory failure which  resolved with diuresis and treatment of ALL   D. 1/3/2022: Bone marrow biopsy after induction shows normocellular marrow (60%) with no definite evidence of B-ALL; bcr-abl p190 fusion detected at 0.02% of total ABL1; FISH normal; MRD testing not sent   E. 1/20/2022: Begin consolidation therapy with [id number] protocol   F. 3/16/2022: BCR-ABL p190 transcript on peripheral blood negative   G. 4/13/2022: BCR-ABL p190 transcript on peripheral blood 0.63%   H. 5/4/2022: Bone marrow biopsy shows 50% cellular marrow with relapsed B-ALL; bone marrow aspirate shows 23.5% blasts; cytogenetics 46,XX,-9,t(9;22)(q34;q11.2),+isaias(22)t(9;22)[5] /46,XX[15]; FISH shows 9.5% nuclei with bcr/abl1 fusion; bcr/abl1 quantitative PCR shows 47% of total ABL1    2. History of TIA  3. Seizure disorder  4. Anxiety and depression  5. Chronic obstructive pulmonary disease  6. Paroxysmal atrial fibrillation  7. Implantable loop recorder and pacemaker in place  8. Peripheral artery disease  9. Diabetes mellitus, type 2  10. Gastroesophageal reflux disease  11. Gastroparesis  12. Rheumatoid arthritis  13. Osteoporosis  14. History of tobacco use     INTERVAL HISTORY:      Ms. Gonzalez returns to clinic for follow-up of Ph+ B-ALL. She is seen to review bone marrow biopsy results from last week. Her bcr-abl transcript had become positive again after having become negative, so we obtained bone marrow biopsy to assess for relapsed disease. Bone marrow biopsy confirms relapse. She is seen with her  to discuss her care.     Past Medical History, Past Social History and Past Family History have been reviewed and are unchanged except as noted in the interval history.    MEDICATIONS:     Current Outpatient Medications on File Prior to Visit   Medication Sig Dispense Refill    acyclovir (ZOVIRAX) 400 MG tablet Take 1 tablet (400 mg total) by mouth 2 (two) times daily. 60 tablet 11    artificial tears (ISOPTO TEARS) 0.5 % ophthalmic solution Place 1  drop into both eyes 4 (four) times daily as needed.      atorvastatin (LIPITOR) 80 MG tablet Take 80 mg by mouth once daily.      blood sugar diagnostic (ONETOUCH VERIO TEST STRIPS) Str SMARTSIG:Via Meter 1 to 2 Times Daily      busPIRone (BUSPAR) 10 MG tablet Take 10 mg by mouth 2 (two) times daily.      dapagliflozin (FARXIGA) 10 mg tablet Take 10 mg by mouth once daily.      diazePAM (VALIUM) 10 MG Tab Take 10 mg by mouth 2 (two) times daily as needed.      diltiaZEM (CARDIZEM) 90 MG tablet Take 1 tablet (90 mg total) by mouth every 6 (six) hours. 120 tablet 11    duke's soln (benadryl 30 mL, mylanta 30 mL, LIDOcaine 30 mL, nystatin 30 mL) 120mL Take 10 mLs by mouth 4 (four) times daily as needed (mouth pain). 120 mL 0    fenofibrate 160 MG Tab Take 160 mg by mouth once daily.      fluconazole (DIFLUCAN) 200 MG Tab Take 2 tablets (400 mg total) by mouth once daily. 60 tablet 2    gabapentin (NEURONTIN) 300 MG capsule Take 1 capsule (300 mg total) by mouth 3 (three) times daily. 90 capsule 11    hydrOXYchloroQUINE (PLAQUENIL) 200 mg tablet Take 200 mg by mouth once daily.      hydrOXYzine (ATARAX) 50 MG tablet Take 50 mg by mouth 2 (two) times a day.      imatinib (GLEEVEC) 100 MG Tab Take 2 tablets (200 mg total) by mouth once daily with 1 other imatinib prescription for 600 mg total on days 15-28 of each cycle during consolidation.Take with a meal and large glass of water 180 tablet 3    imatinib (GLEEVEC) 400 MG Tab Take 1 tablet (400 mg total) by mouth once daily with 1 other imatinib prescription for 600 mg total on days 15-28 of each cycle during consolidation. Take with a meal and large glass of water 90 tablet 3    levoFLOXacin (LEVAQUIN) 500 MG tablet Take 1 tablet (500 mg total) by mouth once daily. 30 tablet 2    losartan (COZAAR) 25 MG tablet Take 25 mg by mouth once daily.      omeprazole (PRILOSEC) 40 MG capsule Take 40 mg by mouth once daily.      ondansetron (ZOFRAN-ODT) 8 MG  TbDL Dissolve 1 tablet (8 mg total) by mouth every 12 (twelve) hours as needed (in case of chemo induced nausea). 30 tablet 1    ONETOUCH VERIO TEST STRIPS Strp SMARTSIG:Via Meter 1 to 2 Times Daily      OXcarbazepine (TRILEPTAL) 600 MG Tab Take 1,200 mg by mouth 2 (two) times daily.      oxyCODONE (ROXICODONE) 5 MG immediate release tablet Take 1 tablet (5 mg total) by mouth every 4 (four) hours as needed for Pain. 45 tablet 0    polyethylene glycol (GLYCOLAX) 17 gram/dose powder Dissolve one capful (17 g) in liquid and take by mouth daily as needed (constipation). (Patient not taking: No sig reported) 510 g 0    prochlorperazine (COMPAZINE) 5 MG tablet Take 1 tablet (5 mg total) by mouth every 6 (six) hours as needed for Nausea. 30 tablet 2    QUEtiapine (SEROQUEL) 200 MG Tab Take 200 mg by mouth every evening.      rivaroxaban (XARELTO) 20 mg Tab Take 1 tablet (20 mg total) by mouth daily with dinner or evening meal. Due to low platelet count, hold until instructed to resume by provider.      senna-docusate 8.6-50 mg (PERICOLACE) 8.6-50 mg per tablet Take 1 tablet by mouth 2 (two) times daily. 60 tablet 3    sumatriptan (IMITREX) 50 MG tablet Take 1 tablet (50 mg total) by mouth daily as needed (headache). 30 tablet 0    traMADoL (ULTRAM) 50 mg tablet Take 1 tablet (50 mg total) by mouth every 6 (six) hours as needed for Pain. 30 tablet 0    TRINTELLIX 20 mg Tab Take 1 tablet by mouth once daily.      [DISCONTINUED] aspirin (ECOTRIN) 81 MG EC tablet Take 1 tablet (81 mg total) by mouth once daily. Hold for platelet count less than 50 thousand. (Patient not taking: No sig reported)  0     No current facility-administered medications on file prior to visit.       ALLERGIES:   Review of patient's allergies indicates:   Allergen Reactions    Levetiracetam      Other reaction(s): Unknown  Other reaction(s): Unknown  Other reaction(s): Unknown        ROS:       Review of Systems   Constitutional: Positive  for fatigue (improved significantly). Negative for diaphoresis, fever and unexpected weight change.   HENT:   Negative for lump/mass and sore throat.    Eyes: Negative for icterus.   Respiratory: Negative for cough and shortness of breath.    Cardiovascular: Negative for palpitations.   Gastrointestinal: Positive for nausea and vomiting. Negative for abdominal distention, abdominal pain, constipation and diarrhea.   Genitourinary: Negative for dysuria and frequency.    Musculoskeletal: Negative for arthralgias, gait problem and myalgias.        Muscle weakness - diffuse   Skin: Negative for rash.   Neurological: Negative for dizziness, gait problem and headaches.   Hematological: Negative for adenopathy. Does not bruise/bleed easily.   Psychiatric/Behavioral: The patient is not nervous/anxious.        PHYSICAL EXAM:  There were no vitals filed for this visit.    KARNOFSKY PERFORMANCE STATUS 60%  ECOG 2    Physical Exam   Head and neck visualized and no abnormalities seen    LAB:   Results for orders placed or performed in visit on 05/10/22   CBC auto differential   Result Value Ref Range    WBC 29.05 (H) 3.90 - 12.70 K/uL    RBC 2.75 (L) 4.00 - 5.40 M/uL    Hemoglobin 9.1 (L) 12.0 - 16.0 g/dL    Hematocrit 27.5 (L) 37.0 - 48.5 %     (H) 82 - 98 fL    MCH 33.1 (H) 27.0 - 31.0 pg    MCHC 33.1 32.0 - 36.0 g/dL    RDW 17.4 (H) 11.5 - 14.5 %    Platelets 97 (L) 150 - 450 K/uL    MPV 9.7 9.2 - 12.9 fL    Immature Granulocytes Test Not Performed 0.0 - 0.5 %    Immature Grans (Abs) Test Not Performed 0.00 - 0.04 K/uL    nRBC 1 (A) 0 /100 WBC    Gran % 78.0 (H) 38.0 - 73.0 %    Lymph % 9.0 (L) 18.0 - 48.0 %    Mono % 4.0 4.0 - 15.0 %    Eosinophil % 0.0 0.0 - 8.0 %    Basophil % 0.0 0.0 - 1.9 %    Bands 3.0 %    Metamyelocytes 2.0 %    Myelocytes 1.0 %    Other 3 %    Platelet Estimate Decreased (A)     Aniso Slight     Poik Slight     Poly Occasional     Hypo Occasional     Tear Drop Cells Occasional     Differential  Method Manual    CMP   Result Value Ref Range    Sodium 138 136 - 145 mmol/L    Potassium 3.9 3.5 - 5.1 mmol/L    Chloride 102 95 - 110 mmol/L    CO2 24 23 - 29 mmol/L    Glucose 164 (H) 70 - 110 mg/dL    BUN 6 6 - 20 mg/dL    Creatinine 0.9 0.5 - 1.4 mg/dL    Calcium 9.0 8.7 - 10.5 mg/dL    Total Protein 6.6 6.0 - 8.4 g/dL    Albumin 3.7 3.5 - 5.2 g/dL    Total Bilirubin 0.2 0.1 - 1.0 mg/dL    Alkaline Phosphatase 156 (H) 55 - 135 U/L    AST 14 10 - 40 U/L    ALT 15 10 - 44 U/L    Anion Gap 12 8 - 16 mmol/L    eGFR if African American >60.0 >60 mL/min/1.73 m^2    eGFR if non African American >60.0 >60 mL/min/1.73 m^2   PHOSPHORUS   Result Value Ref Range    Phosphorus 2.8 2.7 - 4.5 mg/dL   Magnesium   Result Value Ref Range    Magnesium 1.8 1.6 - 2.6 mg/dL   BCR/ABL, p190, Quant, Minor, Monitor, blood   Result Value Ref Range    Specimen Type, p190, Quant, bld Blood    BCR/ABL Mutation, ASPE. Blood   Result Value Ref Range    BCR/ABL Specimen Type (Blood) Blood     BCRABL Fusion Form, Blood p190    Pathologist Review   Result Value Ref Range    Pathologist Review Peripheral Smear REVIEWED    Type & Screen   Result Value Ref Range    Group & Rh B POS     Indirect Deepika NEG        PROBLEMS ASSESSED THIS VISIT:    1. B-cell acute lymphoblastic leukemia    2. Thrombocytopenia    3. 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 We discussed that this carries very adverse overall prognosis.     For salvage, I recommend blinatumomab therapy with an alternative TKI. We obtained mutation testing for bcr-abl to help guide therapy. She was initially on imatinib as she presented with bilateral pleural effusions and pulmonary edema. We will consider either a 2nd generation TKI (e.g. dasatinib) or 3rd generation (ponatinib) pending results of testing.     Plan for admit on Friday for blinatumomab.     Anemia  Thrombocytopenia  Likely secondary to relapsed disease. Monitor.      Follow-up  Return Friday for admission for blinatumomab    Route Chart for Scheduling    BMT Chart Routing      Follow up with physician . Patient already scheduled for hospital admission   Follow up with [facility]    Labs    Imaging    Pharmacy appointment    Other referrals              Dayron Jenkins MD  Hematology and Stem Cell Transplant

## 2022-05-13 NOTE — ASSESSMENT & PLAN NOTE
- Relatively recent onsent urinary retention, otherwise without urinary sx  - Denies new drugs/new anticholinergics. Denies saddle anesthesia/incontinence/lower ext weakness  - Plan for UA, post-void bladder scan with further work up to follow as warranted

## 2022-05-14 PROBLEM — D89.832 CYTOKINE RELEASE SYNDROME, GRADE 2: Status: ACTIVE | Noted: 2022-05-14

## 2022-05-14 LAB
ABO + RH BLD: NORMAL
ALBUMIN SERPL BCP-MCNC: 3.3 G/DL (ref 3.5–5.2)
ALP SERPL-CCNC: 171 U/L (ref 55–135)
ALT SERPL W/O P-5'-P-CCNC: 21 U/L (ref 10–44)
ANION GAP SERPL CALC-SCNC: 11 MMOL/L (ref 8–16)
ANION GAP SERPL CALC-SCNC: 13 MMOL/L (ref 8–16)
ANISOCYTOSIS BLD QL SMEAR: SLIGHT
APTT BLDCRRT: 25.5 SEC (ref 21–32)
AST SERPL-CCNC: 48 U/L (ref 10–40)
BASO STIPL BLD QL SMEAR: ABNORMAL
BASOPHILS # BLD AUTO: ABNORMAL K/UL (ref 0–0.2)
BASOPHILS NFR BLD: 0 % (ref 0–1.9)
BILIRUB SERPL-MCNC: 0.2 MG/DL (ref 0.1–1)
BLD GP AB SCN CELLS X3 SERPL QL: NORMAL
BLD PROD TYP BPU: NORMAL
BLOOD UNIT EXPIRATION DATE: NORMAL
BLOOD UNIT TYPE CODE: 7300
BLOOD UNIT TYPE: NORMAL
BUN SERPL-MCNC: 4 MG/DL (ref 6–20)
BUN SERPL-MCNC: 6 MG/DL (ref 6–20)
CALCIUM SERPL-MCNC: 8.5 MG/DL (ref 8.7–10.5)
CALCIUM SERPL-MCNC: 8.8 MG/DL (ref 8.7–10.5)
CHLORIDE SERPL-SCNC: 104 MMOL/L (ref 95–110)
CHLORIDE SERPL-SCNC: 107 MMOL/L (ref 95–110)
CO2 SERPL-SCNC: 23 MMOL/L (ref 23–29)
CO2 SERPL-SCNC: 24 MMOL/L (ref 23–29)
CODING SYSTEM: NORMAL
CREAT SERPL-MCNC: 0.6 MG/DL (ref 0.5–1.4)
CREAT SERPL-MCNC: 0.7 MG/DL (ref 0.5–1.4)
DIFFERENTIAL METHOD: ABNORMAL
DISPENSE STATUS: NORMAL
EOSINOPHIL # BLD AUTO: ABNORMAL K/UL (ref 0–0.5)
EOSINOPHIL NFR BLD: 0 % (ref 0–8)
ERYTHROCYTE [DISTWIDTH] IN BLOOD BY AUTOMATED COUNT: 18.2 % (ref 11.5–14.5)
EST. GFR  (AFRICAN AMERICAN): >60 ML/MIN/1.73 M^2
EST. GFR  (AFRICAN AMERICAN): >60 ML/MIN/1.73 M^2
EST. GFR  (NON AFRICAN AMERICAN): >60 ML/MIN/1.73 M^2
EST. GFR  (NON AFRICAN AMERICAN): >60 ML/MIN/1.73 M^2
FIBRINOGEN PPP-MCNC: 328 MG/DL (ref 182–400)
GLUCOSE SERPL-MCNC: 131 MG/DL (ref 70–110)
GLUCOSE SERPL-MCNC: 81 MG/DL (ref 70–110)
HCT VFR BLD AUTO: 18.3 % (ref 37–48.5)
HGB BLD-MCNC: 6 G/DL (ref 12–16)
IMM GRANULOCYTES # BLD AUTO: ABNORMAL K/UL (ref 0–0.04)
IMM GRANULOCYTES NFR BLD AUTO: ABNORMAL % (ref 0–0.5)
INR PPP: 1.3 (ref 0.8–1.2)
LDH SERPL L TO P-CCNC: 1623 U/L (ref 110–260)
LYMPHOCYTES # BLD AUTO: ABNORMAL K/UL (ref 1–4.8)
LYMPHOCYTES NFR BLD: 10 % (ref 18–48)
MAGNESIUM SERPL-MCNC: 1.6 MG/DL (ref 1.6–2.6)
MCH RBC QN AUTO: 32.1 PG (ref 27–31)
MCHC RBC AUTO-ENTMCNC: 32.8 G/DL (ref 32–36)
MCV RBC AUTO: 98 FL (ref 82–98)
METAMYELOCYTES NFR BLD MANUAL: 0.5 %
MONOCYTES # BLD AUTO: ABNORMAL K/UL (ref 0.3–1)
MONOCYTES NFR BLD: 1 % (ref 4–15)
MYELOCYTES NFR BLD MANUAL: 2 %
NEUTROPHILS NFR BLD: 32.5 % (ref 38–73)
NEUTS BAND NFR BLD MANUAL: 9 %
NRBC BLD-RTO: 1 /100 WBC
NUM UNITS TRANS PACKED RBC: NORMAL
PHOSPHATE SERPL-MCNC: 2.8 MG/DL (ref 2.7–4.5)
PLATELET # BLD AUTO: 48 K/UL (ref 150–450)
PLATELET BLD QL SMEAR: ABNORMAL
PMV BLD AUTO: 10.6 FL (ref 9.2–12.9)
POCT GLUCOSE: 107 MG/DL (ref 70–110)
POCT GLUCOSE: 143 MG/DL (ref 70–110)
POCT GLUCOSE: 184 MG/DL (ref 70–110)
POLYCHROMASIA BLD QL SMEAR: ABNORMAL
POTASSIUM SERPL-SCNC: 2.8 MMOL/L (ref 3.5–5.1)
POTASSIUM SERPL-SCNC: 4 MMOL/L (ref 3.5–5.1)
PROT SERPL-MCNC: 5.4 G/DL (ref 6–8.4)
PROTHROMBIN TIME: 13.1 SEC (ref 9–12.5)
RBC # BLD AUTO: 1.87 M/UL (ref 4–5.4)
SODIUM SERPL-SCNC: 141 MMOL/L (ref 136–145)
SODIUM SERPL-SCNC: 141 MMOL/L (ref 136–145)
URATE SERPL-MCNC: 4.6 MG/DL (ref 2.4–5.7)
WBC # BLD AUTO: 67.89 K/UL (ref 3.9–12.7)
WBC OTHER NFR BLD MANUAL: 45 %

## 2022-05-14 PROCEDURE — 99233 PR SUBSEQUENT HOSPITAL CARE,LEVL III: ICD-10-PCS | Mod: ,,, | Performed by: INTERNAL MEDICINE

## 2022-05-14 PROCEDURE — 25000003 PHARM REV CODE 250: Performed by: STUDENT IN AN ORGANIZED HEALTH CARE EDUCATION/TRAINING PROGRAM

## 2022-05-14 PROCEDURE — 85610 PROTHROMBIN TIME: CPT | Performed by: NURSE PRACTITIONER

## 2022-05-14 PROCEDURE — 25000003 PHARM REV CODE 250: Performed by: INTERNAL MEDICINE

## 2022-05-14 PROCEDURE — 85384 FIBRINOGEN ACTIVITY: CPT | Performed by: NURSE PRACTITIONER

## 2022-05-14 PROCEDURE — 80048 BASIC METABOLIC PNL TOTAL CA: CPT | Performed by: STUDENT IN AN ORGANIZED HEALTH CARE EDUCATION/TRAINING PROGRAM

## 2022-05-14 PROCEDURE — 63600175 PHARM REV CODE 636 W HCPCS: Performed by: INTERNAL MEDICINE

## 2022-05-14 PROCEDURE — P9040 RBC LEUKOREDUCED IRRADIATED: HCPCS | Performed by: STUDENT IN AN ORGANIZED HEALTH CARE EDUCATION/TRAINING PROGRAM

## 2022-05-14 PROCEDURE — 84550 ASSAY OF BLOOD/URIC ACID: CPT | Performed by: NURSE PRACTITIONER

## 2022-05-14 PROCEDURE — 85730 THROMBOPLASTIN TIME PARTIAL: CPT | Performed by: NURSE PRACTITIONER

## 2022-05-14 PROCEDURE — 25000003 PHARM REV CODE 250: Performed by: NURSE PRACTITIONER

## 2022-05-14 PROCEDURE — 86920 COMPATIBILITY TEST SPIN: CPT | Performed by: STUDENT IN AN ORGANIZED HEALTH CARE EDUCATION/TRAINING PROGRAM

## 2022-05-14 PROCEDURE — 85007 BL SMEAR W/DIFF WBC COUNT: CPT | Performed by: NURSE PRACTITIONER

## 2022-05-14 PROCEDURE — 97530 THERAPEUTIC ACTIVITIES: CPT

## 2022-05-14 PROCEDURE — 99233 SBSQ HOSP IP/OBS HIGH 50: CPT | Mod: ,,, | Performed by: INTERNAL MEDICINE

## 2022-05-14 PROCEDURE — 83615 LACTATE (LD) (LDH) ENZYME: CPT | Performed by: NURSE PRACTITIONER

## 2022-05-14 PROCEDURE — 36430 TRANSFUSION BLD/BLD COMPNT: CPT

## 2022-05-14 PROCEDURE — 97165 OT EVAL LOW COMPLEX 30 MIN: CPT

## 2022-05-14 PROCEDURE — 36415 COLL VENOUS BLD VENIPUNCTURE: CPT | Performed by: STUDENT IN AN ORGANIZED HEALTH CARE EDUCATION/TRAINING PROGRAM

## 2022-05-14 PROCEDURE — 20600001 HC STEP DOWN PRIVATE ROOM

## 2022-05-14 PROCEDURE — 83735 ASSAY OF MAGNESIUM: CPT | Performed by: NURSE PRACTITIONER

## 2022-05-14 PROCEDURE — 84100 ASSAY OF PHOSPHORUS: CPT | Performed by: NURSE PRACTITIONER

## 2022-05-14 PROCEDURE — 63600175 PHARM REV CODE 636 W HCPCS: Performed by: STUDENT IN AN ORGANIZED HEALTH CARE EDUCATION/TRAINING PROGRAM

## 2022-05-14 PROCEDURE — 80053 COMPREHEN METABOLIC PANEL: CPT | Performed by: NURSE PRACTITIONER

## 2022-05-14 PROCEDURE — 85027 COMPLETE CBC AUTOMATED: CPT | Performed by: NURSE PRACTITIONER

## 2022-05-14 PROCEDURE — 63600175 PHARM REV CODE 636 W HCPCS: Performed by: NURSE PRACTITIONER

## 2022-05-14 PROCEDURE — 86901 BLOOD TYPING SEROLOGIC RH(D): CPT | Performed by: NURSE PRACTITIONER

## 2022-05-14 PROCEDURE — 87040 BLOOD CULTURE FOR BACTERIA: CPT | Mod: 59 | Performed by: STUDENT IN AN ORGANIZED HEALTH CARE EDUCATION/TRAINING PROGRAM

## 2022-05-14 PROCEDURE — 87086 URINE CULTURE/COLONY COUNT: CPT | Performed by: STUDENT IN AN ORGANIZED HEALTH CARE EDUCATION/TRAINING PROGRAM

## 2022-05-14 RX ORDER — SODIUM CHLORIDE 0.9 % (FLUSH) 0.9 %
10 SYRINGE (ML) INJECTION
Status: CANCELLED | OUTPATIENT
Start: 2022-05-28

## 2022-05-14 RX ORDER — HEPARIN 100 UNIT/ML
300 SYRINGE INTRAVENOUS
Status: CANCELLED | OUTPATIENT
Start: 2022-05-26

## 2022-05-14 RX ORDER — SODIUM CHLORIDE 0.9 % (FLUSH) 0.9 %
10 SYRINGE (ML) INJECTION
Status: CANCELLED | OUTPATIENT
Start: 2022-05-24

## 2022-05-14 RX ORDER — HEPARIN 100 UNIT/ML
300 SYRINGE INTRAVENOUS
Status: CANCELLED | OUTPATIENT
Start: 2022-06-04

## 2022-05-14 RX ORDER — SUMATRIPTAN 50 MG/1
50 TABLET, FILM COATED ORAL ONCE
Status: COMPLETED | OUTPATIENT
Start: 2022-05-14 | End: 2022-05-14

## 2022-05-14 RX ORDER — ONDANSETRON 4 MG/1
8 TABLET, FILM COATED ORAL
Status: DISCONTINUED | OUTPATIENT
Start: 2022-05-14 | End: 2022-05-16

## 2022-05-14 RX ORDER — POTASSIUM CHLORIDE 20 MEQ/1
40 TABLET, EXTENDED RELEASE ORAL
Status: COMPLETED | OUTPATIENT
Start: 2022-05-14 | End: 2022-05-14

## 2022-05-14 RX ORDER — CEFEPIME HYDROCHLORIDE 1 G/50ML
2 INJECTION, SOLUTION INTRAVENOUS
Status: DISCONTINUED | OUTPATIENT
Start: 2022-05-14 | End: 2022-05-17

## 2022-05-14 RX ORDER — PROCHLORPERAZINE EDISYLATE 5 MG/ML
5 INJECTION INTRAMUSCULAR; INTRAVENOUS EVERY 6 HOURS PRN
Status: DISCONTINUED | OUTPATIENT
Start: 2022-05-14 | End: 2022-05-18

## 2022-05-14 RX ORDER — ACETAMINOPHEN 325 MG/1
650 TABLET ORAL EVERY 6 HOURS PRN
Status: DISCONTINUED | OUTPATIENT
Start: 2022-05-14 | End: 2022-05-19 | Stop reason: HOSPADM

## 2022-05-14 RX ORDER — HEPARIN 100 UNIT/ML
300 SYRINGE INTRAVENOUS
Status: DISCONTINUED | OUTPATIENT
Start: 2022-05-14 | End: 2022-05-18

## 2022-05-14 RX ORDER — HEPARIN 100 UNIT/ML
300 SYRINGE INTRAVENOUS
Status: CANCELLED | OUTPATIENT
Start: 2022-05-28

## 2022-05-14 RX ORDER — MEPERIDINE HYDROCHLORIDE 50 MG/ML
25 INJECTION INTRAMUSCULAR; INTRAVENOUS; SUBCUTANEOUS ONCE
Status: COMPLETED | OUTPATIENT
Start: 2022-05-14 | End: 2022-05-14

## 2022-05-14 RX ORDER — SODIUM CHLORIDE 0.9 % (FLUSH) 0.9 %
10 SYRINGE (ML) INJECTION
Status: CANCELLED | OUTPATIENT
Start: 2022-05-26

## 2022-05-14 RX ORDER — HEPARIN 100 UNIT/ML
300 SYRINGE INTRAVENOUS
Status: CANCELLED | OUTPATIENT
Start: 2022-05-24

## 2022-05-14 RX ORDER — SODIUM CHLORIDE 0.9 % (FLUSH) 0.9 %
10 SYRINGE (ML) INJECTION
Status: CANCELLED | OUTPATIENT
Start: 2022-06-04

## 2022-05-14 RX ORDER — HYDROCODONE BITARTRATE AND ACETAMINOPHEN 500; 5 MG/1; MG/1
TABLET ORAL
Status: DISCONTINUED | OUTPATIENT
Start: 2022-05-14 | End: 2022-05-18

## 2022-05-14 RX ORDER — ONDANSETRON 8 MG/1
8 TABLET, ORALLY DISINTEGRATING ORAL EVERY 8 HOURS PRN
Status: DISCONTINUED | OUTPATIENT
Start: 2022-05-14 | End: 2022-05-18

## 2022-05-14 RX ORDER — HEPARIN 100 UNIT/ML
300 SYRINGE INTRAVENOUS
Status: CANCELLED | OUTPATIENT
Start: 2022-06-11

## 2022-05-14 RX ORDER — SODIUM CHLORIDE 0.9 % (FLUSH) 0.9 %
10 SYRINGE (ML) INJECTION
Status: CANCELLED | OUTPATIENT
Start: 2022-06-11

## 2022-05-14 RX ORDER — SODIUM CHLORIDE 0.9 % (FLUSH) 0.9 %
10 SYRINGE (ML) INJECTION
Status: DISCONTINUED | OUTPATIENT
Start: 2022-05-14 | End: 2022-05-18

## 2022-05-14 RX ADMIN — DEXAMETHASONE SODIUM PHOSPHATE 20 MG: 4 INJECTION, SOLUTION INTRA-ARTICULAR; INTRALESIONAL; INTRAMUSCULAR; INTRAVENOUS; SOFT TISSUE at 10:05

## 2022-05-14 RX ADMIN — HYDROXYZINE HYDROCHLORIDE 50 MG: 25 TABLET, FILM COATED ORAL at 04:05

## 2022-05-14 RX ADMIN — OXCARBAZEPINE 1200 MG: 600 TABLET, FILM COATED ORAL at 09:05

## 2022-05-14 RX ADMIN — QUETIAPINE FUMARATE 200 MG: 200 TABLET ORAL at 09:05

## 2022-05-14 RX ADMIN — GABAPENTIN 300 MG: 300 CAPSULE ORAL at 09:05

## 2022-05-14 RX ADMIN — ACYCLOVIR 400 MG: 200 CAPSULE ORAL at 09:05

## 2022-05-14 RX ADMIN — HYDROXYZINE HYDROCHLORIDE 50 MG: 25 TABLET, FILM COATED ORAL at 05:05

## 2022-05-14 RX ADMIN — INSULIN ASPART 1 UNITS: 100 INJECTION, SOLUTION INTRAVENOUS; SUBCUTANEOUS at 09:05

## 2022-05-14 RX ADMIN — BUSPIRONE HYDROCHLORIDE 10 MG: 10 TABLET ORAL at 09:05

## 2022-05-14 RX ADMIN — PROCHLORPERAZINE EDISYLATE 5 MG: 5 INJECTION INTRAMUSCULAR; INTRAVENOUS at 05:05

## 2022-05-14 RX ADMIN — SUMATRIPTAN SUCCINATE 50 MG: 50 TABLET ORAL at 06:05

## 2022-05-14 RX ADMIN — PANTOPRAZOLE SODIUM 40 MG: 40 TABLET, DELAYED RELEASE ORAL at 09:05

## 2022-05-14 RX ADMIN — GABAPENTIN 300 MG: 300 CAPSULE ORAL at 03:05

## 2022-05-14 RX ADMIN — DILTIAZEM HYDROCHLORIDE 90 MG: 30 TABLET, FILM COATED ORAL at 05:05

## 2022-05-14 RX ADMIN — SUMATRIPTAN SUCCINATE 50 MG: 50 TABLET ORAL at 10:05

## 2022-05-14 RX ADMIN — VORTIOXETINE 20 MG: 10 TABLET, FILM COATED ORAL at 09:05

## 2022-05-14 RX ADMIN — DILTIAZEM HYDROCHLORIDE 90 MG: 30 TABLET, FILM COATED ORAL at 04:05

## 2022-05-14 RX ADMIN — POTASSIUM CHLORIDE 40 MEQ: 1500 TABLET, EXTENDED RELEASE ORAL at 08:05

## 2022-05-14 RX ADMIN — ACETAMINOPHEN 650 MG: 325 TABLET ORAL at 09:05

## 2022-05-14 RX ADMIN — OXYCODONE 5 MG: 5 TABLET ORAL at 08:05

## 2022-05-14 RX ADMIN — ACETAMINOPHEN 650 MG: 325 TABLET ORAL at 03:05

## 2022-05-14 RX ADMIN — DILTIAZEM HYDROCHLORIDE 90 MG: 30 TABLET, FILM COATED ORAL at 09:05

## 2022-05-14 RX ADMIN — ONDANSETRON 8 MG: 8 TABLET, ORALLY DISINTEGRATING ORAL at 08:05

## 2022-05-14 RX ADMIN — CEFEPIME 2 G: 2 INJECTION, POWDER, FOR SOLUTION INTRAVENOUS at 09:05

## 2022-05-14 RX ADMIN — LOSARTAN POTASSIUM 25 MG: 25 TABLET, FILM COATED ORAL at 11:05

## 2022-05-14 RX ADMIN — ONDANSETRON HYDROCHLORIDE 8 MG: 4 TABLET, FILM COATED ORAL at 10:05

## 2022-05-14 RX ADMIN — DIAZEPAM 10 MG: 5 TABLET ORAL at 06:05

## 2022-05-14 RX ADMIN — GABAPENTIN 300 MG: 300 CAPSULE ORAL at 11:05

## 2022-05-14 RX ADMIN — MEPERIDINE HYDROCHLORIDE 25 MG: 50 INJECTION INTRAMUSCULAR; INTRAVENOUS; SUBCUTANEOUS at 03:05

## 2022-05-14 RX ADMIN — POTASSIUM CHLORIDE 40 MEQ: 1500 TABLET, EXTENDED RELEASE ORAL at 06:05

## 2022-05-14 RX ADMIN — ASPIRIN 81 MG: 81 TABLET, COATED ORAL at 09:05

## 2022-05-14 RX ADMIN — BLINATUMOMAB 9 MCG: KIT INTRAVENOUS at 11:05

## 2022-05-14 RX ADMIN — POLYETHYLENE GLYCOL 3350 17 G: 17 POWDER, FOR SOLUTION ORAL at 10:05

## 2022-05-14 RX ADMIN — SENNOSIDES AND DOCUSATE SODIUM 1 TABLET: 50; 8.6 TABLET ORAL at 10:05

## 2022-05-14 NOTE — PROGRESS NOTES
05/14/22 1700 05/14/22 1705   Vital Signs   Temp (!) 103.1 °F (39.5 °C)  --    Temp src Oral  --    Pulse (!) 145 (!) 145   Heart Rate Source Monitor Monitor   Resp (!) 32 (!) 36   SpO2 (!) 93 % 97 %   Pulse Oximetry Type Continuous  --        Pt continuing to fever, pt with some increased confusion and endorsing a headache. MD to bedside to assess. Per MD, to continue to hold Blinatumomab at this time. GIGI.

## 2022-05-14 NOTE — HOSPITAL COURSE
05/14/2022 C1D1 Blinatumumab started this morning. Developed rigors and fever up to 101.6 and tachycardia. Started cefepime and ordered cultures, CXR, UA.   05/15/2022 C1D2 Blinatumumab. Blina stopped yesterday due to fevers/tachycardia/confusion. Fevers resolved this morning and she is clinically improved. LFTs have increased and she has Grade 4 transaminitis, now meets criteria for Grade 3 CRS. Holding blina and starting dexamethasone.   05/16/2022 C1D3 Blincyto. Blincyto held on D1 over weekend due to Grade 4 CRS with fevers, tachycardia, AMS, and transaminitis. Pt continues on Dex q8hrs with downtrending LFTs. AMS has resolved, pt afebrile. Infectious work up negative thus far, continues on cefepine and afebrile overnight/stable vitals. Continuing neuro checks. Plan to continue steroids and re-challenge Blincyto tomorrow after completion of steroids.   05/17/2022: Blincyto on hold since 5/14 and patient receiving dex q 3. CRS and neurotoxicity now resolved. Plan to re-challenge with Blinctyo today. Since she received so little of initial infusion, will treat today as C1D1. She will receive a 20 mg dose of dex prior to starting. Will monitor closely for s/s of CRS and neurotoxicity. Blood glucose in 200s. Likely 2/2 steroids. Will hold off on adjusting insulin for now as she has completed steroids. Liver u/s showing hepatomegaly and cholelith, but no cholecystitis. Acute hep panel in process. Transaminitis improving with Blincyto clearance, so suspect that it is a side effect of Blincyto.  05/18/2022: Resumed Blincyto yesterday at ~ 1230 after receiving 20 mg dose of Dex. Spiked a temp of 102.2 at ~ 8 pm. T-max over night 102.9. Became hypotensive a little after midnight. Given 500 ml bolus, 1 liter bolus, and 500 ml bolus. BP normalized. Grade 2 CRS. Blincyto stopped. Cefepime started. Infection w/u ordered. Neg thus far. Coagulopathic and with Grade 3 transaminitis on this mornings labs. Giving 1 unit cryo for  fibrinogen of 131 and giving vit K. Will check DIC labs BID. Repeating hepatic function panel this afternoon. Given coagulapathy and transaminitis. Will monitor for at lease one more day. Will not resume Blincyto per Dr. Jenkins. Patient will f/u with Dr. Jenkins early next week to discuss treatment options.   05/19/2022: No longer coagulopathic. LFTs improving with Blincyto clearance. Hgb 5.0 on morning CBC. No nakia bleeding. Repeated CBC and hgb 7.4 on repeat, which is consistent with yesterday's level. Blood glucose improved off steroids. Plan to discharge home today. Will give 1 unit prbc prior to admission. She will f/u with Dr. Jenkins on Monday 5/23/22.

## 2022-05-14 NOTE — PROGRESS NOTES
[facility] ([facility])  Hematology  Bone Marrow Transplant  Progress Note    Patient Name: Deepti Gonzalez  Admission Date: 5/13/2022  Hospital Length of Stay: 1 days  Code Status: Full Code    Subjective:     Interval History: C1D1 Blinatumumab started this morning. Developed rigors and fever up to 101.6 and tachycardia. Started cefepime and ordered cultures, CXR, UA.     Objective:     Vital Signs (Most Recent):  Temp: (!) 103.1 °F (39.5 °C) (05/14/22 1700)  Pulse: (!) 145 (05/14/22 1705)  Resp: (!) 36 (05/14/22 1705)  BP: (!) 146/67 (05/14/22 1527)  SpO2: 97 % (05/14/22 1705)   Vital Signs (24h Range):  Temp:  [98 °F (36.7 °C)-103.1 °F (39.5 °C)] 103.1 °F (39.5 °C)  Pulse:  [] 145  Resp:  [16-36] 36  SpO2:  [93 %-100 %] 97 %  BP: (109-146)/(56-73) 146/67     Weight: 71.4 kg (157 lb 6.5 oz)  Body mass index is 27.02 kg/m².  Body surface area is 1.8 meters squared.    ECOG SCORE           [unfilled]    Intake/Output - Last 3 Shifts         05/12 0700  05/13 0659 05/13 0700  05/14 0659 05/14 0700  05/15 0659    P.O.  480 600    Blood   350    Total Intake(mL/kg)  480 (6.7) 950 (13.3)    Urine (mL/kg/hr)  800 1100 (1.5)    Total Output  800 1100    Net  -320 -150                   Physical Exam  Constitutional:       General: She is not in acute distress.     Appearance: She is well-developed. She is not diaphoretic.   HENT:      Head: Normocephalic and atraumatic.   Eyes:      General: No scleral icterus.     Conjunctiva/sclera: Conjunctivae normal.   Cardiovascular:      Rate and Rhythm: Normal rate and regular rhythm.      Heart sounds: Normal heart sounds. No murmur heard.    No friction rub. No gallop.   Pulmonary:      Effort: Pulmonary effort is normal. No respiratory distress.      Breath sounds: Normal breath sounds. No wheezing or rales.   Abdominal:      General: Bowel sounds are normal. There is no distension.      Palpations: Abdomen is soft.      Tenderness: There is no abdominal  tenderness. There is no guarding.   Musculoskeletal:         General: No tenderness. Normal range of motion.      Cervical back: Normal range of motion and neck supple.   Skin:     General: Skin is warm and dry.      Findings: No rash.   Neurological:      General: No focal deficit present.      Mental Status: She is alert and oriented to person, place, and time.   Psychiatric:         Behavior: Behavior normal.       Significant Labs:   CBC:   Recent Labs   Lab 05/14/22  0334   WBC 67.89*   HGB 6.0*   HCT 18.3*   PLT 48*    and CMP:   Recent Labs   Lab 05/14/22  0334 05/14/22  1339    141   K 2.8* 4.0    107   CO2 24 23   GLU 81 131*   BUN 6 4*   CREATININE 0.6 0.7   CALCIUM 8.5* 8.8   PROT 5.4*  --    ALBUMIN 3.3*  --    BILITOT 0.2  --    ALKPHOS 171*  --    AST 48*  --    ALT 21  --    ANIONGAP 13 11   EGFRNONAA >60.0 >60.0       Diagnostic Results:  I have reviewed all pertinent imaging results/findings within the past 24 hours.    Assessment/Plan:     * B-cell acute lymphoblastic leukemia  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 Jenkins considering 2nd/3rd generation TKI pending results  - Blincyto consent obtained in clinic  - Bactrim DS M-W-F OI ppx  - qshift neuro monitoring and vitals, close monitoring for CRS  - Daily CBC, CMP, Mag, Phos    Cytokine release syndrome, grade 2  Developed grade 2 CRS on 5/14 with fever to 103F, tachycardia to 140s, mild confusion. No hypotension.     - paused blinatumumab, will resume when symptoms resolve  - workup fever with cultures, CXR, UA  - empiric cefepime    Thrombocytopenia  - In setting of recent chemo / disease state  - Plt count 97k on  5/10  - Daily CBC, plan to transfuse to maintain plt >10k  - Hold Xarelto for platelets <50k     Urinary retention  - Relatively recent onsent urinary retention, otherwise without urinary sx  - Denies new drugs/new anticholinergics. Denies saddle anesthesia/incontinence/lower ext weakness  - Plan for UA, post-void bladder scan with further work up to follow as warranted    Anemia associated with chemotherapy  - in setting of antineoplastic therapy/disease state  - plan to transfuse to maintain Hgb > 7    Moderate major depression  - Continue home trintellix    Anxiety  - continue home trintellix and valium while inpatient    GERD (gastroesophageal reflux disease)  - home prilosec not on formulary. Will receive protonix while inpatient.    Hypertension  - continue home losartan while inpatient    Seizure disorder  - continue home oxcarbazepine while inpatient    Type 2 diabetes mellitus, without long-term current use of insulin  - hold home farxiga while inpatient. Can resume at discharge.  - low dose SSI, achs blood glucose monitoring, and diabetic diet while admitted    Rheumatoid arthritis involving multiple sites  - continue home plaquenil.   - home leflunomide was recently discontinued by her rhemotologist    History of TIA (transient ischemic attack)  - Continue home ASA and fenofibrate. Holding home statin while inpatient.    Hyperlipidemia  - holding home statin while inpatient    Paroxysmal atrial fibrillation  - continue home diltiazem and xarelto. Hold xarelto with plt count < 50K.        VTE Risk Mitigation (From admission, onward)         Ordered     heparin, porcine (PF) 100 unit/mL injection flush 300 Units  As needed (PRN)         05/14/22 0735     IP VTE HIGH RISK PATIENT  Once         05/13/22 1728     Place sequential compression device  Until discontinued         05/13/22 1728     Reason for No Pharmacological VTE Prophylaxis  Once        Question:  Reasons:  Answer:  Already adequately  anticoagulated on oral Anticoagulants    05/13/22 1570                Disposition: remain inpatient    Felisha Goodwin DO  Bone Marrow Transplant  [facility] ([facility])

## 2022-05-14 NOTE — PROGRESS NOTES
"   05/14/22 1527   Vital Signs   Temp 99.7 °F (37.6 °C)   Temp src Oral   Pulse (!) 120   Heart Rate Source Monitor   Resp (!) 32   SpO2 99 %   Pulse Oximetry Type Intermittent   O2 Device (Oxygen Therapy) room air   BP (!) 146/67   MAP (mmHg) 97   BP Location Right arm   BP Method Automatic   Patient Position Lying     Patient called in primary nurse and stated she felt "really cold." Pt visibly shaking and RR = 32. All other vitals stable at this time. Blinatumomab paused with a total of 41.5 ml administered to this point. MD notified. MD to come top bedside to assess. WCTM.   "

## 2022-05-14 NOTE — PLAN OF CARE
"Pt alert and oriented x4. 94% saturation on room air at this time. Ambulatory with the aid of a walker. Continent B/B, voided, no BM. Medicated pt with several PRN's for c/o pain, anxiety and headache. H&H low this AM, awaiting blood transfusion. K+ repleted. Education provided. Pt had declined seizure precaution. Pt said her kind of seizure is "non-epileptic" Instead she said she just stares without any movements or jerking. No seizure activity observed overnight.VSS. Spouse at bedside.Continue to monitor.  "

## 2022-05-14 NOTE — SUBJECTIVE & OBJECTIVE
Subjective:     Interval History: C1D1 Blinatumumab started this morning. Developed rigors and fever up to 101.6 and tachycardia. Started cefepime and ordered cultures, CXR, UA.     Objective:     Vital Signs (Most Recent):  Temp: (!) 103.1 °F (39.5 °C) (05/14/22 1700)  Pulse: (!) 145 (05/14/22 1705)  Resp: (!) 36 (05/14/22 1705)  BP: (!) 146/67 (05/14/22 1527)  SpO2: 97 % (05/14/22 1705)   Vital Signs (24h Range):  Temp:  [98 °F (36.7 °C)-103.1 °F (39.5 °C)] 103.1 °F (39.5 °C)  Pulse:  [] 145  Resp:  [16-36] 36  SpO2:  [93 %-100 %] 97 %  BP: (109-146)/(56-73) 146/67     Weight: 71.4 kg (157 lb 6.5 oz)  Body mass index is 27.02 kg/m².  Body surface area is 1.8 meters squared.    ECOG SCORE           [unfilled]    Intake/Output - Last 3 Shifts         05/12 0700  05/13 0659 05/13 0700  05/14 0659 05/14 0700  05/15 0659    P.O.  480 600    Blood   350    Total Intake(mL/kg)  480 (6.7) 950 (13.3)    Urine (mL/kg/hr)  800 1100 (1.5)    Total Output  800 1100    Net  -320 -150                   Physical Exam  Constitutional:       General: She is not in acute distress.     Appearance: She is well-developed. She is not diaphoretic.   HENT:      Head: Normocephalic and atraumatic.   Eyes:      General: No scleral icterus.     Conjunctiva/sclera: Conjunctivae normal.   Cardiovascular:      Rate and Rhythm: Normal rate and regular rhythm.      Heart sounds: Normal heart sounds. No murmur heard.    No friction rub. No gallop.   Pulmonary:      Effort: Pulmonary effort is normal. No respiratory distress.      Breath sounds: Normal breath sounds. No wheezing or rales.   Abdominal:      General: Bowel sounds are normal. There is no distension.      Palpations: Abdomen is soft.      Tenderness: There is no abdominal tenderness. There is no guarding.   Musculoskeletal:         General: No tenderness. Normal range of motion.      Cervical back: Normal range of motion and neck supple.   Skin:     General: Skin is warm and dry.       Findings: No rash.   Neurological:      General: No focal deficit present.      Mental Status: She is alert and oriented to person, place, and time.   Psychiatric:         Behavior: Behavior normal.       Significant Labs:   CBC:   Recent Labs   Lab 05/14/22  0334   WBC 67.89*   HGB 6.0*   HCT 18.3*   PLT 48*    and CMP:   Recent Labs   Lab 05/14/22 0334 05/14/22  1339    141   K 2.8* 4.0    107   CO2 24 23   GLU 81 131*   BUN 6 4*   CREATININE 0.6 0.7   CALCIUM 8.5* 8.8   PROT 5.4*  --    ALBUMIN 3.3*  --    BILITOT 0.2  --    ALKPHOS 171*  --    AST 48*  --    ALT 21  --    ANIONGAP 13 11   EGFRNONAA >60.0 >60.0       Diagnostic Results:  I have reviewed all pertinent imaging results/findings within the past 24 hours.

## 2022-05-14 NOTE — NURSING
blinatumomab (BLINCYTO) 9 mcg, stabilizer 5.5 mL in sodium chloride 0.9% 240 mL chemo infusion (270 mL total volume) started through R chest port noted for having positive blood return. Pre-meds administered as ordered. Neuro check WNL. Pt baseline with intermittent headache though headache is resolved with PRN meds.  Chemotherapy dosage and BSA checked by two chemotherapy certified nurses prior to administration.  Chemotherapy education done prior to hanging of chemotherapy.  Chemotherapeutic precautions in place throughout therapy.  Will continue to monitor.

## 2022-05-14 NOTE — CARE UPDATE
"RAPID RESPONSE NURSE CHART REVIEW        Chart Reviewed: 05/14/2022, 4:32 PM    MRN: [id number]  Bed: 844/844 A    Dx: B-cell acute lymphoblastic leukemia    Deepti Gonzalez has a past medical history of Arthritis, Cancer, COPD (chronic obstructive pulmonary disease), Hypertension, and Stroke.    Last VS: BP (!) 146/67 (BP Location: Right arm, Patient Position: Lying)   Pulse (!) 136   Temp (!) 101.6 °F (38.7 °C) (Oral)   Resp (!) 32   Ht 5' 4" (1.626 m)   Wt 71.4 kg (157 lb 6.5 oz)   SpO2 96%   Breastfeeding No   BMI 27.02 kg/m²     24H Vital Sign Range:  Temp:  [98 °F (36.7 °C)-101.6 °F (38.7 °C)]   Pulse:  []   Resp:  [16-32]   BP: (109-146)/(56-73)   SpO2:  [93 %-100 %]     Level of Consciousness (AVPU): alert    Recent Labs     05/14/22  0334   WBC 67.89*   HGB 6.0*   HCT 18.3*   PLT 48*       Recent Labs     05/14/22  0334 05/14/22  1339    141   K 2.8* 4.0    107   CO2 24 23   CREATININE 0.6 0.7   GLU 81 131*   PHOS 2.8  --    MG 1.6  --         No results for input(s): PH, PCO2, PO2, HCO3, POCSATURATED, BE in the last 72 hours.     OXYGEN:        O2 Device (Oxygen Therapy): room air    MEWS score: 8    Charge RN, Carlos contacted. No concerns verbalized at this time. Instructed to call [phone] for further concerns or assistance.    Abiola Garcia RN        "

## 2022-05-14 NOTE — PT/OT/SLP PROGRESS
Physical Therapy      Patient Name:  Deepti Gonzalez   MRN:  [id number]    Patient not seen today secondary to  (pt receiving PRBC this AM and working with OT. OT reports increased fatigue following OT session.). Will follow-up for PT evaluation at next scheduled session as able.    5/14/2022

## 2022-05-14 NOTE — PLAN OF CARE
Problem: Occupational Therapy  Goal: Occupational Therapy Goal  Description: Goals to be met by: 05-30-22     Patient will increase functional independence with ADLs by performing:    UE Dressing with Set-up Assistance.  LE Dressing with Supervision.  Grooming while standing at sink with Supervision.  Toileting from toilet with Supervision for hygiene and clothing management.   Stand pivot transfers with Supervision with rollator  Toilet transfer to toilet with Supervision.  Pt. To be I with HEP to improve level of endurance    Outcome: Ongoing, Progressing

## 2022-05-14 NOTE — ASSESSMENT & PLAN NOTE
- hold home farxiga while inpatient. Can resume at discharge.  - low dose SSI, achs blood glucose monitoring, and diabetic diet while admitted

## 2022-05-14 NOTE — ASSESSMENT & PLAN NOTE
Developed grade 2 CRS on 5/14 with fever to 103F, tachycardia to 140s, mild confusion. No hypotension.     - paused blinatumumab, will resume when symptoms resolve  - workup fever with cultures, CXR, UA  - empiric cefepime

## 2022-05-14 NOTE — PT/OT/SLP EVAL
Occupational Therapy   Evaluation/tx    Name: Deepti Gonzalez  MRN: [id number]  Admitting Diagnosis:  B-cell acute lymphoblastic leukemia  Recent Surgery: * No surgery found *      Recommendations:     Discharge Recommendations: home health OT  Discharge Equipment Recommendations:     Barriers to discharge:  None    Assessment:     Deepti Gonzalez is a 51 y.o. female with a medical diagnosis of B-cell acute lymphoblastic leukemia.  She presents with weakness and requires close CGA for mobility and requires standing rest breaks as needed. Pt. Tolerated evaluation well overall but did exhibits periods of fatigue and some SOB noted with activity. Pt. Would benefit from continued OT services to maximize levelof I and safety with ADL tasks.  Performance deficits affecting function: weakness, impaired endurance, impaired self care skills, impaired functional mobilty, gait instability, pain.      Rehab Prognosis: Good; patient would benefit from acute skilled OT services to address these deficits and reach maximum level of function.       Plan:     Patient to be seen 3 x/week to address the above listed problems via self-care/home management, therapeutic activities, therapeutic exercises, neuromuscular re-education  · Plan of Care Expires: 06/13/22  · Plan of Care Reviewed with: patient, spouse    Subjective     Chief Complaint: pt. Reported some pain and fatigue  Patient/Family Comments/goals: pt. Wanting to get better     Occupational Profile:  Living Environment: Pt. Resides in ss house with no steps. Someone is always with patient. Pt. Has a Tub shower with TTB.  Pt. Also reported having an elevated commode  Previous level of function: pt. Reports that her spouse typically assists pt. In and out of bath tub with use of bench. Pt. Was able to perform own ADL task of dressing and ambulated with rollator in house.   Roles and Routines: caretaker of self , spouse, mother  Equipment Used at Home:  rollator, bath bench,  raised toilet  Assistance upon Discharge: family    Pain/Comfort:  · Pain Rating 1: 7/10  · Location 1: back  · Pain Addressed 1: Reposition, Distraction, Pre-medicate for activity, Nurse notified  · Pain Rating Post-Intervention 1: 7/10    Patients cultural, spiritual, Jew conflicts given the current situation: no    Objective:     Communicated with: nurse prior to session.  Patient found supine with peripheral IV upon OT entry to room. Pt. Receiving blood; family present    General Precautions: Standard, fall   Orthopedic Precautions:N/A   Braces: N/A  Respiratory Status: Room air    Occupational Performance:    Bed Mobility:    · Patient completed Supine to Sit with stand by assistance    Functional Mobility/Transfers:  · Patient completed Sit <> Stand Transfer with stand by assistance  with  4 wheeled walker   · Patient completed Bed <> Chair Transfer using Stand Pivot technique with contact guard assistance with 4 wheeled walker  · Functional Mobility: pt. Ambulated ~ 130 feet with rollator (to new room per nursing request) with CGA and 1 standing rest break required    Activities of Daily Living:  · Lower Body Dressing: stand by assistance to don shoes seated EOB    Cognitive/Visual Perceptual:  Cognitive/Psychosocial Skills:     -       Oriented to: Person, Place, Time and Situation   -       Follows Commands/attention:Follows multistep  commands  -       Communication: clear/fluent  -       Memory: No Deficits noted  -       Safety awareness/insight to disability: intact   -       Mood/Affect/Coping skills/emotional control: Appropriate to situation  Visual/Perceptual:      -wears glasses    Physical Exam:  Balance: -       sit: good; stand SBA/CGA with rollator  Postural examination/scapula alignment:    -       Rounded shoulders  -       Posterior pelvic tilt  Skin integrity: Visible skin intact  Edema:  None noted  Dominant hand: -       right  Upper Extremity Range of Motion:     -       Right  Upper Extremity: WNL  -       Left Upper Extremity: WNL   Strength:    -       Right Upper Extremity: WFL  -       Left Upper Extremity: WFL    AMPAC 6 Click ADL:  AMPAC Total Score: 20    Treatment & Education:  Pt. Educated on role of OT and POC  Pt. Educated on safety with mobility and ADL tasks  Education:    Patient left up in chair with all lines intact, call button in reach, nurse notified and spouse present    GOALS:   Multidisciplinary Problems     Occupational Therapy Goals        Problem: Occupational Therapy    Goal Priority Disciplines Outcome Interventions   Occupational Therapy Goal     OT, PT/OT     Description: Goals to be met by: 05-30-22     Patient will increase functional independence with ADLs by performing:    UE Dressing with Set-up Assistance.  LE Dressing with Supervision.  Grooming while standing at sink with Supervision.  Toileting from toilet with Supervision for hygiene and clothing management.   Stand pivot transfers with Supervision with rollator  Toilet transfer to toilet with Supervision.  Pt. To be I with HEP to improve level of endurance                     History:     Past Medical History:   Diagnosis Date    Arthritis     Cancer     COPD (chronic obstructive pulmonary disease)     Hypertension     Stroke     TIA x 4       Past Surgical History:   Procedure Laterality Date    APPENDECTOMY      HYSTERECTOMY      ROTATOR CUFF REPAIR Bilateral     TONSILLECTOMY      TUBAL LIGATION         Time Tracking:     OT Date of Treatment: 05/14/22  OT Start Time: 0812  OT Stop Time: 0830  OT Total Time (min): 18 min    Billable Minutes:Evaluation 10  Therapeutic Activity 8    5/14/2022

## 2022-05-14 NOTE — PLAN OF CARE
Pt involved in plan of care and communicating needs throughout shift. 1 u RBCs transfused this am. C1D1 Blinatumomab started; see previous progress notes for details. Bed alarm set at this time and  at bedside. T-max = 103.1, septic workup ordered. PRN Zofran and PRN Compazine given today. Potassium replaced PO. Pt baseline with intermittent headache, moderate relief from PRN Sumatriptan. Up in room and to bathroom with standby assistance; pain controlled with PRN Oxy. Accuchecks ACHS. Tolerating diet, voiding without difficulty. Pt remaining free from falls or injury throughout shift; bed locked and in lowest position; call light within reach.  Pt instructed to call for assistance as needed.  Q1H rounding done on pt.

## 2022-05-15 PROBLEM — D89.833 CYTOKINE RELEASE SYNDROME, GRADE 3: Status: ACTIVE | Noted: 2022-05-14

## 2022-05-15 LAB
ALBUMIN SERPL BCP-MCNC: 3.1 G/DL (ref 3.5–5.2)
ALBUMIN SERPL BCP-MCNC: 3.6 G/DL (ref 3.5–5.2)
ALP SERPL-CCNC: 303 U/L (ref 55–135)
ALP SERPL-CCNC: 454 U/L (ref 55–135)
ALT SERPL W/O P-5'-P-CCNC: 534 U/L (ref 10–44)
ALT SERPL W/O P-5'-P-CCNC: 814 U/L (ref 10–44)
ANION GAP SERPL CALC-SCNC: 15 MMOL/L (ref 8–16)
ANION GAP SERPL CALC-SCNC: 8 MMOL/L (ref 8–16)
ANISOCYTOSIS BLD QL SMEAR: SLIGHT
APTT BLDCRRT: 23.4 SEC (ref 21–32)
AST SERPL-CCNC: 1076 U/L (ref 10–40)
AST SERPL-CCNC: 290 U/L (ref 10–40)
BASOPHILS # BLD AUTO: ABNORMAL K/UL (ref 0–0.2)
BASOPHILS NFR BLD: 0 % (ref 0–1.9)
BILIRUB SERPL-MCNC: 0.3 MG/DL (ref 0.1–1)
BILIRUB SERPL-MCNC: 0.4 MG/DL (ref 0.1–1)
BUN SERPL-MCNC: 11 MG/DL (ref 6–20)
BUN SERPL-MCNC: 16 MG/DL (ref 6–20)
CALCIUM SERPL-MCNC: 8.2 MG/DL (ref 8.7–10.5)
CALCIUM SERPL-MCNC: 9.1 MG/DL (ref 8.7–10.5)
CHLORIDE SERPL-SCNC: 104 MMOL/L (ref 95–110)
CHLORIDE SERPL-SCNC: 105 MMOL/L (ref 95–110)
CO2 SERPL-SCNC: 21 MMOL/L (ref 23–29)
CO2 SERPL-SCNC: 25 MMOL/L (ref 23–29)
CREAT SERPL-MCNC: 0.7 MG/DL (ref 0.5–1.4)
CREAT SERPL-MCNC: 0.9 MG/DL (ref 0.5–1.4)
DIFFERENTIAL METHOD: ABNORMAL
EOSINOPHIL # BLD AUTO: ABNORMAL K/UL (ref 0–0.5)
EOSINOPHIL NFR BLD: 0 % (ref 0–8)
ERYTHROCYTE [DISTWIDTH] IN BLOOD BY AUTOMATED COUNT: 20 % (ref 11.5–14.5)
EST. GFR  (AFRICAN AMERICAN): >60 ML/MIN/1.73 M^2
EST. GFR  (AFRICAN AMERICAN): >60 ML/MIN/1.73 M^2
EST. GFR  (NON AFRICAN AMERICAN): >60 ML/MIN/1.73 M^2
EST. GFR  (NON AFRICAN AMERICAN): >60 ML/MIN/1.73 M^2
FIBRINOGEN PPP-MCNC: 399 MG/DL (ref 182–400)
GLUCOSE SERPL-MCNC: 165 MG/DL (ref 70–110)
GLUCOSE SERPL-MCNC: 177 MG/DL (ref 70–110)
HCT VFR BLD AUTO: 28.5 % (ref 37–48.5)
HGB BLD-MCNC: 9.6 G/DL (ref 12–16)
IMM GRANULOCYTES # BLD AUTO: ABNORMAL K/UL (ref 0–0.04)
IMM GRANULOCYTES NFR BLD AUTO: ABNORMAL % (ref 0–0.5)
INR PPP: 1.3 (ref 0.8–1.2)
LDH SERPL L TO P-CCNC: 3853 U/L (ref 110–260)
LYMPHOCYTES # BLD AUTO: ABNORMAL K/UL (ref 1–4.8)
LYMPHOCYTES NFR BLD: 6 % (ref 18–48)
MAGNESIUM SERPL-MCNC: 1.9 MG/DL (ref 1.6–2.6)
MCH RBC QN AUTO: 31.5 PG (ref 27–31)
MCHC RBC AUTO-ENTMCNC: 33.7 G/DL (ref 32–36)
MCV RBC AUTO: 93 FL (ref 82–98)
METAMYELOCYTES NFR BLD MANUAL: 1.5 %
MONOCYTES # BLD AUTO: ABNORMAL K/UL (ref 0.3–1)
MONOCYTES NFR BLD: 1 % (ref 4–15)
MYELOCYTES NFR BLD MANUAL: 2.5 %
NEUTROPHILS NFR BLD: 22.5 % (ref 38–73)
NEUTS BAND NFR BLD MANUAL: 7.5 %
NRBC BLD-RTO: 0 /100 WBC
PHOSPHATE SERPL-MCNC: 4.3 MG/DL (ref 2.7–4.5)
PLATELET # BLD AUTO: 23 K/UL (ref 150–450)
PLATELET BLD QL SMEAR: ABNORMAL
PMV BLD AUTO: 9.4 FL (ref 9.2–12.9)
POTASSIUM SERPL-SCNC: 4 MMOL/L (ref 3.5–5.1)
POTASSIUM SERPL-SCNC: 4.1 MMOL/L (ref 3.5–5.1)
PROMYELOCYTES NFR BLD MANUAL: 0.5 %
PROT SERPL-MCNC: 5.4 G/DL (ref 6–8.4)
PROT SERPL-MCNC: 6.3 G/DL (ref 6–8.4)
PROTHROMBIN TIME: 12.9 SEC (ref 9–12.5)
RBC # BLD AUTO: 3.05 M/UL (ref 4–5.4)
SODIUM SERPL-SCNC: 138 MMOL/L (ref 136–145)
SODIUM SERPL-SCNC: 140 MMOL/L (ref 136–145)
URATE SERPL-MCNC: 5 MG/DL (ref 2.4–5.7)
WBC # BLD AUTO: 46.27 K/UL (ref 3.9–12.7)
WBC OTHER NFR BLD MANUAL: 59 %

## 2022-05-15 PROCEDURE — 84100 ASSAY OF PHOSPHORUS: CPT | Performed by: NURSE PRACTITIONER

## 2022-05-15 PROCEDURE — 83735 ASSAY OF MAGNESIUM: CPT | Performed by: NURSE PRACTITIONER

## 2022-05-15 PROCEDURE — 20600001 HC STEP DOWN PRIVATE ROOM

## 2022-05-15 PROCEDURE — 80074 ACUTE HEPATITIS PANEL: CPT | Performed by: STUDENT IN AN ORGANIZED HEALTH CARE EDUCATION/TRAINING PROGRAM

## 2022-05-15 PROCEDURE — 80053 COMPREHEN METABOLIC PANEL: CPT | Performed by: NURSE PRACTITIONER

## 2022-05-15 PROCEDURE — 99233 PR SUBSEQUENT HOSPITAL CARE,LEVL III: ICD-10-PCS | Mod: ,,, | Performed by: INTERNAL MEDICINE

## 2022-05-15 PROCEDURE — 97116 GAIT TRAINING THERAPY: CPT

## 2022-05-15 PROCEDURE — 25000003 PHARM REV CODE 250: Performed by: INTERNAL MEDICINE

## 2022-05-15 PROCEDURE — 85007 BL SMEAR W/DIFF WBC COUNT: CPT | Performed by: NURSE PRACTITIONER

## 2022-05-15 PROCEDURE — 63600175 PHARM REV CODE 636 W HCPCS: Performed by: NURSE PRACTITIONER

## 2022-05-15 PROCEDURE — 63600175 PHARM REV CODE 636 W HCPCS: Performed by: STUDENT IN AN ORGANIZED HEALTH CARE EDUCATION/TRAINING PROGRAM

## 2022-05-15 PROCEDURE — 25000003 PHARM REV CODE 250: Performed by: NURSE PRACTITIONER

## 2022-05-15 PROCEDURE — 84550 ASSAY OF BLOOD/URIC ACID: CPT | Performed by: NURSE PRACTITIONER

## 2022-05-15 PROCEDURE — 97161 PT EVAL LOW COMPLEX 20 MIN: CPT

## 2022-05-15 PROCEDURE — 25000003 PHARM REV CODE 250: Performed by: STUDENT IN AN ORGANIZED HEALTH CARE EDUCATION/TRAINING PROGRAM

## 2022-05-15 PROCEDURE — 85730 THROMBOPLASTIN TIME PARTIAL: CPT | Performed by: STUDENT IN AN ORGANIZED HEALTH CARE EDUCATION/TRAINING PROGRAM

## 2022-05-15 PROCEDURE — 83615 LACTATE (LD) (LDH) ENZYME: CPT | Performed by: NURSE PRACTITIONER

## 2022-05-15 PROCEDURE — 80053 COMPREHEN METABOLIC PANEL: CPT | Mod: 91 | Performed by: STUDENT IN AN ORGANIZED HEALTH CARE EDUCATION/TRAINING PROGRAM

## 2022-05-15 PROCEDURE — 85027 COMPLETE CBC AUTOMATED: CPT | Performed by: NURSE PRACTITIONER

## 2022-05-15 PROCEDURE — 85384 FIBRINOGEN ACTIVITY: CPT | Performed by: STUDENT IN AN ORGANIZED HEALTH CARE EDUCATION/TRAINING PROGRAM

## 2022-05-15 PROCEDURE — 99233 SBSQ HOSP IP/OBS HIGH 50: CPT | Mod: ,,, | Performed by: INTERNAL MEDICINE

## 2022-05-15 PROCEDURE — 85610 PROTHROMBIN TIME: CPT | Performed by: STUDENT IN AN ORGANIZED HEALTH CARE EDUCATION/TRAINING PROGRAM

## 2022-05-15 RX ADMIN — CEFEPIME 2 G: 2 INJECTION, POWDER, FOR SOLUTION INTRAVENOUS at 04:05

## 2022-05-15 RX ADMIN — DILTIAZEM HYDROCHLORIDE 90 MG: 30 TABLET, FILM COATED ORAL at 09:05

## 2022-05-15 RX ADMIN — OXCARBAZEPINE 1200 MG: 600 TABLET, FILM COATED ORAL at 09:05

## 2022-05-15 RX ADMIN — LOSARTAN POTASSIUM 25 MG: 25 TABLET, FILM COATED ORAL at 11:05

## 2022-05-15 RX ADMIN — INSULIN ASPART 4 UNITS: 100 INJECTION, SOLUTION INTRAVENOUS; SUBCUTANEOUS at 06:05

## 2022-05-15 RX ADMIN — BUSPIRONE HYDROCHLORIDE 10 MG: 10 TABLET ORAL at 09:05

## 2022-05-15 RX ADMIN — DEXAMETHASONE SODIUM PHOSPHATE 8 MG: 4 INJECTION INTRA-ARTICULAR; INTRALESIONAL; INTRAMUSCULAR; INTRAVENOUS; SOFT TISSUE at 09:05

## 2022-05-15 RX ADMIN — DILTIAZEM HYDROCHLORIDE 90 MG: 30 TABLET, FILM COATED ORAL at 03:05

## 2022-05-15 RX ADMIN — VORTIOXETINE 20 MG: 10 TABLET, FILM COATED ORAL at 11:05

## 2022-05-15 RX ADMIN — DEXAMETHASONE SODIUM PHOSPHATE 8 MG: 4 INJECTION INTRA-ARTICULAR; INTRALESIONAL; INTRAMUSCULAR; INTRAVENOUS; SOFT TISSUE at 06:05

## 2022-05-15 RX ADMIN — ONDANSETRON HYDROCHLORIDE 8 MG: 4 TABLET, FILM COATED ORAL at 09:05

## 2022-05-15 RX ADMIN — GABAPENTIN 300 MG: 300 CAPSULE ORAL at 03:05

## 2022-05-15 RX ADMIN — PANTOPRAZOLE SODIUM 40 MG: 40 TABLET, DELAYED RELEASE ORAL at 09:05

## 2022-05-15 RX ADMIN — CEFEPIME 2 G: 2 INJECTION, POWDER, FOR SOLUTION INTRAVENOUS at 09:05

## 2022-05-15 RX ADMIN — HYDROXYZINE HYDROCHLORIDE 50 MG: 25 TABLET, FILM COATED ORAL at 03:05

## 2022-05-15 RX ADMIN — DILTIAZEM HYDROCHLORIDE 90 MG: 30 TABLET, FILM COATED ORAL at 06:05

## 2022-05-15 RX ADMIN — ACYCLOVIR 400 MG: 200 CAPSULE ORAL at 09:05

## 2022-05-15 RX ADMIN — SENNOSIDES AND DOCUSATE SODIUM 1 TABLET: 50; 8.6 TABLET ORAL at 06:05

## 2022-05-15 RX ADMIN — ASPIRIN 81 MG: 81 TABLET, COATED ORAL at 09:05

## 2022-05-15 RX ADMIN — GABAPENTIN 300 MG: 300 CAPSULE ORAL at 11:05

## 2022-05-15 RX ADMIN — HYDROXYZINE HYDROCHLORIDE 50 MG: 25 TABLET, FILM COATED ORAL at 06:05

## 2022-05-15 RX ADMIN — CEFEPIME 2 G: 2 INJECTION, POWDER, FOR SOLUTION INTRAVENOUS at 01:05

## 2022-05-15 RX ADMIN — QUETIAPINE FUMARATE 200 MG: 200 TABLET ORAL at 09:05

## 2022-05-15 RX ADMIN — GABAPENTIN 300 MG: 300 CAPSULE ORAL at 09:05

## 2022-05-15 RX ADMIN — INSULIN ASPART 4 UNITS: 100 INJECTION, SOLUTION INTRAVENOUS; SUBCUTANEOUS at 09:05

## 2022-05-15 RX ADMIN — OXCARBAZEPINE 1200 MG: 600 TABLET, FILM COATED ORAL at 11:05

## 2022-05-15 NOTE — ASSESSMENT & PLAN NOTE
Developed grade 2 CRS on 5/14 with fever to 103F, tachycardia to 140s, mild confusion. No hypotension.   LFTs increased on 5/15, grade 4 transaminitis, now meets criteria for grade 3 CRS    - paused blinatumumab  - empiric cefepime  - follow cultures  - liver US ordered as well as hepatitis labs to r/o other causes of transaminitis

## 2022-05-15 NOTE — SUBJECTIVE & OBJECTIVE
Subjective:     Interval History: C1D2 Blinatumumab. Blina stopped yesterday due to fevers/tachycardia/confusion. Fevers resolved this morning and she is clinically improved. LFTs have increased and she has Grade 4 transaminitis, now meets criteria for Grade 3 CRS. Holding blina and starting dexamethasone.     Objective:     Vital Signs (Most Recent):  Temp: 98.3 °F (36.8 °C) (05/15/22 1130)  Pulse: 91 (05/15/22 1130)  Resp: 19 (05/15/22 1130)  BP: (!) 113/59 (05/15/22 1130)  SpO2: (!) 92 % (05/15/22 1130)   Vital Signs (24h Range):  Temp:  [97.8 °F (36.6 °C)-103.1 °F (39.5 °C)] 98.3 °F (36.8 °C)  Pulse:  [] 91  Resp:  [18-36] 19  SpO2:  [90 %-100 %] 92 %  BP: (105-146)/(55-74) 113/59     Weight: 71.4 kg (157 lb 6.5 oz)  Body mass index is 27.02 kg/m².  Body surface area is 1.8 meters squared.    ECOG SCORE           [unfilled]    Intake/Output - Last 3 Shifts         05/13 0700  05/14 0659 05/14 0700  05/15 0659 05/15 0700  05/16 0659    P.O. 480 720     I.V. (mL/kg)   10 (0.1)    Blood  350     IV Piggyback  91.1     Total Intake(mL/kg) 480 (6.7) 1161.1 (16.3) 10 (0.1)    Urine (mL/kg/hr) 800 3200 (1.9) 1100 (2.6)    Emesis/NG output  0     Stool  0     Total Output 800 3200 1100    Net -320 -2038.9 -1090           Stool Occurrence  0 x     Emesis Occurrence  1 x             Physical Exam  Constitutional:       General: She is not in acute distress.     Appearance: She is well-developed. She is not diaphoretic.   HENT:      Head: Normocephalic and atraumatic.   Eyes:      General: No scleral icterus.     Conjunctiva/sclera: Conjunctivae normal.   Cardiovascular:      Rate and Rhythm: Normal rate and regular rhythm.      Heart sounds: Normal heart sounds. No murmur heard.    No friction rub. No gallop.   Pulmonary:      Effort: Pulmonary effort is normal. No respiratory distress.      Breath sounds: Normal breath sounds. No wheezing or rales.   Abdominal:      General: Bowel sounds are normal. There is no  distension.      Palpations: Abdomen is soft.      Tenderness: There is no abdominal tenderness. There is no guarding.   Musculoskeletal:         General: No tenderness. Normal range of motion.      Cervical back: Normal range of motion and neck supple.   Skin:     General: Skin is warm and dry.      Findings: No rash.   Neurological:      General: No focal deficit present.      Mental Status: She is alert and oriented to person, place, and time.   Psychiatric:         Behavior: Behavior normal.       Significant Labs:   CBC:   Recent Labs   Lab 05/14/22  0334 05/15/22  0334   WBC 67.89* 46.27*   HGB 6.0* 9.6*   HCT 18.3* 28.5*   PLT 48* 23*    and CMP:   Recent Labs   Lab 05/14/22  0334 05/14/22  1339 05/15/22  0334    141 140   K 2.8* 4.0 4.0    107 104   CO2 24 23 21*   GLU 81 131* 165*   BUN 6 4* 11   CREATININE 0.6 0.7 0.9   CALCIUM 8.5* 8.8 9.1   PROT 5.4*  --  6.3   ALBUMIN 3.3*  --  3.6   BILITOT 0.2  --  0.4   ALKPHOS 171*  --  454*   AST 48*  --  1,076*   ALT 21  --  814*   ANIONGAP 13 11 15   EGFRNONAA >60.0 >60.0 >60.0       Diagnostic Results:  I have reviewed all pertinent imaging results/findings within the past 24 hours.

## 2022-05-15 NOTE — CARE UPDATE
RAPID RESPONSE NURSE ROUND       Rounding completed with charge RN, Jenn. No concerns verbalized at this time. Instructed to call [phone] for further concerns or assistance.

## 2022-05-15 NOTE — ASSESSMENT & PLAN NOTE
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 Jenkins considering 2nd/3rd generation TKI pending results  - Blincyto consent obtained in clinic  - Bactrim DS M-W-F OI ppx  - qshift neuro monitoring and vitals, close monitoring for CRS  - Daily CBC, CMP, Mag, Phos  - blinatumumab on hold for now

## 2022-05-15 NOTE — CARE UPDATE
RAPID RESPONSE NURSE ROUND       Rounding completed with charge RN, Carlos. Chemo currently paused since reaction yesterday. No concerns verbalized at this time. Instructed to call [phone] for further concerns or assistance.

## 2022-05-15 NOTE — PLAN OF CARE
Day 1 Blinatumomab. Chemo was paused yesterday d/t reaction. Plan to restart once pt vitals are WNL. Patient is progressing and involved with plan of care, communicating needs throughout shift. Up with stand by assist, bed alarm activated. Pt c/o headache, Imitrex given with relief. Poor appetite, voiding without difficulty. CBG checked at HS, SSI given as ordered. All vitals stable; no acute events this shift. Pt. Remaining free from falls or injury throughout shift; bed in lowest position; side rails up X2; call light within reach; pt instructed to call for assistance as needed - verbalized understanding. Q2h rounding on patient. Will continue to monitor.

## 2022-05-15 NOTE — PLAN OF CARE
Problem: Physical Therapy  Goal: Physical Therapy Goal  Description: PT goals until 5/24/22    1. Pt sit to supine with mod independent-not met  2. Pt sit to stand with RW with supervision-not met  3. Pt to perform gait 125ft with RW with supervision-not met  4. Pt to transfer bed to/from bedside chair with no AD with supervision.-not met  5. Pt to perform B LE exs in sitting or supine x 15 reps to strengthen B LE to improve functional mobility.-not met    Outcome: Ongoing, Progressing   Pt's goals set and pt will benefit from skilled PT services to work towards improved functional mobility including: bed mobility, transfers, and gait.   5/15/2022

## 2022-05-15 NOTE — PLAN OF CARE
Liver doppler done, Alert and oriented x4, Ambulate to bathroom with assistance. Right PAC SL and right PIV intact.

## 2022-05-15 NOTE — PT/OT/SLP EVAL
"Physical Therapy Evaluation    Patient Name:  Deepti Gonzalez   MRN:  [id number]    Recommendations:     Discharge Recommendations:  home with home health   Discharge Equipment Recommendations: wheelchair (TBD as pt progresses)   Barriers to discharge: Decreased caregiver support requires assist for mobility    Assessment:     Deepti Gonzalez is a 51 y.o. female admitted with a medical diagnosis of B-cell acute lymphoblastic leukemia.  She presents with the following impairments/functional limitations:  weakness, gait instability, impaired endurance, impaired balance, impaired self care skills, impaired functional mobilty, pain, impaired cardiopulmonary response to activity . Pt is unsafe with functional mobility at this time due to pt requires minimal assist for bed mobility, CGA/minimal assist for transfers, and minimal assist for gait due to LE weakness and shakiness with mobility. Pt is motivated to progress with functional mobility.    Rehab Prognosis: Good; patient would benefit from acute skilled PT services to address these deficits and reach maximum level of function.    Recent Surgery: * No surgery found *      Plan:     During this hospitalization, patient to be seen 4 x/week to address the identified rehab impairments via gait training, therapeutic activities, therapeutic exercises, neuromuscular re-education and progress toward the following goals:    · Plan of Care Expires:  06/14/22    Subjective   "I would like to walk to the bathroom"    Pain/Comfort:  · Pain Rating 1: 6/10  · Location - Side 1: Left  · Location - Orientation 1: lateral  · Location 1: flank  · Pain Addressed 1: Reposition, Distraction, Cessation of Activity  · Pain Rating Post-Intervention 1: 6/10    Patients cultural, spiritual, Druze conflicts given the current situation: no    Living Environment:  Pt lives with her  in a 1 story home with no LUZ  Prior to admission, patients level of function was independent.  " Equipment used at home: bath bench, raised toilet, rollator.  Upon discharge, patient will have assistance from .    Objective:     Communicated with nurse prior to session.  Patient found HOB elevated with peripheral IV  upon PT entry to room.    General Precautions: Standard, fall   Orthopedic Precautions:N/A   Braces: N/A  Respiratory Status: Room air    Exams:  · Cognitive Exam:  Patient is oriented to Person, Place and Time  · Sensation:    · -       Intact  light/touch B LE  · RLE ROM: WFL  · RLE Strength: Deficits: hip flex 3-/5; knee flex 4-/5; knee ext 3+/5; ankle DF 4-/5  · LLE ROM: WFL  · LLE Strength: Deficits: hip flex 3+/5; knee flex/ext 4/5; ankle DF 4/5    Functional Mobility:  · Bed Mobility:     · Supine to Sit: stand by assistance  · Sit to Supine: minimum assistance  · Transfers:     · Sit to Stand:  minimum assistance with 4 wheeled walker  · Gait: 6 steps then 25ft then 35ft then 20ft with pt's own rollator with minimal assist. pt performed gait with flexed trunk, weakness noted in her LEs at times, and instability.     Therapeutic Activities and Exercises:   Pt ambulated to the bathroom and urinated. Pt stood with minimal assist to clean herself and at the sink with minimal assist to wash her hands.     AM-PAC 6 CLICK MOBILITY  Total Score:18     Patient left HOB elevated with all lines intact, call button in reach, bed alarm on, nurse notified and  present.    GOALS:   Multidisciplinary Problems     Physical Therapy Goals        Problem: Physical Therapy    Goal Priority Disciplines Outcome Goal Variances Interventions   Physical Therapy Goal     PT, PT/OT Ongoing, Progressing     Description: PT goals until 5/24/22    1. Pt sit to supine with mod independent-not met  2. Pt sit to stand with RW with supervision-not met  3. Pt to perform gait 125ft with RW with supervision-not met  4. Pt to transfer bed to/from bedside chair with no AD with supervision.-not met  5. Pt to perform  B LE exs in sitting or supine x 15 reps to strengthen B LE to improve functional mobility.-not met                     History:     Past Medical History:   Diagnosis Date    Arthritis     Cancer     COPD (chronic obstructive pulmonary disease)     Hypertension     Stroke     TIA x 4       Past Surgical History:   Procedure Laterality Date    APPENDECTOMY      HYSTERECTOMY      ROTATOR CUFF REPAIR Bilateral     TONSILLECTOMY      TUBAL LIGATION         Time Tracking:     PT Received On: 05/15/22  PT Start Time: 1025     PT Stop Time: 1045  PT Total Time (min): 20 min     Billable Minutes: Evaluation 10 and Gait Training 10      05/15/2022

## 2022-05-15 NOTE — ASSESSMENT & PLAN NOTE
- Relatively recent onsent urinary retention, otherwise without urinary sx  - Denies new drugs/new anticholinergics. Denies saddle anesthesia/incontinence/lower ext weakness  - no retention over the weekend

## 2022-05-15 NOTE — ASSESSMENT & PLAN NOTE
- In setting of recent chemo / disease state  - Daily CBC, plan to transfuse to maintain plt >10k  - Hold Xarelto for platelets <50k

## 2022-05-16 ENCOUNTER — RESEARCH ENCOUNTER (OUTPATIENT)
Dept: RESEARCH | Facility: HOSPITAL | Age: 52
End: 2022-05-16
Payer: COMMERCIAL

## 2022-05-16 DIAGNOSIS — C91.00 B-CELL ACUTE LYMPHOBLASTIC LEUKEMIA: Primary | ICD-10-CM

## 2022-05-16 LAB
ALBUMIN SERPL BCP-MCNC: 3.3 G/DL (ref 3.5–5.2)
ALP SERPL-CCNC: 285 U/L (ref 55–135)
ALT SERPL W/O P-5'-P-CCNC: 420 U/L (ref 10–44)
ANION GAP SERPL CALC-SCNC: 13 MMOL/L (ref 8–16)
ANISOCYTOSIS BLD QL SMEAR: SLIGHT
AST SERPL-CCNC: 114 U/L (ref 10–40)
BACTERIA UR CULT: NORMAL
BASOPHILS NFR BLD: 0 % (ref 0–1.9)
BILIRUB SERPL-MCNC: 0.3 MG/DL (ref 0.1–1)
BUN SERPL-MCNC: 19 MG/DL (ref 6–20)
CALCIUM SERPL-MCNC: 8.6 MG/DL (ref 8.7–10.5)
CHLORIDE SERPL-SCNC: 106 MMOL/L (ref 95–110)
CO2 SERPL-SCNC: 21 MMOL/L (ref 23–29)
CREAT SERPL-MCNC: 0.8 MG/DL (ref 0.5–1.4)
DIFFERENTIAL METHOD: ABNORMAL
EOSINOPHIL NFR BLD: 1 % (ref 0–8)
ERYTHROCYTE [DISTWIDTH] IN BLOOD BY AUTOMATED COUNT: 20 % (ref 11.5–14.5)
EST. GFR  (AFRICAN AMERICAN): >60 ML/MIN/1.73 M^2
EST. GFR  (NON AFRICAN AMERICAN): >60 ML/MIN/1.73 M^2
FIBRINOGEN PPP-MCNC: 405 MG/DL (ref 182–400)
GLUCOSE SERPL-MCNC: 205 MG/DL (ref 70–110)
HCT VFR BLD AUTO: 25.2 % (ref 37–48.5)
HGB BLD-MCNC: 8.4 G/DL (ref 12–16)
HYPOCHROMIA BLD QL SMEAR: ABNORMAL
IMM GRANULOCYTES # BLD AUTO: ABNORMAL K/UL (ref 0–0.04)
IMM GRANULOCYTES NFR BLD AUTO: ABNORMAL % (ref 0–0.5)
INR PPP: 1.2 (ref 0.8–1.2)
LDH SERPL L TO P-CCNC: 1152 U/L (ref 110–260)
LYMPHOCYTES NFR BLD: 10 % (ref 18–48)
MAGNESIUM SERPL-MCNC: 2 MG/DL (ref 1.6–2.6)
MCH RBC QN AUTO: 31.2 PG (ref 27–31)
MCHC RBC AUTO-ENTMCNC: 33.3 G/DL (ref 32–36)
MCV RBC AUTO: 94 FL (ref 82–98)
MONOCYTES NFR BLD: 2 % (ref 4–15)
MYELOCYTES NFR BLD MANUAL: 1 %
NEUTROPHILS NFR BLD: 57 % (ref 38–73)
NEUTS BAND NFR BLD MANUAL: 5 %
NRBC BLD-RTO: 0 /100 WBC
PHOSPHATE SERPL-MCNC: 3.5 MG/DL (ref 2.7–4.5)
PLATELET # BLD AUTO: 18 K/UL (ref 150–450)
PLATELET BLD QL SMEAR: ABNORMAL
PMV BLD AUTO: 12 FL (ref 9.2–12.9)
POCT GLUCOSE: 221 MG/DL (ref 70–110)
POCT GLUCOSE: 251 MG/DL (ref 70–110)
POCT GLUCOSE: 272 MG/DL (ref 70–110)
POIKILOCYTOSIS BLD QL SMEAR: SLIGHT
POTASSIUM SERPL-SCNC: 3.9 MMOL/L (ref 3.5–5.1)
PROT SERPL-MCNC: 5.7 G/DL (ref 6–8.4)
PROTHROMBIN TIME: 12.5 SEC (ref 9–12.5)
RBC # BLD AUTO: 2.69 M/UL (ref 4–5.4)
SODIUM SERPL-SCNC: 140 MMOL/L (ref 136–145)
URATE SERPL-MCNC: 3.9 MG/DL (ref 2.4–5.7)
WBC # BLD AUTO: 34.28 K/UL (ref 3.9–12.7)
WBC OTHER NFR BLD MANUAL: 24 %

## 2022-05-16 PROCEDURE — 25000003 PHARM REV CODE 250: Performed by: INTERNAL MEDICINE

## 2022-05-16 PROCEDURE — 85027 COMPLETE CBC AUTOMATED: CPT | Performed by: NURSE PRACTITIONER

## 2022-05-16 PROCEDURE — 80053 COMPREHEN METABOLIC PANEL: CPT | Performed by: NURSE PRACTITIONER

## 2022-05-16 PROCEDURE — 85610 PROTHROMBIN TIME: CPT | Performed by: STUDENT IN AN ORGANIZED HEALTH CARE EDUCATION/TRAINING PROGRAM

## 2022-05-16 PROCEDURE — 20600001 HC STEP DOWN PRIVATE ROOM

## 2022-05-16 PROCEDURE — 25000003 PHARM REV CODE 250: Performed by: PHYSICIAN ASSISTANT

## 2022-05-16 PROCEDURE — 99233 PR SUBSEQUENT HOSPITAL CARE,LEVL III: ICD-10-PCS | Mod: ,,, | Performed by: INTERNAL MEDICINE

## 2022-05-16 PROCEDURE — 85007 BL SMEAR W/DIFF WBC COUNT: CPT | Performed by: NURSE PRACTITIONER

## 2022-05-16 PROCEDURE — 84100 ASSAY OF PHOSPHORUS: CPT | Performed by: NURSE PRACTITIONER

## 2022-05-16 PROCEDURE — 63600175 PHARM REV CODE 636 W HCPCS: Performed by: STUDENT IN AN ORGANIZED HEALTH CARE EDUCATION/TRAINING PROGRAM

## 2022-05-16 PROCEDURE — 83615 LACTATE (LD) (LDH) ENZYME: CPT | Performed by: NURSE PRACTITIONER

## 2022-05-16 PROCEDURE — 25000003 PHARM REV CODE 250: Performed by: NURSE PRACTITIONER

## 2022-05-16 PROCEDURE — 84550 ASSAY OF BLOOD/URIC ACID: CPT | Performed by: NURSE PRACTITIONER

## 2022-05-16 PROCEDURE — 25000003 PHARM REV CODE 250: Performed by: STUDENT IN AN ORGANIZED HEALTH CARE EDUCATION/TRAINING PROGRAM

## 2022-05-16 PROCEDURE — 99233 SBSQ HOSP IP/OBS HIGH 50: CPT | Mod: ,,, | Performed by: INTERNAL MEDICINE

## 2022-05-16 PROCEDURE — 63600175 PHARM REV CODE 636 W HCPCS: Performed by: INTERNAL MEDICINE

## 2022-05-16 PROCEDURE — 85384 FIBRINOGEN ACTIVITY: CPT | Performed by: STUDENT IN AN ORGANIZED HEALTH CARE EDUCATION/TRAINING PROGRAM

## 2022-05-16 PROCEDURE — 83735 ASSAY OF MAGNESIUM: CPT | Performed by: NURSE PRACTITIONER

## 2022-05-16 RX ADMIN — INSULIN ASPART 6 UNITS: 100 INJECTION, SOLUTION INTRAVENOUS; SUBCUTANEOUS at 08:05

## 2022-05-16 RX ADMIN — OXYCODONE 5 MG: 5 TABLET ORAL at 06:05

## 2022-05-16 RX ADMIN — INSULIN ASPART 4 UNITS: 100 INJECTION, SOLUTION INTRAVENOUS; SUBCUTANEOUS at 01:05

## 2022-05-16 RX ADMIN — DILTIAZEM HYDROCHLORIDE 90 MG: 30 TABLET, FILM COATED ORAL at 04:05

## 2022-05-16 RX ADMIN — DEXAMETHASONE SODIUM PHOSPHATE 8 MG: 4 INJECTION INTRA-ARTICULAR; INTRALESIONAL; INTRAMUSCULAR; INTRAVENOUS; SOFT TISSUE at 04:05

## 2022-05-16 RX ADMIN — PANTOPRAZOLE SODIUM 40 MG: 40 TABLET, DELAYED RELEASE ORAL at 09:05

## 2022-05-16 RX ADMIN — SENNOSIDES AND DOCUSATE SODIUM 1 TABLET: 50; 8.6 TABLET ORAL at 06:05

## 2022-05-16 RX ADMIN — BUSPIRONE HYDROCHLORIDE 10 MG: 10 TABLET ORAL at 09:05

## 2022-05-16 RX ADMIN — DEXAMETHASONE SODIUM PHOSPHATE 8 MG: 4 INJECTION INTRA-ARTICULAR; INTRALESIONAL; INTRAMUSCULAR; INTRAVENOUS; SOFT TISSUE at 09:05

## 2022-05-16 RX ADMIN — CEFEPIME 2 G: 2 INJECTION, POWDER, FOR SOLUTION INTRAVENOUS at 01:05

## 2022-05-16 RX ADMIN — VORTIOXETINE 20 MG: 10 TABLET, FILM COATED ORAL at 09:05

## 2022-05-16 RX ADMIN — QUETIAPINE FUMARATE 200 MG: 200 TABLET ORAL at 09:05

## 2022-05-16 RX ADMIN — ASPIRIN 81 MG: 81 TABLET, COATED ORAL at 09:05

## 2022-05-16 RX ADMIN — HYDROXYZINE HYDROCHLORIDE 50 MG: 25 TABLET, FILM COATED ORAL at 04:05

## 2022-05-16 RX ADMIN — PROCHLORPERAZINE EDISYLATE 5 MG: 5 INJECTION INTRAMUSCULAR; INTRAVENOUS at 01:05

## 2022-05-16 RX ADMIN — LOSARTAN POTASSIUM 25 MG: 25 TABLET, FILM COATED ORAL at 01:05

## 2022-05-16 RX ADMIN — DEXAMETHASONE SODIUM PHOSPHATE 8 MG: 4 INJECTION INTRA-ARTICULAR; INTRALESIONAL; INTRAMUSCULAR; INTRAVENOUS; SOFT TISSUE at 01:05

## 2022-05-16 RX ADMIN — DIAZEPAM 10 MG: 5 TABLET ORAL at 04:05

## 2022-05-16 RX ADMIN — GABAPENTIN 300 MG: 300 CAPSULE ORAL at 04:05

## 2022-05-16 RX ADMIN — DILTIAZEM HYDROCHLORIDE 90 MG: 30 TABLET, FILM COATED ORAL at 09:05

## 2022-05-16 RX ADMIN — INSULIN ASPART 6 UNITS: 100 INJECTION, SOLUTION INTRAVENOUS; SUBCUTANEOUS at 06:05

## 2022-05-16 RX ADMIN — GABAPENTIN 300 MG: 300 CAPSULE ORAL at 07:05

## 2022-05-16 RX ADMIN — GABAPENTIN 300 MG: 300 CAPSULE ORAL at 01:05

## 2022-05-16 RX ADMIN — OXCARBAZEPINE 1200 MG: 600 TABLET, FILM COATED ORAL at 09:05

## 2022-05-16 RX ADMIN — ACYCLOVIR 400 MG: 200 CAPSULE ORAL at 09:05

## 2022-05-16 RX ADMIN — ONDANSETRON 8 MG: 8 TABLET, ORALLY DISINTEGRATING ORAL at 01:05

## 2022-05-16 RX ADMIN — CEFEPIME 2 G: 2 INJECTION, POWDER, FOR SOLUTION INTRAVENOUS at 04:05

## 2022-05-16 RX ADMIN — SULFAMETHOXAZOLE AND TRIMETHOPRIM 1 TABLET: 800; 160 TABLET ORAL at 04:05

## 2022-05-16 RX ADMIN — SUMATRIPTAN SUCCINATE 50 MG: 50 TABLET ORAL at 09:05

## 2022-05-16 RX ADMIN — CEFEPIME 2 G: 2 INJECTION, POWDER, FOR SOLUTION INTRAVENOUS at 09:05

## 2022-05-16 NOTE — CARE UPDATE
RAPID RESPONSE NURSE ROUND       Rounding completed with charge RN, Jenn. No concerns verbalized at this time. Instructed to call [phone] for further concerns or assistance.

## 2022-05-16 NOTE — SUBJECTIVE & OBJECTIVE
Subjective:     Interval History: C1D3 Blincyto. Blincyto held on D1 over weekend due to Grade 4 CRS with fevers, tachycardia, AMS, and transaminitis. Pt continues on Dex q8hrs with downtrending LFTs. AMS has resolved, pt afebrile. Infectious work up negative thus far, continues on cefepine and afebrile overnight/stable vitals. Continuing neuro checks. Plan to continue steroids and re-challenge Blincyto tomorrow after completion of steroids.     Objective:     Vital Signs (Most Recent):  Temp: 98.4 °F (36.9 °C) (05/16/22 0719)  Pulse: 88 (05/16/22 0719)  Resp: 16 (05/16/22 0719)  BP: 121/66 (05/16/22 0719)  SpO2: (!) 94 % (05/16/22 0719)   Vital Signs (24h Range):  Temp:  [97.7 °F (36.5 °C)-98.5 °F (36.9 °C)] 98.4 °F (36.9 °C)  Pulse:  [79-88] 88  Resp:  [16-20] 16  SpO2:  [93 %-99 %] 94 %  BP: (103-133)/(52-68) 121/66     Weight: 69.7 kg (153 lb 10.6 oz)  Body mass index is 26.38 kg/m².  Body surface area is 1.77 meters squared.    ECOG SCORE             Intake/Output - Last 3 Shifts         05/14 0700  05/15 0659 05/15 0700 05/16 0659 05/16 0700  05/17 0659    P.O. 720 480     I.V. (mL/kg)  10 (0.1)     Blood 350      IV Piggyback 91.1 377.4     Total Intake(mL/kg) 1161.1 (16.3) 867.4 (12.1)     Urine (mL/kg/hr) 3200 (1.9) 1800 (1.1) 1750 (5.2)    Emesis/NG output 0      Stool 0 0     Total Output 3200 1800 1750    Net -2038.9 -932.6 -1750           Stool Occurrence 0 x 0 x     Emesis Occurrence 1 x              Physical Exam  Vitals and nursing note reviewed.   Constitutional:       General: She is not in acute distress.     Appearance: She is well-developed. She is not diaphoretic.   HENT:      Head: Normocephalic and atraumatic.   Eyes:      General: No scleral icterus.     Conjunctiva/sclera: Conjunctivae normal.   Cardiovascular:      Rate and Rhythm: Normal rate and regular rhythm.      Heart sounds: Normal heart sounds. No murmur heard.    No friction rub. No gallop.   Pulmonary:      Effort: Pulmonary  effort is normal. No respiratory distress.      Breath sounds: Normal breath sounds. No wheezing or rales.   Abdominal:      General: Bowel sounds are normal. There is no distension.      Palpations: Abdomen is soft.      Tenderness: There is no abdominal tenderness. There is no guarding.   Musculoskeletal:         General: No tenderness. Normal range of motion.      Cervical back: Normal range of motion and neck supple.   Skin:     General: Skin is warm and dry.      Findings: No rash.   Neurological:      General: No focal deficit present.      Mental Status: She is alert and oriented to person, place, and time.   Psychiatric:         Behavior: Behavior normal.       Significant Labs:   CBC:   Recent Labs   Lab 05/15/22  0334 05/16/22  0341   WBC 46.27* 34.28*   HGB 9.6* 8.4*   HCT 28.5* 25.2*   PLT 23* 18*    and CMP:   Recent Labs   Lab 05/15/22  0334 05/15/22  1501 05/16/22  0341    138 140   K 4.0 4.1 3.9    105 106   CO2 21* 25 21*   * 177* 205*   BUN 11 16 19   CREATININE 0.9 0.7 0.8   CALCIUM 9.1 8.2* 8.6*   PROT 6.3 5.4* 5.7*   ALBUMIN 3.6 3.1* 3.3*   BILITOT 0.4 0.3 0.3   ALKPHOS 454* 303* 285*   AST 1,076* 290* 114*   * 534* 420*   ANIONGAP 15 8 13   EGFRNONAA >60.0 >60.0 >60.0       Diagnostic Results:  None

## 2022-05-16 NOTE — PLAN OF CARE
No acute events. Valentina held this shift, plan to restart tomorrow. Dex q8 continued. Pt with c/o nausea relieved by prn zofran and compazine x1. Accuchecks AC HS, SSI admin as ordered. Pt remains afebrile and VSS. WCTM.

## 2022-05-16 NOTE — PROGRESS NOTES
[facility] - Oncology ([facility])  Hematology  Bone Marrow Transplant  Progress Note    Patient Name: Deepti Gonzalez  Admission Date: 5/13/2022  Hospital Length of Stay: 3 days  Code Status: Full Code    Subjective:     Interval History: C1D3 Blincyto. Blincyto held on D1 over weekend due to Grade 4 CRS with fevers, tachycardia, AMS, and transaminitis. Pt continues on Dex q8hrs with downtrending LFTs. AMS has resolved, pt afebrile. Infectious work up negative thus far, continues on cefepine and afebrile overnight/stable vitals. Continuing neuro checks. Plan to continue steroids and re-challenge Blincyto tomorrow after completion of steroids.     Objective:     Vital Signs (Most Recent):  Temp: 98.4 °F (36.9 °C) (05/16/22 0719)  Pulse: 88 (05/16/22 0719)  Resp: 16 (05/16/22 0719)  BP: 121/66 (05/16/22 0719)  SpO2: (!) 94 % (05/16/22 0719)   Vital Signs (24h Range):  Temp:  [97.7 °F (36.5 °C)-98.5 °F (36.9 °C)] 98.4 °F (36.9 °C)  Pulse:  [79-88] 88  Resp:  [16-20] 16  SpO2:  [93 %-99 %] 94 %  BP: (103-133)/(52-68) 121/66     Weight: 69.7 kg (153 lb 10.6 oz)  Body mass index is 26.38 kg/m².  Body surface area is 1.77 meters squared.    ECOG SCORE             Intake/Output - Last 3 Shifts         05/14 0700  05/15 0659 05/15 0700  05/16 0659 05/16 0700 05/17 0659    P.O. 720 480     I.V. (mL/kg)  10 (0.1)     Blood 350      IV Piggyback 91.1 377.4     Total Intake(mL/kg) 1161.1 (16.3) 867.4 (12.1)     Urine (mL/kg/hr) 3200 (1.9) 1800 (1.1) 1750 (5.2)    Emesis/NG output 0      Stool 0 0     Total Output 3200 1800 1750    Net -2038.9 -932.6 -1750           Stool Occurrence 0 x 0 x     Emesis Occurrence 1 x              Physical Exam  Vitals and nursing note reviewed.   Constitutional:       General: She is not in acute distress.     Appearance: She is well-developed. She is not diaphoretic.   HENT:      Head: Normocephalic and atraumatic.   Eyes:      General: No scleral icterus.     Conjunctiva/sclera: Conjunctivae  normal.   Cardiovascular:      Rate and Rhythm: Normal rate and regular rhythm.      Heart sounds: Normal heart sounds. No murmur heard.    No friction rub. No gallop.   Pulmonary:      Effort: Pulmonary effort is normal. No respiratory distress.      Breath sounds: Normal breath sounds. No wheezing or rales.   Abdominal:      General: Bowel sounds are normal. There is no distension.      Palpations: Abdomen is soft.      Tenderness: There is no abdominal tenderness. There is no guarding.   Musculoskeletal:         General: No tenderness. Normal range of motion.      Cervical back: Normal range of motion and neck supple.   Skin:     General: Skin is warm and dry.      Findings: No rash.   Neurological:      General: No focal deficit present.      Mental Status: She is alert and oriented to person, place, and time.   Psychiatric:         Behavior: Behavior normal.       Significant Labs:   CBC:   Recent Labs   Lab 05/15/22  0334 05/16/22  0341   WBC 46.27* 34.28*   HGB 9.6* 8.4*   HCT 28.5* 25.2*   PLT 23* 18*    and CMP:   Recent Labs   Lab 05/15/22  0334 05/15/22  1501 05/16/22  0341    138 140   K 4.0 4.1 3.9    105 106   CO2 21* 25 21*   * 177* 205*   BUN 11 16 19   CREATININE 0.9 0.7 0.8   CALCIUM 9.1 8.2* 8.6*   PROT 6.3 5.4* 5.7*   ALBUMIN 3.6 3.1* 3.3*   BILITOT 0.4 0.3 0.3   ALKPHOS 454* 303* 285*   AST 1,076* 290* 114*   * 534* 420*   ANIONGAP 15 8 13   EGFRNONAA >60.0 >60.0 >60.0       Diagnostic Results:  None    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 Jenkins considering 2nd/3rd generation TKI pending results  - Bactrim DS M-W-F OI ppx  - Admitted 5/13 for Cycle 1 Blincyto, ROED1  on 5/14. Shortly after developed signs of CRS. Today is C1D3. Blincyto on hold, see CRS.  - q4hr neuro checks  - Daily CBC, CMP, Mag, Phos    Cytokine release syndrome, grade 3  Developed grade 2 CRS on 5/14 with fever to 103F, tachycardia to 140s, mild confusion. No hypotension.   LFTs increased on 5/15, grade 4 transaminitis, now meets criteria for grade 3 CRS    - paused blinatumumab  - infectious work up unrevealing, continues on cefepime  - liver US non revealing and hepatitis panel negative, transaminitis improving   - continue dexamethasone 8mg q8hrs and q4hr neuro checks  - AMS now resolved  - plan to resume Blincyto tomorrow     Thrombocytopenia  - In setting of recent chemo / disease state  - Daily CBC, plan to transfuse to maintain plt >10k  - Hold Xarelto for platelets <50k     Urinary retention  - Relatively recent onsent urinary retention, otherwise without urinary sx  - Denies new drugs/new anticholinergics. Denies saddle anesthesia/incontinence/lower ext weakness  - no retention over the weekend    Anemia associated with chemotherapy  - in setting of antineoplastic therapy/disease state  - plan to transfuse to maintain Hgb > 7    Moderate major depression  - Continue home trintellix    Anxiety  - continue home trintellix and valium while inpatient    GERD (gastroesophageal reflux disease)  - home prilosec not on formulary. Will receive protonix while inpatient.    Hypertension  - continue home losartan while inpatient    Seizure disorder  - continue home oxcarbazepine while inpatient    Type 2 diabetes mellitus, without long-term current use of insulin  - hold home farxiga while inpatient. Can resume at discharge.  - low dose SSI, achs blood glucose monitoring, and diabetic diet while admitted    Rheumatoid arthritis involving multiple sites  - continue home plaquenil.   - home leflunomide was recently discontinued by her rhemotologist    History of TIA (transient ischemic attack)  - Continue home  ASA and fenofibrate. Holding home statin while inpatient.    Hyperlipidemia  - holding home statin while inpatient    Paroxysmal atrial fibrillation  - continue home diltiazem and xarelto. Hold xarelto with plt count < 50K.        VTE Risk Mitigation (From admission, onward)         Ordered     heparin, porcine (PF) 100 unit/mL injection flush 300 Units  As needed (PRN)         05/14/22 0735     IP VTE HIGH RISK PATIENT  Once         05/13/22 1728     Place sequential compression device  Until discontinued         05/13/22 1728     Reason for No Pharmacological VTE Prophylaxis  Once        Question:  Reasons:  Answer:  Already adequately anticoagulated on oral Anticoagulants    05/13/22 1728                Disposition: Remain inpatient      Jackie Sharma PA-C  Bone Marrow Transplant  [facility] - Oncology ([facility])

## 2022-05-16 NOTE — PROGRESS NOTES
The patient Deepti Gonzalez, person granting permission, was called today regarding the patient's participation in a B-ALL blood collection study (IRB #[id number] PI: Nate).     The Verbal Informed Consent was read and discussed by the consenter. The following was discussed:   Types of specimens to be collected - blood (10ml) to be collected by the patient's nurse via the patient's line   All medical information released to researchers will be stripped of identifiers, and no patient information will be given to anyone outside of this research project.    Participating in a research study is not the same as getting regular medical care and will not improve the patient's health. The purpose of a research study is to gather information. Being in this study does not interfere with your regular medical care.   The patient does not have to participate in this study. If they do not join, their care at [facility] will not be affected.    The person granting permission was provided adequate time to ask questions regarding the scope and purpose of the study. The patient did not have any questions.     Permission was obtained by telephone.     The above statements were read by the person obtaining permission to the person granting permission and witnessed by Carlo Luna, who also confirmed the patient's consent to the study. The witness information was documented on the verbal consent form as well.    This Verbal Informed Consent process was conducted prior to initiation of any study procedures.    Of note, the patient's Physician Assistant Jos Sharma approved of the patient's participation in the study prior to patient consent and research blood collection.     Also of note, a copy of the verbal consent form and my card were given to the patient by the patient's nurse at time of the research blood collection.

## 2022-05-16 NOTE — ASSESSMENT & PLAN NOTE
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 Jenkins considering 2nd/3rd generation TKI pending results  - Bactrim DS M-W-F OI ppx  - Admitted 5/13 for Cycle 1 Blincyto, C1D1 on 5/14. Shortly after developed signs of CRS. Today is C1D3. Blincyto on hold, see CRS.  - q4hr neuro checks  - Daily CBC, CMP, Mag, Phos

## 2022-05-16 NOTE — PT/OT/SLP PROGRESS
Occupational Therapy      Patient Name:  Deepti Gonzalez   MRN:  [id number]    Patient not seen today secondary to eating on first attempt and blurry vision reported by family on second attempt. MD aware per pt. Spouse     5/16/2022

## 2022-05-16 NOTE — ASSESSMENT & PLAN NOTE
Developed grade 2 CRS on 5/14 with fever to 103F, tachycardia to 140s, mild confusion. No hypotension.   LFTs increased on 5/15, grade 4 transaminitis, now meets criteria for grade 3 CRS    - paused blinatumumab  - infectious work up unrevealing, continues on cefepime  - liver US non revealing and hepatitis panel negative, transaminitis improving   - continue dexamethasone 8mg q8hrs and q4hr neuro checks  - AMS now resolved  - plan to resume Blincyto tomorrow

## 2022-05-17 ENCOUNTER — SPECIALTY PHARMACY (OUTPATIENT)
Dept: PHARMACY | Facility: CLINIC | Age: 52
End: 2022-05-17
Payer: COMMERCIAL

## 2022-05-17 PROBLEM — D72.829 LEUKOCYTOSIS: Status: ACTIVE | Noted: 2022-05-17

## 2022-05-17 LAB
ABO + RH BLD: NORMAL
ALBUMIN SERPL BCP-MCNC: 3.4 G/DL (ref 3.5–5.2)
ALP SERPL-CCNC: 229 U/L (ref 55–135)
ALT SERPL W/O P-5'-P-CCNC: 253 U/L (ref 10–44)
ANION GAP SERPL CALC-SCNC: 10 MMOL/L (ref 8–16)
ANISOCYTOSIS BLD QL SMEAR: SLIGHT
AST SERPL-CCNC: 32 U/L (ref 10–40)
BASOPHILS # BLD AUTO: ABNORMAL K/UL (ref 0–0.2)
BASOPHILS NFR BLD: 0 % (ref 0–1.9)
BILIRUB SERPL-MCNC: 0.2 MG/DL (ref 0.1–1)
BLD GP AB SCN CELLS X3 SERPL QL: NORMAL
BUN SERPL-MCNC: 20 MG/DL (ref 6–20)
CALCIUM SERPL-MCNC: 8.6 MG/DL (ref 8.7–10.5)
CHLORIDE SERPL-SCNC: 105 MMOL/L (ref 95–110)
CO2 SERPL-SCNC: 21 MMOL/L (ref 23–29)
CREAT SERPL-MCNC: 0.7 MG/DL (ref 0.5–1.4)
DIFFERENTIAL METHOD: ABNORMAL
EOSINOPHIL # BLD AUTO: ABNORMAL K/UL (ref 0–0.5)
EOSINOPHIL NFR BLD: 0 % (ref 0–8)
ERYTHROCYTE [DISTWIDTH] IN BLOOD BY AUTOMATED COUNT: 19.8 % (ref 11.5–14.5)
EST. GFR  (AFRICAN AMERICAN): >60 ML/MIN/1.73 M^2
EST. GFR  (NON AFRICAN AMERICAN): >60 ML/MIN/1.73 M^2
FIBRINOGEN PPP-MCNC: 249 MG/DL (ref 182–400)
GLUCOSE SERPL-MCNC: 265 MG/DL (ref 70–110)
HCT VFR BLD AUTO: 24.4 % (ref 37–48.5)
HGB BLD-MCNC: 8 G/DL (ref 12–16)
IMM GRANULOCYTES # BLD AUTO: ABNORMAL K/UL (ref 0–0.04)
IMM GRANULOCYTES NFR BLD AUTO: ABNORMAL % (ref 0–0.5)
INR PPP: 1.2 (ref 0.8–1.2)
LDH SERPL L TO P-CCNC: 840 U/L (ref 110–260)
LYMPHOCYTES # BLD AUTO: ABNORMAL K/UL (ref 1–4.8)
LYMPHOCYTES NFR BLD: 13 % (ref 18–48)
MAGNESIUM SERPL-MCNC: 2.2 MG/DL (ref 1.6–2.6)
MCH RBC QN AUTO: 30.8 PG (ref 27–31)
MCHC RBC AUTO-ENTMCNC: 32.8 G/DL (ref 32–36)
MCV RBC AUTO: 94 FL (ref 82–98)
METAMYELOCYTES NFR BLD MANUAL: 1 %
MONOCYTES # BLD AUTO: ABNORMAL K/UL (ref 0.3–1)
MONOCYTES NFR BLD: 3 % (ref 4–15)
MYELOCYTES NFR BLD MANUAL: 1 %
NEUTROPHILS NFR BLD: 37 % (ref 38–73)
NEUTS BAND NFR BLD MANUAL: 9 %
NRBC BLD-RTO: 0 /100 WBC
PHOSPHATE SERPL-MCNC: 3.6 MG/DL (ref 2.7–4.5)
PLATELET # BLD AUTO: 17 K/UL (ref 150–450)
PLATELET BLD QL SMEAR: ABNORMAL
PMV BLD AUTO: 12 FL (ref 9.2–12.9)
POTASSIUM SERPL-SCNC: 4.4 MMOL/L (ref 3.5–5.1)
PROMYELOCYTES NFR BLD MANUAL: 1 %
PROT SERPL-MCNC: 5.6 G/DL (ref 6–8.4)
PROTHROMBIN TIME: 12.2 SEC (ref 9–12.5)
RBC # BLD AUTO: 2.6 M/UL (ref 4–5.4)
SODIUM SERPL-SCNC: 136 MMOL/L (ref 136–145)
URATE SERPL-MCNC: 3.8 MG/DL (ref 2.4–5.7)
WBC # BLD AUTO: 37.7 K/UL (ref 3.9–12.7)
WBC OTHER NFR BLD MANUAL: 35 %

## 2022-05-17 PROCEDURE — 76937 US GUIDE VASCULAR ACCESS: CPT

## 2022-05-17 PROCEDURE — 86850 RBC ANTIBODY SCREEN: CPT | Performed by: NURSE PRACTITIONER

## 2022-05-17 PROCEDURE — 86920 COMPATIBILITY TEST SPIN: CPT | Performed by: NURSE PRACTITIONER

## 2022-05-17 PROCEDURE — 36415 COLL VENOUS BLD VENIPUNCTURE: CPT | Performed by: STUDENT IN AN ORGANIZED HEALTH CARE EDUCATION/TRAINING PROGRAM

## 2022-05-17 PROCEDURE — 25000003 PHARM REV CODE 250: Performed by: INTERNAL MEDICINE

## 2022-05-17 PROCEDURE — 36410 VNPNXR 3YR/> PHY/QHP DX/THER: CPT

## 2022-05-17 PROCEDURE — 84100 ASSAY OF PHOSPHORUS: CPT | Performed by: NURSE PRACTITIONER

## 2022-05-17 PROCEDURE — 20600001 HC STEP DOWN PRIVATE ROOM

## 2022-05-17 PROCEDURE — 63600175 PHARM REV CODE 636 W HCPCS: Performed by: STUDENT IN AN ORGANIZED HEALTH CARE EDUCATION/TRAINING PROGRAM

## 2022-05-17 PROCEDURE — 85007 BL SMEAR W/DIFF WBC COUNT: CPT | Performed by: NURSE PRACTITIONER

## 2022-05-17 PROCEDURE — 87040 BLOOD CULTURE FOR BACTERIA: CPT | Performed by: STUDENT IN AN ORGANIZED HEALTH CARE EDUCATION/TRAINING PROGRAM

## 2022-05-17 PROCEDURE — 85610 PROTHROMBIN TIME: CPT | Performed by: STUDENT IN AN ORGANIZED HEALTH CARE EDUCATION/TRAINING PROGRAM

## 2022-05-17 PROCEDURE — 25000003 PHARM REV CODE 250: Performed by: STUDENT IN AN ORGANIZED HEALTH CARE EDUCATION/TRAINING PROGRAM

## 2022-05-17 PROCEDURE — 25000003 PHARM REV CODE 250: Performed by: NURSE PRACTITIONER

## 2022-05-17 PROCEDURE — C1751 CATH, INF, PER/CENT/MIDLINE: HCPCS

## 2022-05-17 PROCEDURE — 83615 LACTATE (LD) (LDH) ENZYME: CPT | Performed by: NURSE PRACTITIONER

## 2022-05-17 PROCEDURE — 99233 PR SUBSEQUENT HOSPITAL CARE,LEVL III: ICD-10-PCS | Mod: ,,, | Performed by: INTERNAL MEDICINE

## 2022-05-17 PROCEDURE — 99233 SBSQ HOSP IP/OBS HIGH 50: CPT | Mod: ,,, | Performed by: INTERNAL MEDICINE

## 2022-05-17 PROCEDURE — 80053 COMPREHEN METABOLIC PANEL: CPT | Performed by: NURSE PRACTITIONER

## 2022-05-17 PROCEDURE — 85384 FIBRINOGEN ACTIVITY: CPT | Performed by: STUDENT IN AN ORGANIZED HEALTH CARE EDUCATION/TRAINING PROGRAM

## 2022-05-17 PROCEDURE — 63600175 PHARM REV CODE 636 W HCPCS: Performed by: INTERNAL MEDICINE

## 2022-05-17 PROCEDURE — 84550 ASSAY OF BLOOD/URIC ACID: CPT | Performed by: NURSE PRACTITIONER

## 2022-05-17 PROCEDURE — 83735 ASSAY OF MAGNESIUM: CPT | Performed by: NURSE PRACTITIONER

## 2022-05-17 PROCEDURE — 85027 COMPLETE CBC AUTOMATED: CPT | Performed by: NURSE PRACTITIONER

## 2022-05-17 RX ORDER — ONDANSETRON 4 MG/1
8 TABLET, FILM COATED ORAL
Status: DISCONTINUED | OUTPATIENT
Start: 2022-05-17 | End: 2022-05-18

## 2022-05-17 RX ORDER — MEPERIDINE HYDROCHLORIDE 50 MG/ML
12.5 INJECTION INTRAMUSCULAR; INTRAVENOUS; SUBCUTANEOUS ONCE
Status: DISCONTINUED | OUTPATIENT
Start: 2022-05-17 | End: 2022-05-17

## 2022-05-17 RX ORDER — MEPERIDINE HYDROCHLORIDE 50 MG/ML
12.5 INJECTION INTRAMUSCULAR; INTRAVENOUS; SUBCUTANEOUS ONCE
Status: COMPLETED | OUTPATIENT
Start: 2022-05-17 | End: 2022-05-17

## 2022-05-17 RX ORDER — CEFEPIME HYDROCHLORIDE 1 G/50ML
2 INJECTION, SOLUTION INTRAVENOUS
Status: DISCONTINUED | OUTPATIENT
Start: 2022-05-17 | End: 2022-05-19

## 2022-05-17 RX ADMIN — CEFEPIME 2 G: 2 INJECTION, POWDER, FOR SOLUTION INTRAVENOUS at 04:05

## 2022-05-17 RX ADMIN — GABAPENTIN 300 MG: 300 CAPSULE ORAL at 04:05

## 2022-05-17 RX ADMIN — ONDANSETRON HYDROCHLORIDE 8 MG: 4 TABLET, FILM COATED ORAL at 11:05

## 2022-05-17 RX ADMIN — BUSPIRONE HYDROCHLORIDE 10 MG: 10 TABLET ORAL at 10:05

## 2022-05-17 RX ADMIN — BUSPIRONE HYDROCHLORIDE 10 MG: 10 TABLET ORAL at 09:05

## 2022-05-17 RX ADMIN — LOSARTAN POTASSIUM 25 MG: 25 TABLET, FILM COATED ORAL at 11:05

## 2022-05-17 RX ADMIN — INSULIN ASPART 4 UNITS: 100 INJECTION, SOLUTION INTRAVENOUS; SUBCUTANEOUS at 01:05

## 2022-05-17 RX ADMIN — PANTOPRAZOLE SODIUM 40 MG: 40 TABLET, DELAYED RELEASE ORAL at 09:05

## 2022-05-17 RX ADMIN — SODIUM CHLORIDE, SODIUM LACTATE, POTASSIUM CHLORIDE, AND CALCIUM CHLORIDE 500 ML: .6; .31; .03; .02 INJECTION, SOLUTION INTRAVENOUS at 08:05

## 2022-05-17 RX ADMIN — OXCARBAZEPINE 1200 MG: 600 TABLET, FILM COATED ORAL at 09:05

## 2022-05-17 RX ADMIN — MEPERIDINE HYDROCHLORIDE 12.5 MG: 50 INJECTION INTRAMUSCULAR; INTRAVENOUS; SUBCUTANEOUS at 08:05

## 2022-05-17 RX ADMIN — DILTIAZEM HYDROCHLORIDE 90 MG: 30 TABLET, FILM COATED ORAL at 10:05

## 2022-05-17 RX ADMIN — INSULIN ASPART 2 UNITS: 100 INJECTION, SOLUTION INTRAVENOUS; SUBCUTANEOUS at 10:05

## 2022-05-17 RX ADMIN — INSULIN ASPART 6 UNITS: 100 INJECTION, SOLUTION INTRAVENOUS; SUBCUTANEOUS at 08:05

## 2022-05-17 RX ADMIN — SENNOSIDES AND DOCUSATE SODIUM 1 TABLET: 50; 8.6 TABLET ORAL at 09:05

## 2022-05-17 RX ADMIN — QUETIAPINE FUMARATE 200 MG: 200 TABLET ORAL at 10:05

## 2022-05-17 RX ADMIN — ACETAMINOPHEN 650 MG: 325 TABLET ORAL at 08:05

## 2022-05-17 RX ADMIN — DILTIAZEM HYDROCHLORIDE 90 MG: 30 TABLET, FILM COATED ORAL at 04:05

## 2022-05-17 RX ADMIN — ACYCLOVIR 400 MG: 200 CAPSULE ORAL at 09:05

## 2022-05-17 RX ADMIN — HYDROXYZINE HYDROCHLORIDE 50 MG: 25 TABLET, FILM COATED ORAL at 04:05

## 2022-05-17 RX ADMIN — BLINATUMOMAB 9 MCG: KIT INTRAVENOUS at 12:05

## 2022-05-17 RX ADMIN — ACYCLOVIR 400 MG: 200 CAPSULE ORAL at 10:05

## 2022-05-17 RX ADMIN — VORTIOXETINE 20 MG: 10 TABLET, FILM COATED ORAL at 09:05

## 2022-05-17 RX ADMIN — DEXAMETHASONE SODIUM PHOSPHATE 8 MG: 4 INJECTION INTRA-ARTICULAR; INTRALESIONAL; INTRAMUSCULAR; INTRAVENOUS; SOFT TISSUE at 12:05

## 2022-05-17 RX ADMIN — OXCARBAZEPINE 1200 MG: 600 TABLET, FILM COATED ORAL at 10:05

## 2022-05-17 RX ADMIN — DILTIAZEM HYDROCHLORIDE 90 MG: 30 TABLET, FILM COATED ORAL at 09:05

## 2022-05-17 RX ADMIN — GABAPENTIN 300 MG: 300 CAPSULE ORAL at 07:05

## 2022-05-17 RX ADMIN — INSULIN ASPART 10 UNITS: 100 INJECTION, SOLUTION INTRAVENOUS; SUBCUTANEOUS at 05:05

## 2022-05-17 RX ADMIN — CEFEPIME HYDROCHLORIDE 2 G: 2 INJECTION, SOLUTION INTRAVENOUS at 10:05

## 2022-05-17 RX ADMIN — DEXAMETHASONE SODIUM PHOSPHATE 20 MG: 10 INJECTION INTRAMUSCULAR; INTRAVENOUS at 11:05

## 2022-05-17 RX ADMIN — GABAPENTIN 300 MG: 300 CAPSULE ORAL at 12:05

## 2022-05-17 RX ADMIN — ONDANSETRON 8 MG: 8 TABLET, ORALLY DISINTEGRATING ORAL at 08:05

## 2022-05-17 RX ADMIN — OXYCODONE 5 MG: 5 TABLET ORAL at 10:05

## 2022-05-17 NOTE — ASSESSMENT & PLAN NOTE
Developed grade 2 CRS on 5/14 with fever to 103F, tachycardia to 140s, mild confusion. No hypotension.   LFTs increased on 5/15, grade 4 transaminitis, now meets criteria for grade 3 CRS    - paused blinatumumab  - infectious work up unrevealing, continues on cefepime  - liver US non revealing and hepatitis panel negative, transaminitis improving   - continue dexamethasone 8mg q8hrs and q4hr neuro checks  - AMS now resolved  - plan to resume Blincyto todday

## 2022-05-17 NOTE — SUBJECTIVE & OBJECTIVE
Subjective:     Interval History: Blincyto on hold since 5/14 and patient receiving dex q 3. CRS and neurotoxicity now resolved. Plan to re-challenge with Blinctyo today. Since she received so little of initial infusion, will treat today as C1D1. She will receive a 20 mg dose of dex prior to starting. Will monitor closely for s/s of CRS and neurotoxicity. Blood glucose in 200s. Likely 2/2 steroids. Will hold off on adjusting insulin for now as she has completed steroids. Liver u/s showing hepatomegaly and cholelith, but no cholecystitis. Acute hep panel in process. Transaminitis improving with Blincyto clearance, so suspect that it is a side effect of Blincyto.    Objective:     Vital Signs (Most Recent):  Temp: 98 °F (36.7 °C) (05/17/22 1139)  Pulse: 87 (05/17/22 1139)  Resp: 18 (05/17/22 1139)  BP: 130/60 (05/17/22 1139)  SpO2: 95 % (05/17/22 1139) Vital Signs (24h Range):  Temp:  [97.8 °F (36.6 °C)-98.7 °F (37.1 °C)] 98 °F (36.7 °C)  Pulse:  [86-93] 87  Resp:  [17-20] 18  SpO2:  [91 %-95 %] 95 %  BP: (116-135)/(60-70) 130/60     Weight: 69.7 kg (153 lb 10.6 oz)  Body mass index is 26.38 kg/m².  Body surface area is 1.77 meters squared.    ECOG SCORE           [unfilled]    Intake/Output - Last 3 Shifts         05/15 0700  05/16 0659 05/16 0700  05/17 0659 05/17 0700  05/18 0659    P.O. 480  360    I.V. (mL/kg) 10 (0.1)      Blood       IV Piggyback 377.4 279.2     Total Intake(mL/kg) 867.4 (12.1) 279.2 (4) 360 (5.2)    Urine (mL/kg/hr) 1800 (1.1) 3350 (2) 1500 (4.1)    Emesis/NG output       Stool 0 0     Total Output 1800 3350 1500    Net -932.6 -3070.8 -1140           Stool Occurrence 0 x 0 x             Physical Exam  Constitutional:       Appearance: She is well-developed.   HENT:      Head: Normocephalic and atraumatic.      Mouth/Throat:      Pharynx: No oropharyngeal exudate.   Eyes:      Conjunctiva/sclera: Conjunctivae normal.      Pupils: Pupils are equal, round, and reactive to light.   Cardiovascular:       Rate and Rhythm: Normal rate and regular rhythm.      Heart sounds: Normal heart sounds. No murmur heard.  Pulmonary:      Effort: Pulmonary effort is normal.      Breath sounds: Normal breath sounds.   Abdominal:      General: Bowel sounds are normal. There is no distension.      Palpations: Abdomen is soft.      Tenderness: There is no abdominal tenderness.   Musculoskeletal:         General: No deformity. Normal range of motion.      Cervical back: Normal range of motion and neck supple.   Skin:     General: Skin is warm and dry.      Findings: Bruising present. No erythema or rash.      Comments: Right chest wall port-a-cath. Dressing c/d/i. No sign of infection to site.  petechiae   Neurological:      Mental Status: She is alert and oriented to person, place, and time.   Psychiatric:         Behavior: Behavior normal.         Thought Content: Thought content normal.         Judgment: Judgment normal.       Significant Labs:   CBC:   Recent Labs   Lab 05/16/22  0341 05/17/22  0419   WBC 34.28* 37.70*   HGB 8.4* 8.0*   HCT 25.2* 24.4*   PLT 18*  --     and CMP:   Recent Labs   Lab 05/15/22  1501 05/16/22  0341 05/17/22  0419    140 136   K 4.1 3.9 4.4    106 105   CO2 25 21* 21*   * 205* 265*   BUN 16 19 20   CREATININE 0.7 0.8 0.7   CALCIUM 8.2* 8.6* 8.6*   PROT 5.4* 5.7* 5.6*   ALBUMIN 3.1* 3.3* 3.4*   BILITOT 0.3 0.3 0.2   ALKPHOS 303* 285* 229*   * 114* 32   * 420* 253*   ANIONGAP 8 13 10   EGFRNONAA >60.0 >60.0 >60.0       Diagnostic Results:  I have reviewed all pertinent imaging results/findings within the past 24 hours.

## 2022-05-17 NOTE — PROGRESS NOTES
Roberto Yepez - Oncology ([facility])  Hematology  Bone Marrow Transplant  Progress Note    Patient Name: Deepti Gonzalez  Admission Date: 5/13/2022  Hospital Length of Stay: 4 days  Code Status: Full Code    Subjective:     Interval History: Blincyto on hold since 5/14 and patient receiving dex q 3. CRS and neurotoxicity now resolved. Plan to re-challenge with Blinctyo today. Since she received so little of initial infusion, will treat today as C1D1. She will receive a 20 mg dose of dex prior to starting. Will monitor closely for s/s of CRS and neurotoxicity. Blood glucose in 200s. Likely 2/2 steroids. Will hold off on adjusting insulin for now as she has completed steroids. Liver u/s showing hepatomegaly and cholelith, but no cholecystitis. Acute hep panel in process. Transaminitis improving with Blincyto clearance, so suspect that it is a side effect of Blincyto.    Objective:     Vital Signs (Most Recent):  Temp: 98 °F (36.7 °C) (05/17/22 1139)  Pulse: 87 (05/17/22 1139)  Resp: 18 (05/17/22 1139)  BP: 130/60 (05/17/22 1139)  SpO2: 95 % (05/17/22 1139) Vital Signs (24h Range):  Temp:  [97.8 °F (36.6 °C)-98.7 °F (37.1 °C)] 98 °F (36.7 °C)  Pulse:  [86-93] 87  Resp:  [17-20] 18  SpO2:  [91 %-95 %] 95 %  BP: (116-135)/(60-70) 130/60     Weight: 69.7 kg (153 lb 10.6 oz)  Body mass index is 26.38 kg/m².  Body surface area is 1.77 meters squared.    ECOG SCORE           [unfilled]    Intake/Output - Last 3 Shifts         05/15 0700 05/16 0659 05/16 0700 05/17 0659 05/17 0700  05/18 0659    P.O. 480  360    I.V. (mL/kg) 10 (0.1)      Blood       IV Piggyback 377.4 279.2     Total Intake(mL/kg) 867.4 (12.1) 279.2 (4) 360 (5.2)    Urine (mL/kg/hr) 1800 (1.1) 3350 (2) 1500 (4.1)    Emesis/NG output       Stool 0 0     Total Output 1800 3350 1500    Net -932.6 -3070.8 -1140           Stool Occurrence 0 x 0 x             Physical Exam  Constitutional:       Appearance: She is well-developed.   HENT:      Head: Normocephalic and  atraumatic.      Mouth/Throat:      Pharynx: No oropharyngeal exudate.   Eyes:      Conjunctiva/sclera: Conjunctivae normal.      Pupils: Pupils are equal, round, and reactive to light.   Cardiovascular:      Rate and Rhythm: Normal rate and regular rhythm.      Heart sounds: Normal heart sounds. No murmur heard.  Pulmonary:      Effort: Pulmonary effort is normal.      Breath sounds: Normal breath sounds.   Abdominal:      General: Bowel sounds are normal. There is no distension.      Palpations: Abdomen is soft.      Tenderness: There is no abdominal tenderness.   Musculoskeletal:         General: No deformity. Normal range of motion.      Cervical back: Normal range of motion and neck supple.   Skin:     General: Skin is warm and dry.      Findings: Bruising present. No erythema or rash.      Comments: Right chest wall port-a-cath. Dressing c/d/i. No sign of infection to site.  petechiae   Neurological:      Mental Status: She is alert and oriented to person, place, and time.   Psychiatric:         Behavior: Behavior normal.         Thought Content: Thought content normal.         Judgment: Judgment normal.       Significant Labs:   CBC:   Recent Labs   Lab 05/16/22  0341 05/17/22  0419   WBC 34.28* 37.70*   HGB 8.4* 8.0*   HCT 25.2* 24.4*   PLT 18*  --     and CMP:   Recent Labs   Lab 05/15/22  1501 05/16/22  0341 05/17/22  0419    140 136   K 4.1 3.9 4.4    106 105   CO2 25 21* 21*   * 205* 265*   BUN 16 19 20   CREATININE 0.7 0.8 0.7   CALCIUM 8.2* 8.6* 8.6*   PROT 5.4* 5.7* 5.6*   ALBUMIN 3.1* 3.3* 3.4*   BILITOT 0.3 0.3 0.2   ALKPHOS 303* 285* 229*   * 114* 32   * 420* 253*   ANIONGAP 8 13 10   EGFRNONAA >60.0 >60.0 >60.0       Diagnostic Results:  I have reviewed all pertinent imaging results/findings within the past 24 hours.    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 Jenkins considering 2nd/3rd generation TKI pending results  - Bactrim DS M-W-F OI ppx  - Admitted 5/13 for Cycle 1 Blincyto, C1D1 on 5/14. Shortly after developed signs of CRS.Blincyto on held, see CRS.  - q4hr neuro checks  - Daily CBC, CMP, Mag, Phos  - Plan to resume blincyto today. Since she received so little of day 1 infusion on 5/14, will treat today like C1D1.    Cytokine release syndrome, grade 3  Developed grade 2 CRS on 5/14 with fever to 103F, tachycardia to 140s, mild confusion. No hypotension.   LFTs increased on 5/15, grade 4 transaminitis, now meets criteria for grade 3 CRS    - paused blinatumumab  - infectious work up unrevealing, continues on cefepime  - liver US non revealing and hepatitis panel negative, transaminitis improving   - continue dexamethasone 8mg q8hrs and q4hr neuro checks  - AMS now resolved  - plan to resume Blincyto todday     Leukocytosis  - WBC count 67.89 on admission. Down to 37.7 today.  - See ALL    Thrombocytopenia  - In setting of recent chemo / disease state  - Daily CBC, plan to transfuse to maintain plt >10k  - Hold Xarelto and ASA for platelets <50k     Urinary retention  - Relatively recent onsent urinary retention, otherwise without urinary sx  - Denies new drugs/new anticholinergics. Denies saddle anesthesia/incontinence/lower ext weakness  - no in last several days    Anemia associated with chemotherapy  - in setting of antineoplastic therapy/disease state  - plan to transfuse to maintain Hgb > 7    Moderate major depression  - Continue home trintellix    Anxiety  - continue home trintellix and valium while inpatient    GERD (gastroesophageal reflux disease)  - home prilosec not on formulary. Will receive protonix while inpatient.    Hypertension  - continue home  losartan while inpatient    Seizure disorder  - continue home oxcarbazepine while inpatient    Type 2 diabetes mellitus, without long-term current use of insulin  - hold home farxiga while inpatient as it is not on formulary. Can resume at discharge.  - low dose SSI, achs blood glucose monitoring, and diabetic diet while admitted  - blood glucose in 200s. Likely 2/2 steroids. Will defer adjusting insulin for now as patient completed steroids.    Rheumatoid arthritis involving multiple sites  - continue home plaquenil.   - home leflunomide was recently discontinued by her rhemotologist    History of TIA (transient ischemic attack)  - Continue home fenofibrate. Holding home statin and ASA while inpatient.    Hyperlipidemia  - holding home statin while inpatient    Paroxysmal atrial fibrillation  - continue home diltiazem and xarelto. Hold xarelto with plt count < 50K.        VTE Risk Mitigation (From admission, onward)         Ordered     heparin, porcine (PF) 100 unit/mL injection flush 300 Units  As needed (PRN)         05/14/22 0735     IP VTE HIGH RISK PATIENT  Once         05/13/22 1728     Place sequential compression device  Until discontinued         05/13/22 1728     Reason for No Pharmacological VTE Prophylaxis  Once        Question:  Reasons:  Answer:  Already adequately anticoagulated on oral Anticoagulants    05/13/22 1728                Disposition: Inpatient for chemo.    Mirtha Antonio NP  Bone Marrow Transplant  Roberto Yepez - Oncology ([facility])

## 2022-05-17 NOTE — ASSESSMENT & PLAN NOTE
- Relatively recent onsent urinary retention, otherwise without urinary sx  - Denies new drugs/new anticholinergics. Denies saddle anesthesia/incontinence/lower ext weakness  - no in last several days

## 2022-05-17 NOTE — NURSING
blinatumomab (BLINCYTO) 9 mcg, stabilizer 5.5 mL in sodium chloride 0.9% 240 mL chemo infusion (270 mL total volume) started through R chest port noted for having positive blood return. Pre-meds administered as ordered. Neuro check WNL. Chemotherapy dosage and BSA checked by two chemotherapy certified nurses prior to administration.  Chemotherapy education done prior to hanging of chemotherapy.  Chemotherapeutic precautions in place throughout therapy.  Will continue to monitor.

## 2022-05-17 NOTE — TELEPHONE ENCOUNTER
Per notes patient is receiving blinatumomab therapy with an alternative TKI.  She was initially on imatinib but suffered with bilateral pleural effusions and pulmonary edema. New script received for Inclusig.     Therapy appropriate. Blinatumomab + iclusig for relapsed Ph+ B-ALL.     PA submitted via [facility] online portal. Awaiting determination

## 2022-05-17 NOTE — PLAN OF CARE
Pt involved in plan of care and communicating needs throughout shift. Blinatumomab re-started today; pt tolerating well and nuero checks WNL. Up in room and to bathroom with assistance x1. Accuchecks ACHS; SSI given as ordered. Tolerating diet, voiding without difficulty. Pt remaining free from falls or injury throughout shift; bed locked and in lowest position; call light within reach.  Pt instructed to call for assistance as needed.  Q1H rounding done on pt.

## 2022-05-17 NOTE — ASSESSMENT & PLAN NOTE
- hold home farxiga while inpatient as it is not on formulary. Can resume at discharge.  - low dose SSI, achs blood glucose monitoring, and diabetic diet while admitted  - blood glucose in 200s. Likely 2/2 steroids. Will defer adjusting insulin for now as patient completed steroids.

## 2022-05-17 NOTE — RESPIRATORY THERAPY
RAPID RESPONSE RESPIRATORY CHART CHECK       Chart check completed,    instructed to call [phone] for further concerns or assistance.

## 2022-05-17 NOTE — CONSULTS
Placed 20 g x 8 cm midline in right basilic vein using realtime ultrasound guidance.  Lot#[id number].  Max dwell date 6/15/22.  Imagery recorded and saved.

## 2022-05-17 NOTE — ASSESSMENT & PLAN NOTE
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 Jenkins considering 2nd/3rd generation TKI pending results  - Bactrim DS M-W-F OI ppx  - Admitted 5/13 for Cycle 1 Blincyto, C1D1 on 5/14. Shortly after developed signs of CRS.Blincyto on held, see CRS.  - q4hr neuro checks  - Daily CBC, CMP, Mag, Phos  - Plan to resume blincyto today. Since she received so little of day 1 infusion on 5/14, will treat today like C1D1.

## 2022-05-17 NOTE — ASSESSMENT & PLAN NOTE
- In setting of recent chemo / disease state  - Daily CBC, plan to transfuse to maintain plt >10k  - Hold Xarelto and ASA for platelets <50k

## 2022-05-18 PROBLEM — R74.01 TRANSAMINITIS: Status: ACTIVE | Noted: 2022-05-18

## 2022-05-18 PROBLEM — D68.9 COAGULOPATHY: Status: ACTIVE | Noted: 2022-05-18

## 2022-05-18 LAB
ALBUMIN SERPL BCP-MCNC: 2.9 G/DL (ref 3.5–5.2)
ALBUMIN SERPL BCP-MCNC: 3 G/DL (ref 3.5–5.2)
ALP SERPL-CCNC: 172 U/L (ref 55–135)
ALP SERPL-CCNC: 256 U/L (ref 55–135)
ALT SERPL W/O P-5'-P-CCNC: 372 U/L (ref 10–44)
ALT SERPL W/O P-5'-P-CCNC: 533 U/L (ref 10–44)
ANION GAP SERPL CALC-SCNC: 8 MMOL/L (ref 8–16)
ANISOCYTOSIS BLD QL SMEAR: SLIGHT
AST SERPL-CCNC: 124 U/L (ref 10–40)
AST SERPL-CCNC: 357 U/L (ref 10–40)
BASOPHILS NFR BLD: 0 % (ref 0–1.9)
BILIRUB DIRECT SERPL-MCNC: 0.2 MG/DL (ref 0.1–0.3)
BILIRUB SERPL-MCNC: 0.3 MG/DL (ref 0.1–1)
BILIRUB SERPL-MCNC: 0.4 MG/DL (ref 0.1–1)
BLASTS NFR BLD MANUAL: 0 %
BLD PROD TYP BPU: NORMAL
BLD PROD TYP BPU: NORMAL
BLOOD UNIT EXPIRATION DATE: NORMAL
BLOOD UNIT EXPIRATION DATE: NORMAL
BLOOD UNIT TYPE CODE: 5100
BLOOD UNIT TYPE CODE: 5100
BLOOD UNIT TYPE: NORMAL
BLOOD UNIT TYPE: NORMAL
BUN SERPL-MCNC: 20 MG/DL (ref 6–20)
CALCIUM SERPL-MCNC: 7.8 MG/DL (ref 8.7–10.5)
CHLORIDE SERPL-SCNC: 100 MMOL/L (ref 95–110)
CO2 SERPL-SCNC: 22 MMOL/L (ref 23–29)
CODING SYSTEM: NORMAL
CODING SYSTEM: NORMAL
CREAT SERPL-MCNC: 0.8 MG/DL (ref 0.5–1.4)
DIFFERENTIAL METHOD: ABNORMAL
DISPENSE STATUS: NORMAL
DISPENSE STATUS: NORMAL
EOSINOPHIL NFR BLD: 0 % (ref 0–8)
ERYTHROCYTE [DISTWIDTH] IN BLOOD BY AUTOMATED COUNT: 19.7 % (ref 11.5–14.5)
EST. GFR  (AFRICAN AMERICAN): >60 ML/MIN/1.73 M^2
EST. GFR  (NON AFRICAN AMERICAN): >60 ML/MIN/1.73 M^2
FIBRINOGEN PPP-MCNC: 131 MG/DL (ref 182–400)
FIBRINOGEN PPP-MCNC: 280 MG/DL (ref 182–400)
GLUCOSE SERPL-MCNC: 216 MG/DL (ref 70–110)
HAV IGM SERPL QL IA: NEGATIVE
HBV CORE IGM SERPL QL IA: NEGATIVE
HBV SURFACE AG SERPL QL IA: NEGATIVE
HCT VFR BLD AUTO: 22.4 % (ref 37–48.5)
HCV AB SERPL QL IA: NEGATIVE
HGB BLD-MCNC: 7.3 G/DL (ref 12–16)
IMM GRANULOCYTES # BLD AUTO: ABNORMAL K/UL (ref 0–0.04)
IMM GRANULOCYTES NFR BLD AUTO: ABNORMAL % (ref 0–0.5)
INR PPP: 1.3 (ref 0.8–1.2)
INR PPP: 1.5 (ref 0.8–1.2)
LDH SERPL L TO P-CCNC: 1211 U/L (ref 110–260)
LYMPHOCYTES NFR BLD: 43 % (ref 18–48)
MAGNESIUM SERPL-MCNC: 1.9 MG/DL (ref 1.6–2.6)
MCH RBC QN AUTO: 30.4 PG (ref 27–31)
MCHC RBC AUTO-ENTMCNC: 32.6 G/DL (ref 32–36)
MCV RBC AUTO: 93 FL (ref 82–98)
MONOCYTES NFR BLD: 3 % (ref 4–15)
MYELOCYTES NFR BLD MANUAL: 1 %
NEUTROPHILS NFR BLD: 36 % (ref 38–73)
NEUTS BAND NFR BLD MANUAL: 3 %
NRBC BLD-RTO: 1 /100 WBC
PHOSPHATE SERPL-MCNC: 3.4 MG/DL (ref 2.7–4.5)
PLATELET # BLD AUTO: 9 K/UL (ref 150–450)
PLATELET BLD QL SMEAR: ABNORMAL
PMV BLD AUTO: 12.2 FL (ref 9.2–12.9)
POCT GLUCOSE: 130 MG/DL (ref 70–110)
POCT GLUCOSE: 156 MG/DL (ref 70–110)
POCT GLUCOSE: 172 MG/DL (ref 70–110)
POIKILOCYTOSIS BLD QL SMEAR: SLIGHT
POLYCHROMASIA BLD QL SMEAR: ABNORMAL
POTASSIUM SERPL-SCNC: 4.6 MMOL/L (ref 3.5–5.1)
PROT SERPL-MCNC: 5 G/DL (ref 6–8.4)
PROT SERPL-MCNC: 5.2 G/DL (ref 6–8.4)
PROTHROMBIN TIME: 13.5 SEC (ref 9–12.5)
PROTHROMBIN TIME: 15.1 SEC (ref 9–12.5)
RBC # BLD AUTO: 2.4 M/UL (ref 4–5.4)
SMUDGE CELLS BLD QL SMEAR: PRESENT
SODIUM SERPL-SCNC: 130 MMOL/L (ref 136–145)
UNIT NUMBER: NORMAL
UNIT NUMBER: NORMAL
URATE SERPL-MCNC: 3.6 MG/DL (ref 2.4–5.7)
WBC # BLD AUTO: 13.57 K/UL (ref 3.9–12.7)
WBC OTHER NFR BLD MANUAL: 14 %

## 2022-05-18 PROCEDURE — 97530 THERAPEUTIC ACTIVITIES: CPT

## 2022-05-18 PROCEDURE — 80053 COMPREHEN METABOLIC PANEL: CPT | Performed by: NURSE PRACTITIONER

## 2022-05-18 PROCEDURE — 83735 ASSAY OF MAGNESIUM: CPT | Performed by: NURSE PRACTITIONER

## 2022-05-18 PROCEDURE — 25000003 PHARM REV CODE 250: Performed by: NURSE PRACTITIONER

## 2022-05-18 PROCEDURE — 20600001 HC STEP DOWN PRIVATE ROOM

## 2022-05-18 PROCEDURE — 86965 POOLING BLOOD PLATELETS: CPT | Performed by: NURSE PRACTITIONER

## 2022-05-18 PROCEDURE — 85610 PROTHROMBIN TIME: CPT | Performed by: STUDENT IN AN ORGANIZED HEALTH CARE EDUCATION/TRAINING PROGRAM

## 2022-05-18 PROCEDURE — P9037 PLATE PHERES LEUKOREDU IRRAD: HCPCS | Performed by: NURSE PRACTITIONER

## 2022-05-18 PROCEDURE — 85384 FIBRINOGEN ACTIVITY: CPT | Mod: 91 | Performed by: NURSE PRACTITIONER

## 2022-05-18 PROCEDURE — 85610 PROTHROMBIN TIME: CPT | Mod: 91 | Performed by: NURSE PRACTITIONER

## 2022-05-18 PROCEDURE — 36430 TRANSFUSION BLD/BLD COMPNT: CPT

## 2022-05-18 PROCEDURE — 99233 PR SUBSEQUENT HOSPITAL CARE,LEVL III: ICD-10-PCS | Mod: ,,, | Performed by: INTERNAL MEDICINE

## 2022-05-18 PROCEDURE — P9012 CRYOPRECIPITATE EACH UNIT: HCPCS | Performed by: NURSE PRACTITIONER

## 2022-05-18 PROCEDURE — 85384 FIBRINOGEN ACTIVITY: CPT | Performed by: STUDENT IN AN ORGANIZED HEALTH CARE EDUCATION/TRAINING PROGRAM

## 2022-05-18 PROCEDURE — 80076 HEPATIC FUNCTION PANEL: CPT | Performed by: NURSE PRACTITIONER

## 2022-05-18 PROCEDURE — 83615 LACTATE (LD) (LDH) ENZYME: CPT | Performed by: NURSE PRACTITIONER

## 2022-05-18 PROCEDURE — 25000003 PHARM REV CODE 250: Performed by: PHYSICIAN ASSISTANT

## 2022-05-18 PROCEDURE — 84550 ASSAY OF BLOOD/URIC ACID: CPT | Performed by: NURSE PRACTITIONER

## 2022-05-18 PROCEDURE — 99233 SBSQ HOSP IP/OBS HIGH 50: CPT | Mod: ,,, | Performed by: INTERNAL MEDICINE

## 2022-05-18 PROCEDURE — 84100 ASSAY OF PHOSPHORUS: CPT | Performed by: NURSE PRACTITIONER

## 2022-05-18 PROCEDURE — 63600175 PHARM REV CODE 636 W HCPCS: Performed by: STUDENT IN AN ORGANIZED HEALTH CARE EDUCATION/TRAINING PROGRAM

## 2022-05-18 PROCEDURE — 85027 COMPLETE CBC AUTOMATED: CPT | Performed by: NURSE PRACTITIONER

## 2022-05-18 PROCEDURE — 63600175 PHARM REV CODE 636 W HCPCS: Performed by: NURSE PRACTITIONER

## 2022-05-18 PROCEDURE — 85007 BL SMEAR W/DIFF WBC COUNT: CPT | Performed by: NURSE PRACTITIONER

## 2022-05-18 RX ORDER — HYDROCODONE BITARTRATE AND ACETAMINOPHEN 500; 5 MG/1; MG/1
TABLET ORAL
Status: DISCONTINUED | OUTPATIENT
Start: 2022-05-18 | End: 2022-05-19

## 2022-05-18 RX ORDER — DIPHENHYDRAMINE HCL 25 MG
25 CAPSULE ORAL ONCE AS NEEDED
Status: COMPLETED | OUTPATIENT
Start: 2022-05-18 | End: 2022-05-18

## 2022-05-18 RX ORDER — ACETAMINOPHEN 325 MG/1
650 TABLET ORAL ONCE AS NEEDED
Status: COMPLETED | OUTPATIENT
Start: 2022-05-18 | End: 2022-05-18

## 2022-05-18 RX ORDER — BUTALBITAL, ACETAMINOPHEN AND CAFFEINE 50; 325; 40 MG/1; MG/1; MG/1
1 TABLET ORAL ONCE AS NEEDED
Status: DISCONTINUED | OUTPATIENT
Start: 2022-05-18 | End: 2022-05-18

## 2022-05-18 RX ORDER — ONDANSETRON 2 MG/ML
8 INJECTION INTRAMUSCULAR; INTRAVENOUS EVERY 8 HOURS PRN
Status: DISCONTINUED | OUTPATIENT
Start: 2022-05-18 | End: 2022-05-19 | Stop reason: HOSPADM

## 2022-05-18 RX ADMIN — ACYCLOVIR 400 MG: 200 CAPSULE ORAL at 09:05

## 2022-05-18 RX ADMIN — CEFEPIME HYDROCHLORIDE 2 G: 2 INJECTION, SOLUTION INTRAVENOUS at 09:05

## 2022-05-18 RX ADMIN — OXYCODONE 5 MG: 5 TABLET ORAL at 05:05

## 2022-05-18 RX ADMIN — SUMATRIPTAN SUCCINATE 50 MG: 50 TABLET ORAL at 08:05

## 2022-05-18 RX ADMIN — DIAZEPAM 10 MG: 5 TABLET ORAL at 08:05

## 2022-05-18 RX ADMIN — ONDANSETRON 8 MG: 2 INJECTION INTRAMUSCULAR; INTRAVENOUS at 02:05

## 2022-05-18 RX ADMIN — QUETIAPINE FUMARATE 200 MG: 200 TABLET ORAL at 09:05

## 2022-05-18 RX ADMIN — ACETAMINOPHEN 650 MG: 325 TABLET ORAL at 10:05

## 2022-05-18 RX ADMIN — SULFAMETHOXAZOLE AND TRIMETHOPRIM 1 TABLET: 800; 160 TABLET ORAL at 04:05

## 2022-05-18 RX ADMIN — DIPHENHYDRAMINE HYDROCHLORIDE 25 MG: 25 CAPSULE ORAL at 10:05

## 2022-05-18 RX ADMIN — INSULIN ASPART 2 UNITS: 100 INJECTION, SOLUTION INTRAVENOUS; SUBCUTANEOUS at 05:05

## 2022-05-18 RX ADMIN — GABAPENTIN 300 MG: 300 CAPSULE ORAL at 12:05

## 2022-05-18 RX ADMIN — INSULIN ASPART 2 UNITS: 100 INJECTION, SOLUTION INTRAVENOUS; SUBCUTANEOUS at 09:05

## 2022-05-18 RX ADMIN — GABAPENTIN 300 MG: 300 CAPSULE ORAL at 08:05

## 2022-05-18 RX ADMIN — CEFEPIME HYDROCHLORIDE 2 G: 2 INJECTION, SOLUTION INTRAVENOUS at 01:05

## 2022-05-18 RX ADMIN — OXCARBAZEPINE 1200 MG: 600 TABLET, FILM COATED ORAL at 09:05

## 2022-05-18 RX ADMIN — BUSPIRONE HYDROCHLORIDE 10 MG: 10 TABLET ORAL at 09:05

## 2022-05-18 RX ADMIN — OXYCODONE 5 MG: 5 TABLET ORAL at 01:05

## 2022-05-18 RX ADMIN — PANTOPRAZOLE SODIUM 40 MG: 40 TABLET, DELAYED RELEASE ORAL at 09:05

## 2022-05-18 RX ADMIN — SODIUM CHLORIDE, SODIUM LACTATE, POTASSIUM CHLORIDE, AND CALCIUM CHLORIDE 1000 ML: .6; .31; .03; .02 INJECTION, SOLUTION INTRAVENOUS at 12:05

## 2022-05-18 RX ADMIN — CEFEPIME HYDROCHLORIDE 2 G: 2 INJECTION, SOLUTION INTRAVENOUS at 06:05

## 2022-05-18 RX ADMIN — HYDROXYZINE HYDROCHLORIDE 50 MG: 25 TABLET, FILM COATED ORAL at 04:05

## 2022-05-18 RX ADMIN — VORTIOXETINE 20 MG: 10 TABLET, FILM COATED ORAL at 09:05

## 2022-05-18 RX ADMIN — SODIUM CHLORIDE, SODIUM LACTATE, POTASSIUM CHLORIDE, AND CALCIUM CHLORIDE 500 ML: .6; .31; .03; .02 INJECTION, SOLUTION INTRAVENOUS at 05:05

## 2022-05-18 RX ADMIN — OXYCODONE 5 MG: 5 TABLET ORAL at 09:05

## 2022-05-18 RX ADMIN — PHYTONADIONE 10 MG: 10 INJECTION, EMULSION INTRAMUSCULAR; INTRAVENOUS; SUBCUTANEOUS at 10:05

## 2022-05-18 NOTE — ASSESSMENT & PLAN NOTE
- Developed coagulapathy on this morning's (5/18) labs with fibrinogen of 131 and INR of 1.5  - Changed DIC labs to BID  - Giving 1 unit cryo and vit K+  - Will keep inpatient until coagulapathy resolves

## 2022-05-18 NOTE — TELEPHONE ENCOUNTER
Reached out to [employer] to check status of iclusig PA. Per [facility], A determination letter will be emailed to in 5-7 business day or we can call to recheck status in 5-7 business days and request a  faxed determination letter.     I will request a team member call in 5-7 business days to check status.     Patient's  is aware that we are awaiting PA determination.

## 2022-05-18 NOTE — CARE UPDATE
Called to bedside for worsening fever, tachycardia, tachypnea, and mild hypoxia - likely due to cytokine release syndrome. Blincyto infusion discontinued. Patient given 500 ml IV fluid bolus. Blood cultures repeated and cefepime restarted. Patient with improving tachycardia and temperature but noted to have blood pressure 80/50s with worsening mentation, although oriented. Additional, 1 L IV fluids started.

## 2022-05-18 NOTE — SUBJECTIVE & OBJECTIVE
Subjective:     Interval History: Resumed Blincyto yesterday at ~ 1230 after receiving 20 mg dose of Dex. Spiked a temp of 102.2 at ~ 8 pm. T-max over night 102.9. Became hypotensive a little after midnight. Given 500 ml bolus, 1 liter bolus, and 500 ml bolus. BP normalized. Grade 2 CRS. Blincyto stopped. Cefepime started. Infection w/u ordered. Neg thus far. Coagulopathic and with Grade 3 transaminitis on this mornings labs. Giving 1 unit cryo for fibrinogen of 131 and giving vit K. Will check DIC labs BID. Repeating hepatic function panel this afternoon. Given coagulapathy and transaminitis. Will monitor for at lease one more day. Will not resume Blincyto per Dr. Jenkins. Patient will f/u with Dr. Jenkins early next week to discuss treatment options.     Objective:     Vital Signs (Most Recent):  Temp: 98.9 °F (37.2 °C) (05/18/22 0702)  Pulse: 90 (05/18/22 0803)  Resp: 20 (05/18/22 0702)  BP: 132/62 (05/18/22 0803)  SpO2: 100 % (05/18/22 0803) Vital Signs (24h Range):  Temp:  [98 °F (36.7 °C)-102.9 °F (39.4 °C)] 98.9 °F (37.2 °C)  Pulse:  [] 90  Resp:  [18-42] 20  SpO2:  [91 %-100 %] 100 %  BP: ()/(47-73) 132/62     Weight: 69.7 kg (153 lb 10.6 oz)  Body mass index is 26.38 kg/m².  Body surface area is 1.77 meters squared.    ECOG SCORE           [unfilled]    Intake/Output - Last 3 Shifts         05/16 0700 05/17 0659 05/17 0700 05/18 0659 05/18 0700 05/19 0659    P.O.  1200     I.V. (mL/kg)       IV Piggyback 279.2 1501.6     Total Intake(mL/kg) 279.2 (4) 2701.6 (38.8)     Urine (mL/kg/hr) 3350 (2) 3300 (2)     Stool 0 0     Total Output 3350 3300     Net -3070.8 -598.5            Urine Occurrence  2 x     Stool Occurrence 0 x 0 x             Physical Exam  Constitutional:       Appearance: She is well-developed.   HENT:      Head: Normocephalic and atraumatic.      Mouth/Throat:      Pharynx: No oropharyngeal exudate.   Eyes:      Conjunctiva/sclera: Conjunctivae normal.      Pupils: Pupils are  equal, round, and reactive to light.   Cardiovascular:      Rate and Rhythm: Normal rate and regular rhythm.      Heart sounds: Normal heart sounds. No murmur heard.  Pulmonary:      Effort: Pulmonary effort is normal.      Breath sounds: Normal breath sounds.   Abdominal:      General: Bowel sounds are normal. There is no distension.      Palpations: Abdomen is soft.      Tenderness: There is no abdominal tenderness.   Musculoskeletal:         General: No deformity. Normal range of motion.      Cervical back: Normal range of motion and neck supple.   Skin:     General: Skin is warm and dry.      Findings: Bruising present. No erythema or rash.      Comments: Right chest wall port-a-cath. Dressing c/d/i. No sign of infection to site.  petechiae   Neurological:      Mental Status: She is alert and oriented to person, place, and time.   Psychiatric:         Behavior: Behavior normal.         Thought Content: Thought content normal.         Judgment: Judgment normal.       Significant Labs:   CBC:   Recent Labs   Lab 05/17/22  0419 05/18/22  0326   WBC 37.70* 13.57*   HGB 8.0* 7.3*   HCT 24.4* 22.4*   PLT 17* 9*      and CMP:   Recent Labs   Lab 05/17/22  0419 05/18/22  0326    130*   K 4.4 4.6    100   CO2 21* 22*   * 216*   BUN 20 20   CREATININE 0.7 0.8   CALCIUM 8.6* 7.8*   PROT 5.6* 5.0*   ALBUMIN 3.4* 2.9*   BILITOT 0.2 0.3   ALKPHOS 229* 256*   AST 32 357*   * 533*   ANIONGAP 10 8   EGFRNONAA >60.0 >60.0         Diagnostic Results:  I have reviewed all pertinent imaging results/findings within the past 24 hours.

## 2022-05-18 NOTE — PT/OT/SLP PROGRESS
Occupational Therapy   Treatment    Name: Deepti Gonzalez  MRN: [id number]  Admitting Diagnosis:  B-cell acute lymphoblastic leukemia       Recommendations:     Discharge Recommendations: home with home health  Discharge Equipment Recommendations:  wheelchair  Barriers to discharge:  None    Assessment:     Deepti Gonzalez is a 51 y.o. female with a medical diagnosis of B-cell acute lymphoblastic leukemia.  She presents with deficits in  Self-care tasks as well as mobility.  Pt. Noted to be much weaker on this date than previous session.  Pt. Tolerated session well overall and happy to be out of the bed. Pt. With improvements noted in pain level OOB as well. Pt. Would benefit from continued OT services . Performance deficits affecting function are weakness, impaired endurance, impaired self care skills, impaired functional mobilty, gait instability, pain, impaired cardiopulmonary response to activity.     Rehab Prognosis:  Good; patient would benefit from acute skilled OT services to address these deficits and reach maximum level of function.       Plan:     Patient to be seen 3 x/week to address the above listed problems via self-care/home management, therapeutic activities, therapeutic exercises  · Plan of Care Expires: 06/13/22  · Plan of Care Reviewed with: patient    Subjective   Pt. Reported that she felt better in the chair   Pain/Comfort:  · Pain Rating 1:  (did not rate)  · Location 1: back  · Pain Addressed 1: Reposition, Distraction  · Pain Rating Post-Intervention 1:  (appeared more comfortable in chair)    Objective:     Communicated with: nurse prior to session.  Patient found supine with oxygen (port) upon OT entry to room.    General Precautions: Standard, fall   Orthopedic Precautions:N/A   Braces: N/A  Respiratory Status: Nasal cannula, flow 1 L/min     Occupational Performance:     Bed Mobility:    · Patient completed Supine to Sit with moderate assistance     Functional  Mobility/Transfers:  · Patient completed Sit <> Stand Transfer with minimum assistance  with  no assistive device   · Functional Mobility: not tested  · SPT to bedside chair with Mod A and no AD (difficulty noted pivoting on feet)    Activities of Daily Living:  · Deferred on this date per pt. request      [facility] 6 Click ADL: 16    Treatment & Education:  Pt. Educated on importance of OOB/therapy as tolerated  BSC adjusted for appropriate height for pt. use    Patient left up in chair with all lines intact, call button in reach and BLE elevatedEducation:   and nurse notified    GOALS:   Multidisciplinary Problems     Occupational Therapy Goals        Problem: Occupational Therapy    Goal Priority Disciplines Outcome Interventions   Occupational Therapy Goal     OT, PT/OT Ongoing, Progressing    Description: Goals to be met by: 05-30-22     Patient will increase functional independence with ADLs by performing:    UE Dressing with Set-up Assistance.  LE Dressing with Supervision.  Grooming while standing at sink with Supervision.  Toileting from toilet with Supervision for hygiene and clothing management.   Stand pivot transfers with Supervision with rollator  Toilet transfer to toilet with Supervision.  Pt. To be I with HEP to improve level of endurance                     Time Tracking:     OT Date of Treatment: 05/18/22  OT Start Time: 0821  OT Stop Time: 0835  OT Total Time (min): 14 min    Billable Minutes:Therapeutic Activity 14    OT/REZA: OT          5/18/2022

## 2022-05-18 NOTE — CARE UPDATE
RAPID RESPONSE NURSE FOLLOW-UP NOTE       Followed up with patient for proactive rounding.  Hypotensive and worsening mentation, received bolus SBP now 104. Will continue to monitor very closely. Primary team aware and has seen patient. Reviewed plan of care with bedside RNMaicol.   Team will continue to follow.  Please call Rapid Response RN, Djea Yousif RN with any questions or concerns at [phone].

## 2022-05-18 NOTE — ASSESSMENT & PLAN NOTE
- LFTs up significantly today since resuming Blincyto (now stopped). Grade 3 transaminitis likely 2/2 Blincyto.   - Also developed transaminitis after initially starting Blincyto. W/u neg. Improved with with Blincyto clearance.  - Will check hepatic function panel this afternoon.

## 2022-05-18 NOTE — PT/OT/SLP PROGRESS
Physical Therapy      Patient Name:  Deepti Gonzalez   MRN:  [id number]    Patient not seen today secondary to Patient unwilling to participate, Patient fatigue. Pt reports having just returned to bed following OT session.   PT educated Pt and spouse on importance of upright positioning to promote cardiopulmonary health and to help regulate blood pressure and prevent orthostatic hypotension.  [id number]   9 minutes  1 Therapeutic Activity charge associated with this missed visit note.     Will follow-up 5/19/22.

## 2022-05-18 NOTE — PROGRESS NOTES
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   is involved was referred to the Social Work Department via routine referral.  Patient presents as a 51 y.o. year old  female.    Persons interviewed: patient.    Living Situation:  Patient lives with her  Mannie ([phone]) and their 12 year old daughter in their single-story home normally; recently staying with brother while needing a caregiver.  Independent with ADLs though needs assistance moving in/out of tub.    Resides at [address] [place] [address] [place] [address]   phone: [phone] (home).      (RETIRED) Functional Status Prior  Ambulation Prior: 3-->assistive equipment and person  Transferrin-->assistive equipment  Toiletin-->assistive equipment    Current or Past Agencies and Description of Services/Supplies    DME  Agency Name: [facility]  Agency Phone Number: [phone]  Equipment Currently Used at Home: bath bench, raised toilet, rollator, has RW.      Home Health  Agency Name: [facility]  Agency Phone Number: [phone]  Services: SN, PT/OT    IV Infusion  Agency Name: none    Nutrition: Oral, diabetic diet.      Outpatient Pharmacy:     [facility]  [address] [address]  Phone: [phone] Fax: [phone]    [id number] - [place], [place] - [address] AT [address]  [address] [place] [address]  Phone: [phone] Fax: [phone]      Patient Preference of agencies  include: none expressed.      Patient/Caregiver informed of right to choose providers or agencies.  Patient provides permission to release any necessary information to [facility] and to Non[facility] agencies as needed to facilitate patient care, treatment planning, and patient discharge planning.  Written and verbal resources provided.      Coping   Coping well with support of family.  Elevated distress score of 9 on admission; related to financial stressors.  Previously referred to financial counselors for assistance.       Adjustment to Diagnosis and Treatment  Adequate.      Emotional/Behavioral/Cognitive Issues   Hx of anxiety; recent AMS.  Compliant with Trintellix 20 mg daily, Seroquel 200mg HS,  Vistaril 50mg BID PRN, and Valium 10mg BID PRN.         History/Current Symptoms of Anxiety/Depression: Yes  History/Current Substance Use:   Social History     Tobacco Use    Smoking status: Current Every Day Smoker     Packs/day: 0.50     Types: Cigarettes    Smokeless tobacco: Never Used   Substance and Sexual Activity    Alcohol use: No    Drug use: Never    Sexual activity: Not on file       Indications of Abuse/Neglect: No:   Abuse Screen (yes response referral indicated)  Feels Unsafe at Home or Work/School: no  Feels Threatened by Someone: no  Does Anyone Try to Keep You From Having Contact with Others or Doing Things Outside Your Home?: no  Physical Signs of Abuse Present: no    Financial:  Payer/Plan Subscr  Sex Relation Sub. Ins. ID Effective Group Num   1. BLUE CROSS BL* SHIRLENE NARANJO 1900 Male Spouse [id number]* 3/22/14 [id number]                                   [address] [address]      Significant financial stressors; recent referral for financial assistance.      Other identified concerns/needs: current recs for wheelchair and resuming HH.      Plan: return home to care of spouse with HH support.      Interventions/Referrals:  ALAN.  Submitted online application for [facility] fund and approved for  $500 annual shelbie based on report of $40k income for household of two.  Requested update from financial counselors.  Patient/caregiver engaged in treatment planning process.     providing psychosocial and supportive counseling, resources, education, assistance and discharge planning as appropriate.  Patient/caregiver state understanding of  available resources,  following, remains available.  Will update following Kameron Tran on plan upon his return.

## 2022-05-18 NOTE — CARE UPDATE
RAPID RESPONSE NURSE FOLLOW-UP NOTE       Followed up with patient for proactive rounding.  Patient received second bolus, MAP 80's currently. Mentation is still poor.   Reviewed plan of care with bedside RNMaicol.   Team will continue to follow.  Please call Rapid Response RN, Deja Yousif RN with any questions or concerns at [phone].

## 2022-05-18 NOTE — ASSESSMENT & PLAN NOTE
- Relatively recent onsent urinary retention, otherwise without urinary sx  - Denies new drugs/new anticholinergics. Denies saddle anesthesia/incontinence/lower ext weakness  - none in last several days  RESOLVED

## 2022-05-18 NOTE — CARE UPDATE
RAPID RESPONSE NURSE PROACTIVE ROUNDING NOTE       Time of Visit:     Admit Date: 2022  LOS: 4  Code Status: Full Code   Date of Visit: 2022  : 1970  Age: 51 y.o.  Sex: female  Race: White  Bed: 844/844 A:   MRN: [id number]  Was the patient discharged from an ICU this admission? No   Was the patient discharged from a PACU within last 24 hours? No   Did the patient receive conscious sedation/general anesthesia in last 24 hours? No  Was the patient in the ED within the past 24 hours? No  Was the patient on NIPPV within the past 24 hours? No   Attending Physician: Yusuf Joseph MD  Primary Service: [facility]   Time spent at the bedside: < 15 min    SITUATION    Notified by bedside RN via phone call. Maicol  Reason for alert: Reaction to   Called to evaluate the patient for Circulatory    BACKGROUND     Why is the patient in the hospital?: B-cell acute lymphoblastic leukemia    Patient has a past medical history of Arthritis, Cancer, COPD (chronic obstructive pulmonary disease), Hypertension, and Stroke.    Last Vitals:  Temp: 102.9 °F (39.4 °C) (2052)  Pulse: 137 (2052)  Resp: 40 (2030)  BP: 155/68 (2052)  SpO2: 91 % (2052)    24 Hours Vitals Range:  Temp:  [98 °F (36.7 °C)-102.9 °F (39.4 °C)]   Pulse:  []   Resp:  [17-42]   BP: (125-155)/(60-70)   SpO2:  [91 %-100 %]     Labs:  Recent Labs     05/15/22  0334 22  0341 22  0419   WBC 46.27* 34.28* 37.70*   HGB 9.6* 8.4* 8.0*   HCT 28.5* 25.2* 24.4*   PLT 23* 18* 17*       Recent Labs     05/15/22  0334 05/15/22  1501 22  0341 22  0419    138 140 136   K 4.0 4.1 3.9 4.4    105 106 105   CO2 21* 25 21* 21*   CREATININE 0.9 0.7 0.8 0.7   * 177* 205* 265*   PHOS 4.3  --  3.5 3.6   MG 1.9  --  2.0 2.2        No results for input(s): PH, PCO2, PO2, HCO3, POCSATURATED, BE in the last 72 hours.     ASSESSMENT    -140's.   Temp 102.8   Pulse ox 91 placed on  2L now 100%     Pt resting in bed, in some pain and trying to get fever down.       INTERVENTIONS    RN stopped infusion, team ordered bolus, blood cultures, cefepime, and chest XRAY.       RECOMMENDATIONS    Will continue to follow team orders and monitor patient.     PROVIDER ESCALATION    Yes/No  yes    Orders received and case discussed with Dr. Hooker.    Disposition: Remain in room 844.    FOLLOW-UP    Plan of care discussed with   charge RNLaura and bedside RNMaicol. Instructed to call the Rapid Response Nurse, Deja Yousif RN at [phone] for additional questions or concerns.

## 2022-05-18 NOTE — CONSULTS
Consult received - see Significant Event note / Rapid response documentation.     Please call #[phone] should concerns arise for re-evaluation.    Oz Mills DO PGY 6

## 2022-05-18 NOTE — ASSESSMENT & PLAN NOTE
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 Jenkins considering 2nd/3rd generation TKI pending results  - Bactrim DS M-W-F OI ppx  - Admitted 5/13 for Cycle 1 Blincyto, C1D1 on 5/14. Shortly after developed Grade 3 CRS, moderate AMS, and transaminitis.Blincyto held (see CRS). Given steroids x 3 days. Resumed Blincyto 5/17 and again developed CRS, Grade 2 as well as mild AMS and Grade 3 transaminitis. Blincyto stopped with no plan to resume.  - q4hr neuro checks  - Daily CBC, CMP, Mag, Phos  - Plan is to f/u with Dr. Jenkins early next week to discuss other treatment options.

## 2022-05-18 NOTE — ASSESSMENT & PLAN NOTE
- In setting of recent chemo / disease state  - Daily CBC, plan to transfuse to maintain plt >10k  - Hold Xarelto and ASA for platelets <50k   - Transfusing 1 unit plts for plt count of 9K

## 2022-05-18 NOTE — ASSESSMENT & PLAN NOTE
- Developed grade 3 CRS on 5/14 with fever to 103F, tachycardia to 140s, mild confusion. No hypotension.   - LFTs increased on 5/15, grade 4 transaminitis, now meets criteria for grade 3 CRS  - paused blinatumumab  - infectious work up unrevealing, continues on cefepime  - liver US unrevealing and hepatitis panel negative, transaminitis improved with Blincyto clearance.   - started dexamethasone 8mg q8hrs and q4hr neuro checks  - AMS resolved  - Resumed Blincyto 5/17/22 and again developed CRS (Grade 2), mild AMS, and Grade 3 transaminitis. Blincyto stopped with no plan to resume.

## 2022-05-18 NOTE — ASSESSMENT & PLAN NOTE
- hold home farxiga while inpatient as it is not on formulary. Can resume at discharge.  - low dose SSI, achs blood glucose monitoring, and diabetic diet while admitted  - blood glucose was in 200s. Likely 2/2 steroids. Deferred adjusting insulin for now as patient completed steroids. Blood glucose improving with steroid clearance.

## 2022-05-18 NOTE — SIGNIFICANT EVENT
Brief CCM Significant Event Note -     Made aware of episode of hypotension and acute mental status change from the Rapid Response team.     Evaluated the patient at the bedside at this time. She is s/p two liters of IVF. Complained of chills, rigors, fevers to 102 F, delirium, nausea, headache, and dizziness on and off last night. These symptoms have now resolved except for a slight headache. No dysuria or UTI symptoms.    Exam - Blood pressure is 130s/80s, Pulse 90, SpO2 100% on 2 LPM NC --> Decreased to 1 LPM with adequate SpO2. Lungs are clear, RRR without murmurs. No edema. Scattered petechiae on extremities. Abdomen is soft without distention.    CXR performed last night without obvious infiltrate.     Plan - Continue current management. MICU will remain available to re-evaluate at any time should she decompensate. Please call us at #[phone] if help is needed.    Oz Mills DO PGY6  PCCM

## 2022-05-18 NOTE — PLAN OF CARE
Patient AAOx4. Complaints of migraine this shift; unrelieved with PRN Imitrex or oxycodone. CT head completed. 1U cryo and 1U plt transfused this shift and PO Vit K administered. IV abx continued. Afebrile this shift. Lethargic intermittently through out shift. Bed in low locked position, call light and personal items within reach. Instructed to call if needed.

## 2022-05-19 VITALS
WEIGHT: 156.5 LBS | RESPIRATION RATE: 19 BRPM | OXYGEN SATURATION: 96 % | TEMPERATURE: 98 F | DIASTOLIC BLOOD PRESSURE: 70 MMHG | SYSTOLIC BLOOD PRESSURE: 143 MMHG | HEART RATE: 85 BPM | HEIGHT: 64 IN | BODY MASS INDEX: 26.72 KG/M2

## 2022-05-19 LAB
ALBUMIN SERPL BCP-MCNC: 2.8 G/DL (ref 3.5–5.2)
ALP SERPL-CCNC: 150 U/L (ref 55–135)
ALT SERPL W/O P-5'-P-CCNC: 255 U/L (ref 10–44)
ANION GAP SERPL CALC-SCNC: 8 MMOL/L (ref 8–16)
ANISOCYTOSIS BLD QL SMEAR: SLIGHT
AST SERPL-CCNC: 43 U/L (ref 10–40)
BACTERIA BLD CULT: NORMAL
BACTERIA BLD CULT: NORMAL
BASOPHILS # BLD AUTO: 0.02 K/UL (ref 0–0.2)
BASOPHILS # BLD AUTO: ABNORMAL K/UL (ref 0–0.2)
BASOPHILS NFR BLD: 0 % (ref 0–1.9)
BASOPHILS NFR BLD: 0.1 % (ref 0–1.9)
BILIRUB SERPL-MCNC: 0.2 MG/DL (ref 0.1–1)
BLASTS NFR BLD MANUAL: 35 %
BLD PROD TYP BPU: NORMAL
BLOOD UNIT EXPIRATION DATE: NORMAL
BLOOD UNIT TYPE CODE: 7300
BLOOD UNIT TYPE: NORMAL
BUN SERPL-MCNC: 12 MG/DL (ref 6–20)
CALCIUM SERPL-MCNC: 7.9 MG/DL (ref 8.7–10.5)
CHLORIDE SERPL-SCNC: 102 MMOL/L (ref 95–110)
CO2 SERPL-SCNC: 25 MMOL/L (ref 23–29)
CODING SYSTEM: NORMAL
CREAT SERPL-MCNC: 0.6 MG/DL (ref 0.5–1.4)
DIFFERENTIAL METHOD: ABNORMAL
DIFFERENTIAL METHOD: ABNORMAL
DISPENSE STATUS: NORMAL
EOSINOPHIL # BLD AUTO: 0.1 K/UL (ref 0–0.5)
EOSINOPHIL # BLD AUTO: ABNORMAL K/UL (ref 0–0.5)
EOSINOPHIL NFR BLD: 0 % (ref 0–8)
EOSINOPHIL NFR BLD: 0.5 % (ref 0–8)
ERYTHROCYTE [DISTWIDTH] IN BLOOD BY AUTOMATED COUNT: 18.8 % (ref 11.5–14.5)
ERYTHROCYTE [DISTWIDTH] IN BLOOD BY AUTOMATED COUNT: 18.9 % (ref 11.5–14.5)
EST. GFR  (AFRICAN AMERICAN): >60 ML/MIN/1.73 M^2
EST. GFR  (NON AFRICAN AMERICAN): >60 ML/MIN/1.73 M^2
FIBRINOGEN PPP-MCNC: 301 MG/DL (ref 182–400)
GLUCOSE SERPL-MCNC: 100 MG/DL (ref 70–110)
HCT VFR BLD AUTO: 14.4 % (ref 37–48.5)
HCT VFR BLD AUTO: 21.7 % (ref 37–48.5)
HGB BLD-MCNC: 5 G/DL (ref 12–16)
HGB BLD-MCNC: 7.4 G/DL (ref 12–16)
HYPOCHROMIA BLD QL SMEAR: ABNORMAL
IMM GRANULOCYTES # BLD AUTO: 0.91 K/UL (ref 0–0.04)
IMM GRANULOCYTES # BLD AUTO: ABNORMAL K/UL (ref 0–0.04)
IMM GRANULOCYTES NFR BLD AUTO: 3.5 % (ref 0–0.5)
IMM GRANULOCYTES NFR BLD AUTO: ABNORMAL % (ref 0–0.5)
INR PPP: 1.1 (ref 0.8–1.2)
LDH SERPL L TO P-CCNC: 550 U/L (ref 110–260)
LYMPHOCYTES # BLD AUTO: 4.2 K/UL (ref 1–4.8)
LYMPHOCYTES # BLD AUTO: ABNORMAL K/UL (ref 1–4.8)
LYMPHOCYTES NFR BLD: 16.3 % (ref 18–48)
LYMPHOCYTES NFR BLD: 31 % (ref 18–48)
MAGNESIUM SERPL-MCNC: 2 MG/DL (ref 1.6–2.6)
MCH RBC QN AUTO: 31.6 PG (ref 27–31)
MCH RBC QN AUTO: 32.1 PG (ref 27–31)
MCHC RBC AUTO-ENTMCNC: 34.1 G/DL (ref 32–36)
MCHC RBC AUTO-ENTMCNC: 34.7 G/DL (ref 32–36)
MCV RBC AUTO: 92 FL (ref 82–98)
MCV RBC AUTO: 93 FL (ref 82–98)
MONOCYTES # BLD AUTO: 14.2 K/UL (ref 0.3–1)
MONOCYTES # BLD AUTO: ABNORMAL K/UL (ref 0.3–1)
MONOCYTES NFR BLD: 4 % (ref 4–15)
MONOCYTES NFR BLD: 54.8 % (ref 4–15)
MYELOCYTES NFR BLD MANUAL: 2 %
NEUTROPHILS # BLD AUTO: 6.4 K/UL (ref 1.8–7.7)
NEUTROPHILS NFR BLD: 23 % (ref 38–73)
NEUTROPHILS NFR BLD: 24.8 % (ref 38–73)
NEUTS BAND NFR BLD MANUAL: 4 %
NRBC BLD-RTO: 0 /100 WBC
NRBC BLD-RTO: 0 /100 WBC
NUM UNITS TRANS PACKED RBC: NORMAL
PATH REV BLD -IMP: NORMAL
PHOSPHATE SERPL-MCNC: 3.3 MG/DL (ref 2.7–4.5)
PLATELET # BLD AUTO: 56 K/UL (ref 150–450)
PLATELET # BLD AUTO: 63 K/UL (ref 150–450)
PLATELET BLD QL SMEAR: ABNORMAL
PLATELET BLD QL SMEAR: ABNORMAL
PMV BLD AUTO: 11.1 FL (ref 9.2–12.9)
PMV BLD AUTO: 11.6 FL (ref 9.2–12.9)
POCT GLUCOSE: 107 MG/DL (ref 70–110)
POCT GLUCOSE: 99 MG/DL (ref 70–110)
POIKILOCYTOSIS BLD QL SMEAR: SLIGHT
POLYCHROMASIA BLD QL SMEAR: ABNORMAL
POTASSIUM SERPL-SCNC: 4.2 MMOL/L (ref 3.5–5.1)
PROMYELOCYTES NFR BLD MANUAL: 1 %
PROT SERPL-MCNC: 5.2 G/DL (ref 6–8.4)
PROTHROMBIN TIME: 11.7 SEC (ref 9–12.5)
RBC # BLD AUTO: 1.56 M/UL (ref 4–5.4)
RBC # BLD AUTO: 2.34 M/UL (ref 4–5.4)
SMUDGE CELLS BLD QL SMEAR: PRESENT
SODIUM SERPL-SCNC: 135 MMOL/L (ref 136–145)
TARGETS BLD QL SMEAR: ABNORMAL
URATE SERPL-MCNC: 2.8 MG/DL (ref 2.4–5.7)
WBC # BLD AUTO: 19.51 K/UL (ref 3.9–12.7)
WBC # BLD AUTO: 25.8 K/UL (ref 3.9–12.7)

## 2022-05-19 PROCEDURE — 63600175 PHARM REV CODE 636 W HCPCS: Performed by: NURSE PRACTITIONER

## 2022-05-19 PROCEDURE — 85027 COMPLETE CBC AUTOMATED: CPT | Performed by: NURSE PRACTITIONER

## 2022-05-19 PROCEDURE — 97530 THERAPEUTIC ACTIVITIES: CPT

## 2022-05-19 PROCEDURE — 85025 COMPLETE CBC W/AUTO DIFF WBC: CPT | Performed by: NURSE PRACTITIONER

## 2022-05-19 PROCEDURE — 84550 ASSAY OF BLOOD/URIC ACID: CPT | Performed by: NURSE PRACTITIONER

## 2022-05-19 PROCEDURE — 99233 PR SUBSEQUENT HOSPITAL CARE,LEVL III: ICD-10-PCS | Mod: ,,, | Performed by: INTERNAL MEDICINE

## 2022-05-19 PROCEDURE — 80053 COMPREHEN METABOLIC PANEL: CPT | Performed by: NURSE PRACTITIONER

## 2022-05-19 PROCEDURE — P9040 RBC LEUKOREDUCED IRRADIATED: HCPCS | Performed by: NURSE PRACTITIONER

## 2022-05-19 PROCEDURE — 84100 ASSAY OF PHOSPHORUS: CPT | Performed by: NURSE PRACTITIONER

## 2022-05-19 PROCEDURE — 25000003 PHARM REV CODE 250: Performed by: NURSE PRACTITIONER

## 2022-05-19 PROCEDURE — 83615 LACTATE (LD) (LDH) ENZYME: CPT | Performed by: NURSE PRACTITIONER

## 2022-05-19 PROCEDURE — 85384 FIBRINOGEN ACTIVITY: CPT | Performed by: STUDENT IN AN ORGANIZED HEALTH CARE EDUCATION/TRAINING PROGRAM

## 2022-05-19 PROCEDURE — 85060 BLOOD SMEAR INTERPRETATION: CPT | Mod: ,,, | Performed by: PATHOLOGY

## 2022-05-19 PROCEDURE — 36430 TRANSFUSION BLD/BLD COMPNT: CPT

## 2022-05-19 PROCEDURE — 85610 PROTHROMBIN TIME: CPT | Performed by: STUDENT IN AN ORGANIZED HEALTH CARE EDUCATION/TRAINING PROGRAM

## 2022-05-19 PROCEDURE — 63600175 PHARM REV CODE 636 W HCPCS: Performed by: STUDENT IN AN ORGANIZED HEALTH CARE EDUCATION/TRAINING PROGRAM

## 2022-05-19 PROCEDURE — 83735 ASSAY OF MAGNESIUM: CPT | Performed by: NURSE PRACTITIONER

## 2022-05-19 PROCEDURE — 85060 PATHOLOGIST REVIEW: ICD-10-PCS | Mod: ,,, | Performed by: PATHOLOGY

## 2022-05-19 PROCEDURE — 85007 BL SMEAR W/DIFF WBC COUNT: CPT | Performed by: NURSE PRACTITIONER

## 2022-05-19 PROCEDURE — 99233 SBSQ HOSP IP/OBS HIGH 50: CPT | Mod: ,,, | Performed by: INTERNAL MEDICINE

## 2022-05-19 RX ORDER — ACETAMINOPHEN 325 MG/1
650 TABLET ORAL ONCE AS NEEDED
Status: COMPLETED | OUTPATIENT
Start: 2022-05-19 | End: 2022-05-19

## 2022-05-19 RX ORDER — DIPHENHYDRAMINE HCL 25 MG
25 CAPSULE ORAL ONCE AS NEEDED
Status: COMPLETED | OUTPATIENT
Start: 2022-05-19 | End: 2022-05-19

## 2022-05-19 RX ORDER — ASPIRIN 81 MG/1
81 TABLET ORAL DAILY
Refills: 0 | Status: ON HOLD
Start: 2022-05-19 | End: 2022-06-06 | Stop reason: SDUPTHER

## 2022-05-19 RX ORDER — HYDROCODONE BITARTRATE AND ACETAMINOPHEN 500; 5 MG/1; MG/1
TABLET ORAL
Status: DISCONTINUED | OUTPATIENT
Start: 2022-05-19 | End: 2022-05-19 | Stop reason: HOSPADM

## 2022-05-19 RX ORDER — HEPARIN 100 UNIT/ML
300 SYRINGE INTRAVENOUS ONCE AS NEEDED
Status: COMPLETED | OUTPATIENT
Start: 2022-05-19 | End: 2022-05-19

## 2022-05-19 RX ORDER — CALCIUM CARBONATE 200(500)MG
1000 TABLET,CHEWABLE ORAL ONCE
Status: COMPLETED | OUTPATIENT
Start: 2022-05-19 | End: 2022-05-19

## 2022-05-19 RX ORDER — HYDROXYCHLOROQUINE SULFATE 200 MG/1
200 TABLET, FILM COATED ORAL DAILY
Status: ON HOLD
Start: 2022-05-19 | End: 2022-10-13 | Stop reason: HOSPADM

## 2022-05-19 RX ADMIN — HEPARIN 300 UNITS: 100 SYRINGE at 02:05

## 2022-05-19 RX ADMIN — GABAPENTIN 300 MG: 300 CAPSULE ORAL at 04:05

## 2022-05-19 RX ADMIN — ACYCLOVIR 400 MG: 200 CAPSULE ORAL at 09:05

## 2022-05-19 RX ADMIN — CALCIUM CARBONATE (ANTACID) CHEW TAB 500 MG 1000 MG: 500 CHEW TAB at 01:05

## 2022-05-19 RX ADMIN — GABAPENTIN 300 MG: 300 CAPSULE ORAL at 12:05

## 2022-05-19 RX ADMIN — PANTOPRAZOLE SODIUM 40 MG: 40 TABLET, DELAYED RELEASE ORAL at 09:05

## 2022-05-19 RX ADMIN — VORTIOXETINE 20 MG: 10 TABLET, FILM COATED ORAL at 09:05

## 2022-05-19 RX ADMIN — PHYTONADIONE 5 MG: 10 INJECTION, EMULSION INTRAMUSCULAR; INTRAVENOUS; SUBCUTANEOUS at 09:05

## 2022-05-19 RX ADMIN — ACETAMINOPHEN 650 MG: 325 TABLET ORAL at 10:05

## 2022-05-19 RX ADMIN — SUMATRIPTAN SUCCINATE 50 MG: 50 TABLET ORAL at 10:05

## 2022-05-19 RX ADMIN — OXCARBAZEPINE 1200 MG: 600 TABLET, FILM COATED ORAL at 09:05

## 2022-05-19 RX ADMIN — DIPHENHYDRAMINE HYDROCHLORIDE 25 MG: 25 CAPSULE ORAL at 10:05

## 2022-05-19 RX ADMIN — DIAZEPAM 10 MG: 5 TABLET ORAL at 09:05

## 2022-05-19 RX ADMIN — HYDROXYZINE HYDROCHLORIDE 50 MG: 25 TABLET, FILM COATED ORAL at 04:05

## 2022-05-19 RX ADMIN — ONDANSETRON 8 MG: 2 INJECTION INTRAMUSCULAR; INTRAVENOUS at 12:05

## 2022-05-19 RX ADMIN — BUSPIRONE HYDROCHLORIDE 10 MG: 10 TABLET ORAL at 09:05

## 2022-05-19 RX ADMIN — CEFEPIME HYDROCHLORIDE 2 G: 2 INJECTION, SOLUTION INTRAVENOUS at 05:05

## 2022-05-19 NOTE — PT/OT/SLP PROGRESS
"Physical Therapy Treatment    Patient Name:  Deepti Gonzalez   MRN:  [id number]    Recommendations:     Discharge Recommendations:  nursing facility, skilled   Discharge Equipment Recommendations: wheelchair, walker, rolling   Barriers to discharge: Inaccessible home, Decreased caregiver support and increased assistance required    Assessment:     Deepti Gonzalez is a 51 y.o. female admitted with a medical diagnosis of B-cell acute lymphoblastic leukemia.  She presents with the following impairments/functional limitations:  weakness, decreased safety awareness, impaired balance, impaired endurance, impaired functional mobilty, gait instability.  Pt participated in functional mobility training, notably weak and with global deficits resulting in reduced activity tolerance, strength, and impaired mobility. Pt able to ambulate to/from bathroom however required mod assist for transfers (max from toilet), and then had several significant LOB when ambulating back from bathroom. Pt continues to present below their independent prior functional baseline. Pt would benefit from continued, skilled PT while in house to address the above listed impairments, further progress mobility as able, return towards highest level of function.      Rehab Prognosis: Fair; patient continues to benefit from acute skilled PT services to address these deficits and reach maximum level of function.  Patient remains most appropriate to discharge to nursing facility, skilled  Recent Surgery: * No surgery found *      Plan:     During this hospitalization, patient to be seen 3 x/week to address the identified rehab impairments via gait training, therapeutic activities, therapeutic exercises, neuromuscular re-education and progress toward the following goals:    · Plan of Care Expires:  06/14/22    Subjective     Subjective: "I'll be fine, I'm usually never alone, someone is always with me"   Pain/Comfort:   · Pain Rating 1: 0/10      Objective: "     Communicated with RN prior to session.  Patient found supine with peripheral IV upon PT entry to room.      General Precautions: Standard, fall   Orthopedic Precautions:N/A   Braces: N/A     Functional Mobility:  · Bed Mobility: supervision for supine<>sit  · Transfers: mod assist for sit>stand from EOB with rollator anteriorly, however max assist for sit>stand from toilet in bathroom with significant BUE use on both rollator/grab rail  · Gait: Patient ambulated to/from bathroom 15 ft x2 with rollator and CGA/min assist. Initial ambulation to bathroom with CGA with reduced georgiana and cueing for safety. However, ambulating back to bed from bathroom with min assist and significantly impaired gait. Cueing to reduce georgiana to improve overall safety. Pt with impaired trunk control (leaning anteriorly), reduced foot clearance, and inability to safely control rollator requiring cueing and increased assistance at all times.   · Balance:   · Sitting: supervision for safety  · Standing: impaired; initially CGA with use of rollator however when ambulating back from bathroom pt had 3 moderate LOB requiring significant external assistance as pt demo's delayed to absent righting reactions. Pt with impaired trunk control in all directions with LOB inconsistently throughout all standing activity. Pt attempting to lean unsafely on rollator anteriorly which subsequently further increased anterior trunk sway.       AM-PAC 6 CLICK MOBILITY  Turning over in bed (including adjusting bedclothes, sheets and blankets)?: 3  Sitting down on and standing up from a chair with arms (e.g., wheelchair, bedside commode, etc.): 2  Moving from lying on back to sitting on the side of the bed?: 3  Moving to and from a bed to a chair (including a wheelchair)?: 3  Need to walk in hospital room?: 2  Climbing 3-5 steps with a railing?: 1  Basic Mobility Total Score: 14       Education:  Education provided re: d/c planning, PT POC, safety in mobility.  Discussed concerns re: d/c home due to current safety concerns, however pt expressing no concerns herself. Pt endorsed understanding.     Patient left supine with all lines intact, call button in reach and RN present.    GOALS:   Multidisciplinary Problems     Physical Therapy Goals        Problem: Physical Therapy    Goal Priority Disciplines Outcome Goal Variances Interventions   Physical Therapy Goal     PT, PT/OT Ongoing, Progressing     Description: PT goals until 5/24/22    1. Pt sit to supine with mod independent-not met  2. Pt sit to stand with RW with supervision-not met  3. Pt to perform gait 125ft with RW with supervision-not met  4. Pt to transfer bed to/from bedside chair with no AD with supervision.-not met  5. Pt to perform B LE exs in sitting or supine x 15 reps to strengthen B LE to improve functional mobility.-not met                     Time Tracking:     PT Received On: 05/19/22  PT Start Time: 1107     PT Stop Time: 1131  PT Total Time (min): 24 min     Billable Minutes: Therapeutic Activity 24 min    Treatment Type: Treatment  PT/PTA: PT           Shanta Whitehead PT, DPT  5/19/2022

## 2022-05-19 NOTE — PROGRESS NOTES
Roberto Yepez - Oncology ([facility])  Hematology  Bone Marrow Transplant  Progress Note    Patient Name: Deepti Gonzalez  Admission Date: 5/13/2022  Hospital Length of Stay: 6 days  Code Status: Full Code    Subjective:     Interval History: No longer coagulopathic. LFTs improving with Blincyto clearance. Hgb 5.0 on morning CBC. No nakia bleeding. Repeated CBC and hgb 7.4 on repeat, which is consistent with yesterday's level. Blood glucose improved off steroids. Plan to discharge home today. Will give 1 unit prbc prior to admission. She will f/u with Dr. Jenkins on Monday 5/23/22.     Objective:     Vital Signs (Most Recent):  Temp: 98.3 °F (36.8 °C) (05/19/22 1230)  Pulse: 89 (05/19/22 1230)  Resp: 19 (05/19/22 1230)  BP: 138/76 (05/19/22 1230)  SpO2: 97 % (05/19/22 1230) Vital Signs (24h Range):  Temp:  [98 °F (36.7 °C)-99.1 °F (37.3 °C)] 98.3 °F (36.8 °C)  Pulse:  [83-92] 89  Resp:  [16-19] 19  SpO2:  [93 %-97 %] 97 %  BP: (106-138)/(55-80) 138/76     Weight: 71 kg (156 lb 8.4 oz)  Body mass index is 26.87 kg/m².  Body surface area is 1.79 meters squared.    ECOG SCORE           [unfilled]    Intake/Output - Last 3 Shifts         05/17 0700  05/18 0659 05/18 0700  05/19 0659 05/19 0700 05/20 0659    P.O. 1200 1340     Blood  1369.4 450    IV Piggyback 1501.6 148.3     Total Intake(mL/kg) 2701.6 (38.8) 2857.7 (40.2) 450 (6.3)    Urine (mL/kg/hr) 3300 (2) 1600 (0.9)     Stool 0 0     Total Output 3300 1600     Net -598.5 +1257.7 +450           Urine Occurrence 2 x      Stool Occurrence 0 x 3 x             Physical Exam  Constitutional:       Appearance: She is well-developed.   HENT:      Head: Normocephalic and atraumatic.      Mouth/Throat:      Pharynx: No oropharyngeal exudate.   Eyes:      Conjunctiva/sclera: Conjunctivae normal.      Pupils: Pupils are equal, round, and reactive to light.   Cardiovascular:      Rate and Rhythm: Normal rate and regular rhythm.      Heart sounds: Normal heart sounds. No murmur  heard.  Pulmonary:      Effort: Pulmonary effort is normal.      Breath sounds: Normal breath sounds.   Abdominal:      General: Bowel sounds are normal. There is no distension.      Palpations: Abdomen is soft.      Tenderness: There is no abdominal tenderness.   Musculoskeletal:         General: No deformity. Normal range of motion.      Cervical back: Normal range of motion and neck supple.   Skin:     General: Skin is warm and dry.      Findings: Bruising present. No erythema or rash.      Comments: Right chest wall port-a-cath. Dressing c/d/i. No sign of infection to site.  petechiae   Neurological:      Mental Status: She is alert and oriented to person, place, and time.   Psychiatric:         Behavior: Behavior normal.         Thought Content: Thought content normal.         Judgment: Judgment normal.       Significant Labs:   CBC:   Recent Labs   Lab 05/18/22  0326 05/19/22  0416 05/19/22  0813   WBC 13.57* 25.80* 19.51*   HGB 7.3* 5.0* 7.4*   HCT 22.4* 14.4* 21.7*   PLT 9* 63* 56*      and CMP:   Recent Labs   Lab 05/18/22  0326 05/18/22  1233 05/19/22  0416   *  --  135*   K 4.6  --  4.2     --  102   CO2 22*  --  25   *  --  100   BUN 20  --  12   CREATININE 0.8  --  0.6   CALCIUM 7.8*  --  7.9*   PROT 5.0* 5.2* 5.2*   ALBUMIN 2.9* 3.0* 2.8*   BILITOT 0.3 0.4 0.2   ALKPHOS 256* 172* 150*   * 124* 43*   * 372* 255*   ANIONGAP 8  --  8   EGFRNONAA >60.0  --  >60.0         Diagnostic Results:  I have reviewed all pertinent imaging results/findings within the past 24 hours.    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 Jenkins considering 2nd/3rd generation TKI pending results  -  Bactrim DS M-W-F OI ppx  - Admitted 5/13 for Cycle 1 Blincyto, C1D1 on 5/14. Shortly after developed Grade 3 CRS, moderate AMS, and transaminitis.Blincyto held (see CRS). Given steroids x 3 days. Resumed Blincyto 5/17 and again developed CRS, Grade 2 as well as mild AMS and Grade 3 transaminitis. Blincyto stopped with no plan to resume.  - q4hr neuro checks  - Daily CBC, CMP, Mag, Phos  - Plan is to f/u with Dr. Jenkins Monday 5/23/22 to discuss other treatment options.    Cytokine release syndrome, grade 3  - Developed grade 3 CRS on 5/14 with fever to 103F, tachycardia to 140s, mild confusion. No hypotension.   - LFTs increased on 5/15, grade 4 transaminitis, now meets criteria for grade 3 CRS  - paused blinatumumab  - infectious work up unrevealing, continues on cefepime  - liver US unrevealing and hepatitis panel negative, transaminitis improved with Blincyto clearance.   - started dexamethasone 8mg q8hrs and q4hr neuro checks  - AMS resolved  - Resumed Blincyto 5/17/22 and again developed CRS (Grade 2), mild AMS, and Grade 3 transaminitis. Blincyto stopped with no plan to resume.    Coagulopathy  - Developed coagulapathy on this morning's (5/18) labs with fibrinogen of 131 and INR of 1.5  - Changed DIC labs to BID  - Giving 1 unit cryo and vit K+  - Kept inpatient until coagulapathy resolved  RESOLVES     Transaminitis  - LFTs up significantly today since resuming Blincyto (now stopped). Grade 3 transaminitis likely 2/2 Blincyto.   - Also developed transaminitis after initially starting Blincyto. W/u neg. Improved with with Blincyto clearance.  - Improving with Blincyto clearance.    Leukocytosis  - WBC count 67.89 on admission.  - See ALL  - Daily CBC while inpatient    Thrombocytopenia  - In setting of recent chemo / disease state  - Daily CBC, plan to transfuse to maintain plt >10k  - Holding Xarelto and ASA for platelets <50k. Will hold both at discharge. Can resume outpatient when appropriate.      Urinary  retention  - Relatively recent onsent urinary retention, otherwise without urinary sx  - Denies new drugs/new anticholinergics. Denies saddle anesthesia/incontinence/lower ext weakness  - none in last several days  RESOLVED    Anemia associated with chemotherapy  - in setting of antineoplastic therapy/disease state  - plan to transfuse to maintain Hgb > 7    Moderate major depression  - Continue home trintellix    Anxiety  - continue home trintellix and valium while inpatient    GERD (gastroesophageal reflux disease)  - home prilosec not on formulary. Will receive protonix while inpatient.    Hypertension  - continue home losartan while inpatient    Seizure disorder  - continue home oxcarbazepine while inpatient    Type 2 diabetes mellitus, without long-term current use of insulin  - hold home farxiga while inpatient as it is not on formulary. Can resume at discharge.  - low dose SSI, achs blood glucose monitoring, and diabetic diet while admitted  - blood glucose was in 200s. Likely 2/2 steroids. Deferred adjusting insulin for now as patient completed steroids. Blood glucose improving with steroid clearance.    Rheumatoid arthritis involving multiple sites  - continue home plaquenil.   - home leflunomide was recently discontinued by her rhemotologist    History of TIA (transient ischemic attack)  - Continue home fenofibrate. Holding home statin and ASA while inpatient due to thrombocytopenia. Will hold ASA at discharge. Can be resumed outpatient as appropriate.    Hyperlipidemia  - holding home statin while inpatient. Will resume at discharge.    Paroxysmal atrial fibrillation  - continue home diltiazem and xarelto. Holding xarelto with plt count < 50K.  - will hold xarelto at discharge. Can resume outpatient when appropriate.        VTE Risk Mitigation (From admission, onward)         Ordered     IP VTE HIGH RISK PATIENT  Once         05/13/22 1728     Place sequential compression device  Until discontinued          05/13/22 1728     Reason for No Pharmacological VTE Prophylaxis  Once        Question:  Reasons:  Answer:  Already adequately anticoagulated on oral Anticoagulants    05/13/22 1728                Disposition: Inpatient. Plan to discharge home today.    Mirtha Antonio, TERRY  Bone Marrow Transplant  [facility] - Oncology ([facility])

## 2022-05-19 NOTE — PROGRESS NOTES
Oncology LCSW received wheelchair order. Deja SCOTT With [facility] reports that she cannot provide wheelchair because pt lives outside of their service area. Wheelchair will be delivered to pt's home by [facility] care. Pt and pt's  are in agreement with plan.

## 2022-05-19 NOTE — ASSESSMENT & PLAN NOTE
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 Jenkins considering 2nd/3rd generation TKI pending results  - Bactrim DS M-W-F OI ppx  - Admitted 5/13 for Cycle 1 Blincyto, C1D1 on 5/14. Shortly after developed Grade 3 CRS, moderate AMS, and transaminitis.Blincyto held (see CRS). Given steroids x 3 days. Resumed Blincyto 5/17 and again developed CRS, Grade 2 as well as mild AMS and Grade 3 transaminitis. Blincyto stopped with no plan to resume.  - q4hr neuro checks  - Daily CBC, CMP, Mag, Phos  - Plan is to f/u with Dr. Jenkins Monday 5/23/22 to discuss other treatment options.

## 2022-05-19 NOTE — ASSESSMENT & PLAN NOTE
- In setting of recent chemo / disease state  - Daily CBC, plan to transfuse to maintain plt >10k  - Holding Xarelto and ASA for platelets <50k. Will hold both at discharge. Can resume outpatient when appropriate.

## 2022-05-19 NOTE — NURSING
Road Test  Oxygen-Patient tolerating room air, no distress.  Ambulation-Patient up with assistance of walker  Devices-Patient going home with no devices  Tolerating-Diabetic diet.  Elimination-Patient voiding without difficulty.  Self Care-Performs self care without assistance.  Teaching-Verbal and written discharge teaching given.     Patient tolerates room air, ambulates with assistance of walker, diabetic diet, voiding without difficulty, and is going home with no devices. PAC de-accessed and heparin locked. Midline removed, catheter intact, dressed with dry gauze and coban. No bleeding present. Patient going home. Discharge paperwork discussed. Medications reviewed. Patient has all belongings and has no questions at this time. Will f/u with Dr. Jenkins 5/23.

## 2022-05-19 NOTE — ASSESSMENT & PLAN NOTE
- Continue home fenofibrate. Holding home statin and ASA while inpatient due to thrombocytopenia. Will hold ASA at discharge. Can be resumed outpatient as appropriate.

## 2022-05-19 NOTE — ASSESSMENT & PLAN NOTE
- LFTs up significantly today since resuming Blincyto (now stopped). Grade 3 transaminitis likely 2/2 Blincyto.   - Also developed transaminitis after initially starting Blincyto. W/u neg. Improved with with Blincyto clearance.  - Improving with Blincyto clearance.

## 2022-05-19 NOTE — ASSESSMENT & PLAN NOTE
- continue home diltiazem and xarelto. Holding xarelto with plt count < 50K.  - will hold xarelto at discharge. Can resume outpatient when appropriate.

## 2022-05-19 NOTE — PLAN OF CARE
Plan of care discussed at start of shift. Free from falls and injuries. Resting quietly with eyes closed. Respirations even, unlabored. Skin warm and dry. Pain managed with oxycodone 5 mg po x's 1 dose. Valium 10 mg PO given x's 1 for anxiety. Denies nausea.  at bedside. Frequent checks for pain and safety maintained. Bed in lowest position, wheels locked, side rails up x's 2, call light in reach. Instructed to call for assistance as needed, verbalizes understanding. Will continue to monitor.

## 2022-05-19 NOTE — SUBJECTIVE & OBJECTIVE
Subjective:     Interval History: No longer coagulopathic. LFTs improving with Blincyto clearance. Hgb 5.0 on morning CBC. No nakia bleeding. Repeated CBC and hgb 7.4 on repeat, which is consistent with yesterday's level. Blood glucose improved off steroids. Plan to discharge home today. Will give 1 unit prbc prior to admission. She will f/u with Dr. Jenkins on Monday 5/23/22.     Objective:     Vital Signs (Most Recent):  Temp: 98.3 °F (36.8 °C) (05/19/22 1230)  Pulse: 89 (05/19/22 1230)  Resp: 19 (05/19/22 1230)  BP: 138/76 (05/19/22 1230)  SpO2: 97 % (05/19/22 1230) Vital Signs (24h Range):  Temp:  [98 °F (36.7 °C)-99.1 °F (37.3 °C)] 98.3 °F (36.8 °C)  Pulse:  [83-92] 89  Resp:  [16-19] 19  SpO2:  [93 %-97 %] 97 %  BP: (106-138)/(55-80) 138/76     Weight: 71 kg (156 lb 8.4 oz)  Body mass index is 26.87 kg/m².  Body surface area is 1.79 meters squared.    ECOG SCORE           [unfilled]    Intake/Output - Last 3 Shifts         05/17 0700  05/18 0659 05/18 0700  05/19 0659 05/19 0700  05/20 0659    P.O. 1200 1340     Blood  1369.4 450    IV Piggyback 1501.6 148.3     Total Intake(mL/kg) 2701.6 (38.8) 2857.7 (40.2) 450 (6.3)    Urine (mL/kg/hr) 3300 (2) 1600 (0.9)     Stool 0 0     Total Output 3300 1600     Net -598.5 +1257.7 +450           Urine Occurrence 2 x      Stool Occurrence 0 x 3 x             Physical Exam  Constitutional:       Appearance: She is well-developed.   HENT:      Head: Normocephalic and atraumatic.      Mouth/Throat:      Pharynx: No oropharyngeal exudate.   Eyes:      Conjunctiva/sclera: Conjunctivae normal.      Pupils: Pupils are equal, round, and reactive to light.   Cardiovascular:      Rate and Rhythm: Normal rate and regular rhythm.      Heart sounds: Normal heart sounds. No murmur heard.  Pulmonary:      Effort: Pulmonary effort is normal.      Breath sounds: Normal breath sounds.   Abdominal:      General: Bowel sounds are normal. There is no distension.      Palpations: Abdomen is  soft.      Tenderness: There is no abdominal tenderness.   Musculoskeletal:         General: No deformity. Normal range of motion.      Cervical back: Normal range of motion and neck supple.   Skin:     General: Skin is warm and dry.      Findings: Bruising present. No erythema or rash.      Comments: Right chest wall port-a-cath. Dressing c/d/i. No sign of infection to site.  petechiae   Neurological:      Mental Status: She is alert and oriented to person, place, and time.   Psychiatric:         Behavior: Behavior normal.         Thought Content: Thought content normal.         Judgment: Judgment normal.       Significant Labs:   CBC:   Recent Labs   Lab 05/18/22  0326 05/19/22  0416 05/19/22  0813   WBC 13.57* 25.80* 19.51*   HGB 7.3* 5.0* 7.4*   HCT 22.4* 14.4* 21.7*   PLT 9* 63* 56*      and CMP:   Recent Labs   Lab 05/18/22  0326 05/18/22  1233 05/19/22  0416   *  --  135*   K 4.6  --  4.2     --  102   CO2 22*  --  25   *  --  100   BUN 20  --  12   CREATININE 0.8  --  0.6   CALCIUM 7.8*  --  7.9*   PROT 5.0* 5.2* 5.2*   ALBUMIN 2.9* 3.0* 2.8*   BILITOT 0.3 0.4 0.2   ALKPHOS 256* 172* 150*   * 124* 43*   * 372* 255*   ANIONGAP 8  --  8   EGFRNONAA >60.0  --  >60.0         Diagnostic Results:  I have reviewed all pertinent imaging results/findings within the past 24 hours.

## 2022-05-19 NOTE — DISCHARGE SUMMARY
[facility] - Oncology ([facility])  Hematology  Bone Marrow Transplant  Discharge Summary      Patient Name: Deepti Gonzalez  MRN: [id number]  Admission Date: 5/13/2022  Hospital Length of Stay: 6 days  Discharge Date and Time: 5/19/2022  5:16 PM  Attending Physician: Yusuf Joseph MD   Discharging Provider: Mirtha Antonio NP  Primary Care Provider: Primary Doctor No    HPI: Mrs. Gonzalez is a 51-y-o patient of Dr. Jenkins with relapsed, refractory B-ALL. She was received consolidation therapy with EWALL-Ph-01 protocol beginning in January 2022, unfortunately BMBx in May showed relapsed B-ALL with 23.5% blasts. She is admitting today for C1 Blincyto, consent obtained in clinic. Other medical history includes COPD, HTN, HLD, DM2, TIA, anxiety, depression, GERD, seizure disorder, PAD, gastroparesis, RA, osteoporosis, a-fib, and pacermaker placement. She is admitting today for C1 Blincyto.  She is feeling relatively well today. She reports low grade temp of 100.2 last night, one time, and has felt generally weak and easily fatigued with intermittent nausea. She reports a cold ~7 days ago that has now resolved. Reports some urinary retention, feels like she needs to use bathroom but has to wait 3-4 minutes before urinating, no foul urine/dysuria. No saddle anesthesia/incontinence/isolated lower extremity weakness/trauma. Getting UA and bladder scan to start. Ongoing generalized joint pains. Otherwise denies fevers, chills, cough, SOB, chest pain, bleeding or bruising, and constipation. She is COVID neg.       * No surgery found *     Hospital Course: Admitted 5/13/22 for Blinatumumab for relapsed Ph+ B-ALL. Developed Grade 3 CRS with fevers, tachycardia, and rigors, transaminitis, and neurotoxicity on C1D2. Blina stopped and Q8 hour dex started. CRS, transaminitis, and neurotoxicity improved. Acute hepatitis panel neg. Liver u/s non-acute. Infection w/u neg. Re-challenged with Blina on 5/17/22, but patient again developed  Grade 2 CRS with fevers and hypotension, and Grade 3 transaminitis. Blina stopped with no plans to resume. CRS resolved and LFTs improved with Blina clearance. Admission further complicated by cytopenias requiring transfusion of blood products and coagulopathy requiring transfusion of cryoprecipitate and vit K administration. Coagulopathy resolved prior to discharge. Patient was discharge home 5/19/22. 1 unit prbc transfused prior to discharge. She will f/u with Dr. Jenkins 5/23/22. PT rec'd SNF, but patient/ would prefer to go home and start next line of treatment asap.    B-cell acute lymphoblastic leukemia  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 Jenkins considering 2nd/3rd generation TKI pending results  - Bactrim DS M-W-F OI ppx  - Admitted 5/13 for Cycle 1 Blincyto, C1D1 on 5/14. Shortly after developed Grade 3 CRS, moderate AMS, and transaminitis.Blincyto held (see CRS). Given steroids x 3 days. Resumed Blincyto 5/17 and again developed CRS, Grade 2 as well as mild AMS and Grade 3 transaminitis. Blincyto stopped with no plan to resume.  - q4hr neuro checks  - Daily CBC, CMP, Mag, Phos  - Plan is to f/u with Dr. Jenkins Monday 5/23/22 to discuss other treatment options.     Cytokine release syndrome, grade 3  - Developed grade 3 CRS on 5/14 with fever to 103F, tachycardia to 140s, mild confusion. No hypotension.   - LFTs increased on 5/15, grade 4 transaminitis, now meets criteria for grade 3 CRS  - paused blinatumumab  - infectious work up unrevealing, continues on cefepime  - liver US unrevealing and hepatitis panel negative, transaminitis improved with Blincyto clearance.   - started dexamethasone 8mg q8hrs and q4hr neuro checks  - AMS resolved  - Resumed Blincyto  5/17/22 and again developed CRS (Grade 2), mild AMS, and Grade 3 transaminitis. Blincyto stopped with no plan to resume.     Coagulopathy  - Developed coagulapathy on this morning's (5/18) labs with fibrinogen of 131 and INR of 1.5  - Changed DIC labs to BID  - Giving 1 unit cryo and vit K+  - Kept inpatient until coagulapathy resolved  RESOLVES      Transaminitis  - LFTs up significantly today since resuming Blincyto (now stopped). Grade 3 transaminitis likely 2/2 Blincyto.   - Also developed transaminitis after initially starting Blincyto. W/u neg. Improved with with Blincyto clearance.  - Improving with Blincyto clearance.     Leukocytosis  - WBC count 67.89 on admission.  - See ALL  - Daily CBC while inpatient     Thrombocytopenia  - In setting of recent chemo / disease state  - Daily CBC, plan to transfuse to maintain plt >10k  - Holding Xarelto and ASA for platelets <50k. Will hold both at discharge. Can resume outpatient when appropriate.        Urinary retention  - Relatively recent onsent urinary retention, otherwise without urinary sx  - Denies new drugs/new anticholinergics. Denies saddle anesthesia/incontinence/lower ext weakness  - none in last several days  RESOLVED     Anemia associated with chemotherapy  - in setting of antineoplastic therapy/disease state  - plan to transfuse to maintain Hgb > 7     Moderate major depression  - Continue home trintellix     Anxiety  - continue home trintellix and valium while inpatient     GERD (gastroesophageal reflux disease)  - home prilosec not on formulary. Will receive protonix while inpatient.     Hypertension  - continue home losartan while inpatient     Seizure disorder  - continue home oxcarbazepine while inpatient     Type 2 diabetes mellitus, without long-term current use of insulin  - hold home farxiga while inpatient as it is not on formulary. Can resume at discharge.  - low dose SSI, achs blood glucose monitoring, and diabetic diet while admitted  -  blood glucose was in 200s. Likely 2/2 steroids. Deferred adjusting insulin for now as patient completed steroids. Blood glucose improving with steroid clearance.     Rheumatoid arthritis involving multiple sites  - continue home plaquenil.   - home leflunomide was recently discontinued by her rhemotologist     History of TIA (transient ischemic attack)  - Continue home fenofibrate. Holding home statin and ASA while inpatient due to thrombocytopenia. Will hold ASA at discharge. Can be resumed outpatient as appropriate.     Hyperlipidemia  - holding home statin while inpatient. Will resume at discharge.     Paroxysmal atrial fibrillation  - continue home diltiazem and xarelto. Holding xarelto with plt count < 50K.  - will hold xarelto at discharge. Can resume outpatient when appropriate.      Goals of Care Treatment Preferences:  Code Status: Full Code    Health care agent:  is NOK.  Health care agent number: No value filed.      Consults (From admission, onward)        Status Ordering Provider     Inpatient consult to Critical Care Medicine  Once        Provider:  (Not yet assigned)    Completed JAVAN SHEPARD     Inpatient consult to Midline team  Once        Provider:  (Not yet assigned)    Completed JAVAN SHEPARD          Significant Diagnostic Studies: Labs:   CMP   Recent Labs   Lab 05/18/22  0326 05/18/22  1233 05/19/22  0416   *  --  135*   K 4.6  --  4.2     --  102   CO2 22*  --  25   *  --  100   BUN 20  --  12   CREATININE 0.8  --  0.6   CALCIUM 7.8*  --  7.9*   PROT 5.0* 5.2* 5.2*   ALBUMIN 2.9* 3.0* 2.8*   BILITOT 0.3 0.4 0.2   ALKPHOS 256* 172* 150*   * 124* 43*   * 372* 255*   ANIONGAP 8  --  8   ESTGFRAFRICA >60.0  --  >60.0   EGFRNONAA >60.0  --  >60.0    and CBC   Recent Labs   Lab 05/18/22  0326 05/19/22  0416 05/19/22  0813   WBC 13.57* 25.80* 19.51*   HGB 7.3* 5.0* 7.4*   HCT 22.4* 14.4* 21.7*   PLT 9* 63* 56*       Pending Diagnostic Studies:      None        Final Active Diagnoses:    Diagnosis Date Noted POA    PRINCIPAL PROBLEM:  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] 11/24/2021 Yes    Cytokine release syndrome, grade 3 [D89.833] 05/14/2022 No    Coagulopathy [D68.9] 05/18/2022 Yes    Transaminitis [R74.01] 05/18/2022 Yes    Leukocytosis [D72.829] 05/17/2022 Yes    Urinary retention [R33.9] 05/13/2022 Yes    Thrombocytopenia [D69.6] 05/13/2022 Yes    Anemia associated with chemotherapy [D64.81, T45.1X5A] 02/21/2022 Yes    Moderate major depression [F32.1] 11/29/2021 Yes    Type 2 diabetes mellitus, without long-term current use of insulin [E11.9] 11/24/2021 Yes    Hypertension [I10] 11/24/2021 Yes    Seizure disorder [G40.909] 11/24/2021 Yes    GERD (gastroesophageal reflux disease) [K21.9] 11/24/2021 Yes    Anxiety [F41.9] 11/24/2021 Yes    Paroxysmal atrial fibrillation [I48.0] 12/22/2017 Yes    Hyperlipidemia [E78.5] 12/22/2017 Yes    History of TIA (transient ischemic attack) [Z86.73] 12/22/2017 Not Applicable    Rheumatoid arthritis involving multiple sites [M06.9] 12/22/2017 Yes      Problems Resolved During this Admission:    Diagnosis Date Noted Date Resolved POA    COPD (chronic obstructive pulmonary disease) [J44.9] 11/26/2021 05/13/2022 Yes      Discharged Condition: stable    Disposition: Home or Self Care    Follow Up:    Patient Instructions:      CBC Auto Differential   Standing Status: Future Standing Exp. Date: 07/17/23     Magnesium   Standing Status: Future Standing Exp. Date: 07/17/23     Phosphorus   Standing Status: Future Standing Exp. Date: 07/17/23     Comprehensive Metabolic Panel   Standing Status: Future Standing Exp. Date: 07/17/23     LACTATE DEHYDROGENASE   Standing Status: Future Standing Exp. Date: 07/17/23     URIC ACID   Standing Status: Future Standing Exp. Date: 07/17/23     APTT   Standing Status: Future Standing Exp. Date: 07/17/23     PROTIME-INR   Standing Status: Future Standing Exp.  Date: 07/17/23     FIBRINOGEN   Standing Status: Future Standing Exp. Date: 07/17/23     Diet diabetic     Diet Cardiac     Notify your health care provider if you experience any of the following:  temperature >100.4     Notify your health care provider if you experience any of the following:  persistent nausea and vomiting or diarrhea     Notify your health care provider if you experience any of the following:  severe uncontrolled pain     Notify your health care provider if you experience any of the following:  redness, tenderness, or signs of infection (pain, swelling, redness, odor or green/yellow discharge around incision site)     Notify your health care provider if you experience any of the following:     Notify your health care provider if you experience any of the following:  increased confusion or weakness     Notify your health care provider if you experience any of the following:  persistent dizziness, light-headedness, or visual disturbances     Notify your health care provider if you experience any of the following:  severe persistent headache     Notify your health care provider if you experience any of the following:  difficulty breathing or increased cough     Type & Screen   Standing Status: Future Standing Exp. Date: 07/17/23     Activity as tolerated     Medications:  Reconciled Home Medications:      Medication List      START taking these medications    PONATinib 45 mg Tab tablet  Commonly known as: ICLUSIG  Take 1 tablet (45 mg total) by mouth once daily Do not initiate until instructed to do so by Dr. Jenkins..        CHANGE how you take these medications    aspirin 81 MG EC tablet  Commonly known as: ECOTRIN  Take 1 tablet (81 mg total) by mouth once daily. Hold until instructed to resume by provider due to low platelet count.  What changed: additional instructions     rivaroxaban 20 mg Tab  Commonly known as: XARELTO  Take 1 tablet (20 mg total) by mouth daily with dinner or evening meal.  Hold until instructed to resume by provider due to low platelet count.  What changed: additional instructions        CONTINUE taking these medications    acyclovir 400 MG tablet  Commonly known as: ZOVIRAX  Take 1 tablet (400 mg total) by mouth 2 (two) times daily.     artificial tears 0.5 % ophthalmic solution  Commonly known as: ISOPTO TEARS  Place 1 drop into both eyes 4 (four) times daily as needed.     atorvastatin 80 MG tablet  Commonly known as: LIPITOR  Take 80 mg by mouth once daily.     * blood sugar diagnostic Strp  SMARTSIG:Via Meter 1 to 2 Times Daily     * ONETOUCH VERIO TEST STRIPS Strp  Generic drug: blood sugar diagnostic  SMARTSIG:Via Meter 1 to 2 Times Daily     busPIRone 10 MG tablet  Commonly known as: BUSPAR  Take 10 mg by mouth 2 (two) times daily.     diazePAM 10 MG Tab  Commonly known as: VALIUM  Take 10 mg by mouth 2 (two) times daily as needed.     diltiaZEM 90 MG tablet  Commonly known as: CARDIZEM  Take 1 tablet (90 mg total) by mouth every 6 (six) hours.     DUKE'S SOLUTION (BENADRYL 30 ML, MYLANTA 30 ML, LIDOCAINE 30 ML, NYSTATIN 30 ML)  Take 10 mLs by mouth 4 (four) times daily as needed (mouth pain).     FARXIGA 10 mg tablet  Generic drug: dapagliflozin  Take 10 mg by mouth once daily.     fenofibrate 160 MG Tab  Take 160 mg by mouth once daily.     fluconazole 200 MG Tab  Commonly known as: DIFLUCAN  Take 2 tablets (400 mg total) by mouth once daily.     gabapentin 300 MG capsule  Commonly known as: NEURONTIN  Take 1 capsule (300 mg total) by mouth 3 (three) times daily.     hydrOXYchloroQUINE 200 mg tablet  Commonly known as: PLAQUENIL  Take 1 tablet (200 mg total) by mouth once daily.     hydrOXYzine pamoate 50 MG Cap  Commonly known as: VISTARIL  Take 50 mg by mouth 2 (two) times daily as needed.     levoFLOXacin 500 MG tablet  Commonly known as: LEVAQUIN  Take 1 tablet (500 mg total) by mouth once daily.     losartan 25 MG tablet  Commonly known as: COZAAR  Take 25 mg by mouth  once daily.     omeprazole 40 MG capsule  Commonly known as: PRILOSEC  Take 40 mg by mouth once daily.     ondansetron 8 MG Tbdl  Commonly known as: ZOFRAN-ODT  Dissolve 1 tablet (8 mg total) by mouth every 12 (twelve) hours as needed (in case of chemo induced nausea).     OXcarbazepine 600 MG Tab  Commonly known as: TRILEPTAL  Take 1,200 mg by mouth 2 (two) times daily.     oxyCODONE 5 MG immediate release tablet  Commonly known as: ROXICODONE  Take 1 tablet (5 mg total) by mouth every 4 (four) hours as needed for Pain.     polyethylene glycol 17 gram/dose powder  Commonly known as: GLYCOLAX  Dissolve one capful (17 g) in liquid and take by mouth daily as needed (constipation).     prochlorperazine 5 MG tablet  Commonly known as: COMPAZINE  Take 1 tablet (5 mg total) by mouth every 6 (six) hours as needed for Nausea.     QUEtiapine 200 MG Tab  Commonly known as: SEROQUEL  Take 200 mg by mouth every evening.     STOOL SOFTENER-LAXATIVE 8.6-50 mg per tablet  Generic drug: senna-docusate 8.6-50 mg  Take 1 tablet by mouth 2 (two) times daily.     sumatriptan 50 MG tablet  Commonly known as: IMITREX  Take 1 tablet (50 mg total) by mouth daily as needed (headache).     TRINTELLIX 20 mg Tab  Generic drug: vortioxetine  Take 1 tablet by mouth once daily.         * This list has 2 medication(s) that are the same as other medications prescribed for you. Read the directions carefully, and ask your doctor or other care provider to review them with you.            STOP taking these medications    hydrOXYzine 50 MG tablet  Commonly known as: ATARAX     imatinib 100 MG Tab  Commonly known as: GLEEVEC     imatinib 400 MG Tab  Commonly known as: GLEEVEC     traMADoL 50 mg tablet  Commonly known as: CYNTHIA Antonio NP  Bone Marrow Transplant  [facility] - Oncology ([facility])

## 2022-05-19 NOTE — ASSESSMENT & PLAN NOTE
- Developed coagulapathy on this morning's (5/18) labs with fibrinogen of 131 and INR of 1.5  - Changed DIC labs to BID  - Giving 1 unit cryo and vit K+  - Kept inpatient until coagulapathy resolved  RESOLVES

## 2022-05-22 LAB
BACTERIA BLD CULT: NORMAL
BACTERIA BLD CULT: NORMAL

## 2022-05-23 ENCOUNTER — DOCUMENTATION ONLY (OUTPATIENT)
Dept: HEMATOLOGY/ONCOLOGY | Facility: CLINIC | Age: 52
End: 2022-05-23
Payer: COMMERCIAL

## 2022-05-23 ENCOUNTER — OFFICE VISIT (OUTPATIENT)
Dept: HEMATOLOGY/ONCOLOGY | Facility: CLINIC | Age: 52
DRG: 837 | End: 2022-05-23
Payer: COMMERCIAL

## 2022-05-23 ENCOUNTER — HOSPITAL ENCOUNTER (INPATIENT)
Facility: HOSPITAL | Age: 52
LOS: 14 days | Discharge: HOME | DRG: 837 | End: 2022-06-06
Attending: INTERNAL MEDICINE | Admitting: INTERNAL MEDICINE
Payer: COMMERCIAL

## 2022-05-23 VITALS
TEMPERATURE: 100 F | OXYGEN SATURATION: 95 % | HEART RATE: 109 BPM | SYSTOLIC BLOOD PRESSURE: 130 MMHG | HEIGHT: 64 IN | RESPIRATION RATE: 18 BRPM | DIASTOLIC BLOOD PRESSURE: 64 MMHG | BODY MASS INDEX: 26.42 KG/M2 | WEIGHT: 154.75 LBS

## 2022-05-23 DIAGNOSIS — G89.3 CANCER ASSOCIATED PAIN: ICD-10-CM

## 2022-05-23 DIAGNOSIS — D63.0 ANEMIA IN NEOPLASTIC DISEASE: ICD-10-CM

## 2022-05-23 DIAGNOSIS — C91.00 B-CELL ACUTE LYMPHOBLASTIC LEUKEMIA: Primary | ICD-10-CM

## 2022-05-23 DIAGNOSIS — Z11.52 ENCOUNTER FOR SCREENING FOR COVID-19: ICD-10-CM

## 2022-05-23 DIAGNOSIS — R07.9 CHEST PAIN: ICD-10-CM

## 2022-05-23 DIAGNOSIS — D72.829 LEUKOCYTOSIS: Primary | ICD-10-CM

## 2022-05-23 DIAGNOSIS — D72.829 LEUKOCYTOSIS, UNSPECIFIED TYPE: ICD-10-CM

## 2022-05-23 DIAGNOSIS — C91.00 B-CELL ACUTE LYMPHOBLASTIC LEUKEMIA: ICD-10-CM

## 2022-05-23 PROBLEM — R29.90 MULTIPLE NEUROLOGICAL SYMPTOMS: Status: RESOLVED | Noted: 2022-04-14 | Resolved: 2022-05-23

## 2022-05-23 PROBLEM — Z91.89 AT HIGH RISK OF TUMOR LYSIS SYNDROME: Status: ACTIVE | Noted: 2022-05-23

## 2022-05-23 PROBLEM — D68.9 COAGULOPATHY: Status: RESOLVED | Noted: 2022-05-18 | Resolved: 2022-05-23

## 2022-05-23 PROBLEM — R33.9 URINARY RETENTION: Status: RESOLVED | Noted: 2022-05-13 | Resolved: 2022-05-23

## 2022-05-23 LAB
GLUCOSE SERPL-MCNC: 176 MG/DL (ref 70–110)
SARS-COV-2 RDRP RESP QL NAA+PROBE: NEGATIVE

## 2022-05-23 PROCEDURE — 3075F SYST BP GE 130 - 139MM HG: CPT | Mod: CPTII,S$GLB,, | Performed by: INTERNAL MEDICINE

## 2022-05-23 PROCEDURE — 4010F ACE/ARB THERAPY RXD/TAKEN: CPT | Mod: CPTII,S$GLB,, | Performed by: INTERNAL MEDICINE

## 2022-05-23 PROCEDURE — 4010F PR ACE/ARB THEARPY RXD/TAKEN: ICD-10-PCS | Mod: CPTII,S$GLB,, | Performed by: INTERNAL MEDICINE

## 2022-05-23 PROCEDURE — 63600175 PHARM REV CODE 636 W HCPCS: Performed by: NURSE PRACTITIONER

## 2022-05-23 PROCEDURE — 3044F HG A1C LEVEL LT 7.0%: CPT | Mod: CPTII,S$GLB,, | Performed by: INTERNAL MEDICINE

## 2022-05-23 PROCEDURE — 25000003 PHARM REV CODE 250: Performed by: NURSE PRACTITIONER

## 2022-05-23 PROCEDURE — 99499 UNLISTED E&M SERVICE: CPT | Mod: S$GLB,,, | Performed by: INTERNAL MEDICINE

## 2022-05-23 PROCEDURE — U0002 COVID-19 LAB TEST NON-CDC: HCPCS | Performed by: INTERNAL MEDICINE

## 2022-05-23 PROCEDURE — 25000003 PHARM REV CODE 250: Performed by: STUDENT IN AN ORGANIZED HEALTH CARE EDUCATION/TRAINING PROGRAM

## 2022-05-23 PROCEDURE — 93010 ELECTROCARDIOGRAM REPORT: CPT | Mod: ,,, | Performed by: INTERNAL MEDICINE

## 2022-05-23 PROCEDURE — 93010 EKG 12-LEAD: ICD-10-PCS | Mod: ,,, | Performed by: INTERNAL MEDICINE

## 2022-05-23 PROCEDURE — 3075F PR MOST RECENT SYSTOLIC BLOOD PRESS GE 130-139MM HG: ICD-10-PCS | Mod: CPTII,S$GLB,, | Performed by: INTERNAL MEDICINE

## 2022-05-23 PROCEDURE — 3008F BODY MASS INDEX DOCD: CPT | Mod: CPTII,S$GLB,, | Performed by: INTERNAL MEDICINE

## 2022-05-23 PROCEDURE — 99223 PR INITIAL HOSPITAL CARE,LEVL III: ICD-10-PCS | Mod: ,,, | Performed by: INTERNAL MEDICINE

## 2022-05-23 PROCEDURE — 3044F PR MOST RECENT HEMOGLOBIN A1C LEVEL <7.0%: ICD-10-PCS | Mod: CPTII,S$GLB,, | Performed by: INTERNAL MEDICINE

## 2022-05-23 PROCEDURE — 99999 PR PBB SHADOW E&M-EST. PATIENT-LVL V: CPT | Mod: PBBFAC,,, | Performed by: INTERNAL MEDICINE

## 2022-05-23 PROCEDURE — 99999 PR PBB SHADOW E&M-EST. PATIENT-LVL V: ICD-10-PCS | Mod: PBBFAC,,, | Performed by: INTERNAL MEDICINE

## 2022-05-23 PROCEDURE — 1160F RVW MEDS BY RX/DR IN RCRD: CPT | Mod: CPTII,S$GLB,, | Performed by: INTERNAL MEDICINE

## 2022-05-23 PROCEDURE — 20600001 HC STEP DOWN PRIVATE ROOM

## 2022-05-23 PROCEDURE — 99499 NO LOS: ICD-10-PCS | Mod: S$GLB,,, | Performed by: INTERNAL MEDICINE

## 2022-05-23 PROCEDURE — 1111F DSCHRG MED/CURRENT MED MERGE: CPT | Mod: CPTII,S$GLB,, | Performed by: INTERNAL MEDICINE

## 2022-05-23 PROCEDURE — 1160F PR REVIEW ALL MEDS BY PRESCRIBER/CLIN PHARMACIST DOCUMENTED: ICD-10-PCS | Mod: CPTII,S$GLB,, | Performed by: INTERNAL MEDICINE

## 2022-05-23 PROCEDURE — 1111F PR DISCHARGE MEDS RECONCILED W/ CURRENT OUTPATIENT MED LIST: ICD-10-PCS | Mod: CPTII,S$GLB,, | Performed by: INTERNAL MEDICINE

## 2022-05-23 PROCEDURE — 1159F MED LIST DOCD IN RCRD: CPT | Mod: CPTII,S$GLB,, | Performed by: INTERNAL MEDICINE

## 2022-05-23 PROCEDURE — 3078F DIAST BP <80 MM HG: CPT | Mod: CPTII,S$GLB,, | Performed by: INTERNAL MEDICINE

## 2022-05-23 PROCEDURE — 1159F PR MEDICATION LIST DOCUMENTED IN MEDICAL RECORD: ICD-10-PCS | Mod: CPTII,S$GLB,, | Performed by: INTERNAL MEDICINE

## 2022-05-23 PROCEDURE — 99223 1ST HOSP IP/OBS HIGH 75: CPT | Mod: ,,, | Performed by: INTERNAL MEDICINE

## 2022-05-23 PROCEDURE — 3008F PR BODY MASS INDEX (BMI) DOCUMENTED: ICD-10-PCS | Mod: CPTII,S$GLB,, | Performed by: INTERNAL MEDICINE

## 2022-05-23 PROCEDURE — 93005 ELECTROCARDIOGRAM TRACING: CPT

## 2022-05-23 PROCEDURE — 3078F PR MOST RECENT DIASTOLIC BLOOD PRESSURE < 80 MM HG: ICD-10-PCS | Mod: CPTII,S$GLB,, | Performed by: INTERNAL MEDICINE

## 2022-05-23 RX ORDER — IBUPROFEN 200 MG
24 TABLET ORAL
Status: DISCONTINUED | OUTPATIENT
Start: 2022-05-23 | End: 2022-06-06 | Stop reason: HOSPADM

## 2022-05-23 RX ORDER — NALOXONE HCL 0.4 MG/ML
0.02 VIAL (ML) INJECTION
Status: DISCONTINUED | OUTPATIENT
Start: 2022-05-23 | End: 2022-06-06 | Stop reason: HOSPADM

## 2022-05-23 RX ORDER — ACYCLOVIR 200 MG/1
400 CAPSULE ORAL
Status: DISCONTINUED | OUTPATIENT
Start: 2022-05-23 | End: 2022-06-06 | Stop reason: HOSPADM

## 2022-05-23 RX ORDER — OXCARBAZEPINE 600 MG/1
1200 TABLET, FILM COATED ORAL
Status: DISCONTINUED | OUTPATIENT
Start: 2022-05-23 | End: 2022-06-06 | Stop reason: HOSPADM

## 2022-05-23 RX ORDER — QUETIAPINE FUMARATE 200 MG/1
200 TABLET, FILM COATED ORAL
Status: DISCONTINUED | OUTPATIENT
Start: 2022-05-23 | End: 2022-06-06 | Stop reason: HOSPADM

## 2022-05-23 RX ORDER — HEPARIN 100 UNIT/ML
5 SYRINGE INTRAVENOUS
Status: DISCONTINUED | OUTPATIENT
Start: 2022-05-23 | End: 2022-05-25

## 2022-05-23 RX ORDER — GABAPENTIN 300 MG/1
300 CAPSULE ORAL
Status: DISCONTINUED | OUTPATIENT
Start: 2022-05-23 | End: 2022-06-06 | Stop reason: HOSPADM

## 2022-05-23 RX ORDER — OXYCODONE HYDROCHLORIDE 5 MG/1
5 TABLET ORAL EVERY 4 HOURS PRN
Status: DISCONTINUED | OUTPATIENT
Start: 2022-05-23 | End: 2022-05-23

## 2022-05-23 RX ORDER — ONDANSETRON 4 MG/1
4 TABLET, ORALLY DISINTEGRATING ORAL ONCE
Status: COMPLETED | OUTPATIENT
Start: 2022-05-23 | End: 2022-05-23

## 2022-05-23 RX ORDER — BUSPIRONE HYDROCHLORIDE 10 MG/1
10 TABLET ORAL
Status: DISCONTINUED | OUTPATIENT
Start: 2022-05-23 | End: 2022-06-06 | Stop reason: HOSPADM

## 2022-05-23 RX ORDER — GLUCAGON 1 MG
1 KIT INJECTION
Status: DISCONTINUED | OUTPATIENT
Start: 2022-05-23 | End: 2022-06-06 | Stop reason: HOSPADM

## 2022-05-23 RX ORDER — FLUCONAZOLE 200 MG/1
400 TABLET ORAL
Status: DISCONTINUED | OUTPATIENT
Start: 2022-05-24 | End: 2022-06-06 | Stop reason: HOSPADM

## 2022-05-23 RX ORDER — OXCARBAZEPINE 600 MG/1
1200 TABLET, FILM COATED ORAL
Status: DISCONTINUED | OUTPATIENT
Start: 2022-05-23 | End: 2022-05-23

## 2022-05-23 RX ORDER — SODIUM CHLORIDE 0.9 % (FLUSH) 0.9 %
10 SYRINGE (ML) INJECTION EVERY 12 HOURS PRN
Status: DISCONTINUED | OUTPATIENT
Start: 2022-05-23 | End: 2022-05-25

## 2022-05-23 RX ORDER — IBUPROFEN 200 MG
16 TABLET ORAL
Status: DISCONTINUED | OUTPATIENT
Start: 2022-05-23 | End: 2022-06-06 | Stop reason: HOSPADM

## 2022-05-23 RX ORDER — HYDROMORPHONE HYDROCHLORIDE 1 MG/ML
0.5 INJECTION, SOLUTION INTRAMUSCULAR; INTRAVENOUS; SUBCUTANEOUS EVERY 6 HOURS PRN
Status: DISCONTINUED | OUTPATIENT
Start: 2022-05-23 | End: 2022-05-24

## 2022-05-23 RX ORDER — POTASSIUM CHLORIDE 20 MEQ/1
20 TABLET, EXTENDED RELEASE ORAL ONCE
Status: COMPLETED | OUTPATIENT
Start: 2022-05-23 | End: 2022-05-23

## 2022-05-23 RX ORDER — HYDROXYZINE HYDROCHLORIDE 25 MG/1
50 TABLET, FILM COATED ORAL
Status: DISCONTINUED | OUTPATIENT
Start: 2022-05-23 | End: 2022-06-05

## 2022-05-23 RX ORDER — SODIUM CHLORIDE 9 MG/ML
INJECTION, SOLUTION INTRAVENOUS CONTINUOUS
Status: DISCONTINUED | OUTPATIENT
Start: 2022-05-23 | End: 2022-05-28

## 2022-05-23 RX ORDER — PANTOPRAZOLE SODIUM 40 MG/1
40 TABLET, DELAYED RELEASE ORAL
Status: DISCONTINUED | OUTPATIENT
Start: 2022-05-23 | End: 2022-06-06 | Stop reason: HOSPADM

## 2022-05-23 RX ORDER — DIAZEPAM 5 MG/1
10 TABLET ORAL 2 TIMES DAILY PRN
Status: DISCONTINUED | OUTPATIENT
Start: 2022-05-23 | End: 2022-06-06 | Stop reason: HOSPADM

## 2022-05-23 RX ORDER — DEXAMETHASONE 4 MG/1
20 TABLET ORAL DAILY
Status: DISCONTINUED | OUTPATIENT
Start: 2022-05-23 | End: 2022-05-25

## 2022-05-23 RX ORDER — POLYETHYLENE GLYCOL 3350 17 G/17G
17 POWDER, FOR SOLUTION ORAL DAILY PRN
Status: DISCONTINUED | OUTPATIENT
Start: 2022-05-23 | End: 2022-06-06 | Stop reason: HOSPADM

## 2022-05-23 RX ORDER — AMOXICILLIN 250 MG
1 CAPSULE ORAL 2 TIMES DAILY PRN
Status: DISCONTINUED | OUTPATIENT
Start: 2022-05-23 | End: 2022-06-06 | Stop reason: HOSPADM

## 2022-05-23 RX ORDER — SUMATRIPTAN 50 MG/1
50 TABLET, FILM COATED ORAL DAILY PRN
Status: DISCONTINUED | OUTPATIENT
Start: 2022-05-23 | End: 2022-06-06 | Stop reason: HOSPADM

## 2022-05-23 RX ORDER — INSULIN ASPART 100 [IU]/ML
1-10 INJECTION, SOLUTION INTRAVENOUS; SUBCUTANEOUS
Status: DISCONTINUED | OUTPATIENT
Start: 2022-05-23 | End: 2022-06-06 | Stop reason: HOSPADM

## 2022-05-23 RX ORDER — LEVOFLOXACIN 500 MG/1
500 TABLET, FILM COATED ORAL
Status: DISCONTINUED | OUTPATIENT
Start: 2022-05-24 | End: 2022-06-06 | Stop reason: HOSPADM

## 2022-05-23 RX ORDER — ALLOPURINOL 300 MG/1
300 TABLET ORAL 2 TIMES DAILY
Status: DISCONTINUED | OUTPATIENT
Start: 2022-05-23 | End: 2022-05-27

## 2022-05-23 RX ORDER — DILTIAZEM HYDROCHLORIDE 30 MG/1
90 TABLET, FILM COATED ORAL
Status: DISCONTINUED | OUTPATIENT
Start: 2022-05-23 | End: 2022-06-06 | Stop reason: HOSPADM

## 2022-05-23 RX ORDER — SULFAMETHOXAZOLE AND TRIMETHOPRIM 800; 160 MG/1; MG/1
1 TABLET ORAL
Status: DISCONTINUED | OUTPATIENT
Start: 2022-05-25 | End: 2022-06-06 | Stop reason: HOSPADM

## 2022-05-23 RX ORDER — LOSARTAN POTASSIUM 25 MG/1
25 TABLET ORAL
Status: DISCONTINUED | OUTPATIENT
Start: 2022-05-23 | End: 2022-06-06 | Stop reason: HOSPADM

## 2022-05-23 RX ADMIN — ACYCLOVIR 400 MG: 200 CAPSULE ORAL at 10:05

## 2022-05-23 RX ADMIN — POTASSIUM CHLORIDE 20 MEQ: 1500 TABLET, EXTENDED RELEASE ORAL at 05:05

## 2022-05-23 RX ADMIN — DIAZEPAM 10 MG: 5 TABLET ORAL at 10:05

## 2022-05-23 RX ADMIN — SODIUM CHLORIDE: 0.9 INJECTION, SOLUTION INTRAVENOUS at 06:05

## 2022-05-23 RX ADMIN — DILTIAZEM HYDROCHLORIDE 90 MG: 30 TABLET, FILM COATED ORAL at 06:05

## 2022-05-23 RX ADMIN — BUSPIRONE HYDROCHLORIDE 10 MG: 10 TABLET ORAL at 10:05

## 2022-05-23 RX ADMIN — SUMATRIPTAN SUCCINATE 50 MG: 50 TABLET ORAL at 07:05

## 2022-05-23 RX ADMIN — INSULIN ASPART 1 UNITS: 100 INJECTION, SOLUTION INTRAVENOUS; SUBCUTANEOUS at 10:05

## 2022-05-23 RX ADMIN — ONDANSETRON 4 MG: 4 TABLET, ORALLY DISINTEGRATING ORAL at 08:05

## 2022-05-23 RX ADMIN — ALLOPURINOL 300 MG: 300 TABLET ORAL at 06:05

## 2022-05-23 RX ADMIN — HYDROXYZINE HYDROCHLORIDE 50 MG: 25 TABLET, FILM COATED ORAL at 06:05

## 2022-05-23 RX ADMIN — GABAPENTIN 300 MG: 300 CAPSULE ORAL at 08:05

## 2022-05-23 RX ADMIN — PANTOPRAZOLE SODIUM 40 MG: 40 TABLET, DELAYED RELEASE ORAL at 06:05

## 2022-05-23 RX ADMIN — OXCARBAZEPINE 1200 MG: 600 TABLET, FILM COATED ORAL at 10:05

## 2022-05-23 RX ADMIN — LOSARTAN POTASSIUM 25 MG: 25 TABLET, FILM COATED ORAL at 06:05

## 2022-05-23 RX ADMIN — QUETIAPINE FUMARATE 200 MG: 200 TABLET, FILM COATED ORAL at 10:05

## 2022-05-23 RX ADMIN — DEXAMETHASONE 20 MG: 4 TABLET ORAL at 06:05

## 2022-05-23 RX ADMIN — HYDROMORPHONE HYDROCHLORIDE 0.5 MG: 1 INJECTION, SOLUTION INTRAMUSCULAR; INTRAVENOUS; SUBCUTANEOUS at 06:05

## 2022-05-23 NOTE — ASSESSMENT & PLAN NOTE
- given leukocytosis with WBC 136K and plan for rapid reduction with PO dex and 1 dose of vincristine, patient at high risk for TLS  - on IVF @75  - start allopurinol 300mg BID  - monitor TLS labs BID starting 5/24

## 2022-05-23 NOTE — ASSESSMENT & PLAN NOTE
- significant transaminitis following Blinatumomab  - improving since off chemotherapy  - closely monitor with cytoreduction and vincristine

## 2022-05-23 NOTE — ASSESSMENT & PLAN NOTE
- K 3.3 on admit  - high TLS risk with leukocytosis and impending cytoreduction/vincristine causing hyperkalemia  - will replace today with only 20mEq  - plan to monitor BID CMP starting 5/24

## 2022-05-23 NOTE — ASSESSMENT & PLAN NOTE
- seen in BMT clinic 5/23 by Dr. Jenkins  - WBC up from 19.5 on 5/19 to 136.5 on 5/23  - see B-ALL for hx of treatment  - will plan for cytoreduction with 20mg dex daily  - plan for one dose of 2mg vincristine on 5/24  - high TLS risk, will get BID TLS labs starting 5/24

## 2022-05-23 NOTE — HPI
Ms. Gonzalez is a 51 y.o. female, patient of Dr. 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 Her WBC count on 5/19 was 19K. She is admitted for plans of cytoreduction with dexamethasone and one dose of vincristine with extremely high TLS risk. She has an extensive medical history including hx of TIA, seizure disorder, anxiety/depression, afib, PAD, HTN, HLD, DM2, GERD, and RA. She reports low grade temps at home as well as worsening left hip pain. She reports intermittent nausea and headaches. She denies any chest pain, sob, vomiting, diarrhea, bleeding. She was covid negative prior to admission. She has a right chest wall port.

## 2022-05-23 NOTE — ASSESSMENT & PLAN NOTE
- continue home diltiazem  - Holding xarelto with plt count < 50K. Can resume outpatient when appropriate.

## 2022-05-23 NOTE — H&P
Roberto Yepez - Oncology ([facility])  Hematology  Bone Marrow Transplant  H&P    Subjective:     Principal Problem: Leukocytosis    HPI: Ms. Gonzalez is a 51 y.o. female, patient of Dr. 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 Her WBC count on 5/19 was 19K. She is admitted for plans of cytoreduction with dexamethasone and one dose of vincristine with extremely high TLS risk. She has an extensive medical history including hx of TIA, seizure disorder, anxiety/depression, afib, PAD, HTN, HLD, DM2, GERD, and RA. She reports low grade temps at home as well as worsening left hip pain. She reports intermittent nausea and headaches. She denies any chest pain, sob, vomiting, diarrhea, bleeding. She was covid negative prior to admission. She has a right chest wall port.      Patient information was obtained from patient and past medical records.     Oncology History:  Patient of Dr. Dayron Jenkins  Precursor B-cell acute lymphoblastic leukemia (Ph+)              A. 11/23/2021: [place] [facility] [place] for acute leukemia              B. 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               C. 11/30/2021 - 12/23/2021: Vincristine + imatinib induction; hospital course was complicated by acute hypoxemic respiratory failure which resolved with diuresis and treatment of ALL              D. 1/3/2022: Bone marrow biopsy after induction shows normocellular marrow (60%) with no definite evidence of B-ALL; bcr-abl p190 fusion detected at 0.02% of total ABL1; FISH normal; MRD testing not sent              E. 1/20/2022: Begin consolidation therapy with [id number] protocol               F. 3/16/2022: BCR-ABL p190 transcript on peripheral blood negative              G. 4/13/2022: BCR-ABL p190 transcript on peripheral blood 0.63%              H. 5/4/2022: Bone marrow biopsy shows 50% cellular marrow with relapsed B-ALL; bone marrow aspirate shows 23.5% blasts; cytogenetics 46,XX,-9,t(9;22)(q34;q11.2),+isaias(22)t(9;22)[5] /46,XX[15]; FISH shows 9.5% nuclei with bcr/abl1 fusion; bcr/abl1 quantitative PCR shows 47% of total ABL1    Medications Prior to Admission   Medication Sig Dispense Refill Last Dose    acyclovir (ZOVIRAX) 400 MG tablet Take 1 tablet (400 mg total) by mouth 2 (two) times daily. 60 tablet 11 5/23/2022 at Unknown time    artificial tears (ISOPTO TEARS) 0.5 % ophthalmic solution Place 1 drop into both eyes 4 (four) times daily as needed.   5/23/2022 at Unknown time    atorvastatin (LIPITOR) 80 MG tablet Take 80 mg by mouth once daily.   5/23/2022 at Unknown time    busPIRone (BUSPAR) 10 MG tablet Take 10 mg by mouth 2 (two) times daily.   5/23/2022 at Unknown time    diazePAM (VALIUM) 10 MG Tab Take 10 mg by mouth 2 (two) times daily as needed.   5/23/2022 at Unknown time    diltiaZEM (CARDIZEM) 90 MG tablet Take 1 tablet (90 mg total) by mouth every 6 (six) hours. 120 tablet 11 5/23/2022 at Unknown time    gabapentin (NEURONTIN) 300 MG capsule Take 1 capsule (300 mg total) by mouth 3 (three) times daily. 90 capsule 11 5/23/2022 at Unknown time    hydrOXYchloroQUINE (PLAQUENIL) 200 mg tablet Take 1 tablet (200 mg total) by mouth once daily.   5/23/2022 at Unknown time    hydrOXYzine pamoate (VISTARIL) 50 MG Cap Take 50 mg by mouth 2 (two) times daily as needed.       levoFLOXacin (LEVAQUIN) 500 MG tablet Take 1 tablet (500 mg total) by mouth once daily. 30 tablet 2 5/23/2022 at Unknown time    losartan (COZAAR) 25 MG tablet Take 25 mg by mouth once daily.   5/22/2022 at Unknown time    omeprazole (PRILOSEC) 40 MG capsule Take 40 mg by mouth once daily.   5/23/2022 at  Unknown time    ondansetron (ZOFRAN-ODT) 8 MG TbDL Dissolve 1 tablet (8 mg total) by mouth every 12 (twelve) hours as needed (in case of chemo induced nausea). 30 tablet 1 5/23/2022 at Unknown time    OXcarbazepine (TRILEPTAL) 600 MG Tab Take 1,200 mg by mouth 2 (two) times daily.   5/23/2022 at Unknown time    oxyCODONE (ROXICODONE) 5 MG immediate release tablet Take 1 tablet (5 mg total) by mouth every 4 (four) hours as needed for Pain. 45 tablet 0 5/22/2022 at Unknown time    PONATinib (ICLUSIG) 45 mg Tab tablet Take 1 tablet (45 mg total) by mouth once daily Do not initiate until instructed to do so by Dr. Jenkins.. 30 tablet 0     prochlorperazine (COMPAZINE) 5 MG tablet Take 1 tablet (5 mg total) by mouth every 6 (six) hours as needed for Nausea. 30 tablet 2 Past Week at Unknown time    QUEtiapine (SEROQUEL) 200 MG Tab Take 200 mg by mouth every evening.   5/22/2022 at Unknown time    senna-docusate 8.6-50 mg (PERICOLACE) 8.6-50 mg per tablet Take 1 tablet by mouth 2 (two) times daily. 60 tablet 3 5/23/2022 at Unknown time    TRINTELLIX 20 mg Tab Take 1 tablet by mouth once daily.   5/23/2022 at Unknown time    aspirin (ECOTRIN) 81 MG EC tablet Take 1 tablet (81 mg total) by mouth once daily. Hold until instructed to resume by provider due to low platelet count.  0 Unknown at Unknown time    blood sugar diagnostic Strp SMARTSIG:Via Meter 1 to 2 Times Daily       dapagliflozin (FARXIGA) 10 mg tablet Take 10 mg by mouth once daily.   Unknown at Unknown time    duke's soln (benadryl 30 mL, mylanta 30 mL, LIDOcaine 30 mL, nystatin 30 mL) 120mL Take 10 mLs by mouth 4 (four) times daily as needed (mouth pain). (Patient not taking: No sig reported) 120 mL 0     fenofibrate 160 MG Tab Take 160 mg by mouth once daily.       fluconazole (DIFLUCAN) 200 MG Tab Take 2 tablets (400 mg total) by mouth once daily. 60 tablet 2 More than a month at Unknown time    ONETOUCH VERIO TEST STRIPS Strp SMARTSIG:Via Meter  1 to 2 Times Daily       polyethylene glycol (GLYCOLAX) 17 gram/dose powder Dissolve one capful (17 g) in liquid and take by mouth daily as needed (constipation). (Patient not taking: No sig reported) 510 g 0 More than a month at Unknown time    rivaroxaban (XARELTO) 20 mg Tab Take 1 tablet (20 mg total) by mouth daily with dinner or evening meal. Hold until instructed to resume by provider due to low platelet count.       sumatriptan (IMITREX) 50 MG tablet Take 1 tablet (50 mg total) by mouth daily as needed (headache). 30 tablet 0        Levetiracetam     Past Medical History:   Diagnosis Date    Arthritis     Cancer     COPD (chronic obstructive pulmonary disease)     Hypertension     Stroke     TIA x 4     Past Surgical History:   Procedure Laterality Date    APPENDECTOMY      HYSTERECTOMY      ROTATOR CUFF REPAIR Bilateral     TONSILLECTOMY      TUBAL LIGATION       Family History    None       Tobacco Use    Smoking status: Current Every Day Smoker     Packs/day: 0.50     Types: Cigarettes    Smokeless tobacco: Never Used   Substance and Sexual Activity    Alcohol use: No    Drug use: Never    Sexual activity: Not on file       Review of Systems   Constitutional:  Positive for fever (low grade temps <100F). Negative for chills, diaphoresis and fatigue.   HENT:  Negative for congestion, ear pain, mouth sores, nosebleeds, postnasal drip, rhinorrhea, sinus pressure, sinus pain, sore throat and trouble swallowing.    Eyes:  Negative for pain, discharge and redness.   Respiratory:  Negative for apnea, cough, chest tightness, shortness of breath and wheezing.    Cardiovascular:  Negative for chest pain, palpitations and leg swelling.   Gastrointestinal:  Positive for nausea. Negative for abdominal distention, abdominal pain, blood in stool, constipation, diarrhea and vomiting.   Genitourinary:  Negative for dysuria, frequency and hematuria.   Musculoskeletal:  Positive for arthralgias (left hip).  Negative for back pain and joint swelling.   Skin:  Negative for rash.   Neurological:  Positive for headaches. Negative for dizziness, tremors, syncope and numbness.   Hematological:  Bruises/bleeds easily.   Psychiatric/Behavioral:  Negative for behavioral problems and confusion. The patient is not nervous/anxious.    Objective:     Vital Signs (Most Recent):  Temp: 100.3 °F (37.9 °C) (05/23/22 1656)  Pulse: 99 (05/23/22 1656)  Resp: 16 (05/23/22 1656)  BP: (!) 140/74 (05/23/22 1656)  SpO2: (!) 91 % (05/23/22 1656) Vital Signs (24h Range):  Temp:  [99.9 °F (37.7 °C)-100.3 °F (37.9 °C)] 100.3 °F (37.9 °C)  Pulse:  [] 99  Resp:  [16-18] 16  SpO2:  [91 %-95 %] 91 %  BP: (130-140)/(64-74) 140/74     Weight: 69.4 kg (153 lb)  Body mass index is 25.46 kg/m².  Body surface area is 1.78 meters squared.    ECOG SCORE 2            Lines/Drains/Airways       Central Venous Catheter Line  Duration                  PowerPort A Cath Single Lumen 03/16/22 1128 right internal jugular 68 days                    Physical Exam  Vitals reviewed.   Constitutional:       Appearance: Normal appearance. She is well-developed.   HENT:      Head: Normocephalic and atraumatic.      Right Ear: External ear normal.      Left Ear: External ear normal.   Eyes:      Extraocular Movements: Extraocular movements intact.      Conjunctiva/sclera: Conjunctivae normal.   Cardiovascular:      Rate and Rhythm: Normal rate and regular rhythm.      Pulses: Normal pulses.      Heart sounds: Normal heart sounds. No murmur heard.  Pulmonary:      Effort: Pulmonary effort is normal. No respiratory distress.      Breath sounds: Normal breath sounds. No stridor. No wheezing or rales.   Abdominal:      General: Bowel sounds are normal. There is no distension.      Palpations: Abdomen is soft.      Tenderness: There is no abdominal tenderness.   Musculoskeletal:         General: Normal range of motion.      Cervical back: Normal range of motion.      Right  lower leg: No edema.      Left lower leg: No edema.   Skin:     General: Skin is warm and dry.      Findings: Bruising present. No rash.      Comments: RCWP with no signs of infection   Neurological:      General: No focal deficit present.      Mental Status: She is alert and oriented to person, place, and time.   Psychiatric:         Mood and Affect: Mood normal.         Behavior: Behavior normal.         Thought Content: Thought content normal.         Judgment: Judgment normal.       Significant Labs:   CBC:   Recent Labs   Lab 05/23/22  1238   .50*   HGB 10.1*   HCT 28.6*   PLT 15*   , CMP:   Recent Labs   Lab 05/23/22  1238      K 3.3*      CO2 26   *   BUN 10   CREATININE 0.8   CALCIUM 9.1   PROT 6.3   ALBUMIN 3.6   BILITOT 0.6   ALKPHOS 531*   AST 63*   ALT 96*   ANIONGAP 11   EGFRNONAA >60.0   , and Uric Acid   Recent Labs   Lab 05/23/22  1238   URICACID 5.0       Diagnostic Results:  None    Assessment/Plan:     * Leukocytosis  - seen in BMT clinic 5/23 by Dr. Jenkins  - WBC up from 19.5 on 5/19 to 136.5 on 5/23  - see B-ALL for hx of treatment  - will plan for cytoreduction with 20mg dex daily  - plan for one dose of 2mg vincristine on 5/24  - high TLS risk, will get BID TLS labs starting 5/24    B-cell acute lymphoblastic leukemia  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 Blincyto held & given steroids x 3 days. Resumed Blincyto 5/17 and again developed CRS, Grade 2 as well as mild AMS and Grade 3 transaminitis. Blincyto stopped with no plan to resume. Discharged on 5/19  - seen today 5/23 by Dr. Jenkins in  BMT clinic for follow up to discuss next plan with WBC 136K  - admitted for cytoreduction and dose of vincristine as bridge to inotuzumab + ponatinib  - will start 20mg daily dex, 2mg vincristine x1 on 5/24  - also newly developed T315I bcr-abl resistance mutation; ponatinib Rx pending via specialty pharmacy  - continue ppx acyclovir, fluconazole, levaquin and bactrim    At high risk of tumor lysis syndrome  - given leukocytosis with WBC 136K and plan for rapid reduction with PO dex and 1 dose of vincristine, patient at high risk for TLS  - on IVF @75  - start allopurinol 300mg BID  - monitor TLS labs BID starting 5/24    Cancer associated pain  - worsening pain to left hip with worsening leukocytosis  - denies trauma or injury to area  - likely d/t leukemia  - oxycodone has been unsuccessful  - will try 0.5mg dilaudid q6h prn    Anemia in neoplastic disease  - daily CBC while inpatient  - transfuse for Hgb <7    Thrombocytopenia  - daily CBC while inpatient  - transfuse for Plt <10K or bleeding    Transaminitis  - significant transaminitis following Blinatumomab  - improving since off chemotherapy  - closely monitor with cytoreduction and vincristine    Hypokalemia  - K 3.3 on admit  - high TLS risk with leukocytosis and impending cytoreduction/vincristine causing hyperkalemia  - will replace today with only 20mEq  - plan to monitor BID CMP starting 5/24    Moderate major depression  - Continue home trintellix    Anxiety  - Continue home trintellix and prn valium    GERD (gastroesophageal reflux disease)  - hold home omeprazole, switch to protonix while inpatient    Hypertension  - continue home losartan    Seizure disorder  - continue home oxcarbazepine    Type 2 diabetes mellitus, without long-term current use of insulin  - hold home farxiga while inpatient; can resume at discharge.  - achs blood glucose monitoring, moderate SSI, and diabetic diet while admitted  - will likely have hyperglycemia with steroids,  consider endocrine consult if plan for long term steroids    Rheumatoid arthritis involving multiple sites  - not currently on any active tx    History of TIA (transient ischemic attack)  - Holding home statin while inpatient  - Holding home ASA while inpatient due to thrombocytopenia. Will hold ASA at discharge. Can be resumed outpatient as appropriate when platelets >50K    Hyperlipidemia  - holding home statin while inpatient. Can resume at discharge if LFTs normalize.    Paroxysmal atrial fibrillation  - continue home diltiazem  - Holding xarelto with plt count < 50K. Can resume outpatient when appropriate.      VTE Risk Mitigation (From admission, onward)         Ordered     heparin, porcine (PF) 100 unit/mL injection flush 500 Units  As needed (PRN)         05/23/22 1554     IP VTE HIGH RISK PATIENT  Once         05/23/22 1552     Place sequential compression device  Until discontinued         05/23/22 1552     Reason for No Pharmacological VTE Prophylaxis  Once        Question:  Reasons:  Answer:  Thrombocytopenia    05/23/22 1552                Disposition: Admit to inpatient BMT service for cytoreduction with dex and vincristine. High TLS risk with refractory B-ALL    Shanta Love NP  Bone Marrow Transplant  Hematology  [facility] - Oncology ([facility])

## 2022-05-23 NOTE — PROGRESS NOTES
HEMATOLOGIC MALIGNANCIES PROGRESS NOTE    IDENTIFYING STATEMENT   Deepti Davison) is a 51 y.o. female with a  of 1970 from [place], [place] with the diagnosis of B-ALL.     ONCOLOGY HISTORY:    1. Precursor B-cell acute lymphoblastic leukemia (Ph+)   A. 2021: [place] [facility] [place] evaluation for acute leukemia   B. 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    C. 2021 - 2021: Vincristine + imatinib induction; hospital course was complicated by acute hypoxemic respiratory failure which resolved with diuresis and treatment of ALL   D. 1/3/2022: Bone marrow biopsy after induction shows normocellular marrow (60%) with no definite evidence of B-ALL; bcr-abl p190 fusion detected at 0.02% of total ABL1; FISH normal; MRD testing not sent   E. 2022: Begin consolidation therapy with EWALL-Ph-01 protocol   F. 3/16/2022: BCR-ABL p190 transcript on peripheral blood negative   G. 2022: BCR-ABL p190 transcript on peripheral blood 0.63%   H. 2022: Bone marrow biopsy shows 50% cellular marrow with relapsed B-ALL; bone marrow aspirate shows 23.5% blasts; cytogenetics 46,XX,-9,t(9;22)(q34;q11.2),+isaias(22)t(9;22)[5] /46,XX[15]; FISH shows 9.5% nuclei with bcr/abl1 fusion; bcr/abl1 quantitative PCR shows 47% of total ABL1    2. History of TIA  3. Seizure disorder  4. Anxiety and depression  5. Chronic obstructive pulmonary disease  6. Paroxysmal atrial fibrillation  7. Implantable loop recorder and pacemaker in place  8. Peripheral artery disease  9. Diabetes mellitus, type 2  10. Gastroesophageal reflux disease  11. Gastroparesis  12. Rheumatoid arthritis  13. Osteoporosis  14. History of  tobacco use     INTERVAL HISTORY:      Ms. Gonzalez presents today for admission for Blinatumumab for relapsed Ph+ B-ALL. 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 Treatment was interrupted, but upon reintroduction of blinatumomab, the hepatotoxicity recurred at Grade 3. Blinatumomab was stopped.    Ponatinib authorization process has been started and is pending determination.    She feels poorly today. Reports daily fever at home. She has left hip pain. She feels fatigued.     Past Medical History, Past Social History and Past Family History have been reviewed and are unchanged except as noted in the interval history.    MEDICATIONS:     Current Outpatient Medications on File Prior to Visit   Medication Sig Dispense Refill    acyclovir (ZOVIRAX) 400 MG tablet Take 1 tablet (400 mg total) by mouth 2 (two) times daily. 60 tablet 11    artificial tears (ISOPTO TEARS) 0.5 % ophthalmic solution Place 1 drop into both eyes 4 (four) times daily as needed.      aspirin (ECOTRIN) 81 MG EC tablet Take 1 tablet (81 mg total) by mouth once daily. Hold until instructed to resume by provider due to low platelet count.  0    atorvastatin (LIPITOR) 80 MG tablet Take 80 mg by mouth once daily.      blood sugar diagnostic Strp SMARTSIG:Via Meter 1 to 2 Times Daily      busPIRone (BUSPAR) 10 MG tablet Take 10 mg by mouth 2 (two) times daily.      dapagliflozin (FARXIGA) 10 mg tablet Take 10 mg by mouth once daily.      diazePAM (VALIUM) 10 MG Tab Take 10 mg by mouth 2 (two) times daily as needed.      diltiaZEM (CARDIZEM) 90 MG tablet Take 1 tablet (90 mg total) by mouth every 6 (six) hours. 120 tablet 11    fenofibrate 160 MG Tab Take 160 mg by mouth once daily.      fluconazole (DIFLUCAN) 200 MG Tab Take 2 tablets (400 mg total) by mouth once daily. 60 tablet 2    gabapentin (NEURONTIN) 300 MG capsule Take 1 capsule (300 mg total) by mouth 3 (three) times  daily. 90 capsule 11    hydrOXYchloroQUINE (PLAQUENIL) 200 mg tablet Take 1 tablet (200 mg total) by mouth once daily.      hydrOXYzine pamoate (VISTARIL) 50 MG Cap Take 50 mg by mouth 2 (two) times daily as needed.      levoFLOXacin (LEVAQUIN) 500 MG tablet Take 1 tablet (500 mg total) by mouth once daily. 30 tablet 2    losartan (COZAAR) 25 MG tablet Take 25 mg by mouth once daily.      omeprazole (PRILOSEC) 40 MG capsule Take 40 mg by mouth once daily.      ondansetron (ZOFRAN-ODT) 8 MG TbDL Dissolve 1 tablet (8 mg total) by mouth every 12 (twelve) hours as needed (in case of chemo induced nausea). 30 tablet 1    ONETOUCH VERIO TEST STRIPS Strp SMARTSIG:Via Meter 1 to 2 Times Daily      OXcarbazepine (TRILEPTAL) 600 MG Tab Take 1,200 mg by mouth 2 (two) times daily.      oxyCODONE (ROXICODONE) 5 MG immediate release tablet Take 1 tablet (5 mg total) by mouth every 4 (four) hours as needed for Pain. 45 tablet 0    PONATinib (ICLUSIG) 45 mg Tab tablet Take 1 tablet (45 mg total) by mouth once daily Do not initiate until instructed to do so by Dr. Jenkins.. 30 tablet 0    prochlorperazine (COMPAZINE) 5 MG tablet Take 1 tablet (5 mg total) by mouth every 6 (six) hours as needed for Nausea. 30 tablet 2    QUEtiapine (SEROQUEL) 200 MG Tab Take 200 mg by mouth every evening.      rivaroxaban (XARELTO) 20 mg Tab Take 1 tablet (20 mg total) by mouth daily with dinner or evening meal. Hold until instructed to resume by provider due to low platelet count.      senna-docusate 8.6-50 mg (PERICOLACE) 8.6-50 mg per tablet Take 1 tablet by mouth 2 (two) times daily. 60 tablet 3    TRINTELLIX 20 mg Tab Take 1 tablet by mouth once daily.      duke's soln (benadryl 30 mL, mylanta 30 mL, LIDOcaine 30 mL, nystatin 30 mL) 120mL Take 10 mLs by mouth 4 (four) times daily as needed (mouth pain). (Patient not taking: No sig reported) 120 mL 0    polyethylene glycol (GLYCOLAX) 17 gram/dose powder Dissolve one capful (17 g)  "in liquid and take by mouth daily as needed (constipation). (Patient not taking: No sig reported) 510 g 0    sumatriptan (IMITREX) 50 MG tablet Take 1 tablet (50 mg total) by mouth daily as needed (headache). 30 tablet 0     No current facility-administered medications on file prior to visit.       ALLERGIES:   Review of patient's allergies indicates:   Allergen Reactions    Levetiracetam      Other reaction(s): Unknown  Other reaction(s): Unknown  Other reaction(s): Unknown        ROS:       Review of Systems   Constitutional: Positive for fatigue (improved significantly). Negative for diaphoresis, fever and unexpected weight change.   HENT:   Negative for lump/mass and sore throat.    Eyes: Negative for icterus.   Respiratory: Negative for cough and shortness of breath.    Cardiovascular: Negative for palpitations.   Gastrointestinal: Positive for nausea and vomiting. Negative for abdominal distention, abdominal pain, constipation and diarrhea.   Genitourinary: Negative for dysuria and frequency.    Musculoskeletal: Negative for arthralgias, gait problem and myalgias.        Muscle weakness - diffuse   Skin: Negative for rash.   Neurological: Negative for dizziness, gait problem and headaches.   Hematological: Negative for adenopathy. Does not bruise/bleed easily.   Psychiatric/Behavioral: The patient is not nervous/anxious.        PHYSICAL EXAM:  Vitals:    05/23/22 1347   BP: 130/64   Pulse: 109   Resp: 18   Temp: 99.9 °F (37.7 °C)   TempSrc: Oral   SpO2: 95%   Weight: 70.2 kg (154 lb 12.2 oz)   Height: 5' 4" (1.626 m)   PainSc:   9   PainLoc: Hip       KARNOFSKY PERFORMANCE STATUS 60%  ECOG 2    Physical Exam  Constitutional:       Appearance: Normal appearance.   HENT:      Head: Normocephalic and atraumatic.   Pulmonary:      Effort: Pulmonary effort is normal.   Musculoskeletal:         General: Normal range of motion.      Cervical back: Normal range of motion and neck supple.   Skin:     Findings: No " rash.   Neurological:      General: No focal deficit present.      Mental Status: She is alert and oriented to person, place, and time.   Psychiatric:         Mood and Affect: Mood normal.         Thought Content: Thought content normal.          LAB:   Results for orders placed or performed in visit on 05/23/22   CBC Auto Differential   Result Value Ref Range    .50 (HH) 3.90 - 12.70 K/uL    RBC 3.25 (L) 4.00 - 5.40 M/uL    Hemoglobin 10.1 (L) 12.0 - 16.0 g/dL    Hematocrit 28.6 (L) 37.0 - 48.5 %    MCV 88 82 - 98 fL    MCH 31.1 (H) 27.0 - 31.0 pg    MCHC 35.3 32.0 - 36.0 g/dL    RDW 18.6 (H) 11.5 - 14.5 %    MPV SEE COMMENT 9.2 - 12.9 fL   Magnesium   Result Value Ref Range    Magnesium 1.6 1.6 - 2.6 mg/dL   Phosphorus   Result Value Ref Range    Phosphorus 2.7 2.7 - 4.5 mg/dL   Comprehensive Metabolic Panel   Result Value Ref Range    Sodium 138 136 - 145 mmol/L    Potassium 3.3 (L) 3.5 - 5.1 mmol/L    Chloride 101 95 - 110 mmol/L    CO2 26 23 - 29 mmol/L    Glucose 148 (H) 70 - 110 mg/dL    BUN 10 6 - 20 mg/dL    Creatinine 0.8 0.5 - 1.4 mg/dL    Calcium 9.1 8.7 - 10.5 mg/dL    Total Protein 6.3 6.0 - 8.4 g/dL    Albumin 3.6 3.5 - 5.2 g/dL    Total Bilirubin 0.6 0.1 - 1.0 mg/dL    Alkaline Phosphatase 531 (H) 55 - 135 U/L    AST 63 (H) 10 - 40 U/L    ALT 96 (H) 10 - 44 U/L    Anion Gap 11 8 - 16 mmol/L    eGFR if African American >60.0 >60 mL/min/1.73 m^2    eGFR if non African American >60.0 >60 mL/min/1.73 m^2   LACTATE DEHYDROGENASE   Result Value Ref Range    LD 2,755 (H) 110 - 260 U/L   URIC ACID   Result Value Ref Range    Uric Acid 5.0 2.4 - 5.7 mg/dL   APTT   Result Value Ref Range    aPTT <21.0 21.0 - 32.0 sec   PROTIME-INR   Result Value Ref Range    Prothrombin Time 11.0 9.0 - 12.5 sec    INR 1.1 0.8 - 1.2   FIBRINOGEN   Result Value Ref Range    Fibrinogen 346 182 - 400 mg/dL   Type & Screen   Result Value Ref Range    Group & Rh B POS     Indirect Deepika NEG        PROBLEMS ASSESSED THIS  VISIT:    1. B-cell acute lymphoblastic leukemia    2. Encounter for screening for COVID-19    3. Anemia in neoplastic disease        PLAN:       Ph+ B-ALL   Ms. 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    Attempt at salvage with blinatumomab was complicated by cytokine release syndrome that was prohibitive of continuation of therapy.    She is now showing rapid increase in WBC. Will admit for cytoreduction with steroids and vincristine. Would recommend starting Dexamethasone 20 mg PO daily.     Next line therapy will be with inotuzumab + ponatinib. Will plan to start ponatinib as soon as this is available.    Anemia in neoplastic disease  Due to relapsed ALL. Monitor in the hospital. Transfuse as clinically indicated.     Follow-up    Route Chart for Scheduling    BMT Chart Routing      Follow up with physician . Pending hospital discharge   Follow up with [facility]    Labs    Imaging    Pharmacy appointment    Other referrals              Dayron Jenkins MD  Hematology and Stem Cell Transplant

## 2022-05-23 NOTE — ASSESSMENT & PLAN NOTE
- worsening pain to left hip with worsening leukocytosis  - denies trauma or injury to area  - likely d/t leukemia  - oxycodone has been unsuccessful  - will try 0.5mg dilaudid q6h prn

## 2022-05-23 NOTE — ASSESSMENT & PLAN NOTE
- hold home farxiga while inpatient; can resume at discharge.  - achs blood glucose monitoring, moderate SSI, and diabetic diet while admitted  - will likely have hyperglycemia with steroids, consider endocrine consult if plan for long term steroids

## 2022-05-23 NOTE — ASSESSMENT & PLAN NOTE
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 Blincyto held & given steroids x 3 days. Resumed Blincyto 5/17 and again developed CRS, Grade 2 as well as mild AMS and Grade 3 transaminitis. Blincyto stopped with no plan to resume. Discharged on 5/19  - seen today 5/23 by Dr. 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; ponatinib Rx pending via specialty pharmacy  - continue ppx acyclovir, fluconazole, levaquin and bactrim

## 2022-05-23 NOTE — SUBJECTIVE & OBJECTIVE
Patient information was obtained from patient and past medical records.     Oncology History:  Patient of Dr. Dayron Jenkins  Precursor B-cell acute lymphoblastic leukemia (Ph+)              A. 11/23/2021: Transferred to [facility] [place] from outside hospital for evaluation for acute leukemia              B. 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               C. 11/30/2021 - 12/23/2021: Vincristine + imatinib induction; hospital course was complicated by acute hypoxemic respiratory failure which resolved with diuresis and treatment of ALL              D. 1/3/2022: Bone marrow biopsy after induction shows normocellular marrow (60%) with no definite evidence of B-ALL; bcr-abl p190 fusion detected at 0.02% of total ABL1; FISH normal; MRD testing not sent              E. 1/20/2022: Begin consolidation therapy with EWALL-Ph-01 protocol              F. 3/16/2022: BCR-ABL p190 transcript on peripheral blood negative              G. 4/13/2022: BCR-ABL p190 transcript on peripheral blood 0.63%              H. 5/4/2022: Bone marrow biopsy shows 50% cellular marrow with relapsed B-ALL; bone marrow aspirate shows 23.5% blasts; cytogenetics 46,XX,-9,t(9;22)(q34;q11.2),+isaias(22)t(9;22)[5] /46,XX[15]; FISH shows 9.5% nuclei with bcr/abl1 fusion; bcr/abl1 quantitative PCR shows 47% of total ABL1    Medications Prior to Admission   Medication Sig Dispense Refill Last Dose    acyclovir (ZOVIRAX) 400 MG tablet Take 1 tablet (400 mg total) by mouth 2 (two) times daily. 60 tablet 11 5/23/2022 at Unknown time    artificial tears (ISOPTO TEARS) 0.5 % ophthalmic solution Place 1 drop into both eyes 4 (four) times daily as needed.   5/23/2022 at Unknown time    atorvastatin  (LIPITOR) 80 MG tablet Take 80 mg by mouth once daily.   5/23/2022 at Unknown time    busPIRone (BUSPAR) 10 MG tablet Take 10 mg by mouth 2 (two) times daily.   5/23/2022 at Unknown time    diazePAM (VALIUM) 10 MG Tab Take 10 mg by mouth 2 (two) times daily as needed.   5/23/2022 at Unknown time    diltiaZEM (CARDIZEM) 90 MG tablet Take 1 tablet (90 mg total) by mouth every 6 (six) hours. 120 tablet 11 5/23/2022 at Unknown time    gabapentin (NEURONTIN) 300 MG capsule Take 1 capsule (300 mg total) by mouth 3 (three) times daily. 90 capsule 11 5/23/2022 at Unknown time    hydrOXYchloroQUINE (PLAQUENIL) 200 mg tablet Take 1 tablet (200 mg total) by mouth once daily.   5/23/2022 at Unknown time    hydrOXYzine pamoate (VISTARIL) 50 MG Cap Take 50 mg by mouth 2 (two) times daily as needed.       levoFLOXacin (LEVAQUIN) 500 MG tablet Take 1 tablet (500 mg total) by mouth once daily. 30 tablet 2 5/23/2022 at Unknown time    losartan (COZAAR) 25 MG tablet Take 25 mg by mouth once daily.   5/22/2022 at Unknown time    omeprazole (PRILOSEC) 40 MG capsule Take 40 mg by mouth once daily.   5/23/2022 at Unknown time    ondansetron (ZOFRAN-ODT) 8 MG TbDL Dissolve 1 tablet (8 mg total) by mouth every 12 (twelve) hours as needed (in case of chemo induced nausea). 30 tablet 1 5/23/2022 at Unknown time    OXcarbazepine (TRILEPTAL) 600 MG Tab Take 1,200 mg by mouth 2 (two) times daily.   5/23/2022 at Unknown time    oxyCODONE (ROXICODONE) 5 MG immediate release tablet Take 1 tablet (5 mg total) by mouth every 4 (four) hours as needed for Pain. 45 tablet 0 5/22/2022 at Unknown time    PONATinib (ICLUSIG) 45 mg Tab tablet Take 1 tablet (45 mg total) by mouth once daily Do not initiate until instructed to do so by Dr. Jenkins.. 30 tablet 0     prochlorperazine (COMPAZINE) 5 MG tablet Take 1 tablet (5 mg total) by mouth every 6 (six) hours as needed for Nausea. 30 tablet 2 Past Week at Unknown time    QUEtiapine (SEROQUEL) 200 MG Tab Take  200 mg by mouth every evening.   5/22/2022 at Unknown time    senna-docusate 8.6-50 mg (PERICOLACE) 8.6-50 mg per tablet Take 1 tablet by mouth 2 (two) times daily. 60 tablet 3 5/23/2022 at Unknown time    TRINTELLIX 20 mg Tab Take 1 tablet by mouth once daily.   5/23/2022 at Unknown time    aspirin (ECOTRIN) 81 MG EC tablet Take 1 tablet (81 mg total) by mouth once daily. Hold until instructed to resume by provider due to low platelet count.  0 Unknown at Unknown time    blood sugar diagnostic Strp SMARTSIG:Via Meter 1 to 2 Times Daily       dapagliflozin (FARXIGA) 10 mg tablet Take 10 mg by mouth once daily.   Unknown at Unknown time    duke's soln (benadryl 30 mL, mylanta 30 mL, LIDOcaine 30 mL, nystatin 30 mL) 120mL Take 10 mLs by mouth 4 (four) times daily as needed (mouth pain). (Patient not taking: No sig reported) 120 mL 0     fenofibrate 160 MG Tab Take 160 mg by mouth once daily.       fluconazole (DIFLUCAN) 200 MG Tab Take 2 tablets (400 mg total) by mouth once daily. 60 tablet 2 More than a month at Unknown time    ONETOUCH VERIO TEST STRIPS Strp SMARTSIG:Via Meter 1 to 2 Times Daily       polyethylene glycol (GLYCOLAX) 17 gram/dose powder Dissolve one capful (17 g) in liquid and take by mouth daily as needed (constipation). (Patient not taking: No sig reported) 510 g 0 More than a month at Unknown time    rivaroxaban (XARELTO) 20 mg Tab Take 1 tablet (20 mg total) by mouth daily with dinner or evening meal. Hold until instructed to resume by provider due to low platelet count.       sumatriptan (IMITREX) 50 MG tablet Take 1 tablet (50 mg total) by mouth daily as needed (headache). 30 tablet 0        Levetiracetam     Past Medical History:   Diagnosis Date    Arthritis     Cancer     COPD (chronic obstructive pulmonary disease)     Hypertension     Stroke     TIA x 4     Past Surgical History:   Procedure Laterality Date    APPENDECTOMY      HYSTERECTOMY      ROTATOR CUFF REPAIR Bilateral      TONSILLECTOMY      TUBAL LIGATION       Family History    None       Tobacco Use    Smoking status: Current Every Day Smoker     Packs/day: 0.50     Types: Cigarettes    Smokeless tobacco: Never Used   Substance and Sexual Activity    Alcohol use: No    Drug use: Never    Sexual activity: Not on file       Review of Systems   Constitutional:  Positive for fever (low grade temps <100F). Negative for chills, diaphoresis and fatigue.   HENT:  Negative for congestion, ear pain, mouth sores, nosebleeds, postnasal drip, rhinorrhea, sinus pressure, sinus pain, sore throat and trouble swallowing.    Eyes:  Negative for pain, discharge and redness.   Respiratory:  Negative for apnea, cough, chest tightness, shortness of breath and wheezing.    Cardiovascular:  Negative for chest pain, palpitations and leg swelling.   Gastrointestinal:  Positive for nausea. Negative for abdominal distention, abdominal pain, blood in stool, constipation, diarrhea and vomiting.   Genitourinary:  Negative for dysuria, frequency and hematuria.   Musculoskeletal:  Positive for arthralgias (left hip). Negative for back pain and joint swelling.   Skin:  Negative for rash.   Neurological:  Positive for headaches. Negative for dizziness, tremors, syncope and numbness.   Hematological:  Bruises/bleeds easily.   Psychiatric/Behavioral:  Negative for behavioral problems and confusion. The patient is not nervous/anxious.    Objective:     Vital Signs (Most Recent):  Temp: 100.3 °F (37.9 °C) (05/23/22 1656)  Pulse: 99 (05/23/22 1656)  Resp: 16 (05/23/22 1656)  BP: (!) 140/74 (05/23/22 1656)  SpO2: (!) 91 % (05/23/22 1656) Vital Signs (24h Range):  Temp:  [99.9 °F (37.7 °C)-100.3 °F (37.9 °C)] 100.3 °F (37.9 °C)  Pulse:  [] 99  Resp:  [16-18] 16  SpO2:  [91 %-95 %] 91 %  BP: (130-140)/(64-74) 140/74     Weight: 69.4 kg (153 lb)  Body mass index is 25.46 kg/m².  Body surface area is 1.78 meters squared.    ECOG SCORE 2            Lines/Drains/Airways        Central Venous Catheter Line  Duration                  PowerPort A Cath Single Lumen 03/16/22 1128 right internal jugular 68 days                    Physical Exam  Vitals reviewed.   Constitutional:       Appearance: Normal appearance. She is well-developed.   HENT:      Head: Normocephalic and atraumatic.      Right Ear: External ear normal.      Left Ear: External ear normal.   Eyes:      Extraocular Movements: Extraocular movements intact.      Conjunctiva/sclera: Conjunctivae normal.   Cardiovascular:      Rate and Rhythm: Normal rate and regular rhythm.      Pulses: Normal pulses.      Heart sounds: Normal heart sounds. No murmur heard.  Pulmonary:      Effort: Pulmonary effort is normal. No respiratory distress.      Breath sounds: Normal breath sounds. No stridor. No wheezing or rales.   Abdominal:      General: Bowel sounds are normal. There is no distension.      Palpations: Abdomen is soft.      Tenderness: There is no abdominal tenderness.   Musculoskeletal:         General: Normal range of motion.      Cervical back: Normal range of motion.      Right lower leg: No edema.      Left lower leg: No edema.   Skin:     General: Skin is warm and dry.      Findings: Bruising present. No rash.      Comments: RCWP with no signs of infection   Neurological:      General: No focal deficit present.      Mental Status: She is alert and oriented to person, place, and time.   Psychiatric:         Mood and Affect: Mood normal.         Behavior: Behavior normal.         Thought Content: Thought content normal.         Judgment: Judgment normal.       Significant Labs:   CBC:   Recent Labs   Lab 05/23/22  1238   .50*   HGB 10.1*   HCT 28.6*   PLT 15*   , CMP:   Recent Labs   Lab 05/23/22  1238      K 3.3*      CO2 26   *   BUN 10   CREATININE 0.8   CALCIUM 9.1   PROT 6.3   ALBUMIN 3.6   BILITOT 0.6   ALKPHOS 531*   AST 63*   ALT 96*   ANIONGAP 11   EGFRNONAA >60.0   , and Uric Acid   Recent  Labs   Lab 05/23/22  1238   URICACID 5.0       Diagnostic Results:  None

## 2022-05-24 LAB
ALBUMIN SERPL BCP-MCNC: 3.3 G/DL (ref 3.5–5.2)
ALBUMIN SERPL BCP-MCNC: 3.3 G/DL (ref 3.5–5.2)
ALP SERPL-CCNC: 433 U/L (ref 55–135)
ALP SERPL-CCNC: 494 U/L (ref 55–135)
ALT SERPL W/O P-5'-P-CCNC: 64 U/L (ref 10–44)
ALT SERPL W/O P-5'-P-CCNC: 72 U/L (ref 10–44)
ANION GAP SERPL CALC-SCNC: 12 MMOL/L (ref 8–16)
ANION GAP SERPL CALC-SCNC: 12 MMOL/L (ref 8–16)
ANISOCYTOSIS BLD QL SMEAR: SLIGHT
AST SERPL-CCNC: 37 U/L (ref 10–40)
AST SERPL-CCNC: 41 U/L (ref 10–40)
BASOPHILS NFR BLD: 0 % (ref 0–1.9)
BILIRUB SERPL-MCNC: 0.4 MG/DL (ref 0.1–1)
BILIRUB SERPL-MCNC: 0.4 MG/DL (ref 0.1–1)
BLASTS NFR BLD MANUAL: 45 %
BUN SERPL-MCNC: 10 MG/DL (ref 6–20)
BUN SERPL-MCNC: 12 MG/DL (ref 6–20)
CALCIUM SERPL-MCNC: 8.4 MG/DL (ref 8.7–10.5)
CALCIUM SERPL-MCNC: 8.6 MG/DL (ref 8.7–10.5)
CHLORIDE SERPL-SCNC: 103 MMOL/L (ref 95–110)
CHLORIDE SERPL-SCNC: 104 MMOL/L (ref 95–110)
CO2 SERPL-SCNC: 22 MMOL/L (ref 23–29)
CO2 SERPL-SCNC: 22 MMOL/L (ref 23–29)
CREAT SERPL-MCNC: 0.7 MG/DL (ref 0.5–1.4)
CREAT SERPL-MCNC: 0.8 MG/DL (ref 0.5–1.4)
DACRYOCYTES BLD QL SMEAR: ABNORMAL
DIFFERENTIAL METHOD: ABNORMAL
EOSINOPHIL NFR BLD: 1 % (ref 0–8)
ERYTHROCYTE [DISTWIDTH] IN BLOOD BY AUTOMATED COUNT: 18.5 % (ref 11.5–14.5)
EST. GFR  (AFRICAN AMERICAN): >60 ML/MIN/1.73 M^2
EST. GFR  (AFRICAN AMERICAN): >60 ML/MIN/1.73 M^2
EST. GFR  (NON AFRICAN AMERICAN): >60 ML/MIN/1.73 M^2
EST. GFR  (NON AFRICAN AMERICAN): >60 ML/MIN/1.73 M^2
FIBRINOGEN PPP-MCNC: 325 MG/DL (ref 182–400)
GLUCOSE SERPL-MCNC: 184 MG/DL (ref 70–110)
GLUCOSE SERPL-MCNC: 237 MG/DL (ref 70–110)
GLUCOSE SERPL-MCNC: 259 MG/DL (ref 70–110)
HCT VFR BLD AUTO: 26.1 % (ref 37–48.5)
HGB BLD-MCNC: 8.9 G/DL (ref 12–16)
HYPOCHROMIA BLD QL SMEAR: ABNORMAL
IMM GRANULOCYTES # BLD AUTO: ABNORMAL K/UL (ref 0–0.04)
IMM GRANULOCYTES NFR BLD AUTO: ABNORMAL % (ref 0–0.5)
INR PPP: 1.1 (ref 0.8–1.2)
LDH SERPL L TO P-CCNC: 2040 U/L (ref 110–260)
LDH SERPL L TO P-CCNC: 2564 U/L (ref 110–260)
LYMPHOCYTES NFR BLD: 29 % (ref 18–48)
MAGNESIUM SERPL-MCNC: 1.6 MG/DL (ref 1.6–2.6)
MAGNESIUM SERPL-MCNC: 1.6 MG/DL (ref 1.6–2.6)
MCH RBC QN AUTO: 30.3 PG (ref 27–31)
MCHC RBC AUTO-ENTMCNC: 34.1 G/DL (ref 32–36)
MCV RBC AUTO: 89 FL (ref 82–98)
METAMYELOCYTES NFR BLD MANUAL: 2 %
MONOCYTES NFR BLD: 0 % (ref 4–15)
MYELOCYTES NFR BLD MANUAL: 1 %
NEUTROPHILS NFR BLD: 18 % (ref 38–73)
NEUTS BAND NFR BLD MANUAL: 3 %
NRBC BLD-RTO: 0 /100 WBC
PHOSPHATE SERPL-MCNC: 3.3 MG/DL (ref 2.7–4.5)
PHOSPHATE SERPL-MCNC: 3.7 MG/DL (ref 2.7–4.5)
PLATELET # BLD AUTO: 15 K/UL (ref 150–450)
PLATELET BLD QL SMEAR: ABNORMAL
PMV BLD AUTO: ABNORMAL FL (ref 9.2–12.9)
POCT GLUCOSE: 185 MG/DL (ref 70–110)
POCT GLUCOSE: 209 MG/DL (ref 70–110)
POCT GLUCOSE: 230 MG/DL (ref 70–110)
POIKILOCYTOSIS BLD QL SMEAR: SLIGHT
POLYCHROMASIA BLD QL SMEAR: ABNORMAL
POTASSIUM SERPL-SCNC: 4.2 MMOL/L (ref 3.5–5.1)
POTASSIUM SERPL-SCNC: 4.5 MMOL/L (ref 3.5–5.1)
PROMYELOCYTES NFR BLD MANUAL: 1 %
PROT SERPL-MCNC: 6 G/DL (ref 6–8.4)
PROT SERPL-MCNC: 6.2 G/DL (ref 6–8.4)
PROTHROMBIN TIME: 11.8 SEC (ref 9–12.5)
RBC # BLD AUTO: 2.94 M/UL (ref 4–5.4)
SMUDGE CELLS BLD QL SMEAR: PRESENT
SODIUM SERPL-SCNC: 137 MMOL/L (ref 136–145)
SODIUM SERPL-SCNC: 138 MMOL/L (ref 136–145)
URATE SERPL-MCNC: 2.7 MG/DL (ref 2.4–5.7)
URATE SERPL-MCNC: 4.3 MG/DL (ref 2.4–5.7)
WBC # BLD AUTO: 111.8 K/UL (ref 3.9–12.7)

## 2022-05-24 PROCEDURE — 84550 ASSAY OF BLOOD/URIC ACID: CPT | Performed by: NURSE PRACTITIONER

## 2022-05-24 PROCEDURE — 63600175 PHARM REV CODE 636 W HCPCS: Performed by: INTERNAL MEDICINE

## 2022-05-24 PROCEDURE — 83735 ASSAY OF MAGNESIUM: CPT | Mod: 91 | Performed by: NURSE PRACTITIONER

## 2022-05-24 PROCEDURE — 97530 THERAPEUTIC ACTIVITIES: CPT

## 2022-05-24 PROCEDURE — 85027 COMPLETE CBC AUTOMATED: CPT | Performed by: NURSE PRACTITIONER

## 2022-05-24 PROCEDURE — 20600001 HC STEP DOWN PRIVATE ROOM

## 2022-05-24 PROCEDURE — 85007 BL SMEAR W/DIFF WBC COUNT: CPT | Performed by: NURSE PRACTITIONER

## 2022-05-24 PROCEDURE — 85610 PROTHROMBIN TIME: CPT | Performed by: NURSE PRACTITIONER

## 2022-05-24 PROCEDURE — 27000221 HC OXYGEN, UP TO 24 HOURS

## 2022-05-24 PROCEDURE — 80053 COMPREHEN METABOLIC PANEL: CPT | Performed by: NURSE PRACTITIONER

## 2022-05-24 PROCEDURE — 85384 FIBRINOGEN ACTIVITY: CPT | Performed by: NURSE PRACTITIONER

## 2022-05-24 PROCEDURE — 94761 N-INVAS EAR/PLS OXIMETRY MLT: CPT

## 2022-05-24 PROCEDURE — 99232 SBSQ HOSP IP/OBS MODERATE 35: CPT | Mod: ,,, | Performed by: INTERNAL MEDICINE

## 2022-05-24 PROCEDURE — 97161 PT EVAL LOW COMPLEX 20 MIN: CPT

## 2022-05-24 PROCEDURE — 99232 PR SUBSEQUENT HOSPITAL CARE,LEVL II: ICD-10-PCS | Mod: ,,, | Performed by: INTERNAL MEDICINE

## 2022-05-24 PROCEDURE — 97165 OT EVAL LOW COMPLEX 30 MIN: CPT

## 2022-05-24 PROCEDURE — 83615 LACTATE (LD) (LDH) ENZYME: CPT | Performed by: NURSE PRACTITIONER

## 2022-05-24 PROCEDURE — 63600175 PHARM REV CODE 636 W HCPCS: Performed by: NURSE PRACTITIONER

## 2022-05-24 PROCEDURE — 84100 ASSAY OF PHOSPHORUS: CPT | Mod: 91 | Performed by: NURSE PRACTITIONER

## 2022-05-24 PROCEDURE — 25000003 PHARM REV CODE 250: Performed by: NURSE PRACTITIONER

## 2022-05-24 PROCEDURE — 25000003 PHARM REV CODE 250: Performed by: INTERNAL MEDICINE

## 2022-05-24 RX ORDER — HEPARIN 100 UNIT/ML
300 SYRINGE INTRAVENOUS
Status: DISCONTINUED | OUTPATIENT
Start: 2022-05-24 | End: 2022-06-06 | Stop reason: HOSPADM

## 2022-05-24 RX ORDER — HYDROMORPHONE HYDROCHLORIDE 1 MG/ML
0.5 INJECTION, SOLUTION INTRAMUSCULAR; INTRAVENOUS; SUBCUTANEOUS EVERY 4 HOURS PRN
Status: DISCONTINUED | OUTPATIENT
Start: 2022-05-24 | End: 2022-06-03

## 2022-05-24 RX ORDER — ONDANSETRON 8 MG/1
8 TABLET, ORALLY DISINTEGRATING ORAL EVERY 8 HOURS PRN
Status: DISCONTINUED | OUTPATIENT
Start: 2022-05-24 | End: 2022-06-06 | Stop reason: HOSPADM

## 2022-05-24 RX ORDER — SODIUM CHLORIDE 0.9 % (FLUSH) 0.9 %
10 SYRINGE (ML) INJECTION
Status: DISCONTINUED | OUTPATIENT
Start: 2022-05-24 | End: 2022-06-01

## 2022-05-24 RX ADMIN — DEXAMETHASONE 20 MG: 4 TABLET ORAL at 09:05

## 2022-05-24 RX ADMIN — OXCARBAZEPINE 1200 MG: 600 TABLET, FILM COATED ORAL at 09:05

## 2022-05-24 RX ADMIN — VINCRISTINE SULFATE 2 MG: 1 INJECTION, SOLUTION INTRAVENOUS at 12:05

## 2022-05-24 RX ADMIN — HYDROMORPHONE HYDROCHLORIDE 0.5 MG: 1 INJECTION, SOLUTION INTRAMUSCULAR; INTRAVENOUS; SUBCUTANEOUS at 12:05

## 2022-05-24 RX ADMIN — BUSPIRONE HYDROCHLORIDE 10 MG: 10 TABLET ORAL at 09:05

## 2022-05-24 RX ADMIN — INSULIN ASPART 3 UNITS: 100 INJECTION, SOLUTION INTRAVENOUS; SUBCUTANEOUS at 09:05

## 2022-05-24 RX ADMIN — HYDROMORPHONE HYDROCHLORIDE 0.5 MG: 1 INJECTION, SOLUTION INTRAMUSCULAR; INTRAVENOUS; SUBCUTANEOUS at 05:05

## 2022-05-24 RX ADMIN — DILTIAZEM HYDROCHLORIDE 90 MG: 30 TABLET, FILM COATED ORAL at 05:05

## 2022-05-24 RX ADMIN — ALLOPURINOL 300 MG: 300 TABLET ORAL at 08:05

## 2022-05-24 RX ADMIN — HYDROMORPHONE HYDROCHLORIDE 0.5 MG: 1 INJECTION, SOLUTION INTRAMUSCULAR; INTRAVENOUS; SUBCUTANEOUS at 08:05

## 2022-05-24 RX ADMIN — GABAPENTIN 300 MG: 300 CAPSULE ORAL at 08:05

## 2022-05-24 RX ADMIN — DILTIAZEM HYDROCHLORIDE 90 MG: 30 TABLET, FILM COATED ORAL at 12:05

## 2022-05-24 RX ADMIN — INSULIN ASPART 4 UNITS: 100 INJECTION, SOLUTION INTRAVENOUS; SUBCUTANEOUS at 01:05

## 2022-05-24 RX ADMIN — HYDROMORPHONE HYDROCHLORIDE 0.5 MG: 1 INJECTION, SOLUTION INTRAMUSCULAR; INTRAVENOUS; SUBCUTANEOUS at 04:05

## 2022-05-24 RX ADMIN — INSULIN ASPART 4 UNITS: 100 INJECTION, SOLUTION INTRAVENOUS; SUBCUTANEOUS at 05:05

## 2022-05-24 RX ADMIN — DILTIAZEM HYDROCHLORIDE 90 MG: 30 TABLET, FILM COATED ORAL at 09:05

## 2022-05-24 RX ADMIN — LEVOFLOXACIN 500 MG: 500 TABLET, FILM COATED ORAL at 04:05

## 2022-05-24 RX ADMIN — FLUCONAZOLE 400 MG: 200 TABLET ORAL at 04:05

## 2022-05-24 RX ADMIN — INSULIN ASPART 2 UNITS: 100 INJECTION, SOLUTION INTRAVENOUS; SUBCUTANEOUS at 09:05

## 2022-05-24 RX ADMIN — GABAPENTIN 300 MG: 300 CAPSULE ORAL at 04:05

## 2022-05-24 RX ADMIN — PANTOPRAZOLE SODIUM 40 MG: 40 TABLET, DELAYED RELEASE ORAL at 09:05

## 2022-05-24 RX ADMIN — ALLOPURINOL 300 MG: 300 TABLET ORAL at 09:05

## 2022-05-24 RX ADMIN — LOSARTAN POTASSIUM 25 MG: 25 TABLET, FILM COATED ORAL at 01:05

## 2022-05-24 RX ADMIN — ACYCLOVIR 400 MG: 200 CAPSULE ORAL at 09:05

## 2022-05-24 RX ADMIN — QUETIAPINE FUMARATE 200 MG: 200 TABLET, FILM COATED ORAL at 09:05

## 2022-05-24 RX ADMIN — GABAPENTIN 300 MG: 300 CAPSULE ORAL at 01:05

## 2022-05-24 RX ADMIN — HYDROXYZINE HYDROCHLORIDE 50 MG: 25 TABLET, FILM COATED ORAL at 04:05

## 2022-05-24 RX ADMIN — DILTIAZEM HYDROCHLORIDE 90 MG: 30 TABLET, FILM COATED ORAL at 04:05

## 2022-05-24 RX ADMIN — SENNOSIDES AND DOCUSATE SODIUM 1 TABLET: 50; 8.6 TABLET ORAL at 06:05

## 2022-05-24 RX ADMIN — DIAZEPAM 10 MG: 5 TABLET ORAL at 09:05

## 2022-05-24 RX ADMIN — VORTIOXETINE 20 MG: 10 TABLET, FILM COATED ORAL at 09:05

## 2022-05-24 NOTE — PT/OT/SLP EVAL
Physical Therapy Evaluation and Tx    Patient Name:  Deepti Gonzalez   MRN:  [id number]  Admit Date: 5/23/2022  Admitting Diagnosis:  Leukocytosis  Length of Stay: 1 days  Recent Surgery: * No surgery found *      Recommendations:   Discharge Recommendations:  home health PT   Discharge Equipment Recommendations: wheelchair, walker, rolling   Barriers to discharge: None      Assessment:     Deepti Gonzalez is a 51 y.o. female admitted with a medical diagnosis of Leukocytosis.  Pt presents with significant functional mobility deficits and is functioning below baseline. Pt reports that prior to this hospitalization she was so weak that she required significant assistance for bed mobility and transfers; however, on this date she performed supine<>sit and sit<>stand transfers with SBA and Meg, respectively. Pt will continue to benefit from acute PT services in order to maximize safety and (I) with functional mobility.     Problem List: weakness, impaired endurance, impaired self care skills, impaired functional mobilty, gait instability, impaired balance, decreased safety awareness  Rehab Prognosis: Good; patient would benefit from acute skilled PT services to address these deficits and reach maximum level of function.        Plan:   During this hospitalization, patient to be seen 3 x/week to address the above listed problems via gait training, therapeutic activities, therapeutic exercises, neuromuscular re-education  · Plan of Care Expires:  06/21/22    Subjective     Chief Complaint: No chief complaint on file.    Patient/Family Comments/goals: to get better  Pain/Comfort:  · Pain Rating 1: other (see comments) (not rated)  · Location - Side 1: Left  · Location - Orientation 1: generalized  · Location 1: hip  · Pain Addressed 1: Cessation of Activity, Reposition, Pre-medicate for activity  · Pain Rating Post-Intervention 1: other (see comments) (not rated)    Social History:  Residence: Pt resides in [facility] with no  steps.  Support available: family; has 24/7 assistance  Equipment Used: rollator  Prior level of function: mod(I) with rollator for ambulation    Objective:     Communicated with RN prior to session.  Patient found HOB elevated upon PT entry to room.   Additional staff present: N/A    General Precautions: Standard, fall   Orthopedic Precautions:N/A   Braces: N/A   Oxygen Device: Nasal Cannula 2L    Exams:  · Cognition:   · AxOx4  · Command following: Follows multistep verbal commands    · Postural Exam:  Patient presented with the following abnormalities:    · -       Rounded shoulders  · -       Forward head  · Sensation:    · -       Intact    · RLE ROM: WFL  · RLE Strength: WFL  · LLE ROM: WFL  · LLE Strength: WFL    Functional Mobility:  Bed Mobility:     Scooting: supervision  Supine to Sit: supervision  Sit to Supine: supervision  Transfers:     Sit to Stand:  minimum assistance with 4 wheeled walker  Gait:   · Distance: ~30 ft  · Level of Assistance:  contact guard assistance  · AD used: 4 wheeled walker  · Gait Deviations: decreased speed, step length, swing through, heel strike, forward flexed posture, and downward gaze.   · Comments: PT providing verbal and tactile cues for improved upright posture, gaze direction, AD management, and gait fluidity.   Balance:   · Static Sitting: SPV  · Dynamic Sitting: SBA   · Static Standing: SBA  · Dynamic Standing: CGA    Therapeutic Activities & Exercises:      Role of PT in POC   Patient educated on the importance of early mobility to prevent functional decline during hospital stay   Patient was instructed to utilize staff assistance for mobility/transfers.   Patient was educated on PT POC and all questions answered within PT scope of practice.   Patient able to verbalize understanding; will follow-up with pt during current admit for additional questions/concerns within scope of practice.     Outcome Measures:  AM-PAC 6 CLICK MOBILITY  Turning over in bed  (including adjusting bedclothes, sheets and blankets)?: 3  Sitting down on and standing up from a chair with arms (e.g., wheelchair, bedside commode, etc.): 3  Moving from lying on back to sitting on the side of the bed?: 3  Moving to and from a bed to a chair (including a wheelchair)?: 3  Need to walk in hospital room?: 3  Climbing 3-5 steps with a railing?: 2  Basic Mobility Total Score: 17     Patient left HOB elevated with all lines intact, call button in reach and family\ present.    GOALS:   Multidisciplinary Problems     Physical Therapy Goals        Problem: Physical Therapy    Goal Priority Disciplines Outcome Goal Variances Interventions   Physical Therapy Goal     PT, PT/OT Ongoing, Progressing     Description: Goals to be met by: 22     Patient will increase functional independence with mobility by performin. Supine to sit with Modified [place]  2. Sit to supine with Modified [place]  3. Sit to stand transfer with Stand-by Assistance  4. Gait  x >50 feet with Contact Guard Assistance using rollator.  5. Lower extremity exercise program x 10 reps per handout, with independence                       History:     Past Medical History:   Diagnosis Date    Arthritis     Cancer     COPD (chronic obstructive pulmonary disease)     Hypertension     Stroke     TIA x 4       Past Surgical History:   Procedure Laterality Date    APPENDECTOMY      HYSTERECTOMY      ROTATOR CUFF REPAIR Bilateral     TONSILLECTOMY      TUBAL LIGATION         No family history on file.    Social History     Socioeconomic History    Marital status:    Tobacco Use    Smoking status: Current Every Day Smoker     Packs/day: 0.50     Types: Cigarettes    Smokeless tobacco: Never Used   Substance and Sexual Activity    Alcohol use: No    Drug use: Never     Social Determinants of Health     Financial Resource Strain: High Risk    Difficulty of Paying Living Expenses: Hard   Food Insecurity: Food  Insecurity Present    Worried About Running Out of Food in the Last Year: Often true    Ran Out of Food in the Last Year: Sometimes true   Transportation Needs: Unmet Transportation Needs    Lack of Transportation (Medical): Yes    Lack of Transportation (Non-Medical): Yes   Physical Activity: Inactive    Days of Exercise per Week: 0 days    Minutes of Exercise per Session: 10 min   Stress: Stress Concern Present    Feeling of Stress : Rather much   Social Connections: Unknown    Frequency of Communication with Friends and Family: Three times a week    Frequency of Social Gatherings with Friends and Family: Three times a week    Active Member of Clubs or Organizations: Patient refused    Attends Club or Organization Meetings: Patient refused    Marital Status:    Housing Stability: Low Risk     Unable to Pay for Housing in the Last Year: No    Number of Places Lived in the Last Year: 1    Unstable Housing in the Last Year: No     Time Tracking:     PT Received On: 05/24/22  PT Start Time: 0811     PT Stop Time: 0830  PT Total Time (min): 19 min     Billable Minutes: Evaluation 10 and Therapeutic Activity 9    5/24/2022

## 2022-05-24 NOTE — PROGRESS NOTES
Plan of care reviewed with patient and family this shift. Complaints of pain; PRNs given. VS stable. Maintaining saturations on 2L NC.  IVF continued. All questions and concerns addressed. Will continue to monitor.

## 2022-05-24 NOTE — PLAN OF CARE
Side rails up x2; call bell in place; bed in lowest, locked position; skid proof socks on; no evidence of skin breakdown; care plan explained to patient; pt remains free of injury. Pt tolerated small amount of po, assistance with standing and walking needed. Pt with c/o pain dilaudid iv given. Port accessed, NS 75 cc/hr infusing. Pt encouraged to call for assistance. Frequent rounding on pt. Temp 100.3 BP, HR, RR and 02 sats WNL.

## 2022-05-24 NOTE — PLAN OF CARE
Patient is progressing and involved with plan of care. Frequent rounds made to assess pain and safety. Pt communicating needs throughout shift. Up with stand by assist. Tolerating diet, voiding without difficulty. Pain controlled with PRN dilaudid. Blood glucose below 200- 1 unit of aspart given. PLTs 9000 All vitals stable; no acute events this shift. Pt. Remaining free from falls or injury throughout shift; bed in lowest position; side rails up X2; call light within reach; pt instructed to call for assistance as needed - verbalized understanding. Q2h rounding on patient. Will continue to monitor.

## 2022-05-24 NOTE — PLAN OF CARE
11/8/21 spoke with the patient, he is still going to do the cologuard test.  Knows it is good for 1 year.    Nader Hernandez,   [facility]  [phone]     Evaluation completed and POC established.    ALFREDO Briones

## 2022-05-24 NOTE — PLAN OF CARE
Problem: Physical Therapy  Goal: Physical Therapy Goal  Description: Goals to be met by: 22     Patient will increase functional independence with mobility by performin. Supine to sit with Modified [place]  2. Sit to supine with Modified [place]  3. Sit to stand transfer with Stand-by Assistance  4. Gait  x >50 feet with Contact Guard Assistance using rollator.  5. Lower extremity exercise program x 10 reps per handout, with independence      Outcome: Ongoing, Progressing   Pt progressing, appropriate goals established

## 2022-05-24 NOTE — PROGRESS NOTES
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       is involved by a routine referral to the Social Work Department. Patient presents as a 51 y.o. year old  female.    Persons interviewed: Patient and spouse with the patient's permission.    Living Situation:    Patient lives with her  and their 12 year old daughter in a single family home. Patient resides at [address] [place] [address] [place] [address] [place] [address].    Patient's phone: [phone].     Phone: [phone].    (RETIRED) Functional Status Prior  Ambulation Prior: 2-->assistive person  Transferrin-->independent  Toiletin-->assistive person    Current or Past Agencies and Description of Services/Supplies    DME  Agency Name: [facility]  Agency Phone Number: [phone].  Equipment Currently Used at Home: Rolling walker.    Home Health  Agency Name: [facility].  Agency Phone Number: [phone]  Services: Nursing care with PT/OT.    IV Infusion  Agency Name: None  Agency Phone Number: None    Nutrition: Diabetic diet.    Outpatient Pharmacy:     [facility]  [address] [address]  Phone: [phone] Fax: [phone]    [id number] [place], [place] - [address]  [address] [facility] [address] [address] [facility] [place] [address]  Phone: [phone] Fax: [phone]      Patient Preference of agencies include: Patient has no agency preference.    Patient/Caregiver informed of right to choose providers or agencies.  Patient provides permission to release any necessary information to  [facility] and to Non-[facility] agencies as needed to facilitate patient care, treatment planning, and patient discharge planning.  Written and verbal resources provided.      Coping:  discussed with the patient her score on the Distress Screening. Patient stated that her stress was related to financial issues. The issues that the patient was concerned with are in the process of being resolved and the patient feels less stress regarding those concerns. Patient is approved for Family and Patient Assistance.    Adjustment to Diagnosis and Treatment: Patient stated that she has multiple medical issues, and is becoming more adjusted to her diagnosis and treatment .    Emotional: Emotions have improved.    Behavioral: None reported.     Cognitive Issues: Patient stated that she is having issues with her short-term memory but currently can't attribute it to anything specific.    Employment: Currently unemployed but in the past she worked as a  for a . Patient stated that she had to stop due to heart problems. Patient stated that she has not worked since 2006.    Finance: Patient reports that they were having difficulty paying the hospital bills.  connected the patient to the appropriate services for assistance. Patient is now, eligable for Family and Patient Assistance.    Housing: Stable.    Support System: Patient reports having a good support system with her  and her 30 year old daughter.    Immediate needs: Coordination of wheel chair delivery upon this discharge.    Recreation/Leisure: Patient reports, watching her daughter play baseball, watching movies, and spending time with the family.    Lynsey: Shinto (Restorationist).    Transportation: Patient's  will provide transportation whenever it is needed.      History/Current Symptoms of Anxiety/Depression: No.  History/Current Substance Use:   Social History     Tobacco Use    Smoking status: Current Every Day Smoker      Packs/day: 0.50     Types: Cigarettes    Smokeless tobacco: Never Used   Substance and Sexual Activity    Alcohol use: No    Drug use: Never    Sexual activity: Not on file       Indications of Abuse/Neglect: No  Abuse Screen (yes response referral indicated)  Feels Unsafe at Home or Work/School: No  Physical Signs of Abuse Present: No    Financial:  Payer/Plan Subscr  Sex Relation Sub. Ins. ID Effective Group Num   1. BLUE CROSS BL* SHIRLENE NARANJO [address] Male Spouse [id number]* 3/22/[birth year] [id number]                                    [address] [id number]        Other identified concerns/needs:Wheelchair delivery to residence upon discharge.    Plan: TBD    Interventions/Referrals: TBD  Patient/caregiver engaged in treatment planning process.Yes.     providing psychosocial and supportive counseling, resources, education, assistance and discharge planning as appropriate.  Patient/caregiver state understanding of  available resources,  following, remains available.

## 2022-05-24 NOTE — PT/OT/SLP EVAL
Occupational Therapy   Evaluation    Name: Deepti Gonzalez  MRN: [id number]  Admitting Diagnosis:  Leukocytosis  Recent Surgery: * No surgery found *      Recommendations:     Discharge Recommendations: home  Discharge Equipment Recommendations:     Barriers to discharge:  None    Assessment:     Deepti Gonzalez is a 51 y.o. female with a medical diagnosis of Leukocytosis. Performance deficits affecting function: weakness, impaired endurance, impaired self care skills, impaired functional mobilty, gait instability, impaired balance.      Rehab Prognosis: Good; patient would benefit from acute skilled OT services to address these deficits and reach maximum level of function.       Plan:     Patient to be seen 3 x/week to address the above listed problems via self-care/home management, therapeutic activities, therapeutic exercises  · Plan of Care Expires: 06/23/22  · Plan of Care Reviewed with: patient    Subjective     Occupational Profile:  Living Environment: Pt. Resides in ss house with no steps. Someone is always with patient. Pt. Has a Tub shower with TTB.  Pt. Also reported having an elevated commode.  Previous level of function: pt. Reports that her spouse typically assists pt. In and out of bath tub with use of bench. Pt. Was able to perform own ADL task of dressing and ambulated with rollator in house.   Roles and Routines: caretaker of self , spouse, mother  Equipment Used at Home:  rollator, bath bench, raised toilet  Assistance upon Discharge: family    Pain/Comfort:  · Pain Rating 1: 0/10    Patients cultural, spiritual, Scientology conflicts given the current situation:      Objective:     Communicated with: rn prior to session.  Patient found supine with peripheral IV upon OT entry to room.    General Precautions: Standard, fall   Orthopedic Precautions:    Braces:    Respiratory Status: Room air    Occupational Performance:    Bed Mobility:    · Patient completed Rolling/Turning to Left with   supervision  · Patient completed Scooting/Bridging with supervision  · Patient completed Supine to Sit with supervision  · Patient completed Sit to Supine with supervision    Functional Mobility/Transfers:  · Patient completed Sit <> Stand Transfer with supervision  with  rolling walker   · Patient completed Toilet Transfer Step Transfer technique with stand by assistance with  rolling walker  · Functional Mobility: SBA with rollator within room.    Activities of Daily Living:  · Feeding:  independence   · Lower Body Dressing: supervision     Cognitive/Visual Perceptual:  Cognitive/Psychosocial Skills:     -       Oriented to: Person, Place, Time and Situation   -       Safety awareness/insight to disability: intact     Physical Exam:  BUE AROM/MMT: WNL    AMPAC 6 Click ADL:  AMPAC Total Score: 21    Treatment & Education:  UE ROM/MMT  Bed mobility training / assessment  Functional mobility assessment  Sit/standing balance assessment  Educated on importance of sitting OOB in bedside chair to promote increased strength, endurance & breathing.  Discussed OT POC / Post-acute plan  Education:    Patient left supine with all lines intact and call button in reach    GOALS:   Multidisciplinary Problems     Occupational Therapy Goals        Problem: Occupational Therapy    Goal Priority Disciplines Outcome Interventions   Occupational Therapy Goal     OT, PT/OT Ongoing, Progressing    Description: Goals to be met by: 5/31/22    Patient will increase functional independence with ADLs by performing:    Grooming while standing at sink with Modified [place].  Toileting from toilet with [place] for hygiene and clothing management.   Supine to sit with [place].  Toilet transfer to toilet with Modified [place].                     History:     Past Medical History:   Diagnosis Date    Arthritis     Cancer     COPD (chronic obstructive pulmonary disease)     Hypertension     Stroke     TIA x 4       Past  Surgical History:   Procedure Laterality Date    APPENDECTOMY      HYSTERECTOMY      ROTATOR CUFF REPAIR Bilateral     TONSILLECTOMY      TUBAL LIGATION         Time Tracking:     OT Date of Treatment: 05/24/22  OT Start Time: 1244  OT Stop Time: 1254  OT Total Time (min): 10 min    Billable Minutes:Evaluation 10    5/24/2022

## 2022-05-24 NOTE — PROGRESS NOTES
CHEMO NOTE     Verified chemo consent signed and in chart. Chemo dosage and and rate checked by 2 chemo certified nurses. Patient education offered and stated understanding. Denies questions at this time.      Blood return positive via: R chest port     1259 vinCRIStine (ONCOVIN) 2 mg in sodium chloride 0.9% 50 mL chemo infusion started through R chest port.

## 2022-05-25 PROBLEM — E87.6 HYPOKALEMIA: Status: RESOLVED | Noted: 2022-03-17 | Resolved: 2022-05-25

## 2022-05-25 PROBLEM — E87.70 VOLUME OVERLOAD: Status: ACTIVE | Noted: 2022-05-25

## 2022-05-25 LAB
ALBUMIN SERPL BCP-MCNC: 3.4 G/DL (ref 3.5–5.2)
ALBUMIN SERPL BCP-MCNC: 3.4 G/DL (ref 3.5–5.2)
ALP SERPL-CCNC: 348 U/L (ref 55–135)
ALP SERPL-CCNC: 412 U/L (ref 55–135)
ALT SERPL W/O P-5'-P-CCNC: 49 U/L (ref 10–44)
ALT SERPL W/O P-5'-P-CCNC: 62 U/L (ref 10–44)
ANION GAP SERPL CALC-SCNC: 15 MMOL/L (ref 8–16)
ANION GAP SERPL CALC-SCNC: 15 MMOL/L (ref 8–16)
ANISOCYTOSIS BLD QL SMEAR: SLIGHT
AST SERPL-CCNC: 38 U/L (ref 10–40)
AST SERPL-CCNC: 59 U/L (ref 10–40)
BASOPHILS NFR BLD: 0.5 % (ref 0–1.9)
BILIRUB SERPL-MCNC: 0.3 MG/DL (ref 0.1–1)
BILIRUB SERPL-MCNC: 0.4 MG/DL (ref 0.1–1)
BLASTS NFR BLD MANUAL: 41.5 %
BLD PROD TYP BPU: NORMAL
BLOOD UNIT EXPIRATION DATE: NORMAL
BLOOD UNIT TYPE CODE: 7300
BLOOD UNIT TYPE: NORMAL
BUN SERPL-MCNC: 19 MG/DL (ref 6–20)
BUN SERPL-MCNC: 23 MG/DL (ref 6–20)
CALCIUM SERPL-MCNC: 8.4 MG/DL (ref 8.7–10.5)
CALCIUM SERPL-MCNC: 8.6 MG/DL (ref 8.7–10.5)
CHLORIDE SERPL-SCNC: 100 MMOL/L (ref 95–110)
CHLORIDE SERPL-SCNC: 104 MMOL/L (ref 95–110)
CO2 SERPL-SCNC: 18 MMOL/L (ref 23–29)
CO2 SERPL-SCNC: 22 MMOL/L (ref 23–29)
CODING SYSTEM: NORMAL
CREAT SERPL-MCNC: 0.7 MG/DL (ref 0.5–1.4)
CREAT SERPL-MCNC: 1.1 MG/DL (ref 0.5–1.4)
DIFFERENTIAL METHOD: ABNORMAL
DISPENSE STATUS: NORMAL
EOSINOPHIL NFR BLD: 0.5 % (ref 0–8)
ERYTHROCYTE [DISTWIDTH] IN BLOOD BY AUTOMATED COUNT: 19 % (ref 11.5–14.5)
EST. GFR  (AFRICAN AMERICAN): >60 ML/MIN/1.73 M^2
EST. GFR  (AFRICAN AMERICAN): >60 ML/MIN/1.73 M^2
EST. GFR  (NON AFRICAN AMERICAN): 58.3 ML/MIN/1.73 M^2
EST. GFR  (NON AFRICAN AMERICAN): >60 ML/MIN/1.73 M^2
FIBRINOGEN PPP-MCNC: 118 MG/DL (ref 182–400)
FIBRINOGEN PPP-MCNC: 123 MG/DL (ref 182–400)
GLUCOSE SERPL-MCNC: 274 MG/DL (ref 70–110)
GLUCOSE SERPL-MCNC: 307 MG/DL (ref 70–110)
GLUCOSE SERPL-MCNC: 337 MG/DL (ref 70–110)
GLUCOSE SERPL-MCNC: 460 MG/DL (ref 70–110)
HCT VFR BLD AUTO: 24.2 % (ref 37–48.5)
HGB BLD-MCNC: 8.1 G/DL (ref 12–16)
HYPOCHROMIA BLD QL SMEAR: ABNORMAL
IMM GRANULOCYTES # BLD AUTO: ABNORMAL K/UL (ref 0–0.04)
IMM GRANULOCYTES NFR BLD AUTO: ABNORMAL % (ref 0–0.5)
INR PPP: 1.3 (ref 0.8–1.2)
INR PPP: 1.3 (ref 0.8–1.2)
LDH SERPL L TO P-CCNC: 2034 U/L (ref 110–260)
LDH SERPL L TO P-CCNC: 2522 U/L (ref 110–260)
LYMPHOCYTES NFR BLD: 24.5 % (ref 18–48)
MAGNESIUM SERPL-MCNC: 1.7 MG/DL (ref 1.6–2.6)
MAGNESIUM SERPL-MCNC: 1.9 MG/DL (ref 1.6–2.6)
MCH RBC QN AUTO: 30.2 PG (ref 27–31)
MCHC RBC AUTO-ENTMCNC: 33.5 G/DL (ref 32–36)
MCV RBC AUTO: 90 FL (ref 82–98)
METAMYELOCYTES NFR BLD MANUAL: 1 %
MONOCYTES NFR BLD: 1 % (ref 4–15)
NEUTROPHILS NFR BLD: 26 % (ref 38–73)
NEUTS BAND NFR BLD MANUAL: 5 %
NRBC BLD-RTO: 0 /100 WBC
OVALOCYTES BLD QL SMEAR: ABNORMAL
PHOSPHATE SERPL-MCNC: 4.1 MG/DL (ref 2.7–4.5)
PHOSPHATE SERPL-MCNC: 4.4 MG/DL (ref 2.7–4.5)
PLATELET # BLD AUTO: 8 K/UL (ref 150–450)
PLATELET BLD QL SMEAR: ABNORMAL
PMV BLD AUTO: ABNORMAL FL (ref 9.2–12.9)
POCT GLUCOSE: 307 MG/DL (ref 70–110)
POCT GLUCOSE: 312 MG/DL (ref 70–110)
POCT GLUCOSE: 342 MG/DL (ref 70–110)
POCT GLUCOSE: 468 MG/DL (ref 70–110)
POIKILOCYTOSIS BLD QL SMEAR: SLIGHT
POLYCHROMASIA BLD QL SMEAR: ABNORMAL
POTASSIUM SERPL-SCNC: 4.4 MMOL/L (ref 3.5–5.1)
POTASSIUM SERPL-SCNC: 4.4 MMOL/L (ref 3.5–5.1)
PROT SERPL-MCNC: 6.3 G/DL (ref 6–8.4)
PROT SERPL-MCNC: 6.7 G/DL (ref 6–8.4)
PROTHROMBIN TIME: 12.9 SEC (ref 9–12.5)
PROTHROMBIN TIME: 13 SEC (ref 9–12.5)
RBC # BLD AUTO: 2.68 M/UL (ref 4–5.4)
SMUDGE CELLS BLD QL SMEAR: PRESENT
SODIUM SERPL-SCNC: 137 MMOL/L (ref 136–145)
SODIUM SERPL-SCNC: 137 MMOL/L (ref 136–145)
UNIT NUMBER: NORMAL
URATE SERPL-MCNC: 2.4 MG/DL (ref 2.4–5.7)
URATE SERPL-MCNC: 3.1 MG/DL (ref 2.4–5.7)
WBC # BLD AUTO: 129.3 K/UL (ref 3.9–12.7)

## 2022-05-25 PROCEDURE — P9037 PLATE PHERES LEUKOREDU IRRAD: HCPCS | Performed by: NURSE PRACTITIONER

## 2022-05-25 PROCEDURE — 25000003 PHARM REV CODE 250: Performed by: NURSE PRACTITIONER

## 2022-05-25 PROCEDURE — 85610 PROTHROMBIN TIME: CPT | Performed by: NURSE PRACTITIONER

## 2022-05-25 PROCEDURE — 80053 COMPREHEN METABOLIC PANEL: CPT | Performed by: NURSE PRACTITIONER

## 2022-05-25 PROCEDURE — 83615 LACTATE (LD) (LDH) ENZYME: CPT | Mod: 91 | Performed by: NURSE PRACTITIONER

## 2022-05-25 PROCEDURE — 84100 ASSAY OF PHOSPHORUS: CPT | Mod: 91 | Performed by: NURSE PRACTITIONER

## 2022-05-25 PROCEDURE — 85007 BL SMEAR W/DIFF WBC COUNT: CPT | Performed by: NURSE PRACTITIONER

## 2022-05-25 PROCEDURE — 99232 SBSQ HOSP IP/OBS MODERATE 35: CPT | Mod: ,,, | Performed by: INTERNAL MEDICINE

## 2022-05-25 PROCEDURE — 63600175 PHARM REV CODE 636 W HCPCS: Performed by: NURSE PRACTITIONER

## 2022-05-25 PROCEDURE — 99232 PR SUBSEQUENT HOSPITAL CARE,LEVL II: ICD-10-PCS | Mod: ,,, | Performed by: INTERNAL MEDICINE

## 2022-05-25 PROCEDURE — 85027 COMPLETE CBC AUTOMATED: CPT | Performed by: NURSE PRACTITIONER

## 2022-05-25 PROCEDURE — 85384 FIBRINOGEN ACTIVITY: CPT | Performed by: NURSE PRACTITIONER

## 2022-05-25 PROCEDURE — 83735 ASSAY OF MAGNESIUM: CPT | Performed by: NURSE PRACTITIONER

## 2022-05-25 PROCEDURE — 20600001 HC STEP DOWN PRIVATE ROOM

## 2022-05-25 PROCEDURE — 84550 ASSAY OF BLOOD/URIC ACID: CPT | Mod: 91 | Performed by: NURSE PRACTITIONER

## 2022-05-25 PROCEDURE — 36430 TRANSFUSION BLD/BLD COMPNT: CPT

## 2022-05-25 RX ORDER — INSULIN ASPART 100 [IU]/ML
7 INJECTION, SOLUTION INTRAVENOUS; SUBCUTANEOUS
Status: DISCONTINUED | OUTPATIENT
Start: 2022-05-26 | End: 2022-05-26

## 2022-05-25 RX ORDER — INSULIN ASPART 100 [IU]/ML
5 INJECTION, SOLUTION INTRAVENOUS; SUBCUTANEOUS
Status: DISCONTINUED | OUTPATIENT
Start: 2022-05-25 | End: 2022-05-25

## 2022-05-25 RX ORDER — DEXAMETHASONE 4 MG/1
20 TABLET ORAL ONCE
Status: COMPLETED | OUTPATIENT
Start: 2022-05-25 | End: 2022-05-25

## 2022-05-25 RX ORDER — HYDROCODONE BITARTRATE AND ACETAMINOPHEN 500; 5 MG/1; MG/1
TABLET ORAL
Status: DISCONTINUED | OUTPATIENT
Start: 2022-05-25 | End: 2022-05-27

## 2022-05-25 RX ORDER — DIPHENHYDRAMINE HCL 25 MG
25 CAPSULE ORAL ONCE AS NEEDED
Status: COMPLETED | OUTPATIENT
Start: 2022-05-25 | End: 2022-05-25

## 2022-05-25 RX ORDER — DEXAMETHASONE 4 MG/1
40 TABLET ORAL DAILY
Status: DISCONTINUED | OUTPATIENT
Start: 2022-05-26 | End: 2022-05-26

## 2022-05-25 RX ORDER — FUROSEMIDE 10 MG/ML
40 INJECTION INTRAMUSCULAR; INTRAVENOUS ONCE
Status: COMPLETED | OUTPATIENT
Start: 2022-05-25 | End: 2022-05-25

## 2022-05-25 RX ORDER — ACETAMINOPHEN 325 MG/1
650 TABLET ORAL ONCE AS NEEDED
Status: COMPLETED | OUTPATIENT
Start: 2022-05-25 | End: 2022-05-25

## 2022-05-25 RX ADMIN — DEXAMETHASONE 20 MG: 4 TABLET ORAL at 09:05

## 2022-05-25 RX ADMIN — HYDROMORPHONE HYDROCHLORIDE 0.5 MG: 1 INJECTION, SOLUTION INTRAMUSCULAR; INTRAVENOUS; SUBCUTANEOUS at 08:05

## 2022-05-25 RX ADMIN — GABAPENTIN 300 MG: 300 CAPSULE ORAL at 04:05

## 2022-05-25 RX ADMIN — GABAPENTIN 300 MG: 300 CAPSULE ORAL at 08:05

## 2022-05-25 RX ADMIN — INSULIN ASPART 10 UNITS: 100 INJECTION, SOLUTION INTRAVENOUS; SUBCUTANEOUS at 06:05

## 2022-05-25 RX ADMIN — INSULIN ASPART 4 UNITS: 100 INJECTION, SOLUTION INTRAVENOUS; SUBCUTANEOUS at 10:05

## 2022-05-25 RX ADMIN — HYDROMORPHONE HYDROCHLORIDE 0.5 MG: 1 INJECTION, SOLUTION INTRAMUSCULAR; INTRAVENOUS; SUBCUTANEOUS at 10:05

## 2022-05-25 RX ADMIN — ACYCLOVIR 400 MG: 200 CAPSULE ORAL at 10:05

## 2022-05-25 RX ADMIN — GABAPENTIN 300 MG: 300 CAPSULE ORAL at 12:05

## 2022-05-25 RX ADMIN — BUSPIRONE HYDROCHLORIDE 10 MG: 10 TABLET ORAL at 10:05

## 2022-05-25 RX ADMIN — DILTIAZEM HYDROCHLORIDE 90 MG: 30 TABLET, FILM COATED ORAL at 03:05

## 2022-05-25 RX ADMIN — DIPHENHYDRAMINE HYDROCHLORIDE 25 MG: 25 CAPSULE ORAL at 08:05

## 2022-05-25 RX ADMIN — HYDROMORPHONE HYDROCHLORIDE 0.5 MG: 1 INJECTION, SOLUTION INTRAMUSCULAR; INTRAVENOUS; SUBCUTANEOUS at 04:05

## 2022-05-25 RX ADMIN — HYDROXYZINE HYDROCHLORIDE 50 MG: 25 TABLET, FILM COATED ORAL at 04:05

## 2022-05-25 RX ADMIN — SULFAMETHOXAZOLE AND TRIMETHOPRIM 1 TABLET: 800; 160 TABLET ORAL at 08:05

## 2022-05-25 RX ADMIN — DIAZEPAM 10 MG: 5 TABLET ORAL at 09:05

## 2022-05-25 RX ADMIN — FUROSEMIDE 40 MG: 10 INJECTION, SOLUTION INTRAMUSCULAR; INTRAVENOUS at 12:05

## 2022-05-25 RX ADMIN — INSULIN ASPART 5 UNITS: 100 INJECTION, SOLUTION INTRAVENOUS; SUBCUTANEOUS at 05:05

## 2022-05-25 RX ADMIN — INSULIN ASPART 8 UNITS: 100 INJECTION, SOLUTION INTRAVENOUS; SUBCUTANEOUS at 08:05

## 2022-05-25 RX ADMIN — DILTIAZEM HYDROCHLORIDE 90 MG: 30 TABLET, FILM COATED ORAL at 04:05

## 2022-05-25 RX ADMIN — DILTIAZEM HYDROCHLORIDE 90 MG: 30 TABLET, FILM COATED ORAL at 09:05

## 2022-05-25 RX ADMIN — HYDROMORPHONE HYDROCHLORIDE 0.5 MG: 1 INJECTION, SOLUTION INTRAMUSCULAR; INTRAVENOUS; SUBCUTANEOUS at 12:05

## 2022-05-25 RX ADMIN — BUSPIRONE HYDROCHLORIDE 10 MG: 10 TABLET ORAL at 09:05

## 2022-05-25 RX ADMIN — ALLOPURINOL 300 MG: 300 TABLET ORAL at 08:05

## 2022-05-25 RX ADMIN — OXCARBAZEPINE 1200 MG: 600 TABLET, FILM COATED ORAL at 09:05

## 2022-05-25 RX ADMIN — ACETAMINOPHEN 650 MG: 325 TABLET ORAL at 08:05

## 2022-05-25 RX ADMIN — ACYCLOVIR 400 MG: 200 CAPSULE ORAL at 09:05

## 2022-05-25 RX ADMIN — HYDROXYZINE HYDROCHLORIDE 50 MG: 25 TABLET, FILM COATED ORAL at 03:05

## 2022-05-25 RX ADMIN — PANTOPRAZOLE SODIUM 40 MG: 40 TABLET, DELAYED RELEASE ORAL at 09:05

## 2022-05-25 RX ADMIN — OXCARBAZEPINE 1200 MG: 600 TABLET, FILM COATED ORAL at 10:05

## 2022-05-25 RX ADMIN — LEVOFLOXACIN 500 MG: 500 TABLET, FILM COATED ORAL at 04:05

## 2022-05-25 RX ADMIN — DILTIAZEM HYDROCHLORIDE 90 MG: 30 TABLET, FILM COATED ORAL at 10:05

## 2022-05-25 RX ADMIN — INSULIN ASPART 8 UNITS: 100 INJECTION, SOLUTION INTRAVENOUS; SUBCUTANEOUS at 12:05

## 2022-05-25 RX ADMIN — QUETIAPINE FUMARATE 200 MG: 200 TABLET, FILM COATED ORAL at 10:05

## 2022-05-25 RX ADMIN — FLUCONAZOLE 400 MG: 200 TABLET ORAL at 04:05

## 2022-05-25 RX ADMIN — LOSARTAN POTASSIUM 25 MG: 25 TABLET, FILM COATED ORAL at 12:05

## 2022-05-25 RX ADMIN — DEXAMETHASONE 20 MG: 4 TABLET ORAL at 08:05

## 2022-05-25 NOTE — ASSESSMENT & PLAN NOTE
- given leukocytosis with WBC 136K and plan for rapid reduction with PO dex and 1 dose of vincristine, patient at high risk for TLS  - on IVF @75  - start allopurinol 300mg BID  - monitor TLS labs BID

## 2022-05-25 NOTE — PLAN OF CARE
1 Unit of platelets given this shift, pt tolerated well. PRN dilaudid given q 4, relief obtained. PRN Valium given x 1 for anxiety. ACHS, coverage required, BG max 460. MD notified. No other complaints this shift. Family at bedside. POC reviewed with patient; understanding verbalized. Pt. with nonskid footwear on, bed in lowest position, and locked with bed rails up x2.  Pt. instructed to call prior to getting OOB.  Pt. has call light and personal items within reach. Patient ambulates in room independently. VSS and afebrile this shift. All questions and concerns addressed at this time. Will continue to monitor.

## 2022-05-25 NOTE — ASSESSMENT & PLAN NOTE
- Holding home statin while inpatient  - Holding home ASA while inpatient due to thrombocytopenia. Will hold ASA at discharge. Can be resumed outpatient as appropriate when platelets >50K

## 2022-05-25 NOTE — ASSESSMENT & PLAN NOTE
- seen in BMT clinic 5/23 by Dr. Jenkins  - WBC up from 19.5 on 5/19 to 136.5 on 5/23  - see B-ALL for hx of treatment  - continue cytoreduction with dex, increased to 40mg on 5/25  - s/p one dose of 2mg vincristine 5/24  - high TLS risk, monitoring BID TLS labs

## 2022-05-25 NOTE — ASSESSMENT & PLAN NOTE
- weight up ~9lbs from admit today  - on high rate IVF d/t TLS risk  - on 1-2L O2 via NC, reporting intermittent SOB  - mild wheezing noted on exam  - will give 40mg lasix x1

## 2022-05-25 NOTE — ASSESSMENT & PLAN NOTE
- significant transaminitis following [place]  - closely monitor with cytoreduction and vincristine  - slightly increased ALT/AST today following vincristine

## 2022-05-25 NOTE — PROGRESS NOTES
Roberto Yepez - Oncology ([facility])  Hematology  Bone Marrow Transplant  Progress Note    Patient Name: Deepti Gonzalez  Admission Date: 5/23/2022  Hospital Length of Stay: 2 days  Code Status: Full Code    Subjective:     Interval History: Patient admitted yesterday for worsening WBC count with known refractory B-ALL. Continues on dex 20mg daily. Plan for dose of vincristine today. Will monitor BID TLS labs given high risk. Continues with intermittent pain, controlled with prn dilaudid. Denies any other issues this AM    Objective:     Vital Signs (Most Recent):  Temp: 97.3 °F (36.3 °C) (05/25/22 0727)  Pulse: 84 (05/25/22 0727)  Resp: 18 (05/25/22 0727)  BP: 139/72 (05/25/22 0727)  SpO2: 95 % (05/25/22 0727) Vital Signs (24h Range):  Temp:  [97.3 °F (36.3 °C)-98.2 °F (36.8 °C)] 97.3 °F (36.3 °C)  Pulse:  [81-97] 84  Resp:  [16-19] 18  SpO2:  [94 %-96 %] 95 %  BP: (132-147)/(70-75) 139/72     Weight: 73.7 kg (162 lb 7.7 oz)  Body mass index is 27.04 kg/m².  Body surface area is 1.84 meters squared.      Intake/Output - Last 3 Shifts         05/23 0700  05/24 0659 05/24 0700  05/25 0659 05/25 0700  05/26 0659    P.O.  500     I.V. (mL/kg)  830.5 (11.3)     IV Piggyback  21.8     Total Intake(mL/kg)  1352.3 (18.3)     Urine (mL/kg/hr) 300 1900 (1.1)     Total Output 300 1900     Net -300 -547.7            Urine Occurrence 2 x 2 x             Physical Exam  Vitals reviewed.   Constitutional:       Appearance: Normal appearance. She is well-developed.   HENT:      Head: Normocephalic and atraumatic.      Right Ear: External ear normal.      Left Ear: External ear normal.   Eyes:      Extraocular Movements: Extraocular movements intact.      Conjunctiva/sclera: Conjunctivae normal.   Cardiovascular:      Rate and Rhythm: Normal rate and regular rhythm.      Pulses: Normal pulses.      Heart sounds: Normal heart sounds. No murmur heard.  Pulmonary:      Effort: Pulmonary effort is normal. No respiratory distress.      Breath  sounds: Normal breath sounds. No stridor. No wheezing or rales.   Abdominal:      General: Bowel sounds are normal. There is no distension.      Palpations: Abdomen is soft.      Tenderness: There is no abdominal tenderness.   Musculoskeletal:         General: Normal range of motion.      Cervical back: Normal range of motion.      Right lower leg: No edema.      Left lower leg: No edema.   Skin:     General: Skin is warm and dry.      Findings: Bruising present. No rash.      Comments: RCWP with no signs of infection   Neurological:      General: No focal deficit present.      Mental Status: She is alert and oriented to person, place, and time.   Psychiatric:         Mood and Affect: Mood normal.         Behavior: Behavior normal.         Thought Content: Thought content normal.         Judgment: Judgment normal.       Significant Labs:   CBC:   Recent Labs   Lab 05/23/22  1238 05/24/22  0501 05/25/22  0439   .50* 111.80* 129.30*   HGB 10.1* 8.9* 8.1*   HCT 28.6* 26.1* 24.2*   PLT 15* 15*  --     and CMP:   Recent Labs   Lab 05/24/22  0501 05/24/22  1611 05/25/22  0439    137 137   K 4.2 4.5 4.4    103 104   CO2 22* 22* 18*   * 237* 274*   BUN 10 12 19   CREATININE 0.7 0.8 0.7   CALCIUM 8.6* 8.4* 8.4*   PROT 6.2 6.0 6.3   ALBUMIN 3.3* 3.3* 3.4*   BILITOT 0.4 0.4 0.3   ALKPHOS 494* 433* 412*   AST 41* 37 59*   ALT 72* 64* 62*   ANIONGAP 12 12 15   EGFRNONAA >60.0 >60.0 >60.0       Diagnostic Results:  I have reviewed all pertinent imaging results/findings within the past 24 hours.    Assessment/Plan:     * Leukocytosis  - seen in BMT clinic 5/23 by Dr. 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 Blincyto held & given steroids x 3 days. Resumed Blincyto 5/17 and again developed CRS, Grade 2 as well as mild AMS and Grade 3 transaminitis. Blincyto stopped with no plan to resume. Discharged on 5/19  - seen today 5/23 by Dr. 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; ponatinib Rx pending via specialty pharmacy  - continue ppx acyclovir, fluconazole, levaquin and bactrim    At high risk of tumor lysis syndrome  - given leukocytosis with WBC 136K and plan for rapid reduction with PO dex and 1 dose of vincristine, patient at high risk for TLS  - on IVF @75  - start allopurinol 300mg BID  - monitor TLS labs BID    Cancer associated pain  - worsening pain to left hip with worsening leukocytosis  - denies trauma or injury to area  - likely d/t leukemia  - oxycodone has been unsuccessful  - reports improvement with 0.5mg dilaudid q6h prn    Anemia in neoplastic disease  - daily CBC while inpatient  - transfuse for Hgb <7    Thrombocytopenia  - daily CBC while inpatient  - transfuse for Plt <10K or bleeding    Transaminitis  - significant transaminitis following Blinatumomab  - improving since off chemotherapy  - closely monitor with cytoreduction and vincristine    Moderate major depression  - Continue home trintellix    Anxiety  - Continue home trintellix and prn valium    GERD (gastroesophageal reflux disease)  - hold home omeprazole, switch to protonix  while inpatient    Hypertension  - continue home losartan    Seizure disorder  - continue home oxcarbazepine    Type 2 diabetes mellitus, without long-term current use of insulin  - hold home farxiga while inpatient; can resume at discharge.  - achs blood glucose monitoring, moderate SSI, and diabetic diet while admitted  - will likely have hyperglycemia with steroids, consider endocrine consult if plan for long term steroids    Rheumatoid arthritis involving multiple sites  - not currently on any active tx    History of TIA (transient ischemic attack)  - Holding home statin while inpatient  - Holding home ASA while inpatient due to thrombocytopenia. Will hold ASA at discharge. Can be resumed outpatient as appropriate when platelets >50K    Hyperlipidemia  - holding home statin while inpatient. Can resume at discharge if LFTs normalize.    Paroxysmal atrial fibrillation  - continue home diltiazem  - Holding xarelto with plt count < 50K. Can resume outpatient when appropriate.        VTE Risk Mitigation (From admission, onward)         Ordered     heparin, porcine (PF) 100 unit/mL injection flush 300 Units  As needed (PRN)         05/24/22 1021     IP VTE HIGH RISK PATIENT  Once         05/23/22 1552     Place sequential compression device  Until discontinued         05/23/22 1552     Reason for No Pharmacological VTE Prophylaxis  Once        Question:  Reasons:  Answer:  Thrombocytopenia    05/23/22 1552                Disposition: Remains inpatient    Shanta Love NP  Bone Marrow Transplant  [facility]to [facility]pez - Oncology ([facility])

## 2022-05-25 NOTE — ASSESSMENT & PLAN NOTE
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 Blincyto held & given steroids x 3 days. Resumed Blincyto 5/17 and again developed CRS, Grade 2 as well as mild AMS and Grade 3 transaminitis. Blincyto stopped with no plan to resume. Discharged on 5/19  - seen today 5/23 by Dr. 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

## 2022-05-25 NOTE — ASSESSMENT & PLAN NOTE
- hold home farxiga while inpatient; can resume at discharge.  - achs blood glucose monitoring, moderate SSI, and diabetic diet while admitted  - added 5units aspart w/ meals after increasing dex dose  - will likely have hyperglycemia with steroids, consider endocrine consult if plan for long term steroids

## 2022-05-25 NOTE — SUBJECTIVE & OBJECTIVE
Subjective:     Interval History: Patient admitted yesterday for worsening WBC count with known refractory B-ALL. Continues on dex 20mg daily. Plan for dose of vincristine today. Will monitor BID TLS labs given high risk. Continues with intermittent pain, controlled with prn dilaudid. Denies any other issues this AM    Objective:     Vital Signs (Most Recent):  Temp: 97.3 °F (36.3 °C) (05/25/22 0727)  Pulse: 84 (05/25/22 0727)  Resp: 18 (05/25/22 0727)  BP: 139/72 (05/25/22 0727)  SpO2: 95 % (05/25/22 0727) Vital Signs (24h Range):  Temp:  [97.3 °F (36.3 °C)-98.2 °F (36.8 °C)] 97.3 °F (36.3 °C)  Pulse:  [81-97] 84  Resp:  [16-19] 18  SpO2:  [94 %-96 %] 95 %  BP: (132-147)/(70-75) 139/72     Weight: 73.7 kg (162 lb 7.7 oz)  Body mass index is 27.04 kg/m².  Body surface area is 1.84 meters squared.      Intake/Output - Last 3 Shifts         05/23 0700  05/24 0659 05/24 0700  05/25 0659 05/25 0700  05/26 0659    P.O.  500     I.V. (mL/kg)  830.5 (11.3)     IV Piggyback  21.8     Total Intake(mL/kg)  1352.3 (18.3)     Urine (mL/kg/hr) 300 1900 (1.1)     Total Output 300 1900     Net -300 -547.7            Urine Occurrence 2 x 2 x             Physical Exam  Vitals reviewed.   Constitutional:       Appearance: Normal appearance. She is well-developed.   HENT:      Head: Normocephalic and atraumatic.      Right Ear: External ear normal.      Left Ear: External ear normal.   Eyes:      Extraocular Movements: Extraocular movements intact.      Conjunctiva/sclera: Conjunctivae normal.   Cardiovascular:      Rate and Rhythm: Normal rate and regular rhythm.      Pulses: Normal pulses.      Heart sounds: Normal heart sounds. No murmur heard.  Pulmonary:      Effort: Pulmonary effort is normal. No respiratory distress.      Breath sounds: Normal breath sounds. No stridor. No wheezing or rales.   Abdominal:      General: Bowel sounds are normal. There is no distension.      Palpations: Abdomen is soft.      Tenderness: There is  no abdominal tenderness.   Musculoskeletal:         General: Normal range of motion.      Cervical back: Normal range of motion.      Right lower leg: No edema.      Left lower leg: No edema.   Skin:     General: Skin is warm and dry.      Findings: Bruising present. No rash.      Comments: RCWP with no signs of infection   Neurological:      General: No focal deficit present.      Mental Status: She is alert and oriented to person, place, and time.   Psychiatric:         Mood and Affect: Mood normal.         Behavior: Behavior normal.         Thought Content: Thought content normal.         Judgment: Judgment normal.       Significant Labs:   CBC:   Recent Labs   Lab 05/23/22  1238 05/24/22  0501 05/25/22  0439   .50* 111.80* 129.30*   HGB 10.1* 8.9* 8.1*   HCT 28.6* 26.1* 24.2*   PLT 15* 15*  --     and CMP:   Recent Labs   Lab 05/24/22  0501 05/24/22  1611 05/25/22  0439    137 137   K 4.2 4.5 4.4    103 104   CO2 22* 22* 18*   * 237* 274*   BUN 10 12 19   CREATININE 0.7 0.8 0.7   CALCIUM 8.6* 8.4* 8.4*   PROT 6.2 6.0 6.3   ALBUMIN 3.3* 3.3* 3.4*   BILITOT 0.4 0.4 0.3   ALKPHOS 494* 433* 412*   AST 41* 37 59*   ALT 72* 64* 62*   ANIONGAP 12 12 15   EGFRNONAA >60.0 >60.0 >60.0       Diagnostic Results:  I have reviewed all pertinent imaging results/findings within the past 24 hours.

## 2022-05-25 NOTE — SUBJECTIVE & OBJECTIVE
Subjective:     Interval History: Day 2 of vincristine. Tolerated dose 5/24 without issue. WBC back up to 129K, will increase dex to 40mg daily. Concern for impending DIC with decreased fibrinogen to 123, will monitor BID DIC labs in addition to TLS labs. Patient weight up from admit, no edema, however reports SOB and using O2 via NC. Will give 40mg lasix. Blood sugars increased, added 5u aspart w/ meals. Afebrile, VSS    Objective:     Vital Signs (Most Recent):  Temp: 98.1 °F (36.7 °C) (05/25/22 1208)  Pulse: 84 (05/25/22 1208)  Resp: 19 (05/25/22 1208)  BP: 135/72 (05/25/22 1208)  SpO2: 95 % (05/25/22 1208) Vital Signs (24h Range):  Temp:  [97.2 °F (36.2 °C)-98.1 °F (36.7 °C)] 98.1 °F (36.7 °C)  Pulse:  [81-97] 84  Resp:  [16-19] 19  SpO2:  [92 %-96 %] 95 %  BP: (123-147)/(65-75) 135/72     Weight: 73.7 kg (162 lb 7.7 oz)  Body mass index is 27.04 kg/m².  Body surface area is 1.84 meters squared.      Intake/Output - Last 3 Shifts         05/23 0700  05/24 0659 05/24 0700  05/25 0659 05/25 0700  05/26 0659    P.O.  500     I.V. (mL/kg)  830.5 (11.3) 225 (3.1)    Blood   215    IV Piggyback  21.8     Total Intake(mL/kg)  1352.3 (18.3) 440 (6)    Urine (mL/kg/hr) 300 1900 (1.1) 600 (1.5)    Total Output 300 1900 600    Net -300 -547.7 -160           Urine Occurrence 2 x 2 x             Physical Exam  Vitals reviewed.   Constitutional:       Appearance: Normal appearance. She is well-developed.   HENT:      Head: Normocephalic and atraumatic.      Right Ear: External ear normal.      Left Ear: External ear normal.   Eyes:      Extraocular Movements: Extraocular movements intact.      Conjunctiva/sclera: Conjunctivae normal.   Cardiovascular:      Rate and Rhythm: Normal rate and regular rhythm.      Pulses: Normal pulses.      Heart sounds: Normal heart sounds. No murmur heard.  Pulmonary:      Effort: Pulmonary effort is normal. No respiratory distress.      Breath sounds: No stridor. Wheezing present. No rales.    Abdominal:      General: Bowel sounds are normal. There is no distension.      Palpations: Abdomen is soft.      Tenderness: There is no abdominal tenderness.   Musculoskeletal:         General: Normal range of motion.      Cervical back: Normal range of motion.      Right lower leg: No edema.      Left lower leg: No edema.   Skin:     General: Skin is warm and dry.      Findings: Bruising present. No rash.      Comments: RCWP with no signs of infection   Neurological:      General: No focal deficit present.      Mental Status: She is alert and oriented to person, place, and time.   Psychiatric:         Mood and Affect: Mood normal.         Behavior: Behavior normal.         Thought Content: Thought content normal.         Judgment: Judgment normal.       Significant Labs:   CBC:   Recent Labs   Lab 05/23/22  1238 05/24/22  0501 05/25/22  0439   .50* 111.80* 129.30*   HGB 10.1* 8.9* 8.1*   HCT 28.6* 26.1* 24.2*   PLT 15* 15* 8*      and CMP:   Recent Labs   Lab 05/24/22  0501 05/24/22  1611 05/25/22  0439    137 137   K 4.2 4.5 4.4    103 104   CO2 22* 22* 18*   * 237* 274*   BUN 10 12 19   CREATININE 0.7 0.8 0.7   CALCIUM 8.6* 8.4* 8.4*   PROT 6.2 6.0 6.3   ALBUMIN 3.3* 3.3* 3.4*   BILITOT 0.4 0.4 0.3   ALKPHOS 494* 433* 412*   AST 41* 37 59*   ALT 72* 64* 62*   ANIONGAP 12 12 15   EGFRNONAA >60.0 >60.0 >60.0         Diagnostic Results:  I have reviewed all pertinent imaging results/findings within the past 24 hours.

## 2022-05-25 NOTE — HOSPITAL COURSE
5/24/2022 Patient admitted yesterday for worsening WBC count with known refractory B-ALL. Continues on dex 20mg daily. Plan for dose of vincristine today. Will monitor BID TLS labs given high risk. Continues with intermittent pain, controlled with prn dilaudid. Denies any other issues this AM  05/25/2022 Day 2 of vincristine. Tolerated dose 5/24 without issue. WBC back up to 129K, will increase dex to 40mg daily. Concern for impending DIC with decreased fibrinogen to 123, will monitor BID DIC labs in addition to TLS labs. Patient weight up from admit, no edema, however reports SOB and using O2 via NC. Will give 40mg lasix. Blood sugars increased, added 5u aspart w/ meals. Afebrile, VSS  05/26/2022 WBC down to 42k. Continues on dex to 40mg daily, dc today. Blood sugars remain in 300s, meal time aspart increased to 7 units TID.  05/27/2022 WBC down to 11k. Blood sugars remain elevated - insulin titrated up to 10 units with meals and 10 units BID. Panotinib still pending insurance authorization.  05/28/2022 WBC down to 8k. Blood sugars improved - insulin titrated down to 5 units with meals and 10 units BID. Panotinib still pending insurance authorization. Transfusing 1 unit prbc.  05/29/2022 WBC down to 6k. Insulin titrated down to 10 units daily. Panotinib still pending insurance authorization.  05/30/2022 WBC stable at 7K but increased blasts on peripheral blood. Remains on 10 units levemir daily. Pending panotinib. Will discuss vincristine dose for tomorrow. Transfusing 1 unit platelets.  05/31/2022 WBC increased to 11K. Glucose stable and remains on 10 units levemir daily. Pending panotinib. Plan for vincristine today.  06/01/2022 Received Vincristine yesterday without issue. WBC increased to 26k today. Starting dex 20mg daily. Panotinib pending. Met with Dr. cMcord today and will start PCA for bone pain.  06/02/2022 WBC 6K today. Blood glucose elevated - added 6 units TIDWM insulin. Pending panotinib. Continues on  PCA pump.  06/03/2022 WBC 3K. Up titrated insulin regimen. Transitioned to PO dilaudid. 1 unit platelets and PRBC.  06/04/2022 WBC 4K with 2% blasts on CBC. Increased insulin to 10 units detemir BID and 10 units TIDWM. Pain well controlled with PO dilaudid. No transfusion requirement today.  06/05/2022 Completed dexamethasone yesterday. Will titrate down insulin to 10 units TIDWM and 10 units detemir daily. Pain controlled with PO dilaudid, will space to q4.  06/06/2022 Patient stable for discharge home with close follow up in clinic and with twice weekly labs for further  transfusion needs. She will discharge home with OI ppx and allopurinol 300mg daily. Her ASA (CAD) and AC (Afib) will be held due to her significant thrombocytopenia until further evaluated with serial labs.   no vomiting/no diarrhea/no nausea

## 2022-05-25 NOTE — ASSESSMENT & PLAN NOTE
- seen in BMT clinic 5/23 by Dr. Jenkins  - WBC up from 19.5 on 5/19 to 136.5 on 5/23  - see B-ALL for hx of treatment  - continue cytoreduction with 20mg dex daily  - plan for one dose of 2mg vincristine today  - high TLS risk, monitoring BID TLS labs

## 2022-05-25 NOTE — PLAN OF CARE
Patient is progressing and involved with plan of care. Frequent rounds made to assess pain and safety. Pt communicating needs throughout shift. Up with stand by assist. Tolerating diet, voiding without difficulty. Pain controlled with PRN dilaudid (given every four hours through night shift), Blood glucose below 259- treated with  units of aspart.PLT 8, Mag 1.7. All vitals stable; no acute events this shift. Pt. Remaining free from falls or injury throughout shift; bed in lowest position; side rails up X2; call light within reach; pt instructed to call for assistance as needed - verbalized understanding. Q2h rounding on patient. Will continue to monitor.

## 2022-05-25 NOTE — ASSESSMENT & PLAN NOTE
- high risk with cytoreduction/refractory B-ALL s/p vincristine on 5/24  - monitoring BID INR/fibrinogen  - fib this ; plan for cryo if <100

## 2022-05-25 NOTE — ASSESSMENT & PLAN NOTE
- worsening pain to left hip with worsening leukocytosis  - denies trauma or injury to area  - likely d/t leukemia  - oxycodone has been unsuccessful  - reports improvement with 0.5mg dilaudid q6h prn

## 2022-05-26 LAB
ALBUMIN SERPL BCP-MCNC: 3.4 G/DL (ref 3.5–5.2)
ALBUMIN SERPL BCP-MCNC: 3.4 G/DL (ref 3.5–5.2)
ALP SERPL-CCNC: 272 U/L (ref 55–135)
ALP SERPL-CCNC: 336 U/L (ref 55–135)
ALT SERPL W/O P-5'-P-CCNC: 33 U/L (ref 10–44)
ALT SERPL W/O P-5'-P-CCNC: 40 U/L (ref 10–44)
ANION GAP SERPL CALC-SCNC: 10 MMOL/L (ref 8–16)
ANION GAP SERPL CALC-SCNC: 11 MMOL/L (ref 8–16)
ANION GAP SERPL CALC-SCNC: 17 MMOL/L (ref 8–16)
ANISOCYTOSIS BLD QL SMEAR: SLIGHT
AST SERPL-CCNC: 12 U/L (ref 10–40)
AST SERPL-CCNC: 23 U/L (ref 10–40)
BASO STIPL BLD QL SMEAR: ABNORMAL
BASOPHILS NFR BLD: 0 % (ref 0–1.9)
BILIRUB SERPL-MCNC: 0.4 MG/DL (ref 0.1–1)
BILIRUB SERPL-MCNC: 0.5 MG/DL (ref 0.1–1)
BLASTS NFR BLD MANUAL: 31 %
BLD PROD TYP BPU: NORMAL
BLOOD UNIT EXPIRATION DATE: NORMAL
BLOOD UNIT TYPE CODE: 7300
BLOOD UNIT TYPE: NORMAL
BUN SERPL-MCNC: 21 MG/DL (ref 6–20)
BUN SERPL-MCNC: 21 MG/DL (ref 6–20)
BUN SERPL-MCNC: 25 MG/DL (ref 6–20)
CALCIUM SERPL-MCNC: 8.6 MG/DL (ref 8.7–10.5)
CALCIUM SERPL-MCNC: 8.7 MG/DL (ref 8.7–10.5)
CALCIUM SERPL-MCNC: 8.7 MG/DL (ref 8.7–10.5)
CHLORIDE SERPL-SCNC: 101 MMOL/L (ref 95–110)
CHLORIDE SERPL-SCNC: 101 MMOL/L (ref 95–110)
CHLORIDE SERPL-SCNC: 102 MMOL/L (ref 95–110)
CO2 SERPL-SCNC: 20 MMOL/L (ref 23–29)
CO2 SERPL-SCNC: 23 MMOL/L (ref 23–29)
CO2 SERPL-SCNC: 24 MMOL/L (ref 23–29)
CODING SYSTEM: NORMAL
CREAT SERPL-MCNC: 0.7 MG/DL (ref 0.5–1.4)
CREAT SERPL-MCNC: 0.7 MG/DL (ref 0.5–1.4)
CREAT SERPL-MCNC: 0.9 MG/DL (ref 0.5–1.4)
DIFFERENTIAL METHOD: ABNORMAL
DISPENSE STATUS: NORMAL
EOSINOPHIL NFR BLD: 0 % (ref 0–8)
ERYTHROCYTE [DISTWIDTH] IN BLOOD BY AUTOMATED COUNT: 18.1 % (ref 11.5–14.5)
EST. GFR  (AFRICAN AMERICAN): >60 ML/MIN/1.73 M^2
EST. GFR  (NON AFRICAN AMERICAN): >60 ML/MIN/1.73 M^2
FIBRINOGEN PPP-MCNC: 112 MG/DL (ref 182–400)
FIBRINOGEN PPP-MCNC: 88 MG/DL (ref 182–400)
GLUCOSE SERPL-MCNC: 256 MG/DL (ref 70–110)
GLUCOSE SERPL-MCNC: 268 MG/DL (ref 70–110)
GLUCOSE SERPL-MCNC: 341 MG/DL (ref 70–110)
HCT VFR BLD AUTO: 23.1 % (ref 37–48.5)
HGB BLD-MCNC: 7.9 G/DL (ref 12–16)
IMM GRANULOCYTES # BLD AUTO: ABNORMAL K/UL (ref 0–0.04)
IMM GRANULOCYTES NFR BLD AUTO: ABNORMAL % (ref 0–0.5)
INR PPP: 1.2 (ref 0.8–1.2)
LDH SERPL L TO P-CCNC: 1117 U/L (ref 110–260)
LDH SERPL L TO P-CCNC: 1571 U/L (ref 110–260)
LYMPHOCYTES NFR BLD: 18 % (ref 18–48)
MAGNESIUM SERPL-MCNC: 2.1 MG/DL (ref 1.6–2.6)
MAGNESIUM SERPL-MCNC: 2.2 MG/DL (ref 1.6–2.6)
MCH RBC QN AUTO: 31.1 PG (ref 27–31)
MCHC RBC AUTO-ENTMCNC: 34.2 G/DL (ref 32–36)
MCV RBC AUTO: 91 FL (ref 82–98)
MONOCYTES NFR BLD: 3 % (ref 4–15)
NEUTROPHILS NFR BLD: 46 % (ref 38–73)
NEUTS BAND NFR BLD MANUAL: 2 %
NRBC BLD-RTO: 0 /100 WBC
PHOSPHATE SERPL-MCNC: 3.3 MG/DL (ref 2.7–4.5)
PHOSPHATE SERPL-MCNC: 4.6 MG/DL (ref 2.7–4.5)
PLATELET # BLD AUTO: 34 K/UL (ref 150–450)
PLATELET BLD QL SMEAR: ABNORMAL
PMV BLD AUTO: 12.6 FL (ref 9.2–12.9)
POCT GLUCOSE: 106 MG/DL (ref 70–110)
POCT GLUCOSE: 207 MG/DL (ref 70–110)
POCT GLUCOSE: 213 MG/DL (ref 70–110)
POCT GLUCOSE: 225 MG/DL (ref 70–110)
POCT GLUCOSE: 245 MG/DL (ref 70–110)
POCT GLUCOSE: 258 MG/DL (ref 70–110)
POCT GLUCOSE: 292 MG/DL (ref 70–110)
POCT GLUCOSE: 334 MG/DL (ref 70–110)
POCT GLUCOSE: 382 MG/DL (ref 70–110)
POCT GLUCOSE: 417 MG/DL (ref 70–110)
POIKILOCYTOSIS BLD QL SMEAR: SLIGHT
POLYCHROMASIA BLD QL SMEAR: ABNORMAL
POTASSIUM SERPL-SCNC: 4.1 MMOL/L (ref 3.5–5.1)
POTASSIUM SERPL-SCNC: 4.2 MMOL/L (ref 3.5–5.1)
POTASSIUM SERPL-SCNC: 4.4 MMOL/L (ref 3.5–5.1)
PROT SERPL-MCNC: 5.9 G/DL (ref 6–8.4)
PROT SERPL-MCNC: 6.4 G/DL (ref 6–8.4)
PROTHROMBIN TIME: 12.4 SEC (ref 9–12.5)
RBC # BLD AUTO: 2.54 M/UL (ref 4–5.4)
SCHISTOCYTES BLD QL SMEAR: ABNORMAL
SCHISTOCYTES BLD QL SMEAR: PRESENT
SMUDGE CELLS BLD QL SMEAR: PRESENT
SODIUM SERPL-SCNC: 135 MMOL/L (ref 136–145)
SODIUM SERPL-SCNC: 136 MMOL/L (ref 136–145)
SODIUM SERPL-SCNC: 138 MMOL/L (ref 136–145)
UNIT NUMBER: NORMAL
URATE SERPL-MCNC: 1.8 MG/DL (ref 2.4–5.7)
URATE SERPL-MCNC: 2.3 MG/DL (ref 2.4–5.7)
WBC # BLD AUTO: 42.82 K/UL (ref 3.9–12.7)

## 2022-05-26 PROCEDURE — P9012 CRYOPRECIPITATE EACH UNIT: HCPCS | Performed by: STUDENT IN AN ORGANIZED HEALTH CARE EDUCATION/TRAINING PROGRAM

## 2022-05-26 PROCEDURE — 99232 SBSQ HOSP IP/OBS MODERATE 35: CPT | Mod: ,,, | Performed by: INTERNAL MEDICINE

## 2022-05-26 PROCEDURE — 99232 PR SUBSEQUENT HOSPITAL CARE,LEVL II: ICD-10-PCS | Mod: ,,, | Performed by: INTERNAL MEDICINE

## 2022-05-26 PROCEDURE — 83735 ASSAY OF MAGNESIUM: CPT | Performed by: NURSE PRACTITIONER

## 2022-05-26 PROCEDURE — 25000003 PHARM REV CODE 250: Performed by: NURSE PRACTITIONER

## 2022-05-26 PROCEDURE — 20600001 HC STEP DOWN PRIVATE ROOM

## 2022-05-26 PROCEDURE — 85610 PROTHROMBIN TIME: CPT | Performed by: NURSE PRACTITIONER

## 2022-05-26 PROCEDURE — 97116 GAIT TRAINING THERAPY: CPT | Mod: CQ

## 2022-05-26 PROCEDURE — 85007 BL SMEAR W/DIFF WBC COUNT: CPT | Performed by: NURSE PRACTITIONER

## 2022-05-26 PROCEDURE — 85384 FIBRINOGEN ACTIVITY: CPT | Mod: 91 | Performed by: STUDENT IN AN ORGANIZED HEALTH CARE EDUCATION/TRAINING PROGRAM

## 2022-05-26 PROCEDURE — 84100 ASSAY OF PHOSPHORUS: CPT | Mod: 91 | Performed by: NURSE PRACTITIONER

## 2022-05-26 PROCEDURE — 80053 COMPREHEN METABOLIC PANEL: CPT | Performed by: NURSE PRACTITIONER

## 2022-05-26 PROCEDURE — 86965 POOLING BLOOD PLATELETS: CPT | Performed by: STUDENT IN AN ORGANIZED HEALTH CARE EDUCATION/TRAINING PROGRAM

## 2022-05-26 PROCEDURE — 85027 COMPLETE CBC AUTOMATED: CPT | Performed by: NURSE PRACTITIONER

## 2022-05-26 PROCEDURE — C9399 UNCLASSIFIED DRUGS OR BIOLOG: HCPCS | Performed by: STUDENT IN AN ORGANIZED HEALTH CARE EDUCATION/TRAINING PROGRAM

## 2022-05-26 PROCEDURE — 63600175 PHARM REV CODE 636 W HCPCS: Performed by: NURSE PRACTITIONER

## 2022-05-26 PROCEDURE — 85384 FIBRINOGEN ACTIVITY: CPT | Performed by: NURSE PRACTITIONER

## 2022-05-26 PROCEDURE — 80048 BASIC METABOLIC PNL TOTAL CA: CPT | Mod: XB | Performed by: STUDENT IN AN ORGANIZED HEALTH CARE EDUCATION/TRAINING PROGRAM

## 2022-05-26 PROCEDURE — 25000003 PHARM REV CODE 250: Performed by: STUDENT IN AN ORGANIZED HEALTH CARE EDUCATION/TRAINING PROGRAM

## 2022-05-26 PROCEDURE — 83615 LACTATE (LD) (LDH) ENZYME: CPT | Performed by: NURSE PRACTITIONER

## 2022-05-26 PROCEDURE — 84550 ASSAY OF BLOOD/URIC ACID: CPT | Performed by: NURSE PRACTITIONER

## 2022-05-26 RX ORDER — HYDROCODONE BITARTRATE AND ACETAMINOPHEN 500; 5 MG/1; MG/1
TABLET ORAL
Status: CANCELLED | OUTPATIENT
Start: 2022-05-26

## 2022-05-26 RX ORDER — INSULIN ASPART 100 [IU]/ML
10 INJECTION, SOLUTION INTRAVENOUS; SUBCUTANEOUS
Status: DISCONTINUED | OUTPATIENT
Start: 2022-05-26 | End: 2022-05-28

## 2022-05-26 RX ORDER — HYDROCODONE BITARTRATE AND ACETAMINOPHEN 500; 5 MG/1; MG/1
TABLET ORAL
Status: DISCONTINUED | OUTPATIENT
Start: 2022-05-26 | End: 2022-05-27

## 2022-05-26 RX ADMIN — INSULIN ASPART 6 UNITS: 100 INJECTION, SOLUTION INTRAVENOUS; SUBCUTANEOUS at 05:05

## 2022-05-26 RX ADMIN — ALLOPURINOL 300 MG: 300 TABLET ORAL at 08:05

## 2022-05-26 RX ADMIN — INSULIN ASPART 7 UNITS: 100 INJECTION, SOLUTION INTRAVENOUS; SUBCUTANEOUS at 01:05

## 2022-05-26 RX ADMIN — INSULIN ASPART 8 UNITS: 100 INJECTION, SOLUTION INTRAVENOUS; SUBCUTANEOUS at 08:05

## 2022-05-26 RX ADMIN — ACYCLOVIR 400 MG: 200 CAPSULE ORAL at 09:05

## 2022-05-26 RX ADMIN — GABAPENTIN 300 MG: 300 CAPSULE ORAL at 09:05

## 2022-05-26 RX ADMIN — OXCARBAZEPINE 1200 MG: 600 TABLET, FILM COATED ORAL at 09:05

## 2022-05-26 RX ADMIN — DILTIAZEM HYDROCHLORIDE 90 MG: 30 TABLET, FILM COATED ORAL at 04:05

## 2022-05-26 RX ADMIN — DEXAMETHASONE 40 MG: 4 TABLET ORAL at 08:05

## 2022-05-26 RX ADMIN — GABAPENTIN 300 MG: 300 CAPSULE ORAL at 04:05

## 2022-05-26 RX ADMIN — LEVOFLOXACIN 500 MG: 500 TABLET, FILM COATED ORAL at 04:05

## 2022-05-26 RX ADMIN — DILTIAZEM HYDROCHLORIDE 90 MG: 30 TABLET, FILM COATED ORAL at 09:05

## 2022-05-26 RX ADMIN — HYDROXYZINE HYDROCHLORIDE 50 MG: 25 TABLET, FILM COATED ORAL at 04:05

## 2022-05-26 RX ADMIN — INSULIN DETEMIR 10 UNITS: 100 INJECTION, SOLUTION SUBCUTANEOUS at 11:05

## 2022-05-26 RX ADMIN — ALLOPURINOL 300 MG: 300 TABLET ORAL at 09:05

## 2022-05-26 RX ADMIN — DIAZEPAM 10 MG: 5 TABLET ORAL at 08:05

## 2022-05-26 RX ADMIN — HYDROMORPHONE HYDROCHLORIDE 0.5 MG: 1 INJECTION, SOLUTION INTRAMUSCULAR; INTRAVENOUS; SUBCUTANEOUS at 04:05

## 2022-05-26 RX ADMIN — INSULIN ASPART 5 UNITS: 100 INJECTION, SOLUTION INTRAVENOUS; SUBCUTANEOUS at 11:05

## 2022-05-26 RX ADMIN — HYDROMORPHONE HYDROCHLORIDE 0.5 MG: 1 INJECTION, SOLUTION INTRAMUSCULAR; INTRAVENOUS; SUBCUTANEOUS at 08:05

## 2022-05-26 RX ADMIN — BUSPIRONE HYDROCHLORIDE 10 MG: 10 TABLET ORAL at 09:05

## 2022-05-26 RX ADMIN — PANTOPRAZOLE SODIUM 40 MG: 40 TABLET, DELAYED RELEASE ORAL at 09:05

## 2022-05-26 RX ADMIN — INSULIN ASPART 7 UNITS: 100 INJECTION, SOLUTION INTRAVENOUS; SUBCUTANEOUS at 08:05

## 2022-05-26 RX ADMIN — INSULIN ASPART 10 UNITS: 100 INJECTION, SOLUTION INTRAVENOUS; SUBCUTANEOUS at 05:05

## 2022-05-26 RX ADMIN — HYDROMORPHONE HYDROCHLORIDE 0.5 MG: 1 INJECTION, SOLUTION INTRAMUSCULAR; INTRAVENOUS; SUBCUTANEOUS at 09:05

## 2022-05-26 RX ADMIN — INSULIN DETEMIR 10 UNITS: 100 INJECTION, SOLUTION SUBCUTANEOUS at 12:05

## 2022-05-26 RX ADMIN — QUETIAPINE FUMARATE 200 MG: 200 TABLET, FILM COATED ORAL at 09:05

## 2022-05-26 RX ADMIN — GABAPENTIN 300 MG: 300 CAPSULE ORAL at 11:05

## 2022-05-26 RX ADMIN — LOSARTAN POTASSIUM 25 MG: 25 TABLET, FILM COATED ORAL at 11:05

## 2022-05-26 RX ADMIN — FLUCONAZOLE 400 MG: 200 TABLET ORAL at 04:05

## 2022-05-26 RX ADMIN — INSULIN ASPART 8 UNITS: 100 INJECTION, SOLUTION INTRAVENOUS; SUBCUTANEOUS at 01:05

## 2022-05-26 NOTE — ASSESSMENT & PLAN NOTE
- weight up ~9lbs from admit   - on high rate IVF d/t TLS risk  - on 1-2L O2 via NC, reporting intermittent SOB  - will reassess daily if lasix needed

## 2022-05-26 NOTE — PT/OT/SLP PROGRESS
"Physical Therapy Treatment    Patient Name:  Deepti Gonzalez   MRN:  [id number]    Recommendations:     Discharge Recommendations:  home health PT   Discharge Equipment Recommendations: wheelchair, walker, rolling   Barriers to discharge: Decreased caregiver support    Assessment:     Deepti Gonzalez is a 51 y.o. female admitted with a medical diagnosis of Leukocytosis.  She presents with the following impairments/functional limitations:  weakness, impaired endurance, impaired functional mobilty, gait instability, impaired balance, decreased coordination, decreased safety awareness.  Pt was motivated to participate in therapy today. Pt required increased assistance with gait training, pt report increase blurriness in eyes today. Pt demonstrated increase lateral swaying and impaired balancing. RN was notified about increased level of assistance required. Pt continues to benefit from therapy to improve functional endurance, strength, and mobility.      Rehab Prognosis: Fair; patient would benefit from acute skilled PT services to address these deficits and reach maximum level of function.    Recent Surgery: * No surgery found *      Plan:     During this hospitalization, patient to be seen 3 x/week to address the identified rehab impairments via gait training, therapeutic activities, therapeutic exercises, neuromuscular re-education and progress toward the following goals:    · Plan of Care Expires:  06/21/22    Subjective     Chief Complaint: joint pain in BLE   Patient/Family Comments/goals: pt report feel more unsteady today, possible due to meds. RN notified.   Pain/Comfort:  · Pain Rating 1: 6/10  · Location - Side 1: Bilateral  · Location - Orientation 1: generalized  · Location 1: leg ("joints/bone")  · Pain Addressed 1: Pre-medicate for activity  · Pain Rating Post-Intervention 1: 0/10      Objective:     Communicated with RN prior to session.  Patient found HOB elevated with peripheral IV upon PT entry to " room.     General Precautions: Standard, fall   Orthopedic Precautions:N/A   Braces: N/A  Respiratory Status: Nasal cannula, flow 1 L/min     Functional Mobility:  · Bed Mobility:     · Supine to Sit: stand by assistance with HOB elevated  · Sit to Supine: stand by assistance  · Transfers:     · Sit to Stand:  contact guard assistance with rollator  · Gait: pt ambulated 2x10ft with maxA and rollator. Pt demonstrated very unsteady gait with increase lateral swaying and flexed posture.  · Verbal and tactile for upright posture, decrease speed, and rollaror management  · Pt report increased blurred vision today.   · RN was notified about increased of assistance needed today.    Balance:   · Static/Dynamic sitting EOB balance: fair, CGA with BUE support   · x5 minutes for therex.   · Static standing balance: fair, Meg with rollator.    AM-PAC 6 CLICK MOBILITY  Turning over in bed (including adjusting bedclothes, sheets and blankets)?: 3  Sitting down on and standing up from a chair with arms (e.g., wheelchair, bedside commode, etc.): 3  Moving from lying on back to sitting on the side of the bed?: 3  Moving to and from a bed to a chair (including a wheelchair)?: 3  Need to walk in hospital room?: 2  Climbing 3-5 steps with a railing?: 2  Basic Mobility Total Score: 16       Therapeutic Activities and Exercises:   Seated BLE therex x10 reps: ankle pumps, LAQ, and marching.   Patient educated on role of therapy, goals of session, and benefits of out of bed mobility.   Instructed on use of call button and importance of calling nursing staff for assistance with mobility   Questions/concerns addressed within PTA scope of practice  Pt verbalized understanding.      Patient left HOB elevated with all lines intact, call button in reach and RN notified..    GOALS:   Multidisciplinary Problems     Physical Therapy Goals        Problem: Physical Therapy    Goal Priority Disciplines Outcome Goal Variances Interventions   Physical  Therapy Goal     PT, PT/OT Ongoing, Progressing     Description: Goals to be met by: 22     Patient will increase functional independence with mobility by performin. Supine to sit with Modified [place]  2. Sit to supine with Modified [place]  3. Sit to stand transfer with Stand-by Assistance  4. Gait  x >50 feet with Contact Guard Assistance using rollator.  5. Lower extremity exercise program x 10 reps per handout, with independence                       Time Tracking:     PT Received On: 22  PT Start Time: 1025     PT Stop Time: 1037  PT Total Time (min): 12 min     Billable Minutes: Gait Training 12    Treatment Type: Treatment  PT/PTA: PTA     PTA Visit Number: 1     2022

## 2022-05-26 NOTE — PROGRESS NOTES
[facility] - [facility] ([facility])  Hematology  Bone Marrow Transplant  Progress Note    Patient Name: Deepti Gonzalez  Admission Date: 5/23/2022  Hospital Length of Stay: 3 days  Code Status: Full Code    Subjective:     Interval History: WBC down to 42k. Continues on dex to 40mg daily, will discontinue today. Blood sugars remain in 300s, meal time aspart increased to 7 units TID.    Objective:     Vital Signs (Most Recent):  Temp: 98.1 °F (36.7 °C) (05/26/22 0740)  Pulse: 83 (05/26/22 0740)  Resp: 16 (05/26/22 0740)  BP: 115/62 (05/26/22 0740)  SpO2: 95 % (05/26/22 0740) Vital Signs (24h Range):  Temp:  [97.2 °F (36.2 °C)-98.1 °F (36.7 °C)] 98.1 °F (36.7 °C)  Pulse:  [68-92] 83  Resp:  [14-19] 16  SpO2:  [92 %-98 %] 95 %  BP: (115-150)/(61-80) 115/62     Weight: 73.3 kg (161 lb 9.6 oz)  Body mass index is 26.89 kg/m².  Body surface area is 1.83 meters squared.      Intake/Output - Last 3 Shifts         05/24 0700  05/25 0659 05/25 0700  05/26 0659 05/26 0700  05/27 0659    P.O. 500 500     I.V. (mL/kg) 830.5 (11.3) 225 (3.1) 1514.9 (20.7)    Blood  215     IV Piggyback 21.8      Total Intake(mL/kg) 1352.3 (18.3) 940 (12.8) 1514.9 (20.7)    Urine (mL/kg/hr) 1900 (1.1) 600 (0.3)     Total Output 1900 600     Net -547.7 +340 +1514.9           Urine Occurrence 2 x              Physical Exam  Vitals reviewed.   Constitutional:       Appearance: Normal appearance. She is well-developed.   HENT:      Head: Normocephalic and atraumatic.      Right Ear: External ear normal.      Left Ear: External ear normal.   Eyes:      Extraocular Movements: Extraocular movements intact.      Conjunctiva/sclera: Conjunctivae normal.   Cardiovascular:      Rate and Rhythm: Normal rate and regular rhythm.      Pulses: Normal pulses.      Heart sounds: Normal heart sounds. No murmur heard.  Pulmonary:      Effort: Pulmonary effort is normal. No respiratory distress.      Breath sounds: No stridor. Wheezing present. No rales.   Abdominal:       General: Bowel sounds are normal. There is no distension.      Palpations: Abdomen is soft.      Tenderness: There is no abdominal tenderness.   Musculoskeletal:         General: Normal range of motion.      Cervical back: Normal range of motion.      Right lower leg: No edema.      Left lower leg: No edema.   Skin:     General: Skin is warm and dry.      Findings: Bruising present. No rash.      Comments: RCWP with no signs of infection   Neurological:      General: No focal deficit present.      Mental Status: She is alert and oriented to person, place, and time.   Psychiatric:         Mood and Affect: Mood normal.         Behavior: Behavior normal.         Thought Content: Thought content normal.         Judgment: Judgment normal.       Significant Labs:   CBC:   Recent Labs   Lab 05/25/22  0439 05/26/22  0402   .30* 42.82*   HGB 8.1* 7.9*   HCT 24.2* 23.1*   PLT 8*  --       and CMP:   Recent Labs   Lab 05/25/22  0439 05/25/22  1531 05/26/22  0402    137 138   K 4.4 4.4 4.1    100 101   CO2 18* 22* 20*   * 337* 341*   BUN 19 23* 25*   CREATININE 0.7 1.1 0.9   CALCIUM 8.4* 8.6* 8.7   PROT 6.3 6.7 6.4   ALBUMIN 3.4* 3.4* 3.4*   BILITOT 0.3 0.4 0.5   ALKPHOS 412* 348* 336*   AST 59* 38 23   ALT 62* 49* 40   ANIONGAP 15 15 17*   EGFRNONAA >60.0 58.3* >60.0         Diagnostic Results:  I have reviewed all pertinent imaging results/findings within the past 24 hours.    Assessment/Plan:     * Leukocytosis  - seen in BMT clinic 5/23 by Dr. Jenkins  - WBC up from 19.5 on 5/19 to 136.5 on 5/23  - see B-ALL for hx of treatment  - continue cytoreduction with dex, increased to 40mg on 5/25, dc on 5/26  - s/p 2mg vincristine 5/24  - high TLS risk, monitoring BID TLS labs    Volume overload  - weight up ~9lbs from admit   - on high rate IVF d/t TLS risk  - on 1-2L O2 via NC, reporting intermittent SOB  - will reassess daily if lasix needed      At high risk of tumor lysis syndrome  Given leukocytosis  with WBC 136K and plan for rapid reduction with PO dex and 1 dose of vincristine, patient at high risk for TLS  - on IVF @75  - continue allopurinol 300mg BID  - monitor TLS labs BID    Anemia in neoplastic disease  - daily CBC while inpatient  - transfuse for Hgb <7    Transaminitis  - significant transaminitis following Blinatumomab  - closely monitor with cytoreduction and vincristine  - slightly increased ALT/AST today following vincristine    Coagulopathy  - high risk with cytoreduction/refractory B-ALL s/p vincristine on 5/24  - monitoring BID INR/fibrinogen  - fib this ; plan for cryo if <100    Thrombocytopenia  - daily CBC while inpatient  - transfuse for Plt <10K or bleeding    Cancer associated pain  - worsening pain to left hip with worsening leukocytosis  - denies trauma or injury to area  - likely d/t leukemia  - oxycodone has been unsuccessful  - reports improvement with 0.5mg dilaudid q6h prn    Moderate major depression  - Continue home trintellix    B-cell acute lymphoblastic leukemia  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 Blincyto held & given steroids x 3 days. Resumed Blincyto 5/17 and again developed CRS, Grade 2 as well as mild AMS and Grade 3 transaminitis. Blincyto stopped with no plan to resume. Discharged on 5/19  - seen today 5/23 by Dr. Jenkins in BMT clinic for follow up to discuss next plan with WBC 136K  - admitted for cytoreduction and dose of vincristine as bridge to inotuzumab + ponatinib  - started 20mg daily dex, increased to 40mg on 5/24 and discontinued 5/26  - s/p 2mg vincristine on 5/24  -  also newly developed T315I bcr-abl resistance mutation; ponatinib Rx pending via specialty pharmacy  - continue ppx acyclovir, fluconazole, levaquin and bactrim    Anxiety  - Continue home trintellix and prn valium    GERD (gastroesophageal reflux disease)  - hold home omeprazole, switch to protonix while inpatient    Hypertension  - continue home losartan    Seizure disorder  - continue home oxcarbazepine    Type 2 diabetes mellitus, without long-term current use of insulin  - hold home farxiga while inpatient; can resume at discharge.  - achs blood glucose monitoring, moderate SSI, and diabetic diet while admitted  - added 7units aspart w/ meals after increasing dex dose  - will likely have hyperglycemia with steroids, consider endocrine consult if plan for long term steroids    Rheumatoid arthritis involving multiple sites  - not currently on any active tx    History of TIA (transient ischemic attack)  - Holding home statin while inpatient  - Holding home ASA while inpatient due to thrombocytopenia. Will hold ASA at discharge. Can be resumed outpatient as appropriate when platelets >50K    Hyperlipidemia  - holding home statin while inpatient. Can resume at discharge if LFTs normalize.    Paroxysmal atrial fibrillation  - continue home diltiazem  - Holding xarelto with plt count < 50K. Can resume outpatient when appropriate.        VTE Risk Mitigation (From admission, onward)         Ordered     heparin, porcine (PF) 100 unit/mL injection flush 300 Units  As needed (PRN)         05/24/22 1021     IP VTE HIGH RISK PATIENT  Once         05/23/22 1552     Place sequential compression device  Until discontinued         05/23/22 1552     Reason for No Pharmacological VTE Prophylaxis  Once        Question:  Reasons:  Answer:  Thrombocytopenia    05/23/22 1552                Disposition: Home    Jan Hooker MD  Bone Marrow Transplant  [facility] - Oncology ([facility])

## 2022-05-26 NOTE — PLAN OF CARE
PRN pain medicine given x 2. Diabetic diet, appetite fair. ACHS, coverage required. Glucose 324, 389, and 292. POC reviewed with patient; understanding verbalized. Pt. with nonskid footwear on, bed in lowest position, and locked with bed rails up x2.  Pt. instructed to call prior to getting OOB.  Pt. has call light and personal items within reach. VSS and afebrile this shift. All questions and concerns addressed at this time. Will continue to monitor.

## 2022-05-26 NOTE — ASSESSMENT & PLAN NOTE
Given leukocytosis with WBC 136K and plan for rapid reduction with PO dex and 1 dose of vincristine, patient at high risk for TLS  - on IVF @75  - continue allopurinol 300mg BID  - monitor TLS labs BID

## 2022-05-26 NOTE — ASSESSMENT & PLAN NOTE
- high risk with cytoreduction/refractory B-ALL s/p vincristine on 5/24  - monitoring BID INR/fibrinogen  - received cryo x2 5/27 ( suspect 1 unit not fully transfused)  - plan for cryo if <100

## 2022-05-26 NOTE — ASSESSMENT & PLAN NOTE
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 Blincyto held & given steroids x 3 days. Resumed Blincyto 5/17 and again developed CRS, Grade 2 as well as mild AMS and Grade 3 transaminitis. Blincyto stopped with no plan to resume. Discharged on 5/19  - seen today 5/23 by Dr. 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

## 2022-05-26 NOTE — ASSESSMENT & PLAN NOTE
- hold home farxiga while inpatient; can resume at discharge.  - achs blood glucose monitoring, moderate SSI, and diabetic diet while admitted  - added 7units aspart w/ meals after increasing dex dose  - will likely have hyperglycemia with steroids, consider endocrine consult if plan for long term steroids

## 2022-05-26 NOTE — ASSESSMENT & PLAN NOTE
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 Blincyto held & given steroids x 3 days. Resumed Blincyto 5/17 and again developed CRS, Grade 2 as well as mild AMS and Grade 3 transaminitis. Blincyto stopped with no plan to resume. Discharged on 5/19  - seen today 5/23 by Dr. 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

## 2022-05-26 NOTE — PLAN OF CARE
Plan of care reviewed with the patient at the beginning of shift. The patient is alert and oriented. GCS 15. Denying complaints at this time. NAEON. Independent and ambulatory. Remained free from falls and injuries throughout shift. IVF infusing. VSS. Bed in low locked position. Call bell and personal items within reach. Will continue to monitor.

## 2022-05-26 NOTE — ASSESSMENT & PLAN NOTE
- seen in BMT clinic 5/23 by Dr. Jenkins  - WBC up from 19.5 on 5/19 to 136.5 on 5/23  - see B-ALL for hx of treatment  - continue cytoreduction with dex, increased to 40mg on 5/25, dc 5/26  - s/p 2mg vincristine 5/24  - high TLS risk, monitoring BID TLS labs

## 2022-05-26 NOTE — ASSESSMENT & PLAN NOTE
- seen in BMT clinic 5/23 by Dr. Jenkins  - WBC up from 19.5 on 5/19 to 136.5 on 5/23  - see B-ALL for hx of treatment  - continue cytoreduction with dex, increased to 40mg on 5/25  - s/p 2mg vincristine 5/24 and 5/25  - high TLS risk, monitoring BID TLS labs

## 2022-05-26 NOTE — SUBJECTIVE & OBJECTIVE
Subjective:     Interval History: Tolerated vincristine dose 5/25 without issue. WBC down to 42k. Continues on dex to 40mg daily. Blood sugars remain in 300s, meal time aspart increased to 7 units TID.    Objective:     Vital Signs (Most Recent):  Temp: 98.1 °F (36.7 °C) (05/26/22 0740)  Pulse: 83 (05/26/22 0740)  Resp: 16 (05/26/22 0740)  BP: 115/62 (05/26/22 0740)  SpO2: 95 % (05/26/22 0740) Vital Signs (24h Range):  Temp:  [97.2 °F (36.2 °C)-98.1 °F (36.7 °C)] 98.1 °F (36.7 °C)  Pulse:  [68-92] 83  Resp:  [14-19] 16  SpO2:  [92 %-98 %] 95 %  BP: (115-150)/(61-80) 115/62     Weight: 73.3 kg (161 lb 9.6 oz)  Body mass index is 26.89 kg/m².  Body surface area is 1.83 meters squared.      Intake/Output - Last 3 Shifts         05/24 0700  05/25 0659 05/25 0700  05/26 0659 05/26 0700  05/27 0659    P.O. 500 500     I.V. (mL/kg) 830.5 (11.3) 225 (3.1) 1514.9 (20.7)    Blood  215     IV Piggyback 21.8      Total Intake(mL/kg) 1352.3 (18.3) 940 (12.8) 1514.9 (20.7)    Urine (mL/kg/hr) 1900 (1.1) 600 (0.3)     Total Output 1900 600     Net -547.7 +340 +1514.9           Urine Occurrence 2 x              Physical Exam  Vitals reviewed.   Constitutional:       Appearance: Normal appearance. She is well-developed.   HENT:      Head: Normocephalic and atraumatic.      Right Ear: External ear normal.      Left Ear: External ear normal.   Eyes:      Extraocular Movements: Extraocular movements intact.      Conjunctiva/sclera: Conjunctivae normal.   Cardiovascular:      Rate and Rhythm: Normal rate and regular rhythm.      Pulses: Normal pulses.      Heart sounds: Normal heart sounds. No murmur heard.  Pulmonary:      Effort: Pulmonary effort is normal. No respiratory distress.      Breath sounds: No stridor. Wheezing present. No rales.   Abdominal:      General: Bowel sounds are normal. There is no distension.      Palpations: Abdomen is soft.      Tenderness: There is no abdominal tenderness.   Musculoskeletal:          General: Normal range of motion.      Cervical back: Normal range of motion.      Right lower leg: No edema.      Left lower leg: No edema.   Skin:     General: Skin is warm and dry.      Findings: Bruising present. No rash.      Comments: RCWP with no signs of infection   Neurological:      General: No focal deficit present.      Mental Status: She is alert and oriented to person, place, and time.   Psychiatric:         Mood and Affect: Mood normal.         Behavior: Behavior normal.         Thought Content: Thought content normal.         Judgment: Judgment normal.       Significant Labs:   CBC:   Recent Labs   Lab 05/25/22  0439 05/26/22  0402   .30* 42.82*   HGB 8.1* 7.9*   HCT 24.2* 23.1*   PLT 8*  --       and CMP:   Recent Labs   Lab 05/25/22  0439 05/25/22  1531 05/26/22  0402    137 138   K 4.4 4.4 4.1    100 101   CO2 18* 22* 20*   * 337* 341*   BUN 19 23* 25*   CREATININE 0.7 1.1 0.9   CALCIUM 8.4* 8.6* 8.7   PROT 6.3 6.7 6.4   ALBUMIN 3.4* 3.4* 3.4*   BILITOT 0.3 0.4 0.5   ALKPHOS 412* 348* 336*   AST 59* 38 23   ALT 62* 49* 40   ANIONGAP 15 15 17*   EGFRNONAA >60.0 58.3* >60.0         Diagnostic Results:  I have reviewed all pertinent imaging results/findings within the past 24 hours.

## 2022-05-27 LAB
ALBUMIN SERPL BCP-MCNC: 3.3 G/DL (ref 3.5–5.2)
ALBUMIN SERPL BCP-MCNC: 3.6 G/DL (ref 3.5–5.2)
ALP SERPL-CCNC: 227 U/L (ref 55–135)
ALP SERPL-CCNC: 253 U/L (ref 55–135)
ALT SERPL W/O P-5'-P-CCNC: 25 U/L (ref 10–44)
ALT SERPL W/O P-5'-P-CCNC: 28 U/L (ref 10–44)
ANION GAP SERPL CALC-SCNC: 12 MMOL/L (ref 8–16)
ANION GAP SERPL CALC-SCNC: 13 MMOL/L (ref 8–16)
ANISOCYTOSIS BLD QL SMEAR: SLIGHT
AST SERPL-CCNC: 11 U/L (ref 10–40)
AST SERPL-CCNC: 9 U/L (ref 10–40)
BASO STIPL BLD QL SMEAR: ABNORMAL
BASOPHILS NFR BLD: 0 % (ref 0–1.9)
BILIRUB SERPL-MCNC: 0.4 MG/DL (ref 0.1–1)
BILIRUB SERPL-MCNC: 0.4 MG/DL (ref 0.1–1)
BLASTS NFR BLD MANUAL: 6 %
BLD PROD TYP BPU: NORMAL
BLOOD UNIT EXPIRATION DATE: NORMAL
BLOOD UNIT TYPE CODE: 5100
BLOOD UNIT TYPE: NORMAL
BUN SERPL-MCNC: 19 MG/DL (ref 6–20)
BUN SERPL-MCNC: 21 MG/DL (ref 6–20)
CALCIUM SERPL-MCNC: 8.3 MG/DL (ref 8.7–10.5)
CALCIUM SERPL-MCNC: 8.7 MG/DL (ref 8.7–10.5)
CHLORIDE SERPL-SCNC: 97 MMOL/L (ref 95–110)
CHLORIDE SERPL-SCNC: 99 MMOL/L (ref 95–110)
CO2 SERPL-SCNC: 23 MMOL/L (ref 23–29)
CO2 SERPL-SCNC: 24 MMOL/L (ref 23–29)
CODING SYSTEM: NORMAL
CREAT SERPL-MCNC: 0.7 MG/DL (ref 0.5–1.4)
CREAT SERPL-MCNC: 0.8 MG/DL (ref 0.5–1.4)
DIFFERENTIAL METHOD: ABNORMAL
DISPENSE STATUS: NORMAL
EOSINOPHIL NFR BLD: 0 % (ref 0–8)
ERYTHROCYTE [DISTWIDTH] IN BLOOD BY AUTOMATED COUNT: 17.4 % (ref 11.5–14.5)
EST. GFR  (AFRICAN AMERICAN): >60 ML/MIN/1.73 M^2
EST. GFR  (AFRICAN AMERICAN): >60 ML/MIN/1.73 M^2
EST. GFR  (NON AFRICAN AMERICAN): >60 ML/MIN/1.73 M^2
EST. GFR  (NON AFRICAN AMERICAN): >60 ML/MIN/1.73 M^2
FIBRINOGEN PPP-MCNC: 94 MG/DL (ref 182–400)
GLUCOSE SERPL-MCNC: 191 MG/DL (ref 70–110)
GLUCOSE SERPL-MCNC: 351 MG/DL (ref 70–110)
HCT VFR BLD AUTO: 20.8 % (ref 37–48.5)
HGB BLD-MCNC: 7.3 G/DL (ref 12–16)
HYPOCHROMIA BLD QL SMEAR: ABNORMAL
IMM GRANULOCYTES # BLD AUTO: ABNORMAL K/UL (ref 0–0.04)
IMM GRANULOCYTES NFR BLD AUTO: ABNORMAL % (ref 0–0.5)
INR PPP: 1.2 (ref 0.8–1.2)
LDH SERPL L TO P-CCNC: 773 U/L (ref 110–260)
LDH SERPL L TO P-CCNC: 865 U/L (ref 110–260)
LYMPHOCYTES NFR BLD: 18 % (ref 18–48)
MAGNESIUM SERPL-MCNC: 2.1 MG/DL (ref 1.6–2.6)
MAGNESIUM SERPL-MCNC: 2.4 MG/DL (ref 1.6–2.6)
MCH RBC QN AUTO: 31.2 PG (ref 27–31)
MCHC RBC AUTO-ENTMCNC: 35.1 G/DL (ref 32–36)
MCV RBC AUTO: 89 FL (ref 82–98)
MONOCYTES NFR BLD: 1 % (ref 4–15)
NEUTROPHILS NFR BLD: 68 % (ref 38–73)
NEUTS BAND NFR BLD MANUAL: 7 %
NRBC BLD-RTO: 0 /100 WBC
PHOSPHATE SERPL-MCNC: 3.5 MG/DL (ref 2.7–4.5)
PHOSPHATE SERPL-MCNC: 3.8 MG/DL (ref 2.7–4.5)
PLATELET # BLD AUTO: 20 K/UL (ref 150–450)
PLATELET BLD QL SMEAR: ABNORMAL
PMV BLD AUTO: 12 FL (ref 9.2–12.9)
POCT GLUCOSE: 214 MG/DL (ref 70–110)
POCT GLUCOSE: 255 MG/DL (ref 70–110)
POCT GLUCOSE: 322 MG/DL (ref 70–110)
POCT GLUCOSE: 368 MG/DL (ref 70–110)
POIKILOCYTOSIS BLD QL SMEAR: SLIGHT
POLYCHROMASIA BLD QL SMEAR: ABNORMAL
POTASSIUM SERPL-SCNC: 4 MMOL/L (ref 3.5–5.1)
POTASSIUM SERPL-SCNC: 4.2 MMOL/L (ref 3.5–5.1)
PROT SERPL-MCNC: 5.9 G/DL (ref 6–8.4)
PROT SERPL-MCNC: 6.6 G/DL (ref 6–8.4)
PROTHROMBIN TIME: 12.5 SEC (ref 9–12.5)
RBC # BLD AUTO: 2.34 M/UL (ref 4–5.4)
SMUDGE CELLS BLD QL SMEAR: PRESENT
SODIUM SERPL-SCNC: 134 MMOL/L (ref 136–145)
SODIUM SERPL-SCNC: 134 MMOL/L (ref 136–145)
UNIT NUMBER: NORMAL
URATE SERPL-MCNC: 1.6 MG/DL (ref 2.4–5.7)
URATE SERPL-MCNC: 1.9 MG/DL (ref 2.4–5.7)
WBC # BLD AUTO: 11.27 K/UL (ref 3.9–12.7)

## 2022-05-27 PROCEDURE — P9012 CRYOPRECIPITATE EACH UNIT: HCPCS | Performed by: STUDENT IN AN ORGANIZED HEALTH CARE EDUCATION/TRAINING PROGRAM

## 2022-05-27 PROCEDURE — 85007 BL SMEAR W/DIFF WBC COUNT: CPT | Performed by: NURSE PRACTITIONER

## 2022-05-27 PROCEDURE — 80053 COMPREHEN METABOLIC PANEL: CPT | Mod: 91 | Performed by: NURSE PRACTITIONER

## 2022-05-27 PROCEDURE — 97110 THERAPEUTIC EXERCISES: CPT

## 2022-05-27 PROCEDURE — 83735 ASSAY OF MAGNESIUM: CPT | Mod: 91 | Performed by: NURSE PRACTITIONER

## 2022-05-27 PROCEDURE — 83615 LACTATE (LD) (LDH) ENZYME: CPT | Mod: 91 | Performed by: NURSE PRACTITIONER

## 2022-05-27 PROCEDURE — 84550 ASSAY OF BLOOD/URIC ACID: CPT | Mod: 91 | Performed by: NURSE PRACTITIONER

## 2022-05-27 PROCEDURE — 85384 FIBRINOGEN ACTIVITY: CPT | Performed by: NURSE PRACTITIONER

## 2022-05-27 PROCEDURE — 25000003 PHARM REV CODE 250: Performed by: INTERNAL MEDICINE

## 2022-05-27 PROCEDURE — 99233 PR SUBSEQUENT HOSPITAL CARE,LEVL III: ICD-10-PCS | Mod: ,,, | Performed by: INTERNAL MEDICINE

## 2022-05-27 PROCEDURE — 85610 PROTHROMBIN TIME: CPT | Performed by: NURSE PRACTITIONER

## 2022-05-27 PROCEDURE — 25000003 PHARM REV CODE 250: Performed by: NURSE PRACTITIONER

## 2022-05-27 PROCEDURE — 20600001 HC STEP DOWN PRIVATE ROOM

## 2022-05-27 PROCEDURE — 85027 COMPLETE CBC AUTOMATED: CPT | Performed by: NURSE PRACTITIONER

## 2022-05-27 PROCEDURE — 99233 SBSQ HOSP IP/OBS HIGH 50: CPT | Mod: ,,, | Performed by: INTERNAL MEDICINE

## 2022-05-27 PROCEDURE — 84100 ASSAY OF PHOSPHORUS: CPT | Performed by: NURSE PRACTITIONER

## 2022-05-27 PROCEDURE — 97116 GAIT TRAINING THERAPY: CPT | Mod: CQ

## 2022-05-27 PROCEDURE — 36430 TRANSFUSION BLD/BLD COMPNT: CPT

## 2022-05-27 PROCEDURE — 63600175 PHARM REV CODE 636 W HCPCS: Performed by: NURSE PRACTITIONER

## 2022-05-27 PROCEDURE — 86965 POOLING BLOOD PLATELETS: CPT | Performed by: STUDENT IN AN ORGANIZED HEALTH CARE EDUCATION/TRAINING PROGRAM

## 2022-05-27 RX ORDER — HYDROCODONE BITARTRATE AND ACETAMINOPHEN 500; 5 MG/1; MG/1
TABLET ORAL
Status: DISCONTINUED | OUTPATIENT
Start: 2022-05-27 | End: 2022-05-29

## 2022-05-27 RX ORDER — ALLOPURINOL 300 MG/1
300 TABLET ORAL DAILY
Status: DISCONTINUED | OUTPATIENT
Start: 2022-05-28 | End: 2022-06-02

## 2022-05-27 RX ADMIN — ALLOPURINOL 300 MG: 300 TABLET ORAL at 09:05

## 2022-05-27 RX ADMIN — LOSARTAN POTASSIUM 25 MG: 25 TABLET, FILM COATED ORAL at 12:05

## 2022-05-27 RX ADMIN — BUSPIRONE HYDROCHLORIDE 10 MG: 10 TABLET ORAL at 09:05

## 2022-05-27 RX ADMIN — FLUCONAZOLE 400 MG: 200 TABLET ORAL at 04:05

## 2022-05-27 RX ADMIN — GABAPENTIN 300 MG: 300 CAPSULE ORAL at 12:05

## 2022-05-27 RX ADMIN — INSULIN ASPART 10 UNITS: 100 INJECTION, SOLUTION INTRAVENOUS; SUBCUTANEOUS at 09:05

## 2022-05-27 RX ADMIN — INSULIN ASPART 8 UNITS: 100 INJECTION, SOLUTION INTRAVENOUS; SUBCUTANEOUS at 09:05

## 2022-05-27 RX ADMIN — INSULIN ASPART 4 UNITS: 100 INJECTION, SOLUTION INTRAVENOUS; SUBCUTANEOUS at 01:05

## 2022-05-27 RX ADMIN — VORTIOXETINE 20 MG: 10 TABLET, FILM COATED ORAL at 09:05

## 2022-05-27 RX ADMIN — PANTOPRAZOLE SODIUM 40 MG: 40 TABLET, DELAYED RELEASE ORAL at 09:05

## 2022-05-27 RX ADMIN — ACYCLOVIR 400 MG: 200 CAPSULE ORAL at 09:05

## 2022-05-27 RX ADMIN — HYDROXYZINE HYDROCHLORIDE 50 MG: 25 TABLET, FILM COATED ORAL at 04:05

## 2022-05-27 RX ADMIN — DILTIAZEM HYDROCHLORIDE 90 MG: 30 TABLET, FILM COATED ORAL at 09:05

## 2022-05-27 RX ADMIN — HYDROMORPHONE HYDROCHLORIDE 0.5 MG: 1 INJECTION, SOLUTION INTRAMUSCULAR; INTRAVENOUS; SUBCUTANEOUS at 07:05

## 2022-05-27 RX ADMIN — DILTIAZEM HYDROCHLORIDE 90 MG: 30 TABLET, FILM COATED ORAL at 03:05

## 2022-05-27 RX ADMIN — LEVOFLOXACIN 500 MG: 500 TABLET, FILM COATED ORAL at 04:05

## 2022-05-27 RX ADMIN — INSULIN ASPART 10 UNITS: 100 INJECTION, SOLUTION INTRAVENOUS; SUBCUTANEOUS at 06:05

## 2022-05-27 RX ADMIN — INSULIN ASPART 10 UNITS: 100 INJECTION, SOLUTION INTRAVENOUS; SUBCUTANEOUS at 01:05

## 2022-05-27 RX ADMIN — HYDROMORPHONE HYDROCHLORIDE 0.5 MG: 1 INJECTION, SOLUTION INTRAMUSCULAR; INTRAVENOUS; SUBCUTANEOUS at 03:05

## 2022-05-27 RX ADMIN — DILTIAZEM HYDROCHLORIDE 90 MG: 30 TABLET, FILM COATED ORAL at 04:05

## 2022-05-27 RX ADMIN — ONDANSETRON 8 MG: 8 TABLET, ORALLY DISINTEGRATING ORAL at 09:05

## 2022-05-27 RX ADMIN — HYDROMORPHONE HYDROCHLORIDE 0.5 MG: 1 INJECTION, SOLUTION INTRAMUSCULAR; INTRAVENOUS; SUBCUTANEOUS at 09:05

## 2022-05-27 RX ADMIN — QUETIAPINE FUMARATE 200 MG: 200 TABLET, FILM COATED ORAL at 11:05

## 2022-05-27 RX ADMIN — HYDROXYZINE HYDROCHLORIDE 50 MG: 25 TABLET, FILM COATED ORAL at 03:05

## 2022-05-27 RX ADMIN — SULFAMETHOXAZOLE AND TRIMETHOPRIM 1 TABLET: 800; 160 TABLET ORAL at 03:05

## 2022-05-27 RX ADMIN — GABAPENTIN 300 MG: 300 CAPSULE ORAL at 08:05

## 2022-05-27 RX ADMIN — GABAPENTIN 300 MG: 300 CAPSULE ORAL at 04:05

## 2022-05-27 RX ADMIN — INSULIN ASPART 6 UNITS: 100 INJECTION, SOLUTION INTRAVENOUS; SUBCUTANEOUS at 06:05

## 2022-05-27 RX ADMIN — SODIUM CHLORIDE: 0.9 INJECTION, SOLUTION INTRAVENOUS at 03:05

## 2022-05-27 RX ADMIN — OXCARBAZEPINE 1200 MG: 600 TABLET, FILM COATED ORAL at 09:05

## 2022-05-27 RX ADMIN — OXCARBAZEPINE 1200 MG: 600 TABLET, FILM COATED ORAL at 11:05

## 2022-05-27 RX ADMIN — OXCARBAZEPINE 1200 MG: 600 TABLET, FILM COATED ORAL at 12:05

## 2022-05-27 RX ADMIN — HYDROMORPHONE HYDROCHLORIDE 0.5 MG: 1 INJECTION, SOLUTION INTRAMUSCULAR; INTRAVENOUS; SUBCUTANEOUS at 05:05

## 2022-05-27 NOTE — PT/OT/SLP PROGRESS
Occupational Therapy   Treatment    Name: Deepti Gonzalez  MRN: [id number]  Admitting Diagnosis:  Leukocytosis       Recommendations:     Discharge Recommendations: home  Discharge Equipment Recommendations:  wheelchair, walker, rolling  Barriers to discharge:  None    Assessment:     Deepti Gonzalez is a 51 y.o. female with a medical diagnosis of Leukocytosis.  She presents with minimal weakness and dizziness with fast turns. Pt was motivated for therapy and participated well in session. Performance deficits affecting function are weakness, impaired endurance, pain, decreased coordination, impaired balance.     Rehab Prognosis:  Good; patient would benefit from acute skilled OT services to address these deficits and reach maximum level of function.       Plan:     Patient to be seen 3 x/week to address the above listed problems via self-care/home management, therapeutic activities, therapeutic exercises  · Plan of Care Expires: 06/27/22  · Plan of Care Reviewed with: patient, family    Subjective     Pain/Comfort:  Pain Rating 1: 0/10  Pain Addressed 1: Reposition, Distraction  Pain Rating Post-Intervention 1: 0/10    Objective:     Communicated with: nurse prior to session.  Patient found HOB elevated with peripheral IV upon OT entry to room.  and daughter present during session.    General Precautions: Standard, fall   Orthopedic Precautions:N/A   Braces: N/A  Respiratory Status: Room air (1L O2 nasal cannula removed by Pt before OT entrance due to nose bleeding from the cannula/O2. Nurse notified and aware)     Occupational Performance:     Bed Mobility:    · Patient completed Scooting/Bridging with stand by assistance  · Patient completed Supine to Sit with stand by assistance  · Patient completed Sit to Supine with stand by assistance     Functional Mobility/Transfers:  · Patient completed Sit <> Stand Transfer with stand by assistance  with  no assistive device   · Patient completed Toilet Transfer  Step Transfer technique with stand by assistance with  no AD  Functional Mobility: Pt performed functional mobility from EOB to bathroom for grooming activity and returned to EOB for EOB activities with SBA and no AD. Pt then performed functional mobility from EOB to other side of room and back for ~34 feet with no assistive device and SBA to increase endurance required for ADL performance.    Activities of Daily Living:  · Grooming: stand by assistance to wash face with cold water and wash hands while standing at sink.  · Lower Body Dressing: stand by assistance to don socks in figure four position while seated EOB.  · Toileting: stand by assistance for dynamic standing after performing toileting hygiene at toilet.      [facility] 6 Click ADL: 21    Treatment & Education:  Pt performed BUE AROM exercises and active BLE exercises for 15x repetitions while seated EOB with SBA to increase strength and endurance throughout the BUE and BLE required for ADL performance. Patient performed the following exercises: Shoulder flexion (to 90 due to port insertion in R chest area), chest press, elbow flexion/extension, grasping, leg extension, and leg pumps.     Pt demonstrated good dynamic sitting balance while donning socks and performing UE exercises at EOB.    Pt demonstrated good balance during functional mobility with one noted moment of dizziness while turning in place. Pt able to self correct safely and return to EOB with no further complaints of dizziness.    Pt had no further questions & when asked whether there were any concerns pt reported none.    Patient left HOB elevated with all lines intact, call button in reach, nurse notified and family presentEducation:      GOALS:   Multidisciplinary Problems     Occupational Therapy Goals        Problem: Occupational Therapy    Goal Priority Disciplines Outcome Interventions   Occupational Therapy Goal     OT, PT/OT Ongoing, Progressing    Description: Goals to be met by:  5/31/22    Patient will increase functional independence with ADLs by performing:    Grooming while standing at sink with Modified [place].  Toileting from toilet with [place] for hygiene and clothing management.   Supine to sit with [place].  Toilet transfer to toilet with Modified [place].                     Time Tracking:     OT Date of Treatment: 05/27/22  OT Start Time: 0843  OT Stop Time: 0859  OT Total Time (min): 16 min    Billable Minutes:Therapeutic Exercise 16    OT/REZA: OT          5/27/2022

## 2022-05-27 NOTE — PLAN OF CARE
Plan of care reviewed with the patient at the beginning of shift. The patient is alert and oriented. GCS 15. Denying complaints at this time. Some complaints of pain that were well controlled with PRN medication. 1U cryo administered. IVF infusing. Port needle changed. VSS. Bed in low locked position. Call bell and personal items within reach. Will continue to monitor.

## 2022-05-27 NOTE — PLAN OF CARE
Problem: Occupational Therapy  Goal: Occupational Therapy Goal  Description: Goals to be met by: 5/31/22    Patient will increase functional independence with ADLs by performing:    Grooming while standing at sink with Modified [place].  Toileting from toilet with [place] for hygiene and clothing management.   Supine to sit with [place].  Toilet transfer to toilet with Modified [place].    Outcome: Ongoing, Progressing   Goals were made appropriate.

## 2022-05-27 NOTE — SUBJECTIVE & OBJECTIVE
Subjective:     Interval History: WBC down to 11k. Blood sugars remain elevated - insulin titrated up to 10 units with meals and 10 units BID. Panotinib still pending insurance authorization.      Objective:     Vital Signs (Most Recent):  Temp: 99.1 °F (37.3 °C) (05/27/22 1115)  Pulse: 81 (05/27/22 1115)  Resp: 16 (05/27/22 1115)  BP: (!) 141/65 (05/27/22 1115)  SpO2: 98 % (05/27/22 1115) Vital Signs (24h Range):  Temp:  [97.8 °F (36.6 °C)-99.1 °F (37.3 °C)] 99.1 °F (37.3 °C)  Pulse:  [71-85] 81  Resp:  [16-18] 16  SpO2:  [97 %-99 %] 98 %  BP: (117-155)/(55-74) 141/65     Weight: 73.3 kg (161 lb 9.6 oz)  Body mass index is 26.89 kg/m².  Body surface area is 1.83 meters squared.      Intake/Output - Last 3 Shifts         05/25 0700  05/26 0659 05/26 0700  05/27 0659 05/27 0700  05/28 0659    P.O. 500 700     I.V. (mL/kg) 225 (3.1) 2337.9 (31.9)     Blood 215 70     IV Piggyback       Total Intake(mL/kg) 940 (12.8) 3107.9 (42.4)     Urine (mL/kg/hr) 600 (0.3) 3300 (1.9)     Total Output 600 3300     Net +340 -192.1                    Physical Exam  Vitals reviewed.   Constitutional:       Appearance: Normal appearance. She is well-developed.   HENT:      Head: Normocephalic and atraumatic.      Right Ear: External ear normal.      Left Ear: External ear normal.   Eyes:      Extraocular Movements: Extraocular movements intact.      Conjunctiva/sclera: Conjunctivae normal.   Cardiovascular:      Rate and Rhythm: Normal rate and regular rhythm.      Pulses: Normal pulses.      Heart sounds: Normal heart sounds. No murmur heard.  Pulmonary:      Effort: Pulmonary effort is normal. No respiratory distress.      Breath sounds: No stridor. No wheezing or rales.   Abdominal:      General: Bowel sounds are normal. There is no distension.      Palpations: Abdomen is soft.      Tenderness: There is no abdominal tenderness.   Musculoskeletal:         General: Normal range of motion.      Cervical back: Normal range of motion.       Right lower leg: No edema.      Left lower leg: No edema.   Skin:     General: Skin is warm and dry.      Findings: Bruising present. No rash.      Comments: RCWP with no signs of infection   Neurological:      General: No focal deficit present.      Mental Status: She is alert and oriented to person, place, and time.   Psychiatric:         Mood and Affect: Mood normal.         Behavior: Behavior normal.         Thought Content: Thought content normal.         Judgment: Judgment normal.       Significant Labs:   CBC:   Recent Labs   Lab 05/26/22  0402 05/27/22  0550   WBC 42.82* 11.27   HGB 7.9* 7.3*   HCT 23.1* 20.8*   PLT 34* 20*      and CMP:   Recent Labs   Lab 05/26/22  0402 05/26/22  1642 05/26/22  2111 05/27/22  0550    136 135* 134*   K 4.1 4.2 4.4 4.2    102 101 99   CO2 20* 23 24 23   * 268* 256* 351*   BUN 25* 21* 21* 19   CREATININE 0.9 0.7 0.7 0.8   CALCIUM 8.7 8.7 8.6* 8.3*   PROT 6.4 5.9*  --  5.9*   ALBUMIN 3.4* 3.4*  --  3.3*   BILITOT 0.5 0.4  --  0.4   ALKPHOS 336* 272*  --  253*   AST 23 12  --  11   ALT 40 33  --  28   ANIONGAP 17* 11 10 12   EGFRNONAA >60.0 >60.0 >60.0 >60.0         Diagnostic Results:  I have reviewed all pertinent imaging results/findings within the past 24 hours.

## 2022-05-27 NOTE — ASSESSMENT & PLAN NOTE
- hold home farxiga while inpatient; can resume at discharge.  - achs blood glucose monitoring, moderate SSI, and diabetic diet while admitted  - added 10units aspart w/ meals  And 10 levemir BID

## 2022-05-27 NOTE — PROGRESS NOTES
[facility] ([facility])  Hematology  Bone Marrow Transplant  Progress Note    Patient Name: Deepti Gonzalez  Admission Date: 5/23/2022  Hospital Length of Stay: 4 days  Code Status: Full Code    Subjective:     Interval History: WBC down to 11k. Blood sugars remain elevated - insulin titrated up to 10 units with meals and 10 units BID. Panotinib still pending insurance authorization.      Objective:     Vital Signs (Most Recent):  Temp: 99.1 °F (37.3 °C) (05/27/22 1115)  Pulse: 81 (05/27/22 1115)  Resp: 16 (05/27/22 1115)  BP: (!) 141/65 (05/27/22 1115)  SpO2: 98 % (05/27/22 1115) Vital Signs (24h Range):  Temp:  [97.8 °F (36.6 °C)-99.1 °F (37.3 °C)] 99.1 °F (37.3 °C)  Pulse:  [71-85] 81  Resp:  [16-18] 16  SpO2:  [97 %-99 %] 98 %  BP: (117-155)/(55-74) 141/65     Weight: 73.3 kg (161 lb 9.6 oz)  Body mass index is 26.89 kg/m².  Body surface area is 1.83 meters squared.      Intake/Output - Last 3 Shifts         05/25 0700  05/26 0659 05/26 0700  05/27 0659 05/27 0700  05/28 0659    P.O. 500 700     I.V. (mL/kg) 225 (3.1) 2337.9 (31.9)     Blood 215 70     IV Piggyback       Total Intake(mL/kg) 940 (12.8) 3107.9 (42.4)     Urine (mL/kg/hr) 600 (0.3) 3300 (1.9)     Total Output 600 3300     Net +340 -192.1                    Physical Exam  Vitals reviewed.   Constitutional:       Appearance: Normal appearance. She is well-developed.   HENT:      Head: Normocephalic and atraumatic.      Right Ear: External ear normal.      Left Ear: External ear normal.   Eyes:      Extraocular Movements: Extraocular movements intact.      Conjunctiva/sclera: Conjunctivae normal.   Cardiovascular:      Rate and Rhythm: Normal rate and regular rhythm.      Pulses: Normal pulses.      Heart sounds: Normal heart sounds. No murmur heard.  Pulmonary:      Effort: Pulmonary effort is normal. No respiratory distress.      Breath sounds: No stridor. No wheezing or rales.   Abdominal:      General: Bowel sounds are normal. There is no  distension.      Palpations: Abdomen is soft.      Tenderness: There is no abdominal tenderness.   Musculoskeletal:         General: Normal range of motion.      Cervical back: Normal range of motion.      Right lower leg: No edema.      Left lower leg: No edema.   Skin:     General: Skin is warm and dry.      Findings: Bruising present. No rash.      Comments: RCWP with no signs of infection   Neurological:      General: No focal deficit present.      Mental Status: She is alert and oriented to person, place, and time.   Psychiatric:         Mood and Affect: Mood normal.         Behavior: Behavior normal.         Thought Content: Thought content normal.         Judgment: Judgment normal.       Significant Labs:   CBC:   Recent Labs   Lab 05/26/22  0402 05/27/22  0550   WBC 42.82* 11.27   HGB 7.9* 7.3*   HCT 23.1* 20.8*   PLT 34* 20*      and CMP:   Recent Labs   Lab 05/26/22  0402 05/26/22  1642 05/26/22  2111 05/27/22  0550    136 135* 134*   K 4.1 4.2 4.4 4.2    102 101 99   CO2 20* 23 24 23   * 268* 256* 351*   BUN 25* 21* 21* 19   CREATININE 0.9 0.7 0.7 0.8   CALCIUM 8.7 8.7 8.6* 8.3*   PROT 6.4 5.9*  --  5.9*   ALBUMIN 3.4* 3.4*  --  3.3*   BILITOT 0.5 0.4  --  0.4   ALKPHOS 336* 272*  --  253*   AST 23 12  --  11   ALT 40 33  --  28   ANIONGAP 17* 11 10 12   EGFRNONAA >60.0 >60.0 >60.0 >60.0         Diagnostic Results:  I have reviewed all pertinent imaging results/findings within the past 24 hours.    Assessment/Plan:     * Leukocytosis  - seen in BMT clinic 5/23 by Dr. 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  Given leukocytosis with WBC 136K and plan for rapid reduction with PO dex and 1 dose of vincristine, patient at high risk for TLS  - on IVF @75  - continue allopurinol 300mg BID  - monitor TLS labs BID    Anemia in neoplastic disease  - daily CBC while inpatient  - transfuse for Hgb <7    Transaminitis  - significant transaminitis following Blinatumomab  - closely monitor with cytoreduction and vincristine  - slightly increased ALT/AST today following vincristine    Coagulopathy  - high risk with cytoreduction/refractory B-ALL s/p vincristine on 5/24  - monitoring BID INR/fibrinogen  - received cryo x2 5/27 ( suspect 1 unit not fully transfused)  - plan for cryo if <100    Thrombocytopenia  - daily CBC while inpatient  - transfuse for Plt <10K or bleeding    Cancer associated pain  - worsening pain to left hip with worsening leukocytosis  - denies trauma or injury to area  - likely d/t leukemia  - oxycodone has been unsuccessful  - reports improvement with 0.5mg dilaudid q6h prn    Moderate major depression  - Continue home trintellix    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 Blincyto held & given steroids x 3 days. Resumed Blincyto 5/17 and again developed CRS, Grade 2 as well as mild AMS and Grade 3 transaminitis. Blincyto stopped with no plan to resume. Discharged on 5/19  - seen today 5/23 by Dr. 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  - achs blood glucose monitoring, moderate SSI, and diabetic diet while admitted  - added 10units aspart w/ meals  And 10 levemir BID      Rheumatoid arthritis involving multiple sites  - not currently on any active tx    History of TIA (transient ischemic attack)  - Holding home statin while inpatient  - Holding home ASA while inpatient due to thrombocytopenia. Will hold ASA at discharge. Can be resumed outpatient as appropriate when platelets >50K    Hyperlipidemia  - holding home statin while inpatient. Can resume at discharge if LFTs normalize.    Paroxysmal atrial fibrillation  - continue home diltiazem  - Holding xarelto with plt count < 50K. Can resume outpatient when appropriate.        VTE Risk Mitigation (From admission, onward)         Ordered     heparin, porcine (PF) 100 unit/mL injection flush 300 Units  As needed (PRN)         05/24/22 1021     IP VTE HIGH RISK PATIENT  Once         05/23/22 1552     Place sequential compression device  Until discontinued         05/23/22 1552     Reason for No Pharmacological VTE Prophylaxis  Once        Question:  Reasons:  Answer:  Thrombocytopenia    05/23/22 1552                Disposition: Home  Jan Hooker MD  Bone Marrow Transplant  [facility] - Oncology ([facility])

## 2022-05-27 NOTE — PLAN OF CARE
Plan of care reviewed. Patient is oriented X4. Up with assist. Right chest port infusing NS @ 75 ml/hr. Complained of pain; PRN IV dilaudid administered. Remained afebrile. HS ; SS and scheduled long acting insulin administered. Received 1 unit of cryo. All questions and concerns addressed. Family remains at bedside. All safety precautions in place. Remains free of injury. Patient is stable. All needs met at this time.

## 2022-05-27 NOTE — PT/OT/SLP PROGRESS
"Physical Therapy Treatment    Patient Name:  Deepti Gonzalez   MRN:  [id number]    Recommendations:     Discharge Recommendations:  home health PT   Discharge Equipment Recommendations: wheelchair, walker, rolling   Barriers to discharge: None    Assessment:     Deepti Gonzalez is a 51 y.o. female admitted with a medical diagnosis of Leukocytosis.  She presents with the following impairments/functional limitations:  weakness, impaired endurance, pain, decreased coordination.  Pt appeared more alert and demonstrated better balance during today session. Pt was able to ambulate ~65ft with CGA and noAD. No rest break and no LOB noted. Pt is progressing well and continues to benefit from therapy to achieve highest level of independence prior to discharge.    Rehab Prognosis: Good; patient would benefit from acute skilled PT services to address these deficits and reach maximum level of function.    Recent Surgery: * No surgery found *      Plan:     During this hospitalization, patient to be seen 3 x/week to address the identified rehab impairments via gait training, therapeutic activities, therapeutic exercises, neuromuscular re-education and progress toward the following goals:    · Plan of Care Expires:  06/21/22    Subjective     Chief Complaint: pain in BLE   Patient/Family Comments/goals: "sorry if I fell on you yesterday, I took 2 medicines too close together"  Pain/Comfort:  · Pain Rating 1: 4/10  · Location - Side 1: Bilateral  · Location 1: leg  · Pain Addressed 1: Pre-medicate for activity, Reposition, Distraction  · Pain Rating Post-Intervention 1: 4/10      Objective:     Communicated with Rn prior to session.  Patient found supine with peripheral IV upon PT entry to room. Pt's family present.    General Precautions: Standard, fall   Orthopedic Precautions:N/A   Braces: N/A  Respiratory Status: Room air SpO2 above 97%     Functional Mobility:  · Bed Mobility:     · Scooting to EOB: supervision  · Supine to " Sit: supervision with bed rails  · Sit to Supine: supervision  · Transfers:     · Sit to Stand:  stand by assistance with no AD  · Gait: pt ambulated ~65ft with noAD and CGA. Pt demonstrated mostly steady gait with slight flexed posture and decrease step length. No rest break and no LOB noted.     Balance:   · Static/Dynamic sitting EOB balance: good, SBA for safety   · x5 minutes; completed therex and donned socks.       AM-PAC 6 CLICK MOBILITY  Turning over in bed (including adjusting bedclothes, sheets and blankets)?: 4  Sitting down on and standing up from a chair with arms (e.g., wheelchair, bedside commode, etc.): 3  Moving from lying on back to sitting on the side of the bed?: 3  Moving to and from a bed to a chair (including a wheelchair)?: 3  Need to walk in hospital room?: 3  Climbing 3-5 steps with a railing?: 2  Basic Mobility Total Score: 18       Therapeutic Activities and Exercises:   Seated BLE therex x15 reps: heel/toes raises, LAQ, marching, and hip abd/add with pillow  Patient educated on role of therapy, goals of session, and benefits of out of bed mobility.   Instructed on use of call button and importance of calling nursing staff for assistance with mobility   Questions/concerns addressed within PTA scope of practice  Pt verbalized understanding.      Patient left HOB elevated with all lines intact, call button in reach and RN present..    GOALS:   Multidisciplinary Problems     Physical Therapy Goals        Problem: Physical Therapy    Goal Priority Disciplines Outcome Goal Variances Interventions   Physical Therapy Goal     PT, PT/OT Ongoing, Progressing     Description: Goals to be met by: 22     Patient will increase functional independence with mobility by performin. Supine to sit with Modified [place]  2. Sit to supine with Modified [place]  3. Sit to stand transfer with Stand-by Assistance  4. Gait  x >50 feet with Contact Guard Assistance using rollator.  5. Lower  extremity exercise program x 10 reps per handout, with independence                       Time Tracking:     PT Received On: 05/27/22  PT Start Time: 0921     PT Stop Time: 0935  PT Total Time (min): 14 min     Billable Minutes: Gait Training 14    Treatment Type: Treatment  PT/PTA: PTA     PTA Visit Number: 2     05/27/2022

## 2022-05-28 LAB
ABO + RH BLD: NORMAL
ALBUMIN SERPL BCP-MCNC: 3.2 G/DL (ref 3.5–5.2)
ALBUMIN SERPL BCP-MCNC: 3.4 G/DL (ref 3.5–5.2)
ALP SERPL-CCNC: 184 U/L (ref 55–135)
ALP SERPL-CCNC: 188 U/L (ref 55–135)
ALT SERPL W/O P-5'-P-CCNC: 22 U/L (ref 10–44)
ALT SERPL W/O P-5'-P-CCNC: 23 U/L (ref 10–44)
ANION GAP SERPL CALC-SCNC: 10 MMOL/L (ref 8–16)
ANION GAP SERPL CALC-SCNC: 11 MMOL/L (ref 8–16)
ANISOCYTOSIS BLD QL SMEAR: SLIGHT
AST SERPL-CCNC: 10 U/L (ref 10–40)
AST SERPL-CCNC: 11 U/L (ref 10–40)
BASOPHILS # BLD AUTO: 0.01 K/UL (ref 0–0.2)
BASOPHILS NFR BLD: 0.1 % (ref 0–1.9)
BILIRUB SERPL-MCNC: 0.4 MG/DL (ref 0.1–1)
BILIRUB SERPL-MCNC: 0.4 MG/DL (ref 0.1–1)
BLD GP AB SCN CELLS X3 SERPL QL: NORMAL
BLD PROD TYP BPU: NORMAL
BLOOD UNIT EXPIRATION DATE: NORMAL
BLOOD UNIT TYPE CODE: 7300
BLOOD UNIT TYPE: NORMAL
BUN SERPL-MCNC: 17 MG/DL (ref 6–20)
BUN SERPL-MCNC: 22 MG/DL (ref 6–20)
CALCIUM SERPL-MCNC: 8.2 MG/DL (ref 8.7–10.5)
CALCIUM SERPL-MCNC: 8.4 MG/DL (ref 8.7–10.5)
CHLORIDE SERPL-SCNC: 101 MMOL/L (ref 95–110)
CHLORIDE SERPL-SCNC: 95 MMOL/L (ref 95–110)
CO2 SERPL-SCNC: 24 MMOL/L (ref 23–29)
CO2 SERPL-SCNC: 25 MMOL/L (ref 23–29)
CODING SYSTEM: NORMAL
CREAT SERPL-MCNC: 0.6 MG/DL (ref 0.5–1.4)
CREAT SERPL-MCNC: 0.7 MG/DL (ref 0.5–1.4)
DIFFERENTIAL METHOD: ABNORMAL
DISPENSE STATUS: NORMAL
EOSINOPHIL # BLD AUTO: 0 K/UL (ref 0–0.5)
EOSINOPHIL NFR BLD: 0 % (ref 0–8)
ERYTHROCYTE [DISTWIDTH] IN BLOOD BY AUTOMATED COUNT: 17.2 % (ref 11.5–14.5)
EST. GFR  (AFRICAN AMERICAN): >60 ML/MIN/1.73 M^2
EST. GFR  (AFRICAN AMERICAN): >60 ML/MIN/1.73 M^2
EST. GFR  (NON AFRICAN AMERICAN): >60 ML/MIN/1.73 M^2
EST. GFR  (NON AFRICAN AMERICAN): >60 ML/MIN/1.73 M^2
FIBRINOGEN PPP-MCNC: 117 MG/DL (ref 182–400)
FIBRINOGEN PPP-MCNC: 120 MG/DL (ref 182–400)
GLUCOSE SERPL-MCNC: 143 MG/DL (ref 70–110)
GLUCOSE SERPL-MCNC: 145 MG/DL (ref 70–110)
HCT VFR BLD AUTO: 19.3 % (ref 37–48.5)
HGB BLD-MCNC: 6.5 G/DL (ref 12–16)
HYPOCHROMIA BLD QL SMEAR: ABNORMAL
IMM GRANULOCYTES # BLD AUTO: 0.16 K/UL (ref 0–0.04)
IMM GRANULOCYTES NFR BLD AUTO: 2 % (ref 0–0.5)
INR PPP: 1.1 (ref 0.8–1.2)
LDH SERPL L TO P-CCNC: 554 U/L (ref 110–260)
LDH SERPL L TO P-CCNC: 559 U/L (ref 110–260)
LYMPHOCYTES # BLD AUTO: 2.9 K/UL (ref 1–4.8)
LYMPHOCYTES NFR BLD: 36.6 % (ref 18–48)
MAGNESIUM SERPL-MCNC: 1.9 MG/DL (ref 1.6–2.6)
MAGNESIUM SERPL-MCNC: 2 MG/DL (ref 1.6–2.6)
MCH RBC QN AUTO: 31 PG (ref 27–31)
MCHC RBC AUTO-ENTMCNC: 33.7 G/DL (ref 32–36)
MCV RBC AUTO: 92 FL (ref 82–98)
MONOCYTES # BLD AUTO: 0.8 K/UL (ref 0.3–1)
MONOCYTES NFR BLD: 9.5 % (ref 4–15)
NEUTROPHILS # BLD AUTO: 4.1 K/UL (ref 1.8–7.7)
NEUTROPHILS NFR BLD: 51.8 % (ref 38–73)
NRBC BLD-RTO: 0 /100 WBC
NUM UNITS TRANS PACKED RBC: NORMAL
OVALOCYTES BLD QL SMEAR: ABNORMAL
PHOSPHATE SERPL-MCNC: 4 MG/DL (ref 2.7–4.5)
PHOSPHATE SERPL-MCNC: 4.4 MG/DL (ref 2.7–4.5)
PLATELET # BLD AUTO: 15 K/UL (ref 150–450)
PMV BLD AUTO: ABNORMAL FL (ref 9.2–12.9)
POCT GLUCOSE: 135 MG/DL (ref 70–110)
POCT GLUCOSE: 149 MG/DL (ref 70–110)
POCT GLUCOSE: 229 MG/DL (ref 70–110)
POCT GLUCOSE: 239 MG/DL (ref 70–110)
POCT GLUCOSE: 98 MG/DL (ref 70–110)
POIKILOCYTOSIS BLD QL SMEAR: SLIGHT
POLYCHROMASIA BLD QL SMEAR: ABNORMAL
POTASSIUM SERPL-SCNC: 4.5 MMOL/L (ref 3.5–5.1)
POTASSIUM SERPL-SCNC: 4.7 MMOL/L (ref 3.5–5.1)
PROT SERPL-MCNC: 5.5 G/DL (ref 6–8.4)
PROT SERPL-MCNC: 6.1 G/DL (ref 6–8.4)
PROTHROMBIN TIME: 11.5 SEC (ref 9–12.5)
RBC # BLD AUTO: 2.1 M/UL (ref 4–5.4)
SODIUM SERPL-SCNC: 130 MMOL/L (ref 136–145)
SODIUM SERPL-SCNC: 136 MMOL/L (ref 136–145)
URATE SERPL-MCNC: 1.8 MG/DL (ref 2.4–5.7)
URATE SERPL-MCNC: 2.6 MG/DL (ref 2.4–5.7)
WBC # BLD AUTO: 7.9 K/UL (ref 3.9–12.7)

## 2022-05-28 PROCEDURE — 85025 COMPLETE CBC W/AUTO DIFF WBC: CPT | Performed by: NURSE PRACTITIONER

## 2022-05-28 PROCEDURE — 25000003 PHARM REV CODE 250: Performed by: INTERNAL MEDICINE

## 2022-05-28 PROCEDURE — 85384 FIBRINOGEN ACTIVITY: CPT | Mod: 91 | Performed by: STUDENT IN AN ORGANIZED HEALTH CARE EDUCATION/TRAINING PROGRAM

## 2022-05-28 PROCEDURE — 84550 ASSAY OF BLOOD/URIC ACID: CPT | Mod: 91 | Performed by: NURSE PRACTITIONER

## 2022-05-28 PROCEDURE — 99233 PR SUBSEQUENT HOSPITAL CARE,LEVL III: ICD-10-PCS | Mod: ,,, | Performed by: INTERNAL MEDICINE

## 2022-05-28 PROCEDURE — 86920 COMPATIBILITY TEST SPIN: CPT | Performed by: STUDENT IN AN ORGANIZED HEALTH CARE EDUCATION/TRAINING PROGRAM

## 2022-05-28 PROCEDURE — 85384 FIBRINOGEN ACTIVITY: CPT | Performed by: NURSE PRACTITIONER

## 2022-05-28 PROCEDURE — 80053 COMPREHEN METABOLIC PANEL: CPT | Performed by: NURSE PRACTITIONER

## 2022-05-28 PROCEDURE — 36430 TRANSFUSION BLD/BLD COMPNT: CPT

## 2022-05-28 PROCEDURE — 63600175 PHARM REV CODE 636 W HCPCS: Performed by: NURSE PRACTITIONER

## 2022-05-28 PROCEDURE — 25000003 PHARM REV CODE 250: Performed by: NURSE PRACTITIONER

## 2022-05-28 PROCEDURE — 99233 SBSQ HOSP IP/OBS HIGH 50: CPT | Mod: ,,, | Performed by: INTERNAL MEDICINE

## 2022-05-28 PROCEDURE — 84100 ASSAY OF PHOSPHORUS: CPT | Performed by: NURSE PRACTITIONER

## 2022-05-28 PROCEDURE — 20600001 HC STEP DOWN PRIVATE ROOM

## 2022-05-28 PROCEDURE — P9040 RBC LEUKOREDUCED IRRADIATED: HCPCS | Performed by: STUDENT IN AN ORGANIZED HEALTH CARE EDUCATION/TRAINING PROGRAM

## 2022-05-28 PROCEDURE — 85610 PROTHROMBIN TIME: CPT | Performed by: NURSE PRACTITIONER

## 2022-05-28 PROCEDURE — 83615 LACTATE (LD) (LDH) ENZYME: CPT | Mod: 91 | Performed by: NURSE PRACTITIONER

## 2022-05-28 PROCEDURE — 86850 RBC ANTIBODY SCREEN: CPT | Performed by: STUDENT IN AN ORGANIZED HEALTH CARE EDUCATION/TRAINING PROGRAM

## 2022-05-28 PROCEDURE — 83735 ASSAY OF MAGNESIUM: CPT | Mod: 91 | Performed by: NURSE PRACTITIONER

## 2022-05-28 RX ORDER — HYDROCODONE BITARTRATE AND ACETAMINOPHEN 500; 5 MG/1; MG/1
TABLET ORAL
Status: DISCONTINUED | OUTPATIENT
Start: 2022-05-28 | End: 2022-05-30

## 2022-05-28 RX ORDER — INSULIN ASPART 100 [IU]/ML
5 INJECTION, SOLUTION INTRAVENOUS; SUBCUTANEOUS
Status: DISCONTINUED | OUTPATIENT
Start: 2022-05-28 | End: 2022-05-28

## 2022-05-28 RX ADMIN — HYDROMORPHONE HYDROCHLORIDE 0.5 MG: 1 INJECTION, SOLUTION INTRAMUSCULAR; INTRAVENOUS; SUBCUTANEOUS at 04:05

## 2022-05-28 RX ADMIN — OXCARBAZEPINE 1200 MG: 600 TABLET, FILM COATED ORAL at 10:05

## 2022-05-28 RX ADMIN — HYDROMORPHONE HYDROCHLORIDE 0.5 MG: 1 INJECTION, SOLUTION INTRAMUSCULAR; INTRAVENOUS; SUBCUTANEOUS at 08:05

## 2022-05-28 RX ADMIN — DILTIAZEM HYDROCHLORIDE 90 MG: 30 TABLET, FILM COATED ORAL at 10:05

## 2022-05-28 RX ADMIN — ALLOPURINOL 300 MG: 300 TABLET ORAL at 09:05

## 2022-05-28 RX ADMIN — OXCARBAZEPINE 1200 MG: 600 TABLET, FILM COATED ORAL at 09:05

## 2022-05-28 RX ADMIN — BUSPIRONE HYDROCHLORIDE 10 MG: 10 TABLET ORAL at 09:05

## 2022-05-28 RX ADMIN — QUETIAPINE FUMARATE 200 MG: 200 TABLET, FILM COATED ORAL at 10:05

## 2022-05-28 RX ADMIN — SODIUM CHLORIDE: 0.9 INJECTION, SOLUTION INTRAVENOUS at 04:05

## 2022-05-28 RX ADMIN — BUSPIRONE HYDROCHLORIDE 10 MG: 10 TABLET ORAL at 10:05

## 2022-05-28 RX ADMIN — INSULIN ASPART 5 UNITS: 100 INJECTION, SOLUTION INTRAVENOUS; SUBCUTANEOUS at 09:05

## 2022-05-28 RX ADMIN — DILTIAZEM HYDROCHLORIDE 90 MG: 30 TABLET, FILM COATED ORAL at 04:05

## 2022-05-28 RX ADMIN — FLUCONAZOLE 400 MG: 200 TABLET ORAL at 04:05

## 2022-05-28 RX ADMIN — HYDROMORPHONE HYDROCHLORIDE 0.5 MG: 1 INJECTION, SOLUTION INTRAMUSCULAR; INTRAVENOUS; SUBCUTANEOUS at 09:05

## 2022-05-28 RX ADMIN — HYDROXYZINE HYDROCHLORIDE 50 MG: 25 TABLET, FILM COATED ORAL at 04:05

## 2022-05-28 RX ADMIN — DILTIAZEM HYDROCHLORIDE 90 MG: 30 TABLET, FILM COATED ORAL at 09:05

## 2022-05-28 RX ADMIN — LOSARTAN POTASSIUM 25 MG: 25 TABLET, FILM COATED ORAL at 12:05

## 2022-05-28 RX ADMIN — GABAPENTIN 300 MG: 300 CAPSULE ORAL at 08:05

## 2022-05-28 RX ADMIN — ACYCLOVIR 400 MG: 200 CAPSULE ORAL at 10:05

## 2022-05-28 RX ADMIN — VORTIOXETINE 20 MG: 10 TABLET, FILM COATED ORAL at 09:05

## 2022-05-28 RX ADMIN — HYDROMORPHONE HYDROCHLORIDE 0.5 MG: 1 INJECTION, SOLUTION INTRAMUSCULAR; INTRAVENOUS; SUBCUTANEOUS at 12:05

## 2022-05-28 RX ADMIN — GABAPENTIN 300 MG: 300 CAPSULE ORAL at 12:05

## 2022-05-28 RX ADMIN — PANTOPRAZOLE SODIUM 40 MG: 40 TABLET, DELAYED RELEASE ORAL at 09:05

## 2022-05-28 RX ADMIN — ONDANSETRON 8 MG: 8 TABLET, ORALLY DISINTEGRATING ORAL at 01:05

## 2022-05-28 RX ADMIN — INSULIN ASPART 2 UNITS: 100 INJECTION, SOLUTION INTRAVENOUS; SUBCUTANEOUS at 08:05

## 2022-05-28 RX ADMIN — LEVOFLOXACIN 500 MG: 500 TABLET, FILM COATED ORAL at 04:05

## 2022-05-28 RX ADMIN — INSULIN ASPART 2 UNITS: 100 INJECTION, SOLUTION INTRAVENOUS; SUBCUTANEOUS at 12:05

## 2022-05-28 RX ADMIN — GABAPENTIN 300 MG: 300 CAPSULE ORAL at 04:05

## 2022-05-28 RX ADMIN — ACYCLOVIR 400 MG: 200 CAPSULE ORAL at 09:05

## 2022-05-28 NOTE — SUBJECTIVE & OBJECTIVE
Subjective:     Interval History: WBC down to 8k. Blood sugars improved - insulin titrated down to 5 units with meals and 10 units BID. Panotinib still pending insurance authorization. Transfusing 1 unit prbc.      Objective:     Vital Signs (Most Recent):  Temp: 98.7 °F (37.1 °C) (05/28/22 1115)  Pulse: 86 (05/28/22 1115)  Resp: 16 (05/28/22 1226)  BP: 129/64 (05/28/22 1115)  SpO2: (!) 93 % (05/28/22 1115) Vital Signs (24h Range):  Temp:  [97.8 °F (36.6 °C)-98.7 °F (37.1 °C)] 98.7 °F (37.1 °C)  Pulse:  [80-88] 86  Resp:  [16-20] 16  SpO2:  [92 %-97 %] 93 %  BP: (121-156)/(63-74) 129/64     Weight: 73.3 kg (161 lb 9.6 oz)  Body mass index is 26.89 kg/m².  Body surface area is 1.83 meters squared.      Intake/Output - Last 3 Shifts         05/26 0700  05/27 0659 05/27 0700  05/28 0659 05/28 0700  05/29 0659    P.O. 700 480 240    I.V. (mL/kg) 2337.9 (31.9) 2333.7 (31.8) 493.2 (6.7)    Blood 70 57     Total Intake(mL/kg) 3107.9 (42.4) 2870.7 (39.2) 733.2 (10)    Urine (mL/kg/hr) 3300 (1.9) 600 (0.3) 1700 (3.8)    Total Output 3300 600 1700    Net -192.1 +2270.7 -966.8                   Physical Exam  Vitals reviewed.   Constitutional:       Appearance: Normal appearance. She is well-developed.   HENT:      Head: Normocephalic and atraumatic.      Right Ear: External ear normal.      Left Ear: External ear normal.   Eyes:      Extraocular Movements: Extraocular movements intact.      Conjunctiva/sclera: Conjunctivae normal.   Cardiovascular:      Rate and Rhythm: Normal rate and regular rhythm.      Pulses: Normal pulses.      Heart sounds: Normal heart sounds. No murmur heard.  Pulmonary:      Effort: Pulmonary effort is normal. No respiratory distress.      Breath sounds: No stridor. No wheezing or rales.   Abdominal:      General: Bowel sounds are normal. There is no distension.      Palpations: Abdomen is soft.      Tenderness: There is no abdominal tenderness.   Musculoskeletal:         General: Normal range of  motion.      Cervical back: Normal range of motion.      Right lower leg: No edema.      Left lower leg: No edema.   Skin:     General: Skin is warm and dry.      Findings: Bruising present. No rash.      Comments: RCWP with no signs of infection   Neurological:      General: No focal deficit present.      Mental Status: She is alert and oriented to person, place, and time.   Psychiatric:         Mood and Affect: Mood normal.         Behavior: Behavior normal.         Thought Content: Thought content normal.         Judgment: Judgment normal.       Significant Labs:   CBC:   Recent Labs   Lab 05/27/22  0550 05/28/22  0418   WBC 11.27 7.90   HGB 7.3* 6.5*   HCT 20.8* 19.3*   PLT 20* 15*      and CMP:   Recent Labs   Lab 05/27/22  0550 05/27/22  1611 05/28/22  0418   * 134* 136   K 4.2 4.0 4.5   CL 99 97 101   CO2 23 24 25   * 191* 143*   BUN 19 21* 22*   CREATININE 0.8 0.7 0.6   CALCIUM 8.3* 8.7 8.2*   PROT 5.9* 6.6 5.5*   ALBUMIN 3.3* 3.6 3.2*   BILITOT 0.4 0.4 0.4   ALKPHOS 253* 227* 184*   AST 11 9* 10   ALT 28 25 23   ANIONGAP 12 13 10   EGFRNONAA >60.0 >60.0 >60.0         Diagnostic Results:  I have reviewed all pertinent imaging results/findings within the past 24 hours.

## 2022-05-28 NOTE — PROGRESS NOTES
[facility] - On[facility] ([facility])  Hematology  Bone Marrow Transplant  Progress Note    Patient Name: Deepti Gonzalez  Admission Date: 5/23/2022  Hospital Length of Stay: 5 days  Code Status: Full Code    Subjective:     Interval History: WBC down to 8k. Blood sugars improved - insulin titrated down to 5 units with meals and 10 units BID. Panotinib still pending insurance authorization. Transfusing 1 unit prbc.      Objective:     Vital Signs (Most Recent):  Temp: 98.7 °F (37.1 °C) (05/28/22 1115)  Pulse: 86 (05/28/22 1115)  Resp: 16 (05/28/22 1226)  BP: 129/64 (05/28/22 1115)  SpO2: (!) 93 % (05/28/22 1115) Vital Signs (24h Range):  Temp:  [97.8 °F (36.6 °C)-98.7 °F (37.1 °C)] 98.7 °F (37.1 °C)  Pulse:  [80-88] 86  Resp:  [16-20] 16  SpO2:  [92 %-97 %] 93 %  BP: (121-156)/(63-74) 129/64     Weight: 73.3 kg (161 lb 9.6 oz)  Body mass index is 26.89 kg/m².  Body surface area is 1.83 meters squared.      Intake/Output - Last 3 Shifts         05/26 0700  05/27 0659 05/27 0700  05/28 0659 05/28 0700  05/29 0659    P.O. 700 480 240    I.V. (mL/kg) 2337.9 (31.9) 2333.7 (31.8) 493.2 (6.7)    Blood 70 57     Total Intake(mL/kg) 3107.9 (42.4) 2870.7 (39.2) 733.2 (10)    Urine (mL/kg/hr) 3300 (1.9) 600 (0.3) 1700 (3.8)    Total Output 3300 600 1700    Net -192.1 +2270.7 -966.8                   Physical Exam  Vitals reviewed.   Constitutional:       Appearance: Normal appearance. She is well-developed.   HENT:      Head: Normocephalic and atraumatic.      Right Ear: External ear normal.      Left Ear: External ear normal.   Eyes:      Extraocular Movements: Extraocular movements intact.      Conjunctiva/sclera: Conjunctivae normal.   Cardiovascular:      Rate and Rhythm: Normal rate and regular rhythm.      Pulses: Normal pulses.      Heart sounds: Normal heart sounds. No murmur heard.  Pulmonary:      Effort: Pulmonary effort is normal. No respiratory distress.      Breath sounds: No stridor. No wheezing or rales.   Abdominal:       General: Bowel sounds are normal. There is no distension.      Palpations: Abdomen is soft.      Tenderness: There is no abdominal tenderness.   Musculoskeletal:         General: Normal range of motion.      Cervical back: Normal range of motion.      Right lower leg: No edema.      Left lower leg: No edema.   Skin:     General: Skin is warm and dry.      Findings: Bruising present. No rash.      Comments: RCWP with no signs of infection   Neurological:      General: No focal deficit present.      Mental Status: She is alert and oriented to person, place, and time.   Psychiatric:         Mood and Affect: Mood normal.         Behavior: Behavior normal.         Thought Content: Thought content normal.         Judgment: Judgment normal.       Significant Labs:   CBC:   Recent Labs   Lab 05/27/22  0550 05/28/22  0418   WBC 11.27 7.90   HGB 7.3* 6.5*   HCT 20.8* 19.3*   PLT 20* 15*      and CMP:   Recent Labs   Lab 05/27/22  0550 05/27/22  1611 05/28/22  0418   * 134* 136   K 4.2 4.0 4.5   CL 99 97 101   CO2 23 24 25   * 191* 143*   BUN 19 21* 22*   CREATININE 0.8 0.7 0.6   CALCIUM 8.3* 8.7 8.2*   PROT 5.9* 6.6 5.5*   ALBUMIN 3.3* 3.6 3.2*   BILITOT 0.4 0.4 0.4   ALKPHOS 253* 227* 184*   AST 11 9* 10   ALT 28 25 23   ANIONGAP 12 13 10   EGFRNONAA >60.0 >60.0 >60.0         Diagnostic Results:  I have reviewed all pertinent imaging results/findings within the past 24 hours.    Assessment/Plan:     * Leukocytosis  - seen in BMT clinic 5/23 by Dr. Jenkins  - WBC up from 19.5 on 5/19 to 136.5 on 5/23  - see B-ALL for hx of treatment  - continue cytoreduction with dex, increased to 40mg on 5/25, dc 5/26  - s/p 2mg vincristine 5/24  - high TLS risk, monitoring BID TLS labs    Volume overload  - weight up ~9lbs from admit   - on high rate IVF d/t TLS risk  - on 1-2L O2 via NC, reporting intermittent SOB  - will reassess daily if lasix needed      At high risk of tumor lysis syndrome  Given leukocytosis with WBC  136K and plan for rapid reduction with PO dex and 1 dose of vincristine, patient at high risk for TLS  - continue allopurinol 300mg BID  - monitor TLS labs BID    Anemia in neoplastic disease  - daily CBC while inpatient  - transfuse for Hgb <7    Transaminitis  - significant transaminitis following Blinatumomab  - closely monitor with cytoreduction and vincristine  - improved ALT/AST     Coagulopathy  - high risk with cytoreduction/refractory B-ALL s/p vincristine on 5/24  - monitoring BID INR/fibrinogen  - received cryo x2 5/27 ( suspect 1 unit not fully transfused)  - plan for cryo if <100    Thrombocytopenia  - daily CBC while inpatient  - transfuse for Plt <10K or bleeding    Cancer associated pain  - worsening pain to left hip with worsening leukocytosis  - denies trauma or injury to area  - likely d/t leukemia  - oxycodone has been unsuccessful  - reports improvement with 0.5mg dilaudid q6h prn    Moderate major depression  - Continue home trintellix    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 Blincyto held & given steroids x 3 days. Resumed Blincyto 5/17 and again developed CRS, Grade 2 as well as mild AMS and Grade 3 transaminitis. Blincyto stopped with no plan to resume. Discharged on 5/19  - seen today 5/23 by Dr. 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  - achs blood glucose monitoring, moderate SSI, and diabetic diet while admitted  - 5 units aspart w/ meals  and 10 levemir BID      Rheumatoid arthritis involving multiple sites  - not currently on any active tx    History of TIA (transient ischemic attack)  - Holding home statin while inpatient  - Holding home ASA while inpatient due to thrombocytopenia. Will hold ASA at discharge. Can be resumed outpatient as appropriate when platelets >50K    Hyperlipidemia  - holding home statin while inpatient. Can resume at discharge if LFTs normalize.    Paroxysmal atrial fibrillation  - continue home diltiazem  - Holding xarelto with plt count < 50K. Can resume outpatient when appropriate.        VTE Risk Mitigation (From admission, onward)         Ordered     heparin, porcine (PF) 100 unit/mL injection flush 300 Units  As needed (PRN)         05/24/22 1021     IP VTE HIGH RISK PATIENT  Once         05/23/22 1552     Place sequential compression device  Until discontinued         05/23/22 1552     Reason for No Pharmacological VTE Prophylaxis  Once        Question:  Reasons:  Answer:  Thrombocytopenia    05/23/22 1552                Disposition: Home    Jan Hooker MD  Bone Marrow Transplant  [facility] - Oncology ([facility])

## 2022-05-28 NOTE — PLAN OF CARE
Plan of care reviewed. Patient is oriented X4. Up with assist. Right chest port infusing NS @ 75 ml/hr. Complained of pain; PRN IV dilaudid administered x3. Remained afebrile. HS ; SS and scheduled long acting insulin administered. All questions and concerns addressed. Family remains at bedside. All safety precautions in place. Remains free of injury. Patient is stable. All needs met at this time.

## 2022-05-28 NOTE — PLAN OF CARE
Plan of care reviewed with the patient at the beginning of shift. The patient is alert and oriented. GCS 15. Denying complaints at this time. The patient is up with assist,. Remained free from falls and injuries throughout shift. IVF ended. Insulin dosing changed. 1 U PRBC administered. VSS. Bed in low locked position. Call bell and personal items within reach. Will continue to monitor.

## 2022-05-28 NOTE — ASSESSMENT & PLAN NOTE
- significant transaminitis following Blinatumomab  - closely monitor with cytoreduction and vincristine  - improved ALT/AST

## 2022-05-28 NOTE — ASSESSMENT & PLAN NOTE
- hold home farxiga while inpatient; can resume at discharge.  - achs blood glucose monitoring, moderate SSI, and diabetic diet while admitted  - 5 units aspart w/ meals  and 10 levemir BID
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
## 2022-05-29 LAB
ALBUMIN SERPL BCP-MCNC: 3.3 G/DL (ref 3.5–5.2)
ALBUMIN SERPL BCP-MCNC: 3.6 G/DL (ref 3.5–5.2)
ALP SERPL-CCNC: 178 U/L (ref 55–135)
ALP SERPL-CCNC: 192 U/L (ref 55–135)
ALT SERPL W/O P-5'-P-CCNC: 18 U/L (ref 10–44)
ALT SERPL W/O P-5'-P-CCNC: 19 U/L (ref 10–44)
ANION GAP SERPL CALC-SCNC: 12 MMOL/L (ref 8–16)
ANION GAP SERPL CALC-SCNC: 14 MMOL/L (ref 8–16)
ANISOCYTOSIS BLD QL SMEAR: SLIGHT
AST SERPL-CCNC: 10 U/L (ref 10–40)
AST SERPL-CCNC: 10 U/L (ref 10–40)
BASOPHILS # BLD AUTO: 0.02 K/UL (ref 0–0.2)
BASOPHILS NFR BLD: 0.3 % (ref 0–1.9)
BILIRUB SERPL-MCNC: 0.3 MG/DL (ref 0.1–1)
BILIRUB SERPL-MCNC: 0.3 MG/DL (ref 0.1–1)
BUN SERPL-MCNC: 12 MG/DL (ref 6–20)
BUN SERPL-MCNC: 14 MG/DL (ref 6–20)
CALCIUM SERPL-MCNC: 8.7 MG/DL (ref 8.7–10.5)
CALCIUM SERPL-MCNC: 9 MG/DL (ref 8.7–10.5)
CHLORIDE SERPL-SCNC: 93 MMOL/L (ref 95–110)
CHLORIDE SERPL-SCNC: 95 MMOL/L (ref 95–110)
CO2 SERPL-SCNC: 27 MMOL/L (ref 23–29)
CO2 SERPL-SCNC: 28 MMOL/L (ref 23–29)
CREAT SERPL-MCNC: 0.7 MG/DL (ref 0.5–1.4)
CREAT SERPL-MCNC: 0.8 MG/DL (ref 0.5–1.4)
DIFFERENTIAL METHOD: ABNORMAL
EOSINOPHIL # BLD AUTO: 0 K/UL (ref 0–0.5)
EOSINOPHIL NFR BLD: 0.5 % (ref 0–8)
ERYTHROCYTE [DISTWIDTH] IN BLOOD BY AUTOMATED COUNT: 16.5 % (ref 11.5–14.5)
EST. GFR  (AFRICAN AMERICAN): >60 ML/MIN/1.73 M^2
EST. GFR  (AFRICAN AMERICAN): >60 ML/MIN/1.73 M^2
EST. GFR  (NON AFRICAN AMERICAN): >60 ML/MIN/1.73 M^2
EST. GFR  (NON AFRICAN AMERICAN): >60 ML/MIN/1.73 M^2
FIBRINOGEN PPP-MCNC: 197 MG/DL (ref 182–400)
GLUCOSE SERPL-MCNC: 108 MG/DL (ref 70–110)
GLUCOSE SERPL-MCNC: 148 MG/DL (ref 70–110)
HCT VFR BLD AUTO: 24.4 % (ref 37–48.5)
HGB BLD-MCNC: 8.3 G/DL (ref 12–16)
HYPOCHROMIA BLD QL SMEAR: ABNORMAL
IMM GRANULOCYTES # BLD AUTO: 0.09 K/UL (ref 0–0.04)
IMM GRANULOCYTES NFR BLD AUTO: 1.5 % (ref 0–0.5)
INR PPP: 1 (ref 0.8–1.2)
LDH SERPL L TO P-CCNC: 468 U/L (ref 110–260)
LDH SERPL L TO P-CCNC: 485 U/L (ref 110–260)
LYMPHOCYTES # BLD AUTO: 2.4 K/UL (ref 1–4.8)
LYMPHOCYTES NFR BLD: 39.1 % (ref 18–48)
MAGNESIUM SERPL-MCNC: 1.9 MG/DL (ref 1.6–2.6)
MAGNESIUM SERPL-MCNC: 1.9 MG/DL (ref 1.6–2.6)
MCH RBC QN AUTO: 29.5 PG (ref 27–31)
MCHC RBC AUTO-ENTMCNC: 34 G/DL (ref 32–36)
MCV RBC AUTO: 87 FL (ref 82–98)
MONOCYTES # BLD AUTO: 0.7 K/UL (ref 0.3–1)
MONOCYTES NFR BLD: 11.2 % (ref 4–15)
NEUTROPHILS # BLD AUTO: 2.9 K/UL (ref 1.8–7.7)
NEUTROPHILS NFR BLD: 47.4 % (ref 38–73)
NRBC BLD-RTO: 0 /100 WBC
PHOSPHATE SERPL-MCNC: 5.2 MG/DL (ref 2.7–4.5)
PHOSPHATE SERPL-MCNC: 5.5 MG/DL (ref 2.7–4.5)
PLATELET # BLD AUTO: 11 K/UL (ref 150–450)
PLATELET BLD QL SMEAR: ABNORMAL
PMV BLD AUTO: ABNORMAL FL (ref 9.2–12.9)
POCT GLUCOSE: 100 MG/DL (ref 70–110)
POCT GLUCOSE: 108 MG/DL (ref 70–110)
POCT GLUCOSE: 133 MG/DL (ref 70–110)
POCT GLUCOSE: 134 MG/DL (ref 70–110)
POCT GLUCOSE: 162 MG/DL (ref 70–110)
POCT GLUCOSE: 92 MG/DL (ref 70–110)
POCT GLUCOSE: 98 MG/DL (ref 70–110)
POTASSIUM SERPL-SCNC: 4.3 MMOL/L (ref 3.5–5.1)
POTASSIUM SERPL-SCNC: 4.4 MMOL/L (ref 3.5–5.1)
PROT SERPL-MCNC: 5.4 G/DL (ref 6–8.4)
PROT SERPL-MCNC: 6.3 G/DL (ref 6–8.4)
PROTHROMBIN TIME: 10.8 SEC (ref 9–12.5)
RBC # BLD AUTO: 2.81 M/UL (ref 4–5.4)
SODIUM SERPL-SCNC: 134 MMOL/L (ref 136–145)
SODIUM SERPL-SCNC: 135 MMOL/L (ref 136–145)
URATE SERPL-MCNC: 2.1 MG/DL (ref 2.4–5.7)
URATE SERPL-MCNC: 2.6 MG/DL (ref 2.4–5.7)
WBC # BLD AUTO: 6.16 K/UL (ref 3.9–12.7)

## 2022-05-29 PROCEDURE — 63600175 PHARM REV CODE 636 W HCPCS: Performed by: STUDENT IN AN ORGANIZED HEALTH CARE EDUCATION/TRAINING PROGRAM

## 2022-05-29 PROCEDURE — 99233 SBSQ HOSP IP/OBS HIGH 50: CPT | Mod: ,,, | Performed by: INTERNAL MEDICINE

## 2022-05-29 PROCEDURE — 84100 ASSAY OF PHOSPHORUS: CPT | Mod: 91 | Performed by: NURSE PRACTITIONER

## 2022-05-29 PROCEDURE — 85384 FIBRINOGEN ACTIVITY: CPT | Performed by: NURSE PRACTITIONER

## 2022-05-29 PROCEDURE — 25000003 PHARM REV CODE 250: Performed by: STUDENT IN AN ORGANIZED HEALTH CARE EDUCATION/TRAINING PROGRAM

## 2022-05-29 PROCEDURE — 85610 PROTHROMBIN TIME: CPT | Performed by: NURSE PRACTITIONER

## 2022-05-29 PROCEDURE — 99233 PR SUBSEQUENT HOSPITAL CARE,LEVL III: ICD-10-PCS | Mod: ,,, | Performed by: INTERNAL MEDICINE

## 2022-05-29 PROCEDURE — 25000003 PHARM REV CODE 250: Performed by: INTERNAL MEDICINE

## 2022-05-29 PROCEDURE — 20600001 HC STEP DOWN PRIVATE ROOM

## 2022-05-29 PROCEDURE — 25000003 PHARM REV CODE 250: Performed by: NURSE PRACTITIONER

## 2022-05-29 PROCEDURE — 80053 COMPREHEN METABOLIC PANEL: CPT | Mod: 91 | Performed by: NURSE PRACTITIONER

## 2022-05-29 PROCEDURE — 83615 LACTATE (LD) (LDH) ENZYME: CPT | Performed by: NURSE PRACTITIONER

## 2022-05-29 PROCEDURE — 85025 COMPLETE CBC W/AUTO DIFF WBC: CPT | Performed by: NURSE PRACTITIONER

## 2022-05-29 PROCEDURE — 84550 ASSAY OF BLOOD/URIC ACID: CPT | Performed by: NURSE PRACTITIONER

## 2022-05-29 PROCEDURE — 83735 ASSAY OF MAGNESIUM: CPT | Performed by: NURSE PRACTITIONER

## 2022-05-29 PROCEDURE — 63600175 PHARM REV CODE 636 W HCPCS: Performed by: NURSE PRACTITIONER

## 2022-05-29 RX ORDER — HYDROMORPHONE HYDROCHLORIDE 1 MG/ML
0.5 INJECTION, SOLUTION INTRAMUSCULAR; INTRAVENOUS; SUBCUTANEOUS ONCE
Status: COMPLETED | OUTPATIENT
Start: 2022-05-29 | End: 2022-05-29

## 2022-05-29 RX ORDER — OXYCODONE HYDROCHLORIDE 5 MG/1
5 TABLET ORAL EVERY 4 HOURS PRN
Status: DISCONTINUED | OUTPATIENT
Start: 2022-05-29 | End: 2022-05-31

## 2022-05-29 RX ORDER — LIDOCAINE 50 MG/G
1 PATCH TOPICAL
Status: DISCONTINUED | OUTPATIENT
Start: 2022-05-29 | End: 2022-06-06 | Stop reason: HOSPADM

## 2022-05-29 RX ADMIN — ALLOPURINOL 300 MG: 300 TABLET ORAL at 09:05

## 2022-05-29 RX ADMIN — HYDROMORPHONE HYDROCHLORIDE 0.5 MG: 1 INJECTION, SOLUTION INTRAMUSCULAR; INTRAVENOUS; SUBCUTANEOUS at 01:05

## 2022-05-29 RX ADMIN — HYDROMORPHONE HYDROCHLORIDE 0.5 MG: 1 INJECTION, SOLUTION INTRAMUSCULAR; INTRAVENOUS; SUBCUTANEOUS at 09:05

## 2022-05-29 RX ADMIN — HYDROXYZINE HYDROCHLORIDE 50 MG: 25 TABLET, FILM COATED ORAL at 03:05

## 2022-05-29 RX ADMIN — OXYCODONE 5 MG: 5 TABLET ORAL at 07:05

## 2022-05-29 RX ADMIN — FLUCONAZOLE 400 MG: 200 TABLET ORAL at 05:05

## 2022-05-29 RX ADMIN — BUSPIRONE HYDROCHLORIDE 10 MG: 10 TABLET ORAL at 09:05

## 2022-05-29 RX ADMIN — OXCARBAZEPINE 1200 MG: 600 TABLET, FILM COATED ORAL at 09:05

## 2022-05-29 RX ADMIN — DILTIAZEM HYDROCHLORIDE 90 MG: 30 TABLET, FILM COATED ORAL at 03:05

## 2022-05-29 RX ADMIN — GABAPENTIN 300 MG: 300 CAPSULE ORAL at 12:05

## 2022-05-29 RX ADMIN — VORTIOXETINE 20 MG: 10 TABLET, FILM COATED ORAL at 09:05

## 2022-05-29 RX ADMIN — ONDANSETRON 8 MG: 8 TABLET, ORALLY DISINTEGRATING ORAL at 09:05

## 2022-05-29 RX ADMIN — DILTIAZEM HYDROCHLORIDE 90 MG: 30 TABLET, FILM COATED ORAL at 05:05

## 2022-05-29 RX ADMIN — QUETIAPINE FUMARATE 200 MG: 200 TABLET, FILM COATED ORAL at 09:05

## 2022-05-29 RX ADMIN — PANTOPRAZOLE SODIUM 40 MG: 40 TABLET, DELAYED RELEASE ORAL at 09:05

## 2022-05-29 RX ADMIN — ACYCLOVIR 400 MG: 200 CAPSULE ORAL at 09:05

## 2022-05-29 RX ADMIN — LOSARTAN POTASSIUM 25 MG: 25 TABLET, FILM COATED ORAL at 12:05

## 2022-05-29 RX ADMIN — INSULIN ASPART 2 UNITS: 100 INJECTION, SOLUTION INTRAVENOUS; SUBCUTANEOUS at 12:05

## 2022-05-29 RX ADMIN — HYDROMORPHONE HYDROCHLORIDE 0.5 MG: 1 INJECTION, SOLUTION INTRAMUSCULAR; INTRAVENOUS; SUBCUTANEOUS at 05:05

## 2022-05-29 RX ADMIN — LEVOFLOXACIN 500 MG: 500 TABLET, FILM COATED ORAL at 05:05

## 2022-05-29 RX ADMIN — DILTIAZEM HYDROCHLORIDE 90 MG: 30 TABLET, FILM COATED ORAL at 09:05

## 2022-05-29 RX ADMIN — HYDROXYZINE HYDROCHLORIDE 50 MG: 25 TABLET, FILM COATED ORAL at 05:05

## 2022-05-29 RX ADMIN — GABAPENTIN 300 MG: 300 CAPSULE ORAL at 07:05

## 2022-05-29 RX ADMIN — GABAPENTIN 300 MG: 300 CAPSULE ORAL at 05:05

## 2022-05-29 RX ADMIN — LIDOCAINE 1 PATCH: 50 PATCH CUTANEOUS at 12:05

## 2022-05-29 NOTE — PROGRESS NOTES
[facility] - Oncology ([facility])  Hematology  Bone Marrow Transplant  Progress Note    Patient Name: Deepti Gonzalez  Admission Date: 5/23/2022  Hospital Length of Stay: 6 days  Code Status: Full Code    Subjective:     Interval History: WBC down to 6k. Insulin titrated down to 10 units daily. Panotinib still pending insurance authorization.      Objective:     Vital Signs (Most Recent):  Temp: 98.7 °F (37.1 °C) (05/29/22 1132)  Pulse: 86 (05/29/22 1132)  Resp: 20 (05/29/22 1132)  BP: 132/74 (05/29/22 1132)  SpO2: 95 % (05/29/22 1132) Vital Signs (24h Range):  Temp:  [98 °F (36.7 °C)-98.8 °F (37.1 °C)] 98.7 °F (37.1 °C)  Pulse:  [77-86] 86  Resp:  [14-20] 20  SpO2:  [92 %-97 %] 95 %  BP: (117-146)/(72-82) 132/74     Weight: 73.3 kg (161 lb 9.6 oz)  Body mass index is 26.89 kg/m².  Body surface area is 1.83 meters squared.      Intake/Output - Last 3 Shifts         05/27 0700  05/28 0659 05/28 0700  05/29 0659 05/29 0700  05/30 0659    P.O. 480 240     I.V. (mL/kg) 2333.7 (31.8) 493.2 (6.7)     Blood 57 450     Total Intake(mL/kg) 2870.7 (39.2) 1183.2 (16.1)     Urine (mL/kg/hr) 600 (0.3) 1700 (1)     Total Output 600 1700     Net +2270.7 -516.8                    Physical Exam  Vitals reviewed.   Constitutional:       Appearance: Normal appearance. She is well-developed.   HENT:      Head: Normocephalic and atraumatic.      Right Ear: External ear normal.      Left Ear: External ear normal.   Eyes:      Extraocular Movements: Extraocular movements intact.      Conjunctiva/sclera: Conjunctivae normal.   Cardiovascular:      Rate and Rhythm: Normal rate and regular rhythm.      Pulses: Normal pulses.      Heart sounds: Normal heart sounds. No murmur heard.  Pulmonary:      Effort: Pulmonary effort is normal. No respiratory distress.      Breath sounds: No stridor. No wheezing or rales.   Abdominal:      General: Bowel sounds are normal. There is no distension.      Palpations: Abdomen is soft.      Tenderness: There is  no abdominal tenderness.   Musculoskeletal:         General: Normal range of motion.      Cervical back: Normal range of motion.      Right lower leg: No edema.      Left lower leg: No edema.   Skin:     General: Skin is warm and dry.      Findings: Bruising present. No rash.      Comments: RCWP with no signs of infection   Neurological:      General: No focal deficit present.      Mental Status: She is alert and oriented to person, place, and time.   Psychiatric:         Mood and Affect: Mood normal.         Behavior: Behavior normal.         Thought Content: Thought content normal.         Judgment: Judgment normal.       Significant Labs:   CBC:   Recent Labs   Lab 05/28/22  0418 05/29/22  0456   WBC 7.90 6.16   HGB 6.5* 8.3*   HCT 19.3* 24.4*   PLT 15* 11*      and CMP:   Recent Labs   Lab 05/28/22  0418 05/28/22  1652 05/29/22  0456    130* 135*   K 4.5 4.7 4.3    95 95   CO2 25 24 28   * 145* 108   BUN 22* 17 14   CREATININE 0.6 0.7 0.7   CALCIUM 8.2* 8.4* 8.7   PROT 5.5* 6.1 5.4*   ALBUMIN 3.2* 3.4* 3.3*   BILITOT 0.4 0.4 0.3   ALKPHOS 184* 188* 178*   AST 10 11 10   ALT 23 22 18   ANIONGAP 10 11 12   EGFRNONAA >60.0 >60.0 >60.0         Diagnostic Results:  I have reviewed all pertinent imaging results/findings within the past 24 hours.    Assessment/Plan:     * Leukocytosis  - seen in BMT clinic 5/23 by Dr. Jenkins  - WBC up from 19.5 on 5/19 to 136.5 on 5/23  - see B-ALL for hx of treatment  - continue cytoreduction with dex, increased to 40mg on 5/25, dc 5/26  - s/p 2mg vincristine 5/24  - high TLS risk, monitoring BID TLS labs    Volume overload  - weight up ~9lbs from admit   - on high rate IVF d/t TLS risk  - on 1-2L O2 via NC, reporting intermittent SOB  - will reassess daily if lasix needed      At high risk of tumor lysis syndrome  Given leukocytosis with WBC 136K and plan for rapid reduction with PO dex and 1 dose of vincristine, patient at high risk for TLS  - continue allopurinol  300mg BID  - monitor TLS labs BID    Anemia in neoplastic disease  - daily CBC while inpatient  - transfuse for Hgb <7    Transaminitis  - significant transaminitis following Blinatumomab  - closely monitor with cytoreduction and vincristine  - improved ALT/AST     Coagulopathy  - high risk with cytoreduction/refractory B-ALL s/p vincristine on 5/24  - monitoring BID INR/fibrinogen  - received cryo x2 5/27 ( suspect 1 unit not fully transfused)  - plan for cryo if <100    Thrombocytopenia  - daily CBC while inpatient  - transfuse for Plt <10K or bleeding    Cancer associated pain  - worsening pain to left hip with worsening leukocytosis  - denies trauma or injury to area  - likely d/t leukemia  - oxycodone has been unsuccessful  - reports improvement with 0.5mg dilaudid q6h prn    Moderate major depression  - Continue home trintellix    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 Blincyto held & given steroids x 3 days. Resumed Blincyto 5/17 and again developed CRS, Grade 2 as well as mild AMS and Grade 3 transaminitis. Blincyto stopped with no plan to resume. Discharged on 5/19  - seen today 5/23 by Dr. 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  - achs blood glucose monitoring and diabetic diet while admitted  - 10 units levemir daily with MDSSI      Rheumatoid arthritis involving multiple sites  - not currently on any active tx    History of TIA (transient ischemic attack)  - Holding home statin while inpatient  - Holding home ASA while inpatient due to thrombocytopenia. Will hold ASA at discharge. Can be resumed outpatient as appropriate when platelets >50K    Hyperlipidemia  - holding home statin while inpatient. Can resume at discharge if LFTs normalize.    Paroxysmal atrial fibrillation  - continue home diltiazem  - Holding xarelto with plt count < 50K. Can resume outpatient when appropriate.        VTE Risk Mitigation (From admission, onward)         Ordered     heparin, porcine (PF) 100 unit/mL injection flush 300 Units  As needed (PRN)         05/24/22 1021     IP VTE HIGH RISK PATIENT  Once         05/23/22 1552     Place sequential compression device  Until discontinued         05/23/22 1552     Reason for No Pharmacological VTE Prophylaxis  Once        Question:  Reasons:  Answer:  Thrombocytopenia    05/23/22 1552                Disposition: Home    Jan Hooker MD  Bone Marrow Transplant  [facility] - Oncology (Hospital)

## 2022-05-29 NOTE — ASSESSMENT & PLAN NOTE
- Continue home trintellix   c/o abd pain since monday, severe abd pain and fever today. pt appears pale, mottled. motrin 200 mg given today

## 2022-05-29 NOTE — PLAN OF CARE
Plan of care reviewed with the patient at the beginning of shift. The patient us alert and oriented. GCS 15. Denying complaints at this time. Some complaints of pain. PRN pain medication administered. Independent and ambulatory. Remained free from falls and injuries throughout shift. VSS. Bed in low locked position. Call bell and personal items within reach. Will continue to monitor.

## 2022-05-29 NOTE — ASSESSMENT & PLAN NOTE
- hold home farxiga while inpatient; can resume at discharge.  - achs blood glucose monitoring and diabetic diet while admitted  - 10 units levemir daily with MDSSI

## 2022-05-29 NOTE — ASSESSMENT & PLAN NOTE
Given leukocytosis with WBC 136K and plan for rapid reduction with PO dex and 1 dose of vincristine, patient at high risk for TLS  - continue allopurinol 300mg BID  - monitor TLS labs BID   78

## 2022-05-29 NOTE — PLAN OF CARE
Problem: Adult Inpatient Plan of Care  Goal: Patient-Specific Goal (Individualized)  Outcome: Ongoing, Progressing  Goal: Absence of Hospital-Acquired Illness or Injury  Outcome: Ongoing, Progressing  Goal: Optimal Comfort and Wellbeing  Outcome: Ongoing, Progressing

## 2022-05-30 LAB
ALBUMIN SERPL BCP-MCNC: 3.3 G/DL (ref 3.5–5.2)
ALBUMIN SERPL BCP-MCNC: 3.3 G/DL (ref 3.5–5.2)
ALP SERPL-CCNC: 168 U/L (ref 55–135)
ALP SERPL-CCNC: 170 U/L (ref 55–135)
ALT SERPL W/O P-5'-P-CCNC: 18 U/L (ref 10–44)
ALT SERPL W/O P-5'-P-CCNC: 20 U/L (ref 10–44)
ANION GAP SERPL CALC-SCNC: 12 MMOL/L (ref 8–16)
ANION GAP SERPL CALC-SCNC: 13 MMOL/L (ref 8–16)
ANISOCYTOSIS BLD QL SMEAR: SLIGHT
AST SERPL-CCNC: 11 U/L (ref 10–40)
AST SERPL-CCNC: 12 U/L (ref 10–40)
BASOPHILS # BLD AUTO: ABNORMAL K/UL (ref 0–0.2)
BASOPHILS NFR BLD: 0 % (ref 0–1.9)
BILIRUB SERPL-MCNC: 0.3 MG/DL (ref 0.1–1)
BILIRUB SERPL-MCNC: 0.3 MG/DL (ref 0.1–1)
BLASTS NFR BLD MANUAL: 9 %
BLD PROD TYP BPU: NORMAL
BLOOD UNIT EXPIRATION DATE: NORMAL
BLOOD UNIT TYPE CODE: 8400
BLOOD UNIT TYPE: NORMAL
BUN SERPL-MCNC: 10 MG/DL (ref 6–20)
BUN SERPL-MCNC: 13 MG/DL (ref 6–20)
CALCIUM SERPL-MCNC: 8.8 MG/DL (ref 8.7–10.5)
CALCIUM SERPL-MCNC: 9.1 MG/DL (ref 8.7–10.5)
CHLORIDE SERPL-SCNC: 93 MMOL/L (ref 95–110)
CHLORIDE SERPL-SCNC: 94 MMOL/L (ref 95–110)
CO2 SERPL-SCNC: 25 MMOL/L (ref 23–29)
CO2 SERPL-SCNC: 26 MMOL/L (ref 23–29)
CODING SYSTEM: NORMAL
CREAT SERPL-MCNC: 0.7 MG/DL (ref 0.5–1.4)
CREAT SERPL-MCNC: 0.8 MG/DL (ref 0.5–1.4)
DIFFERENTIAL METHOD: ABNORMAL
DISPENSE STATUS: NORMAL
EOSINOPHIL # BLD AUTO: ABNORMAL K/UL (ref 0–0.5)
EOSINOPHIL NFR BLD: 1 % (ref 0–8)
ERYTHROCYTE [DISTWIDTH] IN BLOOD BY AUTOMATED COUNT: 16.1 % (ref 11.5–14.5)
EST. GFR  (AFRICAN AMERICAN): >60 ML/MIN/1.73 M^2
EST. GFR  (AFRICAN AMERICAN): >60 ML/MIN/1.73 M^2
EST. GFR  (NON AFRICAN AMERICAN): >60 ML/MIN/1.73 M^2
EST. GFR  (NON AFRICAN AMERICAN): >60 ML/MIN/1.73 M^2
FIBRINOGEN PPP-MCNC: 290 MG/DL (ref 182–400)
GLUCOSE SERPL-MCNC: 118 MG/DL (ref 70–110)
GLUCOSE SERPL-MCNC: 251 MG/DL (ref 70–110)
HCT VFR BLD AUTO: 27.1 % (ref 37–48.5)
HGB BLD-MCNC: 9.1 G/DL (ref 12–16)
IMM GRANULOCYTES # BLD AUTO: ABNORMAL K/UL (ref 0–0.04)
IMM GRANULOCYTES NFR BLD AUTO: ABNORMAL % (ref 0–0.5)
INR PPP: 1 (ref 0.8–1.2)
LDH SERPL L TO P-CCNC: 412 U/L (ref 110–260)
LDH SERPL L TO P-CCNC: 429 U/L (ref 110–260)
LYMPHOCYTES # BLD AUTO: ABNORMAL K/UL (ref 1–4.8)
LYMPHOCYTES NFR BLD: 27 % (ref 18–48)
MAGNESIUM SERPL-MCNC: 1.6 MG/DL (ref 1.6–2.6)
MAGNESIUM SERPL-MCNC: 1.9 MG/DL (ref 1.6–2.6)
MCH RBC QN AUTO: 29.9 PG (ref 27–31)
MCHC RBC AUTO-ENTMCNC: 33.6 G/DL (ref 32–36)
MCV RBC AUTO: 89 FL (ref 82–98)
MONOCYTES # BLD AUTO: ABNORMAL K/UL (ref 0.3–1)
MONOCYTES NFR BLD: 0 % (ref 4–15)
NEUTROPHILS NFR BLD: 62 % (ref 38–73)
NEUTS BAND NFR BLD MANUAL: 1 %
NRBC BLD-RTO: 0 /100 WBC
PHOSPHATE SERPL-MCNC: 4.2 MG/DL (ref 2.7–4.5)
PHOSPHATE SERPL-MCNC: 4.7 MG/DL (ref 2.7–4.5)
PLATELET # BLD AUTO: 9 K/UL (ref 150–450)
PLATELET BLD QL SMEAR: ABNORMAL
PMV BLD AUTO: ABNORMAL FL (ref 9.2–12.9)
POCT GLUCOSE: 105 MG/DL (ref 70–110)
POCT GLUCOSE: 162 MG/DL (ref 70–110)
POCT GLUCOSE: 237 MG/DL (ref 70–110)
POTASSIUM SERPL-SCNC: 4.2 MMOL/L (ref 3.5–5.1)
POTASSIUM SERPL-SCNC: 4.3 MMOL/L (ref 3.5–5.1)
PROT SERPL-MCNC: 5.7 G/DL (ref 6–8.4)
PROT SERPL-MCNC: 6.2 G/DL (ref 6–8.4)
PROTHROMBIN TIME: 10.5 SEC (ref 9–12.5)
RBC # BLD AUTO: 3.04 M/UL (ref 4–5.4)
SODIUM SERPL-SCNC: 130 MMOL/L (ref 136–145)
SODIUM SERPL-SCNC: 133 MMOL/L (ref 136–145)
UNIT NUMBER: NORMAL
URATE SERPL-MCNC: 2.5 MG/DL (ref 2.4–5.7)
URATE SERPL-MCNC: 2.6 MG/DL (ref 2.4–5.7)
WBC # BLD AUTO: 7.46 K/UL (ref 3.9–12.7)

## 2022-05-30 PROCEDURE — 63600175 PHARM REV CODE 636 W HCPCS: Performed by: STUDENT IN AN ORGANIZED HEALTH CARE EDUCATION/TRAINING PROGRAM

## 2022-05-30 PROCEDURE — 36430 TRANSFUSION BLD/BLD COMPNT: CPT

## 2022-05-30 PROCEDURE — 85007 BL SMEAR W/DIFF WBC COUNT: CPT | Performed by: NURSE PRACTITIONER

## 2022-05-30 PROCEDURE — 85384 FIBRINOGEN ACTIVITY: CPT | Performed by: NURSE PRACTITIONER

## 2022-05-30 PROCEDURE — 83735 ASSAY OF MAGNESIUM: CPT | Performed by: NURSE PRACTITIONER

## 2022-05-30 PROCEDURE — P9037 PLATE PHERES LEUKOREDU IRRAD: HCPCS | Performed by: STUDENT IN AN ORGANIZED HEALTH CARE EDUCATION/TRAINING PROGRAM

## 2022-05-30 PROCEDURE — 20600001 HC STEP DOWN PRIVATE ROOM

## 2022-05-30 PROCEDURE — 99233 PR SUBSEQUENT HOSPITAL CARE,LEVL III: ICD-10-PCS | Mod: ,,, | Performed by: INTERNAL MEDICINE

## 2022-05-30 PROCEDURE — 25000003 PHARM REV CODE 250: Performed by: STUDENT IN AN ORGANIZED HEALTH CARE EDUCATION/TRAINING PROGRAM

## 2022-05-30 PROCEDURE — 25000003 PHARM REV CODE 250: Performed by: INTERNAL MEDICINE

## 2022-05-30 PROCEDURE — 85027 COMPLETE CBC AUTOMATED: CPT | Performed by: NURSE PRACTITIONER

## 2022-05-30 PROCEDURE — 84550 ASSAY OF BLOOD/URIC ACID: CPT | Mod: 91 | Performed by: NURSE PRACTITIONER

## 2022-05-30 PROCEDURE — 99233 SBSQ HOSP IP/OBS HIGH 50: CPT | Mod: ,,, | Performed by: INTERNAL MEDICINE

## 2022-05-30 PROCEDURE — 83615 LACTATE (LD) (LDH) ENZYME: CPT | Mod: 91 | Performed by: NURSE PRACTITIONER

## 2022-05-30 PROCEDURE — 63600175 PHARM REV CODE 636 W HCPCS: Performed by: NURSE PRACTITIONER

## 2022-05-30 PROCEDURE — 25000003 PHARM REV CODE 250: Performed by: NURSE PRACTITIONER

## 2022-05-30 PROCEDURE — 84100 ASSAY OF PHOSPHORUS: CPT | Mod: 91 | Performed by: NURSE PRACTITIONER

## 2022-05-30 PROCEDURE — 85610 PROTHROMBIN TIME: CPT | Performed by: NURSE PRACTITIONER

## 2022-05-30 PROCEDURE — 80053 COMPREHEN METABOLIC PANEL: CPT | Performed by: NURSE PRACTITIONER

## 2022-05-30 RX ORDER — DIPHENHYDRAMINE HCL 25 MG
25 CAPSULE ORAL ONCE
Status: COMPLETED | OUTPATIENT
Start: 2022-05-30 | End: 2022-05-30

## 2022-05-30 RX ORDER — SODIUM CHLORIDE 9 MG/ML
INJECTION, SOLUTION INTRAVENOUS CONTINUOUS
Status: DISCONTINUED | OUTPATIENT
Start: 2022-05-30 | End: 2022-05-30

## 2022-05-30 RX ORDER — HYDROCODONE BITARTRATE AND ACETAMINOPHEN 500; 5 MG/1; MG/1
TABLET ORAL
Status: DISCONTINUED | OUTPATIENT
Start: 2022-05-30 | End: 2022-06-03

## 2022-05-30 RX ORDER — ACETAMINOPHEN 325 MG/1
650 TABLET ORAL ONCE
Status: COMPLETED | OUTPATIENT
Start: 2022-05-30 | End: 2022-05-30

## 2022-05-30 RX ORDER — SEVELAMER CARBONATE 800 MG/1
800 TABLET, FILM COATED ORAL
Status: DISCONTINUED | OUTPATIENT
Start: 2022-05-30 | End: 2022-05-30

## 2022-05-30 RX ORDER — PROCHLORPERAZINE EDISYLATE 5 MG/ML
5 INJECTION INTRAMUSCULAR; INTRAVENOUS EVERY 6 HOURS PRN
Status: DISCONTINUED | OUTPATIENT
Start: 2022-05-30 | End: 2022-06-06 | Stop reason: HOSPADM

## 2022-05-30 RX ADMIN — OXYCODONE 5 MG: 5 TABLET ORAL at 08:05

## 2022-05-30 RX ADMIN — SODIUM CHLORIDE: 0.9 INJECTION, SOLUTION INTRAVENOUS at 01:05

## 2022-05-30 RX ADMIN — ACYCLOVIR 400 MG: 200 CAPSULE ORAL at 10:05

## 2022-05-30 RX ADMIN — BUSPIRONE HYDROCHLORIDE 10 MG: 10 TABLET ORAL at 10:05

## 2022-05-30 RX ADMIN — PANTOPRAZOLE SODIUM 40 MG: 40 TABLET, DELAYED RELEASE ORAL at 09:05

## 2022-05-30 RX ADMIN — INSULIN ASPART 1 UNITS: 100 INJECTION, SOLUTION INTRAVENOUS; SUBCUTANEOUS at 08:05

## 2022-05-30 RX ADMIN — ACETAMINOPHEN 650 MG: 325 TABLET ORAL at 08:05

## 2022-05-30 RX ADMIN — GABAPENTIN 300 MG: 300 CAPSULE ORAL at 08:05

## 2022-05-30 RX ADMIN — ALLOPURINOL 300 MG: 300 TABLET ORAL at 08:05

## 2022-05-30 RX ADMIN — DILTIAZEM HYDROCHLORIDE 90 MG: 30 TABLET, FILM COATED ORAL at 10:05

## 2022-05-30 RX ADMIN — OXCARBAZEPINE 1200 MG: 600 TABLET, FILM COATED ORAL at 11:05

## 2022-05-30 RX ADMIN — LEVOFLOXACIN 500 MG: 500 TABLET, FILM COATED ORAL at 04:05

## 2022-05-30 RX ADMIN — GABAPENTIN 300 MG: 300 CAPSULE ORAL at 04:05

## 2022-05-30 RX ADMIN — BUSPIRONE HYDROCHLORIDE 10 MG: 10 TABLET ORAL at 09:05

## 2022-05-30 RX ADMIN — ONDANSETRON 8 MG: 8 TABLET, ORALLY DISINTEGRATING ORAL at 08:05

## 2022-05-30 RX ADMIN — OXCARBAZEPINE 1200 MG: 600 TABLET, FILM COATED ORAL at 09:05

## 2022-05-30 RX ADMIN — QUETIAPINE FUMARATE 200 MG: 200 TABLET, FILM COATED ORAL at 10:05

## 2022-05-30 RX ADMIN — DILTIAZEM HYDROCHLORIDE 90 MG: 30 TABLET, FILM COATED ORAL at 09:05

## 2022-05-30 RX ADMIN — DIPHENHYDRAMINE HYDROCHLORIDE 25 MG: 25 CAPSULE ORAL at 08:05

## 2022-05-30 RX ADMIN — OXCARBAZEPINE 1200 MG: 600 TABLET, FILM COATED ORAL at 10:05

## 2022-05-30 RX ADMIN — PROCHLORPERAZINE EDISYLATE 5 MG: 5 INJECTION INTRAMUSCULAR; INTRAVENOUS at 11:05

## 2022-05-30 RX ADMIN — HYDROMORPHONE HYDROCHLORIDE 0.5 MG: 1 INJECTION, SOLUTION INTRAMUSCULAR; INTRAVENOUS; SUBCUTANEOUS at 06:05

## 2022-05-30 RX ADMIN — OXYCODONE 5 MG: 5 TABLET ORAL at 04:05

## 2022-05-30 RX ADMIN — HYDROMORPHONE HYDROCHLORIDE 0.5 MG: 1 INJECTION, SOLUTION INTRAMUSCULAR; INTRAVENOUS; SUBCUTANEOUS at 10:05

## 2022-05-30 RX ADMIN — HYDROMORPHONE HYDROCHLORIDE 0.5 MG: 1 INJECTION, SOLUTION INTRAMUSCULAR; INTRAVENOUS; SUBCUTANEOUS at 11:05

## 2022-05-30 RX ADMIN — FLUCONAZOLE 400 MG: 200 TABLET ORAL at 04:05

## 2022-05-30 RX ADMIN — DILTIAZEM HYDROCHLORIDE 90 MG: 30 TABLET, FILM COATED ORAL at 03:05

## 2022-05-30 RX ADMIN — SULFAMETHOXAZOLE AND TRIMETHOPRIM 1 TABLET: 800; 160 TABLET ORAL at 03:05

## 2022-05-30 RX ADMIN — HYDROXYZINE HYDROCHLORIDE 50 MG: 25 TABLET, FILM COATED ORAL at 03:05

## 2022-05-30 RX ADMIN — ACYCLOVIR 400 MG: 200 CAPSULE ORAL at 09:05

## 2022-05-30 RX ADMIN — HYDROXYZINE HYDROCHLORIDE 50 MG: 25 TABLET, FILM COATED ORAL at 04:05

## 2022-05-30 RX ADMIN — LOSARTAN POTASSIUM 25 MG: 25 TABLET, FILM COATED ORAL at 11:05

## 2022-05-30 RX ADMIN — LIDOCAINE 1 PATCH: 50 PATCH CUTANEOUS at 11:05

## 2022-05-30 RX ADMIN — HYDROMORPHONE HYDROCHLORIDE 0.5 MG: 1 INJECTION, SOLUTION INTRAMUSCULAR; INTRAVENOUS; SUBCUTANEOUS at 02:05

## 2022-05-30 RX ADMIN — GABAPENTIN 300 MG: 300 CAPSULE ORAL at 11:05

## 2022-05-30 RX ADMIN — HYDROMORPHONE HYDROCHLORIDE 0.5 MG: 1 INJECTION, SOLUTION INTRAMUSCULAR; INTRAVENOUS; SUBCUTANEOUS at 05:05

## 2022-05-30 RX ADMIN — DILTIAZEM HYDROCHLORIDE 90 MG: 30 TABLET, FILM COATED ORAL at 04:05

## 2022-05-30 RX ADMIN — VORTIOXETINE 20 MG: 10 TABLET, FILM COATED ORAL at 09:05

## 2022-05-30 NOTE — TELEPHONE ENCOUNTER
Called and tried to check status of [employer] through [employer] [place]. Office is closed due to the Memorial Day holiday. Will follow up tomorrow to check status.

## 2022-05-30 NOTE — PROGRESS NOTES
Called patient with normal tumor markers. Left message informing her these were normal and she could call back if she had questions   [facility]pez - Oncology ([facility])  Hematology  Bone Marrow Transplant  Progress Note    Patient Name: Deepti Gonzalez  Admission Date: 5/23/2022  Hospital Length of Stay: 7 days  Code Status: Full Code    Subjective:     Interval History: WBC stable at 7K but increased blasts on peripheral blood. Remains on 10 units levemir daily. Pending panotinib. Will discuss vincristine dose for tomorrow. Transfusing 1 unit platelets.      Objective:     Vital Signs (Most Recent):  Temp: 98.7 °F (37.1 °C) (05/30/22 0427)  Pulse: 87 (05/30/22 0427)  Resp: 16 (05/30/22 0821)  BP: 104/68 (05/30/22 0427)  SpO2: (!) 92 % (05/30/22 0427) Vital Signs (24h Range):  Temp:  [98.3 °F (36.8 °C)-98.8 °F (37.1 °C)] 98.7 °F (37.1 °C)  Pulse:  [85-91] 87  Resp:  [14-20] 16  SpO2:  [92 %-95 %] 92 %  BP: (104-141)/(65-74) 104/68     Weight: 69.3 kg (152 lb 12.5 oz)  Body mass index is 25.42 kg/m².  Body surface area is 1.78 meters squared.      Intake/Output - Last 3 Shifts         05/28 0700  05/29 0659 05/29 0700  05/30 0659 05/30 0700  05/31 0659    P.O. 240      I.V. (mL/kg) 493.2 (6.7)      Blood 450      Total Intake(mL/kg) 1183.2 (16.1)      Urine (mL/kg/hr) 1700 (1)      Total Output 1700      Net -516.8                     Physical Exam  Vitals reviewed.   Constitutional:       Appearance: Normal appearance. She is well-developed.   HENT:      Head: Normocephalic and atraumatic.      Right Ear: External ear normal.      Left Ear: External ear normal.   Eyes:      Extraocular Movements: Extraocular movements intact.      Conjunctiva/sclera: Conjunctivae normal.   Cardiovascular:      Rate and Rhythm: Normal rate and regular rhythm.      Pulses: Normal pulses.      Heart sounds: Normal heart sounds. No murmur heard.  Pulmonary:      Effort: Pulmonary effort is normal. No respiratory distress.      Breath sounds: No stridor. No wheezing or rales.   Abdominal:      General: Bowel sounds are normal. There is no distension.      Palpations:  Abdomen is soft.      Tenderness: There is no abdominal tenderness.   Musculoskeletal:         General: Normal range of motion.      Cervical back: Normal range of motion.      Right lower leg: No edema.      Left lower leg: No edema.   Skin:     General: Skin is warm and dry.      Findings: Bruising present. No rash.      Comments: RCWP with no signs of infection   Neurological:      General: No focal deficit present.      Mental Status: She is alert and oriented to person, place, and time.   Psychiatric:         Mood and Affect: Mood normal.         Behavior: Behavior normal.         Thought Content: Thought content normal.         Judgment: Judgment normal.       Significant Labs:   CBC:   Recent Labs   Lab 05/29/22  0456 05/30/22  0558   WBC 6.16 7.46   HGB 8.3* 9.1*   HCT 24.4* 27.1*   PLT 11* 9*      and CMP:   Recent Labs   Lab 05/29/22  0456 05/29/22  1539 05/30/22  0558   * 134* 133*   K 4.3 4.4 4.3   CL 95 93* 94*   CO2 28 27 26    148* 118*   BUN 14 12 13   CREATININE 0.7 0.8 0.7   CALCIUM 8.7 9.0 9.1   PROT 5.4* 6.3 5.7*   ALBUMIN 3.3* 3.6 3.3*   BILITOT 0.3 0.3 0.3   ALKPHOS 178* 192* 170*   AST 10 10 12   ALT 18 19 20   ANIONGAP 12 14 13   EGFRNONAA >60.0 >60.0 >60.0         Diagnostic Results:  I have reviewed all pertinent imaging results/findings within the past 24 hours.    Assessment/Plan:     * Leukocytosis  - seen in BMT clinic 5/23 by Dr. Jenkins  - WBC up from 19.5 on 5/19 to 136.5 on 5/23  - see B-ALL for hx of treatment  - continue cytoreduction with dex, increased to 40mg on 5/25, dc 5/26  - s/p 2mg vincristine 5/24  - high TLS risk, monitoring BID TLS labs    Volume overload  - weight up ~9lbs from admit   - on high rate IVF d/t TLS risk  - on 1-2L O2 via NC, reporting intermittent SOB  - will reassess daily if lasix needed      At high risk of tumor lysis syndrome  Given leukocytosis with WBC 136K and plan for rapid reduction with PO dex and 1 dose of vincristine, patient at  high risk for TLS  - continue allopurinol 300mg BID  - monitor TLS labs BID    Anemia in neoplastic disease  - daily CBC while inpatient  - transfuse for Hgb <7    Transaminitis  - significant transaminitis following Blinatumomab  - closely monitor with cytoreduction and vincristine  - improved ALT/AST     Coagulopathy  - high risk with cytoreduction/refractory B-ALL s/p vincristine on 5/24  - monitoring BID INR/fibrinogen  - received cryo x2 5/27 ( suspect 1 unit not fully transfused)  - plan for cryo if <100    Thrombocytopenia  - daily CBC while inpatient  - transfuse for Plt <10K or bleeding    Cancer associated pain  - worsening pain to left hip with worsening leukocytosis  - denies trauma or injury to area  - likely d/t leukemia  - oxycodone has been unsuccessful  - reports improvement with 0.5mg dilaudid q6h prn    Moderate major depression  - Continue home trintellix    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 Blincyto held & given steroids x 3 days. Resumed Blincyto 5/17 and again developed CRS, Grade 2 as well as mild AMS and Grade 3 transaminitis. Blincyto stopped with no plan to resume. Discharged on 5/19  - seen today 5/23 by Dr. 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  - achs blood glucose monitoring and diabetic diet while admitted  - 10 units levemir daily with MDSSI      Rheumatoid arthritis involving multiple sites  - not currently on any active tx    History of TIA (transient ischemic attack)  - Holding home statin while inpatient  - Holding home ASA while inpatient due to thrombocytopenia. Will hold ASA at discharge. Can be resumed outpatient as appropriate when platelets >50K    Hyperlipidemia  - holding home statin while inpatient. Can resume at discharge if LFTs normalize.    Paroxysmal atrial fibrillation  - continue home diltiazem  - Holding xarelto with plt count < 50K. Can resume outpatient when appropriate.        VTE Risk Mitigation (From admission, onward)         Ordered     heparin, porcine (PF) 100 unit/mL injection flush 300 Units  As needed (PRN)         05/24/22 1021     IP VTE HIGH RISK PATIENT  Once         05/23/22 1552     Place sequential compression device  Until discontinued         05/23/22 1552     Reason for No Pharmacological VTE Prophylaxis  Once        Question:  Reasons:  Answer:  Thrombocytopenia    05/23/22 1552                Disposition: Home    Jan Hooker MD  Bone Marrow Transplant  [facility] - Oncology ([facility])

## 2022-05-30 NOTE — PLAN OF CARE
Plan of care reviewed with the patient at the beginning of shift. The patient is alert and oriented. GCS 15. Complaining of pain. PRN pain medication administered.Up with stand by assist. Remained free from falls and injuries throughout shift. 1U PLT administered today. VSS. Bed in low locked position. Call bell and personal items within reach. Will continue to monitor.

## 2022-05-30 NOTE — PLAN OF CARE
Meets 2 Wesley-Cade criteria for TLS based on >25% increase in serum uric acid from 3 days ago and elevated phosphate > 4.5.     Will fluid resuscitate with normal saline over 10 hours and oral phosphate binders. Will follow up morning labs for further electrolyte abnormalities.

## 2022-05-30 NOTE — SUBJECTIVE & OBJECTIVE
Subjective:     Interval History: WBC stable at 7K but increased blasts on peripheral blood. Remains on 10 units levemir daily. Pending panotinib. Will discuss vincristine dose for tomorrow. Transfusing 1 unit platelets.      Objective:     Vital Signs (Most Recent):  Temp: 98.7 °F (37.1 °C) (05/30/22 0427)  Pulse: 87 (05/30/22 0427)  Resp: 16 (05/30/22 0821)  BP: 104/68 (05/30/22 0427)  SpO2: (!) 92 % (05/30/22 0427) Vital Signs (24h Range):  Temp:  [98.3 °F (36.8 °C)-98.8 °F (37.1 °C)] 98.7 °F (37.1 °C)  Pulse:  [85-91] 87  Resp:  [14-20] 16  SpO2:  [92 %-95 %] 92 %  BP: (104-141)/(65-74) 104/68     Weight: 69.3 kg (152 lb 12.5 oz)  Body mass index is 25.42 kg/m².  Body surface area is 1.78 meters squared.      Intake/Output - Last 3 Shifts         05/28 0700  05/29 0659 05/29 0700  05/30 0659 05/30 0700  05/31 0659    P.O. 240      I.V. (mL/kg) 493.2 (6.7)      Blood 450      Total Intake(mL/kg) 1183.2 (16.1)      Urine (mL/kg/hr) 1700 (1)      Total Output 1700      Net -516.8                     Physical Exam  Vitals reviewed.   Constitutional:       Appearance: Normal appearance. She is well-developed.   HENT:      Head: Normocephalic and atraumatic.      Right Ear: External ear normal.      Left Ear: External ear normal.   Eyes:      Extraocular Movements: Extraocular movements intact.      Conjunctiva/sclera: Conjunctivae normal.   Cardiovascular:      Rate and Rhythm: Normal rate and regular rhythm.      Pulses: Normal pulses.      Heart sounds: Normal heart sounds. No murmur heard.  Pulmonary:      Effort: Pulmonary effort is normal. No respiratory distress.      Breath sounds: No stridor. No wheezing or rales.   Abdominal:      General: Bowel sounds are normal. There is no distension.      Palpations: Abdomen is soft.      Tenderness: There is no abdominal tenderness.   Musculoskeletal:         General: Normal range of motion.      Cervical back: Normal range of motion.      Right lower leg: No edema.       Left lower leg: No edema.   Skin:     General: Skin is warm and dry.      Findings: Bruising present. No rash.      Comments: RCWP with no signs of infection   Neurological:      General: No focal deficit present.      Mental Status: She is alert and oriented to person, place, and time.   Psychiatric:         Mood and Affect: Mood normal.         Behavior: Behavior normal.         Thought Content: Thought content normal.         Judgment: Judgment normal.       Significant Labs:   CBC:   Recent Labs   Lab 05/29/22  0456 05/30/22  0558   WBC 6.16 7.46   HGB 8.3* 9.1*   HCT 24.4* 27.1*   PLT 11* 9*      and CMP:   Recent Labs   Lab 05/29/22  0456 05/29/22  1539 05/30/22  0558   * 134* 133*   K 4.3 4.4 4.3   CL 95 93* 94*   CO2 28 27 26    148* 118*   BUN 14 12 13   CREATININE 0.7 0.8 0.7   CALCIUM 8.7 9.0 9.1   PROT 5.4* 6.3 5.7*   ALBUMIN 3.3* 3.6 3.3*   BILITOT 0.3 0.3 0.3   ALKPHOS 178* 192* 170*   AST 10 10 12   ALT 18 19 20   ANIONGAP 12 14 13   EGFRNONAA >60.0 >60.0 >60.0         Diagnostic Results:  I have reviewed all pertinent imaging results/findings within the past 24 hours.

## 2022-05-30 NOTE — ASSESSMENT & PLAN NOTE
Given leukocytosis with WBC 136K and plan for rapid reduction with PO dex and 1 dose of vincristine, patient at high risk for TLS  - continue allopurinol 300mg BID  - monitor TLS labs BID

## 2022-05-31 LAB
ALBUMIN SERPL BCP-MCNC: 3.1 G/DL (ref 3.5–5.2)
ALBUMIN SERPL BCP-MCNC: 3.4 G/DL (ref 3.5–5.2)
ALP SERPL-CCNC: 160 U/L (ref 55–135)
ALP SERPL-CCNC: 197 U/L (ref 55–135)
ALT SERPL W/O P-5'-P-CCNC: 18 U/L (ref 10–44)
ALT SERPL W/O P-5'-P-CCNC: 33 U/L (ref 10–44)
ANION GAP SERPL CALC-SCNC: 11 MMOL/L (ref 8–16)
ANION GAP SERPL CALC-SCNC: 13 MMOL/L (ref 8–16)
ANISOCYTOSIS BLD QL SMEAR: SLIGHT
AST SERPL-CCNC: 12 U/L (ref 10–40)
AST SERPL-CCNC: 30 U/L (ref 10–40)
BASOPHILS NFR BLD: 0 % (ref 0–1.9)
BILIRUB SERPL-MCNC: 0.2 MG/DL (ref 0.1–1)
BILIRUB SERPL-MCNC: 0.2 MG/DL (ref 0.1–1)
BLASTS NFR BLD MANUAL: 31 %
BUN SERPL-MCNC: 8 MG/DL (ref 6–20)
BUN SERPL-MCNC: 9 MG/DL (ref 6–20)
CALCIUM SERPL-MCNC: 8.8 MG/DL (ref 8.7–10.5)
CALCIUM SERPL-MCNC: 9.1 MG/DL (ref 8.7–10.5)
CHLORIDE SERPL-SCNC: 95 MMOL/L (ref 95–110)
CHLORIDE SERPL-SCNC: 98 MMOL/L (ref 95–110)
CO2 SERPL-SCNC: 24 MMOL/L (ref 23–29)
CO2 SERPL-SCNC: 26 MMOL/L (ref 23–29)
CREAT SERPL-MCNC: 0.7 MG/DL (ref 0.5–1.4)
CREAT SERPL-MCNC: 0.8 MG/DL (ref 0.5–1.4)
DIFFERENTIAL METHOD: ABNORMAL
EOSINOPHIL NFR BLD: 0 % (ref 0–8)
ERYTHROCYTE [DISTWIDTH] IN BLOOD BY AUTOMATED COUNT: 16.4 % (ref 11.5–14.5)
EST. GFR  (AFRICAN AMERICAN): >60 ML/MIN/1.73 M^2
EST. GFR  (AFRICAN AMERICAN): >60 ML/MIN/1.73 M^2
EST. GFR  (NON AFRICAN AMERICAN): >60 ML/MIN/1.73 M^2
EST. GFR  (NON AFRICAN AMERICAN): >60 ML/MIN/1.73 M^2
FIBRINOGEN PPP-MCNC: 361 MG/DL (ref 182–400)
GLUCOSE SERPL-MCNC: 106 MG/DL (ref 70–110)
GLUCOSE SERPL-MCNC: 133 MG/DL (ref 70–110)
HCT VFR BLD AUTO: 24.1 % (ref 37–48.5)
HGB BLD-MCNC: 8.1 G/DL (ref 12–16)
HYPOCHROMIA BLD QL SMEAR: ABNORMAL
IMM GRANULOCYTES # BLD AUTO: ABNORMAL K/UL (ref 0–0.04)
IMM GRANULOCYTES NFR BLD AUTO: ABNORMAL % (ref 0–0.5)
INR PPP: 1 (ref 0.8–1.2)
LDH SERPL L TO P-CCNC: 447 U/L (ref 110–260)
LDH SERPL L TO P-CCNC: 717 U/L (ref 110–260)
LYMPHOCYTES NFR BLD: 32 % (ref 18–48)
MAGNESIUM SERPL-MCNC: 1.7 MG/DL (ref 1.6–2.6)
MAGNESIUM SERPL-MCNC: 1.7 MG/DL (ref 1.6–2.6)
MCH RBC QN AUTO: 29.7 PG (ref 27–31)
MCHC RBC AUTO-ENTMCNC: 33.6 G/DL (ref 32–36)
MCV RBC AUTO: 88 FL (ref 82–98)
METAMYELOCYTES NFR BLD MANUAL: 1 %
MONOCYTES NFR BLD: 0 % (ref 4–15)
MYELOCYTES NFR BLD MANUAL: 1 %
NEUTROPHILS NFR BLD: 32 % (ref 38–73)
NEUTS BAND NFR BLD MANUAL: 3 %
NRBC BLD-RTO: 0 /100 WBC
OVALOCYTES BLD QL SMEAR: ABNORMAL
PHOSPHATE SERPL-MCNC: 3.5 MG/DL (ref 2.7–4.5)
PHOSPHATE SERPL-MCNC: 3.9 MG/DL (ref 2.7–4.5)
PLATELET # BLD AUTO: 48 K/UL (ref 150–450)
PLATELET BLD QL SMEAR: ABNORMAL
PMV BLD AUTO: 10.3 FL (ref 9.2–12.9)
POCT GLUCOSE: 111 MG/DL (ref 70–110)
POCT GLUCOSE: 116 MG/DL (ref 70–110)
POCT GLUCOSE: 185 MG/DL (ref 70–110)
POCT GLUCOSE: 203 MG/DL (ref 70–110)
POIKILOCYTOSIS BLD QL SMEAR: SLIGHT
POTASSIUM SERPL-SCNC: 4 MMOL/L (ref 3.5–5.1)
POTASSIUM SERPL-SCNC: 4.3 MMOL/L (ref 3.5–5.1)
PROT SERPL-MCNC: 5.6 G/DL (ref 6–8.4)
PROT SERPL-MCNC: 6.3 G/DL (ref 6–8.4)
PROTHROMBIN TIME: 10.6 SEC (ref 9–12.5)
RBC # BLD AUTO: 2.73 M/UL (ref 4–5.4)
SMUDGE CELLS BLD QL SMEAR: PRESENT
SODIUM SERPL-SCNC: 132 MMOL/L (ref 136–145)
SODIUM SERPL-SCNC: 135 MMOL/L (ref 136–145)
URATE SERPL-MCNC: 3.3 MG/DL (ref 2.4–5.7)
URATE SERPL-MCNC: 3.5 MG/DL (ref 2.4–5.7)
WBC # BLD AUTO: 11.62 K/UL (ref 3.9–12.7)

## 2022-05-31 PROCEDURE — 99233 PR SUBSEQUENT HOSPITAL CARE,LEVL III: ICD-10-PCS | Mod: ,,, | Performed by: INTERNAL MEDICINE

## 2022-05-31 PROCEDURE — 85027 COMPLETE CBC AUTOMATED: CPT | Performed by: NURSE PRACTITIONER

## 2022-05-31 PROCEDURE — 85007 BL SMEAR W/DIFF WBC COUNT: CPT | Performed by: NURSE PRACTITIONER

## 2022-05-31 PROCEDURE — 25000003 PHARM REV CODE 250: Performed by: INTERNAL MEDICINE

## 2022-05-31 PROCEDURE — 84550 ASSAY OF BLOOD/URIC ACID: CPT | Performed by: NURSE PRACTITIONER

## 2022-05-31 PROCEDURE — 25000003 PHARM REV CODE 250: Performed by: NURSE PRACTITIONER

## 2022-05-31 PROCEDURE — 85610 PROTHROMBIN TIME: CPT | Performed by: NURSE PRACTITIONER

## 2022-05-31 PROCEDURE — 83735 ASSAY OF MAGNESIUM: CPT | Performed by: NURSE PRACTITIONER

## 2022-05-31 PROCEDURE — 80053 COMPREHEN METABOLIC PANEL: CPT | Performed by: NURSE PRACTITIONER

## 2022-05-31 PROCEDURE — 97530 THERAPEUTIC ACTIVITIES: CPT

## 2022-05-31 PROCEDURE — 97116 GAIT TRAINING THERAPY: CPT

## 2022-05-31 PROCEDURE — 99233 SBSQ HOSP IP/OBS HIGH 50: CPT | Mod: ,,, | Performed by: INTERNAL MEDICINE

## 2022-05-31 PROCEDURE — 84100 ASSAY OF PHOSPHORUS: CPT | Mod: 91 | Performed by: NURSE PRACTITIONER

## 2022-05-31 PROCEDURE — 85384 FIBRINOGEN ACTIVITY: CPT | Performed by: NURSE PRACTITIONER

## 2022-05-31 PROCEDURE — 63600175 PHARM REV CODE 636 W HCPCS: Performed by: INTERNAL MEDICINE

## 2022-05-31 PROCEDURE — 83615 LACTATE (LD) (LDH) ENZYME: CPT | Performed by: NURSE PRACTITIONER

## 2022-05-31 PROCEDURE — 25000003 PHARM REV CODE 250: Performed by: STUDENT IN AN ORGANIZED HEALTH CARE EDUCATION/TRAINING PROGRAM

## 2022-05-31 PROCEDURE — 63600175 PHARM REV CODE 636 W HCPCS: Performed by: NURSE PRACTITIONER

## 2022-05-31 PROCEDURE — 20600001 HC STEP DOWN PRIVATE ROOM

## 2022-05-31 RX ORDER — SODIUM CHLORIDE 0.9 % (FLUSH) 0.9 %
10 SYRINGE (ML) INJECTION
Status: DISCONTINUED | OUTPATIENT
Start: 2022-05-31 | End: 2022-06-06 | Stop reason: HOSPADM

## 2022-05-31 RX ORDER — HEPARIN 100 UNIT/ML
300 SYRINGE INTRAVENOUS
Status: DISCONTINUED | OUTPATIENT
Start: 2022-05-31 | End: 2022-05-31 | Stop reason: SDUPTHER

## 2022-05-31 RX ORDER — OXYCODONE HYDROCHLORIDE 10 MG/1
10 TABLET ORAL EVERY 4 HOURS PRN
Status: DISCONTINUED | OUTPATIENT
Start: 2022-05-31 | End: 2022-06-03

## 2022-05-31 RX ORDER — ONDANSETRON 8 MG/1
8 TABLET, ORALLY DISINTEGRATING ORAL EVERY 8 HOURS PRN
Status: DISCONTINUED | OUTPATIENT
Start: 2022-05-31 | End: 2022-05-31 | Stop reason: SDUPTHER

## 2022-05-31 RX ADMIN — OXYCODONE HYDROCHLORIDE 10 MG: 10 TABLET ORAL at 11:05

## 2022-05-31 RX ADMIN — DILTIAZEM HYDROCHLORIDE 90 MG: 30 TABLET, FILM COATED ORAL at 09:05

## 2022-05-31 RX ADMIN — PANTOPRAZOLE SODIUM 40 MG: 40 TABLET, DELAYED RELEASE ORAL at 09:05

## 2022-05-31 RX ADMIN — HYDROXYZINE HYDROCHLORIDE 50 MG: 25 TABLET, FILM COATED ORAL at 03:05

## 2022-05-31 RX ADMIN — INSULIN ASPART 4 UNITS: 100 INJECTION, SOLUTION INTRAVENOUS; SUBCUTANEOUS at 03:05

## 2022-05-31 RX ADMIN — BUSPIRONE HYDROCHLORIDE 10 MG: 10 TABLET ORAL at 09:05

## 2022-05-31 RX ADMIN — GABAPENTIN 300 MG: 300 CAPSULE ORAL at 05:05

## 2022-05-31 RX ADMIN — ONDANSETRON 8 MG: 8 TABLET, ORALLY DISINTEGRATING ORAL at 10:05

## 2022-05-31 RX ADMIN — INSULIN ASPART 1 UNITS: 100 INJECTION, SOLUTION INTRAVENOUS; SUBCUTANEOUS at 10:05

## 2022-05-31 RX ADMIN — DILTIAZEM HYDROCHLORIDE 90 MG: 30 TABLET, FILM COATED ORAL at 03:05

## 2022-05-31 RX ADMIN — OXYCODONE HYDROCHLORIDE 10 MG: 10 TABLET ORAL at 03:05

## 2022-05-31 RX ADMIN — ALLOPURINOL 300 MG: 300 TABLET ORAL at 09:05

## 2022-05-31 RX ADMIN — DILTIAZEM HYDROCHLORIDE 90 MG: 30 TABLET, FILM COATED ORAL at 10:05

## 2022-05-31 RX ADMIN — HYDROMORPHONE HYDROCHLORIDE 0.5 MG: 1 INJECTION, SOLUTION INTRAMUSCULAR; INTRAVENOUS; SUBCUTANEOUS at 09:05

## 2022-05-31 RX ADMIN — SUMATRIPTAN SUCCINATE 50 MG: 50 TABLET ORAL at 10:05

## 2022-05-31 RX ADMIN — VORTIOXETINE 20 MG: 10 TABLET, FILM COATED ORAL at 09:05

## 2022-05-31 RX ADMIN — LEVOFLOXACIN 500 MG: 500 TABLET, FILM COATED ORAL at 05:05

## 2022-05-31 RX ADMIN — ACYCLOVIR 400 MG: 200 CAPSULE ORAL at 09:05

## 2022-05-31 RX ADMIN — QUETIAPINE FUMARATE 200 MG: 200 TABLET, FILM COATED ORAL at 10:05

## 2022-05-31 RX ADMIN — DILTIAZEM HYDROCHLORIDE 90 MG: 30 TABLET, FILM COATED ORAL at 05:05

## 2022-05-31 RX ADMIN — HYDROMORPHONE HYDROCHLORIDE 0.5 MG: 1 INJECTION, SOLUTION INTRAMUSCULAR; INTRAVENOUS; SUBCUTANEOUS at 01:05

## 2022-05-31 RX ADMIN — HYDROMORPHONE HYDROCHLORIDE 0.5 MG: 1 INJECTION, SOLUTION INTRAMUSCULAR; INTRAVENOUS; SUBCUTANEOUS at 10:05

## 2022-05-31 RX ADMIN — OXCARBAZEPINE 1200 MG: 600 TABLET, FILM COATED ORAL at 09:05

## 2022-05-31 RX ADMIN — LIDOCAINE 1 PATCH: 50 PATCH CUTANEOUS at 11:05

## 2022-05-31 RX ADMIN — BUSPIRONE HYDROCHLORIDE 10 MG: 10 TABLET ORAL at 10:05

## 2022-05-31 RX ADMIN — ACYCLOVIR 400 MG: 200 CAPSULE ORAL at 10:05

## 2022-05-31 RX ADMIN — HYDROMORPHONE HYDROCHLORIDE 0.5 MG: 1 INJECTION, SOLUTION INTRAMUSCULAR; INTRAVENOUS; SUBCUTANEOUS at 05:05

## 2022-05-31 RX ADMIN — LOSARTAN POTASSIUM 25 MG: 25 TABLET, FILM COATED ORAL at 11:05

## 2022-05-31 RX ADMIN — OXCARBAZEPINE 1200 MG: 600 TABLET, FILM COATED ORAL at 10:05

## 2022-05-31 RX ADMIN — GABAPENTIN 300 MG: 300 CAPSULE ORAL at 07:05

## 2022-05-31 RX ADMIN — OXYCODONE HYDROCHLORIDE 10 MG: 10 TABLET ORAL at 08:05

## 2022-05-31 RX ADMIN — FLUCONAZOLE 400 MG: 200 TABLET ORAL at 05:05

## 2022-05-31 RX ADMIN — VINCRISTINE SULFATE 2 MG: 1 INJECTION, SOLUTION INTRAVENOUS at 08:05

## 2022-05-31 RX ADMIN — HYDROXYZINE HYDROCHLORIDE 50 MG: 25 TABLET, FILM COATED ORAL at 05:05

## 2022-05-31 RX ADMIN — GABAPENTIN 300 MG: 300 CAPSULE ORAL at 11:05

## 2022-05-31 NOTE — PT/OT/SLP PROGRESS
Occupational Therapy   Treatment & D/C    Name: Deepti Gonzalez  MRN: [id number]  Admitting Diagnosis:  B-cell acute lymphoblastic leukemia       Recommendations:     Discharge Recommendations: home  Discharge Equipment Recommendations:  wheelchair, walker, rolling  Barriers to discharge:  None    Assessment:     Deepti Gonzalez is a 51 y.o. female with a medical diagnosis of B-cell acute lymphoblastic leukemia. Goals met - pt is independent. OT services D/Cd. Pt is aware and in agreement. Performance deficits affecting function are impaired endurance.     Rehab Prognosis:  Good; patient would benefit from acute skilled OT services to address these deficits and reach maximum level of function.       Plan:     Patient to be seen 3 x/week to address the above listed problems via self-care/home management, therapeutic activities, therapeutic exercises  · Plan of Care Expires: 06/27/22  · Plan of Care Reviewed with: patient    Subjective     Pain/Comfort:  · Pain Rating 1: 0/10    Objective:     Communicated with: rn prior to session.  Patient found supine with peripheral IV upon OT entry to room.    General Precautions: Standard, fall   Orthopedic Precautions:N/A   Braces:    Respiratory Status: Room air     Occupational Performance:     Bed Mobility:    · Independent    Functional Mobility/Transfers:  Independent    Activities of Daily Living:  Independent    [facility] 6 Click ADL: 24    Treatment & Education:  Discussed D/C of OT services.    Patient left supine with all lines intact and call button in reachEducation:      GOALS:   Multidisciplinary Problems     Occupational Therapy Goals     Not on file          Multidisciplinary Problems (Resolved)        Problem: Occupational Therapy    Goal Priority Disciplines Outcome Interventions   Occupational Therapy Goal   (Resolved)     OT, PT/OT Met    Description: Goals to be met by: 5/31/22    Patient will increase functional independence with ADLs by  performing:    Grooming while standing at sink with Modified [place].  Toileting from toilet with [place] for hygiene and clothing management.   Supine to sit with [place].  Toilet transfer to toilet with Modified [place].                     Time Tracking:     OT Date of Treatment: 05/31/22  OT Start Time: 0831  OT Stop Time: 0840  OT Total Time (min): 9 min    Billable Minutes:Therapeutic Activity 9    OT/REZA: OT          5/31/2022

## 2022-05-31 NOTE — ASSESSMENT & PLAN NOTE
- worsening pain to left hip with worsening leukocytosis  - denies trauma or injury to area  - likely d/t leukemia  - oxycodone 10 prn q4  - reports improvement with 0.5mg dilaudid q6h prn

## 2022-05-31 NOTE — SUBJECTIVE & OBJECTIVE
Subjective:     Interval History: WBC increased to 11K. Glucose stable and remains on 10 units levemir daily. Pending panotinib. Plan for vincristine today.      Objective:     Vital Signs (Most Recent):  Temp: 98.1 °F (36.7 °C) (05/31/22 0738)  Pulse: 93 (05/31/22 0738)  Resp: 18 (05/31/22 0738)  BP: 123/62 (05/31/22 0738)  SpO2: (!) 92 % (05/31/22 0738) Vital Signs (24h Range):  Temp:  [98 °F (36.7 °C)-98.8 °F (37.1 °C)] 98.1 °F (36.7 °C)  Pulse:  [] 93  Resp:  [15-18] 18  SpO2:  [92 %-96 %] 92 %  BP: ()/(48-76) 123/62     Weight: 69.5 kg (153 lb 3.5 oz)  Body mass index is 25.5 kg/m².  Body surface area is 1.79 meters squared.      Intake/Output - Last 3 Shifts         05/29 0700  05/30 0659 05/30 0700  05/31 0659 05/31 0700  06/01 0659    P.O. 600 780     I.V. (mL/kg)       Blood  242     Total Intake(mL/kg) 600 (8.7) 1022 (14.7)     Urine (mL/kg/hr) 1100 (0.7) 1300 (0.8)     Total Output 1100 1300     Net -500 -278            Urine Occurrence  2 x             Physical Exam  Vitals reviewed.   Constitutional:       Appearance: Normal appearance. She is well-developed.   HENT:      Head: Normocephalic and atraumatic.      Right Ear: External ear normal.      Left Ear: External ear normal.   Eyes:      Extraocular Movements: Extraocular movements intact.      Conjunctiva/sclera: Conjunctivae normal.   Cardiovascular:      Rate and Rhythm: Normal rate and regular rhythm.      Pulses: Normal pulses.      Heart sounds: Normal heart sounds. No murmur heard.  Pulmonary:      Effort: Pulmonary effort is normal. No respiratory distress.      Breath sounds: No stridor. No wheezing or rales.   Abdominal:      General: Bowel sounds are normal. There is no distension.      Palpations: Abdomen is soft.      Tenderness: There is no abdominal tenderness.   Musculoskeletal:         General: Normal range of motion.      Cervical back: Normal range of motion.      Right lower leg: No edema.      Left lower leg: No  edema.   Skin:     General: Skin is warm and dry.      Findings: Bruising present. No rash.      Comments: RCWP with no signs of infection   Neurological:      General: No focal deficit present.      Mental Status: She is alert and oriented to person, place, and time.   Psychiatric:         Mood and Affect: Mood normal.         Behavior: Behavior normal.         Thought Content: Thought content normal.         Judgment: Judgment normal.       Significant Labs:   CBC:   Recent Labs   Lab 05/30/22  0558 05/31/22  0523   WBC 7.46 11.62   HGB 9.1* 8.1*   HCT 27.1* 24.1*   PLT 9* 48*      and CMP:   Recent Labs   Lab 05/30/22  0558 05/30/22  1717 05/31/22  0523   * 130* 132*   K 4.3 4.2 4.0   CL 94* 93* 95   CO2 26 25 26   * 251* 133*   BUN 13 10 9   CREATININE 0.7 0.8 0.8   CALCIUM 9.1 8.8 8.8   PROT 5.7* 6.2 5.6*   ALBUMIN 3.3* 3.3* 3.1*   BILITOT 0.3 0.3 0.2   ALKPHOS 170* 168* 160*   AST 12 11 12   ALT 20 18 18   ANIONGAP 13 12 11   EGFRNONAA >60.0 >60.0 >60.0         Diagnostic Results:  I have reviewed all pertinent imaging results/findings within the past 24 hours.

## 2022-05-31 NOTE — PROGRESS NOTES
[facility] - Oncology ([facility])  Hematology  Bone Marrow Transplant  Progress Note    Patient Name: Deepti Gonzalez  Admission Date: 5/23/2022  Hospital Length of Stay: 8 days  Code Status: Full Code    Subjective:     Interval History: WBC increased to 11K. Glucose stable and remains on 10 units levemir daily. Pending panotinib. Plan for vincristine today.      Objective:     Vital Signs (Most Recent):  Temp: 98.1 °F (36.7 °C) (05/31/22 0738)  Pulse: 93 (05/31/22 0738)  Resp: 18 (05/31/22 0738)  BP: 123/62 (05/31/22 0738)  SpO2: (!) 92 % (05/31/22 0738) Vital Signs (24h Range):  Temp:  [98 °F (36.7 °C)-98.8 °F (37.1 °C)] 98.1 °F (36.7 °C)  Pulse:  [] 93  Resp:  [15-18] 18  SpO2:  [92 %-96 %] 92 %  BP: ()/(48-76) 123/62     Weight: 69.5 kg (153 lb 3.5 oz)  Body mass index is 25.5 kg/m².  Body surface area is 1.79 meters squared.      Intake/Output - Last 3 Shifts         05/29 0700  05/30 0659 05/30 0700 05/31 0659 05/31 0700 06/01 0659    P.O. 600 780     I.V. (mL/kg)       Blood  242     Total Intake(mL/kg) 600 (8.7) 1022 (14.7)     Urine (mL/kg/hr) 1100 (0.7) 1300 (0.8)     Total Output 1100 1300     Net -500 -278            Urine Occurrence  2 x             Physical Exam  Vitals reviewed.   Constitutional:       Appearance: Normal appearance. She is well-developed.   HENT:      Head: Normocephalic and atraumatic.      Right Ear: External ear normal.      Left Ear: External ear normal.   Eyes:      Extraocular Movements: Extraocular movements intact.      Conjunctiva/sclera: Conjunctivae normal.   Cardiovascular:      Rate and Rhythm: Normal rate and regular rhythm.      Pulses: Normal pulses.      Heart sounds: Normal heart sounds. No murmur heard.  Pulmonary:      Effort: Pulmonary effort is normal. No respiratory distress.      Breath sounds: No stridor. No wheezing or rales.   Abdominal:      General: Bowel sounds are normal. There is no distension.      Palpations: Abdomen is soft.       Tenderness: There is no abdominal tenderness.   Musculoskeletal:         General: Normal range of motion.      Cervical back: Normal range of motion.      Right lower leg: No edema.      Left lower leg: No edema.   Skin:     General: Skin is warm and dry.      Findings: Bruising present. No rash.      Comments: RCWP with no signs of infection   Neurological:      General: No focal deficit present.      Mental Status: She is alert and oriented to person, place, and time.   Psychiatric:         Mood and Affect: Mood normal.         Behavior: Behavior normal.         Thought Content: Thought content normal.         Judgment: Judgment normal.       Significant Labs:   CBC:   Recent Labs   Lab 05/30/22  0558 05/31/22  0523   WBC 7.46 11.62   HGB 9.1* 8.1*   HCT 27.1* 24.1*   PLT 9* 48*      and CMP:   Recent Labs   Lab 05/30/22  0558 05/30/22  1717 05/31/22  0523   * 130* 132*   K 4.3 4.2 4.0   CL 94* 93* 95   CO2 26 25 26   * 251* 133*   BUN 13 10 9   CREATININE 0.7 0.8 0.8   CALCIUM 9.1 8.8 8.8   PROT 5.7* 6.2 5.6*   ALBUMIN 3.3* 3.3* 3.1*   BILITOT 0.3 0.3 0.2   ALKPHOS 170* 168* 160*   AST 12 11 12   ALT 20 18 18   ANIONGAP 13 12 11   EGFRNONAA >60.0 >60.0 >60.0         Diagnostic Results:  I have reviewed all pertinent imaging results/findings within the past 24 hours.    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 Blincyto held & given steroids x 3 days. Resumed Blincyto 5/17 and again developed CRS, Grade 2 as well as mild AMS and Grade 3 transaminitis. Blincyto stopped  with no plan to resume. Discharged on 5/19  - seen today 5/23 by Dr. 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; ponatinib Rx pending via specialty pharmacy  - continue ppx acyclovir, fluconazole, levaquin and bactrim  - s/p 2mg vincristine on 5/24 and plan for redose 5/31    Volume overload  On 1-2L O2 via NC, reporting intermittent SOB  - will reassess daily if lasix needed      At high risk of tumor lysis syndrome  Given leukocytosis with WBC 136K and plan for rapid reduction with PO dex and 1 dose of vincristine, patient at high risk for TLS  - continue allopurinol 300mg BID  - monitor TLS labs BID    Anemia in neoplastic disease  - daily CBC while inpatient  - transfuse for Hgb <7    Transaminitis  - significant transaminitis following Blinatumomab  - closely monitor with cytoreduction and vincristine  - improved ALT/AST     Coagulopathy  - high risk with cytoreduction/refractory B-ALL s/p vincristine on 5/24 and 5/31  - monitoring BID INR/fibrinogen  - received cryo x2 5/27 ( suspect 1 unit not fully transfused)  - plan for cryo if <100    Leukocytosis  - see B-ALL      Thrombocytopenia  - daily CBC while inpatient  - transfuse for Plt <10K or bleeding    Cancer associated pain  - worsening pain to left hip with worsening leukocytosis  - denies trauma or injury to area  - likely d/t leukemia  - oxycodone 10 prn q4  - reports improvement with 0.5mg dilaudid q6h prn    Moderate major depression  - Continue home trintellix    Anxiety  - Continue home trintellix and prn valium    GERD (gastroesophageal reflux disease)  - hold home omeprazole, switch to protonix while inpatient    Hypertension  - continue home losartan    Seizure disorder  - continue home  oxcarbazepine    Type 2 diabetes mellitus, without long-term current use of insulin  - hold home farxiga while inpatient; can resume at discharge.  - achs blood glucose monitoring and diabetic diet while admitted  - 10 units levemir daily with MDSSI      Rheumatoid arthritis involving multiple sites  - not currently on any active tx    History of TIA (transient ischemic attack)  - Holding home statin while inpatient  - Holding home ASA while inpatient due to thrombocytopenia. Will hold ASA at discharge. Can be resumed outpatient as appropriate when platelets >50K    Hyperlipidemia  - holding home statin while inpatient. Can resume at discharge if LFTs normalize.    Paroxysmal atrial fibrillation  - continue home diltiazem  - Holding xarelto with plt count < 50K. Can resume outpatient when appropriate.        VTE Risk Mitigation (From admission, onward)         Ordered     heparin, porcine (PF) 100 unit/mL injection flush 300 Units  As needed (PRN)         05/24/22 1021     IP VTE HIGH RISK PATIENT  Once         05/23/22 1552     Place sequential compression device  Until discontinued         05/23/22 1552     Reason for No Pharmacological VTE Prophylaxis  Once        Question:  Reasons:  Answer:  Thrombocytopenia    05/23/22 1552                Disposition: Home    Jan Hooker MD  Bone Marrow Transplant  [facility] - Oncology ([facility])

## 2022-05-31 NOTE — ASSESSMENT & PLAN NOTE
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 Blincyto held & given steroids x 3 days. Resumed Blincyto 5/17 and again developed CRS, Grade 2 as well as mild AMS and Grade 3 transaminitis. Blincyto stopped with no plan to resume. Discharged on 5/19  - seen today 5/23 by Dr. 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

## 2022-05-31 NOTE — PT/OT/SLP PROGRESS
Physical Therapy Treatment    Patient Name:  Deepti Gonzalez   MRN:  [id number]    Recommendations:     Discharge Recommendations:  home health PT   Discharge Equipment Recommendations: walker, rolling, wheelchair   Barriers to discharge: None    Assessment:     Deepti Gonzalez is a 51 y.o. female admitted with a medical diagnosis of B-cell acute lymphoblastic leukemia.  She presents with the following impairments/functional limitations:  weakness, impaired endurance, impaired functional mobilty, gait instability, impaired cardiopulmonary response to activity, impaired balance, decreased coordination. Deepti Gonzalez would benefit from acute PT intervention to address listed functional deficits, provide patient/caregiver education, reduce fall risk, and maximize (I) and safety with functional mobility.    Pt presents with significant functional mobility deficits and is functioning below baseline. Pt did demonstrate improvement in functional mobility compared to previous sessions. Was able to ambulate ~200 ft x 2 with short seated rest break. Pt will continue to benefit from acute PT services in order to maximize safety and (I) with functional mobility.    After hospital discharge, pt would benefit from HHPT to continue addressing therapy impairments.    Rehab Prognosis: Good; patient would benefit from acute skilled PT services to address these deficits and reach maximum level of function.      Plan:     During this hospitalization, patient to be seen 3 x/week to address the identified rehab impairments via gait training, therapeutic activities, therapeutic exercises, neuromuscular re-education and progress toward the following goals:    · Plan of Care Expires:  06/21/22    This plan of care has been discussed with the patient, who was included in its development and is in agreement with the identified goals and treatment plan.     Subjective     Communicated with RN prior to session.  Patient agreeable to  "participate.     Chief Complaint: none  Patient/Family Comments/goals: "I feel much better than last week."  Pt pain prior to session: 0/10  Pt pain following session: 0/10    Objective:     Patient found HOB elevated with Other (comments) (no active lines)  upon PT entry to room.    General Precautions: Standard, fall   Orthopedic Precautions:N/A   Braces: N/A       Functional Mobility:    Bed Mobility:  · Supine to Sit: Independent  · Sit to Supine: Independent    Transfers:   · Sit to Stand Transfer: Supervision or Set-up Assistance  from EOB with no AD               Gait:  · Patient received gait training ~200 ft x 2 trials  with Stand-by Assistance and no AD  · Gait Assessment: decreased georgiana, decreased gait speed, wide base of support and unsteady gait    · PT providing verbal and tactile cues for improved upright posture, gaze direction, and gait fluidity.   · Pt also performed ambulation with vertical and horizontal head turns  · Several minimal LOBs, but Pt easily self corrected  · PT with very close SBA during this activity    Balance:  · Static Sit: Independent at EOB x 2 minutes  · Static Stand: Supervision with no AD      Therapeutic Activities/Exercises     Importance of daily OOB mobility  Patient educated on the importance of early mobility to prevent functional decline during hospital stay    Patient was instructed to utilize staff assistance for mobility/transfers.  Patient was educated on PT POC and all questions answered within PT scope of practice.    Patient able to verbalize understanding; will follow-up with pt during current admit for additional questions/concerns within scope of practice.       AM-PAC 6 CLICK MOBILITY  Total Score:20     Patient left HOB elevated with call button in reach.        History/Goals:     PAST MEDICAL HISTORY:  Past Medical History:   Diagnosis Date    Arthritis     Cancer     COPD (chronic obstructive pulmonary disease)     Hypertension     Stroke     TIA " x 4       Past Surgical History:   Procedure Laterality Date    APPENDECTOMY      HYSTERECTOMY      ROTATOR CUFF REPAIR Bilateral     TONSILLECTOMY      TUBAL LIGATION         GOALS:   Multidisciplinary Problems     Physical Therapy Goals        Problem: Physical Therapy    Goal Priority Disciplines Outcome Goal Variances Interventions   Physical Therapy Goal     PT, PT/OT Ongoing, Progressing     Description: Goals to be met by: 22     Patient will increase functional independence with mobility by performin. Supine to sit with Modified Bibb  2. Sit to supine with Modified Bibb  3. Sit to stand transfer with Stand-by Assistance  4. Gait  x >50 feet with Contact Guard Assistance using rollator.  5. Lower extremity exercise program x 10 reps per handout, with independence                       Time Tracking:     PT Received On: 22  PT Start Time: 903     PT Stop Time: 926  PT Total Time (min): 23 min     Billable Minutes: Gait Training 15 and Therapeutic Activity 8      Dandre Gonzales, PAT  2022

## 2022-05-31 NOTE — PLAN OF CARE
Problem: Adult Inpatient Plan of Care  Goal: Plan of Care Review  Outcome: Ongoing, Not Progressing  Goal: Patient-Specific Goal (Individualized)  Outcome: Ongoing, Not Progressing  Goal: Absence of Hospital-Acquired Illness or Injury  Outcome: Ongoing, Not Progressing  Goal: Optimal Comfort and Wellbeing  Outcome: Ongoing, Not Progressing  Goal: Readiness for Transition of Care  Outcome: Ongoing, Not Progressing     Problem: Diabetes Comorbidity  Goal: Blood Glucose Level Within Targeted Range  Outcome: Ongoing, Not Progressing     Problem: Infection  Goal: Absence of Infection Signs and Symptoms  Outcome: Ongoing, Not Progressing     Problem: Fall Injury Risk  Goal: Absence of Fall and Fall-Related Injury  Outcome: Ongoing, Not Progressing     Problem: Skin Injury Risk Increased  Goal: Skin Health and Integrity  Outcome: Ongoing, Not Progressing

## 2022-05-31 NOTE — ASSESSMENT & PLAN NOTE
- high risk with cytoreduction/refractory B-ALL s/p vincristine on 5/24 and 5/31  - monitoring BID INR/fibrinogen  - received cryo x2 5/27 ( suspect 1 unit not fully transfused)  - plan for cryo if <100

## 2022-06-01 LAB
ALBUMIN SERPL BCP-MCNC: 3.1 G/DL (ref 3.5–5.2)
ALBUMIN SERPL BCP-MCNC: 3.4 G/DL (ref 3.5–5.2)
ALP SERPL-CCNC: 193 U/L (ref 55–135)
ALP SERPL-CCNC: 229 U/L (ref 55–135)
ALT SERPL W/O P-5'-P-CCNC: 32 U/L (ref 10–44)
ALT SERPL W/O P-5'-P-CCNC: 52 U/L (ref 10–44)
ANION GAP SERPL CALC-SCNC: 10 MMOL/L (ref 8–16)
ANION GAP SERPL CALC-SCNC: 10 MMOL/L (ref 8–16)
ANISOCYTOSIS BLD QL SMEAR: SLIGHT
AST SERPL-CCNC: 40 U/L (ref 10–40)
AST SERPL-CCNC: 57 U/L (ref 10–40)
BASOPHILS NFR BLD: 0 % (ref 0–1.9)
BILIRUB SERPL-MCNC: 0.2 MG/DL (ref 0.1–1)
BILIRUB SERPL-MCNC: 0.3 MG/DL (ref 0.1–1)
BLASTS NFR BLD MANUAL: 48 %
BUN SERPL-MCNC: 14 MG/DL (ref 6–20)
BUN SERPL-MCNC: 8 MG/DL (ref 6–20)
CALCIUM SERPL-MCNC: 8.8 MG/DL (ref 8.7–10.5)
CALCIUM SERPL-MCNC: 8.8 MG/DL (ref 8.7–10.5)
CHLORIDE SERPL-SCNC: 94 MMOL/L (ref 95–110)
CHLORIDE SERPL-SCNC: 98 MMOL/L (ref 95–110)
CO2 SERPL-SCNC: 24 MMOL/L (ref 23–29)
CO2 SERPL-SCNC: 25 MMOL/L (ref 23–29)
CREAT SERPL-MCNC: 0.7 MG/DL (ref 0.5–1.4)
CREAT SERPL-MCNC: 0.8 MG/DL (ref 0.5–1.4)
DIFFERENTIAL METHOD: ABNORMAL
EOSINOPHIL NFR BLD: 0 % (ref 0–8)
ERYTHROCYTE [DISTWIDTH] IN BLOOD BY AUTOMATED COUNT: 16.6 % (ref 11.5–14.5)
EST. GFR  (AFRICAN AMERICAN): >60 ML/MIN/1.73 M^2
EST. GFR  (AFRICAN AMERICAN): >60 ML/MIN/1.73 M^2
EST. GFR  (NON AFRICAN AMERICAN): >60 ML/MIN/1.73 M^2
EST. GFR  (NON AFRICAN AMERICAN): >60 ML/MIN/1.73 M^2
FIBRINOGEN PPP-MCNC: 377 MG/DL (ref 182–400)
GLUCOSE SERPL-MCNC: 134 MG/DL (ref 70–110)
GLUCOSE SERPL-MCNC: 348 MG/DL (ref 70–110)
HCT VFR BLD AUTO: 22.8 % (ref 37–48.5)
HGB BLD-MCNC: 7.8 G/DL (ref 12–16)
HYPOCHROMIA BLD QL SMEAR: ABNORMAL
IMM GRANULOCYTES # BLD AUTO: ABNORMAL K/UL (ref 0–0.04)
IMM GRANULOCYTES NFR BLD AUTO: ABNORMAL % (ref 0–0.5)
INR PPP: 1.1 (ref 0.8–1.2)
LDH SERPL L TO P-CCNC: 1098 U/L (ref 110–260)
LDH SERPL L TO P-CCNC: 1502 U/L (ref 110–260)
LYMPHOCYTES NFR BLD: 20 % (ref 18–48)
MAGNESIUM SERPL-MCNC: 1.6 MG/DL (ref 1.6–2.6)
MAGNESIUM SERPL-MCNC: 1.9 MG/DL (ref 1.6–2.6)
MCH RBC QN AUTO: 29.4 PG (ref 27–31)
MCHC RBC AUTO-ENTMCNC: 34.2 G/DL (ref 32–36)
MCV RBC AUTO: 86 FL (ref 82–98)
MONOCYTES NFR BLD: 1 % (ref 4–15)
NEUTROPHILS NFR BLD: 31 % (ref 38–73)
NRBC BLD-RTO: 0 /100 WBC
PHOSPHATE SERPL-MCNC: 3.3 MG/DL (ref 2.7–4.5)
PHOSPHATE SERPL-MCNC: 4 MG/DL (ref 2.7–4.5)
PLATELET # BLD AUTO: 28 K/UL (ref 150–450)
PLATELET BLD QL SMEAR: ABNORMAL
PMV BLD AUTO: 8.4 FL (ref 9.2–12.9)
POCT GLUCOSE: 151 MG/DL (ref 70–110)
POCT GLUCOSE: 310 MG/DL (ref 70–110)
POCT GLUCOSE: 343 MG/DL (ref 70–110)
POIKILOCYTOSIS BLD QL SMEAR: SLIGHT
POLYCHROMASIA BLD QL SMEAR: ABNORMAL
POTASSIUM SERPL-SCNC: 3.8 MMOL/L (ref 3.5–5.1)
POTASSIUM SERPL-SCNC: 4.4 MMOL/L (ref 3.5–5.1)
PROT SERPL-MCNC: 5.6 G/DL (ref 6–8.4)
PROT SERPL-MCNC: 6.3 G/DL (ref 6–8.4)
PROTHROMBIN TIME: 10.9 SEC (ref 9–12.5)
RBC # BLD AUTO: 2.65 M/UL (ref 4–5.4)
SODIUM SERPL-SCNC: 129 MMOL/L (ref 136–145)
SODIUM SERPL-SCNC: 132 MMOL/L (ref 136–145)
URATE SERPL-MCNC: 4.1 MG/DL (ref 2.4–5.7)
URATE SERPL-MCNC: 4.2 MG/DL (ref 2.4–5.7)
WBC # BLD AUTO: 25.82 K/UL (ref 3.9–12.7)

## 2022-06-01 PROCEDURE — 85384 FIBRINOGEN ACTIVITY: CPT | Performed by: NURSE PRACTITIONER

## 2022-06-01 PROCEDURE — 25000003 PHARM REV CODE 250: Performed by: INTERNAL MEDICINE

## 2022-06-01 PROCEDURE — 83735 ASSAY OF MAGNESIUM: CPT | Performed by: NURSE PRACTITIONER

## 2022-06-01 PROCEDURE — 99223 PR INITIAL HOSPITAL CARE,LEVL III: ICD-10-PCS | Mod: ,,, | Performed by: INTERNAL MEDICINE

## 2022-06-01 PROCEDURE — 80053 COMPREHEN METABOLIC PANEL: CPT | Mod: 91 | Performed by: NURSE PRACTITIONER

## 2022-06-01 PROCEDURE — 63600175 PHARM REV CODE 636 W HCPCS: Performed by: INTERNAL MEDICINE

## 2022-06-01 PROCEDURE — 25000003 PHARM REV CODE 250: Performed by: STUDENT IN AN ORGANIZED HEALTH CARE EDUCATION/TRAINING PROGRAM

## 2022-06-01 PROCEDURE — 99223 1ST HOSP IP/OBS HIGH 75: CPT | Mod: ,,, | Performed by: INTERNAL MEDICINE

## 2022-06-01 PROCEDURE — 85007 BL SMEAR W/DIFF WBC COUNT: CPT | Performed by: NURSE PRACTITIONER

## 2022-06-01 PROCEDURE — 99233 PR SUBSEQUENT HOSPITAL CARE,LEVL III: ICD-10-PCS | Mod: ,,, | Performed by: INTERNAL MEDICINE

## 2022-06-01 PROCEDURE — 85610 PROTHROMBIN TIME: CPT | Performed by: NURSE PRACTITIONER

## 2022-06-01 PROCEDURE — 63600175 PHARM REV CODE 636 W HCPCS: Performed by: NURSE PRACTITIONER

## 2022-06-01 PROCEDURE — 99233 SBSQ HOSP IP/OBS HIGH 50: CPT | Mod: ,,, | Performed by: INTERNAL MEDICINE

## 2022-06-01 PROCEDURE — 20600001 HC STEP DOWN PRIVATE ROOM

## 2022-06-01 PROCEDURE — 84100 ASSAY OF PHOSPHORUS: CPT | Mod: 91 | Performed by: NURSE PRACTITIONER

## 2022-06-01 PROCEDURE — 83615 LACTATE (LD) (LDH) ENZYME: CPT | Performed by: NURSE PRACTITIONER

## 2022-06-01 PROCEDURE — 85027 COMPLETE CBC AUTOMATED: CPT | Performed by: NURSE PRACTITIONER

## 2022-06-01 PROCEDURE — 63600175 PHARM REV CODE 636 W HCPCS: Performed by: STUDENT IN AN ORGANIZED HEALTH CARE EDUCATION/TRAINING PROGRAM

## 2022-06-01 PROCEDURE — 84550 ASSAY OF BLOOD/URIC ACID: CPT | Mod: 91 | Performed by: NURSE PRACTITIONER

## 2022-06-01 PROCEDURE — 25000003 PHARM REV CODE 250: Performed by: NURSE PRACTITIONER

## 2022-06-01 RX ORDER — HYDROMORPHONE HCL IN 0.9% NACL 6 MG/30 ML
PATIENT CONTROLLED ANALGESIA SYRINGE INTRAVENOUS CONTINUOUS
Status: DISCONTINUED | OUTPATIENT
Start: 2022-06-01 | End: 2022-06-02

## 2022-06-01 RX ORDER — DEXAMETHASONE 4 MG/1
20 TABLET ORAL DAILY
Status: DISCONTINUED | OUTPATIENT
Start: 2022-06-01 | End: 2022-06-03

## 2022-06-01 RX ADMIN — HYDROXYZINE HYDROCHLORIDE 50 MG: 25 TABLET, FILM COATED ORAL at 04:06

## 2022-06-01 RX ADMIN — ONDANSETRON 8 MG: 8 TABLET, ORALLY DISINTEGRATING ORAL at 08:06

## 2022-06-01 RX ADMIN — ALLOPURINOL 300 MG: 300 TABLET ORAL at 08:06

## 2022-06-01 RX ADMIN — PROCHLORPERAZINE EDISYLATE 5 MG: 5 INJECTION INTRAMUSCULAR; INTRAVENOUS at 11:06

## 2022-06-01 RX ADMIN — BUSPIRONE HYDROCHLORIDE 10 MG: 10 TABLET ORAL at 10:06

## 2022-06-01 RX ADMIN — LEVOFLOXACIN 500 MG: 500 TABLET, FILM COATED ORAL at 04:06

## 2022-06-01 RX ADMIN — PROCHLORPERAZINE EDISYLATE 5 MG: 5 INJECTION INTRAMUSCULAR; INTRAVENOUS at 09:06

## 2022-06-01 RX ADMIN — Medication: at 12:06

## 2022-06-01 RX ADMIN — DILTIAZEM HYDROCHLORIDE 90 MG: 30 TABLET, FILM COATED ORAL at 11:06

## 2022-06-01 RX ADMIN — DILTIAZEM HYDROCHLORIDE 90 MG: 30 TABLET, FILM COATED ORAL at 03:06

## 2022-06-01 RX ADMIN — LIDOCAINE 1 PATCH: 50 PATCH CUTANEOUS at 11:06

## 2022-06-01 RX ADMIN — SULFAMETHOXAZOLE AND TRIMETHOPRIM 1 TABLET: 800; 160 TABLET ORAL at 03:06

## 2022-06-01 RX ADMIN — GABAPENTIN 300 MG: 300 CAPSULE ORAL at 03:06

## 2022-06-01 RX ADMIN — INSULIN ASPART 8 UNITS: 100 INJECTION, SOLUTION INTRAVENOUS; SUBCUTANEOUS at 06:06

## 2022-06-01 RX ADMIN — DEXAMETHASONE 20 MG: 4 TABLET ORAL at 08:06

## 2022-06-01 RX ADMIN — ACYCLOVIR 400 MG: 200 CAPSULE ORAL at 11:06

## 2022-06-01 RX ADMIN — HYDROXYZINE HYDROCHLORIDE 50 MG: 25 TABLET, FILM COATED ORAL at 03:06

## 2022-06-01 RX ADMIN — VORTIOXETINE 20 MG: 10 TABLET, FILM COATED ORAL at 11:06

## 2022-06-01 RX ADMIN — GABAPENTIN 300 MG: 300 CAPSULE ORAL at 04:06

## 2022-06-01 RX ADMIN — HYDROMORPHONE HYDROCHLORIDE 0.5 MG: 1 INJECTION, SOLUTION INTRAMUSCULAR; INTRAVENOUS; SUBCUTANEOUS at 04:06

## 2022-06-01 RX ADMIN — LOSARTAN POTASSIUM 25 MG: 25 TABLET, FILM COATED ORAL at 11:06

## 2022-06-01 RX ADMIN — QUETIAPINE FUMARATE 200 MG: 200 TABLET, FILM COATED ORAL at 11:06

## 2022-06-01 RX ADMIN — OXCARBAZEPINE 1200 MG: 600 TABLET, FILM COATED ORAL at 11:06

## 2022-06-01 RX ADMIN — FLUCONAZOLE 400 MG: 200 TABLET ORAL at 04:06

## 2022-06-01 RX ADMIN — INSULIN ASPART 4 UNITS: 100 INJECTION, SOLUTION INTRAVENOUS; SUBCUTANEOUS at 09:06

## 2022-06-01 RX ADMIN — BUSPIRONE HYDROCHLORIDE 10 MG: 10 TABLET ORAL at 11:06

## 2022-06-01 RX ADMIN — DILTIAZEM HYDROCHLORIDE 90 MG: 30 TABLET, FILM COATED ORAL at 04:06

## 2022-06-01 RX ADMIN — HYDROMORPHONE HYDROCHLORIDE 0.5 MG: 1 INJECTION, SOLUTION INTRAMUSCULAR; INTRAVENOUS; SUBCUTANEOUS at 08:06

## 2022-06-01 RX ADMIN — GABAPENTIN 300 MG: 300 CAPSULE ORAL at 08:06

## 2022-06-01 RX ADMIN — PANTOPRAZOLE SODIUM 40 MG: 40 TABLET, DELAYED RELEASE ORAL at 11:06

## 2022-06-01 NOTE — PROGRESS NOTES
[facility]to [facility]pez - Oncology ([facility])  Hematology  Bone Marrow Transplant  Progress Note    Patient Name: Deepti Gonzalez  Admission Date: 5/23/2022  Hospital Length of Stay: 9 days  Code Status: Full Code    Subjective:     Interval History: Received Vincristine yesterday without issue. WBC increased to 26k today. Starting dex 20mg daily. Panotinib pending. Met with Dr. Mccord today and will start PCA for bone pain.      Objective:     Vital Signs (Most Recent):  Temp: 99.3 °F (37.4 °C) (06/01/22 1208)  Pulse: 102 (06/01/22 1208)  Resp: 16 (06/01/22 1214)  BP: 135/63 (06/01/22 1208)  SpO2: (!) 91 % (06/01/22 1208) Vital Signs (24h Range):  Temp:  [98.1 °F (36.7 °C)-99.8 °F (37.7 °C)] 99.3 °F (37.4 °C)  Pulse:  [] 102  Resp:  [14-20] 16  SpO2:  [91 %-97 %] 91 %  BP: (100-135)/(56-67) 135/63     Weight: 69 kg (152 lb 1.9 oz)  Body mass index is 25.31 kg/m².  Body surface area is 1.78 meters squared.      Intake/Output - Last 3 Shifts         05/30 0700  05/31 0659 05/31 0700  06/01 0659 06/01 0700  06/02 0659    P.O. 780 360     Blood 242      Total Intake(mL/kg) 1022 (14.7) 360 (5.2)     Urine (mL/kg/hr) 1300 (0.8)      Total Output 1300      Net -278 +360            Urine Occurrence 2 x 4 x     Stool Occurrence  0 x             Physical Exam  Vitals reviewed.   Constitutional:       Appearance: Normal appearance. She is well-developed.   HENT:      Head: Normocephalic and atraumatic.      Right Ear: External ear normal.      Left Ear: External ear normal.   Eyes:      Extraocular Movements: Extraocular movements intact.      Conjunctiva/sclera: Conjunctivae normal.   Cardiovascular:      Rate and Rhythm: Normal rate and regular rhythm.      Pulses: Normal pulses.      Heart sounds: Normal heart sounds. No murmur heard.  Pulmonary:      Effort: Pulmonary effort is normal. No respiratory distress.      Breath sounds: No stridor. No wheezing or rales.   Abdominal:      General: Bowel sounds are normal. There is no  distension.      Palpations: Abdomen is soft.      Tenderness: There is no abdominal tenderness.   Musculoskeletal:         General: Normal range of motion.      Cervical back: Normal range of motion.      Right lower leg: No edema.      Left lower leg: No edema.   Skin:     General: Skin is warm and dry.      Findings: Bruising present. No rash.      Comments: RCWP with no signs of infection   Neurological:      General: No focal deficit present.      Mental Status: She is alert and oriented to person, place, and time.   Psychiatric:         Mood and Affect: Mood normal.         Behavior: Behavior normal.         Thought Content: Thought content normal.         Judgment: Judgment normal.       Significant Labs:   CBC:   Recent Labs   Lab 05/31/22 0523 06/01/22  0422   WBC 11.62 25.82*   HGB 8.1* 7.8*   HCT 24.1* 22.8*   PLT 48* 28*      and CMP:   Recent Labs   Lab 05/31/22 0523 05/31/22  1758 06/01/22  0422   * 135* 132*   K 4.0 4.3 3.8   CL 95 98 98   CO2 26 24 24   * 106 134*   BUN 9 8 8   CREATININE 0.8 0.7 0.7   CALCIUM 8.8 9.1 8.8   PROT 5.6* 6.3 5.6*   ALBUMIN 3.1* 3.4* 3.1*   BILITOT 0.2 0.2 0.2   ALKPHOS 160* 197* 193*   AST 12 30 40   ALT 18 33 32   ANIONGAP 11 13 10   EGFRNONAA >60.0 >60.0 >60.0         Diagnostic Results:  I have reviewed all pertinent imaging results/findings within the past 24 hours.    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 Blincyto held & given steroids x 3 days. Resumed Blincyto 5/17 and again developed CRS, Grade 2 as well as mild  AMS and Grade 3 transaminitis. Blincyto stopped with no plan to resume. Discharged on 5/19  - seen today 5/23 by Dr. 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; ponatinib Rx pending via specialty pharmacy  - continue ppx acyclovir, fluconazole, levaquin and bactrim  - s/p 2mg vincristine on 5/24 and 5/31    Volume overload  On 1-2L O2 via NC, reporting intermittent SOB  - will reassess daily if lasix needed      At high risk of tumor lysis syndrome  Given leukocytosis with WBC 136K and plan for rapid reduction with PO dex and 1 dose of vincristine, patient at high risk for TLS  - continue allopurinol 300mg BID  - monitor TLS labs BID    Anemia in neoplastic disease  - daily CBC while inpatient  - transfuse for Hgb <7    Transaminitis  - significant transaminitis following Blinatumomab  - closely monitor with cytoreduction and vincristine  - improved ALT/AST     Coagulopathy  - high risk with cytoreduction/refractory B-ALL s/p vincristine on 5/24 and 5/31  - monitoring BID INR/fibrinogen  - received cryo x2 5/27 ( suspect 1 unit not fully transfused)  - plan for cryo if <100    Leukocytosis  - see B-ALL      Thrombocytopenia  - daily CBC while inpatient  - transfuse for Plt <10K or bleeding    Cancer associated pain  - worsening pain to left hip with worsening leukocytosis  - denies trauma or injury to area  - likely d/t leukemia  - oxycodone 10 prn q4  - reports improvement with 0.5mg dilaudid q6h prn  - met with dr. Mccord today, will start PCA for relief    Moderate major depression  - Continue home trintellix    Anxiety  - Continue home trintellix and prn valium    GERD (gastroesophageal reflux disease)  - hold home omeprazole, switch to protonix while  inpatient    Hypertension  - continue home losartan    Seizure disorder  - continue home oxcarbazepine    Type 2 diabetes mellitus, without long-term current use of insulin  - hold home farxiga while inpatient; can resume at discharge.  - achs blood glucose monitoring and diabetic diet while admitted  - 10 units levemir daily with MDSSI      Rheumatoid arthritis involving multiple sites  - not currently on any active tx    History of TIA (transient ischemic attack)  - Holding home statin while inpatient  - Holding home ASA while inpatient due to thrombocytopenia. Will hold ASA at discharge. Can be resumed outpatient as appropriate when platelets >50K    Hyperlipidemia  - holding home statin while inpatient. Can resume at discharge if LFTs normalize.    Paroxysmal atrial fibrillation  - continue home diltiazem  - Holding xarelto with plt count < 50K. Can resume outpatient when appropriate.        VTE Risk Mitigation (From admission, onward)         Ordered     heparin, porcine (PF) 100 unit/mL injection flush 300 Units  As needed (PRN)         05/24/22 1021     IP VTE HIGH RISK PATIENT  Once         05/23/22 1552     Place sequential compression device  Until discontinued         05/23/22 1552     Reason for No Pharmacological VTE Prophylaxis  Once        Question:  Reasons:  Answer:  Thrombocytopenia    05/23/22 1552                Disposition: Continue with inpatient management    Deonte Orellana MD  Bone Marrow Transplant  [facility] - Oncology ([facility])

## 2022-06-01 NOTE — PROGRESS NOTES
Pt seen for follow up. In good spirits despite some frustration while waiting for insurance authorization for treatment.  Continues to decline home health; reports excellent support system at home.  No other needs identified at this time. Will update following Robby Melo, on plan.

## 2022-06-01 NOTE — ASSESSMENT & PLAN NOTE
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 Blincyto held & given steroids x 3 days. Resumed Blincyto 5/17 and again developed CRS, Grade 2 as well as mild AMS and Grade 3 transaminitis. Blincyto stopped with no plan to resume. Discharged on 5/19  - seen today 5/23 by Dr. 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; ponatinib Rx pending via specialty pharmacy  - continue ppx acyclovir, fluconazole, levaquin and bactrim  - s/p 2mg vincristine on 5/24 and 5/31

## 2022-06-01 NOTE — ASSESSMENT & PLAN NOTE
Impression:    1) Symptoms:  Leukemia without remission.     2) Medicolegal: Has decision making capacity.  , Mannie  is surrogate decision maker.   He would be HCPOA.     3) Psychosocial:  support system consists of , daughters and siblings, nieces, nephews.     4) Spiritual: Denominational    5) Prognostication: guarded due to not achieving remission initially.    6) Understanding of disease and Illness Trajectory: Patient  has  adequate understanding of her illness, they can benefit from continued education on what to expect in the future. She does not want to think about not getting better.      7) Goals of care:  curative  Advance Care Planning     Date: 06/01/2022  patient did not want to discuss today any further.          8) Code status: full for now.    9) Advance directives:none      Summary and recommendations:Added PCA and monitor. Further discussion about care plan hoping for the best but planning for the worst.

## 2022-06-01 NOTE — CONSULTS
oRberto Yepez - Oncology ([facility])  Palliative Medicine  Consult Note    Patient Name: Deepti Gonzalez  MRN: [id number]  Admission Date: 5/23/2022  Hospital Length of Stay: 9 days  Code Status: Full Code   Attending Provider: Dayron Jenkins MD  Consulting Provider: Ines Mccord MD  Primary Care Physician: Primary Doctor No  Principal Problem:B-cell acute lymphoblastic leukemia    Patient information was obtained from patient, past medical records and primary team.      Inpatient consult to Palliative Care  Consult performed by: Ines Mccord MD  Consult ordered by: Deonte Orellana MD  Reason for consult: better pain control and further discussions about goals of care        Assessment/Plan:     * B-cell acute lymphoblastic leukemia  Currently being actively treated. Awaiting insurance approval for additional meds.  Discussed with BMT team for further discussions about treatment options.    Cancer associated pain  Patient has been taking oxycodone at home however, the pain is not well controlled.  She has received hydromorphone 0.5 mg every six hours but she says it does not last more than 3, She has since had dosing interval decreased to every 4 hours. We discussed using PCA in the hospital to get idea of pain control needs then convert to hydromorphone orally with or without methadone as the long acting agent. QTc is 454.  Dexamethasone can help with this pain which is multifaceted. It may be related to cell turnover in the pelvis? Will monitor and adjust dose. She understands that she will need an end tidal C02 monitor and agrees.  She is on a bowel regimen.    Palliative care encounter  Impression:    1) Symptoms:  Leukemia without remission.     2) Medicolegal: Has decision making capacity.  , Mannie  is surrogate decision maker.   He would be HCPOA.     3) Psychosocial:  support system consists of , daughters and siblings, nieces, nephews.     4) Spiritual: Hoahaoism    5) Prognostication:  "guarded due to not achieving remission initially.    6) Understanding of disease and Illness Trajectory: Patient  has  adequate understanding of her illness, they can benefit from continued education on what to expect in the future. She does not want to think about not getting better.      7) Goals of care:  curative  Advance Care Planning     Date: 06/01/2022  patient did not want to discuss today any further.         8) Code status: full for now.    9) Advance directives:none      Summary and recommendations:Added PCA and monitor. Further discussion about care plan hoping for the best but planning for the worst.                   Thank you for your consult. I will follow-up with patient. Please contact us if you have any additional questions.    Subjective:     HPI:   51 year old woman with leukemia who was admitted due to rapid rise in WBC. I was scheduled to see her in palliative medicine clinic to help manage her pain related to her underlying chronic illnesses.    Today she tells me that her pain is diffuse and gnawing in her lower back/pelvis with some radiation to her upper thighs. She is hopeful that her treatments will work to get her leukemia under control.     Reports that morphine and norco are not helpful for her pain and the hydromorphone does not last 4 hours. She says the med takes the edge off so that it is not what she things of but it is always there. She reports the pain as worsening and she is having a difficult time getting comfortable. Her nephew Joaquin was in the room with her. She reports that she likes to be strong and independent and does "have a high tolerance" for pain.    We discussed various pain regimens that can be used in the outpatient world including methadone but I would like to get her pain under control with PCA to which she agrees.      Hospital Course:  No notes on file    Interval History: Current pain meds ineffective for more than an hour. Pain is gnawing in her lower " back/pelvis.    Past Medical History:   Diagnosis Date    Arthritis     Cancer     COPD (chronic obstructive pulmonary disease)     Hypertension     Stroke     TIA x 4       Past Surgical History:   Procedure Laterality Date    APPENDECTOMY      HYSTERECTOMY      ROTATOR CUFF REPAIR Bilateral     TONSILLECTOMY      TUBAL LIGATION         Review of patient's allergies indicates:   Allergen Reactions    Levetiracetam      Other reaction(s): Unknown  Other reaction(s): Unknown  Other reaction(s): Unknown       Medications:  Continuous Infusions:   hydromorphone in 0.9 % NaCl 6 mg/30 ml       Scheduled Meds:   acyclovir  400 mg Oral Q12H    allopurinoL  300 mg Oral Daily    busPIRone  10 mg Oral Q12H    dexAMETHasone  20 mg Oral Daily    diltiaZEM  90 mg Oral Q6H    fluconazole  400 mg Oral Q24H    gabapentin  300 mg Oral Q8H    hydrOXYzine  50 mg Oral Q12H    insulin detemir U-100  10 Units Subcutaneous Daily    levoFLOXacin  500 mg Oral Q24H    LIDOcaine  1 patch Transdermal Q24H    losartan  25 mg Oral Q24H    OXcarbazepine  1,200 mg Oral Q12H    pantoprazole  40 mg Oral Q24H    QUEtiapine  200 mg Oral Q24H    sulfamethoxazole-trimethoprim 800-160mg  1 tablet Oral Every Mon, Wed, Fri    vortioxetine  2 tablet Oral Q24H     PRN Meds:sodium chloride, alteplase, artificial tears, dextrose 10%, dextrose 10%, diazePAM, glucagon (human recombinant), glucose, glucose, heparin, porcine (PF), HYDROmorphone, insulin aspart U-100, naloxone, ondansetron, oxyCODONE, polyethylene glycol, prochlorperazine, senna-docusate 8.6-50 mg, sodium chloride 0.9%, sumatriptan    Family History    None       Tobacco Use    Smoking status: Current Every Day Smoker     Packs/day: 0.50     Types: Cigarettes    Smokeless tobacco: Never Used   Substance and Sexual Activity    Alcohol use: No    Drug use: Never    Sexual activity: Not on file       Review of Systems   Constitutional:  Negative for activity change  and fatigue.   Genitourinary:  Negative for difficulty urinating.   Musculoskeletal:  Positive for back pain.   Skin:  Positive for color change.   Objective:     Vital Signs (Most Recent):  Temp: 99.8 °F (37.7 °C) (06/01/22 0722)  Pulse: 91 (06/01/22 0722)  Resp: 16 (06/01/22 0827)  BP: 100/65 (06/01/22 0722)  SpO2: (!) 94 % (06/01/22 0722)   Vital Signs (24h Range):  Temp:  [98.1 °F (36.7 °C)-99.8 °F (37.7 °C)] 99.8 °F (37.7 °C)  Pulse:  [] 91  Resp:  [14-20] 16  SpO2:  [92 %-97 %] 94 %  BP: (100-135)/(56-74) 100/65     Weight: 69 kg (152 lb 1.9 oz)  Body mass index is 25.31 kg/m².    Physical Exam  Vitals reviewed.   Constitutional:       Appearance: Normal appearance. She is well-developed.   HENT:      Head: Normocephalic and atraumatic.      Right Ear: External ear normal.      Left Ear: External ear normal.      Nose: Nose normal.   Eyes:      Conjunctiva/sclera: Conjunctivae normal.      Pupils: Pupils are equal, round, and reactive to light.   Cardiovascular:      Rate and Rhythm: Normal rate and regular rhythm.   Pulmonary:      Effort: Pulmonary effort is normal.   Musculoskeletal:         General: Normal range of motion.      Cervical back: Normal range of motion and neck supple.   Skin:     General: Skin is warm and dry.   Neurological:      General: No focal deficit present.      Mental Status: She is alert and oriented to person, place, and time.   Psychiatric:         Mood and Affect: Mood normal.         Speech: Speech normal.         Behavior: Behavior normal.         Thought Content: Thought content normal.         Judgment: Judgment normal.       Review of Symptoms      Symptom Assessment (ESAS 0-10 Scale)  Pain:  4  Dyspnea:  0  Anxiety:  0  Nausea:  0  Depression:  0  Anorexia:  0  Fatigue:  0  Insomnia:  0  Restlessness:  0  Agitation:  0     CAM / Delirium:  Negative  Constipation:  Negative  Diarrhea:  Negative    Bowel Management Plan (BMP):  Yes      Pain Assessment:  OME in 24  hours:  50  Location(s): back    Back       Location: bilateral        Quality: Aching and pressure-like        Quantity: 4/10 in intensity        Chronicity: Onset 2 week(s) ago, gradually worsening since Patient says pain is worsened as the leukemia cells have been greater over her usual low back pain and hand pain.        Aggravating Factors: None        Alleviating Factors: Opiates       Associated Symptoms: None    Performance Status:  90    Living Arrangements:  Lives with family and Lives >50 miles from facility    Psychosocial/Cultural:  has not worked since 2006.  is working. He can't be at the hospital during the day. She is on his insurance plan. 2 daughters, grandchildren.    Spiritual:  F - Lynsey and Belief:  Advent  I - Importance:  Yes  C - Community:  Yes  A - Address in Care:  Yes    Advance Care Planning   Advance Directives:   Living Will: No    LaPOST: No    Do Not Resuscitate Status: No    Medical Power of : No    Agent's Name:  Mannie Gonzalez   Agent's Contact Number:  [phone]    Decision Making:  Patient answered questions       Significant Labs: All pertinent labs within the past 24 hours have been reviewed.  CBC:   Recent Labs   Lab 06/01/22 0422   WBC 25.82*   HGB 7.8*   HCT 22.8*   MCV 86   PLT 28*     BMP:  Recent Labs   Lab 06/01/22 0422   *   *   K 3.8   CL 98   CO2 24   BUN 8   CREATININE 0.7   CALCIUM 8.8   MG 1.6     LFT:  Lab Results   Component Value Date    AST 40 06/01/2022    ALKPHOS 193 (H) 06/01/2022    BILITOT 0.2 06/01/2022     Albumin:   Albumin   Date Value Ref Range Status   06/01/2022 3.1 (L) 3.5 - 5.2 g/dL Final     Protein:   Total Protein   Date Value Ref Range Status   06/01/2022 5.6 (L) 6.0 - 8.4 g/dL Final     Lactic acid:   Lab Results   Component Value Date    LACTATE 1.4 11/24/2021       Significant Imaging: I have reviewed all pertinent imaging results/findings within the past 24 hours.            Ines Mccord,  MD  Palliative Medicine  [facility] - Oncology ([facility])

## 2022-06-01 NOTE — HPI
"51 year old woman with leukemia who was admitted due to rapid rise in WBC. I was scheduled to see her in palliative medicine clinic to help manage her pain related to her underlying chronic illnesses.    Today she tells me that her pain is diffuse and gnawing in her lower back/pelvis with some radiation to her upper thighs. She is hopeful that her treatments will work to get her leukemia under control.     Reports that morphine and norco are not helpful for her pain and the hydromorphone does not last 4 hours. She says the med takes the edge off so that it is not what she things of but it is always there. She reports the pain as worsening and she is having a difficult time getting comfortable. Her nephew Joaquin was in the room with her. She reports that she likes to be strong and independent and does "have a high tolerance" for pain.    We discussed various pain regimens that can be used in the outpatient world including methadone but I would like to get her pain under control with PCA to which she agrees.  "

## 2022-06-01 NOTE — PLAN OF CARE
Plan of care reviewed with patient and family. Vital signs stable within shift. Patient now on Dilaudid PCA. Prn nausea medications given for relief. Patient voided via commode. No acute changes, will continue to monitor.

## 2022-06-01 NOTE — SUBJECTIVE & OBJECTIVE
Subjective:     Interval History: Received Vincristine yesterday without issue. WBC increased to 26k today. Starting dex 20mg daily. Panotinib pending. Met with Dr. Mccord today and will start PCA for bone pain.      Objective:     Vital Signs (Most Recent):  Temp: 99.3 °F (37.4 °C) (06/01/22 1208)  Pulse: 102 (06/01/22 1208)  Resp: 16 (06/01/22 1214)  BP: 135/63 (06/01/22 1208)  SpO2: (!) 91 % (06/01/22 1208) Vital Signs (24h Range):  Temp:  [98.1 °F (36.7 °C)-99.8 °F (37.7 °C)] 99.3 °F (37.4 °C)  Pulse:  [] 102  Resp:  [14-20] 16  SpO2:  [91 %-97 %] 91 %  BP: (100-135)/(56-67) 135/63     Weight: 69 kg (152 lb 1.9 oz)  Body mass index is 25.31 kg/m².  Body surface area is 1.78 meters squared.      Intake/Output - Last 3 Shifts         05/30 0700  05/31 0659 05/31 0700  06/01 0659 06/01 0700  06/02 0659    P.O. 780 360     Blood 242      Total Intake(mL/kg) 1022 (14.7) 360 (5.2)     Urine (mL/kg/hr) 1300 (0.8)      Total Output 1300      Net -278 +360            Urine Occurrence 2 x 4 x     Stool Occurrence  0 x             Physical Exam  Vitals reviewed.   Constitutional:       Appearance: Normal appearance. She is well-developed.   HENT:      Head: Normocephalic and atraumatic.      Right Ear: External ear normal.      Left Ear: External ear normal.   Eyes:      Extraocular Movements: Extraocular movements intact.      Conjunctiva/sclera: Conjunctivae normal.   Cardiovascular:      Rate and Rhythm: Normal rate and regular rhythm.      Pulses: Normal pulses.      Heart sounds: Normal heart sounds. No murmur heard.  Pulmonary:      Effort: Pulmonary effort is normal. No respiratory distress.      Breath sounds: No stridor. No wheezing or rales.   Abdominal:      General: Bowel sounds are normal. There is no distension.      Palpations: Abdomen is soft.      Tenderness: There is no abdominal tenderness.   Musculoskeletal:         General: Normal range of motion.      Cervical back: Normal range of motion.       Right lower leg: No edema.      Left lower leg: No edema.   Skin:     General: Skin is warm and dry.      Findings: Bruising present. No rash.      Comments: RCWP with no signs of infection   Neurological:      General: No focal deficit present.      Mental Status: She is alert and oriented to person, place, and time.   Psychiatric:         Mood and Affect: Mood normal.         Behavior: Behavior normal.         Thought Content: Thought content normal.         Judgment: Judgment normal.       Significant Labs:   CBC:   Recent Labs   Lab 05/31/22 0523 06/01/22 0422   WBC 11.62 25.82*   HGB 8.1* 7.8*   HCT 24.1* 22.8*   PLT 48* 28*      and CMP:   Recent Labs   Lab 05/31/22 0523 05/31/22  1758 06/01/22 0422   * 135* 132*   K 4.0 4.3 3.8   CL 95 98 98   CO2 26 24 24   * 106 134*   BUN 9 8 8   CREATININE 0.8 0.7 0.7   CALCIUM 8.8 9.1 8.8   PROT 5.6* 6.3 5.6*   ALBUMIN 3.1* 3.4* 3.1*   BILITOT 0.2 0.2 0.2   ALKPHOS 160* 197* 193*   AST 12 30 40   ALT 18 33 32   ANIONGAP 11 13 10   EGFRNONAA >60.0 >60.0 >60.0         Diagnostic Results:  I have reviewed all pertinent imaging results/findings within the past 24 hours.   19

## 2022-06-01 NOTE — ASSESSMENT & PLAN NOTE
Patient has been taking oxycodone at home however, the pain is not well controlled.  She has received hydromorphone 0.5 mg every six hours but she says it does not last more than 3, She has since had dosing interval decreased to every 4 hours. We discussed using PCA in the hospital to get idea of pain control needs then convert to hydromorphone orally with or without methadone as the long acting agent. QTc is 454.  Dexamethasone can help with this pain which is multifaceted. It may be related to cell turnover in the pelvis? Will monitor and adjust dose. She understands that she will need an end tidal C02 monitor and agrees.  She is on a bowel regimen.

## 2022-06-01 NOTE — ASSESSMENT & PLAN NOTE
Currently being actively treated. Awaiting insurance approval for additional meds.  Discussed with BMT team for further discussions about treatment options.

## 2022-06-01 NOTE — SUBJECTIVE & OBJECTIVE
Interval History: Current pain meds ineffective for more than an hour. Pain is gnawing in her lower back/pelvis.    Past Medical History:   Diagnosis Date    Arthritis     Cancer     COPD (chronic obstructive pulmonary disease)     Hypertension     Stroke     TIA x 4       Past Surgical History:   Procedure Laterality Date    APPENDECTOMY      HYSTERECTOMY      ROTATOR CUFF REPAIR Bilateral     TONSILLECTOMY      TUBAL LIGATION         Review of patient's allergies indicates:   Allergen Reactions    Levetiracetam      Other reaction(s): Unknown  Other reaction(s): Unknown  Other reaction(s): Unknown       Medications:  Continuous Infusions:   hydromorphone in 0.9 % NaCl 6 mg/30 ml       Scheduled Meds:   acyclovir  400 mg Oral Q12H    allopurinoL  300 mg Oral Daily    busPIRone  10 mg Oral Q12H    dexAMETHasone  20 mg Oral Daily    diltiaZEM  90 mg Oral Q6H    fluconazole  400 mg Oral Q24H    gabapentin  300 mg Oral Q8H    hydrOXYzine  50 mg Oral Q12H    insulin detemir U-100  10 Units Subcutaneous Daily    levoFLOXacin  500 mg Oral Q24H    LIDOcaine  1 patch Transdermal Q24H    losartan  25 mg Oral Q24H    OXcarbazepine  1,200 mg Oral Q12H    pantoprazole  40 mg Oral Q24H    QUEtiapine  200 mg Oral Q24H    sulfamethoxazole-trimethoprim 800-160mg  1 tablet Oral Every Mon, Wed, Fri    vortioxetine  2 tablet Oral Q24H     PRN Meds:sodium chloride, alteplase, artificial tears, dextrose 10%, dextrose 10%, diazePAM, glucagon (human recombinant), glucose, glucose, heparin, porcine (PF), HYDROmorphone, insulin aspart U-100, naloxone, ondansetron, oxyCODONE, polyethylene glycol, prochlorperazine, senna-docusate 8.6-50 mg, sodium chloride 0.9%, sumatriptan    Family History    None       Tobacco Use    Smoking status: Current Every Day Smoker     Packs/day: 0.50     Types: Cigarettes    Smokeless tobacco: Never Used   Substance and Sexual Activity    Alcohol use: No    Drug use: Never    Sexual activity: Not on file        Review of Systems   Constitutional:  Negative for activity change and fatigue.   Genitourinary:  Negative for difficulty urinating.   Musculoskeletal:  Positive for back pain.   Skin:  Positive for color change.   Objective:     Vital Signs (Most Recent):  Temp: 99.8 °F (37.7 °C) (06/01/22 0722)  Pulse: 91 (06/01/22 0722)  Resp: 16 (06/01/22 0827)  BP: 100/65 (06/01/22 0722)  SpO2: (!) 94 % (06/01/22 0722)   Vital Signs (24h Range):  Temp:  [98.1 °F (36.7 °C)-99.8 °F (37.7 °C)] 99.8 °F (37.7 °C)  Pulse:  [] 91  Resp:  [14-20] 16  SpO2:  [92 %-97 %] 94 %  BP: (100-135)/(56-74) 100/65     Weight: 69 kg (152 lb 1.9 oz)  Body mass index is 25.31 kg/m².    Physical Exam  Vitals reviewed.   Constitutional:       Appearance: Normal appearance. She is well-developed.   HENT:      Head: Normocephalic and atraumatic.      Right Ear: External ear normal.      Left Ear: External ear normal.      Nose: Nose normal.   Eyes:      Conjunctiva/sclera: Conjunctivae normal.      Pupils: Pupils are equal, round, and reactive to light.   Cardiovascular:      Rate and Rhythm: Normal rate and regular rhythm.   Pulmonary:      Effort: Pulmonary effort is normal.   Musculoskeletal:         General: Normal range of motion.      Cervical back: Normal range of motion and neck supple.   Skin:     General: Skin is warm and dry.   Neurological:      General: No focal deficit present.      Mental Status: She is alert and oriented to person, place, and time.   Psychiatric:         Mood and Affect: Mood normal.         Speech: Speech normal.         Behavior: Behavior normal.         Thought Content: Thought content normal.         Judgment: Judgment normal.       Review of Symptoms      Symptom Assessment (ESAS 0-10 Scale)  Pain:  4  Dyspnea:  0  Anxiety:  0  Nausea:  0  Depression:  0  Anorexia:  0  Fatigue:  0  Insomnia:  0  Restlessness:  0  Agitation:  0     CAM / Delirium:  Negative  Constipation:  Negative  Diarrhea:   Negative    Bowel Management Plan (BMP):  Yes      Pain Assessment:  OME in 24 hours:  50  Location(s): back    Back       Location: bilateral        Quality: Aching and pressure-like        Quantity: 4/10 in intensity        Chronicity: Onset 2 week(s) ago, gradually worsening since Patient says pain is worsened as the leukemia cells have been greater over her usual low back pain and hand pain.        Aggravating Factors: None        Alleviating Factors: Opiates       Associated Symptoms: None    Performance Status:  90    Living Arrangements:  Lives with family and Lives >50 miles from facility    Psychosocial/Cultural:  has not worked since 2006.  is working. He can't be at the hospital during the day. She is on his insurance plan. 2 daughters, grandchildren.    Spiritual:  F - Lynsey and Belief:  Orthodox  I - Importance:  Yes  C - Community:  Yes  A - Address in Care:  Yes    Advance Care Planning   Advance Directives:   Living Will: No    LaPOST: No    Do Not Resuscitate Status: No    Medical Power of : No    Agent's Name:  Mannie Gonzalez   Agent's Contact Number:  [phone]    Decision Making:  Patient answered questions       Significant Labs: All pertinent labs within the past 24 hours have been reviewed.  CBC:   Recent Labs   Lab 06/01/22 0422   WBC 25.82*   HGB 7.8*   HCT 22.8*   MCV 86   PLT 28*     BMP:  Recent Labs   Lab 06/01/22 0422   *   *   K 3.8   CL 98   CO2 24   BUN 8   CREATININE 0.7   CALCIUM 8.8   MG 1.6     LFT:  Lab Results   Component Value Date    AST 40 06/01/2022    ALKPHOS 193 (H) 06/01/2022    BILITOT 0.2 06/01/2022     Albumin:   Albumin   Date Value Ref Range Status   06/01/2022 3.1 (L) 3.5 - 5.2 g/dL Final     Protein:   Total Protein   Date Value Ref Range Status   06/01/2022 5.6 (L) 6.0 - 8.4 g/dL Final     Lactic acid:   Lab Results   Component Value Date    LACTATE 1.4 11/24/2021       Significant Imaging: I have reviewed all pertinent imaging  results/findings within the past 24 hours.

## 2022-06-01 NOTE — NURSING
Chemotherapy - checked by two certified nurses -consent for chemotherapy in chart- noted excellent blood return from patients line - Chemotherapy started following hospital policy/procedure without difficulty.

## 2022-06-01 NOTE — ASSESSMENT & PLAN NOTE
- worsening pain to left hip with worsening leukocytosis  - denies trauma or injury to area  - likely d/t leukemia  - oxycodone 10 prn q4  - reports improvement with 0.5mg dilaudid q6h prn  - met with dr. Mccord today, will start PCA for relief

## 2022-06-02 LAB
ALBUMIN SERPL BCP-MCNC: 3.3 G/DL (ref 3.5–5.2)
ALBUMIN SERPL BCP-MCNC: 3.3 G/DL (ref 3.5–5.2)
ALP SERPL-CCNC: 171 U/L (ref 55–135)
ALP SERPL-CCNC: 196 U/L (ref 55–135)
ALT SERPL W/O P-5'-P-CCNC: 32 U/L (ref 10–44)
ALT SERPL W/O P-5'-P-CCNC: 37 U/L (ref 10–44)
ANION GAP SERPL CALC-SCNC: 12 MMOL/L (ref 8–16)
ANION GAP SERPL CALC-SCNC: 13 MMOL/L (ref 8–16)
ANISOCYTOSIS BLD QL SMEAR: SLIGHT
AST SERPL-CCNC: 17 U/L (ref 10–40)
AST SERPL-CCNC: 36 U/L (ref 10–40)
BASOPHILS NFR BLD: 0 % (ref 0–1.9)
BILIRUB SERPL-MCNC: 0.3 MG/DL (ref 0.1–1)
BILIRUB SERPL-MCNC: 0.3 MG/DL (ref 0.1–1)
BLASTS NFR BLD MANUAL: 14 %
BUN SERPL-MCNC: 20 MG/DL (ref 6–20)
BUN SERPL-MCNC: 22 MG/DL (ref 6–20)
CALCIUM SERPL-MCNC: 8.7 MG/DL (ref 8.7–10.5)
CALCIUM SERPL-MCNC: 9 MG/DL (ref 8.7–10.5)
CHLORIDE SERPL-SCNC: 100 MMOL/L (ref 95–110)
CHLORIDE SERPL-SCNC: 98 MMOL/L (ref 95–110)
CO2 SERPL-SCNC: 22 MMOL/L (ref 23–29)
CO2 SERPL-SCNC: 22 MMOL/L (ref 23–29)
CREAT SERPL-MCNC: 0.7 MG/DL (ref 0.5–1.4)
CREAT SERPL-MCNC: 0.7 MG/DL (ref 0.5–1.4)
DIFFERENTIAL METHOD: ABNORMAL
EOSINOPHIL NFR BLD: 0 % (ref 0–8)
ERYTHROCYTE [DISTWIDTH] IN BLOOD BY AUTOMATED COUNT: 15.8 % (ref 11.5–14.5)
EST. GFR  (AFRICAN AMERICAN): >60 ML/MIN/1.73 M^2
EST. GFR  (AFRICAN AMERICAN): >60 ML/MIN/1.73 M^2
EST. GFR  (NON AFRICAN AMERICAN): >60 ML/MIN/1.73 M^2
EST. GFR  (NON AFRICAN AMERICAN): >60 ML/MIN/1.73 M^2
FIBRINOGEN PPP-MCNC: 425 MG/DL (ref 182–400)
GLUCOSE SERPL-MCNC: 282 MG/DL (ref 70–110)
GLUCOSE SERPL-MCNC: 330 MG/DL (ref 70–110)
HCT VFR BLD AUTO: 22.7 % (ref 37–48.5)
HGB BLD-MCNC: 7.8 G/DL (ref 12–16)
HYPOCHROMIA BLD QL SMEAR: ABNORMAL
IMM GRANULOCYTES # BLD AUTO: ABNORMAL K/UL (ref 0–0.04)
IMM GRANULOCYTES NFR BLD AUTO: ABNORMAL % (ref 0–0.5)
INR PPP: 1.1 (ref 0.8–1.2)
LDH SERPL L TO P-CCNC: 1321 U/L (ref 110–260)
LDH SERPL L TO P-CCNC: 915 U/L (ref 110–260)
LYMPHOCYTES NFR BLD: 19 % (ref 18–48)
MAGNESIUM SERPL-MCNC: 2.3 MG/DL (ref 1.6–2.6)
MAGNESIUM SERPL-MCNC: 2.3 MG/DL (ref 1.6–2.6)
MCH RBC QN AUTO: 30.5 PG (ref 27–31)
MCHC RBC AUTO-ENTMCNC: 34.4 G/DL (ref 32–36)
MCV RBC AUTO: 89 FL (ref 82–98)
METAMYELOCYTES NFR BLD MANUAL: 1 %
MONOCYTES NFR BLD: 1 % (ref 4–15)
MYELOCYTES NFR BLD MANUAL: 1 %
NEUTROPHILS NFR BLD: 63 % (ref 38–73)
NEUTS BAND NFR BLD MANUAL: 1 %
NRBC BLD-RTO: 0 /100 WBC
PHOSPHATE SERPL-MCNC: 4.1 MG/DL (ref 2.7–4.5)
PHOSPHATE SERPL-MCNC: 4.8 MG/DL (ref 2.7–4.5)
PLATELET # BLD AUTO: 12 K/UL (ref 150–450)
PLATELET BLD QL SMEAR: ABNORMAL
PMV BLD AUTO: 11.9 FL (ref 9.2–12.9)
POCT GLUCOSE: 144 MG/DL (ref 70–110)
POCT GLUCOSE: 196 MG/DL (ref 70–110)
POCT GLUCOSE: 225 MG/DL (ref 70–110)
POCT GLUCOSE: 324 MG/DL (ref 70–110)
POCT GLUCOSE: 330 MG/DL (ref 70–110)
POIKILOCYTOSIS BLD QL SMEAR: SLIGHT
POLYCHROMASIA BLD QL SMEAR: ABNORMAL
POTASSIUM SERPL-SCNC: 4 MMOL/L (ref 3.5–5.1)
POTASSIUM SERPL-SCNC: 4.3 MMOL/L (ref 3.5–5.1)
PROT SERPL-MCNC: 5.9 G/DL (ref 6–8.4)
PROT SERPL-MCNC: 6 G/DL (ref 6–8.4)
PROTHROMBIN TIME: 10.9 SEC (ref 9–12.5)
RBC # BLD AUTO: 2.56 M/UL (ref 4–5.4)
SODIUM SERPL-SCNC: 133 MMOL/L (ref 136–145)
SODIUM SERPL-SCNC: 134 MMOL/L (ref 136–145)
URATE SERPL-MCNC: 4.6 MG/DL (ref 2.4–5.7)
URATE SERPL-MCNC: 6.3 MG/DL (ref 2.4–5.7)
WBC # BLD AUTO: 6.06 K/UL (ref 3.9–12.7)

## 2022-06-02 PROCEDURE — 94761 N-INVAS EAR/PLS OXIMETRY MLT: CPT

## 2022-06-02 PROCEDURE — 63600175 PHARM REV CODE 636 W HCPCS: Performed by: NURSE PRACTITIONER

## 2022-06-02 PROCEDURE — 85610 PROTHROMBIN TIME: CPT | Performed by: NURSE PRACTITIONER

## 2022-06-02 PROCEDURE — 84100 ASSAY OF PHOSPHORUS: CPT | Performed by: NURSE PRACTITIONER

## 2022-06-02 PROCEDURE — 99233 PR SUBSEQUENT HOSPITAL CARE,LEVL III: ICD-10-PCS | Mod: ,,, | Performed by: INTERNAL MEDICINE

## 2022-06-02 PROCEDURE — 83615 LACTATE (LD) (LDH) ENZYME: CPT | Mod: 91 | Performed by: NURSE PRACTITIONER

## 2022-06-02 PROCEDURE — 85027 COMPLETE CBC AUTOMATED: CPT | Performed by: NURSE PRACTITIONER

## 2022-06-02 PROCEDURE — 99233 SBSQ HOSP IP/OBS HIGH 50: CPT | Mod: ,,, | Performed by: INTERNAL MEDICINE

## 2022-06-02 PROCEDURE — 63600175 PHARM REV CODE 636 W HCPCS: Performed by: INTERNAL MEDICINE

## 2022-06-02 PROCEDURE — 99233 SBSQ HOSP IP/OBS HIGH 50: CPT | Mod: GT,,, | Performed by: INTERNAL MEDICINE

## 2022-06-02 PROCEDURE — 84550 ASSAY OF BLOOD/URIC ACID: CPT | Performed by: NURSE PRACTITIONER

## 2022-06-02 PROCEDURE — 20600001 HC STEP DOWN PRIVATE ROOM

## 2022-06-02 PROCEDURE — 99233 PR SUBSEQUENT HOSPITAL CARE,LEVL III: ICD-10-PCS | Mod: GT,,, | Performed by: INTERNAL MEDICINE

## 2022-06-02 PROCEDURE — 85007 BL SMEAR W/DIFF WBC COUNT: CPT | Performed by: NURSE PRACTITIONER

## 2022-06-02 PROCEDURE — 63600175 PHARM REV CODE 636 W HCPCS: Performed by: STUDENT IN AN ORGANIZED HEALTH CARE EDUCATION/TRAINING PROGRAM

## 2022-06-02 PROCEDURE — 97530 THERAPEUTIC ACTIVITIES: CPT

## 2022-06-02 PROCEDURE — 99900035 HC TECH TIME PER 15 MIN (STAT)

## 2022-06-02 PROCEDURE — 25000003 PHARM REV CODE 250: Performed by: NURSE PRACTITIONER

## 2022-06-02 PROCEDURE — 85384 FIBRINOGEN ACTIVITY: CPT | Performed by: NURSE PRACTITIONER

## 2022-06-02 PROCEDURE — 83735 ASSAY OF MAGNESIUM: CPT | Mod: 91 | Performed by: NURSE PRACTITIONER

## 2022-06-02 PROCEDURE — 25000003 PHARM REV CODE 250: Performed by: STUDENT IN AN ORGANIZED HEALTH CARE EDUCATION/TRAINING PROGRAM

## 2022-06-02 PROCEDURE — 25000003 PHARM REV CODE 250: Performed by: INTERNAL MEDICINE

## 2022-06-02 PROCEDURE — 80053 COMPREHEN METABOLIC PANEL: CPT | Mod: 91 | Performed by: NURSE PRACTITIONER

## 2022-06-02 RX ORDER — ALLOPURINOL 300 MG/1
300 TABLET ORAL 2 TIMES DAILY
Status: DISCONTINUED | OUTPATIENT
Start: 2022-06-02 | End: 2022-06-05

## 2022-06-02 RX ORDER — INSULIN ASPART 100 [IU]/ML
6 INJECTION, SOLUTION INTRAVENOUS; SUBCUTANEOUS
Status: DISCONTINUED | OUTPATIENT
Start: 2022-06-02 | End: 2022-06-03

## 2022-06-02 RX ORDER — SODIUM CHLORIDE 9 MG/ML
INJECTION, SOLUTION INTRAVENOUS CONTINUOUS
Status: DISCONTINUED | OUTPATIENT
Start: 2022-06-02 | End: 2022-06-03

## 2022-06-02 RX ORDER — SODIUM CHLORIDE 9 MG/ML
INJECTION, SOLUTION INTRAVENOUS CONTINUOUS
Status: DISCONTINUED | OUTPATIENT
Start: 2022-06-02 | End: 2022-06-02

## 2022-06-02 RX ORDER — SODIUM CHLORIDE, SODIUM LACTATE, POTASSIUM CHLORIDE, CALCIUM CHLORIDE 600; 310; 30; 20 MG/100ML; MG/100ML; MG/100ML; MG/100ML
INJECTION, SOLUTION INTRAVENOUS CONTINUOUS
Status: DISCONTINUED | OUTPATIENT
Start: 2022-06-02 | End: 2022-06-02

## 2022-06-02 RX ORDER — INSULIN ASPART 100 [IU]/ML
8 INJECTION, SOLUTION INTRAVENOUS; SUBCUTANEOUS
Status: DISCONTINUED | OUTPATIENT
Start: 2022-06-02 | End: 2022-06-02

## 2022-06-02 RX ADMIN — LEVOFLOXACIN 500 MG: 500 TABLET, FILM COATED ORAL at 05:06

## 2022-06-02 RX ADMIN — DILTIAZEM HYDROCHLORIDE 90 MG: 30 TABLET, FILM COATED ORAL at 10:06

## 2022-06-02 RX ADMIN — HYDROXYZINE HYDROCHLORIDE 50 MG: 25 TABLET, FILM COATED ORAL at 05:06

## 2022-06-02 RX ADMIN — PANTOPRAZOLE SODIUM 40 MG: 40 TABLET, DELAYED RELEASE ORAL at 09:06

## 2022-06-02 RX ADMIN — DILTIAZEM HYDROCHLORIDE 90 MG: 30 TABLET, FILM COATED ORAL at 09:06

## 2022-06-02 RX ADMIN — SUMATRIPTAN SUCCINATE 50 MG: 50 TABLET ORAL at 05:06

## 2022-06-02 RX ADMIN — HYDROMORPHONE HYDROCHLORIDE 0.5 MG: 1 INJECTION, SOLUTION INTRAMUSCULAR; INTRAVENOUS; SUBCUTANEOUS at 11:06

## 2022-06-02 RX ADMIN — ALLOPURINOL 300 MG: 300 TABLET ORAL at 10:06

## 2022-06-02 RX ADMIN — BUSPIRONE HYDROCHLORIDE 10 MG: 10 TABLET ORAL at 10:06

## 2022-06-02 RX ADMIN — ACYCLOVIR 400 MG: 200 CAPSULE ORAL at 09:06

## 2022-06-02 RX ADMIN — ONDANSETRON 8 MG: 8 TABLET, ORALLY DISINTEGRATING ORAL at 02:06

## 2022-06-02 RX ADMIN — DEXAMETHASONE 20 MG: 4 TABLET ORAL at 09:06

## 2022-06-02 RX ADMIN — OXCARBAZEPINE 1200 MG: 600 TABLET, FILM COATED ORAL at 09:06

## 2022-06-02 RX ADMIN — GABAPENTIN 300 MG: 300 CAPSULE ORAL at 12:06

## 2022-06-02 RX ADMIN — GABAPENTIN 300 MG: 300 CAPSULE ORAL at 05:06

## 2022-06-02 RX ADMIN — SODIUM CHLORIDE: 0.9 INJECTION, SOLUTION INTRAVENOUS at 09:06

## 2022-06-02 RX ADMIN — FLUCONAZOLE 400 MG: 200 TABLET ORAL at 05:06

## 2022-06-02 RX ADMIN — INSULIN ASPART 8 UNITS: 100 INJECTION, SOLUTION INTRAVENOUS; SUBCUTANEOUS at 09:06

## 2022-06-02 RX ADMIN — INSULIN ASPART 2 UNITS: 100 INJECTION, SOLUTION INTRAVENOUS; SUBCUTANEOUS at 10:06

## 2022-06-02 RX ADMIN — VORTIOXETINE 20 MG: 10 TABLET, FILM COATED ORAL at 09:06

## 2022-06-02 RX ADMIN — LOSARTAN POTASSIUM 25 MG: 25 TABLET, FILM COATED ORAL at 12:06

## 2022-06-02 RX ADMIN — INSULIN ASPART 2 UNITS: 100 INJECTION, SOLUTION INTRAVENOUS; SUBCUTANEOUS at 12:06

## 2022-06-02 RX ADMIN — QUETIAPINE FUMARATE 200 MG: 200 TABLET, FILM COATED ORAL at 10:06

## 2022-06-02 RX ADMIN — INSULIN ASPART 8 UNITS: 100 INJECTION, SOLUTION INTRAVENOUS; SUBCUTANEOUS at 06:06

## 2022-06-02 RX ADMIN — ALLOPURINOL 300 MG: 300 TABLET ORAL at 09:06

## 2022-06-02 RX ADMIN — GABAPENTIN 300 MG: 300 CAPSULE ORAL at 08:06

## 2022-06-02 RX ADMIN — INSULIN ASPART 6 UNITS: 100 INJECTION, SOLUTION INTRAVENOUS; SUBCUTANEOUS at 06:06

## 2022-06-02 RX ADMIN — SODIUM CHLORIDE: 0.9 INJECTION, SOLUTION INTRAVENOUS at 06:06

## 2022-06-02 RX ADMIN — DILTIAZEM HYDROCHLORIDE 90 MG: 30 TABLET, FILM COATED ORAL at 05:06

## 2022-06-02 RX ADMIN — BUSPIRONE HYDROCHLORIDE 10 MG: 10 TABLET ORAL at 09:06

## 2022-06-02 RX ADMIN — SODIUM CHLORIDE: 0.9 INJECTION, SOLUTION INTRAVENOUS at 08:06

## 2022-06-02 RX ADMIN — ACYCLOVIR 400 MG: 200 CAPSULE ORAL at 10:06

## 2022-06-02 RX ADMIN — INSULIN ASPART 6 UNITS: 100 INJECTION, SOLUTION INTRAVENOUS; SUBCUTANEOUS at 12:06

## 2022-06-02 RX ADMIN — OXCARBAZEPINE 1200 MG: 600 TABLET, FILM COATED ORAL at 11:06

## 2022-06-02 NOTE — PROGRESS NOTES
[facility] - On[facility] ([facility])  Hematology  Bone Marrow Transplant  Progress Note    Patient Name: Deepti Gonzalez  Admission Date: 5/23/2022  Hospital Length of Stay: 10 days  Code Status: Full Code    Subjective:     Interval History: WBC 6K today. Blood glucose elevated - added 6 units TIDWM insulin. Pending panotinib. Continues on PCA pump.    Objective:     Vital Signs (Most Recent):  Temp: 98.1 °F (36.7 °C) (06/02/22 0817)  Pulse: 83 (06/02/22 0412)  Resp: 15 (06/02/22 0412)  BP: (!) 147/67 (06/02/22 0412)  SpO2: (!) 94 % (06/02/22 0412) Vital Signs (24h Range):  Temp:  [97.6 °F (36.4 °C)-99.3 °F (37.4 °C)] 98.1 °F (36.7 °C)  Pulse:  [] 83  Resp:  [14-20] 15  SpO2:  [90 %-94 %] 94 %  BP: (116-147)/(63-82) 147/67     Weight: 69 kg (152 lb 1.9 oz)  Body mass index is 25.31 kg/m².  Body surface area is 1.78 meters squared.      Intake/Output - Last 3 Shifts         05/31 0700  06/01 0659 06/01 0700  06/02 0659 06/02 0700  06/03 0659    P.O. 360 720     Blood       Total Intake(mL/kg) 360 (5.2) 720 (10.4)     Urine (mL/kg/hr)       Total Output       Net +360 +720            Urine Occurrence 4 x 6 x     Stool Occurrence 0 x              Physical Exam  Vitals reviewed.   Constitutional:       Appearance: Normal appearance. She is well-developed.   HENT:      Head: Normocephalic and atraumatic.      Right Ear: External ear normal.      Left Ear: External ear normal.   Eyes:      Extraocular Movements: Extraocular movements intact.      Conjunctiva/sclera: Conjunctivae normal.   Cardiovascular:      Rate and Rhythm: Normal rate and regular rhythm.      Pulses: Normal pulses.      Heart sounds: Normal heart sounds. No murmur heard.  Pulmonary:      Effort: Pulmonary effort is normal. No respiratory distress.      Breath sounds: No stridor. No wheezing or rales.   Abdominal:      General: Bowel sounds are normal. There is no distension.      Palpations: Abdomen is soft.      Tenderness: There is no abdominal  tenderness.   Musculoskeletal:         General: Normal range of motion.      Cervical back: Normal range of motion.      Right lower leg: No edema.      Left lower leg: No edema.   Skin:     General: Skin is warm and dry.      Findings: Bruising present. No rash.      Comments: RCWP with no signs of infection   Neurological:      General: No focal deficit present.      Mental Status: She is alert and oriented to person, place, and time.   Psychiatric:         Mood and Affect: Mood normal.         Behavior: Behavior normal.         Thought Content: Thought content normal.         Judgment: Judgment normal.       Significant Labs:   CBC:   Recent Labs   Lab 06/01/22  0422 06/02/22  0510   WBC 25.82* 6.06   HGB 7.8* 7.8*   HCT 22.8* 22.7*   PLT 28* 12*      and CMP:   Recent Labs   Lab 06/01/22  0422 06/01/22  1622 06/02/22  0510   * 129* 133*   K 3.8 4.4 4.0   CL 98 94* 98   CO2 24 25 22*   * 348* 282*   BUN 8 14 20   CREATININE 0.7 0.8 0.7   CALCIUM 8.8 8.8 9.0   PROT 5.6* 6.3 6.0   ALBUMIN 3.1* 3.4* 3.3*   BILITOT 0.2 0.3 0.3   ALKPHOS 193* 229* 196*   AST 40 57* 36   ALT 32 52* 37   ANIONGAP 10 10 13   EGFRNONAA >60.0 >60.0 >60.0         Diagnostic Results:  I have reviewed all pertinent imaging results/findings within the past 24 hours.    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 Blincyto held & given steroids x 3 days. Resumed Blincyto 5/17 and again developed CRS, Grade 2 as well as mild AMS and Grade 3 transaminitis. Blincyto stopped with no plan to resume.  Discharged on 5/19  - seen today 5/23 by Dr. 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; ponatinib Rx pending via specialty pharmacy  - continue ppx acyclovir, fluconazole, levaquin and bactrim  - s/p 2mg vincristine on 5/24 and 5/31    Volume overload  On 1-2L O2 via NC, reporting intermittent SOB  - will reassess daily if lasix needed      At high risk of tumor lysis syndrome  Given leukocytosis with WBC 136K and plan for rapid reduction with PO dex and 1 dose of vincristine, patient at high risk for TLS  - continue allopurinol 300mg BID  - monitor TLS labs BID    Anemia in neoplastic disease  - daily CBC while inpatient  - transfuse for Hgb <7    Transaminitis  - significant transaminitis following Blinatumomab  - closely monitor with cytoreduction and vincristine  - improved ALT/AST     Coagulopathy  - high risk with cytoreduction/refractory B-ALL s/p vincristine on 5/24 and 5/31  - monitoring BID INR/fibrinogen  - received cryo x2 5/27 ( suspect 1 unit not fully transfused)  - plan for cryo if <100    Leukocytosis  - see B-ALL      Thrombocytopenia  - daily CBC while inpatient  - transfuse for Plt <10K or bleeding    Cancer associated pain  - worsening pain to left hip with worsening leukocytosis  - denies trauma or injury to area  - likely d/t leukemia  - discussed with palliative who recommended PCA 6/1, continue with PCA    Moderate major depression  - Continue home trintellix    Anxiety  - Continue home trintellix and prn valium    GERD (gastroesophageal reflux disease)  - hold home omeprazole, switch to protonix while inpatient    Hypertension  - continue home losartan    Seizure disorder  - continue home oxcarbazepine    Type 2 diabetes  mellitus, without long-term current use of insulin  - hold home farxiga while inpatient; can resume at discharge.  - achs blood glucose monitoring and diabetic diet while admitted  - 6units TIDWM and 10 units levemir daily with MDSSI      Rheumatoid arthritis involving multiple sites  - not currently on any active tx    History of TIA (transient ischemic attack)  - Holding home statin while inpatient  - Holding home ASA while inpatient due to thrombocytopenia. Will hold ASA at discharge. Can be resumed outpatient as appropriate when platelets >50K    Hyperlipidemia  - holding home statin while inpatient. Can resume at discharge if LFTs normalize.    Paroxysmal atrial fibrillation  - continue home diltiazem  - Holding xarelto with plt count < 50K. Can resume outpatient when appropriate.        VTE Risk Mitigation (From admission, onward)         Ordered     heparin, porcine (PF) 100 unit/mL injection flush 300 Units  As needed (PRN)         05/24/22 1021     IP VTE HIGH RISK PATIENT  Once         05/23/22 1552     Place sequential compression device  Until discontinued         05/23/22 1552     Reason for No Pharmacological VTE Prophylaxis  Once        Question:  Reasons:  Answer:  Thrombocytopenia    05/23/22 1552                Disposition: Home    Jan Hooker MD  Bone Marrow Transplant  [facility] - Oncology ([facility])

## 2022-06-02 NOTE — SUBJECTIVE & OBJECTIVE
Subjective:     Interval History: WBC 6K today. Blood glucose elevated - added 6 units TIDWM insulin. Pending panotinib. Continues on PCA pump.    Objective:     Vital Signs (Most Recent):  Temp: 98.1 °F (36.7 °C) (06/02/22 0817)  Pulse: 83 (06/02/22 0412)  Resp: 15 (06/02/22 0412)  BP: (!) 147/67 (06/02/22 0412)  SpO2: (!) 94 % (06/02/22 0412) Vital Signs (24h Range):  Temp:  [97.6 °F (36.4 °C)-99.3 °F (37.4 °C)] 98.1 °F (36.7 °C)  Pulse:  [] 83  Resp:  [14-20] 15  SpO2:  [90 %-94 %] 94 %  BP: (116-147)/(63-82) 147/67     Weight: 69 kg (152 lb 1.9 oz)  Body mass index is 25.31 kg/m².  Body surface area is 1.78 meters squared.      Intake/Output - Last 3 Shifts         05/31 0700  06/01 0659 06/01 0700  06/02 0659 06/02 0700  06/03 0659    P.O. 360 720     Blood       Total Intake(mL/kg) 360 (5.2) 720 (10.4)     Urine (mL/kg/hr)       Total Output       Net +360 +720            Urine Occurrence 4 x 6 x     Stool Occurrence 0 x              Physical Exam  Vitals reviewed.   Constitutional:       Appearance: Normal appearance. She is well-developed.   HENT:      Head: Normocephalic and atraumatic.      Right Ear: External ear normal.      Left Ear: External ear normal.   Eyes:      Extraocular Movements: Extraocular movements intact.      Conjunctiva/sclera: Conjunctivae normal.   Cardiovascular:      Rate and Rhythm: Normal rate and regular rhythm.      Pulses: Normal pulses.      Heart sounds: Normal heart sounds. No murmur heard.  Pulmonary:      Effort: Pulmonary effort is normal. No respiratory distress.      Breath sounds: No stridor. No wheezing or rales.   Abdominal:      General: Bowel sounds are normal. There is no distension.      Palpations: Abdomen is soft.      Tenderness: There is no abdominal tenderness.   Musculoskeletal:         General: Normal range of motion.      Cervical back: Normal range of motion.      Right lower leg: No edema.      Left lower leg: No edema.   Skin:     General: Skin  is warm and dry.      Findings: Bruising present. No rash.      Comments: RCWP with no signs of infection   Neurological:      General: No focal deficit present.      Mental Status: She is alert and oriented to person, place, and time.   Psychiatric:         Mood and Affect: Mood normal.         Behavior: Behavior normal.         Thought Content: Thought content normal.         Judgment: Judgment normal.       Significant Labs:   CBC:   Recent Labs   Lab 06/01/22  0422 06/02/22  0510   WBC 25.82* 6.06   HGB 7.8* 7.8*   HCT 22.8* 22.7*   PLT 28* 12*      and CMP:   Recent Labs   Lab 06/01/22  0422 06/01/22  1622 06/02/22  0510   * 129* 133*   K 3.8 4.4 4.0   CL 98 94* 98   CO2 24 25 22*   * 348* 282*   BUN 8 14 20   CREATININE 0.7 0.8 0.7   CALCIUM 8.8 8.8 9.0   PROT 5.6* 6.3 6.0   ALBUMIN 3.1* 3.4* 3.3*   BILITOT 0.2 0.3 0.3   ALKPHOS 193* 229* 196*   AST 40 57* 36   ALT 32 52* 37   ANIONGAP 10 10 13   EGFRNONAA >60.0 >60.0 >60.0         Diagnostic Results:  I have reviewed all pertinent imaging results/findings within the past 24 hours.

## 2022-06-02 NOTE — ASSESSMENT & PLAN NOTE
06/02/2022  No real change in plans. Patient waiting for chemotherapy to be approved. Not ready to discuss what would happen if not successful in leukemia treatment.  06/1/2022  Impression:    1) Symptoms:  Leukemia without remission.     2) Medicolegal: Has decision making capacity.  , Mannie  is surrogate decision maker.   He would be HCPOA.     3) Psychosocial:  support system consists of , daughters and siblings, nieces, nephews.     4) Spiritual: Mandaen    5) Prognostication: guarded due to not achieving remission initially.    6) Understanding of disease and Illness Trajectory: Patient  has  adequate understanding of her illness, they can benefit from continued education on what to expect in the future. She does not want to think about not getting better.      7) Goals of care:  curative  Advance Care Planning     Date: 06/01/2022  patient did not want to discuss today any further.          8) Code status: full for now.    9) Advance directives:none      Summary and recommendations:Added PCA and monitor. Further discussion about care plan hoping for the best but planning for the worst.

## 2022-06-02 NOTE — SUBJECTIVE & OBJECTIVE
Interval History: stopped continuous infusion late evening. Had vomiting as well. Doing ok on demand dilaudid with good pain control. No further nausea/vomiting. She looks good today.    Medications:  Continuous Infusions:   sodium chloride 0.9% 100 mL/hr at 06/02/22 0924    hydromorphone in 0.9 % NaCl 6 mg/30 ml       Scheduled Meds:   acyclovir  400 mg Oral Q12H    allopurinoL  300 mg Oral BID    busPIRone  10 mg Oral Q12H    dexAMETHasone  20 mg Oral Daily    diltiaZEM  90 mg Oral Q6H    fluconazole  400 mg Oral Q24H    gabapentin  300 mg Oral Q8H    hydrOXYzine  50 mg Oral Q12H    insulin aspart U-100  6 Units Subcutaneous TIDWM    insulin detemir U-100  10 Units Subcutaneous Daily    levoFLOXacin  500 mg Oral Q24H    LIDOcaine  1 patch Transdermal Q24H    losartan  25 mg Oral Q24H    OXcarbazepine  1,200 mg Oral Q12H    pantoprazole  40 mg Oral Q24H    QUEtiapine  200 mg Oral Q24H    sulfamethoxazole-trimethoprim 800-160mg  1 tablet Oral Every Mon, Wed, Fri    vortioxetine  2 tablet Oral Q24H     PRN Meds:sodium chloride, alteplase, artificial tears, dextrose 10%, dextrose 10%, diazePAM, glucagon (human recombinant), glucose, glucose, heparin, porcine (PF), HYDROmorphone, insulin aspart U-100, naloxone, ondansetron, oxyCODONE, polyethylene glycol, prochlorperazine, senna-docusate 8.6-50 mg, sodium chloride 0.9%, sumatriptan    Objective:     Vital Signs (Most Recent):  Temp: 97.6 °F (36.4 °C) (06/02/22 1134)  Pulse: 83 (06/02/22 1134)  Resp: 18 (06/02/22 1134)  BP: 129/60 (06/02/22 1134)  SpO2: 95 % (06/02/22 1134) Vital Signs (24h Range):  Temp:  [97.6 °F (36.4 °C)-99.3 °F (37.4 °C)] 97.6 °F (36.4 °C)  Pulse:  [] 83  Resp:  [14-20] 18  SpO2:  [90 %-95 %] 95 %  BP: (116-147)/(60-82) 129/60     Weight: 69 kg (152 lb 1.9 oz)  Body mass index is 25.31 kg/m².    Physical Exam  Vitals and nursing note reviewed.   Constitutional:       Appearance: Normal appearance. She is well-developed.   HENT:      Head:  Normocephalic and atraumatic.      Right Ear: External ear normal.      Left Ear: External ear normal.      Nose: Nose normal.   Eyes:      Conjunctiva/sclera: Conjunctivae normal.      Pupils: Pupils are equal, round, and reactive to light.   Cardiovascular:      Rate and Rhythm: Normal rate and regular rhythm.   Pulmonary:      Effort: Pulmonary effort is normal.   Musculoskeletal:         General: Normal range of motion.      Cervical back: Normal range of motion and neck supple.   Skin:     General: Skin is warm and dry.   Neurological:      Mental Status: She is alert and oriented to person, place, and time.   Psychiatric:         Mood and Affect: Mood normal.         Speech: Speech normal.         Behavior: Behavior normal.         Thought Content: Thought content normal.         Judgment: Judgment normal.       Review of Symptoms      Symptom Assessment (ESAS 0-10 Scale)  Pain:  0  Dyspnea:  0  Anxiety:  0  Nausea:  0  Depression:  0  Anorexia:  0  Fatigue:  0  Insomnia:  0  Restlessness:  0  Agitation:  0     CAM / Delirium:  Negative  Constipation:  Negative  Diarrhea:  Negative    Bowel Management Plan (BMP):  Yes      Pain Assessment:  OME in 24 hours:  2 mg iv dilaudid or not much OME!  Location(s): back    Back       Location: bilateral        Quality: Pressure-like        Quantity: 0/10 in intensity        Chronicity: Onset 1 minute(s) ago, rapidly improving since Pain now under control with demand only dilaudid.        Aggravating Factors: None        Alleviating Factors: Opiates       Associated Symptoms: Vomiting    Performance Status:  90    Living Arrangements:  Lives with family, Lives with spouse and Lives >50 miles from facility    Psychosocial/Cultural: Has 2 daughters, 22 and 13. 13 year old is doing well in school.    Spiritual:  F - Lynesy and Belief:  Orthodox  I - Importance:  Yes  C - Community:  Yes  A - Address in Care:  Yes      Advance Care Planning   Advance Directives:   Living Will:  No    LaPOST: No    Do Not Resuscitate Status: No    Medical Power of : No    Agent's Name:  Mannie Gonzalez   Agent's Contact Number:  [phone]    Decision Making:  Patient answered questions       Significant Labs: All pertinent labs within the past 24 hours have been reviewed.  CBC:   Recent Labs   Lab 06/02/22  0510   WBC 6.06   HGB 7.8*   HCT 22.7*   MCV 89   PLT 12*     BMP:  Recent Labs   Lab 06/02/22  0510   *   *   K 4.0   CL 98   CO2 22*   BUN 20   CREATININE 0.7   CALCIUM 9.0   MG 2.3     LFT:  Lab Results   Component Value Date    AST 36 06/02/2022    ALKPHOS 196 (H) 06/02/2022    BILITOT 0.3 06/02/2022     Albumin:   Albumin   Date Value Ref Range Status   06/02/2022 3.3 (L) 3.5 - 5.2 g/dL Final     Protein:   Total Protein   Date Value Ref Range Status   06/02/2022 6.0 6.0 - 8.4 g/dL Final     Lactic acid:   Lab Results   Component Value Date    LACTATE 1.4 11/24/2021       Significant Imaging: I have reviewed all pertinent imaging results/findings within the past 24 hours.

## 2022-06-02 NOTE — ASSESSMENT & PLAN NOTE
- worsening pain to left hip with worsening leukocytosis  - denies trauma or injury to area  - likely d/t leukemia  - discussed with palliative who recommended PCA 6/1, continue with PCA

## 2022-06-02 NOTE — PROGRESS NOTES
[facility] - Oncology ([facility])  Palliative Medicine  Progress Note    Patient Name: Deepti Gonzalez  MRN: [id number]  Admission Date: 5/23/2022  Hospital Length of Stay: 10 days  Code Status: Full Code   Attending Provider: Dayron Jenkins MD  Consulting Provider: Ines Mccord MD  Primary Care Physician: Primary Doctor No  Principal Problem:B-cell acute lymphoblastic leukemia    Patient information was obtained from patient, past medical records and primary team.      Assessment/Plan:     * B-cell acute lymphoblastic leukemia  06/2/2022  Still awaiting insurance approval for additional meds.  06/1/2022  Currently being actively treated. Awaiting insurance approval for additional meds.  Discussed with BMT team for further discussions about treatment options.    Cancer associated pain  06/02/22  Pain better controlled. Stopped continuous infusion due to lethargy and vomiting. Doing well with demand dose. Will monitor her over next 24 hours and reevaluated.  06/01/22  Patient has been taking oxycodone at home however, the pain is not well controlled.  She has received hydromorphone 0.5 mg every six hours but she says it does not last more than 3, She has since had dosing interval decreased to every 4 hours. We discussed using PCA in the hospital to get idea of pain control needs then convert to hydromorphone orally with or without methadone as the long acting agent. QTc is 454.  Dexamethasone can help with this pain which is multifaceted. It may be related to cell turnover in the pelvis? Will monitor and adjust dose. She understands that she will need an end tidal C02 monitor and agrees.  She is on a bowel regimen.    Palliative care encounter  06/02/2022  No real change in plans. Patient waiting for chemotherapy to be approved. Not ready to discuss what would happen if not successful in leukemia treatment.  06/1/2022  Impression:    1) Symptoms:  Leukemia without remission.     2) Medicolegal: Has decision making  "capacity.  , Mannie  is surrogate decision maker.   He would be HCPOA.     3) Psychosocial:  support system consists of , daughters and siblings, nieces, nephews.     4) Spiritual: Anabaptism    5) Prognostication: guarded due to not achieving remission initially.    6) Understanding of disease and Illness Trajectory: Patient  has  adequate understanding of her illness, they can benefit from continued education on what to expect in the future. She does not want to think about not getting better.      7) Goals of care:  curative  Advance Care Planning     Date: 06/01/2022  patient did not want to discuss today any further.         8) Code status: full for now.    9) Advance directives:none      Summary and recommendations:Added PCA and monitor. Further discussion about care plan hoping for the best but planning for the worst.                   I will follow-up with patient. Please contact us if you have any additional questions.   Ines Mccord MD  Palliative Medicine      Subjective:     Chief Complaint: No chief complaint on file.      HPI:   51 year old woman with leukemia who was admitted due to rapid rise in WBC. I was scheduled to see her in palliative medicine clinic to help manage her pain related to her underlying chronic illnesses.    Today she tells me that her pain is diffuse and gnawing in her lower back/pelvis with some radiation to her upper thighs. She is hopeful that her treatments will work to get her leukemia under control.     Reports that morphine and norco are not helpful for her pain and the hydromorphone does not last 4 hours. She says the med takes the edge off so that it is not what she things of but it is always there. She reports the pain as worsening and she is having a difficult time getting comfortable. Her nephew Joaquin was in the room with her. She reports that she likes to be strong and independent and does "have a high tolerance" for pain.    We discussed various pain regimens " that can be used in the outpatient world including methadone but I would like to get her pain under control with PCA to which she agrees.      Hospital Course:  No notes on file    Interval History: stopped continuous infusion late evening. Had vomiting as well. Doing ok on demand dilaudid with good pain control. No further nausea/vomiting. She looks good today.    Medications:  Continuous Infusions:   sodium chloride 0.9% 100 mL/hr at 06/02/22 0924    hydromorphone in 0.9 % NaCl 6 mg/30 ml       Scheduled Meds:   acyclovir  400 mg Oral Q12H    allopurinoL  300 mg Oral BID    busPIRone  10 mg Oral Q12H    dexAMETHasone  20 mg Oral Daily    diltiaZEM  90 mg Oral Q6H    fluconazole  400 mg Oral Q24H    gabapentin  300 mg Oral Q8H    hydrOXYzine  50 mg Oral Q12H    insulin aspart U-100  6 Units Subcutaneous TIDWM    insulin detemir U-100  10 Units Subcutaneous Daily    levoFLOXacin  500 mg Oral Q24H    LIDOcaine  1 patch Transdermal Q24H    losartan  25 mg Oral Q24H    OXcarbazepine  1,200 mg Oral Q12H    pantoprazole  40 mg Oral Q24H    QUEtiapine  200 mg Oral Q24H    sulfamethoxazole-trimethoprim 800-160mg  1 tablet Oral Every Mon, Wed, Fri    vortioxetine  2 tablet Oral Q24H     PRN Meds:sodium chloride, alteplase, artificial tears, dextrose 10%, dextrose 10%, diazePAM, glucagon (human recombinant), glucose, glucose, heparin, porcine (PF), HYDROmorphone, insulin aspart U-100, naloxone, ondansetron, oxyCODONE, polyethylene glycol, prochlorperazine, senna-docusate 8.6-50 mg, sodium chloride 0.9%, sumatriptan    Objective:     Vital Signs (Most Recent):  Temp: 97.6 °F (36.4 °C) (06/02/22 1134)  Pulse: 83 (06/02/22 1134)  Resp: 18 (06/02/22 1134)  BP: 129/60 (06/02/22 1134)  SpO2: 95 % (06/02/22 1134) Vital Signs (24h Range):  Temp:  [97.6 °F (36.4 °C)-99.3 °F (37.4 °C)] 97.6 °F (36.4 °C)  Pulse:  [] 83  Resp:  [14-20] 18  SpO2:  [90 %-95 %] 95 %  BP: (116-147)/(60-82) 129/60     Weight: 69 kg  (152 lb 1.9 oz)  Body mass index is 25.31 kg/m².    Physical Exam  Vitals and nursing note reviewed.   Constitutional:       Appearance: Normal appearance. She is well-developed.   HENT:      Head: Normocephalic and atraumatic.      Right Ear: External ear normal.      Left Ear: External ear normal.      Nose: Nose normal.   Eyes:      Conjunctiva/sclera: Conjunctivae normal.      Pupils: Pupils are equal, round, and reactive to light.   Cardiovascular:      Rate and Rhythm: Normal rate and regular rhythm.   Pulmonary:      Effort: Pulmonary effort is normal.   Musculoskeletal:         General: Normal range of motion.      Cervical back: Normal range of motion and neck supple.   Skin:     General: Skin is warm and dry.   Neurological:      Mental Status: She is alert and oriented to person, place, and time.   Psychiatric:         Mood and Affect: Mood normal.         Speech: Speech normal.         Behavior: Behavior normal.         Thought Content: Thought content normal.         Judgment: Judgment normal.       Review of Symptoms      Symptom Assessment (ESAS 0-10 Scale)  Pain:  0  Dyspnea:  0  Anxiety:  0  Nausea:  0  Depression:  0  Anorexia:  0  Fatigue:  0  Insomnia:  0  Restlessness:  0  Agitation:  0     CAM / Delirium:  Negative  Constipation:  Negative  Diarrhea:  Negative    Bowel Management Plan (BMP):  Yes      Pain Assessment:  OME in 24 hours:  2 mg iv dilaudid or not much OME!  Location(s): back    Back       Location: bilateral        Quality: Pressure-like        Quantity: 0/10 in intensity        Chronicity: Onset 1 minute(s) ago, rapidly improving since Pain now under control with demand only dilaudid.        Aggravating Factors: None        Alleviating Factors: Opiates       Associated Symptoms: Vomiting    Performance Status:  90    Living Arrangements:  Lives with family, Lives with spouse and Lives >50 miles from facility    Psychosocial/Cultural: Has 2 daughters, 22 and 13. 13 year old is  doing well in school.    Spiritual:  F - Lynsey and Belief:  Mormon  I - Importance:  Yes  C - Community:  Yes  A - Address in Care:  Yes      Advance Care Planning   Advance Directives:   Living Will: No    LaPOST: No    Do Not Resuscitate Status: No    Medical Power of : No    Agent's Name:  Mannie Gonzalez   Agent's Contact Number:  [phone]    Decision Making:  Patient answered questions       Significant Labs: All pertinent labs within the past 24 hours have been reviewed.  CBC:   Recent Labs   Lab 06/02/22  0510   WBC 6.06   HGB 7.8*   HCT 22.7*   MCV 89   PLT 12*     BMP:  Recent Labs   Lab 06/02/22  0510   *   *   K 4.0   CL 98   CO2 22*   BUN 20   CREATININE 0.7   CALCIUM 9.0   MG 2.3     LFT:  Lab Results   Component Value Date    AST 36 06/02/2022    ALKPHOS 196 (H) 06/02/2022    BILITOT 0.3 06/02/2022     Albumin:   Albumin   Date Value Ref Range Status   06/02/2022 3.3 (L) 3.5 - 5.2 g/dL Final     Protein:   Total Protein   Date Value Ref Range Status   06/02/2022 6.0 6.0 - 8.4 g/dL Final     Lactic acid:   Lab Results   Component Value Date    LACTATE 1.4 11/24/2021       Significant Imaging: I have reviewed all pertinent imaging results/findings within the past 24 hours.      Ines Mccord MD  Palliative Medicine  [facility] - Oncology ([facility])

## 2022-06-02 NOTE — PLAN OF CARE
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 S/p 2mg Vincristine on 5/24 and 5/31. Has inotuzumab approved, currently pending ponatinib prescription. Started on dex 20mg daily for rising WBC and has had slight rise in uric acid so will increase Allopurinol to 300mg BID for TLS. Is on acyclovir, levofloxacin, fluconazole, and Bactrim for opportunistic infection prophylaxis. Evaluated by palliative care and started on PCA for pain control while hospitalized and is currently feeling that pain is significantly better controlled.     Deonte Orellana, PGY- V  Hematology/Oncology Fellow

## 2022-06-02 NOTE — PT/OT/SLP PROGRESS
"Physical Therapy      Patient Name:  Deepti Gonzalez   MRN:  [id number]    Patient not seen today secondary to Pt politely declined citing that she just returned to bed after "tidying up" her room. Reports that she is not in pain and she is "jubialant". PT provided Pt with education regarding importance of OOB mobility. Further education provided regarding use of Rating of Perceived Exertion scale to measure proper intensity of daily activities.    [id number] (10 min)  1 Therapeutic Activity charge associate with this missed visit note    Will follow-up 6/3/22.        "

## 2022-06-02 NOTE — ASSESSMENT & PLAN NOTE
06/2/2022  Still awaiting insurance approval for additional meds.  06/1/2022  Currently being actively treated. Awaiting insurance approval for additional meds.  Discussed with BMT team for further discussions about treatment options.

## 2022-06-02 NOTE — ASSESSMENT & PLAN NOTE
06/02/22  Pain better controlled. Stopped continuous infusion due to lethargy and vomiting. Doing well with demand dose. Will monitor her over next 24 hours and reevaluated.  06/01/22  Patient has been taking oxycodone at home however, the pain is not well controlled.  She has received hydromorphone 0.5 mg every six hours but she says it does not last more than 3, She has since had dosing interval decreased to every 4 hours. We discussed using PCA in the hospital to get idea of pain control needs then convert to hydromorphone orally with or without methadone as the long acting agent. QTc is 454.  Dexamethasone can help with this pain which is multifaceted. It may be related to cell turnover in the pelvis? Will monitor and adjust dose. She understands that she will need an end tidal C02 monitor and agrees.  She is on a bowel regimen.

## 2022-06-03 LAB
ABO + RH BLD: NORMAL
ALBUMIN SERPL BCP-MCNC: 3.1 G/DL (ref 3.5–5.2)
ALBUMIN SERPL BCP-MCNC: 3.4 G/DL (ref 3.5–5.2)
ALP SERPL-CCNC: 156 U/L (ref 55–135)
ALP SERPL-CCNC: 174 U/L (ref 55–135)
ALT SERPL W/O P-5'-P-CCNC: 24 U/L (ref 10–44)
ALT SERPL W/O P-5'-P-CCNC: 25 U/L (ref 10–44)
ANION GAP SERPL CALC-SCNC: 11 MMOL/L (ref 8–16)
ANION GAP SERPL CALC-SCNC: 9 MMOL/L (ref 8–16)
ANISOCYTOSIS BLD QL SMEAR: SLIGHT
AST SERPL-CCNC: 11 U/L (ref 10–40)
AST SERPL-CCNC: 9 U/L (ref 10–40)
BASO STIPL BLD QL SMEAR: ABNORMAL
BASOPHILS NFR BLD: 0 % (ref 0–1.9)
BILIRUB SERPL-MCNC: 0.3 MG/DL (ref 0.1–1)
BILIRUB SERPL-MCNC: 0.3 MG/DL (ref 0.1–1)
BLASTS NFR BLD MANUAL: 7 %
BLD GP AB SCN CELLS X3 SERPL QL: NORMAL
BLD PROD TYP BPU: NORMAL
BLD PROD TYP BPU: NORMAL
BLOOD UNIT EXPIRATION DATE: NORMAL
BLOOD UNIT EXPIRATION DATE: NORMAL
BLOOD UNIT TYPE CODE: 6200
BLOOD UNIT TYPE CODE: 7300
BLOOD UNIT TYPE: NORMAL
BLOOD UNIT TYPE: NORMAL
BUN SERPL-MCNC: 17 MG/DL (ref 6–20)
BUN SERPL-MCNC: 20 MG/DL (ref 6–20)
CALCIUM SERPL-MCNC: 8.4 MG/DL (ref 8.7–10.5)
CALCIUM SERPL-MCNC: 8.9 MG/DL (ref 8.7–10.5)
CHLORIDE SERPL-SCNC: 100 MMOL/L (ref 95–110)
CHLORIDE SERPL-SCNC: 102 MMOL/L (ref 95–110)
CO2 SERPL-SCNC: 20 MMOL/L (ref 23–29)
CO2 SERPL-SCNC: 22 MMOL/L (ref 23–29)
CODING SYSTEM: NORMAL
CODING SYSTEM: NORMAL
CREAT SERPL-MCNC: 0.7 MG/DL (ref 0.5–1.4)
CREAT SERPL-MCNC: 0.9 MG/DL (ref 0.5–1.4)
DIFFERENTIAL METHOD: ABNORMAL
DISPENSE STATUS: NORMAL
DISPENSE STATUS: NORMAL
EOSINOPHIL NFR BLD: 0 % (ref 0–8)
ERYTHROCYTE [DISTWIDTH] IN BLOOD BY AUTOMATED COUNT: 16 % (ref 11.5–14.5)
EST. GFR  (AFRICAN AMERICAN): >60 ML/MIN/1.73 M^2
EST. GFR  (AFRICAN AMERICAN): >60 ML/MIN/1.73 M^2
EST. GFR  (NON AFRICAN AMERICAN): >60 ML/MIN/1.73 M^2
EST. GFR  (NON AFRICAN AMERICAN): >60 ML/MIN/1.73 M^2
FIBRINOGEN PPP-MCNC: 290 MG/DL (ref 182–400)
GLUCOSE SERPL-MCNC: 290 MG/DL (ref 70–110)
GLUCOSE SERPL-MCNC: 329 MG/DL (ref 70–110)
HCT VFR BLD AUTO: 19.6 % (ref 37–48.5)
HGB BLD-MCNC: 6.8 G/DL (ref 12–16)
HYPOCHROMIA BLD QL SMEAR: ABNORMAL
IMM GRANULOCYTES # BLD AUTO: ABNORMAL K/UL (ref 0–0.04)
IMM GRANULOCYTES NFR BLD AUTO: ABNORMAL % (ref 0–0.5)
INR PPP: 1.1 (ref 0.8–1.2)
LDH SERPL L TO P-CCNC: 512 U/L (ref 110–260)
LDH SERPL L TO P-CCNC: 620 U/L (ref 110–260)
LYMPHOCYTES NFR BLD: 11 % (ref 18–48)
MAGNESIUM SERPL-MCNC: 2.2 MG/DL (ref 1.6–2.6)
MAGNESIUM SERPL-MCNC: 2.2 MG/DL (ref 1.6–2.6)
MCH RBC QN AUTO: 29.3 PG (ref 27–31)
MCHC RBC AUTO-ENTMCNC: 34.7 G/DL (ref 32–36)
MCV RBC AUTO: 85 FL (ref 82–98)
MONOCYTES NFR BLD: 1 % (ref 4–15)
NEUTROPHILS NFR BLD: 73 % (ref 38–73)
NEUTS BAND NFR BLD MANUAL: 6 %
NRBC BLD-RTO: 0 /100 WBC
NUM UNITS TRANS PACKED RBC: NORMAL
OVALOCYTES BLD QL SMEAR: ABNORMAL
PHOSPHATE SERPL-MCNC: 3.1 MG/DL (ref 2.7–4.5)
PHOSPHATE SERPL-MCNC: 4.4 MG/DL (ref 2.7–4.5)
PLATELET # BLD AUTO: 8 K/UL (ref 150–450)
PLATELET BLD QL SMEAR: ABNORMAL
PMV BLD AUTO: 11.2 FL (ref 9.2–12.9)
POCT GLUCOSE: 237 MG/DL (ref 70–110)
POCT GLUCOSE: 283 MG/DL (ref 70–110)
POCT GLUCOSE: 336 MG/DL (ref 70–110)
POCT GLUCOSE: 379 MG/DL (ref 70–110)
POIKILOCYTOSIS BLD QL SMEAR: SLIGHT
POLYCHROMASIA BLD QL SMEAR: ABNORMAL
POTASSIUM SERPL-SCNC: 4 MMOL/L (ref 3.5–5.1)
POTASSIUM SERPL-SCNC: 4.7 MMOL/L (ref 3.5–5.1)
PROT SERPL-MCNC: 5.6 G/DL (ref 6–8.4)
PROT SERPL-MCNC: 6.4 G/DL (ref 6–8.4)
PROTHROMBIN TIME: 11.1 SEC (ref 9–12.5)
RBC # BLD AUTO: 2.32 M/UL (ref 4–5.4)
SODIUM SERPL-SCNC: 131 MMOL/L (ref 136–145)
SODIUM SERPL-SCNC: 133 MMOL/L (ref 136–145)
UNIT NUMBER: NORMAL
URATE SERPL-MCNC: 2.8 MG/DL (ref 2.4–5.7)
URATE SERPL-MCNC: 3.9 MG/DL (ref 2.4–5.7)
WBC # BLD AUTO: 3.75 K/UL (ref 3.9–12.7)
WBC OTHER NFR BLD MANUAL: 2 %

## 2022-06-03 PROCEDURE — 99233 SBSQ HOSP IP/OBS HIGH 50: CPT | Mod: GT,,, | Performed by: INTERNAL MEDICINE

## 2022-06-03 PROCEDURE — 85610 PROTHROMBIN TIME: CPT | Performed by: NURSE PRACTITIONER

## 2022-06-03 PROCEDURE — 80053 COMPREHEN METABOLIC PANEL: CPT | Mod: 91 | Performed by: NURSE PRACTITIONER

## 2022-06-03 PROCEDURE — 63600175 PHARM REV CODE 636 W HCPCS: Performed by: STUDENT IN AN ORGANIZED HEALTH CARE EDUCATION/TRAINING PROGRAM

## 2022-06-03 PROCEDURE — P9037 PLATE PHERES LEUKOREDU IRRAD: HCPCS | Performed by: STUDENT IN AN ORGANIZED HEALTH CARE EDUCATION/TRAINING PROGRAM

## 2022-06-03 PROCEDURE — 86850 RBC ANTIBODY SCREEN: CPT | Performed by: STUDENT IN AN ORGANIZED HEALTH CARE EDUCATION/TRAINING PROGRAM

## 2022-06-03 PROCEDURE — 25000003 PHARM REV CODE 250: Performed by: INTERNAL MEDICINE

## 2022-06-03 PROCEDURE — 63600175 PHARM REV CODE 636 W HCPCS: Performed by: NURSE PRACTITIONER

## 2022-06-03 PROCEDURE — 83735 ASSAY OF MAGNESIUM: CPT | Performed by: NURSE PRACTITIONER

## 2022-06-03 PROCEDURE — 36430 TRANSFUSION BLD/BLD COMPNT: CPT

## 2022-06-03 PROCEDURE — 25000003 PHARM REV CODE 250: Performed by: STUDENT IN AN ORGANIZED HEALTH CARE EDUCATION/TRAINING PROGRAM

## 2022-06-03 PROCEDURE — 63600175 PHARM REV CODE 636 W HCPCS: Performed by: INTERNAL MEDICINE

## 2022-06-03 PROCEDURE — 85384 FIBRINOGEN ACTIVITY: CPT | Performed by: NURSE PRACTITIONER

## 2022-06-03 PROCEDURE — P9040 RBC LEUKOREDUCED IRRADIATED: HCPCS | Performed by: STUDENT IN AN ORGANIZED HEALTH CARE EDUCATION/TRAINING PROGRAM

## 2022-06-03 PROCEDURE — 25000003 PHARM REV CODE 250: Performed by: NURSE PRACTITIONER

## 2022-06-03 PROCEDURE — 83615 LACTATE (LD) (LDH) ENZYME: CPT | Mod: 91 | Performed by: NURSE PRACTITIONER

## 2022-06-03 PROCEDURE — 20600001 HC STEP DOWN PRIVATE ROOM

## 2022-06-03 PROCEDURE — 85027 COMPLETE CBC AUTOMATED: CPT | Performed by: NURSE PRACTITIONER

## 2022-06-03 PROCEDURE — 99233 SBSQ HOSP IP/OBS HIGH 50: CPT | Mod: ,,, | Performed by: INTERNAL MEDICINE

## 2022-06-03 PROCEDURE — 99497 PR ADVNCD CARE PLAN 30 MIN: ICD-10-PCS | Mod: ,,, | Performed by: INTERNAL MEDICINE

## 2022-06-03 PROCEDURE — 86920 COMPATIBILITY TEST SPIN: CPT | Performed by: STUDENT IN AN ORGANIZED HEALTH CARE EDUCATION/TRAINING PROGRAM

## 2022-06-03 PROCEDURE — 84100 ASSAY OF PHOSPHORUS: CPT | Performed by: NURSE PRACTITIONER

## 2022-06-03 PROCEDURE — 84550 ASSAY OF BLOOD/URIC ACID: CPT | Performed by: NURSE PRACTITIONER

## 2022-06-03 PROCEDURE — 85007 BL SMEAR W/DIFF WBC COUNT: CPT | Performed by: NURSE PRACTITIONER

## 2022-06-03 PROCEDURE — 99233 PR SUBSEQUENT HOSPITAL CARE,LEVL III: ICD-10-PCS | Mod: ,,, | Performed by: INTERNAL MEDICINE

## 2022-06-03 PROCEDURE — 99497 ADVNCD CARE PLAN 30 MIN: CPT | Mod: ,,, | Performed by: INTERNAL MEDICINE

## 2022-06-03 PROCEDURE — 99233 PR SUBSEQUENT HOSPITAL CARE,LEVL III: ICD-10-PCS | Mod: GT,,, | Performed by: INTERNAL MEDICINE

## 2022-06-03 RX ORDER — DEXAMETHASONE 4 MG/1
20 TABLET ORAL DAILY
Status: COMPLETED | OUTPATIENT
Start: 2022-06-04 | End: 2022-06-04

## 2022-06-03 RX ORDER — HYDROMORPHONE HYDROCHLORIDE 1 MG/ML
0.5 INJECTION, SOLUTION INTRAMUSCULAR; INTRAVENOUS; SUBCUTANEOUS
Status: DISCONTINUED | OUTPATIENT
Start: 2022-06-03 | End: 2022-06-03

## 2022-06-03 RX ORDER — HYDROCODONE BITARTRATE AND ACETAMINOPHEN 500; 5 MG/1; MG/1
TABLET ORAL
Status: DISCONTINUED | OUTPATIENT
Start: 2022-06-03 | End: 2022-06-05

## 2022-06-03 RX ORDER — HYDROMORPHONE HYDROCHLORIDE 1 MG/ML
0.5 INJECTION, SOLUTION INTRAMUSCULAR; INTRAVENOUS; SUBCUTANEOUS
Status: DISCONTINUED | OUTPATIENT
Start: 2022-06-03 | End: 2022-06-05

## 2022-06-03 RX ORDER — INSULIN ASPART 100 [IU]/ML
10 INJECTION, SOLUTION INTRAVENOUS; SUBCUTANEOUS
Status: DISCONTINUED | OUTPATIENT
Start: 2022-06-03 | End: 2022-06-06

## 2022-06-03 RX ORDER — HYDROMORPHONE HYDROCHLORIDE 2 MG/1
2 TABLET ORAL
Status: DISCONTINUED | OUTPATIENT
Start: 2022-06-03 | End: 2022-06-05

## 2022-06-03 RX ADMIN — SULFAMETHOXAZOLE AND TRIMETHOPRIM 1 TABLET: 800; 160 TABLET ORAL at 04:06

## 2022-06-03 RX ADMIN — DEXAMETHASONE 20 MG: 4 TABLET ORAL at 09:06

## 2022-06-03 RX ADMIN — INSULIN ASPART 6 UNITS: 100 INJECTION, SOLUTION INTRAVENOUS; SUBCUTANEOUS at 01:06

## 2022-06-03 RX ADMIN — INSULIN ASPART 10 UNITS: 100 INJECTION, SOLUTION INTRAVENOUS; SUBCUTANEOUS at 01:06

## 2022-06-03 RX ADMIN — DILTIAZEM HYDROCHLORIDE 90 MG: 30 TABLET, FILM COATED ORAL at 04:06

## 2022-06-03 RX ADMIN — GABAPENTIN 300 MG: 300 CAPSULE ORAL at 04:06

## 2022-06-03 RX ADMIN — HYDROXYZINE HYDROCHLORIDE 50 MG: 25 TABLET, FILM COATED ORAL at 04:06

## 2022-06-03 RX ADMIN — HYDROMORPHONE HYDROCHLORIDE 2 MG: 2 TABLET ORAL at 02:06

## 2022-06-03 RX ADMIN — HYDROMORPHONE HYDROCHLORIDE 0.5 MG: 1 INJECTION, SOLUTION INTRAMUSCULAR; INTRAVENOUS; SUBCUTANEOUS at 06:06

## 2022-06-03 RX ADMIN — BUSPIRONE HYDROCHLORIDE 10 MG: 10 TABLET ORAL at 09:06

## 2022-06-03 RX ADMIN — HYDROMORPHONE HYDROCHLORIDE 2 MG: 2 TABLET ORAL at 09:06

## 2022-06-03 RX ADMIN — LEVOFLOXACIN 500 MG: 500 TABLET, FILM COATED ORAL at 04:06

## 2022-06-03 RX ADMIN — INSULIN ASPART 10 UNITS: 100 INJECTION, SOLUTION INTRAVENOUS; SUBCUTANEOUS at 09:06

## 2022-06-03 RX ADMIN — VORTIOXETINE 20 MG: 10 TABLET, FILM COATED ORAL at 09:06

## 2022-06-03 RX ADMIN — LIDOCAINE 1 PATCH: 50 PATCH CUTANEOUS at 12:06

## 2022-06-03 RX ADMIN — HYDROMORPHONE HYDROCHLORIDE 2 MG: 2 TABLET ORAL at 06:06

## 2022-06-03 RX ADMIN — INSULIN ASPART 4 UNITS: 100 INJECTION, SOLUTION INTRAVENOUS; SUBCUTANEOUS at 09:06

## 2022-06-03 RX ADMIN — GABAPENTIN 300 MG: 300 CAPSULE ORAL at 12:06

## 2022-06-03 RX ADMIN — ALLOPURINOL 300 MG: 300 TABLET ORAL at 09:06

## 2022-06-03 RX ADMIN — PANTOPRAZOLE SODIUM 40 MG: 40 TABLET, DELAYED RELEASE ORAL at 09:06

## 2022-06-03 RX ADMIN — ACYCLOVIR 400 MG: 200 CAPSULE ORAL at 09:06

## 2022-06-03 RX ADMIN — DILTIAZEM HYDROCHLORIDE 90 MG: 30 TABLET, FILM COATED ORAL at 09:06

## 2022-06-03 RX ADMIN — INSULIN ASPART 10 UNITS: 100 INJECTION, SOLUTION INTRAVENOUS; SUBCUTANEOUS at 06:06

## 2022-06-03 RX ADMIN — ONDANSETRON 8 MG: 8 TABLET, ORALLY DISINTEGRATING ORAL at 08:06

## 2022-06-03 RX ADMIN — OXCARBAZEPINE 1200 MG: 600 TABLET, FILM COATED ORAL at 11:06

## 2022-06-03 RX ADMIN — LOSARTAN POTASSIUM 25 MG: 25 TABLET, FILM COATED ORAL at 12:06

## 2022-06-03 RX ADMIN — GABAPENTIN 300 MG: 300 CAPSULE ORAL at 09:06

## 2022-06-03 RX ADMIN — INSULIN ASPART 8 UNITS: 100 INJECTION, SOLUTION INTRAVENOUS; SUBCUTANEOUS at 09:06

## 2022-06-03 RX ADMIN — FLUCONAZOLE 400 MG: 200 TABLET ORAL at 04:06

## 2022-06-03 RX ADMIN — OXCARBAZEPINE 1200 MG: 600 TABLET, FILM COATED ORAL at 09:06

## 2022-06-03 RX ADMIN — QUETIAPINE FUMARATE 200 MG: 200 TABLET, FILM COATED ORAL at 09:06

## 2022-06-03 RX ADMIN — HYDROMORPHONE HYDROCHLORIDE 2 MG: 2 TABLET ORAL at 11:06

## 2022-06-03 NOTE — ASSESSMENT & PLAN NOTE
- hold home farxiga while inpatient; can resume at discharge.  - achs blood glucose monitoring and diabetic diet while admitted  - 10 units TIDWM and 10 units levemir daily with MDSSI

## 2022-06-03 NOTE — PLAN OF CARE
Plan of care reviewed with the patient at the beginning of shift. The patient is alert and oriented. GCS 15. Complaining of pain. PRN pain medications administered. Independent and ambulatory. Remained free from falls and injuries throughout shift. 1U  PRBC administered. 1U PLT administered. VSS. Bed in low locked position. Call bell and personal items within reach. Will continue to monitor.

## 2022-06-03 NOTE — ASSESSMENT & PLAN NOTE
06/03/2022  Advance Care Planning   Reviewed with patient the implications of low platelet count and potential to bleed (has received platelets) Discussed the risk of spontaneous intracranial bleeding which is difficult to treat. Discussed that if that happens she may not be able to make decisions and she would expect her  Mannie to make them. He nods in agreement. She then said they need to have a serious talk. We did talk about HOPING FOR THE BEST always but I needed to talk about the worst. She thanked me for honesty.     >16 minutes spent in ACP  06/02/2022  No real change in plans. Patient waiting for chemotherapy to be approved. Not ready to discuss what would happen if not successful in leukemia treatment.  06/1/2022  Impression:    1) Symptoms:  Leukemia without remission.     2) Medicolegal: Has decision making capacity.  , Mannie  is surrogate decision maker.   He would be HCPOA.     3) Psychosocial:  support system consists of , daughters and siblings, nieces, nephews.     4) Spiritual: Scientologist    5) Prognostication: guarded due to not achieving remission initially.    6) Understanding of disease and Illness Trajectory: Patient  has  adequate understanding of her illness, they can benefit from continued education on what to expect in the future. She does not want to think about not getting better.      7) Goals of care:  curative  Advance Care Planning     Date: 06/01/2022  patient did not want to discuss today any further.          8) Code status: full for now.    9) Advance directives:none      Summary and recommendations:Added PCA and monitor. Further discussion about care plan hoping for the best but planning for the worst.

## 2022-06-03 NOTE — PLAN OF CARE
Pt remains AAO x 4 with VSS, afebrile, sats upper 90s on RA, HR 80s throughout shift  Chief complaint of pain relieved with PRN Dilaudid  R chest wall port - NS infusing at 100 cc/hr continuous x10 hours  Dilaudid PCA pump d/c'd 6/2 - pain well controlled on scheduled and PRN  CBG ACHS - last  - 2 U SSI given as ordered  AM labs with critical H/H: 6.8/19.6 and Plt 8 - MD notified, Plts and PRBC ordered  Pt up 1x assist and ambulatory, voids in toilet, LBM 6/1  Family remains at bedside and attentive to patient needs  Diabetic diet   Pt remains free from falls and injuries, call light in reach, bed in lowest position, nonskid socks on when OOB, side rails up x2  Will continue to monitor

## 2022-06-03 NOTE — PT/OT/SLP PROGRESS
Physical Therapy      Patient Name:  Deepti Gonzalez   MRN:  [id number]    Patient not seen today secondary to Patient unwilling to participate. Patient politely declines to participate this date due to dizziness and double vision. Reports difficulty ambulating to bathroom earlier due to double vision, defers further activity/ Educated about importance of OOB mobility. Will follow-up as scheduled.

## 2022-06-03 NOTE — SUBJECTIVE & OBJECTIVE
Subjective:     Interval History: WBC 3K today. Blood glucose elevated - increased meal time insulin. Pending panotinib. Transitioned to PO dilaudid. 1 unit PRBC and platelets today.    Objective:     Vital Signs (Most Recent):  Temp: 98.1 °F (36.7 °C) (06/03/22 0720)  Pulse: 85 (06/03/22 0720)  Resp: 16 (06/03/22 1101)  BP: (!) 118/54 (06/03/22 0720)  SpO2: 96 % (06/03/22 0720) Vital Signs (24h Range):  Temp:  [97.6 °F (36.4 °C)-98.4 °F (36.9 °C)] 98.1 °F (36.7 °C)  Pulse:  [80-94] 85  Resp:  [16-18] 16  SpO2:  [95 %-98 %] 96 %  BP: (118-151)/(54-70) 118/54     Weight: 73.6 kg (162 lb 4.1 oz)  Body mass index is 27 kg/m².  Body surface area is 1.84 meters squared.      Intake/Output - Last 3 Shifts         06/01 0700  06/02 0659 06/02 0700  06/03 0659 06/03 0700  06/04 0659    P.O. 720 600     I.V. (mL/kg)  1830.9 (24.9)     Other  0     Total Intake(mL/kg) 720 (10.4) 2430.9 (33)     Urine (mL/kg/hr)  0 (0)     Emesis/NG output  0     Other  0     Stool  0     Blood  0     Total Output  0     Net +720 +2430.9            Urine Occurrence 6 x 3 x     Stool Occurrence  0 x     Emesis Occurrence  0 x             Physical Exam  Vitals reviewed.   Constitutional:       Appearance: Normal appearance. She is well-developed.   HENT:      Head: Normocephalic and atraumatic.      Right Ear: External ear normal.      Left Ear: External ear normal.   Eyes:      Extraocular Movements: Extraocular movements intact.      Conjunctiva/sclera: Conjunctivae normal.   Cardiovascular:      Rate and Rhythm: Normal rate and regular rhythm.      Pulses: Normal pulses.      Heart sounds: Normal heart sounds. No murmur heard.  Pulmonary:      Effort: Pulmonary effort is normal. No respiratory distress.      Breath sounds: No stridor. No wheezing or rales.   Abdominal:      General: Bowel sounds are normal. There is no distension.      Palpations: Abdomen is soft.      Tenderness: There is no abdominal tenderness.   Musculoskeletal:          General: Normal range of motion.      Cervical back: Normal range of motion.      Right lower leg: No edema.      Left lower leg: No edema.   Skin:     General: Skin is warm and dry.      Findings: Bruising present. No rash.      Comments: RCWP with no signs of infection   Neurological:      General: No focal deficit present.      Mental Status: She is alert and oriented to person, place, and time.   Psychiatric:         Mood and Affect: Mood normal.         Behavior: Behavior normal.         Thought Content: Thought content normal.         Judgment: Judgment normal.       Significant Labs:   CBC:   Recent Labs   Lab 06/02/22  0510 06/03/22  0403   WBC 6.06 3.75*   HGB 7.8* 6.8*   HCT 22.7* 19.6*   PLT 12* 8*      and CMP:   Recent Labs   Lab 06/02/22  0510 06/02/22  1816 06/03/22  0403   * 134* 131*   K 4.0 4.3 4.0   CL 98 100 100   CO2 22* 22* 20*   * 330* 329*   BUN 20 22* 17   CREATININE 0.7 0.7 0.7   CALCIUM 9.0 8.7 8.4*   PROT 6.0 5.9* 5.6*   ALBUMIN 3.3* 3.3* 3.1*   BILITOT 0.3 0.3 0.3   ALKPHOS 196* 171* 156*   AST 36 17 9*   ALT 37 32 24   ANIONGAP 13 12 11   EGFRNONAA >60.0 >60.0 >60.0         Diagnostic Results:  I have reviewed all pertinent imaging results/findings within the past 24 hours.

## 2022-06-03 NOTE — SUBJECTIVE & OBJECTIVE
Interval History: Having more pain in pelvis. IV dilaudid given at 6 am but none since. Saw her at 11 am and ordered additional dose.     Medications:  Continuous Infusions:  Scheduled Meds:   acyclovir  400 mg Oral Q12H    allopurinoL  300 mg Oral BID    busPIRone  10 mg Oral Q12H    [START ON 6/4/2022] dexAMETHasone  20 mg Oral Daily    diltiaZEM  90 mg Oral Q6H    fluconazole  400 mg Oral Q24H    gabapentin  300 mg Oral Q8H    hydrOXYzine  50 mg Oral Q12H    insulin aspart U-100  10 Units Subcutaneous TIDWM    insulin detemir U-100  10 Units Subcutaneous Daily    levoFLOXacin  500 mg Oral Q24H    LIDOcaine  1 patch Transdermal Q24H    losartan  25 mg Oral Q24H    OXcarbazepine  1,200 mg Oral Q12H    pantoprazole  40 mg Oral Q24H    QUEtiapine  200 mg Oral Q24H    sulfamethoxazole-trimethoprim 800-160mg  1 tablet Oral Every Mon, Wed, Fri    vortioxetine  2 tablet Oral Q24H     PRN Meds:sodium chloride, sodium chloride, alteplase, artificial tears, dextrose 10%, dextrose 10%, diazePAM, glucagon (human recombinant), glucose, glucose, heparin, porcine (PF), HYDROmorphone, insulin aspart U-100, naloxone, ondansetron, polyethylene glycol, prochlorperazine, senna-docusate 8.6-50 mg, sodium chloride 0.9%, sumatriptan    Objective:     Vital Signs (Most Recent):  Temp: 98.1 °F (36.7 °C) (06/03/22 0720)  Pulse: 85 (06/03/22 0720)  Resp: 16 (06/03/22 1101)  BP: (!) 118/54 (06/03/22 0720)  SpO2: 96 % (06/03/22 0720)   Vital Signs (24h Range):  Temp:  [97.6 °F (36.4 °C)-98.4 °F (36.9 °C)] 98.1 °F (36.7 °C)  Pulse:  [80-94] 85  Resp:  [16-18] 16  SpO2:  [95 %-98 %] 96 %  BP: (118-151)/(54-70) 118/54     Weight: 73.6 kg (162 lb 4.1 oz)  Body mass index is 27 kg/m².    Physical Exam  Vitals and nursing note reviewed.   Constitutional:       Appearance: Normal appearance. She is well-developed.   HENT:      Head: Normocephalic and atraumatic.      Right Ear: External ear normal.      Left Ear: External ear normal.      Nose: Nose  normal.   Eyes:      Conjunctiva/sclera: Conjunctivae normal.      Pupils: Pupils are equal, round, and reactive to light.      Comments: No petechiae noticed.   Cardiovascular:      Rate and Rhythm: Normal rate.   Pulmonary:      Effort: Pulmonary effort is normal.   Genitourinary:     Comments: No bleeding.  Musculoskeletal:         General: Normal range of motion.      Cervical back: Normal range of motion and neck supple.   Skin:     General: Skin is warm and dry.   Neurological:      General: No focal deficit present.      Mental Status: She is alert and oriented to person, place, and time.   Psychiatric:         Mood and Affect: Mood normal.         Speech: Speech normal.         Behavior: Behavior normal.         Thought Content: Thought content normal.         Judgment: Judgment normal.       Review of Symptoms      Symptom Assessment (ESAS 0-10 Scale)  Pain:  2  Dyspnea:  0  Anxiety:  0  Nausea:  0  Depression:  0  Anorexia:  0  Fatigue:  0  Insomnia:  0  Restlessness:  0  Agitation:  0     CAM / Delirium:  Negative  Constipation:  Negative  Diarrhea:  Negative    Bowel Management Plan (BMP):  Yes      Pain Assessment:  OME in 24 hours:  8 mg ome  Location(s): back    Back       Location: generalized        Quality: Pressure-like        Quantity: 2/10 in intensity        Chronicity: Onset 2 hour(s) ago, gradually worsening since Had great pain relief while on PCA; worsened now but not asking for pain meds.        Aggravating Factors: None        Alleviating Factors: Opiates       Associated Symptoms: None    Performance Status:  90    Living Arrangements:  Lives with family, Lives >50 miles from facility and Lives with spouse    Psychosocial/Cultural: .  in room today.     Spiritual:  F - Lynsey and Belief:  Church  I - Importance:  Yes  C - Community:  Yes  A - Address in Care:  Yes      Advance Care Planning   Advance Directives:   Living Will: No    LaPOST: No    Do Not Resuscitate Status:  No    Medical Power of : No    Agent's Name:  Mannie Gonzalez   Agent's Contact Number:  [phone]    Decision Making:  Patient answered questions       Significant Labs: All pertinent labs within the past 24 hours have been reviewed.  CBC:   Recent Labs   Lab 06/03/22  0403   WBC 3.75*   HGB 6.8*   HCT 19.6*   MCV 85   PLT 8*     BMP:  Recent Labs   Lab 06/03/22 0403   *   *   K 4.0      CO2 20*   BUN 17   CREATININE 0.7   CALCIUM 8.4*   MG 2.2     LFT:  Lab Results   Component Value Date    AST 9 (L) 06/03/2022    ALKPHOS 156 (H) 06/03/2022    BILITOT 0.3 06/03/2022     Albumin:   Albumin   Date Value Ref Range Status   06/03/2022 3.1 (L) 3.5 - 5.2 g/dL Final     Protein:   Total Protein   Date Value Ref Range Status   06/03/2022 5.6 (L) 6.0 - 8.4 g/dL Final     Lactic acid:   Lab Results   Component Value Date    LACTATE 1.4 11/24/2021       Significant Imaging: I have reviewed all pertinent imaging results/findings within the past 24 hours.

## 2022-06-03 NOTE — ASSESSMENT & PLAN NOTE
06/03/2022  Pain worse again but has not asked for pain meds. Would continue Dilaudid 0.5 mg every 3 hours as needed or Dilaudid 2 mg orally every 3 hours as needed. Orders placed.  06/02/22  Pain better controlled. Stopped continuous infusion due to lethargy and vomiting. Doing well with demand dose. Will monitor her over next 24 hours and reevaluated.  06/01/22  Patient has been taking oxycodone at home however, the pain is not well controlled.  She has received hydromorphone 0.5 mg every six hours but she says it does not last more than 3, She has since had dosing interval decreased to every 4 hours. We discussed using PCA in the hospital to get idea of pain control needs then convert to hydromorphone orally with or without methadone as the long acting agent. QTc is 454.  Dexamethasone can help with this pain which is multifaceted. It may be related to cell turnover in the pelvis? Will monitor and adjust dose. She understands that she will need an end tidal C02 monitor and agrees.  She is on a bowel regimen.

## 2022-06-03 NOTE — ASSESSMENT & PLAN NOTE
- worsening pain to left hip with worsening leukocytosis  - denies trauma or injury to area  - likely d/t leukemia  - discussed with palliative who recommended PCA 6/1, transitioned to PO dilaudid 6/3

## 2022-06-03 NOTE — PROGRESS NOTES
[facility] ([facility])  Hematology  Bone Marrow Transplant  Progress Note    Patient Name: Deepti Gonzalez  Admission Date: 5/23/2022  Hospital Length of Stay: 11 days  Code Status: Full Code    Subjective:     Interval History: WBC 3K today. Blood glucose elevated - increased meal time insulin. Pending panotinib. Transitioned to PO dilaudid. 1 unit PRBC and platelets today.    Objective:     Vital Signs (Most Recent):  Temp: 98.1 °F (36.7 °C) (06/03/22 0720)  Pulse: 85 (06/03/22 0720)  Resp: 16 (06/03/22 1101)  BP: (!) 118/54 (06/03/22 0720)  SpO2: 96 % (06/03/22 0720) Vital Signs (24h Range):  Temp:  [97.6 °F (36.4 °C)-98.4 °F (36.9 °C)] 98.1 °F (36.7 °C)  Pulse:  [80-94] 85  Resp:  [16-18] 16  SpO2:  [95 %-98 %] 96 %  BP: (118-151)/(54-70) 118/54     Weight: 73.6 kg (162 lb 4.1 oz)  Body mass index is 27 kg/m².  Body surface area is 1.84 meters squared.      Intake/Output - Last 3 Shifts         06/01 0700  06/02 0659 06/02 0700  06/03 0659 06/03 0700  06/04 0659    P.O. 720 600     I.V. (mL/kg)  1830.9 (24.9)     Other  0     Total Intake(mL/kg) 720 (10.4) 2430.9 (33)     Urine (mL/kg/hr)  0 (0)     Emesis/NG output  0     Other  0     Stool  0     Blood  0     Total Output  0     Net +720 +2430.9            Urine Occurrence 6 x 3 x     Stool Occurrence  0 x     Emesis Occurrence  0 x             Physical Exam  Vitals reviewed.   Constitutional:       Appearance: Normal appearance. She is well-developed.   HENT:      Head: Normocephalic and atraumatic.      Right Ear: External ear normal.      Left Ear: External ear normal.   Eyes:      Extraocular Movements: Extraocular movements intact.      Conjunctiva/sclera: Conjunctivae normal.   Cardiovascular:      Rate and Rhythm: Normal rate and regular rhythm.      Pulses: Normal pulses.      Heart sounds: Normal heart sounds. No murmur heard.  Pulmonary:      Effort: Pulmonary effort is normal. No respiratory distress.      Breath sounds: No stridor. No  wheezing or rales.   Abdominal:      General: Bowel sounds are normal. There is no distension.      Palpations: Abdomen is soft.      Tenderness: There is no abdominal tenderness.   Musculoskeletal:         General: Normal range of motion.      Cervical back: Normal range of motion.      Right lower leg: No edema.      Left lower leg: No edema.   Skin:     General: Skin is warm and dry.      Findings: Bruising present. No rash.      Comments: RCWP with no signs of infection   Neurological:      General: No focal deficit present.      Mental Status: She is alert and oriented to person, place, and time.   Psychiatric:         Mood and Affect: Mood normal.         Behavior: Behavior normal.         Thought Content: Thought content normal.         Judgment: Judgment normal.       Significant Labs:   CBC:   Recent Labs   Lab 06/02/22  0510 06/03/22  0403   WBC 6.06 3.75*   HGB 7.8* 6.8*   HCT 22.7* 19.6*   PLT 12* 8*      and CMP:   Recent Labs   Lab 06/02/22  0510 06/02/22  1816 06/03/22  0403   * 134* 131*   K 4.0 4.3 4.0   CL 98 100 100   CO2 22* 22* 20*   * 330* 329*   BUN 20 22* 17   CREATININE 0.7 0.7 0.7   CALCIUM 9.0 8.7 8.4*   PROT 6.0 5.9* 5.6*   ALBUMIN 3.3* 3.3* 3.1*   BILITOT 0.3 0.3 0.3   ALKPHOS 196* 171* 156*   AST 36 17 9*   ALT 37 32 24   ANIONGAP 13 12 11   EGFRNONAA >60.0 >60.0 >60.0         Diagnostic Results:  I have reviewed all pertinent imaging results/findings within the past 24 hours.    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 Blincyto held &  given steroids x 3 days. Resumed Blincyto 5/17 and again developed CRS, Grade 2 as well as mild AMS and Grade 3 transaminitis. Blincyto stopped with no plan to resume. Discharged on 5/19  - seen today 5/23 by Dr. 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; ponatinib Rx pending via specialty pharmacy  - continue ppx acyclovir, fluconazole, levaquin and bactrim  - s/p 2mg vincristine on 5/24 and 5/31    Volume overload  On 1-2L O2 via NC, reporting intermittent SOB  - will reassess daily if lasix needed      At high risk of tumor lysis syndrome  Given leukocytosis with WBC 136K and plan for rapid reduction with PO dex and 1 dose of vincristine, patient at high risk for TLS  - continue allopurinol 300mg BID  - monitor TLS labs BID    Anemia in neoplastic disease  - daily CBC while inpatient  - transfuse for Hgb <7    Transaminitis  - significant transaminitis following Blinatumomab  - closely monitor with cytoreduction and vincristine  - improved ALT/AST     Coagulopathy  - high risk with cytoreduction/refractory B-ALL s/p vincristine on 5/24 and 5/31  - monitoring BID INR/fibrinogen  - received cryo x2 5/27 ( suspect 1 unit not fully transfused)  - plan for cryo if <100    Leukocytosis  - see B-ALL      Thrombocytopenia  - daily CBC while inpatient  - transfuse for Plt <10K or bleeding    Cancer associated pain  - worsening pain to left hip with worsening leukocytosis  - denies trauma or injury to area  - likely d/t leukemia  - discussed with palliative who recommended PCA 6/1, transitioned to PO dilaudid 6/3    Moderate major depression  - Continue home trintellix    Anxiety  - Continue home trintellix and prn valium    GERD (gastroesophageal reflux  disease)  - hold home omeprazole, switch to protonix while inpatient    Hypertension  - continue home losartan    Seizure disorder  - continue home oxcarbazepine    Type 2 diabetes mellitus, without long-term current use of insulin  - hold home farxiga while inpatient; can resume at discharge.  - achs blood glucose monitoring and diabetic diet while admitted  - 10 units TIDWM and 10 units levemir daily with MDSSI      Rheumatoid arthritis involving multiple sites  - not currently on any active tx    History of TIA (transient ischemic attack)  - Holding home statin while inpatient  - Holding home ASA while inpatient due to thrombocytopenia. Will hold ASA at discharge. Can be resumed outpatient as appropriate when platelets >50K    Hyperlipidemia  - holding home statin while inpatient. Can resume at discharge if LFTs normalize.    Paroxysmal atrial fibrillation  - continue home diltiazem  - Holding xarelto with plt count < 50K. Can resume outpatient when appropriate.        VTE Risk Mitigation (From admission, onward)         Ordered     heparin, porcine (PF) 100 unit/mL injection flush 300 Units  As needed (PRN)         05/24/22 1021     IP VTE HIGH RISK PATIENT  Once         05/23/22 1552     Place sequential compression device  Until discontinued         05/23/22 1552     Reason for No Pharmacological VTE Prophylaxis  Once        Question:  Reasons:  Answer:  Thrombocytopenia    05/23/22 1552                Disposition: Home    Jan Hooker MD  Bone Marrow Transplant  [facility] ([facility])

## 2022-06-03 NOTE — PROGRESS NOTES
[facility] ([facility])  Palliative Medicine  Progress Note    Patient Name: Deepti Gonzalez  MRN: [id number]  Admission Date: 5/23/2022  Hospital Length of Stay: 11 days  Code Status: Full Code   Attending Provider: Kathrine Posey MD  Consulting Provider: Ines Mccord MD  Primary Care Physician: Primary Doctor No  Principal Problem:B-cell acute lymphoblastic leukemia    Patient information was obtained from patient, spouse/SO, past medical records and primary team.      Assessment/Plan:     Cancer associated pain  06/03/2022  Pain worse again but has not asked for pain meds. Would continue Dilaudid 0.5 mg every 3 hours as needed or Dilaudid 2 mg orally every 3 hours as needed. Orders placed.  06/02/22  Pain better controlled. Stopped continuous infusion due to lethargy and vomiting. Doing well with demand dose. Will monitor her over next 24 hours and reevaluated.  06/01/22  Patient has been taking oxycodone at home however, the pain is not well controlled.  She has received hydromorphone 0.5 mg every six hours but she says it does not last more than 3, She has since had dosing interval decreased to every 4 hours. We discussed using PCA in the hospital to get idea of pain control needs then convert to hydromorphone orally with or without methadone as the long acting agent. QTc is 454.  Dexamethasone can help with this pain which is multifaceted. It may be related to cell turnover in the pelvis? Will monitor and adjust dose. She understands that she will need an end tidal C02 monitor and agrees.  She is on a bowel regimen.    Palliative care encounter  06/03/2022  Advance Care Planning   Reviewed with patient the implications of low platelet count and potential to bleed (has received platelets) Discussed the risk of spontaneous intracranial bleeding which is difficult to treat. Discussed that if that happens she may not be able to make decisions and she would expect her  Mannie to make them. He nods in  agreement. She then said they need to have a serious talk. We did talk about H[facility]ING FOR THE BEST always but I needed to talk about the worst. She thanked me for honesty.    >16 minutes spent in ACP  06/02/2022  No real change in plans. Patient waiting for chemotherapy to be approved. Not ready to discuss what would happen if not successful in leukemia treatment.  06/1/2022  Impression:    1) Symptoms:  Leukemia without remission.     2) Medicolegal: Has decision making capacity.  , Mannie  is surrogate decision maker.   He would be HCPOA.     3) Psychosocial:  support system consists of , daughters and siblings, nieces, nephews.     4) Spiritual: Adventism    5) Prognostication: guarded due to not achieving remission initially.    6) Understanding of disease and Illness Trajectory: Patient  has  adequate understanding of her illness, they can benefit from continued education on what to expect in the future. She does not want to think about not getting better.      7) Goals of care:  curative  Advance Care Planning     Date: 06/01/2022  patient did not want to discuss today any further.         8) Code status: full for now.    9) Advance directives:none      Summary and recommendations:Added PCA and monitor. Further discussion about care plan hoping for the best but planning for the worst.                   I will follow-up with patient. Please contact us if you have any additional questions.    Subjective:     Chief Complaint: No chief complaint on file.      HPI:   51 year old woman with leukemia who was admitted due to rapid rise in WBC. I was scheduled to see her in palliative medicine clinic to help manage her pain related to her underlying chronic illnesses.    Today she tells me that her pain is diffuse and gnawing in her lower back/pelvis with some radiation to her upper thighs. She is hopeful that her treatments will work to get her leukemia under control.     Reports that morphine and norco are  "not helpful for her pain and the hydromorphone does not last 4 hours. She says the med takes the edge off so that it is not what she things of but it is always there. She reports the pain as worsening and she is having a difficult time getting comfortable. Her nephew Joaquin was in the room with her. She reports that she likes to be strong and independent and does "have a high tolerance" for pain.    We discussed various pain regimens that can be used in the outpatient world including methadone but I would like to get her pain under control with PCA to which she agrees.      Hospital Course:  No notes on file    Interval History: Having more pain in pelvis. IV dilaudid given at 6 am but none since. Saw her at 11 am and ordered additional dose.     Medications:  Continuous Infusions:  Scheduled Meds:   acyclovir  400 mg Oral Q12H    allopurinoL  300 mg Oral BID    busPIRone  10 mg Oral Q12H    [START ON 6/4/2022] dexAMETHasone  20 mg Oral Daily    diltiaZEM  90 mg Oral Q6H    fluconazole  400 mg Oral Q24H    gabapentin  300 mg Oral Q8H    hydrOXYzine  50 mg Oral Q12H    insulin aspart U-100  10 Units Subcutaneous TIDWM    insulin detemir U-100  10 Units Subcutaneous Daily    levoFLOXacin  500 mg Oral Q24H    LIDOcaine  1 patch Transdermal Q24H    losartan  25 mg Oral Q24H    OXcarbazepine  1,200 mg Oral Q12H    pantoprazole  40 mg Oral Q24H    QUEtiapine  200 mg Oral Q24H    sulfamethoxazole-trimethoprim 800-160mg  1 tablet Oral Every Mon, Wed, Fri    vortioxetine  2 tablet Oral Q24H     PRN Meds:sodium chloride, sodium chloride, alteplase, artificial tears, dextrose 10%, dextrose 10%, diazePAM, glucagon (human recombinant), glucose, glucose, heparin, porcine (PF), HYDROmorphone, insulin aspart U-100, naloxone, ondansetron, polyethylene glycol, prochlorperazine, senna-docusate 8.6-50 mg, sodium chloride 0.9%, sumatriptan    Objective:     Vital Signs (Most Recent):  Temp: 98.1 °F (36.7 °C) (06/03/22 " 0720)  Pulse: 85 (06/03/22 0720)  Resp: 16 (06/03/22 1101)  BP: (!) 118/54 (06/03/22 0720)  SpO2: 96 % (06/03/22 0720)   Vital Signs (24h Range):  Temp:  [97.6 °F (36.4 °C)-98.4 °F (36.9 °C)] 98.1 °F (36.7 °C)  Pulse:  [80-94] 85  Resp:  [16-18] 16  SpO2:  [95 %-98 %] 96 %  BP: (118-151)/(54-70) 118/54     Weight: 73.6 kg (162 lb 4.1 oz)  Body mass index is 27 kg/m².    Physical Exam  Vitals and nursing note reviewed.   Constitutional:       Appearance: Normal appearance. She is well-developed.   HENT:      Head: Normocephalic and atraumatic.      Right Ear: External ear normal.      Left Ear: External ear normal.      Nose: Nose normal.   Eyes:      Conjunctiva/sclera: Conjunctivae normal.      Pupils: Pupils are equal, round, and reactive to light.      Comments: No petechiae noticed.   Cardiovascular:      Rate and Rhythm: Normal rate.   Pulmonary:      Effort: Pulmonary effort is normal.   Genitourinary:     Comments: No bleeding.  Musculoskeletal:         General: Normal range of motion.      Cervical back: Normal range of motion and neck supple.   Skin:     General: Skin is warm and dry.   Neurological:      General: No focal deficit present.      Mental Status: She is alert and oriented to person, place, and time.   Psychiatric:         Mood and Affect: Mood normal.         Speech: Speech normal.         Behavior: Behavior normal.         Thought Content: Thought content normal.         Judgment: Judgment normal.       Review of Symptoms      Symptom Assessment (ESAS 0-10 Scale)  Pain:  2  Dyspnea:  0  Anxiety:  0  Nausea:  0  Depression:  0  Anorexia:  0  Fatigue:  0  Insomnia:  0  Restlessness:  0  Agitation:  0     CAM / Delirium:  Negative  Constipation:  Negative  Diarrhea:  Negative    Bowel Management Plan (BMP):  Yes      Pain Assessment:  OME in 24 hours:  8 mg ome  Location(s): back    Back       Location: generalized        Quality: Pressure-like        Quantity: 2/10 in intensity         Chronicity: Onset 2 hour(s) ago, gradually worsening since Had great pain relief while on PCA; worsened now but not asking for pain meds.        Aggravating Factors: None        Alleviating Factors: Opiates       Associated Symptoms: None    Performance Status:  90    Living Arrangements:  Lives with family, Lives >50 miles from facility and Lives with spouse    Psychosocial/Cultural: .  in room today.     Spiritual:  F - Lynsey and Belief:  Christianity  I - Importance:  Yes  C - Community:  Yes  A - Address in Care:  Yes      Advance Care Planning   Advance Directives:   Living Will: No    LaPOST: No    Do Not Resuscitate Status: No    Medical Power of : No    Agent's Name:  Mannie Gonzalez   Agent's Contact Number:  [phone]    Decision Making:  Patient answered questions       Significant Labs: All pertinent labs within the past 24 hours have been reviewed.  CBC:   Recent Labs   Lab 06/03/22  0403   WBC 3.75*   HGB 6.8*   HCT 19.6*   MCV 85   PLT 8*     BMP:  Recent Labs   Lab 06/03/22  0403   *   *   K 4.0      CO2 20*   BUN 17   CREATININE 0.7   CALCIUM 8.4*   MG 2.2     LFT:  Lab Results   Component Value Date    AST 9 (L) 06/03/2022    ALKPHOS 156 (H) 06/03/2022    BILITOT 0.3 06/03/2022     Albumin:   Albumin   Date Value Ref Range Status   06/03/2022 3.1 (L) 3.5 - 5.2 g/dL Final     Protein:   Total Protein   Date Value Ref Range Status   06/03/2022 5.6 (L) 6.0 - 8.4 g/dL Final     Lactic acid:   Lab Results   Component Value Date    LACTATE 1.4 11/24/2021       Significant Imaging: I have reviewed all pertinent imaging results/findings within the past 24 hours.      Ines Mccord MD  Palliative Medicine  [facility] - Oncology ([facility])

## 2022-06-04 LAB
ALBUMIN SERPL BCP-MCNC: 3.2 G/DL (ref 3.5–5.2)
ALBUMIN SERPL BCP-MCNC: 3.6 G/DL (ref 3.5–5.2)
ALP SERPL-CCNC: 145 U/L (ref 55–135)
ALP SERPL-CCNC: 150 U/L (ref 55–135)
ALT SERPL W/O P-5'-P-CCNC: 19 U/L (ref 10–44)
ALT SERPL W/O P-5'-P-CCNC: 20 U/L (ref 10–44)
ANION GAP SERPL CALC-SCNC: 10 MMOL/L (ref 8–16)
ANION GAP SERPL CALC-SCNC: 12 MMOL/L (ref 8–16)
ANISOCYTOSIS BLD QL SMEAR: SLIGHT
AST SERPL-CCNC: 5 U/L (ref 10–40)
AST SERPL-CCNC: 6 U/L (ref 10–40)
BASOPHILS # BLD AUTO: ABNORMAL K/UL (ref 0–0.2)
BASOPHILS NFR BLD: 0 % (ref 0–1.9)
BILIRUB SERPL-MCNC: 0.2 MG/DL (ref 0.1–1)
BILIRUB SERPL-MCNC: 0.4 MG/DL (ref 0.1–1)
BLASTS NFR BLD MANUAL: 2 %
BUN SERPL-MCNC: 21 MG/DL (ref 6–20)
BUN SERPL-MCNC: 23 MG/DL (ref 6–20)
CALCIUM SERPL-MCNC: 8.7 MG/DL (ref 8.7–10.5)
CALCIUM SERPL-MCNC: 9 MG/DL (ref 8.7–10.5)
CHLORIDE SERPL-SCNC: 96 MMOL/L (ref 95–110)
CHLORIDE SERPL-SCNC: 99 MMOL/L (ref 95–110)
CO2 SERPL-SCNC: 22 MMOL/L (ref 23–29)
CO2 SERPL-SCNC: 22 MMOL/L (ref 23–29)
CREAT SERPL-MCNC: 0.9 MG/DL (ref 0.5–1.4)
CREAT SERPL-MCNC: 0.9 MG/DL (ref 0.5–1.4)
DIFFERENTIAL METHOD: ABNORMAL
EOSINOPHIL # BLD AUTO: ABNORMAL K/UL (ref 0–0.5)
EOSINOPHIL NFR BLD: 0 % (ref 0–8)
ERYTHROCYTE [DISTWIDTH] IN BLOOD BY AUTOMATED COUNT: 15.4 % (ref 11.5–14.5)
EST. GFR  (AFRICAN AMERICAN): >60 ML/MIN/1.73 M^2
EST. GFR  (AFRICAN AMERICAN): >60 ML/MIN/1.73 M^2
EST. GFR  (NON AFRICAN AMERICAN): >60 ML/MIN/1.73 M^2
EST. GFR  (NON AFRICAN AMERICAN): >60 ML/MIN/1.73 M^2
FIBRINOGEN PPP-MCNC: 232 MG/DL (ref 182–400)
GLUCOSE SERPL-MCNC: 351 MG/DL (ref 70–110)
GLUCOSE SERPL-MCNC: 384 MG/DL (ref 70–110)
HCT VFR BLD AUTO: 23.3 % (ref 37–48.5)
HGB BLD-MCNC: 8 G/DL (ref 12–16)
HYPOCHROMIA BLD QL SMEAR: ABNORMAL
IMM GRANULOCYTES # BLD AUTO: ABNORMAL K/UL (ref 0–0.04)
IMM GRANULOCYTES NFR BLD AUTO: ABNORMAL % (ref 0–0.5)
INR PPP: 1.1 (ref 0.8–1.2)
LDH SERPL L TO P-CCNC: 377 U/L (ref 110–260)
LDH SERPL L TO P-CCNC: 382 U/L (ref 110–260)
LYMPHOCYTES # BLD AUTO: ABNORMAL K/UL (ref 1–4.8)
LYMPHOCYTES NFR BLD: 19 % (ref 18–48)
MAGNESIUM SERPL-MCNC: 1.9 MG/DL (ref 1.6–2.6)
MAGNESIUM SERPL-MCNC: 2 MG/DL (ref 1.6–2.6)
MCH RBC QN AUTO: 30.2 PG (ref 27–31)
MCHC RBC AUTO-ENTMCNC: 34.3 G/DL (ref 32–36)
MCV RBC AUTO: 88 FL (ref 82–98)
MONOCYTES # BLD AUTO: ABNORMAL K/UL (ref 0.3–1)
MONOCYTES NFR BLD: 1 % (ref 4–15)
NEUTROPHILS NFR BLD: 77 % (ref 38–73)
NEUTS BAND NFR BLD MANUAL: 1 %
NRBC BLD-RTO: 0 /100 WBC
PHOSPHATE SERPL-MCNC: 3.5 MG/DL (ref 2.7–4.5)
PHOSPHATE SERPL-MCNC: 4.1 MG/DL (ref 2.7–4.5)
PLATELET # BLD AUTO: 15 K/UL (ref 150–450)
PLATELET BLD QL SMEAR: ABNORMAL
PMV BLD AUTO: 12.7 FL (ref 9.2–12.9)
POCT GLUCOSE: 211 MG/DL (ref 70–110)
POCT GLUCOSE: 338 MG/DL (ref 70–110)
POCT GLUCOSE: 397 MG/DL (ref 70–110)
POCT GLUCOSE: 402 MG/DL (ref 70–110)
POTASSIUM SERPL-SCNC: 4.5 MMOL/L (ref 3.5–5.1)
POTASSIUM SERPL-SCNC: 4.8 MMOL/L (ref 3.5–5.1)
PROT SERPL-MCNC: 5.6 G/DL (ref 6–8.4)
PROT SERPL-MCNC: 6.5 G/DL (ref 6–8.4)
PROTHROMBIN TIME: 11.6 SEC (ref 9–12.5)
RBC # BLD AUTO: 2.65 M/UL (ref 4–5.4)
SODIUM SERPL-SCNC: 130 MMOL/L (ref 136–145)
SODIUM SERPL-SCNC: 131 MMOL/L (ref 136–145)
URATE SERPL-MCNC: 2 MG/DL (ref 2.4–5.7)
URATE SERPL-MCNC: 2.6 MG/DL (ref 2.4–5.7)
WBC # BLD AUTO: 4.57 K/UL (ref 3.9–12.7)

## 2022-06-04 PROCEDURE — 25000003 PHARM REV CODE 250: Performed by: INTERNAL MEDICINE

## 2022-06-04 PROCEDURE — 84550 ASSAY OF BLOOD/URIC ACID: CPT | Mod: 91 | Performed by: NURSE PRACTITIONER

## 2022-06-04 PROCEDURE — 85027 COMPLETE CBC AUTOMATED: CPT | Performed by: NURSE PRACTITIONER

## 2022-06-04 PROCEDURE — 25000003 PHARM REV CODE 250: Performed by: NURSE PRACTITIONER

## 2022-06-04 PROCEDURE — 20600001 HC STEP DOWN PRIVATE ROOM

## 2022-06-04 PROCEDURE — 99233 SBSQ HOSP IP/OBS HIGH 50: CPT | Mod: ,,, | Performed by: INTERNAL MEDICINE

## 2022-06-04 PROCEDURE — 80053 COMPREHEN METABOLIC PANEL: CPT | Mod: 91 | Performed by: NURSE PRACTITIONER

## 2022-06-04 PROCEDURE — 84100 ASSAY OF PHOSPHORUS: CPT | Performed by: NURSE PRACTITIONER

## 2022-06-04 PROCEDURE — 83735 ASSAY OF MAGNESIUM: CPT | Performed by: NURSE PRACTITIONER

## 2022-06-04 PROCEDURE — 99233 PR SUBSEQUENT HOSPITAL CARE,LEVL III: ICD-10-PCS | Mod: ,,, | Performed by: INTERNAL MEDICINE

## 2022-06-04 PROCEDURE — 85007 BL SMEAR W/DIFF WBC COUNT: CPT | Performed by: NURSE PRACTITIONER

## 2022-06-04 PROCEDURE — 85384 FIBRINOGEN ACTIVITY: CPT | Performed by: NURSE PRACTITIONER

## 2022-06-04 PROCEDURE — 25000003 PHARM REV CODE 250: Performed by: STUDENT IN AN ORGANIZED HEALTH CARE EDUCATION/TRAINING PROGRAM

## 2022-06-04 PROCEDURE — 85610 PROTHROMBIN TIME: CPT | Performed by: NURSE PRACTITIONER

## 2022-06-04 PROCEDURE — 83615 LACTATE (LD) (LDH) ENZYME: CPT | Mod: 91 | Performed by: NURSE PRACTITIONER

## 2022-06-04 PROCEDURE — 63600175 PHARM REV CODE 636 W HCPCS: Performed by: STUDENT IN AN ORGANIZED HEALTH CARE EDUCATION/TRAINING PROGRAM

## 2022-06-04 RX ADMIN — INSULIN ASPART 10 UNITS: 100 INJECTION, SOLUTION INTRAVENOUS; SUBCUTANEOUS at 08:06

## 2022-06-04 RX ADMIN — FLUCONAZOLE 400 MG: 200 TABLET ORAL at 06:06

## 2022-06-04 RX ADMIN — HYDROMORPHONE HYDROCHLORIDE 2 MG: 2 TABLET ORAL at 01:06

## 2022-06-04 RX ADMIN — INSULIN ASPART 10 UNITS: 100 INJECTION, SOLUTION INTRAVENOUS; SUBCUTANEOUS at 06:06

## 2022-06-04 RX ADMIN — GABAPENTIN 300 MG: 300 CAPSULE ORAL at 06:06

## 2022-06-04 RX ADMIN — HYDROMORPHONE HYDROCHLORIDE 2 MG: 2 TABLET ORAL at 08:06

## 2022-06-04 RX ADMIN — INSULIN ASPART 10 UNITS: 100 INJECTION, SOLUTION INTRAVENOUS; SUBCUTANEOUS at 10:06

## 2022-06-04 RX ADMIN — GABAPENTIN 300 MG: 300 CAPSULE ORAL at 11:06

## 2022-06-04 RX ADMIN — ACYCLOVIR 400 MG: 200 CAPSULE ORAL at 10:06

## 2022-06-04 RX ADMIN — INSULIN ASPART 4 UNITS: 100 INJECTION, SOLUTION INTRAVENOUS; SUBCUTANEOUS at 01:06

## 2022-06-04 RX ADMIN — HYDROMORPHONE HYDROCHLORIDE 2 MG: 2 TABLET ORAL at 10:06

## 2022-06-04 RX ADMIN — DILTIAZEM HYDROCHLORIDE 90 MG: 30 TABLET, FILM COATED ORAL at 05:06

## 2022-06-04 RX ADMIN — PANTOPRAZOLE SODIUM 40 MG: 40 TABLET, DELAYED RELEASE ORAL at 10:06

## 2022-06-04 RX ADMIN — LEVOFLOXACIN 500 MG: 500 TABLET, FILM COATED ORAL at 06:06

## 2022-06-04 RX ADMIN — HYDROXYZINE HYDROCHLORIDE 50 MG: 25 TABLET, FILM COATED ORAL at 06:06

## 2022-06-04 RX ADMIN — DILTIAZEM HYDROCHLORIDE 90 MG: 30 TABLET, FILM COATED ORAL at 12:06

## 2022-06-04 RX ADMIN — ALLOPURINOL 300 MG: 300 TABLET ORAL at 08:06

## 2022-06-04 RX ADMIN — BUSPIRONE HYDROCHLORIDE 10 MG: 10 TABLET ORAL at 10:06

## 2022-06-04 RX ADMIN — DILTIAZEM HYDROCHLORIDE 90 MG: 30 TABLET, FILM COATED ORAL at 06:06

## 2022-06-04 RX ADMIN — HYDROXYZINE HYDROCHLORIDE 50 MG: 25 TABLET, FILM COATED ORAL at 05:06

## 2022-06-04 RX ADMIN — INSULIN ASPART 10 UNITS: 100 INJECTION, SOLUTION INTRAVENOUS; SUBCUTANEOUS at 01:06

## 2022-06-04 RX ADMIN — DEXAMETHASONE 20 MG: 4 TABLET ORAL at 10:06

## 2022-06-04 RX ADMIN — QUETIAPINE FUMARATE 200 MG: 200 TABLET, FILM COATED ORAL at 10:06

## 2022-06-04 RX ADMIN — INSULIN ASPART 5 UNITS: 100 INJECTION, SOLUTION INTRAVENOUS; SUBCUTANEOUS at 08:06

## 2022-06-04 RX ADMIN — OXCARBAZEPINE 1200 MG: 600 TABLET, FILM COATED ORAL at 10:06

## 2022-06-04 RX ADMIN — LOSARTAN POTASSIUM 25 MG: 25 TABLET, FILM COATED ORAL at 11:06

## 2022-06-04 RX ADMIN — ALLOPURINOL 300 MG: 300 TABLET ORAL at 10:06

## 2022-06-04 RX ADMIN — INSULIN ASPART 5 UNITS: 100 INJECTION, SOLUTION INTRAVENOUS; SUBCUTANEOUS at 10:06

## 2022-06-04 RX ADMIN — SENNOSIDES AND DOCUSATE SODIUM 1 TABLET: 50; 8.6 TABLET ORAL at 07:06

## 2022-06-04 RX ADMIN — SUMATRIPTAN SUCCINATE 50 MG: 50 TABLET ORAL at 08:06

## 2022-06-04 RX ADMIN — HYDROMORPHONE HYDROCHLORIDE 2 MG: 2 TABLET ORAL at 07:06

## 2022-06-04 RX ADMIN — GABAPENTIN 300 MG: 300 CAPSULE ORAL at 08:06

## 2022-06-04 RX ADMIN — VORTIOXETINE 20 MG: 10 TABLET, FILM COATED ORAL at 10:06

## 2022-06-04 RX ADMIN — ONDANSETRON 8 MG: 8 TABLET, ORALLY DISINTEGRATING ORAL at 07:06

## 2022-06-04 RX ADMIN — DILTIAZEM HYDROCHLORIDE 90 MG: 30 TABLET, FILM COATED ORAL at 10:06

## 2022-06-04 NOTE — PROGRESS NOTES
Roberto Yepez - Oncology ([facility])  Hematology  Bone Marrow Transplant  Progress Note    Patient Name: Deepti Gonzalez  Admission Date: 5/23/2022  Hospital Length of Stay: 12 days  Code Status: Full Code    Subjective:     Interval History: WBC 4K with 2% blasts on CBC. Increased insulin to 10 units detemir BID and 10 units TIDWM. Pain well controlled with PO dilaudid. No transfusion requirement today.    Objective:     Vital Signs (Most Recent):  Temp: 98.2 °F (36.8 °C) (06/04/22 0318)  Pulse: 81 (06/04/22 0318)  Resp: 18 (06/04/22 0714)  BP: 120/65 (06/04/22 0318)  SpO2: 95 % (06/04/22 0318) Vital Signs (24h Range):  Temp:  [98 °F (36.7 °C)-98.5 °F (36.9 °C)] 98.2 °F (36.8 °C)  Pulse:  [76-84] 81  Resp:  [15-18] 18  SpO2:  [94 %-97 %] 95 %  BP: (118-138)/(55-78) 120/65     Weight: 73.6 kg (162 lb 4.1 oz)  Body mass index is 27 kg/m².  Body surface area is 1.84 meters squared.      Intake/Output - Last 3 Shifts         06/02 0700  06/03 0659 06/03 0700  06/04 0659 06/04 0700  06/05 0659    P.O. 600      I.V. (mL/kg) 1830.9 (24.9)      Blood  560     Other 0      Total Intake(mL/kg) 2430.9 (33) 560 (7.6)     Urine (mL/kg/hr) 0 (0)      Emesis/NG output 0      Other 0      Stool 0      Blood 0      Total Output 0      Net +2430.9 +560            Urine Occurrence 3 x      Stool Occurrence 0 x      Emesis Occurrence 0 x              Physical Exam  Vitals reviewed.   Constitutional:       Appearance: Normal appearance. She is well-developed.   HENT:      Head: Normocephalic and atraumatic.      Right Ear: External ear normal.      Left Ear: External ear normal.   Eyes:      Extraocular Movements: Extraocular movements intact.      Conjunctiva/sclera: Conjunctivae normal.   Cardiovascular:      Rate and Rhythm: Normal rate and regular rhythm.      Pulses: Normal pulses.      Heart sounds: Normal heart sounds. No murmur heard.  Pulmonary:      Effort: Pulmonary effort is normal. No respiratory distress.      Breath sounds:  No stridor. No wheezing or rales.   Abdominal:      General: Bowel sounds are normal. There is no distension.      Palpations: Abdomen is soft.      Tenderness: There is no abdominal tenderness.   Musculoskeletal:         General: Normal range of motion.      Cervical back: Normal range of motion.      Right lower leg: No edema.      Left lower leg: No edema.   Skin:     General: Skin is warm and dry.      Findings: Bruising present. No rash.      Comments: RCWP with no signs of infection   Neurological:      General: No focal deficit present.      Mental Status: She is alert and oriented to person, place, and time.   Psychiatric:         Mood and Affect: Mood normal.         Behavior: Behavior normal.         Thought Content: Thought content normal.         Judgment: Judgment normal.       Significant Labs:   CBC:   Recent Labs   Lab 06/03/22  0403 06/04/22  0414   WBC 3.75* 4.57   HGB 6.8* 8.0*   HCT 19.6* 23.3*   PLT 8* 15*      and CMP:   Recent Labs   Lab 06/03/22  0403 06/03/22  1648 06/04/22  0414   * 133* 131*   K 4.0 4.7 4.5    102 99   CO2 20* 22* 22*   * 290* 384*   BUN 17 20 23*   CREATININE 0.7 0.9 0.9   CALCIUM 8.4* 8.9 8.7   PROT 5.6* 6.4 5.6*   ALBUMIN 3.1* 3.4* 3.2*   BILITOT 0.3 0.3 0.2   ALKPHOS 156* 174* 145*   AST 9* 11 6*   ALT 24 25 19   ANIONGAP 11 9 10   EGFRNONAA >60.0 >60.0 >60.0         Diagnostic Results:  I have reviewed all pertinent imaging results/findings within the past 24 hours.    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  Blincyto held & given steroids x 3 days. Resumed Blincyto 5/17 and again developed CRS, Grade 2 as well as mild AMS and Grade 3 transaminitis. Blincyto stopped with no plan to resume. Discharged on 5/19  - seen today 5/23 by Dr. 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; ponatinib Rx pending via specialty pharmacy  - continue ppx acyclovir, fluconazole, levaquin and bactrim  - s/p 2mg vincristine on 5/24 and 5/31    Volume overload  On 1-2L O2 via NC, reporting intermittent SOB  - will reassess daily if lasix needed      At high risk of tumor lysis syndrome  Given leukocytosis with WBC 136K and plan for rapid reduction with PO dex and vincristine, patient at high risk for TLS  - continue allopurinol 300mg BID  - monitor TLS labs BID    Anemia in neoplastic disease  - daily CBC while inpatient  - transfuse for Hgb <7    Transaminitis  - significant transaminitis following Blinatumomab  - closely monitor with cytoreduction and vincristine  - improved ALT/AST     Coagulopathy  - high risk with cytoreduction/refractory B-ALL s/p vincristine on 5/24 and 5/31  - monitoring BID INR/fibrinogen  - received cryo x2 5/27 ( suspect 1 unit not fully transfused)  - plan for cryo if <100    Leukocytosis  - see B-ALL      Thrombocytopenia  - daily CBC while inpatient  - transfuse for Plt <10K or bleeding    Cancer associated pain  - worsening pain to left hip with worsening leukocytosis  - denies trauma or injury to area  - likely d/t leukemia  - discussed with palliative who recommended PCA 6/1, transitioned to PO dilaudid 6/3    Moderate major depression  - Continue home trintellix    Anxiety  - Continue home trintellix and prn valium    GERD  (gastroesophageal reflux disease)  - hold home omeprazole, switch to protonix while inpatient    Hypertension  - continue home losartan    Seizure disorder  - continue home oxcarbazepine    Type 2 diabetes mellitus, without long-term current use of insulin  - hold home farxiga while inpatient; can resume at discharge.  - achs blood glucose monitoring and diabetic diet while admitted  - 10 units TIDWM and 10 units detemir BID with MDSSI      Rheumatoid arthritis involving multiple sites  - not currently on any active tx    History of TIA (transient ischemic attack)  - Holding home statin while inpatient  - Holding home ASA while inpatient due to thrombocytopenia. Will hold ASA at discharge. Can be resumed outpatient as appropriate when platelets >50K    Hyperlipidemia  - holding home statin while inpatient. Can resume at discharge if LFTs normalize.    Paroxysmal atrial fibrillation  - continue home diltiazem  - Holding xarelto with plt count < 50K. Can resume outpatient when appropriate.        VTE Risk Mitigation (From admission, onward)         Ordered     heparin, porcine (PF) 100 unit/mL injection flush 300 Units  As needed (PRN)         05/24/22 1021     IP VTE HIGH RISK PATIENT  Once         05/23/22 1552     Place sequential compression device  Until discontinued         05/23/22 1552     Reason for No Pharmacological VTE Prophylaxis  Once        Question:  Reasons:  Answer:  Thrombocytopenia    05/23/22 1552                Disposition: Home    Jan Hooker MD  Bone Marrow Transplant  Roberto britta - Oncology ([facility])

## 2022-06-04 NOTE — ASSESSMENT & PLAN NOTE
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 Blincyto held & given steroids x 3 days. Resumed Blincyto 5/17 and again developed CRS, Grade 2 as well as mild AMS and Grade 3 transaminitis. Blincyto stopped with no plan to resume. Discharged on 5/19  - seen today 5/23 by Dr. 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

## 2022-06-04 NOTE — PROGRESS NOTES
Plan of care reviewed with patient and family this shift. Pt reported 2 nights ago twisting her L ankle; no swelling, but pt c/o pain. X-ray to L ankle completed; ice applied and wrapped with ace bandage. VS stable. Maintaining saturations on room air.  All questions and concerns addressed.

## 2022-06-04 NOTE — SUBJECTIVE & OBJECTIVE
Subjective:     Interval History: WBC 4K with 2% blasts on CBC. Increased insulin to 10 units detemir BID and 10 units TIDWM. Pain well controlled with PO dilaudid. No transfusion requirement today.    Objective:     Vital Signs (Most Recent):  Temp: 98.2 °F (36.8 °C) (06/04/22 0318)  Pulse: 81 (06/04/22 0318)  Resp: 18 (06/04/22 0714)  BP: 120/65 (06/04/22 0318)  SpO2: 95 % (06/04/22 0318) Vital Signs (24h Range):  Temp:  [98 °F (36.7 °C)-98.5 °F (36.9 °C)] 98.2 °F (36.8 °C)  Pulse:  [76-84] 81  Resp:  [15-18] 18  SpO2:  [94 %-97 %] 95 %  BP: (118-138)/(55-78) 120/65     Weight: 73.6 kg (162 lb 4.1 oz)  Body mass index is 27 kg/m².  Body surface area is 1.84 meters squared.      Intake/Output - Last 3 Shifts         06/02 0700  06/03 0659 06/03 0700  06/04 0659 06/04 0700  06/05 0659    P.O. 600      I.V. (mL/kg) 1830.9 (24.9)      Blood  560     Other 0      Total Intake(mL/kg) 2430.9 (33) 560 (7.6)     Urine (mL/kg/hr) 0 (0)      Emesis/NG output 0      Other 0      Stool 0      Blood 0      Total Output 0      Net +2430.9 +560            Urine Occurrence 3 x      Stool Occurrence 0 x      Emesis Occurrence 0 x              Physical Exam  Vitals reviewed.   Constitutional:       Appearance: Normal appearance. She is well-developed.   HENT:      Head: Normocephalic and atraumatic.      Right Ear: External ear normal.      Left Ear: External ear normal.   Eyes:      Extraocular Movements: Extraocular movements intact.      Conjunctiva/sclera: Conjunctivae normal.   Cardiovascular:      Rate and Rhythm: Normal rate and regular rhythm.      Pulses: Normal pulses.      Heart sounds: Normal heart sounds. No murmur heard.  Pulmonary:      Effort: Pulmonary effort is normal. No respiratory distress.      Breath sounds: No stridor. No wheezing or rales.   Abdominal:      General: Bowel sounds are normal. There is no distension.      Palpations: Abdomen is soft.      Tenderness: There is no abdominal tenderness.    Musculoskeletal:         General: Normal range of motion.      Cervical back: Normal range of motion.      Right lower leg: No edema.      Left lower leg: No edema.   Skin:     General: Skin is warm and dry.      Findings: Bruising present. No rash.      Comments: RCWP with no signs of infection   Neurological:      General: No focal deficit present.      Mental Status: She is alert and oriented to person, place, and time.   Psychiatric:         Mood and Affect: Mood normal.         Behavior: Behavior normal.         Thought Content: Thought content normal.         Judgment: Judgment normal.       Significant Labs:   CBC:   Recent Labs   Lab 06/03/22  0403 06/04/22  0414   WBC 3.75* 4.57   HGB 6.8* 8.0*   HCT 19.6* 23.3*   PLT 8* 15*      and CMP:   Recent Labs   Lab 06/03/22  0403 06/03/22  1648 06/04/22  0414   * 133* 131*   K 4.0 4.7 4.5    102 99   CO2 20* 22* 22*   * 290* 384*   BUN 17 20 23*   CREATININE 0.7 0.9 0.9   CALCIUM 8.4* 8.9 8.7   PROT 5.6* 6.4 5.6*   ALBUMIN 3.1* 3.4* 3.2*   BILITOT 0.3 0.3 0.2   ALKPHOS 156* 174* 145*   AST 9* 11 6*   ALT 24 25 19   ANIONGAP 11 9 10   EGFRNONAA >60.0 >60.0 >60.0         Diagnostic Results:  I have reviewed all pertinent imaging results/findings within the past 24 hours.

## 2022-06-04 NOTE — ASSESSMENT & PLAN NOTE
- hold home farxiga while inpatient; can resume at discharge.  - achs blood glucose monitoring and diabetic diet while admitted  - 10 units TIDWM and 10 units detemir BID with MDSSI

## 2022-06-05 LAB
ALBUMIN SERPL BCP-MCNC: 3.3 G/DL (ref 3.5–5.2)
ALBUMIN SERPL BCP-MCNC: 3.5 G/DL (ref 3.5–5.2)
ALP SERPL-CCNC: 121 U/L (ref 55–135)
ALP SERPL-CCNC: 122 U/L (ref 55–135)
ALT SERPL W/O P-5'-P-CCNC: 16 U/L (ref 10–44)
ALT SERPL W/O P-5'-P-CCNC: 16 U/L (ref 10–44)
ANION GAP SERPL CALC-SCNC: 11 MMOL/L (ref 8–16)
ANION GAP SERPL CALC-SCNC: 13 MMOL/L (ref 8–16)
ANISOCYTOSIS BLD QL SMEAR: SLIGHT
AST SERPL-CCNC: 6 U/L (ref 10–40)
AST SERPL-CCNC: 7 U/L (ref 10–40)
BASOPHILS # BLD AUTO: 0.01 K/UL (ref 0–0.2)
BASOPHILS NFR BLD: 0.2 % (ref 0–1.9)
BILIRUB SERPL-MCNC: 0.3 MG/DL (ref 0.1–1)
BILIRUB SERPL-MCNC: 0.4 MG/DL (ref 0.1–1)
BUN SERPL-MCNC: 21 MG/DL (ref 6–20)
BUN SERPL-MCNC: 22 MG/DL (ref 6–20)
CALCIUM SERPL-MCNC: 8.9 MG/DL (ref 8.7–10.5)
CALCIUM SERPL-MCNC: 9.2 MG/DL (ref 8.7–10.5)
CHLORIDE SERPL-SCNC: 94 MMOL/L (ref 95–110)
CHLORIDE SERPL-SCNC: 95 MMOL/L (ref 95–110)
CO2 SERPL-SCNC: 24 MMOL/L (ref 23–29)
CO2 SERPL-SCNC: 27 MMOL/L (ref 23–29)
CREAT SERPL-MCNC: 0.7 MG/DL (ref 0.5–1.4)
CREAT SERPL-MCNC: 0.7 MG/DL (ref 0.5–1.4)
DIFFERENTIAL METHOD: ABNORMAL
EOSINOPHIL # BLD AUTO: 0 K/UL (ref 0–0.5)
EOSINOPHIL NFR BLD: 0 % (ref 0–8)
ERYTHROCYTE [DISTWIDTH] IN BLOOD BY AUTOMATED COUNT: 14.8 % (ref 11.5–14.5)
EST. GFR  (AFRICAN AMERICAN): >60 ML/MIN/1.73 M^2
EST. GFR  (AFRICAN AMERICAN): >60 ML/MIN/1.73 M^2
EST. GFR  (NON AFRICAN AMERICAN): >60 ML/MIN/1.73 M^2
EST. GFR  (NON AFRICAN AMERICAN): >60 ML/MIN/1.73 M^2
FIBRINOGEN PPP-MCNC: 199 MG/DL (ref 182–400)
GLUCOSE SERPL-MCNC: 208 MG/DL (ref 70–110)
GLUCOSE SERPL-MCNC: 260 MG/DL (ref 70–110)
HCT VFR BLD AUTO: 23.6 % (ref 37–48.5)
HGB BLD-MCNC: 8.2 G/DL (ref 12–16)
IMM GRANULOCYTES # BLD AUTO: 0.07 K/UL (ref 0–0.04)
IMM GRANULOCYTES NFR BLD AUTO: 1.6 % (ref 0–0.5)
INR PPP: 1.1 (ref 0.8–1.2)
LDH SERPL L TO P-CCNC: 280 U/L (ref 110–260)
LDH SERPL L TO P-CCNC: 293 U/L (ref 110–260)
LYMPHOCYTES # BLD AUTO: 1.4 K/UL (ref 1–4.8)
LYMPHOCYTES NFR BLD: 33.5 % (ref 18–48)
MAGNESIUM SERPL-MCNC: 1.9 MG/DL (ref 1.6–2.6)
MAGNESIUM SERPL-MCNC: 1.9 MG/DL (ref 1.6–2.6)
MCH RBC QN AUTO: 29.7 PG (ref 27–31)
MCHC RBC AUTO-ENTMCNC: 34.7 G/DL (ref 32–36)
MCV RBC AUTO: 86 FL (ref 82–98)
MONOCYTES # BLD AUTO: 0.1 K/UL (ref 0.3–1)
MONOCYTES NFR BLD: 1.9 % (ref 4–15)
NEUTROPHILS # BLD AUTO: 2.7 K/UL (ref 1.8–7.7)
NEUTROPHILS NFR BLD: 62.8 % (ref 38–73)
NRBC BLD-RTO: 0 /100 WBC
OVALOCYTES BLD QL SMEAR: ABNORMAL
PHOSPHATE SERPL-MCNC: 4 MG/DL (ref 2.7–4.5)
PHOSPHATE SERPL-MCNC: 4.8 MG/DL (ref 2.7–4.5)
PLATELET # BLD AUTO: 11 K/UL (ref 150–450)
PLATELET BLD QL SMEAR: ABNORMAL
PMV BLD AUTO: 12.2 FL (ref 9.2–12.9)
POCT GLUCOSE: 154 MG/DL (ref 70–110)
POCT GLUCOSE: 193 MG/DL (ref 70–110)
POCT GLUCOSE: 204 MG/DL (ref 70–110)
POCT GLUCOSE: 252 MG/DL (ref 70–110)
POCT GLUCOSE: 292 MG/DL (ref 70–110)
POCT GLUCOSE: 373 MG/DL (ref 70–110)
POCT GLUCOSE: 447 MG/DL (ref 70–110)
POIKILOCYTOSIS BLD QL SMEAR: SLIGHT
POLYCHROMASIA BLD QL SMEAR: ABNORMAL
POTASSIUM SERPL-SCNC: 4.6 MMOL/L (ref 3.5–5.1)
POTASSIUM SERPL-SCNC: 4.7 MMOL/L (ref 3.5–5.1)
PROT SERPL-MCNC: 5.7 G/DL (ref 6–8.4)
PROT SERPL-MCNC: 6.3 G/DL (ref 6–8.4)
PROTHROMBIN TIME: 11.6 SEC (ref 9–12.5)
RBC # BLD AUTO: 2.76 M/UL (ref 4–5.4)
SODIUM SERPL-SCNC: 131 MMOL/L (ref 136–145)
SODIUM SERPL-SCNC: 133 MMOL/L (ref 136–145)
URATE SERPL-MCNC: 1.8 MG/DL (ref 2.4–5.7)
URATE SERPL-MCNC: 1.8 MG/DL (ref 2.4–5.7)
WBC # BLD AUTO: 4.3 K/UL (ref 3.9–12.7)

## 2022-06-05 PROCEDURE — 80053 COMPREHEN METABOLIC PANEL: CPT | Mod: 91 | Performed by: NURSE PRACTITIONER

## 2022-06-05 PROCEDURE — 25000003 PHARM REV CODE 250: Performed by: STUDENT IN AN ORGANIZED HEALTH CARE EDUCATION/TRAINING PROGRAM

## 2022-06-05 PROCEDURE — 85384 FIBRINOGEN ACTIVITY: CPT | Performed by: NURSE PRACTITIONER

## 2022-06-05 PROCEDURE — 99233 PR SUBSEQUENT HOSPITAL CARE,LEVL III: ICD-10-PCS | Mod: ,,, | Performed by: INTERNAL MEDICINE

## 2022-06-05 PROCEDURE — 25000003 PHARM REV CODE 250: Performed by: NURSE PRACTITIONER

## 2022-06-05 PROCEDURE — 25000003 PHARM REV CODE 250: Performed by: INTERNAL MEDICINE

## 2022-06-05 PROCEDURE — 84550 ASSAY OF BLOOD/URIC ACID: CPT | Mod: 91 | Performed by: NURSE PRACTITIONER

## 2022-06-05 PROCEDURE — 99233 SBSQ HOSP IP/OBS HIGH 50: CPT | Mod: ,,, | Performed by: INTERNAL MEDICINE

## 2022-06-05 PROCEDURE — 85610 PROTHROMBIN TIME: CPT | Performed by: NURSE PRACTITIONER

## 2022-06-05 PROCEDURE — 85025 COMPLETE CBC W/AUTO DIFF WBC: CPT | Performed by: NURSE PRACTITIONER

## 2022-06-05 PROCEDURE — 83735 ASSAY OF MAGNESIUM: CPT | Performed by: NURSE PRACTITIONER

## 2022-06-05 PROCEDURE — 84100 ASSAY OF PHOSPHORUS: CPT | Performed by: NURSE PRACTITIONER

## 2022-06-05 PROCEDURE — 63600175 PHARM REV CODE 636 W HCPCS: Performed by: STUDENT IN AN ORGANIZED HEALTH CARE EDUCATION/TRAINING PROGRAM

## 2022-06-05 PROCEDURE — 83615 LACTATE (LD) (LDH) ENZYME: CPT | Mod: 91 | Performed by: NURSE PRACTITIONER

## 2022-06-05 PROCEDURE — 20600001 HC STEP DOWN PRIVATE ROOM

## 2022-06-05 RX ORDER — ALLOPURINOL 300 MG/1
300 TABLET ORAL DAILY
Status: DISCONTINUED | OUTPATIENT
Start: 2022-06-06 | End: 2022-06-06 | Stop reason: HOSPADM

## 2022-06-05 RX ORDER — HYDROMORPHONE HYDROCHLORIDE 2 MG/1
2 TABLET ORAL EVERY 4 HOURS PRN
Status: DISCONTINUED | OUTPATIENT
Start: 2022-06-05 | End: 2022-06-06 | Stop reason: HOSPADM

## 2022-06-05 RX ORDER — HYDROXYZINE HYDROCHLORIDE 25 MG/1
50 TABLET, FILM COATED ORAL EVERY 12 HOURS PRN
Status: DISCONTINUED | OUTPATIENT
Start: 2022-06-05 | End: 2022-06-06

## 2022-06-05 RX ORDER — LORAZEPAM 2 MG/ML
1 INJECTION INTRAMUSCULAR ONCE AS NEEDED
Status: COMPLETED | OUTPATIENT
Start: 2022-06-05 | End: 2022-06-05

## 2022-06-05 RX ADMIN — LORAZEPAM 1 MG: 2 INJECTION INTRAMUSCULAR; INTRAVENOUS at 04:06

## 2022-06-05 RX ADMIN — ONDANSETRON 8 MG: 8 TABLET, ORALLY DISINTEGRATING ORAL at 08:06

## 2022-06-05 RX ADMIN — ACYCLOVIR 400 MG: 200 CAPSULE ORAL at 09:06

## 2022-06-05 RX ADMIN — FLUCONAZOLE 400 MG: 200 TABLET ORAL at 04:06

## 2022-06-05 RX ADMIN — LOSARTAN POTASSIUM 25 MG: 25 TABLET, FILM COATED ORAL at 04:06

## 2022-06-05 RX ADMIN — LEVOFLOXACIN 500 MG: 500 TABLET, FILM COATED ORAL at 04:06

## 2022-06-05 RX ADMIN — QUETIAPINE FUMARATE 200 MG: 200 TABLET, FILM COATED ORAL at 09:06

## 2022-06-05 RX ADMIN — HYDROXYZINE HYDROCHLORIDE 50 MG: 25 TABLET, FILM COATED ORAL at 04:06

## 2022-06-05 RX ADMIN — SUMATRIPTAN SUCCINATE 50 MG: 50 TABLET ORAL at 04:06

## 2022-06-05 RX ADMIN — OXCARBAZEPINE 1200 MG: 600 TABLET, FILM COATED ORAL at 09:06

## 2022-06-05 RX ADMIN — PROCHLORPERAZINE EDISYLATE 5 MG: 5 INJECTION INTRAMUSCULAR; INTRAVENOUS at 01:06

## 2022-06-05 RX ADMIN — GABAPENTIN 300 MG: 300 CAPSULE ORAL at 04:06

## 2022-06-05 RX ADMIN — HYDROMORPHONE HYDROCHLORIDE 2 MG: 2 TABLET ORAL at 08:06

## 2022-06-05 RX ADMIN — PANTOPRAZOLE SODIUM 40 MG: 40 TABLET, DELAYED RELEASE ORAL at 09:06

## 2022-06-05 RX ADMIN — INSULIN ASPART 4 UNITS: 100 INJECTION, SOLUTION INTRAVENOUS; SUBCUTANEOUS at 06:06

## 2022-06-05 RX ADMIN — DILTIAZEM HYDROCHLORIDE 90 MG: 30 TABLET, FILM COATED ORAL at 09:06

## 2022-06-05 RX ADMIN — INSULIN ASPART 1 UNITS: 100 INJECTION, SOLUTION INTRAVENOUS; SUBCUTANEOUS at 09:06

## 2022-06-05 RX ADMIN — INSULIN ASPART 10 UNITS: 100 INJECTION, SOLUTION INTRAVENOUS; SUBCUTANEOUS at 09:06

## 2022-06-05 RX ADMIN — SENNOSIDES AND DOCUSATE SODIUM 1 TABLET: 50; 8.6 TABLET ORAL at 08:06

## 2022-06-05 RX ADMIN — ONDANSETRON 8 MG: 8 TABLET, ORALLY DISINTEGRATING ORAL at 04:06

## 2022-06-05 RX ADMIN — INSULIN ASPART 6 UNITS: 100 INJECTION, SOLUTION INTRAVENOUS; SUBCUTANEOUS at 09:06

## 2022-06-05 RX ADMIN — HYDROMORPHONE HYDROCHLORIDE 2 MG: 2 TABLET ORAL at 04:06

## 2022-06-05 RX ADMIN — GABAPENTIN 300 MG: 300 CAPSULE ORAL at 11:06

## 2022-06-05 RX ADMIN — INSULIN ASPART 2 UNITS: 100 INJECTION, SOLUTION INTRAVENOUS; SUBCUTANEOUS at 04:06

## 2022-06-05 RX ADMIN — BUSPIRONE HYDROCHLORIDE 10 MG: 10 TABLET ORAL at 09:06

## 2022-06-05 RX ADMIN — DILTIAZEM HYDROCHLORIDE 90 MG: 30 TABLET, FILM COATED ORAL at 04:06

## 2022-06-05 RX ADMIN — INSULIN ASPART 10 UNITS: 100 INJECTION, SOLUTION INTRAVENOUS; SUBCUTANEOUS at 06:06

## 2022-06-05 RX ADMIN — ALLOPURINOL 300 MG: 300 TABLET ORAL at 08:06

## 2022-06-05 RX ADMIN — VORTIOXETINE 20 MG: 10 TABLET, FILM COATED ORAL at 09:06

## 2022-06-05 NOTE — ASSESSMENT & PLAN NOTE
- hold home farxiga while inpatient; can resume at discharge.  - achs blood glucose monitoring and diabetic diet while admitted  - 10 units TIDWM and 10 units detemir daily with MDSSI

## 2022-06-05 NOTE — ASSESSMENT & PLAN NOTE
- worsening pain to left hip with worsening leukocytosis  - denies trauma or injury to area  - likely d/t leukemia  - discussed with palliative who recommended PCA 6/1, transitioned to PO dilaudid 6/3 - titrated to 2 mg q4 on 6/5

## 2022-06-05 NOTE — NURSING
Discussed rechecked blood sugar=373 with Dr Campuzano. The MD ordered to give the PRN sliding scale. So 5 units will be given.

## 2022-06-05 NOTE — PLAN OF CARE
Hyperglycemic controlled by PRN sliding scale and extra dose per MD order. Blood sugar dropped from 447 to 253. All VSS, afebrile, denied naisea. PRN pain medications given per patient's request. Rounding completed. Bed locked and in lowest position. Call light within reach. Instructed patient to call for help as needed. Plan of care reviewed. Will continue to monitor.

## 2022-06-05 NOTE — PROGRESS NOTES
Plan of care reviewed with patient and family this shift.  C/o nausea; emesis X1; PRNs given.  VS stable. Maintaining saturations on room air. All questions and concerns addressed. Will continue to monitor.

## 2022-06-05 NOTE — PROGRESS NOTES
Roberto Yepez - Oncology ([facility])  Hematology  Bone Marrow Transplant  Progress Note    Patient Name: Deepti Gonzalez  Admission Date: 5/23/2022  Hospital Length of Stay: 13 days  Code Status: Full Code    Subjective:     Interval History: Completed dexamethasone yesterday. Will titrate down insulin to 10 units TIDWM and 10 units detemir daily. Pain controlled with PO dilaudid, will space to q4.    Objective:     Vital Signs (Most Recent):  Temp: 98.4 °F (36.9 °C) (06/05/22 0804)  Pulse: 81 (06/05/22 0804)  Resp: 18 (06/05/22 0812)  BP: (!) 145/77 (06/05/22 0804)  SpO2: 96 % (06/05/22 0804) Vital Signs (24h Range):  Temp:  [98 °F (36.7 °C)-98.6 °F (37 °C)] 98.4 °F (36.9 °C)  Pulse:  [74-89] 81  Resp:  [16-18] 18  SpO2:  [94 %-98 %] 96 %  BP: (140-166)/(68-80) 145/77     Weight: 73.6 kg (162 lb 4.1 oz)  Body mass index is 27 kg/m².  Body surface area is 1.84 meters squared.      Intake/Output - Last 3 Shifts         06/03 0700  06/04 0659 06/04 0700  06/05 0659 06/05 0700  06/06 0659    P.O.  870     I.V. (mL/kg)       Blood 560      Other       Total Intake(mL/kg) 560 (7.6) 870 (11.8)     Urine (mL/kg/hr)       Emesis/NG output       Other       Stool       Blood       Total Output       Net +560 +870            Urine Occurrence  4 x             Physical Exam  Vitals reviewed.   Constitutional:       Appearance: Normal appearance. She is well-developed.   HENT:      Head: Normocephalic and atraumatic.      Right Ear: External ear normal.      Left Ear: External ear normal.   Eyes:      Extraocular Movements: Extraocular movements intact.      Conjunctiva/sclera: Conjunctivae normal.   Cardiovascular:      Rate and Rhythm: Normal rate and regular rhythm.      Pulses: Normal pulses.      Heart sounds: Normal heart sounds. No murmur heard.  Pulmonary:      Effort: Pulmonary effort is normal. No respiratory distress.      Breath sounds: No stridor. No wheezing or rales.   Abdominal:      General: Bowel sounds are normal.  There is no distension.      Palpations: Abdomen is soft.      Tenderness: There is no abdominal tenderness.   Musculoskeletal:         General: Normal range of motion.      Cervical back: Normal range of motion.      Right lower leg: No edema.      Left lower leg: No edema.   Skin:     General: Skin is warm and dry.      Findings: Bruising present. No rash.      Comments: RCWP with no signs of infection   Neurological:      General: No focal deficit present.      Mental Status: She is alert and oriented to person, place, and time.   Psychiatric:         Mood and Affect: Mood normal.         Behavior: Behavior normal.         Thought Content: Thought content normal.         Judgment: Judgment normal.       Significant Labs:   CBC:   Recent Labs   Lab 06/04/22  0414 06/05/22  0425   WBC 4.57 4.30   HGB 8.0* 8.2*   HCT 23.3* 23.6*   PLT 15* 11*      and CMP:   Recent Labs   Lab 06/04/22  0414 06/04/22  1708 06/05/22  0425   * 130* 133*   K 4.5 4.8 4.7   CL 99 96 95   CO2 22* 22* 27   * 351* 260*   BUN 23* 21* 21*   CREATININE 0.9 0.9 0.7   CALCIUM 8.7 9.0 8.9   PROT 5.6* 6.5 5.7*   ALBUMIN 3.2* 3.6 3.3*   BILITOT 0.2 0.4 0.3   ALKPHOS 145* 150* 122   AST 6* 5* 6*   ALT 19 20 16   ANIONGAP 10 12 11   EGFRNONAA >60.0 >60.0 >60.0         Diagnostic Results:  I have reviewed all pertinent imaging results/findings within the past 24 hours.    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 Blincyto held & given steroids x 3 days. Resumed Blincyto 5/17 and again developed CRS, Grade 2  as well as mild AMS and Grade 3 transaminitis. Blincyto stopped with no plan to resume. Discharged on 5/19  - seen today 5/23 by Dr. 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; ponatinib Rx pending via specialty pharmacy  - continue ppx acyclovir, fluconazole, levaquin and bactrim  - s/p 2mg vincristine on 5/24 and 5/31    Volume overload  On 1-2L O2 via NC, reporting intermittent SOB  - will reassess daily if lasix needed      At high risk of tumor lysis syndrome  Given leukocytosis with WBC 136K and plan for rapid reduction with PO dex and vincristine, patient at high risk for TLS  - continue allopurinol 300mg BID  - monitor TLS labs BID    Anemia in neoplastic disease  - daily CBC while inpatient  - transfuse for Hgb <7    Transaminitis  - significant transaminitis following Blinatumomab  - closely monitor with cytoreduction and vincristine  - improved ALT/AST     Coagulopathy  - high risk with cytoreduction/refractory B-ALL s/p vincristine on 5/24 and 5/31  - monitoring BID INR/fibrinogen  - received cryo x2 5/27 ( suspect 1 unit not fully transfused)  - plan for cryo if <100    Leukocytosis  - see B-ALL      Thrombocytopenia  - daily CBC while inpatient  - transfuse for Plt <10K or bleeding    Cancer associated pain  - worsening pain to left hip with worsening leukocytosis  - denies trauma or injury to area  - likely d/t leukemia  - discussed with palliative who recommended PCA 6/1, transitioned to PO dilaudid 6/3 - titrated to 2 mg q4 on 6/5    Moderate major depression  - Continue home trintellix    Anxiety  - Continue home trintellix and prn valium    GERD (gastroesophageal reflux disease)  - hold home omeprazole, switch to  protonix while inpatient    Hypertension  - continue home losartan    Seizure disorder  - continue home oxcarbazepine    Type 2 diabetes mellitus, without long-term current use of insulin  - hold home farxiga while inpatient; can resume at discharge.  - achs blood glucose monitoring and diabetic diet while admitted  - 10 units TIDWM and 10 units detemir daily with MDSSI      Rheumatoid arthritis involving multiple sites  - not currently on any active tx    History of TIA (transient ischemic attack)  - Holding home statin while inpatient  - Holding home ASA while inpatient due to thrombocytopenia. Will hold ASA at discharge. Can be resumed outpatient as appropriate when platelets >50K    Hyperlipidemia  - holding home statin while inpatient. Can resume at discharge if LFTs normalize.    Paroxysmal atrial fibrillation  - continue home diltiazem  - Holding xarelto with plt count < 50K. Can resume outpatient when appropriate.        VTE Risk Mitigation (From admission, onward)         Ordered     heparin, porcine (PF) 100 unit/mL injection flush 300 Units  As needed (PRN)         05/24/22 1021     IP VTE HIGH RISK PATIENT  Once         05/23/22 1552     Place sequential compression device  Until discontinued         05/23/22 1552     Reason for No Pharmacological VTE Prophylaxis  Once        Question:  Reasons:  Answer:  Thrombocytopenia    05/23/22 1552                Disposition: Home    Jan Hooker MD  Bone Marrow Transplant  Roberto britta - Oncology ([facility])

## 2022-06-05 NOTE — ASSESSMENT & PLAN NOTE
On 1-2L O2 via NC, reporting intermittent SOB  - will reassess daily if lasix needed     Detail Level: Zone Include Location In Plan?: No

## 2022-06-05 NOTE — ASSESSMENT & PLAN NOTE
Given leukocytosis with WBC 136K and plan for rapid reduction with PO dex and vincristine, patient at high risk for TLS  - continue allopurinol 300mg BID  - monitor TLS labs BID

## 2022-06-05 NOTE — SUBJECTIVE & OBJECTIVE
Subjective:     Interval History: Completed dexamethasone yesterday. Will titrate down insulin to 10 units TIDWM and 10 units detemir daily. Pain controlled with PO dilaudid, will space to q4.    Objective:     Vital Signs (Most Recent):  Temp: 98.4 °F (36.9 °C) (06/05/22 0804)  Pulse: 81 (06/05/22 0804)  Resp: 18 (06/05/22 0812)  BP: (!) 145/77 (06/05/22 0804)  SpO2: 96 % (06/05/22 0804) Vital Signs (24h Range):  Temp:  [98 °F (36.7 °C)-98.6 °F (37 °C)] 98.4 °F (36.9 °C)  Pulse:  [74-89] 81  Resp:  [16-18] 18  SpO2:  [94 %-98 %] 96 %  BP: (140-166)/(68-80) 145/77     Weight: 73.6 kg (162 lb 4.1 oz)  Body mass index is 27 kg/m².  Body surface area is 1.84 meters squared.      Intake/Output - Last 3 Shifts         06/03 0700  06/04 0659 06/04 0700  06/05 0659 06/05 0700  06/06 0659    P.O.  870     I.V. (mL/kg)       Blood 560      Other       Total Intake(mL/kg) 560 (7.6) 870 (11.8)     Urine (mL/kg/hr)       Emesis/NG output       Other       Stool       Blood       Total Output       Net +560 +870            Urine Occurrence  4 x             Physical Exam  Vitals reviewed.   Constitutional:       Appearance: Normal appearance. She is well-developed.   HENT:      Head: Normocephalic and atraumatic.      Right Ear: External ear normal.      Left Ear: External ear normal.   Eyes:      Extraocular Movements: Extraocular movements intact.      Conjunctiva/sclera: Conjunctivae normal.   Cardiovascular:      Rate and Rhythm: Normal rate and regular rhythm.      Pulses: Normal pulses.      Heart sounds: Normal heart sounds. No murmur heard.  Pulmonary:      Effort: Pulmonary effort is normal. No respiratory distress.      Breath sounds: No stridor. No wheezing or rales.   Abdominal:      General: Bowel sounds are normal. There is no distension.      Palpations: Abdomen is soft.      Tenderness: There is no abdominal tenderness.   Musculoskeletal:         General: Normal range of motion.      Cervical back: Normal range  of motion.      Right lower leg: No edema.      Left lower leg: No edema.   Skin:     General: Skin is warm and dry.      Findings: Bruising present. No rash.      Comments: RCWP with no signs of infection   Neurological:      General: No focal deficit present.      Mental Status: She is alert and oriented to person, place, and time.   Psychiatric:         Mood and Affect: Mood normal.         Behavior: Behavior normal.         Thought Content: Thought content normal.         Judgment: Judgment normal.       Significant Labs:   CBC:   Recent Labs   Lab 06/04/22  0414 06/05/22  0425   WBC 4.57 4.30   HGB 8.0* 8.2*   HCT 23.3* 23.6*   PLT 15* 11*      and CMP:   Recent Labs   Lab 06/04/22  0414 06/04/22  1708 06/05/22  0425   * 130* 133*   K 4.5 4.8 4.7   CL 99 96 95   CO2 22* 22* 27   * 351* 260*   BUN 23* 21* 21*   CREATININE 0.9 0.9 0.7   CALCIUM 8.7 9.0 8.9   PROT 5.6* 6.5 5.7*   ALBUMIN 3.2* 3.6 3.3*   BILITOT 0.2 0.4 0.3   ALKPHOS 145* 150* 122   AST 6* 5* 6*   ALT 19 20 16   ANIONGAP 10 12 11   EGFRNONAA >60.0 >60.0 >60.0         Diagnostic Results:  I have reviewed all pertinent imaging results/findings within the past 24 hours.

## 2022-06-05 NOTE — NURSING
Informed Dr Campuzano about blood sugar= 447. The MD ordered to give the sliding scale now and recheck in one hour.

## 2022-06-06 VITALS
BODY MASS INDEX: 27.18 KG/M2 | SYSTOLIC BLOOD PRESSURE: 128 MMHG | HEIGHT: 65 IN | HEART RATE: 81 BPM | TEMPERATURE: 97 F | DIASTOLIC BLOOD PRESSURE: 62 MMHG | OXYGEN SATURATION: 95 % | WEIGHT: 163.13 LBS | RESPIRATION RATE: 18 BRPM

## 2022-06-06 DIAGNOSIS — C91.00 B-CELL ACUTE LYMPHOBLASTIC LEUKEMIA: Primary | ICD-10-CM

## 2022-06-06 LAB
ALBUMIN SERPL BCP-MCNC: 3.3 G/DL (ref 3.5–5.2)
ALP SERPL-CCNC: 110 U/L (ref 55–135)
ALT SERPL W/O P-5'-P-CCNC: 17 U/L (ref 10–44)
ANION GAP SERPL CALC-SCNC: 12 MMOL/L (ref 8–16)
ANISOCYTOSIS BLD QL SMEAR: SLIGHT
AST SERPL-CCNC: 8 U/L (ref 10–40)
BASOPHILS # BLD AUTO: 0 K/UL (ref 0–0.2)
BASOPHILS # BLD AUTO: 0 K/UL (ref 0–0.2)
BASOPHILS NFR BLD: 0 % (ref 0–1.9)
BASOPHILS NFR BLD: 0 % (ref 0–1.9)
BILIRUB SERPL-MCNC: 0.4 MG/DL (ref 0.1–1)
BLD PROD TYP BPU: NORMAL
BLOOD UNIT EXPIRATION DATE: NORMAL
BLOOD UNIT TYPE CODE: 8400
BLOOD UNIT TYPE: NORMAL
BUN SERPL-MCNC: 21 MG/DL (ref 6–20)
CALCIUM SERPL-MCNC: 8.9 MG/DL (ref 8.7–10.5)
CHLORIDE SERPL-SCNC: 95 MMOL/L (ref 95–110)
CO2 SERPL-SCNC: 26 MMOL/L (ref 23–29)
CODING SYSTEM: NORMAL
CREAT SERPL-MCNC: 0.7 MG/DL (ref 0.5–1.4)
DACRYOCYTES BLD QL SMEAR: ABNORMAL
DIFFERENTIAL METHOD: ABNORMAL
DIFFERENTIAL METHOD: ABNORMAL
DISPENSE STATUS: NORMAL
EOSINOPHIL # BLD AUTO: 0 K/UL (ref 0–0.5)
EOSINOPHIL # BLD AUTO: 0 K/UL (ref 0–0.5)
EOSINOPHIL NFR BLD: 0.2 % (ref 0–8)
EOSINOPHIL NFR BLD: 0.3 % (ref 0–8)
ERYTHROCYTE [DISTWIDTH] IN BLOOD BY AUTOMATED COUNT: 14.9 % (ref 11.5–14.5)
ERYTHROCYTE [DISTWIDTH] IN BLOOD BY AUTOMATED COUNT: 15.2 % (ref 11.5–14.5)
EST. GFR  (AFRICAN AMERICAN): >60 ML/MIN/1.73 M^2
EST. GFR  (NON AFRICAN AMERICAN): >60 ML/MIN/1.73 M^2
FIBRINOGEN PPP-MCNC: 195 MG/DL (ref 182–400)
GLUCOSE SERPL-MCNC: 118 MG/DL (ref 70–110)
HCT VFR BLD AUTO: 24.8 % (ref 37–48.5)
HCT VFR BLD AUTO: 25.7 % (ref 37–48.5)
HGB BLD-MCNC: 8.8 G/DL (ref 12–16)
HGB BLD-MCNC: 9.1 G/DL (ref 12–16)
HYPOCHROMIA BLD QL SMEAR: ABNORMAL
IMM GRANULOCYTES # BLD AUTO: 0.01 K/UL (ref 0–0.04)
IMM GRANULOCYTES # BLD AUTO: 0.04 K/UL (ref 0–0.04)
IMM GRANULOCYTES NFR BLD AUTO: 0.3 % (ref 0–0.5)
IMM GRANULOCYTES NFR BLD AUTO: 1 % (ref 0–0.5)
INR PPP: 1.1 (ref 0.8–1.2)
LDH SERPL L TO P-CCNC: 226 U/L (ref 110–260)
LYMPHOCYTES # BLD AUTO: 1.6 K/UL (ref 1–4.8)
LYMPHOCYTES # BLD AUTO: 2.1 K/UL (ref 1–4.8)
LYMPHOCYTES NFR BLD: 50.9 % (ref 18–48)
LYMPHOCYTES NFR BLD: 54.9 % (ref 18–48)
MAGNESIUM SERPL-MCNC: 1.8 MG/DL (ref 1.6–2.6)
MCH RBC QN AUTO: 29.3 PG (ref 27–31)
MCH RBC QN AUTO: 29.4 PG (ref 27–31)
MCHC RBC AUTO-ENTMCNC: 35.4 G/DL (ref 32–36)
MCHC RBC AUTO-ENTMCNC: 35.5 G/DL (ref 32–36)
MCV RBC AUTO: 83 FL (ref 82–98)
MCV RBC AUTO: 83 FL (ref 82–98)
MONOCYTES # BLD AUTO: 0.2 K/UL (ref 0.3–1)
MONOCYTES # BLD AUTO: 0.2 K/UL (ref 0.3–1)
MONOCYTES NFR BLD: 5.1 % (ref 4–15)
MONOCYTES NFR BLD: 5.2 % (ref 4–15)
NEUTROPHILS # BLD AUTO: 1.2 K/UL (ref 1.8–7.7)
NEUTROPHILS # BLD AUTO: 1.7 K/UL (ref 1.8–7.7)
NEUTROPHILS NFR BLD: 39.4 % (ref 38–73)
NEUTROPHILS NFR BLD: 42.7 % (ref 38–73)
NRBC BLD-RTO: 0 /100 WBC
NRBC BLD-RTO: 0 /100 WBC
OVALOCYTES BLD QL SMEAR: ABNORMAL
PHOSPHATE SERPL-MCNC: 5.5 MG/DL (ref 2.7–4.5)
PLATELET # BLD AUTO: 10 K/UL (ref 150–450)
PLATELET # BLD AUTO: 24 K/UL (ref 150–450)
PLATELET BLD QL SMEAR: ABNORMAL
PLATELET BLD QL SMEAR: ABNORMAL
PMV BLD AUTO: 9.1 FL (ref 9.2–12.9)
PMV BLD AUTO: ABNORMAL FL (ref 9.2–12.9)
POCT GLUCOSE: 134 MG/DL (ref 70–110)
POCT GLUCOSE: 86 MG/DL (ref 70–110)
POIKILOCYTOSIS BLD QL SMEAR: SLIGHT
POLYCHROMASIA BLD QL SMEAR: ABNORMAL
POTASSIUM SERPL-SCNC: 4.5 MMOL/L (ref 3.5–5.1)
PROT SERPL-MCNC: 5.7 G/DL (ref 6–8.4)
PROTHROMBIN TIME: 11.5 SEC (ref 9–12.5)
RBC # BLD AUTO: 3 M/UL (ref 4–5.4)
RBC # BLD AUTO: 3.1 M/UL (ref 4–5.4)
SCHISTOCYTES BLD QL SMEAR: ABNORMAL
SODIUM SERPL-SCNC: 133 MMOL/L (ref 136–145)
SPHEROCYTES BLD QL SMEAR: ABNORMAL
UNIT NUMBER: NORMAL
URATE SERPL-MCNC: 2.1 MG/DL (ref 2.4–5.7)
WBC # BLD AUTO: 2.95 K/UL (ref 3.9–12.7)
WBC # BLD AUTO: 4.07 K/UL (ref 3.9–12.7)

## 2022-06-06 PROCEDURE — 85384 FIBRINOGEN ACTIVITY: CPT | Performed by: NURSE PRACTITIONER

## 2022-06-06 PROCEDURE — 83615 LACTATE (LD) (LDH) ENZYME: CPT | Performed by: NURSE PRACTITIONER

## 2022-06-06 PROCEDURE — 63600175 PHARM REV CODE 636 W HCPCS: Performed by: INTERNAL MEDICINE

## 2022-06-06 PROCEDURE — 85025 COMPLETE CBC W/AUTO DIFF WBC: CPT | Mod: 91 | Performed by: STUDENT IN AN ORGANIZED HEALTH CARE EDUCATION/TRAINING PROGRAM

## 2022-06-06 PROCEDURE — 25000003 PHARM REV CODE 250: Performed by: STUDENT IN AN ORGANIZED HEALTH CARE EDUCATION/TRAINING PROGRAM

## 2022-06-06 PROCEDURE — 83735 ASSAY OF MAGNESIUM: CPT | Performed by: NURSE PRACTITIONER

## 2022-06-06 PROCEDURE — 84550 ASSAY OF BLOOD/URIC ACID: CPT | Performed by: NURSE PRACTITIONER

## 2022-06-06 PROCEDURE — 99239 PR HOSPITAL DISCHARGE DAY,>30 MIN: ICD-10-PCS | Mod: ,,, | Performed by: INTERNAL MEDICINE

## 2022-06-06 PROCEDURE — 99239 HOSP IP/OBS DSCHRG MGMT >30: CPT | Mod: ,,, | Performed by: INTERNAL MEDICINE

## 2022-06-06 PROCEDURE — 25000003 PHARM REV CODE 250: Performed by: INTERNAL MEDICINE

## 2022-06-06 PROCEDURE — 85025 COMPLETE CBC W/AUTO DIFF WBC: CPT | Performed by: NURSE PRACTITIONER

## 2022-06-06 PROCEDURE — 80053 COMPREHEN METABOLIC PANEL: CPT | Performed by: NURSE PRACTITIONER

## 2022-06-06 PROCEDURE — 25000003 PHARM REV CODE 250: Performed by: NURSE PRACTITIONER

## 2022-06-06 PROCEDURE — P9037 PLATE PHERES LEUKOREDU IRRAD: HCPCS | Performed by: STUDENT IN AN ORGANIZED HEALTH CARE EDUCATION/TRAINING PROGRAM

## 2022-06-06 PROCEDURE — 84100 ASSAY OF PHOSPHORUS: CPT | Performed by: NURSE PRACTITIONER

## 2022-06-06 PROCEDURE — 85610 PROTHROMBIN TIME: CPT | Performed by: NURSE PRACTITIONER

## 2022-06-06 PROCEDURE — 63600175 PHARM REV CODE 636 W HCPCS: Performed by: STUDENT IN AN ORGANIZED HEALTH CARE EDUCATION/TRAINING PROGRAM

## 2022-06-06 RX ORDER — INSULIN ASPART 100 [IU]/ML
5 INJECTION, SOLUTION INTRAVENOUS; SUBCUTANEOUS
Status: DISCONTINUED | OUTPATIENT
Start: 2022-06-06 | End: 2022-06-06 | Stop reason: HOSPADM

## 2022-06-06 RX ORDER — ACETAMINOPHEN 325 MG/1
650 TABLET ORAL ONCE AS NEEDED
Status: COMPLETED | OUTPATIENT
Start: 2022-06-06 | End: 2022-06-06

## 2022-06-06 RX ORDER — LIDOCAINE 50 MG/G
1 PATCH TOPICAL DAILY
Qty: 30 PATCH | Refills: 2 | Status: ON HOLD | OUTPATIENT
Start: 2022-06-06 | End: 2022-10-14 | Stop reason: HOSPADM

## 2022-06-06 RX ORDER — HYDROCODONE BITARTRATE AND ACETAMINOPHEN 500; 5 MG/1; MG/1
TABLET ORAL
Status: DISCONTINUED | OUTPATIENT
Start: 2022-06-06 | End: 2022-06-06 | Stop reason: HOSPADM

## 2022-06-06 RX ORDER — ASPIRIN 81 MG/1
81 TABLET ORAL DAILY
Refills: 0 | Status: ON HOLD
Start: 2022-06-06 | End: 2022-08-28 | Stop reason: HOSPADM

## 2022-06-06 RX ORDER — ALLOPURINOL 300 MG/1
300 TABLET ORAL DAILY
Qty: 90 TABLET | Refills: 3 | Status: ON HOLD | OUTPATIENT
Start: 2022-06-07 | End: 2022-10-14 | Stop reason: HOSPADM

## 2022-06-06 RX ORDER — DIPHENHYDRAMINE HCL 25 MG
25 CAPSULE ORAL ONCE AS NEEDED
Status: COMPLETED | OUTPATIENT
Start: 2022-06-06 | End: 2022-06-06

## 2022-06-06 RX ORDER — SULFAMETHOXAZOLE AND TRIMETHOPRIM 800; 160 MG/1; MG/1
1 TABLET ORAL
Qty: 40 TABLET | Refills: 3 | Status: SHIPPED | OUTPATIENT
Start: 2022-06-06 | End: 2022-09-12

## 2022-06-06 RX ORDER — CETIRIZINE HYDROCHLORIDE 5 MG/1
5 TABLET ORAL DAILY
Status: DISCONTINUED | OUTPATIENT
Start: 2022-06-06 | End: 2022-06-06 | Stop reason: HOSPADM

## 2022-06-06 RX ADMIN — HYDROMORPHONE HYDROCHLORIDE 2 MG: 2 TABLET ORAL at 09:06

## 2022-06-06 RX ADMIN — CETIRIZINE HYDROCHLORIDE 5 MG: 5 TABLET, FILM COATED ORAL at 02:06

## 2022-06-06 RX ADMIN — HEPARIN 300 UNITS: 100 SYRINGE at 04:06

## 2022-06-06 RX ADMIN — VORTIOXETINE 20 MG: 10 TABLET, FILM COATED ORAL at 09:06

## 2022-06-06 RX ADMIN — ONDANSETRON 8 MG: 8 TABLET, ORALLY DISINTEGRATING ORAL at 12:06

## 2022-06-06 RX ADMIN — DILTIAZEM HYDROCHLORIDE 90 MG: 30 TABLET, FILM COATED ORAL at 04:06

## 2022-06-06 RX ADMIN — OXCARBAZEPINE 1200 MG: 600 TABLET, FILM COATED ORAL at 09:06

## 2022-06-06 RX ADMIN — FLUCONAZOLE 400 MG: 200 TABLET ORAL at 04:06

## 2022-06-06 RX ADMIN — ACYCLOVIR 400 MG: 200 CAPSULE ORAL at 09:06

## 2022-06-06 RX ADMIN — ACETAMINOPHEN 650 MG: 325 TABLET ORAL at 09:06

## 2022-06-06 RX ADMIN — ALLOPURINOL 300 MG: 300 TABLET ORAL at 09:06

## 2022-06-06 RX ADMIN — SENNOSIDES AND DOCUSATE SODIUM 1 TABLET: 50; 8.6 TABLET ORAL at 09:06

## 2022-06-06 RX ADMIN — LEVOFLOXACIN 500 MG: 500 TABLET, FILM COATED ORAL at 04:06

## 2022-06-06 RX ADMIN — LOSARTAN POTASSIUM 25 MG: 25 TABLET, FILM COATED ORAL at 02:06

## 2022-06-06 RX ADMIN — BUSPIRONE HYDROCHLORIDE 10 MG: 10 TABLET ORAL at 09:06

## 2022-06-06 RX ADMIN — DILTIAZEM HYDROCHLORIDE 90 MG: 30 TABLET, FILM COATED ORAL at 09:06

## 2022-06-06 RX ADMIN — PANTOPRAZOLE SODIUM 40 MG: 40 TABLET, DELAYED RELEASE ORAL at 09:06

## 2022-06-06 RX ADMIN — PROCHLORPERAZINE EDISYLATE 5 MG: 5 INJECTION INTRAMUSCULAR; INTRAVENOUS at 11:06

## 2022-06-06 RX ADMIN — SULFAMETHOXAZOLE AND TRIMETHOPRIM 1 TABLET: 800; 160 TABLET ORAL at 04:06

## 2022-06-06 RX ADMIN — INSULIN ASPART 5 UNITS: 100 INJECTION, SOLUTION INTRAVENOUS; SUBCUTANEOUS at 09:06

## 2022-06-06 RX ADMIN — GABAPENTIN 300 MG: 300 CAPSULE ORAL at 02:06

## 2022-06-06 RX ADMIN — DIPHENHYDRAMINE HYDROCHLORIDE 25 MG: 25 CAPSULE ORAL at 09:06

## 2022-06-06 NOTE — PROGRESS NOTES
Road Test  Oxygen-Patient tolerating room air, no distress.  Ambulation-Patient up with assistance with walker.   Devices-Patient going home with no devices  Tolerating-Regular diet.  Elimination-Patient voiding without difficulty.  Self Care-Performs self care without assistance.  Teaching-Verbal and written discharge teaching given.     Port flushed with heparin and de-accessed.  No bleeding present. Patient going home. Discharge paperwork discussed. Medications reviewed. Patient has all belongings and has no questions at this time.

## 2022-06-06 NOTE — PLAN OF CARE
All VSS, afebrile, denied naisea. No PRN medications given so far. Rounding completed. Bed locked and in lowest position. Call light within reach. Instructed patient to call for help as needed. Plan of care reviewed. Will continue to monitor.

## 2022-06-06 NOTE — TELEPHONE ENCOUNTER
BENEFIT INVESTIGATION:  [facility] in network  Deductible: $500  OOP max: $0  Co pay: $100  Forwarded to FA team for review.

## 2022-06-06 NOTE — TELEPHONE ENCOUNTER
Running test claim as we again have not heard back from insurance and now getting paid claim. Sending to BI/FA as URGENT.

## 2022-06-06 NOTE — DISCHARGE SUMMARY
[facility] On[facility] ([facility])  Hematology  Bone Marrow Transplant  Discharge Summary      Patient Name: Deepti Gonzalez  MRN: [id number]  Admission Date: 5/23/2022  Hospital Length of Stay: 14 days  Discharge Date and Time:  06/06/2022 2:18 PM  Attending Physician: Kathrine Posey MD   Discharging Provider: Connor M Gillies, MD  Primary Care Provider: Primary Doctor No    HPI: Ms. Gonzalez is a 51 y.o. female, patient of Dr. 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 Her WBC count on 5/19 was 19K. She is admitted for plans of cytoreduction with dexamethasone and one dose of vincristine with extremely high TLS risk. She has an extensive medical history including hx of TIA, seizure disorder, anxiety/depression, afib, PAD, HTN, HLD, DM2, GERD, and RA. She reports low grade temps at home as well as worsening left hip pain. She reports intermittent nausea and headaches. She denies any chest pain, sob, vomiting, diarrhea, bleeding. She was covid negative prior to admission. She has a right chest wall port.      * No surgery found *     Hospital Course: 5/24/2022 Patient admitted yesterday for worsening WBC count with known refractory B-ALL. Continues on dex 20mg daily. Plan for dose of vincristine today. Will monitor BID TLS labs given high risk. Continues with intermittent pain, controlled with prn dilaudid. Denies any other issues this AM  05/25/2022 Day 2 of vincristine. Tolerated dose 5/24 without issue. WBC back up to 129K, will increase dex to 40mg daily. Concern for impending DIC with decreased fibrinogen to 123, will monitor BID DIC labs in addition to TLS labs. Patient weight up from admit, no edema, however reports SOB and using O2 via NC. Will give 40mg lasix. Blood sugars increased, added 5u aspart w/ meals. Afebrile, VSS  05/26/2022 WBC down to 42k. Continues on dex to 40mg daily, dc today. Blood sugars remain in 300s, meal time aspart increased to 7  units TID.  05/27/2022 WBC down to 11k. Blood sugars remain elevated - insulin titrated up to 10 units with meals and 10 units BID. Panotinib still pending insurance authorization.  05/28/2022 WBC down to 8k. Blood sugars improved - insulin titrated down to 5 units with meals and 10 units BID. Panotinib still pending insurance authorization. Transfusing 1 unit prbc.  05/29/2022 WBC down to 6k. Insulin titrated down to 10 units daily. Panotinib still pending insurance authorization.  05/30/2022 WBC stable at 7K but increased blasts on peripheral blood. Remains on 10 units levemir daily. Pending panotinib. Will discuss vincristine dose for tomorrow. Transfusing 1 unit platelets.  05/31/2022 WBC increased to 11K. Glucose stable and remains on 10 units levemir daily. Pending panotinib. Plan for vincristine today.  06/01/2022 Received Vincristine yesterday without issue. WBC increased to 26k today. Starting dex 20mg daily. Panotinib pending. Met with Dr. Mccord today and will start PCA for bone pain.  06/02/2022 WBC 6K today. Blood glucose elevated - added 6 units TIDWM insulin. Pending panotinib. Continues on PCA pump.  06/03/2022 WBC 3K. Up titrated insulin regimen. Transitioned to PO dilaudid. 1 unit platelets and PRBC.  06/04/2022 WBC 4K with 2% blasts on CBC. Increased insulin to 10 units detemir BID and 10 units TIDWM. Pain well controlled with PO dilaudid. No transfusion requirement today.  06/05/2022 Completed dexamethasone yesterday. Will titrate down insulin to 10 units TIDWM and 10 units detemir daily. Pain controlled with PO dilaudid, will space to q4.  06/06/2022 Patient stable for discharge home with close follow up in clinic and with twice weekly labs for further  transfusion needs. She will discharge home with OI ppx and allopurinol 300mg daily. Her ASA (CAD) and AC (Afib) will be held due to her significant thrombocytopenia until further evaluated with serial labs.      Goals of Care Treatment  Preferences:  Code Status: Full Code    Health care agent: Mannie Gonzalez  Health care agent number: [phone]      Vitals:    06/06/22 1028 06/06/22 1100 06/06/22 1126 06/06/22 1411   BP: (!) 116/58 112/68 (!) 117/55 128/62   BP Location:       Patient Position:       Pulse: 83 84 81    Resp: 18 18 18    Temp: 97.8 °F (36.6 °C) 98.1 °F (36.7 °C) 96.7 °F (35.9 °C)    TempSrc: Oral Oral Oral    SpO2: 95% (!) 94% 95%    Weight:       Height:            Physical Exam  Vitals reviewed.   Constitutional:       Appearance: Normal appearance. She is well-developed.   HENT:      Head: Normocephalic and atraumatic.      Right Ear: External ear normal.      Left Ear: External ear normal.   Eyes:      Extraocular Movements: Extraocular movements intact.      Conjunctiva/sclera: Conjunctivae normal.   Cardiovascular:      Rate and Rhythm: Normal rate and regular rhythm.      Pulses: Normal pulses.      Heart sounds: Normal heart sounds. No murmur heard.  Pulmonary:      Effort: Pulmonary effort is normal. No respiratory distress.      Breath sounds: No stridor. No wheezing or rales.   Abdominal:      General: Bowel sounds are normal. There is no distension.      Palpations: Abdomen is soft.      Tenderness: There is no abdominal tenderness.   Musculoskeletal:         General: Normal range of motion.      Cervical back: Normal range of motion.      Right lower leg: No edema.      Left lower leg: No edema.   Skin:     General: Skin is warm and dry.      Findings: Bruising present. No rash.      Comments: RCWP with no signs of infection   Neurological:      General: No focal deficit present.      Mental Status: She is alert and oriented to person, place, and time.   Psychiatric:         Mood and Affect: Mood normal.         Behavior: Behavior normal.         Thought Content: Thought content normal.         Judgment: Judgment normal.                    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] 11/24/2021 Yes    Volume overload [E87.70] 05/25/2022 Yes    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] 05/23/2022 Yes    At high risk of tumor lysis syndrome [Z91.89] 05/23/2022 Yes    Transaminitis [R74.01] 05/18/2022 Yes    Coagulopathy [D68.9] 05/18/2022 Yes    Leukocytosis [D72.829] 05/17/2022 Yes    Thrombocytopenia [D69.6] 05/13/2022 Yes    Palliative care encounter [Z51.5] 03/18/2022 Not Applicable    Cancer associated pain [G89.3]  Yes    Moderate major depression [F32.1] 11/29/2021 Yes    Type 2 diabetes mellitus, without long-term current use of insulin [E11.9] 11/24/2021 Yes    Seizure disorder [G40.909] 11/24/2021 Yes    Hypertension [I10] 11/24/2021 Yes    GERD (gastroesophageal reflux disease) [K21.9] 11/24/2021 Yes    Anxiety [F41.9] 11/24/2021 Yes    Rheumatoid arthritis involving multiple sites [M06.9] 12/22/2017 Yes    Paroxysmal atrial fibrillation [I48.0] 12/22/2017 Yes    Hyperlipidemia [E78.5] 12/22/2017 Yes    History of TIA (transient ischemic attack) [Z86.73] 12/22/2017 Not Applicable      Problems Resolved During this Admission:    Diagnosis Date Noted Date Resolved POA    Hypokalemia [E87.6] 03/17/2022 05/25/2022 Yes      Discharged Condition: fair    Disposition: Home    Follow Up: Will be scheduled by our department    Patient Instructions:   No discharge procedures on file.  Medications:  Reconciled Home Medications:      Medication List      START taking these medications     allopurinoL 300 MG tablet  Commonly known as: ZYLOPRIM  Take 1 tablet (300 mg total) by mouth once daily.  Start taking on: June 7, 2022     LIDOcaine 5 %  Commonly known as: LIDODERM  Place 1 patch onto the skin once daily. Remove & Discard patch within 12 hours or as directed by MD     sulfamethoxazole-trimethoprim 800-160mg 800-160 mg Tab  Commonly known as: BACTRIM DS  Take 1 tablet by mouth every Mon, Wed, Fri.        CONTINUE taking these medications    acyclovir 400 MG tablet  Commonly known as: ZOVIRAX  Take 1 tablet (400 mg total) by mouth 2 (two) times daily.     artificial tears 0.5 % ophthalmic solution  Commonly known as: ISOPTO TEARS  Place 1 drop into both eyes 4 (four) times daily as needed.     aspirin 81 MG EC tablet  Commonly known as: ECOTRIN  Take 1 tablet (81 mg total) by mouth once daily. Hold until instructed to resume by provider due to low platelet count.     atorvastatin 80 MG tablet  Commonly known as: LIPITOR  Take 80 mg by mouth once daily.     * blood sugar diagnostic Strp  SMARTSIG:Via Meter 1 to 2 Times Daily     * ONETOUCH VERIO TEST STRIPS Strp  Generic drug: blood sugar diagnostic  SMARTSIG:Via Meter 1 to 2 Times Daily     busPIRone 10 MG tablet  Commonly known as: BUSPAR  Take 10 mg by mouth 2 (two) times daily.     diazePAM 10 MG Tab  Commonly known as: VALIUM  Take 10 mg by mouth 2 (two) times daily as needed.     diltiaZEM 90 MG tablet  Commonly known as: CARDIZEM  Take 1 tablet (90 mg total) by mouth every 6 (six) hours.     DUKE'S SOLUTION (BENADRYL 30 ML, MYLANTA 30 ML, LIDOCAINE 30 ML, NYSTATIN 30 ML)  Take 10 mLs by mouth 4 (four) times daily as needed (mouth pain).     FARXIGA 10 mg tablet  Generic drug: dapagliflozin  Take 10 mg by mouth once daily.     fenofibrate 160 MG Tab  Take 160 mg by mouth once daily.     fluconazole 200 MG Tab  Commonly known as: DIFLUCAN  Take 2 tablets (400 mg total) by mouth once daily.     gabapentin 300 MG capsule  Commonly known as:  NEURONTIN  Take 1 capsule (300 mg total) by mouth 3 (three) times daily.     hydrOXYchloroQUINE 200 mg tablet  Commonly known as: PLAQUENIL  Take 1 tablet (200 mg total) by mouth once daily.     hydrOXYzine pamoate 50 MG Cap  Commonly known as: VISTARIL  Take 50 mg by mouth 2 (two) times daily as needed.     levoFLOXacin 500 MG tablet  Commonly known as: LEVAQUIN  Take 1 tablet (500 mg total) by mouth once daily.     losartan 25 MG tablet  Commonly known as: COZAAR  Take 25 mg by mouth once daily.     omeprazole 40 MG capsule  Commonly known as: PRILOSEC  Take 40 mg by mouth once daily.     ondansetron 8 MG Tbdl  Commonly known as: ZOFRAN-ODT  Dissolve 1 tablet (8 mg total) by mouth every 12 (twelve) hours as needed (in case of chemo induced nausea).     OXcarbazepine 600 MG Tab  Commonly known as: TRILEPTAL  Take 1,200 mg by mouth 2 (two) times daily.     oxyCODONE 5 MG immediate release tablet  Commonly known as: ROXICODONE  Take 1 tablet (5 mg total) by mouth every 4 (four) hours as needed for Pain.     polyethylene glycol 17 gram/dose powder  Commonly known as: GLYCOLAX  Dissolve one capful (17 g) in liquid and take by mouth daily as needed (constipation).     PONATinib 45 mg Tab tablet  Commonly known as: ICLUSIG  Take 1 tablet (45 mg total) by mouth once daily Do not initiate until instructed to do so by Dr. Jenkins..     prochlorperazine 5 MG tablet  Commonly known as: COMPAZINE  Take 1 tablet (5 mg total) by mouth every 6 (six) hours as needed for Nausea.     QUEtiapine 200 MG Tab  Commonly known as: SEROQUEL  Take 200 mg by mouth every evening.     rivaroxaban 20 mg Tab  Commonly known as: XARELTO  Take 1 tablet (20 mg total) by mouth daily with dinner or evening meal. Hold until instructed to resume by provider due to low platelet count.     STOOL SOFTENER-LAXATIVE 8.6-50 mg per tablet  Generic drug: senna-docusate 8.6-50 mg  Take 1 tablet by mouth 2 (two) times daily.     sumatriptan 50 MG tablet  Commonly  known as: IMITREX  Take 1 tablet (50 mg total) by mouth daily as needed (headache).     TRINTELLIX 20 mg Tab  Generic drug: vortioxetine  Take 1 tablet by mouth once daily.         * This list has 2 medication(s) that are the same as other medications prescribed for you. Read the directions carefully, and ask your doctor or other care provider to review them with you.                Connor M Gillies, MD  Bone Marrow Transplant  [facility])

## 2022-06-06 NOTE — TELEPHONE ENCOUNTER
FOR DOCUMENTATION ONLY:  Financial Assistance for Iclusig is approved from 6/6/22 to 12/31/22  Source:  Copay Card  BIN: [id number]  PCN: [employer]  Id: [id number]  GRP: [id number]  Annual Max: $64880

## 2022-06-07 ENCOUNTER — SPECIALTY PHARMACY (OUTPATIENT)
Dept: PHARMACY | Facility: CLINIC | Age: 52
End: 2022-06-07
Payer: COMMERCIAL

## 2022-06-07 NOTE — TELEPHONE ENCOUNTER
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 female, who is followed by the specialty pharmacy service for management and education.    Recent Encounters     Date Type Provider Description    06/07/2022 Specialty Pharmacy Stephanie Durham PharmD Initial Clinical Assessment    05/17/2022 Specialty Pharmacy KAMILA ULLOA PharmD Referral Authorization    04/25/2022 Specialty Pharmacy KAMILA ULLOA PharmD Refill Coordination    04/01/2022 Specialty Pharmacy KAMILA ULLOA PharmD Referral Authorization; Refill Coordination; Follow-up Clinical Reassessment    02/09/2022 Specialty Pharmacy KAMILA ULLOA PharmD Refill Coordination        Clinical call attempts since last clinical assessment   No call attempts found.     Current Outpatient Medications   Medication Sig    acyclovir (ZOVIRAX) 400 MG tablet Take 1 tablet (400 mg total) by mouth 2 (two) times daily.    allopurinoL (ZYLOPRIM) 300 MG tablet Take 1 tablet (300 mg total) by mouth once daily.    artificial tears (ISOPTO TEARS) 0.5 % ophthalmic solution Place 1 drop into both eyes 4 (four) times daily as needed.    aspirin (ECOTRIN) 81 MG EC tablet Take 1 tablet (81 mg total) by mouth once daily. Hold until instructed to resume by provider due to low platelet count.    atorvastatin (LIPITOR) 80 MG tablet Take 80 mg by mouth once daily.    blood sugar diagnostic Strp SMARTSIG:Via Meter 1 to 2 Times Daily    busPIRone (BUSPAR) 10 MG tablet Take 10 mg by mouth 2 (two) times daily.    dapagliflozin (FARXIGA) 10 mg tablet Take 10 mg by mouth once daily.    diazePAM (VALIUM) 10 MG Tab Take 10 mg by mouth 2 (two) times daily as needed.    diltiaZEM (CARDIZEM) 90 MG tablet  Take 1 tablet (90 mg total) by mouth every 6 (six) hours.    duke's soln (benadryl 30 mL, mylanta 30 mL, LIDOcaine 30 mL, nystatin 30 mL) 120mL Take 10 mLs by mouth 4 (four) times daily as needed (mouth pain). (Patient not taking: No sig reported)    fenofibrate 160 MG Tab Take 160 mg by mouth once daily.    fluconazole (DIFLUCAN) 200 MG Tab Take 2 tablets (400 mg total) by mouth once daily.    gabapentin (NEURONTIN) 300 MG capsule Take 1 capsule (300 mg total) by mouth 3 (three) times daily.    hydrOXYchloroQUINE (PLAQUENIL) 200 mg tablet Take 1 tablet (200 mg total) by mouth once daily.    hydrOXYzine pamoate (VISTARIL) 50 MG Cap Take 50 mg by mouth 2 (two) times daily as needed.    levoFLOXacin (LEVAQUIN) 500 MG tablet Take 1 tablet (500 mg total) by mouth once daily.    LIDOcaine (LIDODERM) 5 % Place 1 patch onto the skin once daily. Remove & Discard patch within 12 hours or as directed by MD    losartan (COZAAR) 25 MG tablet Take 25 mg by mouth once daily.    omeprazole (PRILOSEC) 40 MG capsule Take 40 mg by mouth once daily.    ondansetron (ZOFRAN-ODT) 8 MG TbDL Dissolve 1 tablet (8 mg total) by mouth every 12 (twelve) hours as needed (in case of chemo induced nausea).    ONETOUCH VERIO TEST STRIPS Strp SMARTSIG:Via Meter 1 to 2 Times Daily    OXcarbazepine (TRILEPTAL) 600 MG Tab Take 1,200 mg by mouth 2 (two) times daily.    oxyCODONE (ROXICODONE) 5 MG immediate release tablet Take 1 tablet (5 mg total) by mouth every 4 (four) hours as needed for Pain.    polyethylene glycol (GLYCOLAX) 17 gram/dose powder Dissolve one capful (17 g) in liquid and take by mouth daily as needed (constipation). (Patient not taking: No sig reported)    PONATinib (ICLUSIG) 45 mg Tab tablet Take 1 tablet (45 mg total) by mouth once daily Do not initiate until instructed to do so by Dr. Jenkins..    prochlorperazine (COMPAZINE) 5 MG tablet Take 1 tablet (5 mg total) by mouth every 6 (six) hours as needed for Nausea.     QUEtiapine (SEROQUEL) 200 MG Tab Take 200 mg by mouth every evening.    rivaroxaban (XARELTO) 20 mg Tab Take 1 tablet (20 mg total) by mouth daily with dinner or evening meal. Hold until instructed to resume by provider due to low platelet count.    senna-docusate 8.6-50 mg (PERICOLACE) 8.6-50 mg per tablet Take 1 tablet by mouth 2 (two) times daily.    sulfamethoxazole-trimethoprim 800-160mg (BACTRIM DS) 800-160 mg Tab Take 1 tablet by mouth every Mon, Wed, Fri.    sumatriptan (IMITREX) 50 MG tablet Take 1 tablet (50 mg total) by mouth daily as needed (headache).    TRINTELLIX 20 mg Tab Take 1 tablet by mouth once daily.   Last reviewed on 5/23/2022  5:16 PM by Divine Rogers RN    Review of patient's allergies indicates:   Allergen Reactions    Levetiracetam      Other reaction(s): Unknown  Other reaction(s): Unknown  Other reaction(s): Unknown   Last reviewed on  5/25/2022 6:36 AM by Mirtha Palafox    Drug Interactions    Clinically relevant drug interactions identified: yes   Interactions list: Arava + Gleevec  Risk Rating D: Consider therapy modification   Immunosuppressants (Miscellaneous Oncologic Agents) may enhance the immunosuppressive effect of Leflunomide.   Patient Management Increase the frequency of chronic monitoring of platelet, white blood cell count, and hemoglobin or hematocrit to monthly, instead of every 6 to 8 weeks, if leflunomide is coadministered with immunosuppressive agents.    Decadron + Gleevec  Risk Rating D: Consider therapy modification  Summary DexAMETHasone (Systemic) may decrease the serum concentration of Imatinib.   Patient Management Avoid concurrent use of imatinib with dexamethasone when possible. If such a combination must be used, increase imatinib dose by at least 50% and monitor the patient's clinical response closely.    Cardizem + Gleevec  Risk Rating C: Monitor therapy  Gleevec may increase the serum concentration of DilTIAZem. Severity Minor Reliability  Rating Good   Patient Management Monitor for increased diltiazem effects and toxicities (eg, hypotension, bradycardia) when combined with moderate CY inhibitors.    Lipitor + Gleevec   Risk Rating C: Monitor therapy  Summary Gleevec may increase the serum concentration of AtorvaSTATin. Severity Moderate Reliability Rating Good   Patient Management Monitor for increased atorvastatin adverse effects (eg, myopathy, rhabdomyolysis)     Seroquel + Gleevec   Risk Rating C: Monitor therapy  Gleevec may increase the serum concentration of QUEtiapine. Severity Moderate Reliability Rating Good   Patient Management Monitor for increased quetiapine effects and toxicities, including QTc prolongation, when combined with moderate CY inhibitors.    Valium + Gleevec   Risk Rating C: Monitor therapy  Gleevec may increase the serum concentration of DiazePAM. Severity Moderate Reliability Rating Good   Patient Management Monitor for increased diazepam effects and toxicities (eg, somnolence, sedation) if combined with moderate CY inhibitors.     Gleevec + diflucan  Risk Rating B: No action needed  Gleevec may increase the serum concentration of Imatinib. Severity Minor Reliability Rating Fair   Patient Management No action required beyond standard clinical care measures.    Iclusig + Cardizem   Risk Rating C: monitor therapy  Cardizem may increase Iclusig serum concentration  Patient Management: Monitor for increased ponatinib toxicities     Iclusig + Diflucan   Risk Rating C: monitor therapy   Patient Management: Monitor for increased ponatinib toxicities          Drug management plan: Arava + Gleevec  Risk Rating D: Consider therapy modification   Immunosuppressants (Miscellaneous Oncologic Agents) may enhance the immunosuppressive effect of Leflunomide.   Patient Management Increase the frequency of chronic monitoring of platelet, white blood cell count, and hemoglobin or hematocrit to monthly, instead of every 6 to 8  weeks, if leflunomide is coadministered with immunosuppressive agents.    Decadron + Gleevec  Risk Rating D: Consider therapy modification  Summary DexAMETHasone (Systemic) may decrease the serum concentration of Imatinib.   Patient Management Avoid concurrent use of imatinib with dexamethasone when possible. If such a combination must be used, increase imatinib dose by at least 50% and monitor the patient's clinical response closely.    Cardizem + Gleevec  Risk Rating C: Monitor therapy  Gleevec may increase the serum concentration of DilTIAZem. Severity Minor Reliability Rating Good   Patient Management Monitor for increased diltiazem effects and toxicities (eg, hypotension, bradycardia) when combined with moderate CY inhibitors.    Lipitor + Gleevec   Risk Rating C: Monitor therapy  Summary Gleevec may increase the serum concentration of AtorvaSTATin. Severity Moderate Reliability Rating Good   Patient Management Monitor for increased atorvastatin adverse effects (eg, myopathy, rhabdomyolysis)     Seroquel + Gleevec   Risk Rating C: Monitor therapy  Gleevec may increase the serum concentration of QUEtiapine. Severity Moderate Reliability Rating Good   Patient Management Monitor for increased quetiapine effects and toxicities, including QTc prolongation, when combined with moderate CY inhibitors.    Valium + Gleevec   Risk Rating C: Monitor therapy  Gleevec may increase the serum concentration of DiazePAM. Severity Moderate Reliability Rating Good   Patient Management Monitor for increased diazepam effects and toxicities (eg, somnolence, sedation) if combined with moderate CY inhibitors.     Gleevec + diflucan  Risk Rating B: No action needed  Gleevec may increase the serum concentration of Imatinib. Severity Minor Reliability Rating Fair   Patient Management No action required beyond standard clinical care measures.    Iclusig + Cardizem   Risk Rating C: monitor therapy  Cardizem may increase Iclusig  serum concentration  Patient Management: Monitor for increased ponatinib toxicities     Iclusig + Diflucan   Risk Rating C: monitor therapy   Patient Management: Monitor for increased ponatinib toxicities         Medication Adherence    Adherence tools used: alarm  Support network for adherence: healthcare provider       Adverse Effects    *All other systems reviewed and are negative       Assessment Questions - Documented Responses    Flowsheet Row Most Recent Value   Assessment    Medication Reconciliation completed for patient Yes   During the past 4 weeks, has patient missed any activities due to condition or medication? No   During the past 4 weeks, did patient have any of the following urgent care visits? None   Goals of Therapy Status Discussed (new start)   Status of the patients ability to self-administer: Is Able   All education points have been covered with patient? Yes, supplemental printed education provided   Welcome packet contents reviewed and discussed with patient? Yes   Assesment completed? Yes   Plan Therapy being initiated   Do you need to open a clinical intervention (i-vent)? No   Do you want to schedule first shipment? Yes   Medication #1 Assessment Info    Patient status New medication, New to [facility]   Is this medication appropriate for the patient? Yes   Is this medication effective? Not yet started        Refill Questions - Documented Responses    Flowsheet Row Most Recent Value   Patient Availability and HIPAA Verification    Does patient want to proceed with activity? Yes   HIPAA/medical authority confirmed? Yes   Relationship to patient of person spoken to? Spouse/Significant Other   Refill Screening Questions    When does the patient need to receive the medication? 06/09/22   Refill Delivery Questions    How will the patient receive the medication? Mail   When does the patient need to receive the medication? 06/09/22   Shipping Address Home   Address in [facility] confirmed and  "updated if neccessary? Yes   Expected Copay ($) 0   Is the patient able to afford the medication copay? Yes   Payment Method zero copay   Days supply of Refill 30   Supplies needed? No supplies needed   Refill activity completed? Yes   Refill activity plan Refill scheduled   Shipment/Pickup Date: 06/09/22          Objective    She has a past medical history of Arthritis, Cancer, COPD (chronic obstructive pulmonary disease), Hypertension, and Stroke.    Tried/failed medications: Gleevec    BP Readings from Last 4 Encounters:   06/06/22 128/62   05/23/22 130/64   05/19/22 (!) 143/70   05/13/22 (!) 115/57     Ht Readings from Last 4 Encounters:   05/23/22 5' 5" (1.651 m)   05/23/22 5' 4" (1.626 m)   05/13/22 5' 4" (1.626 m)   05/13/22 5' 4" (1.626 m)     Wt Readings from Last 4 Encounters:   06/06/22 74 kg (163 lb 2.3 oz)   05/23/22 70.2 kg (154 lb 12.2 oz)   05/19/22 71 kg (156 lb 8.4 oz)   05/13/22 70.7 kg (155 lb 13.8 oz)     Recent Labs   Lab Result Units 06/06/22  1356 06/06/22  0400 06/05/22  1611 06/05/22  0425 06/04/22  1708 06/04/22  0414   RBC M/uL 3.10 L 3.00 L  --  2.76 L  --  2.65 L   Hemoglobin g/dL 9.1 L 8.8 L  --  8.2 L  --  8.0 L   Hematocrit % 25.7 L 24.8 L  --  23.6 L  --  23.3 L   WBC K/uL 2.95 L 4.07  --  4.30  --  4.57   Gran # (ANC) K/uL 1.2 L 1.7 L  --  2.7  --   --    Gran % % 39.4 42.7  --  62.8  --  77.0 H   Platelets K/uL 24 LL 10 LL  --  11 LL  --  15 LL   Sodium mmol/L  --  133 L 131 L 133 L 130 L 131 L   Potassium mmol/L  --  4.5 4.6 4.7 4.8 4.5   Chloride mmol/L  --  95 94 L 95 96 99   Glucose mg/dL  --  118 H 208 H 260 H 351 H 384 H   BUN mg/dL  --  21 H 22 H 21 H 21 H 23 H   Creatinine mg/dL  --  0.7 0.7 0.7 0.9 0.9   Calcium mg/dL  --  8.9 9.2 8.9 9.0 8.7   Total Protein g/dL  --  5.7 L 6.3 5.7 L 6.5 5.6 L   Albumin g/dL  --  3.3 L 3.5 3.3 L 3.6 3.2 L   Total Bilirubin mg/dL  --  0.4 0.4 0.3 0.4 0.2   Alkaline Phosphatase U/L  --  110 121 122 150 H 145 H   AST U/L  --  8 L 7 L 6 L 5 L " 6 L   ALT U/L  --  17 16 16 20 19     The goals of cancer treatment include:  · Achieving remission of cancer, if possible  · Reducing tumor size and spread of cancer, if remission is not possible  · Minimizing pain and symptoms of the cancer  · Preventing infection and other complications of treatment  · Promoting adequate nutrition  · Encouraging proper hydration  · Improving or maintaining quality of life  · Maintaining optimal therapy adherence  · Minimizing and managing side effects    Goals of Therapy Status: Discussed (new start)    Assessment/Plan  Patient plans to start therapy on 06/09/22      Indication, dosage, appropriateness, effectiveness, safety and convenience of her specialty medication(s) were reviewed today.     Patient Education   Patient received education on the following:    Expectations and possible outcomes of therapy   Proper use, timely administration, and missed dose management   Duration of therapy   Side effects, including prevention, minimization, and management   Contraindications and safety precautions   New or changed medications, including prescribe and over the counter medications and supplements   Reviews recommended vaccinations, as appropriate   Storage, safe handling, and disposal        Tasks added this encounter   7/2/2022 - Refill Call (Auto Added)  11/27/2022 - Clinical - Follow Up Assesement (180 day)   Tasks due within next 3 months   No tasks due.     Stephanie Durham, PharmD  [facility]  [address]  [facility] [address]  Phone: [phone]  Fax: [phone]

## 2022-06-08 ENCOUNTER — TELEPHONE (OUTPATIENT)
Dept: HEMATOLOGY/ONCOLOGY | Facility: CLINIC | Age: 52
End: 2022-06-08
Payer: COMMERCIAL

## 2022-06-08 DIAGNOSIS — G89.3 CANCER ASSOCIATED PAIN: ICD-10-CM

## 2022-06-08 DIAGNOSIS — C91.00 B-CELL ACUTE LYMPHOBLASTIC LEUKEMIA: Primary | ICD-10-CM

## 2022-06-08 RX ORDER — HYDROMORPHONE HYDROCHLORIDE 2 MG/1
2 TABLET ORAL
Qty: 120 TABLET | Refills: 0 | Status: SHIPPED | OUTPATIENT
Start: 2022-06-08 | End: 2022-08-12 | Stop reason: SDUPTHER

## 2022-06-08 RX ORDER — NALOXONE HYDROCHLORIDE 4 MG/.1ML
SPRAY NASAL
Qty: 1 EACH | Refills: 11 | Status: SHIPPED | OUTPATIENT
Start: 2022-06-08

## 2022-06-08 NOTE — TELEPHONE ENCOUNTER
"----- Message from Edgar Birmingham sent at 6/8/2022 12:31 PM CDT -----  Consult/Advisory:          Name Of Caller: Mannie (Spouse)      Contact Preference?: [phone]      Provider Name: Gregory      Does patient feel the need to be seen today? No      What is the nature of the call?: Calling to speak w/ nurse. Inquiring if it's okay for pt to take metFORMIN medication          Additional Notes: metFORMIN not listed on pt chart      "Thank you for all that you do for our patients"      "

## 2022-06-10 ENCOUNTER — TELEPHONE (OUTPATIENT)
Dept: HEMATOLOGY/ONCOLOGY | Facility: CLINIC | Age: 52
End: 2022-06-10
Payer: COMMERCIAL

## 2022-06-10 NOTE — TELEPHONE ENCOUNTER
"----- Message from Milli Flores sent at 6/10/2022 11:14 AM CDT -----  Name Of Caller: Mannie-     Contact Preference?: [phone]    What is the nature of the call?: requesting a call back to discuss medication that was received in the mail yesterday             Additional Notes:  "Thank you for all that you do for our patients'"     "

## 2022-06-11 ENCOUNTER — HOSPITAL ENCOUNTER (EMERGENCY)
Facility: HOSPITAL | Age: 52
Discharge: HOME | End: 2022-06-11
Attending: EMERGENCY MEDICINE
Payer: COMMERCIAL

## 2022-06-11 VITALS
DIASTOLIC BLOOD PRESSURE: 71 MMHG | OXYGEN SATURATION: 98 % | SYSTOLIC BLOOD PRESSURE: 136 MMHG | HEIGHT: 65 IN | RESPIRATION RATE: 18 BRPM | TEMPERATURE: 98 F | BODY MASS INDEX: 25.66 KG/M2 | WEIGHT: 154 LBS | HEART RATE: 102 BPM

## 2022-06-11 DIAGNOSIS — E83.42 HYPOMAGNESEMIA: Primary | ICD-10-CM

## 2022-06-11 DIAGNOSIS — R04.0 LEFT-SIDED EPISTAXIS: ICD-10-CM

## 2022-06-11 DIAGNOSIS — D69.6 THROMBOCYTOPENIA: ICD-10-CM

## 2022-06-11 LAB
ABO + RH BLD: NORMAL
ALBUMIN SERPL BCP-MCNC: 3.2 G/DL (ref 3.5–5.2)
ALP SERPL-CCNC: 113 U/L (ref 55–135)
ALT SERPL W/O P-5'-P-CCNC: 13 U/L (ref 10–44)
ANION GAP SERPL CALC-SCNC: 12 MMOL/L (ref 8–16)
ANISOCYTOSIS BLD QL SMEAR: SLIGHT
APTT BLDCRRT: 22.2 SEC (ref 21–32)
AST SERPL-CCNC: 16 U/L (ref 10–40)
BASO STIPL BLD QL SMEAR: ABNORMAL
BASOPHILS NFR BLD: 0 % (ref 0–1.9)
BILIRUB SERPL-MCNC: 0.3 MG/DL (ref 0.1–1)
BLASTS NFR BLD MANUAL: 59 %
BLD GP AB SCN CELLS X3 SERPL QL: NORMAL
BLD PROD TYP BPU: NORMAL
BLOOD UNIT EXPIRATION DATE: NORMAL
BLOOD UNIT TYPE CODE: 8400
BLOOD UNIT TYPE: NORMAL
BUN SERPL-MCNC: 13 MG/DL (ref 6–20)
CALCIUM SERPL-MCNC: 9 MG/DL (ref 8.7–10.5)
CHLORIDE SERPL-SCNC: 106 MMOL/L (ref 95–110)
CO2 SERPL-SCNC: 20 MMOL/L (ref 23–29)
CODING SYSTEM: NORMAL
CREAT SERPL-MCNC: 0.8 MG/DL (ref 0.5–1.4)
DIFFERENTIAL METHOD: ABNORMAL
DISPENSE STATUS: NORMAL
EOSINOPHIL NFR BLD: 0 % (ref 0–8)
ERYTHROCYTE [DISTWIDTH] IN BLOOD BY AUTOMATED COUNT: 15.4 % (ref 11.5–14.5)
EST. GFR  (AFRICAN AMERICAN): >60 ML/MIN/1.73 M^2
EST. GFR  (NON AFRICAN AMERICAN): >60 ML/MIN/1.73 M^2
GLUCOSE SERPL-MCNC: 148 MG/DL (ref 70–110)
HCT VFR BLD AUTO: 22.9 % (ref 37–48.5)
HGB BLD-MCNC: 7.9 G/DL (ref 12–16)
IMM GRANULOCYTES # BLD AUTO: ABNORMAL K/UL (ref 0–0.04)
IMM GRANULOCYTES NFR BLD AUTO: ABNORMAL % (ref 0–0.5)
INR PPP: 1 (ref 0.8–1.2)
LYMPHOCYTES NFR BLD: 26 % (ref 18–48)
MAGNESIUM SERPL-MCNC: 1.5 MG/DL (ref 1.6–2.6)
MCH RBC QN AUTO: 29.8 PG (ref 27–31)
MCHC RBC AUTO-ENTMCNC: 34.5 G/DL (ref 32–36)
MCV RBC AUTO: 86 FL (ref 82–98)
MONOCYTES NFR BLD: 0 % (ref 4–15)
NEUTROPHILS NFR BLD: 14 % (ref 38–73)
NRBC BLD-RTO: 0 /100 WBC
PHOSPHATE SERPL-MCNC: 4.1 MG/DL (ref 2.7–4.5)
PLATELET # BLD AUTO: 11 K/UL (ref 150–450)
PLATELET BLD QL SMEAR: ABNORMAL
PMV BLD AUTO: 10.2 FL (ref 9.2–12.9)
POTASSIUM SERPL-SCNC: 3.9 MMOL/L (ref 3.5–5.1)
PROMYELOCYTES NFR BLD MANUAL: 1 %
PROT SERPL-MCNC: 6.3 G/DL (ref 6–8.4)
PROTHROMBIN TIME: 10.9 SEC (ref 9–12.5)
RBC # BLD AUTO: 2.65 M/UL (ref 4–5.4)
SCHISTOCYTES BLD QL SMEAR: ABNORMAL
SMUDGE CELLS BLD QL SMEAR: PRESENT
SODIUM SERPL-SCNC: 138 MMOL/L (ref 136–145)
UNIT NUMBER: NORMAL
WBC # BLD AUTO: 14.92 K/UL (ref 3.9–12.7)

## 2022-06-11 PROCEDURE — 36430 TRANSFUSION BLD/BLD COMPNT: CPT

## 2022-06-11 PROCEDURE — 99284 EMERGENCY DEPT VISIT MOD MDM: CPT | Mod: 25,,, | Performed by: EMERGENCY MEDICINE

## 2022-06-11 PROCEDURE — 99284 PR EMERGENCY DEPT VISIT,LEVEL IV: ICD-10-PCS | Mod: 25,,, | Performed by: EMERGENCY MEDICINE

## 2022-06-11 PROCEDURE — 63600175 PHARM REV CODE 636 W HCPCS: Performed by: EMERGENCY MEDICINE

## 2022-06-11 PROCEDURE — 85027 COMPLETE CBC AUTOMATED: CPT | Performed by: EMERGENCY MEDICINE

## 2022-06-11 PROCEDURE — 86900 BLOOD TYPING SEROLOGIC ABO: CPT | Performed by: EMERGENCY MEDICINE

## 2022-06-11 PROCEDURE — P9037 PLATE PHERES LEUKOREDU IRRAD: HCPCS | Performed by: EMERGENCY MEDICINE

## 2022-06-11 PROCEDURE — 96365 THER/PROPH/DIAG IV INF INIT: CPT

## 2022-06-11 PROCEDURE — 85007 BL SMEAR W/DIFF WBC COUNT: CPT | Performed by: EMERGENCY MEDICINE

## 2022-06-11 PROCEDURE — 87389 HIV-1 AG W/HIV-1&-2 AB AG IA: CPT | Performed by: EMERGENCY MEDICINE

## 2022-06-11 PROCEDURE — 84100 ASSAY OF PHOSPHORUS: CPT | Performed by: EMERGENCY MEDICINE

## 2022-06-11 PROCEDURE — 25000003 PHARM REV CODE 250: Performed by: EMERGENCY MEDICINE

## 2022-06-11 PROCEDURE — 30903 CONTROL OF NOSEBLEED: CPT

## 2022-06-11 PROCEDURE — 30901 CONTROL OF NOSEBLEED: CPT | Mod: LT,,, | Performed by: EMERGENCY MEDICINE

## 2022-06-11 PROCEDURE — 86901 BLOOD TYPING SEROLOGIC RH(D): CPT | Performed by: EMERGENCY MEDICINE

## 2022-06-11 PROCEDURE — 80053 COMPREHEN METABOLIC PANEL: CPT | Performed by: EMERGENCY MEDICINE

## 2022-06-11 PROCEDURE — 83735 ASSAY OF MAGNESIUM: CPT | Performed by: EMERGENCY MEDICINE

## 2022-06-11 PROCEDURE — 99284 EMERGENCY DEPT VISIT MOD MDM: CPT | Mod: 25

## 2022-06-11 PROCEDURE — 30901 PR CTRL 2SEBLEED,ANTER,SIMPLE: ICD-10-PCS | Mod: LT,,, | Performed by: EMERGENCY MEDICINE

## 2022-06-11 PROCEDURE — 85730 THROMBOPLASTIN TIME PARTIAL: CPT | Performed by: EMERGENCY MEDICINE

## 2022-06-11 PROCEDURE — 85610 PROTHROMBIN TIME: CPT | Performed by: EMERGENCY MEDICINE

## 2022-06-11 RX ORDER — CEPHALEXIN 500 MG/1
500 CAPSULE ORAL EVERY 12 HOURS
Qty: 10 CAPSULE | Refills: 0 | Status: SHIPPED | OUTPATIENT
Start: 2022-06-11 | End: 2022-06-16

## 2022-06-11 RX ORDER — OXYMETAZOLINE HCL 0.05 %
1 SPRAY, NON-AEROSOL (ML) NASAL
Status: COMPLETED | OUTPATIENT
Start: 2022-06-11 | End: 2022-06-11

## 2022-06-11 RX ORDER — HYDROCODONE BITARTRATE AND ACETAMINOPHEN 500; 5 MG/1; MG/1
TABLET ORAL
Status: DISCONTINUED | OUTPATIENT
Start: 2022-06-11 | End: 2022-06-11 | Stop reason: HOSPADM

## 2022-06-11 RX ORDER — MAGNESIUM SULFATE HEPTAHYDRATE 40 MG/ML
2 INJECTION, SOLUTION INTRAVENOUS
Status: COMPLETED | OUTPATIENT
Start: 2022-06-11 | End: 2022-06-11

## 2022-06-11 RX ADMIN — OXYMETAZOLINE HYDROCHLORIDE 1 SPRAY: 0.05 SPRAY NASAL at 06:06

## 2022-06-11 RX ADMIN — MAGNESIUM SULFATE 2 G: 2 INJECTION INTRAVENOUS at 03:06

## 2022-06-11 NOTE — ED NOTES
Deepti Gonzalez, an 51 y.o. female presents to the ED complaining of epistaxis since 10 AM this AM. Patient spoke to her oncologist who recommended she come in for evaluation. Received platelets yesterday at [facility]. Denies all other complaints. Patient  at bedside.       Chief Complaint   Patient presents with    Epistaxis     Hx leukemia, on chemo, just dc'd on Monday for same thing, plt infusion yest     Review of patient's allergies indicates:   Allergen Reactions    Levetiracetam      Other reaction(s): Unknown  Other reaction(s): Unknown  Other reaction(s): Unknown     Past Medical History:   Diagnosis Date    Arthritis     Cancer     COPD (chronic obstructive pulmonary disease)     Hypertension     Stroke     TIA x 4       LOC: The patient is awake, alert and aware of environment with an appropriate affect, the patient is oriented x 3 and speaking appropriately.   APPEARANCE: Patient appears pale, but in no acute distress, patient is clean and well groomed. Patient has towel applied to her nose to stop her epistaxis.  SKIN: The skin is pale appearing.  MUSCULOSKELETAL: Patient moving all extremities spontaneously, no swelling noted.  RESPIRATORY: Airway is open and patent, respirations are spontaneous, patient has a normal effort and rate, no accessory muscle use noted.  CARDIAC: Patient has a normal rate and regular rhythm, no edema noted, capillary refill < 3 seconds.   GASTRO: Soft and non tender to palpation, no distention noted.   : Pt denies any pain or frequency with urination.  NEURO: Pt opens eyes spontaneously, behavior appropriate to situation, follows commands.

## 2022-06-11 NOTE — ED PROVIDER NOTES
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  The patient reports coughing and sneezing earlier this a.m. with resultant left-sided nose bleed.  At the time of my exam, she notes mild ongoing hemorrhage without shortness of breath or chest pain.  She does note mild nausea which she relates to the swallowing of her nasal bleeding.  She denies any associated diarrhea or, vomiting, but does report mild generalized body aches which she frequently notes during her chemotherapy.  She denies any history of trauma.  She was recently admitted to this facility for similar problems and noted to have significantly diminished platelets prompting recent platelet infusions.        Review of patient's allergies indicates:   Allergen Reactions    Levetiracetam      Other reaction(s): Unknown  Other reaction(s): Unknown  Other reaction(s): Unknown     Past Medical History:   Diagnosis Date    Arthritis     Cancer     COPD (chronic obstructive pulmonary disease)     Hypertension     Stroke     TIA x 4     Past Surgical History:   Procedure Laterality Date    APPENDECTOMY      HYSTERECTOMY      ROTATOR CUFF REPAIR Bilateral     TONSILLECTOMY      TUBAL LIGATION       No family history on file.  Social History     Tobacco Use    Smoking status: Current Every Day Smoker     Packs/day: 0.50     Types: Cigarettes    Smokeless tobacco: Never Used   Substance Use Topics    Alcohol use: No    Drug use: Never     Review of Systems   Constitutional: Positive for fatigue. Negative for chills and fever.   HENT: Positive for nosebleeds and sneezing. Negative for dental  problem, ear discharge, facial swelling, postnasal drip, rhinorrhea, sinus pain, sore throat and trouble swallowing.    Respiratory: Negative for chest tightness and shortness of breath.    Cardiovascular: Negative for chest pain.   Gastrointestinal: Positive for nausea. Negative for abdominal pain, diarrhea and vomiting.   Genitourinary: Negative for dysuria and hematuria.   Musculoskeletal: Negative for neck pain.   Skin: Negative for rash and wound.   Neurological: Negative for seizures and numbness.   Hematological: Bruises/bleeds easily.   Psychiatric/Behavioral: Negative for confusion and decreased concentration.       Physical Exam     Initial Vitals [06/11/22 1348]   BP Pulse Resp Temp SpO2   130/60 100 18 98.1 °F (36.7 °C) 98 %      MAP       --         Physical Exam    Vitals reviewed.  Constitutional:   51-year-old  woman, no acute distress noted, pale   HENT:   Head: Normocephalic and atraumatic.   Right nare:  No active hemorrhage or sequelae of bleeding noted  Left near:  No active hemorrhage is noted, however, there is mild bleeding noted to the left septum without external evidence of trauma or deformity   Eyes: EOM are normal. Pupils are equal, round, and reactive to light.   Neck: No tracheal deviation present.   Cardiovascular: Normal rate, regular rhythm and intact distal pulses.   Pulmonary/Chest: No stridor. No respiratory distress. She has no wheezes.   Abdominal: Abdomen is soft. She exhibits no mass. There is no abdominal tenderness. There is no rebound.   Musculoskeletal:         General: No edema. Normal range of motion.     Neurological: She is alert and oriented to person, place, and time.   Skin: Skin is warm and dry.   Psychiatric: Her behavior is normal. Thought content normal.         ED Course   Epistaxis Mgmt    Date/Time: 6/11/2022 6:45 PM  Performed by: Huey Donnelly MD  Authorized by: Huey Donnelly MD   Consent Done: Yes  Consent: Verbal consent  "obtained.  Time out: Immediately prior to procedure a "time out" was called to verify the correct patient, procedure, equipment, support staff and site/side marked as required.    Patient sedated: no  Treatment site: left anterior  Repair method: anterior pack, oxymetazoline and Rhino Rocket  Post-procedure assessment: bleeding decreased  Treatment complexity: complex  Recurrence: recurrence of recent bleed  Patient tolerance: Patient tolerated the procedure well with no immediate complications        Labs Reviewed   CBC W/ AUTO DIFFERENTIAL - Abnormal; Notable for the following components:       Result Value    WBC 14.92 (*)     RBC 2.65 (*)     Hemoglobin 7.9 (*)     Hematocrit 22.9 (*)     RDW 15.4 (*)     Platelets 11 (*)     Gran % 14.0 (*)     Mono % 0.0 (*)     Blasts 59.0 (*)     Platelet Estimate Decreased (*)     All other components within normal limits    Narrative:     PLT   critical result(s) called and verbal readback obtained from   COLBY KENNEDY RN by  06/11/2022 15:41   COMPREHENSIVE METABOLIC PANEL - Abnormal; Notable for the following components:    CO2 20 (*)     Glucose 148 (*)     Albumin 3.2 (*)     All other components within normal limits   MAGNESIUM - Abnormal; Notable for the following components:    Magnesium 1.5 (*)     All other components within normal limits   APTT   PROTIME-INR   PHOSPHORUS   HIV 1 / 2 ANTIBODY   TYPE & SCREEN   PREPARE PLATELETS (DOSE) SOFT          Imaging Results    None          Medications   0.9%  NaCl infusion (for blood administration) (has no administration in time range)   magnesium sulfate 2g in water 50mL IVPB (premix) (0 g Intravenous Stopped 6/11/22 1642)   oxymetazoline 0.05 % nasal spray 1 spray (1 spray Each Nostril Given by Other 6/11/22 1815)     Medical Decision Making:   History:   Old Medical Records: I decided to obtain old medical records.  Old Records Summarized: records from clinic visits and records from previous " admission(s).  Differential Diagnosis:   Epistaxis, symptomatic anemia, thrombocytopenia, electrolyte derangement  Clinical Tests:   Lab Tests: Ordered and Reviewed  Other:   I have discussed this case with another health care provider.            Attending Attestation:             Attending ED Notes:   Shortly after initial examination, patient was able to clear nose, clearing a clot, and direct pressure has been initiated for 5-10 minutes.  I will continue to monitor her findings and response to therapy.  Left-sided nosebleed has subsided with direct pressure, and vital signs are within normal limits.  Laboratory evaluation reveals mild hypo magnesemia which has been repleted intravenously.  He CBC reveals evidence of a mild leukocytosis as well as macrocytic anemia, but does illustrate recurrent significant thrombocytopenia with 11,000 platelets with active micro- hemorrhage.  Findings and concerns have been discussed with Hematology on-call who recommended platelet infusion in ED which has been ordered.  I will continue to monitor this patient's response to therapy.   Patient was noted to indicate recurrence of her left-sided nasal bleeding prompting nasal packing placement by me without complication.  She has undergone her platelet infusion in the ED without complication, and reports that the sensation of nasal hemorrhage has subsided.  She will be discharged home in improved condition with instructions to take cephalexin 500 mg twice daily as a prophylaxis while nasal packing is in place.  An urgent referral to ENT has been submitted by me for further evaluation.  The patient is also scheduled to follow-up with her managing hematologist on Monday for further management.  All questions have been answered to the patient's satisfaction, and she will be discharged home in improved condition.               Clinical Impression:   Final diagnoses:  [E83.42] Hypomagnesemia (Primary)  [D69.6] Thrombocytopenia  [R04.0]  Left-sided epistaxis          ED Disposition Condition    Discharge Stable        ED Prescriptions     Medication Sig Dispense Start Date End Date Auth. Provider    cephALEXin (KEFLEX) 500 MG capsule Take 1 capsule (500 mg total) by mouth every 12 (twelve) hours. for 5 days 10 capsule 6/11/2022 6/16/2022 Huey Donnelly MD        Follow-up Information     Follow up With Specialties Details Why Contact Info Additional Information    [facility] - Emergency Dept Emergency Medicine  As needed, If symptoms worsen [address] [facility] [id number] [phone]     [facility] - Hem Onc [facility] Hematology and Oncology In 2 days as scheduled [facility] [facility] [id number] [phone] Please park in the Cancer Center surface lot on the [place] side and check in on the 3rd floor    [facility] ENT Otolaryngology Schedule an appointment as soon as possible for a visit in 5 days for packing removal and further evaluation [address] [facility] [id number]  [phone]            Huey Donnelly MD  06/11/22 1958

## 2022-06-12 NOTE — DISCHARGE INSTRUCTIONS
Follow-up with ENT in 5 days. Call the clinic in order to schedule  follow-up and notify them that you have nasal packing in place. Take cephalexin 500 mg twice daily until packing removed.

## 2022-06-12 NOTE — ED NOTES
Patient endorses significant pain to left nare. Dr. Donnelly made aware and some air removed. Patient states she feels better.

## 2022-06-13 ENCOUNTER — OFFICE VISIT (OUTPATIENT)
Dept: HEMATOLOGY/ONCOLOGY | Facility: CLINIC | Age: 52
End: 2022-06-13
Payer: COMMERCIAL

## 2022-06-13 ENCOUNTER — DOCUMENTATION ONLY (OUTPATIENT)
Dept: HEMATOLOGY/ONCOLOGY | Facility: CLINIC | Age: 52
End: 2022-06-13
Payer: COMMERCIAL

## 2022-06-13 ENCOUNTER — INFUSION (OUTPATIENT)
Dept: INFUSION THERAPY | Facility: HOSPITAL | Age: 52
End: 2022-06-13
Payer: COMMERCIAL

## 2022-06-13 ENCOUNTER — LAB VISIT (OUTPATIENT)
Dept: LAB | Facility: HOSPITAL | Age: 52
End: 2022-06-13
Payer: COMMERCIAL

## 2022-06-13 VITALS
OXYGEN SATURATION: 98 % | BODY MASS INDEX: 24.55 KG/M2 | TEMPERATURE: 98 F | HEART RATE: 98 BPM | WEIGHT: 147.38 LBS | DIASTOLIC BLOOD PRESSURE: 56 MMHG | RESPIRATION RATE: 16 BRPM | HEIGHT: 65 IN | SYSTOLIC BLOOD PRESSURE: 113 MMHG

## 2022-06-13 VITALS
WEIGHT: 147.25 LBS | RESPIRATION RATE: 18 BRPM | SYSTOLIC BLOOD PRESSURE: 118 MMHG | HEART RATE: 98 BPM | HEIGHT: 65 IN | DIASTOLIC BLOOD PRESSURE: 58 MMHG | TEMPERATURE: 98 F | BODY MASS INDEX: 24.53 KG/M2

## 2022-06-13 DIAGNOSIS — C91.00 B-CELL ACUTE LYMPHOBLASTIC LEUKEMIA: Primary | ICD-10-CM

## 2022-06-13 DIAGNOSIS — C91.00 B-CELL ACUTE LYMPHOBLASTIC LEUKEMIA: ICD-10-CM

## 2022-06-13 DIAGNOSIS — D61.818 PANCYTOPENIA: ICD-10-CM

## 2022-06-13 DIAGNOSIS — D72.829 LEUKOCYTOSIS, UNSPECIFIED TYPE: Primary | ICD-10-CM

## 2022-06-13 LAB
ABO + RH BLD: NORMAL
ALBUMIN SERPL BCP-MCNC: 3.3 G/DL (ref 3.5–5.2)
ALP SERPL-CCNC: 118 U/L (ref 55–135)
ALT SERPL W/O P-5'-P-CCNC: 13 U/L (ref 10–44)
ANION GAP SERPL CALC-SCNC: 16 MMOL/L (ref 8–16)
AST SERPL-CCNC: 12 U/L (ref 10–40)
BASOPHILS # BLD AUTO: ABNORMAL K/UL (ref 0–0.2)
BASOPHILS NFR BLD: 0 % (ref 0–1.9)
BILIRUB SERPL-MCNC: 0.4 MG/DL (ref 0.1–1)
BLASTS NFR BLD MANUAL: 17 %
BLD GP AB SCN CELLS X3 SERPL QL: NORMAL
BUN SERPL-MCNC: 25 MG/DL (ref 6–20)
CALCIUM SERPL-MCNC: 9 MG/DL (ref 8.7–10.5)
CHLORIDE SERPL-SCNC: 104 MMOL/L (ref 95–110)
CO2 SERPL-SCNC: 18 MMOL/L (ref 23–29)
CREAT SERPL-MCNC: 0.7 MG/DL (ref 0.5–1.4)
DIFFERENTIAL METHOD: ABNORMAL
EOSINOPHIL # BLD AUTO: ABNORMAL K/UL (ref 0–0.5)
EOSINOPHIL NFR BLD: 1 % (ref 0–8)
ERYTHROCYTE [DISTWIDTH] IN BLOOD BY AUTOMATED COUNT: 15.1 % (ref 11.5–14.5)
EST. GFR  (AFRICAN AMERICAN): >60 ML/MIN/1.73 M^2
EST. GFR  (NON AFRICAN AMERICAN): >60 ML/MIN/1.73 M^2
GLUCOSE SERPL-MCNC: 150 MG/DL (ref 70–110)
HCT VFR BLD AUTO: 22.3 % (ref 37–48.5)
HGB BLD-MCNC: 7.5 G/DL (ref 12–16)
HIV 1+2 AB+HIV1 P24 AG SERPL QL IA: NEGATIVE
IMM GRANULOCYTES # BLD AUTO: ABNORMAL K/UL (ref 0–0.04)
IMM GRANULOCYTES NFR BLD AUTO: ABNORMAL % (ref 0–0.5)
LYMPHOCYTES # BLD AUTO: ABNORMAL K/UL (ref 1–4.8)
LYMPHOCYTES NFR BLD: 73 % (ref 18–48)
MAGNESIUM SERPL-MCNC: 1.9 MG/DL (ref 1.6–2.6)
MCH RBC QN AUTO: 29.9 PG (ref 27–31)
MCHC RBC AUTO-ENTMCNC: 33.6 G/DL (ref 32–36)
MCV RBC AUTO: 89 FL (ref 82–98)
METAMYELOCYTES NFR BLD MANUAL: 1 %
MONOCYTES # BLD AUTO: ABNORMAL K/UL (ref 0.3–1)
MONOCYTES NFR BLD: 0 % (ref 4–15)
NEUTROPHILS NFR BLD: 6 % (ref 38–73)
NEUTS BAND NFR BLD MANUAL: 2 %
NRBC BLD-RTO: 0 /100 WBC
PHOSPHATE SERPL-MCNC: 6.9 MG/DL (ref 2.7–4.5)
PLATELET # BLD AUTO: 31 K/UL (ref 150–450)
PLATELET BLD QL SMEAR: ABNORMAL
PMV BLD AUTO: 11.5 FL (ref 9.2–12.9)
POTASSIUM SERPL-SCNC: 4.1 MMOL/L (ref 3.5–5.1)
PROT SERPL-MCNC: 6.6 G/DL (ref 6–8.4)
RBC # BLD AUTO: 2.51 M/UL (ref 4–5.4)
SODIUM SERPL-SCNC: 138 MMOL/L (ref 136–145)
WBC # BLD AUTO: 3.51 K/UL (ref 3.9–12.7)

## 2022-06-13 PROCEDURE — 3078F DIAST BP <80 MM HG: CPT | Mod: CPTII,S$GLB,, | Performed by: INTERNAL MEDICINE

## 2022-06-13 PROCEDURE — 4010F ACE/ARB THERAPY RXD/TAKEN: CPT | Mod: CPTII,S$GLB,, | Performed by: INTERNAL MEDICINE

## 2022-06-13 PROCEDURE — 80053 COMPREHEN METABOLIC PANEL: CPT | Performed by: NURSE PRACTITIONER

## 2022-06-13 PROCEDURE — 3074F PR MOST RECENT SYSTOLIC BLOOD PRESSURE < 130 MM HG: ICD-10-PCS | Mod: CPTII,S$GLB,, | Performed by: INTERNAL MEDICINE

## 2022-06-13 PROCEDURE — 96367 TX/PROPH/DG ADDL SEQ IV INF: CPT

## 2022-06-13 PROCEDURE — 96413 CHEMO IV INFUSION 1 HR: CPT

## 2022-06-13 PROCEDURE — 36415 COLL VENOUS BLD VENIPUNCTURE: CPT | Performed by: NURSE PRACTITIONER

## 2022-06-13 PROCEDURE — 3008F BODY MASS INDEX DOCD: CPT | Mod: CPTII,S$GLB,, | Performed by: INTERNAL MEDICINE

## 2022-06-13 PROCEDURE — 96375 TX/PRO/DX INJ NEW DRUG ADDON: CPT

## 2022-06-13 PROCEDURE — 99999 PR PBB SHADOW E&M-EST. PATIENT-LVL V: CPT | Mod: PBBFAC,,, | Performed by: INTERNAL MEDICINE

## 2022-06-13 PROCEDURE — 3044F HG A1C LEVEL LT 7.0%: CPT | Mod: CPTII,S$GLB,, | Performed by: INTERNAL MEDICINE

## 2022-06-13 PROCEDURE — 4010F PR ACE/ARB THEARPY RXD/TAKEN: ICD-10-PCS | Mod: CPTII,S$GLB,, | Performed by: INTERNAL MEDICINE

## 2022-06-13 PROCEDURE — 1160F PR REVIEW ALL MEDS BY PRESCRIBER/CLIN PHARMACIST DOCUMENTED: ICD-10-PCS | Mod: CPTII,S$GLB,, | Performed by: INTERNAL MEDICINE

## 2022-06-13 PROCEDURE — 1111F DSCHRG MED/CURRENT MED MERGE: CPT | Mod: CPTII,S$GLB,, | Performed by: INTERNAL MEDICINE

## 2022-06-13 PROCEDURE — A4216 STERILE WATER/SALINE, 10 ML: HCPCS | Performed by: INTERNAL MEDICINE

## 2022-06-13 PROCEDURE — 84100 ASSAY OF PHOSPHORUS: CPT | Performed by: NURSE PRACTITIONER

## 2022-06-13 PROCEDURE — 1160F RVW MEDS BY RX/DR IN RCRD: CPT | Mod: CPTII,S$GLB,, | Performed by: INTERNAL MEDICINE

## 2022-06-13 PROCEDURE — 1159F PR MEDICATION LIST DOCUMENTED IN MEDICAL RECORD: ICD-10-PCS | Mod: CPTII,S$GLB,, | Performed by: INTERNAL MEDICINE

## 2022-06-13 PROCEDURE — 99215 OFFICE O/P EST HI 40 MIN: CPT | Mod: S$GLB,,, | Performed by: INTERNAL MEDICINE

## 2022-06-13 PROCEDURE — 3078F PR MOST RECENT DIASTOLIC BLOOD PRESSURE < 80 MM HG: ICD-10-PCS | Mod: CPTII,S$GLB,, | Performed by: INTERNAL MEDICINE

## 2022-06-13 PROCEDURE — 3074F SYST BP LT 130 MM HG: CPT | Mod: CPTII,S$GLB,, | Performed by: INTERNAL MEDICINE

## 2022-06-13 PROCEDURE — 63600175 PHARM REV CODE 636 W HCPCS: Performed by: INTERNAL MEDICINE

## 2022-06-13 PROCEDURE — 3044F PR MOST RECENT HEMOGLOBIN A1C LEVEL <7.0%: ICD-10-PCS | Mod: CPTII,S$GLB,, | Performed by: INTERNAL MEDICINE

## 2022-06-13 PROCEDURE — 25000003 PHARM REV CODE 250: Performed by: INTERNAL MEDICINE

## 2022-06-13 PROCEDURE — 99999 PR PBB SHADOW E&M-EST. PATIENT-LVL V: ICD-10-PCS | Mod: PBBFAC,,, | Performed by: INTERNAL MEDICINE

## 2022-06-13 PROCEDURE — 83735 ASSAY OF MAGNESIUM: CPT | Performed by: NURSE PRACTITIONER

## 2022-06-13 PROCEDURE — 3008F PR BODY MASS INDEX (BMI) DOCUMENTED: ICD-10-PCS | Mod: CPTII,S$GLB,, | Performed by: INTERNAL MEDICINE

## 2022-06-13 PROCEDURE — 99215 PR OFFICE/OUTPT VISIT, EST, LEVL V, 40-54 MIN: ICD-10-PCS | Mod: S$GLB,,, | Performed by: INTERNAL MEDICINE

## 2022-06-13 PROCEDURE — 86901 BLOOD TYPING SEROLOGIC RH(D): CPT | Performed by: NURSE PRACTITIONER

## 2022-06-13 PROCEDURE — 1111F PR DISCHARGE MEDS RECONCILED W/ CURRENT OUTPATIENT MED LIST: ICD-10-PCS | Mod: CPTII,S$GLB,, | Performed by: INTERNAL MEDICINE

## 2022-06-13 PROCEDURE — 1159F MED LIST DOCD IN RCRD: CPT | Mod: CPTII,S$GLB,, | Performed by: INTERNAL MEDICINE

## 2022-06-13 RX ORDER — ACETAMINOPHEN 325 MG/1
650 TABLET ORAL
Status: CANCELLED | OUTPATIENT
Start: 2022-06-13

## 2022-06-13 RX ORDER — HEPARIN 100 UNIT/ML
500 SYRINGE INTRAVENOUS
Status: DISCONTINUED | OUTPATIENT
Start: 2022-06-13 | End: 2022-06-13 | Stop reason: HOSPADM

## 2022-06-13 RX ORDER — ACETAMINOPHEN 325 MG/1
650 TABLET ORAL
Status: COMPLETED | OUTPATIENT
Start: 2022-06-13 | End: 2022-06-13

## 2022-06-13 RX ORDER — SODIUM CHLORIDE 0.9 % (FLUSH) 0.9 %
10 SYRINGE (ML) INJECTION
Status: CANCELLED | OUTPATIENT
Start: 2022-06-20

## 2022-06-13 RX ORDER — SODIUM CHLORIDE 0.9 % (FLUSH) 0.9 %
10 SYRINGE (ML) INJECTION
Status: DISCONTINUED | OUTPATIENT
Start: 2022-06-13 | End: 2022-06-13 | Stop reason: HOSPADM

## 2022-06-13 RX ORDER — ONDANSETRON 8 MG/1
8 TABLET, ORALLY DISINTEGRATING ORAL EVERY 12 HOURS PRN
Qty: 30 TABLET | Refills: 1 | Status: CANCELLED | OUTPATIENT
Start: 2022-06-13 | End: 2023-06-13

## 2022-06-13 RX ORDER — SODIUM CHLORIDE 0.9 % (FLUSH) 0.9 %
10 SYRINGE (ML) INJECTION
Status: CANCELLED | OUTPATIENT
Start: 2022-06-13

## 2022-06-13 RX ORDER — ACETAMINOPHEN 325 MG/1
650 TABLET ORAL
Status: CANCELLED | OUTPATIENT
Start: 2022-07-05

## 2022-06-13 RX ORDER — HEPARIN 100 UNIT/ML
500 SYRINGE INTRAVENOUS
Status: CANCELLED | OUTPATIENT
Start: 2022-07-05

## 2022-06-13 RX ORDER — HEPARIN 100 UNIT/ML
500 SYRINGE INTRAVENOUS
Status: CANCELLED | OUTPATIENT
Start: 2022-06-20

## 2022-06-13 RX ORDER — SODIUM CHLORIDE 0.9 % (FLUSH) 0.9 %
10 SYRINGE (ML) INJECTION
Status: CANCELLED | OUTPATIENT
Start: 2022-07-05

## 2022-06-13 RX ORDER — HEPARIN 100 UNIT/ML
500 SYRINGE INTRAVENOUS
Status: CANCELLED | OUTPATIENT
Start: 2022-06-13

## 2022-06-13 RX ORDER — ACETAMINOPHEN 325 MG/1
650 TABLET ORAL
Status: CANCELLED | OUTPATIENT
Start: 2022-06-20

## 2022-06-13 RX ORDER — URSODIOL 300 MG/1
300 CAPSULE ORAL 3 TIMES DAILY
Qty: 90 CAPSULE | Refills: 11 | Status: ON HOLD | OUTPATIENT
Start: 2022-06-13 | End: 2022-10-14 | Stop reason: HOSPADM

## 2022-06-13 RX ADMIN — HEPARIN 500 UNITS: 100 SYRINGE at 10:06

## 2022-06-13 RX ADMIN — Medication 10 ML: at 10:06

## 2022-06-13 RX ADMIN — DIPHENHYDRAMINE HYDROCHLORIDE 25 MG: 50 INJECTION INTRAMUSCULAR; INTRAVENOUS at 09:06

## 2022-06-13 RX ADMIN — ACETAMINOPHEN 650 MG: 325 TABLET ORAL at 09:06

## 2022-06-13 RX ADMIN — SODIUM CHLORIDE 1.4 MG: 9 INJECTION, SOLUTION INTRAVENOUS at 09:06

## 2022-06-13 RX ADMIN — HYDROCORTISONE SODIUM SUCCINATE 25 MG: 100 INJECTION, POWDER, FOR SOLUTION INTRAMUSCULAR; INTRAVENOUS at 09:06

## 2022-06-13 RX ADMIN — SODIUM CHLORIDE: 0.9 INJECTION, SOLUTION INTRAVENOUS at 09:06

## 2022-06-13 NOTE — PROGRESS NOTES
spoke with the patient's  by phone @ [phone], to discuss the cost of transportation. Mr. Carreon stated that they need some assistance.   scheduled for Mr. Gonzalez to pick-up a gas card on the next appointment 10/20/2021.  No other needs noted at this time.

## 2022-06-14 DIAGNOSIS — C91.00 B-CELL ACUTE LYMPHOBLASTIC LEUKEMIA: ICD-10-CM

## 2022-06-14 RX ORDER — ONDANSETRON 8 MG/1
8 TABLET, ORALLY DISINTEGRATING ORAL EVERY 12 HOURS PRN
Qty: 30 TABLET | Refills: 1 | Status: SHIPPED | OUTPATIENT
Start: 2022-06-14 | End: 2022-06-29 | Stop reason: SDUPTHER

## 2022-06-19 NOTE — PROGRESS NOTES
HEMATOLOGIC MALIGNANCIES PROGRESS NOTE    IDENTIFYING STATEMENT   Deepti Davison) is a 51 y.o. female with a  of 1970 from [place], [place] with the diagnosis of B-ALL.     ONCOLOGY HISTORY:    1. Precursor B-cell acute lymphoblastic leukemia (Ph+)   A. 2021: Transferred to [facility] from outside hospital for evaluation for acute leukemia   B. 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    C. 2021 - 2021: Vincristine + imatinib induction; hospital course was complicated by acute hypoxemic respiratory failure which resolved with diuresis and treatment of ALL   D. 1/3/2022: Bone marrow biopsy after induction shows normocellular marrow (60%) with no definite evidence of B-ALL; bcr-abl p190 fusion detected at 0.02% of total ABL1; FISH normal; MRD testing not sent   E. 2022: Begin consolidation therapy with [id number]-01 protocol   F. 3/16/2022: BCR-ABL p190 transcript on peripheral blood negative   G. 2022: BCR-ABL p190 transcript on peripheral blood 0.63%   H. 2022: Bone marrow biopsy shows 50% cellular marrow with relapsed B-ALL; bone marrow aspirate shows 23.5% blasts; cytogenetics 46,XX,-9,t(9;22)(q34;q11.2),+isaias(22)t(9;22)[5] /46,XX[15]; FISH shows 9.5% nuclei with bcr/abl1 fusion; bcr/abl1 quantitative PCR shows 47% of total ABL1    2. History of TIA  3. Seizure disorder  4. Anxiety and depression  5. Chronic obstructive pulmonary disease  6. Paroxysmal atrial fibrillation  7. Implantable loop recorder and pacemaker in place  8. Peripheral artery disease  9. Diabetes mellitus, type 2  10. Gastroesophageal reflux disease  11. Gastroparesis  12. Rheumatoid arthritis  13. Osteoporosis  14. History of  tobacco use     INTERVAL HISTORY:      Ms. Gonzalez presents today for follow-up of relapsed Ph+ B-ALL. She is feeling weak overall. She had emergency department presentation over the weekend for epistaxis and had nasal packing. She reports that his has controlled the bleeding. She otherwise is feeling very fatigued. She is using a walker at home and wheelchair in clinic. She has many questions about her treatment plan and overall clinical course.     Past Medical History, Past Social History and Past Family History have been reviewed and are unchanged except as noted in the interval history.    MEDICATIONS:     Current Outpatient Medications on File Prior to Visit   Medication Sig Dispense Refill    acyclovir (ZOVIRAX) 400 MG tablet Take 1 tablet (400 mg total) by mouth 2 (two) times daily. 60 tablet 11    allopurinoL (ZYLOPRIM) 300 MG tablet Take 1 tablet (300 mg total) by mouth once daily. 90 tablet 3    artificial tears (ISOPTO TEARS) 0.5 % ophthalmic solution Place 1 drop into both eyes 4 (four) times daily as needed.      atorvastatin (LIPITOR) 80 MG tablet Take 80 mg by mouth once daily.      blood sugar diagnostic Strp SMARTSIG:Via Meter 1 to 2 Times Daily      busPIRone (BUSPAR) 10 MG tablet Take 10 mg by mouth 2 (two) times daily.      dapagliflozin (FARXIGA) 10 mg tablet Take 10 mg by mouth once daily.      diltiaZEM (CARDIZEM) 90 MG tablet Take 1 tablet (90 mg total) by mouth every 6 (six) hours. 120 tablet 11    fluconazole (DIFLUCAN) 200 MG Tab Take 2 tablets (400 mg total) by mouth once daily. 60 tablet 2    gabapentin (NEURONTIN) 300 MG capsule Take 1 capsule (300 mg total) by mouth 3 (three) times daily. 90 capsule 11    HYDROmorphone (DILAUDID) 2 MG tablet Take 1 tablet (2 mg total) by mouth every 3 (three) hours as needed for Pain. 120 tablet 0    hydrOXYchloroQUINE (PLAQUENIL) 200 mg tablet Take 1 tablet (200 mg total) by mouth once daily.      hydrOXYzine pamoate (VISTARIL) 50 MG Cap  Take 50 mg by mouth 2 (two) times daily as needed.      losartan (COZAAR) 25 MG tablet Take 25 mg by mouth once daily.      omeprazole (PRILOSEC) 40 MG capsule Take 40 mg by mouth once daily.      ONETOUCH VERIO TEST STRIPS Strp SMARTSIG:Via Meter 1 to 2 Times Daily      OXcarbazepine (TRILEPTAL) 600 MG Tab Take 1,200 mg by mouth 2 (two) times daily.      PONATinib (ICLUSIG) 45 mg Tab tablet Take 1 tablet (45 mg total) by mouth once daily Do not initiate until instructed to do so by Dr. Jenkins.. 30 tablet 0    QUEtiapine (SEROQUEL) 200 MG Tab Take 200 mg by mouth every evening.      sulfamethoxazole-trimethoprim 800-160mg (BACTRIM DS) 800-160 mg Tab Take 1 tablet by mouth every Mon, Wed, Fri. 40 tablet 3    sumatriptan (IMITREX) 50 MG tablet Take 1 tablet (50 mg total) by mouth daily as needed (headache). 30 tablet 0    TRINTELLIX 20 mg Tab Take 1 tablet by mouth once daily.      aspirin (ECOTRIN) 81 MG EC tablet Take 1 tablet (81 mg total) by mouth once daily. Hold until instructed to resume by provider due to low platelet count. (Patient not taking: Reported on 6/13/2022)  0    diazePAM (VALIUM) 10 MG Tab Take 10 mg by mouth 2 (two) times daily as needed.      duke's soln (benadryl 30 mL, mylanta 30 mL, LIDOcaine 30 mL, nystatin 30 mL) 120mL Take 10 mLs by mouth 4 (four) times daily as needed (mouth pain). (Patient not taking: No sig reported) 120 mL 0    fenofibrate 160 MG Tab Take 160 mg by mouth once daily.      LIDOcaine (LIDODERM) 5 % Place 1 patch onto the skin once daily. Remove & Discard patch within 12 hours or as directed by MD (Patient not taking: Reported on 6/13/2022) 30 patch 2    naloxone (NARCAN) 4 mg/actuation Spry 4mg by nasal route as needed for opioid overdose; may repeat every 2-3 minutes in alternating nostrils until medical help arrives. Call 911 (Patient not taking: Reported on 6/13/2022) 1 each 11    polyethylene glycol (GLYCOLAX) 17 gram/dose powder Dissolve one capful (17  g) in liquid and take by mouth daily as needed (constipation). (Patient not taking: No sig reported) 510 g 0    prochlorperazine (COMPAZINE) 5 MG tablet Take 1 tablet (5 mg total) by mouth every 6 (six) hours as needed for Nausea. (Patient not taking: Reported on 6/13/2022) 30 tablet 2    rivaroxaban (XARELTO) 20 mg Tab Take 1 tablet (20 mg total) by mouth daily with dinner or evening meal. Hold until instructed to resume by provider due to low platelet count. (Patient not taking: Reported on 6/13/2022)      senna-docusate 8.6-50 mg (PERICOLACE) 8.6-50 mg per tablet Take 1 tablet by mouth 2 (two) times daily. (Patient not taking: Reported on 6/13/2022) 60 tablet 3     No current facility-administered medications on file prior to visit.       ALLERGIES:   Review of patient's allergies indicates:   Allergen Reactions    Levetiracetam      Other reaction(s): Unknown  Other reaction(s): Unknown  Other reaction(s): Unknown        ROS:       Review of Systems   Constitutional: Positive for fatigue (improved significantly). Negative for diaphoresis, fever and unexpected weight change.   HENT:   Negative for lump/mass and sore throat.    Eyes: Negative for icterus.   Respiratory: Negative for cough and shortness of breath.    Cardiovascular: Negative for palpitations.   Gastrointestinal: Positive for nausea and vomiting. Negative for abdominal distention, abdominal pain, constipation and diarrhea.   Genitourinary: Negative for dysuria and frequency.    Musculoskeletal: Negative for arthralgias, gait problem and myalgias.        Muscle weakness - diffuse   Skin: Negative for rash.   Neurological: Negative for dizziness, gait problem and headaches.   Hematological: Negative for adenopathy. Bruises/bleeds easily.   Psychiatric/Behavioral: The patient is not nervous/anxious.        PHYSICAL EXAM:  Vitals:    06/13/22 0749   BP: (!) 113/56   Pulse: 98   Resp: 16   Temp: 98.1 °F (36.7 °C)   TempSrc: Oral   SpO2: 98%   Weight:  "66.8 kg (147 lb 6 oz)   Height: 5' 5" (1.651 m)   PainSc: 0-No pain       KARNOFSKY PERFORMANCE STATUS 60%  ECOG 2    Physical Exam  Constitutional:       Appearance: Normal appearance.   HENT:      Head: Normocephalic and atraumatic.      Nose:      Comments: Nasal packing in place  Pulmonary:      Effort: Pulmonary effort is normal.   Musculoskeletal:         General: Normal range of motion.      Cervical back: Normal range of motion and neck supple.   Skin:     Findings: No rash.   Neurological:      General: No focal deficit present.      Mental Status: She is alert and oriented to person, place, and time.   Psychiatric:         Mood and Affect: Mood normal.         Thought Content: Thought content normal.          LAB:   Results for orders placed or performed in visit on 06/13/22   CBC Auto Differential   Result Value Ref Range    WBC 3.51 (L) 3.90 - 12.70 K/uL    RBC 2.51 (L) 4.00 - 5.40 M/uL    Hemoglobin 7.5 (L) 12.0 - 16.0 g/dL    Hematocrit 22.3 (L) 37.0 - 48.5 %    MCV 89 82 - 98 fL    MCH 29.9 27.0 - 31.0 pg    MCHC 33.6 32.0 - 36.0 g/dL    RDW 15.1 (H) 11.5 - 14.5 %    Platelets 31 (LL) 150 - 450 K/uL    MPV 11.5 9.2 - 12.9 fL    Immature Granulocytes CANCELED 0.0 - 0.5 %    Immature Grans (Abs) CANCELED 0.00 - 0.04 K/uL    Lymph # Test Not Performed 1.0 - 4.8 K/uL    Mono # Test Not Performed 0.3 - 1.0 K/uL    Eos # Test Not Performed 0.0 - 0.5 K/uL    Baso # Test Not Performed 0.00 - 0.20 K/uL    nRBC 0 0 /100 WBC    Gran % 6.0 (L) 38.0 - 73.0 %    Lymph % 73.0 (H) 18.0 - 48.0 %    Mono % 0.0 (L) 4.0 - 15.0 %    Eosinophil % 1.0 0.0 - 8.0 %    Basophil % 0.0 0.0 - 1.9 %    Bands 2.0 %    Metamyelocytes 1.0 %    Blasts 17.0 (A) 0 %    Platelet Estimate Decreased (A)     Differential Method Manual    Comprehensive Metabolic Panel   Result Value Ref Range    Sodium 138 136 - 145 mmol/L    Potassium 4.1 3.5 - 5.1 mmol/L    Chloride 104 95 - 110 mmol/L    CO2 18 (L) 23 - 29 mmol/L    Glucose 150 (H) 70 - " 110 mg/dL    BUN 25 (H) 6 - 20 mg/dL    Creatinine 0.7 0.5 - 1.4 mg/dL    Calcium 9.0 8.7 - 10.5 mg/dL    Total Protein 6.6 6.0 - 8.4 g/dL    Albumin 3.3 (L) 3.5 - 5.2 g/dL    Total Bilirubin 0.4 0.1 - 1.0 mg/dL    Alkaline Phosphatase 118 55 - 135 U/L    AST 12 10 - 40 U/L    ALT 13 10 - 44 U/L    Anion Gap 16 8 - 16 mmol/L    eGFR if African American >60.0 >60 mL/min/1.73 m^2    eGFR if non African American >60.0 >60 mL/min/1.73 m^2   Magnesium   Result Value Ref Range    Magnesium 1.9 1.6 - 2.6 mg/dL   Phosphorus   Result Value Ref Range    Phosphorus 6.9 (H) 2.7 - 4.5 mg/dL   Type & Screen   Result Value Ref Range    Group & Rh B POS     Indirect Deepika NEG        PROBLEMS ASSESSED THIS VISIT:    1. B-cell acute lymphoblastic leukemia    2. 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    Attempt at salvage with blinatumomab was complicated by cytokine release syndrome that was prohibitive of continuation of therapy.    She had long hospitalization while awaiting approval for new line of therapy. She has very recently recieved ponatinib and has been taking this for three days. We will plan inotuzumab ozogamicin in combination.     We reviewed that her overall prognosis is poor. Even if she responds to therapy, it is not curative in current intent. Her current performance status (ECOG 3) is insufficient to be considered for allogeneic stem cell transplant. She also is a high risk candidate due to comorbid conditions. She would need to achieve CR and have improvement in clinical status to be considered for this.     We will begin inotuzumab ozogamicin today. Plan for bone marrow biopsy after Cycle 1 to assess response. Continue ponatinib.     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 Monitor weekly and transfuse as clinically indicated.     Follow-up    Route Chart for Scheduling    BMT Chart  Routing      Follow up with physician . Please schedule bone marrow biopsy on 6/28. Please schedule MD follow-up on 7/5. Please schedule chemo as per plan below.    Follow up with DANUTA    Labs CBC, CMP, phosphorus, magnesium, other and uric acid   Lab interval: once a week  Please also include type and screen, amylase, lipase   Imaging    Pharmacy appointment    Other referrals          Treatment Plan Information   OP INOTUZUMAB OZOGAMICIN   Dayron Jenkins MD   Upcoming Treatment Dates - OP INOTUZUMAB OZOGAMICIN    6/20/2022       Pre-Medications       acetaminophen tablet 650 mg       diphenydramine (BENADRYL) 25 mg in NS 50 mL IVPB       hydrocortisone sodium succinate injection 25 mg       Chemotherapy       inotuzumab ozogamicin (BESPONSA) 0.9 mg in sodium chloride 0.9% 50 mL infusion  6/27/2022       Pre-Medications       acetaminophen tablet 650 mg       diphenydramine (BENADRYL) 25 mg in NS 50 mL IVPB       hydrocortisone sodium succinate injection 25 mg       Chemotherapy       inotuzumab ozogamicin (BESPONSA) 0.9 mg in sodium chloride 0.9% 50 mL infusion  7/4/2022       Pre-Medications       ACETAMINOPHEN  325 MG ORAL TAB       DIPHENHYDRAMINE IV ORDERABLE       HYDROCORTISONE SOD  MG INJ SOLR (UMBRELLA)       Chemotherapy       inotuzumab ozogamicin (BESPONSA) in sodium chloride 0.9% 50 mL infusion  7/11/2022       Pre-Medications       ACETAMINOPHEN  325 MG ORAL TAB       DIPHENHYDRAMINE IV ORDERABLE       HYDROCORTISONE SOD  MG INJ SOLR (UMBRELLA)       Chemotherapy       inotuzumab ozogamicin (BESPONSA) 0.9 mg in sodium chloride 0.9% 50 mL infusion      Dayron Jenkins MD  Hematology and Stem Cell Transplant

## 2022-06-20 ENCOUNTER — INFUSION (OUTPATIENT)
Dept: INFUSION THERAPY | Facility: HOSPITAL | Age: 52
End: 2022-06-20
Attending: INTERNAL MEDICINE
Payer: COMMERCIAL

## 2022-06-20 VITALS
SYSTOLIC BLOOD PRESSURE: 125 MMHG | TEMPERATURE: 98 F | HEART RATE: 99 BPM | WEIGHT: 145.5 LBS | OXYGEN SATURATION: 100 % | DIASTOLIC BLOOD PRESSURE: 70 MMHG | BODY MASS INDEX: 24.24 KG/M2 | HEIGHT: 65 IN | RESPIRATION RATE: 18 BRPM

## 2022-06-20 DIAGNOSIS — C91.00 B-CELL ACUTE LYMPHOBLASTIC LEUKEMIA: ICD-10-CM

## 2022-06-20 DIAGNOSIS — R11.2 NAUSEA & VOMITING: ICD-10-CM

## 2022-06-20 DIAGNOSIS — D72.829 LEUKOCYTOSIS, UNSPECIFIED TYPE: Primary | ICD-10-CM

## 2022-06-20 PROCEDURE — 25000003 PHARM REV CODE 250: Performed by: INTERNAL MEDICINE

## 2022-06-20 PROCEDURE — 96367 TX/PROPH/DG ADDL SEQ IV INF: CPT

## 2022-06-20 PROCEDURE — A4216 STERILE WATER/SALINE, 10 ML: HCPCS | Performed by: INTERNAL MEDICINE

## 2022-06-20 PROCEDURE — 96413 CHEMO IV INFUSION 1 HR: CPT

## 2022-06-20 PROCEDURE — 96375 TX/PRO/DX INJ NEW DRUG ADDON: CPT

## 2022-06-20 PROCEDURE — 63600175 PHARM REV CODE 636 W HCPCS: Performed by: PHYSICIAN ASSISTANT

## 2022-06-20 PROCEDURE — 63600175 PHARM REV CODE 636 W HCPCS: Performed by: INTERNAL MEDICINE

## 2022-06-20 RX ORDER — ACETAMINOPHEN 325 MG/1
650 TABLET ORAL
Status: COMPLETED | OUTPATIENT
Start: 2022-06-20 | End: 2022-06-20

## 2022-06-20 RX ORDER — ONDANSETRON 2 MG/ML
8 INJECTION INTRAMUSCULAR; INTRAVENOUS ONCE
Status: COMPLETED | OUTPATIENT
Start: 2022-06-20 | End: 2022-06-20

## 2022-06-20 RX ORDER — SODIUM CHLORIDE 0.9 % (FLUSH) 0.9 %
10 SYRINGE (ML) INJECTION
Status: DISCONTINUED | OUTPATIENT
Start: 2022-06-20 | End: 2022-06-20 | Stop reason: HOSPADM

## 2022-06-20 RX ORDER — HEPARIN 100 UNIT/ML
500 SYRINGE INTRAVENOUS
Status: DISCONTINUED | OUTPATIENT
Start: 2022-06-20 | End: 2022-06-20 | Stop reason: HOSPADM

## 2022-06-20 RX ADMIN — SODIUM CHLORIDE 0.9 MG: 9 INJECTION, SOLUTION INTRAVENOUS at 02:06

## 2022-06-20 RX ADMIN — ACETAMINOPHEN 650 MG: 325 TABLET ORAL at 01:06

## 2022-06-20 RX ADMIN — DIPHENHYDRAMINE HYDROCHLORIDE 25 MG: 50 INJECTION, SOLUTION INTRAMUSCULAR; INTRAVENOUS at 01:06

## 2022-06-20 RX ADMIN — ONDANSETRON 8 MG: 2 INJECTION, SOLUTION INTRAMUSCULAR; INTRAVENOUS at 01:06

## 2022-06-20 RX ADMIN — HEPARIN 500 UNITS: 100 SYRINGE at 04:06

## 2022-06-20 RX ADMIN — Medication 10 ML: at 04:06

## 2022-06-20 RX ADMIN — HYDROCORTISONE SODIUM SUCCINATE 25 MG: 100 INJECTION, POWDER, FOR SOLUTION INTRAMUSCULAR; INTRAVENOUS at 01:06

## 2022-06-20 RX ADMIN — SODIUM CHLORIDE: 9 INJECTION, SOLUTION INTRAVENOUS at 01:06

## 2022-06-20 NOTE — PLAN OF CARE
Besponza infusion complete and tolerated well. 60 min obs complete with no s/s of adverse reaction. PAC flushed with NS hep locked and de accessed. Pt verbalized relief of nausea. Encouraged clear liquids and sips of h2o throughout the day to maintain adequate hydration. Pt instructed to contact clinic with concerns between appointments and if nausea returns but not relieved with home meds. Assisted to w/c, d/c home accompanied by family member, NAD.

## 2022-06-20 NOTE — NURSING
Pt arrives in w/c accompanied by spouse. States she has been nauseous since yesterday, ate breakfast this am and vomited soon after. zofran odt taken at 530am compazine at 1030am provided minimal relief of nausea. Assessment complete vitals stable, pt complaints verbalized to ROLANDO CORMIER. Order obtained for zofran 8mg IVP now, zofran odt can be taken every 8 hours as needed. Pt notified and verbalized understanding.

## 2022-06-27 ENCOUNTER — TELEPHONE (OUTPATIENT)
Dept: HEMATOLOGY/ONCOLOGY | Facility: CLINIC | Age: 52
End: 2022-06-27
Payer: COMMERCIAL

## 2022-06-27 ENCOUNTER — DOCUMENTATION ONLY (OUTPATIENT)
Dept: HEMATOLOGY/ONCOLOGY | Facility: CLINIC | Age: 52
End: 2022-06-27
Payer: COMMERCIAL

## 2022-06-27 ENCOUNTER — INFUSION (OUTPATIENT)
Dept: INFUSION THERAPY | Facility: HOSPITAL | Age: 52
End: 2022-06-27
Payer: COMMERCIAL

## 2022-06-27 ENCOUNTER — LAB VISIT (OUTPATIENT)
Dept: LAB | Facility: HOSPITAL | Age: 52
End: 2022-06-27
Attending: INTERNAL MEDICINE
Payer: COMMERCIAL

## 2022-06-27 VITALS
DIASTOLIC BLOOD PRESSURE: 67 MMHG | HEART RATE: 99 BPM | SYSTOLIC BLOOD PRESSURE: 141 MMHG | OXYGEN SATURATION: 98 % | RESPIRATION RATE: 18 BRPM | TEMPERATURE: 99 F

## 2022-06-27 DIAGNOSIS — C91.00 B-CELL ACUTE LYMPHOBLASTIC LEUKEMIA: ICD-10-CM

## 2022-06-27 DIAGNOSIS — D69.6 THROMBOCYTOPENIA: Primary | ICD-10-CM

## 2022-06-27 LAB
ABO + RH BLD: NORMAL
ALBUMIN SERPL BCP-MCNC: 3.6 G/DL (ref 3.5–5.2)
ALP SERPL-CCNC: 347 U/L (ref 55–135)
ALT SERPL W/O P-5'-P-CCNC: 64 U/L (ref 10–44)
AMYLASE SERPL-CCNC: 26 U/L (ref 20–110)
ANION GAP SERPL CALC-SCNC: 11 MMOL/L (ref 8–16)
AST SERPL-CCNC: 31 U/L (ref 10–40)
BASOPHILS # BLD AUTO: ABNORMAL K/UL (ref 0–0.2)
BASOPHILS NFR BLD: 0 % (ref 0–1.9)
BILIRUB SERPL-MCNC: 0.5 MG/DL (ref 0.1–1)
BLD GP AB SCN CELLS X3 SERPL QL: NORMAL
BLD PROD TYP BPU: NORMAL
BLOOD UNIT EXPIRATION DATE: NORMAL
BLOOD UNIT TYPE CODE: 6200
BLOOD UNIT TYPE: NORMAL
BUN SERPL-MCNC: 9 MG/DL (ref 6–20)
CALCIUM SERPL-MCNC: 9 MG/DL (ref 8.7–10.5)
CHLORIDE SERPL-SCNC: 108 MMOL/L (ref 95–110)
CO2 SERPL-SCNC: 21 MMOL/L (ref 23–29)
CODING SYSTEM: NORMAL
CREAT SERPL-MCNC: 0.7 MG/DL (ref 0.5–1.4)
DIFFERENTIAL METHOD: ABNORMAL
DISPENSE STATUS: NORMAL
EOSINOPHIL # BLD AUTO: ABNORMAL K/UL (ref 0–0.5)
EOSINOPHIL NFR BLD: 0 % (ref 0–8)
ERYTHROCYTE [DISTWIDTH] IN BLOOD BY AUTOMATED COUNT: 14.6 % (ref 11.5–14.5)
EST. GFR  (AFRICAN AMERICAN): >60 ML/MIN/1.73 M^2
EST. GFR  (NON AFRICAN AMERICAN): >60 ML/MIN/1.73 M^2
GLUCOSE SERPL-MCNC: 153 MG/DL (ref 70–110)
HCT VFR BLD AUTO: 26.7 % (ref 37–48.5)
HGB BLD-MCNC: 8.9 G/DL (ref 12–16)
IMM GRANULOCYTES # BLD AUTO: ABNORMAL K/UL (ref 0–0.04)
IMM GRANULOCYTES NFR BLD AUTO: ABNORMAL % (ref 0–0.5)
LIPASE SERPL-CCNC: 38 U/L (ref 4–60)
LYMPHOCYTES # BLD AUTO: ABNORMAL K/UL (ref 1–4.8)
LYMPHOCYTES NFR BLD: 91 % (ref 18–48)
MAGNESIUM SERPL-MCNC: 1.7 MG/DL (ref 1.6–2.6)
MCH RBC QN AUTO: 28.7 PG (ref 27–31)
MCHC RBC AUTO-ENTMCNC: 33.3 G/DL (ref 32–36)
MCV RBC AUTO: 86 FL (ref 82–98)
MONOCYTES # BLD AUTO: ABNORMAL K/UL (ref 0.3–1)
MONOCYTES NFR BLD: 2 % (ref 4–15)
NEUTROPHILS NFR BLD: 6 % (ref 38–73)
NEUTS BAND NFR BLD MANUAL: 1 %
NRBC BLD-RTO: 1 /100 WBC
OVALOCYTES BLD QL SMEAR: ABNORMAL
PHOSPHATE SERPL-MCNC: 2.6 MG/DL (ref 2.7–4.5)
PLATELET # BLD AUTO: 6 K/UL (ref 150–450)
PLATELET BLD QL SMEAR: ABNORMAL
PMV BLD AUTO: ABNORMAL FL (ref 9.2–12.9)
POIKILOCYTOSIS BLD QL SMEAR: SLIGHT
POTASSIUM SERPL-SCNC: 4.1 MMOL/L (ref 3.5–5.1)
PROT SERPL-MCNC: 6.9 G/DL (ref 6–8.4)
RBC # BLD AUTO: 3.1 M/UL (ref 4–5.4)
SODIUM SERPL-SCNC: 140 MMOL/L (ref 136–145)
UNIT NUMBER: NORMAL
URATE SERPL-MCNC: 2.5 MG/DL (ref 2.4–5.7)
WBC # BLD AUTO: 1.66 K/UL (ref 3.9–12.7)

## 2022-06-27 PROCEDURE — 82150 ASSAY OF AMYLASE: CPT | Performed by: INTERNAL MEDICINE

## 2022-06-27 PROCEDURE — P9037 PLATE PHERES LEUKOREDU IRRAD: HCPCS | Performed by: INTERNAL MEDICINE

## 2022-06-27 PROCEDURE — 85007 BL SMEAR W/DIFF WBC COUNT: CPT | Performed by: INTERNAL MEDICINE

## 2022-06-27 PROCEDURE — 80053 COMPREHEN METABOLIC PANEL: CPT | Performed by: INTERNAL MEDICINE

## 2022-06-27 PROCEDURE — 36430 TRANSFUSION BLD/BLD COMPNT: CPT

## 2022-06-27 PROCEDURE — 84550 ASSAY OF BLOOD/URIC ACID: CPT | Performed by: INTERNAL MEDICINE

## 2022-06-27 PROCEDURE — 83690 ASSAY OF LIPASE: CPT | Performed by: INTERNAL MEDICINE

## 2022-06-27 PROCEDURE — 85027 COMPLETE CBC AUTOMATED: CPT | Performed by: INTERNAL MEDICINE

## 2022-06-27 PROCEDURE — 86850 RBC ANTIBODY SCREEN: CPT | Performed by: INTERNAL MEDICINE

## 2022-06-27 PROCEDURE — 25000003 PHARM REV CODE 250: Performed by: INTERNAL MEDICINE

## 2022-06-27 PROCEDURE — 83735 ASSAY OF MAGNESIUM: CPT | Performed by: INTERNAL MEDICINE

## 2022-06-27 PROCEDURE — 84100 ASSAY OF PHOSPHORUS: CPT | Performed by: INTERNAL MEDICINE

## 2022-06-27 RX ORDER — FUROSEMIDE 10 MG/ML
20 INJECTION INTRAMUSCULAR; INTRAVENOUS
Status: DISCONTINUED | OUTPATIENT
Start: 2022-06-27 | End: 2022-06-27 | Stop reason: HOSPADM

## 2022-06-27 RX ORDER — ACETAMINOPHEN 325 MG/1
650 TABLET ORAL
Status: COMPLETED | OUTPATIENT
Start: 2022-06-27 | End: 2022-06-27

## 2022-06-27 RX ORDER — HYDROCODONE BITARTRATE AND ACETAMINOPHEN 500; 5 MG/1; MG/1
TABLET ORAL ONCE
Status: DISCONTINUED | OUTPATIENT
Start: 2022-06-27 | End: 2022-06-27 | Stop reason: HOSPADM

## 2022-06-27 RX ORDER — DIPHENHYDRAMINE HCL 25 MG
25 CAPSULE ORAL
Status: COMPLETED | OUTPATIENT
Start: 2022-06-27 | End: 2022-06-27

## 2022-06-27 RX ADMIN — ACETAMINOPHEN 650 MG: 325 TABLET ORAL at 12:06

## 2022-06-27 RX ADMIN — DIPHENHYDRAMINE HYDROCHLORIDE 25 MG: 25 CAPSULE ORAL at 12:06

## 2022-06-27 NOTE — PLAN OF CARE
1130- Patient arrived to clinic for besponsa infusion following labs. Labs resulted, patient in need of plts today. Chemo cancelled per Dr. Jenkins. Patient tolerated plts with no complications. Port flushed and deaccessed. Patient left clinic in NAD via walker accompanied by family. Appt calendar given.

## 2022-06-27 NOTE — TELEPHONE ENCOUNTER
Pt spouse (Mannie Gonzalez) verbalized understanding of the following:  may eat a small meal before the procedure, take all medications except Aspirin 81 mg, as well as wear comfortable clothing.   The procedure takes between 15-20 minutes. Pt will have any questions answered during the consent process prior to the procedure. Pt will be asked to lay on her back for an additional 10 minutes to put pressure on the site. Vitals and bandage are checked upon release. Pt verbalized understanding that family members are not permitted in the procedure room during the procedure. Pt advised that procedure not under sedation.

## 2022-06-28 ENCOUNTER — PROCEDURE VISIT (OUTPATIENT)
Dept: HEMATOLOGY/ONCOLOGY | Facility: CLINIC | Age: 52
End: 2022-06-28
Payer: COMMERCIAL

## 2022-06-28 VITALS
WEIGHT: 143.88 LBS | BODY MASS INDEX: 23.97 KG/M2 | RESPIRATION RATE: 20 BRPM | HEIGHT: 65 IN | HEART RATE: 117 BPM | OXYGEN SATURATION: 97 % | SYSTOLIC BLOOD PRESSURE: 132 MMHG | DIASTOLIC BLOOD PRESSURE: 64 MMHG | TEMPERATURE: 99 F

## 2022-06-28 DIAGNOSIS — C91.00 B-CELL ACUTE LYMPHOBLASTIC LEUKEMIA: ICD-10-CM

## 2022-06-28 PROCEDURE — 88311 DECALCIFY TISSUE: CPT | Performed by: PATHOLOGY

## 2022-06-28 PROCEDURE — 88313 PR  SPECIAL STAINS,GROUP II: ICD-10-PCS | Mod: 26,,, | Performed by: PATHOLOGY

## 2022-06-28 PROCEDURE — 88189 PR  FLOWCYTOMETRY/READ, 16 & > MARKERS: ICD-10-PCS | Mod: ,,, | Performed by: PATHOLOGY

## 2022-06-28 PROCEDURE — 88185 FLOWCYTOMETRY/TC ADD-ON: CPT | Mod: 59 | Performed by: PATHOLOGY

## 2022-06-28 PROCEDURE — 85097 PR  BONE MARROW,SMEAR INTERPRETATION: ICD-10-PCS | Mod: ,,, | Performed by: PATHOLOGY

## 2022-06-28 PROCEDURE — 88341 PR IHC OR ICC EACH ADD'L SINGLE ANTIBODY  STAINPR: ICD-10-PCS | Mod: 26,,, | Performed by: PATHOLOGY

## 2022-06-28 PROCEDURE — 88185 FLOWCYTOMETRY/TC ADD-ON: CPT | Mod: 59 | Performed by: INTERNAL MEDICINE

## 2022-06-28 PROCEDURE — 88275 CYTOGENETICS 100-300: CPT | Performed by: INTERNAL MEDICINE

## 2022-06-28 PROCEDURE — 88189 FLOWCYTOMETRY/READ 16 & >: CPT | Mod: ,,, | Performed by: PATHOLOGY

## 2022-06-28 PROCEDURE — 81207 BCR/ABL1 GENE MINOR BP: CPT | Performed by: INTERNAL MEDICINE

## 2022-06-28 PROCEDURE — 88341 IMHCHEM/IMCYTCHM EA ADD ANTB: CPT | Mod: 26,,, | Performed by: PATHOLOGY

## 2022-06-28 PROCEDURE — 88342 CHG IMMUNOCYTOCHEMISTRY: ICD-10-PCS | Mod: 26,59,, | Performed by: PATHOLOGY

## 2022-06-28 PROCEDURE — 88341 IMHCHEM/IMCYTCHM EA ADD ANTB: CPT | Performed by: PATHOLOGY

## 2022-06-28 PROCEDURE — 38222 BONE MARROW: ICD-10-PCS | Mod: LT,S$GLB,, | Performed by: NURSE PRACTITIONER

## 2022-06-28 PROCEDURE — 88311 PR  DECALCIFY TISSUE: ICD-10-PCS | Mod: 26,,, | Performed by: PATHOLOGY

## 2022-06-28 PROCEDURE — 88313 SPECIAL STAINS GROUP 2: CPT | Mod: 26,,, | Performed by: PATHOLOGY

## 2022-06-28 PROCEDURE — 88311 DECALCIFY TISSUE: CPT | Mod: 26,,, | Performed by: PATHOLOGY

## 2022-06-28 PROCEDURE — 88188 FLOWCYTOMETRY/READ 9-15: CPT

## 2022-06-28 PROCEDURE — 88184 FLOWCYTOMETRY/ TC 1 MARKER: CPT

## 2022-06-28 PROCEDURE — 88342 IMHCHEM/IMCYTCHM 1ST ANTB: CPT | Mod: 59 | Performed by: PATHOLOGY

## 2022-06-28 PROCEDURE — 88305 TISSUE EXAM BY PATHOLOGIST: ICD-10-PCS | Mod: 26,,, | Performed by: PATHOLOGY

## 2022-06-28 PROCEDURE — 88184 FLOWCYTOMETRY/ TC 1 MARKER: CPT | Performed by: PATHOLOGY

## 2022-06-28 PROCEDURE — 88313 SPECIAL STAINS GROUP 2: CPT | Performed by: PATHOLOGY

## 2022-06-28 PROCEDURE — 88264 CHROMOSOME ANALYSIS 20-25: CPT | Performed by: INTERNAL MEDICINE

## 2022-06-28 PROCEDURE — 85097 BONE MARROW INTERPRETATION: CPT | Mod: ,,, | Performed by: PATHOLOGY

## 2022-06-28 PROCEDURE — 88299 UNLISTED CYTOGENETIC STUDY: CPT | Performed by: INTERNAL MEDICINE

## 2022-06-28 PROCEDURE — 38222 DX BONE MARROW BX & ASPIR: CPT | Mod: LT,S$GLB,, | Performed by: NURSE PRACTITIONER

## 2022-06-28 PROCEDURE — 88342 IMHCHEM/IMCYTCHM 1ST ANTB: CPT | Mod: 26,59,, | Performed by: PATHOLOGY

## 2022-06-28 PROCEDURE — 88237 TISSUE CULTURE BONE MARROW: CPT | Performed by: INTERNAL MEDICINE

## 2022-06-28 PROCEDURE — 88305 TISSUE EXAM BY PATHOLOGIST: CPT | Mod: 26,,, | Performed by: PATHOLOGY

## 2022-06-28 PROCEDURE — 88305 TISSUE EXAM BY PATHOLOGIST: CPT | Mod: 59 | Performed by: PATHOLOGY

## 2022-06-28 RX ORDER — LIDOCAINE HYDROCHLORIDE 20 MG/ML
10 INJECTION, SOLUTION EPIDURAL; INFILTRATION; INTRACAUDAL; PERINEURAL ONCE
Status: COMPLETED | OUTPATIENT
Start: 2022-06-28 | End: 2022-06-28

## 2022-06-28 RX ADMIN — LIDOCAINE HYDROCHLORIDE 7 ML: 20 INJECTION, SOLUTION EPIDURAL; INFILTRATION; INTRACAUDAL; PERINEURAL at 01:06

## 2022-06-28 NOTE — PROCEDURES
Bone marrow    Date/Time: 6/28/2022 1:30 PM  Performed by: Valerie Slade NP  Authorized by: Valerie Slade NP     Aspiration?: Yes   Biopsy?: Yes      PROCEDURE NOTE:  Date of Procedure: 06/28/2022  Bone Marrow Biopsy and Aspiration  Indication: B-Cell ALL restaging  Consent: Informed consent was obtained from patient.  Timeout: Done and documented.  Position: Prone  Site: Left posterior illiac crest.  Prep: Betadine.  Needle used: 11 gauge Jamshidi needle.  Anesthetic: 2% lidocaine 7 cc.  Biopsy: The biopsy needle was introduced into the marrow cavity and an aspirate was obtained without complications and sent for flow cytometry,ALL FISH,DNA hold,MRD, BCR/ABL P190 and cytogenetics. Core biopsy obtained without difficulty and sent for routine histologic examination.  Complications: None.  Disposition: Left patient with primary nurse,instructed to lay on back for 20 minutes. Advised not to take shower and keep dressing clean, dry & intact for 24 hours.  Blood loss: Minimal.     Valerie Slade NP  Hematology/Oncology/BMT

## 2022-06-28 NOTE — PROCEDURES
Patient with B Cell ALL presented at BMT clinic for restaging bone marrow biopsy. Tolerated procedure well. Not in any distress. See  note  for full history. Reviewed medications, allergies and labs.     Valerie Slade NP  Hematology/Oncology/BMT

## 2022-06-29 DIAGNOSIS — T45.1X5A CHEMOTHERAPY-INDUCED NEUTROPENIA: ICD-10-CM

## 2022-06-29 DIAGNOSIS — D70.1 CHEMOTHERAPY-INDUCED NEUTROPENIA: ICD-10-CM

## 2022-06-29 DIAGNOSIS — C91.00 B-CELL ACUTE LYMPHOBLASTIC LEUKEMIA: ICD-10-CM

## 2022-06-29 DIAGNOSIS — R11.0 NAUSEA: Primary | ICD-10-CM

## 2022-06-29 NOTE — TELEPHONE ENCOUNTER
Spoke with patient  regarding medication refills. Patient  had questions regarding Levaquin. Advised we will refill this medication based on recent labs and Zofran.

## 2022-06-29 NOTE — TELEPHONE ENCOUNTER
"----- Message from Milli Flores sent at 6/29/2022  9:20 AM CDT -----  RX Name and Strength: ondansetron (ZOFRAN-ODT) 8 MG TbDL    How is the patient currently taking it? Dissolve 1 tablet (8 mg total) by mouth every 12 (twelve) hours as needed (in case of chemo induced nausea)     Is this a 30 day or 90 day Rx? 30 tablets         Preferred Pharmacy with phone number:       [id number] - [place], [place] - [address] [place] [address] AT SEC OF [address] [place] [address]  Phone: [phone] Fax: [phone]        Local or Mail Order: local         Ordering Provider: Dr Jenkins         Contact Preference: [phone]       Additional Information: Also need to check on Levothyroxine to see if discontinued          "Thank you for all that you do for our patients"     "

## 2022-06-30 LAB
ANNOTATION COMMENT IMP: NORMAL
BCR/ABL RESULT, P190, QUANT, BM: NORMAL
BODY SITE - BONE MARROW: NORMAL
CELLS W CYTOGENETIC ABNL BLD/T: NORMAL
CHROM ANALY RESULT (ISCN): NORMAL
CLINICAL DIAGNOSIS - BONE MARROW: NORMAL
DNA/RNA EXTRACT AND HOLD RESULT: NORMAL
DNA/RNA EXTRACTION: NORMAL
EXHR SPECIMEN TYPE: NORMAL
FBALB INTERPRETATION: NORMAL
FBALB REASON FOR REFERRAL: NORMAL
FLOW CYTOMETRY ANTIBODIES ANALYZED - BONE MARROW: NORMAL
FLOW CYTOMETRY COMMENT - BONE MARROW: NORMAL
FLOW CYTOMETRY INTERPRETATION - BONE MARROW: NORMAL
MOL DX INTERP BLD/T QL: NORMAL
PATH REPORT.FINAL DX SPEC: NORMAL
PROVIDER SIGNING NAME: NORMAL
REF LAB TEST METHOD: NORMAL
SPECIMEN SOURCE: NORMAL
SPECIMEN TYPE, P190, QUANT, BM: NORMAL
TEST PERFORMANCE INFO SPEC: NORMAL

## 2022-06-30 RX ORDER — LEVOFLOXACIN 500 MG/1
500 TABLET, FILM COATED ORAL DAILY
Qty: 30 TABLET | Refills: 0 | Status: SHIPPED | OUTPATIENT
Start: 2022-06-30 | End: 2022-07-30

## 2022-06-30 RX ORDER — ONDANSETRON 8 MG/1
8 TABLET, ORALLY DISINTEGRATING ORAL EVERY 12 HOURS PRN
Qty: 30 TABLET | Refills: 1 | Status: SHIPPED | OUTPATIENT
Start: 2022-06-30 | End: 2022-07-05

## 2022-06-30 NOTE — TELEPHONE ENCOUNTER
"----- Message from Milli Flores sent at 6/30/2022  2:04 PM CDT -----  Name Of Caller: Mannie-     Contact Preference?: [phone]    What is the nature of the call?: needing Zofran and levoFLOXacin tablet 500 mg   but pharmacy says discontinued           Additional Notes:  "Thank you for all that you do for our patients'"     "

## 2022-07-05 ENCOUNTER — DOCUMENTATION ONLY (OUTPATIENT)
Dept: INFUSION THERAPY | Facility: HOSPITAL | Age: 52
End: 2022-07-05
Payer: COMMERCIAL

## 2022-07-05 ENCOUNTER — LAB VISIT (OUTPATIENT)
Dept: LAB | Facility: HOSPITAL | Age: 52
End: 2022-07-05
Payer: COMMERCIAL

## 2022-07-05 ENCOUNTER — OFFICE VISIT (OUTPATIENT)
Dept: HEMATOLOGY/ONCOLOGY | Facility: CLINIC | Age: 52
End: 2022-07-05
Payer: COMMERCIAL

## 2022-07-05 ENCOUNTER — INFUSION (OUTPATIENT)
Dept: INFUSION THERAPY | Facility: HOSPITAL | Age: 52
End: 2022-07-05
Attending: INTERNAL MEDICINE
Payer: COMMERCIAL

## 2022-07-05 VITALS
HEIGHT: 65 IN | SYSTOLIC BLOOD PRESSURE: 116 MMHG | HEART RATE: 100 BPM | BODY MASS INDEX: 24.02 KG/M2 | RESPIRATION RATE: 24 BRPM | OXYGEN SATURATION: 99 % | TEMPERATURE: 99 F | DIASTOLIC BLOOD PRESSURE: 58 MMHG | WEIGHT: 144.19 LBS

## 2022-07-05 VITALS
WEIGHT: 144.19 LBS | HEIGHT: 65 IN | SYSTOLIC BLOOD PRESSURE: 130 MMHG | BODY MASS INDEX: 24.02 KG/M2 | DIASTOLIC BLOOD PRESSURE: 62 MMHG | RESPIRATION RATE: 20 BRPM | TEMPERATURE: 98 F | HEART RATE: 94 BPM

## 2022-07-05 DIAGNOSIS — I10 ESSENTIAL HYPERTENSION: ICD-10-CM

## 2022-07-05 DIAGNOSIS — R11.0 NAUSEA: ICD-10-CM

## 2022-07-05 DIAGNOSIS — C91.00 B-CELL ACUTE LYMPHOBLASTIC LEUKEMIA: ICD-10-CM

## 2022-07-05 DIAGNOSIS — D61.818 PANCYTOPENIA: ICD-10-CM

## 2022-07-05 DIAGNOSIS — D63.0 ANEMIA IN NEOPLASTIC DISEASE: ICD-10-CM

## 2022-07-05 DIAGNOSIS — R39.11 URINARY HESITANCY: ICD-10-CM

## 2022-07-05 DIAGNOSIS — D72.829 LEUKOCYTOSIS, UNSPECIFIED TYPE: Primary | ICD-10-CM

## 2022-07-05 DIAGNOSIS — C91.00 B-CELL ACUTE LYMPHOBLASTIC LEUKEMIA: Primary | ICD-10-CM

## 2022-07-05 DIAGNOSIS — G40.909 SEIZURE DISORDER: ICD-10-CM

## 2022-07-05 LAB
ABO + RH BLD: NORMAL
ALBUMIN SERPL BCP-MCNC: 3.8 G/DL (ref 3.5–5.2)
ALP SERPL-CCNC: 319 U/L (ref 55–135)
ALT SERPL W/O P-5'-P-CCNC: 32 U/L (ref 10–44)
AMYLASE SERPL-CCNC: 84 U/L (ref 20–110)
ANION GAP SERPL CALC-SCNC: 12 MMOL/L (ref 8–16)
ANISOCYTOSIS BLD QL SMEAR: SLIGHT
AST SERPL-CCNC: 19 U/L (ref 10–40)
BASOPHILS # BLD AUTO: 0 K/UL (ref 0–0.2)
BASOPHILS NFR BLD: 0 % (ref 0–1.9)
BILIRUB SERPL-MCNC: 0.4 MG/DL (ref 0.1–1)
BLD GP AB SCN CELLS X3 SERPL QL: NORMAL
BUN SERPL-MCNC: 11 MG/DL (ref 6–20)
CALCIUM SERPL-MCNC: 8.2 MG/DL (ref 8.7–10.5)
CHLORIDE SERPL-SCNC: 109 MMOL/L (ref 95–110)
CHROM BANDING METHOD: NORMAL
CHROMOSOME ANALYSIS BM ADDITIONAL INFORMATION: NORMAL
CHROMOSOME ANALYSIS BM RELEASED BY: NORMAL
CHROMOSOME ANALYSIS BM RESULT SUMMARY: NORMAL
CLINICAL CYTOGENETICIST REVIEW: NORMAL
CO2 SERPL-SCNC: 19 MMOL/L (ref 23–29)
CREAT SERPL-MCNC: 0.7 MG/DL (ref 0.5–1.4)
DIFFERENTIAL METHOD: ABNORMAL
EOSINOPHIL # BLD AUTO: 0 K/UL (ref 0–0.5)
EOSINOPHIL NFR BLD: 0 % (ref 0–8)
ERYTHROCYTE [DISTWIDTH] IN BLOOD BY AUTOMATED COUNT: 15.6 % (ref 11.5–14.5)
EST. GFR  (AFRICAN AMERICAN): >60 ML/MIN/1.73 M^2
EST. GFR  (NON AFRICAN AMERICAN): >60 ML/MIN/1.73 M^2
GLUCOSE SERPL-MCNC: 164 MG/DL (ref 70–110)
HCT VFR BLD AUTO: 20.8 % (ref 37–48.5)
HGB BLD-MCNC: 7.1 G/DL (ref 12–16)
IMM GRANULOCYTES # BLD AUTO: 0.01 K/UL (ref 0–0.04)
IMM GRANULOCYTES NFR BLD AUTO: 0.3 % (ref 0–0.5)
KARYOTYP MAR: NORMAL
LIPASE SERPL-CCNC: 203 U/L (ref 4–60)
LYMPHOCYTES # BLD AUTO: 2.1 K/UL (ref 1–4.8)
LYMPHOCYTES NFR BLD: 66.8 % (ref 18–48)
MAGNESIUM SERPL-MCNC: 1.3 MG/DL (ref 1.6–2.6)
MCH RBC QN AUTO: 29.5 PG (ref 27–31)
MCHC RBC AUTO-ENTMCNC: 34.1 G/DL (ref 32–36)
MCV RBC AUTO: 86 FL (ref 82–98)
MINIMAL RESIDUAL DISEASE DETECTION (MRD), BONE MARROW: NORMAL
MONOCYTES # BLD AUTO: 0.2 K/UL (ref 0.3–1)
MONOCYTES NFR BLD: 6.3 % (ref 4–15)
NEUTROPHILS # BLD AUTO: 0.8 K/UL (ref 1.8–7.7)
NEUTROPHILS NFR BLD: 26.6 % (ref 38–73)
NRBC BLD-RTO: 1 /100 WBC
PHOSPHATE SERPL-MCNC: 3.3 MG/DL (ref 2.7–4.5)
PLATELET # BLD AUTO: 26 K/UL (ref 150–450)
PLATELET BLD QL SMEAR: ABNORMAL
PMV BLD AUTO: 11.1 FL (ref 9.2–12.9)
POTASSIUM SERPL-SCNC: 4.3 MMOL/L (ref 3.5–5.1)
PROT SERPL-MCNC: 6.5 G/DL (ref 6–8.4)
RBC # BLD AUTO: 2.41 M/UL (ref 4–5.4)
REASON FOR REFERRAL (NARRATIVE): NORMAL
REF LAB TEST METHOD: NORMAL
SCHISTOCYTES BLD QL SMEAR: ABNORMAL
SODIUM SERPL-SCNC: 140 MMOL/L (ref 136–145)
SPECIMEN SOURCE: NORMAL
SPECIMEN: NORMAL
TOXIC GRANULES BLD QL SMEAR: PRESENT
URATE SERPL-MCNC: 2.8 MG/DL (ref 2.4–5.7)
WBC # BLD AUTO: 3.16 K/UL (ref 3.9–12.7)

## 2022-07-05 PROCEDURE — 99999 PR PBB SHADOW E&M-EST. PATIENT-LVL V: CPT | Mod: PBBFAC,,, | Performed by: PHYSICIAN ASSISTANT

## 2022-07-05 PROCEDURE — 1160F PR REVIEW ALL MEDS BY PRESCRIBER/CLIN PHARMACIST DOCUMENTED: ICD-10-PCS | Mod: CPTII,S$GLB,, | Performed by: PHYSICIAN ASSISTANT

## 2022-07-05 PROCEDURE — 1160F RVW MEDS BY RX/DR IN RCRD: CPT | Mod: CPTII,S$GLB,, | Performed by: PHYSICIAN ASSISTANT

## 2022-07-05 PROCEDURE — 1159F MED LIST DOCD IN RCRD: CPT | Mod: CPTII,S$GLB,, | Performed by: PHYSICIAN ASSISTANT

## 2022-07-05 PROCEDURE — 3044F PR MOST RECENT HEMOGLOBIN A1C LEVEL <7.0%: ICD-10-PCS | Mod: CPTII,S$GLB,, | Performed by: PHYSICIAN ASSISTANT

## 2022-07-05 PROCEDURE — 85025 COMPLETE CBC W/AUTO DIFF WBC: CPT | Performed by: INTERNAL MEDICINE

## 2022-07-05 PROCEDURE — 84100 ASSAY OF PHOSPHORUS: CPT | Performed by: INTERNAL MEDICINE

## 2022-07-05 PROCEDURE — 99215 PR OFFICE/OUTPT VISIT, EST, LEVL V, 40-54 MIN: ICD-10-PCS | Mod: S$GLB,,, | Performed by: PHYSICIAN ASSISTANT

## 2022-07-05 PROCEDURE — 3044F HG A1C LEVEL LT 7.0%: CPT | Mod: CPTII,S$GLB,, | Performed by: PHYSICIAN ASSISTANT

## 2022-07-05 PROCEDURE — 4010F ACE/ARB THERAPY RXD/TAKEN: CPT | Mod: CPTII,S$GLB,, | Performed by: PHYSICIAN ASSISTANT

## 2022-07-05 PROCEDURE — 1111F PR DISCHARGE MEDS RECONCILED W/ CURRENT OUTPATIENT MED LIST: ICD-10-PCS | Mod: CPTII,S$GLB,, | Performed by: PHYSICIAN ASSISTANT

## 2022-07-05 PROCEDURE — 3008F BODY MASS INDEX DOCD: CPT | Mod: CPTII,S$GLB,, | Performed by: PHYSICIAN ASSISTANT

## 2022-07-05 PROCEDURE — 82150 ASSAY OF AMYLASE: CPT | Performed by: INTERNAL MEDICINE

## 2022-07-05 PROCEDURE — 1159F PR MEDICATION LIST DOCUMENTED IN MEDICAL RECORD: ICD-10-PCS | Mod: CPTII,S$GLB,, | Performed by: PHYSICIAN ASSISTANT

## 2022-07-05 PROCEDURE — 83735 ASSAY OF MAGNESIUM: CPT | Performed by: INTERNAL MEDICINE

## 2022-07-05 PROCEDURE — 1111F DSCHRG MED/CURRENT MED MERGE: CPT | Mod: CPTII,S$GLB,, | Performed by: PHYSICIAN ASSISTANT

## 2022-07-05 PROCEDURE — 3078F DIAST BP <80 MM HG: CPT | Mod: CPTII,S$GLB,, | Performed by: PHYSICIAN ASSISTANT

## 2022-07-05 PROCEDURE — 3008F PR BODY MASS INDEX (BMI) DOCUMENTED: ICD-10-PCS | Mod: CPTII,S$GLB,, | Performed by: PHYSICIAN ASSISTANT

## 2022-07-05 PROCEDURE — 4010F PR ACE/ARB THEARPY RXD/TAKEN: ICD-10-PCS | Mod: CPTII,S$GLB,, | Performed by: PHYSICIAN ASSISTANT

## 2022-07-05 PROCEDURE — 86901 BLOOD TYPING SEROLOGIC RH(D): CPT | Performed by: INTERNAL MEDICINE

## 2022-07-05 PROCEDURE — 84550 ASSAY OF BLOOD/URIC ACID: CPT | Performed by: INTERNAL MEDICINE

## 2022-07-05 PROCEDURE — 25000003 PHARM REV CODE 250: Performed by: INTERNAL MEDICINE

## 2022-07-05 PROCEDURE — 83690 ASSAY OF LIPASE: CPT | Performed by: INTERNAL MEDICINE

## 2022-07-05 PROCEDURE — 80053 COMPREHEN METABOLIC PANEL: CPT | Performed by: INTERNAL MEDICINE

## 2022-07-05 PROCEDURE — 3078F PR MOST RECENT DIASTOLIC BLOOD PRESSURE < 80 MM HG: ICD-10-PCS | Mod: CPTII,S$GLB,, | Performed by: PHYSICIAN ASSISTANT

## 2022-07-05 PROCEDURE — 63600175 PHARM REV CODE 636 W HCPCS: Performed by: INTERNAL MEDICINE

## 2022-07-05 PROCEDURE — 99999 PR PBB SHADOW E&M-EST. PATIENT-LVL V: ICD-10-PCS | Mod: PBBFAC,,, | Performed by: PHYSICIAN ASSISTANT

## 2022-07-05 PROCEDURE — 3074F PR MOST RECENT SYSTOLIC BLOOD PRESSURE < 130 MM HG: ICD-10-PCS | Mod: CPTII,S$GLB,, | Performed by: PHYSICIAN ASSISTANT

## 2022-07-05 PROCEDURE — 3074F SYST BP LT 130 MM HG: CPT | Mod: CPTII,S$GLB,, | Performed by: PHYSICIAN ASSISTANT

## 2022-07-05 PROCEDURE — 99215 OFFICE O/P EST HI 40 MIN: CPT | Mod: S$GLB,,, | Performed by: PHYSICIAN ASSISTANT

## 2022-07-05 RX ORDER — ONDANSETRON 8 MG/1
8 TABLET, ORALLY DISINTEGRATING ORAL EVERY 8 HOURS PRN
Qty: 30 TABLET | Refills: 1 | Status: SHIPPED | OUTPATIENT
Start: 2022-07-05 | End: 2022-08-09 | Stop reason: SDUPTHER

## 2022-07-05 RX ORDER — SODIUM CHLORIDE 0.9 % (FLUSH) 0.9 %
10 SYRINGE (ML) INJECTION
Status: DISCONTINUED | OUTPATIENT
Start: 2022-07-05 | End: 2022-07-05

## 2022-07-05 RX ORDER — HEPARIN 100 UNIT/ML
500 SYRINGE INTRAVENOUS
Status: DISCONTINUED | OUTPATIENT
Start: 2022-07-05 | End: 2022-07-05

## 2022-07-05 RX ORDER — ACETAMINOPHEN 325 MG/1
650 TABLET ORAL
Status: COMPLETED | OUTPATIENT
Start: 2022-07-05 | End: 2022-07-05

## 2022-07-05 NOTE — PROGRESS NOTES
HEMATOLOGIC MALIGNANCIES PROGRESS NOTE    IDENTIFYING STATEMENT   Deepti Davison) is a 51 y.o. female with a  of 1970 from [place], [place] with the diagnosis of B-ALL.     ONCOLOGY HISTORY:    1. Precursor B-cell acute lymphoblastic leukemia (Ph+)   A. 2021: Transferred to [facility] from outside hospital for evaluation for acute leukemia   B. 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    C. 2021 - 2021: Vincristine + imatinib induction; hospital course was complicated by acute hypoxemic respiratory failure which resolved with diuresis and treatment of ALL   D. 1/3/2022: Bone marrow biopsy after induction shows normocellular marrow (60%) with no definite evidence of B-ALL; bcr-abl p190 fusion detected at 0.02% of total ABL1; FISH normal; MRD testing not sent   E. 2022: Begin consolidation therapy with EWALL-Ph-01 protocol   F. 3/16/2022: BCR-ABL p190 transcript on peripheral blood negative   G. 2022: BCR-ABL p190 transcript on peripheral blood 0.63%   H. 2022: Bone marrow biopsy shows 50% cellular marrow with relapsed B-ALL; bone marrow aspirate shows 23.5% blasts; cytogenetics 46,XX,-9,t(9;22)(q34;q11.2),+isaias(22)t(9;22)[5] /46,XX[15]; FISH shows 9.5% nuclei with bcr/abl1 fusion; bcr/abl1 quantitative PCR shows 47% of total ABL1    2. History of TIA  3. Seizure disorder  4. Anxiety and depression  5. Chronic obstructive pulmonary disease  6. Paroxysmal atrial fibrillation  7. Implantable loop recorder and pacemaker in place  8. Peripheral artery disease  9. Diabetes mellitus, type 2  10. Gastroesophageal reflux disease  11. Gastroparesis  12. Rheumatoid arthritis  13. Osteoporosis  14. History of  tobacco use     INTERVAL HISTORY:      Ms. Gonzalez presents today for follow-up of relapsed Ph+ B-ALL. She received first two infusions of Inotuzumab Ozogamicin (C1D1, C1D8) in mid June and has continued Ponatanib therapy, today she presents for C1D15. Overall, she has been feeling better since initiating this therapy and thinks her energy has been slightly better. She continues to have chronic complaints of back pain, intermittent headaches. She does report new complaints of some urinary hesitancy that began ~6-8 weeks ago,  does note that at this time the patient resumed twice daily hydroxyzine. She denies any incontinence, but does report she has to sit on the toilet for 2-3 minutes before she can urinate. We discussed that this does not sound like a typical side effect of ponatanib, and could instead reflect some anticholinergic effects, she will hold hydroxyurea and we will follow this closely in the event she warrants urology referral. She denies any abdominal pain, V/D. She does have chronic nausea, but began taking q8hr zofran which has helped significantly. No CP/SOB. She has had no further episodes of epistaxis or any other sources of bleeding. We discussed preliminary results of her BMBx which showed no morphologic evidence of leukemia, albeit in a very hypocellular sample, but continued molecular evidence of BCR-ABL. Pt/ understanding.     The patient had labs following our clinic visit which revealed elevated lipase and tripling of amylase level from prior levels. Discussed this was in setting of therapy and that we will hold infusion/ponatnib for 1 week, re-evaluate labs at that time to discuss continuing therapy. After further discussion, pt did endorse a small, 1x episode of RLQ abdominal pain a couple days ago. She has no abd pain at present and a benign abdominal exam. She received IVF while accessed in the infusion center and was discharged to home, with instructions of what would  warrant emergency evaluation.     Past Medical History, Past Social History and Past Family History have been reviewed and are unchanged except as noted in the interval history.    MEDICATIONS:     Current Outpatient Medications on File Prior to Visit   Medication Sig Dispense Refill    acyclovir (ZOVIRAX) 400 MG tablet Take 1 tablet (400 mg total) by mouth 2 (two) times daily. 60 tablet 11    allopurinoL (ZYLOPRIM) 300 MG tablet Take 1 tablet (300 mg total) by mouth once daily. 90 tablet 3    atorvastatin (LIPITOR) 80 MG tablet Take 80 mg by mouth once daily.      blood sugar diagnostic Strp SMARTSIG:Via Meter 1 to 2 Times Daily      busPIRone (BUSPAR) 10 MG tablet Take 10 mg by mouth 2 (two) times daily.      dapagliflozin (FARXIGA) 10 mg tablet Take 10 mg by mouth once daily.      diazePAM (VALIUM) 10 MG Tab Take 10 mg by mouth 2 (two) times daily as needed.      diltiaZEM (CARDIZEM) 90 MG tablet Take 1 tablet (90 mg total) by mouth every 6 (six) hours. 120 tablet 11    fluconazole (DIFLUCAN) 200 MG Tab Take 2 tablets (400 mg total) by mouth once daily. 60 tablet 2    gabapentin (NEURONTIN) 300 MG capsule Take 1 capsule (300 mg total) by mouth 3 (three) times daily. 90 capsule 11    HYDROmorphone (DILAUDID) 2 MG tablet Take 1 tablet (2 mg total) by mouth every 3 (three) hours as needed for Pain. 120 tablet 0    hydrOXYzine pamoate (VISTARIL) 50 MG Cap Take 50 mg by mouth 2 (two) times daily as needed.      levoFLOXacin (LEVAQUIN) 500 MG tablet Take 1 tablet (500 mg total) by mouth once daily. 30 tablet 0    losartan (COZAAR) 25 MG tablet Take 25 mg by mouth once daily.      omeprazole (PRILOSEC) 40 MG capsule Take 40 mg by mouth once daily.      ondansetron (ZOFRAN-ODT) 8 MG TbDL Dissolve 1 tablet (8 mg total) by mouth every 12 (twelve) hours as needed (in case of chemo induced nausea). 30 tablet 1    ONETOUCH VERIO TEST STRIPS Strp SMARTSIG:Via Meter 1 to 2 Times Daily      OXcarbazepine  (TRILEPTAL) 600 MG Tab Take 1,200 mg by mouth 2 (two) times daily.      PONATinib (ICLUSIG) 45 mg Tab tablet Take 1 tablet (45 mg total) by mouth once daily Do not initiate until instructed to do so by Dr. Jenkins.. 30 tablet 0    prochlorperazine (COMPAZINE) 5 MG tablet Take 1 tablet (5 mg total) by mouth every 6 (six) hours as needed for Nausea. 30 tablet 2    QUEtiapine (SEROQUEL) 200 MG Tab Take 200 mg by mouth every evening.      senna-docusate 8.6-50 mg (PERICOLACE) 8.6-50 mg per tablet Take 1 tablet by mouth 2 (two) times daily. 60 tablet 3    sulfamethoxazole-trimethoprim 800-160mg (BACTRIM DS) 800-160 mg Tab Take 1 tablet by mouth every Mon, Wed, Fri. 40 tablet 3    sumatriptan (IMITREX) 50 MG tablet Take 1 tablet (50 mg total) by mouth daily as needed (headache). 30 tablet 0    TRINTELLIX 20 mg Tab Take 1 tablet by mouth once daily.      ursodioL (ACTIGALL) 300 mg capsule Take 1 capsule (300 mg total) by mouth 3 (three) times daily. 90 capsule 11    artificial tears (ISOPTO TEARS) 0.5 % ophthalmic solution Place 1 drop into both eyes 4 (four) times daily as needed. (Patient not taking: Reported on 7/5/2022)      aspirin (ECOTRIN) 81 MG EC tablet Take 1 tablet (81 mg total) by mouth once daily. Hold until instructed to resume by provider due to low platelet count. (Patient not taking: No sig reported)  0    duke's soln (benadryl 30 mL, mylanta 30 mL, LIDOcaine 30 mL, nystatin 30 mL) 120mL Take 10 mLs by mouth 4 (four) times daily as needed (mouth pain). (Patient not taking: No sig reported) 120 mL 0    fenofibrate 160 MG Tab Take 160 mg by mouth once daily.      hydrOXYchloroQUINE (PLAQUENIL) 200 mg tablet Take 1 tablet (200 mg total) by mouth once daily. (Patient not taking: Reported on 7/5/2022)      LIDOcaine (LIDODERM) 5 % Place 1 patch onto the skin once daily. Remove & Discard patch within 12 hours or as directed by MD (Patient not taking: No sig reported) 30 patch 2    naloxone (NARCAN)  4 mg/actuation Spry 4mg by nasal route as needed for opioid overdose; may repeat every 2-3 minutes in alternating nostrils until medical help arrives. Call 911 (Patient not taking: No sig reported) 1 each 11    polyethylene glycol (GLYCOLAX) 17 gram/dose powder Dissolve one capful (17 g) in liquid and take by mouth daily as needed (constipation). (Patient not taking: Reported on 7/5/2022) 510 g 0    rivaroxaban (XARELTO) 20 mg Tab Take 1 tablet (20 mg total) by mouth daily with dinner or evening meal. Hold until instructed to resume by provider due to low platelet count. (Patient not taking: No sig reported)       No current facility-administered medications on file prior to visit.       ALLERGIES:   Review of patient's allergies indicates:   Allergen Reactions    Levetiracetam      Other reaction(s): Unknown  Other reaction(s): Unknown  Other reaction(s): Unknown        ROS:       Review of Systems   Constitutional: Positive for fatigue (improved significantly). Negative for diaphoresis, fever and unexpected weight change.   HENT:   Negative for lump/mass and sore throat.    Eyes: Negative for icterus.   Respiratory: Negative for cough and shortness of breath.    Cardiovascular: Negative for palpitations.   Gastrointestinal: Positive for nausea (well controlled with q8hr zofran). Negative for abdominal distention, abdominal pain, constipation, diarrhea and vomiting.   Genitourinary: Positive for difficulty urinating (urinary hesitancy). Negative for dysuria and frequency.    Musculoskeletal: Positive for back pain (chronic). Negative for arthralgias, gait problem and myalgias.        Muscle weakness - diffuse   Skin: Negative for rash.   Neurological: Negative for dizziness, gait problem and headaches.   Hematological: Negative for adenopathy. Bruises/bleeds easily.   Psychiatric/Behavioral: The patient is not nervous/anxious.        PHYSICAL EXAM:  Vitals:    07/05/22 1317   BP: (!) 116/58   Pulse: 100   Resp:  "(!) 24   Temp: 99.4 °F (37.4 °C)   TempSrc: Oral   SpO2: 99%   Weight: 65.4 kg (144 lb 2.9 oz)   Height: 5' 5" (1.651 m)   PainSc: 0-No pain       KARNOFSKY PERFORMANCE STATUS 60%  ECOG 2    Physical Exam  Constitutional:       General: She is not in acute distress.     Appearance: Normal appearance.   HENT:      Head: Normocephalic and atraumatic.      Right Ear: External ear normal.      Left Ear: External ear normal.      Nose: Nose normal.      Comments: Nasal packing in place  Cardiovascular:      Rate and Rhythm: Normal rate.   Pulmonary:      Effort: Pulmonary effort is normal.   Abdominal:      General: Abdomen is flat. There is no distension.      Palpations: Abdomen is soft. There is no mass.      Tenderness: There is no abdominal tenderness. There is no guarding or rebound.   Musculoskeletal:         General: Normal range of motion.      Cervical back: Normal range of motion and neck supple.   Skin:     Findings: No rash.   Neurological:      General: No focal deficit present.      Mental Status: She is alert and oriented to person, place, and time.   Psychiatric:         Mood and Affect: Mood normal.         Thought Content: Thought content normal.          LAB:       Results for orders placed or performed in visit on 06/28/22   ALL FISH, Bone Marrow (Ages over 30 yrs)   Result Value Ref Range    ALL (BM) Result Summary Test Not Performed     ALL INTERPRETATION (BM) Test Not Performed     ALL (BM) Result Table Test Not Performed     ALL RESULT (BM) Test Not Performed     ALL REASON FOR REFERRAL (BM) Test Not Performed     ALL SPECIMEN SOURCE (BM) Test Not Performed     ALL METHOD (BM) Test Not Performed     ALL (BM) Additional Information Test Not Performed     ALL (BM) Disclaimer Test Not Performed     ALL (BM) Released by Test Not Performed    BCR/ABL, p190, bone marrow   Result Value Ref Range    Specimen Type, p190, Quant, bm Bone Marrow     BCR/ABL Result, P190, Quant, BM see interpretation     " Final Diagnosis, p190, Quant, bm SEE BELOW    Minimal Residual Disease Detection, Bone Marrow B Cell ALL   Result Value Ref Range    Minimal Residual Disease Detection, Bone Marrow See result image under hyperlink    Heme Disorders DNA/RNA Hold, Bone Marrow   Result Value Ref Range    EXHR Specimen Type Bone Marrow     EXHR Extract and Hold Result see method     EXHR Extraction Performed    Chromosome Analysis, Bone Marrow Left Posterior Iliac Crest   Result Value Ref Range    Chromosome analysis BM Result Summary Normal     Interpretation SEE BELOW     Results 46,XX[11]     Reason for Referral B-ALL     Specimen Bone Marrow     Source Test Not Performed     Method Culture without mitogens     Banding Methods, BM Chromosome SEE BELOW     Chromosome analysis BM Additional Information SEE BELOW     Chromosome analysis BM Released By Helena Dominguez D.O.    Leukemia/Lymphoma Screen - Bone Marrow Left Posterior Iliac Crest   Result Value Ref Range    Clinical Diagnosis - Bone Marrow ALL     Body Site - Bone Marrow LPI     Bone Marrow Interpretation       No diagnostic abnormal hematopoietic population is detected in this sample.    Bone Marrow Antibodies Analyzed       All analyzed: CD2, CD3, CD4, CD5, CD7, CD8, CD10, CD13, CD19, CD20, CD34,  , KAPPA, LAMBDA,CD45 and 7AAD.      Bone Marrow Comment       Flow cytometric analysis of  bone marrow shows predominantly T lymphocytes  that are immunophenotypically unremarkable. B lymphocytes are essentially  undetectable. The blast gate is not increased.  Flow differential:  Lymphocytes 87.7%, Monocytes 1.4%, Granulocytes  9.0%,  Blast  0.5%, Debris/nRBC 1.2%,  Viability 99.3%.     Specimen to Pathology, Bone Marrow Aspiration/Biopsy   Result Value Ref Range    Final Pathologic Diagnosis       BONE MARROW, LEFT ILIAC CREST (ASPIRATE SMEAR, TOUCH PREPARATION, CLOT  SECTION, AND CORE BIOPSY):  -- MARKEDLY HYPOCELLULAR MARROW (<5%) WITH MARKED TRILINEAGE HYPOPLASIA.  --  "NO MORPHOLOGIC EVIDENCE OF RESIDUAL B LYMPHOBLASTIC LEUKEMIA/LYMPHOMA.  -- QUANTITATIVE PCR DETECTED BCR/ABL1 P190 FUSION FORM (0.03 %).  -- ADEQUATE IRON STORAGE.  -- SEE COMMENT.      Gross       Patient ID/Pathology ID: [id number]  Received in 2 parts.  Part 1  Received in formalin labeled "left core" is a hard  2 x 21 mm tan,  hemorrhagic cylindrical tissue core. The specimen was decalcified for 20  minutes and submitted entirely in cassette [id number]  Part 2  Received in formalin labeled "left clot" are red gelatinous-like tissue  measuring 16 x 16 mm.  Specimen submitted entirely into cassette  [id number]   Grossed by Ashley Velez      Microscopic Exam       CBC (06/27/2022): WBC 1.66 K/uL (granulocyte 6.0 %, lymphocyte 91.0 %,  monocyte 2.0 %, eosinophil 0.0 %, basophil 0.0 %), RBC 3.10 M/uL, HGB 8.9  g/dL, HCT 26.7 %, MCV 86 fL, MCH 28.7 pg, MCHC 33.3 g/dL, RDW 14.6 %,  Platelet 6 K/uL, MPV [default value] fL.  BONE MARROW ASPIRATE SMEAR:  The smears are aspicular and markedly hemodiluted.  Small mature lymphoid  cells and erythroid precursors are seen.  Blasts are not increased.  Megakaryocytes are not identified.  Stainable iron cannot be assessed due to  aspicular smear.  Ring sideroblasts are not identified.  BONE MARROW TOUCH PREPARATION:  The touch preparation displays cellular composition similar to the aspirate  smear.  BONE MARROW CLOT SECTION:  The clot section is aspicular.  Stainable iron is not identified.  BONE MARROW CORE BIOPSY:  Cortical and medullary bones are present. The cellularity is <5%.  Small  mature lymphocytes, plasma cells, clusters of erythroid precursors, scattered  myeloid precursors and rare megakaryocytes  are seen.  Blasts are not  increased.  Stainable iron is adequate.      Comment       Flow cytometric analysis of  bone marrow shows predominantly T lymphocytes  that are immunophenotypically unremarkable. B lymphocytes are essentially  undetectable. The blast " gate is not increased. Flow differential: Lymphocytes  87.7%, Monocytes 1.4%, Granulocytes  9.0%, Blast  0.5%, Debris/nRBC 1.2%,  Viability 99.3%.  Immunohistochemical stains are performed on the biopsy core for greater  sensitivity and further architectural assessment with adequate controls.  CD34-positive blasts are not increased.  Spectrin stains clusters of  erythroid precursors.  Myeloperoxidase stains scattered myeloid precursors.  Quantitative PCR test performed at [facility] detected BCR/ABL1  p190 fusion form (0.03%).  Taken together, there is molecular evidence of residual disease.  Correlation  with other laboratory results including minimal residual disease flow  cytometric analysis is required.      Disclaimer       Unless the case is a 'gross only' or additional testing only, the final  diagnosis for each specimen is based on a microscopic examination of  appropriate tissue sections.         PROBLEMS ASSESSED THIS VISIT:    1. B-cell acute lymphoblastic leukemia    2. 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    Attempt at salvage with blinatumomab was complicated by cytokine release syndrome that was prohibitive of continuation of therapy.    She had long hospitalization while awaiting approval for new line of therapy. She has been initiated on first cycle of Inotuzumab Ozogamicin and Ponatanib, today is C1D15 of inotuzumab. She had BMBx on 6/28 after first two infusions which revealed markedly hypocellular marrow (<5%), no morphologic evidence of resiudal leukemia, with ongoing evidence of molecular disease with BCR/ABL1 P190 of 0.03%.     We reviewed that her overall prognosis is poor. Even if she responds to therapy, it is not curative in current intent. Her current performance status (ECOG 3) is insufficient to be considered for allogeneic stem cell transplant. She  also is a high risk candidate due to comorbid conditions. She would need to achieve CR and have improvement in clinical status to be considered for this.     Today patient's lipase markedly elevated (203) and amylase tripled, though remains WNL. She did have episode of minor abdominal pain a few days ago, no longer present and has benign abdominal exam. Given these findings, we are holding all therapy for at least 1 week. We will keep patient on infusion schedule next week with labs prior to determine if proceeding with treatment is appropriate. She was given IVF while at infusion center along with explicit instructions of what warrants emergent evaluation.     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 Monitor weekly and transfuse as clinically indicated.     Follow-up    Route Chart for Scheduling    BMT Chart Routing      Follow up with physician 2 weeks. F/u with Dr. Jenkins/Jackie in ~2 weeks. Please coordinate w/ infusion schedule.   Follow up with DANUTA    Infusion scheduling note    Injection scheduling note    Labs CBC, CMP, phosphorus, magnesium, other and uric acid   Lab interval: once a week  Please also include type and screen, amylase, lipase   Imaging    Pharmacy appointment    Other referrals          Treatment Plan Information   OP INOTUZUMAB OZOGAMICIN   Dayron Jenkins MD   Upcoming Treatment Dates - OP INOTUZUMAB OZOGAMICIN    6/27/2022       Pre-Medications       acetaminophen tablet 650 mg       diphenydramine (BENADRYL) 25 mg in NS 50 mL IVPB       hydrocortisone sodium succinate injection 25 mg       Chemotherapy       inotuzumab ozogamicin (BESPONSA) 0.9 mg in sodium chloride 0.9% 50 mL infusion  7/4/2022       Pre-Medications       acetaminophen tablet 650 mg       diphenydramine (BENADRYL) 25 mg in NS 50 mL IVPB       hydrocortisone sodium succinate injection 25 mg       Chemotherapy       inotuzumab ozogamicin (BESPONSA) in sodium chloride 0.9% 50 mL infusion  7/11/2022       Pre-Medications        ACETAMINOPHEN  325 MG ORAL TAB       DIPHENHYDRAMINE IV ORDERABLE       HYDROCORTISONE SOD  MG INJ SOLR (UMBRELLA)       Chemotherapy       inotuzumab ozogamicin (BESPONSA) 0.9 mg in sodium chloride 0.9% 50 mL infusion  7/18/2022       Pre-Medications       ACETAMINOPHEN  325 MG ORAL TAB       DIPHENHYDRAMINE IV ORDERABLE       HYDROCORTISONE SOD  MG INJ SOLR (UMBRELLA)       Chemotherapy       inotuzumab ozogamicin (BESPONSA) 0.9 mg in sodium chloride 0.9% 50 mL infusion      Jackie Sharma PA-C  Hematology and Stem Cell Transplant

## 2022-07-05 NOTE — PROGRESS NOTES
"Patient's treatment cancelled today per Dr. Jenkins. "Lipase is greater than 2x upper limit of normal, which is Grade 2 per CTCAE. For both ponatinib and inotuzumab, holding therapy is recommended." Patient does state she had right sided abdominal pain yesterday. Updated MD. Patient is to report if pain continues or worsens and if so will plan for abdominal CT scan. Patient declined ivfs today.     Plan to rtc 7/11 for repeat labs. Patient also verbalized understanding to hold all oral chemo for now also.     Discharged home, ambulated independenlty with  by side.  "

## 2022-07-06 ENCOUNTER — SPECIALTY PHARMACY (OUTPATIENT)
Dept: PHARMACY | Facility: CLINIC | Age: 52
End: 2022-07-06
Payer: COMMERCIAL

## 2022-07-06 NOTE — TELEPHONE ENCOUNTER
Outgoing call to pt to get on hand count of Iclusig. Pts  was not sure how many she had on hand and was not home to check. Informed pts  that a refill request was sent to the doctor and once received will call back to set up refill. Voiced understanding.

## 2022-07-11 ENCOUNTER — TELEPHONE (OUTPATIENT)
Dept: HEMATOLOGY/ONCOLOGY | Facility: CLINIC | Age: 52
End: 2022-07-11
Payer: COMMERCIAL

## 2022-07-11 ENCOUNTER — HOSPITAL ENCOUNTER (OUTPATIENT)
Dept: RADIOLOGY | Facility: OTHER | Age: 52
Discharge: HOME | End: 2022-07-11
Attending: INTERNAL MEDICINE
Payer: COMMERCIAL

## 2022-07-11 ENCOUNTER — INFUSION (OUTPATIENT)
Dept: INFUSION THERAPY | Facility: HOSPITAL | Age: 52
End: 2022-07-11
Attending: INTERNAL MEDICINE
Payer: COMMERCIAL

## 2022-07-11 VITALS
SYSTOLIC BLOOD PRESSURE: 124 MMHG | TEMPERATURE: 98 F | HEART RATE: 103 BPM | DIASTOLIC BLOOD PRESSURE: 72 MMHG | RESPIRATION RATE: 18 BRPM | OXYGEN SATURATION: 98 %

## 2022-07-11 DIAGNOSIS — R10.11 RIGHT UPPER QUADRANT PAIN: Primary | ICD-10-CM

## 2022-07-11 DIAGNOSIS — C91.00 B-CELL ACUTE LYMPHOBLASTIC LEUKEMIA: ICD-10-CM

## 2022-07-11 DIAGNOSIS — D72.829 LEUKOCYTOSIS, UNSPECIFIED TYPE: Primary | ICD-10-CM

## 2022-07-11 DIAGNOSIS — R10.11 RIGHT UPPER QUADRANT PAIN: ICD-10-CM

## 2022-07-11 PROCEDURE — 25000003 PHARM REV CODE 250: Performed by: INTERNAL MEDICINE

## 2022-07-11 PROCEDURE — 96375 TX/PRO/DX INJ NEW DRUG ADDON: CPT

## 2022-07-11 PROCEDURE — 76700 US EXAM ABDOM COMPLETE: CPT | Mod: TC

## 2022-07-11 PROCEDURE — 63600175 PHARM REV CODE 636 W HCPCS: Performed by: INTERNAL MEDICINE

## 2022-07-11 PROCEDURE — 96413 CHEMO IV INFUSION 1 HR: CPT

## 2022-07-11 PROCEDURE — 76700 US EXAM ABDOM COMPLETE: CPT | Mod: 26,,, | Performed by: RADIOLOGY

## 2022-07-11 PROCEDURE — 76700 US ABDOMEN COMPLETE: ICD-10-PCS | Mod: 26,,, | Performed by: RADIOLOGY

## 2022-07-11 PROCEDURE — 96367 TX/PROPH/DG ADDL SEQ IV INF: CPT

## 2022-07-11 RX ORDER — HEPARIN 100 UNIT/ML
500 SYRINGE INTRAVENOUS
Status: CANCELLED | OUTPATIENT
Start: 2022-07-11

## 2022-07-11 RX ORDER — ACETAMINOPHEN 325 MG/1
650 TABLET ORAL
Status: COMPLETED | OUTPATIENT
Start: 2022-07-11 | End: 2022-07-11

## 2022-07-11 RX ORDER — PROCHLORPERAZINE MALEATE 10 MG
10 TABLET ORAL EVERY 6 HOURS PRN
Qty: 30 TABLET | Refills: 2 | Status: SHIPPED | OUTPATIENT
Start: 2022-07-11 | End: 2023-07-11

## 2022-07-11 RX ORDER — SODIUM CHLORIDE 0.9 % (FLUSH) 0.9 %
10 SYRINGE (ML) INJECTION
Status: CANCELLED | OUTPATIENT
Start: 2022-07-11

## 2022-07-11 RX ORDER — HEPARIN 100 UNIT/ML
500 SYRINGE INTRAVENOUS
Status: DISCONTINUED | OUTPATIENT
Start: 2022-07-11 | End: 2022-07-11 | Stop reason: HOSPADM

## 2022-07-11 RX ORDER — ACETAMINOPHEN 325 MG/1
650 TABLET ORAL
Status: CANCELLED | OUTPATIENT
Start: 2022-07-11

## 2022-07-11 RX ORDER — SODIUM CHLORIDE 0.9 % (FLUSH) 0.9 %
10 SYRINGE (ML) INJECTION
Status: DISCONTINUED | OUTPATIENT
Start: 2022-07-11 | End: 2022-07-11 | Stop reason: HOSPADM

## 2022-07-11 RX ADMIN — DIPHENHYDRAMINE HYDROCHLORIDE 25 MG: 50 INJECTION, SOLUTION INTRAMUSCULAR; INTRAVENOUS at 11:07

## 2022-07-11 RX ADMIN — ACETAMINOPHEN 650 MG: 325 TABLET ORAL at 11:07

## 2022-07-11 RX ADMIN — SODIUM CHLORIDE 0.9 MG: 9 INJECTION, SOLUTION INTRAVENOUS at 12:07

## 2022-07-11 RX ADMIN — HYDROCORTISONE SODIUM SUCCINATE 25 MG: 100 INJECTION, POWDER, FOR SOLUTION INTRAMUSCULAR; INTRAVENOUS at 11:07

## 2022-07-11 RX ADMIN — SODIUM CHLORIDE: 9 INJECTION, SOLUTION INTRAVENOUS at 11:07

## 2022-07-11 NOTE — PLAN OF CARE
Patient tolerated treatment with no complications. Patient had one episode of nausea/vomiting prior to discharge which she states is similar to what she has been experiencing at home. Patient stable and feeling better at time of discharge. Port flushed and deaccessed, patient discharged in NAD to ultrasound appt at [facility]. Patient had not eaten yet today and is to remain NPO until US complete, patient verbalized understanding and left clinic via wheelchair accompanied by .

## 2022-07-11 NOTE — PLAN OF CARE
1100- Patient arrived to clinic for besponsa infusion following labs. Labs appropriate for treatment. Upon assessment patient c/o of nausea/vomiting, no appetite, right sided abdominal pain and swelling. Per Dr. Jenkins, ultrasound of RUQ ordered today post chemo infusion and compazine order placed at [facility] for . OK to proceed with treatment today (pt can also resume home chemo meds per Dr. Jenkins). Patient made aware of ultrasound appt and prescription.

## 2022-07-11 NOTE — TELEPHONE ENCOUNTER
Specialty Pharmacy - Refill Coordination    Specialty Medication Orders Linked to Encounter    Flowsheet Row Most Recent Value   Medication #1 PONATinib (ICLUSIG) 45 mg Tab tablet (Order#[id number], Rx#[id number])          Refill Questions - Documented Responses    Flowsheet Row Most Recent Value   Patient Availability and HIPAA Verification    Does patient want to proceed with activity? Yes   HIPAA/medical authority confirmed? Yes   Relationship to patient of person spoken to? Self   Refill Screening Questions    Changes to allergies? No   Changes to medications? No   New conditions since last clinic visit? No   Unplanned office visit, urgent care, ED, or hospital admission in the last 4 weeks? No   How does patient/caregiver feel medication is working? Good   Financial problems or insurance changes? No   How many doses of your specialty medications were missed in the last 4 weeks? 0   Would patient like to speak to a pharmacist? No   When does the patient need to receive the medication? 07/13/22   Refill Delivery Questions    How will the patient receive the medication? Mail   When does the patient need to receive the medication? 07/13/22   Shipping Address Home   Address in [facility] confirmed and updated if neccessary? Yes   Expected Copay ($) 0   Is the patient able to afford the medication copay? Yes   Payment Method zero copay   Days supply of Refill 30   Supplies needed? No supplies needed   Refill activity completed? Yes   Refill activity plan Refill scheduled   Shipment/Pickup Date: 07/11/22          Current Outpatient Medications   Medication Sig    acyclovir (ZOVIRAX) 400 MG tablet Take 1 tablet (400 mg total) by mouth 2 (two) times daily.    allopurinoL (ZYLOPRIM) 300 MG tablet Take 1 tablet (300 mg total) by mouth once daily.    artificial tears (ISOPTO TEARS) 0.5 % ophthalmic solution Place 1 drop into both eyes 4 (four) times daily as needed. (Patient not taking: Reported on 7/5/2022)     aspirin (ECOTRIN) 81 MG EC tablet Take 1 tablet (81 mg total) by mouth once daily. Hold until instructed to resume by provider due to low platelet count. (Patient not taking: No sig reported)    atorvastatin (LIPITOR) 80 MG tablet Take 80 mg by mouth once daily.    blood sugar diagnostic Strp SMARTSIG:Via Meter 1 to 2 Times Daily    busPIRone (BUSPAR) 10 MG tablet Take 10 mg by mouth 2 (two) times daily.    dapagliflozin (FARXIGA) 10 mg tablet Take 10 mg by mouth once daily.    diazePAM (VALIUM) 10 MG Tab Take 10 mg by mouth 2 (two) times daily as needed.    diltiaZEM (CARDIZEM) 90 MG tablet Take 1 tablet (90 mg total) by mouth every 6 (six) hours.    duke's soln (benadryl 30 mL, mylanta 30 mL, LIDOcaine 30 mL, nystatin 30 mL) 120mL Take 10 mLs by mouth 4 (four) times daily as needed (mouth pain). (Patient not taking: No sig reported)    fenofibrate 160 MG Tab Take 160 mg by mouth once daily.    fluconazole (DIFLUCAN) 200 MG Tab Take 2 tablets (400 mg total) by mouth once daily.    gabapentin (NEURONTIN) 300 MG capsule Take 1 capsule (300 mg total) by mouth 3 (three) times daily.    hydrOXYchloroQUINE (PLAQUENIL) 200 mg tablet Take 1 tablet (200 mg total) by mouth once daily. (Patient not taking: Reported on 7/5/2022)    hydrOXYzine pamoate (VISTARIL) 50 MG Cap Take 50 mg by mouth 2 (two) times daily as needed.    levoFLOXacin (LEVAQUIN) 500 MG tablet Take 1 tablet (500 mg total) by mouth once daily.    LIDOcaine (LIDODERM) 5 % Place 1 patch onto the skin once daily. Remove & Discard patch within 12 hours or as directed by MD (Patient not taking: No sig reported)    losartan (COZAAR) 25 MG tablet Take 25 mg by mouth once daily.    naloxone (NARCAN) 4 mg/actuation Spry 4mg by nasal route as needed for opioid overdose; may repeat every 2-3 minutes in alternating nostrils until medical help arrives. Call 911 (Patient not taking: No sig reported)    omeprazole (PRILOSEC) 40 MG capsule Take 40 mg by  mouth once daily.    ondansetron (ZOFRAN-ODT) 8 MG TbDL Take 1 tablet (8 mg total) by mouth every 8 (eight) hours as needed (in case of chemo induced nausea).    ONETOUCH VERIO TEST STRIPS Strp SMARTSIG:Via Meter 1 to 2 Times Daily    OXcarbazepine (TRILEPTAL) 600 MG Tab Take 1,200 mg by mouth 2 (two) times daily.    polyethylene glycol (GLYCOLAX) 17 gram/dose powder Dissolve one capful (17 g) in liquid and take by mouth daily as needed (constipation). (Patient not taking: Reported on 7/5/2022)    PONATinib (ICLUSIG) 45 mg Tab tablet Take 1 tablet (45 mg total) by mouth once daily Do not initiate until instructed to do so by Dr. Jenkins..    prochlorperazine (COMPAZINE) 10 MG tablet Take 1 tablet (10 mg total) by mouth every 6 (six) hours as needed for Nausea.    QUEtiapine (SEROQUEL) 200 MG Tab Take 200 mg by mouth every evening.    rivaroxaban (XARELTO) 20 mg Tab Take 1 tablet (20 mg total) by mouth daily with dinner or evening meal. Hold until instructed to resume by provider due to low platelet count. (Patient not taking: No sig reported)    senna-docusate 8.6-50 mg (PERICOLACE) 8.6-50 mg per tablet Take 1 tablet by mouth 2 (two) times daily.    sulfamethoxazole-trimethoprim 800-160mg (BACTRIM DS) 800-160 mg Tab Take 1 tablet by mouth every Mon, Wed, Fri.    sumatriptan (IMITREX) 50 MG tablet Take 1 tablet (50 mg total) by mouth daily as needed (headache).    TRINTELLIX 20 mg Tab Take 1 tablet by mouth once daily.    ursodioL (ACTIGALL) 300 mg capsule Take 1 capsule (300 mg total) by mouth 3 (three) times daily.   Last reviewed on 7/6/2022  9:57 AM by Jackie Sharma PA-C    Review of patient's allergies indicates:   Allergen Reactions    Levetiracetam      Other reaction(s): Unknown  Other reaction(s): Unknown  Other reaction(s): Unknown    Last reviewed on  7/11/2022 11:12 AM by Dayron Jenkins      Tasks added this encounter   8/5/2022 - Refill Call (Auto Added)   Tasks due within next 3 months    No tasks due.     Stephanie Durham, PharmD  [facility]  [address]  [facility] [address]  Phone: [phone]  Fax: [phone]

## 2022-07-18 ENCOUNTER — OFFICE VISIT (OUTPATIENT)
Dept: HEMATOLOGY/ONCOLOGY | Facility: CLINIC | Age: 52
End: 2022-07-18
Payer: COMMERCIAL

## 2022-07-18 ENCOUNTER — DOCUMENTATION ONLY (OUTPATIENT)
Dept: HEMATOLOGY/ONCOLOGY | Facility: CLINIC | Age: 52
End: 2022-07-18
Payer: COMMERCIAL

## 2022-07-18 VITALS
SYSTOLIC BLOOD PRESSURE: 135 MMHG | OXYGEN SATURATION: 98 % | WEIGHT: 141.63 LBS | BODY MASS INDEX: 23.6 KG/M2 | RESPIRATION RATE: 20 BRPM | TEMPERATURE: 98 F | HEART RATE: 106 BPM | HEIGHT: 65 IN | DIASTOLIC BLOOD PRESSURE: 60 MMHG

## 2022-07-18 DIAGNOSIS — D64.81 ANEMIA ASSOCIATED WITH CHEMOTHERAPY: ICD-10-CM

## 2022-07-18 DIAGNOSIS — T45.1X5A ANEMIA ASSOCIATED WITH CHEMOTHERAPY: ICD-10-CM

## 2022-07-18 DIAGNOSIS — D69.6 THROMBOCYTOPENIA: ICD-10-CM

## 2022-07-18 DIAGNOSIS — C91.00 B-CELL ACUTE LYMPHOBLASTIC LEUKEMIA: Primary | ICD-10-CM

## 2022-07-18 PROCEDURE — 99999 PR PBB SHADOW E&M-EST. PATIENT-LVL V: ICD-10-PCS | Mod: PBBFAC,,, | Performed by: INTERNAL MEDICINE

## 2022-07-18 PROCEDURE — 3075F SYST BP GE 130 - 139MM HG: CPT | Mod: CPTII,S$GLB,, | Performed by: INTERNAL MEDICINE

## 2022-07-18 PROCEDURE — 1160F RVW MEDS BY RX/DR IN RCRD: CPT | Mod: CPTII,S$GLB,, | Performed by: INTERNAL MEDICINE

## 2022-07-18 PROCEDURE — 3078F PR MOST RECENT DIASTOLIC BLOOD PRESSURE < 80 MM HG: ICD-10-PCS | Mod: CPTII,S$GLB,, | Performed by: INTERNAL MEDICINE

## 2022-07-18 PROCEDURE — 3008F PR BODY MASS INDEX (BMI) DOCUMENTED: ICD-10-PCS | Mod: CPTII,S$GLB,, | Performed by: INTERNAL MEDICINE

## 2022-07-18 PROCEDURE — 4010F ACE/ARB THERAPY RXD/TAKEN: CPT | Mod: CPTII,S$GLB,, | Performed by: INTERNAL MEDICINE

## 2022-07-18 PROCEDURE — 99215 OFFICE O/P EST HI 40 MIN: CPT | Mod: S$GLB,,, | Performed by: INTERNAL MEDICINE

## 2022-07-18 PROCEDURE — 3075F PR MOST RECENT SYSTOLIC BLOOD PRESS GE 130-139MM HG: ICD-10-PCS | Mod: CPTII,S$GLB,, | Performed by: INTERNAL MEDICINE

## 2022-07-18 PROCEDURE — 3008F BODY MASS INDEX DOCD: CPT | Mod: CPTII,S$GLB,, | Performed by: INTERNAL MEDICINE

## 2022-07-18 PROCEDURE — 3078F DIAST BP <80 MM HG: CPT | Mod: CPTII,S$GLB,, | Performed by: INTERNAL MEDICINE

## 2022-07-18 PROCEDURE — 99999 PR PBB SHADOW E&M-EST. PATIENT-LVL V: CPT | Mod: PBBFAC,,, | Performed by: INTERNAL MEDICINE

## 2022-07-18 PROCEDURE — 3044F PR MOST RECENT HEMOGLOBIN A1C LEVEL <7.0%: ICD-10-PCS | Mod: CPTII,S$GLB,, | Performed by: INTERNAL MEDICINE

## 2022-07-18 PROCEDURE — 3044F HG A1C LEVEL LT 7.0%: CPT | Mod: CPTII,S$GLB,, | Performed by: INTERNAL MEDICINE

## 2022-07-18 PROCEDURE — 1160F PR REVIEW ALL MEDS BY PRESCRIBER/CLIN PHARMACIST DOCUMENTED: ICD-10-PCS | Mod: CPTII,S$GLB,, | Performed by: INTERNAL MEDICINE

## 2022-07-18 PROCEDURE — 1159F PR MEDICATION LIST DOCUMENTED IN MEDICAL RECORD: ICD-10-PCS | Mod: CPTII,S$GLB,, | Performed by: INTERNAL MEDICINE

## 2022-07-18 PROCEDURE — 99215 PR OFFICE/OUTPT VISIT, EST, LEVL V, 40-54 MIN: ICD-10-PCS | Mod: S$GLB,,, | Performed by: INTERNAL MEDICINE

## 2022-07-18 PROCEDURE — 1159F MED LIST DOCD IN RCRD: CPT | Mod: CPTII,S$GLB,, | Performed by: INTERNAL MEDICINE

## 2022-07-18 PROCEDURE — 4010F PR ACE/ARB THEARPY RXD/TAKEN: ICD-10-PCS | Mod: CPTII,S$GLB,, | Performed by: INTERNAL MEDICINE

## 2022-07-18 RX ORDER — LANOLIN ALCOHOL/MO/W.PET/CERES
1 CREAM (GRAM) TOPICAL DAILY
Status: ON HOLD | COMMUNITY
Start: 2022-07-12 | End: 2022-10-13 | Stop reason: HOSPADM

## 2022-07-18 RX ORDER — POTASSIUM CHLORIDE 1500 MG/1
20 TABLET, EXTENDED RELEASE ORAL 2 TIMES DAILY
Status: ON HOLD | COMMUNITY
Start: 2022-07-12 | End: 2022-10-13 | Stop reason: HOSPADM

## 2022-07-18 NOTE — PROGRESS NOTES
HEMATOLOGIC MALIGNANCIES PROGRESS NOTE    IDENTIFYING STATEMENT   Deepti Davison) is a 51 y.o. female with a  of 1970 from [place], [place] with the diagnosis of B-ALL.     ONCOLOGY HISTORY:    1. Precursor B-cell acute lymphoblastic leukemia (Ph+)   A. [place]: [place] [facility] [place] for [place]   B. 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    C. 2021 - 2021: Vincristine + imatinib induction; hospital course was complicated by acute hypoxemic respiratory failure which resolved with diuresis and treatment of ALL   D. 1/3/2022: Bone marrow biopsy after induction shows normocellular marrow (60%) with no definite evidence of B-ALL; bcr-abl p190 fusion detected at 0.02% of total ABL1; FISH normal; MRD testing not sent   E. 2022: Begin consolidation therapy with EWALL-Ph-01 protocol   F. 3/16/2022: BCR-ABL p190 transcript on peripheral blood negative   G. 2022: BCR-ABL p190 transcript on peripheral blood 0.63%   H. 2022: Bone marrow biopsy shows 50% cellular marrow with relapsed B-ALL; bone marrow aspirate shows 23.5% blasts; cytogenetics 46,XX,-9,t(9;22)(q34;q11.2),+isaias(22)t(9;22)[5] /46,XX[15]; FISH shows 9.5% nuclei with bcr/abl1 fusion; bcr/abl1 quantitative PCR shows 47% of total ABL1    2. History of TIA  3. Seizure disorder  4. Anxiety and depression  5. Chronic obstructive pulmonary disease  6. Paroxysmal atrial fibrillation  7. Implantable loop recorder and pacemaker in place  8. Peripheral artery disease  9. Diabetes mellitus, type 2  10. Gastroesophageal reflux disease  11. Gastroparesis  12. Rheumatoid arthritis  13. Osteoporosis  14. History of  tobacco use     INTERVAL HISTORY:      Ms. Gonzalez presents today for follow-up of relapsed Ph+ B-ALL. She is seen prior to cycle 2 of inotuzumab ozogamicin + ponatinib therapy. At last visit, her anti-leukemic therapy was held due to elevated lipase. This improved last week, and she resumed therapy. She presents to discuss her bone marrow results and considerations regarding therapy.     Past Medical History, Past Social History and Past Family History have been reviewed and are unchanged except as noted in the interval history.    MEDICATIONS:     Current Outpatient Medications on File Prior to Visit   Medication Sig Dispense Refill    acyclovir (ZOVIRAX) 400 MG tablet Take 1 tablet (400 mg total) by mouth 2 (two) times daily. 60 tablet 11    allopurinoL (ZYLOPRIM) 300 MG tablet Take 1 tablet (300 mg total) by mouth once daily. 90 tablet 3    atorvastatin (LIPITOR) 80 MG tablet Take 80 mg by mouth once daily.      blood sugar diagnostic Strp SMARTSIG:Via Meter 1 to 2 Times Daily      busPIRone (BUSPAR) 10 MG tablet Take 10 mg by mouth 2 (two) times daily.      dapagliflozin (FARXIGA) 10 mg tablet Take 10 mg by mouth once daily.      diltiaZEM (CARDIZEM) 90 MG tablet Take 1 tablet (90 mg total) by mouth every 6 (six) hours. 120 tablet 11    fenofibrate 160 MG Tab Take 160 mg by mouth once daily.      fluconazole (DIFLUCAN) 200 MG Tab Take 2 tablets (400 mg total) by mouth once daily. 60 tablet 2    gabapentin (NEURONTIN) 300 MG capsule Take 1 capsule (300 mg total) by mouth 3 (three) times daily. 90 capsule 11    levoFLOXacin (LEVAQUIN) 500 MG tablet Take 1 tablet (500 mg total) by mouth once daily. 30 tablet 0    losartan (COZAAR) 25 MG tablet Take 25 mg by mouth once daily.      magnesium oxide (MAG-OX) 400 mg (241.3 mg magnesium) tablet Take 1 tablet by mouth once daily.      omeprazole (PRILOSEC) 40 MG capsule Take 40 mg by mouth once daily.      ondansetron (ZOFRAN-ODT) 8 MG TbDL Take 1 tablet (8  mg total) by mouth every 8 (eight) hours as needed (in case of chemo induced nausea). 30 tablet 1    ONETOUCH VERIO TEST STRIPS Strp SMARTSIG:Via Meter 1 to 2 Times Daily      OXcarbazepine (TRILEPTAL) 600 MG Tab Take 1,200 mg by mouth 2 (two) times daily.      PONATinib (ICLUSIG) 45 mg Tab tablet Take 1 tablet (45 mg total) by mouth once daily Do not initiate until instructed to do so by Dr. Jenkins.. 30 tablet 0    potassium chloride (K-TAB) 20 mEq Take 20 mEq by mouth 2 (two) times daily.      QUEtiapine (SEROQUEL) 200 MG Tab Take 200 mg by mouth every evening.      sulfamethoxazole-trimethoprim 800-160mg (BACTRIM DS) 800-160 mg Tab Take 1 tablet by mouth every Mon, Wed, Fri. 40 tablet 3    sumatriptan (IMITREX) 50 MG tablet Take 1 tablet (50 mg total) by mouth daily as needed (headache). 30 tablet 0    TRINTELLIX 20 mg Tab Take 1 tablet by mouth once daily.      ursodioL (ACTIGALL) 300 mg capsule Take 1 capsule (300 mg total) by mouth 3 (three) times daily. 90 capsule 11    artificial tears (ISOPTO TEARS) 0.5 % ophthalmic solution Place 1 drop into both eyes 4 (four) times daily as needed. (Patient not taking: No sig reported)      aspirin (ECOTRIN) 81 MG EC tablet Take 1 tablet (81 mg total) by mouth once daily. Hold until instructed to resume by provider due to low platelet count. (Patient not taking: No sig reported)  0    diazePAM (VALIUM) 10 MG Tab Take 10 mg by mouth 2 (two) times daily as needed.      duke's soln (benadryl 30 mL, mylanta 30 mL, LIDOcaine 30 mL, nystatin 30 mL) 120mL Take 10 mLs by mouth 4 (four) times daily as needed (mouth pain). (Patient not taking: No sig reported) 120 mL 0    hydrOXYchloroQUINE (PLAQUENIL) 200 mg tablet Take 1 tablet (200 mg total) by mouth once daily. (Patient not taking: No sig reported)      hydrOXYzine pamoate (VISTARIL) 50 MG Cap Take 50 mg by mouth 2 (two) times daily as needed.      LIDOcaine (LIDODERM) 5 % Place 1 patch onto the skin once daily.  Remove & Discard patch within 12 hours or as directed by MD (Patient not taking: No sig reported) 30 patch 2    naloxone (NARCAN) 4 mg/actuation Spry 4mg by nasal route as needed for opioid overdose; may repeat every 2-3 minutes in alternating nostrils until medical help arrives. Call 911 (Patient not taking: No sig reported) 1 each 11    polyethylene glycol (GLYCOLAX) 17 gram/dose powder Dissolve one capful (17 g) in liquid and take by mouth daily as needed (constipation). (Patient not taking: No sig reported) 510 g 0    prochlorperazine (COMPAZINE) 10 MG tablet Take 1 tablet (10 mg total) by mouth every 6 (six) hours as needed for Nausea. (Patient not taking: Reported on 7/18/2022) 30 tablet 2    rivaroxaban (XARELTO) 20 mg Tab Take 1 tablet (20 mg total) by mouth daily with dinner or evening meal. Hold until instructed to resume by provider due to low platelet count. (Patient not taking: No sig reported)      senna-docusate 8.6-50 mg (PERICOLACE) 8.6-50 mg per tablet Take 1 tablet by mouth 2 (two) times daily. (Patient not taking: Reported on 7/18/2022) 60 tablet 3     No current facility-administered medications on file prior to visit.       ALLERGIES:   Review of patient's allergies indicates:   Allergen Reactions    Levetiracetam      Other reaction(s): Unknown  Other reaction(s): Unknown  Other reaction(s): Unknown        ROS:       Review of Systems   Constitutional: Positive for fatigue (improved significantly). Negative for diaphoresis, fever and unexpected weight change.   HENT:   Negative for lump/mass and sore throat.    Eyes: Negative for icterus.   Respiratory: Negative for cough and shortness of breath.    Cardiovascular: Negative for palpitations.   Gastrointestinal: Negative for abdominal distention, abdominal pain, constipation, diarrhea, nausea and vomiting.   Genitourinary: Negative for difficulty urinating, dysuria and frequency.    Musculoskeletal: Positive for back pain (chronic).  "Negative for arthralgias, gait problem and myalgias.   Skin: Negative for rash.   Neurological: Negative for dizziness, gait problem and headaches.   Hematological: Negative for adenopathy. Bruises/bleeds easily.   Psychiatric/Behavioral: The patient is not nervous/anxious.        PHYSICAL EXAM:  Vitals:    07/18/22 1239   BP: 135/60   Pulse: 106   Resp: 20   Temp: 97.7 °F (36.5 °C)   TempSrc: Oral   SpO2: 98%   Weight: 64.3 kg (141 lb 10.3 oz)   Height: 5' 5" (1.651 m)   PainSc: 0-No pain       KARNOFSKY PERFORMANCE STATUS 70%  ECOG 1    Physical Exam  Constitutional:       General: She is not in acute distress.     Appearance: Normal appearance.   HENT:      Head: Normocephalic and atraumatic.      Right Ear: External ear normal.      Left Ear: External ear normal.      Nose: Nose normal.   Cardiovascular:      Rate and Rhythm: Normal rate.   Pulmonary:      Effort: Pulmonary effort is normal.   Abdominal:      General: Abdomen is flat. There is no distension.      Palpations: Abdomen is soft. There is no mass.      Tenderness: There is no abdominal tenderness. There is no guarding or rebound.   Musculoskeletal:         General: Normal range of motion.      Cervical back: Normal range of motion and neck supple.   Skin:     Findings: No rash.   Neurological:      General: No focal deficit present.      Mental Status: She is alert and oriented to person, place, and time.   Psychiatric:         Mood and Affect: Mood normal.         Thought Content: Thought content normal.          LAB:       Results for orders placed or performed in visit on 07/18/22   CBC Auto Differential   Result Value Ref Range    WBC 7.32 3.90 - 12.70 K/uL    RBC 3.43 (L) 4.00 - 5.40 M/uL    Hemoglobin 10.5 (L) 12.0 - 16.0 g/dL    Hematocrit 31.0 (L) 37.0 - 48.5 %    MCV 90 82 - 98 fL    MCH 30.6 27.0 - 31.0 pg    MCHC 33.9 32.0 - 36.0 g/dL    RDW 19.5 (H) 11.5 - 14.5 %    Platelets 37 (LL) 150 - 450 K/uL    MPV 10.7 9.2 - 12.9 fL    Immature " Granulocytes 0.5 0.0 - 0.5 %    Gran # (ANC) 3.6 1.8 - 7.7 K/uL    Immature Grans (Abs) 0.04 0.00 - 0.04 K/uL    Lymph # 3.1 1.0 - 4.8 K/uL    Mono # 0.6 0.3 - 1.0 K/uL    Eos # 0.0 0.0 - 0.5 K/uL    Baso # 0.01 0.00 - 0.20 K/uL    nRBC 1 (A) 0 /100 WBC    Gran % 48.9 38.0 - 73.0 %    Lymph % 42.3 18.0 - 48.0 %    Mono % 7.9 4.0 - 15.0 %    Eosinophil % 0.3 0.0 - 8.0 %    Basophil % 0.1 0.0 - 1.9 %    Differential Method Automated    Magnesium   Result Value Ref Range    Magnesium 2.2 1.6 - 2.6 mg/dL   Comprehensive Metabolic Panel   Result Value Ref Range    Sodium 138 136 - 145 mmol/L    Potassium 4.8 3.5 - 5.1 mmol/L    Chloride 104 95 - 110 mmol/L    CO2 21 (L) 23 - 29 mmol/L    Glucose 369 (H) 70 - 110 mg/dL    BUN 16 6 - 20 mg/dL    Creatinine 0.8 0.5 - 1.4 mg/dL    Calcium 10.1 8.7 - 10.5 mg/dL    Total Protein 7.4 6.0 - 8.4 g/dL    Albumin 3.8 3.5 - 5.2 g/dL    Total Bilirubin 0.4 0.1 - 1.0 mg/dL    Alkaline Phosphatase 257 (H) 55 - 135 U/L    AST 20 10 - 40 U/L    ALT 43 10 - 44 U/L    Anion Gap 13 8 - 16 mmol/L    eGFR if African American >60.0 >60 mL/min/1.73 m^2    eGFR if non African American >60.0 >60 mL/min/1.73 m^2   PHOSPHORUS   Result Value Ref Range    Phosphorus 4.2 2.7 - 4.5 mg/dL   Amylase   Result Value Ref Range    Amylase 90 20 - 110 U/L   Lipase   Result Value Ref Range    Lipase 218 (H) 4 - 60 U/L   Uric Acid   Result Value Ref Range    Uric Acid 2.3 (L) 2.4 - 5.7 mg/dL   Type & Screen   Result Value Ref Range    Group & Rh B POS     Indirect Deepika NEG        PROBLEMS ASSESSED THIS VISIT:    1. B-cell acute lymphoblastic leukemia    2. Anemia associated with chemotherapy    3. 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    Attempt at salvage with blinatumomab was complicated by cytokine release syndrome that was prohibitive of continuation of therapy.    She had  long hospitalization while awaiting approval for new line of therapy. She has been initiated on first cycle of Inotuzumab Ozogamicin and Ponatanib. Today is C1D36 of inotuzumab. She had BMBx on 6/28 after first two infusions which revealed markedly hypocellular marrow (<5%), no morphologic evidence of resiudal leukemia, with ongoing evidence of molecular disease with BCR/ABL1 P190 of 0.03%.     Lipase is markedly elevated today. We will hold cycle 2 of inotuzumab. Continue ponatinib as monotherapy. Will reassess potential to give inotuzumab next week. If she is unable to receive this consistently, we may need to discontinue.     Her performance status has improved. I will review her case at our multidisciplinary transplant committee to discuss candidacy for transplant.    Cytopenias   Improving. She has mild anemia which is much improved. Thrombocytopenia remains severe but is improving as well. Continue to monitor on therapy.     Follow-up    Route Chart for Scheduling    BMT Chart Routing      Follow up with physician . 8/8. Please adjust lab and infusion time that day.    Follow up with DANUTA    Infusion scheduling note    Injection scheduling note    Labs    Lab interval:  Already scheduled.    Imaging    Pharmacy appointment    Other referrals          Treatment Plan Information   OP INOTUZUMAB OZOGAMICIN   Dayron Jenkins MD   Upcoming Treatment Dates - OP INOTUZUMAB OZOGAMICIN    7/26/2022       Pre-Medications       acetaminophen tablet 650 mg       diphenydramine (BENADRYL) 25 mg in NS 50 mL IVPB       hydrocortisone sodium succinate injection 25 mg       Chemotherapy       inotuzumab ozogamicin (BESPONSA) in sodium chloride 0.9% 50 mL infusion  8/2/2022       Pre-Medications       ACETAMINOPHEN  325 MG ORAL TAB       DIPHENHYDRAMINE IV ORDERABLE       HYDROCORTISONE SOD  MG INJ SOLR (UMBRELLA)       Chemotherapy       inotuzumab ozogamicin (BESPONSA) 0.9 mg in sodium chloride 0.9% 50 mL  infusion  8/9/2022       Pre-Medications       ACETAMINOPHEN  325 MG ORAL TAB       DIPHENHYDRAMINE IV ORDERABLE       HYDROCORTISONE SOD  MG INJ SOLR (UMBRELLA)       Chemotherapy       inotuzumab ozogamicin (BESPONSA) 0.9 mg in sodium chloride 0.9% 50 mL infusion  8/23/2022       Pre-Medications       acetaminophen tablet 650 mg       diphenydramine (BENADRYL) 25 mg in NS 50 mL IVPB       hydrocortisone sodium succinate injection 25 mg       Chemotherapy       inotuzumab ozogamicin (BESPONSA) in sodium chloride 0.9% 50 mL infusion      Dayron Jenkins MD  Hematology and Stem Cell Transplant

## 2022-07-21 ENCOUNTER — TELEPHONE (OUTPATIENT)
Dept: HEMATOLOGY/ONCOLOGY | Facility: CLINIC | Age: 52
End: 2022-07-21
Payer: COMMERCIAL

## 2022-07-21 NOTE — TELEPHONE ENCOUNTER
"----- Message from Edgar Birmingham sent at 7/21/2022  3:09 PM CDT -----  Consult/Advisory:          Name Of Caller: Mannie Gonzalez (Spouse)       Contact Preference?: [phone] (Home Phone)      Provider Name: Gregory      Does patient feel the need to be seen today? No      What is the nature of the call?: Returning call to office about 8/8 appt          Additional Notes:  "Thank you for all that you do for our patients"       "

## 2022-07-21 NOTE — TELEPHONE ENCOUNTER
"----- Message from Shanta Ochoa RN sent at 7/21/2022  9:35 AM CDT -----    ----- Message -----  From: Edgar Birmingham  Sent: 7/21/2022   9:06 AM CDT  To: Gregory Padgett Staff    Consult/Advisory:          Name Of Caller: Mannie Gonzalez (Spouse)         Contact Preference?: [phone]       Provider Name: Gregory      Does patient feel the need to be seen today? No      What is the nature of the call?: Returning call to office          Additional Notes: Call wasn't documented at this time      "Thank you for all that you do for our patients"        "

## 2022-07-26 ENCOUNTER — INFUSION (OUTPATIENT)
Dept: INFUSION THERAPY | Facility: HOSPITAL | Age: 52
End: 2022-07-26
Attending: INTERNAL MEDICINE
Payer: COMMERCIAL

## 2022-07-26 VITALS
RESPIRATION RATE: 18 BRPM | SYSTOLIC BLOOD PRESSURE: 138 MMHG | TEMPERATURE: 98 F | OXYGEN SATURATION: 98 % | DIASTOLIC BLOOD PRESSURE: 71 MMHG | HEART RATE: 98 BPM

## 2022-07-26 DIAGNOSIS — C91.00 B-CELL ACUTE LYMPHOBLASTIC LEUKEMIA: Primary | ICD-10-CM

## 2022-07-26 DIAGNOSIS — D72.829 LEUKOCYTOSIS, UNSPECIFIED TYPE: ICD-10-CM

## 2022-07-26 PROCEDURE — 25000003 PHARM REV CODE 250: Performed by: INTERNAL MEDICINE

## 2022-07-26 PROCEDURE — 96375 TX/PRO/DX INJ NEW DRUG ADDON: CPT

## 2022-07-26 PROCEDURE — 96413 CHEMO IV INFUSION 1 HR: CPT

## 2022-07-26 PROCEDURE — 96367 TX/PROPH/DG ADDL SEQ IV INF: CPT

## 2022-07-26 PROCEDURE — 63600175 PHARM REV CODE 636 W HCPCS: Mod: JG | Performed by: INTERNAL MEDICINE

## 2022-07-26 RX ORDER — SODIUM CHLORIDE 0.9 % (FLUSH) 0.9 %
10 SYRINGE (ML) INJECTION
Status: CANCELLED | OUTPATIENT
Start: 2022-08-02

## 2022-07-26 RX ORDER — HEPARIN 100 UNIT/ML
500 SYRINGE INTRAVENOUS
Status: DISCONTINUED | OUTPATIENT
Start: 2022-07-26 | End: 2022-07-26 | Stop reason: HOSPADM

## 2022-07-26 RX ORDER — HEPARIN 100 UNIT/ML
500 SYRINGE INTRAVENOUS
Status: CANCELLED | OUTPATIENT
Start: 2022-08-02

## 2022-07-26 RX ORDER — SODIUM CHLORIDE 0.9 % (FLUSH) 0.9 %
10 SYRINGE (ML) INJECTION
Status: CANCELLED | OUTPATIENT
Start: 2022-08-09

## 2022-07-26 RX ORDER — ACETAMINOPHEN 325 MG/1
650 TABLET ORAL
Status: COMPLETED | OUTPATIENT
Start: 2022-07-26 | End: 2022-07-26

## 2022-07-26 RX ORDER — ACETAMINOPHEN 325 MG/1
650 TABLET ORAL
Status: CANCELLED | OUTPATIENT
Start: 2022-08-09

## 2022-07-26 RX ORDER — SODIUM CHLORIDE 0.9 % (FLUSH) 0.9 %
10 SYRINGE (ML) INJECTION
Status: DISCONTINUED | OUTPATIENT
Start: 2022-07-26 | End: 2022-07-26 | Stop reason: HOSPADM

## 2022-07-26 RX ORDER — ACETAMINOPHEN 325 MG/1
650 TABLET ORAL
Status: CANCELLED | OUTPATIENT
Start: 2022-08-02

## 2022-07-26 RX ORDER — HEPARIN 100 UNIT/ML
500 SYRINGE INTRAVENOUS
Status: CANCELLED | OUTPATIENT
Start: 2022-08-09

## 2022-07-26 RX ADMIN — SODIUM CHLORIDE 0.9 MG: 9 INJECTION, SOLUTION INTRAVENOUS at 03:07

## 2022-07-26 RX ADMIN — DIPHENHYDRAMINE HYDROCHLORIDE 25 MG: 50 INJECTION, SOLUTION INTRAMUSCULAR; INTRAVENOUS at 02:07

## 2022-07-26 RX ADMIN — ACETAMINOPHEN 650 MG: 325 TABLET ORAL at 02:07

## 2022-07-26 RX ADMIN — HYDROCORTISONE SODIUM SUCCINATE 25 MG: 100 INJECTION, POWDER, FOR SOLUTION INTRAMUSCULAR; INTRAVENOUS at 02:07

## 2022-07-26 RX ADMIN — SODIUM CHLORIDE: 9 INJECTION, SOLUTION INTRAVENOUS at 02:07

## 2022-07-26 NOTE — PLAN OF CARE
Patient tolerated treatment with no complications. Port flushed and deaccessed. Patient discharged in NAD.    Skin normal color for race, warm, dry and intact. No evidence of rash.

## 2022-07-26 NOTE — PLAN OF CARE
1400- Patient arrived to clinic for besponsa infusion following labs. Assessment appropriate for treatment, labs pending. Port accessed, + blood return noted, flushes easily, NS infusing.

## 2022-08-01 ENCOUNTER — INFUSION (OUTPATIENT)
Dept: INFUSION THERAPY | Facility: HOSPITAL | Age: 52
End: 2022-08-01
Attending: INTERNAL MEDICINE
Payer: COMMERCIAL

## 2022-08-01 VITALS
HEART RATE: 111 BPM | TEMPERATURE: 98 F | SYSTOLIC BLOOD PRESSURE: 150 MMHG | BODY MASS INDEX: 23.62 KG/M2 | RESPIRATION RATE: 18 BRPM | WEIGHT: 141.75 LBS | HEIGHT: 65 IN | DIASTOLIC BLOOD PRESSURE: 68 MMHG

## 2022-08-01 DIAGNOSIS — D72.829 LEUKOCYTOSIS, UNSPECIFIED TYPE: Primary | ICD-10-CM

## 2022-08-01 DIAGNOSIS — C91.00 B-CELL ACUTE LYMPHOBLASTIC LEUKEMIA: Primary | ICD-10-CM

## 2022-08-01 DIAGNOSIS — C91.00 B-CELL ACUTE LYMPHOBLASTIC LEUKEMIA: ICD-10-CM

## 2022-08-01 PROCEDURE — 25000003 PHARM REV CODE 250: Performed by: INTERNAL MEDICINE

## 2022-08-01 PROCEDURE — 96413 CHEMO IV INFUSION 1 HR: CPT

## 2022-08-01 PROCEDURE — A4216 STERILE WATER/SALINE, 10 ML: HCPCS | Performed by: INTERNAL MEDICINE

## 2022-08-01 PROCEDURE — 96367 TX/PROPH/DG ADDL SEQ IV INF: CPT

## 2022-08-01 PROCEDURE — 96375 TX/PRO/DX INJ NEW DRUG ADDON: CPT

## 2022-08-01 PROCEDURE — 63600175 PHARM REV CODE 636 W HCPCS: Performed by: INTERNAL MEDICINE

## 2022-08-01 RX ORDER — SODIUM CHLORIDE 0.9 % (FLUSH) 0.9 %
10 SYRINGE (ML) INJECTION
Status: DISCONTINUED | OUTPATIENT
Start: 2022-08-01 | End: 2022-08-01 | Stop reason: HOSPADM

## 2022-08-01 RX ORDER — ACETAMINOPHEN 325 MG/1
650 TABLET ORAL
Status: COMPLETED | OUTPATIENT
Start: 2022-08-01 | End: 2022-08-01

## 2022-08-01 RX ORDER — HEPARIN 100 UNIT/ML
500 SYRINGE INTRAVENOUS
Status: DISCONTINUED | OUTPATIENT
Start: 2022-08-01 | End: 2022-08-01 | Stop reason: HOSPADM

## 2022-08-01 RX ADMIN — DIPHENHYDRAMINE HYDROCHLORIDE 25 MG: 50 INJECTION, SOLUTION INTRAMUSCULAR; INTRAVENOUS at 11:08

## 2022-08-01 RX ADMIN — Medication 10 ML: at 01:08

## 2022-08-01 RX ADMIN — ACETAMINOPHEN 650 MG: 325 TABLET ORAL at 10:08

## 2022-08-01 RX ADMIN — SODIUM CHLORIDE: 0.9 INJECTION, SOLUTION INTRAVENOUS at 11:08

## 2022-08-01 RX ADMIN — HYDROCORTISONE SODIUM SUCCINATE 25 MG: 100 INJECTION, POWDER, FOR SOLUTION INTRAMUSCULAR; INTRAVENOUS at 11:08

## 2022-08-01 RX ADMIN — SODIUM CHLORIDE 0.9 MG: 9 INJECTION, SOLUTION INTRAVENOUS at 12:08

## 2022-08-01 RX ADMIN — HEPARIN 500 UNITS: 100 SYRINGE at 01:08

## 2022-08-03 DIAGNOSIS — C91.00 B-CELL ACUTE LYMPHOBLASTIC LEUKEMIA: ICD-10-CM

## 2022-08-04 RX ORDER — FLUCONAZOLE 200 MG/1
400 TABLET ORAL DAILY
Qty: 60 TABLET | Refills: 2 | Status: SHIPPED | OUTPATIENT
Start: 2022-08-04 | End: 2022-09-12

## 2022-08-05 ENCOUNTER — PATIENT MESSAGE (OUTPATIENT)
Dept: PHARMACY | Facility: CLINIC | Age: 52
End: 2022-08-05
Payer: COMMERCIAL

## 2022-08-05 ENCOUNTER — SPECIALTY PHARMACY (OUTPATIENT)
Dept: PHARMACY | Facility: CLINIC | Age: 52
End: 2022-08-05
Payer: COMMERCIAL

## 2022-08-05 DIAGNOSIS — C91.00 B-CELL ACUTE LYMPHOBLASTIC LEUKEMIA: ICD-10-CM

## 2022-08-05 NOTE — TELEPHONE ENCOUNTER
Specialty Pharmacy - Refill Coordination    Specialty Medication Orders Linked to Encounter    Flowsheet Row Most Recent Value   Medication #1 PONATinib (ICLUSIG) 45 mg Tab tablet (Order#[id number], Rx#[id number])          Refill Questions - Documented Responses    Flowsheet Row Most Recent Value   Patient Availability and HIPAA Verification    Does patient want to proceed with activity? Yes   HIPAA/medical authority confirmed? Yes   Relationship to patient of person spoken to? Spouse/Significant Other   Refill Screening Questions    Changes to allergies? No   Changes to medications? No   New conditions since last clinic visit? No   Unplanned office visit, urgent care, ED, or hospital admission in the last 4 weeks? No   How does patient/caregiver feel medication is working? Good   Financial problems or insurance changes? No   How many doses of your specialty medications were missed in the last 4 weeks? 0   Would patient like to speak to a pharmacist? No   When does the patient need to receive the medication? 08/15/22   Refill Delivery Questions    How will the patient receive the medication? Mail   When does the patient need to receive the medication? 08/15/22   Shipping Address Home   Address in [facility] confirmed and updated if neccessary? Yes   Expected Copay ($) 0   Is the patient able to afford the medication copay? Yes   Payment Method zero copay   Days supply of Refill 30   Supplies needed? No supplies needed   Refill activity completed? Yes   Refill activity plan Refill scheduled   Shipment/Pickup Date: 08/11/22          Current Outpatient Medications   Medication Sig    acyclovir (ZOVIRAX) 400 MG tablet Take 1 tablet (400 mg total) by mouth 2 (two) times daily.    allopurinoL (ZYLOPRIM) 300 MG tablet Take 1 tablet (300 mg total) by mouth once daily.    artificial tears (ISOPTO TEARS) 0.5 % ophthalmic solution Place 1 drop into both eyes 4 (four) times daily as needed. (Patient not taking: No sig  reported)    aspirin (ECOTRIN) 81 MG EC tablet Take 1 tablet (81 mg total) by mouth once daily. Hold until instructed to resume by provider due to low platelet count. (Patient not taking: No sig reported)    atorvastatin (LIPITOR) 80 MG tablet Take 80 mg by mouth once daily.    blood sugar diagnostic Strp SMARTSIG:Via Meter 1 to 2 Times Daily    busPIRone (BUSPAR) 10 MG tablet Take 10 mg by mouth 2 (two) times daily.    dapagliflozin (FARXIGA) 10 mg tablet Take 10 mg by mouth once daily.    diazePAM (VALIUM) 10 MG Tab Take 10 mg by mouth 2 (two) times daily as needed.    diltiaZEM (CARDIZEM) 90 MG tablet Take 1 tablet (90 mg total) by mouth every 6 (six) hours.    duke's soln (benadryl 30 mL, mylanta 30 mL, LIDOcaine 30 mL, nystatin 30 mL) 120mL Take 10 mLs by mouth 4 (four) times daily as needed (mouth pain). (Patient not taking: No sig reported)    fenofibrate 160 MG Tab Take 160 mg by mouth once daily.    fluconazole (DIFLUCAN) 200 MG Tab Take 2 tablets (400 mg total) by mouth once daily.    gabapentin (NEURONTIN) 300 MG capsule Take 1 capsule (300 mg total) by mouth 3 (three) times daily.    hydrOXYchloroQUINE (PLAQUENIL) 200 mg tablet Take 1 tablet (200 mg total) by mouth once daily. (Patient not taking: No sig reported)    hydrOXYzine pamoate (VISTARIL) 50 MG Cap Take 50 mg by mouth 2 (two) times daily as needed.    LIDOcaine (LIDODERM) 5 % Place 1 patch onto the skin once daily. Remove & Discard patch within 12 hours or as directed by MD (Patient not taking: No sig reported)    losartan (COZAAR) 25 MG tablet Take 25 mg by mouth once daily.    magnesium oxide (MAG-OX) 400 mg (241.3 mg magnesium) tablet Take 1 tablet by mouth once daily.    naloxone (NARCAN) 4 mg/actuation Spry 4mg by nasal route as needed for opioid overdose; may repeat every 2-3 minutes in alternating nostrils until medical help arrives. Call 911 (Patient not taking: No sig reported)    omeprazole (PRILOSEC) 40 MG capsule  Take 40 mg by mouth once daily.    ondansetron (ZOFRAN-ODT) 8 MG TbDL Take 1 tablet (8 mg total) by mouth every 8 (eight) hours as needed (in case of chemo induced nausea).    ONETOUCH VERIO TEST STRIPS Strp SMARTSIG:Via Meter 1 to 2 Times Daily    OXcarbazepine (TRILEPTAL) 600 MG Tab Take 1,200 mg by mouth 2 (two) times daily.    polyethylene glycol (GLYCOLAX) 17 gram/dose powder Dissolve one capful (17 g) in liquid and take by mouth daily as needed (constipation). (Patient not taking: No sig reported)    PONATinib (ICLUSIG) 45 mg Tab tablet Take 1 tablet (45 mg total) by mouth once daily Do not initiate until instructed to do so by Dr. Jenkins..    potassium chloride (K-TAB) 20 mEq Take 20 mEq by mouth 2 (two) times daily.    prochlorperazine (COMPAZINE) 10 MG tablet Take 1 tablet (10 mg total) by mouth every 6 (six) hours as needed for Nausea. (Patient not taking: Reported on 7/18/2022)    QUEtiapine (SEROQUEL) 200 MG Tab Take 200 mg by mouth every evening.    rivaroxaban (XARELTO) 20 mg Tab Take 1 tablet (20 mg total) by mouth daily with dinner or evening meal. Hold until instructed to resume by provider due to low platelet count. (Patient not taking: No sig reported)    senna-docusate 8.6-50 mg (PERICOLACE) 8.6-50 mg per tablet Take 1 tablet by mouth 2 (two) times daily. (Patient not taking: Reported on 7/18/2022)    sulfamethoxazole-trimethoprim 800-160mg (BACTRIM DS) 800-160 mg Tab Take 1 tablet by mouth every Mon, Wed, Fri.    sumatriptan (IMITREX) 50 MG tablet Take 1 tablet (50 mg total) by mouth daily as needed (headache).    TRINTELLIX 20 mg Tab Take 1 tablet by mouth once daily.    ursodioL (ACTIGALL) 300 mg capsule Take 1 capsule (300 mg total) by mouth 3 (three) times daily.   Last reviewed on 8/1/2022 10:51 AM by Layne Bryan RN    Review of patient's allergies indicates:   Allergen Reactions    Levetiracetam      Other reaction(s): Unknown  Other reaction(s): Unknown  Other  reaction(s): Unknown    Last reviewed on  8/5/2022 8:55 AM by Rose Marie Ledbetter      Tasks added this encounter   8/30/2022 - Refill Call (Auto Added)   Tasks due within next 3 months   No tasks due.     [facility]  [address]  [facility] [address]  Phone: [phone]  Fax: [phone]

## 2022-08-08 ENCOUNTER — INFUSION (OUTPATIENT)
Dept: INFUSION THERAPY | Facility: HOSPITAL | Age: 52
End: 2022-08-08
Attending: INTERNAL MEDICINE
Payer: COMMERCIAL

## 2022-08-08 VITALS
TEMPERATURE: 98 F | HEIGHT: 65 IN | BODY MASS INDEX: 23.62 KG/M2 | HEART RATE: 89 BPM | SYSTOLIC BLOOD PRESSURE: 111 MMHG | RESPIRATION RATE: 18 BRPM | DIASTOLIC BLOOD PRESSURE: 57 MMHG | WEIGHT: 141.75 LBS

## 2022-08-08 DIAGNOSIS — C91.00 B-CELL ACUTE LYMPHOBLASTIC LEUKEMIA: ICD-10-CM

## 2022-08-08 DIAGNOSIS — D72.829 LEUKOCYTOSIS, UNSPECIFIED TYPE: Primary | ICD-10-CM

## 2022-08-08 PROCEDURE — 96367 TX/PROPH/DG ADDL SEQ IV INF: CPT

## 2022-08-08 PROCEDURE — 25000003 PHARM REV CODE 250: Performed by: INTERNAL MEDICINE

## 2022-08-08 PROCEDURE — A4216 STERILE WATER/SALINE, 10 ML: HCPCS | Performed by: INTERNAL MEDICINE

## 2022-08-08 PROCEDURE — 63600175 PHARM REV CODE 636 W HCPCS: Performed by: INTERNAL MEDICINE

## 2022-08-08 PROCEDURE — 96413 CHEMO IV INFUSION 1 HR: CPT

## 2022-08-08 PROCEDURE — 96375 TX/PRO/DX INJ NEW DRUG ADDON: CPT

## 2022-08-08 RX ORDER — SODIUM CHLORIDE 0.9 % (FLUSH) 0.9 %
10 SYRINGE (ML) INJECTION
Status: DISCONTINUED | OUTPATIENT
Start: 2022-08-08 | End: 2022-08-08 | Stop reason: HOSPADM

## 2022-08-08 RX ORDER — HEPARIN 100 UNIT/ML
500 SYRINGE INTRAVENOUS
Status: DISCONTINUED | OUTPATIENT
Start: 2022-08-08 | End: 2022-08-08 | Stop reason: HOSPADM

## 2022-08-08 RX ORDER — ACETAMINOPHEN 325 MG/1
650 TABLET ORAL
Status: COMPLETED | OUTPATIENT
Start: 2022-08-08 | End: 2022-08-08

## 2022-08-08 RX ADMIN — DIPHENHYDRAMINE HYDROCHLORIDE 25 MG: 50 INJECTION, SOLUTION INTRAMUSCULAR; INTRAVENOUS at 11:08

## 2022-08-08 RX ADMIN — Medication 10 ML: at 01:08

## 2022-08-08 RX ADMIN — SODIUM CHLORIDE 0.9 MG: 9 INJECTION, SOLUTION INTRAVENOUS at 12:08

## 2022-08-08 RX ADMIN — HYDROCORTISONE SODIUM SUCCINATE 25 MG: 100 INJECTION, POWDER, FOR SOLUTION INTRAMUSCULAR; INTRAVENOUS at 11:08

## 2022-08-08 RX ADMIN — SODIUM CHLORIDE: 0.9 INJECTION, SOLUTION INTRAVENOUS at 11:08

## 2022-08-08 RX ADMIN — HEPARIN 500 UNITS: 100 SYRINGE at 01:08

## 2022-08-08 RX ADMIN — ACETAMINOPHEN 650 MG: 325 TABLET ORAL at 11:08

## 2022-08-09 ENCOUNTER — PATIENT MESSAGE (OUTPATIENT)
Dept: HEMATOLOGY/ONCOLOGY | Facility: CLINIC | Age: 52
End: 2022-08-09
Payer: COMMERCIAL

## 2022-08-09 DIAGNOSIS — C91.00 B-CELL ACUTE LYMPHOBLASTIC LEUKEMIA: ICD-10-CM

## 2022-08-09 DIAGNOSIS — R11.0 NAUSEA: ICD-10-CM

## 2022-08-09 RX ORDER — ONDANSETRON 8 MG/1
8 TABLET, ORALLY DISINTEGRATING ORAL EVERY 8 HOURS PRN
Qty: 30 TABLET | Refills: 1 | Status: SHIPPED | OUTPATIENT
Start: 2022-08-09

## 2022-08-09 NOTE — TELEPHONE ENCOUNTER
Spoke to  regarding the request for refill on Zofran ODT 8MG TbDL. Pt  confirmed that Ms.Christie Gonzalez has nausea due to chemo and no vomiting. Pt spouse advised that refill request would be routed to . Pharmacy location confirmed by .

## 2022-08-09 NOTE — TELEPHONE ENCOUNTER
"----- Message from Enedelia Lopez sent at 8/9/2022  9:25 AM CDT -----  Regarding: Refill request  Contact: Mannie Rao  Consult/Advisory:       Name Of Caller:Mannie Rao      Contact Preference?:[phone]         Does patient feel the need to be seen today? nO      What is the nature of the call?: Calling to get refill on ondansetron (ZOFRAN-ODT) 8 MG TbDL       [facility] [place]    [phone]      Additional Notes:  "Thank you for all that you do for our patients'"        "

## 2022-08-12 DIAGNOSIS — C91.00 B-CELL ACUTE LYMPHOBLASTIC LEUKEMIA: ICD-10-CM

## 2022-08-12 DIAGNOSIS — G89.3 CANCER ASSOCIATED PAIN: ICD-10-CM

## 2022-08-12 RX ORDER — HYDROMORPHONE HYDROCHLORIDE 2 MG/1
2 TABLET ORAL
Qty: 120 TABLET | Refills: 0 | Status: SHIPPED | OUTPATIENT
Start: 2022-08-12 | End: 2022-09-11

## 2022-08-15 ENCOUNTER — TELEPHONE (OUTPATIENT)
Dept: HEMATOLOGY/ONCOLOGY | Facility: CLINIC | Age: 52
End: 2022-08-15
Payer: COMMERCIAL

## 2022-08-15 ENCOUNTER — DOCUMENTATION ONLY (OUTPATIENT)
Dept: HEMATOLOGY/ONCOLOGY | Facility: CLINIC | Age: 52
End: 2022-08-15
Payer: COMMERCIAL

## 2022-08-15 NOTE — TELEPHONE ENCOUNTER
----- Message from Elliot Mejia sent at 8/15/2022 11:59 AM CDT -----  Regarding: RE: SCT Benefits check  Good Afternoon Rachel,    Verified medical and transplant benefits for patient at [facility] only.    Have a good rest of the day  Elliot  ----- Message -----  From: Rachel Albarran RN  Sent: 8/15/2022  11:42 AM CDT  To: Gary Marquez, Elliot Mejia  Subject: SCT Benefits check                               Hello,    Please complete benefit check for patient. Medical benefit check and transplant benefit check for [facility] only.    Allo    Thank you,  Rachel

## 2022-08-22 NOTE — PROGRESS NOTES
"Oncology Nutrition Assessment for Medical Nutrition Therapy  Follow-up Visit    Deepti Gonzalez   1970    Referring Provider: Dayron Jenkins MD      Reason for Visit: nutrition counseling and education    PMHx:   Past Medical History:   Diagnosis Date    Arthritis     Cancer     COPD (chronic obstructive pulmonary disease)     Hypertension     Stroke     TIA x 4       Nutrition Assessment    This is a 51 y.o.female with a medical diagnosis of relapsed B-cell ALL s/p cycle 2 Inotuzumab + Ozogamicin + Ponatanib. She is known to me from previous visit 3/2022. She reports that her appetite fluctuates, some days no real appetite. She says that her brother does push her to eat when he is with her in the mornings. She will typically have something small like a Nutrigrain bar or grits for breakfast. She had been drinking Boost but has gotten tired of it. Lunch is usually leftovers or a tomato sandwich. If she isn't up to eating, then she will have chicken noodle soup. Dinner is typically something home cooked, always some sort of protein like meat or beans. If she snacks, it is usually a boiled egg, jello, or a scoop of peanut butter. She is doing much better with her water intake than when we last met. She is having some taste changes that have made her no longer like sodas. She is still dealing with nausea, taking Zofran ODT which helps. She also reports abdoninal cramping/pain which she was told has to do with some pancreas issues she is having.    Weight: 62.5 kg (137 lb 10.8 oz)  Height: 5' 5" (165.1 cm)  BMI: Body mass index is 22.91 kg/m².   IBW: Ideal body weight: 57 kg (125 lb 10.6 oz)  Adjusted ideal body weight: 59.2 kg (130 lb 7.5 oz)    Usual BW: 155-160lb  Weight Change: ~20lb loss over 4-5 months    Allergies: Levetiracetam    Current Medications:  Current Outpatient Medications:     acyclovir (ZOVIRAX) 400 MG tablet, Take 1 tablet (400 mg total) by mouth 2 (two) times daily., Disp: 60 tablet, " Rfl: 11    allopurinoL (ZYLOPRIM) 300 MG tablet, Take 1 tablet (300 mg total) by mouth once daily., Disp: 90 tablet, Rfl: 3    artificial tears (ISOPTO TEARS) 0.5 % ophthalmic solution, Place 1 drop into both eyes 4 (four) times daily as needed. (Patient not taking: No sig reported), Disp: , Rfl:     aspirin (ECOTRIN) 81 MG EC tablet, Take 1 tablet (81 mg total) by mouth once daily. Hold until instructed to resume by provider due to low platelet count. (Patient not taking: No sig reported), Disp: , Rfl: 0    atorvastatin (LIPITOR) 80 MG tablet, Take 80 mg by mouth once daily., Disp: , Rfl:     blood sugar diagnostic Strp, SMARTSIG:Via Meter 1 to 2 Times Daily, Disp: , Rfl:     busPIRone (BUSPAR) 10 MG tablet, Take 10 mg by mouth 2 (two) times daily., Disp: , Rfl:     dapagliflozin (FARXIGA) 10 mg tablet, Take 10 mg by mouth once daily., Disp: , Rfl:     diazePAM (VALIUM) 10 MG Tab, Take 10 mg by mouth 2 (two) times daily as needed., Disp: , Rfl:     diltiaZEM (CARDIZEM) 90 MG tablet, Take 1 tablet (90 mg total) by mouth every 6 (six) hours., Disp: 120 tablet, Rfl: 11    duke's soln (benadryl 30 mL, mylanta 30 mL, LIDOcaine 30 mL, nystatin 30 mL) 120mL, Take 10 mLs by mouth 4 (four) times daily as needed (mouth pain). (Patient not taking: No sig reported), Disp: 120 mL, Rfl: 0    fenofibrate 160 MG Tab, Take 160 mg by mouth once daily., Disp: , Rfl:     fluconazole (DIFLUCAN) 200 MG Tab, Take 2 tablets (400 mg total) by mouth once daily., Disp: 60 tablet, Rfl: 2    gabapentin (NEURONTIN) 300 MG capsule, Take 1 capsule (300 mg total) by mouth 3 (three) times daily., Disp: 90 capsule, Rfl: 11    HYDROmorphone (DILAUDID) 2 MG tablet, Take 1 tablet (2 mg total) by mouth every 3 (three) hours as needed for Pain., Disp: 120 tablet, Rfl: 0    hydrOXYchloroQUINE (PLAQUENIL) 200 mg tablet, Take 1 tablet (200 mg total) by mouth once daily. (Patient not taking: No sig reported), Disp: , Rfl:     hydrOXYzine  pamoate (VISTARIL) 50 MG Cap, Take 50 mg by mouth 2 (two) times daily as needed., Disp: , Rfl:     LIDOcaine (LIDODERM) 5 %, Place 1 patch onto the skin once daily. Remove & Discard patch within 12 hours or as directed by MD (Patient not taking: No sig reported), Disp: 30 patch, Rfl: 2    losartan (COZAAR) 25 MG tablet, Take 25 mg by mouth once daily., Disp: , Rfl:     magnesium oxide (MAG-OX) 400 mg (241.3 mg magnesium) tablet, Take 1 tablet by mouth once daily., Disp: , Rfl:     naloxone (NARCAN) 4 mg/actuation Spry, 4mg by nasal route as needed for opioid overdose; may repeat every 2-3 minutes in alternating nostrils until medical help arrives. Call 911 (Patient not taking: No sig reported), Disp: 1 each, Rfl: 11    omeprazole (PRILOSEC) 40 MG capsule, Take 40 mg by mouth once daily., Disp: , Rfl:     ondansetron (ZOFRAN-ODT) 8 MG TbDL, Take 1 tablet (8 mg total) by mouth every 8 (eight) hours as needed (in case of chemo induced nausea)., Disp: 30 tablet, Rfl: 1    ONETOUCH VERIO TEST STRIPS Strp, SMARTSIG:Via Meter 1 to 2 Times Daily, Disp: , Rfl:     OXcarbazepine (TRILEPTAL) 600 MG Tab, Take 1,200 mg by mouth 2 (two) times daily., Disp: , Rfl:     polyethylene glycol (GLYCOLAX) 17 gram/dose powder, Dissolve one capful (17 g) in liquid and take by mouth daily as needed (constipation). (Patient not taking: No sig reported), Disp: 510 g, Rfl: 0    PONATinib (ICLUSIG) 45 mg Tab tablet, Take 1 tablet (45 mg total) by mouth once daily Do not initiate until instructed to do so by Dr. Jenkins.., Disp: 30 tablet, Rfl: 0    potassium chloride (K-TAB) 20 mEq, Take 20 mEq by mouth 2 (two) times daily., Disp: , Rfl:     prochlorperazine (COMPAZINE) 10 MG tablet, Take 1 tablet (10 mg total) by mouth every 6 (six) hours as needed for Nausea. (Patient not taking: Reported on 7/18/2022), Disp: 30 tablet, Rfl: 2    QUEtiapine (SEROQUEL) 200 MG Tab, Take 200 mg by mouth every evening., Disp: , Rfl:     rivaroxaban  (XARELTO) 20 mg Tab, Take 1 tablet (20 mg total) by mouth daily with dinner or evening meal. Hold until instructed to resume by provider due to low platelet count. (Patient not taking: No sig reported), Disp: , Rfl:     senna-docusate 8.6-50 mg (PERICOLACE) 8.6-50 mg per tablet, Take 1 tablet by mouth 2 (two) times daily. (Patient not taking: Reported on 7/18/2022), Disp: 60 tablet, Rfl: 3    sulfamethoxazole-trimethoprim 800-160mg (BACTRIM DS) 800-160 mg Tab, Take 1 tablet by mouth every Mon, Wed, Fri., Disp: 40 tablet, Rfl: 3    sumatriptan (IMITREX) 50 MG tablet, Take 1 tablet (50 mg total) by mouth daily as needed (headache)., Disp: 30 tablet, Rfl: 0    TRINTELLIX 20 mg Tab, Take 1 tablet by mouth once daily., Disp: , Rfl:     ursodioL (ACTIGALL) 300 mg capsule, Take 1 capsule (300 mg total) by mouth 3 (three) times daily., Disp: 90 capsule, Rfl: 11    Food/medication interactions noted: none    Vitamins/Supplements: none    Labs: Reviewed    Nutrition Diagnosis    Problem: moderate malnutrition  Etiology (related to): appetite loss and early satiety  Signs/Symptoms (as evidenced by): reports of inadequate PO intake, weight loss and both fat & muscle wasting present    Nutrition Intervention    Nutrition Prescription   0201-8250 kcals (25-30kcal/kg)  75g protein (1.2g/kg)   1563-1875mL fluid (25-30mL/kg)    Recommendations:  Try eating more small frequent meals/snacks (aim for 5-6/day)  Include protein with all meals/snacks (examples reviewed)  Continue to supplement with Boost Plus/Ensure Complete - can use as base of smoothies or milkshakes to help with flavor  Try Ensure Clear  Drink at least 64oz/day    Nutrition Monitoring and Evaluation    Monitor: diet education needs, energy intake and weight status    Goals: prevent further weight loss    Follow up: Patient provided with dietitian contact information and advised to call/message with questions or to make future appointment if further intervention is  needed.    Communication to referring provider/care team: note available in chart    April Castaneda MS, RD, LDN  [phone]

## 2022-08-23 ENCOUNTER — LAB VISIT (OUTPATIENT)
Dept: LAB | Facility: HOSPITAL | Age: 52
End: 2022-08-23
Payer: COMMERCIAL

## 2022-08-23 ENCOUNTER — OFFICE VISIT (OUTPATIENT)
Dept: HEMATOLOGY/ONCOLOGY | Facility: CLINIC | Age: 52
End: 2022-08-23
Payer: COMMERCIAL

## 2022-08-23 ENCOUNTER — CLINICAL SUPPORT (OUTPATIENT)
Dept: HEMATOLOGY/ONCOLOGY | Facility: CLINIC | Age: 52
End: 2022-08-23
Payer: COMMERCIAL

## 2022-08-23 VITALS
BODY MASS INDEX: 23.03 KG/M2 | OXYGEN SATURATION: 98 % | TEMPERATURE: 99 F | HEART RATE: 103 BPM | HEIGHT: 65 IN | RESPIRATION RATE: 16 BRPM | SYSTOLIC BLOOD PRESSURE: 138 MMHG | WEIGHT: 138.25 LBS | DIASTOLIC BLOOD PRESSURE: 63 MMHG

## 2022-08-23 VITALS — BODY MASS INDEX: 22.91 KG/M2 | WEIGHT: 137.69 LBS

## 2022-08-23 DIAGNOSIS — C91.00 B-CELL ACUTE LYMPHOBLASTIC LEUKEMIA: ICD-10-CM

## 2022-08-23 DIAGNOSIS — C91.00 B-CELL ACUTE LYMPHOBLASTIC LEUKEMIA: Primary | ICD-10-CM

## 2022-08-23 DIAGNOSIS — E44.0 MODERATE MALNUTRITION: ICD-10-CM

## 2022-08-23 DIAGNOSIS — Z71.3 NUTRITIONAL COUNSELING: ICD-10-CM

## 2022-08-23 DIAGNOSIS — Z76.82 STEM CELL TRANSPLANT CANDIDATE: Primary | ICD-10-CM

## 2022-08-23 DIAGNOSIS — D84.9 IMMUNOSUPPRESSION: ICD-10-CM

## 2022-08-23 LAB
ABO + RH BLD: NORMAL
ALBUMIN SERPL BCP-MCNC: 3.7 G/DL (ref 3.5–5.2)
ALP SERPL-CCNC: 198 U/L (ref 55–135)
ALT SERPL W/O P-5'-P-CCNC: 51 U/L (ref 10–44)
AMYLASE SERPL-CCNC: 41 U/L (ref 20–110)
ANION GAP SERPL CALC-SCNC: 14 MMOL/L (ref 8–16)
ANISOCYTOSIS BLD QL SMEAR: SLIGHT
AST SERPL-CCNC: 54 U/L (ref 10–40)
BASOPHILS # BLD AUTO: 0.01 K/UL (ref 0–0.2)
BASOPHILS NFR BLD: 0.3 % (ref 0–1.9)
BILIRUB SERPL-MCNC: 0.4 MG/DL (ref 0.1–1)
BLD GP AB SCN CELLS X3 SERPL QL: NORMAL
BUN SERPL-MCNC: 12 MG/DL (ref 6–20)
CALCIUM SERPL-MCNC: 9.6 MG/DL (ref 8.7–10.5)
CHLORIDE SERPL-SCNC: 102 MMOL/L (ref 95–110)
CO2 SERPL-SCNC: 22 MMOL/L (ref 23–29)
CREAT SERPL-MCNC: 0.8 MG/DL (ref 0.5–1.4)
DACRYOCYTES BLD QL SMEAR: ABNORMAL
DIFFERENTIAL METHOD: ABNORMAL
EOSINOPHIL # BLD AUTO: 0.1 K/UL (ref 0–0.5)
EOSINOPHIL NFR BLD: 1.8 % (ref 0–8)
ERYTHROCYTE [DISTWIDTH] IN BLOOD BY AUTOMATED COUNT: 23.7 % (ref 11.5–14.5)
EST. GFR  (NO RACE VARIABLE): >60 ML/MIN/1.73 M^2
GLUCOSE SERPL-MCNC: 111 MG/DL (ref 70–110)
HCT VFR BLD AUTO: 31.6 % (ref 37–48.5)
HGB BLD-MCNC: 11.3 G/DL (ref 12–16)
HYPOCHROMIA BLD QL SMEAR: ABNORMAL
IMM GRANULOCYTES # BLD AUTO: 0 K/UL (ref 0–0.04)
IMM GRANULOCYTES NFR BLD AUTO: 0 % (ref 0–0.5)
LIPASE SERPL-CCNC: 61 U/L (ref 4–60)
LYMPHOCYTES # BLD AUTO: 2.8 K/UL (ref 1–4.8)
LYMPHOCYTES NFR BLD: 84.5 % (ref 18–48)
MAGNESIUM SERPL-MCNC: 1.9 MG/DL (ref 1.6–2.6)
MCH RBC QN AUTO: 38 PG (ref 27–31)
MCHC RBC AUTO-ENTMCNC: 35.8 G/DL (ref 32–36)
MCV RBC AUTO: 106 FL (ref 82–98)
MONOCYTES # BLD AUTO: 0.4 K/UL (ref 0.3–1)
MONOCYTES NFR BLD: 13.1 % (ref 4–15)
NEUTROPHILS # BLD AUTO: 0 K/UL (ref 1.8–7.7)
NEUTROPHILS NFR BLD: 0.3 % (ref 38–73)
NRBC BLD-RTO: 1 /100 WBC
OVALOCYTES BLD QL SMEAR: ABNORMAL
PHOSPHATE SERPL-MCNC: 4.6 MG/DL (ref 2.7–4.5)
PLATELET # BLD AUTO: 42 K/UL (ref 150–450)
PMV BLD AUTO: 13.5 FL (ref 9.2–12.9)
POIKILOCYTOSIS BLD QL SMEAR: SLIGHT
POLYCHROMASIA BLD QL SMEAR: ABNORMAL
POTASSIUM SERPL-SCNC: 4.5 MMOL/L (ref 3.5–5.1)
PROT SERPL-MCNC: 7.2 G/DL (ref 6–8.4)
RBC # BLD AUTO: 2.97 M/UL (ref 4–5.4)
SODIUM SERPL-SCNC: 138 MMOL/L (ref 136–145)
SPHEROCYTES BLD QL SMEAR: ABNORMAL
URATE SERPL-MCNC: 2.5 MG/DL (ref 2.4–5.7)
WBC # BLD AUTO: 3.29 K/UL (ref 3.9–12.7)

## 2022-08-23 PROCEDURE — 82150 ASSAY OF AMYLASE: CPT | Performed by: INTERNAL MEDICINE

## 2022-08-23 PROCEDURE — 84550 ASSAY OF BLOOD/URIC ACID: CPT | Performed by: INTERNAL MEDICINE

## 2022-08-23 PROCEDURE — 3044F PR MOST RECENT HEMOGLOBIN A1C LEVEL <7.0%: ICD-10-PCS | Mod: CPTII,S$GLB,, | Performed by: INTERNAL MEDICINE

## 2022-08-23 PROCEDURE — 99215 PR OFFICE/OUTPT VISIT, EST, LEVL V, 40-54 MIN: ICD-10-PCS | Mod: S$GLB,,, | Performed by: INTERNAL MEDICINE

## 2022-08-23 PROCEDURE — 99215 OFFICE O/P EST HI 40 MIN: CPT | Mod: S$GLB,,, | Performed by: INTERNAL MEDICINE

## 2022-08-23 PROCEDURE — 1159F PR MEDICATION LIST DOCUMENTED IN MEDICAL RECORD: ICD-10-PCS | Mod: CPTII,S$GLB,, | Performed by: INTERNAL MEDICINE

## 2022-08-23 PROCEDURE — 99999 PR PBB SHADOW E&M-EST. PATIENT-LVL V: CPT | Mod: PBBFAC,,, | Performed by: INTERNAL MEDICINE

## 2022-08-23 PROCEDURE — 97803 PR MED NUTR THER, SUBSQ, INDIV, EA 15 MIN: ICD-10-PCS | Mod: S$GLB,,, | Performed by: DIETITIAN, REGISTERED

## 2022-08-23 PROCEDURE — 86850 RBC ANTIBODY SCREEN: CPT | Performed by: INTERNAL MEDICINE

## 2022-08-23 PROCEDURE — 3008F BODY MASS INDEX DOCD: CPT | Mod: CPTII,S$GLB,, | Performed by: INTERNAL MEDICINE

## 2022-08-23 PROCEDURE — 99999 PR PBB SHADOW E&M-EST. PATIENT-LVL II: ICD-10-PCS | Mod: PBBFAC,,, | Performed by: DIETITIAN, REGISTERED

## 2022-08-23 PROCEDURE — 1159F MED LIST DOCD IN RCRD: CPT | Mod: CPTII,S$GLB,, | Performed by: INTERNAL MEDICINE

## 2022-08-23 PROCEDURE — 3075F SYST BP GE 130 - 139MM HG: CPT | Mod: CPTII,S$GLB,, | Performed by: INTERNAL MEDICINE

## 2022-08-23 PROCEDURE — 99999 PR PBB SHADOW E&M-EST. PATIENT-LVL II: CPT | Mod: PBBFAC,,, | Performed by: DIETITIAN, REGISTERED

## 2022-08-23 PROCEDURE — 99999 PR PBB SHADOW E&M-EST. PATIENT-LVL V: ICD-10-PCS | Mod: PBBFAC,,, | Performed by: INTERNAL MEDICINE

## 2022-08-23 PROCEDURE — 3075F PR MOST RECENT SYSTOLIC BLOOD PRESS GE 130-139MM HG: ICD-10-PCS | Mod: CPTII,S$GLB,, | Performed by: INTERNAL MEDICINE

## 2022-08-23 PROCEDURE — 80053 COMPREHEN METABOLIC PANEL: CPT | Performed by: INTERNAL MEDICINE

## 2022-08-23 PROCEDURE — 4010F PR ACE/ARB THEARPY RXD/TAKEN: ICD-10-PCS | Mod: CPTII,S$GLB,, | Performed by: INTERNAL MEDICINE

## 2022-08-23 PROCEDURE — 84100 ASSAY OF PHOSPHORUS: CPT | Performed by: INTERNAL MEDICINE

## 2022-08-23 PROCEDURE — 83735 ASSAY OF MAGNESIUM: CPT | Performed by: INTERNAL MEDICINE

## 2022-08-23 PROCEDURE — 1160F RVW MEDS BY RX/DR IN RCRD: CPT | Mod: CPTII,S$GLB,, | Performed by: INTERNAL MEDICINE

## 2022-08-23 PROCEDURE — 3044F HG A1C LEVEL LT 7.0%: CPT | Mod: CPTII,S$GLB,, | Performed by: INTERNAL MEDICINE

## 2022-08-23 PROCEDURE — 1160F PR REVIEW ALL MEDS BY PRESCRIBER/CLIN PHARMACIST DOCUMENTED: ICD-10-PCS | Mod: CPTII,S$GLB,, | Performed by: INTERNAL MEDICINE

## 2022-08-23 PROCEDURE — 4010F ACE/ARB THERAPY RXD/TAKEN: CPT | Mod: CPTII,S$GLB,, | Performed by: INTERNAL MEDICINE

## 2022-08-23 PROCEDURE — 3008F PR BODY MASS INDEX (BMI) DOCUMENTED: ICD-10-PCS | Mod: CPTII,S$GLB,, | Performed by: INTERNAL MEDICINE

## 2022-08-23 PROCEDURE — 3078F PR MOST RECENT DIASTOLIC BLOOD PRESSURE < 80 MM HG: ICD-10-PCS | Mod: CPTII,S$GLB,, | Performed by: INTERNAL MEDICINE

## 2022-08-23 PROCEDURE — 85025 COMPLETE CBC W/AUTO DIFF WBC: CPT | Performed by: INTERNAL MEDICINE

## 2022-08-23 PROCEDURE — 83690 ASSAY OF LIPASE: CPT | Performed by: INTERNAL MEDICINE

## 2022-08-23 PROCEDURE — 36415 COLL VENOUS BLD VENIPUNCTURE: CPT | Performed by: INTERNAL MEDICINE

## 2022-08-23 PROCEDURE — 97803 MED NUTRITION INDIV SUBSEQ: CPT | Mod: S$GLB,,, | Performed by: DIETITIAN, REGISTERED

## 2022-08-23 PROCEDURE — 3078F DIAST BP <80 MM HG: CPT | Mod: CPTII,S$GLB,, | Performed by: INTERNAL MEDICINE

## 2022-08-23 RX ORDER — LEVOFLOXACIN 500 MG/1
500 TABLET, FILM COATED ORAL DAILY
Qty: 30 TABLET | Refills: 11 | Status: SHIPPED | OUTPATIENT
Start: 2022-08-23 | End: 2022-09-12

## 2022-08-23 RX ORDER — METFORMIN HYDROCHLORIDE 500 MG/1
1000 TABLET, EXTENDED RELEASE ORAL 2 TIMES DAILY
Status: ON HOLD | COMMUNITY
Start: 2022-07-22 | End: 2022-10-14 | Stop reason: HOSPADM

## 2022-08-23 NOTE — Clinical Note
Thanks for giving me the chance to chat with her extensively about transplant. I think after really digging in, the risks associated with transplant likely outweigh the benefits. She agreed.   Also she didn't have treatment scheduled today, but it would have been held regardless as her ANC declined further (ANC 0 today). I restarted levofloxacin in addition to acyclovir/fluconazole.

## 2022-08-23 NOTE — PROGRESS NOTES
Section of Hematology and Stem Cell Transplantation  New Patient Consult     Date of visit: 8/23/22  Visit diagnosis: Stem cell transplant candidate [Z76.82]  Referred by:  Dr. Dayron Jenkins    Oncologic History:     Primary Oncologic Diagnosis: Precursor B-cell acute lymphoblastic leukemia, Ph+    Oncologist History from Dr. Jenkins' note:  A. 11/23/2021: Transferred to [facility] from outside hospital for evaluation for acute leukemia  B. 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   C. 11/30/2021 - 12/23/2021: Vincristine + imatinib induction; hospital course was complicated by acute hypoxemic respiratory failure which resolved with diuresis and treatment of ALL  D. 1/3/2022: Bone marrow biopsy after induction shows normocellular marrow (60%) with no definite evidence of B-ALL; bcr-abl p190 fusion detected at 0.02% of total ABL1; FISH normal; MRD testing not sent  E. 1/20/2022: Begin consolidation therapy with [id number] protocol  F. 3/16/2022: BCR-ABL p190 transcript on peripheral blood negative  G. 4/13/2022: BCR-ABL p190 transcript on peripheral blood 0.63%  H. 5/4/2022: Bone marrow biopsy shows 50% cellular marrow with relapsed B-ALL; bone marrow aspirate shows 23.5% blasts; cytogenetics 46,XX,-9,t(9;22)(q34;q11.2),+isaias(22)t(9;22)[5] /46,XX[15]; FISH shows 9.5% nuclei with bcr/abl1 fusion; bcr/abl1 quantitative PCR shows 47% of total ABL1    History of Present Ilness:   Deepti CORRAL Antelmosharath Saman) is a pleasant 51 y.o.female with a past medical history of DM2, RA, COPD, TIA, HTN, and Ph+ B-ALL who presents for initial visit regarding allogeneic stem cell transplantation. She feels well today. Treatment held last week due to neutropenia. She  denies recent fevers and chills.     PAST MEDICAL HISTORY:   Past Medical History:   Diagnosis Date    Cancer     COPD (chronic obstructive pulmonary disease)     Hypertension     Rheumatoid arthritis, unspecified     Stroke     TIA x 4    Type 2 diabetes mellitus with circulatory disorder, without long-term current use of insulin        PAST SURGICAL HISTORY:   Past Surgical History:   Procedure Laterality Date    APPENDECTOMY      HYSTERECTOMY      ROTATOR CUFF REPAIR Bilateral     TONSILLECTOMY      TUBAL LIGATION         PAST SOCIAL HISTORY:  Social History     Tobacco Use    Smoking status: Current Every Day Smoker     Packs/day: 0.50     Types: Cigarettes    Smokeless tobacco: Never Used   Substance Use Topics    Alcohol use: No    Drug use: Never       FAMILY HISTORY:  No family history on file.    CURRENT MEDICATIONS:   Current Outpatient Medications   Medication Sig    acyclovir (ZOVIRAX) 400 MG tablet Take 1 tablet (400 mg total) by mouth 2 (two) times daily.    allopurinoL (ZYLOPRIM) 300 MG tablet Take 1 tablet (300 mg total) by mouth once daily.    artificial tears (ISOPTO TEARS) 0.5 % ophthalmic solution Place 1 drop into both eyes 4 (four) times daily as needed.    atorvastatin (LIPITOR) 80 MG tablet Take 80 mg by mouth once daily.    blood sugar diagnostic Strp SMARTSIG:Via Meter 1 to 2 Times Daily    busPIRone (BUSPAR) 10 MG tablet Take 10 mg by mouth 2 (two) times daily.    dapagliflozin (FARXIGA) 10 mg tablet Take 10 mg by mouth once daily.    diazePAM (VALIUM) 10 MG Tab Take 10 mg by mouth 2 (two) times daily as needed.    diltiaZEM (CARDIZEM) 90 MG tablet Take 1 tablet (90 mg total) by mouth every 6 (six) hours.    fluconazole (DIFLUCAN) 200 MG Tab Take 2 tablets (400 mg total) by mouth once daily.    gabapentin (NEURONTIN) 300 MG capsule Take 1 capsule (300 mg total) by mouth 3 (three) times daily.    HYDROmorphone (DILAUDID) 2 MG tablet Take 1 tablet (2 mg total) by  mouth every 3 (three) hours as needed for Pain.    hydrOXYzine pamoate (VISTARIL) 50 MG Cap Take 50 mg by mouth 2 (two) times daily as needed.    losartan (COZAAR) 25 MG tablet Take 25 mg by mouth once daily.    magnesium oxide (MAG-OX) 400 mg (241.3 mg magnesium) tablet Take 1 tablet by mouth once daily.    metFORMIN (GLUCOPHAGE-XR) 500 MG ER 24hr tablet Take 1,000 mg by mouth 2 (two) times daily.    omeprazole (PRILOSEC) 40 MG capsule Take 40 mg by mouth once daily.    ondansetron (ZOFRAN-ODT) 8 MG TbDL Take 1 tablet (8 mg total) by mouth every 8 (eight) hours as needed (in case of chemo induced nausea).    ONETOUCH VERIO TEST STRIPS Strp SMARTSIG:Via Meter 1 to 2 Times Daily    OXcarbazepine (TRILEPTAL) 600 MG Tab Take 1,200 mg by mouth 2 (two) times daily.    PONATinib (ICLUSIG) 45 mg Tab tablet Take 1 tablet (45 mg total) by mouth once daily Do not initiate until instructed to do so by Dr. Jenkins..    potassium chloride (K-TAB) 20 mEq Take 20 mEq by mouth 2 (two) times daily.    QUEtiapine (SEROQUEL) 200 MG Tab Take 200 mg by mouth every evening.    senna-docusate 8.6-50 mg (PERICOLACE) 8.6-50 mg per tablet Take 1 tablet by mouth 2 (two) times daily.    sulfamethoxazole-trimethoprim 800-160mg (BACTRIM DS) 800-160 mg Tab Take 1 tablet by mouth every Mon, Wed, Fri.    sumatriptan (IMITREX) 50 MG tablet Take 1 tablet (50 mg total) by mouth daily as needed (headache).    TRINTELLIX 20 mg Tab Take 1 tablet by mouth once daily.    ursodioL (ACTIGALL) 300 mg capsule Take 1 capsule (300 mg total) by mouth 3 (three) times daily.    aspirin (ECOTRIN) 81 MG EC tablet Take 1 tablet (81 mg total) by mouth once daily. Hold until instructed to resume by provider due to low platelet count. (Patient not taking: No sig reported)    duke's soln (benadryl 30 mL, mylanta 30 mL, LIDOcaine 30 mL, nystatin 30 mL) 120mL Take 10 mLs by mouth 4 (four) times daily as needed (mouth pain). (Patient not taking: No sig  reported)    fenofibrate 160 MG Tab Take 160 mg by mouth once daily.    hydrOXYchloroQUINE (PLAQUENIL) 200 mg tablet Take 1 tablet (200 mg total) by mouth once daily. (Patient not taking: No sig reported)    levoFLOXacin (LEVAQUIN) 500 MG tablet Take 1 tablet (500 mg total) by mouth once daily.    LIDOcaine (LIDODERM) 5 % Place 1 patch onto the skin once daily. Remove & Discard patch within 12 hours or as directed by MD (Patient not taking: No sig reported)    naloxone (NARCAN) 4 mg/actuation Spry 4mg by nasal route as needed for opioid overdose; may repeat every 2-3 minutes in alternating nostrils until medical help arrives. Call 911 (Patient not taking: No sig reported)    polyethylene glycol (GLYCOLAX) 17 gram/dose powder Dissolve one capful (17 g) in liquid and take by mouth daily as needed (constipation). (Patient not taking: No sig reported)    prochlorperazine (COMPAZINE) 10 MG tablet Take 1 tablet (10 mg total) by mouth every 6 (six) hours as needed for Nausea. (Patient not taking: No sig reported)    rivaroxaban (XARELTO) 20 mg Tab Take 1 tablet (20 mg total) by mouth daily with dinner or evening meal. Hold until instructed to resume by provider due to low platelet count. (Patient not taking: No sig reported)     No current facility-administered medications for this visit.       ALLERGIES:   Review of patient's allergies indicates:   Allergen Reactions    Levetiracetam      Other reaction(s): Unknown  Other reaction(s): Unknown  Other reaction(s): Unknown       Review of Systems:     Pertinent positives and negatives included in the HPI. Otherwise a complete review of systems is negative.    Physical Exam:     Vitals:    08/23/22 1326   BP: 138/63   Pulse: 103   Resp: 16   Temp: 98.5 °F (36.9 °C)     General: Appears well, NAD  HEENT: MMM, no OP lesions  Pulmonary: CTAB, no increased work of breathing, no W/R/C  Cardiovascular: S1S2 normal, RRR, no M/R/G  Abdominal: Soft, NT, ND, BS+, no  HSM  Extremities: No C/C/E  Neurological: AAOx4, grossly normal, no focal deficits  Dermatologic: No appreciable rashes or lesions  Lymphatic: No cervical, axillary, or inguinal lymphadenopathy     ECOG Performance Status: (foot note - ECOG PS provided by [employer] Co[employer]cology Group) 0 - Asymptomatic    Karnofsky Performance Score:  100%- Normal, No Complaints, No Evidence of Disease    Labs:   Lab Results   Component Value Date    WBC 3.29 (L) 08/23/2022    RBC 2.97 (L) 08/23/2022    HGB 11.3 (L) 08/23/2022    HCT 31.6 (L) 08/23/2022     (H) 08/23/2022    MCH 38.0 (H) 08/23/2022    MCHC 35.8 08/23/2022    RDW 23.7 (H) 08/23/2022    PLT 42 (L) 08/23/2022    MPV 13.5 (H) 08/23/2022    GRAN 0.0 (L) 08/23/2022    GRAN 0.3 (L) 08/23/2022    LYMPH 2.8 08/23/2022    LYMPH 84.5 (H) 08/23/2022    MONO 0.4 08/23/2022    MONO 13.1 08/23/2022    EOS 0.1 08/23/2022    BASO 0.01 08/23/2022    EOSINOPHIL 1.8 08/23/2022    BASOPHIL 0.3 08/23/2022       CMP  Sodium   Date Value Ref Range Status   08/23/2022 138 136 - 145 mmol/L Final     Potassium   Date Value Ref Range Status   08/23/2022 4.5 3.5 - 5.1 mmol/L Final     Chloride   Date Value Ref Range Status   08/23/2022 102 95 - 110 mmol/L Final     CO2   Date Value Ref Range Status   08/23/2022 22 (L) 23 - 29 mmol/L Final     Glucose   Date Value Ref Range Status   08/23/2022 111 (H) 70 - 110 mg/dL Final     BUN   Date Value Ref Range Status   08/23/2022 12 6 - 20 mg/dL Final     Creatinine   Date Value Ref Range Status   08/23/2022 0.8 0.5 - 1.4 mg/dL Final     Calcium   Date Value Ref Range Status   08/23/2022 9.6 8.7 - 10.5 mg/dL Final     Total Protein   Date Value Ref Range Status   08/23/2022 7.2 6.0 - 8.4 g/dL Final     Comment:     *Specimen lipemic.  Result may be interfered by lipemia     Albumin   Date Value Ref Range Status   08/23/2022 3.7 3.5 - 5.2 g/dL Final     Total Bilirubin   Date Value Ref Range Status   08/23/2022 0.4 0.1 - 1.0 mg/dL Final      Comment:     For infants and newborns, interpretation of results should be based  on gestational age, weight and in agreement with clinical  observations.    Premature Infant recommended reference ranges:  Up to 24 hours.............<8.0 mg/dL  Up to 48 hours............<12.0 mg/dL  3-5 days..................<15.0 mg/dL  6-29 days.................<15.0 mg/dL       Alkaline Phosphatase   Date Value Ref Range Status   08/23/2022 198 (H) 55 - 135 U/L Final     AST   Date Value Ref Range Status   08/23/2022 54 (H) 10 - 40 U/L Final     ALT   Date Value Ref Range Status   08/23/2022 51 (H) 10 - 44 U/L Final     Anion Gap   Date Value Ref Range Status   08/23/2022 14 8 - 16 mmol/L Final     eGFR if    Date Value Ref Range Status   07/26/2022 >60.0 >60 mL/min/1.73 m^2 Final   07/26/2022 >60.0 >60 mL/min/1.73 m^2 Final     eGFR if non    Date Value Ref Range Status   07/26/2022 >60.0 >60 mL/min/1.73 m^2 Final     Comment:     Calculation used to obtain the estimated glomerular filtration  rate (eGFR) is the CKD-EPI equation.      07/26/2022 >60.0 >60 mL/min/1.73 m^2 Final     Comment:     Calculation used to obtain the estimated glomerular filtration  rate (eGFR) is the CKD-EPI equation.          Imaging:   Reviewed     Pathology:  Reviewed     Assessment and Plan:   Deepti Gonzalez (Deepti) is a pleasant 51 y.o.female with a past medical history of DM2, RA, COPD, TIA, HTN, and Ph+ B-ALL who presents for initial visit regarding allogeneic stem cell transplantation.    1. Stem cell transplant candidate: We discussed the role for allogeneic stem cell transplantation in this disease process, specifically in relapsed ALL. We reviewed the rationale, logistics, risks, and benefits. We also reviewed the requirement to stay in the [place] area for 100 days with a caregiver at all times. We discussed the risks, including infection, graft failure, organ toxicity, graft versus host disease,  relapse of disease, and secondary cancers. We reviewed the need for long-term immunosuppression and need for close monitoring. Her HCT-CI is 6 (high risk) which has been associated with a 2 year NRM of 41% and 2 year OS of 34%. In addition, she has received 2 cycles of inotuzumab already with her most recent marrow showing a morphologic CR but persistent measurable residual disease. The risk of VOD increases in patients who receive >2 cycles of inotuzumab (29%). More importantly, the benefit of transplant is not overwhelmingly convincing in this setting (KAT conditioning, MRD+, Ph+ ALL in the modern era of treatment) based on the available retrospective data. After a prolonged discussion about the risks and benefits, I believe the risks associated with transplant unfortunately outweigh the potential benefits. She expressed understanding and agreement. I will discuss further with Dr. Jenkins.     2. Ph+ ALL: She is currently receiving inotuzumab + ponatinib, which is currently on hold due to neutropenia. This is managed by Dr. Jenkins.    3. Immunosuppression: She is currently taking acyclovir and fluconazole. Will restart levofloxacin with ANC of 0 today.    Orders/Follow Up:      Orders Placed This Encounter    levoFLOXacin (LEVAQUIN) 500 MG tablet     Route Chart for Scheduling    BMT Chart Routing      Follow up with physician No follow up needed. PRN   Follow up with DANUTA    Infusion scheduling note    Injection scheduling note    Labs    Imaging    Pharmacy appointment    Other referrals          Treatment Plan Information   OP INOTUZUMAB OZOGAMICIN   Dayron Jenkins MD   Upcoming Treatment Dates - OP INOTUZUMAB OZOGAMICIN    8/23/2022       Pre-Medications       acetaminophen tablet 650 mg       diphenydramine (BENADRYL) 25 mg in NS 50 mL IVPB       hydrocortisone sodium succinate injection 25 mg       Chemotherapy       inotuzumab ozogamicin (BESPONSA) 0.9 mg in sodium chloride 0.9% 50 mL infusion  8/30/2022        Pre-Medications       acetaminophen tablet 650 mg       diphenydramine (BENADRYL) 25 mg in NS 50 mL IVPB       hydrocortisone sodium succinate injection 25 mg       Chemotherapy       inotuzumab ozogamicin (BESPONSA) 0.9 mg in sodium chloride 0.9% 50 mL infusion  9/6/2022       Pre-Medications       acetaminophen tablet 650 mg       diphenydramine (BENADRYL) 25 mg in NS 50 mL IVPB       hydrocortisone sodium succinate injection 25 mg       Chemotherapy       inotuzumab ozogamicin (BESPONSA) 0.9 mg in sodium chloride 0.9% 50 mL infusion  9/20/2022       Pre-Medications       ACETAMINOPHEN  325 MG ORAL TAB       DIPHENHYDRAMINE IV ORDERABLE       HYDROCORTISONE SOD  MG INJ SOLR (UMBRELLA)       Chemotherapy       inotuzumab ozogamicin (BESPONSA) 0.9 mg in sodium chloride 0.9% 50 mL infusion      Thank you for allowing me to partake in the care of this patient. If there are any questions, please do not hesitate to reach out.    Chaim Armendariz MD  Hematology, Oncology, and Stem Cell Transplantation  [facility]

## 2022-08-26 ENCOUNTER — TELEPHONE (OUTPATIENT)
Dept: HEMATOLOGY/ONCOLOGY | Facility: CLINIC | Age: 52
End: 2022-08-26
Payer: COMMERCIAL

## 2022-08-26 NOTE — TELEPHONE ENCOUNTER
patient temp was 101 last night. 100.7 earlier today. has been bouncing around. went see ENT today and was prescribed augmentin. Last anc on the 23rd was 0. Advised to proceed to ER for neutropenic fever admit per MD.  voiced understanding. Patient is hesitant. Reviewed neutropenic fever protocol.

## 2022-08-26 NOTE — TELEPHONE ENCOUNTER
"----- Message from Milli Flores sent at 8/26/2022  3:49 PM CDT -----  Regarding: Fever  Consult/Advisory:           Name Of Caller: Mannie-    Contact Preference?: [phone]    What is the nature of the call?: called to advise that pt is running a fever around 100           Additional Notes:  "Thank you for all that you do for our patients'"     "

## 2022-08-27 ENCOUNTER — HOSPITAL ENCOUNTER (OUTPATIENT)
Facility: HOSPITAL | Age: 52
Discharge: HOME | End: 2022-08-28
Attending: STUDENT IN AN ORGANIZED HEALTH CARE EDUCATION/TRAINING PROGRAM | Admitting: STUDENT IN AN ORGANIZED HEALTH CARE EDUCATION/TRAINING PROGRAM
Payer: COMMERCIAL

## 2022-08-27 DIAGNOSIS — R50.9 FEVER: ICD-10-CM

## 2022-08-27 LAB
ALBUMIN SERPL BCP-MCNC: 3.3 G/DL (ref 3.5–5.2)
ALP SERPL-CCNC: 213 U/L (ref 55–135)
ALT SERPL W/O P-5'-P-CCNC: 58 U/L (ref 10–44)
ANION GAP SERPL CALC-SCNC: 12 MMOL/L (ref 8–16)
ANISOCYTOSIS BLD QL SMEAR: SLIGHT
AST SERPL-CCNC: 42 U/L (ref 10–40)
BACTERIA #/AREA URNS AUTO: ABNORMAL /HPF
BASOPHILS # BLD AUTO: 0.02 K/UL (ref 0–0.2)
BASOPHILS NFR BLD: 0.5 % (ref 0–1.9)
BILIRUB SERPL-MCNC: 0.5 MG/DL (ref 0.1–1)
BILIRUB UR QL STRIP: NEGATIVE
BUN SERPL-MCNC: 8 MG/DL (ref 6–20)
CALCIUM SERPL-MCNC: 9.3 MG/DL (ref 8.7–10.5)
CHLORIDE SERPL-SCNC: 104 MMOL/L (ref 95–110)
CLARITY UR REFRACT.AUTO: CLEAR
CO2 SERPL-SCNC: 19 MMOL/L (ref 23–29)
COLOR UR AUTO: YELLOW
CREAT SERPL-MCNC: 0.6 MG/DL (ref 0.5–1.4)
CTP QC/QA: YES
DACRYOCYTES BLD QL SMEAR: ABNORMAL
DIFFERENTIAL METHOD: ABNORMAL
EOSINOPHIL # BLD AUTO: 0.1 K/UL (ref 0–0.5)
EOSINOPHIL NFR BLD: 1.8 % (ref 0–8)
ERYTHROCYTE [DISTWIDTH] IN BLOOD BY AUTOMATED COUNT: 20.7 % (ref 11.5–14.5)
EST. GFR  (NO RACE VARIABLE): >60 ML/MIN/1.73 M^2
GLUCOSE SERPL-MCNC: 129 MG/DL (ref 70–110)
GLUCOSE UR QL STRIP: ABNORMAL
HCT VFR BLD AUTO: 30.2 % (ref 37–48.5)
HGB BLD-MCNC: 10.5 G/DL (ref 12–16)
HGB UR QL STRIP: NEGATIVE
HYPOCHROMIA BLD QL SMEAR: ABNORMAL
IMM GRANULOCYTES # BLD AUTO: 0.06 K/UL (ref 0–0.04)
IMM GRANULOCYTES NFR BLD AUTO: 1.4 % (ref 0–0.5)
KETONES UR QL STRIP: NEGATIVE
LACTATE SERPL-SCNC: 1.9 MMOL/L (ref 0.5–2.2)
LACTATE SERPL-SCNC: 3.2 MMOL/L (ref 0.5–2.2)
LEUKOCYTE ESTERASE UR QL STRIP: NEGATIVE
LYMPHOCYTES # BLD AUTO: 2.8 K/UL (ref 1–4.8)
LYMPHOCYTES NFR BLD: 63.2 % (ref 18–48)
MCH RBC QN AUTO: 37.8 PG (ref 27–31)
MCHC RBC AUTO-ENTMCNC: 34.8 G/DL (ref 32–36)
MCV RBC AUTO: 109 FL (ref 82–98)
MICROSCOPIC COMMENT: ABNORMAL
MONOCYTES # BLD AUTO: 0.5 K/UL (ref 0.3–1)
MONOCYTES NFR BLD: 12.1 % (ref 4–15)
NEUTROPHILS # BLD AUTO: 0.9 K/UL (ref 1.8–7.7)
NEUTROPHILS NFR BLD: 21 % (ref 38–73)
NITRITE UR QL STRIP: NEGATIVE
NRBC BLD-RTO: 1 /100 WBC
PH UR STRIP: 6 [PH] (ref 5–8)
PLATELET # BLD AUTO: 23 K/UL (ref 150–450)
PLATELET BLD QL SMEAR: ABNORMAL
PMV BLD AUTO: 13.1 FL (ref 9.2–12.9)
POC MOLECULAR INFLUENZA A AGN: NEGATIVE
POC MOLECULAR INFLUENZA B AGN: NEGATIVE
POIKILOCYTOSIS BLD QL SMEAR: SLIGHT
POLYCHROMASIA BLD QL SMEAR: ABNORMAL
POTASSIUM SERPL-SCNC: 3.7 MMOL/L (ref 3.5–5.1)
PROT SERPL-MCNC: 7 G/DL (ref 6–8.4)
PROT UR QL STRIP: ABNORMAL
RBC # BLD AUTO: 2.78 M/UL (ref 4–5.4)
RBC #/AREA URNS AUTO: 2 /HPF (ref 0–4)
SARS-COV-2 RDRP RESP QL NAA+PROBE: NEGATIVE
SODIUM SERPL-SCNC: 135 MMOL/L (ref 136–145)
SP GR UR STRIP: >1.03 (ref 1–1.03)
SPHEROCYTES BLD QL SMEAR: ABNORMAL
URN SPEC COLLECT METH UR: ABNORMAL
WBC # BLD AUTO: 4.38 K/UL (ref 3.9–12.7)
WBC #/AREA URNS AUTO: 1 /HPF (ref 0–5)
YEAST UR QL AUTO: ABNORMAL

## 2022-08-27 PROCEDURE — G0378 HOSPITAL OBSERVATION PER HR: HCPCS

## 2022-08-27 PROCEDURE — 99285 EMERGENCY DEPT VISIT HI MDM: CPT | Mod: CS,,, | Performed by: STUDENT IN AN ORGANIZED HEALTH CARE EDUCATION/TRAINING PROGRAM

## 2022-08-27 PROCEDURE — 81001 URINALYSIS AUTO W/SCOPE: CPT | Performed by: INTERNAL MEDICINE

## 2022-08-27 PROCEDURE — 93005 ELECTROCARDIOGRAM TRACING: CPT

## 2022-08-27 PROCEDURE — 87040 BLOOD CULTURE FOR BACTERIA: CPT | Performed by: INTERNAL MEDICINE

## 2022-08-27 PROCEDURE — 99285 PR EMERGENCY DEPT VISIT,LEVEL V: ICD-10-PCS | Mod: CS,,, | Performed by: STUDENT IN AN ORGANIZED HEALTH CARE EDUCATION/TRAINING PROGRAM

## 2022-08-27 PROCEDURE — 25000003 PHARM REV CODE 250: Performed by: INTERNAL MEDICINE

## 2022-08-27 PROCEDURE — 96361 HYDRATE IV INFUSION ADD-ON: CPT

## 2022-08-27 PROCEDURE — 85025 COMPLETE CBC W/AUTO DIFF WBC: CPT | Performed by: INTERNAL MEDICINE

## 2022-08-27 PROCEDURE — 83605 ASSAY OF LACTIC ACID: CPT | Mod: 91 | Performed by: INTERNAL MEDICINE

## 2022-08-27 PROCEDURE — 96365 THER/PROPH/DIAG IV INF INIT: CPT

## 2022-08-27 PROCEDURE — U0002 COVID-19 LAB TEST NON-CDC: HCPCS | Performed by: INTERNAL MEDICINE

## 2022-08-27 PROCEDURE — 99285 EMERGENCY DEPT VISIT HI MDM: CPT | Mod: 25

## 2022-08-27 PROCEDURE — 87502 INFLUENZA DNA AMP PROBE: CPT

## 2022-08-27 PROCEDURE — 96367 TX/PROPH/DG ADDL SEQ IV INF: CPT

## 2022-08-27 PROCEDURE — 93010 ELECTROCARDIOGRAM REPORT: CPT | Mod: ,,, | Performed by: INTERNAL MEDICINE

## 2022-08-27 PROCEDURE — 96366 THER/PROPH/DIAG IV INF ADDON: CPT

## 2022-08-27 PROCEDURE — 93010 EKG 12-LEAD: ICD-10-PCS | Mod: ,,, | Performed by: INTERNAL MEDICINE

## 2022-08-27 PROCEDURE — 63600175 PHARM REV CODE 636 W HCPCS: Performed by: INTERNAL MEDICINE

## 2022-08-27 PROCEDURE — 80053 COMPREHEN METABOLIC PANEL: CPT | Performed by: INTERNAL MEDICINE

## 2022-08-27 RX ORDER — NALOXONE HCL 0.4 MG/ML
0.02 VIAL (ML) INJECTION
Status: DISCONTINUED | OUTPATIENT
Start: 2022-08-27 | End: 2022-08-29 | Stop reason: HOSPADM

## 2022-08-27 RX ORDER — AMOXICILLIN 250 MG
1 CAPSULE ORAL 2 TIMES DAILY
Status: DISCONTINUED | OUTPATIENT
Start: 2022-08-28 | End: 2022-08-29 | Stop reason: HOSPADM

## 2022-08-27 RX ORDER — PANTOPRAZOLE SODIUM 40 MG/1
40 TABLET, DELAYED RELEASE ORAL DAILY
Status: DISCONTINUED | OUTPATIENT
Start: 2022-08-28 | End: 2022-08-29 | Stop reason: HOSPADM

## 2022-08-27 RX ORDER — POTASSIUM CHLORIDE 750 MG/1
30 CAPSULE, EXTENDED RELEASE ORAL ONCE
Status: DISCONTINUED | OUTPATIENT
Start: 2022-08-27 | End: 2022-08-29 | Stop reason: HOSPADM

## 2022-08-27 RX ORDER — ACYCLOVIR 200 MG/1
400 CAPSULE ORAL 2 TIMES DAILY
Status: DISCONTINUED | OUTPATIENT
Start: 2022-08-27 | End: 2022-08-29 | Stop reason: HOSPADM

## 2022-08-27 RX ORDER — IBUPROFEN 200 MG
24 TABLET ORAL
Status: DISCONTINUED | OUTPATIENT
Start: 2022-08-27 | End: 2022-08-29 | Stop reason: HOSPADM

## 2022-08-27 RX ORDER — HYDROMORPHONE HYDROCHLORIDE 2 MG/1
2 TABLET ORAL
Status: DISCONTINUED | OUTPATIENT
Start: 2022-08-27 | End: 2022-08-29 | Stop reason: HOSPADM

## 2022-08-27 RX ORDER — QUETIAPINE FUMARATE 100 MG/1
200 TABLET, FILM COATED ORAL NIGHTLY
Status: DISCONTINUED | OUTPATIENT
Start: 2022-08-27 | End: 2022-08-29 | Stop reason: HOSPADM

## 2022-08-27 RX ORDER — INSULIN ASPART 100 [IU]/ML
1-10 INJECTION, SOLUTION INTRAVENOUS; SUBCUTANEOUS
Status: DISCONTINUED | OUTPATIENT
Start: 2022-08-27 | End: 2022-08-29 | Stop reason: HOSPADM

## 2022-08-27 RX ORDER — LEVOFLOXACIN 250 MG/1
500 TABLET ORAL DAILY
Status: DISCONTINUED | OUTPATIENT
Start: 2022-08-28 | End: 2022-08-28

## 2022-08-27 RX ORDER — SIMETHICONE 80 MG
1 TABLET,CHEWABLE ORAL 4 TIMES DAILY PRN
Status: DISCONTINUED | OUTPATIENT
Start: 2022-08-27 | End: 2022-08-29 | Stop reason: HOSPADM

## 2022-08-27 RX ORDER — PROMETHAZINE HYDROCHLORIDE 25 MG/1
25 TABLET ORAL EVERY 6 HOURS PRN
Status: DISCONTINUED | OUTPATIENT
Start: 2022-08-27 | End: 2022-08-29 | Stop reason: HOSPADM

## 2022-08-27 RX ORDER — CEFEPIME HYDROCHLORIDE 1 G/50ML
2 INJECTION, SOLUTION INTRAVENOUS
Status: DISCONTINUED | OUTPATIENT
Start: 2022-08-28 | End: 2022-08-29 | Stop reason: HOSPADM

## 2022-08-27 RX ORDER — BUSPIRONE HYDROCHLORIDE 5 MG/1
10 TABLET ORAL 2 TIMES DAILY
Status: DISCONTINUED | OUTPATIENT
Start: 2022-08-27 | End: 2022-08-29 | Stop reason: HOSPADM

## 2022-08-27 RX ORDER — OXCARBAZEPINE 300 MG/1
1200 TABLET, FILM COATED ORAL 2 TIMES DAILY
Status: DISCONTINUED | OUTPATIENT
Start: 2022-08-27 | End: 2022-08-29 | Stop reason: HOSPADM

## 2022-08-27 RX ORDER — FLUCONAZOLE 200 MG/1
400 TABLET ORAL DAILY
Status: DISCONTINUED | OUTPATIENT
Start: 2022-08-28 | End: 2022-08-29 | Stop reason: HOSPADM

## 2022-08-27 RX ORDER — DILTIAZEM HYDROCHLORIDE 30 MG/1
90 TABLET, FILM COATED ORAL EVERY 6 HOURS
Status: DISCONTINUED | OUTPATIENT
Start: 2022-08-28 | End: 2022-08-29 | Stop reason: HOSPADM

## 2022-08-27 RX ORDER — MAG HYDROX/ALUMINUM HYD/SIMETH 200-200-20
30 SUSPENSION, ORAL (FINAL DOSE FORM) ORAL 4 TIMES DAILY PRN
Status: DISCONTINUED | OUTPATIENT
Start: 2022-08-27 | End: 2022-08-29 | Stop reason: HOSPADM

## 2022-08-27 RX ORDER — LANOLIN ALCOHOL/MO/W.PET/CERES
400 CREAM (GRAM) TOPICAL DAILY
Status: DISCONTINUED | OUTPATIENT
Start: 2022-08-28 | End: 2022-08-29 | Stop reason: HOSPADM

## 2022-08-27 RX ORDER — ONDANSETRON 2 MG/ML
4 INJECTION INTRAMUSCULAR; INTRAVENOUS EVERY 8 HOURS PRN
Status: DISCONTINUED | OUTPATIENT
Start: 2022-08-27 | End: 2022-08-29 | Stop reason: HOSPADM

## 2022-08-27 RX ORDER — SULFAMETHOXAZOLE AND TRIMETHOPRIM 800; 160 MG/1; MG/1
1 TABLET ORAL
Status: DISCONTINUED | OUTPATIENT
Start: 2022-08-29 | End: 2022-08-29 | Stop reason: HOSPADM

## 2022-08-27 RX ORDER — SODIUM CHLORIDE 0.9 % (FLUSH) 0.9 %
10 SYRINGE (ML) INJECTION EVERY 12 HOURS PRN
Status: DISCONTINUED | OUTPATIENT
Start: 2022-08-27 | End: 2022-08-29 | Stop reason: HOSPADM

## 2022-08-27 RX ORDER — AMOXICILLIN 250 MG
1 CAPSULE ORAL 2 TIMES DAILY PRN
Status: DISCONTINUED | OUTPATIENT
Start: 2022-08-27 | End: 2022-08-27

## 2022-08-27 RX ORDER — POTASSIUM CHLORIDE 20 MEQ/1
20 TABLET, EXTENDED RELEASE ORAL 2 TIMES DAILY
Status: DISCONTINUED | OUTPATIENT
Start: 2022-08-28 | End: 2022-08-29 | Stop reason: HOSPADM

## 2022-08-27 RX ORDER — IBUPROFEN 200 MG
16 TABLET ORAL
Status: DISCONTINUED | OUTPATIENT
Start: 2022-08-27 | End: 2022-08-29 | Stop reason: HOSPADM

## 2022-08-27 RX ORDER — GLUCAGON 1 MG
1 KIT INJECTION
Status: DISCONTINUED | OUTPATIENT
Start: 2022-08-27 | End: 2022-08-29 | Stop reason: HOSPADM

## 2022-08-27 RX ORDER — CEFEPIME HYDROCHLORIDE 1 G/50ML
2 INJECTION, SOLUTION INTRAVENOUS
Status: COMPLETED | OUTPATIENT
Start: 2022-08-27 | End: 2022-08-27

## 2022-08-27 RX ORDER — URSODIOL 300 MG/1
300 CAPSULE ORAL 3 TIMES DAILY
Status: DISCONTINUED | OUTPATIENT
Start: 2022-08-28 | End: 2022-08-29 | Stop reason: HOSPADM

## 2022-08-27 RX ORDER — TALC
6 POWDER (GRAM) TOPICAL NIGHTLY PRN
Status: DISCONTINUED | OUTPATIENT
Start: 2022-08-27 | End: 2022-08-29 | Stop reason: HOSPADM

## 2022-08-27 RX ORDER — GABAPENTIN 300 MG/1
300 CAPSULE ORAL 3 TIMES DAILY
Status: DISCONTINUED | OUTPATIENT
Start: 2022-08-28 | End: 2022-08-29 | Stop reason: HOSPADM

## 2022-08-27 RX ORDER — ALLOPURINOL 100 MG/1
300 TABLET ORAL DAILY
Status: DISCONTINUED | OUTPATIENT
Start: 2022-08-28 | End: 2022-08-29 | Stop reason: HOSPADM

## 2022-08-27 RX ORDER — DIAZEPAM 5 MG/1
10 TABLET ORAL 2 TIMES DAILY PRN
Status: DISCONTINUED | OUTPATIENT
Start: 2022-08-27 | End: 2022-08-29 | Stop reason: HOSPADM

## 2022-08-27 RX ADMIN — SODIUM CHLORIDE, SODIUM LACTATE, POTASSIUM CHLORIDE, AND CALCIUM CHLORIDE 500 ML: .6; .31; .03; .02 INJECTION, SOLUTION INTRAVENOUS at 05:08

## 2022-08-27 RX ADMIN — VANCOMYCIN HYDROCHLORIDE 2000 MG: 500 INJECTION, POWDER, LYOPHILIZED, FOR SOLUTION INTRAVENOUS at 09:08

## 2022-08-27 RX ADMIN — CEFEPIME HYDROCHLORIDE 2 G: 2 INJECTION, SOLUTION INTRAVENOUS at 08:08

## 2022-08-27 RX ADMIN — SODIUM CHLORIDE, SODIUM LACTATE, POTASSIUM CHLORIDE, AND CALCIUM CHLORIDE 1000 ML: .6; .31; .03; .02 INJECTION, SOLUTION INTRAVENOUS at 07:08

## 2022-08-27 NOTE — ED TRIAGE NOTES
Patient reports running a high temp on Tuesday and Thursday pass 101.4. Dr Jenkins told her when that happens for her to report to the ER to get admitted to the 8th floor.

## 2022-08-27 NOTE — ED PROVIDER NOTES
Encounter date: 4:55 PM 08/27/2022    Source of History   Pt/      Chief Complaint   Pt presents with:   Fever Post Chemo (Leukemia, last session 2 weeks ago. Started Thurs)      History Of Present Illness   Deepti Gonzalez is a 51 y.o. 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 Pt states she had fever to 101.7 Thursday night and this morning her fever was 100.6 this morning despite tylenol and motrin. She has had cough x 2 weeks and negative covid test yesterday. No known sick contacts. Pt states she deals with chronic abdominal pain that has not worsened. She does not chronic headaches without any thunderclap onset or recent worsening. She was previously on low dose aspirin and xarleto but that was stopped about six months ago due to low plt counts.      Denies: chest pain or shortness of breath   This is the extent to the patients complaints today here in the emergency department.    Review Of Systems   As per HPI and below:  Constitutional:  + fever.   HEENT: No voice change. No sore throat .    Eyes:  No visual disturbances. No eye pain.   Respiratory:  No shortness of breath. No wheezing. + cough.   Cardio:  No chest pain. No leg swelling. No palpitations.   GI:  No abdominal pain. No nausea or vomiting. No diarrhea.   :  No blood in urine. No difficulty urinating. No dysuria.   MSK:  + chronic back pain. No neck pain.   Skin: No rash.   Neurologic: + chronic headache. No dizziness.       Review of patient's allergies indicates:   Allergen Reactions    Levetiracetam      Other reaction(s): Unknown  Other reaction(s): Unknown  Other reaction(s): Unknown       No current facility-administered medications on file prior to encounter.     Current Outpatient Medications on File Prior to Encounter   Medication Sig Dispense Refill    acyclovir (ZOVIRAX) 400 MG tablet Take 1 tablet (400 mg total) by mouth 2 (two) times daily.  60 tablet 11    allopurinoL (ZYLOPRIM) 300 MG tablet Take 1 tablet (300 mg total) by mouth once daily. 90 tablet 3    artificial tears (ISOPTO TEARS) 0.5 % ophthalmic solution Place 1 drop into both eyes 4 (four) times daily as needed.      aspirin (ECOTRIN) 81 MG EC tablet Take 1 tablet (81 mg total) by mouth once daily. Hold until instructed to resume by provider due to low platelet count. (Patient not taking: No sig reported)  0    atorvastatin (LIPITOR) 80 MG tablet Take 80 mg by mouth once daily.      blood sugar diagnostic Strp SMARTSIG:Via Meter 1 to 2 Times Daily      busPIRone (BUSPAR) 10 MG tablet Take 10 mg by mouth 2 (two) times daily.      dapagliflozin (FARXIGA) 10 mg tablet Take 10 mg by mouth once daily.      diazePAM (VALIUM) 10 MG Tab Take 10 mg by mouth 2 (two) times daily as needed.      diltiaZEM (CARDIZEM) 90 MG tablet Take 1 tablet (90 mg total) by mouth every 6 (six) hours. 120 tablet 11    duke's soln (benadryl 30 mL, mylanta 30 mL, LIDOcaine 30 mL, nystatin 30 mL) 120mL Take 10 mLs by mouth 4 (four) times daily as needed (mouth pain). (Patient not taking: No sig reported) 120 mL 0    fenofibrate 160 MG Tab Take 160 mg by mouth once daily.      fluconazole (DIFLUCAN) 200 MG Tab Take 2 tablets (400 mg total) by mouth once daily. 60 tablet 2    gabapentin (NEURONTIN) 300 MG capsule Take 1 capsule (300 mg total) by mouth 3 (three) times daily. 90 capsule 11    HYDROmorphone (DILAUDID) 2 MG tablet Take 1 tablet (2 mg total) by mouth every 3 (three) hours as needed for Pain. 120 tablet 0    hydrOXYchloroQUINE (PLAQUENIL) 200 mg tablet Take 1 tablet (200 mg total) by mouth once daily. (Patient not taking: No sig reported)      hydrOXYzine pamoate (VISTARIL) 50 MG Cap Take 50 mg by mouth 2 (two) times daily as needed.      levoFLOXacin (LEVAQUIN) 500 MG tablet Take 1 tablet (500 mg total) by mouth once daily. 30 tablet 11    LIDOcaine (LIDODERM) 5 % Place 1 patch onto the skin once daily. Remove &  Discard patch within 12 hours or as directed by MD (Patient not taking: No sig reported) 30 patch 2    losartan (COZAAR) 25 MG tablet Take 25 mg by mouth once daily.      magnesium oxide (MAG-OX) 400 mg (241.3 mg magnesium) tablet Take 1 tablet by mouth once daily.      metFORMIN (GLUCOPHAGE-XR) 500 MG ER 24hr tablet Take 1,000 mg by mouth 2 (two) times daily.      naloxone (NARCAN) 4 mg/actuation Spry 4mg by nasal route as needed for opioid overdose; may repeat every 2-3 minutes in alternating nostrils until medical help arrives. Call 911 (Patient not taking: No sig reported) 1 each 11    omeprazole (PRILOSEC) 40 MG capsule Take 40 mg by mouth once daily.      ondansetron (ZOFRAN-ODT) 8 MG TbDL Take 1 tablet (8 mg total) by mouth every 8 (eight) hours as needed (in case of chemo induced nausea). 30 tablet 1    ONETOUCH VERIO TEST STRIPS Strp SMARTSIG:Via Meter 1 to 2 Times Daily      OXcarbazepine (TRILEPTAL) 600 MG Tab Take 1,200 mg by mouth 2 (two) times daily.      polyethylene glycol (GLYCOLAX) 17 gram/dose powder Dissolve one capful (17 g) in liquid and take by mouth daily as needed (constipation). (Patient not taking: No sig reported) 510 g 0    PONATinib (ICLUSIG) 45 mg Tab tablet Take 1 tablet (45 mg total) by mouth once daily Do not initiate until instructed to do so by Dr. Jenkins.. 30 tablet 0    potassium chloride (K-TAB) 20 mEq Take 20 mEq by mouth 2 (two) times daily.      prochlorperazine (COMPAZINE) 10 MG tablet Take 1 tablet (10 mg total) by mouth every 6 (six) hours as needed for Nausea. (Patient not taking: No sig reported) 30 tablet 2    QUEtiapine (SEROQUEL) 200 MG Tab Take 200 mg by mouth every evening.      rivaroxaban (XARELTO) 20 mg Tab Take 1 tablet (20 mg total) by mouth daily with dinner or evening meal. Hold until instructed to resume by provider due to low platelet count. (Patient not taking: No sig reported)      senna-docusate 8.6-50 mg (PERICOLACE) 8.6-50 mg per tablet Take 1 tablet  by mouth 2 (two) times daily. 60 tablet 3    sulfamethoxazole-trimethoprim 800-160mg (BACTRIM DS) 800-160 mg Tab Take 1 tablet by mouth every Mon, Wed, Fri. 40 tablet 3    sumatriptan (IMITREX) 50 MG tablet Take 1 tablet (50 mg total) by mouth daily as needed (headache). 30 tablet 0    TRINTELLIX 20 mg Tab Take 1 tablet by mouth once daily.      ursodioL (ACTIGALL) 300 mg capsule Take 1 capsule (300 mg total) by mouth 3 (three) times daily. 90 capsule 11       Past History   As per HPI and below:  Past Medical History:   Diagnosis Date    Cancer     COPD (chronic obstructive pulmonary disease)     Hypertension     Rheumatoid arthritis, unspecified     Stroke     TIA x 4    Type 2 diabetes mellitus with circulatory disorder, without long-term current use of insulin      Past Surgical History:   Procedure Laterality Date    APPENDECTOMY      HYSTERECTOMY      ROTATOR CUFF REPAIR Bilateral     TONSILLECTOMY      TUBAL LIGATION         Social History     Socioeconomic History    Marital status:    Tobacco Use    Smoking status: Every Day     Packs/day: 0.50     Types: Cigarettes    Smokeless tobacco: Never   Substance and Sexual Activity    Alcohol use: No    Drug use: Never     Social Determinants of Health     Financial Resource Strain: High Risk    Difficulty of Paying Living Expenses: Hard   Food Insecurity: Food Insecurity Present    Worried About Running Out of Food in the Last Year: Sometimes true    Ran Out of Food in the Last Year: Sometimes true   Transportation Needs: No Transportation Needs    Lack of Transportation (Medical): No    Lack of Transportation (Non-Medical): No   Physical Activity: Insufficiently Active    Days of Exercise per Week: 1 day    Minutes of Exercise per Session: 10 min   Stress: Stress Concern Present    Feeling of Stress : Rather much   Social Connections: Unknown    Frequency of Communication with Friends and Family: More than three times a week    Frequency of Social  "Gatherings with Friends and Family: Once a week    Active Member of Clubs or Organizations: No    Attends Club or Organization Meetings: Never    Marital Status:    Housing Stability: High Risk    Unable to Pay for Housing in the Last Year: Yes    Number of Places Lived in the Last Year: 1    Unstable Housing in the Last Year: No       No family history on file.    Physical Exam     Vitals:    08/27/22 1902 08/27/22 1904 08/27/22 2002 08/27/22 2101   BP: (!) 161/78  (!) 165/77 (!) 165/75   BP Location:   Right arm    Pulse: 92  94 93   Resp:  20 20 19   Temp:   99.6 °F (37.6 °C)    TempSrc:   Oral    SpO2: 99%  99% 96%   Weight:   62.1 kg (137 lb)    Height:   5' 5" (1.651 m)      Physical Exam:   Nursing note and vitals reviewed.  Appearance:  Nontoxic adult female in no acute respiratory distress.  Making purposeful movements.  Speaking in full sentences.  Skin: No rashes seen.  Good turgor.  No abrasions.  No ecchymoses.  Eyes: No conjunctival injection. EOMI, PERRL.  Head: NC/AT  ENT: Oropharynx clear.    Chest: CTAB. No increased work of breathing, bilateral chest rise.  Cardiovascular:  Tachycardic rate rhythm yet regular rhythm.  Normal equal bilateral radial pulses.  Abdomen: Soft.  Not distended.  Nontender.  No guarding.  No rebound. No Masses  Musculoskeletal: Good range of motion all joints.  No deformities.  Neck supple, full range of motion, no obvious deformity.  No tenderness to palpation of midline spine.  Neurologic: Moves all extremities.  Normal sensation.  No facial droop.  Normal speech.    Mental Status:  Alert and oriented x 3.  Appropriate, conversant.      Results and Medications    Procedures    Labs Reviewed   CBC W/ AUTO DIFFERENTIAL - Abnormal; Notable for the following components:       Result Value    RBC 2.78 (*)     Hemoglobin 10.5 (*)     Hematocrit 30.2 (*)      (*)     MCH 37.8 (*)     RDW 20.7 (*)     Platelets 23 (*)     MPV 13.1 (*)     Immature Granulocytes 1.4 " (*)     Gran # (ANC) 0.9 (*)     Immature Grans (Abs) 0.06 (*)     nRBC 1 (*)     Gran % 21.0 (*)     Lymph % 63.2 (*)     Platelet Estimate Decreased (*)     All other components within normal limits    Narrative:     PLT critical result(s) called and verbal readback obtained from DR. PAYAL MACKEY. by [facility] 08/27/2022 19:00   COMPREHENSIVE METABOLIC PANEL - Abnormal; Notable for the following components:    Sodium 135 (*)     CO2 19 (*)     Glucose 129 (*)     Albumin 3.3 (*)     Alkaline Phosphatase 213 (*)     AST 42 (*)     ALT 58 (*)     All other components within normal limits   LACTIC ACID, PLASMA - Abnormal; Notable for the following components:    Lactate (Lactic Acid) 3.2 (*)     All other components within normal limits   URINALYSIS, REFLEX TO URINE CULTURE - Abnormal; Notable for the following components:    Specific Gravity, UA >1.030 (*)     Protein, UA Trace (*)     Glucose, UA 4+ (*)     All other components within normal limits    Narrative:     Specimen Source->Urine   URINALYSIS MICROSCOPIC - Abnormal; Notable for the following components:    Bacteria Few (*)     All other components within normal limits    Narrative:     Specimen Source->Urine   CULTURE, BLOOD   CULTURE, BLOOD   RESPIRATORY INFECTION PANEL (PCR), NASOPHARYNGEAL   LACTIC ACID, PLASMA   SARS-COV-2 RNA AMPLIFICATION, QUAL   LACTIC ACID, PLASMA   POCT INFLUENZA A/B MOLECULAR       Imaging Results              X-Ray Chest AP Portable (Final result)  Result time 08/27/22 19:22:18      Final result by José Luis Hernandez MD (08/27/22 19:22:18)                   Impression:      Grossly stable chronic findings as above without radiographic acute intrathoracic process seen.  Specifically, no consolidation.      Electronically signed by: José Luis Hernandez MD  Date:    08/27/2022  Time:    19:22               Narrative:    EXAMINATION:  XR CHEST AP PORTABLE    CLINICAL HISTORY:  Sepsis;    TECHNIQUE:  Single frontal view of the  chest was performed.    COMPARISON:  Chest radiograph 05/17/2022    FINDINGS:  Monitoring leads overlie the chest.    Trachea is midline and patent.  Cardiomediastinal silhouette is midline and within normal limits.  Left chest dual lead cardiac device, right upper chest Port-A-Cath, ASD occluded device and lower left chest electrical device appear stable.    Pulmonary vasculature and hilar contours are within normal limits.  Similar mild elevation of the right hemidiaphragm.  The lungs are otherwise well expanded without consolidation, pleural effusion or pneumothorax.  No acute osseous process seen.  PA and lateral views can be obtained.                                          Medications   vancomycin 2 g in dextrose 5 % 500 mL IVPB (2,000 mg Intravenous New Bag 8/27/22 9022)   sodium chloride 0.9% flush 10 mL (has no administration in time range)   naloxone 0.4 mg/mL injection 0.02 mg (has no administration in time range)   glucose chewable tablet 16 g (has no administration in time range)   glucose chewable tablet 24 g (has no administration in time range)   glucagon (human recombinant) injection 1 mg (has no administration in time range)   potassium chloride CR capsule 30 mEq (has no administration in time range)   ondansetron injection 4 mg (has no administration in time range)   promethazine tablet 25 mg (has no administration in time range)   insulin aspart U-100 pen 1-10 Units (has no administration in time range)   melatonin tablet 6 mg (has no administration in time range)   simethicone chewable tablet 80 mg (has no administration in time range)   aluminum-magnesium hydroxide-simethicone 200-200-20 mg/5 mL suspension 30 mL (has no administration in time range)   cefepime in dextrose 5 % IVPB 2 g (has no administration in time range)   acyclovir tablet 400 mg (has no administration in time range)   allopurinoL tablet 300 mg (has no administration in time range)   busPIRone tablet 10 mg (has no  administration in time range)   diazePAM tablet 10 mg (has no administration in time range)   diltiaZEM tablet 90 mg (has no administration in time range)   fluconazole tablet 400 mg (has no administration in time range)   gabapentin capsule 300 mg (has no administration in time range)   HYDROmorphone tablet 2 mg (has no administration in time range)   levoFLOXacin tablet 500 mg (has no administration in time range)   losartan tablet 25 mg (has no administration in time range)   magnesium oxide tablet 400 mg (has no administration in time range)   pantoprazole EC tablet 40 mg (has no administration in time range)   OXcarbazepine tablet 1,200 mg (has no administration in time range)   potassium chloride SA CR tablet 20 mEq (has no administration in time range)   QUEtiapine tablet 200 mg (has no administration in time range)   sulfamethoxazole-trimethoprim 800-160mg per tablet 1 tablet (has no administration in time range)   ursodioL capsule 300 mg (has no administration in time range)   senna-docusate 8.6-50 mg per tablet 1 tablet (has no administration in time range)   dextrose 10% bolus 125 mL (has no administration in time range)   dextrose 10% bolus 250 mL (has no administration in time range)   lactated ringers bolus 500 mL (0 mLs Intravenous Stopped 8/27/22 1815)   cefepime in dextrose 5 % IVPB 2 g (0 g Intravenous Stopped 8/27/22 2109)   lactated ringers bolus 1,000 mL (0 mLs Intravenous Stopped 8/27/22 2050)       MDM, Impression and Plan   Previous Records:   I decided to obtain old medical records which is listed here or in ED course: Follows with Dr. Armendariz and was recently seen on 8/23/2022  ECHO on 11/29/2021 shows   ? The left ventricle is normal in size with normal systolic function. The estimated ejection fraction is 55%.  ? Normal right ventricular size with normal right ventricular systolic function.  ? Grade II left ventricular diastolic dysfunction with elevated left atrial pressures.  ? Mild  left atrial enlargement.  ? The estimated PA systolic pressure is 35 mmHg.  ? Intermediate central venous pressure (8 mmHg).  Initial Assessment:   Urgent evaluation of 51 y.o. female with history as above presents the ED with chief complaint of fever x2 days.  She is afebrile, hemodynamically stable though slightly tachycardic. Will begin with septic work up, COVID, flu test.    Differential Diagnosis:   -UTI   -pneumonia  -neutropenic fever unlikley based of CVC   -sepsis   -COVID   -influenza  Independently Interpreted Test(s):   EKG:  I independently reviewed and interpreted the EKG and my findings are as follows:   Detailed here or in ED course.  EKG shows normal sinus rhythm with ventricular rate 93 beats per minute.  Narrow QRS.  No ST segment elevations or depressions.  No STEMI.  No chest pain.  No shortness of breath.  IMAGING:  I have ordered and independently interpreted X-rays and my findings are as follow:  Detailed here or in ED course.  Chest x-ray shows no focal pneumonia, pneumothorax or pleural effusion.    Clinical Tests:   Lab Tests: Ordered and Reviewed  Radiological Study: Ordered and Reviewed  Medical Tests: Ordered and Reviewed    ED Management:    Patient's vitals remained stable while she was in the emergency department.  COVID negative.  Initial lactic acid elevated but improved with IV fluids.  Urinalysis not consistent with UTI.  Low bicarb thought to be due to lactic acidosis which should correct with IV fluids.  She has ongoing elevation of her liver enzymes and alk-phos which could be trended inpatient.  Due to concern for acute infection in a immunosuppressed patient blood cultures were drawn, she was given IV antibiotics an S 30 cc/kg fluid bolus.  On reassessment she states that she feels much better after her IV fluids.  Due to concern for infection in decompensation I called discussed the case with heme Onc who was agreeable with admission.  Patient and family members updated on  plans for admission patient deemed stable for admission to heme Onc with pending influenza test.  I called and discussed the case with: Heme/ONC     At 10:19 PM,  I attest a sepsis perfusion exam was performed within 6 hours of sepsis, severe sepsis, or septic shock presentation, following fluid resuscitation.    Please see ED course for discussion of workup and dispo if not listed above.              Final diagnoses:  [R50.9] Fever      ED Disposition Condition    Observation                ED Course as of 08/27/22 2219   Sat Aug 27, 2022   1834 EKG with normal sinus rhythm, rate 94, artifact present but no obvious STEMI, repeat pending. [NN]   1934 Paged oncology    [HM]   1946 Lactate, Kiel(!): 3.2 [NN]   1955 Spoke with oncology who is agreeable with obs [HM]   2204 Lactate, Kiel: 1.9 [HM]      ED Course User Index  [HM] Amador Elizabeth MD  [NN] MD Amador Garcia MD   Emergency Resident   [phone] (spectra)    Please note that this dictation was completed with computer voicerecognition software.  Quite often unanticipated grammatical, syntax, homophones, and other interpretive errors are inadvertently transcribed by the computer software.  Please disregard these errors.  Please excuse any errors that have escaped final proofreading.         Amador Elizabeth MD  Resident  08/27/22 2214       Amador Elizabeth MD  Resident  08/27/22 2219

## 2022-08-28 VITALS
RESPIRATION RATE: 18 BRPM | WEIGHT: 140.44 LBS | OXYGEN SATURATION: 94 % | HEART RATE: 88 BPM | SYSTOLIC BLOOD PRESSURE: 120 MMHG | BODY MASS INDEX: 23.98 KG/M2 | HEIGHT: 64 IN | TEMPERATURE: 99 F | DIASTOLIC BLOOD PRESSURE: 67 MMHG

## 2022-08-28 LAB
ADENOVIRUS: NOT DETECTED
ALBUMIN SERPL BCP-MCNC: 2.9 G/DL (ref 3.5–5.2)
ALP SERPL-CCNC: 198 U/L (ref 55–135)
ALT SERPL W/O P-5'-P-CCNC: 58 U/L (ref 10–44)
ANION GAP SERPL CALC-SCNC: 7 MMOL/L (ref 8–16)
ANISOCYTOSIS BLD QL SMEAR: SLIGHT
AST SERPL-CCNC: 44 U/L (ref 10–40)
BASOPHILS # BLD AUTO: 0.02 K/UL (ref 0–0.2)
BASOPHILS NFR BLD: 0.4 % (ref 0–1.9)
BILIRUB SERPL-MCNC: 0.4 MG/DL (ref 0.1–1)
BORDETELLA PARAPERTUSSIS (IS1001): NOT DETECTED
BORDETELLA PERTUSSIS (PTXP): NOT DETECTED
BUN SERPL-MCNC: 5 MG/DL (ref 6–20)
CALCIUM SERPL-MCNC: 8.7 MG/DL (ref 8.7–10.5)
CHLAMYDIA PNEUMONIAE: NOT DETECTED
CHLORIDE SERPL-SCNC: 106 MMOL/L (ref 95–110)
CO2 SERPL-SCNC: 23 MMOL/L (ref 23–29)
CORONAVIRUS 229E, COMMON COLD VIRUS: NOT DETECTED
CORONAVIRUS HKU1, COMMON COLD VIRUS: NOT DETECTED
CORONAVIRUS NL63, COMMON COLD VIRUS: NOT DETECTED
CORONAVIRUS OC43, COMMON COLD VIRUS: NOT DETECTED
CREAT SERPL-MCNC: 0.5 MG/DL (ref 0.5–1.4)
DIFFERENTIAL METHOD: ABNORMAL
EOSINOPHIL # BLD AUTO: 0.1 K/UL (ref 0–0.5)
EOSINOPHIL NFR BLD: 2.4 % (ref 0–8)
ERYTHROCYTE [DISTWIDTH] IN BLOOD BY AUTOMATED COUNT: 20.8 % (ref 11.5–14.5)
EST. GFR  (NO RACE VARIABLE): >60 ML/MIN/1.73 M^2
FLUBV RNA NPH QL NAA+NON-PROBE: NOT DETECTED
GLUCOSE SERPL-MCNC: 82 MG/DL (ref 70–110)
HCT VFR BLD AUTO: 27.5 % (ref 37–48.5)
HGB BLD-MCNC: 9.6 G/DL (ref 12–16)
HPIV1 RNA NPH QL NAA+NON-PROBE: NOT DETECTED
HPIV2 RNA NPH QL NAA+NON-PROBE: NOT DETECTED
HPIV3 RNA NPH QL NAA+NON-PROBE: NOT DETECTED
HPIV4 RNA NPH QL NAA+NON-PROBE: NOT DETECTED
HUMAN METAPNEUMOVIRUS: NOT DETECTED
IMM GRANULOCYTES # BLD AUTO: 0.03 K/UL (ref 0–0.04)
IMM GRANULOCYTES NFR BLD AUTO: 0.6 % (ref 0–0.5)
INFLUENZA A (SUBTYPES H1,H1-2009,H3): NOT DETECTED
LYMPHOCYTES # BLD AUTO: 3.1 K/UL (ref 1–4.8)
LYMPHOCYTES NFR BLD: 61 % (ref 18–48)
MAGNESIUM SERPL-MCNC: 1.8 MG/DL (ref 1.6–2.6)
MCH RBC QN AUTO: 37.6 PG (ref 27–31)
MCHC RBC AUTO-ENTMCNC: 34.9 G/DL (ref 32–36)
MCV RBC AUTO: 108 FL (ref 82–98)
MONOCYTES # BLD AUTO: 0.6 K/UL (ref 0.3–1)
MONOCYTES NFR BLD: 11.5 % (ref 4–15)
MYCOPLASMA PNEUMONIAE: NOT DETECTED
NEUTROPHILS # BLD AUTO: 1.2 K/UL (ref 1.8–7.7)
NEUTROPHILS NFR BLD: 24.1 % (ref 38–73)
NRBC BLD-RTO: 1 /100 WBC
PHOSPHATE SERPL-MCNC: 3.3 MG/DL (ref 2.7–4.5)
PLATELET # BLD AUTO: 19 K/UL (ref 150–450)
PLATELET BLD QL SMEAR: ABNORMAL
PMV BLD AUTO: 10.4 FL (ref 9.2–12.9)
POCT GLUCOSE: 91 MG/DL (ref 70–110)
POCT GLUCOSE: 98 MG/DL (ref 70–110)
POCT GLUCOSE: 99 MG/DL (ref 70–110)
POIKILOCYTOSIS BLD QL SMEAR: SLIGHT
POTASSIUM SERPL-SCNC: 3.9 MMOL/L (ref 3.5–5.1)
PROT SERPL-MCNC: 6 G/DL (ref 6–8.4)
RBC # BLD AUTO: 2.55 M/UL (ref 4–5.4)
RESPIRATORY INFECTION PANEL SOURCE: NORMAL
RSV RNA NPH QL NAA+NON-PROBE: NOT DETECTED
RV+EV RNA NPH QL NAA+NON-PROBE: NOT DETECTED
SARS-COV-2 RNA RESP QL NAA+PROBE: NOT DETECTED
SODIUM SERPL-SCNC: 136 MMOL/L (ref 136–145)
WBC # BLD AUTO: 5.05 K/UL (ref 3.9–12.7)

## 2022-08-28 PROCEDURE — 87798 DETECT AGENT NOS DNA AMP: CPT | Performed by: STUDENT IN AN ORGANIZED HEALTH CARE EDUCATION/TRAINING PROGRAM

## 2022-08-28 PROCEDURE — 85025 COMPLETE CBC W/AUTO DIFF WBC: CPT | Performed by: STUDENT IN AN ORGANIZED HEALTH CARE EDUCATION/TRAINING PROGRAM

## 2022-08-28 PROCEDURE — 99218 PR INITIAL OBSERVATION CARE,LEVL I: CPT | Mod: ,,, | Performed by: INTERNAL MEDICINE

## 2022-08-28 PROCEDURE — 80053 COMPREHEN METABOLIC PANEL: CPT | Performed by: STUDENT IN AN ORGANIZED HEALTH CARE EDUCATION/TRAINING PROGRAM

## 2022-08-28 PROCEDURE — G0378 HOSPITAL OBSERVATION PER HR: HCPCS

## 2022-08-28 PROCEDURE — 99218 PR INITIAL OBSERVATION CARE,LEVL I: ICD-10-PCS | Mod: ,,, | Performed by: INTERNAL MEDICINE

## 2022-08-28 PROCEDURE — 25000003 PHARM REV CODE 250: Performed by: STUDENT IN AN ORGANIZED HEALTH CARE EDUCATION/TRAINING PROGRAM

## 2022-08-28 PROCEDURE — 36415 COLL VENOUS BLD VENIPUNCTURE: CPT | Performed by: STUDENT IN AN ORGANIZED HEALTH CARE EDUCATION/TRAINING PROGRAM

## 2022-08-28 PROCEDURE — 63600175 PHARM REV CODE 636 W HCPCS: Performed by: STUDENT IN AN ORGANIZED HEALTH CARE EDUCATION/TRAINING PROGRAM

## 2022-08-28 PROCEDURE — 96366 THER/PROPH/DIAG IV INF ADDON: CPT

## 2022-08-28 PROCEDURE — 83735 ASSAY OF MAGNESIUM: CPT | Performed by: STUDENT IN AN ORGANIZED HEALTH CARE EDUCATION/TRAINING PROGRAM

## 2022-08-28 PROCEDURE — 96375 TX/PRO/DX INJ NEW DRUG ADDON: CPT

## 2022-08-28 PROCEDURE — 84100 ASSAY OF PHOSPHORUS: CPT | Performed by: STUDENT IN AN ORGANIZED HEALTH CARE EDUCATION/TRAINING PROGRAM

## 2022-08-28 PROCEDURE — 25500020 PHARM REV CODE 255: Performed by: INTERNAL MEDICINE

## 2022-08-28 RX ADMIN — CEFEPIME HYDROCHLORIDE 2 G: 2 INJECTION, SOLUTION INTRAVENOUS at 04:08

## 2022-08-28 RX ADMIN — BUSPIRONE HYDROCHLORIDE 10 MG: 5 TABLET ORAL at 10:08

## 2022-08-28 RX ADMIN — DILTIAZEM HYDROCHLORIDE 90 MG: 30 TABLET, FILM COATED ORAL at 05:08

## 2022-08-28 RX ADMIN — URSODIOL 300 MG: 300 CAPSULE ORAL at 10:08

## 2022-08-28 RX ADMIN — IOHEXOL 100 ML: 350 INJECTION, SOLUTION INTRAVENOUS at 03:08

## 2022-08-28 RX ADMIN — CEFEPIME HYDROCHLORIDE 2 G: 2 INJECTION, SOLUTION INTRAVENOUS at 01:08

## 2022-08-28 RX ADMIN — ACYCLOVIR 400 MG: 200 CAPSULE ORAL at 10:08

## 2022-08-28 RX ADMIN — OXCARBAZEPINE 1200 MG: 600 TABLET, FILM COATED ORAL at 10:08

## 2022-08-28 RX ADMIN — ALLOPURINOL 300 MG: 100 TABLET ORAL at 10:08

## 2022-08-28 RX ADMIN — FLUCONAZOLE 400 MG: 200 TABLET ORAL at 10:08

## 2022-08-28 RX ADMIN — PANTOPRAZOLE SODIUM 40 MG: 40 TABLET, DELAYED RELEASE ORAL at 10:08

## 2022-08-28 RX ADMIN — GABAPENTIN 300 MG: 300 CAPSULE ORAL at 12:08

## 2022-08-28 RX ADMIN — QUETIAPINE FUMARATE 200 MG: 200 TABLET ORAL at 01:08

## 2022-08-28 RX ADMIN — ONDANSETRON 4 MG: 2 INJECTION INTRAMUSCULAR; INTRAVENOUS at 08:08

## 2022-08-28 RX ADMIN — HYDROMORPHONE HYDROCHLORIDE 2 MG: 2 TABLET ORAL at 10:08

## 2022-08-28 RX ADMIN — LEVOFLOXACIN 500 MG: 250 TABLET, FILM COATED ORAL at 10:08

## 2022-08-28 RX ADMIN — SENNOSIDES AND DOCUSATE SODIUM 1 TABLET: 50; 8.6 TABLET ORAL at 10:08

## 2022-08-28 RX ADMIN — HYDROMORPHONE HYDROCHLORIDE 2 MG: 2 TABLET ORAL at 01:08

## 2022-08-28 RX ADMIN — LOSARTAN POTASSIUM 25 MG: 25 TABLET, FILM COATED ORAL at 10:08

## 2022-08-28 RX ADMIN — POTASSIUM CHLORIDE 20 MEQ: 1500 TABLET, EXTENDED RELEASE ORAL at 10:08

## 2022-08-28 RX ADMIN — Medication 400 MG: at 10:08

## 2022-08-28 NOTE — PROGRESS NOTES
[facility]  Hematology  Bone Marrow Transplant  Progress Note    Patient Name: Deepti Gonzalez  Admission Date: 8/27/2022  Hospital Length of Stay: 0 days  Code Status: Full Code     Subjective:     Interval History:  Ms. Gonzalez is a 51 y.o. F with PH+ B-ALL, NIDDM, RA, COPD, TIA, HTN, pAF, PPM, anxiety, MDD who presents with fever. Currently on inotuzumab, ozogamicin and follows with Dr. Jenkins. Tmax at home was 101.7 before she came  here on 8/27.  Reports some sinus congestion and runny nose. She was recently evaluated by ENT and was prescribed Augmentin. Occasional cough of clear sputum. Occasional diffuse abdominal pain /cramping, nausea without vomiting, diarrhea x1 several days ago. Denies CP, SOB, dysuria, joint pains, wounds. Adherent with all medications including opportunistic prophylaxis.  She has no fever or chills today.             Current Facility-Administered Medications   Medication    acyclovir capsule 400 mg    allopurinoL tablet 300 mg    aluminum-magnesium hydroxide-simethicone 200-200-20 mg/5 mL suspension 30 mL    busPIRone tablet 10 mg    cefepime in dextrose 5 % IVPB 2 g    dextrose 10% bolus 125 mL    dextrose 10% bolus 250 mL    diazePAM tablet 10 mg    diltiaZEM tablet 90 mg    fluconazole tablet 400 mg    gabapentin capsule 300 mg    glucagon (human recombinant) injection 1 mg    glucose chewable tablet 16 g    glucose chewable tablet 24 g    HYDROmorphone tablet 2 mg    insulin aspart U-100 pen 1-10 Units    levoFLOXacin tablet 500 mg    losartan split tablet 25 mg    magnesium oxide tablet 400 mg    melatonin tablet 6 mg    naloxone 0.4 mg/mL injection 0.02 mg    ondansetron injection 4 mg    OXcarbazepine tablet 1,200 mg    pantoprazole EC tablet 40 mg    potassium chloride CR capsule 30 mEq    potassium chloride SA CR tablet 20 mEq    promethazine tablet 25 mg    QUEtiapine tablet 200 mg    senna-docusate 8.6-50 mg per tablet 1 tablet    simethicone chewable  tablet 80 mg    sodium chloride 0.9% flush 10 mL    [START ON 8/29/2022] sulfamethoxazole-trimethoprim 800-160mg per tablet 1 tablet    ursodioL capsule 300 mg        Objective:     Vital Signs (Most Recent):  Temp: 98.7 °F (37.1 °C) (08/28/22 1209)  Pulse: 88 (08/28/22 1209)  Resp: 18 (08/28/22 0853)  BP: 120/67 (08/28/22 1209)  SpO2: (!) 94 % (08/28/22 1209)   Vital Signs (24h Range):  Temp:  [98.4 °F (36.9 °C)-99.8 °F (37.7 °C)] 98.7 °F (37.1 °C)  Pulse:  [] 88  Resp:  [12-22] 18  SpO2:  [94 %-99 %] 94 %  BP: (120-175)/(62-85) 120/67     Weight: 63.7 kg (140 lb 6.9 oz)  Body mass index is 24.11 kg/m².  Body surface area is 1.7 meters squared.    ECOG SCORE           [unfilled]    Intake/Output - Last 3 Shifts         08/26 0700 08/27 0659 08/27 0700 08/28 0659 08/28 0700 08/29 0659    P.O.  90     IV Piggyback  50     Total Intake(mL/kg)  140 (2.2)     Urine (mL/kg/hr)  350     Stool  0     Total Output  350     Net  -210            Stool Occurrence  0 x             Physical Exam    Significant Labs:   CBC:   Recent Labs   Lab 08/27/22  1756 08/28/22  0620   WBC 4.38 5.05   HGB 10.5* 9.6*   HCT 30.2* 27.5*   PLT 23* 19*   , CMP:   Recent Labs   Lab 08/27/22  1756 08/28/22  0620   * 136   K 3.7 3.9    106   CO2 19* 23   * 82   BUN 8 5*   CREATININE 0.6 0.5   CALCIUM 9.3 8.7   PROT 7.0 6.0   ALBUMIN 3.3* 2.9*   BILITOT 0.5 0.4   ALKPHOS 213* 198*   AST 42* 44*   ALT 58* 58*   ANIONGAP 12 7*   , LDH: No results for input(s): LDHCSF, BFSOURCE in the last 48 hours., Reticulocytes: No results for input(s): RETIC in the last 48 hours., and Uric Acid No results for input(s): URICACID in the last 48 hours.    Diagnostic Results:  I have reviewed all pertinent imaging results/findings within the past 24 hours.    8/28/22 CXR     COMPARISON:  Chest radiograph 05/17/2022     FINDINGS:  Monitoring leads overlie the chest.     Trachea is midline and patent.  Cardiomediastinal silhouette is midline and  within normal limits.  Left chest dual lead cardiac device, right upper chest Port-A-Cath, ASD occluded device and lower left chest electrical device appear stable.     Pulmonary vasculature and hilar contours are within normal limits.  Similar mild elevation of the right hemidiaphragm.  The lungs are otherwise well expanded without consolidation, pleural effusion or pneumothorax.  No acute osseous process seen.  PA and lateral views can be obtained.     Impression:     Grossly stable chronic findings as above without radiographic acute intrathoracic process seen.  Specifically, no consolidation.     8/28/22 CT abdomen/pelvis with contrast    COMPARISON:  Abdominal ultrasound 07/11/2022, CT chest abdomen pelvis 11/26/2021     FINDINGS:  Heart: Partially visualized AICD leads are in place.  Normal size.  No pericardial effusion.     Lung Bases: No consolidation of the lower lung bases.  No pleural effusion.     Liver: Hepatomegaly to 21 cm.  Normal overall attenuation.  No focal hepatic lesions.     Gallbladder: No calcified gallstones.     Bile Ducts: No evidence of dilated ducts.     Pancreas: No mass or peripancreatic fat stranding.     Spleen: Normal size and overall attenuation.     Adrenals: Unremarkable.     Kidneys/ Ureters: Normal in size and location.  No mass or nephro ureterolithiasis.  No hydroureteronephrosis.  Stable bilateral perinephric stranding versus perinephric bridging septae.     Bladder: No evidence of wall thickening.     Reproductive organs: The uterus is surgically absent.     GI Tract/Mesentery: Moderate amount of stool within the colon with subtle colonic mucosal enhancement.  No surrounding fat stranding.  No evidence of bowel obstruction.  The stomach is nondistended.  The appendix is surgically absent.     Peritoneal Space: No ascites. No free air.     Retroperitoneum: No significant adenopathy.     Abdominal wall: Unremarkable.     Vasculature: Mild abdominal aortic calcific  atherosclerosis.  No aneurysm.     Bones: No acute fracture.  No osseous destructive lesions.  Minimal degenerative changes of the spine.     Impression:     Subtle colonic mucosal enhancement is noted, which is nonspecific and may represent mild nonspecific colitis.  Correlate clinically.     Hepatomegaly to 21 cm without evidence of focal hepatic lesion.       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 Reports some sinus congestion and runny nose, saw ENT yesterday and prescribed Augmentin. Occasional cough of clear sputum likely 2/2 PND  -- Less likely intra-abdominal source.   -- CXR without infectious source  -- UA non-infectious  -- Hemodynamically stable  -- Lactate 3.2  --no neutropenia  --cultures negative so far  --CT abdomen on 8/27 showed non-specific, subtle colonic mucosal enhancement          2. B-cell acute lymphoblastic leukemia  -- History of PH+ B-ALL on inotuzumab, ozogamicin and follows with Dr. Jenkins.     3. Type 2 diabetes mellitus, without long-term current use of insulin  -- Hold home oral anti-diabetic agents  -- MDSSI  -- POCT glu ACHS  -- Goal 140-180  -- Diabetic diet     4. Paroxysmal atrial fibrillation  -- Continue home diltiazem  -- Rate controlled  -- Xarelto held outpatient due to thrombocytopenia. Continue to hold         VTE Risk Mitigation (From admission, onward)           Ordered     Reason for No Pharmacological VTE Prophylaxis  Once        Question:  Reasons:  Answer:  Thrombocytopenia    08/27/22 2155     IP VTE HIGH RISK PATIENT  Once         08/27/22 2155     Place sequential compression device   Until discontinued         08/27/22 1888                    Disposition: discharge when stable    Kathrine Posey MD  Bone Marrow Transplant  [facility] - [facility]

## 2022-08-28 NOTE — CONSULTS
Patient admitted to BMT service. Please refer to H&P dated 08/28.     Nayeli Devine MD   Hematology and Oncology Fellow, PGY IV  [facility]

## 2022-08-28 NOTE — ASSESSMENT & PLAN NOTE
-- T max 101.7  -- Unclear etiology  -- Possible URI. Reports some sinus congestion and runny nose, saw ENT yesterday and prescribed Augmentin. Occasional cough of clear sputum likely 2/2 PND  -- Less likely intra-abdominal source. Although diffuse tenderness to palpation, worse in right upper and lower quadrants  -- CXR without infectious source  -- UA non-infectious  -- Hemodynamically stable  -- Lactate 3.2    PLAN:  -- Cefepime 2 gm IV Q8h  -- CT abdomen pelvis  -- F/u BCx  -- De-escalate as indicated to cover URI as previous  -- Monitor fever curve  -- Monitor hemodynamics  -- Repeat lactate after IVF completed

## 2022-08-28 NOTE — SUBJECTIVE & OBJECTIVE
Patient information was obtained from patient, past medical records, and ER records.     Oncology History:   A. 11/23/2021: Transferred to [facility] [place] from outside hospital for evaluation for acute leukemia  B. 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   C. 11/30/2021 - 12/23/2021: Vincristine + imatinib induction; hospital course was complicated by acute hypoxemic respiratory failure which resolved with diuresis and treatment of ALL  D. 1/3/2022: Bone marrow biopsy after induction shows normocellular marrow (60%) with no definite evidence of B-ALL; bcr-abl p190 fusion detected at 0.02% of total ABL1; FISH normal; MRD testing not sent  E. 1/20/2022: Begin consolidation therapy with EWALL-Ph-01 protocol  F. 3/16/2022: BCR-ABL p190 transcript on peripheral blood negative  G. 4/13/2022: BCR-ABL p190 transcript on peripheral blood 0.63%  H. 5/4/2022: Bone marrow biopsy shows 50% cellular marrow with relapsed B-ALL; bone marrow aspirate shows 23.5% blasts; cytogenetics 46,XX,-9,t(9;22)(q34;q11.2),+isaias(22)t(9;22)[5] /46,XX[15]; FISH shows 9.5% nuclei with bcr/abl1 fusion; bcr/abl1 quantitative PCR shows 47% of total ABL1    (Not in a hospital admission)      Levetiracetam     Past Medical History:   Diagnosis Date    Cancer     COPD (chronic obstructive pulmonary disease)     Hypertension     Rheumatoid arthritis, unspecified     Stroke     TIA x 4    Type 2 diabetes mellitus with circulatory disorder, without long-term current use of insulin      Past Surgical History:   Procedure Laterality Date    APPENDECTOMY      HYSTERECTOMY      ROTATOR CUFF REPAIR Bilateral     TONSILLECTOMY      TUBAL LIGATION       Family History    None       Tobacco Use     Smoking status: Every Day     Packs/day: 0.50     Types: Cigarettes    Smokeless tobacco: Never   Substance and Sexual Activity    Alcohol use: No    Drug use: Never    Sexual activity: Not on file       Review of Systems   Constitutional:  Positive for fever. Negative for chills.   HENT:  Positive for congestion, postnasal drip and rhinorrhea. Negative for sore throat.    Respiratory:  Positive for cough. Negative for shortness of breath.    Cardiovascular:  Negative for chest pain and leg swelling.   Gastrointestinal:  Positive for abdominal pain and nausea. Negative for constipation, diarrhea and vomiting.   Genitourinary:  Negative for dysuria.   Musculoskeletal:  Negative for myalgias.   Skin:  Negative for rash.   Neurological:  Negative for dizziness and headaches.   Psychiatric/Behavioral:  Negative for confusion.    Objective:     Vital Signs (Most Recent):  Temp: 99.6 °F (37.6 °C) (08/27/22 2002)  Pulse: 93 (08/27/22 2101)  Resp: 19 (08/27/22 2101)  BP: (!) 165/75 (08/27/22 2101)  SpO2: 96 % (08/27/22 2101)   Vital Signs (24h Range):  Temp:  [98.4 °F (36.9 °C)-99.6 °F (37.6 °C)] 99.6 °F (37.6 °C)  Pulse:  [] 93  Resp:  [19-22] 19  SpO2:  [96 %-99 %] 96 %  BP: (124-165)/(70-79) 165/75     Weight: 62.1 kg (137 lb)  Body mass index is 22.8 kg/m².  Body surface area is 1.69 meters squared.    ECOG SCORE           [unfilled]    Lines/Drains/Airways       Central Venous Catheter Line  Duration                  PowerPort A Cath Single Lumen 03/16/22 1128 right internal jugular 164 days              Peripheral Intravenous Line  Duration                  Peripheral IV - Single Lumen 08/27/22 1719 20 G Left Antecubital <1 day                    Physical Exam  Constitutional:       Appearance: Normal appearance.   HENT:      Head: Normocephalic and atraumatic.      Mouth/Throat:      Mouth: Mucous membranes are moist.      Pharynx: Oropharynx is clear.   Eyes:      Extraocular Movements: Extraocular movements  intact.      Pupils: Pupils are equal, round, and reactive to light.   Cardiovascular:      Rate and Rhythm: Normal rate and regular rhythm.      Pulses: Normal pulses.      Heart sounds: Normal heart sounds.   Pulmonary:      Effort: Pulmonary effort is normal. No respiratory distress.      Breath sounds: Normal breath sounds.   Abdominal:      General: Abdomen is flat. Bowel sounds are normal.      Palpations: Abdomen is soft.      Tenderness: There is abdominal tenderness (diffuse, worse in right upper and lower quadrants).   Musculoskeletal:         General: No swelling.      Cervical back: Neck supple.   Skin:     General: Skin is warm and dry.      Capillary Refill: Capillary refill takes less than 2 seconds.   Neurological:      General: No focal deficit present.      Mental Status: She is alert and oriented to person, place, and time. Mental status is at baseline.   Psychiatric:         Mood and Affect: Mood normal.         Behavior: Behavior normal.         Thought Content: Thought content normal.         Judgment: Judgment normal.       Significant Labs:   BMP:   Recent Labs   Lab 08/27/22  1756   *   *   K 3.7      CO2 19*   BUN 8   CREATININE 0.6   CALCIUM 9.3   , CBC:   Recent Labs   Lab 08/27/22 1756   WBC 4.38   HGB 10.5*   HCT 30.2*   PLT 23*   , and CMP:   Recent Labs   Lab 08/27/22  1756   *   K 3.7      CO2 19*   *   BUN 8   CREATININE 0.6   CALCIUM 9.3   PROT 7.0   ALBUMIN 3.3*   BILITOT 0.5   ALKPHOS 213*   AST 42*   ALT 58*   ANIONGAP 12       Diagnostic Results:  I have reviewed all pertinent imaging results/findings within the past 24 hours.

## 2022-08-28 NOTE — NURSING
Patient arrived from the emergency department with  at bedside. Patient ambulated to restroom with X 1 assist. Telemetry monitor displays normal sinus rhythm with HR= 90. BP= 142/64, 98% room air, 98.8 F. Left AC 20 g saline locked; flushed. Lungs clear to ascultation in all lung fields. CT of abdomen and pelvis (IV contrast only) still to be completed.

## 2022-08-28 NOTE — PLAN OF CARE
Patient remaining free from injury and patient's  assisting her to restroom.  Patient temp < 100.  Patient BG < 100.  Patient reports pain 6/10 and adequately controlled with po pain medications.  Patient eating 50% of meals.  Patient reports okay to dc today so she can resume chemo tomorrow.

## 2022-08-28 NOTE — HPI
Deepti Gonzalez is a 51 y.o. F with history of PH+ B-ALL, NIDDM, RA, COPD, TIA, HTN, pAF, PPM, anxiety, MDD who presents with fever. Currently on inotuzumab, ozogamicin and follows with Dr. Jenkins. Report onset of fever yesterday 101.7. Today 100.6. Reports some sinus congestion and runny nose, saw ENT yesterday and prescribed Augmentin. Occasional cough of clear sputum. Occasional diffuse abdominal pain /cramping, nausea without vomiting, diarrhea x1 several days ago. Denies CP, SOB, dysuria, joint pains, wounds. Adherent with all medications including opportunistic prophylaxis.

## 2022-08-28 NOTE — ASSESSMENT & PLAN NOTE
-- Continue home diltiazem  -- Rate controlled  -- Xarelto held outpatient due to thrombocytopenia. Continue to hold

## 2022-08-28 NOTE — PROGRESS NOTES
Patient IV dc'd.  Discharge instructions reviewed and patient verbalizes understanding.  Patient left floor via wheelchair accompanied by  with all personal belongings in tow.

## 2022-08-28 NOTE — H&P
[facility] - Emergency Dept  Hematology  Bone Marrow Transplant  H&P    Subjective:     Principal Problem: Fever    HPI: Deepti Gonzalez is a 51 y.o. F with history of PH+ B-ALL, NIDDM, RA, COPD, TIA, HTN, pAF, PPM, anxiety, MDD who presents with fever. Currently on inotuzumab, ozogamicin and follows with Dr. Jenkins. Report onset of fever yesterday 101.7. Today 100.6. Reports some sinus congestion and runny nose, saw ENT yesterday and prescribed Augmentin. Occasional cough of clear sputum. Occasional diffuse abdominal pain /cramping, nausea without vomiting, diarrhea x1 several days ago. Denies CP, SOB, dysuria, joint pains, wounds. Adherent with all medications including opportunistic prophylaxis.      Patient information was obtained from patient, past medical records, and ER records.     Oncology History:   A. 11/23/2021: Transferred to [facility] [place] hospital for evaluation for acute leukemia  B. 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   C. 11/30/2021 - 12/23/2021: Vincristine + imatinib induction; hospital course was complicated by acute hypoxemic respiratory failure which resolved with diuresis and treatment of ALL  D. 1/3/2022: Bone marrow biopsy after induction shows normocellular marrow (60%) with no definite evidence of B-ALL; bcr-abl p190 fusion detected at 0.02% of total ABL1; FISH normal; MRD testing not sent  E. 1/20/2022: Begin consolidation therapy with EWALL-Ph-01 protocol  F. 3/16/2022: BCR-ABL p190 transcript on peripheral blood negative  G. 4/13/2022: BCR-ABL p190 transcript on peripheral blood 0.63%  H. 5/4/2022: Bone marrow biopsy shows 50% cellular marrow with relapsed B-ALL; bone marrow aspirate shows 23.5% blasts;  cytogenetics 46,XX,-9,t(9;22)(q34;q11.2),+isaias(22)t(9;22)[5] /46,XX[15]; FISH shows 9.5% nuclei with bcr/abl1 fusion; bcr/abl1 quantitative PCR shows 47% of total ABL1    (Not in a hospital admission)      Levetiracetam     Past Medical History:   Diagnosis Date    Cancer     COPD (chronic obstructive pulmonary disease)     Hypertension     Rheumatoid arthritis, unspecified     Stroke     TIA x 4    Type 2 diabetes mellitus with circulatory disorder, without long-term current use of insulin      Past Surgical History:   Procedure Laterality Date    APPENDECTOMY      HYSTERECTOMY      ROTATOR CUFF REPAIR Bilateral     TONSILLECTOMY      TUBAL LIGATION       Family History    None       Tobacco Use    Smoking status: Every Day     Packs/day: 0.50     Types: Cigarettes    Smokeless tobacco: Never   Substance and Sexual Activity    Alcohol use: No    Drug use: Never    Sexual activity: Not on file       Review of Systems   Constitutional:  Positive for fever. Negative for chills.   HENT:  Positive for congestion, postnasal drip and rhinorrhea. Negative for sore throat.    Respiratory:  Positive for cough. Negative for shortness of breath.    Cardiovascular:  Negative for chest pain and leg swelling.   Gastrointestinal:  Positive for abdominal pain and nausea. Negative for constipation, diarrhea and vomiting.   Genitourinary:  Negative for dysuria.   Musculoskeletal:  Negative for myalgias.   Skin:  Negative for rash.   Neurological:  Negative for dizziness and headaches.   Psychiatric/Behavioral:  Negative for confusion.    Objective:     Vital Signs (Most Recent):  Temp: 99.6 °F (37.6 °C) (08/27/22 2002)  Pulse: 93 (08/27/22 2101)  Resp: 19 (08/27/22 2101)  BP: (!) 165/75 (08/27/22 2101)  SpO2: 96 % (08/27/22 2101)   Vital Signs (24h Range):  Temp:  [98.4 °F (36.9 °C)-99.6 °F (37.6 °C)] 99.6 °F (37.6 °C)  Pulse:  [] 93  Resp:  [19-22] 19  SpO2:  [96 %-99 %] 96 %  BP: (124-165)/(70-79) 165/75      Weight: 62.1 kg (137 lb)  Body mass index is 22.8 kg/m².  Body surface area is 1.69 meters squared.    ECOG SCORE           [unfilled]    Lines/Drains/Airways       Central Venous Catheter Line  Duration                  PowerPort A Cath Single Lumen 03/16/22 1128 right internal jugular 164 days              Peripheral Intravenous Line  Duration                  Peripheral IV - Single Lumen 08/27/22 1719 20 G Left Antecubital <1 day                    Physical Exam  Constitutional:       Appearance: Normal appearance.   HENT:      Head: Normocephalic and atraumatic.      Mouth/Throat:      Mouth: Mucous membranes are moist.      Pharynx: Oropharynx is clear.   Eyes:      Extraocular Movements: Extraocular movements intact.      Pupils: Pupils are equal, round, and reactive to light.   Cardiovascular:      Rate and Rhythm: Normal rate and regular rhythm.      Pulses: Normal pulses.      Heart sounds: Normal heart sounds.   Pulmonary:      Effort: Pulmonary effort is normal. No respiratory distress.      Breath sounds: Normal breath sounds.   Abdominal:      General: Abdomen is flat. Bowel sounds are normal.      Palpations: Abdomen is soft.      Tenderness: There is abdominal tenderness (diffuse, worse in right upper and lower quadrants).   Musculoskeletal:         General: No swelling.      Cervical back: Neck supple.   Skin:     General: Skin is warm and dry.      Capillary Refill: Capillary refill takes less than 2 seconds.   Neurological:      General: No focal deficit present.      Mental Status: She is alert and oriented to person, place, and time. Mental status is at baseline.   Psychiatric:         Mood and Affect: Mood normal.         Behavior: Behavior normal.         Thought Content: Thought content normal.         Judgment: Judgment normal.       Significant Labs:   BMP:   Recent Labs   Lab 08/27/22  1756   *   *   K 3.7      CO2 19*   BUN 8   CREATININE 0.6   CALCIUM 9.3   , CBC:    Recent Labs   Lab 08/27/22  1756   WBC 4.38   HGB 10.5*   HCT 30.2*   PLT 23*   , and CMP:   Recent Labs   Lab 08/27/22  1756   *   K 3.7      CO2 19*   *   BUN 8   CREATININE 0.6   CALCIUM 9.3   PROT 7.0   ALBUMIN 3.3*   BILITOT 0.5   ALKPHOS 213*   AST 42*   ALT 58*   ANIONGAP 12       Diagnostic Results:  I have reviewed all pertinent imaging results/findings within the past 24 hours.    Assessment/Plan:     * Fever  -- T max 101.7  -- Unclear etiology  -- Possible URI. Reports some sinus congestion and runny nose, saw ENT yesterday and prescribed Augmentin. Occasional cough of clear sputum likely 2/2 PND  -- Less likely intra-abdominal source. Although diffuse tenderness to palpation, worse in right upper and lower quadrants  -- CXR without infectious source  -- UA non-infectious  -- Hemodynamically stable  -- Lactate 3.2    PLAN:  -- Cefepime 2 gm IV Q8h  -- CT abdomen pelvis  -- F/u BCx  -- De-escalate as indicated to cover URI as previous  -- Monitor fever curve  -- Monitor hemodynamics  -- Repeat lactate after IVF completed    B-cell acute lymphoblastic leukemia  -- History of PH+ B-ALL on inotuzumab, ozogamicin and follows with Dr. Jenkins.    Type 2 diabetes mellitus, without long-term current use of insulin  -- Hold home oral anti-diabetic agents  -- MDSSI  -- POCT glu ACHS  -- Goal 140-180  -- Diabetic diet    Paroxysmal atrial fibrillation  -- Continue home diltiazem  -- Rate controlled  -- Xarelto held outpatient due to thrombocytopenia. Continue to hold        VTE Risk Mitigation (From admission, onward)         Ordered     Reason for No Pharmacological VTE Prophylaxis  Once        Question:  Reasons:  Answer:  Thrombocytopenia    08/27/22 2155     IP VTE HIGH RISK PATIENT  Once         08/27/22 2155     Place sequential compression device  Until discontinued         08/27/22 2155                Disposition: tbd    Nay Copeland, DO  Bone Marrow Transplant  Hematology  [facility] -  Emergency Dept

## 2022-08-28 NOTE — ED TRIAGE NOTES
Deepti Gonzalez, a 51 y.o. female presents to the ED w/ complaint of fever. Pt has leukemia and is in active treatment. Pt reports having fever of 100.7 this morning despite taking Tylenol. Pt reports cough x 2 weeks, chronic abd pain, chronic back pain, chronic headaches. Denies chest pain, SOB.     Triage note:  Chief Complaint   Patient presents with    Fever Post Chemo     Leukemia, last session 2 weeks ago. Started Thurs     Review of patient's allergies indicates:   Allergen Reactions    Levetiracetam      Other reaction(s): Unknown  Other reaction(s): Unknown  Other reaction(s): Unknown     Past Medical History:   Diagnosis Date    Cancer     COPD (chronic obstructive pulmonary disease)     Hypertension     Rheumatoid arthritis, unspecified     Stroke     TIA x 4    Type 2 diabetes mellitus with circulatory disorder, without long-term current use of insulin      LOC: The patient is awake, alert, and oriented to self, place, time, and situation. Pt is calm and cooperative. Affect is appropriate.  Speech is appropriate and clear.     APPEARANCE: Patient resting comfortably in no acute distress.  Patient is clean and well groomed.    SKIN: The skin is warm and dry; color consistent with ethnicity.  Patient has normal skin turgor and moist mucus membranes.  Skin intact; no breakdown or bruising noted.     MUSCULOSKELETAL: Patient moving upper and lower extremities without difficulty; reports chronic back pain.  Reports weakness.     RESPIRATORY: Airway is open and patent. Respirations spontaneous, even, easy, and non-labored.  Patient has a normal effort and rate.  No accessory muscle use noted. Reports cough.     CARDIAC:  Normal rate noted.  No peripheral edema noted. No complaints of chest pain.      ABDOMEN: Soft and non tender to palpation.  No distention noted. Pt reports chronic abdominal pain; denies nausea, vomiting, diarrhea, or constipation.    NEUROLOGIC: Eyes open spontaneously.  Behavior  appropriate to situation.  Follows commands; facial expression symmetrical.  Purposeful motor response noted; normal sensation in all extremities. Pt reports headache; denies lightheadedness or dizziness; denies visual disturbances; denies loss of balance; denies unilateral weakness.

## 2022-08-28 NOTE — ASSESSMENT & PLAN NOTE
-- Hold home oral anti-diabetic agents  -- MDSSI  -- POCT glu ACHS  -- Goal 140-180  -- Diabetic diet

## 2022-08-28 NOTE — ACP (ADVANCE CARE PLANNING)
Advance Care Planning     Code Status  In light of the patients advanced and life limiting illness,I engaged the the patient in a conversation about the patient's preferences for care at the very end of life. The patient wishes to have CPR or other invasive treatments performed when her heart and/or breathing stops. I communicated to the patient that a full code order would be placed in her medical record to reflect this preference. I spent a total of 5 minutes engaging the patient in this advance care planning discussion.    Nay Copeland, DO  Internal Medicine PGY-3  BMT

## 2022-08-29 ENCOUNTER — OFFICE VISIT (OUTPATIENT)
Dept: HEMATOLOGY/ONCOLOGY | Facility: CLINIC | Age: 52
End: 2022-08-29
Payer: COMMERCIAL

## 2022-08-29 VITALS
DIASTOLIC BLOOD PRESSURE: 61 MMHG | RESPIRATION RATE: 16 BRPM | HEART RATE: 108 BPM | BODY MASS INDEX: 23.36 KG/M2 | OXYGEN SATURATION: 98 % | WEIGHT: 136.81 LBS | SYSTOLIC BLOOD PRESSURE: 121 MMHG | HEIGHT: 64 IN

## 2022-08-29 DIAGNOSIS — R51.9 INTRACTABLE EPISODIC HEADACHE, UNSPECIFIED HEADACHE TYPE: ICD-10-CM

## 2022-08-29 DIAGNOSIS — D61.818 PANCYTOPENIA: ICD-10-CM

## 2022-08-29 DIAGNOSIS — C91.00 B-CELL ACUTE LYMPHOBLASTIC LEUKEMIA: Primary | ICD-10-CM

## 2022-08-29 PROCEDURE — 3074F SYST BP LT 130 MM HG: CPT | Mod: CPTII,S$GLB,, | Performed by: INTERNAL MEDICINE

## 2022-08-29 PROCEDURE — 3008F PR BODY MASS INDEX (BMI) DOCUMENTED: ICD-10-PCS | Mod: CPTII,S$GLB,, | Performed by: INTERNAL MEDICINE

## 2022-08-29 PROCEDURE — 3008F BODY MASS INDEX DOCD: CPT | Mod: CPTII,S$GLB,, | Performed by: INTERNAL MEDICINE

## 2022-08-29 PROCEDURE — 3074F PR MOST RECENT SYSTOLIC BLOOD PRESSURE < 130 MM HG: ICD-10-PCS | Mod: CPTII,S$GLB,, | Performed by: INTERNAL MEDICINE

## 2022-08-29 PROCEDURE — 3044F HG A1C LEVEL LT 7.0%: CPT | Mod: CPTII,S$GLB,, | Performed by: INTERNAL MEDICINE

## 2022-08-29 PROCEDURE — 1160F RVW MEDS BY RX/DR IN RCRD: CPT | Mod: CPTII,S$GLB,, | Performed by: INTERNAL MEDICINE

## 2022-08-29 PROCEDURE — 1160F PR REVIEW ALL MEDS BY PRESCRIBER/CLIN PHARMACIST DOCUMENTED: ICD-10-PCS | Mod: CPTII,S$GLB,, | Performed by: INTERNAL MEDICINE

## 2022-08-29 PROCEDURE — 3078F DIAST BP <80 MM HG: CPT | Mod: CPTII,S$GLB,, | Performed by: INTERNAL MEDICINE

## 2022-08-29 PROCEDURE — 99999 PR PBB SHADOW E&M-EST. PATIENT-LVL III: CPT | Mod: PBBFAC,,, | Performed by: INTERNAL MEDICINE

## 2022-08-29 PROCEDURE — 99215 PR OFFICE/OUTPT VISIT, EST, LEVL V, 40-54 MIN: ICD-10-PCS | Mod: S$GLB,,, | Performed by: INTERNAL MEDICINE

## 2022-08-29 PROCEDURE — 1159F MED LIST DOCD IN RCRD: CPT | Mod: CPTII,S$GLB,, | Performed by: INTERNAL MEDICINE

## 2022-08-29 PROCEDURE — 3044F PR MOST RECENT HEMOGLOBIN A1C LEVEL <7.0%: ICD-10-PCS | Mod: CPTII,S$GLB,, | Performed by: INTERNAL MEDICINE

## 2022-08-29 PROCEDURE — 99999 PR PBB SHADOW E&M-EST. PATIENT-LVL III: ICD-10-PCS | Mod: PBBFAC,,, | Performed by: INTERNAL MEDICINE

## 2022-08-29 PROCEDURE — 4010F ACE/ARB THERAPY RXD/TAKEN: CPT | Mod: CPTII,S$GLB,, | Performed by: INTERNAL MEDICINE

## 2022-08-29 PROCEDURE — 4010F PR ACE/ARB THEARPY RXD/TAKEN: ICD-10-PCS | Mod: CPTII,S$GLB,, | Performed by: INTERNAL MEDICINE

## 2022-08-29 PROCEDURE — 99215 OFFICE O/P EST HI 40 MIN: CPT | Mod: S$GLB,,, | Performed by: INTERNAL MEDICINE

## 2022-08-29 PROCEDURE — 3078F PR MOST RECENT DIASTOLIC BLOOD PRESSURE < 80 MM HG: ICD-10-PCS | Mod: CPTII,S$GLB,, | Performed by: INTERNAL MEDICINE

## 2022-08-29 PROCEDURE — 1159F PR MEDICATION LIST DOCUMENTED IN MEDICAL RECORD: ICD-10-PCS | Mod: CPTII,S$GLB,, | Performed by: INTERNAL MEDICINE

## 2022-08-29 RX ORDER — SUMATRIPTAN 50 MG/1
50 TABLET, FILM COATED ORAL DAILY PRN
Qty: 30 TABLET | Refills: 2 | Status: SHIPPED | OUTPATIENT
Start: 2022-08-29 | End: 2022-09-28

## 2022-08-29 NOTE — PROGRESS NOTES
Route Chart for Scheduling    BMT Chart Routing  Urgent    Follow up with physician . 9/12   Follow up with DANUTA    Infusion scheduling note 9/12, 9/19, 9/26   Injection scheduling note    Labs CBC, CMP, magnesium, phosphorus, LDH and uric acid   Lab interval:  Please also include bcr-abl p190. Labs at time of follow-up.   Imaging    Pharmacy appointment    Other referrals        Treatment Plan Information   OP INOTUZUMAB OZOGAMICIN   Dayron Jenkins MD   Upcoming Treatment Dates - OP INOTUZUMAB OZOGAMICIN    9/12/2022       Pre-Medications       acetaminophen tablet 650 mg       diphenydramine (BENADRYL) 25 mg in NS 50 mL IVPB       hydrocortisone sodium succinate injection 25 mg       Chemotherapy       inotuzumab ozogamicin (BESPONSA) 0.9 mg in sodium chloride 0.9% 50 mL infusion  9/19/2022       Pre-Medications       acetaminophen tablet 650 mg       diphenydramine (BENADRYL) 25 mg in NS 50 mL IVPB       hydrocortisone sodium succinate injection 25 mg       Chemotherapy       inotuzumab ozogamicin (BESPONSA) 0.9 mg in sodium chloride 0.9% 50 mL infusion  9/26/2022       Pre-Medications       acetaminophen tablet 650 mg       diphenydramine (BENADRYL) 25 mg in NS 50 mL IVPB       hydrocortisone sodium succinate injection 25 mg       Chemotherapy       inotuzumab ozogamicin (BESPONSA) 0.9 mg in sodium chloride 0.9% 50 mL infusion  10/10/2022       Pre-Medications       ACETAMINOPHEN  325 MG ORAL TAB       DIPHENHYDRAMINE IV ORDERABLE       HYDROCORTISONE SOD  MG INJ SOLR (UMBRELLA)       Chemotherapy       inotuzumab ozogamicin (BESPONSA) 0.9 mg in sodium chloride 0.9% 50 mL infusion

## 2022-08-30 ENCOUNTER — SPECIALTY PHARMACY (OUTPATIENT)
Dept: PHARMACY | Facility: CLINIC | Age: 52
End: 2022-08-30
Payer: COMMERCIAL

## 2022-08-30 DIAGNOSIS — C91.00 B-CELL ACUTE LYMPHOBLASTIC LEUKEMIA: ICD-10-CM

## 2022-08-31 ENCOUNTER — SPECIALTY PHARMACY (OUTPATIENT)
Dept: PHARMACY | Facility: CLINIC | Age: 52
End: 2022-08-31
Payer: COMMERCIAL

## 2022-08-31 NOTE — PROGRESS NOTES
HEMATOLOGIC MALIGNANCIES PROGRESS NOTE    IDENTIFYING STATEMENT   Deepti Davison) is a 51 y.o. female with a  of 1970 from [place], [place] with the diagnosis of B-ALL.     ONCOLOGY HISTORY:    1. Precursor B-cell acute lymphoblastic leukemia (Ph+)   A. 2021: Transferred to [facility] [place] [facility] from outside hospital for evaluation for acute leukemia   B. 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    C. 2021 - 2021: Vincristine + imatinib induction; hospital course was complicated by acute hypoxemic respiratory failure which resolved with diuresis and treatment of ALL   D. 1/3/2022: Bone marrow biopsy after induction shows normocellular marrow (60%) with no definite evidence of B-ALL; bcr-abl p190 fusion detected at 0.02% of total ABL1; FISH normal; MRD testing not sent   E. 2022: Begin consolidation therapy with EWALL-Ph-01 protocol   F. 3/16/2022: BCR-ABL p190 transcript on peripheral blood negative   G. 2022: BCR-ABL p190 transcript on peripheral blood 0.63%   H. 2022: Bone marrow biopsy shows 50% cellular marrow with relapsed B-ALL; bone marrow aspirate shows 23.5% blasts; cytogenetics 46,XX,-9,t(9;22)(q34;q11.2),+isaias(22)t(9;22)[5] /46,XX[15]; FISH shows 9.5% nuclei with bcr/abl1 fusion; bcr/abl1 quantitative PCR shows 47% of total ABL1    2. History of TIA  3. Seizure disorder  4. Anxiety and depression  5. Chronic obstructive pulmonary disease  6. Paroxysmal atrial fibrillation  7. Implantable loop recorder and pacemaker in place  8. Peripheral artery disease  9. Diabetes mellitus, type 2  10. Gastroesophageal reflux disease  11. Gastroparesis  12. Rheumatoid arthritis  13. Osteoporosis  14. History of  tobacco use     INTERVAL HISTORY:      Ms. Gonzalez presents today for follow-up of relapsed Ph+ B-ALL. She was hospitalized over the weekend with fever and discharged yesterday when no sources was identified. She feels okay today. She is on antibiotics. She was scheduled for inotuzumab infusion today, but we held this due to severe thrombocytopenia. She presents to discuss her care.     Past Medical History, Past Social History and Past Family History have been reviewed and are unchanged except as noted in the interval history.    MEDICATIONS:     Current Outpatient Medications on File Prior to Visit   Medication Sig Dispense Refill    acyclovir (ZOVIRAX) 400 MG tablet Take 1 tablet (400 mg total) by mouth 2 (two) times daily. 60 tablet 11    allopurinoL (ZYLOPRIM) 300 MG tablet Take 1 tablet (300 mg total) by mouth once daily. 90 tablet 3    artificial tears (ISOPTO TEARS) 0.5 % ophthalmic solution Place 1 drop into both eyes 4 (four) times daily as needed.      atorvastatin (LIPITOR) 80 MG tablet Take 80 mg by mouth once daily.      blood sugar diagnostic Strp SMARTSIG:Via Meter 1 to 2 Times Daily      busPIRone (BUSPAR) 10 MG tablet Take 10 mg by mouth 2 (two) times daily.      dapagliflozin (FARXIGA) 10 mg tablet Take 10 mg by mouth once daily.      diazePAM (VALIUM) 10 MG Tab Take 10 mg by mouth 2 (two) times daily as needed.      diltiaZEM (CARDIZEM) 90 MG tablet Take 1 tablet (90 mg total) by mouth every 6 (six) hours. 120 tablet 11    duke's soln (benadryl 30 mL, mylanta 30 mL, LIDOcaine 30 mL, nystatin 30 mL) 120mL Take 10 mLs by mouth 4 (four) times daily as needed (mouth pain). 120 mL 0    fenofibrate 160 MG Tab Take 160 mg by mouth once daily.      fluconazole (DIFLUCAN) 200 MG Tab Take 2 tablets (400 mg total) by mouth once daily. 60 tablet 2    gabapentin (NEURONTIN) 300 MG capsule Take 1 capsule (300 mg total) by mouth 3 (three) times daily. 90 capsule 11    HYDROmorphone (DILAUDID) 2 MG tablet Take 1  tablet (2 mg total) by mouth every 3 (three) hours as needed for Pain. 120 tablet 0    hydrOXYchloroQUINE (PLAQUENIL) 200 mg tablet Take 1 tablet (200 mg total) by mouth once daily.      hydrOXYzine pamoate (VISTARIL) 50 MG Cap Take 50 mg by mouth 2 (two) times daily as needed.      levoFLOXacin (LEVAQUIN) 500 MG tablet Take 1 tablet (500 mg total) by mouth once daily. 30 tablet 11    LIDOcaine (LIDODERM) 5 % Place 1 patch onto the skin once daily. Remove & Discard patch within 12 hours or as directed by MD 30 patch 2    losartan (COZAAR) 25 MG tablet Take 25 mg by mouth once daily.      magnesium oxide (MAG-OX) 400 mg (241.3 mg magnesium) tablet Take 1 tablet by mouth once daily.      metFORMIN (GLUCOPHAGE-XR) 500 MG ER 24hr tablet Take 1,000 mg by mouth 2 (two) times daily.      naloxone (NARCAN) 4 mg/actuation Spry 4mg by nasal route as needed for opioid overdose; may repeat every 2-3 minutes in alternating nostrils until medical help arrives. Call 911 1 each 11    omeprazole (PRILOSEC) 40 MG capsule Take 40 mg by mouth once daily.      ondansetron (ZOFRAN-ODT) 8 MG TbDL Take 1 tablet (8 mg total) by mouth every 8 (eight) hours as needed (in case of chemo induced nausea). 30 tablet 1    ONETOUCH VERIO TEST STRIPS Strp SMARTSIG:Via Meter 1 to 2 Times Daily      OXcarbazepine (TRILEPTAL) 600 MG Tab Take 1,200 mg by mouth 2 (two) times daily.      polyethylene glycol (GLYCOLAX) 17 gram/dose powder Dissolve one capful (17 g) in liquid and take by mouth daily as needed (constipation). 510 g 0    potassium chloride (K-TAB) 20 mEq Take 20 mEq by mouth 2 (two) times daily.      prochlorperazine (COMPAZINE) 10 MG tablet Take 1 tablet (10 mg total) by mouth every 6 (six) hours as needed for Nausea. 30 tablet 2    QUEtiapine (SEROQUEL) 200 MG Tab Take 200 mg by mouth every evening.      rivaroxaban (XARELTO) 20 mg Tab Take 1 tablet (20 mg total) by mouth daily with dinner or evening meal. Hold until instructed to resume by  "provider due to low platelet count.      senna-docusate 8.6-50 mg (PERICOLACE) 8.6-50 mg per tablet Take 1 tablet by mouth 2 (two) times daily. 60 tablet 3    sulfamethoxazole-trimethoprim 800-160mg (BACTRIM DS) 800-160 mg Tab Take 1 tablet by mouth every Mon, Wed, Fri. 40 tablet 3    TRINTELLIX 20 mg Tab Take 1 tablet by mouth once daily.      ursodioL (ACTIGALL) 300 mg capsule Take 1 capsule (300 mg total) by mouth 3 (three) times daily. 90 capsule 11     No current facility-administered medications on file prior to visit.       ALLERGIES:   Review of patient's allergies indicates:   Allergen Reactions    Levetiracetam      Other reaction(s): Unknown  Other reaction(s): Unknown  Other reaction(s): Unknown        ROS:       Review of Systems   Constitutional:  Positive for fatigue (improved significantly). Negative for diaphoresis, fever and unexpected weight change.   HENT:   Negative for lump/mass and sore throat.    Eyes:  Negative for icterus.   Respiratory:  Negative for cough and shortness of breath.    Cardiovascular:  Negative for palpitations.   Gastrointestinal:  Negative for abdominal distention, abdominal pain, constipation, diarrhea, nausea and vomiting.   Genitourinary:  Negative for difficulty urinating, dysuria and frequency.    Musculoskeletal:  Positive for back pain (chronic). Negative for arthralgias, gait problem and myalgias.   Skin:  Negative for rash.   Neurological:  Negative for dizziness, gait problem and headaches.   Hematological:  Negative for adenopathy. Bruises/bleeds easily.   Psychiatric/Behavioral:  The patient is not nervous/anxious.      PHYSICAL EXAM:  Vitals:    08/29/22 1129   BP: 121/61   Pulse: 108   Resp: 16   SpO2: 98%   Weight: 62.1 kg (136 lb 12.7 oz)   Height: 5' 4" (1.626 m)   PainSc: 0-No pain       KARNOFSKY PERFORMANCE STATUS 70%  ECOG 1    Physical Exam  Constitutional:       General: She is not in acute distress.     Appearance: Normal appearance.   HENT:      " Head: Normocephalic and atraumatic.      Right Ear: External ear normal.      Left Ear: External ear normal.      Nose: Nose normal.   Cardiovascular:      Rate and Rhythm: Normal rate.   Pulmonary:      Effort: Pulmonary effort is normal.   Abdominal:      General: Abdomen is flat. There is no distension.      Palpations: Abdomen is soft. There is no mass.      Tenderness: There is no abdominal tenderness. There is no guarding or rebound.   Musculoskeletal:         General: Normal range of motion.      Cervical back: Normal range of motion and neck supple.   Skin:     Findings: No rash.   Neurological:      General: No focal deficit present.      Mental Status: She is alert and oriented to person, place, and time.   Psychiatric:         Mood and Affect: Mood normal.         Thought Content: Thought content normal.        LAB:       Results for orders placed or performed in visit on 08/29/22   CBC w/ DIFF   Result Value Ref Range    WBC 4.95 3.90 - 12.70 K/uL    RBC 2.79 (L) 4.00 - 5.40 M/uL    Hemoglobin 10.3 (L) 12.0 - 16.0 g/dL    Hematocrit 29.7 (L) 37.0 - 48.5 %     (H) 82 - 98 fL    MCH 36.9 (H) 27.0 - 31.0 pg    MCHC 34.7 32.0 - 36.0 g/dL    RDW 20.0 (H) 11.5 - 14.5 %    Platelets 22 (LL) 150 - 450 K/uL    MPV 11.7 9.2 - 12.9 fL    Immature Granulocytes 0.6 (H) 0.0 - 0.5 %    Gran # (ANC) 1.5 (L) 1.8 - 7.7 K/uL    Immature Grans (Abs) 0.03 0.00 - 0.04 K/uL    Lymph # 2.8 1.0 - 4.8 K/uL    Mono # 0.5 0.3 - 1.0 K/uL    Eos # 0.1 0.0 - 0.5 K/uL    Baso # 0.02 0.00 - 0.20 K/uL    nRBC 1 (A) 0 /100 WBC    Gran % 30.7 (L) 38.0 - 73.0 %    Lymph % 56.6 (H) 18.0 - 48.0 %    Mono % 10.1 4.0 - 15.0 %    Eosinophil % 1.6 0.0 - 8.0 %    Basophil % 0.4 0.0 - 1.9 %    Differential Method Automated    CMP   Result Value Ref Range    Sodium 136 136 - 145 mmol/L    Potassium 3.7 3.5 - 5.1 mmol/L    Chloride 103 95 - 110 mmol/L    CO2 21 (L) 23 - 29 mmol/L    Glucose 106 70 - 110 mg/dL    BUN 5 (L) 6 - 20 mg/dL     Creatinine 0.6 0.5 - 1.4 mg/dL    Calcium 9.5 8.7 - 10.5 mg/dL    Total Protein 6.7 6.0 - 8.4 g/dL    Albumin 3.1 (L) 3.5 - 5.2 g/dL    Total Bilirubin 0.4 0.1 - 1.0 mg/dL    Alkaline Phosphatase 213 (H) 55 - 135 U/L    AST 30 10 - 40 U/L    ALT 47 (H) 10 - 44 U/L    Anion Gap 12 8 - 16 mmol/L    eGFR >60.0 >60 mL/min/1.73 m^2   Magnesium   Result Value Ref Range    Magnesium 1.9 1.6 - 2.6 mg/dL   Phosphorus   Result Value Ref Range    Phosphorus 3.3 2.7 - 4.5 mg/dL   URIC ACID   Result Value Ref Range    Uric Acid 2.4 2.4 - 5.7 mg/dL   AMYLASE   Result Value Ref Range    Amylase 22 20 - 110 U/L   LIPASE   Result Value Ref Range    Lipase 25 4 - 60 U/L   Type & Screen   Result Value Ref Range    Group & Rh B POS     Indirect Deepika NEG        PROBLEMS ASSESSED THIS VISIT:    1. B-cell acute lymphoblastic leukemia    2. 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    Attempt at salvage with blinatumomab was complicated by cytokine release syndrome that was prohibitive of continuation of therapy.    She had long hospitalization while awaiting approval for new line of therapy. She has been initiated on first cycle of Inotuzumab Ozogamicin and Ponatanib. Today is C2D35 of inotuzumab. She had BMBx on 6/28 after first two infusions which revealed markedly hypocellular marrow (<5%), no morphologic evidence of resiudal leukemia, with ongoing evidence of molecular disease with BCR/ABL1 P190 of 0.03%.     She has severe thrombocytopenia, so this therapy is held. I have also advised her to hold ponatinib for now. We will resume ponatinib at 30 mg daily in 1-2 weeks, pending count recovery.     She met with Dr. Armendariz regarding allogeneic stem cell transplant. Risk is not expected to exceed benefit.     Cytopenias   Improving. She has mild anemia which is much  improved. Thrombocytopenia remains severe. Continue to monitor and adjust therapy as above.     Follow-up  2 weeks    Dayron Jenkins MD  Hematology and Stem Cell Transplant

## 2022-08-31 NOTE — TELEPHONE ENCOUNTER
Dose decrease prescription received. Inclusig 15 m tablets once daily. Insurance will cover 1 per day. Patient's insurance requires an Appeal in order to cover 2 tablets per day.   Appeal submitted through [facility] portal. Appeal determination will be emailed to [email or web address].    Insurance states the appeal can take from 15-30 days for a determination. Will request a more urgent review and will continue to follow up.

## 2022-09-01 LAB
BACTERIA BLD CULT: NORMAL
BACTERIA BLD CULT: NORMAL

## 2022-09-01 NOTE — TELEPHONE ENCOUNTER
Insurance states the appeal can take from 15-30 days for a determination.     Urgent appeal requested via phone. GENIA Massey representative states she will message her supervisor to request urgent review. Reference [id number]. Awaiting determination.

## 2022-09-01 NOTE — DISCHARGE SUMMARY
[facility]  Hematology  Bone Marrow Transplant  Discharge Summary      Patient Name: Deepti Gonzalez  MRN: [id number]  Admission Date: 8/27/2022  Hospital Length of Stay: 0 days  Discharge Date and Time:  09/01/2022 9:11 AM  Attending Physician: No att. providers found   Discharging Provider: Nayeli Devine MD  Primary Care Provider: Primary Doctor Rosey    HPI: Deepti Gonzalez is a 51 y.o. F with history of PH+ B-ALL, NIDDM, RA, COPD, TIA, HTN, pAF, PPM, anxiety, MDD who presents with fever. Currently on inotuzumab, ozogamicin and follows with Dr. Jenkins. Report onset of fever yesterday 101.7. Today 100.6. Reports some sinus congestion and runny nose, saw ENT yesterday and prescribed Augmentin. Occasional cough of clear sputum. Occasional diffuse abdominal pain /cramping, nausea without vomiting, diarrhea x1 several days ago. Denies CP, SOB, dysuria, joint pains, wounds. Adherent with all medications including opportunistic prophylaxis.      * No surgery found *     Hospital Course: Patient was admitted for fever (at home, none documented while inpatient) and started on cefepime. She was previously seen by ENT and prescribed augmentin for URI. Patient remained afebrile and as cultures showed no growth, she was discharged home. She has follow up with Dr. Jenkins on 08/29.       Goals of Care Treatment Preferences:  Code Status: Full Code    Health care agent: Mannie Gonzalez  [facility] agent number: [phone]                   Consults (From admission, onward)        Status Ordering Provider     Inpatient consult to Hematology/Oncology  Once        Provider:  (Not yet assigned)    PAYAL Duffy          Significant Diagnostic Studies: Labs: CMP No results for input(s): NA, K, CL, CO2, GLU, BUN, CREATININE, CALCIUM, PROT, ALBUMIN, BILITOT, ALKPHOS, AST, ALT, ANIONGAP, ESTGFRAFRICA, EGFRNONAA in the last 48 hours. and CBC No results for input(s): WBC, HGB, HCT, PLT in the last 48 hours.    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  Medications:  Reconciled Home Medications:      Medication List      CONTINUE taking these medications    acyclovir 400 MG tablet  Commonly known as: ZOVIRAX  Take 1 tablet (400 mg total) by mouth 2 (two) times daily.     allopurinoL 300 MG tablet  Commonly known as: ZYLOPRIM  Take 1 tablet (300 mg total) by mouth once daily.     artificial tears 0.5 % ophthalmic solution  Commonly known as: ISOPTO TEARS  Place 1 drop into both eyes 4 (four) times daily as needed.     atorvastatin 80 MG tablet  Commonly known as: LIPITOR  Take 80 mg by mouth once daily.     * blood sugar diagnostic Strp  SMARTSIG:Via Meter 1 to 2 Times Daily     * ONETOUCH VERIO TEST STRIPS Strp  Generic drug: blood sugar diagnostic  SMARTSIG:Via Meter 1 to 2 Times Daily     busPIRone 10 MG tablet  Commonly known as: BUSPAR  Take 10 mg by mouth 2 (two) times daily.     diazePAM 10 MG Tab  Commonly known as: VALIUM  Take 10 mg by mouth 2 (two) times daily as needed.     diltiaZEM 90 MG tablet  Commonly known as: CARDIZEM  Take 1 tablet (90 mg total) by mouth every 6 (six) hours.     DUKE'S SOLUTION (BENADRYL 30 ML, MYLANTA 30 ML, LIDOCAINE 30 ML, NYSTATIN 30 ML)  Take 10 mLs by mouth 4 (four) times daily as needed (mouth pain).     FARXIGA 10 mg tablet  Generic drug: dapagliflozin  Take 10 mg by mouth once daily.     fenofibrate 160 MG Tab  Take 160 mg by mouth once daily.     fluconazole 200 MG Tab  Commonly known as: DIFLUCAN  Take 2 tablets (400 mg total) by mouth  once daily.     gabapentin 300 MG capsule  Commonly known as: NEURONTIN  Take 1 capsule (300 mg total) by mouth 3 (three) times daily.     HYDROmorphone 2 MG tablet  Commonly known as: DILAUDID  Take 1 tablet (2 mg total) by mouth every 3 (three) hours as needed for Pain.     hydrOXYzine pamoate 50 MG Cap  Commonly known as: VISTARIL  Take 50 mg by mouth 2 (two) times daily as needed.     levoFLOXacin 500 MG tablet  Commonly known as: LEVAQUIN  Take 1 tablet (500 mg total) by mouth once daily.     LIDOcaine 5 %  Commonly known as: LIDODERM  Place 1 patch onto the skin once daily. Remove & Discard patch within 12 hours or as directed by MD     losartan 25 MG tablet  Commonly known as: COZAAR  Take 25 mg by mouth once daily.     magnesium oxide 400 mg (241.3 mg magnesium) tablet  Commonly known as: MAG-OX  Take 1 tablet by mouth once daily.     metFORMIN 500 MG ER 24hr tablet  Commonly known as: GLUCOPHAGE-XR  Take 1,000 mg by mouth 2 (two) times daily.     naloxone 4 mg/actuation Spry  Commonly known as: NARCAN  4mg by nasal route as needed for opioid overdose; may repeat every 2-3 minutes in alternating nostrils until medical help arrives. Call 911     omeprazole 40 MG capsule  Commonly known as: PRILOSEC  Take 40 mg by mouth once daily.     ondansetron 8 MG Tbdl  Commonly known as: ZOFRAN-ODT  Take 1 tablet (8 mg total) by mouth every 8 (eight) hours as needed (in case of chemo induced nausea).     OXcarbazepine 600 MG Tab  Commonly known as: TRILEPTAL  Take 1,200 mg by mouth 2 (two) times daily.     polyethylene glycol 17 gram/dose powder  Commonly known as: GLYCOLAX  Dissolve one capful (17 g) in liquid and take by mouth daily as needed (constipation).     potassium chloride 20 mEq  Commonly known as: K-TAB  Take 20 mEq by mouth 2 (two) times daily.     prochlorperazine 10 MG tablet  Commonly known as: COMPAZINE  Take 1 tablet (10 mg total) by mouth every 6 (six) hours as needed for Nausea.     QUEtiapine 200 MG  Tab  Commonly known as: SEROQUEL  Take 200 mg by mouth every evening.     rivaroxaban 20 mg Tab  Commonly known as: XARELTO  Take 1 tablet (20 mg total) by mouth daily with dinner or evening meal. Hold until instructed to resume by provider due to low platelet count.     STOOL SOFTENER-LAXATIVE 8.6-50 mg per tablet  Generic drug: senna-docusate 8.6-50 mg  Take 1 tablet by mouth 2 (two) times daily.     sulfamethoxazole-trimethoprim 800-160mg 800-160 mg Tab  Commonly known as: BACTRIM DS  Take 1 tablet by mouth every Mon, Wed, Fri.     TRINTELLIX 20 mg Tab  Generic drug: vortioxetine  Take 1 tablet by mouth once daily.     ursodioL 300 mg capsule  Commonly known as: ACTIGALL  Take 1 capsule (300 mg total) by mouth 3 (three) times daily.         * This list has 2 medication(s) that are the same as other medications prescribed for you. Read the directions carefully, and ask your doctor or other care provider to review them with you.            STOP taking these medications    aspirin 81 MG EC tablet  Commonly known as: ECOTRIN        ASK your doctor about these medications    hydrOXYchloroQUINE 200 mg tablet  Commonly known as: PLAQUENIL  Take 1 tablet (200 mg total) by mouth once daily.            Nayeli Devine MD  Bone Marrow Transplant  [facility]

## 2022-09-01 NOTE — HOSPITAL COURSE
Patient was admitted for fever (at home, none documented while inpatient) and started on cefepime. She was previously seen by ENT and prescribed augmentin for URI. Patient remained afebrile and as cultures showed no growth, she was discharged home. She has follow up with Dr. Jenkins on 08/29.

## 2022-09-02 NOTE — TELEPHONE ENCOUNTER
Outgoing call to patient. Per patients  medication is on hold with the provider and will be reassessed at next week. Reports have some dose of previous dose on hand, but not sure how much.     Informed about working on appeal for insurance and may taking some time.

## 2022-09-06 ENCOUNTER — DOCUMENTATION ONLY (OUTPATIENT)
Dept: HEMATOLOGY/ONCOLOGY | Facility: CLINIC | Age: 52
End: 2022-09-06
Payer: COMMERCIAL

## 2022-09-06 ENCOUNTER — LAB VISIT (OUTPATIENT)
Dept: LAB | Facility: HOSPITAL | Age: 52
End: 2022-09-06
Payer: COMMERCIAL

## 2022-09-06 DIAGNOSIS — C91.00 B-CELL ACUTE LYMPHOBLASTIC LEUKEMIA: ICD-10-CM

## 2022-09-06 LAB
ABO + RH BLD: NORMAL
ALBUMIN SERPL BCP-MCNC: 3.5 G/DL (ref 3.5–5.2)
ALP SERPL-CCNC: 163 U/L (ref 55–135)
ALT SERPL W/O P-5'-P-CCNC: 12 U/L (ref 10–44)
ANION GAP SERPL CALC-SCNC: 13 MMOL/L (ref 8–16)
AST SERPL-CCNC: 11 U/L (ref 10–40)
BASOPHILS # BLD AUTO: 0.02 K/UL (ref 0–0.2)
BASOPHILS NFR BLD: 0.2 % (ref 0–1.9)
BILIRUB SERPL-MCNC: 0.3 MG/DL (ref 0.1–1)
BLD GP AB SCN CELLS X3 SERPL QL: NORMAL
BUN SERPL-MCNC: 8 MG/DL (ref 6–20)
CALCIUM SERPL-MCNC: 9.6 MG/DL (ref 8.7–10.5)
CHLORIDE SERPL-SCNC: 99 MMOL/L (ref 95–110)
CO2 SERPL-SCNC: 22 MMOL/L (ref 23–29)
CREAT SERPL-MCNC: 0.6 MG/DL (ref 0.5–1.4)
DIFFERENTIAL METHOD: ABNORMAL
EOSINOPHIL # BLD AUTO: 0.1 K/UL (ref 0–0.5)
EOSINOPHIL NFR BLD: 0.8 % (ref 0–8)
ERYTHROCYTE [DISTWIDTH] IN BLOOD BY AUTOMATED COUNT: 19.2 % (ref 11.5–14.5)
EST. GFR  (NO RACE VARIABLE): >60 ML/MIN/1.73 M^2
GLUCOSE SERPL-MCNC: 96 MG/DL (ref 70–110)
HCT VFR BLD AUTO: 29.1 % (ref 37–48.5)
HGB BLD-MCNC: 10 G/DL (ref 12–16)
IMM GRANULOCYTES # BLD AUTO: 0.06 K/UL (ref 0–0.04)
IMM GRANULOCYTES NFR BLD AUTO: 0.5 % (ref 0–0.5)
LYMPHOCYTES # BLD AUTO: 2.7 K/UL (ref 1–4.8)
LYMPHOCYTES NFR BLD: 22.4 % (ref 18–48)
MAGNESIUM SERPL-MCNC: 1.5 MG/DL (ref 1.6–2.6)
MCH RBC QN AUTO: 37.6 PG (ref 27–31)
MCHC RBC AUTO-ENTMCNC: 34.4 G/DL (ref 32–36)
MCV RBC AUTO: 109 FL (ref 82–98)
MONOCYTES # BLD AUTO: 0.5 K/UL (ref 0.3–1)
MONOCYTES NFR BLD: 4.6 % (ref 4–15)
NEUTROPHILS # BLD AUTO: 8.5 K/UL (ref 1.8–7.7)
NEUTROPHILS NFR BLD: 71.5 % (ref 38–73)
NRBC BLD-RTO: 0 /100 WBC
PHOSPHATE SERPL-MCNC: 3.4 MG/DL (ref 2.7–4.5)
PLATELET # BLD AUTO: 28 K/UL (ref 150–450)
PMV BLD AUTO: 11.9 FL (ref 9.2–12.9)
POTASSIUM SERPL-SCNC: 3.8 MMOL/L (ref 3.5–5.1)
PROT SERPL-MCNC: 6.6 G/DL (ref 6–8.4)
RBC # BLD AUTO: 2.66 M/UL (ref 4–5.4)
SODIUM SERPL-SCNC: 134 MMOL/L (ref 136–145)
WBC # BLD AUTO: 11.81 K/UL (ref 3.9–12.7)

## 2022-09-06 PROCEDURE — 86901 BLOOD TYPING SEROLOGIC RH(D): CPT | Performed by: NURSE PRACTITIONER

## 2022-09-06 PROCEDURE — 85025 COMPLETE CBC W/AUTO DIFF WBC: CPT | Performed by: NURSE PRACTITIONER

## 2022-09-06 PROCEDURE — 83735 ASSAY OF MAGNESIUM: CPT | Performed by: NURSE PRACTITIONER

## 2022-09-06 PROCEDURE — 80053 COMPREHEN METABOLIC PANEL: CPT | Performed by: NURSE PRACTITIONER

## 2022-09-06 PROCEDURE — 84100 ASSAY OF PHOSPHORUS: CPT | Performed by: NURSE PRACTITIONER

## 2022-09-06 NOTE — TELEPHONE ENCOUNTER
----- Message from Clarence Gonzalez sent at 9/6/2022 12:47 PM CDT -----  Name Of Caller: Mannie        Provider Name: Dayron Jenkins        Does patient feel the need to be seen today? no        Relationship to the Pt?: spouse        Contact Preference?: [phone]        What is the nature of the call?: Patient's  states that he would like to speak with someone in the office regarding some questions that he has regarding his wife's oral chemo treatment

## 2022-09-06 NOTE — TELEPHONE ENCOUNTER
Incoming call from Janeen at [facility] stating Iclusig is covered. Patient's plan will cover Iclusig 30 mg daily quantity 30 tablets. PA approval # [id number]. The approval is good from 2022-2023      Current script written as Iclusig 15 m mg daily Will Message MD to request a new script. Awaiting response.

## 2022-09-06 NOTE — TELEPHONE ENCOUNTER
Spoke with patient  and reviewed medication on hold and reason for the dose being decreased to 30mg. Reviewed appointment times for next week.

## 2022-09-06 NOTE — TELEPHONE ENCOUNTER
Incoming call from Janeen with [employer] [place]. Informed problem was fixed and if [facility] runs Inclusig 30mg for 30 days, claim should go through. Unable to run test claim, call was dropped, could not reach rep with callback for remaining information

## 2022-09-07 ENCOUNTER — DOCUMENTATION ONLY (OUTPATIENT)
Dept: HEMATOLOGY/ONCOLOGY | Facility: CLINIC | Age: 52
End: 2022-09-07
Payer: COMMERCIAL

## 2022-09-07 ENCOUNTER — TELEPHONE (OUTPATIENT)
Dept: HEMATOLOGY/ONCOLOGY | Facility: CLINIC | Age: 52
End: 2022-09-07
Payer: COMMERCIAL

## 2022-09-07 RX ORDER — PONATINIB HYDROCHLORIDE 30 MG/1
30 TABLET, FILM COATED ORAL DAILY
Qty: 30 TABLET | Refills: 0 | Status: SHIPPED | OUTPATIENT
Start: 2022-09-07 | End: 2022-10-07 | Stop reason: SDUPTHER

## 2022-09-07 NOTE — TELEPHONE ENCOUNTER
----- Message from Nano Delgadillo sent at 9/7/2022 12:18 PM CDT -----  Regarding: Pt's  Mr Gonzalez called to speak to the nurse regarding the pt's care and would like a call back today  Patient Advice:    Pt's  Mr Gonzalez called to speak to the nurse regarding the pt's care and would like a call back today    Mr Gonzalez can be reached at [phone]

## 2022-09-07 NOTE — PROGRESS NOTES
completed the patient's request for Lodging at the [facility] for 09/11/2022 to 09/12/2022. Patient and caregiver will come to the [facility] with new test results for COVID-19.

## 2022-09-07 NOTE — TELEPHONE ENCOUNTER
Script for Inclusig 30 mg received. Copay $100. Iclusig copay card also on file. Iclusig copay card brings copay to $0. Routing to initial.

## 2022-09-07 NOTE — TELEPHONE ENCOUNTER
1345: Spoke with  regarding cancelling the lab appt for tomorrow. He reports she had labs done yesterday and are repeating labs again on the 12th before the visit with Dr. Jenkins. Informed  they may cancel appt tomorrow. The  said she is c/o some urinary frequency and hesitancy. He said she has not c/o flank pain. Her last temperature check last night was 99.7.     Informed Dr. Jenkins of all of the above.    Dr. Jenkins requesting pt contact Dr. Garcia to arrange for urine sample.    1402: Notified  who verbalized understanding and will contact Dr. Garcia's office.    CBC reviewed by Dr. Jenkins regarding ANC. Proceed with urine sample per Dr. Garcia's office.

## 2022-09-08 ENCOUNTER — SPECIALTY PHARMACY (OUTPATIENT)
Dept: PHARMACY | Facility: CLINIC | Age: 52
End: 2022-09-08
Payer: COMMERCIAL

## 2022-09-08 NOTE — TELEPHONE ENCOUNTER
Specialty Pharmacy - Refill Coordination  Specialty Pharmacy - Clinical Intervention    Reviewed dose decrease with Mr. Gonzalez. Patient will start Iclusig 30 mg daily. She will hold until instructed to start by Dr. Jenkins.       Specialty Medication Orders Linked to Encounter      Flowsheet Row Most Recent Value   Medication #1 PONATinib (ICLUSIG) 30 mg Tab (Order#[id number], Rx#[id number])            Refill Questions - Documented Responses      Flowsheet Row Most Recent Value   Refill Screening Questions    Changes to allergies? No   Changes to medications? No   New conditions since last clinic visit? No   Unplanned office visit, urgent care, ED, or hospital admission in the last 4 weeks? Yes  [Patient has been having trouble urinating. She went to the ER on yesterday and will follow up with a urologist.]   How does patient/caregiver feel medication is working? Good   Financial problems or insurance changes? No   How many doses of your specialty medications were missed in the last 4 weeks? 0   Would patient like to speak to a pharmacist? No   When does the patient need to receive the medication? 09/14/22   Refill Delivery Questions    How will the patient receive the medication? Mail   When does the patient need to receive the medication? 09/14/22   Shipping Address Home   Address in [facility] confirmed and updated if neccessary? Yes   Expected Copay ($) 0   Is the patient able to afford the medication copay? Yes   Payment Method zero copay   Days supply of Refill 30   Supplies needed? No supplies needed   Refill activity completed? Yes   Refill activity plan Refill scheduled   Shipment/Pickup Date: 09/14/22            Current Outpatient Medications   Medication Sig    acyclovir (ZOVIRAX) 400 MG tablet Take 1 tablet (400 mg total) by mouth 2 (two) times daily.    allopurinoL (ZYLOPRIM) 300 MG tablet Take 1 tablet (300 mg total) by mouth once daily.    artificial tears (ISOPTO TEARS) 0.5 % ophthalmic solution  Place 1 drop into both eyes 4 (four) times daily as needed.    atorvastatin (LIPITOR) 80 MG tablet Take 80 mg by mouth once daily.    blood sugar diagnostic Strp SMARTSIG:Via Meter 1 to 2 Times Daily    busPIRone (BUSPAR) 10 MG tablet Take 10 mg by mouth 2 (two) times daily.    dapagliflozin (FARXIGA) 10 mg tablet Take 10 mg by mouth once daily.    diazePAM (VALIUM) 10 MG Tab Take 10 mg by mouth 2 (two) times daily as needed.    diltiaZEM (CARDIZEM) 90 MG tablet Take 1 tablet (90 mg total) by mouth every 6 (six) hours.    duke's soln (benadryl 30 mL, mylanta 30 mL, LIDOcaine 30 mL, nystatin 30 mL) 120mL Take 10 mLs by mouth 4 (four) times daily as needed (mouth pain).    fenofibrate 160 MG Tab Take 160 mg by mouth once daily.    fluconazole (DIFLUCAN) 200 MG Tab Take 2 tablets (400 mg total) by mouth once daily.    gabapentin (NEURONTIN) 300 MG capsule Take 1 capsule (300 mg total) by mouth 3 (three) times daily.    HYDROmorphone (DILAUDID) 2 MG tablet Take 1 tablet (2 mg total) by mouth every 3 (three) hours as needed for Pain.    hydrOXYchloroQUINE (PLAQUENIL) 200 mg tablet Take 1 tablet (200 mg total) by mouth once daily.    hydrOXYzine pamoate (VISTARIL) 50 MG Cap Take 50 mg by mouth 2 (two) times daily as needed.    levoFLOXacin (LEVAQUIN) 500 MG tablet Take 1 tablet (500 mg total) by mouth once daily.    LIDOcaine (LIDODERM) 5 % Place 1 patch onto the skin once daily. Remove & Discard patch within 12 hours or as directed by MD    losartan (COZAAR) 25 MG tablet Take 25 mg by mouth once daily.    magnesium oxide (MAG-OX) 400 mg (241.3 mg magnesium) tablet Take 1 tablet by mouth once daily.    metFORMIN (GLUCOPHAGE-XR) 500 MG ER 24hr tablet Take 1,000 mg by mouth 2 (two) times daily.    naloxone (NARCAN) 4 mg/actuation Spry 4mg by nasal route as needed for opioid overdose; may repeat every 2-3 minutes in alternating nostrils until medical help arrives. Call 911    omeprazole (PRILOSEC) 40 MG capsule Take 40 mg by  mouth once daily.    ondansetron (ZOFRAN-ODT) 8 MG TbDL Take 1 tablet (8 mg total) by mouth every 8 (eight) hours as needed (in case of chemo induced nausea).    ONETOUCH VERIO TEST STRIPS Strp SMARTSIG:Via Meter 1 to 2 Times Daily    OXcarbazepine (TRILEPTAL) 600 MG Tab Take 1,200 mg by mouth 2 (two) times daily.    polyethylene glycol (GLYCOLAX) 17 gram/dose powder Dissolve one capful (17 g) in liquid and take by mouth daily as needed (constipation).    PONATinib (ICLUSIG) 30 mg Tab Take 30 mg by mouth once daily.    potassium chloride (K-TAB) 20 mEq Take 20 mEq by mouth 2 (two) times daily.    prochlorperazine (COMPAZINE) 10 MG tablet Take 1 tablet (10 mg total) by mouth every 6 (six) hours as needed for Nausea.    QUEtiapine (SEROQUEL) 200 MG Tab Take 200 mg by mouth every evening.    rivaroxaban (XARELTO) 20 mg Tab Take 1 tablet (20 mg total) by mouth daily with dinner or evening meal. Hold until instructed to resume by provider due to low platelet count.    senna-docusate 8.6-50 mg (PERICOLACE) 8.6-50 mg per tablet Take 1 tablet by mouth 2 (two) times daily.    sulfamethoxazole-trimethoprim 800-160mg (BACTRIM DS) 800-160 mg Tab Take 1 tablet by mouth every Mon, Wed, Fri.    sumatriptan (IMITREX) 50 MG tablet Take 1 tablet (50 mg total) by mouth daily as needed (headache).    TRINTELLIX 20 mg Tab Take 1 tablet by mouth once daily.    ursodioL (ACTIGALL) 300 mg capsule Take 1 capsule (300 mg total) by mouth 3 (three) times daily.   Last reviewed on 8/31/2022 11:09 AM by Dayron Jenkins MD    Review of patient's allergies indicates:   Allergen Reactions    Levetiracetam      Other reaction(s): Unknown  Other reaction(s): Unknown  Other reaction(s): Unknown    Last reviewed on  9/6/2022 5:55 PM by Rose Marie Ledbetter      Tasks added this encounter   No tasks added.   Tasks due within next 3 months   11/27/2022 - Clinical - Follow Up Assesement (180 day)  9/7/2022 - Refill Call (Auto Added)     KAMILA ULLOA,  PharmD  [facility]  [address]  [facility] [address]  Phone: [phone]  Fax: [phone]

## 2022-09-12 ENCOUNTER — INFUSION (OUTPATIENT)
Dept: INFUSION THERAPY | Facility: HOSPITAL | Age: 52
End: 2022-09-12
Payer: COMMERCIAL

## 2022-09-12 ENCOUNTER — TELEPHONE (OUTPATIENT)
Dept: HEMATOLOGY/ONCOLOGY | Facility: CLINIC | Age: 52
End: 2022-09-12
Payer: COMMERCIAL

## 2022-09-12 ENCOUNTER — OFFICE VISIT (OUTPATIENT)
Dept: HEMATOLOGY/ONCOLOGY | Facility: CLINIC | Age: 52
End: 2022-09-12
Payer: COMMERCIAL

## 2022-09-12 ENCOUNTER — LAB VISIT (OUTPATIENT)
Dept: LAB | Facility: HOSPITAL | Age: 52
End: 2022-09-12
Payer: COMMERCIAL

## 2022-09-12 VITALS
OXYGEN SATURATION: 98 % | SYSTOLIC BLOOD PRESSURE: 101 MMHG | RESPIRATION RATE: 17 BRPM | HEART RATE: 107 BPM | TEMPERATURE: 98 F | DIASTOLIC BLOOD PRESSURE: 57 MMHG | WEIGHT: 131.75 LBS | HEIGHT: 64 IN | BODY MASS INDEX: 22.49 KG/M2

## 2022-09-12 DIAGNOSIS — D64.81 ANEMIA ASSOCIATED WITH CHEMOTHERAPY: ICD-10-CM

## 2022-09-12 DIAGNOSIS — C91.00 B-CELL ACUTE LYMPHOBLASTIC LEUKEMIA: Primary | ICD-10-CM

## 2022-09-12 DIAGNOSIS — C91.00 B-CELL ACUTE LYMPHOBLASTIC LEUKEMIA: ICD-10-CM

## 2022-09-12 DIAGNOSIS — D69.6 THROMBOCYTOPENIA: ICD-10-CM

## 2022-09-12 DIAGNOSIS — T45.1X5A ANEMIA ASSOCIATED WITH CHEMOTHERAPY: ICD-10-CM

## 2022-09-12 LAB
ABO + RH BLD: NORMAL
ALBUMIN SERPL BCP-MCNC: 3.7 G/DL (ref 3.5–5.2)
ALP SERPL-CCNC: 130 U/L (ref 55–135)
ALT SERPL W/O P-5'-P-CCNC: 8 U/L (ref 10–44)
ANION GAP SERPL CALC-SCNC: 12 MMOL/L (ref 8–16)
AST SERPL-CCNC: 10 U/L (ref 10–40)
BASOPHILS # BLD AUTO: 0.01 K/UL (ref 0–0.2)
BASOPHILS NFR BLD: 0.2 % (ref 0–1.9)
BILIRUB SERPL-MCNC: 0.3 MG/DL (ref 0.1–1)
BLD GP AB SCN CELLS X3 SERPL QL: NORMAL
BUN SERPL-MCNC: 11 MG/DL (ref 6–20)
CALCIUM SERPL-MCNC: 9.6 MG/DL (ref 8.7–10.5)
CHLORIDE SERPL-SCNC: 103 MMOL/L (ref 95–110)
CO2 SERPL-SCNC: 22 MMOL/L (ref 23–29)
CREAT SERPL-MCNC: 0.7 MG/DL (ref 0.5–1.4)
DIFFERENTIAL METHOD: ABNORMAL
EOSINOPHIL # BLD AUTO: 0.1 K/UL (ref 0–0.5)
EOSINOPHIL NFR BLD: 1.1 % (ref 0–8)
ERYTHROCYTE [DISTWIDTH] IN BLOOD BY AUTOMATED COUNT: 19.4 % (ref 11.5–14.5)
EST. GFR  (NO RACE VARIABLE): >60 ML/MIN/1.73 M^2
GLUCOSE SERPL-MCNC: 120 MG/DL (ref 70–110)
HCT VFR BLD AUTO: 28.4 % (ref 37–48.5)
HGB BLD-MCNC: 9.8 G/DL (ref 12–16)
IMM GRANULOCYTES # BLD AUTO: 0.02 K/UL (ref 0–0.04)
IMM GRANULOCYTES NFR BLD AUTO: 0.4 % (ref 0–0.5)
LDH SERPL L TO P-CCNC: 164 U/L (ref 110–260)
LYMPHOCYTES # BLD AUTO: 1.8 K/UL (ref 1–4.8)
LYMPHOCYTES NFR BLD: 38.8 % (ref 18–48)
MAGNESIUM SERPL-MCNC: 1.6 MG/DL (ref 1.6–2.6)
MCH RBC QN AUTO: 38.3 PG (ref 27–31)
MCHC RBC AUTO-ENTMCNC: 34.5 G/DL (ref 32–36)
MCV RBC AUTO: 111 FL (ref 82–98)
MONOCYTES # BLD AUTO: 0.4 K/UL (ref 0.3–1)
MONOCYTES NFR BLD: 7.5 % (ref 4–15)
NEUTROPHILS # BLD AUTO: 2.4 K/UL (ref 1.8–7.7)
NEUTROPHILS NFR BLD: 52 % (ref 38–73)
NRBC BLD-RTO: 1 /100 WBC
PHOSPHATE SERPL-MCNC: 4.1 MG/DL (ref 2.7–4.5)
PLATELET # BLD AUTO: 20 K/UL (ref 150–450)
PMV BLD AUTO: 10 FL (ref 9.2–12.9)
POTASSIUM SERPL-SCNC: 3.9 MMOL/L (ref 3.5–5.1)
PROT SERPL-MCNC: 6.5 G/DL (ref 6–8.4)
RBC # BLD AUTO: 2.56 M/UL (ref 4–5.4)
SODIUM SERPL-SCNC: 137 MMOL/L (ref 136–145)
WBC # BLD AUTO: 4.64 K/UL (ref 3.9–12.7)

## 2022-09-12 PROCEDURE — 80053 COMPREHEN METABOLIC PANEL: CPT | Performed by: INTERNAL MEDICINE

## 2022-09-12 PROCEDURE — 1160F RVW MEDS BY RX/DR IN RCRD: CPT | Mod: CPTII,S$GLB,, | Performed by: INTERNAL MEDICINE

## 2022-09-12 PROCEDURE — A4216 STERILE WATER/SALINE, 10 ML: HCPCS | Performed by: INTERNAL MEDICINE

## 2022-09-12 PROCEDURE — 99215 PR OFFICE/OUTPT VISIT, EST, LEVL V, 40-54 MIN: ICD-10-PCS | Mod: S$GLB,,, | Performed by: INTERNAL MEDICINE

## 2022-09-12 PROCEDURE — 3044F PR MOST RECENT HEMOGLOBIN A1C LEVEL <7.0%: ICD-10-PCS | Mod: CPTII,S$GLB,, | Performed by: INTERNAL MEDICINE

## 2022-09-12 PROCEDURE — 4010F PR ACE/ARB THEARPY RXD/TAKEN: ICD-10-PCS | Mod: CPTII,S$GLB,, | Performed by: INTERNAL MEDICINE

## 2022-09-12 PROCEDURE — 3008F PR BODY MASS INDEX (BMI) DOCUMENTED: ICD-10-PCS | Mod: CPTII,S$GLB,, | Performed by: INTERNAL MEDICINE

## 2022-09-12 PROCEDURE — 3074F SYST BP LT 130 MM HG: CPT | Mod: CPTII,S$GLB,, | Performed by: INTERNAL MEDICINE

## 2022-09-12 PROCEDURE — 99215 OFFICE O/P EST HI 40 MIN: CPT | Mod: S$GLB,,, | Performed by: INTERNAL MEDICINE

## 2022-09-12 PROCEDURE — 86850 RBC ANTIBODY SCREEN: CPT | Performed by: INTERNAL MEDICINE

## 2022-09-12 PROCEDURE — 81207 BCR/ABL1 GENE MINOR BP: CPT | Performed by: INTERNAL MEDICINE

## 2022-09-12 PROCEDURE — 84100 ASSAY OF PHOSPHORUS: CPT | Performed by: INTERNAL MEDICINE

## 2022-09-12 PROCEDURE — 3074F PR MOST RECENT SYSTOLIC BLOOD PRESSURE < 130 MM HG: ICD-10-PCS | Mod: CPTII,S$GLB,, | Performed by: INTERNAL MEDICINE

## 2022-09-12 PROCEDURE — 1159F PR MEDICATION LIST DOCUMENTED IN MEDICAL RECORD: ICD-10-PCS | Mod: CPTII,S$GLB,, | Performed by: INTERNAL MEDICINE

## 2022-09-12 PROCEDURE — 3078F PR MOST RECENT DIASTOLIC BLOOD PRESSURE < 80 MM HG: ICD-10-PCS | Mod: CPTII,S$GLB,, | Performed by: INTERNAL MEDICINE

## 2022-09-12 PROCEDURE — 99999 PR PBB SHADOW E&M-EST. PATIENT-LVL III: CPT | Mod: PBBFAC,,, | Performed by: INTERNAL MEDICINE

## 2022-09-12 PROCEDURE — 83735 ASSAY OF MAGNESIUM: CPT | Performed by: INTERNAL MEDICINE

## 2022-09-12 PROCEDURE — 99999 PR PBB SHADOW E&M-EST. PATIENT-LVL III: ICD-10-PCS | Mod: PBBFAC,,, | Performed by: INTERNAL MEDICINE

## 2022-09-12 PROCEDURE — 25000003 PHARM REV CODE 250: Performed by: INTERNAL MEDICINE

## 2022-09-12 PROCEDURE — 4010F ACE/ARB THERAPY RXD/TAKEN: CPT | Mod: CPTII,S$GLB,, | Performed by: INTERNAL MEDICINE

## 2022-09-12 PROCEDURE — 1160F PR REVIEW ALL MEDS BY PRESCRIBER/CLIN PHARMACIST DOCUMENTED: ICD-10-PCS | Mod: CPTII,S$GLB,, | Performed by: INTERNAL MEDICINE

## 2022-09-12 PROCEDURE — 3008F BODY MASS INDEX DOCD: CPT | Mod: CPTII,S$GLB,, | Performed by: INTERNAL MEDICINE

## 2022-09-12 PROCEDURE — 3044F HG A1C LEVEL LT 7.0%: CPT | Mod: CPTII,S$GLB,, | Performed by: INTERNAL MEDICINE

## 2022-09-12 PROCEDURE — 85025 COMPLETE CBC W/AUTO DIFF WBC: CPT | Performed by: INTERNAL MEDICINE

## 2022-09-12 PROCEDURE — 96523 IRRIG DRUG DELIVERY DEVICE: CPT

## 2022-09-12 PROCEDURE — 36415 COLL VENOUS BLD VENIPUNCTURE: CPT | Performed by: INTERNAL MEDICINE

## 2022-09-12 PROCEDURE — 83615 LACTATE (LD) (LDH) ENZYME: CPT | Performed by: INTERNAL MEDICINE

## 2022-09-12 PROCEDURE — 3078F DIAST BP <80 MM HG: CPT | Mod: CPTII,S$GLB,, | Performed by: INTERNAL MEDICINE

## 2022-09-12 PROCEDURE — 1159F MED LIST DOCD IN RCRD: CPT | Mod: CPTII,S$GLB,, | Performed by: INTERNAL MEDICINE

## 2022-09-12 PROCEDURE — 63600175 PHARM REV CODE 636 W HCPCS: Performed by: INTERNAL MEDICINE

## 2022-09-12 RX ORDER — HEPARIN 100 UNIT/ML
500 SYRINGE INTRAVENOUS
OUTPATIENT
Start: 2022-09-12

## 2022-09-12 RX ORDER — SODIUM CHLORIDE 0.9 % (FLUSH) 0.9 %
10 SYRINGE (ML) INJECTION
Status: DISCONTINUED | OUTPATIENT
Start: 2022-09-12 | End: 2022-09-12 | Stop reason: HOSPADM

## 2022-09-12 RX ORDER — HEPARIN 100 UNIT/ML
500 SYRINGE INTRAVENOUS
Status: DISCONTINUED | OUTPATIENT
Start: 2022-09-12 | End: 2022-09-12 | Stop reason: HOSPADM

## 2022-09-12 RX ORDER — SODIUM CHLORIDE 0.9 % (FLUSH) 0.9 %
10 SYRINGE (ML) INJECTION
OUTPATIENT
Start: 2022-09-12

## 2022-09-12 RX ADMIN — HEPARIN SODIUM (PORCINE) LOCK FLUSH IV SOLN 100 UNIT/ML 500 UNITS: 100 SOLUTION at 09:09

## 2022-09-12 RX ADMIN — Medication 10 ML: at 09:09

## 2022-09-12 NOTE — PROGRESS NOTES
Route Chart for Scheduling    BMT Chart Routing      Follow up with physician 1 month.   Follow up with DANUTA    Infusion scheduling note Please cancel all scheduled infusions. We are stopping infusion therapy for now.   Injection scheduling note    Labs CBC, CMP, magnesium, phosphorus, LDH and uric acid   Lab interval:  Please also include type and screen. Labs at time of follow-up visit.   Imaging    Pharmacy appointment    Other referrals             Anesthesia Type: 1% lidocaine with epinephrine 1:100,000 buffered with 8.4% sodium bicarbonate (1:9 ratio)

## 2022-09-12 NOTE — NURSING
Pt tolerated port flush today. Pt chemo not given today per Shanta, RN per MD Jenkins per pt is switching to PO Chemo. NAD. Port flushed + blood return present, flushed. Hep lock and deaccesed. declined AVS. Uses my [facility]. Discharged home. Pt stayed in w/c with  as escort.

## 2022-09-14 ENCOUNTER — TELEPHONE (OUTPATIENT)
Dept: HEMATOLOGY/ONCOLOGY | Facility: CLINIC | Age: 52
End: 2022-09-14
Payer: COMMERCIAL

## 2022-09-14 LAB
BCR/ABL RESULT, P190, QUANT, BLD: NORMAL
PATH REPORT.FINAL DX SPEC: NORMAL
SPECIMEN TYPE, P190, QUANT, BLD: NORMAL

## 2022-09-14 NOTE — TELEPHONE ENCOUNTER
"----- Message from Edgar Birmingham sent at 9/14/2022 11:35 AM CDT -----  Consult/Advisory:          Name Of Caller: Mannie Gonzalez (Spouse)       Contact Preference?: [phone] (Home Phone)      Provider Name: Gregory      Does patient feel the need to be seen today? No      What is the nature of the call?: Calling to speak w/ nurse. 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 Inquiring about ocular neurology          Additional Notes:  "Thank you for all that you do for our patients"      "

## 2022-09-14 NOTE — TELEPHONE ENCOUNTER
1141: Pt's  reports pt's vision in her left eye went foggy then dark then black last night. Pt's vision has not returned since. Pt saw eye doctor this morning, Dr. Jimmy Buchanan, who said he didn't see anything wrong structurally with the eye.  reports Dr. Buchanan said he wants the pt to see ocular neurology ASAP.  Pt reports HA to forehead and into sinuses that has been ongoing for the past several days. She does note weakness and reports last feeling dizzy 2 days ago. She reports eating and drinking well. She denies CP and SOB.    Discussed all of the above with GENIA Jaime and Dr. Dayron Jenkins who both advised ED.    1204: Notified  of Dr. Jenkins's advice to report to nearest ED now for further evaluation.  verbalized understanding.

## 2022-09-15 ENCOUNTER — TELEPHONE (OUTPATIENT)
Dept: HEMATOLOGY/ONCOLOGY | Facility: CLINIC | Age: 52
End: 2022-09-15
Payer: COMMERCIAL

## 2022-09-15 NOTE — TELEPHONE ENCOUNTER
0846: Pt's  reports pt went to ED yesterday evening. Pt underwent CT with and without contrast with lab work.  Pt's  denies CT showed any blockages or bleeds. Pt's  inquiring next steps.     Discussed with Dr. Jenkins who is reaching out to neurology to assist with next steps. Advised to wait to discuss with family until further instruction per Dr. Jenkins. Awaiting faxed records from [facility].     Discussed no fax results as of yet with Dr. Jenkins. Per Dr. Jenkins, will provide  with an update.    1438: notified  awaiting further steps with neurology. Informed  also awaiting faxed records.  reports pt's right eye is feeling strained at the end of the day.  also reports pt is now complaining of a constant ache in her eye today when  called pt earlier today to check her on.  reports pt notes left eye pain when looking in her periphery to the left.  does report that that has been present since her blindness began.    Discussed all of the above with Dr. Jenkins who is advising pt reports to ED here now for admit.     144: Notified  who states pt may not agreeable today and may prefer direct admit tomorrow. Per Dr. Jenkins, he still recommends ED now but will arrange for direct admit tomorrow if they are unable to come today. Notified  who will contact wife.    1456:  states wife is not agreeable to ED today. Pt will proceed with direct admit tomorrow. Advised  that pt report to ED immediately for any visual changes to right eye, headache, new onset weakness, CP, severe SOB, or any other concerning symptoms.  verbalized understanding.     1531: informed  that pt is advised to hold ponatinib at this time per Dr. Dayron Jenkins.  verbalized understanding.

## 2022-09-15 NOTE — TELEPHONE ENCOUNTER
----- Message from Abiola Barcenas sent at 9/15/2022  8:14 AM CDT -----  Regarding: QUESTIONS AND CONCERNS  Contact: [phone]  Mannie stallings  is calling. Patient lost her sight in her left eye and would like to speak to the office. Please call patient at [phone]    Thank You

## 2022-09-15 NOTE — PROGRESS NOTES
HEMATOLOGIC MALIGNANCIES PROGRESS NOTE    IDENTIFYING STATEMENT   Deepti Davison) is a 51 y.o. female with a  of 1970 from [place], [place] with the diagnosis of B-ALL.     ONCOLOGY HISTORY:    1. Precursor B-cell acute lymphoblastic leukemia (Ph+)   A. 2021: Transferred to [facility] [place] outside hospital for evaluation for acute leukemia   B. 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    C. 2021 - 2021: Vincristine + imatinib induction; hospital course was complicated by acute hypoxemic respiratory failure which resolved with diuresis and treatment of ALL   D. 1/3/2022: Bone marrow biopsy after induction shows normocellular marrow (60%) with no definite evidence of B-ALL; bcr-abl p190 fusion detected at 0.02% of total ABL1; FISH normal; MRD testing not sent   E. 2022: Begin consolidation therapy with [id number] protocol   F. 3/16/2022: BCR-ABL p190 transcript on peripheral blood negative   G. 2022: BCR-ABL p190 transcript on peripheral blood 0.63%   H. 2022: Bone marrow biopsy shows 50% cellular marrow with relapsed B-ALL; bone marrow aspirate shows 23.5% blasts; cytogenetics 46,XX,-9,t(9;22)(q34;q11.2),+isaias(22)t(9;22)[5] /46,XX[15]; FISH shows 9.5% nuclei with bcr/abl1 fusion; bcr/abl1 quantitative PCR shows 47% of total ABL1    2. History of TIA  3. Seizure disorder  4. Anxiety and depression  5. Chronic obstructive pulmonary disease  6. Paroxysmal atrial fibrillation  7. Implantable loop recorder and pacemaker in place  8. Peripheral artery disease  9. Diabetes mellitus, type 2  10. Gastroesophageal reflux disease  11. Gastroparesis  12. Rheumatoid arthritis  13. Osteoporosis  14. History of  tobacco use     INTERVAL HISTORY:      Ms. Gonzalez presents today for follow-up of relapsed Ph+ B-ALL. She has abstained from her therapy over the past two weeks. She reports feeling very well at this time.     Past Medical History, Past Social History and Past Family History have been reviewed and are unchanged except as noted in the interval history.    MEDICATIONS:     Current Outpatient Medications on File Prior to Visit   Medication Sig Dispense Refill    acyclovir (ZOVIRAX) 400 MG tablet Take 1 tablet (400 mg total) by mouth 2 (two) times daily. 60 tablet 11    allopurinoL (ZYLOPRIM) 300 MG tablet Take 1 tablet (300 mg total) by mouth once daily. 90 tablet 3    artificial tears (ISOPTO TEARS) 0.5 % ophthalmic solution Place 1 drop into both eyes 4 (four) times daily as needed.      atorvastatin (LIPITOR) 80 MG tablet Take 80 mg by mouth once daily.      blood sugar diagnostic Strp SMARTSIG:Via Meter 1 to 2 Times Daily      busPIRone (BUSPAR) 10 MG tablet Take 10 mg by mouth 2 (two) times daily.      dapagliflozin (FARXIGA) 10 mg tablet Take 10 mg by mouth once daily.      diazePAM (VALIUM) 10 MG Tab Take 10 mg by mouth 2 (two) times daily as needed.      diltiaZEM (CARDIZEM) 90 MG tablet Take 1 tablet (90 mg total) by mouth every 6 (six) hours. 120 tablet 11    duke's soln (benadryl 30 mL, mylanta 30 mL, LIDOcaine 30 mL, nystatin 30 mL) 120mL Take 10 mLs by mouth 4 (four) times daily as needed (mouth pain). 120 mL 0    fenofibrate 160 MG Tab Take 160 mg by mouth once daily.      gabapentin (NEURONTIN) 300 MG capsule Take 1 capsule (300 mg total) by mouth 3 (three) times daily. 90 capsule 11    hydrOXYchloroQUINE (PLAQUENIL) 200 mg tablet Take 1 tablet (200 mg total) by mouth once daily.      hydrOXYzine pamoate (VISTARIL) 50 MG Cap Take 50 mg by mouth 2 (two) times daily as needed.      LIDOcaine (LIDODERM) 5 % Place 1 patch onto the skin once daily. Remove & Discard patch within 12 hours or as directed by MD Gabriel  patch 2    losartan (COZAAR) 25 MG tablet Take 25 mg by mouth once daily.      magnesium oxide (MAG-OX) 400 mg (241.3 mg magnesium) tablet Take 1 tablet by mouth once daily.      metFORMIN (GLUCOPHAGE-XR) 500 MG ER 24hr tablet Take 1,000 mg by mouth 2 (two) times daily.      naloxone (NARCAN) 4 mg/actuation Spry 4mg by nasal route as needed for opioid overdose; may repeat every 2-3 minutes in alternating nostrils until medical help arrives. Call 911 1 each 11    omeprazole (PRILOSEC) 40 MG capsule Take 40 mg by mouth once daily.      ondansetron (ZOFRAN-ODT) 8 MG TbDL Take 1 tablet (8 mg total) by mouth every 8 (eight) hours as needed (in case of chemo induced nausea). 30 tablet 1    ONETOUCH VERIO TEST STRIPS Strp SMARTSIG:Via Meter 1 to 2 Times Daily      OXcarbazepine (TRILEPTAL) 600 MG Tab Take 1,200 mg by mouth 2 (two) times daily.      polyethylene glycol (GLYCOLAX) 17 gram/dose powder Dissolve one capful (17 g) in liquid and take by mouth daily as needed (constipation). 510 g 0    PONATinib (ICLUSIG) 30 mg Tab Take 1 tablet (30 mg) by mouth once daily. 30 tablet 0    potassium chloride (K-TAB) 20 mEq Take 20 mEq by mouth 2 (two) times daily.      prochlorperazine (COMPAZINE) 10 MG tablet Take 1 tablet (10 mg total) by mouth every 6 (six) hours as needed for Nausea. 30 tablet 2    QUEtiapine (SEROQUEL) 200 MG Tab Take 200 mg by mouth every evening.      rivaroxaban (XARELTO) 20 mg Tab Take 1 tablet (20 mg total) by mouth daily with dinner or evening meal. Hold until instructed to resume by provider due to low platelet count.      senna-docusate 8.6-50 mg (PERICOLACE) 8.6-50 mg per tablet Take 1 tablet by mouth 2 (two) times daily. 60 tablet 3    sumatriptan (IMITREX) 50 MG tablet Take 1 tablet (50 mg total) by mouth daily as needed (headache). 30 tablet 2    TRINTELLIX 20 mg Tab Take 1 tablet by mouth once daily.      ursodioL (ACTIGALL) 300 mg capsule Take 1 capsule (300 mg total) by mouth 3 (three) times  "daily. 90 capsule 11     No current facility-administered medications on file prior to visit.       ALLERGIES:   Review of patient's allergies indicates:   Allergen Reactions    Levetiracetam      Other reaction(s): Unknown  Other reaction(s): Unknown  Other reaction(s): Unknown        ROS:       Review of Systems   Constitutional:  Positive for fatigue (improved significantly). Negative for diaphoresis, fever and unexpected weight change.   HENT:   Negative for lump/mass and sore throat.    Eyes:  Negative for icterus.   Respiratory:  Negative for cough and shortness of breath.    Cardiovascular:  Negative for palpitations.   Gastrointestinal:  Negative for abdominal distention, abdominal pain, constipation, diarrhea, nausea and vomiting.   Genitourinary:  Negative for difficulty urinating, dysuria and frequency.    Musculoskeletal:  Positive for back pain (chronic). Negative for arthralgias, gait problem and myalgias.   Skin:  Negative for rash.   Neurological:  Negative for dizziness, gait problem and headaches.   Hematological:  Negative for adenopathy. Bruises/bleeds easily.   Psychiatric/Behavioral:  The patient is not nervous/anxious.      PHYSICAL EXAM:  Vitals:    09/12/22 0839   BP: (!) 101/57   Pulse: 107   Resp: 17   Temp: 98.4 °F (36.9 °C)   SpO2: 98%   Weight: 59.8 kg (131 lb 11.6 oz)   Height: 5' 4" (1.626 m)   PainSc: 0-No pain       KARNOFSKY PERFORMANCE STATUS 70%  ECOG 1    Physical Exam  Constitutional:       General: She is not in acute distress.     Appearance: Normal appearance.   HENT:      Head: Normocephalic and atraumatic.      Right Ear: External ear normal.      Left Ear: External ear normal.      Nose: Nose normal.   Cardiovascular:      Rate and Rhythm: Normal rate.   Pulmonary:      Effort: Pulmonary effort is normal.   Abdominal:      General: Abdomen is flat. There is no distension.      Palpations: Abdomen is soft. There is no mass.      Tenderness: There is no abdominal " tenderness. There is no guarding or rebound.   Musculoskeletal:         General: Normal range of motion.      Cervical back: Normal range of motion and neck supple.   Skin:     Findings: No rash.   Neurological:      General: No focal deficit present.      Mental Status: She is alert and oriented to person, place, and time.   Psychiatric:         Mood and Affect: Mood normal.         Thought Content: Thought content normal.        LAB:       Results for orders placed or performed in visit on 09/12/22   CBC Auto Differential   Result Value Ref Range    WBC 4.64 3.90 - 12.70 K/uL    RBC 2.56 (L) 4.00 - 5.40 M/uL    Hemoglobin 9.8 (L) 12.0 - 16.0 g/dL    Hematocrit 28.4 (L) 37.0 - 48.5 %     (H) 82 - 98 fL    MCH 38.3 (H) 27.0 - 31.0 pg    MCHC 34.5 32.0 - 36.0 g/dL    RDW 19.4 (H) 11.5 - 14.5 %    Platelets 20 (LL) 150 - 450 K/uL    MPV 10.0 9.2 - 12.9 fL    Immature Granulocytes 0.4 0.0 - 0.5 %    Gran # (ANC) 2.4 1.8 - 7.7 K/uL    Immature Grans (Abs) 0.02 0.00 - 0.04 K/uL    Lymph # 1.8 1.0 - 4.8 K/uL    Mono # 0.4 0.3 - 1.0 K/uL    Eos # 0.1 0.0 - 0.5 K/uL    Baso # 0.01 0.00 - 0.20 K/uL    nRBC 1 (A) 0 /100 WBC    Gran % 52.0 38.0 - 73.0 %    Lymph % 38.8 18.0 - 48.0 %    Mono % 7.5 4.0 - 15.0 %    Eosinophil % 1.1 0.0 - 8.0 %    Basophil % 0.2 0.0 - 1.9 %    Differential Method Automated    Comprehensive Metabolic Panel   Result Value Ref Range    Sodium 137 136 - 145 mmol/L    Potassium 3.9 3.5 - 5.1 mmol/L    Chloride 103 95 - 110 mmol/L    CO2 22 (L) 23 - 29 mmol/L    Glucose 120 (H) 70 - 110 mg/dL    BUN 11 6 - 20 mg/dL    Creatinine 0.7 0.5 - 1.4 mg/dL    Calcium 9.6 8.7 - 10.5 mg/dL    Total Protein 6.5 6.0 - 8.4 g/dL    Albumin 3.7 3.5 - 5.2 g/dL    Total Bilirubin 0.3 0.1 - 1.0 mg/dL    Alkaline Phosphatase 130 55 - 135 U/L    AST 10 10 - 40 U/L    ALT 8 (L) 10 - 44 U/L    Anion Gap 12 8 - 16 mmol/L    eGFR >60.0 >60 mL/min/1.73 m^2   Magnesium   Result Value Ref Range    Magnesium 1.6 1.6 - 2.6  mg/dL   PHOSPHORUS   Result Value Ref Range    Phosphorus 4.1 2.7 - 4.5 mg/dL   BCR/ABL, p190, Quant, Monitor, blood   Result Value Ref Range    Specimen Type, p190, Quant, bld blood     BCR/ABL Result, P190, Quant, Bld see interpretation     Final Diagnosis, p190, Quant, bld SEE BELOW    Lactate Dehydrogenase   Result Value Ref Range     110 - 260 U/L   Type & Screen   Result Value Ref Range    Group & Rh B POS     Indirect Deepika NEG        PROBLEMS ASSESSED THIS VISIT:    1. B-cell acute lymphoblastic leukemia    2. Anemia associated with chemotherapy    3. 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    We have been treating her with a combination of inotuzumab ozogamicin and ponatinib. She currently has evidence of a major molecular response with bcr-abl transcript of 0.02%. Course has been complicated by severe cytopenias. She has been holding therapy for two weeks. Her thrombocytopenia remains severe. These cytopenias are due to therapy. We will therefore restart ponatinib at 30 mg PO daily and hold further inotuzumab (may be reconsidered should she relapse).     She met with Dr. Armendariz regarding allogeneic stem cell transplant. Risk is not expected to exceed benefit. She is therefore not a candidate for this procedure.     Cytopenias   Improving. She has moderate anemia. Thrombocytopenia remains severe. Continue to monitor and adjust therapy as above.     Follow-up  Weekly labs locally  Return to clinic in 1 month    Dayron Jenkins MD  Hematology and Stem Cell Transplant

## 2022-09-16 ENCOUNTER — HOSPITAL ENCOUNTER (INPATIENT)
Facility: HOSPITAL | Age: 52
LOS: 15 days | Discharge: HOME | DRG: 834 | End: 2022-10-01
Attending: INTERNAL MEDICINE | Admitting: INTERNAL MEDICINE
Payer: COMMERCIAL

## 2022-09-16 DIAGNOSIS — C91.00 B-CELL ACUTE LYMPHOBLASTIC LEUKEMIA: Primary | ICD-10-CM

## 2022-09-16 DIAGNOSIS — R33.9 URINARY RETENTION: ICD-10-CM

## 2022-09-16 DIAGNOSIS — C91.02 B-CELL ACUTE LYMPHOBLASTIC LEUKEMIA (ALL) IN RELAPSE: ICD-10-CM

## 2022-09-16 DIAGNOSIS — C91.00 ACUTE LYMPHOBLASTIC LEUKEMIA (ALL): ICD-10-CM

## 2022-09-16 DIAGNOSIS — R11.0 NAUSEA: ICD-10-CM

## 2022-09-16 DIAGNOSIS — H54.62 VISION LOSS OF LEFT EYE: ICD-10-CM

## 2022-09-16 DIAGNOSIS — R53.81 PHYSICAL DEBILITY: ICD-10-CM

## 2022-09-16 LAB
ABO + RH BLD: NORMAL
ALBUMIN SERPL BCP-MCNC: 3.8 G/DL (ref 3.5–5.2)
ALP SERPL-CCNC: 131 U/L (ref 55–135)
ALT SERPL W/O P-5'-P-CCNC: 8 U/L (ref 10–44)
ANION GAP SERPL CALC-SCNC: 11 MMOL/L (ref 8–16)
ANISOCYTOSIS BLD QL SMEAR: SLIGHT
AST SERPL-CCNC: 11 U/L (ref 10–40)
BASOPHILS # BLD AUTO: 0.01 K/UL (ref 0–0.2)
BASOPHILS NFR BLD: 0.2 % (ref 0–1.9)
BILIRUB SERPL-MCNC: 0.5 MG/DL (ref 0.1–1)
BLD GP AB SCN CELLS X3 SERPL QL: NORMAL
BUN SERPL-MCNC: 8 MG/DL (ref 6–20)
CALCIUM SERPL-MCNC: 9.4 MG/DL (ref 8.7–10.5)
CHLORIDE SERPL-SCNC: 103 MMOL/L (ref 95–110)
CO2 SERPL-SCNC: 23 MMOL/L (ref 23–29)
CREAT SERPL-MCNC: 0.5 MG/DL (ref 0.5–1.4)
DACRYOCYTES BLD QL SMEAR: ABNORMAL
DIFFERENTIAL METHOD: ABNORMAL
EOSINOPHIL # BLD AUTO: 0.1 K/UL (ref 0–0.5)
EOSINOPHIL NFR BLD: 0.8 % (ref 0–8)
ERYTHROCYTE [DISTWIDTH] IN BLOOD BY AUTOMATED COUNT: 18.9 % (ref 11.5–14.5)
EST. GFR  (NO RACE VARIABLE): >60 ML/MIN/1.73 M^2
GLUCOSE SERPL-MCNC: 82 MG/DL (ref 70–110)
HCT VFR BLD AUTO: 27.5 % (ref 37–48.5)
HGB BLD-MCNC: 9.9 G/DL (ref 12–16)
HYPOCHROMIA BLD QL SMEAR: ABNORMAL
IMM GRANULOCYTES # BLD AUTO: 0.02 K/UL (ref 0–0.04)
IMM GRANULOCYTES NFR BLD AUTO: 0.3 % (ref 0–0.5)
LDH SERPL L TO P-CCNC: 181 U/L (ref 110–260)
LYMPHOCYTES # BLD AUTO: 2.6 K/UL (ref 1–4.8)
LYMPHOCYTES NFR BLD: 41 % (ref 18–48)
MCH RBC QN AUTO: 39.9 PG (ref 27–31)
MCHC RBC AUTO-ENTMCNC: 36 G/DL (ref 32–36)
MCV RBC AUTO: 111 FL (ref 82–98)
MONOCYTES # BLD AUTO: 0.4 K/UL (ref 0.3–1)
MONOCYTES NFR BLD: 6.9 % (ref 4–15)
NEUTROPHILS # BLD AUTO: 3.2 K/UL (ref 1.8–7.7)
NEUTROPHILS NFR BLD: 50.8 % (ref 38–73)
NRBC BLD-RTO: 1 /100 WBC
PLATELET # BLD AUTO: 20 K/UL (ref 150–450)
PLATELET BLD QL SMEAR: ABNORMAL
PMV BLD AUTO: 13 FL (ref 9.2–12.9)
POIKILOCYTOSIS BLD QL SMEAR: SLIGHT
POLYCHROMASIA BLD QL SMEAR: ABNORMAL
POTASSIUM SERPL-SCNC: 3.5 MMOL/L (ref 3.5–5.1)
PROT SERPL-MCNC: 6.6 G/DL (ref 6–8.4)
RBC # BLD AUTO: 2.48 M/UL (ref 4–5.4)
SODIUM SERPL-SCNC: 137 MMOL/L (ref 136–145)
SPHEROCYTES BLD QL SMEAR: ABNORMAL
WBC # BLD AUTO: 6.37 K/UL (ref 3.9–12.7)

## 2022-09-16 PROCEDURE — 99223 1ST HOSP IP/OBS HIGH 75: CPT | Mod: ,,, | Performed by: INTERNAL MEDICINE

## 2022-09-16 PROCEDURE — 80053 COMPREHEN METABOLIC PANEL: CPT | Performed by: STUDENT IN AN ORGANIZED HEALTH CARE EDUCATION/TRAINING PROGRAM

## 2022-09-16 PROCEDURE — 25000003 PHARM REV CODE 250: Performed by: STUDENT IN AN ORGANIZED HEALTH CARE EDUCATION/TRAINING PROGRAM

## 2022-09-16 PROCEDURE — 85025 COMPLETE CBC W/AUTO DIFF WBC: CPT | Performed by: STUDENT IN AN ORGANIZED HEALTH CARE EDUCATION/TRAINING PROGRAM

## 2022-09-16 PROCEDURE — 20600001 HC STEP DOWN PRIVATE ROOM

## 2022-09-16 PROCEDURE — 86901 BLOOD TYPING SEROLOGIC RH(D): CPT | Performed by: STUDENT IN AN ORGANIZED HEALTH CARE EDUCATION/TRAINING PROGRAM

## 2022-09-16 PROCEDURE — 99223 PR INITIAL HOSPITAL CARE,LEVL III: ICD-10-PCS | Mod: ,,, | Performed by: INTERNAL MEDICINE

## 2022-09-16 PROCEDURE — 36415 COLL VENOUS BLD VENIPUNCTURE: CPT | Performed by: STUDENT IN AN ORGANIZED HEALTH CARE EDUCATION/TRAINING PROGRAM

## 2022-09-16 PROCEDURE — 83615 LACTATE (LD) (LDH) ENZYME: CPT | Performed by: STUDENT IN AN ORGANIZED HEALTH CARE EDUCATION/TRAINING PROGRAM

## 2022-09-16 PROCEDURE — 63600175 PHARM REV CODE 636 W HCPCS: Performed by: STUDENT IN AN ORGANIZED HEALTH CARE EDUCATION/TRAINING PROGRAM

## 2022-09-16 RX ORDER — HYDROXYCHLOROQUINE SULFATE 200 MG/1
200 TABLET, FILM COATED ORAL DAILY
Status: DISCONTINUED | OUTPATIENT
Start: 2022-09-17 | End: 2022-10-01 | Stop reason: HOSPADM

## 2022-09-16 RX ORDER — BUSPIRONE HYDROCHLORIDE 5 MG/1
10 TABLET ORAL 2 TIMES DAILY
Status: DISCONTINUED | OUTPATIENT
Start: 2022-09-16 | End: 2022-10-01 | Stop reason: HOSPADM

## 2022-09-16 RX ORDER — HYDROMORPHONE HYDROCHLORIDE 2 MG/1
2 TABLET ORAL EVERY 4 HOURS PRN
Status: DISCONTINUED | OUTPATIENT
Start: 2022-09-16 | End: 2022-09-18

## 2022-09-16 RX ORDER — GABAPENTIN 300 MG/1
300 CAPSULE ORAL 3 TIMES DAILY
Status: DISCONTINUED | OUTPATIENT
Start: 2022-09-16 | End: 2022-10-01 | Stop reason: HOSPADM

## 2022-09-16 RX ORDER — ATORVASTATIN CALCIUM 20 MG/1
80 TABLET, FILM COATED ORAL DAILY
Status: DISCONTINUED | OUTPATIENT
Start: 2022-09-17 | End: 2022-10-01 | Stop reason: HOSPADM

## 2022-09-16 RX ORDER — ACYCLOVIR 200 MG/1
400 CAPSULE ORAL 2 TIMES DAILY
Status: DISCONTINUED | OUTPATIENT
Start: 2022-09-16 | End: 2022-10-01 | Stop reason: HOSPADM

## 2022-09-16 RX ORDER — OXCARBAZEPINE 600 MG/1
1200 TABLET, FILM COATED ORAL 2 TIMES DAILY
Status: DISCONTINUED | OUTPATIENT
Start: 2022-09-16 | End: 2022-09-17

## 2022-09-16 RX ORDER — ONDANSETRON 2 MG/ML
4 INJECTION INTRAMUSCULAR; INTRAVENOUS EVERY 6 HOURS PRN
Status: DISCONTINUED | OUTPATIENT
Start: 2022-09-16 | End: 2022-10-01 | Stop reason: HOSPADM

## 2022-09-16 RX ORDER — NALOXONE HCL 0.4 MG/ML
0.02 VIAL (ML) INJECTION
Status: DISCONTINUED | OUTPATIENT
Start: 2022-09-16 | End: 2022-10-01 | Stop reason: HOSPADM

## 2022-09-16 RX ORDER — ALLOPURINOL 300 MG/1
300 TABLET ORAL DAILY
Status: DISCONTINUED | OUTPATIENT
Start: 2022-09-17 | End: 2022-10-01 | Stop reason: HOSPADM

## 2022-09-16 RX ORDER — QUETIAPINE FUMARATE 200 MG/1
200 TABLET, FILM COATED ORAL NIGHTLY
Status: DISCONTINUED | OUTPATIENT
Start: 2022-09-16 | End: 2022-10-01 | Stop reason: HOSPADM

## 2022-09-16 RX ORDER — SODIUM CHLORIDE 0.9 % (FLUSH) 0.9 %
10 SYRINGE (ML) INJECTION EVERY 12 HOURS PRN
Status: DISCONTINUED | OUTPATIENT
Start: 2022-09-16 | End: 2022-10-01 | Stop reason: HOSPADM

## 2022-09-16 RX ORDER — FENOFIBRATE 160 MG/1
160 TABLET ORAL DAILY
Status: DISCONTINUED | OUTPATIENT
Start: 2022-09-17 | End: 2022-10-01 | Stop reason: HOSPADM

## 2022-09-16 RX ORDER — DIAZEPAM 5 MG/1
10 TABLET ORAL 2 TIMES DAILY PRN
Status: DISCONTINUED | OUTPATIENT
Start: 2022-09-16 | End: 2022-10-01 | Stop reason: HOSPADM

## 2022-09-16 RX ADMIN — OXCARBAZEPINE 1200 MG: 600 TABLET, FILM COATED ORAL at 10:09

## 2022-09-16 RX ADMIN — GABAPENTIN 300 MG: 300 CAPSULE ORAL at 09:09

## 2022-09-16 RX ADMIN — ACYCLOVIR 400 MG: 200 CAPSULE ORAL at 09:09

## 2022-09-16 RX ADMIN — DILTIAZEM HYDROCHLORIDE 90 MG: 60 TABLET, FILM COATED ORAL at 11:09

## 2022-09-16 RX ADMIN — DILTIAZEM HYDROCHLORIDE 90 MG: 60 TABLET, FILM COATED ORAL at 07:09

## 2022-09-16 RX ADMIN — BUSPIRONE HYDROCHLORIDE 10 MG: 10 TABLET ORAL at 10:09

## 2022-09-16 RX ADMIN — QUETIAPINE FUMARATE 200 MG: 200 TABLET ORAL at 09:09

## 2022-09-16 RX ADMIN — ONDANSETRON 4 MG: 2 INJECTION INTRAMUSCULAR; INTRAVENOUS at 08:09

## 2022-09-16 RX ADMIN — HYDROMORPHONE HYDROCHLORIDE 2 MG: 2 TABLET ORAL at 09:09

## 2022-09-16 NOTE — ASSESSMENT & PLAN NOTE
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 Concern for possible B-ALL involvement.    Plan:  - Consulted Opthomology, appreciate assistance  - Will likely need LP to evaluate for possible B-ALL   - Patient with pacemaker and per patient, not compatible with MRI. Would ideally obtain MRI brain w/ w/o contrast. Will check if pacemaker is MRI compatible.

## 2022-09-16 NOTE — CONSULTS
Chief complaint/Reason for Consult: L eye vision loss since Tuesday     History of Present Illness: Deepti Gonzalez is a 51 y.o. 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 She says she was watching TV on Monday when suddenly she noticed her L eye lost vision. When she closed her R eye, she saw shadowy shapes and figures. There were no associated symptoms or pain of her left eye. An hour later, she lost all vision from her L eye including of light. She saw an outside ophthalmologist on Tuesday who did not see evidence of ocular etiology of the vision loss. Her vision did not improve at all and she began developing increasing pain from her L eye over the last few days. No prior ocular history.    Past Ocular Hx: no past ocular surgeries, wears glasses normally.    Current eye gtts: none      PMHx:  has a past medical history of Cancer, COPD (chronic obstructive pulmonary disease), Hypertension, Rheumatoid arthritis, unspecified, Seizures, Stroke, and Type 2 diabetes mellitus with circulatory disorder, without long-term current use of insulin.     PSurgHx:  has a past surgical history that includes Appendectomy; Tonsillectomy; Rotator cuff repair (Bilateral); Tubal ligation; and Hysterectomy.     Home Medications:   Prior to Admission medications    Medication Sig Start Date End Date Taking? Authorizing Provider   acyclovir (ZOVIRAX) 400 MG tablet Take 1 tablet (400 mg total) by mouth 2 (two) times daily. 12/7/21   Kathrine Posey MD   allopurinoL (ZYLOPRIM) 300 MG tablet Take 1 tablet (300 mg total) by mouth once daily. 6/7/22   Connor M Gillies, MD   artificial tears (ISOPTO TEARS) 0.5 % ophthalmic solution Place 1 drop into both eyes 4 (four) times daily as needed. 1/24/22   Mirtha Antonio NP   atorvastatin (LIPITOR) 80 MG tablet Take 80 mg by mouth once daily.     Historical Provider   blood sugar diagnostic Strp SMARTSIG:Via Meter 1 to 2 Times Daily 10/11/21   Historical Provider   busPIRone (BUSPAR) 10 MG tablet Take 10 mg by mouth 2 (two) times daily.    Historical Provider   dapagliflozin (FARXIGA) 10 mg tablet Take 10 mg by mouth once daily.    Historical Provider   diazePAM (VALIUM) 10 MG Tab Take 10 mg by mouth 2 (two) times daily as needed.    Historical Provider   diltiaZEM (CARDIZEM) 90 MG tablet Take 1 tablet (90 mg total) by mouth every 6 (six) hours. 12/7/21 12/7/22  Kathrine Posey MD   almaraz's soln (benadryl 30 mL, mylanta 30 mL, LIDOcaine 30 mL, nystatin 30 mL) 120mL Take 10 mLs by mouth 4 (four) times daily as needed (mouth pain). 4/19/22   Mirtha Antonio NP   fenofibrate 160 MG Tab Take 160 mg by mouth once daily.    Historical Provider   gabapentin (NEURONTIN) 300 MG capsule Take 1 capsule (300 mg total) by mouth 3 (three) times daily. 12/7/21 12/7/22  Kathrine Posey MD   hydrOXYchloroQUINE (PLAQUENIL) 200 mg tablet Take 1 tablet (200 mg total) by mouth once daily. 5/19/22   Mirtha Antonio NP   hydrOXYzine pamoate (VISTARIL) 50 MG Cap Take 50 mg by mouth 2 (two) times daily as needed. 4/29/22   Historical Provider   LIDOcaine (LIDODERM) 5 % Place 1 patch onto the skin once daily. Remove & Discard patch within 12 hours or as directed by MD 6/6/22   Connor M Gillies, MD   losartan (COZAAR) 25 MG tablet Take 25 mg by mouth once daily. 11/12/21   Historical Provider   magnesium oxide (MAG-OX) 400 mg (241.3 mg magnesium) tablet Take 1 tablet by mouth once daily. 7/12/22   Historical Provider   metFORMIN (GLUCOPHAGE-XR) 500 MG ER 24hr tablet Take 1,000 mg by mouth 2 (two) times daily. 7/22/22   Historical Provider   naloxone (NARCAN) 4 mg/actuation Spry 4mg by nasal route as needed for opioid overdose; may repeat every 2-3 minutes in alternating nostrils until medical help arrives. Call 911 6/8/22   Ines Mccord MD   omeprazole (PRILOSEC) 40 MG capsule Take  40 mg by mouth once daily.    Historical Provider   ondansetron (ZOFRAN-ODT) 8 MG TbDL Take 1 tablet (8 mg total) by mouth every 8 (eight) hours as needed (in case of chemo induced nausea). 8/9/22   Dayron Jenkins MD   ONETOUCH VERIO TEST STRIPS Strp SMARTSIG:Via Meter 1 to 2 Times Daily 4/6/22   Historical Provider   OXcarbazepine (TRILEPTAL) 600 MG Tab Take 1,200 mg by mouth 2 (two) times daily. 10/26/21   Historical Provider   polyethylene glycol (GLYCOLAX) 17 gram/dose powder Dissolve one capful (17 g) in liquid and take by mouth daily as needed (constipation). 12/7/21   Kathrine Posey MD   PONATinib (ICLUSIG) 30 mg Tab Take 1 tablet (30 mg) by mouth once daily. 9/7/22   Dayron Jenkins MD   potassium chloride (K-TAB) 20 mEq Take 20 mEq by mouth 2 (two) times daily. 7/12/22   Historical Provider   prochlorperazine (COMPAZINE) 10 MG tablet Take 1 tablet (10 mg total) by mouth every 6 (six) hours as needed for Nausea. 7/11/22 7/11/23  Dayron Jenkins MD   QUEtiapine (SEROQUEL) 200 MG Tab Take 200 mg by mouth every evening.    Historical Provider   rivaroxaban (XARELTO) 20 mg Tab Take 1 tablet (20 mg total) by mouth daily with dinner or evening meal. Hold until instructed to resume by provider due to low platelet count. 6/6/22   Connor M Gillies, MD   senna-docusate 8.6-50 mg (PERICOLACE) 8.6-50 mg per tablet Take 1 tablet by mouth 2 (two) times daily. 3/19/22   Felisha Goodwin DO   sumatriptan (IMITREX) 50 MG tablet Take 1 tablet (50 mg total) by mouth daily as needed (headache). 8/29/22 9/28/22  Dayron Jenkins MD   TRINTELLIX 20 mg Tab Take 1 tablet by mouth once daily. 3/8/22   Historical Provider   ursodioL (ACTIGALL) 300 mg capsule Take 1 capsule (300 mg total) by mouth 3 (three) times daily. 6/13/22 6/13/23  Dayron Jenkins MD        Medications this encounter:    acyclovir  400 mg Oral BID    [START ON 9/17/2022] allopurinoL  300 mg Oral Daily    [START ON 9/17/2022] atorvastatin  80 mg Oral Daily     busPIRone  10 mg Oral BID    diltiaZEM  90 mg Oral Q6H    [START ON 9/17/2022] fenofibrate  160 mg Oral Daily    gabapentin  300 mg Oral TID    [START ON 9/17/2022] hydrOXYchloroQUINE  200 mg Oral Daily    OXcarbazepine  1,200 mg Oral BID    QUEtiapine  200 mg Oral QHS       Allergies: is allergic to levetiracetam.     Social Hx:  reports that she quit smoking about 6 months ago. Her smoking use included cigarettes. She smoked an average of .5 packs per day. She has never used smokeless tobacco. She reports that she does not drink alcohol and does not use drugs.     Family Hx: No family history of glaucoma. family history is not on file.     ROS: As per HPI    Ocular examination/Dilated fundus examination:  Base Eye Exam       Visual Acuity (Snellen - Linear)         Right Left    Dist sc 20/40 NLP    Dist ph sc 20/30               Tonometry (Tonopen, 5:34 PM)         Right Left    Pressure 13 14              Pupils         Dark Light Shape React APD    Right 5 3 Round Brisk None    Left 5 5 Round Minimal +3              Extraocular Movement         Right Left     Full Full              Dilation       Both eyes: 1% Mydriacyl, 2.5% Phenylephrine @ 5:33 PM                  Slit Lamp and Fundus Exam       External Exam         Right Left    External Normal Normal              Slit Lamp Exam         Right Left    Lids/Lashes Normal Normal    Conjunctiva/Sclera White and quiet White and quiet    Cornea Clear Clear, <1mm round stromal scar at 6:30    Anterior Chamber Deep and quiet Deep and quiet    Iris Round and reactive Round and minimally reactive    Lens Clear Clear    Anterior Vitreous Normal Normal              Fundus Exam         Right Left    Disc Clear disc margins Clear disc margins    C/D Ratio 0.3 0.2    Macula Flat, attached Flat, attached    Vessels Normal Normal    Periphery Flat, attached, no h/t/d Flat, attached, no h/t/d                    Assessment/Plan:   1. Optic neuropathy, OS  - Acute onset vision  loss starting 9/12/22, initially seeing shadowy sillouettes before going completely black while she was watching TV. Became painful starting 9/13/22.  - Base exam: NLP, pupil minimally reactive with 3+ APD, IOP wnl. Unable to assess CVF or color plates  - Anterior and posterior segment exam were unremarkable. Disc was pink and sharp without evidence of retinal ischemia.  - Recommend MRI brain/orbits with contrast and fat suppression  - Recommend CRP and ESR  -  patient that if there is any visual changes in her R eye to immediately notify ophthalmology  - Follow-up as outpatient with ophthalmology for further testing, including fluorescein angiography       Discussed patient's history, physical, assessment and plan with Dr. Barnett.      Maged Key MD  [employer] Ophthalmology PGY-2

## 2022-09-16 NOTE — SUBJECTIVE & OBJECTIVE
Patient information was obtained from patient and spouse/SO.     Oncology History: As above     Medications Prior to Admission   Medication Sig Dispense Refill Last Dose    acyclovir (ZOVIRAX) 400 MG tablet Take 1 tablet (400 mg total) by mouth 2 (two) times daily. 60 tablet 11 9/16/2022    allopurinoL (ZYLOPRIM) 300 MG tablet Take 1 tablet (300 mg total) by mouth once daily. 90 tablet 3 9/16/2022    artificial tears (ISOPTO TEARS) 0.5 % ophthalmic solution Place 1 drop into both eyes 4 (four) times daily as needed.   Past Week    atorvastatin (LIPITOR) 80 MG tablet Take 80 mg by mouth once daily.   9/16/2022    busPIRone (BUSPAR) 10 MG tablet Take 10 mg by mouth 2 (two) times daily.   9/16/2022    dapagliflozin (FARXIGA) 10 mg tablet Take 10 mg by mouth once daily.   9/16/2022    diazePAM (VALIUM) 10 MG Tab Take 10 mg by mouth 2 (two) times daily as needed.   9/16/2022    diltiaZEM (CARDIZEM) 90 MG tablet Take 1 tablet (90 mg total) by mouth every 6 (six) hours. 120 tablet 11 9/16/2022    gabapentin (NEURONTIN) 300 MG capsule Take 1 capsule (300 mg total) by mouth 3 (three) times daily. 90 capsule 11 9/16/2022    losartan (COZAAR) 25 MG tablet Take 25 mg by mouth once daily.   9/16/2022    metFORMIN (GLUCOPHAGE-XR) 500 MG ER 24hr tablet Take 1,000 mg by mouth 2 (two) times daily.   9/16/2022    omeprazole (PRILOSEC) 40 MG capsule Take 40 mg by mouth once daily.   9/16/2022    ondansetron (ZOFRAN-ODT) 8 MG TbDL Take 1 tablet (8 mg total) by mouth every 8 (eight) hours as needed (in case of chemo induced nausea). 30 tablet 1 9/16/2022    OXcarbazepine (TRILEPTAL) 600 MG Tab Take 1,200 mg by mouth 2 (two) times daily.   9/16/2022    PONATinib (ICLUSIG) 30 mg Tab Take 1 tablet (30 mg) by mouth once daily. 30 tablet 0 Past Week    potassium chloride (K-TAB) 20 mEq Take 20 mEq by mouth 2 (two) times daily.   9/16/2022    prochlorperazine (COMPAZINE) 10 MG tablet Take 1 tablet (10 mg total) by mouth every 6 (six) hours as  needed for Nausea. 30 tablet 2 9/16/2022    QUEtiapine (SEROQUEL) 200 MG Tab Take 200 mg by mouth every evening.   9/15/2022    senna-docusate 8.6-50 mg (PERICOLACE) 8.6-50 mg per tablet Take 1 tablet by mouth 2 (two) times daily. 60 tablet 3 Past Week    sumatriptan (IMITREX) 50 MG tablet Take 1 tablet (50 mg total) by mouth daily as needed (headache). 30 tablet 2 Past Week    TRINTELLIX 20 mg Tab Take 1 tablet by mouth once daily.   9/16/2022    ursodioL (ACTIGALL) 300 mg capsule Take 1 capsule (300 mg total) by mouth 3 (three) times daily. 90 capsule 11 9/16/2022    blood sugar diagnostic Strp SMARTSIG:Via Meter 1 to 2 Times Daily       duke's soln (benadryl 30 mL, mylanta 30 mL, LIDOcaine 30 mL, nystatin 30 mL) 120mL Take 10 mLs by mouth 4 (four) times daily as needed (mouth pain). 120 mL 0 Unknown    fenofibrate 160 MG Tab Take 160 mg by mouth once daily.   Unknown    hydrOXYchloroQUINE (PLAQUENIL) 200 mg tablet Take 1 tablet (200 mg total) by mouth once daily.   Unknown    hydrOXYzine pamoate (VISTARIL) 50 MG Cap Take 50 mg by mouth 2 (two) times daily as needed.   Unknown    LIDOcaine (LIDODERM) 5 % Place 1 patch onto the skin once daily. Remove & Discard patch within 12 hours or as directed by MD 30 patch 2 Unknown    magnesium oxide (MAG-OX) 400 mg (241.3 mg magnesium) tablet Take 1 tablet by mouth once daily.   Unknown    naloxone (NARCAN) 4 mg/actuation Spry 4mg by nasal route as needed for opioid overdose; may repeat every 2-3 minutes in alternating nostrils until medical help arrives. Call 911 1 each 11 Unknown    ONETOUCH VERIO TEST STRIPS Strp SMARTSIG:Via Meter 1 to 2 Times Daily       polyethylene glycol (GLYCOLAX) 17 gram/dose powder Dissolve one capful (17 g) in liquid and take by mouth daily as needed (constipation). 510 g 0 Unknown    rivaroxaban (XARELTO) 20 mg Tab Take 1 tablet (20 mg total) by mouth daily with dinner or evening meal. Hold until instructed to resume by provider due to low  platelet count.   More than a month       Levetiracetam     Past Medical History:   Diagnosis Date    Cancer     COPD (chronic obstructive pulmonary disease)     Hypertension     Rheumatoid arthritis, unspecified     Seizures     Stroke     TIA x 4    Type 2 diabetes mellitus with circulatory disorder, without long-term current use of insulin      Past Surgical History:   Procedure Laterality Date    APPENDECTOMY      HYSTERECTOMY      ROTATOR CUFF REPAIR Bilateral     TONSILLECTOMY      TUBAL LIGATION       Family History    None       Tobacco Use    Smoking status: Former     Packs/day: 0.50     Types: Cigarettes     Quit date: 3/16/2022     Years since quittin.5    Smokeless tobacco: Never   Substance and Sexual Activity    Alcohol use: No    Drug use: Never    Sexual activity: Not on file       Review of Systems   Constitutional:  Positive for fatigue. Negative for appetite change, chills, fever and unexpected weight change.   HENT:  Negative for congestion and dental problem.    Eyes:  Positive for pain and visual disturbance. Negative for photophobia, discharge, redness and itching.   Respiratory:  Negative for cough and shortness of breath.    Cardiovascular:  Negative for chest pain and leg swelling.   Gastrointestinal:  Positive for nausea. Negative for abdominal pain, constipation and diarrhea.   Genitourinary:  Negative for difficulty urinating and dysuria.   Musculoskeletal:  Negative for arthralgias and back pain.   Skin:  Negative for color change and rash.   Neurological:  Positive for headaches. Negative for seizures, speech difficulty and light-headedness.   Hematological:  Negative for adenopathy. Does not bruise/bleed easily.   Psychiatric/Behavioral:  Negative for agitation and behavioral problems.    Objective:     Vital Signs (Most Recent):  Temp: 98.5 °F (36.9 °C) (22 1500)  Pulse: 81 (22 1500)  Resp: 16 (22 1500)  BP: 135/65 (22 1500)  SpO2: 97 % (22 1500)  Vital Signs (24h Range):  Temp:  [98.5 °F (36.9 °C)] 98.5 °F (36.9 °C)  Pulse:  [81] 81  Resp:  [16] 16  SpO2:  [97 %] 97 %  BP: (135)/(65) 135/65        Body mass index is 22.61 kg/m².  Body surface area is 1.64 meters squared.    ECOG SCORE           [unfilled]    Lines/Drains/Airways       Central Venous Catheter Line  Duration                  PowerPort A Cath Single Lumen 03/16/22 1128 right internal jugular 184 days              Drain  Duration                  Urethral Catheter 09/07/22 9 days                    Physical Exam  Constitutional:       General: She is not in acute distress.     Appearance: She is well-developed.   HENT:      Head: Normocephalic and atraumatic.      Nose: Nose normal. No congestion.   Eyes:      General: No scleral icterus.     Extraocular Movements: Extraocular movements intact.      Comments: Left eye pupil not reactive. Left eye peripheral vision not intact.   Cardiovascular:      Rate and Rhythm: Normal rate and regular rhythm.      Heart sounds: No murmur heard.  Pulmonary:      Effort: Pulmonary effort is normal. No respiratory distress.   Abdominal:      General: There is no distension.      Palpations: Abdomen is soft.      Tenderness: There is no abdominal tenderness.   Musculoskeletal:         General: Normal range of motion.      Cervical back: Normal range of motion and neck supple.   Skin:     General: Skin is warm and dry.   Neurological:      General: No focal deficit present.      Mental Status: She is alert and oriented to person, place, and time.   Psychiatric:         Mood and Affect: Mood normal.         Behavior: Behavior normal.       Significant Labs:   CBC: No results for input(s): WBC, HGB, HCT, PLT in the last 48 hours. and CMP: No results for input(s): NA, K, CL, CO2, GLU, BUN, CREATININE, CALCIUM, PROT, ALBUMIN, BILITOT, ALKPHOS, AST, ALT, ANIONGAP, EGFRNONAA in the last 48 hours.    Invalid input(s): ESTGFAFRICA    Diagnostic Results:  I have reviewed  all pertinent imaging results/findings within the past 24 hours.

## 2022-09-16 NOTE — ASSESSMENT & PLAN NOTE
Patient reports wolf placed last week and has been that time.  Attempt voiding trial prior to discharge

## 2022-09-16 NOTE — ASSESSMENT & PLAN NOTE
Follows with Dr. Jenkins.  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  Was treated with inotuzumab ozogamicin and ponatinib but course complicated by severe cytopenias. Not has been on therapy for past few weeks.  Not a candidate for allogeneic stem cell transplant at this time.

## 2022-09-16 NOTE — HPI
"Ms. Deepti Gonzalez is a 51 year old woman with PH+ B-ALL, RA, COPD, HTN, pAF, PPM, anxiety, MDD who presents with acute left eye vision loss and pain. She states that it started this past Tuesday (4 days ago). It was a sudden onset. Denies any falls or head trauma. She reports seeing "foggy", followed by "white" and then "black". She has not had a return to vision since then. She has associated eye pain. She has no issues with her right eye. She went to her Optometrist on Wednesday.  and patient report that optometrist took pictures of the eye and stated that "eye looked fine". She has associated headaches that have been worsening, especially around the left eye. She reports nausea that has been chronic for the past few months and vomiting. She vomited or retched about 3 times this morning. She denies fevers, chills, night sweats, chest pain, SOB, abdominal pain, diarrhea, leg swelling. She reports that she has not had an episode like this prior.     Per Dr. Jenkins's note (09/12/2022):  1. Precursor B-cell acute lymphoblastic leukemia (Ph+)              A. 11/23/2021: Transferred to [facility] from outside hospital for evaluation for acute leukemia              B. 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               C. 11/30/2021 - 12/23/2021: Vincristine + imatinib induction; hospital course was complicated by acute hypoxemic respiratory failure which resolved with diuresis and treatment of ALL              D. 1/3/2022: Bone marrow biopsy after induction shows normocellular marrow (60%) with no definite evidence of B-ALL; bcr-abl p190 fusion detected at 0.02% of total ABL1; FISH normal; MRD testing not sent              E. 1/20/2022: " Begin consolidation therapy with [id number] protocol              F. 3/16/2022: BCR-ABL p190 transcript on peripheral blood negative              G. 4/13/2022: BCR-ABL p190 transcript on peripheral blood 0.63%              H. 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     We have been treating her with a combination of inotuzumab ozogamicin and ponatinib. She currently has evidence of a major molecular response with bcr-abl transcript of 0.02%. Course has been complicated by severe cytopenias. She has been holding therapy for two weeks. Her thrombocytopenia remains severe. These cytopenias are due to therapy. We will therefore restart ponatinib at 30 mg PO daily and hold further inotuzumab (may be reconsidered should she relapse).      She met with Dr. Armendariz regarding allogeneic stem cell transplant. Risk is not expected to exceed benefit. She is therefore not a candidate for this procedure.

## 2022-09-16 NOTE — H&P
"Roberto Yepez - Oncology ([facility])  Hematology  Bone Marrow Transplant  H&P    Subjective:     Principal Problem: Vision loss of left eye    HPI: Ms. Deepti Gonzalez is a 51 year old woman with PH+ B-ALL, RA, COPD, HTN, pAF, PPM, anxiety, MDD who presents with acute left eye vision loss and pain. She states that it started this past Tuesday (4 days ago). It was a sudden onset. She reports seeing "foggy", followed by "white" and then "black". She has not had a return to vision since then. She has associated eye pain. She has no issues with her right eye. She went to her Optometrist on Wednesday.  and patient report that optometrist took pictures of the eye and stated that "eye looked fine". She has associated headaches that have been worsening, especially around the left eye. She reports nausea that has been chronic for the past few months and vomiting. She vomited or retched about 3 times this morning. She denies fevers, chills, night sweats, chest pain, SOB, abdominal pain, diarrhea, leg swelling. She reports that she has not had an episode like this prior.     Per Dr. Jenkins's note (09/12/2022):  1. Precursor B-cell acute lymphoblastic leukemia (Ph+)              A. 11/23/2021: Transferred to [facility] [place] outside hospital for evaluation for acute leukemia              B. 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               C. 11/30/2021 - 12/23/2021: Vincristine + imatinib induction; hospital course was complicated by acute hypoxemic respiratory failure which resolved with diuresis and treatment of ALL              D. 1/3/2022: Bone marrow biopsy after induction shows normocellular marrow (60%) with no definite evidence of B-ALL; " bcr-abl p190 fusion detected at 0.02% of total ABL1; FISH normal; MRD testing not sent              E. 1/20/2022: Begin consolidation therapy with [id number] protocol              F. 3/16/2022: BCR-ABL p190 transcript on peripheral blood negative              G. 4/13/2022: BCR-ABL p190 transcript on peripheral blood 0.63%              H. 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     We have been treating her with a combination of inotuzumab ozogamicin and ponatinib. She currently has evidence of a major molecular response with bcr-abl transcript of 0.02%. Course has been complicated by severe cytopenias. She has been holding therapy for two weeks. Her thrombocytopenia remains severe. These cytopenias are due to therapy. We will therefore restart ponatinib at 30 mg PO daily and hold further inotuzumab (may be reconsidered should she relapse).      She met with Dr. Armendariz regarding allogeneic stem cell transplant. Risk is not expected to exceed benefit. She is therefore not a candidate for this procedure.       Patient information was obtained from patient and spouse/SO.     Oncology History: As above     Medications Prior to Admission   Medication Sig Dispense Refill Last Dose    acyclovir (ZOVIRAX) 400 MG tablet Take 1 tablet (400 mg total) by mouth 2 (two) times daily. 60 tablet 11 9/16/2022    allopurinoL (ZYLOPRIM) 300 MG tablet Take 1 tablet (300 mg total) by mouth once daily. 90 tablet 3 9/16/2022    artificial tears (ISOPTO TEARS) 0.5 % ophthalmic solution Place 1 drop into both eyes 4 (four) times daily as needed.   Past Week    atorvastatin  (LIPITOR) 80 MG tablet Take 80 mg by mouth once daily.   9/16/2022    busPIRone (BUSPAR) 10 MG tablet Take 10 mg by mouth 2 (two) times daily.   9/16/2022    dapagliflozin (FARXIGA) 10 mg tablet Take 10 mg by mouth once daily.   9/16/2022    diazePAM (VALIUM) 10 MG Tab Take 10 mg by mouth 2 (two) times daily as needed.   9/16/2022    diltiaZEM (CARDIZEM) 90 MG tablet Take 1 tablet (90 mg total) by mouth every 6 (six) hours. 120 tablet 11 9/16/2022    gabapentin (NEURONTIN) 300 MG capsule Take 1 capsule (300 mg total) by mouth 3 (three) times daily. 90 capsule 11 9/16/2022    losartan (COZAAR) 25 MG tablet Take 25 mg by mouth once daily.   9/16/2022    metFORMIN (GLUCOPHAGE-XR) 500 MG ER 24hr tablet Take 1,000 mg by mouth 2 (two) times daily.   9/16/2022    omeprazole (PRILOSEC) 40 MG capsule Take 40 mg by mouth once daily.   9/16/2022    ondansetron (ZOFRAN-ODT) 8 MG TbDL Take 1 tablet (8 mg total) by mouth every 8 (eight) hours as needed (in case of chemo induced nausea). 30 tablet 1 9/16/2022    OXcarbazepine (TRILEPTAL) 600 MG Tab Take 1,200 mg by mouth 2 (two) times daily.   9/16/2022    PONATinib (ICLUSIG) 30 mg Tab Take 1 tablet (30 mg) by mouth once daily. 30 tablet 0 Past Week    potassium chloride (K-TAB) 20 mEq Take 20 mEq by mouth 2 (two) times daily.   9/16/2022    prochlorperazine (COMPAZINE) 10 MG tablet Take 1 tablet (10 mg total) by mouth every 6 (six) hours as needed for Nausea. 30 tablet 2 9/16/2022    QUEtiapine (SEROQUEL) 200 MG Tab Take 200 mg by mouth every evening.   9/15/2022    senna-docusate 8.6-50 mg (PERICOLACE) 8.6-50 mg per tablet Take 1 tablet by mouth 2 (two) times daily. 60 tablet 3 Past Week    sumatriptan (IMITREX) 50 MG tablet Take 1 tablet (50 mg total) by mouth daily as needed (headache). 30 tablet 2 Past Week    TRINTELLIX 20 mg Tab Take 1 tablet by mouth once daily.   9/16/2022    ursodioL (ACTIGALL) 300 mg capsule Take 1 capsule (300 mg total) by mouth 3  (three) times daily. 90 capsule 11 9/16/2022    blood sugar diagnostic Strp SMARTSIG:Via Meter 1 to 2 Times Daily       duke's soln (benadryl 30 mL, mylanta 30 mL, LIDOcaine 30 mL, nystatin 30 mL) 120mL Take 10 mLs by mouth 4 (four) times daily as needed (mouth pain). 120 mL 0 Unknown    fenofibrate 160 MG Tab Take 160 mg by mouth once daily.   Unknown    hydrOXYchloroQUINE (PLAQUENIL) 200 mg tablet Take 1 tablet (200 mg total) by mouth once daily.   Unknown    hydrOXYzine pamoate (VISTARIL) 50 MG Cap Take 50 mg by mouth 2 (two) times daily as needed.   Unknown    LIDOcaine (LIDODERM) 5 % Place 1 patch onto the skin once daily. Remove & Discard patch within 12 hours or as directed by MD 30 patch 2 Unknown    magnesium oxide (MAG-OX) 400 mg (241.3 mg magnesium) tablet Take 1 tablet by mouth once daily.   Unknown    naloxone (NARCAN) 4 mg/actuation Spry 4mg by nasal route as needed for opioid overdose; may repeat every 2-3 minutes in alternating nostrils until medical help arrives. Call 911 1 each 11 Unknown    ONETOUCH VERIO TEST STRIPS Strp SMARTSIG:Via Meter 1 to 2 Times Daily       polyethylene glycol (GLYCOLAX) 17 gram/dose powder Dissolve one capful (17 g) in liquid and take by mouth daily as needed (constipation). 510 g 0 Unknown    rivaroxaban (XARELTO) 20 mg Tab Take 1 tablet (20 mg total) by mouth daily with dinner or evening meal. Hold until instructed to resume by provider due to low platelet count.   More than a month       Levetiracetam     Past Medical History:   Diagnosis Date    Cancer     COPD (chronic obstructive pulmonary disease)     Hypertension     Rheumatoid arthritis, unspecified     Seizures     Stroke     TIA x 4    Type 2 diabetes mellitus with circulatory disorder, without long-term current use of insulin      Past Surgical History:   Procedure Laterality Date    APPENDECTOMY      HYSTERECTOMY      ROTATOR CUFF REPAIR Bilateral     TONSILLECTOMY      TUBAL LIGATION        Family History    None       Tobacco Use    Smoking status: Former     Packs/day: 0.50     Types: Cigarettes     Quit date: 3/16/2022     Years since quittin.5    Smokeless tobacco: Never   Substance and Sexual Activity    Alcohol use: No    Drug use: Never    Sexual activity: Not on file       Review of Systems   Constitutional:  Positive for fatigue. Negative for appetite change, chills, fever and unexpected weight change.   HENT:  Negative for congestion and dental problem.    Eyes:  Positive for pain and visual disturbance. Negative for photophobia, discharge, redness and itching.   Respiratory:  Negative for cough and shortness of breath.    Cardiovascular:  Negative for chest pain and leg swelling.   Gastrointestinal:  Positive for nausea. Negative for abdominal pain, constipation and diarrhea.   Genitourinary:  Negative for difficulty urinating and dysuria.   Musculoskeletal:  Negative for arthralgias and back pain.   Skin:  Negative for color change and rash.   Neurological:  Positive for headaches. Negative for seizures, speech difficulty and light-headedness.   Hematological:  Negative for adenopathy. Does not bruise/bleed easily.   Psychiatric/Behavioral:  Negative for agitation and behavioral problems.    Objective:     Vital Signs (Most Recent):  Temp: 98.5 °F (36.9 °C) (22 1500)  Pulse: 81 (22 1500)  Resp: 16 (22 1500)  BP: 135/65 (22 1500)  SpO2: 97 % (22 1500) Vital Signs (24h Range):  Temp:  [98.5 °F (36.9 °C)] 98.5 °F (36.9 °C)  Pulse:  [81] 81  Resp:  [16] 16  SpO2:  [97 %] 97 %  BP: (135)/(65) 135/65        Body mass index is 22.61 kg/m².  Body surface area is 1.64 meters squared.    ECOG SCORE           [unfilled]    Lines/Drains/Airways       Central Venous Catheter Line  Duration                  PowerPort A Cath Single Lumen 22 1128 right internal jugular 184 days              Drain  Duration                  Urethral Catheter 22 9 days                     Physical Exam  Constitutional:       General: She is not in acute distress.     Appearance: She is well-developed.   HENT:      Head: Normocephalic and atraumatic.      Nose: Nose normal. No congestion.   Eyes:      General: No scleral icterus.     Extraocular Movements: Extraocular movements intact.      Comments: Left eye pupil not reactive. Left eye peripheral vision not intact.   Cardiovascular:      Rate and Rhythm: Normal rate and regular rhythm.      Heart sounds: No murmur heard.  Pulmonary:      Effort: Pulmonary effort is normal. No respiratory distress.   Abdominal:      General: There is no distension.      Palpations: Abdomen is soft.      Tenderness: There is no abdominal tenderness.   Musculoskeletal:         General: Normal range of motion.      Cervical back: Normal range of motion and neck supple.   Skin:     General: Skin is warm and dry.   Neurological:      General: No focal deficit present.      Mental Status: She is alert and oriented to person, place, and time.   Psychiatric:         Mood and Affect: Mood normal.         Behavior: Behavior normal.       Significant Labs:   CBC: No results for input(s): WBC, HGB, HCT, PLT in the last 48 hours. and CMP: No results for input(s): NA, K, CL, CO2, GLU, BUN, CREATININE, CALCIUM, PROT, ALBUMIN, BILITOT, ALKPHOS, AST, ALT, ANIONGAP, EGFRNONAA in the last 48 hours.    Invalid input(s): ESTGFAFRICA    Diagnostic Results:  I have reviewed all pertinent imaging results/findings within the past 24 hours.    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 Concern for possible B-ALL involvement.    Plan:  - Consulted Opthomology, appreciate assistance  - Will likely need LP to evaluate for possible B-ALL   - Patient with pacemaker and per patient, not compatible with MRI. Would ideally obtain MRI brain w/ w/o contrast. Will check if  pacemaker is MRI compatible.    Thrombocytopenia  Monitor CBC  Transfuse for platelet count <10    Urinary retention  Patient reports wolf placed last week and has been that time.  Attempt voiding trial prior to discharge    Anemia associated with chemotherapy  Monitor CBC  Transfuse for Hgb<7    B-cell acute lymphoblastic leukemia  Follows with Dr. Jenkins.  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  Was treated with inotuzumab ozogamicin and ponatinib but course complicated by severe cytopenias. Not has been on therapy for past few weeks.  Not a candidate for allogeneic stem cell transplant at this time.    Anxiety  Continue home buspirone and quetiapine    Hypertension  Holding home BP meds in the setting of soft BP. Resume when appropriate.    Seizure disorder  Continue home oxcarbazepine 200mg QHS    Pacemaker  Patient with pacemaker in place due to SSS. Will need to evaluate for whether MRI compatible.    Rheumatoid arthritis involving multiple sites  Seronegative RA. Continue home plaquenil    Hyperlipidemia  Continue home atorvastatin and fenofibrate    Paroxysmal atrial fibrillation  Holding home Eliquis in the setting of thrombocytopenia        VTE Risk Mitigation (From admission, onward)         Ordered     IP VTE HIGH RISK PATIENT  Once         09/16/22 1637     Place sequential compression device  Until discontinued         09/16/22 1637     Reason for No Pharmacological VTE Prophylaxis  Once        Question:  Reasons:  Answer:  Thrombocytopenia    09/16/22 1637                Disposition: Remain inpatient    Nayeli Devine MD  Bone Marrow Transplant  Hematology  [facility] - Oncology ([facility])

## 2022-09-17 PROBLEM — I60.9 SAH (SUBARACHNOID HEMORRHAGE): Status: ACTIVE | Noted: 2022-09-17

## 2022-09-17 LAB
ALBUMIN SERPL BCP-MCNC: 3.5 G/DL (ref 3.5–5.2)
ALP SERPL-CCNC: 119 U/L (ref 55–135)
ALT SERPL W/O P-5'-P-CCNC: 7 U/L (ref 10–44)
ANION GAP SERPL CALC-SCNC: 13 MMOL/L (ref 8–16)
ANISOCYTOSIS BLD QL SMEAR: SLIGHT
APTT BLDCRRT: 21.7 SEC (ref 21–32)
AST SERPL-CCNC: 10 U/L (ref 10–40)
BASOPHILS # BLD AUTO: 0.01 K/UL (ref 0–0.2)
BASOPHILS NFR BLD: 0.2 % (ref 0–1.9)
BILIRUB SERPL-MCNC: 0.3 MG/DL (ref 0.1–1)
BUN SERPL-MCNC: 10 MG/DL (ref 6–20)
CALCIUM SERPL-MCNC: 8.8 MG/DL (ref 8.7–10.5)
CHLORIDE SERPL-SCNC: 101 MMOL/L (ref 95–110)
CO2 SERPL-SCNC: 23 MMOL/L (ref 23–29)
CREAT SERPL-MCNC: 0.5 MG/DL (ref 0.5–1.4)
CRP SERPL-MCNC: 10 MG/L (ref 0–8.2)
DIFFERENTIAL METHOD: ABNORMAL
EOSINOPHIL # BLD AUTO: 0.1 K/UL (ref 0–0.5)
EOSINOPHIL NFR BLD: 1.2 % (ref 0–8)
ERYTHROCYTE [DISTWIDTH] IN BLOOD BY AUTOMATED COUNT: 19.1 % (ref 11.5–14.5)
ERYTHROCYTE [SEDIMENTATION RATE] IN BLOOD BY PHOTOMETRIC METHOD: 33 MM/HR (ref 0–36)
EST. GFR  (NO RACE VARIABLE): >60 ML/MIN/1.73 M^2
GLUCOSE SERPL-MCNC: 70 MG/DL (ref 70–110)
HCT VFR BLD AUTO: 26 % (ref 37–48.5)
HGB BLD-MCNC: 9.3 G/DL (ref 12–16)
HYPOCHROMIA BLD QL SMEAR: ABNORMAL
IMM GRANULOCYTES # BLD AUTO: 0.02 K/UL (ref 0–0.04)
IMM GRANULOCYTES NFR BLD AUTO: 0.3 % (ref 0–0.5)
INR PPP: 1.1 (ref 0.8–1.2)
LYMPHOCYTES # BLD AUTO: 3.2 K/UL (ref 1–4.8)
LYMPHOCYTES NFR BLD: 49.6 % (ref 18–48)
MAGNESIUM SERPL-MCNC: 1.8 MG/DL (ref 1.6–2.6)
MCH RBC QN AUTO: 40.1 PG (ref 27–31)
MCHC RBC AUTO-ENTMCNC: 35.8 G/DL (ref 32–36)
MCV RBC AUTO: 112 FL (ref 82–98)
MONOCYTES # BLD AUTO: 0.5 K/UL (ref 0.3–1)
MONOCYTES NFR BLD: 7.9 % (ref 4–15)
NEUTROPHILS # BLD AUTO: 2.7 K/UL (ref 1.8–7.7)
NEUTROPHILS NFR BLD: 40.8 % (ref 38–73)
NRBC BLD-RTO: 1 /100 WBC
PLATELET # BLD AUTO: 19 K/UL (ref 150–450)
PLATELET BLD QL SMEAR: ABNORMAL
PMV BLD AUTO: 12.5 FL (ref 9.2–12.9)
POLYCHROMASIA BLD QL SMEAR: ABNORMAL
POTASSIUM SERPL-SCNC: 2.9 MMOL/L (ref 3.5–5.1)
PROT SERPL-MCNC: 6.1 G/DL (ref 6–8.4)
PROTHROMBIN TIME: 11.3 SEC (ref 9–12.5)
RBC # BLD AUTO: 2.32 M/UL (ref 4–5.4)
SODIUM SERPL-SCNC: 137 MMOL/L (ref 136–145)
WBC # BLD AUTO: 6.49 K/UL (ref 3.9–12.7)

## 2022-09-17 PROCEDURE — 83735 ASSAY OF MAGNESIUM: CPT | Performed by: STUDENT IN AN ORGANIZED HEALTH CARE EDUCATION/TRAINING PROGRAM

## 2022-09-17 PROCEDURE — 94761 N-INVAS EAR/PLS OXIMETRY MLT: CPT

## 2022-09-17 PROCEDURE — 25000003 PHARM REV CODE 250

## 2022-09-17 PROCEDURE — 25000003 PHARM REV CODE 250: Performed by: STUDENT IN AN ORGANIZED HEALTH CARE EDUCATION/TRAINING PROGRAM

## 2022-09-17 PROCEDURE — 80053 COMPREHEN METABOLIC PANEL: CPT | Performed by: STUDENT IN AN ORGANIZED HEALTH CARE EDUCATION/TRAINING PROGRAM

## 2022-09-17 PROCEDURE — 99223 PR INITIAL HOSPITAL CARE,LEVL III: ICD-10-PCS | Mod: FS,,,

## 2022-09-17 PROCEDURE — 63600175 PHARM REV CODE 636 W HCPCS: Performed by: STUDENT IN AN ORGANIZED HEALTH CARE EDUCATION/TRAINING PROGRAM

## 2022-09-17 PROCEDURE — 20600001 HC STEP DOWN PRIVATE ROOM

## 2022-09-17 PROCEDURE — 99233 SBSQ HOSP IP/OBS HIGH 50: CPT | Mod: ,,, | Performed by: INTERNAL MEDICINE

## 2022-09-17 PROCEDURE — 85549 MURAMIDASE: CPT | Performed by: STUDENT IN AN ORGANIZED HEALTH CARE EDUCATION/TRAINING PROGRAM

## 2022-09-17 PROCEDURE — 85730 THROMBOPLASTIN TIME PARTIAL: CPT | Performed by: STUDENT IN AN ORGANIZED HEALTH CARE EDUCATION/TRAINING PROGRAM

## 2022-09-17 PROCEDURE — 25000242 PHARM REV CODE 250 ALT 637 W/ HCPCS: Performed by: STUDENT IN AN ORGANIZED HEALTH CARE EDUCATION/TRAINING PROGRAM

## 2022-09-17 PROCEDURE — 85025 COMPLETE CBC W/AUTO DIFF WBC: CPT | Performed by: STUDENT IN AN ORGANIZED HEALTH CARE EDUCATION/TRAINING PROGRAM

## 2022-09-17 PROCEDURE — 99233 PR SUBSEQUENT HOSPITAL CARE,LEVL III: ICD-10-PCS | Mod: ,,, | Performed by: INTERNAL MEDICINE

## 2022-09-17 PROCEDURE — 99223 1ST HOSP IP/OBS HIGH 75: CPT | Mod: FS,,,

## 2022-09-17 PROCEDURE — 25500020 PHARM REV CODE 255: Performed by: INTERNAL MEDICINE

## 2022-09-17 PROCEDURE — 36415 COLL VENOUS BLD VENIPUNCTURE: CPT | Performed by: STUDENT IN AN ORGANIZED HEALTH CARE EDUCATION/TRAINING PROGRAM

## 2022-09-17 PROCEDURE — 85610 PROTHROMBIN TIME: CPT | Performed by: STUDENT IN AN ORGANIZED HEALTH CARE EDUCATION/TRAINING PROGRAM

## 2022-09-17 PROCEDURE — 85652 RBC SED RATE AUTOMATED: CPT | Performed by: STUDENT IN AN ORGANIZED HEALTH CARE EDUCATION/TRAINING PROGRAM

## 2022-09-17 PROCEDURE — 86140 C-REACTIVE PROTEIN: CPT | Performed by: STUDENT IN AN ORGANIZED HEALTH CARE EDUCATION/TRAINING PROGRAM

## 2022-09-17 PROCEDURE — 86780 TREPONEMA PALLIDUM: CPT | Performed by: STUDENT IN AN ORGANIZED HEALTH CARE EDUCATION/TRAINING PROGRAM

## 2022-09-17 RX ORDER — MUPIROCIN 20 MG/G
OINTMENT TOPICAL 2 TIMES DAILY
Status: DISPENSED | OUTPATIENT
Start: 2022-09-18 | End: 2022-09-23

## 2022-09-17 RX ORDER — OXCARBAZEPINE 600 MG/1
1200 TABLET, FILM COATED ORAL
Status: DISCONTINUED | OUTPATIENT
Start: 2022-09-18 | End: 2022-10-01 | Stop reason: HOSPADM

## 2022-09-17 RX ORDER — HYDROCODONE BITARTRATE AND ACETAMINOPHEN 500; 5 MG/1; MG/1
TABLET ORAL
Status: DISCONTINUED | OUTPATIENT
Start: 2022-09-17 | End: 2022-09-21

## 2022-09-17 RX ORDER — POTASSIUM CHLORIDE 750 MG/1
30 CAPSULE, EXTENDED RELEASE ORAL 2 TIMES DAILY
Status: COMPLETED | OUTPATIENT
Start: 2022-09-17 | End: 2022-09-17

## 2022-09-17 RX ORDER — OXCARBAZEPINE 600 MG/1
1200 TABLET, FILM COATED ORAL 2 TIMES DAILY
Status: DISCONTINUED | OUTPATIENT
Start: 2022-09-17 | End: 2022-09-17

## 2022-09-17 RX ORDER — OXCARBAZEPINE 600 MG/1
1200 TABLET, FILM COATED ORAL NIGHTLY
Status: DISCONTINUED | OUTPATIENT
Start: 2022-09-17 | End: 2022-10-01 | Stop reason: HOSPADM

## 2022-09-17 RX ORDER — BUTALBITAL, ACETAMINOPHEN AND CAFFEINE 50; 325; 40 MG/1; MG/1; MG/1
1 TABLET ORAL EVERY 4 HOURS PRN
Status: DISCONTINUED | OUTPATIENT
Start: 2022-09-17 | End: 2022-09-19

## 2022-09-17 RX ADMIN — ONDANSETRON 4 MG: 2 INJECTION INTRAMUSCULAR; INTRAVENOUS at 07:09

## 2022-09-17 RX ADMIN — GABAPENTIN 300 MG: 300 CAPSULE ORAL at 03:09

## 2022-09-17 RX ADMIN — ACYCLOVIR 400 MG: 200 CAPSULE ORAL at 08:09

## 2022-09-17 RX ADMIN — FENOFIBRATE 160 MG: 160 TABLET ORAL at 08:09

## 2022-09-17 RX ADMIN — ONDANSETRON 4 MG: 2 INJECTION INTRAMUSCULAR; INTRAVENOUS at 10:09

## 2022-09-17 RX ADMIN — HYDROMORPHONE HYDROCHLORIDE 2 MG: 2 TABLET ORAL at 03:09

## 2022-09-17 RX ADMIN — HYDROMORPHONE HYDROCHLORIDE 2 MG: 2 TABLET ORAL at 08:09

## 2022-09-17 RX ADMIN — GABAPENTIN 300 MG: 300 CAPSULE ORAL at 08:09

## 2022-09-17 RX ADMIN — IOHEXOL 75 ML: 350 INJECTION, SOLUTION INTRAVENOUS at 08:09

## 2022-09-17 RX ADMIN — DILTIAZEM HYDROCHLORIDE 90 MG: 60 TABLET, FILM COATED ORAL at 05:09

## 2022-09-17 RX ADMIN — DIAZEPAM 10 MG: 5 TABLET ORAL at 05:09

## 2022-09-17 RX ADMIN — OXCARBAZEPINE 1200 MG: 600 TABLET, FILM COATED ORAL at 10:09

## 2022-09-17 RX ADMIN — BUSPIRONE HYDROCHLORIDE 10 MG: 10 TABLET ORAL at 08:09

## 2022-09-17 RX ADMIN — OXCARBAZEPINE 1200 MG: 600 TABLET, FILM COATED ORAL at 12:09

## 2022-09-17 RX ADMIN — POTASSIUM CHLORIDE 30 MEQ: 10 CAPSULE, COATED, EXTENDED RELEASE ORAL at 08:09

## 2022-09-17 RX ADMIN — ONDANSETRON 4 MG: 2 INJECTION INTRAMUSCULAR; INTRAVENOUS at 03:09

## 2022-09-17 RX ADMIN — ALLOPURINOL 300 MG: 300 TABLET ORAL at 08:09

## 2022-09-17 RX ADMIN — HYDROXYCHLOROQUINE SULFATE 200 MG: 200 TABLET ORAL at 08:09

## 2022-09-17 RX ADMIN — DIAZEPAM 10 MG: 5 TABLET ORAL at 12:09

## 2022-09-17 RX ADMIN — ATORVASTATIN CALCIUM 80 MG: 20 TABLET, FILM COATED ORAL at 08:09

## 2022-09-17 RX ADMIN — DILTIAZEM HYDROCHLORIDE 90 MG: 60 TABLET, FILM COATED ORAL at 12:09

## 2022-09-17 RX ADMIN — QUETIAPINE FUMARATE 200 MG: 200 TABLET ORAL at 08:09

## 2022-09-17 NOTE — ASSESSMENT & PLAN NOTE
Follows with Dr. Jenkins.  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  Was treated with inotuzumab ozogamicin and ponatinib but course complicated by severe cytopenias. Not has been on therapy for past few weeks.  Not a candidate for allogeneic stem cell transplant at this time.  - Holding ponatinib at this time because of reported arterial/venous thrombosis side effect in setting of possible central retinal artery occlusion/vision loss.

## 2022-09-17 NOTE — PROGRESS NOTES
Chief complaint/Reason for Consult: L eye vision loss since Tuesday     History of Present Illness: Deepti Gonzalez is a 51 y.o. 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 She says she was watching TV on Monday when suddenly she noticed her L eye lost vision. When she closed her R eye, she saw shadowy shapes and figures. There were no associated symptoms or pain of her left eye. An hour later, she lost all vision from her L eye including of light. She saw an outside ophthalmologist on Tuesday who did not see evidence of ocular etiology of the vision loss. Her vision did not improve at all and she began developing increasing pain from her L eye over the last few days. No prior ocular history.    Interval hx:  NAEON    Past Ocular Hx: no past ocular surgeries, wears glasses normally.    Current eye gtts: none      PMHx:  has a past medical history of Cancer, COPD (chronic obstructive pulmonary disease), Hypertension, Rheumatoid arthritis, unspecified, Seizures, Stroke, and Type 2 diabetes mellitus with circulatory disorder, without long-term current use of insulin.     PSurgHx:  has a past surgical history that includes Appendectomy; Tonsillectomy; Rotator cuff repair (Bilateral); Tubal ligation; and Hysterectomy.     Home Medications:   Prior to Admission medications    Medication Sig Start Date End Date Taking? Authorizing Provider   acyclovir (ZOVIRAX) 400 MG tablet Take 1 tablet (400 mg total) by mouth 2 (two) times daily. 12/7/21   Kathrine Posey MD   allopurinoL (ZYLOPRIM) 300 MG tablet Take 1 tablet (300 mg total) by mouth once daily. 6/7/22   Connor M Gillies, MD   artificial tears (ISOPTO TEARS) 0.5 % ophthalmic solution Place 1 drop into both eyes 4 (four) times daily as needed. 1/24/22   Mirtha Antonio NP   atorvastatin (LIPITOR) 80 MG tablet Take 80 mg  by mouth once daily.    Historical Provider   blood sugar diagnostic Strp SMARTSIG:Via Meter 1 to 2 Times Daily 10/11/21   Historical Provider   busPIRone (BUSPAR) 10 MG tablet Take 10 mg by mouth 2 (two) times daily.    Historical Provider   dapagliflozin (FARXIGA) 10 mg tablet Take 10 mg by mouth once daily.    Historical Provider   diazePAM (VALIUM) 10 MG Tab Take 10 mg by mouth 2 (two) times daily as needed.    Historical Provider   diltiaZEM (CARDIZEM) 90 MG tablet Take 1 tablet (90 mg total) by mouth every 6 (six) hours. 12/7/21 12/7/22  Kathrine Posey MD   almaraz's soln (benadryl 30 mL, mylanta 30 mL, LIDOcaine 30 mL, nystatin 30 mL) 120mL Take 10 mLs by mouth 4 (four) times daily as needed (mouth pain). 4/19/22   Mirtha Antonio NP   fenofibrate 160 MG Tab Take 160 mg by mouth once daily.    Historical Provider   gabapentin (NEURONTIN) 300 MG capsule Take 1 capsule (300 mg total) by mouth 3 (three) times daily. 12/7/21 12/7/22  Kathrine Posey MD   hydrOXYchloroQUINE (PLAQUENIL) 200 mg tablet Take 1 tablet (200 mg total) by mouth once daily. 5/19/22   Mirtha Antonio NP   hydrOXYzine pamoate (VISTARIL) 50 MG Cap Take 50 mg by mouth 2 (two) times daily as needed. 4/29/22   Historical Provider   LIDOcaine (LIDODERM) 5 % Place 1 patch onto the skin once daily. Remove & Discard patch within 12 hours or as directed by MD 6/6/22   Connor M Gillies, MD   losartan (COZAAR) 25 MG tablet Take 25 mg by mouth once daily. 11/12/21   Historical Provider   magnesium oxide (MAG-OX) 400 mg (241.3 mg magnesium) tablet Take 1 tablet by mouth once daily. 7/12/22   Historical Provider   metFORMIN (GLUCOPHAGE-XR) 500 MG ER 24hr tablet Take 1,000 mg by mouth 2 (two) times daily. 7/22/22   Historical Provider   naloxone (NARCAN) 4 mg/actuation Spry 4mg by nasal route as needed for opioid overdose; may repeat every 2-3 minutes in alternating nostrils until medical help arrives. Call 911 6/8/22   Ines Mccord MD   omeprazole  (PRILOSEC) 40 MG capsule Take 40 mg by mouth once daily.    Historical Provider   ondansetron (ZOFRAN-ODT) 8 MG TbDL Take 1 tablet (8 mg total) by mouth every 8 (eight) hours as needed (in case of chemo induced nausea). 8/9/22   Dayron Jenkins MD   ONETOUCH VERIO TEST STRIPS Strp SMARTSIG:Via Meter 1 to 2 Times Daily 4/6/22   Historical Provider   OXcarbazepine (TRILEPTAL) 600 MG Tab Take 1,200 mg by mouth 2 (two) times daily. 10/26/21   Historical Provider   polyethylene glycol (GLYCOLAX) 17 gram/dose powder Dissolve one capful (17 g) in liquid and take by mouth daily as needed (constipation). 12/7/21   Kathrine Posey MD   PONATinib (ICLUSIG) 30 mg Tab Take 1 tablet (30 mg) by mouth once daily. 9/7/22   Dayron Jenkins MD   potassium chloride (K-TAB) 20 mEq Take 20 mEq by mouth 2 (two) times daily. 7/12/22   Historical Provider   prochlorperazine (COMPAZINE) 10 MG tablet Take 1 tablet (10 mg total) by mouth every 6 (six) hours as needed for Nausea. 7/11/22 7/11/23  Dayron Jenkins MD   QUEtiapine (SEROQUEL) 200 MG Tab Take 200 mg by mouth every evening.    Historical Provider   rivaroxaban (XARELTO) 20 mg Tab Take 1 tablet (20 mg total) by mouth daily with dinner or evening meal. Hold until instructed to resume by provider due to low platelet count. 6/6/22   Connor M Gillies, MD   senna-docusate 8.6-50 mg (PERICOLACE) 8.6-50 mg per tablet Take 1 tablet by mouth 2 (two) times daily. 3/19/22   Felisha Goodwin DO   sumatriptan (IMITREX) 50 MG tablet Take 1 tablet (50 mg total) by mouth daily as needed (headache). 8/29/22 9/28/22  Dayron Jenkins MD   TRINTELLIX 20 mg Tab Take 1 tablet by mouth once daily. 3/8/22   Historical Provider   ursodioL (ACTIGALL) 300 mg capsule Take 1 capsule (300 mg total) by mouth 3 (three) times daily. 6/13/22 6/13/23  Dayron Jenkins MD        Medications this encounter:    acyclovir  400 mg Oral BID    allopurinoL  300 mg Oral Daily    atorvastatin  80 mg Oral Daily    busPIRone   10 mg Oral BID    diltiaZEM  90 mg Oral Q6H    fenofibrate  160 mg Oral Daily    gabapentin  300 mg Oral TID    hydrOXYchloroQUINE  200 mg Oral Daily    OXcarbazepine  1,200 mg Oral BID    potassium chloride  30 mEq Oral BID    QUEtiapine  200 mg Oral QHS       Allergies: is allergic to levetiracetam.     Social Hx:  reports that she quit smoking about 6 months ago. Her smoking use included cigarettes. She smoked an average of .5 packs per day. She has never used smokeless tobacco. She reports that she does not drink alcohol and does not use drugs.     Family Hx: No family history of glaucoma. family history is not on file.     ROS: As per HPI    Ocular examination/Dilated fundus examination:  Base Eye Exam       Visual Acuity (Snellen - Linear)         Right Left    Dist cc 20/20 NLP              Tonometry (Tonopen, 3:57 PM)         Right Left    Pressure 12 12              Pupils         Dark Light Shape React APD    Right 4 3 Round Brisk None    Left 4 4 Round Minimal 3+              Visual Fields         Right Left     Full               Extraocular Movement         Right Left     Full, Ortho Full, Ortho                  Slit Lamp and Fundus Exam       External Exam         Right Left    External Normal Normal              Pen Light Exam         Right Left    Lids/Lashes Normal Normal    Conjunctiva/Sclera White and quiet White and quiet    Cornea Clear Clear, <1mm round stromal scar at 6:30    Anterior Chamber Deep and quiet Deep and quiet    Iris Round and reactive Round and minimally reactive    Lens Clear Clear    Anterior Vitreous Normal Normal              Fundus Exam         Right Left    Disc Clear disc margins Clear disc margins    C/D Ratio 0.3 0.2    Macula Flat, attached Flat, attached    Vessels Normal Normal    Periphery Flat, attached, no h/t/d Flat, attached, no h/t/d                    Assessment/Plan:       Optic neuropathy, left  - Acute, progressive vision loss over 1 hour from normal to not  seeing light on 9/12/22. Painless at time, but deep achy retrobulbar pain developed over the following 24 hours which is exacerbated to a sharp, stabbing pain worst on lateral gaze but present on horizontal.  - 9/17/22 exam: right eye unremarkable. Left eye no light perception, nakia (3+) APD, equal pupil sizes, normal IOP. EOM grossly full and equal OU.   - CN exam otherwise full and equal both sides.  - No evidence of optic nerve or retinal pathology on dilated exam.    - Based on exam, most likely location of disease is posterior optic nerve / ophthalmic artery.    - In light of history of B-ALL, must consider infiltrative leukemic optic neuropathy which is an ophthalmologic emergency. Other ddx include ophthalmic vascular event, infectious including TB/syphilis and opportunistic given recent history of immunosuppression, inflammatory including sarcoidosis, demyelinating disease. Less likely metabolic given asymmetry of disease and normal diet w/o GI history.    - Discussed poor visual prognosis with patient given length of time at [facility]. Highest priority currently is to prevent any damage to the right eye.    - If evidence of infiltrative disease, need to rule out infectious then discuss treatment options as multi-disciplinary team.      Recommendations:  - ESR/CRP to evaluate for GCA (finished: ESR wnl, CRP mildly elevated - disease unlikely in this patient)  - Patient unable to obtain MRI imaging due to pacemaker.   - Recommend CT orbit (thin cuts) and head w/wo with CTA/CTV  - Infectious workup: syphilis, TB (placed 9/17/22)  - Sarcoidosis: Lysozyme ordered 9/17/22. Recent chest x-ray 1 month ago unremarkable. Can repeat if no other etiology found.  - Recommend neurology consult, strongly consider LP with broad workup.           Lucho Herrera MD  [employer] Ophthalmology PGY-3

## 2022-09-17 NOTE — PLAN OF CARE
Plan of care reviewed with pt and her spouse at the start of shift. Right chest port accessed with out difficulty. Pt experiencing nausea and vomiting controlled with IV ondansetron. Pt medicated for headache pain multiple times during the shift. Pt alert and orientated x4. Left pupil sluggish response to light - both pupils round. Moderate but equal strength to all extremities. VSS and afebrile. Pt denies having any appetite with nausea; blood sugars are monitored via lab work at this time. Will continue to monitor pt

## 2022-09-17 NOTE — SUBJECTIVE & OBJECTIVE
Subjective:     Interval History: pacemaker device not MRI compatible per EP, will need likely need LP Monday. Has left sided eye pain/headache barely relieved with dilaudid. CT head with CTV protocol/CT orbit with 1 mm cuts/CTA ordered stat    Objective:     Vital Signs (Most Recent):  Temp: 98.2 °F (36.8 °C) (09/17/22 1152)  Pulse: 69 (09/17/22 1152)  Resp: 18 (09/17/22 1152)  BP: 125/61 (09/17/22 1152)  SpO2: 96 % (09/17/22 1152) Vital Signs (24h Range):  Temp:  [98 °F (36.7 °C)-98.6 °F (37 °C)] 98.2 °F (36.8 °C)  Pulse:  [67-81] 69  Resp:  [15-19] 18  SpO2:  [93 %-97 %] 96 %  BP: (121-175)/(57-72) 125/61        Body mass index is 22.61 kg/m².  Body surface area is 1.64 meters squared.    ECOG SCORE           [unfilled]    Intake/Output - Last 3 Shifts         09/15 0700  09/16 0659 09/16 0700  09/17 0659 09/17 0700  09/18 0659    P.O.  240     Total Intake  240     Urine  150     Total Output  150     Net  +90                    Physical Exam  Constitutional:       General: She is not in acute distress.     Appearance: She is well-developed.   HENT:      Head: Normocephalic and atraumatic.      Nose: Nose normal. No congestion.   Eyes:      General: No scleral icterus.     Extraocular Movements: Extraocular movements intact.      Comments: Left eye pupil not reactive. Left eye peripheral vision not intact. Reports eye pain with movement   Cardiovascular:      Rate and Rhythm: Normal rate and regular rhythm.      Heart sounds: No murmur heard.  Pulmonary:      Effort: Pulmonary effort is normal. No respiratory distress.   Abdominal:      General: There is no distension.      Palpations: Abdomen is soft.      Tenderness: There is no abdominal tenderness.   Musculoskeletal:         General: Normal range of motion.      Cervical back: Normal range of motion and neck supple.   Skin:     General: Skin is warm and dry.   Neurological:      General: No focal deficit present.      Mental Status: She is alert and oriented  to person, place, and time.   Psychiatric:         Mood and Affect: Mood normal.         Behavior: Behavior normal.       Significant Labs:   CBC:   Recent Labs   Lab 09/16/22 1656 09/17/22  0341   WBC 6.37 6.49   HGB 9.9* 9.3*   HCT 27.5* 26.0*   PLT 20* 19*   , CMP:   Recent Labs   Lab 09/16/22 1656 09/17/22  0341    137   K 3.5 2.9*    101   CO2 23 23   GLU 82 70   BUN 8 10   CREATININE 0.5 0.5   CALCIUM 9.4 8.8   PROT 6.6 6.1   ALBUMIN 3.8 3.5   BILITOT 0.5 0.3   ALKPHOS 131 119   AST 11 10   ALT 8* 7*   ANIONGAP 11 13       Diagnostic Results:  None

## 2022-09-17 NOTE — PLAN OF CARE
Reviewing chart, noticed that per cardiology fellow's note that patient's pacemaker is not MRI compatible.  Given this, patient will need stat CT brain and orbits with and without contrast, will also need stat brain CTA and CTV imaging to rule out cavernous sinus thrombosis.  Also recommend formal neurology consult for full neurologic examination in light of benign ocular anatomy on exam. Will communicate this message to primary team.     I have reviewed the history and exam of the patient and agree with the resident's exam, assessment and plan.

## 2022-09-17 NOTE — PLAN OF CARE
Side rails up x2; call bell in place; bed in lowest, locked position; skid proof socks on; no evidence of skin breakdown; care plan explained to patient; pt remains free of injury.  Pt with poor appetite, voids per wolf, ambulated short distance with stand by assist. Pt returned from Ophthalmogy consult without incident. Frequent rounding in progress, VSS and afebrile.

## 2022-09-17 NOTE — ASSESSMENT & PLAN NOTE
Patient reports wolf placed last week and has been that time.  Attempt voiding trial prior to discharge  Will need urology follow up at discharge.

## 2022-09-17 NOTE — PLAN OF CARE
Cardiology Plan of Care    Patient has [employer] PPM model 2240 (serial number [id number]) placed by Dr. Colón 1/24/18. Confirmed with [employer] rep that device is NOT MRI compatible.       Gerardo Medina MD  Cardiology Fellow  Pager: [phone]

## 2022-09-17 NOTE — ASSESSMENT & PLAN NOTE
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 Concern for possible B-ALL involvement vs side effect of ponatinib vs retrobulbar hemorrhage vs stroke    Plan:  - Consulted Opthomology, appreciate assistance  - Will likely need LP to evaluate for possible B-ALL   - Patient with pacemaker and per patient, not compatible with MRI. Would ideally obtain MRI brain w/ w/o contrast. Verified with EP who checked with rep that pacemaker is not MRI compatible.   - STAT CT orbits w/wo, CT head w/wo, and CTA ordered. CT venogram protocol to be added to CT head  - neurology consulted, appreciate recs. Depending on CT results, may need vascular neurology if stroke present.

## 2022-09-17 NOTE — PLAN OF CARE
Pt involved in plan of care and communicating needs throughout shift. Pt alert and oriented x4. Pt c/o of back pain and L eye pain. PRN Dilaudid and Valium given with moderate relief. Zofran given for intermittent nausea.  Neurology consulted. CT Orbits with contrast completed today. Green in for urinary retention. Quantiferon lab unable to be collected d/t results being unable to be resulted until Monday. All VSS; no acute events so far this shift.  Pt remaining free from falls or injury throughout shift; bed locked and in lowest position; call light within reach.  Pt instructed to call for assistance as needed.  Q1H rounding done on pt. WCTM.

## 2022-09-17 NOTE — HOSPITAL COURSE
09/17/2022 vision not improved, confirmed with EP that she cannot have MRI with her pacemaker. Will probably need LP for Monday. STAT CT head w/wo, CT orbit w/wo, CTA ordered per ophthal recs   09/18/2022 vision the same. CTA showed small focal SAH in left frontal lobe. NSGY consulted. Neurology following, vascular neuro commented. Repeat CTH shows stable appearance of SAH  09/19/2022 planning for IT cytarabine, hydrocortisone, MTX administration today. Labs to be drawn for LP  09/20/2022 Received IT chemo, LP significant for WBC count 546, 87% lymphs, CSF shows numerous blastoid atypical cells, correlate with flow cytometry. Discussed with neuro regarding twice weekly lumbar punctures for chemo vs Ommaya device for chemo administration.   09/21/2022 flow from CSF c/w B ALL. Planning for Ommaya placement tomorrow for twice weekly IT chemo. Will need platelets >70 K. Reports HA this am. Receives Fiorcet. Had episode of decreased vision to  right eye yesterday. Seen by ophthalmology, vision improved on its own, possibly due to medications. On Dex q 6, elevated glucose due to steroids, on mod SSI.   09/22/2022 plts at 58 not high enough for Ommaya. Will transfuse 1 more unit and pm CBC. LP scheduled for IT chemo  09/23/2022 plts 92, NSGY having difficulty with finding OR block time for Ommaya placement.   09/24/2022 plts 65, still has HA and OS vision loss. Optho planning for repeat DFE on 9/26, Ommaya placement planned for 9/26 pending plts >70  09/25/2022 plt 86, will transfuse 1 unit, CBC at midnight for possible overnight plt needs. HA still present. OS vision loss the same. Ommaya placement planned for tomorrow. CT head with stealth ordered by NSGY  09/26/2022 plt 98k, will transfuse 1u. Repeat CBC 141k. Plan for Ommaya placement today via [facility] followed by IT chemotherapy.   09/27/2022 now s/p Ommaya placement by [facility]. Will plan to wait until POD #5 to use Ommaya as per [facility].   09/28/2022 Patient w/ Tmax 102.8  overnight. Cultures drawn and cefepime started for empiric antimicrobial coverage. Plan for IT chemo today by flouro.   09/29/2022 Afebrile overnight, tmax 100.8F. Continues on cefepime. Blood cultures NGTD. UC in process. Patient reports persistent nausea all of yesterday with emesis after the LP. Will schedule IV zofran. CT head shows no new bleeding. POD 3 today of ommaya. Will keep platelets >50K at this time following post op and with previous small SAH. PT/OT consulted for dispo planning. Replacing K. Denies any diarrhea or constipation.  09/30/2022 Afebrile last 48hrs. Infectious workup negative. Stopping cefepime.  today. Patient reports dizziness upon standing. Orthostatic vitals positive, given 1L bolus and started on NS. Nausea controlled on scheduled zofran. Denies diarrhea or constipation. Will do voiding trial today with wolf removal. PT/OT recommending rehab however not suitable with requiring twice weekly IT chemotherapy, so will plan for outpatient PT/OT. POD 4 from ommaya. Decreased dex to q12h yesterday. Received 1unit PRBC for Hgb 6.7. Replaced phos. Will repeat BCR ABL p190 tomorrow  10/1/2022 NAEON. Afebrile. Plt 37. Transfuse 1 unit today. 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 . Discharge home on ppx (see med rec) with f/u planned for Monday and to start IT chemo via Ommaya. Referral to urology for urinary retention.  for wolf care at home.

## 2022-09-17 NOTE — PROGRESS NOTES
[facility] - Oncology ([facility])  Hematology  Bone Marrow Transplant  Progress Note    Patient Name: Deepti Gonzalez  Admission Date: 9/16/2022  Hospital Length of Stay: 1 days  Code Status: Full Code    Subjective:     Interval History: pacemaker device not MRI compatible per EP, will need likely need LP Monday. Has left sided eye pain/headache barely relieved with dilaudid. CT head with CTV protocol/CT orbit with 1 mm cuts/CTA ordered stat    Objective:     Vital Signs (Most Recent):  Temp: 98.2 °F (36.8 °C) (09/17/22 1152)  Pulse: 69 (09/17/22 1152)  Resp: 18 (09/17/22 1152)  BP: 125/61 (09/17/22 1152)  SpO2: 96 % (09/17/22 1152) Vital Signs (24h Range):  Temp:  [98 °F (36.7 °C)-98.6 °F (37 °C)] 98.2 °F (36.8 °C)  Pulse:  [67-81] 69  Resp:  [15-19] 18  SpO2:  [93 %-97 %] 96 %  BP: (121-175)/(57-72) 125/61        Body mass index is 22.61 kg/m².  Body surface area is 1.64 meters squared.    ECOG SCORE           [unfilled]    Intake/Output - Last 3 Shifts         09/15 0700  09/16 0659 09/16 0700  09/17 0659 09/17 0700  09/18 0659    P.O.  240     Total Intake  240     Urine  150     Total Output  150     Net  +90                    Physical Exam  Constitutional:       General: She is not in acute distress.     Appearance: She is well-developed.   HENT:      Head: Normocephalic and atraumatic.      Nose: Nose normal. No congestion.   Eyes:      General: No scleral icterus.     Extraocular Movements: Extraocular movements intact.      Comments: Left eye pupil not reactive. Left eye peripheral vision not intact. Reports eye pain with movement   Cardiovascular:      Rate and Rhythm: Normal rate and regular rhythm.      Heart sounds: No murmur heard.  Pulmonary:      Effort: Pulmonary effort is normal. No respiratory distress.   Abdominal:      General: There is no distension.      Palpations: Abdomen is soft.      Tenderness: There is no abdominal tenderness.   Musculoskeletal:         General: Normal range of motion.       Cervical back: Normal range of motion and neck supple.   Skin:     General: Skin is warm and dry.   Neurological:      General: No focal deficit present.      Mental Status: She is alert and oriented to person, place, and time.   Psychiatric:         Mood and Affect: Mood normal.         Behavior: Behavior normal.       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 Concern for possible B-ALL involvement vs side effect of ponatinib vs retrobulbar hemorrhage vs stroke    Plan:  - Consulted Opthomology, appreciate assistance  - Will likely need LP to evaluate for possible B-ALL   - Patient with pacemaker and per patient, not compatible with MRI. Would ideally obtain MRI brain w/ w/o contrast. Verified with EP who checked with rep that pacemaker is not MRI compatible.   - STAT CT orbits w/wo, CT head w/wo, and CTA ordered. CT venogram protocol to be added to CT head  - neurology consulted, appreciate recs. Depending on CT results, may need vascular neurology if stroke present.     Thrombocytopenia  Monitor CBC  Transfuse for platelet count <10    Urinary retention  Patient reports wolf placed last week and has been that time.  Attempt voiding trial prior to discharge  Will need urology follow up at discharge.     Anemia associated with chemotherapy  Monitor CBC  Transfuse for Hgb<7    B-cell acute lymphoblastic leukemia  Follows with Dr. Jenkins.  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  Was treated with inotuzumab ozogamicin and ponatinib but course complicated by severe cytopenias. Not has been on therapy for past few weeks.  Not a candidate for allogeneic stem cell transplant at this time.  - Holding ponatinib at this time because of reported arterial/venous thrombosis side effect in setting of possible central retinal artery occlusion/vision loss.     Anxiety  Continue home buspirone and quetiapine    Hypertension  Holding home BP meds in the setting of soft BP. Resume when appropriate.    Seizure disorder  Continue home oxcarbazepine 200mg QHS    Pacemaker  Patient with pacemaker in place due to SSS. Per EP, it is definitely MRI incompatible.  Will obtain CT imaging instead    Rheumatoid arthritis involving multiple sites  Seronegative RA. Continue home plaquenil    Hyperlipidemia  Continue home atorvastatin and fenofibrate    Paroxysmal atrial fibrillation  Holding home xarelto in the setting of thrombocytopenia        VTE Risk Mitigation (From admission, onward)         Ordered     IP VTE HIGH RISK PATIENT  Once         09/16/22 1637     Place sequential compression device  Until discontinued         09/16/22 1637     Reason for No Pharmacological VTE Prophylaxis  Once        Question:  Reasons:  Answer:  Thrombocytopenia    09/16/22 1637                Disposition: continue BMT admission    Pipe Hager MD  Bone Marrow Transplant  [facility] - [facility] ([facility])

## 2022-09-17 NOTE — ASSESSMENT & PLAN NOTE
Patient with pacemaker in place due to SSS. Per EP, it is definitely MRI incompatible.  Will obtain CT imaging instead

## 2022-09-18 PROBLEM — H54.60 MONOCULAR VISION LOSS: Status: ACTIVE | Noted: 2022-09-18

## 2022-09-18 LAB
ALBUMIN SERPL BCP-MCNC: 3.4 G/DL (ref 3.5–5.2)
ALP SERPL-CCNC: 110 U/L (ref 55–135)
ALT SERPL W/O P-5'-P-CCNC: 7 U/L (ref 10–44)
ANION GAP SERPL CALC-SCNC: 8 MMOL/L (ref 8–16)
AST SERPL-CCNC: 10 U/L (ref 10–40)
BASOPHILS # BLD AUTO: 0.01 K/UL (ref 0–0.2)
BASOPHILS NFR BLD: 0.2 % (ref 0–1.9)
BILIRUB SERPL-MCNC: 0.4 MG/DL (ref 0.1–1)
BLD PROD TYP BPU: NORMAL
BLOOD UNIT EXPIRATION DATE: NORMAL
BLOOD UNIT TYPE CODE: 6200
BLOOD UNIT TYPE: NORMAL
BUN SERPL-MCNC: 10 MG/DL (ref 6–20)
CALCIUM SERPL-MCNC: 9.1 MG/DL (ref 8.7–10.5)
CHLORIDE SERPL-SCNC: 102 MMOL/L (ref 95–110)
CO2 SERPL-SCNC: 25 MMOL/L (ref 23–29)
CODING SYSTEM: NORMAL
CREAT SERPL-MCNC: 0.6 MG/DL (ref 0.5–1.4)
DIFFERENTIAL METHOD: ABNORMAL
DISPENSE STATUS: NORMAL
EOSINOPHIL # BLD AUTO: 0.1 K/UL (ref 0–0.5)
EOSINOPHIL NFR BLD: 1.3 % (ref 0–8)
ERYTHROCYTE [DISTWIDTH] IN BLOOD BY AUTOMATED COUNT: 18.9 % (ref 11.5–14.5)
EST. GFR  (NO RACE VARIABLE): >60 ML/MIN/1.73 M^2
GLUCOSE SERPL-MCNC: 109 MG/DL (ref 70–110)
HCT VFR BLD AUTO: 24.2 % (ref 37–48.5)
HGB BLD-MCNC: 8.3 G/DL (ref 12–16)
IMM GRANULOCYTES # BLD AUTO: 0.02 K/UL (ref 0–0.04)
IMM GRANULOCYTES NFR BLD AUTO: 0.4 % (ref 0–0.5)
LYMPHOCYTES # BLD AUTO: 2.3 K/UL (ref 1–4.8)
LYMPHOCYTES NFR BLD: 41.9 % (ref 18–48)
MAGNESIUM SERPL-MCNC: 1.7 MG/DL (ref 1.6–2.6)
MCH RBC QN AUTO: 39 PG (ref 27–31)
MCHC RBC AUTO-ENTMCNC: 34.3 G/DL (ref 32–36)
MCV RBC AUTO: 114 FL (ref 82–98)
MONOCYTES # BLD AUTO: 0.4 K/UL (ref 0.3–1)
MONOCYTES NFR BLD: 7.7 % (ref 4–15)
NEUTROPHILS # BLD AUTO: 2.7 K/UL (ref 1.8–7.7)
NEUTROPHILS NFR BLD: 48.5 % (ref 38–73)
NRBC BLD-RTO: 0 /100 WBC
PLATELET # BLD AUTO: 63 K/UL (ref 150–450)
PMV BLD AUTO: 10.5 FL (ref 9.2–12.9)
POCT GLUCOSE: 120 MG/DL (ref 70–110)
POCT GLUCOSE: 184 MG/DL (ref 70–110)
POTASSIUM SERPL-SCNC: 3.7 MMOL/L (ref 3.5–5.1)
PROT SERPL-MCNC: 6 G/DL (ref 6–8.4)
RBC # BLD AUTO: 2.13 M/UL (ref 4–5.4)
SODIUM SERPL-SCNC: 135 MMOL/L (ref 136–145)
UNIT NUMBER: NORMAL
WBC # BLD AUTO: 5.56 K/UL (ref 3.9–12.7)

## 2022-09-18 PROCEDURE — 25000003 PHARM REV CODE 250

## 2022-09-18 PROCEDURE — 63600175 PHARM REV CODE 636 W HCPCS

## 2022-09-18 PROCEDURE — 25000242 PHARM REV CODE 250 ALT 637 W/ HCPCS: Performed by: STUDENT IN AN ORGANIZED HEALTH CARE EDUCATION/TRAINING PROGRAM

## 2022-09-18 PROCEDURE — 99223 1ST HOSP IP/OBS HIGH 75: CPT | Mod: ,,, | Performed by: NEUROLOGICAL SURGERY

## 2022-09-18 PROCEDURE — 99223 1ST HOSP IP/OBS HIGH 75: CPT | Mod: ,,, | Performed by: PSYCHIATRY & NEUROLOGY

## 2022-09-18 PROCEDURE — 25000003 PHARM REV CODE 250: Performed by: STUDENT IN AN ORGANIZED HEALTH CARE EDUCATION/TRAINING PROGRAM

## 2022-09-18 PROCEDURE — 20600001 HC STEP DOWN PRIVATE ROOM

## 2022-09-18 PROCEDURE — 99223 PR INITIAL HOSPITAL CARE,LEVL III: ICD-10-PCS | Mod: ,,, | Performed by: PSYCHIATRY & NEUROLOGY

## 2022-09-18 PROCEDURE — 85025 COMPLETE CBC W/AUTO DIFF WBC: CPT | Performed by: STUDENT IN AN ORGANIZED HEALTH CARE EDUCATION/TRAINING PROGRAM

## 2022-09-18 PROCEDURE — 99223 PR INITIAL HOSPITAL CARE,LEVL III: ICD-10-PCS | Mod: ,,, | Performed by: NEUROLOGICAL SURGERY

## 2022-09-18 PROCEDURE — 99233 PR SUBSEQUENT HOSPITAL CARE,LEVL III: ICD-10-PCS | Mod: ,,, | Performed by: INTERNAL MEDICINE

## 2022-09-18 PROCEDURE — 25000003 PHARM REV CODE 250: Performed by: INTERNAL MEDICINE

## 2022-09-18 PROCEDURE — P9037 PLATE PHERES LEUKOREDU IRRAD: HCPCS | Performed by: STUDENT IN AN ORGANIZED HEALTH CARE EDUCATION/TRAINING PROGRAM

## 2022-09-18 PROCEDURE — 80053 COMPREHEN METABOLIC PANEL: CPT | Performed by: STUDENT IN AN ORGANIZED HEALTH CARE EDUCATION/TRAINING PROGRAM

## 2022-09-18 PROCEDURE — 99233 SBSQ HOSP IP/OBS HIGH 50: CPT | Mod: ,,, | Performed by: INTERNAL MEDICINE

## 2022-09-18 PROCEDURE — 83735 ASSAY OF MAGNESIUM: CPT | Performed by: STUDENT IN AN ORGANIZED HEALTH CARE EDUCATION/TRAINING PROGRAM

## 2022-09-18 PROCEDURE — 63600175 PHARM REV CODE 636 W HCPCS: Performed by: STUDENT IN AN ORGANIZED HEALTH CARE EDUCATION/TRAINING PROGRAM

## 2022-09-18 RX ORDER — IBUPROFEN 200 MG
24 TABLET ORAL
Status: DISCONTINUED | OUTPATIENT
Start: 2022-09-18 | End: 2022-10-01 | Stop reason: HOSPADM

## 2022-09-18 RX ORDER — INSULIN ASPART 100 [IU]/ML
1-10 INJECTION, SOLUTION INTRAVENOUS; SUBCUTANEOUS
Status: DISCONTINUED | OUTPATIENT
Start: 2022-09-18 | End: 2022-10-01 | Stop reason: HOSPADM

## 2022-09-18 RX ORDER — IBUPROFEN 200 MG
16 TABLET ORAL
Status: DISCONTINUED | OUTPATIENT
Start: 2022-09-18 | End: 2022-10-01 | Stop reason: HOSPADM

## 2022-09-18 RX ORDER — GLUCAGON 1 MG
1 KIT INJECTION
Status: DISCONTINUED | OUTPATIENT
Start: 2022-09-18 | End: 2022-10-01 | Stop reason: HOSPADM

## 2022-09-18 RX ORDER — POTASSIUM CHLORIDE 20 MEQ/1
40 TABLET, EXTENDED RELEASE ORAL ONCE
Status: COMPLETED | OUTPATIENT
Start: 2022-09-18 | End: 2022-09-18

## 2022-09-18 RX ORDER — HYDROMORPHONE HYDROCHLORIDE 2 MG/1
2 TABLET ORAL
Status: DISCONTINUED | OUTPATIENT
Start: 2022-09-18 | End: 2022-09-19

## 2022-09-18 RX ADMIN — BUSPIRONE HYDROCHLORIDE 10 MG: 10 TABLET ORAL at 08:09

## 2022-09-18 RX ADMIN — DILTIAZEM HYDROCHLORIDE 90 MG: 60 TABLET, FILM COATED ORAL at 05:09

## 2022-09-18 RX ADMIN — DILTIAZEM HYDROCHLORIDE 90 MG: 60 TABLET, FILM COATED ORAL at 01:09

## 2022-09-18 RX ADMIN — METHYLPREDNISOLONE SODIUM SUCCINATE 1000 MG: 1 INJECTION, POWDER, LYOPHILIZED, FOR SOLUTION INTRAMUSCULAR; INTRAVENOUS at 05:09

## 2022-09-18 RX ADMIN — BUTALBITAL, ACETAMINOPHEN, AND CAFFEINE 1 TABLET: 50; 325; 40 TABLET ORAL at 03:09

## 2022-09-18 RX ADMIN — QUETIAPINE FUMARATE 200 MG: 200 TABLET ORAL at 08:09

## 2022-09-18 RX ADMIN — FENOFIBRATE 160 MG: 160 TABLET ORAL at 08:09

## 2022-09-18 RX ADMIN — HYDROMORPHONE HYDROCHLORIDE 2 MG: 2 TABLET ORAL at 05:09

## 2022-09-18 RX ADMIN — POTASSIUM CHLORIDE 40 MEQ: 1500 TABLET, EXTENDED RELEASE ORAL at 08:09

## 2022-09-18 RX ADMIN — GABAPENTIN 300 MG: 300 CAPSULE ORAL at 08:09

## 2022-09-18 RX ADMIN — DILTIAZEM HYDROCHLORIDE 90 MG: 60 TABLET, FILM COATED ORAL at 07:09

## 2022-09-18 RX ADMIN — ATORVASTATIN CALCIUM 80 MG: 20 TABLET, FILM COATED ORAL at 08:09

## 2022-09-18 RX ADMIN — DIAZEPAM 10 MG: 5 TABLET ORAL at 08:09

## 2022-09-18 RX ADMIN — ONDANSETRON 4 MG: 2 INJECTION INTRAMUSCULAR; INTRAVENOUS at 08:09

## 2022-09-18 RX ADMIN — HYDROXYCHLOROQUINE SULFATE 200 MG: 200 TABLET ORAL at 08:09

## 2022-09-18 RX ADMIN — MUPIROCIN: 20 OINTMENT TOPICAL at 08:09

## 2022-09-18 RX ADMIN — BUTALBITAL, ACETAMINOPHEN, AND CAFFEINE 1 TABLET: 50; 325; 40 TABLET ORAL at 06:09

## 2022-09-18 RX ADMIN — GABAPENTIN 300 MG: 300 CAPSULE ORAL at 03:09

## 2022-09-18 RX ADMIN — ACYCLOVIR 400 MG: 200 CAPSULE ORAL at 08:09

## 2022-09-18 RX ADMIN — ONDANSETRON 4 MG: 2 INJECTION INTRAMUSCULAR; INTRAVENOUS at 05:09

## 2022-09-18 RX ADMIN — HYDROMORPHONE HYDROCHLORIDE 2 MG: 2 TABLET ORAL at 10:09

## 2022-09-18 RX ADMIN — BUTALBITAL, ACETAMINOPHEN, AND CAFFEINE 1 TABLET: 50; 325; 40 TABLET ORAL at 09:09

## 2022-09-18 RX ADMIN — OXCARBAZEPINE 1200 MG: 600 TABLET, FILM COATED ORAL at 10:09

## 2022-09-18 RX ADMIN — ALLOPURINOL 300 MG: 300 TABLET ORAL at 08:09

## 2022-09-18 RX ADMIN — DILTIAZEM HYDROCHLORIDE 90 MG: 60 TABLET, FILM COATED ORAL at 12:09

## 2022-09-18 RX ADMIN — OXCARBAZEPINE 1200 MG: 600 TABLET, FILM COATED ORAL at 12:09

## 2022-09-18 RX ADMIN — HYDROMORPHONE HYDROCHLORIDE 2 MG: 2 TABLET ORAL at 01:09

## 2022-09-18 RX ADMIN — HYDROMORPHONE HYDROCHLORIDE 2 MG: 2 TABLET ORAL at 09:09

## 2022-09-18 NOTE — PROGRESS NOTES
Roberto Yepez - Oncology ([facility])  Vascular Neurology  Comprehensive Stroke Center  Progress Note    Assessment/Plan:     * Vision loss of left eye  Per Optho    Monocular vision loss  -Patient with left monocular vision loss. Vision changes since 9/13.  -Ophtho saw them 9/17 and believed this was optic neuropathy and recommended ESR/CRP/MRI Brain orbits.  -Patient unable to get MRI 2/2 pacemaker incompatibility.   -CTA with SAH for which stroke was consulted. SAH likely an incidental finding and does not explain vision changes. PION can also occur due to chronic microvascular disease. No evidence of stroke on imaging or GCA (sed rate 33).  -Neurology consulted, saw patient 9/18, and think findings are more consistent with optic neuritis and recommend LP pending plt count improvement and IV methylpred pending sx course.     SAH (subarachnoid hemorrhage)  50 yo female PMHx of afib, rheumatoid arthritis, pacemaker in place, DM, HTN and Bcell acute leukemia who developed sudden onset, painful left vision loss since 9/13. No trauma or recent falls. She was evaluated by ophtho yesterday who thought it could be optic neuropathy and recommended CRP and ESR, MRI brain/orbits. Patient unable to MRI 2/2 pacemaker not compatible. CTA was performed which showed small L frontal SAH for which vascular neurology was consulted.      -Patient with left monocular vision loss, subtle drift in bilateral lower extremities but this is her baseline. At baseline uses rollator and on occasion a wheelchair. Denied any trauma or falls. Plts 19 yesterday, up to 63 today.     -SAH finding on imaging is likely incidental and not contributing to her vision loss. 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 PION can occur due to chronic microvascular disease. There is no evidence of an acute stroke or strong evidence supporting GCA (sed rate 33).  Patient seen by general neurology who believe this is more likely an optic neuritis and are recommending obtaining an LP pending plt improvement and IV methylpred pending sx course.   -We will sign off at this time. Thank you for your consult. Please do not hesitate to contact us for any questions or concerns.      Antithrombotics: None SAH    Statins: none SAH    Aggressive risk factor modification: HTN, DM, leukemia, thrombocytopenia     Rehab efforts: The patient has been evaluated by a stroke team provider and the therapy needs have been fully considered based off the presenting complaints and exam findings. The following therapy evaluations are needed: None    Diagnostics ordered/pending: None     VTE prophylaxis: Mechanical prophylaxis: Place SCDs  None: Reason for No Pharmacological VTE Prophylaxis: History of systemic or intracranial bleeding    BP parameters: SAH: <140        B-cell acute lymphoblastic leukemia  Per heme/ONC    Pacemaker  Per cardiology's note, not MRI compatible    Paroxysmal atrial fibrillation  Risk factor  Rate control  Not on anticoagulation at this time due to thrombocytopenia         9/18/2022: Patient with left monocular vision loss, subtle drift in bilateral lower extremities but this is her baseline. At baseline uses rollator and on occasion a wheelchair. Denied any trauma or falls. Plts 19 yesterday. She was evaluated by ophtho yesterday who thought it could be optic neuropathy and recommended CRP and ESR, MRI brain/orbits. CTA was done which showed a small SAH in L frontal lobe. SAH finding on imaging is likely incidental and not contributing to her vision loss. 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 PION can occur due to chronic microvascular disease. There is no evidence of an acute stroke or strong evidence supporting GCA (sed rate 33). Patient seen by general  neurology who believe this is more likely an optic neuritis and are recommending obtaining an LP pending plt improvement and IV methylpred pending sx course. We will sign off at this time. Thank you for your consult. Please do not hesitate to contact us for any questions or concerns.      STROKE DOCUMENTATION        NIH Scale:  1a. Level of Consciousness: 0-->Alert, keenly responsive  1b. LOC Questions: 0-->Answers both questions correctly  1c. LOC Commands: 0-->Performs both tasks correctly  2. Best Gaze: 0-->Normal  3. Visual: 0-->No visual loss  4. Facial Palsy: 0-->Normal symmetrical movements  5a. Motor Arm, Left: 0-->No drift, limb holds 90 (or 45) degrees for full 10 secs  5b. Motor Arm, Right: 0-->No drift, limb holds 90 (or 45) degrees for full 10 secs  6a. Motor Leg, Left: 1-->Drift, leg falls by the end of the 5-sec period but does not hit bed (baseline)  6b. Motor Leg, Right: 1-->Drift, leg falls by the end of the 5-sec period but does not hit bed (baseline)  7. Limb Ataxia: 0-->Absent  8. Sensory: 0-->Normal, no sensory loss  9. Best Language: 0-->No aphasia, normal  10. Dysarthria: 0-->Normal  11. Extinction and Inattention (formerly Neglect): 0-->No abnormality  Total (NIH Stroke Scale): 2       Modified Lebo Score: 3  Jahaira Coma Scale:    ABCD2 Score:    [id number] Score:   HAS -BLED Score:   ICH Score:   Hunt & Amado Classification:Grade I     Hemorrhagic change of an Ischemic Stroke: Does this patient have an ischemic stroke with hemorrhagic changes? No     Neurologic Chief Complaint: SAH on CT    Subjective:     Interval History: Patient is seen for follow-up neurological assessment and treatment recommendations:     Patient with left monocular vision loss, subtle drift in bilateral lower extremities but this is her baseline. At baseline uses rollator and on occasion a wheelchair. Denied any trauma or falls. Plts 19 yesterday. She was evaluated by ophtho yesterday who thought it could be  optic neuropathy and recommended CRP and ESR, MRI brain/orbits. CTA was done which showed a small SAH in L frontal lobe. SAH finding on imaging is likely incidental and not contributing to her vision loss. 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 PION can occur due to chronic microvascular disease. There is no evidence of an acute stroke or strong evidence supporting GCA (sed rate 33). Patient seen by general neurology who believe this is more likely an optic neuritis and are recommending obtaining an LP pending plt improvement and IV methylpred pending sx course. We will sign off at this time. Thank you for your consult. Please do not hesitate to contact us for any questions or concerns.    HPI, Past Medical, Family, and Social History remains the same as documented in the initial encounter.     Review of Systems   Constitutional:  Negative for fever.   HENT:  Negative for trouble swallowing.    Eyes:  Positive for visual disturbance. Left eye pain.  Respiratory:  Negative for shortness of breath.    Cardiovascular:  Negative for chest pain.   Gastrointestinal:  Negative for nausea and vomiting.   Genitourinary:  Negative for difficulty urinating.   Musculoskeletal:  Negative for neck pain.   Skin:  Negative for rash.   Neurological:  Positive for weakness (generalized, baseline). Negative for speech difficulty and headaches.   Psychiatric/Behavioral:  Negative for confusion.    Scheduled Meds:   acyclovir  400 mg Oral BID    allopurinoL  300 mg Oral Daily    atorvastatin  80 mg Oral Daily    busPIRone  10 mg Oral BID    diltiaZEM  90 mg Oral Q6H    fenofibrate  160 mg Oral Daily    gabapentin  300 mg Oral TID    hydrOXYchloroQUINE  200 mg Oral Daily    mupirocin   Nasal BID    OXcarbazepine  1,200 mg Oral QHS    OXcarbazepine  1,200 mg Oral with lunch    QUEtiapine  200 mg Oral QHS      Continuous Infusions:  PRN Meds:sodium chloride, butalbital-acetaminophen-caffeine -40 mg, diazePAM, duke's soln (benadryl 30 mL, mylanta 30 mL, LIDOcaine 30 mL, nystatin 30 mL) 120 mL, HYDROmorphone, naloxone, ondansetron, sodium chloride 0.9%    Objective:     Vital Signs (Most Recent):  Temp: 98.3 °F (36.8 °C) (09/18/22 1120)  Pulse: 64 (09/18/22 1120)  Resp: 17 (09/18/22 1342)  BP: (!) 107/55 (09/18/22 1120)  SpO2: 98 % (09/18/22 1120)  BP Location: Left arm    Vital Signs Range (Last 24H):  Temp:  [97.8 °F (36.6 °C)-98.5 °F (36.9 °C)]   Pulse:  [61-70]   Resp:  [14-18]   BP: ()/(51-60)   SpO2:  [93 %-99 %]   BP Location: Left arm    Physical Exam  Vitals and nursing note reviewed.   Constitutional:       General: She is not in acute distress.  HENT:      Head: Normocephalic and atraumatic.      Right Ear: External ear normal.      Left Ear: External ear normal.      Nose: Nose normal.      Mouth/Throat:      Mouth: Mucous membranes are moist.   Eyes:      Extraocular Movements: Extraocular movements intact. Pain with EOM exam.     Conjunctiva/sclera: Conjunctivae normal.   Cardiovascular:      Rate and Rhythm: Normal rate.   Pulmonary:      Effort: Pulmonary effort is normal. No respiratory distress.   Abdominal:      General: There is no distension.   Musculoskeletal:      Cervical back: Normal range of motion.      Right lower leg: No edema.      Left lower leg: No edema.   Skin:     General: Skin is warm and dry.   Neurological:      Mental Status: She is alert and oriented to person, place, and time.      Sensory: No sensory deficit.      Motor: Weakness (bilateral lower extremities 4+/5) present.   Psychiatric:         Mood and Affect: Mood normal.         Behavior: Behavior normal.       Neurological Exam:   LOC: alert  Attention Span: Good   Language: No aphasia  Articulation: No dysarthria  Orientation: Person, Place, Time   Visual Fields: Left monocular vision loss  EOM (CN III, IV,  VI): Full/intact, painful  Facial Movement (CN VII): Symmetric facial expression    Motor: Arm left  Normal 5/5  Leg left  4+/5  Arm right  Normal 5/5  Leg right 4+/5  Sensation: Intact to light touch  Tone: Normal tone throughout    Laboratory:  CMP:   Recent Labs   Lab 09/18/22  0330   CALCIUM 9.1   ALBUMIN 3.4*   PROT 6.0   *   K 3.7   CO2 25      BUN 10   CREATININE 0.6   ALKPHOS 110   ALT 7*   AST 10   BILITOT 0.4     CBC:   Recent Labs   Lab 09/18/22  0330   WBC 5.56   RBC 2.13*   HGB 8.3*   HCT 24.2*   PLT 63*   *   MCH 39.0*   MCHC 34.3     Lipid Panel: No results for input(s): CHOL, LDLCALC, HDL, TRIG in the last 168 hours.  Coagulation:   Recent Labs   Lab 09/17/22 2228   INR 1.1   APTT 21.7     Platelet Aggregation Study: No results for input(s): PLTAGG, PLTAGINTERP, PLTAGREGLACO, ADPPLTAGGREG in the last 168 hours.  Hgb A1C: No results for input(s): HGBA1C in the last 168 hours.  TSH: No results for input(s): TSH in the last 168 hours.    Diagnostic Results     Brain/Vessel Imaging     CTH WO 9/18/2022  Unchanged appearance of presumed mild subarachnoid hemorrhage along the sulci of the left frontal lobe.  This is less likely cortical in location.  MR imaging would be helpful to exclude an intraparenchymal process.    CTA Head 9/17/2022  Small acute subarachnoid hemorrhage in the left frontal lobe.     No focal stenosis or large vessel occlusion.  No aneurysm seen.      Cardiac Imaging   EKG from 8/27/22 w NSR, HR 93bpm    ECHO 11/29/2021   The left ventricle is normal in size with normal systolic function. The estimated ejection fraction is 55%.   Normal right ventricular size with normal right ventricular systolic function.   Grade II left ventricular diastolic dysfunction with elevated left atrial pressures.   Mild left atrial enlargement.   The estimated PA systolic pressure is 35 mmHg.   Intermediate central venous pressure (8 mmHg).      Lissette Escobedo,  [facility]  Comprehensive Stroke Center  Department of Vascular Neurology   Roberto Yepez - Oncology [facility])

## 2022-09-18 NOTE — ASSESSMENT & PLAN NOTE
50 y/o female with very small amount SAH non traumatic to Left frontal are, patient very thrombocytopenic    Antithrombotics: None SAH    Statins: none SAH    Aggressive risk factor modification: HTN, DM, leukemia, thrombocytopenia     Rehab efforts: The patient has been evaluated by a stroke team provider and the therapy needs have been fully considered based off the presenting complaints and exam findings. The following therapy evaluations are needed: None    Diagnostics ordered/pending: None     VTE prophylaxis: Mechanical prophylaxis: Place SCDs  None: Reason for No Pharmacological VTE Prophylaxis: History of systemic or intracranial bleeding    BP parameters: SAH: <140

## 2022-09-18 NOTE — SUBJECTIVE & OBJECTIVE
Neurologic Chief Complaint: SAH on CT    Subjective:     Interval History: Patient is seen for follow-up neurological assessment and treatment recommendations:     Patient with left monocular vision loss, subtle drift in bilateral lower extremities but this is her baseline. At baseline uses rollator and on occasion a wheelchair. Denied any trauma or falls. Plts 19 yesterday. She was evaluated by ophtho yesterday who thought it could be optic neuropathy and recommended CRP and ESR, MRI brain/orbits. CTA was done which showed a small SAH in L frontal lobe. SAH finding on imaging is likely incidental and not contributing to her vision loss. 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 PION can occur due to chronic microvascular disease. There is no evidence of an acute stroke or strong evidence supporting GCA (sed rate 33). Patient seen by general neurology who believe this is more likely an optic neuritis and are recommending obtaining an LP pending plt improvement and IV methylpred pending sx course. We will sign off at this time. Thank you for your consult. Please do not hesitate to contact us for any questions or concerns.    HPI, Past Medical, Family, and Social History remains the same as documented in the initial encounter.     Review of Systems   Constitutional:  Negative for fever.   HENT:  Negative for trouble swallowing.    Eyes:  Positive for visual disturbance.   Respiratory:  Negative for shortness of breath.    Cardiovascular:  Negative for chest pain.   Gastrointestinal:  Negative for nausea and vomiting.   Genitourinary:  Negative for difficulty urinating.   Musculoskeletal:  Negative for neck pain.   Skin:  Negative for rash.   Neurological:  Positive for weakness (generalized, baseline). Negative for speech difficulty and headaches.   Psychiatric/Behavioral:  Negative for confusion.    Scheduled  Meds:   acyclovir  400 mg Oral BID    allopurinoL  300 mg Oral Daily    atorvastatin  80 mg Oral Daily    busPIRone  10 mg Oral BID    diltiaZEM  90 mg Oral Q6H    fenofibrate  160 mg Oral Daily    gabapentin  300 mg Oral TID    hydrOXYchloroQUINE  200 mg Oral Daily    mupirocin   Nasal BID    OXcarbazepine  1,200 mg Oral QHS    OXcarbazepine  1,200 mg Oral with lunch    QUEtiapine  200 mg Oral QHS     Continuous Infusions:  PRN Meds:sodium chloride, butalbital-acetaminophen-caffeine -40 mg, diazePAM, duke's soln (benadryl 30 mL, mylanta 30 mL, LIDOcaine 30 mL, nystatin 30 mL) 120 mL, HYDROmorphone, naloxone, ondansetron, sodium chloride 0.9%    Objective:     Vital Signs (Most Recent):  Temp: 98.3 °F (36.8 °C) (09/18/22 1120)  Pulse: 64 (09/18/22 1120)  Resp: 17 (09/18/22 1342)  BP: (!) 107/55 (09/18/22 1120)  SpO2: 98 % (09/18/22 1120)  BP Location: Left arm    Vital Signs Range (Last 24H):  Temp:  [97.8 °F (36.6 °C)-98.5 °F (36.9 °C)]   Pulse:  [61-70]   Resp:  [14-18]   BP: ()/(51-60)   SpO2:  [93 %-99 %]   BP Location: Left arm    Physical Exam  Vitals and nursing note reviewed.   Constitutional:       General: She is not in acute distress.  HENT:      Head: Normocephalic and atraumatic.      Right Ear: External ear normal.      Left Ear: External ear normal.      Nose: Nose normal.      Mouth/Throat:      Mouth: Mucous membranes are moist.   Eyes:      Extraocular Movements: Extraocular movements intact.      Conjunctiva/sclera: Conjunctivae normal.   Cardiovascular:      Rate and Rhythm: Normal rate.   Pulmonary:      Effort: Pulmonary effort is normal. No respiratory distress.   Abdominal:      General: There is no distension.   Musculoskeletal:      Cervical back: Normal range of motion.      Right lower leg: No edema.      Left lower leg: No edema.   Skin:     General: Skin is warm and dry.   Neurological:      Mental Status: She is alert and oriented to person, place, and time.      Sensory:  No sensory deficit.      Motor: Weakness (bilateral lower extremities 4+/5) present.   Psychiatric:         Mood and Affect: Mood normal.         Behavior: Behavior normal.       Neurological Exam:   LOC: alert  Attention Span: Good   Language: No aphasia  Articulation: No dysarthria  Orientation: Person, Place, Time   Visual Fields: Left monocular vision loss  EOM (CN III, IV, VI): Full/intact, painful  Facial Movement (CN VII): Symmetric facial expression    Motor: Arm left  Normal 5/5  Leg left  4+/5  Arm right  Normal 5/5  Leg right 4+/5  Sensation: Intact to light touch  Tone: Normal tone throughout    Laboratory:  CMP:   Recent Labs   Lab 09/18/22  0330   CALCIUM 9.1   ALBUMIN 3.4*   PROT 6.0   *   K 3.7   CO2 25      BUN 10   CREATININE 0.6   ALKPHOS 110   ALT 7*   AST 10   BILITOT 0.4     CBC:   Recent Labs   Lab 09/18/22  0330   WBC 5.56   RBC 2.13*   HGB 8.3*   HCT 24.2*   PLT 63*   *   MCH 39.0*   MCHC 34.3     Lipid Panel: No results for input(s): CHOL, LDLCALC, HDL, TRIG in the last 168 hours.  Coagulation:   Recent Labs   Lab 09/17/22 2228   INR 1.1   APTT 21.7     Platelet Aggregation Study: No results for input(s): PLTAGG, PLTAGINTERP, PLTAGREGLACO, ADPPLTAGGREG in the last 168 hours.  Hgb A1C: No results for input(s): HGBA1C in the last 168 hours.  TSH: No results for input(s): TSH in the last 168 hours.    Diagnostic Results     Brain/Vessel Imaging     CTH WO 9/18/2022  Unchanged appearance of presumed mild subarachnoid hemorrhage along the sulci of the left frontal lobe.  This is less likely cortical in location.  MR imaging would be helpful to exclude an intraparenchymal process.    CTA Head 9/17/2022  Small acute subarachnoid hemorrhage in the left frontal lobe.     No focal stenosis or large vessel occlusion.  No aneurysm seen.      Cardiac Imaging   EKG from 8/27/22 w NSR, HR 93bpm    ECHO 11/29/2021  The left ventricle is normal in size with normal systolic function. The  estimated ejection fraction is 55%.  Normal right ventricular size with normal right ventricular systolic function.  Grade II left ventricular diastolic dysfunction with elevated left atrial pressures.  Mild left atrial enlargement.  The estimated PA systolic pressure is 35 mmHg.  Intermediate central venous pressure (8 mmHg).

## 2022-09-18 NOTE — HOSPITAL COURSE
9/18/2022: Patient with left monocular vision loss, subtle drift in bilateral lower extremities but this is her baseline. At baseline uses rollator and on occasion a wheelchair. Denied any trauma or falls. Plts 19 yesterday. She was evaluated by ophtho yesterday who thought it could be optic neuropathy and recommended CRP and ESR, MRI brain/orbits. CTA was done which showed a small SAH in L frontal lobe. SAH finding on imaging is likely incidental and not contributing to her vision loss. 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 PION can occur due to chronic microvascular disease. There is no evidence of an acute stroke or strong evidence supporting GCA (sed rate 33). Patient seen by general neurology who believe this is more likely an optic neuritis and are recommending obtaining an LP pending plt improvement and IV methylpred pending sx course. We will sign off at this time. Thank you for your consult. Please do not hesitate to contact us for any questions or concerns.

## 2022-09-18 NOTE — ASSESSMENT & PLAN NOTE
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 Concern for possible B-ALL involvement vs side effect of ponatinib vs retrobulbar hemorrhage vs stroke    Plan:  - Consulted Opthomology, appreciate assistance  - Will likely need LP to evaluate for possible B-ALL   - Patient with pacemaker and per patient, not compatible with MRI. Would ideally obtain MRI brain w/ w/o contrast. Verified with EP who checked with rep that pacemaker is not MRI compatible.   - CTA completed with delayed venous phase revealing small SAH  - neurology consulted, would consider steroids for optic neuritis.   - LP tomorrow

## 2022-09-18 NOTE — SIGNIFICANT EVENT
"CTA showed "Small acute subarachnoid hemorrhage in the left frontal lobe". VSS. Neurologically intact. No falls. Had NCC and stroke team involved. NCC recommended CT head in the morning. Order placed at 6 am. Neuro check q2. Ordered one unit of plt given plt 19 and possible bleeding.   "

## 2022-09-18 NOTE — HPI
"Ms Gonzalez is a 52 y/o female, PMH of B-LLL, RA, COPD, paroxysmal Afib, pacemaker and DM who presented with acute loss of vision of her L eye. She states that her symptoms started last Tuesday 13th, while she was watching TV, with sudden onset of "foggy vision, followed by white and then black vision of everything". The next day she developed associated headache of moderate intensity that has been increasing, progressive onset, located left frontal predominantly around her L eye, L eye pain, nausea, and 3 episodes of vomit (which has been present over the past several months). She consulted with her optometrist on Wednesday who told her that the eye looked fine. She denies any loss of vision in the R eye or any additional neurological symptoms. Her vision has not improved since presentation. She is unable to receive MRI due to pacemaker, and CT/CTA was notable for small area of subarachnoid hemorrhage in the L frontal lobe.  "

## 2022-09-18 NOTE — PROGRESS NOTES
[facility]to [facility] - Oncology ([facility])  Hematology  Bone Marrow Transplant  Progress Note    Patient Name: Deepti Gonzalez  Admission Date: 9/16/2022  Hospital Length of Stay: 2 days  Code Status: Full Code    Subjective:     Interval History: NAOE, Still has severe HA of left side/behind her eye. Vision status unchanged, neuro intact.  CTA revealed small SAH in left frontal lobe, repeat CTH unchanged. NSGY consulted. Neuro considering optic neuritis, may consider starting steroids. LP tomorrow     Objective:     Vital Signs (Most Recent):  Temp: 98.3 °F (36.8 °C) (09/18/22 1120)  Pulse: 64 (09/18/22 1120)  Resp: 15 (09/18/22 1120)  BP: (!) 107/55 (09/18/22 1120)  SpO2: 98 % (09/18/22 1120)   Vital Signs (24h Range):  Temp:  [97.8 °F (36.6 °C)-98.5 °F (36.9 °C)] 98.3 °F (36.8 °C)  Pulse:  [61-70] 64  Resp:  [14-18] 15  SpO2:  [93 %-99 %] 98 %  BP: ()/(51-61) 107/55        Body mass index is 22.61 kg/m².  Body surface area is 1.64 meters squared.    ECOG SCORE           [unfilled]    Intake/Output - Last 3 Shifts         09/16 0700  09/17 0659 09/17 0700  09/18 0659 09/18 0700  09/19 0659    P.O. 240 960     Blood  316.4     Total Intake 240 1276.4     Urine 150 500 600    Total Output 150 500 600    Net +90 +776.4 -600                   Physical Exam  Constitutional:       General: She is not in acute distress.     Appearance: She is well-developed.   HENT:      Head: Normocephalic and atraumatic.      Nose: Nose normal. No congestion.   Eyes:      General: No scleral icterus.     Extraocular Movements: Extraocular movements intact.      Comments: Left eye pupil not reactive. Left eye peripheral vision not intact. Reports eye pain with movement   Cardiovascular:      Rate and Rhythm: Normal rate and regular rhythm.      Heart sounds: No murmur heard.  Pulmonary:      Effort: Pulmonary effort is normal. No respiratory distress.   Abdominal:      General: There is no distension.      Palpations: Abdomen is soft.       Tenderness: There is no abdominal tenderness.   Musculoskeletal:         General: Normal range of motion.      Cervical back: Normal range of motion and neck supple.   Skin:     General: Skin is warm and dry.   Neurological:      General: No focal deficit present.      Mental Status: She is alert and oriented to person, place, and time.   Psychiatric:         Mood and Affect: Mood normal.         Behavior: Behavior normal.       Significant Labs:   CBC:   Recent Labs   Lab 09/16/22 1656 09/17/22 0341 09/18/22  0330   WBC 6.37 6.49 5.56   HGB 9.9* 9.3* 8.3*   HCT 27.5* 26.0* 24.2*   PLT 20* 19* 63*   , CMP:   Recent Labs   Lab 09/16/22 1656 09/17/22 0341 09/18/22  0330    137 135*   K 3.5 2.9* 3.7    101 102   CO2 23 23 25   GLU 82 70 109   BUN 8 10 10   CREATININE 0.5 0.5 0.6   CALCIUM 9.4 8.8 9.1   PROT 6.6 6.1 6.0   ALBUMIN 3.8 3.5 3.4*   BILITOT 0.5 0.3 0.4   ALKPHOS 131 119 110   AST 11 10 10   ALT 8* 7* 7*   ANIONGAP 11 13 8   , and Coagulation:   Recent Labs   Lab 09/17/22  2228   INR 1.1   APTT 21.7       Diagnostic Results:  CT: unchanged appearance of mild SAH    Assessment/Plan:     * Vision loss of left eye  Patient is a 51 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 Concern for possible B-ALL involvement vs side effect of ponatinib vs retrobulbar hemorrhage vs stroke    Plan:  - Consulted Opthomology, appreciate assistance  - Will likely need LP to evaluate for possible B-ALL   - Patient with pacemaker and per patient, not compatible with MRI. Would ideally obtain MRI brain w/ w/o contrast. Verified with EP who checked with rep that pacemaker is not MRI compatible.   - CTA completed with delayed venous phase revealing small SAH  - neurology consulted, would consider steroids for optic neuritis.   - LP tomorrow    SAH (subarachnoid hemorrhage)  Small SAH discovered on CTA. Repeat CTH stable. No focal neuro deficits. Pt has ongoing left sided  headache and eye pain    - NSGY consulted, appreciate recs    Thrombocytopenia  Monitor CBC  Transfuse for platelet count <10 or whatever threshold IR wants for LP    Urinary retention  Patient reports wolf placed last week and has been that time.  Attempt voiding trial prior to discharge  Will need urology follow up at discharge.     Anemia associated with chemotherapy  Monitor CBC  Transfuse for Hgb<7    B-cell acute lymphoblastic leukemia  Follows with Dr. Jenkins.  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  Was treated with inotuzumab ozogamicin and ponatinib but course complicated by severe cytopenias. Not has been on therapy for past few weeks.  Not a candidate for allogeneic stem cell transplant at this time.  - Holding ponatinib at this time because of reported arterial/venous thrombosis side effect in setting of possible central retinal artery occlusion/vision loss.     Anxiety  Continue home buspirone and quetiapine    Hypertension  Holding home BP meds in the setting of soft BP. Resume when appropriate.    Seizure disorder  Continue home oxcarbazepine 200mg QHS    Pacemaker  Patient with pacemaker in place due to SSS. Per EP, it is definitely MRI incompatible.  Will obtain CT imaging instead    Rheumatoid arthritis involving multiple sites  Seronegative RA. Continue home plaquenil    Hyperlipidemia  Continue home atorvastatin and fenofibrate    Paroxysmal atrial fibrillation  Holding home xarelto in the setting of thrombocytopenia        VTE Risk Mitigation (From admission, onward)         Ordered     IP VTE HIGH RISK PATIENT  Once         09/16/22 1637     Place sequential compression device  Until discontinued         09/16/22 1637     Reason for No Pharmacological VTE Prophylaxis  Once        Question:  Reasons:  Answer:  Thrombocytopenia    09/16/22 1637                Disposition: continue admission to BMT    Pipe Hager MD  Bone  Marrow Transplant  [facility] - Oncology ([facility])

## 2022-09-18 NOTE — SUBJECTIVE & OBJECTIVE
Past Medical History:   Diagnosis Date    Cancer     COPD (chronic obstructive pulmonary disease)     Hypertension     Rheumatoid arthritis, unspecified     Seizures     Stroke     TIA x 4    Type 2 diabetes mellitus with circulatory disorder, without long-term current use of insulin      Past Surgical History:   Procedure Laterality Date    APPENDECTOMY      HYSTERECTOMY      ROTATOR CUFF REPAIR Bilateral     TONSILLECTOMY      TUBAL LIGATION        No current facility-administered medications on file prior to encounter.     Current Outpatient Medications on File Prior to Encounter   Medication Sig Dispense Refill    acyclovir (ZOVIRAX) 400 MG tablet Take 1 tablet (400 mg total) by mouth 2 (two) times daily. 60 tablet 11    allopurinoL (ZYLOPRIM) 300 MG tablet Take 1 tablet (300 mg total) by mouth once daily. 90 tablet 3    artificial tears (ISOPTO TEARS) 0.5 % ophthalmic solution Place 1 drop into both eyes 4 (four) times daily as needed.      atorvastatin (LIPITOR) 80 MG tablet Take 80 mg by mouth once daily.      busPIRone (BUSPAR) 10 MG tablet Take 10 mg by mouth 2 (two) times daily.      dapagliflozin (FARXIGA) 10 mg tablet Take 10 mg by mouth once daily.      diazePAM (VALIUM) 10 MG Tab Take 10 mg by mouth 2 (two) times daily as needed.      diltiaZEM (CARDIZEM) 90 MG tablet Take 1 tablet (90 mg total) by mouth every 6 (six) hours. 120 tablet 11    gabapentin (NEURONTIN) 300 MG capsule Take 1 capsule (300 mg total) by mouth 3 (three) times daily. 90 capsule 11    losartan (COZAAR) 25 MG tablet Take 25 mg by mouth once daily.      metFORMIN (GLUCOPHAGE-XR) 500 MG ER 24hr tablet Take 1,000 mg by mouth 2 (two) times daily.      omeprazole (PRILOSEC) 40 MG capsule Take 40 mg by mouth once daily.      ondansetron (ZOFRAN-ODT) 8 MG TbDL Take 1 tablet (8 mg total) by mouth every 8 (eight) hours as needed (in case of chemo induced nausea). 30 tablet 1    OXcarbazepine (TRILEPTAL) 600 MG Tab Take 1,200 mg by mouth 2  (two) times daily.      PONATinib (ICLUSIG) 30 mg Tab Take 1 tablet (30 mg) by mouth once daily. 30 tablet 0    potassium chloride (K-TAB) 20 mEq Take 20 mEq by mouth 2 (two) times daily.      prochlorperazine (COMPAZINE) 10 MG tablet Take 1 tablet (10 mg total) by mouth every 6 (six) hours as needed for Nausea. 30 tablet 2    QUEtiapine (SEROQUEL) 200 MG Tab Take 200 mg by mouth every evening.      senna-docusate 8.6-50 mg (PERICOLACE) 8.6-50 mg per tablet Take 1 tablet by mouth 2 (two) times daily. 60 tablet 3    sumatriptan (IMITREX) 50 MG tablet Take 1 tablet (50 mg total) by mouth daily as needed (headache). 30 tablet 2    TRINTELLIX 20 mg Tab Take 1 tablet by mouth once daily.      ursodioL (ACTIGALL) 300 mg capsule Take 1 capsule (300 mg total) by mouth 3 (three) times daily. 90 capsule 11    blood sugar diagnostic Strp SMARTSIG:Via Meter 1 to 2 Times Daily      duke's soln (benadryl 30 mL, mylanta 30 mL, LIDOcaine 30 mL, nystatin 30 mL) 120mL Take 10 mLs by mouth 4 (four) times daily as needed (mouth pain). 120 mL 0    fenofibrate 160 MG Tab Take 160 mg by mouth once daily.      hydrOXYchloroQUINE (PLAQUENIL) 200 mg tablet Take 1 tablet (200 mg total) by mouth once daily.      hydrOXYzine pamoate (VISTARIL) 50 MG Cap Take 50 mg by mouth 2 (two) times daily as needed.      LIDOcaine (LIDODERM) 5 % Place 1 patch onto the skin once daily. Remove & Discard patch within 12 hours or as directed by MD 30 patch 2    magnesium oxide (MAG-OX) 400 mg (241.3 mg magnesium) tablet Take 1 tablet by mouth once daily.      naloxone (NARCAN) 4 mg/actuation Spry 4mg by nasal route as needed for opioid overdose; may repeat every 2-3 minutes in alternating nostrils until medical help arrives. Call 911 1 each 11    ONETOUCH VERIO TEST STRIPS Strp SMARTSIG:Via Meter 1 to 2 Times Daily      polyethylene glycol (GLYCOLAX) 17 gram/dose powder Dissolve one capful (17 g) in liquid and take by mouth daily as needed (constipation). 510 g  0    rivaroxaban (XARELTO) 20 mg Tab Take 1 tablet (20 mg total) by mouth daily with dinner or evening meal. Hold until instructed to resume by provider due to low platelet count.        Allergies: Levetiracetam    History reviewed. No pertinent family history.  Social History     Tobacco Use    Smoking status: Former     Packs/day: 0.50     Types: Cigarettes     Quit date: 3/16/2022     Years since quittin.5    Smokeless tobacco: Never   Substance Use Topics    Alcohol use: No    Drug use: Never     Review of Systems   Constitutional:  Negative for chills and fever.   HENT:  Negative for rhinorrhea and trouble swallowing.    Eyes:  Positive for pain (left) and visual disturbance (left). Negative for photophobia and redness.   Respiratory:  Negative for shortness of breath.    Cardiovascular:  Negative for chest pain.   Gastrointestinal:  Positive for nausea. Negative for abdominal pain and vomiting.   Genitourinary:  Negative for dysuria.   Musculoskeletal:  Negative for neck pain.   Neurological:  Positive for headaches (left). Negative for dizziness, seizures, facial asymmetry, speech difficulty, weakness and numbness.   Psychiatric/Behavioral:  Negative for confusion.    Objective:     Vitals:    Temp: 97.8 °F (36.6 °C)  Pulse: 68  BP: 128/60  MAP (mmHg): 86  Resp: 18  SpO2: 96 %    Temp  Min: 97.8 °F (36.6 °C)  Max: 98.5 °F (36.9 °C)  Pulse  Min: 67  Max: 74  BP  Min: 110/57  Max: 128/60  MAP (mmHg)  Min: 76  Max: 87  Resp  Min: 15  Max: 19  SpO2  Min: 93 %  Max: 99 %     0701 -  0700  In: 240 [P.O.:240]  Out: 150 [Urine:150]           Physical Exam  Vitals and nursing note reviewed.   Constitutional:       General: She is not in acute distress.     Appearance: Normal appearance.      Comments: Well developed. Well nourished. Resting comfortably in bed.   HENT:      Head: Normocephalic and atraumatic.      Right Ear: External ear normal.      Left Ear: External ear normal.      Nose: Nose normal.       Mouth/Throat:      Mouth: Mucous membranes are moist.      Pharynx: Oropharynx is clear.   Eyes:      General: No scleral icterus.        Right eye: No discharge.         Left eye: No discharge.      Extraocular Movements: Extraocular movements intact.      Conjunctiva/sclera: Conjunctivae normal.      Pupils: Pupils are equal, round, and reactive to light.   Cardiovascular:      Rate and Rhythm: Normal rate and regular rhythm.   Pulmonary:      Effort: Pulmonary effort is normal. No respiratory distress.   Abdominal:      General: Abdomen is flat. There is no distension.   Musculoskeletal:      Right lower leg: No edema.      Left lower leg: No edema.   Skin:     General: Skin is warm and dry.   Neurological:      Mental Status: She is alert.      Comments: [id number]  Awake, alert, & oriented x 4. Speech fluent. Follows commands briskly.   Visual fields full to confrontation bilaterally, peripheral vision slightly worse in left eye. Pupils 7mm & equal, round, & reactive to light. Left pupil minimally reactive. EOMI. No nystagmus. SILT in V1, V2, V3. No facial asymmetry. Conversational hearing intact. Swallows without difficulty. Voice is not hoarse. Shoulder shrug intact. Tongue protrudes midline with no deviations or fasciculations.   ROLY & AG. Strength 5/5 & SILT in all 4 extremities.      Gait & coordination exams deferred.     Today I personally reviewed pertinent medications, lines/drains/airways, imaging, laboratory results, notably:  CTA head

## 2022-09-18 NOTE — ASSESSMENT & PLAN NOTE
52 y/o female with PMH of B-LLL, RA, COPD, paroxysmal Afib, pacemaker and DM who presented with acute loss of vision of her L eye on 9/13 with development of headaches predominantly around the L eye, eye pain, and N and V. PE positive for loss of visual loss in all 4 quadrants of left eye. CTH/CTA showed small SAH in the left frontal lobe, negative for LVO or significant vessel occlusions. MRI could not be performed due to pacemaker incompatibility. Labwork largely unrevealing at this time. Ophthalmology was consulted with no physical ocular abnormalities noted on exam. Vascular causes were r/o with imaging, and most likely cause at this time is an optic neuritis.    Recommendations  -LP with appropriate labs, pending platelet count improvement  -IV methylprednisolone 1g x3-5 days pending symptomatic course  -Neurology will continue to follow, please call with any additional questions or concerns.

## 2022-09-18 NOTE — SUBJECTIVE & OBJECTIVE
Subjective:     Interval History: NAOE, Still has severe HA of left side/behind her eye. Vision status unchanged, neuro intact.  CTA revealed small SAH in left frontal lobe, repeat CTH unchanged. NSGY consulted. Neuro considering optic neuritis, may consider starting steroids. LP tomorrow     Objective:     Vital Signs (Most Recent):  Temp: 98.3 °F (36.8 °C) (09/18/22 1120)  Pulse: 64 (09/18/22 1120)  Resp: 15 (09/18/22 1120)  BP: (!) 107/55 (09/18/22 1120)  SpO2: 98 % (09/18/22 1120)   Vital Signs (24h Range):  Temp:  [97.8 °F (36.6 °C)-98.5 °F (36.9 °C)] 98.3 °F (36.8 °C)  Pulse:  [61-70] 64  Resp:  [14-18] 15  SpO2:  [93 %-99 %] 98 %  BP: ()/(51-61) 107/55        Body mass index is 22.61 kg/m².  Body surface area is 1.64 meters squared.    ECOG SCORE           [unfilled]    Intake/Output - Last 3 Shifts         09/16 0700  09/17 0659 09/17 0700  09/18 0659 09/18 0700  09/19 0659    P.O. 240 960     Blood  316.4     Total Intake 240 1276.4     Urine 150 500 600    Total Output 150 500 600    Net +90 +776.4 -600                   Physical Exam  Constitutional:       General: She is not in acute distress.     Appearance: She is well-developed.   HENT:      Head: Normocephalic and atraumatic.      Nose: Nose normal. No congestion.   Eyes:      General: No scleral icterus.     Extraocular Movements: Extraocular movements intact.      Comments: Left eye pupil not reactive. Left eye peripheral vision not intact. Reports eye pain with movement   Cardiovascular:      Rate and Rhythm: Normal rate and regular rhythm.      Heart sounds: No murmur heard.  Pulmonary:      Effort: Pulmonary effort is normal. No respiratory distress.   Abdominal:      General: There is no distension.      Palpations: Abdomen is soft.      Tenderness: There is no abdominal tenderness.   Musculoskeletal:         General: Normal range of motion.      Cervical back: Normal range of motion and neck supple.   Skin:     General: Skin is warm and  dry.   Neurological:      General: No focal deficit present.      Mental Status: She is alert and oriented to person, place, and time.   Psychiatric:         Mood and Affect: Mood normal.         Behavior: Behavior normal.       Significant Labs:   CBC:   Recent Labs   Lab 09/16/22 1656 09/17/22 0341 09/18/22  0330   WBC 6.37 6.49 5.56   HGB 9.9* 9.3* 8.3*   HCT 27.5* 26.0* 24.2*   PLT 20* 19* 63*   , CMP:   Recent Labs   Lab 09/16/22 1656 09/17/22  0341 09/18/22  0330    137 135*   K 3.5 2.9* 3.7    101 102   CO2 23 23 25   GLU 82 70 109   BUN 8 10 10   CREATININE 0.5 0.5 0.6   CALCIUM 9.4 8.8 9.1   PROT 6.6 6.1 6.0   ALBUMIN 3.8 3.5 3.4*   BILITOT 0.5 0.3 0.4   ALKPHOS 131 119 110   AST 11 10 10   ALT 8* 7* 7*   ANIONGAP 11 13 8   , and Coagulation:   Recent Labs   Lab 09/17/22  2228   INR 1.1   APTT 21.7       Diagnostic Results:  CT: unchanged appearance of mild SAH

## 2022-09-18 NOTE — ASSESSMENT & PLAN NOTE
51 F Pmhx leukemia, presents with left sided vision loss 5 days ago while watching TV. NSGY consulted for incidentally found small left frontal SAH    SAH likely unrelated to vision loss  CTA negative for any vascular lesion or occlusion  Rpt CTH stable bleed    -- no neurosurgical intervention needed at this time  -- unfortunately patient cannot have MRI due to pacemaker, so no further imaging recommended at this time  -- Agree with continued ophthalmology workup

## 2022-09-18 NOTE — MEDICAL/APP STUDENT
"Subjective:       Patient ID: Deepti Gonzalez is a 51 y.o. female.    Chief Complaint: No chief complaint on file.    HPI    Ms Gonzalez is a 52 y/o female, PMH of B-LLL, RA, COPD, paroxysmal Afib, pacemaker and DM. Presented with loss of vision of her L eye. She states that her symptoms started last Tuesday 13th, while she was watching TV, sudden onset of "foggy vision, followed by white and then black vision in all the visual field", next day associated with headache of moderate intensity that has been increasing now 9/10, progressive onset, located left frontal predominantly around her L eye, L eye pain, nausea and 3 episodes of vomit (which she has been presenting over the past months). She consulted with her optometrist on Wednesday who told her that the eye looked fine. Denies loss of vision in the R eye and other neurological symptoms, her vision haven't improve.     Review of Systems   Constitutional:  Positive for fatigue. Negative for chills and fever.   HENT:  Negative for nasal congestion, ear pain, hearing loss, rhinorrhea, sore throat and tinnitus.    Eyes:  Positive for pain. Negative for photophobia, discharge, redness and itching.   Respiratory:  Negative for cough, chest tightness and shortness of breath.    Cardiovascular:  Negative for chest pain.   Gastrointestinal:  Positive for nausea and vomiting. Negative for constipation and diarrhea.   Genitourinary:  Negative for dysuria and hematuria.   Musculoskeletal:  Positive for arthralgias (Her hands, baseline RA).       .   Objective:      Physical Exam  Constitutional:       General: She is not in acute distress.     Appearance: Normal appearance. She is not ill-appearing or diaphoretic.   HENT:      Head: Normocephalic and atraumatic.   Eyes:      Extraocular Movements: Extraocular movements intact and EOM normal.      Pupils: Pupils are equal, round, and reactive to light.   Cardiovascular:      Rate and Rhythm: Normal rate and regular rhythm. "   Pulmonary:      Effort: Pulmonary effort is normal.      Breath sounds: Normal breath sounds.   Abdominal:      General: Abdomen is flat.   Musculoskeletal:         General: Normal range of motion.   Skin:     Capillary Refill: Capillary refill takes less than 2 seconds.   Neurological:      Mental Status: She is alert.      Motor: Motor strength is normal.      Coordination: Finger-Nose-Finger Test normal.      Deep Tendon Reflexes:      Reflex Scores:       Tricep reflexes are 2+ on the right side and 2+ on the left side.       Bicep reflexes are 2+ on the right side and 2+ on the left side.       Brachioradialis reflexes are 2+ on the right side and 2+ on the left side.       Patellar reflexes are 2+ on the right side and 2+ on the left side.       Achilles reflexes are 2+ on the right side and 2+ on the left side.  Psychiatric:         Speech: Speech normal.       NEUROLOGICAL EXAMINATION:     MENTAL STATUS   Oriented to person.   Oriented to place.   Oriented to time.   Registration: recalls 3 of 3 objects. Follows 3 step commands.   Attention: normal.   Speech: speech is normal   Level of consciousness: alert  Able to name object. Able to repeat. Normal comprehension.     CRANIAL NERVES     CN II   Right visual field deficit: none  Left visual field deficit: upper nasal, lower nasal, upper temporal and lower temporal quadrant(s)    CN III, IV, VI   Pupils are equal, round, and reactive to light.  Extraocular motions are normal.   Right pupil: Size: 4 mm. Shape: regular. Reactivity: brisk. Accommodation: intact.   Left pupil: Size: 4 mm. Shape: regular. Reactivity: brisk. Accommodation: intact.   CN III: no CN III palsy  CN VI: no CN VI palsy  Nystagmus: none     CN V   Facial sensation intact.     CN VII   Facial expression full, symmetric.     CN VIII   CN VIII normal.     CN IX, X   CN IX normal.     CN XI   CN XI normal.     CN XII   CN XII normal.     MOTOR EXAM   Muscle bulk: normal  Overall muscle  tone: normal    Strength   Strength 5/5 throughout.     REFLEXES     Reflexes   Right brachioradialis: 2+  Left brachioradialis: 2+  Right biceps: 2+  Left biceps: 2+  Right triceps: 2+  Left triceps: 2+  Right patellar: 2+  Left patellar: 2+  Right achilles: 2+  Left achilles: 2+  Right : 2+  Left : 2+  Right plantar: normal  Left plantar: normal    SENSORY EXAM   Light touch normal.     GAIT AND COORDINATION      Coordination   Finger to nose coordination: normal    Tremor   Resting tremor: present  Action tremor: right arm (low amplitude, rythmic, rapid tremor in her right arm, present in resting and worsens in intention)    Labs:   WBC 3.90 - 12.70 K/uL 5.56    RBC 4.00 - 5.40 M/uL 2.13 Low     Hemoglobin 12.0 - 16.0 g/dL 8.3 Low     Hematocrit 37.0 - 48.5 % 24.2 Low     MCV 82 - 98 fL 114 High     MCH 27.0 - 31.0 pg 39.0 High     MCHC 32.0 - 36.0 g/dL 34.3    RDW 11.5 - 14.5 % 18.9 High     Platelets 150 - 450 K/uL 63 Low     MPV 9.2 - 12.9 fL 10.5    Immature Granulocytes 0.0 - 0.5 % 0.4    Gran # (ANC) 1.8 - 7.7 K/uL 2.7    Immature Grans (Abs) 0.00 - 0.04 K/uL 0.02      Sodium 136 - 145 mmol/L 135 Low     Potassium 3.5 - 5.1 mmol/L 3.7    Chloride 95 - 110 mmol/L 102    CO2 23 - 29 mmol/L 25    Glucose 70 - 110 mg/dL 109    BUN 6 - 20 mg/dL 10    Creatinine 0.5 - 1.4 mg/dL 0.6    Calcium 8.7 - 10.5 mg/dL 9.1    Total Protein 6.0 - 8.4 g/dL 6.0    Albumin 3.5 - 5.2 g/dL 3.4 Low     Total Bilirubin 0.1 - 1.0 mg/dL 0.4      Sed Rate 0 - 36 mm/Hr 33        Imaging:   CT Head Without Contrast   Unchanged appearance of presumed mild subarachnoid hemorrhage along the sulci of the left frontal lobe.  This is less likely cortical in location.  MR imaging would be helpful to exclude an intraparenchymal process.     CTA Head   Small acute subarachnoid hemorrhage in the left frontal lobe.   No focal stenosis or large vessel occlusion.  No aneurysm seen.   This critical information above was relayed by   Shaan  by telephone to Andres Gonzalez MD on 09/17/2027 at 20:58.       Assessment:       50 y/o female with PMH of B-LLL, RA, COPD, paroxysmal Afib, pacemaker and DM. Presented with loss of vision of her L eye,of acute onset last Tuesday, associated with headaches predominantly around the L eye and eye pain, N and V, hasn't recover any vision. PE positive for loss of visual loss in 4 quadrants, no other abnormalities. CTH + CTA showed small SAH in the left frontal lobe, negative for LVO or significant vessel occlusions, MRI could not be performed given the pacemaker incompatibility, labs unremarkable until this moment. Ophthalmology consulted, considering working dx of optic neuropathy. Vascular causes were r/o, impression of possible optic neuritis work up to r/o abnormalities, will perform LP and start steroids given the high suspicious and the impossibility of doing MRI.     Problem List Items Addressed This Visit          Ophtho    Vision loss of left eye       Oncology    * (Principal) B-cell acute lymphoblastic leukemia - Primary    Relevant Orders    Admit to Inpatient (Completed)    Treponema Pallidum (Syphillis) Antibody    Quantiferon Gold TB    Lysozyme (Muramidase), Plasma     Other Visit Diagnoses       Acute lymphoblastic leukemia (ALL)                  Plan:       - Perform lumbar puncture.   - Start steroids course.   - Will continue f/u.

## 2022-09-18 NOTE — ASSESSMENT & PLAN NOTE
Small SAH discovered on CTA. Repeat CTH stable. No focal neuro deficits. Pt has ongoing left sided headache and eye pain    - NSGY consulted, appreciate recs

## 2022-09-18 NOTE — ASSESSMENT & PLAN NOTE
51 y.o. female with history of  PH+ B-ALL, RA, COPD, chronic thrombocytopenia, pAF with pacemaker (not MRI compatible), HTN, & HLD with acute left vision loss & pain.  - CTA with small left frontal SAH  - Recommend  - Repeat CTH in AM  - q4 neurochecks    Thank you for your consult. We will follow up with repeat imaging. Please call [phone] in case of any acute changes or further questions.

## 2022-09-18 NOTE — CONSULTS
[facility] - Oncology ([facility])  Vascular Neurology  Comprehensive Stroke Center  Consult Note    Inpatient consult to Vascular (Stroke) Neurology  Consult performed by: Rosa Isela Blum NP  Consult ordered by: Andres Gonzalez MD        Assessment/Plan:     Patient is a 51 y.o. year old female with:    * B-cell acute lymphoblastic leukemia  Per hemo/ONC    SAH (subarachnoid hemorrhage)  50 y/o female with very small amount SAH non traumatic to Left frontal are, patient very thrombocytopenic    Antithrombotics: None SAH    Statins: none SAH    Aggressive risk factor modification: HTN, DM, leukemia, thrombocytopenia     Rehab efforts: The patient has been evaluated by a stroke team provider and the therapy needs have been fully considered based off the presenting complaints and exam findings. The following therapy evaluations are needed: None    Diagnostics ordered/pending: None     VTE prophylaxis: Mechanical prophylaxis: Place SCDs  None: Reason for No Pharmacological VTE Prophylaxis: History of systemic or intracranial bleeding    BP parameters: SAH: <140        Vision loss of left eye  Per Optho    Paroxysmal atrial fibrillation  Risk factor  Rate control  No anticoagulation at this time due to thrombocytopenia        STROKE DOCUMENTATION          NIH Scale:  1a. Level of Consciousness: 0-->Alert, keenly responsive  1b. LOC Questions: 0-->Answers both questions correctly  1c. LOC Commands: 0-->Performs both tasks correctly  2. Best Gaze: 0-->Normal  3. Visual: 2-->Complete hemianopia  4. Facial Palsy: 0-->Normal symmetrical movements  5a. Motor Arm, Left: 0-->No drift, limb holds 90 (or 45) degrees for full 10 secs  5b. Motor Arm, Right: 0-->No drift, limb holds 90 (or 45) degrees for full 10 secs  6a. Motor Leg, Left: 0-->No drift, leg holds 30 degree position for full 5 secs  6b. Motor Leg, Right: 0-->No drift, leg holds 30 degree position for full 5 secs  7. Limb Ataxia: 0-->Absent  8. Sensory: 0-->Normal, no  "sensory loss  9. Best Language: 0-->No aphasia, normal  10. Dysarthria: 0-->Normal  11. Extinction and Inattention (formerly Neglect): 0-->No abnormality  Total (NIH Stroke Scale): 2    Modified Elie Score: 3  Jahaira Coma Scale:15   ABCD2 Score:    [id number] Score:   HAS -BLED Score:   ICH Score:   Hunt & Amado Classification:Grade I      Thrombolysis Candidate? No, CT findings (ICH, SAH, Large core infarct)     Delays to Thrombolysis?  Not Applicable    Interventional Revascularization Candidate?   Is the patient eligible for mechanical endovascular reperfusion (YAMILKA)?  No SAH    Delays to Thrombectomy? Not Applicable    Hemorrhagic change of an Ischemic Stroke: Does this patient have an ischemic stroke with hemorrhagic changes? No     Subjective:     History of Present Illness:  52 y/o female who has hx of acute lymphocytic leukemia with acute left eye vision loss and pain. She states that it started this past Tuesday (4 days ago). It was a sudden onset. She reports seeing "foggy", followed by "white" and then "black". She has not had a return to vision since then. She has associated eye pain. She has no issues with her right eye. She went to her Optometrist on Wednesday.  and patient report that optometrist took pictures of the eye and stated that "eye looked fine". She has associated headaches that have been worsening, especially around the left eye. She reports nausea that has been chronic for the past few months and vomiting. She vomited or retched about 3 times this morning.  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  Patient neuro intact. Patient thrombocytopenic platelets of 19.1      Case discussed with Vascular neurology Fellow Dr Dexter and recommendation to start Keppra 500 mg BID x 1 week for seizure prophylaxis.     Unable to get MRI brain due to pacemaker not compatible          Past Medical History:   Diagnosis " Date    Cancer     COPD (chronic obstructive pulmonary disease)     Hypertension     Rheumatoid arthritis, unspecified     SAH (subarachnoid hemorrhage) 2022    Seizures     Stroke     TIA x 4    Type 2 diabetes mellitus with circulatory disorder, without long-term current use of insulin      Past Surgical History:   Procedure Laterality Date    APPENDECTOMY      HYSTERECTOMY      ROTATOR CUFF REPAIR Bilateral     TONSILLECTOMY      TUBAL LIGATION       History reviewed. No pertinent family history.  Social History     Tobacco Use    Smoking status: Former     Packs/day: 0.50     Types: Cigarettes     Quit date: 3/16/2022     Years since quittin.5    Smokeless tobacco: Never   Substance Use Topics    Alcohol use: No    Drug use: Never     Review of patient's allergies indicates:   Allergen Reactions    Levetiracetam      Other reaction(s): Unknown  Other reaction(s): Unknown  Other reaction(s): Unknown       Medications: I have reviewed the current medication administration record.    Medications Prior to Admission   Medication Sig Dispense Refill Last Dose    acyclovir (ZOVIRAX) 400 MG tablet Take 1 tablet (400 mg total) by mouth 2 (two) times daily. 60 tablet 11 2022    allopurinoL (ZYLOPRIM) 300 MG tablet Take 1 tablet (300 mg total) by mouth once daily. 90 tablet 3 2022    artificial tears (ISOPTO TEARS) 0.5 % ophthalmic solution Place 1 drop into both eyes 4 (four) times daily as needed.   Past Week    atorvastatin (LIPITOR) 80 MG tablet Take 80 mg by mouth once daily.   2022    busPIRone (BUSPAR) 10 MG tablet Take 10 mg by mouth 2 (two) times daily.   2022    dapagliflozin (FARXIGA) 10 mg tablet Take 10 mg by mouth once daily.   2022    diazePAM (VALIUM) 10 MG Tab Take 10 mg by mouth 2 (two) times daily as needed.   2022    diltiaZEM (CARDIZEM) 90 MG tablet Take 1 tablet (90 mg total) by mouth every 6 (six) hours. 120 tablet 11 2022     gabapentin (NEURONTIN) 300 MG capsule Take 1 capsule (300 mg total) by mouth 3 (three) times daily. 90 capsule 11 9/16/2022    losartan (COZAAR) 25 MG tablet Take 25 mg by mouth once daily.   9/16/2022    metFORMIN (GLUCOPHAGE-XR) 500 MG ER 24hr tablet Take 1,000 mg by mouth 2 (two) times daily.   9/16/2022    omeprazole (PRILOSEC) 40 MG capsule Take 40 mg by mouth once daily.   9/16/2022    ondansetron (ZOFRAN-ODT) 8 MG TbDL Take 1 tablet (8 mg total) by mouth every 8 (eight) hours as needed (in case of chemo induced nausea). 30 tablet 1 9/16/2022    OXcarbazepine (TRILEPTAL) 600 MG Tab Take 1,200 mg by mouth 2 (two) times daily.   9/16/2022    PONATinib (ICLUSIG) 30 mg Tab Take 1 tablet (30 mg) by mouth once daily. 30 tablet 0 Past Week    potassium chloride (K-TAB) 20 mEq Take 20 mEq by mouth 2 (two) times daily.   9/16/2022    prochlorperazine (COMPAZINE) 10 MG tablet Take 1 tablet (10 mg total) by mouth every 6 (six) hours as needed for Nausea. 30 tablet 2 9/16/2022    QUEtiapine (SEROQUEL) 200 MG Tab Take 200 mg by mouth every evening.   9/15/2022    senna-docusate 8.6-50 mg (PERICOLACE) 8.6-50 mg per tablet Take 1 tablet by mouth 2 (two) times daily. 60 tablet 3 Past Week    sumatriptan (IMITREX) 50 MG tablet Take 1 tablet (50 mg total) by mouth daily as needed (headache). 30 tablet 2 Past Week    TRINTELLIX 20 mg Tab Take 1 tablet by mouth once daily.   9/16/2022    ursodioL (ACTIGALL) 300 mg capsule Take 1 capsule (300 mg total) by mouth 3 (three) times daily. 90 capsule 11 9/16/2022    blood sugar diagnostic Strp SMARTSIG:Via Meter 1 to 2 Times Daily       duke's soln (benadryl 30 mL, mylanta 30 mL, LIDOcaine 30 mL, nystatin 30 mL) 120mL Take 10 mLs by mouth 4 (four) times daily as needed (mouth pain). 120 mL 0 Unknown    fenofibrate 160 MG Tab Take 160 mg by mouth once daily.   Unknown    hydrOXYchloroQUINE (PLAQUENIL) 200 mg tablet Take 1 tablet (200 mg total) by mouth once daily.    Unknown    hydrOXYzine pamoate (VISTARIL) 50 MG Cap Take 50 mg by mouth 2 (two) times daily as needed.   Unknown    LIDOcaine (LIDODERM) 5 % Place 1 patch onto the skin once daily. Remove & Discard patch within 12 hours or as directed by MD 30 patch 2 Unknown    magnesium oxide (MAG-OX) 400 mg (241.3 mg magnesium) tablet Take 1 tablet by mouth once daily.   Unknown    naloxone (NARCAN) 4 mg/actuation Spry 4mg by nasal route as needed for opioid overdose; may repeat every 2-3 minutes in alternating nostrils until medical help arrives. Call 911 1 each 11 Unknown    ONETOUCH VERIO TEST STRIPS Strp SMARTSIG:Via Meter 1 to 2 Times Daily       polyethylene glycol (GLYCOLAX) 17 gram/dose powder Dissolve one capful (17 g) in liquid and take by mouth daily as needed (constipation). 510 g 0 Unknown    rivaroxaban (XARELTO) 20 mg Tab Take 1 tablet (20 mg total) by mouth daily with dinner or evening meal. Hold until instructed to resume by provider due to low platelet count.   More than a month       Review of Systems   Constitutional:  Positive for fatigue. Negative for chills and fever.   HENT:  Negative for ear discharge and ear pain.    Eyes:  Positive for pain and visual disturbance.   Gastrointestinal:  Positive for nausea. Negative for constipation.   Genitourinary:  Negative for dyspareunia and dysuria.   Musculoskeletal:  Positive for arthralgias. Negative for back pain.   Skin:  Negative for rash and wound.   Allergic/Immunologic: Positive for immunocompromised state.   Neurological:  Negative for speech difficulty and weakness.   Hematological:  Bruises/bleeds easily.   Psychiatric/Behavioral:  Negative for confusion. The patient is nervous/anxious.    Objective:     Vital Signs (Most Recent):  Temp: 98.4 °F (36.9 °C) (09/18/22 0149)  Pulse: 64 (09/18/22 0149)  Resp: 16 (09/18/22 0149)  BP: (!) 100/55 (09/18/22 0149)  SpO2: (!) 93 % (09/18/22 0149)    Vital Signs Range (Last 24H):  Temp:  [97.8 °F (36.6  °C)-98.5 °F (36.9 °C)]   Pulse:  [61-70]   Resp:  [14-19]   BP: ()/(54-61)   SpO2:  [93 %-99 %]     Physical Exam  Vitals and nursing note reviewed.   Constitutional:       Appearance: Normal appearance.   HENT:      Head: Normocephalic and atraumatic.      Comments: Visual loss left eye  Eyes:      Extraocular Movements: Extraocular movements intact.      Pupils: Pupils are equal, round, and reactive to light.   Cardiovascular:      Rate and Rhythm: Normal rate and regular rhythm.   Pulmonary:      Effort: Pulmonary effort is normal.      Breath sounds: Normal breath sounds.   Abdominal:      General: Abdomen is flat. Bowel sounds are normal.      Palpations: Abdomen is soft.   Musculoskeletal:         General: Normal range of motion.      Cervical back: Normal range of motion.   Skin:     General: Skin is warm and dry.   Neurological:      Mental Status: She is alert and oriented to person, place, and time.       Neurological Exam:   LOC: alert  Attention Span: Good   Language: No aphasia  Articulation: No dysarthria  Orientation: Person, Place, Time   Visual Fields: Hemianopsia left  EOM (CN III, IV, VI): Full/intact  Pupils (CN II, III): PERRL  Facial Sensation (CN V): Normal  Facial Movement (CN VII): Symmetric facial expression    Gag Reflex: present  Reflexes: 2+ throughout  Motor: Arm left  Normal 5/5  Leg left  Normal 5/5  Arm right  Normal 5/5  Leg right Normal 5/5  Cerebellum: No evidence of appendicular or axial ataxia  Sensation: Intact to light touch, temperature and vibration  Tone: Normal tone throughout      Laboratory:  CMP:   Recent Labs   Lab 09/17/22  0341   CALCIUM 8.8   ALBUMIN 3.5   PROT 6.1      K 2.9*   CO2 23      BUN 10   CREATININE 0.5   ALKPHOS 119   ALT 7*   AST 10   BILITOT 0.3     CBC:   Recent Labs   Lab 09/17/22  0341   WBC 6.49   RBC 2.32*   HGB 9.3*   HCT 26.0*   PLT 19*   *   MCH 40.1*   MCHC 35.8     Lipid Panel: No results for input(s): CHOL, LDLCALC,  HDL, TRIG in the last 168 hours.  Coagulation:   Recent Labs   Lab 09/17/22  2228   INR 1.1   APTT 21.7     Hgb A1C: No results for input(s): HGBA1C in the last 168 hours.  TSH: No results for input(s): TSH in the last 168 hours.    Diagnostic Results:      Brain imaging:      Vessel Imaging:  CTA Head 9-17-22 results:  Small acute subarachnoid hemorrhage in the left frontal lobe.     No focal stenosis or large vessel occlusion.  No aneurysm seen.    Cardiac Evaluation:           Rosa Isela Blum NP  Comprehensive Stroke Center  Department of Vascular Neurology   [facility] - Oncology ([facility])

## 2022-09-18 NOTE — ASSESSMENT & PLAN NOTE
-Patient with left monocular vision loss. Vision changes since 9/13.  -Ophtho saw them 9/17 and believed this was optic neuropathy and recommended ESR/CRP/MRI Brain orbits.  -Patient unable to get MRI 2/2 pacemaker incompatibility.   -CTA with SAH for which stroke was consulted. SAH likely an incidental finding and does not explain vision changes. PION can also occur due to chronic microvascular disease. No evidence of stroke on imaging or GCA (sed rate 33).  -Neurology consulted, saw patient 9/18, and think findings are more consistent with optic neuritis and recommend LP pending plt count improvement and IV methylpred pending sx course.

## 2022-09-18 NOTE — PLAN OF CARE
Pt involved in plan of care and communicating needs throughout shift. Pt alert and oriented x4. Pt c/o of back pain and L eye pain -  PRN Fioricet given with moderate relief. PRN Dilaudid and Valium given with moderate relief. Zofran given for intermittent nausea. Neurology consulted. Methylprednisolone given as ordered. Accuchecks ac/hs ordered. LP scheduled for tomorrow. Green in for urinary retention. All VSS; no acute events so far this shift.  Pt remaining free from falls or injury throughout shift; bed locked and in lowest position; call light within reach.  Pt instructed to call for assistance as needed.  Q1H rounding done on pt. WCTM.

## 2022-09-18 NOTE — CONSULTS
"Roberto Yepez - Oncology ([facility])  Neurocritical Care  Consult Note    Admit Date: 9/16/2022  Service Date: 09/17/2022  Length of Stay: 1    Inpatient consult to Neuro Critical Care  Consult performed by: Allyssa Dillon PA-C  Consult ordered by: Andres Gonzalez MD        Subjective:     Chief Complaint: Vision loss of left eye    History of Present Illness: Deepti Gonzalez is a 51 y.o. female with history of PH+ B-ALL, RA, COPD, chronic thrombocytopenia, pAF with pacemaker (not MRI compatible), HTN, & HLD who presents with left vision loss & eye pain since 9/13. Per chart review, the patient was watching TV and saw something "foggy," followed "white" and black" in her left visual field. She was seen by her OP ophthalmologist on 9/14, and had a normal dilated fundoscopic exam at the time. She was admitted to the hospital with concern for possible B-ALL involvement. She was seen IP ophthalmology, who CT orbit and CTH/CTA. CTA showed small left frontal SAH. Currently the patient endorses mild left eye pain, vision loss, left-sided headache, and nausea. She denies any speech difficulty, weakness, numbness, tingling, dizziness, and confusion. Her symptoms are unchanged since her admission. She reports no identifying, alleviating, or exacerbating factors. [facility].       Past Medical History:   Diagnosis Date    Cancer     COPD (chronic obstructive pulmonary disease)     Hypertension     Rheumatoid arthritis, unspecified     Seizures     Stroke     TIA x 4    Type 2 diabetes mellitus with circulatory disorder, without long-term current use of insulin      Past Surgical History:   Procedure Laterality Date    APPENDECTOMY      HYSTERECTOMY      ROTATOR CUFF REPAIR Bilateral     TONSILLECTOMY      TUBAL LIGATION        No current facility-administered medications on file prior to encounter.     Current Outpatient Medications on File Prior to Encounter   Medication Sig " Dispense Refill    acyclovir (ZOVIRAX) 400 MG tablet Take 1 tablet (400 mg total) by mouth 2 (two) times daily. 60 tablet 11    allopurinoL (ZYLOPRIM) 300 MG tablet Take 1 tablet (300 mg total) by mouth once daily. 90 tablet 3    artificial tears (ISOPTO TEARS) 0.5 % ophthalmic solution Place 1 drop into both eyes 4 (four) times daily as needed.      atorvastatin (LIPITOR) 80 MG tablet Take 80 mg by mouth once daily.      busPIRone (BUSPAR) 10 MG tablet Take 10 mg by mouth 2 (two) times daily.      dapagliflozin (FARXIGA) 10 mg tablet Take 10 mg by mouth once daily.      diazePAM (VALIUM) 10 MG Tab Take 10 mg by mouth 2 (two) times daily as needed.      diltiaZEM (CARDIZEM) 90 MG tablet Take 1 tablet (90 mg total) by mouth every 6 (six) hours. 120 tablet 11    gabapentin (NEURONTIN) 300 MG capsule Take 1 capsule (300 mg total) by mouth 3 (three) times daily. 90 capsule 11    losartan (COZAAR) 25 MG tablet Take 25 mg by mouth once daily.      metFORMIN (GLUCOPHAGE-XR) 500 MG ER 24hr tablet Take 1,000 mg by mouth 2 (two) times daily.      omeprazole (PRILOSEC) 40 MG capsule Take 40 mg by mouth once daily.      ondansetron (ZOFRAN-ODT) 8 MG TbDL Take 1 tablet (8 mg total) by mouth every 8 (eight) hours as needed (in case of chemo induced nausea). 30 tablet 1    OXcarbazepine (TRILEPTAL) 600 MG Tab Take 1,200 mg by mouth 2 (two) times daily.      PONATinib (ICLUSIG) 30 mg Tab Take 1 tablet (30 mg) by mouth once daily. 30 tablet 0    potassium chloride (K-TAB) 20 mEq Take 20 mEq by mouth 2 (two) times daily.      prochlorperazine (COMPAZINE) 10 MG tablet Take 1 tablet (10 mg total) by mouth every 6 (six) hours as needed for Nausea. 30 tablet 2    QUEtiapine (SEROQUEL) 200 MG Tab Take 200 mg by mouth every evening.      senna-docusate 8.6-50 mg (PERICOLACE) 8.6-50 mg per tablet Take 1 tablet by mouth 2 (two) times daily. 60 tablet 3    sumatriptan (IMITREX) 50 MG tablet Take 1 tablet (50 mg total) by  mouth daily as needed (headache). 30 tablet 2    TRINTELLIX 20 mg Tab Take 1 tablet by mouth once daily.      ursodioL (ACTIGALL) 300 mg capsule Take 1 capsule (300 mg total) by mouth 3 (three) times daily. 90 capsule 11    blood sugar diagnostic Strp SMARTSIG:Via Meter 1 to 2 Times Daily      duke's soln (benadryl 30 mL, mylanta 30 mL, LIDOcaine 30 mL, nystatin 30 mL) 120mL Take 10 mLs by mouth 4 (four) times daily as needed (mouth pain). 120 mL 0    fenofibrate 160 MG Tab Take 160 mg by mouth once daily.      hydrOXYchloroQUINE (PLAQUENIL) 200 mg tablet Take 1 tablet (200 mg total) by mouth once daily.      hydrOXYzine pamoate (VISTARIL) 50 MG Cap Take 50 mg by mouth 2 (two) times daily as needed.      LIDOcaine (LIDODERM) 5 % Place 1 patch onto the skin once daily. Remove & Discard patch within 12 hours or as directed by MD 30 patch 2    magnesium oxide (MAG-OX) 400 mg (241.3 mg magnesium) tablet Take 1 tablet by mouth once daily.      naloxone (NARCAN) 4 mg/actuation Spry 4mg by nasal route as needed for opioid overdose; may repeat every 2-3 minutes in alternating nostrils until medical help arrives. Call 911 1 each 11    ONETOUCH VERIO TEST STRIPS Strp SMARTSIG:Via Meter 1 to 2 Times Daily      polyethylene glycol (GLYCOLAX) 17 gram/dose powder Dissolve one capful (17 g) in liquid and take by mouth daily as needed (constipation). 510 g 0    rivaroxaban (XARELTO) 20 mg Tab Take 1 tablet (20 mg total) by mouth daily with dinner or evening meal. Hold until instructed to resume by provider due to low platelet count.        Allergies: Levetiracetam    History reviewed. No pertinent family history.  Social History     Tobacco Use    Smoking status: Former     Packs/day: 0.50     Types: Cigarettes     Quit date: 3/16/2022     Years since quittin.5    Smokeless tobacco: Never   Substance Use Topics    Alcohol use: No    Drug use: Never     Review of Systems   Constitutional:  Negative for chills  and fever.   HENT:  Negative for rhinorrhea and trouble swallowing.    Eyes:  Positive for pain (left) and visual disturbance (left). Negative for photophobia and redness.   Respiratory:  Negative for shortness of breath.    Cardiovascular:  Negative for chest pain.   Gastrointestinal:  Positive for nausea. Negative for abdominal pain and vomiting.   Genitourinary:  Negative for dysuria.   Musculoskeletal:  Negative for neck pain.   Neurological:  Positive for headaches (left). Negative for dizziness, seizures, facial asymmetry, speech difficulty, weakness and numbness.   Psychiatric/Behavioral:  Negative for confusion.    Objective:     Vitals:    Temp: 97.8 °F (36.6 °C)  Pulse: 68  BP: 128/60  MAP (mmHg): 86  Resp: 18  SpO2: 96 %    Temp  Min: 97.8 °F (36.6 °C)  Max: 98.5 °F (36.9 °C)  Pulse  Min: 67  Max: 74  BP  Min: 110/57  Max: 128/60  MAP (mmHg)  Min: 76  Max: 87  Resp  Min: 15  Max: 19  SpO2  Min: 93 %  Max: 99 %    09/16 0701 - 09/17 0700  In: 240 [P.O.:240]  Out: 150 [Urine:150]           Physical Exam  Vitals and nursing note reviewed.   Constitutional:       General: She is not in acute distress.     Appearance: Normal appearance.      Comments: Well developed. Well nourished. Resting comfortably in bed.   HENT:      Head: Normocephalic and atraumatic.      Right Ear: External ear normal.      Left Ear: External ear normal.      Nose: Nose normal.      Mouth/Throat:      Mouth: Mucous membranes are moist.      Pharynx: Oropharynx is clear.   Eyes:      General: No scleral icterus.        Right eye: No discharge.         Left eye: No discharge.      Extraocular Movements: Extraocular movements intact.      Conjunctiva/sclera: Conjunctivae normal.      Pupils: Pupils are equal, round, and reactive to light.   Cardiovascular:      Rate and Rhythm: Normal rate and regular rhythm.   Pulmonary:      Effort: Pulmonary effort is normal. No respiratory distress.   Abdominal:      General: Abdomen is flat. There  is no distension.   Musculoskeletal:      Right lower leg: No edema.      Left lower leg: No edema.   Skin:     General: Skin is warm and dry.   Neurological:      Mental Status: She is alert.      Comments: E4V5M6  Awake, alert, & oriented x 4. Speech fluent. Follows commands briskly.   Visual fields full to confrontation bilaterally, peripheral vision slightly worse in left eye. Pupils 7mm & equal, round, & reactive to light. Left pupil minimally reactive. EOMI. No nystagmus. SILT in V1, V2, V3. No facial asymmetry. Conversational hearing intact. Swallows without difficulty. Voice is not hoarse. Shoulder shrug intact. Tongue protrudes midline with no deviations or fasciculations.   ROLY & AG. Strength 5/5 & SILT in all 4 extremities.      Gait & coordination exams deferred.     Today I personally reviewed pertinent medications, lines/drains/airways, imaging, laboratory results, notably:  CTA head      Assessment/Plan:     Neuro  SAH (subarachnoid hemorrhage)  51 y.o. female with history of  PH+ B-ALL, RA, COPD, chronic thrombocytopenia, pAF with pacemaker (not MRI compatible), HTN, & HLD with acute left vision loss & pain.  - CTA with small left frontal SAH  - Recommend  - Repeat CTH in AM  - q4 neurochecks    Thank you for your consult. We will follow up with repeat imaging. Please call [phone] in case of any acute changes or further questions.         The patient is being Prophylaxed for:  Venous Thromboembolism with: None  Stress Ulcer with: Not Applicable   Ventilator Pneumonia with: not applicable    Activity Orders          Progressive Mobility Protocol (mobilize patient to their highest level of functioning at least twice daily) starting at 09/16 2000    Diet Adult Regular (IDDSI Level 7): Regular starting at 09/16 1742        Full Code     Level III    PETERSON DrummondC  Neurocritical Care  [facility] - Oncology ([facility])

## 2022-09-18 NOTE — HPI
"50 y/o female who has hx of acute lymphocytic leukemia with acute left eye vision loss and pain. She states that it started this past Tuesday (4 days ago). It was a sudden onset. She reports seeing "foggy", followed by "white" and then "black". She has not had a return to vision since then. She has associated eye pain. She has no issues with her right eye. She went to her Optometrist on Wednesday.  and patient report that optometrist took pictures of the eye and stated that "eye looked fine". She has associated headaches that have been worsening, especially around the left eye. She reports nausea that has been chronic for the past few months and vomiting. She vomited or retched about 3 times this morning.  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  Patient neuro intact. Patient thrombocytopenic platelets of 19.1      Case discussed with Vascular neurology Fellow Dr Dexter and recommendation to start Keppra 500 mg BID x 1 week for seizure prophylaxis.     Unable to get MRI brain due to pacemaker not compatible  "

## 2022-09-18 NOTE — HPI
51 F Pmhx leukemia, presents with left sided vision loss 5 days ago while watching TV, vision loss began gradually then progressed to complete left eye vision loss over 1 hour. She has not had any improvement since then. She has no headaches, focal weaknesses, confusion, or speech difficulty. She denies any trauma. NSGY consulted for incidentally found small left frontal SAH

## 2022-09-18 NOTE — CONSULTS
Roberto Yepez - Oncology ([facility])  Neurosurgery  Consult Note    Subjective:     History of Present Illness: 51 F Pmhx leukemia, presents with left sided vision loss 5 days ago while watching TV, vision loss began gradually then progressed to complete left eye vision loss over 1 hour. She has not had any improvement since then. She has no headaches, focal weaknesses, confusion, or speech difficulty. She denies any trauma. NSGY consulted for incidentally found small left frontal SAH      Post-Op Info:  * No surgery found *         Past Medical History:   Diagnosis Date    Cancer     COPD (chronic obstructive pulmonary disease)     Hypertension     Rheumatoid arthritis, unspecified     SAH (subarachnoid hemorrhage) 2022    Seizures     Stroke     TIA x 4    Type 2 diabetes mellitus with circulatory disorder, without long-term current use of insulin        Past Surgical History:   Procedure Laterality Date    APPENDECTOMY      HYSTERECTOMY      ROTATOR CUFF REPAIR Bilateral     TONSILLECTOMY      TUBAL LIGATION         Social History     Socioeconomic History    Marital status:    Tobacco Use    Smoking status: Former     Packs/day: 0.50     Types: Cigarettes     Quit date: 3/16/2022     Years since quittin.5    Smokeless tobacco: Never   Substance and Sexual Activity    Alcohol use: No    Drug use: Never     Social Determinants of Health     Financial Resource Strain: High Risk    Difficulty of Paying Living Expenses: Hard   Food Insecurity: Food Insecurity Present    Worried About Running Out of Food in the Last Year: Sometimes true    Ran Out of Food in the Last Year: Sometimes true   Transportation Needs: No Transportation Needs    Lack of Transportation (Medical): No    Lack of Transportation (Non-Medical): No   Physical Activity: Insufficiently Active    Days of Exercise per Week: 1 day    Minutes of Exercise per Session: 10 min   Stress: No Stress Concern Present    Feeling  of Stress : Only a little   Social Connections: Unknown    Frequency of Communication with Friends and Family: More than three times a week    Frequency of Social Gatherings with Friends and Family: Twice a week    Active Member of Clubs or Organizations: No    Attends Club or Organization Meetings: Never    Marital Status:    Housing Stability: Low Risk     Unable to Pay for Housing in the Last Year: No    Number of Places Lived in the Last Year: 1    Unstable Housing in the Last Year: No       History reviewed. No pertinent family history.    Review of patient's allergies indicates:   Allergen Reactions    Levetiracetam      Other reaction(s): Unknown  Other reaction(s): Unknown  Other reaction(s): Unknown         Medications:  Continuous Infusions:  Scheduled Meds:   acyclovir  400 mg Oral BID    allopurinoL  300 mg Oral Daily    atorvastatin  80 mg Oral Daily    busPIRone  10 mg Oral BID    diltiaZEM  90 mg Oral Q6H    fenofibrate  160 mg Oral Daily    gabapentin  300 mg Oral TID    hydrOXYchloroQUINE  200 mg Oral Daily    methylPREDNISolone sodium succinate injection  1,000 mg Intravenous Daily    mupirocin   Nasal BID    OXcarbazepine  1,200 mg Oral QHS    OXcarbazepine  1,200 mg Oral with lunch    QUEtiapine  200 mg Oral QHS     PRN Meds:sodium chloride, butalbital-acetaminophen-caffeine -40 mg, dextrose 10%, dextrose 10%, diazePAM, duke's soln (benadryl 30 mL, mylanta 30 mL, LIDOcaine 30 mL, nystatin 30 mL) 120 mL, glucagon (human recombinant), glucose, glucose, HYDROmorphone, insulin aspart U-100, naloxone, ondansetron, sodium chloride 0.9%     Review of Systems    Review of Systems   Constitutional:  Negative for fatigue and fever.   Respiratory:  Negative for shortness of breath.    Cardiovascular:  Negative for chest pain.   Gastrointestinal:  Negative for nausea and vomiting.   Genitourinary:  Negative for difficulty urinating.   Musculoskeletal: Negative for back pain  "and neck pain.   Neurological:  + vision loss. Negative for headaches. Negative for dizziness, seizures, syncope, weakness and numbness.     Objective:        Body mass index is 22.61 kg/m².  Vital Signs (Most Recent):  Temp: 98.4 °F (36.9 °C) (09/18/22 1520)  Pulse: 71 (09/18/22 1520)  Resp: 15 (09/18/22 1520)  BP: (!) 110/53 (09/18/22 1520)  SpO2: 99 % (09/18/22 1520)   Vital Signs (24h Range):  Temp:  [97.8 °F (36.6 °C)-98.5 °F (36.9 °C)] 98.4 °F (36.9 °C)  Pulse:  [61-71] 71  Resp:  [14-18] 15  SpO2:  [93 %-99 %] 99 %  BP: ()/(51-60) 110/53     Date 09/18/22 0700 - 09/19/22 0659   Shift 1584-1767 6412-6117 5409-3120 24 Hour Total   INTAKE   P.O. 720   720   Shift Total 720   720   OUTPUT   Urine 600   600   Shift Total 600   600   Weight (kg)                            Urethral Catheter 09/07/22 (Active)   Site Assessment Clean;Intact 09/18/22 0737   Collection Container Standard drainage bag 09/18/22 0737   Securement Method secured to top of thigh w/ adhesive device 09/18/22 0737   Catheter Care Performed yes 09/18/22 0737   Reason for Continuing Urinary Catheterization Urinary retention 09/18/22 0737   CAUTI Prevention Bundle Securement Device in place with 1" slack;Sheeting clip in use;Drainage bag/urimeter not overfilled (<2/3 full);No dependent loops or kinks;Drainage bag/urimeter off the floor;Intact seal between catheter & drainage tubing;Drainage bag/urimeter below bladder 09/18/22 0737   Output (mL) 600 mL 09/18/22 0737       Physical Exam    Neurosurgery Physical Exam    Physical Exam:    Constitutional: No distress.     HEENT: atraumatic/normocephalic    Cardiovascular: Regular rhythm.     Pulm: aerating well, saturating well    Abdominal: Soft.     Psych/Behavior: He is alert.     E4V5M6  AOx3  PERRL  EOMI  Face Symmetric  Tongue midline  Vision loss left eye  BUE 5/5  BLE 5/5  No drift      Significant Labs:  Recent Labs   Lab 09/16/22  1656 09/17/22  0341 09/18/22  0330   GLU 82 70 109   NA " 137 137 135*   K 3.5 2.9* 3.7    101 102   CO2 23 23 25   BUN 8 10 10   CREATININE 0.5 0.5 0.6   CALCIUM 9.4 8.8 9.1   MG  --  1.8 1.7     Recent Labs   Lab 09/16/22  1656 09/17/22  0341 09/18/22  0330   WBC 6.37 6.49 5.56   HGB 9.9* 9.3* 8.3*   HCT 27.5* 26.0* 24.2*   PLT 20* 19* 63*     Recent Labs   Lab 09/17/22  2228   INR 1.1   APTT 21.7     Microbiology Results (last 7 days)       Procedure Component Value Units Date/Time    Gram stain [[id number]]     Order Status: No result Specimen: CSF (Spinal Fluid) from CSF Tap, Tube 3     CSF culture [[id number]]     Order Status: No result Specimen: CSF (Spinal Fluid) from CSF Tap, Tube 3           All pertinent labs from the last 24 hours have been reviewed.    Significant Diagnostics:  I have reviewed and interpreted all pertinent imaging results/findings within the past 24 hours.    Assessment/Plan:     Rheumatoid arthritis involving multiple sites  51 F Pmhx leukemia, presents with left sided vision loss 5 days ago while watching TV. NSGY consulted for incidentally found small left frontal SAH    SAH likely unrelated to vision loss  CTA negative for any vascular lesion or occlusion  Rpt CTH stable bleed    -- no neurosurgical intervention needed at this time  -- unfortunately patient cannot have MRI due to pacemaker, so no further imaging recommended at this time  -- Agree with continued ophthalmology workup         Bryon Hayes MD  Neurosurgery  [facility] - Oncology ([facility])

## 2022-09-18 NOTE — SUBJECTIVE & OBJECTIVE
Past Medical History:   Diagnosis Date    Cancer     COPD (chronic obstructive pulmonary disease)     Hypertension     Rheumatoid arthritis, unspecified     SAH (subarachnoid hemorrhage) 9/17/2022    Seizures     Stroke     TIA x 4    Type 2 diabetes mellitus with circulatory disorder, without long-term current use of insulin        Past Surgical History:   Procedure Laterality Date    APPENDECTOMY      HYSTERECTOMY      ROTATOR CUFF REPAIR Bilateral     TONSILLECTOMY      TUBAL LIGATION         Review of patient's allergies indicates:   Allergen Reactions    Levetiracetam      Other reaction(s): Unknown  Other reaction(s): Unknown  Other reaction(s): Unknown       Current Neurological Medications: Gabapentin, oxcarbazepine, quetiapine, bispirone    No current facility-administered medications on file prior to encounter.     Current Outpatient Medications on File Prior to Encounter   Medication Sig    acyclovir (ZOVIRAX) 400 MG tablet Take 1 tablet (400 mg total) by mouth 2 (two) times daily.    allopurinoL (ZYLOPRIM) 300 MG tablet Take 1 tablet (300 mg total) by mouth once daily.    artificial tears (ISOPTO TEARS) 0.5 % ophthalmic solution Place 1 drop into both eyes 4 (four) times daily as needed.    atorvastatin (LIPITOR) 80 MG tablet Take 80 mg by mouth once daily.    busPIRone (BUSPAR) 10 MG tablet Take 10 mg by mouth 2 (two) times daily.    dapagliflozin (FARXIGA) 10 mg tablet Take 10 mg by mouth once daily.    diazePAM (VALIUM) 10 MG Tab Take 10 mg by mouth 2 (two) times daily as needed.    diltiaZEM (CARDIZEM) 90 MG tablet Take 1 tablet (90 mg total) by mouth every 6 (six) hours.    gabapentin (NEURONTIN) 300 MG capsule Take 1 capsule (300 mg total) by mouth 3 (three) times daily.    losartan (COZAAR) 25 MG tablet Take 25 mg by mouth once daily.    metFORMIN (GLUCOPHAGE-XR) 500 MG ER 24hr tablet Take 1,000 mg by mouth 2 (two) times daily.    omeprazole (PRILOSEC) 40 MG capsule Take 40 mg by mouth once daily.     ondansetron (ZOFRAN-ODT) 8 MG TbDL Take 1 tablet (8 mg total) by mouth every 8 (eight) hours as needed (in case of chemo induced nausea).    OXcarbazepine (TRILEPTAL) 600 MG Tab Take 1,200 mg by mouth 2 (two) times daily.    PONATinib (ICLUSIG) 30 mg Tab Take 1 tablet (30 mg) by mouth once daily.    potassium chloride (K-TAB) 20 mEq Take 20 mEq by mouth 2 (two) times daily.    prochlorperazine (COMPAZINE) 10 MG tablet Take 1 tablet (10 mg total) by mouth every 6 (six) hours as needed for Nausea.    QUEtiapine (SEROQUEL) 200 MG Tab Take 200 mg by mouth every evening.    senna-docusate 8.6-50 mg (PERICOLACE) 8.6-50 mg per tablet Take 1 tablet by mouth 2 (two) times daily.    sumatriptan (IMITREX) 50 MG tablet Take 1 tablet (50 mg total) by mouth daily as needed (headache).    TRINTELLIX 20 mg Tab Take 1 tablet by mouth once daily.    ursodioL (ACTIGALL) 300 mg capsule Take 1 capsule (300 mg total) by mouth 3 (three) times daily.    blood sugar diagnostic Strp SMARTSIG:Via Meter 1 to 2 Times Daily    duke's soln (benadryl 30 mL, mylanta 30 mL, LIDOcaine 30 mL, nystatin 30 mL) 120mL Take 10 mLs by mouth 4 (four) times daily as needed (mouth pain).    fenofibrate 160 MG Tab Take 160 mg by mouth once daily.    hydrOXYchloroQUINE (PLAQUENIL) 200 mg tablet Take 1 tablet (200 mg total) by mouth once daily.    hydrOXYzine pamoate (VISTARIL) 50 MG Cap Take 50 mg by mouth 2 (two) times daily as needed.    LIDOcaine (LIDODERM) 5 % Place 1 patch onto the skin once daily. Remove & Discard patch within 12 hours or as directed by MD    magnesium oxide (MAG-OX) 400 mg (241.3 mg magnesium) tablet Take 1 tablet by mouth once daily.    naloxone (NARCAN) 4 mg/actuation Spry 4mg by nasal route as needed for opioid overdose; may repeat every 2-3 minutes in alternating nostrils until medical help arrives. Call 911    ONETOUCH VERIO TEST STRIPS Strp SMARTSIG:Via Meter 1 to 2 Times Daily    polyethylene glycol (GLYCOLAX) 17 gram/dose  powder Dissolve one capful (17 g) in liquid and take by mouth daily as needed (constipation).    rivaroxaban (XARELTO) 20 mg Tab Take 1 tablet (20 mg total) by mouth daily with dinner or evening meal. Hold until instructed to resume by provider due to low platelet count.     Family History    None       Tobacco Use    Smoking status: Former     Packs/day: 0.50     Types: Cigarettes     Quit date: 3/16/2022     Years since quittin.5    Smokeless tobacco: Never   Substance and Sexual Activity    Alcohol use: No    Drug use: Never    Sexual activity: Not on file     Review of Systems   Constitutional:  Negative for fatigue and fever.   HENT:  Negative for sore throat and trouble swallowing.    Eyes:  Positive for pain and visual disturbance. Negative for photophobia.   Respiratory:  Negative for cough and shortness of breath.    Cardiovascular:  Negative for chest pain and leg swelling.   Gastrointestinal:  Positive for nausea and vomiting. Negative for abdominal pain.   Musculoskeletal:  Negative for neck pain and neck stiffness.   Skin:  Negative for color change and pallor.   Neurological:  Positive for headaches. Negative for facial asymmetry, speech difficulty and weakness.   Psychiatric/Behavioral:  Negative for confusion and decreased concentration.    Objective:     Vital Signs (Most Recent):  Temp: 98.3 °F (36.8 °C) (22 0737)  Pulse: 64 (22 0737)  Resp: 17 (22 1007)  BP: (!) 100/55 (22 07)  SpO2: (!) 93 % (22 07)   Vital Signs (24h Range):  Temp:  [97.8 °F (36.6 °C)-98.5 °F (36.9 °C)] 98.3 °F (36.8 °C)  Pulse:  [61-70] 64  Resp:  [14-18] 17  SpO2:  [93 %-99 %] 93 %  BP: ()/(51-61) 100/55        Body mass index is 22.61 kg/m².    Physical Exam  Constitutional:       General: She is not in acute distress.  HENT:      Head: Normocephalic and atraumatic.   Eyes:      Extraocular Movements: Extraocular movements intact and EOM normal.      Pupils: Pupils are equal, round,  and reactive to light.   Cardiovascular:      Rate and Rhythm: Normal rate.      Pulses: Normal pulses.   Pulmonary:      Effort: Pulmonary effort is normal. No respiratory distress.   Abdominal:      Palpations: Abdomen is soft.      Tenderness: There is no abdominal tenderness.   Musculoskeletal:      Cervical back: Normal range of motion and neck supple.   Neurological:      Mental Status: She is alert and oriented to person, place, and time.      Motor: Motor strength is normal.      Coordination: Finger-Nose-Finger Test normal.   Psychiatric:         Mood and Affect: Mood normal.         Speech: Speech normal.         Behavior: Behavior normal.       NEUROLOGICAL EXAMINATION:     MENTAL STATUS   Oriented to person, place, and time.   Attention: normal. Concentration: normal.   Speech: speech is normal   Level of consciousness: alert    CRANIAL NERVES     CN III, IV, VI   Pupils are equal, round, and reactive to light.  Extraocular motions are normal.     CN V   Facial sensation intact.     CN VII   Facial expression full, symmetric.     CN VIII   Hearing: intact       Patient with complete vision loss of L eye.     MOTOR EXAM   Muscle bulk: normal    Strength   Strength 5/5 throughout.     SENSORY EXAM   Light touch normal.     GAIT AND COORDINATION      Coordination   Finger to nose coordination: normal    Tremor   Resting tremor: present    Significant Labs: All pertinent lab results from the past 24 hours have been reviewed.    Significant Imaging: I have reviewed all pertinent imaging results/findings within the past 24 hours.

## 2022-09-18 NOTE — SUBJECTIVE & OBJECTIVE
Past Medical History:   Diagnosis Date    Cancer     COPD (chronic obstructive pulmonary disease)     Hypertension     Rheumatoid arthritis, unspecified     SAH (subarachnoid hemorrhage) 2022    Seizures     Stroke     TIA x 4    Type 2 diabetes mellitus with circulatory disorder, without long-term current use of insulin        Past Surgical History:   Procedure Laterality Date    APPENDECTOMY      HYSTERECTOMY      ROTATOR CUFF REPAIR Bilateral     TONSILLECTOMY      TUBAL LIGATION         Social History     Socioeconomic History    Marital status:    Tobacco Use    Smoking status: Former     Packs/day: 0.50     Types: Cigarettes     Quit date: 3/16/2022     Years since quittin.5    Smokeless tobacco: Never   Substance and Sexual Activity    Alcohol use: No    Drug use: Never     Social Determinants of Health     Financial Resource Strain: High Risk    Difficulty of Paying Living Expenses: Hard   Food Insecurity: Food Insecurity Present    Worried About Running Out of Food in the Last Year: Sometimes true    Ran Out of Food in the Last Year: Sometimes true   Transportation Needs: No Transportation Needs    Lack of Transportation (Medical): No    Lack of Transportation (Non-Medical): No   Physical Activity: Insufficiently Active    Days of Exercise per Week: 1 day    Minutes of Exercise per Session: 10 min   Stress: No Stress Concern Present    Feeling of Stress : Only a little   Social Connections: Unknown    Frequency of Communication with Friends and Family: More than three times a week    Frequency of Social Gatherings with Friends and Family: Twice a week    Active Member of Clubs or Organizations: No    Attends Club or Organization Meetings: Never    Marital Status:    Housing Stability: Low Risk     Unable to Pay for Housing in the Last Year: No    Number of Places Lived in the Last Year: 1    Unstable Housing in the Last Year: No       History reviewed. No pertinent family  history.    Review of patient's allergies indicates:   Allergen Reactions    Levetiracetam      Other reaction(s): Unknown  Other reaction(s): Unknown  Other reaction(s): Unknown         Medications:  Continuous Infusions:  Scheduled Meds:   acyclovir  400 mg Oral BID    allopurinoL  300 mg Oral Daily    atorvastatin  80 mg Oral Daily    busPIRone  10 mg Oral BID    diltiaZEM  90 mg Oral Q6H    fenofibrate  160 mg Oral Daily    gabapentin  300 mg Oral TID    hydrOXYchloroQUINE  200 mg Oral Daily    methylPREDNISolone sodium succinate injection  1,000 mg Intravenous Daily    mupirocin   Nasal BID    OXcarbazepine  1,200 mg Oral QHS    OXcarbazepine  1,200 mg Oral with lunch    QUEtiapine  200 mg Oral QHS     PRN Meds:sodium chloride, butalbital-acetaminophen-caffeine -40 mg, dextrose 10%, dextrose 10%, diazePAM, duke's soln (benadryl 30 mL, mylanta 30 mL, LIDOcaine 30 mL, nystatin 30 mL) 120 mL, glucagon (human recombinant), glucose, glucose, HYDROmorphone, insulin aspart U-100, naloxone, ondansetron, sodium chloride 0.9%     Review of Systems    Review of Systems   Constitutional:  Negative for fatigue and fever.   Respiratory:  Negative for shortness of breath.    Cardiovascular:  Negative for chest pain.   Gastrointestinal:  Negative for nausea and vomiting.   Genitourinary:  Negative for difficulty urinating.   Musculoskeletal: Negative for back pain and neck pain.   Neurological:  + vision loss. Negative for headaches. Negative for dizziness, seizures, syncope, weakness and numbness.     Objective:        Body mass index is 22.61 kg/m².  Vital Signs (Most Recent):  Temp: 98.4 °F (36.9 °C) (09/18/22 1520)  Pulse: 71 (09/18/22 1520)  Resp: 15 (09/18/22 1520)  BP: (!) 110/53 (09/18/22 1520)  SpO2: 99 % (09/18/22 1520)   Vital Signs (24h Range):  Temp:  [97.8 °F (36.6 °C)-98.5 °F (36.9 °C)] 98.4 °F (36.9 °C)  Pulse:  [61-71] 71  Resp:  [14-18] 15  SpO2:  [93 %-99 %] 99 %  BP: ()/(51-60) 110/53     Date  "09/18/22 0700 - 09/19/22 0659   Shift [id number] 7492-8504 9708-0659 24 Hour Total   INTAKE   P.O. 720   720   Shift Total 720   720   OUTPUT   Urine 600   600   Shift Total 600   600   Weight (kg)                            Urethral Catheter 09/07/22 (Active)   Site Assessment Clean;Intact 09/18/22 0737   Collection Container Standard drainage bag 09/18/22 0737   Securement Method secured to top of thigh w/ adhesive device 09/18/22 0737   Catheter Care Performed yes 09/18/22 0737   Reason for Continuing Urinary Catheterization Urinary retention 09/18/22 0737   CAUTI Prevention Bundle Securement Device in place with 1" slack;Sheeting clip in use;Drainage bag/urimeter not overfilled (<2/3 full);No dependent loops or kinks;Drainage bag/urimeter off the floor;Intact seal between catheter & drainage tubing;Drainage bag/urimeter below bladder 09/18/22 0737   Output (mL) 600 mL 09/18/22 0737       Physical Exam    Neurosurgery Physical Exam    Physical Exam:    Constitutional: No distress.     HEENT: atraumatic/normocephalic    Cardiovascular: Regular rhythm.     Pulm: aerating well, saturating well    Abdominal: Soft.     Psych/Behavior: He is alert.     [id number]  AOx3  PERRL  EOMI  Face Symmetric  Tongue midline  Vision loss left eye  BUE 5/5  BLE 5/5  No drift      Significant Labs:  Recent Labs   Lab 09/16/22 1656 09/17/22  0341 09/18/22  0330   GLU 82 70 109    137 135*   K 3.5 2.9* 3.7    101 102   CO2 23 23 25   BUN 8 10 10   CREATININE 0.5 0.5 0.6   CALCIUM 9.4 8.8 9.1   MG  --  1.8 1.7     Recent Labs   Lab 09/16/22  1656 09/17/22  0341 09/18/22  0330   WBC 6.37 6.49 5.56   HGB 9.9* 9.3* 8.3*   HCT 27.5* 26.0* 24.2*   PLT 20* 19* 63*     Recent Labs   Lab 09/17/22 2228   INR 1.1   APTT 21.7     Microbiology Results (last 7 days)       Procedure Component Value Units Date/Time    Gram stain [[id number]]     Order Status: No result Specimen: CSF (Spinal Fluid) from CSF Tap, Tube 3     CSF culture " [[id number]]     Order Status: No result Specimen: CSF (Spinal Fluid) from CSF Tap, Tube 3           All pertinent labs from the last 24 hours have been reviewed.    Significant Diagnostics:  I have reviewed and interpreted all pertinent imaging results/findings within the past 24 hours.

## 2022-09-18 NOTE — CONSULTS
"[facility] ([facility])  Neurology  Consult Note    Patient Name: Deepti Gonzalez  MRN: [id number]  Admission Date: 9/16/2022  Hospital Length of Stay: 2 days  Code Status: Full Code   Attending Provider: Yusuf Joseph MD   Consulting Provider: Mendoza Hernandez MD  Primary Care Physician: Primary Doctor No  Principal Problem:Vision loss of left eye    Inpatient consult to Neurology  Consult performed by: SANDI Hernandez MD  Consult ordered by: Pipe Hager MD         Subjective:     Chief Complaint:  L eye vision loss     HPI:   Ms Gonzalez is a 52 y/o female, PMH of B-LLL, RA, COPD, paroxysmal Afib, pacemaker and DM who presented with acute loss of vision of her L eye. She states that her symptoms started last Tuesday 13th, while she was watching TV, with sudden onset of "foggy vision, followed by white and then black vision of everything". The next day she developed associated headache of moderate intensity that has been increasing, progressive onset, located left frontal predominantly around her L eye, L eye pain, nausea, and 3 episodes of vomit (which has been present over the past several months). She consulted with her optometrist on Wednesday who told her that the eye looked fine. She denies any loss of vision in the R eye or any additional neurological symptoms. Her vision has not improved since presentation. She is unable to receive MRI due to pacemaker, and CT/CTA was notable for small area of subarachnoid hemorrhage in the L frontal lobe.       Past Medical History:   Diagnosis Date    Cancer     COPD (chronic obstructive pulmonary disease)     Hypertension     Rheumatoid arthritis, unspecified     SAH (subarachnoid hemorrhage) 9/17/2022    Seizures     Stroke     TIA x 4    Type 2 diabetes mellitus with circulatory disorder, without long-term current use of insulin        Past Surgical History:   Procedure Laterality Date    APPENDECTOMY      HYSTERECTOMY      ROTATOR CUFF REPAIR " Bilateral     TONSILLECTOMY      TUBAL LIGATION         Review of patient's allergies indicates:   Allergen Reactions    Levetiracetam      Other reaction(s): Unknown  Other reaction(s): Unknown  Other reaction(s): Unknown       Current Neurological Medications: Gabapentin, oxcarbazepine, quetiapine, bispirone    No current facility-administered medications on file prior to encounter.     Current Outpatient Medications on File Prior to Encounter   Medication Sig    acyclovir (ZOVIRAX) 400 MG tablet Take 1 tablet (400 mg total) by mouth 2 (two) times daily.    allopurinoL (ZYLOPRIM) 300 MG tablet Take 1 tablet (300 mg total) by mouth once daily.    artificial tears (ISOPTO TEARS) 0.5 % ophthalmic solution Place 1 drop into both eyes 4 (four) times daily as needed.    atorvastatin (LIPITOR) 80 MG tablet Take 80 mg by mouth once daily.    busPIRone (BUSPAR) 10 MG tablet Take 10 mg by mouth 2 (two) times daily.    dapagliflozin (FARXIGA) 10 mg tablet Take 10 mg by mouth once daily.    diazePAM (VALIUM) 10 MG Tab Take 10 mg by mouth 2 (two) times daily as needed.    diltiaZEM (CARDIZEM) 90 MG tablet Take 1 tablet (90 mg total) by mouth every 6 (six) hours.    gabapentin (NEURONTIN) 300 MG capsule Take 1 capsule (300 mg total) by mouth 3 (three) times daily.    losartan (COZAAR) 25 MG tablet Take 25 mg by mouth once daily.    metFORMIN (GLUCOPHAGE-XR) 500 MG ER 24hr tablet Take 1,000 mg by mouth 2 (two) times daily.    omeprazole (PRILOSEC) 40 MG capsule Take 40 mg by mouth once daily.    ondansetron (ZOFRAN-ODT) 8 MG TbDL Take 1 tablet (8 mg total) by mouth every 8 (eight) hours as needed (in case of chemo induced nausea).    OXcarbazepine (TRILEPTAL) 600 MG Tab Take 1,200 mg by mouth 2 (two) times daily.    PONATinib (ICLUSIG) 30 mg Tab Take 1 tablet (30 mg) by mouth once daily.    potassium chloride (K-TAB) 20 mEq Take 20 mEq by mouth 2 (two) times daily.    prochlorperazine (COMPAZINE) 10 MG tablet  Take 1 tablet (10 mg total) by mouth every 6 (six) hours as needed for Nausea.    QUEtiapine (SEROQUEL) 200 MG Tab Take 200 mg by mouth every evening.    senna-docusate 8.6-50 mg (PERICOLACE) 8.6-50 mg per tablet Take 1 tablet by mouth 2 (two) times daily.    sumatriptan (IMITREX) 50 MG tablet Take 1 tablet (50 mg total) by mouth daily as needed (headache).    TRINTELLIX 20 mg Tab Take 1 tablet by mouth once daily.    ursodioL (ACTIGALL) 300 mg capsule Take 1 capsule (300 mg total) by mouth 3 (three) times daily.    blood sugar diagnostic Strp SMARTSIG:Via Meter 1 to 2 Times Daily    duke's soln (benadryl 30 mL, mylanta 30 mL, LIDOcaine 30 mL, nystatin 30 mL) 120mL Take 10 mLs by mouth 4 (four) times daily as needed (mouth pain).    fenofibrate 160 MG Tab Take 160 mg by mouth once daily.    hydrOXYchloroQUINE (PLAQUENIL) 200 mg tablet Take 1 tablet (200 mg total) by mouth once daily.    hydrOXYzine pamoate (VISTARIL) 50 MG Cap Take 50 mg by mouth 2 (two) times daily as needed.    LIDOcaine (LIDODERM) 5 % Place 1 patch onto the skin once daily. Remove & Discard patch within 12 hours or as directed by MD    magnesium oxide (MAG-OX) 400 mg (241.3 mg magnesium) tablet Take 1 tablet by mouth once daily.    naloxone (NARCAN) 4 mg/actuation Spry 4mg by nasal route as needed for opioid overdose; may repeat every 2-3 minutes in alternating nostrils until medical help arrives. Call 911    [employer] VERIO TEST STRIPS Strp SMARTSIG:Via Meter 1 to 2 Times Daily    polyethylene glycol (GLYCOLAX) 17 gram/dose powder Dissolve one capful (17 g) in liquid and take by mouth daily as needed (constipation).    rivaroxaban (XARELTO) 20 mg Tab Take 1 tablet (20 mg total) by mouth daily with dinner or evening meal. Hold until instructed to resume by provider due to low platelet count.     Family History    None       Tobacco Use    Smoking status: Former     Packs/day: 0.50     Types: Cigarettes     Quit date: 3/16/2022      Years since quittin.5    Smokeless tobacco: Never   Substance and Sexual Activity    Alcohol use: No    Drug use: Never    Sexual activity: Not on file     Review of Systems   Constitutional:  Negative for fatigue and fever.   HENT:  Negative for sore throat and trouble swallowing.    Eyes:  Positive for pain and visual disturbance. Negative for photophobia.   Respiratory:  Negative for cough and shortness of breath.    Cardiovascular:  Negative for chest pain and leg swelling.   Gastrointestinal:  Positive for nausea and vomiting. Negative for abdominal pain.   Musculoskeletal:  Negative for neck pain and neck stiffness.   Skin:  Negative for color change and pallor.   Neurological:  Positive for headaches. Negative for facial asymmetry, speech difficulty and weakness.   Psychiatric/Behavioral:  Negative for confusion and decreased concentration.    Objective:     Vital Signs (Most Recent):  Temp: 98.3 °F (36.8 °C) (22)  Pulse: 64 (22 07)  Resp: 17 (22 1007)  BP: (!) 100/55 (22)  SpO2: (!) 93 % (22)   Vital Signs (24h Range):  Temp:  [97.8 °F (36.6 °C)-98.5 °F (36.9 °C)] 98.3 °F (36.8 °C)  Pulse:  [61-70] 64  Resp:  [14-18] 17  SpO2:  [93 %-99 %] 93 %  BP: ()/(51-61) 100/55        Body mass index is 22.61 kg/m².    Physical Exam  Constitutional:       General: She is not in acute distress.  HENT:      Head: Normocephalic and atraumatic.   Eyes:      Extraocular Movements: Extraocular movements intact and EOM normal.      Pupils: Pupils are equal, round, and reactive to light.   Cardiovascular:      Rate and Rhythm: Normal rate.      Pulses: Normal pulses.   Pulmonary:      Effort: Pulmonary effort is normal. No respiratory distress.   Abdominal:      Palpations: Abdomen is soft.      Tenderness: There is no abdominal tenderness.   Musculoskeletal:      Cervical back: Normal range of motion and neck supple.   Neurological:      Mental Status: She is alert  and oriented to person, place, and time.      Motor: Motor strength is normal.      Coordination: Finger-Nose-Finger Test normal.   Psychiatric:         Mood and Affect: Mood normal.         Speech: Speech normal.         Behavior: Behavior normal.       NEUROLOGICAL EXAMINATION:     MENTAL STATUS   Oriented to person, place, and time.   Attention: normal. Concentration: normal.   Speech: speech is normal   Level of consciousness: alert    CRANIAL NERVES     CN III, IV, VI   Pupils are equal, round, and reactive to light.  Extraocular motions are normal.     CN V   Facial sensation intact.     CN VII   Facial expression full, symmetric.     CN VIII   Hearing: intact       Patient with complete vision loss of L eye.     MOTOR EXAM   Muscle bulk: normal    Strength   Strength 5/5 throughout.     SENSORY EXAM   Light touch normal.     GAIT AND COORDINATION      Coordination   Finger to nose coordination: normal    Tremor   Resting tremor: present    Significant Labs: All pertinent lab results from the past 24 hours have been reviewed.    Significant Imaging: I have reviewed all pertinent imaging results/findings within the past 24 hours.    Assessment and Plan:     * Vision loss of left eye  50 y/o female with PMH of B-LLL, RA, COPD, paroxysmal Afib, pacemaker and DM who presented with acute loss of vision of her L eye on 9/13 with development of headaches predominantly around the L eye, eye pain, and N and V. PE positive for loss of visual loss in all 4 quadrants of left eye. CTH/CTA showed small SAH in the left frontal lobe, negative for LVO or significant vessel occlusions. MRI could not be performed due to pacemaker incompatibility. Labwork largely unrevealing at this time. Ophthalmology was consulted with no physical ocular abnormalities noted on exam. Vascular causes likely r/o with negative imaging. Most likely cause of symptoms at this time is an optic neuritis.    Recommendations  -LP with appropriate labs,  pending platelet count improvement  -IV methylprednisolone 1g x3-5 days pending symptomatic course  -Neurology will continue to follow, please call with any additional questions or concerns.         VTE Risk Mitigation (From admission, onward)         Ordered     IP VTE HIGH RISK PATIENT  Once         09/16/22 1637     Place sequential compression device  Until discontinued         09/16/22 1637     Reason for No Pharmacological VTE Prophylaxis  Once        Question:  Reasons:  Answer:  Thrombocytopenia    09/16/22 1637                Thank you for your consult. I will follow-up with patient. Please contact us if you have any additional questions.    Mendoza Hernandez MD  Neurology  [facility] - Oncology ([facility])

## 2022-09-18 NOTE — ASSESSMENT & PLAN NOTE
50 y/o female with PMH of B-ALL, RA, COPD, paroxysmal Afib, pacemaker and DM presented 9/16/22 with acute loss of vision of her L eye on 9/13 with development of headaches predominantly around the L eye, eye pain, and N/V. Exam with left APD and vision loss in all quadrants of left eye. CTH/CTA showed small SAH in the left frontal lobe, negative for LVO or significant vessel occlusions. MRI could not be performed due to pacemaker incompatibility. ESR/CRP unremarkable. Ophthalmology has ruled out ocular pathology and Vascular etiology ruled out by imaging. Concern for optic neuritis, LP pending.    9/19-day 2 of IV Solumedrol, no significant change in vision in L eye  Still having pain with eye movement, L sided headache and nausea    Recommendations:  -Plt 49 today, LP tentatively planned for 9/19  Cell ct with diff, glucose, protein, CNS demyelinating panel, NMO, MS, viral panel, cytology, freeze and hold   -IV Solumedrol 1 gram qd x 3-5 days (day 2 today), continue monitoring glucose per primary team  -Neurology will continue to follow, please call with any additional questions or concerns

## 2022-09-18 NOTE — SUBJECTIVE & OBJECTIVE
Past Medical History:   Diagnosis Date    Cancer     COPD (chronic obstructive pulmonary disease)     Hypertension     Rheumatoid arthritis, unspecified     SAH (subarachnoid hemorrhage) 2022    Seizures     Stroke     TIA x 4    Type 2 diabetes mellitus with circulatory disorder, without long-term current use of insulin      Past Surgical History:   Procedure Laterality Date    APPENDECTOMY      HYSTERECTOMY      ROTATOR CUFF REPAIR Bilateral     TONSILLECTOMY      TUBAL LIGATION       History reviewed. No pertinent family history.  Social History     Tobacco Use    Smoking status: Former     Packs/day: 0.50     Types: Cigarettes     Quit date: 3/16/2022     Years since quittin.5    Smokeless tobacco: Never   Substance Use Topics    Alcohol use: No    Drug use: Never     Review of patient's allergies indicates:   Allergen Reactions    Levetiracetam      Other reaction(s): Unknown  Other reaction(s): Unknown  Other reaction(s): Unknown       Medications: I have reviewed the current medication administration record.    Medications Prior to Admission   Medication Sig Dispense Refill Last Dose    acyclovir (ZOVIRAX) 400 MG tablet Take 1 tablet (400 mg total) by mouth 2 (two) times daily. 60 tablet 11 2022    allopurinoL (ZYLOPRIM) 300 MG tablet Take 1 tablet (300 mg total) by mouth once daily. 90 tablet 3 2022    artificial tears (ISOPTO TEARS) 0.5 % ophthalmic solution Place 1 drop into both eyes 4 (four) times daily as needed.   Past Week    atorvastatin (LIPITOR) 80 MG tablet Take 80 mg by mouth once daily.   2022    busPIRone (BUSPAR) 10 MG tablet Take 10 mg by mouth 2 (two) times daily.   2022    dapagliflozin (FARXIGA) 10 mg tablet Take 10 mg by mouth once daily.   2022    diazePAM (VALIUM) 10 MG Tab Take 10 mg by mouth 2 (two) times daily as needed.   2022    diltiaZEM (CARDIZEM) 90 MG tablet Take 1 tablet (90 mg total) by mouth every 6 (six) hours. 120 tablet 11  9/16/2022    gabapentin (NEURONTIN) 300 MG capsule Take 1 capsule (300 mg total) by mouth 3 (three) times daily. 90 capsule 11 9/16/2022    losartan (COZAAR) 25 MG tablet Take 25 mg by mouth once daily.   9/16/2022    metFORMIN (GLUCOPHAGE-XR) 500 MG ER 24hr tablet Take 1,000 mg by mouth 2 (two) times daily.   9/16/2022    omeprazole (PRILOSEC) 40 MG capsule Take 40 mg by mouth once daily.   9/16/2022    ondansetron (ZOFRAN-ODT) 8 MG TbDL Take 1 tablet (8 mg total) by mouth every 8 (eight) hours as needed (in case of chemo induced nausea). 30 tablet 1 9/16/2022    OXcarbazepine (TRILEPTAL) 600 MG Tab Take 1,200 mg by mouth 2 (two) times daily.   9/16/2022    PONATinib (ICLUSIG) 30 mg Tab Take 1 tablet (30 mg) by mouth once daily. 30 tablet 0 Past Week    potassium chloride (K-TAB) 20 mEq Take 20 mEq by mouth 2 (two) times daily.   9/16/2022    prochlorperazine (COMPAZINE) 10 MG tablet Take 1 tablet (10 mg total) by mouth every 6 (six) hours as needed for Nausea. 30 tablet 2 9/16/2022    QUEtiapine (SEROQUEL) 200 MG Tab Take 200 mg by mouth every evening.   9/15/2022    senna-docusate 8.6-50 mg (PERICOLACE) 8.6-50 mg per tablet Take 1 tablet by mouth 2 (two) times daily. 60 tablet 3 Past Week    sumatriptan (IMITREX) 50 MG tablet Take 1 tablet (50 mg total) by mouth daily as needed (headache). 30 tablet 2 Past Week    TRINTELLIX 20 mg Tab Take 1 tablet by mouth once daily.   9/16/2022    ursodioL (ACTIGALL) 300 mg capsule Take 1 capsule (300 mg total) by mouth 3 (three) times daily. 90 capsule 11 9/16/2022    blood sugar diagnostic Strp SMARTSIG:Via Meter 1 to 2 Times Daily       duke's soln (benadryl 30 mL, mylanta 30 mL, LIDOcaine 30 mL, nystatin 30 mL) 120mL Take 10 mLs by mouth 4 (four) times daily as needed (mouth pain). 120 mL 0 Unknown    fenofibrate 160 MG Tab Take 160 mg by mouth once daily.   Unknown    hydrOXYchloroQUINE (PLAQUENIL) 200 mg tablet Take 1 tablet (200 mg total) by mouth once daily.   Unknown     hydrOXYzine pamoate (VISTARIL) 50 MG Cap Take 50 mg by mouth 2 (two) times daily as needed.   Unknown    LIDOcaine (LIDODERM) 5 % Place 1 patch onto the skin once daily. Remove & Discard patch within 12 hours or as directed by MD 30 patch 2 Unknown    magnesium oxide (MAG-OX) 400 mg (241.3 mg magnesium) tablet Take 1 tablet by mouth once daily.   Unknown    naloxone (NARCAN) 4 mg/actuation Spry 4mg by nasal route as needed for opioid overdose; may repeat every 2-3 minutes in alternating nostrils until medical help arrives. Call 911 1 each 11 Unknown    ONETOUCH VERIO TEST STRIPS Strp SMARTSIG:Via Meter 1 to 2 Times Daily       polyethylene glycol (GLYCOLAX) 17 gram/dose powder Dissolve one capful (17 g) in liquid and take by mouth daily as needed (constipation). 510 g 0 Unknown    rivaroxaban (XARELTO) 20 mg Tab Take 1 tablet (20 mg total) by mouth daily with dinner or evening meal. Hold until instructed to resume by provider due to low platelet count.   More than a month       Review of Systems   Constitutional:  Positive for fatigue. Negative for chills and fever.   HENT:  Negative for ear discharge and ear pain.    Eyes:  Positive for pain and visual disturbance.   Gastrointestinal:  Positive for nausea. Negative for constipation.   Genitourinary:  Negative for dyspareunia and dysuria.   Musculoskeletal:  Positive for arthralgias. Negative for back pain.   Skin:  Negative for rash and wound.   Allergic/Immunologic: Positive for immunocompromised state.   Neurological:  Negative for speech difficulty and weakness.   Hematological:  Bruises/bleeds easily.   Psychiatric/Behavioral:  Negative for confusion. The patient is nervous/anxious.    Objective:     Vital Signs (Most Recent):  Temp: 98.4 °F (36.9 °C) (09/18/22 0149)  Pulse: 64 (09/18/22 0149)  Resp: 16 (09/18/22 0149)  BP: (!) 100/55 (09/18/22 0149)  SpO2: (!) 93 % (09/18/22 0149)    Vital Signs Range (Last 24H):  Temp:  [97.8 °F (36.6 °C)-98.5 °F (36.9  °C)]   Pulse:  [61-70]   Resp:  [14-19]   BP: ()/(54-61)   SpO2:  [93 %-99 %]     Physical Exam  Vitals and nursing note reviewed.   Constitutional:       Appearance: Normal appearance.   HENT:      Head: Normocephalic and atraumatic.      Comments: Visual loss left eye  Eyes:      Extraocular Movements: Extraocular movements intact.      Pupils: Pupils are equal, round, and reactive to light.   Cardiovascular:      Rate and Rhythm: Normal rate and regular rhythm.   Pulmonary:      Effort: Pulmonary effort is normal.      Breath sounds: Normal breath sounds.   Abdominal:      General: Abdomen is flat. Bowel sounds are normal.      Palpations: Abdomen is soft.   Musculoskeletal:         General: Normal range of motion.      Cervical back: Normal range of motion.   Skin:     General: Skin is warm and dry.   Neurological:      Mental Status: She is alert and oriented to person, place, and time.       Neurological Exam:   LOC: alert  Attention Span: Good   Language: No aphasia  Articulation: No dysarthria  Orientation: Person, Place, Time   Visual Fields: Hemianopsia left  EOM (CN III, IV, VI): Full/intact  Pupils (CN II, III): PERRL  Facial Sensation (CN V): Normal  Facial Movement (CN VII): Symmetric facial expression    Gag Reflex: present  Reflexes: 2+ throughout  Motor: Arm left  Normal 5/5  Leg left  Normal 5/5  Arm right  Normal 5/5  Leg right Normal 5/5  Cerebellum: No evidence of appendicular or axial ataxia  Sensation: Intact to light touch, temperature and vibration  Tone: Normal tone throughout      Laboratory:  CMP:   Recent Labs   Lab 09/17/22  0341   CALCIUM 8.8   ALBUMIN 3.5   PROT 6.1      K 2.9*   CO2 23      BUN 10   CREATININE 0.5   ALKPHOS 119   ALT 7*   AST 10   BILITOT 0.3     CBC:   Recent Labs   Lab 09/17/22  0341   WBC 6.49   RBC 2.32*   HGB 9.3*   HCT 26.0*   PLT 19*   *   MCH 40.1*   MCHC 35.8     Lipid Panel: No results for input(s): CHOL, LDLCALC, HDL, TRIG in the  last 168 hours.  Coagulation:   Recent Labs   Lab 09/17/22  2228   INR 1.1   APTT 21.7     Hgb A1C: No results for input(s): HGBA1C in the last 168 hours.  TSH: No results for input(s): TSH in the last 168 hours.    Diagnostic Results:      Brain imaging:      Vessel Imaging:  CTA Head 9-17-22 results:  Small acute subarachnoid hemorrhage in the left frontal lobe.     No focal stenosis or large vessel occlusion.  No aneurysm seen.    Cardiac Evaluation:

## 2022-09-18 NOTE — HPI
"Deepti Gonzalez is a 51 y.o. female with history of PH+ B-ALL, RA, COPD, chronic thrombocytopenia, pAF with pacemaker (not MRI compatible), HTN, & HLD who presents with left vision loss & eye pain since 9/13. Per chart review, the patient was watching TV and saw something "foggy," followed "white" and black" in her left visual field. She was seen by her OP ophthalmologist on 9/14, and had a normal dilated fundoscopic exam at the time. She was admitted to the hospital with concern for possible B-ALL involvement. She was seen IP ophthalmology, who CT orbit and CTH/CTA. CTA showed small left frontal SAH. Currently the patient endorses mild left eye pain, vision loss, left-sided headache, and nausea. She denies any speech difficulty, weakness, numbness, tingling, dizziness, and confusion. Her symptoms are unchanged since her admission. She reports no identifying, alleviating, or exacerbating factors. [facility] was consulted for recommendations for the SAH.   "

## 2022-09-18 NOTE — ASSESSMENT & PLAN NOTE
52 yo female PMHx of afib, rheumatoid arthritis, pacemaker in place, DM, HTN and Bcell acute leukemia who developed sudden onset, painful left vision loss since 9/13. No trauma or recent falls. She was evaluated by ophtho yesterday who thought it could be optic neuropathy and recommended CRP and ESR, MRI brain/orbits. Patient unable to MRI 2/2 pacemaker not compatible. CTA was performed which showed small SAH for which vascular neurology was consulted.      -Patient with left monocular vision loss, subtle drift in bilateral lower extremities but this is her baseline. At baseline uses rollator and on occasion a wheelchair. Denied any trauma or falls. Plts 19 yesterday, up to 63 today.   - CTA was done which showed a small SAH in L frontal lobe.     -SAH finding on imaging is likely incidental and not contributing to her vision loss. 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 PION can occur due to chronic microvascular disease. There is no evidence of an acute stroke or strong evidence supporting GCA (sed rate 33). Patient seen by general neurology who believe this is more likely an optic neuritis and are recommending obtaining an LP pending plt improvement and IV methylpred pending sx course.   -We will sign off at this time. Thank you for your consult. Please do not hesitate to contact us for any questions or concerns.      Antithrombotics: None SAH    Statins: none SAH    Aggressive risk factor modification: HTN, DM, leukemia, thrombocytopenia     Rehab efforts: The patient has been evaluated by a stroke team provider and the therapy needs have been fully considered based off the presenting complaints and exam findings. The following therapy evaluations are needed: None    Diagnostics ordered/pending: None     VTE prophylaxis: Mechanical prophylaxis: Place SCDs  None: Reason for No Pharmacological VTE  Prophylaxis: History of systemic or intracranial bleeding    BP parameters: SAH: <140

## 2022-09-19 ENCOUNTER — TELEPHONE (OUTPATIENT)
Dept: HEMATOLOGY/ONCOLOGY | Facility: CLINIC | Age: 52
End: 2022-09-19
Payer: COMMERCIAL

## 2022-09-19 LAB
ALBUMIN SERPL BCP-MCNC: 3.8 G/DL (ref 3.5–5.2)
ALP SERPL-CCNC: 152 U/L (ref 55–135)
ALT SERPL W/O P-5'-P-CCNC: 142 U/L (ref 10–44)
ANION GAP SERPL CALC-SCNC: 10 MMOL/L (ref 8–16)
AST SERPL-CCNC: 180 U/L (ref 10–40)
BASOPHILS # BLD AUTO: 0 K/UL (ref 0–0.2)
BASOPHILS NFR BLD: 0 % (ref 0–1.9)
BILIRUB SERPL-MCNC: 0.4 MG/DL (ref 0.1–1)
BUN SERPL-MCNC: 7 MG/DL (ref 6–20)
CALCIUM SERPL-MCNC: 9.6 MG/DL (ref 8.7–10.5)
CHLORIDE SERPL-SCNC: 101 MMOL/L (ref 95–110)
CLARITY CSF: CLEAR
CO2 SERPL-SCNC: 24 MMOL/L (ref 23–29)
COLOR CSF: COLORLESS
CREAT SERPL-MCNC: 0.6 MG/DL (ref 0.5–1.4)
DIFFERENTIAL METHOD: ABNORMAL
EOSINOPHIL # BLD AUTO: 0 K/UL (ref 0–0.5)
EOSINOPHIL NFR BLD: 0 % (ref 0–8)
ERYTHROCYTE [DISTWIDTH] IN BLOOD BY AUTOMATED COUNT: 18.2 % (ref 11.5–14.5)
EST. GFR  (NO RACE VARIABLE): >60 ML/MIN/1.73 M^2
GLUCOSE SERPL-MCNC: 222 MG/DL (ref 70–110)
HCT VFR BLD AUTO: 24.2 % (ref 37–48.5)
HGB BLD-MCNC: 8.5 G/DL (ref 12–16)
IMM GRANULOCYTES # BLD AUTO: 0.01 K/UL (ref 0–0.04)
IMM GRANULOCYTES NFR BLD AUTO: 0.5 % (ref 0–0.5)
LYMPHOCYTES # BLD AUTO: 0.7 K/UL (ref 1–4.8)
LYMPHOCYTES NFR BLD: 32.4 % (ref 18–48)
LYMPHOCYTES NFR CSF MANUAL: 87 % (ref 40–80)
MAGNESIUM SERPL-MCNC: 1.9 MG/DL (ref 1.6–2.6)
MCH RBC QN AUTO: 39 PG (ref 27–31)
MCHC RBC AUTO-ENTMCNC: 35.1 G/DL (ref 32–36)
MCV RBC AUTO: 111 FL (ref 82–98)
MONOCYTES # BLD AUTO: 0 K/UL (ref 0.3–1)
MONOCYTES NFR BLD: 1.4 % (ref 4–15)
MONOS+MACROS NFR CSF MANUAL: 1 % (ref 15–45)
NEUTROPHILS # BLD AUTO: 1.4 K/UL (ref 1.8–7.7)
NEUTROPHILS NFR BLD: 65.7 % (ref 38–73)
NRBC BLD-RTO: 1 /100 WBC
OTHER CELLS CSF: 12 %
PLATELET # BLD AUTO: 49 K/UL (ref 150–450)
PMV BLD AUTO: 10.7 FL (ref 9.2–12.9)
POCT GLUCOSE: 154 MG/DL (ref 70–110)
POCT GLUCOSE: 203 MG/DL (ref 70–110)
POCT GLUCOSE: 290 MG/DL (ref 70–110)
POTASSIUM SERPL-SCNC: 4.3 MMOL/L (ref 3.5–5.1)
PROT CSF-MCNC: 43 MG/DL (ref 15–40)
PROT SERPL-MCNC: 6.7 G/DL (ref 6–8.4)
RBC # BLD AUTO: 2.18 M/UL (ref 4–5.4)
RBC # CSF: 0 /CU MM
SODIUM SERPL-SCNC: 135 MMOL/L (ref 136–145)
SPECIMEN VOL CSF: 4 ML
WBC # BLD AUTO: 2.07 K/UL (ref 3.9–12.7)
WBC # CSF: 546 /CU MM (ref 0–5)

## 2022-09-19 PROCEDURE — 88108 CYTOPATH CONCENTRATE TECH: CPT | Performed by: PATHOLOGY

## 2022-09-19 PROCEDURE — 25000003 PHARM REV CODE 250

## 2022-09-19 PROCEDURE — 63600175 PHARM REV CODE 636 W HCPCS: Performed by: STUDENT IN AN ORGANIZED HEALTH CARE EDUCATION/TRAINING PROGRAM

## 2022-09-19 PROCEDURE — 99233 PR SUBSEQUENT HOSPITAL CARE,LEVL III: ICD-10-PCS | Mod: ,,, | Performed by: PSYCHIATRY & NEUROLOGY

## 2022-09-19 PROCEDURE — 87529 HSV DNA AMP PROBE: CPT | Performed by: STUDENT IN AN ORGANIZED HEALTH CARE EDUCATION/TRAINING PROGRAM

## 2022-09-19 PROCEDURE — 99232 SBSQ HOSP IP/OBS MODERATE 35: CPT | Mod: ,,, | Performed by: NEUROLOGICAL SURGERY

## 2022-09-19 PROCEDURE — 99000 SPECIMEN HANDLING OFFICE-LAB: CPT | Performed by: STUDENT IN AN ORGANIZED HEALTH CARE EDUCATION/TRAINING PROGRAM

## 2022-09-19 PROCEDURE — 80053 COMPREHEN METABOLIC PANEL: CPT | Performed by: STUDENT IN AN ORGANIZED HEALTH CARE EDUCATION/TRAINING PROGRAM

## 2022-09-19 PROCEDURE — 88108 PR  CYTOPATH FLUIDS,CONCENTRATN,INTERP: ICD-10-PCS | Mod: 26,,, | Performed by: PATHOLOGY

## 2022-09-19 PROCEDURE — 20600001 HC STEP DOWN PRIVATE ROOM

## 2022-09-19 PROCEDURE — 88189 PR  FLOWCYTOMETRY/READ, 16 & > MARKERS: ICD-10-PCS | Mod: ,,, | Performed by: PATHOLOGY

## 2022-09-19 PROCEDURE — 83916 OLIGOCLONAL BANDS: CPT | Mod: 59 | Performed by: STUDENT IN AN ORGANIZED HEALTH CARE EDUCATION/TRAINING PROGRAM

## 2022-09-19 PROCEDURE — 88108 CYTOPATH CONCENTRATE TECH: CPT | Mod: 26,,, | Performed by: PATHOLOGY

## 2022-09-19 PROCEDURE — 25000242 PHARM REV CODE 250 ALT 637 W/ HCPCS: Performed by: STUDENT IN AN ORGANIZED HEALTH CARE EDUCATION/TRAINING PROGRAM

## 2022-09-19 PROCEDURE — 84157 ASSAY OF PROTEIN OTHER: CPT | Performed by: STUDENT IN AN ORGANIZED HEALTH CARE EDUCATION/TRAINING PROGRAM

## 2022-09-19 PROCEDURE — 89051 BODY FLUID CELL COUNT: CPT | Performed by: STUDENT IN AN ORGANIZED HEALTH CARE EDUCATION/TRAINING PROGRAM

## 2022-09-19 PROCEDURE — 86403 PARTICLE AGGLUT ANTBDY SCRN: CPT | Performed by: STUDENT IN AN ORGANIZED HEALTH CARE EDUCATION/TRAINING PROGRAM

## 2022-09-19 PROCEDURE — 87798 DETECT AGENT NOS DNA AMP: CPT | Performed by: STUDENT IN AN ORGANIZED HEALTH CARE EDUCATION/TRAINING PROGRAM

## 2022-09-19 PROCEDURE — 86255 FLUORESCENT ANTIBODY SCREEN: CPT | Performed by: STUDENT IN AN ORGANIZED HEALTH CARE EDUCATION/TRAINING PROGRAM

## 2022-09-19 PROCEDURE — 25000003 PHARM REV CODE 250: Performed by: INTERNAL MEDICINE

## 2022-09-19 PROCEDURE — 96450 PR CHEMOTHER,CNS,W/LUMBAR PUNCTURE: ICD-10-PCS | Mod: 52,,, | Performed by: NURSE PRACTITIONER

## 2022-09-19 PROCEDURE — 88189 FLOWCYTOMETRY/READ 16 & >: CPT | Mod: ,,, | Performed by: PATHOLOGY

## 2022-09-19 PROCEDURE — 96450 CHEMOTHERAPY INTO CNS: CPT | Mod: 52,,, | Performed by: NURSE PRACTITIONER

## 2022-09-19 PROCEDURE — 99233 PR SUBSEQUENT HOSPITAL CARE,LEVL III: ICD-10-PCS | Mod: 25,,, | Performed by: INTERNAL MEDICINE

## 2022-09-19 PROCEDURE — 99233 SBSQ HOSP IP/OBS HIGH 50: CPT | Mod: ,,, | Performed by: PSYCHIATRY & NEUROLOGY

## 2022-09-19 PROCEDURE — 86592 SYPHILIS TEST NON-TREP QUAL: CPT | Performed by: STUDENT IN AN ORGANIZED HEALTH CARE EDUCATION/TRAINING PROGRAM

## 2022-09-19 PROCEDURE — 83883 ASSAY NEPHELOMETRY NOT SPEC: CPT | Performed by: STUDENT IN AN ORGANIZED HEALTH CARE EDUCATION/TRAINING PROGRAM

## 2022-09-19 PROCEDURE — 83735 ASSAY OF MAGNESIUM: CPT | Performed by: STUDENT IN AN ORGANIZED HEALTH CARE EDUCATION/TRAINING PROGRAM

## 2022-09-19 PROCEDURE — 99232 PR SUBSEQUENT HOSPITAL CARE,LEVL II: ICD-10-PCS | Mod: ,,, | Performed by: NEUROLOGICAL SURGERY

## 2022-09-19 PROCEDURE — 63600175 PHARM REV CODE 636 W HCPCS

## 2022-09-19 PROCEDURE — 87498 ENTEROVIRUS PROBE&REVRS TRNS: CPT | Performed by: STUDENT IN AN ORGANIZED HEALTH CARE EDUCATION/TRAINING PROGRAM

## 2022-09-19 PROCEDURE — 87798 DETECT AGENT NOS DNA AMP: CPT | Mod: 59 | Performed by: STUDENT IN AN ORGANIZED HEALTH CARE EDUCATION/TRAINING PROGRAM

## 2022-09-19 PROCEDURE — 25000003 PHARM REV CODE 250: Performed by: STUDENT IN AN ORGANIZED HEALTH CARE EDUCATION/TRAINING PROGRAM

## 2022-09-19 PROCEDURE — 63600175 PHARM REV CODE 636 W HCPCS: Performed by: INTERNAL MEDICINE

## 2022-09-19 PROCEDURE — 85025 COMPLETE CBC W/AUTO DIFF WBC: CPT | Performed by: STUDENT IN AN ORGANIZED HEALTH CARE EDUCATION/TRAINING PROGRAM

## 2022-09-19 PROCEDURE — 87205 SMEAR GRAM STAIN: CPT | Performed by: STUDENT IN AN ORGANIZED HEALTH CARE EDUCATION/TRAINING PROGRAM

## 2022-09-19 PROCEDURE — 30000890 MAYO MISCELLANEOUS TEST (REFLEX): Mod: 59 | Performed by: STUDENT IN AN ORGANIZED HEALTH CARE EDUCATION/TRAINING PROGRAM

## 2022-09-19 PROCEDURE — 88185 FLOWCYTOMETRY/TC ADD-ON: CPT | Mod: 59 | Performed by: PATHOLOGY

## 2022-09-19 PROCEDURE — 87556 M.TUBERCULO DNA AMP PROBE: CPT | Performed by: STUDENT IN AN ORGANIZED HEALTH CARE EDUCATION/TRAINING PROGRAM

## 2022-09-19 PROCEDURE — 88184 FLOWCYTOMETRY/ TC 1 MARKER: CPT | Performed by: PATHOLOGY

## 2022-09-19 PROCEDURE — 99233 SBSQ HOSP IP/OBS HIGH 50: CPT | Mod: 25,,, | Performed by: INTERNAL MEDICINE

## 2022-09-19 PROCEDURE — 87070 CULTURE OTHR SPECIMN AEROBIC: CPT | Performed by: STUDENT IN AN ORGANIZED HEALTH CARE EDUCATION/TRAINING PROGRAM

## 2022-09-19 RX ORDER — INSULIN ASPART 100 [IU]/ML
4 INJECTION, SOLUTION INTRAVENOUS; SUBCUTANEOUS
Status: DISCONTINUED | OUTPATIENT
Start: 2022-09-19 | End: 2022-09-23

## 2022-09-19 RX ORDER — LIDOCAINE HYDROCHLORIDE 10 MG/ML
6 INJECTION INFILTRATION; PERINEURAL ONCE
Status: COMPLETED | OUTPATIENT
Start: 2022-09-19 | End: 2022-09-19

## 2022-09-19 RX ORDER — HYDROMORPHONE HYDROCHLORIDE 4 MG/1
4 TABLET ORAL
Status: DISCONTINUED | OUTPATIENT
Start: 2022-09-19 | End: 2022-10-01 | Stop reason: HOSPADM

## 2022-09-19 RX ORDER — BUTALBITAL, ACETAMINOPHEN AND CAFFEINE 50; 325; 40 MG/1; MG/1; MG/1
1 TABLET ORAL EVERY 6 HOURS PRN
Status: DISCONTINUED | OUTPATIENT
Start: 2022-09-19 | End: 2022-10-01 | Stop reason: HOSPADM

## 2022-09-19 RX ADMIN — HYDROMORPHONE HYDROCHLORIDE 4 MG: 4 TABLET ORAL at 09:09

## 2022-09-19 RX ADMIN — BUSPIRONE HYDROCHLORIDE 10 MG: 10 TABLET ORAL at 09:09

## 2022-09-19 RX ADMIN — OXCARBAZEPINE 1200 MG: 600 TABLET, FILM COATED ORAL at 11:09

## 2022-09-19 RX ADMIN — INSULIN ASPART 4 UNITS: 100 INJECTION, SOLUTION INTRAVENOUS; SUBCUTANEOUS at 09:09

## 2022-09-19 RX ADMIN — GABAPENTIN 300 MG: 300 CAPSULE ORAL at 09:09

## 2022-09-19 RX ADMIN — DIAZEPAM 10 MG: 5 TABLET ORAL at 10:09

## 2022-09-19 RX ADMIN — ALLOPURINOL 300 MG: 300 TABLET ORAL at 09:09

## 2022-09-19 RX ADMIN — HYDROMORPHONE HYDROCHLORIDE 2 MG: 2 TABLET ORAL at 06:09

## 2022-09-19 RX ADMIN — CYTARABINE 1 SYRINGE: 20 INJECTION, SOLUTION INTRATHECAL; INTRAVENOUS; SUBCUTANEOUS at 01:09

## 2022-09-19 RX ADMIN — DILTIAZEM HYDROCHLORIDE 90 MG: 60 TABLET, FILM COATED ORAL at 12:09

## 2022-09-19 RX ADMIN — DILTIAZEM HYDROCHLORIDE 90 MG: 60 TABLET, FILM COATED ORAL at 11:09

## 2022-09-19 RX ADMIN — QUETIAPINE FUMARATE 200 MG: 200 TABLET ORAL at 09:09

## 2022-09-19 RX ADMIN — HYDROMORPHONE HYDROCHLORIDE 2 MG: 2 TABLET ORAL at 02:09

## 2022-09-19 RX ADMIN — ONDANSETRON 4 MG: 2 INJECTION INTRAMUSCULAR; INTRAVENOUS at 06:09

## 2022-09-19 RX ADMIN — MUPIROCIN: 20 OINTMENT TOPICAL at 09:09

## 2022-09-19 RX ADMIN — DILTIAZEM HYDROCHLORIDE 90 MG: 60 TABLET, FILM COATED ORAL at 05:09

## 2022-09-19 RX ADMIN — ATORVASTATIN CALCIUM 80 MG: 20 TABLET, FILM COATED ORAL at 09:09

## 2022-09-19 RX ADMIN — INSULIN ASPART 3 UNITS: 100 INJECTION, SOLUTION INTRAVENOUS; SUBCUTANEOUS at 09:09

## 2022-09-19 RX ADMIN — OXCARBAZEPINE 1200 MG: 600 TABLET, FILM COATED ORAL at 04:09

## 2022-09-19 RX ADMIN — HYDROXYCHLOROQUINE SULFATE 200 MG: 200 TABLET ORAL at 09:09

## 2022-09-19 RX ADMIN — METHYLPREDNISOLONE SODIUM SUCCINATE 1000 MG: 1 INJECTION, POWDER, LYOPHILIZED, FOR SOLUTION INTRAMUSCULAR; INTRAVENOUS at 09:09

## 2022-09-19 RX ADMIN — ONDANSETRON 4 MG: 2 INJECTION INTRAMUSCULAR; INTRAVENOUS at 09:09

## 2022-09-19 RX ADMIN — INSULIN DETEMIR 5 UNITS: 100 INJECTION, SOLUTION SUBCUTANEOUS at 10:09

## 2022-09-19 RX ADMIN — BUTALBITAL, ACETAMINOPHEN, AND CAFFEINE 1 TABLET: 50; 325; 40 TABLET ORAL at 02:09

## 2022-09-19 RX ADMIN — GABAPENTIN 300 MG: 300 CAPSULE ORAL at 02:09

## 2022-09-19 RX ADMIN — HYDROMORPHONE HYDROCHLORIDE 2 MG: 2 TABLET ORAL at 12:09

## 2022-09-19 RX ADMIN — HYDROMORPHONE HYDROCHLORIDE 2 MG: 2 TABLET ORAL at 09:09

## 2022-09-19 RX ADMIN — INSULIN ASPART 6 UNITS: 100 INJECTION, SOLUTION INTRAVENOUS; SUBCUTANEOUS at 05:09

## 2022-09-19 RX ADMIN — INSULIN ASPART 4 UNITS: 100 INJECTION, SOLUTION INTRAVENOUS; SUBCUTANEOUS at 05:09

## 2022-09-19 RX ADMIN — ACYCLOVIR 400 MG: 200 CAPSULE ORAL at 09:09

## 2022-09-19 RX ADMIN — BUTALBITAL, ACETAMINOPHEN, AND CAFFEINE 1 TABLET: 50; 325; 40 TABLET ORAL at 09:09

## 2022-09-19 RX ADMIN — BUTALBITAL, ACETAMINOPHEN, AND CAFFEINE 1 TABLET: 50; 325; 40 TABLET ORAL at 11:09

## 2022-09-19 RX ADMIN — FENOFIBRATE 160 MG: 160 TABLET ORAL at 09:09

## 2022-09-19 RX ADMIN — LIDOCAINE HYDROCHLORIDE 6 ML: 10 INJECTION, SOLUTION INFILTRATION; PERINEURAL at 12:09

## 2022-09-19 RX ADMIN — HYDROMORPHONE HYDROCHLORIDE 4 MG: 4 TABLET ORAL at 05:09

## 2022-09-19 NOTE — ASSESSMENT & PLAN NOTE
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 Concern for possible B-ALL involvement vs side effect of ponatinib vs retrobulbar hemorrhage vs stroke    Plan:  - Consulted Opthomology, appreciate assistance  - LP planned for today along with IT chemo (cytarabine, hydrocortisone, methotrexate)  - labs to be collected:   TB DNA by PCR  VDRL  [place] virus pCR  VZV PCR  HSV PCR  Enterovirus RNA  Cryptococcal Ag  CNS demyelinating dz (AQP-4 IGG/MOG IGG)  CSF bands  NMO aquaporin  MS profile    Gram stain  CSF culture  Cytology  Flow cytometry analysis  CSF cell count  CSF protein    - Patient with pacemaker and per patient, not compatible with MRI. Would ideally obtain MRI brain w/ w/o contrast. Verified with EP who checked with rep that pacemaker is not MRI compatible.   - CTA completed with delayed venous phase revealing small SAH

## 2022-09-19 NOTE — PROGRESS NOTES
"Chief complaint/Reason for Consult: L eye vision loss since Tuesday     History of Present Illness: Deepti Gonzalez is a 51 y.o. 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 She says she was watching TV on Monday when suddenly she noticed her L eye lost vision. When she closed her R eye, she saw shadowy shapes and figures. There were no associated symptoms or pain of her left eye. An hour later, she lost all vision from her L eye including of light. She saw an outside ophthalmologist on Tuesday who did not see evidence of ocular etiology of the vision loss. Her vision did not improve at all and she began developing increasing pain from her L eye over the last few days. No prior ocular history.    Interval hx:  NAEON.   Patient reports a few months history of "bad handwriting", "using the wrong terms" sometimes, and slow speech/understanding. She started having the urinary retention around 2 months ago. She also started having an action tremor 1 week before the loss of vision; she noticed it mostly when drinking with a glass or using utensils. Unable to comment on weakness/gait because she's been having difficulty walking and moving around since her B-ALL diagnosis.     Past Ocular Hx: no past ocular surgeries, wears glasses normally.    Current eye gtts: none      PMHx:  has a past medical history of Cancer, COPD (chronic obstructive pulmonary disease), Hypertension, Rheumatoid arthritis, unspecified, SAH (subarachnoid hemorrhage) (9/17/2022), Seizures, Stroke, and Type 2 diabetes mellitus with circulatory disorder, without long-term current use of insulin.     PSurgHx:  has a past surgical history that includes Appendectomy; Tonsillectomy; Rotator cuff repair (Bilateral); Tubal ligation; and Hysterectomy.     Home Medications:   Prior to Admission medications  "   Medication Sig Start Date End Date Taking? Authorizing Provider   acyclovir (ZOVIRAX) 400 MG tablet Take 1 tablet (400 mg total) by mouth 2 (two) times daily. 12/7/21   Kathrine Posey MD   allopurinoL (ZYLOPRIM) 300 MG tablet Take 1 tablet (300 mg total) by mouth once daily. 6/7/22   Connor M Gillies, MD   artificial tears (ISOPTO TEARS) 0.5 % ophthalmic solution Place 1 drop into both eyes 4 (four) times daily as needed. 1/24/22   Mirtha Antonio NP   atorvastatin (LIPITOR) 80 MG tablet Take 80 mg by mouth once daily.    Historical Provider   blood sugar diagnostic Strp SMARTSIG:Via Meter 1 to 2 Times Daily 10/11/21   Historical Provider   busPIRone (BUSPAR) 10 MG tablet Take 10 mg by mouth 2 (two) times daily.    Historical Provider   dapagliflozin (FARXIGA) 10 mg tablet Take 10 mg by mouth once daily.    Historical Provider   diazePAM (VALIUM) 10 MG Tab Take 10 mg by mouth 2 (two) times daily as needed.    Historical Provider   diltiaZEM (CARDIZEM) 90 MG tablet Take 1 tablet (90 mg total) by mouth every 6 (six) hours. 12/7/21 12/7/22  Kathrine Posey MD   almaraz's soln (benadryl 30 mL, mylanta 30 mL, LIDOcaine 30 mL, nystatin 30 mL) 120mL Take 10 mLs by mouth 4 (four) times daily as needed (mouth pain). 4/19/22   Mirtha Antonio NP   fenofibrate 160 MG Tab Take 160 mg by mouth once daily.    Historical Provider   gabapentin (NEURONTIN) 300 MG capsule Take 1 capsule (300 mg total) by mouth 3 (three) times daily. 12/7/21 12/7/22  Kathrine Posey MD   hydrOXYchloroQUINE (PLAQUENIL) 200 mg tablet Take 1 tablet (200 mg total) by mouth once daily. 5/19/22   Mirtha Antonio NP   hydrOXYzine pamoate (VISTARIL) 50 MG Cap Take 50 mg by mouth 2 (two) times daily as needed. 4/29/22   Historical Provider   LIDOcaine (LIDODERM) 5 % Place 1 patch onto the skin once daily. Remove & Discard patch within 12 hours or as directed by MD 6/6/22   Connor M Gillies, MD   losartan (COZAAR) 25 MG tablet Take 25 mg by mouth once daily.  11/12/21   Historical Provider   magnesium oxide (MAG-OX) 400 mg (241.3 mg magnesium) tablet Take 1 tablet by mouth once daily. 7/12/22   Historical Provider   metFORMIN (GLUCOPHAGE-XR) 500 MG ER 24hr tablet Take 1,000 mg by mouth 2 (two) times daily. 7/22/22   Historical Provider   naloxone (NARCAN) 4 mg/actuation Spry 4mg by nasal route as needed for opioid overdose; may repeat every 2-3 minutes in alternating nostrils until medical help arrives. Call 911 6/8/22   Ines Mccord MD   omeprazole (PRILOSEC) 40 MG capsule Take 40 mg by mouth once daily.    Historical Provider   ondansetron (ZOFRAN-ODT) 8 MG TbDL Take 1 tablet (8 mg total) by mouth every 8 (eight) hours as needed (in case of chemo induced nausea). 8/9/22   Dayron Jenkins MD   ONETOUCH VERIO TEST STRIPS Strp SMARTSIG:Via Meter 1 to 2 Times Daily 4/6/22   Historical Provider   OXcarbazepine (TRILEPTAL) 600 MG Tab Take 1,200 mg by mouth 2 (two) times daily. 10/26/21   Historical Provider   polyethylene glycol (GLYCOLAX) 17 gram/dose powder Dissolve one capful (17 g) in liquid and take by mouth daily as needed (constipation). 12/7/21   Kathrine Posey MD   PONATinib (ICLUSIG) 30 mg Tab Take 1 tablet (30 mg) by mouth once daily. 9/7/22   Dayron Jenkins MD   potassium chloride (K-TAB) 20 mEq Take 20 mEq by mouth 2 (two) times daily. 7/12/22   Historical Provider   prochlorperazine (COMPAZINE) 10 MG tablet Take 1 tablet (10 mg total) by mouth every 6 (six) hours as needed for Nausea. 7/11/22 7/11/23  Dayron Jenkins MD   QUEtiapine (SEROQUEL) 200 MG Tab Take 200 mg by mouth every evening.    Historical Provider   rivaroxaban (XARELTO) 20 mg Tab Take 1 tablet (20 mg total) by mouth daily with dinner or evening meal. Hold until instructed to resume by provider due to low platelet count. 6/6/22   Connor M Gillies, MD   senna-docusate 8.6-50 mg (PERICOLACE) 8.6-50 mg per tablet Take 1 tablet by mouth 2 (two) times daily. 3/19/22   eFlisha Goodwin DO    sumatriptan (IMITREX) 50 MG tablet Take 1 tablet (50 mg total) by mouth daily as needed (headache). 8/29/22 9/28/22  Dayron Jenkins MD   TRINTELLIX 20 mg Tab Take 1 tablet by mouth once daily. 3/8/22   Historical Provider   ursodioL (ACTIGALL) 300 mg capsule Take 1 capsule (300 mg total) by mouth 3 (three) times daily. 6/13/22 6/13/23  Dayron Jenkins MD        Medications this encounter:    acyclovir  400 mg Oral BID    allopurinoL  300 mg Oral Daily    atorvastatin  80 mg Oral Daily    busPIRone  10 mg Oral BID    diltiaZEM  90 mg Oral Q6H    fenofibrate  160 mg Oral Daily    gabapentin  300 mg Oral TID    hydrOXYchloroQUINE  200 mg Oral Daily    insulin aspart U-100  4 Units Subcutaneous TIDWM    insulin detemir U-100  5 Units Subcutaneous QHS    methylPREDNISolone sodium succinate injection  1,000 mg Intravenous Daily    mupirocin   Nasal BID    OXcarbazepine  1,200 mg Oral QHS    OXcarbazepine  1,200 mg Oral with lunch    QUEtiapine  200 mg Oral QHS       Allergies: is allergic to levetiracetam.     Social Hx:  reports that she quit smoking about 6 months ago. Her smoking use included cigarettes. She smoked an average of .5 packs per day. She has never used smokeless tobacco. She reports that she does not drink alcohol and does not use drugs.     Family Hx: No family history of glaucoma. family history is not on file.     ROS: As per HPI    Ocular examination/Dilated fundus examination:  Base Eye Exam       Visual Acuity (Snellen - Linear)         Right Left    Dist cc 20/20 NLP      Correction: Glasses              Tonometry (Palpation, 10:32 AM)         Right Left    Pressure Soft Soft              Pupils         Dark Light Shape React APD    Right 4 2 Round Brisk None    Left 4 4 Round Minimal +3              Extraocular Movement         Right Left     Full Full   Pain with EOM left eye             Neuro/Psych       Oriented x3: Yes                  Slit Lamp and Fundus Exam       External Exam          Right Left    External Normal Normal              Slit Lamp Exam         Right Left    Lids/Lashes Normal Normal    Conjunctiva/Sclera White and quiet White and quiet    Cornea Clear <1mm round stromal scar at 6:30    Anterior Chamber Deep and quiet Deep and quiet    Iris Round and reactive Round and minimally reactive    Lens Clear Clear    Anterior Vitreous Normal Normal                  Work-up:  - ESR/CRP to evaluate for GCA (finished: ESR wnl, CRP mildly elevated - disease unlikely in this patient)  - Patient unable to obtain MRI imaging due to pacemaker. CT orbit (thin cuts) and head w/wo with CTA/CTV: Small acute subarachnoid hemorrhage in the left frontal lobe, otherwise unremarkable.    Assessment/Plan:     Optic neuropathy, left  - Acute, progressive vision loss over 1 hour from normal to not seeing light on 9/12/22. Painless at time, but deep achy retrobulbar pain developed over the following 24 hours which is exacerbated to a sharp, stabbing pain worst on lateral gaze but present on horizontal.  - 9/17/22 exam: right eye unremarkable. Left eye no light perception, nakia (3+) APD, equal pupil sizes, normal IOP. EOM grossly full and equal OU.   - CN exam otherwise full and equal both sides.  - No evidence of optic nerve or retinal pathology on dilated exam.    - Based on exam, most likely location of disease is posterior optic nerve / ophthalmic artery.    - In light of history of B-ALL, must consider infiltrative leukemic optic neuropathy which is an ophthalmologic emergency. Other ddx include ophthalmic vascular event, infectious including TB/syphilis and opportunistic given recent history of immunosuppression, inflammatory including sarcoidosis, demyelinating disease, or paraneoplastic syndrome. Less likely metabolic given asymmetry of disease and normal diet w/o GI history.    - If evidence of infiltrative disease, need to rule out infectious then discuss treatment options as multi-disciplinary team.  If work-up is negative, may need to consider optic nerve biopsy.    Recommendations:  - Infectious workup: syphilis, TB (pending, placed 9/17/22)  - Sarcoidosis: Lysozyme ordered 9/17/22. Recent chest x-ray 1 month ago unremarkable. Can repeat if no other etiology found.  - LP - performed today 9/19/22.  - Inpatient gynecologic exam; if urinary retention is likely to be neurogenic based on inpatient exam, this could be another neurologic manifestation of possible infiltrative disease.       Lama Deepthi MD  [employer] [facility] PGY-2

## 2022-09-19 NOTE — TELEPHONE ENCOUNTER
Informed  all upcoming infusion appts have been cancelled per Dr. Jenkins.  verbalized understanding.

## 2022-09-19 NOTE — PROGRESS NOTES
"[facility] ([facility])  Neurology  Progress Note    Patient Name: Deepti Gonzalez  MRN: [id number]  Admission Date: 9/16/2022  Hospital Length of Stay: 3 days  Code Status: Full Code   Attending Provider: Yusuf Joseph MD  Primary Care Physician: Primary Doctor No   Principal Problem:Vision loss of left eye    HPI:   50 y/o female, PMH of B-LLL, RA, COPD, paroxysmal Afib, pacemaker and DM who presented with acute loss of vision of her L eye. She states that her symptoms started last Tuesday 13th, while she was watching TV, with sudden onset of "foggy vision, followed by white and then black vision of everything". The next day she developed associated headache of moderate intensity that has been increasing, progressive onset, located left frontal predominantly around her L eye, L eye pain, nausea, and 3 episodes of vomit (which has been present over the past several months). She consulted with her optometrist on Wednesday who told her that the eye looked fine. She denies any loss of vision in the R eye or any additional neurological symptoms. Her vision has not improved since presentation. She is unable to receive MRI due to pacemaker, and CT/CTA was notable for small area of subarachnoid hemorrhage in the L frontal lobe.    Subjective:     Interval History:   No acute events overnight  Day 2 of IV Solumedrol  Planned for LP today at 11 am, Plt 49  Still having pain with eye movement and intermittent L sided headache     Current Facility-Administered Medications   Medication Dose Route Frequency Provider Last Rate Last Admin    0.9%  NaCl infusion (for blood administration)   Intravenous Q24H PRN Andres Gonzalez MD        acyclovir capsule 400 mg  400 mg Oral BID Nayeli Devine MD   400 mg at 09/19/22 0914    allopurinoL tablet 300 mg  300 mg Oral Daily Nayeli Devine MD   300 mg at 09/19/22 0914    atorvastatin tablet 80 mg  80 mg Oral Daily Nayeli Devine MD   80 mg at 09/19/22 0914    busPIRone tablet 10 mg  " 10 mg Oral BID Nayeli Devine MD   10 mg at 09/19/22 0914    butalbital-acetaminophen-caffeine -40 mg per tablet 1 tablet  1 tablet Oral Q4H PRN Pipe Hager MD   1 tablet at 09/19/22 0914    cytarabine (PF) (CYTOSAR) 30 mg, hydrocortisone sod succ (PF) 30 mg, methotrexate (PF) 15 mg in sodium chloride 0.9% 6 mL INTRATHECAL chemo injection   Intrathecal 1 time in Clinic/HOD Yusuf Joseph MD        dextrose 10% bolus 125 mL  12.5 g Intravenous PRN Pipe Hager MD        dextrose 10% bolus 250 mL  25 g Intravenous PRN Pipe Hager MD        diazePAM tablet 10 mg  10 mg Oral BID PRN Nayeli Devine MD   10 mg at 09/19/22 1039    diltiaZEM tablet 90 mg  90 mg Oral Q6H Nayeli Devine MD   90 mg at 09/19/22 0550    duke's soln (benadryl 30 mL, mylanta 30 mL, LIDOcaine 30 mL, nystatin 30 mL) 120 mL  10 mL Oral QID PRN Nayeli Devine MD        fenofibrate tablet 160 mg  160 mg Oral Daily Nayeli Devine MD   160 mg at 09/19/22 0914    gabapentin capsule 300 mg  300 mg Oral TID Nayeli Devine MD   300 mg at 09/19/22 0914    glucagon (human recombinant) injection 1 mg  1 mg Intramuscular PRN Pipe Hager MD        glucose chewable tablet 16 g  16 g Oral PRN Pipe Hager MD        glucose chewable tablet 24 g  24 g Oral PRN Pipe Hager MD        HYDROmorphone tablet 2 mg  2 mg Oral Q3H PRN Pipe Hager MD   2 mg at 09/19/22 0913    hydrOXYchloroQUINE tablet 200 mg  200 mg Oral Daily Nayeli Devine MD   200 mg at 09/19/22 0914    insulin aspart U-100 pen 1-10 Units  1-10 Units Subcutaneous QID (AC + HS) PRN Pipe Hager MD   4 Units at 09/19/22 0929    insulin detemir U-100 pen 5 Units  5 Units Subcutaneous QHS Pipe Hager MD        methylPREDNISolone sodium succinate (SOLU-MEDROL) 1,000 mg in dextrose 5 % 100 mL IVPB  1,000 mg Intravenous Daily Pipe Hager MD   Stopped at 09/19/22 0955    mupirocin 2 % ointment   Nasal BID Yusuf Joseph MD   Given at 09/19/22 0934    naloxone 0.4 mg/mL injection 0.02 mg  0.02 mg Intravenous  PRMARISOL Devine MD        ondansetron injection 4 mg  4 mg Intravenous Q6H PRN Andres Gonzalez MD   4 mg at 09/19/22 0602    OXcarbazepine tablet 1,200 mg  1,200 mg Oral QHS Pipe Hager MD   1,200 mg at 09/18/22 2203    OXcarbazepine tablet 1,200 mg  1,200 mg Oral with lunch Pipe Hager MD   1,200 mg at 09/18/22 1202    QUEtiapine tablet 200 mg  200 mg Oral QHS Nayeli Devine MD   200 mg at 09/18/22 2015    sodium chloride 0.9% flush 10 mL  10 mL Intravenous Q12H PRMARISOL Devine MD         Review of Systems   Constitutional:  Positive for activity change and fatigue. Negative for fever.   HENT:  Negative for trouble swallowing and voice change.    Eyes:  Positive for photophobia, pain and visual disturbance. Negative for discharge and redness.   Respiratory:  Negative for shortness of breath.    Cardiovascular:  Negative for chest pain.   Gastrointestinal:  Positive for nausea. Negative for vomiting.   Musculoskeletal:  Negative for gait problem and neck stiffness.   Allergic/Immunologic: Positive for immunocompromised state.   Neurological:  Positive for headaches. Negative for dizziness, tremors, seizures, syncope, speech difficulty, weakness and light-headedness.   Psychiatric/Behavioral:  Positive for sleep disturbance. Negative for confusion. The patient is nervous/anxious.    Objective:     Vital Signs (Most Recent):  Temp: 98.5 °F (36.9 °C) (09/19/22 0733)  Pulse: 66 (09/19/22 0733)  Resp: 18 (09/19/22 0913)  BP: (!) 114/53 (09/19/22 0733)  SpO2: 96 % (09/19/22 0733)   Vital Signs (24h Range):  Temp:  [97.5 °F (36.4 °C)-98.5 °F (36.9 °C)] 98.5 °F (36.9 °C)  Pulse:  [60-71] 66  Resp:  [15-19] 18  SpO2:  [92 %-99 %] 96 %  BP: (107-139)/(53-67) 114/53        Body mass index is 22.61 kg/m².    Physical Exam  Constitutional:       Appearance: Normal appearance.   HENT:      Head: Normocephalic and atraumatic.   Pulmonary:      Effort: Pulmonary effort is normal.   Musculoskeletal:      Cervical back: Normal range  of motion.   Neurological:      Mental Status: She is alert and oriented to person, place, and time.      Coordination: Finger-Nose-Finger Test normal.   Psychiatric:         Speech: Speech normal.     NEUROLOGICAL EXAMINATION:     MENTAL STATUS   Oriented to person, place, and time.   Follows 2 step commands.   Attention: normal. Concentration: normal.   Speech: speech is normal   Level of consciousness: alert  Knowledge: good.   Normal comprehension.     CRANIAL NERVES     CN III, IV, VI   Right pupil: Shape: regular. Reactivity: brisk.   Left pupil: Shape: regular. Consensual response: APD.   Nystagmus: none   Diplopia: none  Ophthalmoparesis: none    CN V   Facial sensation intact.     CN VII   Facial expression full, symmetric.     CN VIII   Hearing: intact    CN IX, X   Palate: symmetric    CN XI   CN XI normal.     CN XII   CN XII normal.        No light perception in left eye      MOTOR EXAM   Muscle bulk: normal  Overall muscle tone: normal  Right arm pronator drift: absent  Left arm pronator drift: absent    Strength   Right deltoid: 5/5  Left deltoid: 5/5  Right biceps: 5/5  Left biceps: 5/5  Right triceps: 5/5  Left triceps: 5/5  Right interossei: 5/5  Left interossei: 5/5  Right iliopsoas: 5/5  Left iliopsoas: 5/5  Right quadriceps: 5/5  Left quadriceps: 5/5  Right anterior tibial: 5/5  Left anterior tibial: 5/5  Right posterior tibial: 5/5  Left posterior tibial: 5/5  Right peroneal: 5/5  Left peroneal: 5/5    SENSORY EXAM   Light touch normal.     GAIT AND COORDINATION     Gait  Gait: (deferred)     Coordination   Finger to nose coordination: normal    Tremor   Resting tremor: absent    Significant Labs: All pertinent lab results from the past 24 hours have been reviewed.    ESR 33   CRP 10    CSF pending:  Cell ct with diff, glucose, protein, CNS demyelinating panel, MS profile, NMO, HSV, VZV, Enterovirus, WNV , VDRL, M. Tuberculosis, freeze and hold     Significant Imaging: I have reviewed all  pertinent imaging results/findings within the past 24 hours.    CT Head without contrast (9/18/22):  Unchanged appearance of presumed mild subarachnoid hemorrhage along the sulci of the left frontal lobe.  This is less likely cortical in location.     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 The ACAs, MCAs and PCAs are unremarkable.  No focal high-grade stenosis or large vessel occlusion.  No intracranial aneurysm or vascular malformation is identified.Small acute subarachnoid hemorrhage in the left frontal lobe.No focal stenosis or large vessel occlusion. No aneurysm seen.    Assessment and Plan:     * Vision loss of left eye  52 y/o female with PMH of B-ALL, RA, COPD, paroxysmal Afib, pacemaker and DM presented 9/16/22 with acute loss of vision of her L eye on 9/13 with development of headaches predominantly around the L eye, eye pain, and N/V. Exam with left APD and vision loss in all quadrants of left eye. CTH/CTA showed small SAH in the left frontal lobe, negative for LVO or significant vessel occlusions. MRI could not be performed due to pacemaker incompatibility. ESR/CRP unremarkable. Ophthalmology has ruled out ocular pathology and vascular etiology ruled out by imaging. Concern for optic neuritis, LP pending.    9/19-day 2 of IV Solumedrol, no significant change in vision in L eye  Still having pain with eye movement, L sided headache and nausea    Recommendations:  -Plt 49 today, LP tentatively planned for 9/19  Cell ct with diff, glucose, protein, CNS demyelinating panel, NMO, MS, viral panel, cytology, freeze and hold   -IV Solumedrol 1 gram qd x 3-5 days (day 2 today), continue monitoring glucose per primary team  -Limit prn fioricet use to  minimize chance of medication overuse headache (received 4x between yesterday and today)  Continue zofran 4 mg q 6 hr prn for nausea     VTE Risk Mitigation (From admission, onward)           Ordered     IP VTE HIGH RISK PATIENT  Once         09/16/22 1637     Place sequential compression device  Until discontinued         09/16/22 1637     Reason for No Pharmacological VTE Prophylaxis  Once        Question:  Reasons:  Answer:  Thrombocytopenia    09/16/22 1637                  Chikis Keenan PA-C  General Neurology Consult

## 2022-09-19 NOTE — SUBJECTIVE & OBJECTIVE
Subjective:     Interval History:   No acute events overnight  Day 2 of IV Solumedrol  Planned for LP today at 11 am, Plt 49  Still having pain with eye movement and intermittent L sided headache     Current Facility-Administered Medications   Medication Dose Route Frequency Provider Last Rate Last Admin    0.9%  NaCl infusion (for blood administration)   Intravenous Q24H PRN Andres Gonzalez MD        acyclovir capsule 400 mg  400 mg Oral BID Nayeli Devine MD   400 mg at 09/19/22 0914    allopurinoL tablet 300 mg  300 mg Oral Daily Nayeli Devine MD   300 mg at 09/19/22 0914    atorvastatin tablet 80 mg  80 mg Oral Daily Nayeli Devine MD   80 mg at 09/19/22 0914    busPIRone tablet 10 mg  10 mg Oral BID Nayeli Devine MD   10 mg at 09/19/22 0914    butalbital-acetaminophen-caffeine -40 mg per tablet 1 tablet  1 tablet Oral Q4H PRN Pipe Hager MD   1 tablet at 09/19/22 0914    cytarabine (PF) (CYTOSAR) 30 mg, hydrocortisone sod succ (PF) 30 mg, methotrexate (PF) 15 mg in sodium chloride 0.9% 6 mL INTRATHECAL chemo injection   Intrathecal 1 time in Clinic/HOD Yusuf Joseph MD        dextrose 10% bolus 125 mL  12.5 g Intravenous PRN Pipe Hager MD        dextrose 10% bolus 250 mL  25 g Intravenous PRN Pipe Hager MD        diazePAM tablet 10 mg  10 mg Oral BID PRN Nayeli Devine MD   10 mg at 09/19/22 1039    diltiaZEM tablet 90 mg  90 mg Oral Q6H Nayeli Devine MD   90 mg at 09/19/22 0550    duke's soln (benadryl 30 mL, mylanta 30 mL, LIDOcaine 30 mL, nystatin 30 mL) 120 mL  10 mL Oral QID PRN Nayeli Devine MD        fenofibrate tablet 160 mg  160 mg Oral Daily Nayeli Devine MD   160 mg at 09/19/22 0914    gabapentin capsule 300 mg  300 mg Oral TID Nayeli Devine MD   300 mg at 09/19/22 0914    glucagon (human recombinant) injection 1 mg  1 mg Intramuscular PRN Pipe Hager MD        glucose chewable tablet 16 g  16 g Oral PRN Pipe Hager MD        glucose chewable tablet 24 g  24 g Oral PRN Pipe Hager MD         HYDROmorphone tablet 2 mg  2 mg Oral Q3H PRN Pipe Hager MD   2 mg at 09/19/22 0913    hydrOXYchloroQUINE tablet 200 mg  200 mg Oral Daily Nayeli Devine MD   200 mg at 09/19/22 0914    insulin aspart U-100 pen 1-10 Units  1-10 Units Subcutaneous QID (AC + HS) PRN Pipe Hager MD   4 Units at 09/19/22 0929    insulin detemir U-100 pen 5 Units  5 Units Subcutaneous QHS Pipe Hager MD        methylPREDNISolone sodium succinate (SOLU-MEDROL) 1,000 mg in dextrose 5 % 100 mL IVPB  1,000 mg Intravenous Daily Pipe Hager MD   Stopped at 09/19/22 0955    mupirocin 2 % ointment   Nasal BID Yusuf Joseph MD   Given at 09/19/22 0934    naloxone 0.4 mg/mL injection 0.02 mg  0.02 mg Intravenous PRN Nayeli Devine MD        ondansetron injection 4 mg  4 mg Intravenous Q6H PRN Andres Gonzalez MD   4 mg at 09/19/22 0602    OXcarbazepine tablet 1,200 mg  1,200 mg Oral QHS Pipe Hager MD   1,200 mg at 09/18/22 2203    OXcarbazepine tablet 1,200 mg  1,200 mg Oral with lunch Pipe Hager MD   1,200 mg at 09/18/22 1202    QUEtiapine tablet 200 mg  200 mg Oral QHS Nayeli Devine MD   200 mg at 09/18/22 2015    sodium chloride 0.9% flush 10 mL  10 mL Intravenous Q12H PRN Nayeli Devine MD         Review of Systems   Constitutional:  Positive for activity change and fatigue. Negative for fever.   HENT:  Negative for trouble swallowing and voice change.    Eyes:  Positive for photophobia, pain and visual disturbance. Negative for discharge and redness.   Respiratory:  Negative for shortness of breath.    Cardiovascular:  Negative for chest pain.   Gastrointestinal:  Positive for nausea. Negative for vomiting.   Musculoskeletal:  Negative for gait problem and neck stiffness.   Allergic/Immunologic: Positive for immunocompromised state.   Neurological:  Positive for headaches. Negative for dizziness, tremors, seizures, syncope, speech difficulty, weakness and light-headedness.   Psychiatric/Behavioral:  Positive for sleep disturbance.  Negative for confusion. The patient is nervous/anxious.    Objective:     Vital Signs (Most Recent):  Temp: 98.5 °F (36.9 °C) (09/19/22 0733)  Pulse: 66 (09/19/22 0733)  Resp: 18 (09/19/22 0913)  BP: (!) 114/53 (09/19/22 0733)  SpO2: 96 % (09/19/22 0733)   Vital Signs (24h Range):  Temp:  [97.5 °F (36.4 °C)-98.5 °F (36.9 °C)] 98.5 °F (36.9 °C)  Pulse:  [60-71] 66  Resp:  [15-19] 18  SpO2:  [92 %-99 %] 96 %  BP: (107-139)/(53-67) 114/53        Body mass index is 22.61 kg/m².    Physical Exam  Constitutional:       Appearance: Normal appearance.   HENT:      Head: Normocephalic and atraumatic.   Pulmonary:      Effort: Pulmonary effort is normal.   Musculoskeletal:      Cervical back: Normal range of motion.   Neurological:      Mental Status: She is alert and oriented to person, place, and time.      Coordination: Finger-Nose-Finger Test normal.   Psychiatric:         Speech: Speech normal.     NEUROLOGICAL EXAMINATION:     MENTAL STATUS   Oriented to person, place, and time.   Follows 2 step commands.   Attention: normal. Concentration: normal.   Speech: speech is normal   Level of consciousness: alert  Knowledge: good.   Normal comprehension.     CRANIAL NERVES     CN III, IV, VI   Right pupil: Shape: regular. Reactivity: brisk.   Left pupil: Shape: regular. Consensual response: APD.   Nystagmus: none   Diplopia: none  Ophthalmoparesis: none    CN V   Facial sensation intact.     CN VII   Facial expression full, symmetric.     CN VIII   Hearing: intact    CN IX, X   Palate: symmetric    CN XI   CN XI normal.     CN XII   CN XII normal.        No light perception in left eye      MOTOR EXAM   Muscle bulk: normal  Overall muscle tone: normal  Right arm pronator drift: absent  Left arm pronator drift: absent    Strength   Right deltoid: 5/5  Left deltoid: 5/5  Right biceps: 5/5  Left biceps: 5/5  Right triceps: 5/5  Left triceps: 5/5  Right interossei: 5/5  Left interossei: 5/5  Right iliopsoas: 5/5  Left iliopsoas:  5/5  Right quadriceps: 5/5  Left quadriceps: 5/5  Right anterior tibial: 5/5  Left anterior tibial: 5/5  Right posterior tibial: 5/5  Left posterior tibial: 5/5  Right peroneal: 5/5  Left peroneal: 5/5    SENSORY EXAM   Light touch normal.     GAIT AND COORDINATION     Gait  Gait: (deferred)     Coordination   Finger to nose coordination: normal    Tremor   Resting tremor: absent    Significant Labs: All pertinent lab results from the past 24 hours have been reviewed.    ESR 33   CRP 10    CSF pending:  Cell ct with diff, glucose, protein, CNS demyelinating panel, MS profile, NMO, HSV, VZV, Enterovirus, WNV , VDRL, M. Tuberculosis, freeze and hold     Significant Imaging: I have reviewed all pertinent imaging results/findings within the past 24 hours.    CT Head without contrast (9/18/22):  Unchanged appearance of presumed mild subarachnoid hemorrhage along the sulci of the left frontal lobe.  This is less likely cortical in location.     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 The ACAs, MCAs and PCAs are unremarkable.  No focal high-grade stenosis or large vessel occlusion.  No intracranial aneurysm or vascular malformation is identified.Small acute subarachnoid hemorrhage in the left frontal lobe.No focal stenosis or large vessel occlusion. No aneurysm seen.

## 2022-09-19 NOTE — MEDICAL/APP STUDENT
[facility]   Consult Note    DATE   09/19/2022        SUBJECTIVE:     Patient is a 52 y/o woman with medical history significant for B-ALL, RA, COPD, HTN, HLD, TIA, a-fib, admitted by heme/onc after presenting with acute L sided loss of vision 1 week ago. Pt states she was watching television when she noticed figures becoming shadowy before the vision in her left eye went completely black; she denies any vision changes in R eye. She also reports approx. 1 mo history of nausea and vomiting.      Fundoscopy normal bilaterally per ophthalmology, unable to undergo MRI imaging due to implanted pacemaker. CT positive for incidental SAH in left frontal lobe but no evidence of stenosis, large vessel occlusion, or aneurysm.     Per heme/onc has had a hx of severe pancytopenias in response to treatments. Of note: Holding ponatinib at this time because of reported arterial/venous thrombosis side effect in setting of possible central retinal artery occlusion/vision loss.        Review of Systems:  Review of Systems   Constitutional:  Negative for chills.   HENT: Negative.     Eyes:  Positive for pain and visual disturbance. Negative for discharge and itching.   Respiratory: Negative.     Cardiovascular:  Negative for chest pain and leg swelling.        Implanted pacemaker   Gastrointestinal:  Negative for abdominal distention.   Endocrine: Negative.    Genitourinary:  Positive for difficulty urinating.   Neurological:  Positive for headaches.   Hematological:         Diffuse petechiae B/L lower limbs     CURRENT MEDICATIONS AND ALLERGIES:   Current Medications:  Current Facility-Administered Medications   Medication Dose Route Frequency Provider Last Rate Last Admin    0.9%  NaCl infusion (for blood administration)   Intravenous Q24H PRN Andres Gonzalez MD        acyclovir capsule 400 mg  400 mg Oral BID Nayeli Devine MD   400 mg at 09/18/22 2015    allopurinoL tablet 300 mg  300 mg Oral Daily Nayeli Devine MD   300  mg at 09/18/22 0820    atorvastatin tablet 80 mg  80 mg Oral Daily Nayeli Devine MD   80 mg at 09/18/22 0819    busPIRone tablet 10 mg  10 mg Oral BID Nayeli Devine MD   10 mg at 09/18/22 2015    butalbital-acetaminophen-caffeine -40 mg per tablet 1 tablet  1 tablet Oral Q4H PRN Pipe Hager MD   1 tablet at 09/18/22 1858    dextrose 10% bolus 125 mL  12.5 g Intravenous PRN Pipe Hager MD        dextrose 10% bolus 250 mL  25 g Intravenous PRN Pipe Hager MD        diazePAM tablet 10 mg  10 mg Oral BID PRN Nayeli Devine MD   10 mg at 09/18/22 0837    diltiaZEM tablet 90 mg  90 mg Oral Q6H Nayeli Devine MD   90 mg at 09/19/22 0550    duke's soln (benadryl 30 mL, mylanta 30 mL, LIDOcaine 30 mL, nystatin 30 mL) 120 mL  10 mL Oral QID PRN Nayeli Devine MD        fenofibrate tablet 160 mg  160 mg Oral Daily Nayeli Devine MD   160 mg at 09/18/22 0821    gabapentin capsule 300 mg  300 mg Oral TID Nayeli Devine MD   300 mg at 09/18/22 2015    glucagon (human recombinant) injection 1 mg  1 mg Intramuscular PRN Pipe Hager MD        glucose chewable tablet 16 g  16 g Oral PRN Pipe Hager MD        glucose chewable tablet 24 g  24 g Oral PRN Pipe Hager MD        HYDROmorphone tablet 2 mg  2 mg Oral Q3H PRN Pipe Hager MD   2 mg at 09/19/22 0602    hydrOXYchloroQUINE tablet 200 mg  200 mg Oral Daily Nayeli Devine MD   200 mg at 09/18/22 0820    insulin aspart U-100 pen 1-10 Units  1-10 Units Subcutaneous QID (AC + HS) PRN Pipe Hager MD        insulin detemir U-100 pen 5 Units  5 Units Subcutaneous QHS Pipe Hager MD        methylPREDNISolone sodium succinate (SOLU-MEDROL) 1,000 mg in dextrose 5 % 100 mL IVPB  1,000 mg Intravenous Daily Pipe Hager MD   Stopped at 09/18/22 1756    mupirocin 2 % ointment   Nasal BID Yusuf Joseph MD   Given at 09/18/22 2015    naloxone 0.4 mg/mL injection 0.02 mg  0.02 mg Intravenous PRN Nayeli Devine MD        ondansetron injection 4 mg  4 mg Intravenous Q6H PRN Andres Gonzalez MD   4 mg  at 09/19/22 0602    OXcarbazepine tablet 1,200 mg  1,200 mg Oral QHS Pipe Hager MD   1,200 mg at 09/18/22 2203    OXcarbazepine tablet 1,200 mg  1,200 mg Oral with lunch Pipe Hager MD   1,200 mg at 09/18/22 1202    QUEtiapine tablet 200 mg  200 mg Oral QHS Nayeli Devine MD   200 mg at 09/18/22 2015    sodium chloride 0.9% flush 10 mL  10 mL Intravenous Q12H PRN Nayeli Devine MD           Allergies:  Review of patient's allergies indicates:   Allergen Reactions    Levetiracetam      Other reaction(s): Unknown  Other reaction(s): Unknown  Other reaction(s): Unknown       OBJECTIVE:   Vital Signs (Most Recent)  Vitals:    09/19/22 0333 09/19/22 0602 09/19/22 0733 09/19/22 0913   BP: 139/67  (!) 114/53    BP Location: Left arm      Patient Position: Lying  Lying    Pulse: 60  66    Resp: 16 18 18 18   Temp: 98 °F (36.7 °C)  98.5 °F (36.9 °C)    TempSrc: Oral  Oral    SpO2: (!) 93%  96%    Height:                CRANIAL NERVES     CN I  cranial nerve I not tested    CN II   Left visual field deficit: Complete L anopia.    CN III, IV, VI   Right pupil: Right reactivity: R pupil reactive; L pupil minimal reactivity to light.   Left pupil: Consensual response: APD.     CN V   Facial sensation intact.     CN VII   Facial expression full, symmetric.     CN VIII   CN VIII normal.     CN XII   CN XII normal.     NEUROLOGICAL EXAMINATION:     CRANIAL NERVES     CN II   Left visual field deficit: Complete L anopia.    CN III, IV, VI   Right pupil: Right reactivity: R pupil reactive; L pupil minimal reactivity to light.   Left pupil: Consensual response: APD.     CN V   Facial sensation intact.     CN VII   Facial expression full, symmetric.     CN VIII   CN VIII normal.     CN XII   CN XII normal.      Laboratory Results:     Lab Results   Component Value Date    WBC 2.07 (L) 09/19/2022    HGB 8.5 (L) 09/19/2022    HCT 24.2 (L) 09/19/2022     (H) 09/19/2022    PLT 49 (L) 09/19/2022   CMP  Sodium   Date Value Ref Range  Status   09/19/2022 135 (L) 136 - 145 mmol/L Final     Potassium   Date Value Ref Range Status   09/19/2022 4.3 3.5 - 5.1 mmol/L Final     Chloride   Date Value Ref Range Status   09/19/2022 101 95 - 110 mmol/L Final     CO2   Date Value Ref Range Status   09/19/2022 24 23 - 29 mmol/L Final     Glucose   Date Value Ref Range Status   09/19/2022 222 (H) 70 - 110 mg/dL Final     BUN   Date Value Ref Range Status   09/19/2022 7 6 - 20 mg/dL Final     Creatinine   Date Value Ref Range Status   09/19/2022 0.6 0.5 - 1.4 mg/dL Final     Calcium   Date Value Ref Range Status   09/19/2022 9.6 8.7 - 10.5 mg/dL Final     Total Protein   Date Value Ref Range Status   09/19/2022 6.7 6.0 - 8.4 g/dL Final     Albumin   Date Value Ref Range Status   09/19/2022 3.8 3.5 - 5.2 g/dL Final     Total Bilirubin   Date Value Ref Range Status   09/19/2022 0.4 0.1 - 1.0 mg/dL Final     Comment:     For infants and newborns, interpretation of results should be based  on gestational age, weight and in agreement with clinical  observations.    Premature Infant recommended reference ranges:  Up to 24 hours.............<8.0 mg/dL  Up to 48 hours............<12.0 mg/dL  3-5 days..................<15.0 mg/dL  6-29 days.................<15.0 mg/dL       Alkaline Phosphatase   Date Value Ref Range Status   09/19/2022 152 (H) 55 - 135 U/L Final     AST   Date Value Ref Range Status   09/19/2022 180 (H) 10 - 40 U/L Final     ALT   Date Value Ref Range Status   09/19/2022 142 (H) 10 - 44 U/L Final     Anion Gap   Date Value Ref Range Status   09/19/2022 10 8 - 16 mmol/L Final     eGFR if    Date Value Ref Range Status   07/26/2022 >60.0 >60 mL/min/1.73 m^2 Final   07/26/2022 >60.0 >60 mL/min/1.73 m^2 Final     eGFR if non    Date Value Ref Range Status   07/26/2022 >60.0 >60 mL/min/1.73 m^2 Final     Comment:     Calculation used to obtain the estimated glomerular filtration  rate (eGFR) is the CKD-EPI equation.       07/26/2022 >60.0 >60 mL/min/1.73 m^2 Final     Comment:     Calculation used to obtain the estimated glomerular filtration  rate (eGFR) is the CKD-EPI equation.              Imaging/Pathology Results:    Brain/Vessel Imaging      CTH WO 9/18/2022  Unchanged appearance of presumed mild subarachnoid hemorrhage along the sulci of the left frontal lobe.  This is less likely cortical in location.  MR imaging would be helpful to exclude an intraparenchymal process.     CTA Head 9/17/2022  Small acute subarachnoid hemorrhage in the left frontal lobe.     No focal stenosis or large vessel occlusion.  No aneurysm seen.        Cardiac Imaging   EKG from 8/27/22 w NSR, HR 93bpm     ECHO 11/29/2021  The left ventricle is normal in size with normal systolic function. The estimated ejection fraction is 55%.  Normal right ventricular size with normal right ventricular systolic function.  Grade II left ventricular diastolic dysfunction with elevated left atrial pressures.  Mild left atrial enlargement.  The estimated PA systolic pressure is 35 mmHg.  Intermediate central venous pressure (8 mmHg).         Reviewed past medical, surgical & family history.  She has a past medical history of Cancer, COPD (chronic obstructive pulmonary disease), Hypertension, Rheumatoid arthritis, unspecified, SAH (subarachnoid hemorrhage), Seizures, Stroke, and Type 2 diabetes mellitus with circulatory disorder, without long-term current use of insulin.  She has a past surgical history that includes Appendectomy; Tonsillectomy; Rotator cuff repair (Bilateral); Tubal ligation; and Hysterectomy.  Her family history is not on file.   * denies any family history of neurological disorders in pt's family    ASSESSMENT and PLAN:   Assessment:   - Deepti Gonzalez, 51 y.o. female with B-ALL and multiple medical conditions including HTN, RA, COPD, hx of TIA and cardiac arrhythmia requiring implantable pacemaker, admitted for acute L sided vision loss  and associated orbital pain/headache. Treated with steroids but as of today no change in sx; requiring ongoing intervention for pain management.    Plan:  Likely optic neuritis given clinical findings and negative imaging for vascular cause    -LP studies pending + Labs for CNS demyelinating disease eval   -Cont. IV methylprednisolone 1g x3-5 days pending symptomatic course        -Cont. Pain management, minimize Fioricet usage due to concern for rebound headache  -Continue to monitor liver enzymes (elevated due to steroids vs transaminitis)  today ,     --  Ginny Jordan    [facility]

## 2022-09-19 NOTE — PROGRESS NOTES
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       is involved by a routine referral to the Social Work Department.    Patient presents as a 51 y.o. year old  female.    Persons interviewed:Patient's and .Spouse  With patient's permission.  Living Situation:    Patient lives with her  and their 12 year old daughter in a single family home.  Patient resides at [address] [place] [address] [facility] [address],   Patient's phone: .[phone].    Spouse phone: [phone].    (RETIRED) Functional Status Prior  Ambulation Prior: 2-->assistive person  Transferrin-->assistive person  Toiletin-->assistive person    Current or Past Agencies and Description of Services/Supplies    DME  Agency Name: [facility]  Agency Phone Number: [phone]       Home Health  Agency Name: [facility]  Agency Phone Number: [phone]  Services: Nursing    IV Infusion  Agency Name: N/A  Agency Phone Number: N/A    Nutrition: Diabetic diet.    Outpatient Pharmacy:     [facility]  [address] [facility] [address] [facility] [address]  Phone: [phone] Fax: [phone]    [id number] - [place], [place] - [address] [facility]  [address] [facility] [facility] [address]  Phone: [phone] Fax: [phone]    [id number] - [place], [place] - [address]  [address] [place] [address]  Phone: [phone] Fax: [phone]      Patient Preference of agencies include :Patient does not have an agency preference.    Patient/Caregiver informed of right to choose  providers or agencies.  Patient provides permission to release any necessary information to [facility] and to Non[facility] agencies as needed to facilitate patient care, treatment planning, and patient discharge planning.  Written and verbal resources provided.      Coping: Patient report a Distress Score of (5),Patient stated that her stress was related to financial issues. The issues that the patient was concerned with are in the process of being resolved and the patient feels less stress regarding those concerns. Patient is approved for Family and Patient Assistance.       Adjustment to Diagnosis and Treatment:  Patient stated that she has multiple medical issues, and is becoming more adjusted to her diagnosis and treatment     Emotional: Emotions hav.e improved.    Behavioral: None reported.     Cognitive Issues: Patient stated that she is having issues with her short-term memory but currently can't attribute it to anything specific    Employment: Currently unemployed, but in the past she worked as a  for an . Patient stated that she had to stop due to heart problems. Patient stated that she has not worked since 2006.    Finance: Patient reports that they were having difficulty paying the hospital bills.  connected the patient to the appropriate services for assistance. Patient is now, eligable for Family and Patient Assistance.     Housing: Stable.    Support System: Patient reports having a good support system with her  and her 30 year old daughter.    Immediate needs: Patient reported no immediate needs at this time..    Recreation/Leisure: Patient reports, watching her daughter play baseball, watching movies, and spending time with the family.     Lynsey: Confucianist (Shinto).    Transportation: Patient's  will provide transportation whenever it is needed.    History/Current Symptoms of Anxiety/Depression: No:   History/Current Substance Use:   Social History     Tobacco  Use    Smoking status: Former     Packs/day: 0.50     Types: Cigarettes     Quit date: 3/16/2022     Years since quittin.5    Smokeless tobacco: Never   Substance and Sexual Activity    Alcohol use: No    Drug use: Never    Sexual activity: Not on file       Indications of Abuse/Neglect: No:   Abuse Screen (yes response referral indicated)  Feels Unsafe at Home or Work/School: No  Physical Signs of Abuse Present: No    Financial:  Payer/Plan Subscr  Sex Relation Sub. Ins. ID Effective Group Num   1. [employer]UE [employer]OSS SHIRLENE GUTIERREZ 1900 Male Spouse [id number] 3/22/[birth year] [id number]                                   [address] [address]        Other identified concerns/needs: TBD    Plan: TBD    Interventions/Referrals: TBD    Patient/caregiver engaged in treatment planning process. Yes     providing psychosocial and supportive counseling, resources, education, assistance and discharge planning as appropriate.  Patient/caregiver state understanding of  available resources,  following, remains available.

## 2022-09-19 NOTE — PROGRESS NOTES
Roberto Yepez - Oncology ([facility])  Hematology  Bone Marrow Transplant  Progress Note    Patient Name: Deepti Gonzalez  Admission Date: 9/16/2022  Hospital Length of Stay: 3 days  Code Status: Full Code    Subjective:     Interval History: NAOE, vision the same. HA still present. Planning for IT chemo and LP with studies today. Continue steroids per neuro. Insulin added for steroid induced hyperglycemia.     Objective:     Vital Signs (Most Recent):  Temp: 98.5 °F (36.9 °C) (09/19/22 0733)  Pulse: 66 (09/19/22 0733)  Resp: 18 (09/19/22 0913)  BP: (!) 114/53 (09/19/22 0733)  SpO2: 96 % (09/19/22 0733)   Vital Signs (24h Range):  Temp:  [97.5 °F (36.4 °C)-98.5 °F (36.9 °C)] 98.5 °F (36.9 °C)  Pulse:  [60-71] 66  Resp:  [15-19] 18  SpO2:  [92 %-99 %] 96 %  BP: (110-139)/(53-67) 114/53        Body mass index is 22.61 kg/m².  Body surface area is 1.64 meters squared.    ECOG SCORE           [unfilled]    Intake/Output - Last 3 Shifts         09/17 0700 09/18 0659 09/18 0700 09/19 0659 09/19 0700 09/20 0659    P.O. 960 1320     Blood 316.4      Total Intake 1276.4 1320     Urine 500 2300     Total Output 500 2300     Net +776.4 -980                    Physical Exam  Constitutional:       General: She is not in acute distress.     Appearance: She is well-developed.   HENT:      Head: Normocephalic and atraumatic.      Nose: Nose normal. No congestion.   Eyes:      General: No scleral icterus.     Extraocular Movements: Extraocular movements intact.      Comments: Left eye pupil not reactive. Left eye peripheral vision not intact. Reports eye pain with movement   Cardiovascular:      Rate and Rhythm: Normal rate and regular rhythm.      Heart sounds: No murmur heard.  Pulmonary:      Effort: Pulmonary effort is normal. No respiratory distress.   Abdominal:      General: There is no distension.      Palpations: Abdomen is soft.      Tenderness: There is no abdominal tenderness.   Musculoskeletal:         General: Normal range  of motion.      Cervical back: Normal range of motion and neck supple.   Skin:     General: Skin is warm and dry.   Neurological:      General: No focal deficit present.      Mental Status: She is alert and oriented to person, place, and time.   Psychiatric:         Mood and Affect: Mood normal.         Behavior: Behavior normal.       Significant Labs:   CBC:   Recent Labs   Lab 09/18/22 0330 09/19/22  0323   WBC 5.56 2.07*   HGB 8.3* 8.5*   HCT 24.2* 24.2*   PLT 63* 49*   , CMP:   Recent Labs   Lab 09/18/22 0330 09/19/22 0323   * 135*   K 3.7 4.3    101   CO2 25 24    222*   BUN 10 7   CREATININE 0.6 0.6   CALCIUM 9.1 9.6   PROT 6.0 6.7   ALBUMIN 3.4* 3.8   BILITOT 0.4 0.4   ALKPHOS 110 152*   AST 10 180*   ALT 7* 142*   ANIONGAP 8 10   , and Coagulation:   Recent Labs   Lab 09/17/22 2228   INR 1.1   APTT 21.7       Diagnostic Results:  I have reviewed all pertinent imaging results/findings within the past 24 hours.    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 Concern for possible B-ALL involvement vs side effect of ponatinib vs retrobulbar hemorrhage vs stroke    Plan:  - Consulted Opthomology, appreciate assistance  - LP planned for today along with IT chemo (cytarabine, hydrocortisone, methotrexate)  - labs to be collected:   TB DNA by PCR  VDRL  [place] virus pCR  VZV PCR  HSV PCR  Enterovirus RNA  Cryptococcal Ag  CNS demyelinating dz (AQP-4 IGG/MOG IGG)  CSF bands  NMO aquaporin  MS profile    Gram stain  CSF culture  Cytology  Flow cytometry analysis  CSF cell count  CSF protein    - Patient with pacemaker and per patient, not compatible with MRI. Would ideally obtain MRI brain w/ w/o contrast. Verified with EP who checked with rep that pacemaker is not MRI compatible.   - CTA completed with delayed venous phase revealing small SAH      SAH (subarachnoid  hemorrhage)  Small SAH discovered on CTA. Repeat CTH stable. No focal neuro deficits. Pt has ongoing left sided headache and eye pain    - NSGY consulted, appreciate recs    Thrombocytopenia  Monitor CBC  Transfuse for platelet count <10 or whatever threshold IR wants for LP    Urinary retention  Patient reports wolf placed last week and has been that time.  Attempt voiding trial prior to discharge  Will need urology follow up at discharge.     Anemia associated with chemotherapy  Monitor CBC  Transfuse for Hgb<7    B-cell acute lymphoblastic leukemia  Follows with Dr. Jenkins.  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  Was treated with inotuzumab ozogamicin and ponatinib but course complicated by severe cytopenias. Not has been on therapy for past few weeks.  Not a candidate for allogeneic stem cell transplant at this time.  - Holding ponatinib at this time because of reported arterial/venous thrombosis side effect in setting of possible central retinal artery occlusion/vision loss.     Anxiety  Continue home buspirone and quetiapine    Hypertension  Holding home BP meds in the setting of soft BP. Resume when appropriate.    Seizure disorder  Continue home oxcarbazepine 200mg QHS    Pacemaker  Patient with pacemaker in place due to SSS. Per EP, it is definitely MRI incompatible.  Will obtain CT imaging instead    Rheumatoid arthritis involving multiple sites  Seronegative RA. Continue home plaquenil    Hyperlipidemia  Continue home atorvastatin and fenofibrate    Paroxysmal atrial fibrillation  Holding home xarelto in the setting of thrombocytopenia        VTE Risk Mitigation (From admission, onward)         Ordered     IP VTE HIGH RISK PATIENT  Once         09/16/22 1637     Place sequential compression device  Until discontinued         09/16/22 1637     Reason for No Pharmacological VTE Prophylaxis  Once        Question:  Reasons:  Answer:   Thrombocytopenia    09/16/22 1637                Disposition: BMT admission    Pipe Hager MD  Bone Marrow Transplant  [address] - Oncology ([facility])

## 2022-09-19 NOTE — PROCEDURES
Patient seen at Fluoroscopy. LP done per radiology. CSF studies sent. Timeout done. Chemo checked with MD. Methotrexate 15 mg, hydrocortisone 30 mg and Cytarabine 30 mg given intrathecally without difficulty.     Carolyn Mchugh NP  Hematology/BMT

## 2022-09-19 NOTE — PROCEDURES
Radiology Post-Procedure Note    Pre Op Diagnosis: B-ALL    Post Op Diagnosis: Same    Procedure: Lumbar puncture under fluoroscopic guidance    Procedure performed by: Renzo Noel MD    Written Informed Consent Obtained: Yes    Specimen Removed: 17.5 mL CSF    Estimated Blood Loss: Minimal    Opening pressure: N/A    Needle used: 9cm 22g spinal needle    Findings: Following written informed consent and sterile prep and drape, a 22 gauge spinal needle was inserted at L3-L4 intralaminar space under fluoroscopic surveillance.  17.5 mL clear CSF removed and sent to the lab for further analysis.      After obtaining CSF sample, the oncology NP was called to the procedure suite to administer intrathecal chemotherapy.  After administration, the needle was flushed with 2 mL reserved CSF.    There were no complications.    Patient tolerated procedure well.    Gerardo Zhou M.D.  Resident, Diagnostic and Interventional Radiology  Department of Radiology  [facility]

## 2022-09-19 NOTE — TELEPHONE ENCOUNTER
"----- Message from Milli Flores sent at 9/19/2022  9:13 AM CDT -----  Consult/Advisory:           Name Of Caller: Mannie-     Contact Preference?: [phone]    What is the nature of the call?: requesting a call back to discuss upcoming infusions           Additional Notes:  "Thank you for all that you do for our patients'"     "

## 2022-09-19 NOTE — SUBJECTIVE & OBJECTIVE
Subjective:     Interval History: NAOE, vision the same. HA still present. Planning for IT chemo and LP with studies today. Continue steroids per neuro. Insulin added for steroid induced hyperglycemia.     Objective:     Vital Signs (Most Recent):  Temp: 98.5 °F (36.9 °C) (09/19/22 0733)  Pulse: 66 (09/19/22 0733)  Resp: 18 (09/19/22 0913)  BP: (!) 114/53 (09/19/22 0733)  SpO2: 96 % (09/19/22 0733)   Vital Signs (24h Range):  Temp:  [97.5 °F (36.4 °C)-98.5 °F (36.9 °C)] 98.5 °F (36.9 °C)  Pulse:  [60-71] 66  Resp:  [15-19] 18  SpO2:  [92 %-99 %] 96 %  BP: (110-139)/(53-67) 114/53        Body mass index is 22.61 kg/m².  Body surface area is 1.64 meters squared.    ECOG SCORE           [unfilled]    Intake/Output - Last 3 Shifts         09/17 0700  09/18 0659 09/18 0700  09/19 0659 09/19 0700  09/20 0659    P.O. 960 1320     Blood 316.4      Total Intake 1276.4 1320     Urine 500 2300     Total Output 500 2300     Net +776.4 -980                    Physical Exam  Constitutional:       General: She is not in acute distress.     Appearance: She is well-developed.   HENT:      Head: Normocephalic and atraumatic.      Nose: Nose normal. No congestion.   Eyes:      General: No scleral icterus.     Extraocular Movements: Extraocular movements intact.      Comments: Left eye pupil not reactive. Left eye peripheral vision not intact. Reports eye pain with movement   Cardiovascular:      Rate and Rhythm: Normal rate and regular rhythm.      Heart sounds: No murmur heard.  Pulmonary:      Effort: Pulmonary effort is normal. No respiratory distress.   Abdominal:      General: There is no distension.      Palpations: Abdomen is soft.      Tenderness: There is no abdominal tenderness.   Musculoskeletal:         General: Normal range of motion.      Cervical back: Normal range of motion and neck supple.   Skin:     General: Skin is warm and dry.   Neurological:      General: No focal deficit present.      Mental Status: She is  alert and oriented to person, place, and time.   Psychiatric:         Mood and Affect: Mood normal.         Behavior: Behavior normal.       Significant Labs:   CBC:   Recent Labs   Lab 09/18/22 0330 09/19/22 0323   WBC 5.56 2.07*   HGB 8.3* 8.5*   HCT 24.2* 24.2*   PLT 63* 49*   , CMP:   Recent Labs   Lab 09/18/22 0330 09/19/22 0323   * 135*   K 3.7 4.3    101   CO2 25 24    222*   BUN 10 7   CREATININE 0.6 0.6   CALCIUM 9.1 9.6   PROT 6.0 6.7   ALBUMIN 3.4* 3.8   BILITOT 0.4 0.4   ALKPHOS 110 152*   AST 10 180*   ALT 7* 142*   ANIONGAP 8 10   , and Coagulation:   Recent Labs   Lab 09/17/22 2228   INR 1.1   APTT 21.7       Diagnostic Results:  I have reviewed all pertinent imaging results/findings within the past 24 hours.

## 2022-09-19 NOTE — PLAN OF CARE
No acute events. LP done and IT chemo administered. HA well-controlled with prn fioricet. Dilaudid increased to 4mg q3. Opthomology and neurology consulting. IV dex continued. Accuchecks AC HS, SSI admin as ordered. Voiding via wolf. Pt remains AAOx4. Pt remains afebrile and VSS. WCTM.

## 2022-09-20 LAB
ALBUMIN SERPL BCP-MCNC: 3.6 G/DL (ref 3.5–5.2)
ALP SERPL-CCNC: 144 U/L (ref 55–135)
ALT SERPL W/O P-5'-P-CCNC: 110 U/L (ref 10–44)
ANION GAP SERPL CALC-SCNC: 10 MMOL/L (ref 8–16)
AST SERPL-CCNC: 44 U/L (ref 10–40)
BASOPHILS # BLD AUTO: 0 K/UL (ref 0–0.2)
BASOPHILS NFR BLD: 0 % (ref 0–1.9)
BILIRUB SERPL-MCNC: 0.2 MG/DL (ref 0.1–1)
BLOOD COLLECTION FOR MS PROFILE: NORMAL
BUN SERPL-MCNC: 10 MG/DL (ref 6–20)
CALCIUM SERPL-MCNC: 9.2 MG/DL (ref 8.7–10.5)
CHLORIDE SERPL-SCNC: 102 MMOL/L (ref 95–110)
CO2 SERPL-SCNC: 25 MMOL/L (ref 23–29)
CREAT SERPL-MCNC: 0.6 MG/DL (ref 0.5–1.4)
CRYPTOC AG CSF QL LA: NEGATIVE
DIFFERENTIAL METHOD: ABNORMAL
EOSINOPHIL # BLD AUTO: 0 K/UL (ref 0–0.5)
EOSINOPHIL NFR BLD: 0 % (ref 0–8)
ERYTHROCYTE [DISTWIDTH] IN BLOOD BY AUTOMATED COUNT: 18.2 % (ref 11.5–14.5)
EST. GFR  (NO RACE VARIABLE): >60 ML/MIN/1.73 M^2
FLOW CYTOMETRY ANTIBODIES ANALYZED - CSF: NORMAL
FLOW CYTOMETRY COMMENT - CSF: NORMAL
FLOW CYTOMETRY INTERPRETATION - CSF: NORMAL
GLUCOSE SERPL-MCNC: 188 MG/DL (ref 70–110)
HCT VFR BLD AUTO: 23.4 % (ref 37–48.5)
HGB BLD-MCNC: 8.1 G/DL (ref 12–16)
IMM GRANULOCYTES # BLD AUTO: 0.06 K/UL (ref 0–0.04)
IMM GRANULOCYTES NFR BLD AUTO: 1.2 % (ref 0–0.5)
LYMPHOCYTES # BLD AUTO: 0.9 K/UL (ref 1–4.8)
LYMPHOCYTES NFR BLD: 18.4 % (ref 18–48)
MCH RBC QN AUTO: 39.3 PG (ref 27–31)
MCHC RBC AUTO-ENTMCNC: 34.6 G/DL (ref 32–36)
MCV RBC AUTO: 114 FL (ref 82–98)
MONOCYTES # BLD AUTO: 0.3 K/UL (ref 0.3–1)
MONOCYTES NFR BLD: 7 % (ref 4–15)
NEUTROPHILS # BLD AUTO: 3.6 K/UL (ref 1.8–7.7)
NEUTROPHILS NFR BLD: 73.4 % (ref 38–73)
NRBC BLD-RTO: 1 /100 WBC
PATH INTERP FLD-IMP: NORMAL
PLATELET # BLD AUTO: 45 K/UL (ref 150–450)
PMV BLD AUTO: 10.3 FL (ref 9.2–12.9)
POCT GLUCOSE: 165 MG/DL (ref 70–110)
POCT GLUCOSE: 173 MG/DL (ref 70–110)
POCT GLUCOSE: 199 MG/DL (ref 70–110)
POCT GLUCOSE: 278 MG/DL (ref 70–110)
POTASSIUM SERPL-SCNC: 3.9 MMOL/L (ref 3.5–5.1)
PROT SERPL-MCNC: 6.2 G/DL (ref 6–8.4)
RBC # BLD AUTO: 2.06 M/UL (ref 4–5.4)
SODIUM SERPL-SCNC: 137 MMOL/L (ref 136–145)
TREPONEMA PALLIDUM IGG+IGM AB [PRESENCE] IN SERUM OR PLASMA BY IMMUNOASSAY: NONREACTIVE
WBC # BLD AUTO: 4.84 K/UL (ref 3.9–12.7)

## 2022-09-20 PROCEDURE — 99233 SBSQ HOSP IP/OBS HIGH 50: CPT | Mod: ,,, | Performed by: INTERNAL MEDICINE

## 2022-09-20 PROCEDURE — 36415 COLL VENOUS BLD VENIPUNCTURE: CPT | Performed by: STUDENT IN AN ORGANIZED HEALTH CARE EDUCATION/TRAINING PROGRAM

## 2022-09-20 PROCEDURE — 86255 FLUORESCENT ANTIBODY SCREEN: CPT | Mod: 59 | Performed by: STUDENT IN AN ORGANIZED HEALTH CARE EDUCATION/TRAINING PROGRAM

## 2022-09-20 PROCEDURE — 63600175 PHARM REV CODE 636 W HCPCS

## 2022-09-20 PROCEDURE — 86255 FLUORESCENT ANTIBODY SCREEN: CPT | Performed by: STUDENT IN AN ORGANIZED HEALTH CARE EDUCATION/TRAINING PROGRAM

## 2022-09-20 PROCEDURE — 63600175 PHARM REV CODE 636 W HCPCS: Performed by: STUDENT IN AN ORGANIZED HEALTH CARE EDUCATION/TRAINING PROGRAM

## 2022-09-20 PROCEDURE — 86480 TB TEST CELL IMMUN MEASURE: CPT | Performed by: STUDENT IN AN ORGANIZED HEALTH CARE EDUCATION/TRAINING PROGRAM

## 2022-09-20 PROCEDURE — 20600001 HC STEP DOWN PRIVATE ROOM

## 2022-09-20 PROCEDURE — 99232 PR SUBSEQUENT HOSPITAL CARE,LEVL II: ICD-10-PCS | Mod: ,,, | Performed by: NEUROLOGICAL SURGERY

## 2022-09-20 PROCEDURE — 99233 PR SUBSEQUENT HOSPITAL CARE,LEVL III: ICD-10-PCS | Mod: ,,, | Performed by: INTERNAL MEDICINE

## 2022-09-20 PROCEDURE — 99232 SBSQ HOSP IP/OBS MODERATE 35: CPT | Mod: ,,, | Performed by: NEUROLOGICAL SURGERY

## 2022-09-20 PROCEDURE — 25000242 PHARM REV CODE 250 ALT 637 W/ HCPCS: Performed by: STUDENT IN AN ORGANIZED HEALTH CARE EDUCATION/TRAINING PROGRAM

## 2022-09-20 PROCEDURE — 25000003 PHARM REV CODE 250

## 2022-09-20 PROCEDURE — 80053 COMPREHEN METABOLIC PANEL: CPT | Performed by: STUDENT IN AN ORGANIZED HEALTH CARE EDUCATION/TRAINING PROGRAM

## 2022-09-20 PROCEDURE — 25000003 PHARM REV CODE 250: Performed by: STUDENT IN AN ORGANIZED HEALTH CARE EDUCATION/TRAINING PROGRAM

## 2022-09-20 PROCEDURE — 86480 TB TEST CELL IMMUN MEASURE: CPT | Performed by: INTERNAL MEDICINE

## 2022-09-20 PROCEDURE — 85025 COMPLETE CBC W/AUTO DIFF WBC: CPT | Performed by: STUDENT IN AN ORGANIZED HEALTH CARE EDUCATION/TRAINING PROGRAM

## 2022-09-20 RX ORDER — DILTIAZEM HYDROCHLORIDE 30 MG/1
30 TABLET, FILM COATED ORAL EVERY 6 HOURS SCHEDULED
Status: DISCONTINUED | OUTPATIENT
Start: 2022-09-21 | End: 2022-10-01 | Stop reason: HOSPADM

## 2022-09-20 RX ORDER — HYDROXYZINE PAMOATE 25 MG/1
50 CAPSULE ORAL
Status: DISCONTINUED | OUTPATIENT
Start: 2022-09-20 | End: 2022-10-01 | Stop reason: HOSPADM

## 2022-09-20 RX ORDER — AMOXICILLIN 250 MG
1 CAPSULE ORAL DAILY
Status: DISCONTINUED | OUTPATIENT
Start: 2022-09-20 | End: 2022-09-22

## 2022-09-20 RX ORDER — PANTOPRAZOLE SODIUM 40 MG/1
40 TABLET, DELAYED RELEASE ORAL DAILY
Status: DISCONTINUED | OUTPATIENT
Start: 2022-09-20 | End: 2022-10-01 | Stop reason: HOSPADM

## 2022-09-20 RX ORDER — POLYETHYLENE GLYCOL 3350 17 G/17G
17 POWDER, FOR SOLUTION ORAL DAILY
Status: DISCONTINUED | OUTPATIENT
Start: 2022-09-20 | End: 2022-09-20

## 2022-09-20 RX ORDER — HYDROXYZINE PAMOATE 25 MG/1
50 CAPSULE ORAL EVERY 8 HOURS PRN
Status: DISCONTINUED | OUTPATIENT
Start: 2022-09-20 | End: 2022-09-20

## 2022-09-20 RX ORDER — SUMATRIPTAN 50 MG/1
50 TABLET, FILM COATED ORAL DAILY PRN
Status: DISCONTINUED | OUTPATIENT
Start: 2022-09-20 | End: 2022-10-01 | Stop reason: HOSPADM

## 2022-09-20 RX ADMIN — OXCARBAZEPINE 1200 MG: 600 TABLET, FILM COATED ORAL at 10:09

## 2022-09-20 RX ADMIN — GABAPENTIN 300 MG: 300 CAPSULE ORAL at 09:09

## 2022-09-20 RX ADMIN — INSULIN ASPART 2 UNITS: 100 INJECTION, SOLUTION INTRAVENOUS; SUBCUTANEOUS at 09:09

## 2022-09-20 RX ADMIN — DILTIAZEM HYDROCHLORIDE 90 MG: 60 TABLET, FILM COATED ORAL at 06:09

## 2022-09-20 RX ADMIN — OXCARBAZEPINE 1200 MG: 600 TABLET, FILM COATED ORAL at 11:09

## 2022-09-20 RX ADMIN — DEXAMETHASONE SODIUM PHOSPHATE 4 MG: 4 INJECTION INTRA-ARTICULAR; INTRALESIONAL; INTRAMUSCULAR; INTRAVENOUS; SOFT TISSUE at 05:09

## 2022-09-20 RX ADMIN — MUPIROCIN: 20 OINTMENT TOPICAL at 09:09

## 2022-09-20 RX ADMIN — ALLOPURINOL 300 MG: 300 TABLET ORAL at 09:09

## 2022-09-20 RX ADMIN — BUTALBITAL, ACETAMINOPHEN, AND CAFFEINE 1 TABLET: 50; 325; 40 TABLET ORAL at 05:09

## 2022-09-20 RX ADMIN — ONDANSETRON 4 MG: 2 INJECTION INTRAMUSCULAR; INTRAVENOUS at 05:09

## 2022-09-20 RX ADMIN — INSULIN ASPART 4 UNITS: 100 INJECTION, SOLUTION INTRAVENOUS; SUBCUTANEOUS at 02:09

## 2022-09-20 RX ADMIN — INSULIN ASPART 4 UNITS: 100 INJECTION, SOLUTION INTRAVENOUS; SUBCUTANEOUS at 09:09

## 2022-09-20 RX ADMIN — HYDROMORPHONE HYDROCHLORIDE 4 MG: 4 TABLET ORAL at 10:09

## 2022-09-20 RX ADMIN — BUSPIRONE HYDROCHLORIDE 10 MG: 10 TABLET ORAL at 09:09

## 2022-09-20 RX ADMIN — ATORVASTATIN CALCIUM 80 MG: 20 TABLET, FILM COATED ORAL at 09:09

## 2022-09-20 RX ADMIN — PANTOPRAZOLE SODIUM 40 MG: 40 TABLET, DELAYED RELEASE ORAL at 11:09

## 2022-09-20 RX ADMIN — ACYCLOVIR 400 MG: 200 CAPSULE ORAL at 09:09

## 2022-09-20 RX ADMIN — HYDROMORPHONE HYDROCHLORIDE 4 MG: 4 TABLET ORAL at 07:09

## 2022-09-20 RX ADMIN — ONDANSETRON 4 MG: 2 INJECTION INTRAMUSCULAR; INTRAVENOUS at 06:09

## 2022-09-20 RX ADMIN — DILTIAZEM HYDROCHLORIDE 30 MG: 60 TABLET, FILM COATED ORAL at 11:09

## 2022-09-20 RX ADMIN — BUTALBITAL, ACETAMINOPHEN, AND CAFFEINE 1 TABLET: 50; 325; 40 TABLET ORAL at 06:09

## 2022-09-20 RX ADMIN — INSULIN DETEMIR 5 UNITS: 100 INJECTION, SOLUTION SUBCUTANEOUS at 09:09

## 2022-09-20 RX ADMIN — QUETIAPINE FUMARATE 200 MG: 200 TABLET ORAL at 09:09

## 2022-09-20 RX ADMIN — SENNOSIDES AND DOCUSATE SODIUM 1 TABLET: 50; 8.6 TABLET ORAL at 11:09

## 2022-09-20 RX ADMIN — BUTALBITAL, ACETAMINOPHEN, AND CAFFEINE 1 TABLET: 50; 325; 40 TABLET ORAL at 11:09

## 2022-09-20 RX ADMIN — INSULIN ASPART 4 UNITS: 100 INJECTION, SOLUTION INTRAVENOUS; SUBCUTANEOUS at 05:09

## 2022-09-20 RX ADMIN — DEXAMETHASONE SODIUM PHOSPHATE 4 MG: 4 INJECTION INTRA-ARTICULAR; INTRALESIONAL; INTRAMUSCULAR; INTRAVENOUS; SOFT TISSUE at 11:09

## 2022-09-20 RX ADMIN — HYDROMORPHONE HYDROCHLORIDE 4 MG: 4 TABLET ORAL at 04:09

## 2022-09-20 RX ADMIN — HYDROXYZINE PAMOATE 50 MG: 25 CAPSULE ORAL at 09:09

## 2022-09-20 RX ADMIN — HYDROXYCHLOROQUINE SULFATE 200 MG: 200 TABLET ORAL at 09:09

## 2022-09-20 RX ADMIN — INSULIN ASPART 2 UNITS: 100 INJECTION, SOLUTION INTRAVENOUS; SUBCUTANEOUS at 02:09

## 2022-09-20 RX ADMIN — METHYLPREDNISOLONE SODIUM SUCCINATE 1000 MG: 1 INJECTION, POWDER, LYOPHILIZED, FOR SOLUTION INTRAMUSCULAR; INTRAVENOUS at 09:09

## 2022-09-20 RX ADMIN — FENOFIBRATE 160 MG: 160 TABLET ORAL at 09:09

## 2022-09-20 RX ADMIN — GABAPENTIN 300 MG: 300 CAPSULE ORAL at 11:09

## 2022-09-20 NOTE — PLAN OF CARE
Plan of care reviewed with patient and . Only complaint today was increased blurry vision in the right eye now. Optho saw pt and confirmed decreased vision. Afternoon Dilt held per MD, and order changed d/t low B/P and HR (/69, MAP 79, HR 62). Order placed for request for Ommaya reservoir placement, possibly Thursday, but no NPO orders in as of now. Lab cancelled quant gold TB lab draw from this AM due to overfilled tubes. Reordered as lab draw to confirm proper draw tomorrow, 9/21. VSS, q1h patient rounds, bed in low position and wheels locked, rails up x2, call light and personal belongings within reach.    Gregory Hardin   9/20/2022   5:30 PM

## 2022-09-20 NOTE — PLAN OF CARE
Plan of care reviewed with patient and her  at the start of shift. Pt agreeable to the additional insulin at Hs to control blood sugars better. Pt continues to have headache pain moderately controlled by pain medications. Headaches cause some nausea. VSS and afebrile. Green draining clear yellow urine. Pt encouraged to reposition herself frequently for promotion of skin integrity.

## 2022-09-20 NOTE — ASSESSMENT & PLAN NOTE
Small SAH discovered on CTA. Repeat CTH stable. No focal neuro deficits. Pt has ongoing left sided headache and eye pain. Likely incidental finding that is not causing the vision loss.     - NSGY consulted, appreciate recs

## 2022-09-20 NOTE — ASSESSMENT & PLAN NOTE
Follows with Dr. Jenkins.  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  Was treated with inotuzumab ozogamicin and ponatinib but course complicated by severe cytopenias. Not has been on therapy for past few weeks.  Not a candidate for allogeneic stem cell transplant at this time.  - Holding ponatinib at this time because of reported arterial/venous thrombosis side effect in setting of possible central retinal artery occlusion/vision loss.   - Will plan for LP this Thursday for more IT chemo administration and discuss with NSGY about possible usefullness of Ommaya reservoir vs repeated weekly/biweekly LPs for IT chemo

## 2022-09-20 NOTE — ASSESSMENT & PLAN NOTE
51 F Pmhx leukemia, presents with left sided vision loss 5 days ago while watching TV. NSGY consulted for incidentally found small left frontal SAH    SAH likely unrelated to vision loss  CTA negative for any vascular lesion or occlusion  Rpt CTH stable bleed    -- no neurosurgical intervention needed at this time  -- unfortunately patient cannot have MRI due to pacemaker, so no further imaging recommended at this time  -- Agree with continued ophthalmology workup   --Consider angio to help rule out vasculitis pending neurology workup

## 2022-09-20 NOTE — HPI
Ms. Gonzalez is a 51-year-old female with relapsed/refractory B-ALL who has been on hold from chemotherapy due to cytopenia who is currently admitted due to sudden onset left eye blindness and pain for approximately 1 week.  She has a pacemaker and an MRI of the brain was not possible.  A CTA of the brain revealed subtle hyperdensity noted along the sulci of the left frontal lobe, concerning for small subarachnoid hemorrhage. A CT brain/orbit without contrast was unremarkable.  She also developed some blurriness on the right side.  After evaluation by Ophthalmology, radiation Oncology is consulted regarding urgent radiation due to suspected leukemic crisis.  Of note, she has been started on intrathecal methotrexate less than 1 day ago.  Next cycle is scheduled in 2 days.  Dexamethasone 4 mg every 6 hours has also been initiated per primary team.    At present, she reports improvement in the left eye pain, but her vision has not improved in the left eye.  She reports some improvement in the blurriness in the right eye.  She denies nausea, vomiting, dizziness, or memory loss.

## 2022-09-20 NOTE — PROGRESS NOTES
Roberto Yepez - Oncology ([facility])  Neurosurgery  Progress Note    Subjective:     History of Present Illness: 51 F Pmhx leukemia, presents with left sided vision loss 5 days ago while watching TV, vision loss began gradually then progressed to complete left eye vision loss over 1 hour. She has not had any improvement since then. She has no headaches, focal weaknesses, confusion, or speech difficulty. She denies any trauma. NSGY consulted for incidentally found small left frontal SAH      Post-Op Info:  * No surgery found *         Interval History: 9/19: AFVSS. Neuro stable. LP today. May consider angio to help rule out vasculitis.    Medications:  Continuous Infusions:  Scheduled Meds:   acyclovir  400 mg Oral BID    allopurinoL  300 mg Oral Daily    atorvastatin  80 mg Oral Daily    busPIRone  10 mg Oral BID    diltiaZEM  90 mg Oral Q6H    fenofibrate  160 mg Oral Daily    gabapentin  300 mg Oral TID    hydrOXYchloroQUINE  200 mg Oral Daily    insulin aspart U-100  4 Units Subcutaneous TIDWM    insulin detemir U-100  5 Units Subcutaneous QHS    methylPREDNISolone sodium succinate injection  1,000 mg Intravenous Daily    mupirocin   Nasal BID    OXcarbazepine  1,200 mg Oral QHS    OXcarbazepine  1,200 mg Oral with lunch    QUEtiapine  200 mg Oral QHS     PRN Meds:sodium chloride, butalbital-acetaminophen-caffeine -40 mg, dextrose 10%, dextrose 10%, diazePAM, duke's soln (benadryl 30 mL, mylanta 30 mL, LIDOcaine 30 mL, nystatin 30 mL) 120 mL, glucagon (human recombinant), glucose, glucose, HYDROmorphone, insulin aspart U-100, naloxone, ondansetron, sodium chloride 0.9%, white petrolatum     Review of Systems  Objective:        Body mass index is 22.61 kg/m².  Vital Signs (Most Recent):  Temp: 96.2 °F (35.7 °C) (09/19/22 1937)  Pulse: 70 (09/19/22 1937)  Resp: 18 (09/19/22 2147)  BP: (!) 117/55 (09/19/22 1937)  SpO2: (!) 92 % (09/19/22 1937)   Vital Signs (24h Range):  Temp:  [96.2 °F (35.7 °C)-98.6  "°F (37 °C)] 96.2 °F (35.7 °C)  Pulse:  [60-70] 70  Resp:  [15-18] 18  SpO2:  [92 %-96 %] 92 %  BP: (114-139)/(53-67) 117/55     Date 09/19/22 0700 - 09/20/22 0659   Shift 1157-5160 7092-5615 0082-8922 24 Hour Total   INTAKE   IV Piggyback 100   100   Shift Total 100   100   OUTPUT   Urine 700 750  1450   Shift Total 700 750  1450   Weight (kg)                            Urethral Catheter 09/07/22 (Active)   Site Assessment Clean;Intact 09/19/22 0738   Collection Container Standard drainage bag 09/19/22 0738   Securement Method secured to top of thigh w/ adhesive device 09/19/22 0738   Catheter Care Performed yes 09/19/22 0738   Reason for Continuing Urinary Catheterization Urinary retention 09/19/22 0738   CAUTI Prevention Bundle Securement Device in place with 1" slack;Intact seal between catheter & drainage tubing;Drainage bag/urimeter off the floor;Sheeting clip in use;No dependent loops or kinks;Drainage bag/urimeter not overfilled (<2/3 full);Drainage bag/urimeter below bladder 09/19/22 0738   Output (mL) 750 mL 09/19/22 1700       Physical Exam  Constitutional: No distress.   HEENT: atraumatic/normocephalic  Cardiovascular: Regular rhythm.   Pulm: aerating well, saturating well  Abdominal: Soft.   Psych/Behavior: She is alert.     Neurosurgery Physical Exam  [id number]  AOx3  PERRL  EOMI  Face Symmetric  Tongue midline  Vision loss left eye  BUE 5/5  BLE 5/5  No drift    Significant Labs:  Recent Labs   Lab 09/18/22  0330 09/19/22  0323    222*   * 135*   K 3.7 4.3    101   CO2 25 24   BUN 10 7   CREATININE 0.6 0.6   CALCIUM 9.1 9.6   MG 1.7 1.9     Recent Labs   Lab 09/18/22  0330 09/19/22  0323   WBC 5.56 2.07*   HGB 8.3* 8.5*   HCT 24.2* 24.2*   PLT 63* 49*     Recent Labs   Lab 09/17/22 2228   INR 1.1   APTT 21.7     Microbiology Results (last 7 days)       Procedure Component Value Units Date/Time    CSF culture [[id number]] Collected: 09/19/22 1245    Order Status: Completed Specimen: CSF " (Spinal Fluid) from CSF Tap, Tube 3 Updated: 09/19/22 1459     Gram Stain Result No WBC's      No organisms seen    Cryptococcal antigen, CSF [[id number]] Collected: 09/19/22 1245    Order Status: Sent Specimen: CSF (Spinal Fluid) from CSF Tap, Tube 3 Updated: 09/19/22 1335    Gram stain [[id number]] Collected: 09/19/22 1245    Order Status: Canceled Specimen: CSF (Spinal Fluid) from CSF Tap, Tube 3           All pertinent labs from the last 24 hours have been reviewed.    Significant Diagnostics:  I have reviewed all pertinent imaging results/findings within the past 24 hours.    Assessment/Plan:     Rheumatoid arthritis involving multiple sites  51 F Pmhx leukemia, presents with left sided vision loss 5 days ago while watching TV. NSGY consulted for incidentally found small left frontal SAH    SAH likely unrelated to vision loss  CTA negative for any vascular lesion or occlusion  Rpt CTH stable bleed    -- no neurosurgical intervention needed at this time  -- unfortunately patient cannot have MRI due to pacemaker, so no further imaging recommended at this time  -- Agree with continued ophthalmology workup   --Consider angio to help rule out vasculitis pending neurology workup        Yamila Cloud MD  Neurosurgery  [facility] - Oncology ([facility])

## 2022-09-20 NOTE — PROGRESS NOTES
Chief complaint/Reason for Consult: L eye vision loss since Tuesday     History of Present Illness: Deepti Gonzalez is a 51 y.o. 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 She says she was watching TV on Monday when suddenly she noticed her L eye lost vision. When she closed her R eye, she saw shadowy shapes and figures. There were no associated symptoms or pain of her left eye. An hour later, she lost all vision from her L eye including of light. She saw an outside ophthalmologist on Tuesday who did not see evidence of ocular etiology of the vision loss. Her vision did not improve at all and she began developing increasing pain from her L eye over the last few days. No prior ocular history.    Interval hx:  CSF studies came back with many atypical blastoid cells. Concern for CNS leukemia. Vision dropping right eye (good eye) since 12 noon.    Past Ocular Hx: no past ocular surgeries, wears glasses normally.    Current eye gtts: none      PMHx:  has a past medical history of Cancer, COPD (chronic obstructive pulmonary disease), Hypertension, Rheumatoid arthritis, unspecified, SAH (subarachnoid hemorrhage) (9/17/2022), Seizures, Stroke, and Type 2 diabetes mellitus with circulatory disorder, without long-term current use of insulin.     PSurgHx:  has a past surgical history that includes Appendectomy; Tonsillectomy; Rotator cuff repair (Bilateral); Tubal ligation; and Hysterectomy.     Home Medications:   Prior to Admission medications    Medication Sig Start Date End Date Taking? Authorizing Provider   acyclovir (ZOVIRAX) 400 MG tablet Take 1 tablet (400 mg total) by mouth 2 (two) times daily. 12/7/21   Kathrine Posey MD   allopurinoL (ZYLOPRIM) 300 MG tablet Take 1 tablet (300 mg total) by mouth once daily. 6/7/22   Connor M Gillies, MD   artificial tears  (ISOPTO TEARS) 0.5 % ophthalmic solution Place 1 drop into both eyes 4 (four) times daily as needed. 1/24/22   Mirtha Antonio NP   atorvastatin (LIPITOR) 80 MG tablet Take 80 mg by mouth once daily.    Historical Provider   blood sugar diagnostic Strp SMARTSIG:Via Meter 1 to 2 Times Daily 10/11/21   Historical Provider   busPIRone (BUSPAR) 10 MG tablet Take 10 mg by mouth 2 (two) times daily.    Historical Provider   dapagliflozin (FARXIGA) 10 mg tablet Take 10 mg by mouth once daily.    Historical Provider   diazePAM (VALIUM) 10 MG Tab Take 10 mg by mouth 2 (two) times daily as needed.    Historical Provider   diltiaZEM (CARDIZEM) 90 MG tablet Take 1 tablet (90 mg total) by mouth every 6 (six) hours. 12/7/21 12/7/22  Kathrine Posey MD   almaraz's soln (benadryl 30 mL, mylanta 30 mL, LIDOcaine 30 mL, nystatin 30 mL) 120mL Take 10 mLs by mouth 4 (four) times daily as needed (mouth pain). 4/19/22   Mirtha Antonio NP   fenofibrate 160 MG Tab Take 160 mg by mouth once daily.    Historical Provider   gabapentin (NEURONTIN) 300 MG capsule Take 1 capsule (300 mg total) by mouth 3 (three) times daily. 12/7/21 12/7/22  Kathrine Posey MD   hydrOXYchloroQUINE (PLAQUENIL) 200 mg tablet Take 1 tablet (200 mg total) by mouth once daily. 5/19/22   Mirtha Antonio NP   hydrOXYzine pamoate (VISTARIL) 50 MG Cap Take 50 mg by mouth 2 (two) times daily as needed. 4/29/22   Historical Provider   LIDOcaine (LIDODERM) 5 % Place 1 patch onto the skin once daily. Remove & Discard patch within 12 hours or as directed by MD 6/6/22   Connor M Gillies, MD   losartan (COZAAR) 25 MG tablet Take 25 mg by mouth once daily. 11/12/21   Historical Provider   magnesium oxide (MAG-OX) 400 mg (241.3 mg magnesium) tablet Take 1 tablet by mouth once daily. 7/12/22   Historical Provider   metFORMIN (GLUCOPHAGE-XR) 500 MG ER 24hr tablet Take 1,000 mg by mouth 2 (two) times daily. 7/22/22   Historical Provider   naloxone (NARCAN) 4 mg/actuation Spry 4mg by  nasal route as needed for opioid overdose; may repeat every 2-3 minutes in alternating nostrils until medical help arrives. Call 911 6/8/22   Ines Mccord MD   omeprazole (PRILOSEC) 40 MG capsule Take 40 mg by mouth once daily.    Historical Provider   ondansetron (ZOFRAN-ODT) 8 MG TbDL Take 1 tablet (8 mg total) by mouth every 8 (eight) hours as needed (in case of chemo induced nausea). 8/9/22   Dayron Jenkins MD   ONETOUCH VERIO TEST STRIPS Strp SMARTSIG:Via Meter 1 to 2 Times Daily 4/6/22   Historical Provider   OXcarbazepine (TRILEPTAL) 600 MG Tab Take 1,200 mg by mouth 2 (two) times daily. 10/26/21   Historical Provider   polyethylene glycol (GLYCOLAX) 17 gram/dose powder Dissolve one capful (17 g) in liquid and take by mouth daily as needed (constipation). 12/7/21   Kathrine Posey MD   PONATinib (ICLUSIG) 30 mg Tab Take 1 tablet (30 mg) by mouth once daily. 9/7/22   Dayron Jenkins MD   potassium chloride (K-TAB) 20 mEq Take 20 mEq by mouth 2 (two) times daily. 7/12/22   Historical Provider   prochlorperazine (COMPAZINE) 10 MG tablet Take 1 tablet (10 mg total) by mouth every 6 (six) hours as needed for Nausea. 7/11/22 7/11/23  Dayron Jenkins MD   QUEtiapine (SEROQUEL) 200 MG Tab Take 200 mg by mouth every evening.    Historical Provider   rivaroxaban (XARELTO) 20 mg Tab Take 1 tablet (20 mg total) by mouth daily with dinner or evening meal. Hold until instructed to resume by provider due to low platelet count. 6/6/22   Connor M Gillies, MD   senna-docusate 8.6-50 mg (PERICOLACE) 8.6-50 mg per tablet Take 1 tablet by mouth 2 (two) times daily. 3/19/22   Felisha Goodwin DO   sumatriptan (IMITREX) 50 MG tablet Take 1 tablet (50 mg total) by mouth daily as needed (headache). 8/29/22 9/28/22  Dayron Jenkins MD   TRINTELLIX 20 mg Tab Take 1 tablet by mouth once daily. 3/8/22   Historical Provider   ursodioL (ACTIGALL) 300 mg capsule Take 1 capsule (300 mg total) by mouth 3 (three) times daily. 6/13/22  6/13/23  Dayron Jenkins MD        Medications this encounter:    acyclovir  400 mg Oral BID    allopurinoL  300 mg Oral Daily    atorvastatin  80 mg Oral Daily    busPIRone  10 mg Oral BID    dexamethasone (DECADRON) IVPB  4 mg Intravenous Q6H    diltiaZEM  90 mg Oral Q6H    fenofibrate  160 mg Oral Daily    gabapentin  300 mg Oral TID    hydrOXYchloroQUINE  200 mg Oral Daily    hydrOXYzine pamoate  50 mg Oral Q24H    insulin aspart U-100  4 Units Subcutaneous TIDWM    insulin detemir U-100  5 Units Subcutaneous QHS    mupirocin   Nasal BID    OXcarbazepine  1,200 mg Oral QHS    OXcarbazepine  1,200 mg Oral with lunch    pantoprazole  40 mg Oral Daily    QUEtiapine  200 mg Oral QHS    senna-docusate 8.6-50 mg  1 tablet Oral Daily       Allergies: is allergic to levetiracetam.     Social Hx:  reports that she quit smoking about 6 months ago. Her smoking use included cigarettes. She smoked an average of .5 packs per day. She has never used smokeless tobacco. She reports that she does not drink alcohol and does not use drugs.     Family Hx: No family history of glaucoma. family history is not on file.     ROS: As per HPI    Ocular examination/Dilated fundus examination:  Base Eye Exam       Visual Acuity (Snellen - Linear)         Right Left    Dist cc  NLP    Dist ph cc 20/100               Tonometry (Tonopen, 1:36 PM)         Right Left    Pressure 12 11              Pupils         APD    Right     Left 1-2+              Visual Fields         Right Left    Restrictions  Total superior temporal, inferior temporal, superior nasal, inferior nasal deficiencies              Extraocular Movement    Mild gross limitation             Neuro/Psych       Oriented x3: Yes              Dilation       Both eyes: 1% Mydriacyl, 2.5% Phenylephrine @ 1:36 PM                  Slit Lamp and Fundus Exam       External Exam         Right Left    External Normal Normal              Slit Lamp Exam         Right Left    Lids/Lashes Normal  Normal    Conjunctiva/Sclera White and quiet White and quiet    Cornea Clear Clear    Anterior Chamber Deep and quiet Deep and quiet    Iris Round and reactive Round and minimally reactive    Lens Clear Clear    Anterior Vitreous Normal Normal                  Work-up:  - ESR/CRP to evaluate for GCA (finished: ESR wnl, CRP mildly elevated - disease unlikely in this patient)  - Patient unable to obtain MRI imaging due to pacemaker. CT orbit (thin cuts) and head w/wo with CTA/CTV: Small acute subarachnoid hemorrhage in the left frontal lobe, otherwise unremarkable.    Assessment/Plan:     Optic neuropathy, left  - Acute, progressive vision loss over 1 hour from normal to not seeing light on 9/12/22. Painless at time, but deep achy retrobulbar pain developed over the following 24 hours which is exacerbated to a sharp, stabbing pain worst on lateral gaze but present on horizontal.  - 9/17/22 exam: right eye unremarkable. Left eye no light perception, nakia (3+) APD, equal pupil sizes, normal IOP. EOM grossly full and equal OU.   - CN exam otherwise full and equal both sides.  - No evidence of optic nerve or retinal pathology on dilated exam.    - Based on exam, most likely location of disease is posterior optic nerve / ophthalmic artery.    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 9/20/22 vision decreased OD since 12 noon.      Recommendations:  - Infectious workup: syphilis, TB (pending, placed 9/17/22)  - Sarcoidosis: Lysozyme ordered 9/17/22. Recent chest x-ray 1 month ago unremarkable. Can repeat if no other etiology found.  - LP - performed 9/19/22.    Vision dropping OD to 20/100. 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 Highly recommend urgent rad/onc evaluation for consideration of emergent radiation to attempt to salvage vision. This was discussed with the oncology attending,  Dr. Joseph.    Lucho Herrera, PGY-3  [facility]

## 2022-09-20 NOTE — PLAN OF CARE
Vision rechecked at 1700hrs. Patient back to baseline of 20/20- in the right eye (left eye still NLP). She and her  think her poor vision earlier was due to combination of benzodiazepine and opiate use. She will avoid taking these around the same time in the future.    We appreciate all of the other teams' quick responses to the earlier concern of right eye decreased vision. Can hold off further radiation workup and continue current management.    Lucho Herrera, PGY-3  [facility]

## 2022-09-20 NOTE — PROGRESS NOTES
[facility] ([facility])  Hematology  Bone Marrow Transplant  Progress Note    Patient Name: Deepti Gonzalez  Admission Date: 9/16/2022  Hospital Length of Stay: 4 days  Code Status: Full Code    Subjective:     Interval History: CSF shows numerous blastoid atypical cells, will need to correlate with flow. Highly suspicious that leukemia is the cause of her vision loss over other etiologies. LP and IT chemo tolerated yesterday. HA improved, less pain with eye movement. Vision still the same, NLP in left eye. Discussed with NSGY about possibility of Ommaya device vs twice weekly and weekly Lps for patient convenience.     Objective:     Vital Signs (Most Recent):  Temp: 97.7 °F (36.5 °C) (09/20/22 1108)  Pulse: 60 (09/20/22 1108)  Resp: 18 (09/20/22 1108)  BP: (!) 118/58 (09/20/22 1108)  SpO2: 95 % (09/20/22 1108)   Vital Signs (24h Range):  Temp:  [96.2 °F (35.7 °C)-98.6 °F (37 °C)] 97.7 °F (36.5 °C)  Pulse:  [60-70] 60  Resp:  [15-20] 18  SpO2:  [92 %-96 %] 95 %  BP: (107-127)/(16-61) 118/58        Body mass index is 22.61 kg/m².  Body surface area is 1.64 meters squared.    ECOG SCORE           [unfilled]    Intake/Output - Last 3 Shifts         09/18 0700  09/19 0659 09/19 0700 09/20 0659 09/20 0700  09/21 0659    P.O. 1320 650     Blood       IV Piggyback  100     Total Intake 1320 750     Urine 2300 2450     Total Output 2300 2450     Net -980 -1700                    Physical Exam  Constitutional:       General: She is not in acute distress.     Appearance: She is well-developed.   HENT:      Head: Normocephalic and atraumatic.      Nose: Nose normal. No congestion.   Eyes:      General: No scleral icterus.     Extraocular Movements: Extraocular movements intact.      Comments: Left eye pupil not reactive. Left eye peripheral vision not intact. Reports eye pain with movement improved.    Cardiovascular:      Rate and Rhythm: Normal rate and regular rhythm.      Heart sounds: No murmur heard.  Pulmonary:       Effort: Pulmonary effort is normal. No respiratory distress.   Abdominal:      General: There is no distension.      Palpations: Abdomen is soft.      Tenderness: There is no abdominal tenderness.   Genitourinary:     Comments: Green in place  Musculoskeletal:         General: Normal range of motion.      Cervical back: Normal range of motion and neck supple.   Skin:     General: Skin is warm and dry.   Neurological:      General: No focal deficit present.      Mental Status: She is alert and oriented to person, place, and time.   Psychiatric:         Mood and Affect: Mood normal.         Behavior: Behavior normal.       Significant Labs:   CBC:   Recent Labs   Lab 09/19/22 0323 09/20/22 0323   WBC 2.07* 4.84   HGB 8.5* 8.1*   HCT 24.2* 23.4*   PLT 49* 45*   , CMP:   Recent Labs   Lab 09/19/22 0323 09/20/22 0323   * 137   K 4.3 3.9    102   CO2 24 25   * 188*   BUN 7 10   CREATININE 0.6 0.6   CALCIUM 9.6 9.2   PROT 6.7 6.2   ALBUMIN 3.8 3.6   BILITOT 0.4 0.2   ALKPHOS 152* 144*   * 44*   * 110*   ANIONGAP 10 10       Diagnostic Results:  I have reviewed all pertinent imaging results/findings within the past 24 hours.    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 Concern for possible B-ALL involvement vs side effect of ponatinib vs retrobulbar hemorrhage vs stroke    Plan:  - Consulted Opthomology, appreciate assistance  - LP on 9/19 with IT chemo (cytarabine, hydrocortisone, methotrexate)  - labs pending:   TB DNA by PCR  VDRL  [place] virus pCR  VZV PCR  HSV PCR  Enterovirus RNA  Cryptococcal Ag  CNS demyelinating dz (AQP-4 IGG/MOG IGG)  CSF bands  NMO aquaporin  MS profile      Gram stain: no organisms seen  CSF culture NGTD  Flow cytometry analysis in process  Cytology in process  CSF cell count 546 WBC, 87% lymphs  CSF protein 43    - Patient with pacemaker  and per patient, not compatible with MRI. Would ideally obtain MRI brain w/ w/o contrast. Verified with EP who checked with rep that pacemaker is not MRI compatible.   - CTA completed with delayed venous phase revealing small SAH   - DSA (digitial subtraction angio) recommended by NSGY. Neuro ok with it, but will defer further vasculitis workup for now given that leukemia is a more obvious etiology for her vision loss.     SAH (subarachnoid hemorrhage)  Small SAH discovered on CTA. Repeat CTH stable. No focal neuro deficits. Pt has ongoing left sided headache and eye pain. Likely incidental finding that is not causing the vision loss.     - NSGY consulted, appreciate recs    Thrombocytopenia  Monitor CBC  Transfuse for platelet count <10 or whatever threshold IR wants for LP    Urinary retention  Patient reports wolf placed last week and has been that time.  Attempt voiding trial prior to discharge  Will need urology follow up at discharge. See little value in consult urolgoy/urogynecology inpatient as they will likely recommend outpt follow up.     Anemia associated with chemotherapy  Monitor CBC  Transfuse for Hgb<7    B-cell acute lymphoblastic leukemia  Follows with Dr. Jenkins.  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  Was treated with inotuzumab ozogamicin and ponatinib but course complicated by severe cytopenias. Not has been on therapy for past few weeks.  Not a candidate for allogeneic stem cell transplant at this time.  - Holding ponatinib at this time because of reported arterial/venous thrombosis side effect in setting of possible central retinal artery occlusion/vision loss.   - Will plan for LP this Thursday for more IT chemo administration and discuss with NSGY about possible usefullness of Ommaya reservoir vs repeated weekly/biweekly LPs for IT chemo    Anxiety  Continue home buspirone, hydroxyzine and quetiapine. Trintellix is  nonformulary     Hypertension  Holding home BP meds in the setting of soft BP. Resume when appropriate.    Seizure disorder  Continue home oxcarbazepine 200mg QHS    Pacemaker  Patient with pacemaker in place due to SSS. Per EP, it is definitely MRI incompatible.  Will obtain CT imaging instead    Rheumatoid arthritis involving multiple sites  Seronegative RA. Continue home plaquenil    Hyperlipidemia  Continue home atorvastatin and fenofibrate    Paroxysmal atrial fibrillation  Holding home xarelto in the setting of thrombocytopenia        VTE Risk Mitigation (From admission, onward)         Ordered     IP VTE HIGH RISK PATIENT  Once         09/16/22 1637     Place sequential compression device  Until discontinued         09/16/22 1637     Reason for No Pharmacological VTE Prophylaxis  Once        Question:  Reasons:  Answer:  Thrombocytopenia    09/16/22 1637                Disposition: continue admission BMT    Pipe Hager MD  Bone Marrow Transplant  [facility] ([facility])

## 2022-09-20 NOTE — ASSESSMENT & PLAN NOTE
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 Concern for possible B-ALL involvement vs side effect of ponatinib vs retrobulbar hemorrhage vs stroke    Plan:  - Consulted Opthomology, appreciate assistance  - LP on 9/19 with IT chemo (cytarabine, hydrocortisone, methotrexate)  - labs pending:   TB DNA by PCR  VDRL  [place] virus pCR  VZV PCR  HSV PCR  Enterovirus RNA  Cryptococcal Ag  CNS demyelinating dz (AQP-4 IGG/MOG IGG)  CSF bands  NMO aquaporin  MS profile      Gram stain: no organisms seen  CSF culture NGTD  Flow cytometry analysis in process  Cytology in process  CSF cell count 546 WBC, 87% lymphs  CSF protein 43    - Patient with pacemaker and per patient, not compatible with MRI. Would ideally obtain MRI brain w/ w/o contrast. Verified with EP who checked with rep that pacemaker is not MRI compatible.   - CTA completed with delayed venous phase revealing small SAH   - DSA (digitial subtraction angio) recommended by NSGY. Neuro ok with it, but will defer further vasculitis workup for now given that leukemia is a more obvious etiology for her vision loss.

## 2022-09-20 NOTE — SUBJECTIVE & OBJECTIVE
Subjective:     Interval History: CSF shows numerous blastoid atypical cells, will need to correlate with flow. Highly suspicious that leukemia is the cause of her vision loss over other etiologies. LP and IT chemo tolerated yesterday. HA improved, less pain with eye movement. Vision still the same, NLP in left eye. Discussed with NSGY about possibility of Ommaya device vs twice weekly and weekly Lps for patient convenience.     Objective:     Vital Signs (Most Recent):  Temp: 97.7 °F (36.5 °C) (09/20/22 1108)  Pulse: 60 (09/20/22 1108)  Resp: 18 (09/20/22 1108)  BP: (!) 118/58 (09/20/22 1108)  SpO2: 95 % (09/20/22 1108)   Vital Signs (24h Range):  Temp:  [96.2 °F (35.7 °C)-98.6 °F (37 °C)] 97.7 °F (36.5 °C)  Pulse:  [60-70] 60  Resp:  [15-20] 18  SpO2:  [92 %-96 %] 95 %  BP: (107-127)/(16-61) 118/58        Body mass index is 22.61 kg/m².  Body surface area is 1.64 meters squared.    ECOG SCORE           [unfilled]    Intake/Output - Last 3 Shifts         09/18 0700  09/19 0659 09/19 0700  09/20 0659 09/20 0700  09/21 0659    P.O. 1320 650     Blood       IV Piggyback  100     Total Intake 1320 750     Urine 2300 2450     Total Output 2300 2450     Net -980 -1700                    Physical Exam  Constitutional:       General: She is not in acute distress.     Appearance: She is well-developed.   HENT:      Head: Normocephalic and atraumatic.      Nose: Nose normal. No congestion.   Eyes:      General: No scleral icterus.     Extraocular Movements: Extraocular movements intact.      Comments: Left eye pupil not reactive. Left eye peripheral vision not intact. Reports eye pain with movement improved.    Cardiovascular:      Rate and Rhythm: Normal rate and regular rhythm.      Heart sounds: No murmur heard.  Pulmonary:      Effort: Pulmonary effort is normal. No respiratory distress.   Abdominal:      General: There is no distension.      Palpations: Abdomen is soft.      Tenderness: There is no abdominal tenderness.    Genitourinary:     Comments: Green in place  Musculoskeletal:         General: Normal range of motion.      Cervical back: Normal range of motion and neck supple.   Skin:     General: Skin is warm and dry.   Neurological:      General: No focal deficit present.      Mental Status: She is alert and oriented to person, place, and time.   Psychiatric:         Mood and Affect: Mood normal.         Behavior: Behavior normal.       Significant Labs:   CBC:   Recent Labs   Lab 09/19/22 0323 09/20/22 0323   WBC 2.07* 4.84   HGB 8.5* 8.1*   HCT 24.2* 23.4*   PLT 49* 45*   , CMP:   Recent Labs   Lab 09/19/22 0323 09/20/22  0323   * 137   K 4.3 3.9    102   CO2 24 25   * 188*   BUN 7 10   CREATININE 0.6 0.6   CALCIUM 9.6 9.2   PROT 6.7 6.2   ALBUMIN 3.8 3.6   BILITOT 0.4 0.2   ALKPHOS 152* 144*   * 44*   * 110*   ANIONGAP 10 10       Diagnostic Results:  I have reviewed all pertinent imaging results/findings within the past 24 hours.

## 2022-09-20 NOTE — ASSESSMENT & PLAN NOTE
Patient reports wolf placed last week and has been that time.  Attempt voiding trial prior to discharge  Will need urology follow up at discharge. See little value in consult urolgoy/urogynecology inpatient as they will likely recommend outpt follow up.

## 2022-09-20 NOTE — HOSPITAL COURSE
9/19: AFVSS. Neuro stable. LP today. May consider angio to help rule out vasculitis.  9/20: NAEON. Neuro stable. Defer angio given likely leukemia diagnosis and workup.  9/21: NAEON. Neuro stable. Plan for Ommaya reservoir placement tomorrow if platelet goal of >100k on serial reads is met (or >70k with intraoperative transfusion).  9/22: Platelets 58k this morning after 2 transfusions. Another transfusion scheduled by primary team, but was not able to be given prior to scheduled surgery time. Platelet goals for surgery not met, therefore Ommaya placement cancelled. Patient neuro stable.

## 2022-09-20 NOTE — SUBJECTIVE & OBJECTIVE
Interval History: 9/19: AFVSS. Neuro stable. LP today. May consider angio to help rule out vasculitis.    Medications:  Continuous Infusions:  Scheduled Meds:   acyclovir  400 mg Oral BID    allopurinoL  300 mg Oral Daily    atorvastatin  80 mg Oral Daily    busPIRone  10 mg Oral BID    diltiaZEM  90 mg Oral Q6H    fenofibrate  160 mg Oral Daily    gabapentin  300 mg Oral TID    hydrOXYchloroQUINE  200 mg Oral Daily    insulin aspart U-100  4 Units Subcutaneous TIDWM    insulin detemir U-100  5 Units Subcutaneous QHS    methylPREDNISolone sodium succinate injection  1,000 mg Intravenous Daily    mupirocin   Nasal BID    OXcarbazepine  1,200 mg Oral QHS    OXcarbazepine  1,200 mg Oral with lunch    QUEtiapine  200 mg Oral QHS     PRN Meds:sodium chloride, butalbital-acetaminophen-caffeine -40 mg, dextrose 10%, dextrose 10%, diazePAM, duke's soln (benadryl 30 mL, mylanta 30 mL, LIDOcaine 30 mL, nystatin 30 mL) 120 mL, glucagon (human recombinant), glucose, glucose, HYDROmorphone, insulin aspart U-100, naloxone, ondansetron, sodium chloride 0.9%, white petrolatum     Review of Systems  Objective:        Body mass index is 22.61 kg/m².  Vital Signs (Most Recent):  Temp: 96.2 °F (35.7 °C) (09/19/22 1937)  Pulse: 70 (09/19/22 1937)  Resp: 18 (09/19/22 2147)  BP: (!) 117/55 (09/19/22 1937)  SpO2: (!) 92 % (09/19/22 1937)   Vital Signs (24h Range):  Temp:  [96.2 °F (35.7 °C)-98.6 °F (37 °C)] 96.2 °F (35.7 °C)  Pulse:  [60-70] 70  Resp:  [15-18] 18  SpO2:  [92 %-96 %] 92 %  BP: (114-139)/(53-67) 117/55     Date 09/19/22 0700 - 09/20/22 0659   Shift 8563-4854 4102-0070 3402-2969 24 Hour Total   INTAKE   IV Piggyback 100   100   Shift Total 100   100   OUTPUT   Urine 700 750  1450   Shift Total 700 750  1450   Weight (kg)                            Urethral Catheter 09/07/22 (Active)   Site Assessment Clean;Intact 09/19/22 0738   Collection Container Standard drainage bag 09/19/22 0738   Securement Method secured to top  "of thigh w/ adhesive device 09/19/22 0738   Catheter Care Performed yes 09/19/22 0738   Reason for Continuing Urinary Catheterization Urinary retention 09/19/22 0738   CAUTI Prevention Bundle Securement Device in place with 1" slack;Intact seal between catheter & drainage tubing;Drainage bag/urimeter off the floor;Sheeting clip in use;No dependent loops or kinks;Drainage bag/urimeter not overfilled (<2/3 full);Drainage bag/urimeter below bladder 09/19/22 0738   Output (mL) 750 mL 09/19/22 1700       Physical Exam  Constitutional: No distress.   HEENT: atraumatic/normocephalic  Cardiovascular: Regular rhythm.   Pulm: aerating well, saturating well  Abdominal: Soft.   Psych/Behavior: She is alert.     Neurosurgery Physical Exam  E4V5M6  AOx3  PERRL  EOMI  Face Symmetric  Tongue midline  Vision loss left eye  BUE 5/5  BLE 5/5  No drift    Significant Labs:  Recent Labs   Lab 09/18/22  0330 09/19/22  0323    222*   * 135*   K 3.7 4.3    101   CO2 25 24   BUN 10 7   CREATININE 0.6 0.6   CALCIUM 9.1 9.6   MG 1.7 1.9     Recent Labs   Lab 09/18/22  0330 09/19/22  0323   WBC 5.56 2.07*   HGB 8.3* 8.5*   HCT 24.2* 24.2*   PLT 63* 49*     Recent Labs   Lab 09/17/22  2228   INR 1.1   APTT 21.7     Microbiology Results (last 7 days)       Procedure Component Value Units Date/Time    CSF culture [[id number]] Collected: 09/19/22 1245    Order Status: Completed Specimen: CSF (Spinal Fluid) from CSF Tap, Tube 3 Updated: 09/19/22 1459     Gram Stain Result No WBC's      No organisms seen    Cryptococcal antigen, CSF [[id number]] Collected: 09/19/22 1245    Order Status: Sent Specimen: CSF (Spinal Fluid) from CSF Tap, Tube 3 Updated: 09/19/22 1335    Gram stain [[id number]] Collected: 09/19/22 1245    Order Status: Canceled Specimen: CSF (Spinal Fluid) from CSF Tap, Tube 3           All pertinent labs from the last 24 hours have been reviewed.    Significant Diagnostics:  I have reviewed all pertinent imaging " results/findings within the past 24 hours.

## 2022-09-21 LAB
ALBUMIN SERPL BCP-MCNC: 3.5 G/DL (ref 3.5–5.2)
ALP SERPL-CCNC: 113 U/L (ref 55–135)
ALT SERPL W/O P-5'-P-CCNC: 70 U/L (ref 10–44)
ANION GAP SERPL CALC-SCNC: 10 MMOL/L (ref 8–16)
ANISOCYTOSIS BLD QL SMEAR: SLIGHT
AST SERPL-CCNC: 14 U/L (ref 10–40)
BASOPHILS # BLD AUTO: 0 K/UL (ref 0–0.2)
BASOPHILS NFR BLD: 0 % (ref 0–1.9)
BILIRUB SERPL-MCNC: 0.3 MG/DL (ref 0.1–1)
BLD PROD TYP BPU: NORMAL
BLOOD UNIT EXPIRATION DATE: NORMAL
BLOOD UNIT TYPE CODE: 6200
BLOOD UNIT TYPE: NORMAL
BUN SERPL-MCNC: 14 MG/DL (ref 6–20)
CALCIUM SERPL-MCNC: 8.5 MG/DL (ref 8.7–10.5)
CHLORIDE SERPL-SCNC: 102 MMOL/L (ref 95–110)
CO2 SERPL-SCNC: 24 MMOL/L (ref 23–29)
CODING SYSTEM: NORMAL
CREAT SERPL-MCNC: 0.6 MG/DL (ref 0.5–1.4)
DIFFERENTIAL METHOD: ABNORMAL
DISPENSE STATUS: NORMAL
EOSINOPHIL # BLD AUTO: 0 K/UL (ref 0–0.5)
EOSINOPHIL NFR BLD: 0 % (ref 0–8)
ERYTHROCYTE [DISTWIDTH] IN BLOOD BY AUTOMATED COUNT: 18.2 % (ref 11.5–14.5)
EST. GFR  (NO RACE VARIABLE): >60 ML/MIN/1.73 M^2
GAMMA INTERFERON BACKGROUND BLD IA-ACNC: 0.03 IU/ML
GLUCOSE SERPL-MCNC: 256 MG/DL (ref 70–110)
HCT VFR BLD AUTO: 24 % (ref 37–48.5)
HGB BLD-MCNC: 8.1 G/DL (ref 12–16)
HYPOCHROMIA BLD QL SMEAR: ABNORMAL
IMM GRANULOCYTES # BLD AUTO: 0.04 K/UL (ref 0–0.04)
IMM GRANULOCYTES NFR BLD AUTO: 1 % (ref 0–0.5)
LYMPHOCYTES # BLD AUTO: 0.6 K/UL (ref 1–4.8)
LYMPHOCYTES NFR BLD: 14.9 % (ref 18–48)
LYSOZYME SERPL-MCNC: 6.1 MCG/ML (ref 2.6–6)
M TB IFN-G CD4+ BCKGRND COR BLD-ACNC: 0.01 IU/ML
MCH RBC QN AUTO: 39.3 PG (ref 27–31)
MCHC RBC AUTO-ENTMCNC: 33.8 G/DL (ref 32–36)
MCV RBC AUTO: 117 FL (ref 82–98)
MITOGEN IGNF BCKGRD COR BLD-ACNC: 9.97 IU/ML
MONOCYTES # BLD AUTO: 0.1 K/UL (ref 0.3–1)
MONOCYTES NFR BLD: 3 % (ref 4–15)
NEUTROPHILS # BLD AUTO: 3.2 K/UL (ref 1.8–7.7)
NEUTROPHILS NFR BLD: 81.1 % (ref 38–73)
NRBC BLD-RTO: 1 /100 WBC
OVALOCYTES BLD QL SMEAR: ABNORMAL
PLATELET # BLD AUTO: 37 K/UL (ref 150–450)
PMV BLD AUTO: 11.3 FL (ref 9.2–12.9)
POCT GLUCOSE: 140 MG/DL (ref 70–110)
POCT GLUCOSE: 200 MG/DL (ref 70–110)
POCT GLUCOSE: 227 MG/DL (ref 70–110)
POCT GLUCOSE: 242 MG/DL (ref 70–110)
POCT GLUCOSE: 270 MG/DL (ref 70–110)
POIKILOCYTOSIS BLD QL SMEAR: SLIGHT
POLYCHROMASIA BLD QL SMEAR: ABNORMAL
POTASSIUM SERPL-SCNC: 3.7 MMOL/L (ref 3.5–5.1)
PROT SERPL-MCNC: 5.8 G/DL (ref 6–8.4)
RBC # BLD AUTO: 2.06 M/UL (ref 4–5.4)
SODIUM SERPL-SCNC: 136 MMOL/L (ref 136–145)
SPHEROCYTES BLD QL SMEAR: ABNORMAL
TB GOLD PLUS: NEGATIVE
TB2 - NIL: -0.01 IU/ML
UNIT NUMBER: NORMAL
VDRL CSF QL: NEGATIVE
WBC # BLD AUTO: 3.97 K/UL (ref 3.9–12.7)

## 2022-09-21 PROCEDURE — 25000242 PHARM REV CODE 250 ALT 637 W/ HCPCS: Performed by: STUDENT IN AN ORGANIZED HEALTH CARE EDUCATION/TRAINING PROGRAM

## 2022-09-21 PROCEDURE — 63600175 PHARM REV CODE 636 W HCPCS

## 2022-09-21 PROCEDURE — 99233 SBSQ HOSP IP/OBS HIGH 50: CPT | Mod: ,,, | Performed by: INTERNAL MEDICINE

## 2022-09-21 PROCEDURE — 36415 COLL VENOUS BLD VENIPUNCTURE: CPT | Performed by: STUDENT IN AN ORGANIZED HEALTH CARE EDUCATION/TRAINING PROGRAM

## 2022-09-21 PROCEDURE — 85025 COMPLETE CBC W/AUTO DIFF WBC: CPT | Mod: 91 | Performed by: STUDENT IN AN ORGANIZED HEALTH CARE EDUCATION/TRAINING PROGRAM

## 2022-09-21 PROCEDURE — P9037 PLATE PHERES LEUKOREDU IRRAD: HCPCS | Performed by: NURSE PRACTITIONER

## 2022-09-21 PROCEDURE — 99232 SBSQ HOSP IP/OBS MODERATE 35: CPT | Mod: ,,, | Performed by: NEUROLOGICAL SURGERY

## 2022-09-21 PROCEDURE — 25000003 PHARM REV CODE 250: Performed by: STUDENT IN AN ORGANIZED HEALTH CARE EDUCATION/TRAINING PROGRAM

## 2022-09-21 PROCEDURE — 36430 TRANSFUSION BLD/BLD COMPNT: CPT

## 2022-09-21 PROCEDURE — 99233 PR SUBSEQUENT HOSPITAL CARE,LEVL III: ICD-10-PCS | Mod: ,,, | Performed by: INTERNAL MEDICINE

## 2022-09-21 PROCEDURE — 99232 PR SUBSEQUENT HOSPITAL CARE,LEVL II: ICD-10-PCS | Mod: ,,, | Performed by: RADIOLOGY

## 2022-09-21 PROCEDURE — 80053 COMPREHEN METABOLIC PANEL: CPT | Performed by: STUDENT IN AN ORGANIZED HEALTH CARE EDUCATION/TRAINING PROGRAM

## 2022-09-21 PROCEDURE — 99232 SBSQ HOSP IP/OBS MODERATE 35: CPT | Mod: ,,, | Performed by: RADIOLOGY

## 2022-09-21 PROCEDURE — 85025 COMPLETE CBC W/AUTO DIFF WBC: CPT | Performed by: STUDENT IN AN ORGANIZED HEALTH CARE EDUCATION/TRAINING PROGRAM

## 2022-09-21 PROCEDURE — 25000003 PHARM REV CODE 250: Performed by: NURSE PRACTITIONER

## 2022-09-21 PROCEDURE — 63600175 PHARM REV CODE 636 W HCPCS: Performed by: NURSE PRACTITIONER

## 2022-09-21 PROCEDURE — 63600175 PHARM REV CODE 636 W HCPCS: Performed by: STUDENT IN AN ORGANIZED HEALTH CARE EDUCATION/TRAINING PROGRAM

## 2022-09-21 PROCEDURE — 99232 PR SUBSEQUENT HOSPITAL CARE,LEVL II: ICD-10-PCS | Mod: ,,, | Performed by: NEUROLOGICAL SURGERY

## 2022-09-21 PROCEDURE — 25000003 PHARM REV CODE 250

## 2022-09-21 PROCEDURE — 20600001 HC STEP DOWN PRIVATE ROOM

## 2022-09-21 RX ORDER — DEXAMETHASONE 4 MG/1
4 TABLET ORAL EVERY 6 HOURS
Status: DISCONTINUED | OUTPATIENT
Start: 2022-09-21 | End: 2022-09-29

## 2022-09-21 RX ORDER — DIPHENHYDRAMINE HCL 25 MG
25 CAPSULE ORAL ONCE
Status: COMPLETED | OUTPATIENT
Start: 2022-09-22 | End: 2022-09-21

## 2022-09-21 RX ORDER — HYDROCODONE BITARTRATE AND ACETAMINOPHEN 500; 5 MG/1; MG/1
TABLET ORAL
Status: DISCONTINUED | OUTPATIENT
Start: 2022-09-21 | End: 2022-09-22

## 2022-09-21 RX ORDER — POTASSIUM CHLORIDE 20 MEQ/1
20 TABLET, EXTENDED RELEASE ORAL
Status: DISCONTINUED | OUTPATIENT
Start: 2022-09-21 | End: 2022-10-01 | Stop reason: HOSPADM

## 2022-09-21 RX ORDER — SODIUM,POTASSIUM PHOSPHATES 280-250MG
1 POWDER IN PACKET (EA) ORAL EVERY 4 HOURS PRN
Status: DISCONTINUED | OUTPATIENT
Start: 2022-09-21 | End: 2022-10-01 | Stop reason: HOSPADM

## 2022-09-21 RX ORDER — LANOLIN ALCOHOL/MO/W.PET/CERES
400 CREAM (GRAM) TOPICAL EVERY 4 HOURS PRN
Status: DISCONTINUED | OUTPATIENT
Start: 2022-09-21 | End: 2022-10-01 | Stop reason: HOSPADM

## 2022-09-21 RX ORDER — SODIUM,POTASSIUM PHOSPHATES 280-250MG
2 POWDER IN PACKET (EA) ORAL EVERY 4 HOURS PRN
Status: DISCONTINUED | OUTPATIENT
Start: 2022-09-21 | End: 2022-10-01 | Stop reason: HOSPADM

## 2022-09-21 RX ORDER — LANOLIN ALCOHOL/MO/W.PET/CERES
800 CREAM (GRAM) TOPICAL EVERY 4 HOURS PRN
Status: DISCONTINUED | OUTPATIENT
Start: 2022-09-21 | End: 2022-10-01 | Stop reason: HOSPADM

## 2022-09-21 RX ORDER — ACETAMINOPHEN 325 MG/1
650 TABLET ORAL ONCE
Status: COMPLETED | OUTPATIENT
Start: 2022-09-22 | End: 2022-09-21

## 2022-09-21 RX ADMIN — QUETIAPINE FUMARATE 200 MG: 200 TABLET ORAL at 10:09

## 2022-09-21 RX ADMIN — BUTALBITAL, ACETAMINOPHEN, AND CAFFEINE 1 TABLET: 50; 325; 40 TABLET ORAL at 12:09

## 2022-09-21 RX ADMIN — INSULIN DETEMIR 5 UNITS: 100 INJECTION, SOLUTION SUBCUTANEOUS at 08:09

## 2022-09-21 RX ADMIN — INSULIN ASPART 4 UNITS: 100 INJECTION, SOLUTION INTRAVENOUS; SUBCUTANEOUS at 01:09

## 2022-09-21 RX ADMIN — HYDROMORPHONE HYDROCHLORIDE 4 MG: 4 TABLET ORAL at 05:09

## 2022-09-21 RX ADMIN — HYDROMORPHONE HYDROCHLORIDE 4 MG: 4 TABLET ORAL at 01:09

## 2022-09-21 RX ADMIN — ACYCLOVIR 400 MG: 200 CAPSULE ORAL at 10:09

## 2022-09-21 RX ADMIN — ACYCLOVIR 400 MG: 200 CAPSULE ORAL at 09:09

## 2022-09-21 RX ADMIN — DILTIAZEM HYDROCHLORIDE 30 MG: 60 TABLET, FILM COATED ORAL at 11:09

## 2022-09-21 RX ADMIN — OXCARBAZEPINE 1200 MG: 600 TABLET, FILM COATED ORAL at 10:09

## 2022-09-21 RX ADMIN — HYDROMORPHONE HYDROCHLORIDE 4 MG: 4 TABLET ORAL at 09:09

## 2022-09-21 RX ADMIN — FENOFIBRATE 160 MG: 160 TABLET ORAL at 11:09

## 2022-09-21 RX ADMIN — DEXAMETHASONE 4 MG: 4 TABLET ORAL at 11:09

## 2022-09-21 RX ADMIN — DILTIAZEM HYDROCHLORIDE 30 MG: 60 TABLET, FILM COATED ORAL at 05:09

## 2022-09-21 RX ADMIN — DIPHENHYDRAMINE HYDROCHLORIDE 25 MG: 25 CAPSULE ORAL at 11:09

## 2022-09-21 RX ADMIN — BUSPIRONE HYDROCHLORIDE 10 MG: 10 TABLET ORAL at 10:09

## 2022-09-21 RX ADMIN — GABAPENTIN 300 MG: 300 CAPSULE ORAL at 11:09

## 2022-09-21 RX ADMIN — ATORVASTATIN CALCIUM 80 MG: 20 TABLET, FILM COATED ORAL at 11:09

## 2022-09-21 RX ADMIN — GABAPENTIN 300 MG: 300 CAPSULE ORAL at 04:09

## 2022-09-21 RX ADMIN — OXCARBAZEPINE 1200 MG: 600 TABLET, FILM COATED ORAL at 09:09

## 2022-09-21 RX ADMIN — DEXAMETHASONE SODIUM PHOSPHATE 4 MG: 4 INJECTION INTRA-ARTICULAR; INTRALESIONAL; INTRAMUSCULAR; INTRAVENOUS; SOFT TISSUE at 05:09

## 2022-09-21 RX ADMIN — ALLOPURINOL 300 MG: 300 TABLET ORAL at 09:09

## 2022-09-21 RX ADMIN — VORTIOXETINE 20 MG: 10 TABLET, FILM COATED ORAL at 12:09

## 2022-09-21 RX ADMIN — INSULIN ASPART 4 UNITS: 100 INJECTION, SOLUTION INTRAVENOUS; SUBCUTANEOUS at 09:09

## 2022-09-21 RX ADMIN — BUSPIRONE HYDROCHLORIDE 10 MG: 10 TABLET ORAL at 09:09

## 2022-09-21 RX ADMIN — ONDANSETRON 4 MG: 2 INJECTION INTRAMUSCULAR; INTRAVENOUS at 12:09

## 2022-09-21 RX ADMIN — ACETAMINOPHEN 650 MG: 325 TABLET ORAL at 11:09

## 2022-09-21 RX ADMIN — GABAPENTIN 300 MG: 300 CAPSULE ORAL at 08:09

## 2022-09-21 RX ADMIN — HYDROXYZINE PAMOATE 50 MG: 25 CAPSULE ORAL at 10:09

## 2022-09-21 RX ADMIN — BUTALBITAL, ACETAMINOPHEN, AND CAFFEINE 1 TABLET: 50; 325; 40 TABLET ORAL at 06:09

## 2022-09-21 RX ADMIN — INSULIN ASPART 4 UNITS: 100 INJECTION, SOLUTION INTRAVENOUS; SUBCUTANEOUS at 05:09

## 2022-09-21 RX ADMIN — INSULIN ASPART 3 UNITS: 100 INJECTION, SOLUTION INTRAVENOUS; SUBCUTANEOUS at 10:09

## 2022-09-21 RX ADMIN — HYDROMORPHONE HYDROCHLORIDE 4 MG: 4 TABLET ORAL at 08:09

## 2022-09-21 RX ADMIN — PANTOPRAZOLE SODIUM 40 MG: 40 TABLET, DELAYED RELEASE ORAL at 09:09

## 2022-09-21 RX ADMIN — DEXAMETHASONE 4 MG: 4 TABLET ORAL at 05:09

## 2022-09-21 RX ADMIN — BUTALBITAL, ACETAMINOPHEN, AND CAFFEINE 1 TABLET: 50; 325; 40 TABLET ORAL at 05:09

## 2022-09-21 RX ADMIN — SENNOSIDES AND DOCUSATE SODIUM 1 TABLET: 50; 8.6 TABLET ORAL at 09:09

## 2022-09-21 RX ADMIN — HYDROXYCHLOROQUINE SULFATE 200 MG: 200 TABLET ORAL at 09:09

## 2022-09-21 RX ADMIN — MUPIROCIN: 20 OINTMENT TOPICAL at 09:09

## 2022-09-21 NOTE — SUBJECTIVE & OBJECTIVE
Oncology Treatment Plan:   OP INOTUZUMAB OZOGAMICIN    Current Facility-Administered Medications   Medication    acyclovir capsule 400 mg    allopurinoL tablet 300 mg    atorvastatin tablet 80 mg    busPIRone tablet 10 mg    butalbital-acetaminophen-caffeine -40 mg per tablet 1 tablet    dexamethasone (DECADRON) 4 mg in sodium chloride 0.9% 50 mL IVPB    dextrose 10% bolus 125 mL    dextrose 10% bolus 250 mL    diazePAM tablet 10 mg    diltiaZEM tablet 30 mg    duke's soln (benadryl 30 mL, mylanta 30 mL, LIDOcaine 30 mL, nystatin 30 mL) 120 mL    fenofibrate tablet 160 mg    gabapentin capsule 300 mg    glucagon (human recombinant) injection 1 mg    glucose chewable tablet 16 g    glucose chewable tablet 24 g    HYDROmorphone tablet 4 mg    hydrOXYchloroQUINE tablet 200 mg    hydrOXYzine pamoate capsule 50 mg    insulin aspart U-100 pen 1-10 Units    insulin aspart U-100 pen 4 Units    insulin detemir U-100 pen 5 Units    magnesium oxide tablet 400 mg    magnesium oxide tablet 400 mg    magnesium oxide tablet 800 mg    mupirocin 2 % ointment    naloxone 0.4 mg/mL injection 0.02 mg    ondansetron injection 4 mg    OXcarbazepine tablet 1,200 mg    OXcarbazepine tablet 1,200 mg    pantoprazole EC tablet 40 mg    potassium chloride SA CR tablet 20 mEq    potassium chloride SA CR tablet 20 mEq    potassium chloride SA CR tablet 20 mEq    potassium, sodium phosphates 280-160-250 mg packet 1 packet    potassium, sodium phosphates 280-160-250 mg packet 2 packet    QUEtiapine tablet 200 mg    senna-docusate 8.6-50 mg per tablet 1 tablet    sodium chloride 0.9% flush 10 mL    sumatriptan tablet 50 mg    white petrolatum 41 % ointment       Review of patient's allergies indicates:   Allergen Reactions    Levetiracetam      Other reaction(s): Unknown  Other reaction(s): Unknown  Other reaction(s): Unknown        Past Medical History:   Diagnosis Date    Cancer     COPD (chronic obstructive pulmonary disease)      Hypertension     Rheumatoid arthritis, unspecified     SAH (subarachnoid hemorrhage) 2022    Seizures     Stroke     TIA x 4    Type 2 diabetes mellitus with circulatory disorder, without long-term current use of insulin      Past Surgical History:   Procedure Laterality Date    APPENDECTOMY      HYSTERECTOMY      ROTATOR CUFF REPAIR Bilateral     TONSILLECTOMY      TUBAL LIGATION       Family History    None       Tobacco Use    Smoking status: Former     Packs/day: 0.50     Types: Cigarettes     Quit date: 3/16/2022     Years since quittin.5    Smokeless tobacco: Never   Substance and Sexual Activity    Alcohol use: No    Drug use: Never    Sexual activity: Not on file       Review of Systems   Constitutional:  Negative for appetite change, fever and unexpected weight change.   HENT:  Negative for congestion, ear pain, facial swelling, hearing loss, sore throat, tinnitus, trouble swallowing and voice change.    Eyes:  Positive for visual disturbance. Negative for pain and redness.   Respiratory:  Negative for cough, choking, chest tightness and shortness of breath.    Cardiovascular:  Negative for chest pain and palpitations.   Gastrointestinal:  Negative for abdominal pain, anal bleeding, blood in stool, diarrhea, nausea, rectal pain and vomiting.   Genitourinary:  Negative for dysuria, flank pain, urgency, vaginal bleeding and vaginal discharge.   Musculoskeletal:  Negative for joint swelling and neck pain.   Skin:  Negative for rash.   Neurological:  Negative for dizziness, facial asymmetry, speech difficulty, weakness, light-headedness, numbness and headaches.   Hematological:  Negative for adenopathy.   Psychiatric/Behavioral:  Negative for confusion.    Objective:     Vital Signs (Most Recent):  Temp: 98.9 °F (37.2 °C) (22)  Pulse: 68 (22)  Resp: 17 (22)  BP: 135/63 (22)  SpO2: (!) 93 % (22)   Vital Signs (24h Range):  Temp:  [97 °F (36.1  °C)-98.9 °F (37.2 °C)] 98.9 °F (37.2 °C)  Pulse:  [60-68] 68  Resp:  [15-18] 17  SpO2:  [90 %-97 %] 93 %  BP: (108-142)/(58-69) 135/63     Weight: 60.3 kg (132 lb 15 oz)  Body mass index is 22.82 kg/m².  Body surface area is 1.65 meters squared.    Physical Exam  Deferred    Significant Labs:   All pertinent labs from the last 24 hours have been reviewed.    Diagnostic Results:  I have reviewed all pertinent imaging results/findings within the past 24 hours.

## 2022-09-21 NOTE — CONSULTS
[facility] - Oncology ([facility])  Radiation Oncology  Consult Note    Patient Name: Deepti Gonzalez  MRN: [id number]  Admission Date: 9/16/2022  Hospital Length of Stay: 5 days  Code Status: Full Code   Attending Provider: Yusuf Joseph MD  Consulting Provider: Trinidad Wise MD  Primary Care Physician: Primary Doctor No  Principal Problem:Vision loss of left eye    Inpatient consult to Radiation Oncology  Consult performed by: Trinidad Wise MD  Consult ordered by: Pipe Hager MD        Subjective:     HPI:  Ms. Gonzalez is a 51-year-old female with relapsed/refractory B-ALL who has been on hold from chemotherapy due to cytopenia who is currently admitted due to sudden onset left eye blindness and pain for approximately 1 week.  She has a pacemaker and an MRI of the brain was not possible.  A CTA of the brain revealed subtle hyperdensity noted along the sulci of the left frontal lobe, concerning for small subarachnoid hemorrhage. A CT brain/orbit without contrast was unremarkable.  She also developed some blurriness on the right side.  After evaluation by Ophthalmology, radiation Oncology is consulted regarding urgent radiation due to suspected leukemic crisis.  Of note, she has been started on intrathecal methotrexate less than 1 day ago.  Next cycle is scheduled in 2 days.  Dexamethasone 4 mg every 6 hours has also been initiated per primary team.    At present, she reports improvement in the left eye pain, but her vision has not improved in the left eye.  She reports some improvement in the blurriness in the right eye.  She denies nausea, vomiting, dizziness, or memory loss.        Oncology Treatment Plan:   OP INOTUZUMAB OZOGAMICIN    Current Facility-Administered Medications   Medication    acyclovir capsule 400 mg    allopurinoL tablet 300 mg    atorvastatin tablet 80 mg    busPIRone tablet 10 mg    butalbital-acetaminophen-caffeine -40 mg per tablet 1 tablet    dexamethasone (DECADRON) 4  mg in sodium chloride 0.9% 50 mL IVPB    dextrose 10% bolus 125 mL    dextrose 10% bolus 250 mL    diazePAM tablet 10 mg    diltiaZEM tablet 30 mg    duke's soln (benadryl 30 mL, mylanta 30 mL, LIDOcaine 30 mL, nystatin 30 mL) 120 mL    fenofibrate tablet 160 mg    gabapentin capsule 300 mg    glucagon (human recombinant) injection 1 mg    glucose chewable tablet 16 g    glucose chewable tablet 24 g    HYDROmorphone tablet 4 mg    hydrOXYchloroQUINE tablet 200 mg    hydrOXYzine pamoate capsule 50 mg    insulin aspart U-100 pen 1-10 Units    insulin aspart U-100 pen 4 Units    insulin detemir U-100 pen 5 Units    magnesium oxide tablet 400 mg    magnesium oxide tablet 400 mg    magnesium oxide tablet 800 mg    mupirocin 2 % ointment    naloxone 0.4 mg/mL injection 0.02 mg    ondansetron injection 4 mg    OXcarbazepine tablet 1,200 mg    OXcarbazepine tablet 1,200 mg    pantoprazole EC tablet 40 mg    potassium chloride SA CR tablet 20 mEq    potassium chloride SA CR tablet 20 mEq    potassium chloride SA CR tablet 20 mEq    potassium, sodium phosphates 280-160-250 mg packet 1 packet    potassium, sodium phosphates 280-160-250 mg packet 2 packet    QUEtiapine tablet 200 mg    senna-docusate 8.6-50 mg per tablet 1 tablet    sodium chloride 0.9% flush 10 mL    sumatriptan tablet 50 mg    white petrolatum 41 % ointment       Review of patient's allergies indicates:   Allergen Reactions    Levetiracetam      Other reaction(s): Unknown  Other reaction(s): Unknown  Other reaction(s): Unknown        Past Medical History:   Diagnosis Date    Cancer     COPD (chronic obstructive pulmonary disease)     Hypertension     Rheumatoid arthritis, unspecified     SAH (subarachnoid hemorrhage) 9/17/2022    Seizures     Stroke     TIA x 4    Type 2 diabetes mellitus with circulatory disorder, without long-term current use of insulin      Past Surgical History:   Procedure Laterality Date     APPENDECTOMY      HYSTERECTOMY      ROTATOR CUFF REPAIR Bilateral     TONSILLECTOMY      TUBAL LIGATION       Family History    None       Tobacco Use    Smoking status: Former     Packs/day: 0.50     Types: Cigarettes     Quit date: 3/16/2022     Years since quittin.5    Smokeless tobacco: Never   Substance and Sexual Activity    Alcohol use: No    Drug use: Never    Sexual activity: Not on file       Review of Systems   Constitutional:  Negative for appetite change, fever and unexpected weight change.   HENT:  Negative for congestion, ear pain, facial swelling, hearing loss, sore throat, tinnitus, trouble swallowing and voice change.    Eyes:  Positive for visual disturbance. Negative for pain and redness.   Respiratory:  Negative for cough, choking, chest tightness and shortness of breath.    Cardiovascular:  Negative for chest pain and palpitations.   Gastrointestinal:  Negative for abdominal pain, anal bleeding, blood in stool, diarrhea, nausea, rectal pain and vomiting.   Genitourinary:  Negative for dysuria, flank pain, urgency, vaginal bleeding and vaginal discharge.   Musculoskeletal:  Negative for joint swelling and neck pain.   Skin:  Negative for rash.   Neurological:  Negative for dizziness, facial asymmetry, speech difficulty, weakness, light-headedness, numbness and headaches.   Hematological:  Negative for adenopathy.   Psychiatric/Behavioral:  Negative for confusion.    Objective:     Vital Signs (Most Recent):  Temp: 98.9 °F (37.2 °C) (22)  Pulse: 68 (22)  Resp: 17 (22)  BP: 135/63 (22)  SpO2: (!) 93 % (22)   Vital Signs (24h Range):  Temp:  [97 °F (36.1 °C)-98.9 °F (37.2 °C)] 98.9 °F (37.2 °C)  Pulse:  [60-68] 68  Resp:  [15-18] 17  SpO2:  [90 %-97 %] 93 %  BP: (108-142)/(58-69) 135/63     Weight: 60.3 kg (132 lb 15 oz)  Body mass index is 22.82 kg/m².  Body surface area is 1.65 meters squared.    Physical  Exam  Deferred    Significant Labs:   All pertinent labs from the last 24 hours have been reviewed.    Diagnostic Results:  I have reviewed all pertinent imaging results/findings within the past 24 hours.    Assessment/Plan:     B-cell acute lymphoblastic leukemia  I discussed the option of whole-brain radiation for urgent treatment of her sudden-onset blindness.  The risks, benefits, and side effects were discussed in detail.  All of her questions were answered.  She is currently receiving intrathecal chemotherapy with methotrexate. The addition of radiation to the brain concurrent with methotrexate is highly toxic.  I recommend that she continues with intrathecal chemotherapy and monitor her progress especially since she appears to have some response.   This case was discussed with Dr. Joseph who was in agreement.        Thank you for your consult. I will sign off. Please contact us if you have any additional questions.    Trinidad Wise MD  Radiation Oncology  [facility] - Oncology ([facility])

## 2022-09-21 NOTE — ASSESSMENT & PLAN NOTE
- Continue home buspirone, hydroxyzine and quetiapine.   - Trintellix is nonformulary however pharmacy is attempting to obtain this medication.

## 2022-09-21 NOTE — ASSESSMENT & PLAN NOTE
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 Concern for possible B-ALL involvement vs side effect of ponatinib vs retrobulbar hemorrhage vs stroke    Plan:  - Consulted Opthomology, appreciate assistance  - LP on 9/19 with IT chemo (cytarabine, hydrocortisone, methotrexate)  - labs pending:   TB DNA by PCR  VDRL  [place] virus pCR  VZV PCR  HSV PCR  Enterovirus RNA  CNS demyelinating dz (AQP-4 IGG/MOG IGG)  CSF bands  NMO aquaporin  MS profile      Gram stain: no organisms seen  CSF culture NGTD  Flow cytometry analysis c/w B ALL  Cytology in process  CSF cell count 546 WBC, 87% lymphs, 12% others  CSF protein 43  Cryptococcal Ag negative    - Patient with pacemaker and per patient, not compatible with MRI. Would ideally obtain MRI brain w/ w/o contrast. Verified with EP who checked with rep that pacemaker is not MRI compatible.   - CTA completed with delayed venous phase revealing small SAH   - DSA (digitial subtraction angio) recommended by NSGY. Neuro ok with it, but will defer further vasculitis workup for now given that leukemia is a more obvious etiology for her vision loss.   SEE ALL

## 2022-09-21 NOTE — ASSESSMENT & PLAN NOTE
- Monitor CBC  - Transfuse for platelet count <10 or <50k with bleeding  - NSGY requesting platelets >70 k with platelets running during procedure or platelets >100 k x 2 prior to Ommaya placement

## 2022-09-21 NOTE — PROGRESS NOTES
[facility] - Oncology ([facility])  Neurosurgery  Progress Note    Subjective:     History of Present Illness: 51 F Pmhx leukemia, presents with left sided vision loss 5 days ago while watching TV, vision loss began gradually then progressed to complete left eye vision loss over 1 hour. She has not had any improvement since then. She has no headaches, focal weaknesses, confusion, or speech difficulty. She denies any trauma. NSGY consulted for incidentally found small left frontal SAH      Post-Op Info:  Procedure(s) (LRB):  INSERTION, OMMAYA RESERVOIR (N/A)         Interval History: 9/20: NAEON. Neuro stable. Defer angio given likely leukemia diagnosis and workup.    Medications:  Continuous Infusions:  Scheduled Meds:   acyclovir  400 mg Oral BID    allopurinoL  300 mg Oral Daily    atorvastatin  80 mg Oral Daily    busPIRone  10 mg Oral BID    dexamethasone (DECADRON) IVPB  4 mg Intravenous Q6H    [START ON 9/21/2022] diltiaZEM  30 mg Oral 4 times per day    fenofibrate  160 mg Oral Daily    gabapentin  300 mg Oral TID    hydrOXYchloroQUINE  200 mg Oral Daily    hydrOXYzine pamoate  50 mg Oral Q24H    insulin aspart U-100  4 Units Subcutaneous TIDWM    insulin detemir U-100  5 Units Subcutaneous QHS    mupirocin   Nasal BID    OXcarbazepine  1,200 mg Oral QHS    OXcarbazepine  1,200 mg Oral with lunch    pantoprazole  40 mg Oral Daily    QUEtiapine  200 mg Oral QHS    senna-docusate 8.6-50 mg  1 tablet Oral Daily     PRN Meds:sodium chloride, butalbital-acetaminophen-caffeine -40 mg, dextrose 10%, dextrose 10%, diazePAM, duke's soln (benadryl 30 mL, mylanta 30 mL, LIDOcaine 30 mL, nystatin 30 mL) 120 mL, glucagon (human recombinant), glucose, glucose, HYDROmorphone, insulin aspart U-100, naloxone, ondansetron, sodium chloride 0.9%, sumatriptan, white petrolatum     Review of Systems  Objective:     Weight: 60.3 kg (132 lb 15 oz)  Body mass index is 22.82 kg/m².  Vital Signs (Most  "Recent):  Temp: 98.5 °F (36.9 °C) (09/20/22 1914)  Pulse: 65 (09/20/22 1914)  Resp: 16 (09/20/22 1928)  BP: (!) 142/67 (09/20/22 1914)  SpO2: (!) 93 % (09/20/22 1914)   Vital Signs (24h Range):  Temp:  [97.7 °F (36.5 °C)-98.5 °F (36.9 °C)] 98.5 °F (36.9 °C)  Pulse:  [60-68] 65  Resp:  [15-20] 16  SpO2:  [93 %-97 %] 93 %  BP: (107-142)/(16-69) 142/67     Date 09/20/22 0700 - 09/21/22 0659   Shift 6903-5291 0249-9733 5794-8596 24 Hour Total   INTAKE   Shift Total(mL/kg)       OUTPUT   Urine  900  900   Shift Total(mL/kg)  900(14.9)  900(14.9)   Weight (kg)  60.3 60.3 60.3                          Urethral Catheter 09/07/22 (Active)   Site Assessment Clean;Intact 09/19/22 0738   Collection Container Standard drainage bag 09/19/22 0738   Securement Method secured to top of thigh w/ adhesive device 09/19/22 0738   Catheter Care Performed yes 09/19/22 0738   Reason for Continuing Urinary Catheterization Urinary retention 09/19/22 0738   CAUTI Prevention Bundle Securement Device in place with 1" slack;Intact seal between catheter & drainage tubing;Drainage bag/urimeter off the floor;Sheeting clip in use;No dependent loops or kinks;Drainage bag/urimeter not overfilled (<2/3 full);Drainage bag/urimeter below bladder 09/19/22 0738   Output (mL) 750 mL 09/19/22 1700       Physical Exam  Constitutional: No distress.   HEENT: atraumatic/normocephalic  Cardiovascular: Regular rhythm.   Pulm: aerating well, saturating well  Abdominal: Soft.   Psych/Behavior: She is alert.     Neurosurgery Physical Exam  E4V5M6  AOx3  PERRL  EOMI  Face Symmetric  Tongue midline  Vision loss left eye  BUE 5/5  BLE 5/5  No drift    Significant Labs:  Recent Labs   Lab 09/19/22 0323 09/20/22 0323   * 188*   * 137   K 4.3 3.9    102   CO2 24 25   BUN 7 10   CREATININE 0.6 0.6   CALCIUM 9.6 9.2   MG 1.9  --        Recent Labs   Lab 09/19/22 0323 09/20/22 0323   WBC 2.07* 4.84   HGB 8.5* 8.1*   HCT 24.2* 23.4*   PLT 49* 45* "       No results for input(s): LABPT, INR, APTT in the last 48 hours.    Microbiology Results (last 7 days)       Procedure Component Value Units Date/Time    Cryptococcal antigen, CSF [[id number]] Collected: 09/19/22 1245    Order Status: Completed Specimen: CSF (Spinal Fluid) from CSF Tap, Tube 3 Updated: 09/20/22 0934     Crypto Ag, CSF Negative    CSF culture [[id number]] Collected: 09/19/22 1245    Order Status: Completed Specimen: CSF (Spinal Fluid) from CSF Tap, Tube 3 Updated: 09/20/22 0652     CSF CULTURE No Growth to date     Gram Stain Result No WBC's      No organisms seen    Gram stain [[id number]] Collected: 09/19/22 1245    Order Status: Canceled Specimen: CSF (Spinal Fluid) from CSF Tap, Tube 3           All pertinent labs from the last 24 hours have been reviewed.    Significant Diagnostics:  I have reviewed all pertinent imaging results/findings within the past 24 hours.    Assessment/Plan:     Rheumatoid arthritis involving multiple sites  51 F Pmhx leukemia, presents with left sided vision loss 5 days ago while watching TV. NSGY consulted for incidentally found small left frontal SAH    SAH likely unrelated to vision loss  CTA negative for any vascular lesion or occlusion  Rpt CTH stable bleed    -- no neurosurgical intervention needed at this time  -- unfortunately patient cannot have MRI due to pacemaker, so no further imaging recommended at this time  -- Agree with continued ophthalmology workup  -- Consider placement of Ommaya reservoir on Thursday if platelets >100k on serial draws (at least 2) or >70k with intra-op transfusion of platelets        Yamila Cloud MD  Neurosurgery  [facility] - Oncology ([facility])

## 2022-09-21 NOTE — PROGRESS NOTES
[facility] - Oncology ([facility])  Hematology  Bone Marrow Transplant  Progress Note    Patient Name: Deepti Gonzalez  Admission Date: 9/16/2022  Hospital Length of Stay: 5 days  Code Status: Full Code    Subjective:     Interval History: flow from CSF c/w B ALL. Planning for Ommaya placement tomorrow for twice weekly IT chemo. Will need platelets >70 K. Reports HA this am. Receives Fiorcet. Had episode of decreased vision to  right eye yesterday. Seen by ophthalmology, vision improved on its own, possibly due to medications. On Dex q 6, elevated glucose due to steroids, on mod SSI.     Objective:     Vital Signs (Most Recent):  Temp: 98.9 °F (37.2 °C) (09/21/22 0813)  Pulse: 68 (09/21/22 0813)  Resp: 19 (09/21/22 0921)  BP: 135/63 (09/21/22 0813)  SpO2: (Abnormal) 93 % (09/21/22 0813)   Vital Signs (24h Range):  Temp:  [97 °F (36.1 °C)-98.9 °F (37.2 °C)] 98.9 °F (37.2 °C)  Pulse:  [60-68] 68  Resp:  [15-19] 19  SpO2:  [90 %-97 %] 93 %  BP: (108-142)/(58-69) 135/63     Weight: 60.3 kg (132 lb 15 oz)  Body mass index is 22.82 kg/m².  Body surface area is 1.65 meters squared.    ECOG SCORE             Intake/Output - Last 3 Shifts       Intake/Output Category 09/19 0700 to 09/20 0659 09/20 0700 to 09/21 0659 09/21 0700 to 09/22 0659    P.O. 650 200     IV Piggyback 100 50     Total Intake(mL/kg) 750 250 (4.1)     Urine (mL/kg/hr) 2450 1600 (1.1)     Total Output 2450 1600     Net -1700 -1350                    Physical Exam  Constitutional:       General: She is not in acute distress.     Appearance: She is well-developed.   HENT:      Head: Normocephalic and atraumatic.      Nose: Nose normal. No congestion.   Eyes:      General: No scleral icterus.     Extraocular Movements: Extraocular movements intact.      Comments: Left eye pupil not reactive. Left eye peripheral vision not intact. Reports eye pain with movement improved.    Cardiovascular:      Rate and Rhythm: Normal rate and regular rhythm.      Heart sounds:  No murmur heard.  Pulmonary:      Effort: Pulmonary effort is normal. No respiratory distress.   Abdominal:      General: There is no distension.      Palpations: Abdomen is soft.      Tenderness: There is no abdominal tenderness.   Genitourinary:     Comments: Green in place  Musculoskeletal:         General: Normal range of motion.      Cervical back: Normal range of motion and neck supple.   Skin:     General: Skin is warm and dry.      Comments: Port intact with no redness or drainage   Neurological:      General: No focal deficit present.      Mental Status: She is alert and oriented to person, place, and time.   Psychiatric:         Mood and Affect: Mood normal.         Behavior: Behavior normal.       Significant Labs:   CBC:   Recent Labs   Lab 09/20/22  0323 09/21/22  0407   WBC 4.84 3.97   HGB 8.1* 8.1*   HCT 23.4* 24.0*   PLT 45* 37*    and CMP:   Recent Labs   Lab 09/20/22 0323 09/21/22  0407    136   K 3.9 3.7    102   CO2 25 24   * 256*   BUN 10 14   CREATININE 0.6 0.6   CALCIUM 9.2 8.5*   PROT 6.2 5.8*   ALBUMIN 3.6 3.5   BILITOT 0.2 0.3   ALKPHOS 144* 113   AST 44* 14   * 70*   ANIONGAP 10 10       Diagnostic Results:  None    Assessment/Plan:     * Vision loss of left eye  Patient is a 51 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 Concern for possible B-ALL involvement vs side effect of ponatinib vs retrobulbar hemorrhage vs stroke    Plan:  - Consulted Opthomology, appreciate assistance  - LP on 9/19 with IT chemo (cytarabine, hydrocortisone, methotrexate)  - labs pending:   TB DNA by PCR  VDRL  [place] virus pCR  VZV PCR  HSV PCR  Enterovirus RNA  CNS demyelinating dz (AQP-4 IGG/MOG IGG)  CSF bands  NMO aquaporin  MS profile      Gram stain: no organisms seen  CSF culture NGTD  Flow cytometry analysis c/w B ALL  Cytology in process  CSF cell count 546 WBC, 87% lymphs, 12% others  CSF protein 43  Cryptococcal Ag  negative    - Patient with pacemaker and per patient, not compatible with MRI. Would ideally obtain MRI brain w/ w/o contrast. Verified with EP who checked with rep that pacemaker is not MRI compatible.   - CTA completed with delayed venous phase revealing small SAH   - DSA (digitial subtraction angio) recommended by NSGY. Neuro ok with it, but will defer further vasculitis workup for now given that leukemia is a more obvious etiology for her vision loss.   SEE ALL    B-cell acute lymphoblastic leukemia  - Follows with Dr. Jenkins.  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  - Was treated with inotuzumab ozogamicin and ponatinib but course complicated by severe cytopenias. Not has been on therapy for past few weeks.  - Not a candidate for allogeneic stem cell transplant at this time.  - Holding ponatinib at this time because of reported arterial/venous thrombosis side effect in setting of possible central retinal artery occlusion/vision loss.   - LP done 9/19 with CSF + for B ALL  - received IT chemo 9/19 (triple therapy), given CNS involvement ALL will plan for twice weekly IT chemo. Next due 9/22   - NSGY planning for Ommaya placement tomorrow 9/22 pending platelet count.     Anemia associated with chemotherapy  - Monitor CBC  - Transfuse for Hgb<7    Thrombocytopenia  - Monitor CBC  - Transfuse for platelet count <10 or <50k with bleeding  - NSGY requesting platelets >70 k with platelets running during procedure or platelets >100 k x 2 prior to Ommaya placement     SAH (subarachnoid hemorrhage)  - Small SAH discovered on CTA. Repeat CTH stable. No focal neuro deficits. Pt has ongoing left sided headache and eye pain.   - Likely incidental finding that is not causing the vision loss.   - NSGY consulted, appreciate recs    Paroxysmal atrial fibrillation  - Holding home xarelto in the setting of thrombocytopenia    Urinary retention  - Patient  reports wolf placed last week and has been that time.  - Attempt voiding trial prior to discharge  - Will need urology follow up at discharge. See little value in consult urolgoy/urogynecology inpatient as they will likely recommend outpt follow up.     Anxiety  - Continue home buspirone, hydroxyzine and quetiapine.   - Trintellix is nonformulary however pharmacy is attempting to obtain this medication.    Hypertension  - Holding home BP meds in the setting of soft BP.   - Resume when appropriate.    Seizure disorder  - Continue home oxcarbazepine 200mg QHS    Pacemaker  - Patient with pacemaker in place due to SSS. Per EP, it is definitely MRI incompatible.      Rheumatoid arthritis involving multiple sites  - Seronegative RA. Continue home plaquenil    Hyperlipidemia  - Continue home atorvastatin and fenofibrate      VTE Risk Mitigation (From admission, onward)         Ordered     IP VTE HIGH RISK PATIENT  Once         09/16/22 1637     Place sequential compression device  Until discontinued         09/16/22 1637     Reason for No Pharmacological VTE Prophylaxis  Once        Question:  Reasons:  Answer:  Thrombocytopenia    09/16/22 1637                Disposition: inpatient     Carolyn Mchugh NP  Bone Marrow Transplant  [facility] - Oncology (Hospital)

## 2022-09-21 NOTE — ASSESSMENT & PLAN NOTE
51 F Pmhx leukemia, presents with left sided vision loss 5 days ago while watching TV. NSGY consulted for incidentally found small left frontal SAH    SAH likely unrelated to vision loss  CTA negative for any vascular lesion or occlusion  Rpt CTH stable bleed    -- no neurosurgical intervention needed at this time  -- unfortunately patient cannot have MRI due to pacemaker, so no further imaging recommended at this time  -- Agree with continued ophthalmology workup  -- Consider placement of Ommaya reservoir on Thursday if platelets >100k on serial draws (at least 2) or >70k with intra-op transfusion of platelets

## 2022-09-21 NOTE — ASSESSMENT & PLAN NOTE
- Patient reports wolf placed last week and has been that time.  - Attempt voiding trial prior to discharge  - Will need urology follow up at discharge. See little value in consult urolgoy/urogynecology inpatient as they will likely recommend outpt follow up.

## 2022-09-21 NOTE — ASSESSMENT & PLAN NOTE
- Follows with Dr. Jenkins.  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  - Was treated with inotuzumab ozogamicin and ponatinib but course complicated by severe cytopenias. Not has been on therapy for past few weeks.  - Not a candidate for allogeneic stem cell transplant at this time.  - Holding ponatinib at this time because of reported arterial/venous thrombosis side effect in setting of possible central retinal artery occlusion/vision loss.   - LP done 9/19 with CSF + for B ALL  - received IT chemo 9/19 (triple therapy), given CNS involvement ALL will plan for twice weekly IT chemo. Next due 9/22   - NSGY planning for Ommaya placement tomorrow 9/22 pending platelet count.

## 2022-09-21 NOTE — PLAN OF CARE
Problem: Adult Inpatient Plan of Care  Goal: Plan of Care Review  Outcome: Ongoing, Progressing  Goal: Patient-Specific Goal (Individualized)  Outcome: Ongoing, Progressing  Goal: Absence of Hospital-Acquired Illness or Injury  Outcome: Ongoing, Progressing  Goal: Optimal Comfort and Wellbeing  Outcome: Ongoing, Progressing  Goal: Readiness for Transition of Care  Outcome: Ongoing, Progressing     Problem: Diabetes Comorbidity  Goal: Blood Glucose Level Within Targeted Range  Outcome: Ongoing, Progressing     Problem: Skin Injury Risk Increased  Goal: Skin Health and Integrity  Outcome: Ongoing, Progressing     Problem: Infection  Goal: Absence of Infection Signs and Symptoms  Outcome: Ongoing, Progressing     Problem: Pain Acute  Goal: Acceptable Pain Control and Functional Ability  Outcome: Ongoing, Progressing     Problem: Fall Injury Risk  Goal: Absence of Fall and Fall-Related Injury  Outcome: Ongoing, Progressing

## 2022-09-21 NOTE — ASSESSMENT & PLAN NOTE
- Small SAH discovered on CTA. Repeat CTH stable. No focal neuro deficits. Pt has ongoing left sided headache and eye pain.   - Likely incidental finding that is not causing the vision loss.   - NSGY consulted, appreciate recs

## 2022-09-21 NOTE — PROGRESS NOTES
Chief complaint/Reason for Consult: L eye vision loss since Tuesday     History of Present Illness: Deepti Gonzalez is a 51 y.o. 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 She says she was watching TV on Monday when suddenly she noticed her L eye lost vision. When she closed her R eye, she saw shadowy shapes and figures. There were no associated symptoms or pain of her left eye. An hour later, she lost all vision from her L eye including of light. She saw an outside ophthalmologist on Tuesday who did not see evidence of ocular etiology of the vision loss. Her vision did not improve at all and she began developing increasing pain from her L eye over the last few days. No prior ocular history.    Interval hx:  Patient had an episode of decrease in vision in the right eye in the morning yesterday that she attributed to taking opiate and benzodiazepine around the same time. Vision was back to baseline (20/20) on re-evaluation in the afternoon. Since then, patient reports no other changes in vision.   Patient was started on intrathecal methotrexate on 9/19. LP was performed and confirmed CNS ALL involvement. She is on dexamethasone 4 mg every 6 hours. Plan for LP/IT chemo again tomorrow, and/or Ommaya reservoir placement.    Past Ocular Hx: no past ocular surgeries, wears glasses normally.    Current eye gtts: none      PMHx:  has a past medical history of Cancer, COPD (chronic obstructive pulmonary disease), Hypertension, Rheumatoid arthritis, unspecified, SAH (subarachnoid hemorrhage) (9/17/2022), Seizures, Stroke, and Type 2 diabetes mellitus with circulatory disorder, without long-term current use of insulin.     PSurgHx:  has a past surgical history that includes Appendectomy; Tonsillectomy; Rotator cuff repair (Bilateral); Tubal ligation; and Hysterectomy.      Home Medications:   Prior to Admission medications    Medication Sig Start Date End Date Taking? Authorizing Provider   acyclovir (ZOVIRAX) 400 MG tablet Take 1 tablet (400 mg total) by mouth 2 (two) times daily. 12/7/21   Kathrine Posey MD   allopurinoL (ZYLOPRIM) 300 MG tablet Take 1 tablet (300 mg total) by mouth once daily. 6/7/22   Connor M Gillies, MD   artificial tears (ISOPTO TEARS) 0.5 % ophthalmic solution Place 1 drop into both eyes 4 (four) times daily as needed. 1/24/22   Mirtha Antonio NP   atorvastatin (LIPITOR) 80 MG tablet Take 80 mg by mouth once daily.    Historical Provider   blood sugar diagnostic Strp SMARTSIG:Via Meter 1 to 2 Times Daily 10/11/21   Historical Provider   busPIRone (BUSPAR) 10 MG tablet Take 10 mg by mouth 2 (two) times daily.    Historical Provider   dapagliflozin (FARXIGA) 10 mg tablet Take 10 mg by mouth once daily.    Historical Provider   diazePAM (VALIUM) 10 MG Tab Take 10 mg by mouth 2 (two) times daily as needed.    Historical Provider   diltiaZEM (CARDIZEM) 90 MG tablet Take 1 tablet (90 mg total) by mouth every 6 (six) hours. 12/7/21 12/7/22  Kathrine Posey MD   almaraz's soln (benadryl 30 mL, mylanta 30 mL, LIDOcaine 30 mL, nystatin 30 mL) 120mL Take 10 mLs by mouth 4 (four) times daily as needed (mouth pain). 4/19/22   Mirtha Antonio NP   fenofibrate 160 MG Tab Take 160 mg by mouth once daily.    Historical Provider   gabapentin (NEURONTIN) 300 MG capsule Take 1 capsule (300 mg total) by mouth 3 (three) times daily. 12/7/21 12/7/22  Kathrine Posey MD   hydrOXYchloroQUINE (PLAQUENIL) 200 mg tablet Take 1 tablet (200 mg total) by mouth once daily. 5/19/22   Mirtha Antonio NP   hydrOXYzine pamoate (VISTARIL) 50 MG Cap Take 50 mg by mouth 2 (two) times daily as needed. 4/29/22   Historical Provider   LIDOcaine (LIDODERM) 5 % Place 1 patch onto the skin once daily. Remove & Discard patch within 12 hours or as directed by MD 6/6/22   Connor M Gillies, MD   losartan  (COZAAR) 25 MG tablet Take 25 mg by mouth once daily. 11/12/21   Historical Provider   magnesium oxide (MAG-OX) 400 mg (241.3 mg magnesium) tablet Take 1 tablet by mouth once daily. 7/12/22   Historical Provider   metFORMIN (GLUCOPHAGE-XR) 500 MG ER 24hr tablet Take 1,000 mg by mouth 2 (two) times daily. 7/22/22   Historical Provider   naloxone (NARCAN) 4 mg/actuation Spry 4mg by nasal route as needed for opioid overdose; may repeat every 2-3 minutes in alternating nostrils until medical help arrives. Call 911 6/8/22   Ines Mccord MD   omeprazole (PRILOSEC) 40 MG capsule Take 40 mg by mouth once daily.    Historical Provider   ondansetron (ZOFRAN-ODT) 8 MG TbDL Take 1 tablet (8 mg total) by mouth every 8 (eight) hours as needed (in case of chemo induced nausea). 8/9/22   Dayron Jenkins MD   ONETOUCH VERIO TEST STRIPS Strp SMARTSIG:Via Meter 1 to 2 Times Daily 4/6/22   Historical Provider   OXcarbazepine (TRILEPTAL) 600 MG Tab Take 1,200 mg by mouth 2 (two) times daily. 10/26/21   Historical Provider   polyethylene glycol (GLYCOLAX) 17 gram/dose powder Dissolve one capful (17 g) in liquid and take by mouth daily as needed (constipation). 12/7/21   Kathrine Posey MD   PONATinib (ICLUSIG) 30 mg Tab Take 1 tablet (30 mg) by mouth once daily. 9/7/22   Dayron Jenkins MD   potassium chloride (K-TAB) 20 mEq Take 20 mEq by mouth 2 (two) times daily. 7/12/22   Historical Provider   prochlorperazine (COMPAZINE) 10 MG tablet Take 1 tablet (10 mg total) by mouth every 6 (six) hours as needed for Nausea. 7/11/22 7/11/23  Dayron Jenkins MD   QUEtiapine (SEROQUEL) 200 MG Tab Take 200 mg by mouth every evening.    Historical Provider   rivaroxaban (XARELTO) 20 mg Tab Take 1 tablet (20 mg total) by mouth daily with dinner or evening meal. Hold until instructed to resume by provider due to low platelet count. 6/6/22   Connor M Gillies, MD   senna-docusate 8.6-50 mg (PERICOLACE) 8.6-50 mg per tablet Take 1 tablet by mouth 2  (two) times daily. 3/19/22   Felisha DO Buddy   sumatriptan (IMITREX) 50 MG tablet Take 1 tablet (50 mg total) by mouth daily as needed (headache). 8/29/22 9/28/22  Dayron Jenkins MD   TRINTELLIX 20 mg Tab Take 1 tablet by mouth once daily. 3/8/22   Historical Provider   ursodioL (ACTIGALL) 300 mg capsule Take 1 capsule (300 mg total) by mouth 3 (three) times daily. 6/13/22 6/13/23  Dayron Jenkins MD        Medications this encounter:    acyclovir  400 mg Oral BID    allopurinoL  300 mg Oral Daily    atorvastatin  80 mg Oral Daily    busPIRone  10 mg Oral BID    dexAMETHasone  4 mg Oral Q6H    diltiaZEM  30 mg Oral 4 times per day    fenofibrate  160 mg Oral Daily    gabapentin  300 mg Oral TID    hydrOXYchloroQUINE  200 mg Oral Daily    hydrOXYzine pamoate  50 mg Oral Q24H    insulin aspart U-100  4 Units Subcutaneous TIDWM    insulin detemir U-100  5 Units Subcutaneous QHS    mupirocin   Nasal BID    OXcarbazepine  1,200 mg Oral QHS    OXcarbazepine  1,200 mg Oral with lunch    pantoprazole  40 mg Oral Daily    QUEtiapine  200 mg Oral QHS    senna-docusate 8.6-50 mg  1 tablet Oral Daily    vortioxetine  20 mg Oral Q24H       Allergies: is allergic to levetiracetam.     Social Hx:  reports that she quit smoking about 6 months ago. Her smoking use included cigarettes. She smoked an average of .5 packs per day. She has never used smokeless tobacco. She reports that she does not drink alcohol and does not use drugs.     Family Hx: No family history of glaucoma. family history is not on file.     ROS: As per HPI    Ocular examination/Dilated fundus examination:  Base Eye Exam       Visual Acuity (Snellen - Linear)         Right Left    Dist sc  NLP    Dist cc 20/20               Tonometry (Tonopen, 10:57 AM)         Right Left    Pressure 12 14              Pupils         Dark Light Shape React APD    Right 4 2 Round Brisk None    Left 4 3 Round Minimal 3+              Neuro/Psych       Oriented x3: Yes                   Slit Lamp and Fundus Exam       External Exam         Right Left    External Normal Normal              Slit Lamp Exam         Right Left    Lids/Lashes Normal Normal    Conjunctiva/Sclera White and quiet White and quiet    Cornea Clear Clear    Anterior Chamber Deep and quiet Deep and quiet    Iris Round and reactive Round and minimally reactive    Lens Clear Clear    Anterior Vitreous Normal Normal                  Work-up:  - ESR/CRP to evaluate for GCA (finished: ESR wnl, CRP mildly elevated - disease unlikely in this patient)  - Patient unable to obtain MRI imaging due to pacemaker. CT orbit (thin cuts) and head w/wo with CTA/CTV: Small acute subarachnoid hemorrhage in the left frontal lobe, otherwise unremarkable.  - Lysozyme: 6.1 (normal range 2.6-6.0)  - Syphilis Treponemal Ab: Nonreactive    Assessment/Plan:     Optic neuropathy, left  - Acute, progressive vision loss over 1 hour from normal to not seeing light on 9/12/22. Painless at time, but deep achy retrobulbar pain developed over the following 24 hours which is exacerbated to a sharp, stabbing pain worst on lateral gaze but present on horizontal.  - 9/17/22 exam: right eye unremarkable. Left eye no light perception, nakia (3+) APD, equal pupil sizes, normal IOP. EOM grossly full and equal OU.   - CN exam otherwise full and equal both sides.  - No evidence of optic nerve or retinal pathology on dilated exam.    - Based on exam, most likely location of disease is posterior optic nerve / ophthalmic artery.    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    Recommendations:  - Continue current management for CNS involvement by ALL.  - Notify ophthalmology resident on call if patient develops any vision changes in the right eye.    Lama Deepthi MD  [employer] [facility] PGY-2

## 2022-09-21 NOTE — SUBJECTIVE & OBJECTIVE
Subjective:     Interval History: flow from CSF c/w B ALL. Planning for Ommaya placement tomorrow for twice weekly IT chemo. Will need platelets >70 K. Reports HA this am. Receives Fiorcet. Had episode of decreased vision to  right eye yesterday. Seen by ophthalmology, vision improved on its own, possibly due to medications. On Dex q 6, elevated glucose due to steroids, on mod SSI.     Objective:     Vital Signs (Most Recent):  Temp: 98.9 °F (37.2 °C) (09/21/22 0813)  Pulse: 68 (09/21/22 0813)  Resp: 19 (09/21/22 0921)  BP: 135/63 (09/21/22 0813)  SpO2: (Abnormal) 93 % (09/21/22 0813)   Vital Signs (24h Range):  Temp:  [97 °F (36.1 °C)-98.9 °F (37.2 °C)] 98.9 °F (37.2 °C)  Pulse:  [60-68] 68  Resp:  [15-19] 19  SpO2:  [90 %-97 %] 93 %  BP: (108-142)/(58-69) 135/63     Weight: 60.3 kg (132 lb 15 oz)  Body mass index is 22.82 kg/m².  Body surface area is 1.65 meters squared.    ECOG SCORE             Intake/Output - Last 3 Shifts       Intake/Output Category 09/19 0700 to 09/20 0659 09/20 0700 to 09/21 0659 09/21 0700 to 09/22 0659    P.O. 650 200     IV Piggyback 100 50     Total Intake(mL/kg) 750 250 (4.1)     Urine (mL/kg/hr) 2450 1600 (1.1)     Total Output 2450 1600     Net -1700 -1350                    Physical Exam  Constitutional:       General: She is not in acute distress.     Appearance: She is well-developed.   HENT:      Head: Normocephalic and atraumatic.      Nose: Nose normal. No congestion.   Eyes:      General: No scleral icterus.     Extraocular Movements: Extraocular movements intact.      Comments: Left eye pupil not reactive. Left eye peripheral vision not intact. Reports eye pain with movement improved.    Cardiovascular:      Rate and Rhythm: Normal rate and regular rhythm.      Heart sounds: No murmur heard.  Pulmonary:      Effort: Pulmonary effort is normal. No respiratory distress.   Abdominal:      General: There is no distension.      Palpations: Abdomen is soft.      Tenderness: There  is no abdominal tenderness.   Genitourinary:     Comments: Green in place  Musculoskeletal:         General: Normal range of motion.      Cervical back: Normal range of motion and neck supple.   Skin:     General: Skin is warm and dry.      Comments: Port intact with no redness or drainage   Neurological:      General: No focal deficit present.      Mental Status: She is alert and oriented to person, place, and time.   Psychiatric:         Mood and Affect: Mood normal.         Behavior: Behavior normal.       Significant Labs:   CBC:   Recent Labs   Lab 09/20/22  0323 09/21/22  0407   WBC 4.84 3.97   HGB 8.1* 8.1*   HCT 23.4* 24.0*   PLT 45* 37*    and CMP:   Recent Labs   Lab 09/20/22 0323 09/21/22  0407    136   K 3.9 3.7    102   CO2 25 24   * 256*   BUN 10 14   CREATININE 0.6 0.6   CALCIUM 9.2 8.5*   PROT 6.2 5.8*   ALBUMIN 3.6 3.5   BILITOT 0.2 0.3   ALKPHOS 144* 113   AST 44* 14   * 70*   ANIONGAP 10 10       Diagnostic Results:  None

## 2022-09-21 NOTE — PROGRESS NOTES
delivered to the patient, a $75.00 meal card to support the patient's caregiver while the patient is hospitalized.  No other needs noted at this time.

## 2022-09-21 NOTE — SUBJECTIVE & OBJECTIVE
Interval History: 9/20: NAEON. Neuro stable. Defer angio given likely leukemia diagnosis and workup.    Medications:  Continuous Infusions:  Scheduled Meds:   acyclovir  400 mg Oral BID    allopurinoL  300 mg Oral Daily    atorvastatin  80 mg Oral Daily    busPIRone  10 mg Oral BID    dexamethasone (DECADRON) IVPB  4 mg Intravenous Q6H    [START ON 9/21/2022] diltiaZEM  30 mg Oral 4 times per day    fenofibrate  160 mg Oral Daily    gabapentin  300 mg Oral TID    hydrOXYchloroQUINE  200 mg Oral Daily    hydrOXYzine pamoate  50 mg Oral Q24H    insulin aspart U-100  4 Units Subcutaneous TIDWM    insulin detemir U-100  5 Units Subcutaneous QHS    mupirocin   Nasal BID    OXcarbazepine  1,200 mg Oral QHS    OXcarbazepine  1,200 mg Oral with lunch    pantoprazole  40 mg Oral Daily    QUEtiapine  200 mg Oral QHS    senna-docusate 8.6-50 mg  1 tablet Oral Daily     PRN Meds:sodium chloride, butalbital-acetaminophen-caffeine -40 mg, dextrose 10%, dextrose 10%, diazePAM, duke's soln (benadryl 30 mL, mylanta 30 mL, LIDOcaine 30 mL, nystatin 30 mL) 120 mL, glucagon (human recombinant), glucose, glucose, HYDROmorphone, insulin aspart U-100, naloxone, ondansetron, sodium chloride 0.9%, sumatriptan, white petrolatum     Review of Systems  Objective:     Weight: 60.3 kg (132 lb 15 oz)  Body mass index is 22.82 kg/m².  Vital Signs (Most Recent):  Temp: 98.5 °F (36.9 °C) (09/20/22 1914)  Pulse: 65 (09/20/22 1914)  Resp: 16 (09/20/22 1928)  BP: (!) 142/67 (09/20/22 1914)  SpO2: (!) 93 % (09/20/22 1914)   Vital Signs (24h Range):  Temp:  [97.7 °F (36.5 °C)-98.5 °F (36.9 °C)] 98.5 °F (36.9 °C)  Pulse:  [60-68] 65  Resp:  [15-20] 16  SpO2:  [93 %-97 %] 93 %  BP: (107-142)/(16-69) 142/67     Date 09/20/22 0700 - 09/21/22 0659   Shift 9009-1515 7098-4209 6903-3201 24 Hour Total   INTAKE   Shift Total(mL/kg)       OUTPUT   Urine  900  900   Shift Total(mL/kg)  900(14.9)  900(14.9)   Weight (kg)  60.3 60.3 60.3                          " Urethral Catheter 09/07/22 (Active)   Site Assessment Clean;Intact 09/19/22 0738   Collection Container Standard drainage bag 09/19/22 0738   Securement Method secured to top of thigh w/ adhesive device 09/19/22 0738   Catheter Care Performed yes 09/19/22 0738   Reason for Continuing Urinary Catheterization Urinary retention 09/19/22 0738   CAUTI Prevention Bundle Securement Device in place with 1" slack;Intact seal between catheter & drainage tubing;Drainage bag/urimeter off the floor;Sheeting clip in use;No dependent loops or kinks;Drainage bag/urimeter not overfilled (<2/3 full);Drainage bag/urimeter below bladder 09/19/22 0738   Output (mL) 750 mL 09/19/22 1700       Physical Exam  Constitutional: No distress.   HEENT: atraumatic/normocephalic  Cardiovascular: Regular rhythm.   Pulm: aerating well, saturating well  Abdominal: Soft.   Psych/Behavior: She is alert.     Neurosurgery Physical Exam  E4V5M6  AOx3  PERRL  EOMI  Face Symmetric  Tongue midline  Vision loss left eye  BUE 5/5  BLE 5/5  No drift    Significant Labs:  Recent Labs   Lab 09/19/22  0323 09/20/22  0323   * 188*   * 137   K 4.3 3.9    102   CO2 24 25   BUN 7 10   CREATININE 0.6 0.6   CALCIUM 9.6 9.2   MG 1.9  --        Recent Labs   Lab 09/19/22  0323 09/20/22  0323   WBC 2.07* 4.84   HGB 8.5* 8.1*   HCT 24.2* 23.4*   PLT 49* 45*       No results for input(s): LABPT, INR, APTT in the last 48 hours.    Microbiology Results (last 7 days)       Procedure Component Value Units Date/Time    Cryptococcal antigen, CSF [[id number]] Collected: 09/19/22 1245    Order Status: Completed Specimen: CSF (Spinal Fluid) from CSF Tap, Tube 3 Updated: 09/20/22 0934     Crypto Ag, CSF Negative    CSF culture [[id number]] Collected: 09/19/22 1245    Order Status: Completed Specimen: CSF (Spinal Fluid) from CSF Tap, Tube 3 Updated: 09/20/22 0652     CSF CULTURE No Growth to date     Gram Stain Result No WBC's      No organisms seen    Gram stain " [[id number]] Collected: 09/19/22 1245    Order Status: Canceled Specimen: CSF (Spinal Fluid) from CSF Tap, Tube 3           All pertinent labs from the last 24 hours have been reviewed.    Significant Diagnostics:  I have reviewed all pertinent imaging results/findings within the past 24 hours.

## 2022-09-21 NOTE — ASSESSMENT & PLAN NOTE
I discussed the option of whole-brain radiation for urgent treatment of her sudden-onset blindness.  The risks, benefits, and side effects were discussed in detail.  All of her questions were answered.  She is currently receiving intrathecal chemotherapy with methotrexate. The addition of radiation to the brain concurrent with methotrexate is highly toxic.  I recommend that she continues with intrathecal chemotherapy and monitor her progress especially since she appears to have some response.   This case was discussed with Dr. Joseph who was in agreement.

## 2022-09-21 NOTE — PLAN OF CARE
Plan of care reviewed with patient and  at beginning of shift and PRN. Overnight reports blurry vision in the right eye now resolved  Ommaya reservoir placement, possibly Thursday; no NPO orders as of now. VSS, Frequent NC pt remains intact except for L Vision loss. Voiding per wolf. HS accu check w/o SS coverag. Pt w frequent HA pain and back pain. Bed in low position and wheels locked, rails up x2, call light and personal belongings within reach.

## 2022-09-22 ENCOUNTER — DOCUMENTATION ONLY (OUTPATIENT)
Dept: CARDIOLOGY | Facility: HOSPITAL | Age: 52
End: 2022-09-22
Payer: COMMERCIAL

## 2022-09-22 LAB
ALBUMIN SERPL BCP-MCNC: 3.5 G/DL (ref 3.5–5.2)
ALP SERPL-CCNC: 96 U/L (ref 55–135)
ALT SERPL W/O P-5'-P-CCNC: 44 U/L (ref 10–44)
ANION GAP SERPL CALC-SCNC: 10 MMOL/L (ref 8–16)
AST SERPL-CCNC: 11 U/L (ref 10–40)
BASOPHILS # BLD AUTO: 0 K/UL (ref 0–0.2)
BASOPHILS NFR BLD: 0 % (ref 0–1.9)
BILIRUB SERPL-MCNC: 0.4 MG/DL (ref 0.1–1)
BLD PROD TYP BPU: NORMAL
BLD PROD TYP BPU: NORMAL
BLOOD UNIT EXPIRATION DATE: NORMAL
BLOOD UNIT EXPIRATION DATE: NORMAL
BLOOD UNIT TYPE CODE: 6200
BLOOD UNIT TYPE CODE: 6200
BLOOD UNIT TYPE: NORMAL
BLOOD UNIT TYPE: NORMAL
BUN SERPL-MCNC: 14 MG/DL (ref 6–20)
CALCIUM SERPL-MCNC: 8.4 MG/DL (ref 8.7–10.5)
CHLORIDE SERPL-SCNC: 100 MMOL/L (ref 95–110)
CLARITY CSF: ABNORMAL
CO2 SERPL-SCNC: 26 MMOL/L (ref 23–29)
CODING SYSTEM: NORMAL
CODING SYSTEM: NORMAL
COLOR CSF: ABNORMAL
CREAT SERPL-MCNC: 0.6 MG/DL (ref 0.5–1.4)
DIFFERENTIAL METHOD: ABNORMAL
DISPENSE STATUS: NORMAL
DISPENSE STATUS: NORMAL
EOSINOPHIL # BLD AUTO: 0 K/UL (ref 0–0.5)
EOSINOPHIL NFR BLD: 0 % (ref 0–8)
ERYTHROCYTE [DISTWIDTH] IN BLOOD BY AUTOMATED COUNT: 17.1 % (ref 11.5–14.5)
ERYTHROCYTE [DISTWIDTH] IN BLOOD BY AUTOMATED COUNT: 17.6 % (ref 11.5–14.5)
ERYTHROCYTE [DISTWIDTH] IN BLOOD BY AUTOMATED COUNT: 17.7 % (ref 11.5–14.5)
EST. GFR  (NO RACE VARIABLE): >60 ML/MIN/1.73 M^2
EV RNA SPEC QL NAA+PROBE: NEGATIVE
GLUCOSE SERPL-MCNC: 143 MG/DL (ref 70–110)
HCT VFR BLD AUTO: 23.2 % (ref 37–48.5)
HCT VFR BLD AUTO: 23.4 % (ref 37–48.5)
HCT VFR BLD AUTO: 25.5 % (ref 37–48.5)
HGB BLD-MCNC: 7.9 G/DL (ref 12–16)
HGB BLD-MCNC: 8.1 G/DL (ref 12–16)
HGB BLD-MCNC: 8.7 G/DL (ref 12–16)
HSV1, PCR, CSF: NEGATIVE
HSV2, PCR, CSF: NEGATIVE
IMM GRANULOCYTES # BLD AUTO: 0.03 K/UL (ref 0–0.04)
IMM GRANULOCYTES # BLD AUTO: 0.03 K/UL (ref 0–0.04)
IMM GRANULOCYTES # BLD AUTO: 0.06 K/UL (ref 0–0.04)
IMM GRANULOCYTES NFR BLD AUTO: 0.6 % (ref 0–0.5)
IMM GRANULOCYTES NFR BLD AUTO: 0.7 % (ref 0–0.5)
IMM GRANULOCYTES NFR BLD AUTO: 1.1 % (ref 0–0.5)
KAPPA LC FREE CSF-MCNC: <0.0083 MG/DL
LYMPHOCYTES # BLD AUTO: 0.8 K/UL (ref 1–4.8)
LYMPHOCYTES # BLD AUTO: 1 K/UL (ref 1–4.8)
LYMPHOCYTES # BLD AUTO: 1 K/UL (ref 1–4.8)
LYMPHOCYTES NFR BLD: 14.2 % (ref 18–48)
LYMPHOCYTES NFR BLD: 18.4 % (ref 18–48)
LYMPHOCYTES NFR BLD: 24.1 % (ref 18–48)
LYMPHOCYTES NFR CSF MANUAL: 49 % (ref 40–80)
M TB CMPLX DNA SPEC QL NAA+PROBE: NEGATIVE
MAYO MISCELLANEOUS RESULT (REF): NORMAL
MCH RBC QN AUTO: 38.9 PG (ref 27–31)
MCH RBC QN AUTO: 39.3 PG (ref 27–31)
MCH RBC QN AUTO: 39.7 PG (ref 27–31)
MCHC RBC AUTO-ENTMCNC: 34.1 G/DL (ref 32–36)
MCHC RBC AUTO-ENTMCNC: 34.1 G/DL (ref 32–36)
MCHC RBC AUTO-ENTMCNC: 34.6 G/DL (ref 32–36)
MCV RBC AUTO: 114 FL (ref 82–98)
MCV RBC AUTO: 114 FL (ref 82–98)
MCV RBC AUTO: 116 FL (ref 82–98)
MONOCYTES # BLD AUTO: 0.1 K/UL (ref 0.3–1)
MONOCYTES NFR BLD: 0.9 % (ref 4–15)
MONOCYTES NFR BLD: 1.7 % (ref 4–15)
MONOCYTES NFR BLD: 2.1 % (ref 4–15)
MONOS+MACROS NFR CSF MANUAL: 5 % (ref 15–45)
NEUTROPHILS # BLD AUTO: 3.1 K/UL (ref 1.8–7.7)
NEUTROPHILS # BLD AUTO: 4.4 K/UL (ref 1.8–7.7)
NEUTROPHILS # BLD AUTO: 4.4 K/UL (ref 1.8–7.7)
NEUTROPHILS NFR BLD: 73.5 % (ref 38–73)
NEUTROPHILS NFR BLD: 80.1 % (ref 38–73)
NEUTROPHILS NFR BLD: 82.6 % (ref 38–73)
NEUTROPHILS NFR CSF MANUAL: 44 % (ref 0–6)
NRBC BLD-RTO: 0 /100 WBC
NRBC BLD-RTO: 0 /100 WBC
NRBC BLD-RTO: 1 /100 WBC
OTHER CELLS CSF: 2 %
PLATELET # BLD AUTO: 55 K/UL (ref 150–450)
PLATELET # BLD AUTO: 58 K/UL (ref 150–450)
PLATELET # BLD AUTO: 88 K/UL (ref 150–450)
PMV BLD AUTO: 10.4 FL (ref 9.2–12.9)
PMV BLD AUTO: 10.7 FL (ref 9.2–12.9)
PMV BLD AUTO: 9.9 FL (ref 9.2–12.9)
POCT GLUCOSE: 129 MG/DL (ref 70–110)
POTASSIUM SERPL-SCNC: 4 MMOL/L (ref 3.5–5.1)
PROT SERPL-MCNC: 5.7 G/DL (ref 6–8.4)
RBC # BLD AUTO: 2.03 M/UL (ref 4–5.4)
RBC # BLD AUTO: 2.06 M/UL (ref 4–5.4)
RBC # BLD AUTO: 2.19 M/UL (ref 4–5.4)
RBC # CSF: ABNORMAL /CU MM
SARS-COV-2 RDRP RESP QL NAA+PROBE: NEGATIVE
SODIUM SERPL-SCNC: 136 MMOL/L (ref 136–145)
SPECIMEN SOURCE: NORMAL
SPECIMEN VOL CSF: 3.5 ML
UNIT NUMBER: NORMAL
UNIT NUMBER: NORMAL
VARICELLA ZOSTER BY PCR RESULT: NEGATIVE
WBC # BLD AUTO: 4.24 K/UL (ref 3.9–12.7)
WBC # BLD AUTO: 5.29 K/UL (ref 3.9–12.7)
WBC # BLD AUTO: 5.44 K/UL (ref 3.9–12.7)
WBC # CSF: 48 /CU MM (ref 0–5)

## 2022-09-22 PROCEDURE — 99233 PR SUBSEQUENT HOSPITAL CARE,LEVL III: ICD-10-PCS | Mod: ,,, | Performed by: NEUROLOGICAL SURGERY

## 2022-09-22 PROCEDURE — 85025 COMPLETE CBC W/AUTO DIFF WBC: CPT | Performed by: STUDENT IN AN ORGANIZED HEALTH CARE EDUCATION/TRAINING PROGRAM

## 2022-09-22 PROCEDURE — 63600175 PHARM REV CODE 636 W HCPCS: Performed by: STUDENT IN AN ORGANIZED HEALTH CARE EDUCATION/TRAINING PROGRAM

## 2022-09-22 PROCEDURE — 89051 BODY FLUID CELL COUNT: CPT | Performed by: NURSE PRACTITIONER

## 2022-09-22 PROCEDURE — 88108 CYTOPATH CONCENTRATE TECH: CPT | Mod: 26,,, | Performed by: PATHOLOGY

## 2022-09-22 PROCEDURE — 80053 COMPREHEN METABOLIC PANEL: CPT | Performed by: STUDENT IN AN ORGANIZED HEALTH CARE EDUCATION/TRAINING PROGRAM

## 2022-09-22 PROCEDURE — 96450 CHEMOTHERAPY INTO CNS: CPT | Mod: 52,,, | Performed by: NURSE PRACTITIONER

## 2022-09-22 PROCEDURE — 99233 SBSQ HOSP IP/OBS HIGH 50: CPT | Mod: ,,, | Performed by: NEUROLOGICAL SURGERY

## 2022-09-22 PROCEDURE — 96450 PR CHEMOTHER,CNS,W/LUMBAR PUNCTURE: ICD-10-PCS | Mod: 52,,, | Performed by: NURSE PRACTITIONER

## 2022-09-22 PROCEDURE — P9037 PLATE PHERES LEUKOREDU IRRAD: HCPCS

## 2022-09-22 PROCEDURE — 99499 NO LOS: ICD-10-PCS | Mod: ,,, | Performed by: NEUROLOGICAL SURGERY

## 2022-09-22 PROCEDURE — 99233 SBSQ HOSP IP/OBS HIGH 50: CPT | Mod: ,,, | Performed by: INTERNAL MEDICINE

## 2022-09-22 PROCEDURE — 85025 COMPLETE CBC W/AUTO DIFF WBC: CPT | Mod: 91

## 2022-09-22 PROCEDURE — 63600175 PHARM REV CODE 636 W HCPCS: Performed by: NURSE PRACTITIONER

## 2022-09-22 PROCEDURE — 99233 PR SUBSEQUENT HOSPITAL CARE,LEVL III: ICD-10-PCS | Mod: ,,, | Performed by: INTERNAL MEDICINE

## 2022-09-22 PROCEDURE — 25000003 PHARM REV CODE 250: Performed by: STUDENT IN AN ORGANIZED HEALTH CARE EDUCATION/TRAINING PROGRAM

## 2022-09-22 PROCEDURE — 25000003 PHARM REV CODE 250: Performed by: NURSE PRACTITIONER

## 2022-09-22 PROCEDURE — 88108 CYTOPATH CONCENTRATE TECH: CPT | Performed by: PATHOLOGY

## 2022-09-22 PROCEDURE — 25000003 PHARM REV CODE 250

## 2022-09-22 PROCEDURE — 36430 TRANSFUSION BLD/BLD COMPNT: CPT

## 2022-09-22 PROCEDURE — 25000242 PHARM REV CODE 250 ALT 637 W/ HCPCS: Performed by: STUDENT IN AN ORGANIZED HEALTH CARE EDUCATION/TRAINING PROGRAM

## 2022-09-22 PROCEDURE — 25000003 PHARM REV CODE 250: Performed by: INTERNAL MEDICINE

## 2022-09-22 PROCEDURE — 88108 PR  CYTOPATH FLUIDS,CONCENTRATN,INTERP: ICD-10-PCS | Mod: 26,,, | Performed by: PATHOLOGY

## 2022-09-22 PROCEDURE — U0002 COVID-19 LAB TEST NON-CDC: HCPCS | Performed by: INTERNAL MEDICINE

## 2022-09-22 PROCEDURE — 99499 UNLISTED E&M SERVICE: CPT | Mod: ,,, | Performed by: NEUROLOGICAL SURGERY

## 2022-09-22 PROCEDURE — P9037 PLATE PHERES LEUKOREDU IRRAD: HCPCS | Performed by: STUDENT IN AN ORGANIZED HEALTH CARE EDUCATION/TRAINING PROGRAM

## 2022-09-22 PROCEDURE — 20600001 HC STEP DOWN PRIVATE ROOM

## 2022-09-22 RX ORDER — AMOXICILLIN 250 MG
2 CAPSULE ORAL 2 TIMES DAILY
Status: DISCONTINUED | OUTPATIENT
Start: 2022-09-22 | End: 2022-09-25

## 2022-09-22 RX ORDER — SODIUM CHLORIDE 0.9 % (FLUSH) 0.9 %
3 SYRINGE (ML) INJECTION
Status: DISCONTINUED | OUTPATIENT
Start: 2022-09-22 | End: 2022-09-23

## 2022-09-22 RX ORDER — HYDROMORPHONE HYDROCHLORIDE 1 MG/ML
0.2 INJECTION, SOLUTION INTRAMUSCULAR; INTRAVENOUS; SUBCUTANEOUS EVERY 5 MIN PRN
Status: CANCELLED | OUTPATIENT
Start: 2022-09-22

## 2022-09-22 RX ORDER — HYDROCODONE BITARTRATE AND ACETAMINOPHEN 500; 5 MG/1; MG/1
TABLET ORAL
Status: DISCONTINUED | OUTPATIENT
Start: 2022-09-22 | End: 2022-09-24

## 2022-09-22 RX ORDER — POLYETHYLENE GLYCOL 3350 17 G/17G
17 POWDER, FOR SOLUTION ORAL 2 TIMES DAILY
Status: DISCONTINUED | OUTPATIENT
Start: 2022-09-22 | End: 2022-09-25

## 2022-09-22 RX ORDER — POLYETHYLENE GLYCOL 3350 17 G/17G
17 POWDER, FOR SOLUTION ORAL DAILY
Status: DISCONTINUED | OUTPATIENT
Start: 2022-09-22 | End: 2022-09-22

## 2022-09-22 RX ORDER — HYDROCODONE BITARTRATE AND ACETAMINOPHEN 500; 5 MG/1; MG/1
TABLET ORAL
Status: DISCONTINUED | OUTPATIENT
Start: 2022-09-22 | End: 2022-09-22

## 2022-09-22 RX ORDER — LIDOCAINE HYDROCHLORIDE 10 MG/ML
8 INJECTION INFILTRATION; PERINEURAL ONCE
Status: COMPLETED | OUTPATIENT
Start: 2022-09-22 | End: 2022-09-22

## 2022-09-22 RX ADMIN — GABAPENTIN 300 MG: 300 CAPSULE ORAL at 04:09

## 2022-09-22 RX ADMIN — MUPIROCIN: 20 OINTMENT TOPICAL at 01:09

## 2022-09-22 RX ADMIN — FENOFIBRATE 160 MG: 160 TABLET ORAL at 01:09

## 2022-09-22 RX ADMIN — ATORVASTATIN CALCIUM 80 MG: 20 TABLET, FILM COATED ORAL at 12:09

## 2022-09-22 RX ADMIN — DILTIAZEM HYDROCHLORIDE 30 MG: 60 TABLET, FILM COATED ORAL at 05:09

## 2022-09-22 RX ADMIN — HYDROXYCHLOROQUINE SULFATE 200 MG: 200 TABLET ORAL at 06:09

## 2022-09-22 RX ADMIN — OXCARBAZEPINE 1200 MG: 600 TABLET, FILM COATED ORAL at 06:09

## 2022-09-22 RX ADMIN — CYTARABINE 1 SYRINGE: 20 INJECTION, SOLUTION INTRATHECAL; INTRAVENOUS; SUBCUTANEOUS at 03:09

## 2022-09-22 RX ADMIN — ALLOPURINOL 300 MG: 300 TABLET ORAL at 01:09

## 2022-09-22 RX ADMIN — QUETIAPINE FUMARATE 200 MG: 200 TABLET ORAL at 09:09

## 2022-09-22 RX ADMIN — HYDROXYZINE PAMOATE 50 MG: 25 CAPSULE ORAL at 09:09

## 2022-09-22 RX ADMIN — SENNOSIDES AND DOCUSATE SODIUM 2 TABLET: 50; 8.6 TABLET ORAL at 09:09

## 2022-09-22 RX ADMIN — MUPIROCIN: 20 OINTMENT TOPICAL at 09:09

## 2022-09-22 RX ADMIN — ACYCLOVIR 400 MG: 200 CAPSULE ORAL at 09:09

## 2022-09-22 RX ADMIN — PANTOPRAZOLE SODIUM 40 MG: 40 TABLET, DELAYED RELEASE ORAL at 01:09

## 2022-09-22 RX ADMIN — ACYCLOVIR 400 MG: 200 CAPSULE ORAL at 01:09

## 2022-09-22 RX ADMIN — HYDROMORPHONE HYDROCHLORIDE 4 MG: 4 TABLET ORAL at 12:09

## 2022-09-22 RX ADMIN — GABAPENTIN 300 MG: 300 CAPSULE ORAL at 09:09

## 2022-09-22 RX ADMIN — POLYETHYLENE GLYCOL 3350 17 G: 17 POWDER, FOR SOLUTION ORAL at 01:09

## 2022-09-22 RX ADMIN — DILTIAZEM HYDROCHLORIDE 30 MG: 60 TABLET, FILM COATED ORAL at 12:09

## 2022-09-22 RX ADMIN — DILTIAZEM HYDROCHLORIDE 30 MG: 60 TABLET, FILM COATED ORAL at 06:09

## 2022-09-22 RX ADMIN — DEXAMETHASONE 4 MG: 4 TABLET ORAL at 12:09

## 2022-09-22 RX ADMIN — POLYETHYLENE GLYCOL 3350 17 G: 17 POWDER, FOR SOLUTION ORAL at 09:09

## 2022-09-22 RX ADMIN — SENNOSIDES AND DOCUSATE SODIUM 2 TABLET: 50; 8.6 TABLET ORAL at 01:09

## 2022-09-22 RX ADMIN — DEXAMETHASONE 4 MG: 4 TABLET ORAL at 06:09

## 2022-09-22 RX ADMIN — BUTALBITAL, ACETAMINOPHEN, AND CAFFEINE 1 TABLET: 50; 325; 40 TABLET ORAL at 09:09

## 2022-09-22 RX ADMIN — INSULIN DETEMIR 5 UNITS: 100 INJECTION, SOLUTION SUBCUTANEOUS at 10:09

## 2022-09-22 RX ADMIN — LIDOCAINE HYDROCHLORIDE 8 ML: 10 INJECTION, SOLUTION INFILTRATION; PERINEURAL at 02:09

## 2022-09-22 RX ADMIN — ONDANSETRON 4 MG: 2 INJECTION INTRAMUSCULAR; INTRAVENOUS at 08:09

## 2022-09-22 RX ADMIN — VORTIOXETINE 20 MG: 10 TABLET, FILM COATED ORAL at 06:09

## 2022-09-22 RX ADMIN — GABAPENTIN 300 MG: 300 CAPSULE ORAL at 12:09

## 2022-09-22 RX ADMIN — BUTALBITAL, ACETAMINOPHEN, AND CAFFEINE 1 TABLET: 50; 325; 40 TABLET ORAL at 12:09

## 2022-09-22 RX ADMIN — BUSPIRONE HYDROCHLORIDE 10 MG: 10 TABLET ORAL at 01:09

## 2022-09-22 RX ADMIN — FENOFIBRATE 160 MG: 160 TABLET ORAL at 12:09

## 2022-09-22 RX ADMIN — ONDANSETRON 4 MG: 2 INJECTION INTRAMUSCULAR; INTRAVENOUS at 04:09

## 2022-09-22 RX ADMIN — BUSPIRONE HYDROCHLORIDE 10 MG: 10 TABLET ORAL at 09:09

## 2022-09-22 RX ADMIN — DEXAMETHASONE 4 MG: 4 TABLET ORAL at 05:09

## 2022-09-22 NOTE — PROCEDURES
Radiology Post-Procedure Note    Pre Op Diagnosis: ALL    Post Op Diagnosis: Same    Procedure: Lumbar puncture under fluoroscopic guidance    Procedure performed by: Renzo Noel MD; Abelardo ARMENTA, Gerardo (resident)    Written Informed Consent Obtained: Yes    Specimen Removed: 11 mL CSF    Estimated Blood Loss: Minimal    Opening pressure: 16.5 cm H20    Needle used: 9cm, 22g spinal needle    Findings: Following written informed consent and sterile prep and drape, a 22 gauge spinal needle was inserted at L3 - L4 in addition to the L4 - L5 intralaminar spaces under fluoroscopic surveillance advancing approximately 80% of its length before reaching the thecal sac in each location.  Two locations were required secondary to low CSF flow.  11 mL clear CSF removed and sent to the lab for further analysis.      After obtaining sample, oncology was called to the bedside to administer intrathecal chemotherapy.  After instillation, the needle was flushed with 2 mL reserved CSF.  The stylet was replaced and the needle was removed.    There were no complications.    Patient tolerated procedure well.    Gerardo Zhou M.D.  Resident, Diagnostic and Interventional Radiology  Department of Radiology  [facility]

## 2022-09-22 NOTE — PROGRESS NOTES
[facility] - Oncology ([facility])  Neurosurgery  Progress Note    Subjective:     History of Present Illness: 51 F Pmhx leukemia, presents with left sided vision loss 5 days ago while watching TV, vision loss began gradually then progressed to complete left eye vision loss over 1 hour. She has not had any improvement since then. She has no headaches, focal weaknesses, confusion, or speech difficulty. She denies any trauma. NSGY consulted for incidentally found small left frontal SAH      Post-Op Info:  Procedure(s) (LRB):  INSERTION, OMMAYA RESERVOIR (N/A)   Day of Surgery     Interval History: 9/22: Platelets 58k this morning after 2 transfusions. Another transfusion scheduled by primary team, but was not able to be given prior to scheduled surgery time. Platelet goals for surgery not met, therefore Ommaya placement cancelled. Patient neuro stable.    Medications:  Continuous Infusions:  Scheduled Meds:   acyclovir  400 mg Oral BID    allopurinoL  300 mg Oral Daily    atorvastatin  80 mg Oral Daily    busPIRone  10 mg Oral BID    dexAMETHasone  4 mg Oral Q6H    diltiaZEM  30 mg Oral 4 times per day    fenofibrate  160 mg Oral Daily    gabapentin  300 mg Oral TID    hydrOXYchloroQUINE  200 mg Oral Daily    hydrOXYzine pamoate  50 mg Oral Q24H    insulin aspart U-100  4 Units Subcutaneous TIDWM    insulin detemir U-100  5 Units Subcutaneous QHS    mupirocin   Nasal BID    OXcarbazepine  1,200 mg Oral QHS    OXcarbazepine  1,200 mg Oral with lunch    pantoprazole  40 mg Oral Daily    polyethylene glycol  17 g Oral BID    QUEtiapine  200 mg Oral QHS    senna-docusate 8.6-50 mg  2 tablet Oral BID    vortioxetine  20 mg Oral Q24H     PRN Meds:sodium chloride, butalbital-acetaminophen-caffeine -40 mg, dextrose 10%, dextrose 10%, diazePAM, duke's soln (benadryl 30 mL, mylanta 30 mL, LIDOcaine 30 mL, nystatin 30 mL) 120 mL, glucagon (human recombinant), glucose, glucose, HYDROmorphone, insulin aspart  "U-100, magnesium oxide, magnesium oxide, magnesium oxide, naloxone, ondansetron, potassium chloride, potassium chloride, potassium chloride, potassium, sodium phosphates, potassium, sodium phosphates, sodium chloride 0.9%, sodium chloride 0.9%, sumatriptan, white petrolatum     Review of Systems  Objective:     Weight: 60.3 kg (132 lb 15 oz)  Body mass index is 22.82 kg/m².  Vital Signs (Most Recent):  Temp: 98.5 °F (36.9 °C) (09/22/22 1653)  Pulse: 63 (09/22/22 1653)  Resp: 18 (09/22/22 1653)  BP: (!) 166/72 (09/22/22 1653)  SpO2: (!) 94 % (09/22/22 1653)   Vital Signs (24h Range):  Temp:  [97.5 °F (36.4 °C)-98.5 °F (36.9 °C)] 98.5 °F (36.9 °C)  Pulse:  [60-72] 63  Resp:  [16-20] 18  SpO2:  [91 %-98 %] 94 %  BP: (109-177)/(56-77) 166/72     Date 09/22/22 0700 - 09/23/22 0659   Shift 2821-1575 7694-4764 7626-8118 24 Hour Total   INTAKE   Shift Total(mL/kg)       OUTPUT   Urine(mL/kg/hr) 725(1.5)   725   Shift Total(mL/kg) 725(12)   725(12)   Weight (kg) 60.3 60.3 60.3 60.3                          Urethral Catheter 09/07/22 (Active)   Site Assessment Clean;Intact 09/19/22 0738   Collection Container Standard drainage bag 09/19/22 0738   Securement Method secured to top of thigh w/ adhesive device 09/19/22 0738   Catheter Care Performed yes 09/19/22 0738   Reason for Continuing Urinary Catheterization Urinary retention 09/19/22 0738   CAUTI Prevention Bundle Securement Device in place with 1" slack;Intact seal between catheter & drainage tubing;Drainage bag/urimeter off the floor;Sheeting clip in use;No dependent loops or kinks;Drainage bag/urimeter not overfilled (<2/3 full);Drainage bag/urimeter below bladder 09/19/22 0738   Output (mL) 750 mL 09/19/22 1700       Physical Exam  Constitutional: No distress.   HEENT: atraumatic/normocephalic  Cardiovascular: Regular rhythm.   Pulm: aerating well, saturating well  Abdominal: Soft.   Psych/Behavior: She is alert.     Neurosurgery Physical " Exam  E4V5M6  AOx3  PERRL  EOMI  Face Symmetric  Tongue midline  Vision loss left eye  BUE 5/5  BLE 5/5  No drift    Significant Labs:  Recent Labs   Lab 09/21/22  0407 09/22/22  0456   * 143*    136   K 3.7 4.0    100   CO2 24 26   BUN 14 14   CREATININE 0.6 0.6   CALCIUM 8.5* 8.4*       Recent Labs   Lab 09/21/22  2337 09/22/22  0456 09/22/22  1658   WBC 5.29 4.24 5.44   HGB 8.1* 7.9* 8.7*   HCT 23.4* 23.2* 25.5*   PLT 55* 58* 88*       No results for input(s): LABPT, INR, APTT in the last 48 hours.    Microbiology Results (last 7 days)       Procedure Component Value Units Date/Time    CSF culture [[id number]] Collected: 09/19/22 1245    Order Status: Completed Specimen: CSF (Spinal Fluid) from CSF Tap, Tube 3 Updated: 09/22/22 0712     CSF CULTURE No Growth to date     Gram Stain Result No WBC's      No organisms seen    Cryptococcal antigen, CSF [[id number]] Collected: 09/19/22 1245    Order Status: Completed Specimen: CSF (Spinal Fluid) from CSF Tap, Tube 3 Updated: 09/20/22 0934     Crypto Ag, CSF Negative    Gram stain [[id number]] Collected: 09/19/22 1245    Order Status: Canceled Specimen: CSF (Spinal Fluid) from CSF Tap, Tube 3           All pertinent labs from the last 24 hours have been reviewed.    Significant Diagnostics:  I have reviewed all pertinent imaging results/findings within the past 24 hours.    Assessment/Plan:     Rheumatoid arthritis involving multiple sites  51 F Pmhx leukemia, presents with left sided vision loss 5 days ago while watching TV. NSGY consulted for incidentally found small left frontal SAH    SAH likely unrelated to vision loss  CTA negative for any vascular lesion or occlusion  Rpt CTH stable bleed    -- unfortunately patient cannot have MRI due to pacemaker, so no further imaging recommended at this time  -- Agree with continued ophthalmology workup  -- Plan for placement of Ommaya reservoir with Dr. Alberto if platelets >100k on serial draws (at least 2)  or >70k with intra-op transfusion of platelets   --reviewed goals with primary team   --risks/benefits discussed, patient consented   --surgery cancelled today as platelet goal was not met, will need to reschedule        Yamila Cloud MD  Neurosurgery  [facility] - Oncology ([facility])

## 2022-09-22 NOTE — PROGRESS NOTES
Chief complaint/Reason for Consult: L eye vision loss since Tuesday     History of Present Illness: Deepti Gonzalez is a 51 y.o. 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 She says she was watching TV on Monday when suddenly she noticed her L eye lost vision. When she closed her R eye, she saw shadowy shapes and figures. There were no associated symptoms or pain of her left eye. An hour later, she lost all vision from her L eye including of light. She saw an outside ophthalmologist on Tuesday who did not see evidence of ocular etiology of the vision loss. Her vision did not improve at all and she began developing increasing pain from her L eye over the last few days. No prior ocular history.    Interval hx:  NAEON. Patient denies any visual changes in the R eye (unaffected eye) and no changes in the left eye (affected).  Patient was started on intrathecal methotrexate on 9/19 and 9/22. LP was performed and confirmed CNS ALL involvement. She is on dexamethasone 4 mg every 6 hours. Unable to proceed with Ommaya reservoir placement given thrombocytopenia despite replacement.    Past Ocular Hx: no past ocular surgeries, wears glasses normally.    Current eye gtts: none      PMHx:  has a past medical history of Cancer, COPD (chronic obstructive pulmonary disease), Hypertension, Rheumatoid arthritis, unspecified, SAH (subarachnoid hemorrhage) (9/17/2022), Seizures, Stroke, and Type 2 diabetes mellitus with circulatory disorder, without long-term current use of insulin.     PSurgHx:  has a past surgical history that includes Appendectomy; Tonsillectomy; Rotator cuff repair (Bilateral); Tubal ligation; and Hysterectomy.     Home Medications:   Prior to Admission medications    Medication Sig Start Date End Date Taking? Authorizing Provider   acyclovir (ZOVIRAX) 400 MG  tablet Take 1 tablet (400 mg total) by mouth 2 (two) times daily. 12/7/21   Kathrine Posey MD   allopurinoL (ZYLOPRIM) 300 MG tablet Take 1 tablet (300 mg total) by mouth once daily. 6/7/22   Connor M Gillies, MD   artificial tears (ISOPTO TEARS) 0.5 % ophthalmic solution Place 1 drop into both eyes 4 (four) times daily as needed. 1/24/22   Mirtha Antonio NP   atorvastatin (LIPITOR) 80 MG tablet Take 80 mg by mouth once daily.    Historical Provider   blood sugar diagnostic Strp SMARTSIG:Via Meter 1 to 2 Times Daily 10/11/21   Historical Provider   busPIRone (BUSPAR) 10 MG tablet Take 10 mg by mouth 2 (two) times daily.    Historical Provider   dapagliflozin (FARXIGA) 10 mg tablet Take 10 mg by mouth once daily.    Historical Provider   diazePAM (VALIUM) 10 MG Tab Take 10 mg by mouth 2 (two) times daily as needed.    Historical Provider   diltiaZEM (CARDIZEM) 90 MG tablet Take 1 tablet (90 mg total) by mouth every 6 (six) hours. 12/7/21 12/7/22  Kathrine Posey MD   almaraz's soln (benadryl 30 mL, mylanta 30 mL, LIDOcaine 30 mL, nystatin 30 mL) 120mL Take 10 mLs by mouth 4 (four) times daily as needed (mouth pain). 4/19/22   Mirtha Antonio NP   fenofibrate 160 MG Tab Take 160 mg by mouth once daily.    Historical Provider   gabapentin (NEURONTIN) 300 MG capsule Take 1 capsule (300 mg total) by mouth 3 (three) times daily. 12/7/21 12/7/22  Kathrine Posey MD   hydrOXYchloroQUINE (PLAQUENIL) 200 mg tablet Take 1 tablet (200 mg total) by mouth once daily. 5/19/22   Mirtha Antonio NP   hydrOXYzine pamoate (VISTARIL) 50 MG Cap Take 50 mg by mouth 2 (two) times daily as needed. 4/29/22   Historical Provider   LIDOcaine (LIDODERM) 5 % Place 1 patch onto the skin once daily. Remove & Discard patch within 12 hours or as directed by MD 6/6/22   Connor M Gillies, MD   losartan (COZAAR) 25 MG tablet Take 25 mg by mouth once daily. 11/12/21   Historical Provider   magnesium oxide (MAG-OX) 400 mg (241.3 mg magnesium) tablet Take 1  tablet by mouth once daily. 7/12/22   Historical Provider   metFORMIN (GLUCOPHAGE-XR) 500 MG ER 24hr tablet Take 1,000 mg by mouth 2 (two) times daily. 7/22/22   Historical Provider   naloxone (NARCAN) 4 mg/actuation Spry 4mg by nasal route as needed for opioid overdose; may repeat every 2-3 minutes in alternating nostrils until medical help arrives. Call 911 6/8/22   Ines Mccord MD   omeprazole (PRILOSEC) 40 MG capsule Take 40 mg by mouth once daily.    Historical Provider   ondansetron (ZOFRAN-ODT) 8 MG TbDL Take 1 tablet (8 mg total) by mouth every 8 (eight) hours as needed (in case of chemo induced nausea). 8/9/22   Dayron Jenkins MD   ONETOUCH VERIO TEST STRIPS Strp SMARTSIG:Via Meter 1 to 2 Times Daily 4/6/22   Historical Provider   OXcarbazepine (TRILEPTAL) 600 MG Tab Take 1,200 mg by mouth 2 (two) times daily. 10/26/21   Historical Provider   polyethylene glycol (GLYCOLAX) 17 gram/dose powder Dissolve one capful (17 g) in liquid and take by mouth daily as needed (constipation). 12/7/21   Kathrine Posey MD   PONATinib (ICLUSIG) 30 mg Tab Take 1 tablet (30 mg) by mouth once daily. 9/7/22   Dayron Jenkins MD   potassium chloride (K-TAB) 20 mEq Take 20 mEq by mouth 2 (two) times daily. 7/12/22   Historical Provider   prochlorperazine (COMPAZINE) 10 MG tablet Take 1 tablet (10 mg total) by mouth every 6 (six) hours as needed for Nausea. 7/11/22 7/11/23  Dayron Jenkins MD   QUEtiapine (SEROQUEL) 200 MG Tab Take 200 mg by mouth every evening.    Historical Provider   rivaroxaban (XARELTO) 20 mg Tab Take 1 tablet (20 mg total) by mouth daily with dinner or evening meal. Hold until instructed to resume by provider due to low platelet count. 6/6/22   Connor M Gillies, MD   senna-docusate 8.6-50 mg (PERICOLACE) 8.6-50 mg per tablet Take 1 tablet by mouth 2 (two) times daily. 3/19/22   Felisha Goodwin DO   sumatriptan (IMITREX) 50 MG tablet Take 1 tablet (50 mg total) by mouth daily as needed (headache).  8/29/22 9/28/22  Dayron Jenkins MD   TRINTELLIX 20 mg Tab Take 1 tablet by mouth once daily. 3/8/22   Historical Provider   ursodioL (ACTIGALL) 300 mg capsule Take 1 capsule (300 mg total) by mouth 3 (three) times daily. 6/13/22 6/13/23  Dayron Jenkins MD        Medications this encounter:    acyclovir  400 mg Oral BID    allopurinoL  300 mg Oral Daily    atorvastatin  80 mg Oral Daily    busPIRone  10 mg Oral BID    dexAMETHasone  4 mg Oral Q6H    diltiaZEM  30 mg Oral 4 times per day    fenofibrate  160 mg Oral Daily    gabapentin  300 mg Oral TID    hydrOXYchloroQUINE  200 mg Oral Daily    hydrOXYzine pamoate  50 mg Oral Q24H    insulin aspart U-100  4 Units Subcutaneous TIDWM    insulin detemir U-100  5 Units Subcutaneous QHS    mupirocin   Nasal BID    OXcarbazepine  1,200 mg Oral QHS    OXcarbazepine  1,200 mg Oral with lunch    pantoprazole  40 mg Oral Daily    polyethylene glycol  17 g Oral BID    QUEtiapine  200 mg Oral QHS    senna-docusate 8.6-50 mg  2 tablet Oral BID    vortioxetine  20 mg Oral Q24H       Allergies: is allergic to levetiracetam.     Social Hx:  reports that she quit smoking about 6 months ago. Her smoking use included cigarettes. She smoked an average of .5 packs per day. She has never used smokeless tobacco. She reports that she does not drink alcohol and does not use drugs.     Family Hx: No family history of glaucoma. family history is not on file.     ROS: As per HPI    Ocular examination/Dilated fundus examination:  Base Eye Exam       Visual Acuity (Snellen - Linear)         Right Left    Dist cc 20/20 NLP              Tonometry (Tonopen, 4:04 PM)         Right Left    Pressure 14 14              Pupils         Dark Light Shape React APD    Right 4 2 Round Brisk None    Left 4 4 Round Nonreactive 3+ APD              Extraocular Movement         Right Left     Full Full              Neuro/Psych       Oriented x3: Yes                  Slit Lamp and Fundus Exam       External Exam          Right Left    External Normal Normal              Slit Lamp Exam         Right Left    Lids/Lashes Normal Normal    Conjunctiva/Sclera White and quiet White and quiet    Cornea Clear Clear    Anterior Chamber Deep and quiet Deep and quiet    Iris Round and reactive Round and minimally reactive    Lens Clear Clear    Anterior Vitreous Normal Normal                  Work-up:  - ESR/CRP to evaluate for GCA (finished: ESR wnl, CRP mildly elevated - disease unlikely in this patient)  - Patient unable to obtain MRI imaging due to pacemaker. CT orbit (thin cuts) and head w/wo with CTA/CTV: Small acute subarachnoid hemorrhage in the left frontal lobe, otherwise unremarkable.  - Lysozyme: 6.1 (normal range 2.6-6.0)  - Syphilis Treponemal Ab: Nonreactive    Assessment/Plan:     Optic neuropathy, left  - Acute, progressive vision loss over 1 hour from normal to not seeing light on 9/12/22. Painless at time, but deep achy retrobulbar pain developed over the following 24 hours which is exacerbated to a sharp, stabbing pain worst on lateral gaze but present on horizontal.  - 9/17/22 exam: right eye unremarkable. Left eye no light perception, nakia (3+) APD, equal pupil sizes, normal IOP. EOM grossly full and equal OU.   - CN exam otherwise full and equal both sides.  - No evidence of optic nerve or retinal pathology on dilated exam.  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  - Ophthalmology to complete repeat DFE 09/26/22    Recommendations:  - Continue current management for CNS involvement by ALL.  - Notify ophthalmology resident on call if patient develops any vision changes in the right eye.    Ezra Aguilera MD  PGY2 [facility]

## 2022-09-22 NOTE — ASSESSMENT & PLAN NOTE
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 Concern for possible B-ALL involvement vs side effect of ponatinib vs retrobulbar hemorrhage vs stroke    Plan:  - Consulted Opthomology, appreciate assistance  - LP on 9/19 and 9/22 with IT chemo (cytarabine, hydrocortisone, methotrexate)  - labs pending:   TB DNA by PCR  VDRL negative  [place] virus pCR  VZV PCR negative  HSV PCR negative  Enterovirus RNA  CNS demyelinating dz (AQP-4 IGG/MOG IGG)  CSF bands negative  NMO aquaporin  MS profile      Gram stain: no organisms seen  CSF culture NGTD  Flow cytometry analysis c/w B ALL  Cytology in process  CSF cell count 546 WBC, 87% lymphs, 12% others  CSF protein 43  Cryptococcal Ag negative    - Patient with pacemaker and per patient, not compatible with MRI. Would ideally obtain MRI brain w/ w/o contrast. Verified with EP who checked with rep that pacemaker is not MRI compatible.   - CTA completed with delayed venous phase revealing small SAH   - DSA (digitial subtraction angio) recommended by NSGY. Neuro ok with it, but will defer further vasculitis workup for now given that leukemia is a more obvious etiology for her vision loss.   SEE ALL

## 2022-09-22 NOTE — PLAN OF CARE
[place]annah from the pacemaker clinic returned my call and told me that they do not need to come see the patient.  If cautery is used, a magnet will need to be placed over her pacemaker in the OR.

## 2022-09-22 NOTE — ASSESSMENT & PLAN NOTE
51 F Pmhx leukemia, presents with left sided vision loss 5 days ago while watching TV. NSGY consulted for incidentally found small left frontal SAH    SAH likely unrelated to vision loss  CTA negative for any vascular lesion or occlusion  Rpt CTH stable bleed    -- unfortunately patient cannot have MRI due to pacemaker, so no further imaging recommended at this time  -- Agree with continued ophthalmology workup  -- Plan for placement of Ommaya reservoir tomorrow with Dr. Alberto if platelets >100k on serial draws (at least 2) or >70k with intra-op transfusion of platelets   --reviewed goals with BMT team   --risks/benefits discussed, patient consented   --NPO since midnight, hold any heparin products

## 2022-09-22 NOTE — ASSESSMENT & PLAN NOTE
51 F Pmhx leukemia, presents with left sided vision loss 5 days ago while watching TV. NSGY consulted for incidentally found small left frontal SAH    SAH likely unrelated to vision loss  CTA negative for any vascular lesion or occlusion  Rpt CTH stable bleed    -- unfortunately patient cannot have MRI due to pacemaker, so no further imaging recommended at this time  -- Agree with continued ophthalmology workup  -- Plan for placement of Ommaya reservoir with Dr. Alberto if platelets >100k on serial draws (at least 2) or >70k with intra-op transfusion of platelets   --reviewed goals with primary team   --risks/benefits discussed, patient consented   --surgery cancelled today as platelet goal was not met, will need to reschedule

## 2022-09-22 NOTE — PROGRESS NOTES
Patient has been identified as having an implanted cardiac rhythm device (CRD); the implanted device is a [employer] pacemaker.      No noted pacer dependency. EKG from 8/27/22 shows NSR.       Per protocol, no pacemaker reprogramming is required in this non-dependent patient. If cautery is going to be used, please place a magnet over device during procedure.       For additional questions, please contact the Arrhythmia Department at Ext [phone].

## 2022-09-22 NOTE — NURSING
0700- received report from WOO Tejada. Pt is a/o x4 while on room air. Spouse at bedside. Pt continues to c/o headaches. Med interventions consist of Dilaudid 4 mg PRN and Butalbital/acetaminophen/caffeine. Procedure scheduled for this am. Pt is aware of NPO status. Green intact and hanging to gravity with yellow urine output. Pt and family/spouse verbalize understanding the pt is a fall risk due to general weakness and hx of falls. Pt reports significant loss in vision to left eye. No needs voiced at this time; Rn encourages pt/family to notify RN/staff for all needs, concerns, or problems that may arise. Verbalizes understanding.     0830- pt leaving for procedure. Assessment performed and v/s obtained. Pt tolerates w/o difficulty.     1105-This RN is notified by pre-op regarding pt not being able to have procedure today R/T PLT count. Reviewed orders and PLT count with charge nurse, Imelda. Platelets x2 units infused. 0620 PLT ordered and being prepared. Pt left  for procedure/pre-op at 0835    1350- pt continues to tolerate PLT infusion. This RN continues to monitor pt for all needs, changes, or problems that may arise. Opthalomogy MD at bedside. No other needs voiced at this time. Spouse at bedside.     - pt refuses insulin scheduled due to low BS and not eating. This RN encourages pt/spouse to notify nurse/staff for all needs; verbalizes understanding, call light within reach, bed remains in the lowest position; safety precaution continue to be maintained.

## 2022-09-22 NOTE — PROGRESS NOTES
[facility] - Oncology ([facility])  Hematology  Bone Marrow Transplant  Progress Note    Patient Name: Deepti Gonzalez  Admission Date: 9/16/2022  Hospital Length of Stay: 6 days  Code Status: Full Code    Subjective:     Interval History: plts at 58 not high enough for Ommaya. Will transfuse 1 more unit and pm CBC. LP scheduled for IT chemo. Still c/o vision loss and HA. Increased bowel regimen for opioid induced constipation.     Objective:     Vital Signs (Most Recent):  Temp: 97.7 °F (36.5 °C) (09/22/22 1229)  Pulse: 60 (09/22/22 1229)  Resp: 16 (09/22/22 1256)  BP: (!) 159/70 (09/22/22 1229)  SpO2: (!) 92 % (09/22/22 1229)   Vital Signs (24h Range):  Temp:  [97.5 °F (36.4 °C)-98.5 °F (36.9 °C)] 97.7 °F (36.5 °C)  Pulse:  [60-72] 60  Resp:  [16-20] 16  SpO2:  [91 %-98 %] 92 %  BP: (109-177)/(56-77) 159/70     Weight: 60.3 kg (132 lb 15 oz)  Body mass index is 22.82 kg/m².  Body surface area is 1.65 meters squared.    ECOG SCORE           [unfilled]    Intake/Output - Last 3 Shifts         09/20 0700  09/21 0659 09/21 0700  09/22 0659 09/22 0700  09/23 0659    P.O. 200      Blood  348     IV Piggyback 50      Total Intake(mL/kg) 250 (4.1) 348 (5.8)     Urine (mL/kg/hr) 1600 (1.1) 2300 (1.6) 725 (2)    Total Output 1600 2300 725    Net -1350 -1952 -725                   Physical Exam  Constitutional:       General: She is not in acute distress.     Appearance: She is well-developed.   HENT:      Head: Normocephalic and atraumatic.      Nose: Nose normal. No congestion.   Eyes:      General: No scleral icterus.     Extraocular Movements: Extraocular movements intact.      Comments: Left eye pupil not reactive. Left eye peripheral vision not intact. Reports eye pain with movement improved.    Cardiovascular:      Rate and Rhythm: Normal rate and regular rhythm.      Heart sounds: No murmur heard.  Pulmonary:      Effort: Pulmonary effort is normal. No respiratory distress.   Abdominal:      General: There is no  distension.      Palpations: Abdomen is soft.      Tenderness: There is no abdominal tenderness.   Genitourinary:     Comments: Green in place  Musculoskeletal:         General: Normal range of motion.      Cervical back: Normal range of motion and neck supple.   Skin:     General: Skin is warm and dry.      Comments: Port intact with no redness or drainage   Neurological:      General: No focal deficit present.      Mental Status: She is alert and oriented to person, place, and time.   Psychiatric:         Mood and Affect: Mood normal.         Behavior: Behavior normal.       Significant Labs:   CBC:   Recent Labs   Lab 09/21/22 0407 09/21/22  2337 09/22/22 0456   WBC 3.97 5.29 4.24   HGB 8.1* 8.1* 7.9*   HCT 24.0* 23.4* 23.2*   PLT 37* 55* 58*   , CMP:   Recent Labs   Lab 09/21/22 0407 09/22/22 0456    136   K 3.7 4.0    100   CO2 24 26   * 143*   BUN 14 14   CREATININE 0.6 0.6   CALCIUM 8.5* 8.4*   PROT 5.8* 5.7*   ALBUMIN 3.5 3.5   BILITOT 0.3 0.4   ALKPHOS 113 96   AST 14 11   ALT 70* 44   ANIONGAP 10 10   ,   Recent Labs   Lab 09/21/22 0407 09/22/22  0456   ALT 70* 44   AST 14 11   ALKPHOS 113 96   BILITOT 0.3 0.4   PROT 5.8* 5.7*   ALBUMIN 3.5 3.5   ,    Diagnostic Results:  I have reviewed all pertinent imaging results/findings within the past 24 hours.    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 Concern for possible B-ALL involvement vs side effect of ponatinib vs retrobulbar hemorrhage vs stroke    Plan:  - Consulted Opthomology, appreciate assistance  - LP on 9/19 and 9/22 with IT chemo (cytarabine, hydrocortisone, methotrexate)  - labs pending:   TB DNA by PCR  VDRL negative  [place] virus pCR  VZV PCR negative  HSV PCR negative  Enterovirus RNA  CNS demyelinating dz (AQP-4 IGG/MOG IGG)  CSF bands negative  NMO aquaporin  MS profile      Gram stain: no organisms  seen  CSF culture NGTD  Flow cytometry analysis c/w B ALL  Cytology in process  CSF cell count 546 WBC, 87% lymphs, 12% others  CSF protein 43  Cryptococcal Ag negative    - Patient with pacemaker and per patient, not compatible with MRI. Would ideally obtain MRI brain w/ w/o contrast. Verified with EP who checked with rep that pacemaker is not MRI compatible.   - CTA completed with delayed venous phase revealing small SAH   - DSA (digitial subtraction angio) recommended by NSGY. Neuro ok with it, but will defer further vasculitis workup for now given that leukemia is a more obvious etiology for her vision loss.   SEE ALL    SAH (subarachnoid hemorrhage)  - Small SAH discovered on CTA. Repeat CTH stable. No focal neuro deficits. Pt has ongoing left sided headache and eye pain.   - Likely incidental finding that is not causing the vision loss.   - NSGY consulted, appreciate recs    Thrombocytopenia  - Monitor CBC  - Transfuse for platelet count <10 or <50k with bleeding  - NSGY requesting platelets >70 k with platelets running during procedure or platelets >100 k x 2 prior to Ommaya placement     Urinary retention  - Patient reports wolf placed last week and has been that time.  - Attempt voiding trial prior to discharge  - Will need urology follow up at discharge. See little value in consult urolgoy/urogynecology inpatient as they will likely recommend outpt follow up.     Anemia associated with chemotherapy  - Monitor CBC  - Transfuse for Hgb<7    B-cell acute lymphoblastic leukemia  - Follows with Dr. Jenkins.  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  - Was treated with inotuzumab ozogamicin and ponatinib but course complicated by severe cytopenias. Not has been on therapy for past few weeks.  - Not a candidate for allogeneic stem cell transplant at this time.  - Will discuss restarting ponatinib with Dr. Padgett as it is unlikely the cause of  vision loss   - LP done 9/19 with CSF + for B ALL  - received IT chemo 9/19 (triple therapy), given CNS involvement ALL will plan for twice weekly IT chemo. IT chemo planned for 9/22   - NSGY cancalled Ommaya placement today due to plt count.    Anxiety  - Continue home buspirone, hydroxyzine and quetiapine.   - Trintellix is nonformulary however pharmacy is attempting to obtain this medication.    Hypertension  - Holding home BP meds in the setting of soft BP.   - Resume when appropriate.    Seizure disorder  - Continue home oxcarbazepine 200mg QHS    Pacemaker  - Patient with pacemaker in place due to SSS. Per EP, it is definitely MRI incompatible.      Rheumatoid arthritis involving multiple sites  - Seronegative RA. Continue home plaquenil    Hyperlipidemia  - Continue home atorvastatin and fenofibrate    Paroxysmal atrial fibrillation  - Holding home xarelto in the setting of thrombocytopenia        VTE Risk Mitigation (From admission, onward)         Ordered     IP VTE HIGH RISK PATIENT  Once         09/16/22 1637     Place sequential compression device  Until discontinued         09/16/22 1637     Reason for No Pharmacological VTE Prophylaxis  Once        Question:  Reasons:  Answer:  Thrombocytopenia    09/16/22 1637                Disposition: continue bmt admission    Pipe aHger MD  Bone Marrow Transplant  [facility] - Oncology ([facility])

## 2022-09-22 NOTE — PROCEDURES
Patient seen at Fluoroscopy. LP done per radiology. CSF studies sent. Timeout done. Chemo checked with MD. Methotrexate 15 mg, Hydrocortisone 30 mg, and Cytarabine 30 mg given intrathecally without difficulty.    Supervised by GABY Leon NP

## 2022-09-22 NOTE — SUBJECTIVE & OBJECTIVE
Interval History: 9/21: NAEON. Neuro stable. Plan for Ommaya reservoir placement tomorrow if platelet goal of >100k on serial reads is met (or >70k with intraoperative transfusion).    Medications:  Continuous Infusions:  Scheduled Meds:   acyclovir  400 mg Oral BID    allopurinoL  300 mg Oral Daily    atorvastatin  80 mg Oral Daily    busPIRone  10 mg Oral BID    dexAMETHasone  4 mg Oral Q6H    diltiaZEM  30 mg Oral 4 times per day    fenofibrate  160 mg Oral Daily    gabapentin  300 mg Oral TID    hydrOXYchloroQUINE  200 mg Oral Daily    hydrOXYzine pamoate  50 mg Oral Q24H    insulin aspart U-100  4 Units Subcutaneous TIDWM    insulin detemir U-100  5 Units Subcutaneous QHS    mupirocin   Nasal BID    OXcarbazepine  1,200 mg Oral QHS    OXcarbazepine  1,200 mg Oral with lunch    pantoprazole  40 mg Oral Daily    QUEtiapine  200 mg Oral QHS    senna-docusate 8.6-50 mg  1 tablet Oral Daily    vortioxetine  20 mg Oral Q24H     PRN Meds:sodium chloride, butalbital-acetaminophen-caffeine -40 mg, dextrose 10%, dextrose 10%, diazePAM, duke's soln (benadryl 30 mL, mylanta 30 mL, LIDOcaine 30 mL, nystatin 30 mL) 120 mL, glucagon (human recombinant), glucose, glucose, HYDROmorphone, insulin aspart U-100, magnesium oxide, magnesium oxide, magnesium oxide, naloxone, ondansetron, potassium chloride, potassium chloride, potassium chloride, potassium, sodium phosphates, potassium, sodium phosphates, sodium chloride 0.9%, sumatriptan, white petrolatum     Review of Systems  Objective:     Weight: 60.3 kg (132 lb 15 oz)  Body mass index is 22.82 kg/m².  Vital Signs (Most Recent):  Temp: 98.2 °F (36.8 °C) (09/22/22 0002)  Pulse: 64 (09/22/22 0002)  Resp: 20 (09/22/22 0002)  BP: 112/63 (09/22/22 0002)  SpO2: (!) 92 % (09/22/22 0002)   Vital Signs (24h Range):  Temp:  [97 °F (36.1 °C)-98.9 °F (37.2 °C)] 98.2 °F (36.8 °C)  Pulse:  [60-70] 64  Resp:  [16-20] 20  SpO2:  [90 %-96 %] 92 %  BP: (112-139)/(59-63) 112/63              "               Urethral Catheter 09/07/22 (Active)   Site Assessment Clean;Intact 09/19/22 0738   Collection Container Standard drainage bag 09/19/22 0738   Securement Method secured to top of thigh w/ adhesive device 09/19/22 0738   Catheter Care Performed yes 09/19/22 0738   Reason for Continuing Urinary Catheterization Urinary retention 09/19/22 0738   CAUTI Prevention Bundle Securement Device in place with 1" slack;Intact seal between catheter & drainage tubing;Drainage bag/urimeter off the floor;Sheeting clip in use;No dependent loops or kinks;Drainage bag/urimeter not overfilled (<2/3 full);Drainage bag/urimeter below bladder 09/19/22 0738   Output (mL) 750 mL 09/19/22 1700       Physical Exam  Constitutional: No distress.   HEENT: atraumatic/normocephalic  Cardiovascular: Regular rhythm.   Pulm: aerating well, saturating well  Abdominal: Soft.   Psych/Behavior: She is alert.     Neurosurgery Physical Exam  E4V5M6  AOx3  PERRL  EOMI  Face Symmetric  Tongue midline  Vision loss left eye  BUE 5/5  BLE 5/5  No drift    Significant Labs:  Recent Labs   Lab 09/20/22  0323 09/21/22  0407   * 256*    136   K 3.9 3.7    102   CO2 25 24   BUN 10 14   CREATININE 0.6 0.6   CALCIUM 9.2 8.5*       Recent Labs   Lab 09/20/22  0323 09/21/22  0407   WBC 4.84 3.97   HGB 8.1* 8.1*   HCT 23.4* 24.0*   PLT 45* 37*       No results for input(s): LABPT, INR, APTT in the last 48 hours.    Microbiology Results (last 7 days)       Procedure Component Value Units Date/Time    CSF culture [[id number]] Collected: 09/19/22 1245    Order Status: Completed Specimen: CSF (Spinal Fluid) from CSF Tap, Tube 3 Updated: 09/21/22 0704     CSF CULTURE No Growth to date     Gram Stain Result No WBC's      No organisms seen    Cryptococcal antigen, CSF [[id number]] Collected: 09/19/22 1245    Order Status: Completed Specimen: CSF (Spinal Fluid) from CSF Tap, Tube 3 Updated: 09/20/22 0934     Crypto Ag, CSF Negative    Gram stain " [[id number]] Collected: 09/19/22 1245    Order Status: Canceled Specimen: CSF (Spinal Fluid) from CSF Tap, Tube 3           All pertinent labs from the last 24 hours have been reviewed.    Significant Diagnostics:  I have reviewed all pertinent imaging results/findings within the past 24 hours.

## 2022-09-22 NOTE — ASSESSMENT & PLAN NOTE
- Follows with Dr. Jenkins.  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  - Was treated with inotuzumab ozogamicin and ponatinib but course complicated by severe cytopenias. Not has been on therapy for past few weeks.  - Not a candidate for allogeneic stem cell transplant at this time.  - Will discuss restarting ponatinib with Dr. Padgett as it is unlikely the cause of vision loss   - LP done 9/19 with CSF + for B ALL  - received IT chemo 9/19 (triple therapy), given CNS involvement ALL will plan for twice weekly IT chemo. IT chemo planned for 9/22   - NSGY cancalled Ommaya placement today due to plt count.

## 2022-09-22 NOTE — PROGRESS NOTES
[facility] - Oncology ([facility])  Neurosurgery  Progress Note    Subjective:     History of Present Illness: 51 F Pmhx leukemia, presents with left sided vision loss 5 days ago while watching TV, vision loss began gradually then progressed to complete left eye vision loss over 1 hour. She has not had any improvement since then. She has no headaches, focal weaknesses, confusion, or speech difficulty. She denies any trauma. NSGY consulted for incidentally found small left frontal SAH      Post-Op Info:  Procedure(s) (LRB):  INSERTION, OMMAYA RESERVOIR (N/A)         Interval History: 9/21: NAEON. Neuro stable. Plan for Ommaya reservoir placement tomorrow if platelet goal of >100k on serial reads is met (or >70k with intraoperative transfusion).    Medications:  Continuous Infusions:  Scheduled Meds:   acyclovir  400 mg Oral BID    allopurinoL  300 mg Oral Daily    atorvastatin  80 mg Oral Daily    busPIRone  10 mg Oral BID    dexAMETHasone  4 mg Oral Q6H    diltiaZEM  30 mg Oral 4 times per day    fenofibrate  160 mg Oral Daily    gabapentin  300 mg Oral TID    hydrOXYchloroQUINE  200 mg Oral Daily    hydrOXYzine pamoate  50 mg Oral Q24H    insulin aspart U-100  4 Units Subcutaneous TIDWM    insulin detemir U-100  5 Units Subcutaneous QHS    mupirocin   Nasal BID    OXcarbazepine  1,200 mg Oral QHS    OXcarbazepine  1,200 mg Oral with lunch    pantoprazole  40 mg Oral Daily    QUEtiapine  200 mg Oral QHS    senna-docusate 8.6-50 mg  1 tablet Oral Daily    vortioxetine  20 mg Oral Q24H     PRN Meds:sodium chloride, butalbital-acetaminophen-caffeine -40 mg, dextrose 10%, dextrose 10%, diazePAM, duke's soln (benadryl 30 mL, mylanta 30 mL, LIDOcaine 30 mL, nystatin 30 mL) 120 mL, glucagon (human recombinant), glucose, glucose, HYDROmorphone, insulin aspart U-100, magnesium oxide, magnesium oxide, magnesium oxide, naloxone, ondansetron, potassium chloride, potassium chloride, potassium chloride,  "potassium, sodium phosphates, potassium, sodium phosphates, sodium chloride 0.9%, sumatriptan, white petrolatum     Review of Systems  Objective:     Weight: 60.3 kg (132 lb 15 oz)  Body mass index is 22.82 kg/m².  Vital Signs (Most Recent):  Temp: 98.2 °F (36.8 °C) (09/22/22 0002)  Pulse: 64 (09/22/22 0002)  Resp: 20 (09/22/22 0002)  BP: 112/63 (09/22/22 0002)  SpO2: (!) 92 % (09/22/22 0002)   Vital Signs (24h Range):  Temp:  [97 °F (36.1 °C)-98.9 °F (37.2 °C)] 98.2 °F (36.8 °C)  Pulse:  [60-70] 64  Resp:  [16-20] 20  SpO2:  [90 %-96 %] 92 %  BP: (112-139)/(59-63) 112/63                            Urethral Catheter 09/07/22 (Active)   Site Assessment Clean;Intact 09/19/22 0738   Collection Container Standard drainage bag 09/19/22 0738   Securement Method secured to top of thigh w/ adhesive device 09/19/22 0738   Catheter Care Performed yes 09/19/22 0738   Reason for Continuing Urinary Catheterization Urinary retention 09/19/22 0738   CAUTI Prevention Bundle Securement Device in place with 1" slack;Intact seal between catheter & drainage tubing;Drainage bag/urimeter off the floor;Sheeting clip in use;No dependent loops or kinks;Drainage bag/urimeter not overfilled (<2/3 full);Drainage bag/urimeter below bladder 09/19/22 0738   Output (mL) 750 mL 09/19/22 1700       Physical Exam  Constitutional: No distress.   HEENT: atraumatic/normocephalic  Cardiovascular: Regular rhythm.   Pulm: aerating well, saturating well  Abdominal: Soft.   Psych/Behavior: She is alert.     Neurosurgery Physical Exam  E4V5M6  AOx3  PERRL  EOMI  Face Symmetric  Tongue midline  Vision loss left eye  BUE 5/5  BLE 5/5  No drift    Significant Labs:  Recent Labs   Lab 09/20/22  0323 09/21/22  0407   * 256*    136   K 3.9 3.7    102   CO2 25 24   BUN 10 14   CREATININE 0.6 0.6   CALCIUM 9.2 8.5*       Recent Labs   Lab 09/20/22  0323 09/21/22  0407   WBC 4.84 3.97   HGB 8.1* 8.1*   HCT 23.4* 24.0*   PLT 45* 37*       No results " for input(s): LABPT, INR, APTT in the last 48 hours.    Microbiology Results (last 7 days)       Procedure Component Value Units Date/Time    CSF culture [[id number]] Collected: 09/19/22 1245    Order Status: Completed Specimen: CSF (Spinal Fluid) from CSF Tap, Tube 3 Updated: 09/21/22 0704     CSF CULTURE No Growth to date     Gram Stain Result No WBC's      No organisms seen    Cryptococcal antigen, CSF [[id number]] Collected: 09/19/22 1245    Order Status: Completed Specimen: CSF (Spinal Fluid) from CSF Tap, Tube 3 Updated: 09/20/22 0934     Crypto Ag, CSF Negative    Gram stain [[id number]] Collected: 09/19/22 1245    Order Status: Canceled Specimen: CSF (Spinal Fluid) from CSF Tap, Tube 3           All pertinent labs from the last 24 hours have been reviewed.    Significant Diagnostics:  I have reviewed all pertinent imaging results/findings within the past 24 hours.    Assessment/Plan:     Rheumatoid arthritis involving multiple sites  51 F Pmhx leukemia, presents with left sided vision loss 5 days ago while watching TV. NSGY consulted for incidentally found small left frontal SAH    SAH likely unrelated to vision loss  CTA negative for any vascular lesion or occlusion  Rpt CTH stable bleed    -- unfortunately patient cannot have MRI due to pacemaker, so no further imaging recommended at this time  -- Agree with continued ophthalmology workup  -- Plan for placement of Ommaya reservoir tomorrow with Dr. Alberto if platelets >100k on serial draws (at least 2) or >70k with intra-op transfusion of platelets   --reviewed goals with BMT team   --risks/benefits discussed, patient consented   --NPO since midnight, hold any heparin products      Yamila Cloud MD  Neurosurgery  [facility] - Oncology ([facility])

## 2022-09-22 NOTE — SUBJECTIVE & OBJECTIVE
Subjective:     Interval History: plts at 58 not high enough for Ommaya. Will transfuse 1 more unit and pm CBC. LP scheduled for IT chemo. Still c/o vision loss and HA. Increased bowel regimen for opioid induced constipation.     Objective:     Vital Signs (Most Recent):  Temp: 97.7 °F (36.5 °C) (09/22/22 1229)  Pulse: 60 (09/22/22 1229)  Resp: 16 (09/22/22 1256)  BP: (!) 159/70 (09/22/22 1229)  SpO2: (!) 92 % (09/22/22 1229)   Vital Signs (24h Range):  Temp:  [97.5 °F (36.4 °C)-98.5 °F (36.9 °C)] 97.7 °F (36.5 °C)  Pulse:  [60-72] 60  Resp:  [16-20] 16  SpO2:  [91 %-98 %] 92 %  BP: (109-177)/(56-77) 159/70     Weight: 60.3 kg (132 lb 15 oz)  Body mass index is 22.82 kg/m².  Body surface area is 1.65 meters squared.    ECOG SCORE           [unfilled]    Intake/Output - Last 3 Shifts         09/20 0700  09/21 0659 09/21 0700  09/22 0659 09/22 0700  09/23 0659    P.O. 200      Blood  348     IV Piggyback 50      Total Intake(mL/kg) 250 (4.1) 348 (5.8)     Urine (mL/kg/hr) 1600 (1.1) 2300 (1.6) 725 (2)    Total Output 1600 2300 725    Net -1350 -1952 -725                   Physical Exam  Constitutional:       General: She is not in acute distress.     Appearance: She is well-developed.   HENT:      Head: Normocephalic and atraumatic.      Nose: Nose normal. No congestion.   Eyes:      General: No scleral icterus.     Extraocular Movements: Extraocular movements intact.      Comments: Left eye pupil not reactive. Left eye peripheral vision not intact. Reports eye pain with movement improved.    Cardiovascular:      Rate and Rhythm: Normal rate and regular rhythm.      Heart sounds: No murmur heard.  Pulmonary:      Effort: Pulmonary effort is normal. No respiratory distress.   Abdominal:      General: There is no distension.      Palpations: Abdomen is soft.      Tenderness: There is no abdominal tenderness.   Genitourinary:     Comments: Green in place  Musculoskeletal:         General: Normal range of motion.       Cervical back: Normal range of motion and neck supple.   Skin:     General: Skin is warm and dry.      Comments: Port intact with no redness or drainage   Neurological:      General: No focal deficit present.      Mental Status: She is alert and oriented to person, place, and time.   Psychiatric:         Mood and Affect: Mood normal.         Behavior: Behavior normal.       Significant Labs:   CBC:   Recent Labs   Lab 09/21/22 0407 09/21/22  2337 09/22/22  0456   WBC 3.97 5.29 4.24   HGB 8.1* 8.1* 7.9*   HCT 24.0* 23.4* 23.2*   PLT 37* 55* 58*   , CMP:   Recent Labs   Lab 09/21/22 0407 09/22/22  0456    136   K 3.7 4.0    100   CO2 24 26   * 143*   BUN 14 14   CREATININE 0.6 0.6   CALCIUM 8.5* 8.4*   PROT 5.8* 5.7*   ALBUMIN 3.5 3.5   BILITOT 0.3 0.4   ALKPHOS 113 96   AST 14 11   ALT 70* 44   ANIONGAP 10 10   ,   Recent Labs   Lab 09/21/22  0407 09/22/22  0456   ALT 70* 44   AST 14 11   ALKPHOS 113 96   BILITOT 0.3 0.4   PROT 5.8* 5.7*   ALBUMIN 3.5 3.5   ,    Diagnostic Results:  I have reviewed all pertinent imaging results/findings within the past 24 hours.

## 2022-09-22 NOTE — PLAN OF CARE
Called the pacemaker clinic and spoke to Savannah @1061. I gave her all of the info on the patient's pacemaker and scheduled procedure and she said she would do some research and let me know if they will need to come to the bedside to see the patient.

## 2022-09-22 NOTE — SUBJECTIVE & OBJECTIVE
Interval History: 9/22: Platelets 58k this morning after 2 transfusions. Another transfusion scheduled by primary team, but was not able to be given prior to scheduled surgery time. Platelet goals for surgery not met, therefore Ommaya placement cancelled. Patient neuro stable.    Medications:  Continuous Infusions:  Scheduled Meds:   acyclovir  400 mg Oral BID    allopurinoL  300 mg Oral Daily    atorvastatin  80 mg Oral Daily    busPIRone  10 mg Oral BID    dexAMETHasone  4 mg Oral Q6H    diltiaZEM  30 mg Oral 4 times per day    fenofibrate  160 mg Oral Daily    gabapentin  300 mg Oral TID    hydrOXYchloroQUINE  200 mg Oral Daily    hydrOXYzine pamoate  50 mg Oral Q24H    insulin aspart U-100  4 Units Subcutaneous TIDWM    insulin detemir U-100  5 Units Subcutaneous QHS    mupirocin   Nasal BID    OXcarbazepine  1,200 mg Oral QHS    OXcarbazepine  1,200 mg Oral with lunch    pantoprazole  40 mg Oral Daily    polyethylene glycol  17 g Oral BID    QUEtiapine  200 mg Oral QHS    senna-docusate 8.6-50 mg  2 tablet Oral BID    vortioxetine  20 mg Oral Q24H     PRN Meds:sodium chloride, butalbital-acetaminophen-caffeine -40 mg, dextrose 10%, dextrose 10%, diazePAM, duke's soln (benadryl 30 mL, mylanta 30 mL, LIDOcaine 30 mL, nystatin 30 mL) 120 mL, glucagon (human recombinant), glucose, glucose, HYDROmorphone, insulin aspart U-100, magnesium oxide, magnesium oxide, magnesium oxide, naloxone, ondansetron, potassium chloride, potassium chloride, potassium chloride, potassium, sodium phosphates, potassium, sodium phosphates, sodium chloride 0.9%, sodium chloride 0.9%, sumatriptan, white petrolatum     Review of Systems  Objective:     Weight: 60.3 kg (132 lb 15 oz)  Body mass index is 22.82 kg/m².  Vital Signs (Most Recent):  Temp: 98.5 °F (36.9 °C) (09/22/22 1653)  Pulse: 63 (09/22/22 1653)  Resp: 18 (09/22/22 1653)  BP: (!) 166/72 (09/22/22 1653)  SpO2: (!) 94 % (09/22/22 1653)   Vital Signs (24h Range):  Temp:   "[97.5 °F (36.4 °C)-98.5 °F (36.9 °C)] 98.5 °F (36.9 °C)  Pulse:  [60-72] 63  Resp:  [16-20] 18  SpO2:  [91 %-98 %] 94 %  BP: (109-177)/(56-77) 166/72     Date 09/22/22 0700 - 09/23/22 0659   Shift 5237-3152 4591-5306 3730-2214 24 Hour Total   INTAKE   Shift Total(mL/kg)       OUTPUT   Urine(mL/kg/hr) 725(1.5)   725   Shift Total(mL/kg) 725(12)   725(12)   Weight (kg) 60.3 60.3 60.3 60.3                          Urethral Catheter 09/07/22 (Active)   Site Assessment Clean;Intact 09/19/22 0738   Collection Container Standard drainage bag 09/19/22 0738   Securement Method secured to top of thigh w/ adhesive device 09/19/22 0738   Catheter Care Performed yes 09/19/22 0738   Reason for Continuing Urinary Catheterization Urinary retention 09/19/22 0738   CAUTI Prevention Bundle Securement Device in place with 1" slack;Intact seal between catheter & drainage tubing;Drainage bag/urimeter off the floor;Sheeting clip in use;No dependent loops or kinks;Drainage bag/urimeter not overfilled (<2/3 full);Drainage bag/urimeter below bladder 09/19/22 0738   Output (mL) 750 mL 09/19/22 1700       Physical Exam  Constitutional: No distress.   HEENT: atraumatic/normocephalic  Cardiovascular: Regular rhythm.   Pulm: aerating well, saturating well  Abdominal: Soft.   Psych/Behavior: She is alert.     Neurosurgery Physical Exam  E4V5M6  AOx3  PERRL  EOMI  Face Symmetric  Tongue midline  Vision loss left eye  BUE 5/5  BLE 5/5  No drift    Significant Labs:  Recent Labs   Lab 09/21/22  0407 09/22/22  0456   * 143*    136   K 3.7 4.0    100   CO2 24 26   BUN 14 14   CREATININE 0.6 0.6   CALCIUM 8.5* 8.4*       Recent Labs   Lab 09/21/22  2337 09/22/22  0456 09/22/22  1658   WBC 5.29 4.24 5.44   HGB 8.1* 7.9* 8.7*   HCT 23.4* 23.2* 25.5*   PLT 55* 58* 88*       No results for input(s): LABPT, INR, APTT in the last 48 hours.    Microbiology Results (last 7 days)       Procedure Component Value Units Date/Time    CSF culture " [[id number]] Collected: 09/19/22 1245    Order Status: Completed Specimen: CSF (Spinal Fluid) from CSF Tap, Tube 3 Updated: 09/22/22 0712     CSF CULTURE No Growth to date     Gram Stain Result No WBC's      No organisms seen    Cryptococcal antigen, CSF [[id number]] Collected: 09/19/22 1245    Order Status: Completed Specimen: CSF (Spinal Fluid) from CSF Tap, Tube 3 Updated: 09/20/22 0934     Crypto Ag, CSF Negative    Gram stain [[id number]] Collected: 09/19/22 1245    Order Status: Canceled Specimen: CSF (Spinal Fluid) from CSF Tap, Tube 3           All pertinent labs from the last 24 hours have been reviewed.    Significant Diagnostics:  I have reviewed all pertinent imaging results/findings within the past 24 hours.

## 2022-09-22 NOTE — PLAN OF CARE
Patient A/O x4 during shift. VSS on RAIR. 2 units of platelets transfused on this shift. Awaiting latest platelet count. PRN pain medication given x1 see eMAR. Patient NPO awaiting surgery for today. Family remains at bedside. Call light and personal belongings remain in reach. Bed locked and in lowest position throughout night.     Problem: Adult Inpatient Plan of Care  Goal: Optimal Comfort and Wellbeing  Outcome: Ongoing, Progressing     Problem: Diabetes Comorbidity  Goal: Blood Glucose Level Within Targeted Range  Outcome: Ongoing, Progressing     Problem: Skin Injury Risk Increased  Goal: Skin Health and Integrity  Outcome: Ongoing, Progressing     Problem: Pain Acute  Goal: Acceptable Pain Control and Functional Ability  Outcome: Ongoing, Progressing     Problem: Fall Injury Risk  Goal: Absence of Fall and Fall-Related Injury  Outcome: Ongoing, Progressing

## 2022-09-23 ENCOUNTER — ANESTHESIA EVENT (OUTPATIENT)
Dept: SURGERY | Facility: HOSPITAL | Age: 52
DRG: 834 | End: 2022-09-23
Payer: COMMERCIAL

## 2022-09-23 LAB
ALBUMIN SERPL BCP-MCNC: 3.9 G/DL (ref 3.5–5.2)
ALP SERPL-CCNC: 94 U/L (ref 55–135)
ALT SERPL W/O P-5'-P-CCNC: 32 U/L (ref 10–44)
ANION GAP SERPL CALC-SCNC: 9 MMOL/L (ref 8–16)
AST SERPL-CCNC: 9 U/L (ref 10–40)
BASOPHILS # BLD AUTO: 0 K/UL (ref 0–0.2)
BASOPHILS NFR BLD: 0 % (ref 0–1.9)
BILIRUB SERPL-MCNC: 0.5 MG/DL (ref 0.1–1)
BUN SERPL-MCNC: 14 MG/DL (ref 6–20)
CALCIUM SERPL-MCNC: 9 MG/DL (ref 8.7–10.5)
CHLORIDE SERPL-SCNC: 100 MMOL/L (ref 95–110)
CO2 SERPL-SCNC: 28 MMOL/L (ref 23–29)
COMMENT: ABNORMAL
CREAT SERPL-MCNC: 0.6 MG/DL (ref 0.5–1.4)
DIFFERENTIAL METHOD: ABNORMAL
EOSINOPHIL # BLD AUTO: 0 K/UL (ref 0–0.5)
EOSINOPHIL NFR BLD: 0 % (ref 0–8)
ERYTHROCYTE [DISTWIDTH] IN BLOOD BY AUTOMATED COUNT: 16.8 % (ref 11.5–14.5)
EST. GFR  (NO RACE VARIABLE): >60 ML/MIN/1.73 M^2
FINAL PATHOLOGIC DIAGNOSIS: ABNORMAL
FINAL PATHOLOGIC DIAGNOSIS: ABNORMAL
GLUCOSE SERPL-MCNC: 156 MG/DL (ref 70–110)
HCT VFR BLD AUTO: 27.1 % (ref 37–48.5)
HGB BLD-MCNC: 9.3 G/DL (ref 12–16)
IMM GRANULOCYTES # BLD AUTO: 0.02 K/UL (ref 0–0.04)
IMM GRANULOCYTES NFR BLD AUTO: 0.5 % (ref 0–0.5)
LYMPHOCYTES # BLD AUTO: 1 K/UL (ref 1–4.8)
LYMPHOCYTES NFR BLD: 24.4 % (ref 18–48)
Lab: ABNORMAL
Lab: ABNORMAL
MCH RBC QN AUTO: 39.6 PG (ref 27–31)
MCHC RBC AUTO-ENTMCNC: 34.3 G/DL (ref 32–36)
MCV RBC AUTO: 115 FL (ref 82–98)
MICROSCOPIC EXAM: ABNORMAL
MONOCYTES # BLD AUTO: 0 K/UL (ref 0.3–1)
MONOCYTES NFR BLD: 0.9 % (ref 4–15)
NEUTROPHILS # BLD AUTO: 3.2 K/UL (ref 1.8–7.7)
NEUTROPHILS NFR BLD: 74.2 % (ref 38–73)
NMO/AQP4 FACS,CSF: NEGATIVE
NRBC BLD-RTO: 0 /100 WBC
PATH INTERP FLD-IMP: NORMAL
PLATELET # BLD AUTO: 92 K/UL (ref 150–450)
PMV BLD AUTO: 10.6 FL (ref 9.2–12.9)
POCT GLUCOSE: 146 MG/DL (ref 70–110)
POCT GLUCOSE: 193 MG/DL (ref 70–110)
POTASSIUM SERPL-SCNC: 3.9 MMOL/L (ref 3.5–5.1)
PROT SERPL-MCNC: 6.3 G/DL (ref 6–8.4)
RBC # BLD AUTO: 2.35 M/UL (ref 4–5.4)
SODIUM SERPL-SCNC: 137 MMOL/L (ref 136–145)
WBC # BLD AUTO: 4.27 K/UL (ref 3.9–12.7)
WNV RNA CSF QL NAA+PROBE: NEGATIVE

## 2022-09-23 PROCEDURE — 25000003 PHARM REV CODE 250

## 2022-09-23 PROCEDURE — 99233 SBSQ HOSP IP/OBS HIGH 50: CPT | Mod: ,,, | Performed by: PHYSICIAN ASSISTANT

## 2022-09-23 PROCEDURE — 20600001 HC STEP DOWN PRIVATE ROOM

## 2022-09-23 PROCEDURE — 63600175 PHARM REV CODE 636 W HCPCS: Performed by: STUDENT IN AN ORGANIZED HEALTH CARE EDUCATION/TRAINING PROGRAM

## 2022-09-23 PROCEDURE — 85025 COMPLETE CBC W/AUTO DIFF WBC: CPT | Performed by: STUDENT IN AN ORGANIZED HEALTH CARE EDUCATION/TRAINING PROGRAM

## 2022-09-23 PROCEDURE — 63600175 PHARM REV CODE 636 W HCPCS: Performed by: NURSE PRACTITIONER

## 2022-09-23 PROCEDURE — 25000242 PHARM REV CODE 250 ALT 637 W/ HCPCS: Performed by: STUDENT IN AN ORGANIZED HEALTH CARE EDUCATION/TRAINING PROGRAM

## 2022-09-23 PROCEDURE — 99232 PR SUBSEQUENT HOSPITAL CARE,LEVL II: ICD-10-PCS | Mod: ,,, | Performed by: INTERNAL MEDICINE

## 2022-09-23 PROCEDURE — 80053 COMPREHEN METABOLIC PANEL: CPT | Performed by: STUDENT IN AN ORGANIZED HEALTH CARE EDUCATION/TRAINING PROGRAM

## 2022-09-23 PROCEDURE — 25000003 PHARM REV CODE 250: Performed by: STUDENT IN AN ORGANIZED HEALTH CARE EDUCATION/TRAINING PROGRAM

## 2022-09-23 PROCEDURE — 25000003 PHARM REV CODE 250: Performed by: NURSE PRACTITIONER

## 2022-09-23 PROCEDURE — 99233 PR SUBSEQUENT HOSPITAL CARE,LEVL III: ICD-10-PCS | Mod: ,,, | Performed by: PHYSICIAN ASSISTANT

## 2022-09-23 PROCEDURE — 99232 SBSQ HOSP IP/OBS MODERATE 35: CPT | Mod: ,,, | Performed by: INTERNAL MEDICINE

## 2022-09-23 RX ADMIN — ONDANSETRON 4 MG: 2 INJECTION INTRAMUSCULAR; INTRAVENOUS at 10:09

## 2022-09-23 RX ADMIN — ACYCLOVIR 400 MG: 200 CAPSULE ORAL at 09:09

## 2022-09-23 RX ADMIN — GABAPENTIN 300 MG: 300 CAPSULE ORAL at 04:09

## 2022-09-23 RX ADMIN — DEXAMETHASONE 4 MG: 4 TABLET ORAL at 12:09

## 2022-09-23 RX ADMIN — BUTALBITAL, ACETAMINOPHEN, AND CAFFEINE 1 TABLET: 50; 325; 40 TABLET ORAL at 11:09

## 2022-09-23 RX ADMIN — DILTIAZEM HYDROCHLORIDE 30 MG: 60 TABLET, FILM COATED ORAL at 12:09

## 2022-09-23 RX ADMIN — ATORVASTATIN CALCIUM 80 MG: 20 TABLET, FILM COATED ORAL at 12:09

## 2022-09-23 RX ADMIN — POLYETHYLENE GLYCOL 3350 17 G: 17 POWDER, FOR SOLUTION ORAL at 08:09

## 2022-09-23 RX ADMIN — DILTIAZEM HYDROCHLORIDE 30 MG: 60 TABLET, FILM COATED ORAL at 06:09

## 2022-09-23 RX ADMIN — SENNOSIDES AND DOCUSATE SODIUM 2 TABLET: 50; 8.6 TABLET ORAL at 09:09

## 2022-09-23 RX ADMIN — ACYCLOVIR 400 MG: 200 CAPSULE ORAL at 10:09

## 2022-09-23 RX ADMIN — BUTALBITAL, ACETAMINOPHEN, AND CAFFEINE 1 TABLET: 50; 325; 40 TABLET ORAL at 02:09

## 2022-09-23 RX ADMIN — BUSPIRONE HYDROCHLORIDE 10 MG: 10 TABLET ORAL at 09:09

## 2022-09-23 RX ADMIN — DEXAMETHASONE 4 MG: 4 TABLET ORAL at 11:09

## 2022-09-23 RX ADMIN — HYDROMORPHONE HYDROCHLORIDE 4 MG: 4 TABLET ORAL at 10:09

## 2022-09-23 RX ADMIN — DILTIAZEM HYDROCHLORIDE 30 MG: 60 TABLET, FILM COATED ORAL at 11:09

## 2022-09-23 RX ADMIN — BUSPIRONE HYDROCHLORIDE 10 MG: 10 TABLET ORAL at 10:09

## 2022-09-23 RX ADMIN — DEXAMETHASONE 4 MG: 4 TABLET ORAL at 06:09

## 2022-09-23 RX ADMIN — OXCARBAZEPINE 1200 MG: 600 TABLET, FILM COATED ORAL at 09:09

## 2022-09-23 RX ADMIN — PANTOPRAZOLE SODIUM 40 MG: 40 TABLET, DELAYED RELEASE ORAL at 10:09

## 2022-09-23 RX ADMIN — SENNOSIDES AND DOCUSATE SODIUM 2 TABLET: 50; 8.6 TABLET ORAL at 10:09

## 2022-09-23 RX ADMIN — INSULIN DETEMIR 5 UNITS: 100 INJECTION, SOLUTION SUBCUTANEOUS at 09:09

## 2022-09-23 RX ADMIN — ONDANSETRON 4 MG: 2 INJECTION INTRAMUSCULAR; INTRAVENOUS at 08:09

## 2022-09-23 RX ADMIN — HYDROXYCHLOROQUINE SULFATE 200 MG: 200 TABLET ORAL at 10:09

## 2022-09-23 RX ADMIN — HYDROMORPHONE HYDROCHLORIDE 4 MG: 4 TABLET ORAL at 07:09

## 2022-09-23 RX ADMIN — HYDROXYZINE PAMOATE 50 MG: 25 CAPSULE ORAL at 09:09

## 2022-09-23 RX ADMIN — ALLOPURINOL 300 MG: 300 TABLET ORAL at 10:09

## 2022-09-23 RX ADMIN — HYDROMORPHONE HYDROCHLORIDE 4 MG: 4 TABLET ORAL at 02:09

## 2022-09-23 RX ADMIN — QUETIAPINE FUMARATE 200 MG: 200 TABLET ORAL at 09:09

## 2022-09-23 RX ADMIN — GABAPENTIN 300 MG: 300 CAPSULE ORAL at 12:09

## 2022-09-23 RX ADMIN — OXCARBAZEPINE 1200 MG: 600 TABLET, FILM COATED ORAL at 10:09

## 2022-09-23 RX ADMIN — INSULIN ASPART 1 UNITS: 100 INJECTION, SOLUTION INTRAVENOUS; SUBCUTANEOUS at 10:09

## 2022-09-23 RX ADMIN — VORTIOXETINE 20 MG: 10 TABLET, FILM COATED ORAL at 10:09

## 2022-09-23 RX ADMIN — BUTALBITAL, ACETAMINOPHEN, AND CAFFEINE 1 TABLET: 50; 325; 40 TABLET ORAL at 06:09

## 2022-09-23 RX ADMIN — FENOFIBRATE 160 MG: 160 TABLET ORAL at 12:09

## 2022-09-23 RX ADMIN — GABAPENTIN 300 MG: 300 CAPSULE ORAL at 07:09

## 2022-09-23 NOTE — PLAN OF CARE
Pt aox2, makes some needs known. Needs anticipated by staff. Q2hour turn schedule. Sx sites are well approximated without s/s of infection. Green in place d/t retention. Sinus tachy on monitor otherwise vitals are WNL. LCTA. B/L lower extremities are weak, upper b/l extremities are normal strength. Pt progressing. Continue POC.       Problem: Adult Inpatient Plan of Care  Goal: Plan of Care Review  Outcome: Ongoing, Progressing  Goal: Patient-Specific Goal (Individualized)  Outcome: Ongoing, Progressing  Goal: Absence of Hospital-Acquired Illness or Injury  Outcome: Ongoing, Progressing  Goal: Optimal Comfort and Wellbeing  Outcome: Ongoing, Progressing  Goal: Readiness for Transition of Care  Outcome: Ongoing, Progressing     Problem: Diabetes Comorbidity  Goal: Blood Glucose Level Within Targeted Range  Outcome: Ongoing, Progressing     Problem: Skin Injury Risk Increased  Goal: Skin Health and Integrity  Outcome: Ongoing, Progressing     Problem: Infection  Goal: Absence of Infection Signs and Symptoms  Outcome: Ongoing, Progressing     Problem: Pain Acute  Goal: Acceptable Pain Control and Functional Ability  Outcome: Ongoing, Progressing     Problem: Fall Injury Risk  Goal: Absence of Fall and Fall-Related Injury  Outcome: Ongoing, Progressing

## 2022-09-23 NOTE — SUBJECTIVE & OBJECTIVE
Interval History: NAEON. Pt reports doing fine, intermittent HA, no new complaints, vision unchanged. Neuro stable. Plt count improved to 92k this am. OR for Ommaya rescheduled for Monday 9/26.     Medications:  Continuous Infusions:  Scheduled Meds:   acyclovir  400 mg Oral BID    allopurinoL  300 mg Oral Daily    atorvastatin  80 mg Oral Daily    busPIRone  10 mg Oral BID    dexAMETHasone  4 mg Oral Q6H    diltiaZEM  30 mg Oral 4 times per day    fenofibrate  160 mg Oral Daily    gabapentin  300 mg Oral TID    hydrOXYchloroQUINE  200 mg Oral Daily    hydrOXYzine pamoate  50 mg Oral Q24H    insulin detemir U-100  5 Units Subcutaneous QHS    OXcarbazepine  1,200 mg Oral QHS    OXcarbazepine  1,200 mg Oral with lunch    pantoprazole  40 mg Oral Daily    polyethylene glycol  17 g Oral BID    QUEtiapine  200 mg Oral QHS    senna-docusate 8.6-50 mg  2 tablet Oral BID    vortioxetine  20 mg Oral Q24H     PRN Meds:sodium chloride, butalbital-acetaminophen-caffeine -40 mg, dextrose 10%, dextrose 10%, diazePAM, duke's soln (benadryl 30 mL, mylanta 30 mL, LIDOcaine 30 mL, nystatin 30 mL) 120 mL, glucagon (human recombinant), glucose, glucose, HYDROmorphone, insulin aspart U-100, magnesium oxide, magnesium oxide, magnesium oxide, naloxone, ondansetron, potassium chloride, potassium chloride, potassium chloride, potassium, sodium phosphates, potassium, sodium phosphates, sodium chloride 0.9%, sodium chloride 0.9%, sumatriptan, white petrolatum     Review of Systems  Objective:     Weight: 60.3 kg (132 lb 15 oz)  Body mass index is 22.82 kg/m².  Vital Signs (Most Recent):  Temp: 96.4 °F (35.8 °C) (09/23/22 0752)  Pulse: 65 (09/23/22 0752)  Resp: 18 (09/23/22 1031)  BP: (!) 141/79 (09/23/22 0752)  SpO2: 95 % (09/23/22 0752)   Vital Signs (24h Range):  Temp:  [96.4 °F (35.8 °C)-98.5 °F (36.9 °C)] 96.4 °F (35.8 °C)  Pulse:  [60-68] 65  Resp:  [16-20] 18  SpO2:  [92 %-98 %] 95 %  BP: (138-171)/(65-79) 141/79                  "         Urethral Catheter 09/07/22 (Active)   Site Assessment Clean;Intact 09/23/22 0246   Collection Container Standard drainage bag 09/23/22 0246   Securement Method secured to top of thigh w/ adhesive device 09/22/22 0851   Catheter Care Performed yes 09/23/22 0246   Reason for Continuing Urinary Catheterization Urinary retention 09/23/22 0246   CAUTI Prevention Bundle Securement Device in place with 1" slack 09/22/22 2148   Output (mL) 700 mL 09/21/22 0400       Physical Exam    Constitutional: Well nourished, well developed. No distress.   HEENT: atraumatic/normocephalic  Cardiovascular: Regular rhythm.   Pulm: aerating well, saturating well, no respiratory distress  Abdominal: Soft, non-distended.   Psych/Behavior: She is alert, thought content appropriate.      Neurosurgery Physical Exam  E4V5M6  AOx3  EOMI  Face Symmetric  Tongue midline  Vision loss left eye, L pupil non-reactive. R pupil briskly reactive to light.  BUE 5/5  BLE 5/5  No drift    Significant Labs:  Recent Labs   Lab 09/22/22  0456 09/23/22  0458   * 156*    137   K 4.0 3.9    100   CO2 26 28   BUN 14 14   CREATININE 0.6 0.6   CALCIUM 8.4* 9.0     Recent Labs   Lab 09/22/22  0456 09/22/22  1658 09/23/22  0458   WBC 4.24 5.44 4.27   HGB 7.9* 8.7* 9.3*   HCT 23.2* 25.5* 27.1*   PLT 58* 88* 92*     No results for input(s): LABPT, INR, APTT in the last 48 hours.  Microbiology Results (last 7 days)       Procedure Component Value Units Date/Time    CSF culture [[id number]] Collected: 09/19/22 1245    Order Status: Completed Specimen: CSF (Spinal Fluid) from CSF Tap, Tube 3 Updated: 09/23/22 0636     CSF CULTURE No Growth to date     Gram Stain Result No WBC's      No organisms seen    Cryptococcal antigen, CSF [[id number]] Collected: 09/19/22 1245    Order Status: Completed Specimen: CSF (Spinal Fluid) from CSF Tap, Tube 3 Updated: 09/20/22 0934     Crypto Ag, CSF Negative    Gram stain [[id number]] Collected: 09/19/22 1245    " Order Status: Canceled Specimen: CSF (Spinal Fluid) from CSF Tap, Tube 3           All pertinent labs from the last 24 hours have been reviewed.    Significant Diagnostics:  I have reviewed and interpreted all pertinent imaging results/findings within the past 24 hours.

## 2022-09-23 NOTE — PROGRESS NOTES
[facility] - Oncology ([facility])  Neurosurgery  Progress Note    Subjective:     History of Present Illness: 51 F Pmhx leukemia, presents with left sided vision loss 5 days ago while watching TV, vision loss began gradually then progressed to complete left eye vision loss over 1 hour. She has not had any improvement since then. She has no headaches, focal weaknesses, confusion, or speech difficulty. She denies any trauma. NSGY consulted for incidentally found small left frontal SAH      Post-Op Info:  Procedure(s) (LRB):  INSERTION, OMMAYA RESERVOIR (N/A)   1 Day Post-Op     Interval History: NAEON. Pt reports doing fine, intermittent HA, no new complaints, vision unchanged. Neuro stable. Plt count improved to 92k this am. OR for Ommaya rescheduled for Monday 9/26.     Medications:  Continuous Infusions:  Scheduled Meds:   acyclovir  400 mg Oral BID    allopurinoL  300 mg Oral Daily    atorvastatin  80 mg Oral Daily    busPIRone  10 mg Oral BID    dexAMETHasone  4 mg Oral Q6H    diltiaZEM  30 mg Oral 4 times per day    fenofibrate  160 mg Oral Daily    gabapentin  300 mg Oral TID    hydrOXYchloroQUINE  200 mg Oral Daily    hydrOXYzine pamoate  50 mg Oral Q24H    insulin detemir U-100  5 Units Subcutaneous QHS    OXcarbazepine  1,200 mg Oral QHS    OXcarbazepine  1,200 mg Oral with lunch    pantoprazole  40 mg Oral Daily    polyethylene glycol  17 g Oral BID    QUEtiapine  200 mg Oral QHS    senna-docusate 8.6-50 mg  2 tablet Oral BID    vortioxetine  20 mg Oral Q24H     PRN Meds:sodium chloride, butalbital-acetaminophen-caffeine -40 mg, dextrose 10%, dextrose 10%, diazePAM, duke's soln (benadryl 30 mL, mylanta 30 mL, LIDOcaine 30 mL, nystatin 30 mL) 120 mL, glucagon (human recombinant), glucose, glucose, HYDROmorphone, insulin aspart U-100, magnesium oxide, magnesium oxide, magnesium oxide, naloxone, ondansetron, potassium chloride, potassium chloride, potassium chloride, potassium, sodium  "phosphates, potassium, sodium phosphates, sodium chloride 0.9%, sodium chloride 0.9%, sumatriptan, white petrolatum     Review of Systems  Objective:     Weight: 60.3 kg (132 lb 15 oz)  Body mass index is 22.82 kg/m².  Vital Signs (Most Recent):  Temp: 96.4 °F (35.8 °C) (09/23/22 0752)  Pulse: 65 (09/23/22 0752)  Resp: 18 (09/23/22 1031)  BP: (!) 141/79 (09/23/22 0752)  SpO2: 95 % (09/23/22 0752)   Vital Signs (24h Range):  Temp:  [96.4 °F (35.8 °C)-98.5 °F (36.9 °C)] 96.4 °F (35.8 °C)  Pulse:  [60-68] 65  Resp:  [16-20] 18  SpO2:  [92 %-98 %] 95 %  BP: (138-171)/(65-79) 141/79                          Urethral Catheter 09/07/22 (Active)   Site Assessment Clean;Intact 09/23/22 0246   Collection Container Standard drainage bag 09/23/22 0246   Securement Method secured to top of thigh w/ adhesive device 09/22/22 0851   Catheter Care Performed yes 09/23/22 0246   Reason for Continuing Urinary Catheterization Urinary retention 09/23/22 0246   CAUTI Prevention Bundle Securement Device in place with 1" slack 09/22/22 2148   Output (mL) 700 mL 09/21/22 0400       Physical Exam    Constitutional: Well nourished, well developed. No distress.   HEENT: atraumatic/normocephalic  Cardiovascular: Regular rhythm.   Pulm: aerating well, saturating well, no respiratory distress  Abdominal: Soft, non-distended.   Psych/Behavior: She is alert, thought content appropriate.      Neurosurgery Physical Exam  E4V5M6  AOx3  EOMI  Face Symmetric  Tongue midline  Vision loss left eye, L pupil non-reactive. R pupil briskly reactive to light.  BUE 5/5  BLE 5/5  No drift    Significant Labs:  Recent Labs   Lab 09/22/22  0456 09/23/22  0458   * 156*    137   K 4.0 3.9    100   CO2 26 28   BUN 14 14   CREATININE 0.6 0.6   CALCIUM 8.4* 9.0     Recent Labs   Lab 09/22/22  0456 09/22/22  1658 09/23/22  0458   WBC 4.24 5.44 4.27   HGB 7.9* 8.7* 9.3*   HCT 23.2* 25.5* 27.1*   PLT 58* 88* 92*     No results for input(s): LABPT, INR, " APTT in the last 48 hours.  Microbiology Results (last 7 days)       Procedure Component Value Units Date/Time    CSF culture [[id number]] Collected: 09/19/22 1245    Order Status: Completed Specimen: CSF (Spinal Fluid) from CSF Tap, Tube 3 Updated: 09/23/22 0636     CSF CULTURE No Growth to date     Gram Stain Result No WBC's      No organisms seen    Cryptococcal antigen, CSF [[id number]] Collected: 09/19/22 1245    Order Status: Completed Specimen: CSF (Spinal Fluid) from CSF Tap, Tube 3 Updated: 09/20/22 0934     Crypto Ag, CSF Negative    Gram stain [[id number]] Collected: 09/19/22 1245    Order Status: Canceled Specimen: CSF (Spinal Fluid) from CSF Tap, Tube 3           All pertinent labs from the last 24 hours have been reviewed.    Significant Diagnostics:  I have reviewed and interpreted all pertinent imaging results/findings within the past 24 hours.    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 NSGY initially consulted for incidentally found small left frontal SAH. 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 Alberto, canceled 9/22 due to low Plt count   --OR re-scheduled for Monday 9/26   --Will need Plt count >100k x 2 or >70k with intra-op transfusion of platelets; primary team plan to transfuse Sunday  night   --reviewed plan and preop goals with primary team   --risks/benefits discussed, patient consented        Lyudmila Shea PA-C  Neurosurgery  [facility] - On[facility] ([facility])

## 2022-09-23 NOTE — ANESTHESIA PREPROCEDURE EVALUATION
[facility]  Anesthesia Pre-Operative Evaluation        Patient Name: Deepti Gonzalez  YOB: 1970  MRN: [id number]    SUBJECTIVE:     Pre-operative Evaluation for Procedure(s) (LRB):  INSERTION, OMMAYA RESERVOIR (N/A)     09/23/2022    Deepti Gonzalez is a 51 y.o. female with a PMHx significant for COPD, GERD, Hiatal hernia, HTN, anemia, TIA, seizure, paroxysmal afib (with pacemaker), DM II, peripheral artery disease, and rheumatoid arthritis and current B-ALL who presents with sudden vision loss starting 9/12/22.     She now presents for the above procedure(s).    Previous Airway: None documented.    LDA:        PowerPort A Cath Single Lumen 03/16/22 1128 right internal jugular (Active)   Site Assessment No drainage;No redness;No warmth;No swelling 09/22/22 2148   Line Securement Device Secured with sutureless device 09/22/22 2148   Dressing Type Biopatch in place 09/23/22 0246   Dressing Status Clean;Dry;Intact 09/23/22 0246   Dressing Intervention Integrity maintained 09/23/22 0246   Date on Dressing 09/16/22 09/22/22 2148   Dressing Due to be Changed 09/23/22 09/22/22 2148   Patency/Care normal saline locked 09/23/22 0246   Status Clamped 09/23/22 0246   Accessed by: Mariluz cifuentes 09/17/22 0605   Needle Insertion Date 09/16/22 09/17/22 0605   Needle Insertion Time 2030 09/17/22 0605   Type of Needle PowerHuber 09/17/22 0605   Gauge 20 09/17/22 0605   Needle Length 3/4 in 09/17/22 0605   Needle Status Left in place 09/23/22 0246   Flush Performed Yes 09/23/22 0246   Needle Removal Date 09/12/22 09/12/22 0937   Needle Removal Time 0937 09/12/22 0937   Deaccessed By [facility] [place] [place] 09/12/22 0937   Line Necessity Review Longterm central access required 09/22/22 2148   Number of days: 191            Urethral Catheter 09/07/22 (Active)   Site Assessment Clean;Intact 09/23/22 0246   Collection Container Standard drainage bag 09/23/22 0246   Securement Method secured to top of thigh w/ adhesive  "device 09/22/22 0851   Catheter Care Performed yes 09/23/22 0246   Reason for Continuing Urinary Catheterization Urinary retention 09/23/22 0246   CAUTI Prevention Bundle Securement Device in place with 1" slack 09/22/22 2148   Output (mL) 700 mL 09/21/22 0400   Number of days: 16         Patient Active Problem List   Diagnosis    Paroxysmal atrial fibrillation    Long term current use of anticoagulant    Hyperlipidemia    History of TIA (transient ischemic attack)    Long term current use of antiarrhythmic drug    Rheumatoid arthritis involving multiple sites    Syncope    Pacemaker    Type 2 diabetes mellitus, without long-term current use of insulin    Seizure disorder    Hypertension    GERD (gastroesophageal reflux disease)    Anxiety    Insomnia    B-cell acute lymphoblastic leukemia    Tobacco use disorder    Moderate major depression    Atrial septal defect within oval fossa    Gastroparesis    Hiatal hernia    Irritable bowel syndrome    Osteoporosis    Peripheral arterial occlusive disease    Diarrhea    Hypophosphatemia    Headache    Hypomagnesemia    Anemia associated with chemotherapy    Palliative care encounter    Cancer associated pain    Migraine    Urinary retention    Thrombocytopenia    Cytokine release syndrome, grade 3    Leukocytosis    Coagulopathy    Transaminitis    Anemia in neoplastic disease    At high risk of tumor lysis syndrome    Volume overload    Fever    Vision loss of left eye    SAH (subarachnoid hemorrhage)    Monocular vision loss       Review of patient's allergies indicates:   Allergen Reactions    Levetiracetam      Other reaction(s): Unknown  Other reaction(s): Unknown  Other reaction(s): Unknown       Current Outpatient Medications   Medication Instructions    acyclovir (ZOVIRAX) 400 mg, Oral, 2 times daily    allopurinoL (ZYLOPRIM) 300 mg, Oral, Daily    artificial tears (ISOPTO TEARS) 0.5 % ophthalmic solution 1 drop, " Both Eyes, 4 times daily PRN    atorvastatin (LIPITOR) 80 mg, Oral, Daily    blood sugar diagnostic Strp SMARTSIG:Via Meter 1 to 2 Times Daily    busPIRone (BUSPAR) 10 mg, Oral, 2 times daily    diazePAM (VALIUM) 10 mg, Oral, 2 times daily PRN    diltiaZEM (CARDIZEM) 90 mg, Oral, Every 6 hours    duke's soln (benadryl 30 mL, mylanta 30 mL, LIDOcaine 30 mL, nystatin 30 mL) 120mL 10 mLs, Oral, 4 times daily PRN    FARXIGA 10 mg, Oral, Daily    fenofibrate 160 mg, Oral, Daily    gabapentin (NEURONTIN) 300 mg, Oral, 3 times daily    hydrOXYchloroQUINE (PLAQUENIL) 200 mg, Oral, Daily    hydrOXYzine pamoate (VISTARIL) 50 mg, Oral, 2 times daily PRN    LIDOcaine (LIDODERM) 5 % 1 patch, Transdermal, Daily, Remove & Discard patch within 12 hours or as directed by MD    losartan (COZAAR) 25 mg, Oral, Daily    magnesium oxide (MAG-OX) 400 mg (241.3 mg magnesium) tablet 1 tablet, Oral, Daily    metFORMIN (GLUCOPHAGE-XR) 1,000 mg, Oral, 2 times daily    naloxone (NARCAN) 4 mg/actuation Spry 4mg by nasal route as needed for opioid overdose; may repeat every 2-3 minutes in alternating nostrils until medical help arrives. Call 911    omeprazole (PRILOSEC) 40 mg, Oral, Daily    ondansetron (ZOFRAN-ODT) 8 mg, Oral, Every 8 hours PRN    ONETOUCH VERIO TEST STRIPS Strp SMARTSIG:Via Meter 1 to 2 Times Daily    OXcarbazepine (TRILEPTAL) 1,200 mg, Oral, 2 times daily    polyethylene glycol (GLYCOLAX) 17 gram/dose powder Dissolve one capful (17 g) in liquid and take by mouth daily as needed (constipation).    PONATinib (ICLUSIG) 30 mg Tab Take 1 tablet (30 mg) by mouth once daily.    potassium chloride (K-TAB) 20 mEq 20 mEq, Oral, 2 times daily    prochlorperazine (COMPAZINE) 10 mg, Oral, Every 6 hours PRN    QUEtiapine (SEROQUEL) 200 mg, Oral, Nightly    rivaroxaban (XARELTO) 20 mg, Oral, With dinner, Hold until instructed to resume by provider due to low platelet count.    senna-docusate 8.6-50 mg (PERICOLACE)  8.6-50 mg per tablet 1 tablet, Oral, 2 times daily    sumatriptan (IMITREX) 50 mg, Oral, Daily PRN    TRINTELLIX 20 mg Tab 1 tablet, Oral, Daily    ursodioL (ACTIGALL) 300 mg, Oral, 3 times daily       Past Surgical History:   Procedure Laterality Date    APPENDECTOMY      HYSTERECTOMY      ROTATOR CUFF REPAIR Bilateral     TONSILLECTOMY      TUBAL LIGATION         Social History     Substance and Sexual Activity   Drug Use Never     Alcohol Use: Not At Risk    Frequency of Alcohol Consumption: Never    Average Number of Drinks: Patient does not drink    Frequency of Binge Drinking: Never     Tobacco Use: Medium Risk    Smoking Tobacco Use: Former    Smokeless Tobacco Use: Never    Passive Exposure: Not on file       OBJECTIVE:     Vital Signs Range (Last 24H):  Temp:  [35.8 °C (96.4 °F)-36.9 °C (98.5 °F)]   Pulse:  [61-85]   Resp:  [17-20]   BP: (135-166)/(65-79)   SpO2:  [94 %-98 %]       Significant Labs    Heme Profile  Lab Results   Component Value Date    WBC 4.27 09/23/2022    HGB 9.3 (L) 09/23/2022    HCT 27.1 (L) 09/23/2022    PLT 92 (L) 09/23/2022       Coagulation Studies  Lab Results   Component Value Date    LABPROT 11.3 09/17/2022    INR 1.1 09/17/2022    APTT 21.7 09/17/2022       BMP  Lab Results   Component Value Date     09/23/2022    K 3.9 09/23/2022     09/23/2022    CO2 28 09/23/2022    BUN 14 09/23/2022    CREATININE 0.6 09/23/2022    MG 1.9 09/19/2022    PHOS 4.1 09/12/2022       Liver Function Tests  Lab Results   Component Value Date    AST 9 (L) 09/23/2022    ALT 32 09/23/2022    ALKPHOS 94 09/23/2022    BILITOT 0.5 09/23/2022    PROT 6.3 09/23/2022    ALBUMIN 3.9 09/23/2022       Lipid Profile  No results found for: CHOL, HDL, LDLDIRECT, TRIG    Endocrine Profile  Lab Results   Component Value Date    HGBA1C 4.4 04/13/2022    TSH 7.037 (H) 11/24/2021       Cardiac Studies    EKG:   Results for orders placed or performed during the hospital encounter of 08/27/22    EKG 12-lead    Collection Time: 08/27/22  6:08 PM    Narrative    Test Reason : R50.9,    Vent. Rate : 094 BPM     Atrial Rate : 094 BPM     P-R Int : 166 ms          QRS Dur : 078 ms      QT Int : 350 ms       P-R-T Axes : 037 062 036 degrees     QTc Int : 437 ms    Normal sinus rhythm  Normal ECG  When compared with ECG of 23-MAY-2022 20:27,  Questionable change in The axis  Confirmed by Zev GARZA MD ([id number]) on 8/28/2022 10:52:57 AM    Referred By: [email or web address]   SELF           Confirmed By:Zev GARZA MD       TTE (11/24/21):  Results for orders placed during the hospital encounter of 11/24/21  Interpretation Summary  · The left ventricle is normal in size with normal systolic function. The estimated ejection fraction is 55%.  · Normal right ventricular size with normal right ventricular systolic function.  · Grade II left ventricular diastolic dysfunction with elevated left atrial pressures.  · Mild left atrial enlargement.  · The estimated PA systolic pressure is 35 mmHg.  · Intermediate central venous pressure (8 mmHg).      ASSESSMENT/PLAN:      09/23/2022  Deepti Gonzalez is a 51 y.o., female.      Pre-op Assessment    I have reviewed the Patient Summary Reports.     I have reviewed the Nursing Notes.    I have reviewed the Medications.     Review of Systems  Anesthesia Hx:  History of prior surgery of interest to airway management or planning: Denies Family Hx of Anesthesia complications.   Denies Personal Hx of Anesthesia complications.   Social:  Former Smoker    Hematology/Oncology:     Oncology Normal    -- Anemia:   EENT/Dental:EENT/Dental Normal   Cardiovascular:   Hypertension Afib s/p pacemaker   Pulmonary:   COPD    Renal/:  Renal/ Normal     Hepatic/GI:   Hiatal Hernia, GERD    Musculoskeletal:  Musculoskeletal Normal    Neurological:   CVA Headaches Seizures    Endocrine:   Diabetes    Psych:   Psychiatric History          Physical Exam  General: Well nourished, Cooperative, Alert and  Oriented    Airway:  Mallampati: II / I  Mouth Opening: Normal  Tongue: Normal  Neck ROM: Normal ROM    Dental:  Partial Dentures        Anesthesia Plan  Type of Anesthesia, risks & benefits discussed:    Anesthesia Type: Gen ETT  Intra-op Monitoring Plan: Standard ASA Monitors  Post Op Pain Control Plan: multimodal analgesia and IV/PO Opioids PRN  Induction:  IV  Airway Plan: Direct, Post-Induction  Informed Consent: Informed consent signed with the Patient and all parties understand the risks and agree with anesthesia plan.  All questions answered.   ASA Score: 3  Day of Surgery Review of History & Physical: H&P Update referred to the surgeon/provider.    Ready For Surgery From Anesthesia Perspective.     .

## 2022-09-23 NOTE — ASSESSMENT & PLAN NOTE
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 Concern for possible B-ALL involvement vs side effect of ponatinib vs retrobulbar hemorrhage vs stroke    Plan:  - Consulted Opthomology, appreciate assistance  - LP on 9/19 and 9/22 with IT chemo (cytarabine, hydrocortisone, methotrexate)  - labs pending:   TB DNA by PCR negative  VDRL negative  [place] virus pCR  VZV PCR negative  HSV PCR negative  Enterovirus RNA negative  CNS demyelinating dz (AQP-4 IGG/MOG IGG)  CSF bands negative  NMO aquaporin  MS profile      Gram stain: no organisms seen  CSF culture NGTD  Flow cytometry analysis c/w B ALL  Cytology in process  CSF cell count 546 WBC, 87% lymphs, 12% others  CSF protein 43  Cryptococcal Ag negative    - Patient with pacemaker and per patient, not compatible with MRI. Would ideally obtain MRI brain w/ w/o contrast. Verified with EP who checked with rep that pacemaker is not MRI compatible.   - CTA completed with delayed venous phase revealing small SAH   - DSA (digitial subtraction angio) recommended by NSGY. Neuro ok with it, but will defer further vasculitis workup for now given that leukemia is a more obvious etiology for her vision loss.

## 2022-09-23 NOTE — PLAN OF CARE
Pt aox4. Makes needs known. C/o nausea w/o emisis and headache. Medication given for both. Green cath d/t retention. NPO since MN except water with meds. Pt progressing. Continue POC.    Problem: Adult Inpatient Plan of Care  Goal: Plan of Care Review  9/23/2022 0655 by Chidi Peterson RN  Outcome: Ongoing, Progressing  9/23/2022 0641 by Chidi Peterson RN  Outcome: Ongoing, Progressing  Goal: Patient-Specific Goal (Individualized)  9/23/2022 0655 by Chidi Peterson RN  Outcome: Ongoing, Progressing  9/23/2022 0641 by Chidi Peterson RN  Outcome: Ongoing, Progressing  Goal: Absence of Hospital-Acquired Illness or Injury  9/23/2022 0655 by Chidi Peterson RN  Outcome: Ongoing, Progressing  9/23/2022 0641 by Chidi Peterson RN  Outcome: Ongoing, Progressing  Goal: Optimal Comfort and Wellbeing  9/23/2022 0655 by Chidi Peterson RN  Outcome: Ongoing, Progressing  9/23/2022 0641 by Chiid Peterson RN  Outcome: Ongoing, Progressing  Goal: Readiness for Transition of Care  9/23/2022 0655 by Chidi Peterson RN  Outcome: Ongoing, Progressing  9/23/2022 0641 by Chidi Peterson RN  Outcome: Ongoing, Progressing     Problem: Diabetes Comorbidity  Goal: Blood Glucose Level Within Targeted Range  9/23/2022 0655 by Chidi Peterson RN  Outcome: Ongoing, Progressing  9/23/2022 0641 by Chidi Peterson RN  Outcome: Ongoing, Progressing     Problem: Skin Injury Risk Increased  Goal: Skin Health and Integrity  9/23/2022 0655 by Chidi Peterson RN  Outcome: Ongoing, Progressing  9/23/2022 0641 by Chidi Peterson RN  Outcome: Ongoing, Progressing     Problem: Infection  Goal: Absence of Infection Signs and Symptoms  9/23/2022 0655 by Chidi Peterson RN  Outcome: Ongoing, Progressing  9/23/2022 0641 by Chidi Peterson RN  Outcome: Ongoing, Progressing     Problem: Pain Acute  Goal: Acceptable Pain Control and Functional Ability  9/23/2022 0655 by Chidi Peterson RN  Outcome: Ongoing, Progressing  9/23/2022 0641 by Chidi Peterson RN  Outcome: Ongoing, Progressing     Problem: Fall Injury Risk  Goal: Absence  of Fall and Fall-Related Injury  9/23/2022 0655 by Chidi Peterson, RN  Outcome: Ongoing, Progressing  9/23/2022 0641 by Chidi Peterson, RN  Outcome: Ongoing, Progressing

## 2022-09-23 NOTE — SUBJECTIVE & OBJECTIVE
Subjective:     Interval History: vision unchanged, HA still similar. Plt 92, but OR availability difficult     Objective:     Vital Signs (Most Recent):  Temp: 96.4 °F (35.8 °C) (09/23/22 0752)  Pulse: 65 (09/23/22 0752)  Resp: 17 (09/23/22 0752)  BP: (!) 141/79 (09/23/22 0752)  SpO2: 95 % (09/23/22 0752)   Vital Signs (24h Range):  Temp:  [96.4 °F (35.8 °C)-98.5 °F (36.9 °C)] 96.4 °F (35.8 °C)  Pulse:  [60-72] 65  Resp:  [16-20] 17  SpO2:  [91 %-98 %] 95 %  BP: (138-177)/(65-79) 141/79     Weight: 60.3 kg (132 lb 15 oz)  Body mass index is 22.82 kg/m².  Body surface area is 1.65 meters squared.    ECOG SCORE           [unfilled]    Intake/Output - Last 3 Shifts         09/21 0700  09/22 0659 09/22 0700  09/23 0659 09/23 0700  09/24 0659    P.O.       Blood 348 6227.1     IV Piggyback       Total Intake(mL/kg) 348 (5.8) 6227.1 (103.3)     Urine (mL/kg/hr) 2300 (1.6) 725 (0.5)     Total Output 2300 725     Net -1952 +5502.1                    Physical Exam  Constitutional:       General: She is not in acute distress.     Appearance: She is well-developed.   HENT:      Head: Normocephalic and atraumatic.      Nose: Nose normal. No congestion.   Eyes:      General: No scleral icterus.     Extraocular Movements: Extraocular movements intact.      Comments: Left eye pupil not reactive. Left eye peripheral vision not intact. Reports eye pain with movement improved.    Cardiovascular:      Rate and Rhythm: Normal rate and regular rhythm.      Heart sounds: No murmur heard.  Pulmonary:      Effort: Pulmonary effort is normal. No respiratory distress.   Abdominal:      General: There is no distension.      Palpations: Abdomen is soft.      Tenderness: There is no abdominal tenderness.   Genitourinary:     Comments: Green in place  Musculoskeletal:         General: Normal range of motion.      Cervical back: Normal range of motion and neck supple.   Skin:     General: Skin is warm and dry.      Comments: Port intact with no  redness or drainage   Neurological:      General: No focal deficit present.      Mental Status: She is alert and oriented to person, place, and time.   Psychiatric:         Mood and Affect: Mood normal.         Behavior: Behavior normal.       Significant Labs:   CBC:   Recent Labs   Lab 09/22/22  0456 09/22/22  1658 09/23/22  0458   WBC 4.24 5.44 4.27   HGB 7.9* 8.7* 9.3*   HCT 23.2* 25.5* 27.1*   PLT 58* 88* 92*   , CMP:   Recent Labs   Lab 09/22/22  0456 09/23/22  0458    137   K 4.0 3.9    100   CO2 26 28   * 156*   BUN 14 14   CREATININE 0.6 0.6   CALCIUM 8.4* 9.0   PROT 5.7* 6.3   ALBUMIN 3.5 3.9   BILITOT 0.4 0.5   ALKPHOS 96 94   AST 11 9*   ALT 44 32   ANIONGAP 10 9       Diagnostic Results:  I have reviewed and interpreted all pertinent imaging results/findings within the past 24 hours.

## 2022-09-23 NOTE — PROGRESS NOTES
[facility] ([facility])  Hematology  Bone Marrow Transplant  Progress Note    Patient Name: Deepti Gonzalez  Admission Date: 9/16/2022  Hospital Length of Stay: 7 days  Code Status: Full Code    Subjective:     Interval History: vision unchanged, HA still similar. Plt 92, but OR availability difficult     Objective:     Vital Signs (Most Recent):  Temp: 96.4 °F (35.8 °C) (09/23/22 0752)  Pulse: 65 (09/23/22 0752)  Resp: 17 (09/23/22 0752)  BP: (!) 141/79 (09/23/22 0752)  SpO2: 95 % (09/23/22 0752)   Vital Signs (24h Range):  Temp:  [96.4 °F (35.8 °C)-98.5 °F (36.9 °C)] 96.4 °F (35.8 °C)  Pulse:  [60-72] 65  Resp:  [16-20] 17  SpO2:  [91 %-98 %] 95 %  BP: (138-177)/(65-79) 141/79     Weight: 60.3 kg (132 lb 15 oz)  Body mass index is 22.82 kg/m².  Body surface area is 1.65 meters squared.    ECOG SCORE           [unfilled]    Intake/Output - Last 3 Shifts         09/21 0700  09/22 0659 09/22 0700  09/23 0659 09/23 0700  09/24 0659    P.O.       Blood 348 6227.1     IV Piggyback       Total Intake(mL/kg) 348 (5.8) 6227.1 (103.3)     Urine (mL/kg/hr) 2300 (1.6) 725 (0.5)     Total Output 2300 725     Net -1952 +5502.1                    Physical Exam  Constitutional:       General: She is not in acute distress.     Appearance: She is well-developed.   HENT:      Head: Normocephalic and atraumatic.      Nose: Nose normal. No congestion.   Eyes:      General: No scleral icterus.     Extraocular Movements: Extraocular movements intact.      Comments: Left eye pupil not reactive. Left eye peripheral vision not intact. Reports eye pain with movement improved.    Cardiovascular:      Rate and Rhythm: Normal rate and regular rhythm.      Heart sounds: No murmur heard.  Pulmonary:      Effort: Pulmonary effort is normal. No respiratory distress.   Abdominal:      General: There is no distension.      Palpations: Abdomen is soft.      Tenderness: There is no abdominal tenderness.   Genitourinary:     Comments: Green in  place  Musculoskeletal:         General: Normal range of motion.      Cervical back: Normal range of motion and neck supple.   Skin:     General: Skin is warm and dry.      Comments: Port intact with no redness or drainage   Neurological:      General: No focal deficit present.      Mental Status: She is alert and oriented to person, place, and time.   Psychiatric:         Mood and Affect: Mood normal.         Behavior: Behavior normal.       Significant Labs:   CBC:   Recent Labs   Lab 09/22/22  0456 09/22/22  1658 09/23/22  0458   WBC 4.24 5.44 4.27   HGB 7.9* 8.7* 9.3*   HCT 23.2* 25.5* 27.1*   PLT 58* 88* 92*   , CMP:   Recent Labs   Lab 09/22/22  0456 09/23/22  0458    137   K 4.0 3.9    100   CO2 26 28   * 156*   BUN 14 14   CREATININE 0.6 0.6   CALCIUM 8.4* 9.0   PROT 5.7* 6.3   ALBUMIN 3.5 3.9   BILITOT 0.4 0.5   ALKPHOS 96 94   AST 11 9*   ALT 44 32   ANIONGAP 10 9       Diagnostic Results:  I have reviewed and interpreted all pertinent imaging results/findings within the past 24 hours.    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 Concern for possible B-ALL involvement vs side effect of ponatinib vs retrobulbar hemorrhage vs stroke    Plan:  - Consulted Opthomology, appreciate assistance  - LP on 9/19 and 9/22 with IT chemo (cytarabine, hydrocortisone, methotrexate)  - labs pending:   TB DNA by PCR negative  VDRL negative  [place] virus pCR  VZV PCR negative  HSV PCR negative  Enterovirus RNA negative  CNS demyelinating dz (AQP-4 IGG/MOG IGG)  CSF bands negative  NMO aquaporin  MS profile      Gram stain: no organisms seen  CSF culture NGTD  Flow cytometry analysis c/w B ALL  Cytology in process  CSF cell count 546 WBC, 87% lymphs, 12% others  CSF protein 43  Cryptococcal Ag negative    - Patient with pacemaker and per patient, not compatible with MRI. Would ideally obtain  MRI brain w/ w/o contrast. Verified with EP who checked with rep that pacemaker is not MRI compatible.   - CTA completed with delayed venous phase revealing small SAH   - DSA (digitial subtraction angio) recommended by NSGY. Neuro ok with it, but will defer further vasculitis workup for now given that leukemia is a more obvious etiology for her vision loss.       SAH (subarachnoid hemorrhage)  - Small SAH discovered on CTA. Repeat CTH stable. No focal neuro deficits. Pt has ongoing left sided headache and eye pain.   - Likely incidental finding that is not causing the vision loss.   - NSGY consulted, appreciate recs    Thrombocytopenia  - Monitor CBC  - Transfuse for platelet count <10 or <50k with bleeding  - NSGY requesting platelets >70 k with platelets running during procedure or platelets >100 k x 2 prior to Ommaya placement     Urinary retention  - Patient reports wolf placed last week and has been that time.  - Attempt voiding trial prior to discharge  - Will need urology follow up at discharge. See little value in consult urolgoy/urogynecology inpatient as they will likely recommend outpt follow up.     Anemia associated with chemotherapy  - Monitor CBC  - Transfuse for Hgb<7    B-cell acute lymphoblastic leukemia  - Follows with Dr. Jenkins.  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  - Was treated with inotuzumab ozogamicin and ponatinib but course complicated by severe cytopenias. Not has been on therapy for past few weeks.  - Not a candidate for allogeneic stem cell transplant at this time.  - Will discuss restarting ponatinib with Dr. Padgett as it is unlikely the cause of vision loss   - LP done 9/19 with CSF + for B ALL  - received IT chemo 9/19 and 9/22 (triple therapy), given CNS involvement ALL will plan for twice weekly IT chemo  - NSGY cancelled Ommaya placement on 9/22 due to plt count. Will f/u for OR availability and  scheduling and give plts as needed the night prior to procedure.     Anxiety  - Continue home buspirone, hydroxyzine and quetiapine.   - Trintellix is nonformulary however pharmacy is attempting to obtain this medication.    Hypertension  - Holding home BP meds in the setting of soft BP.   - Resume when appropriate.    Seizure disorder  - Continue home oxcarbazepine 200mg QHS    Pacemaker  - Patient with pacemaker in place due to SSS. Per EP, it is definitely MRI incompatible.      Rheumatoid arthritis involving multiple sites  - Seronegative RA. Continue home plaquenil    Hyperlipidemia  - Continue home atorvastatin and fenofibrate    Paroxysmal atrial fibrillation  - Holding home xarelto in the setting of thrombocytopenia        VTE Risk Mitigation (From admission, onward)         Ordered     IP VTE HIGH RISK PATIENT  Once         09/16/22 1637     Place sequential compression device  Until discontinued         09/16/22 1637     Reason for No Pharmacological VTE Prophylaxis  Once        Question:  Reasons:  Answer:  Thrombocytopenia    09/16/22 1637                Disposition: continue BMT admission    Pipe Hager MD  Bone Marrow Transplant  [facility] - Oncology ([facility])

## 2022-09-23 NOTE — ASSESSMENT & PLAN NOTE
- Follows with Dr. Jenkins.  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  - Was treated with inotuzumab ozogamicin and ponatinib but course complicated by severe cytopenias. Not has been on therapy for past few weeks.  - Not a candidate for allogeneic stem cell transplant at this time.  - Will discuss restarting ponatinib with Dr. Padgett as it is unlikely the cause of vision loss   - LP done 9/19 with CSF + for B ALL  - received IT chemo 9/19 and 9/22 (triple therapy), given CNS involvement ALL will plan for twice weekly IT chemo  - NSGY cancelled Ommaya placement on 9/22 due to plt count. Will f/u for OR availability and scheduling and give plts as needed the night prior to procedure.

## 2022-09-23 NOTE — ASSESSMENT & PLAN NOTE
51 F with Pmhx B-ALL who presented with sudden onset left sided vision loss 5 days prior to admission while watching TV. NSGY initially consulted for incidentally found small left frontal SAH. 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 Alberto, canceled 9/22 due to low Plt count   --OR re-scheduled for Monday 9/26   --Will need Plt count >100k x 2 or >70k with intra-op transfusion of platelets; primary team plan to transfuse Michael night   --reviewed plan and preop goals with primary team   --risks/benefits discussed, patient consented

## 2022-09-24 LAB
ALBUMIN SERPL BCP-MCNC: 3.9 G/DL (ref 3.5–5.2)
ALP SERPL-CCNC: 90 U/L (ref 55–135)
ALT SERPL W/O P-5'-P-CCNC: 23 U/L (ref 10–44)
ANION GAP SERPL CALC-SCNC: 10 MMOL/L (ref 8–16)
AST SERPL-CCNC: 7 U/L (ref 10–40)
BACTERIA CSF CULT: NO GROWTH
BASOPHILS # BLD AUTO: 0 K/UL (ref 0–0.2)
BASOPHILS NFR BLD: 0 % (ref 0–1.9)
BILIRUB SERPL-MCNC: 0.6 MG/DL (ref 0.1–1)
BLD PROD TYP BPU: NORMAL
BLOOD UNIT EXPIRATION DATE: NORMAL
BLOOD UNIT TYPE CODE: 6200
BLOOD UNIT TYPE: NORMAL
BUN SERPL-MCNC: 16 MG/DL (ref 6–20)
CALCIUM SERPL-MCNC: 8.9 MG/DL (ref 8.7–10.5)
CHLORIDE SERPL-SCNC: 98 MMOL/L (ref 95–110)
CO2 SERPL-SCNC: 26 MMOL/L (ref 23–29)
CODING SYSTEM: NORMAL
CREAT SERPL-MCNC: 0.6 MG/DL (ref 0.5–1.4)
DIFFERENTIAL METHOD: ABNORMAL
DISPENSE STATUS: NORMAL
EOSINOPHIL # BLD AUTO: 0 K/UL (ref 0–0.5)
EOSINOPHIL NFR BLD: 0 % (ref 0–8)
ERYTHROCYTE [DISTWIDTH] IN BLOOD BY AUTOMATED COUNT: 16.3 % (ref 11.5–14.5)
EST. GFR  (NO RACE VARIABLE): >60 ML/MIN/1.73 M^2
GLUCOSE SERPL-MCNC: 164 MG/DL (ref 70–110)
GRAM STN SPEC: NORMAL
GRAM STN SPEC: NORMAL
HCT VFR BLD AUTO: 27.4 % (ref 37–48.5)
HGB BLD-MCNC: 9.5 G/DL (ref 12–16)
IMM GRANULOCYTES # BLD AUTO: 0.02 K/UL (ref 0–0.04)
IMM GRANULOCYTES NFR BLD AUTO: 0.4 % (ref 0–0.5)
LYMPHOCYTES # BLD AUTO: 1.6 K/UL (ref 1–4.8)
LYMPHOCYTES NFR BLD: 31.4 % (ref 18–48)
MCH RBC QN AUTO: 39.6 PG (ref 27–31)
MCHC RBC AUTO-ENTMCNC: 34.7 G/DL (ref 32–36)
MCV RBC AUTO: 114 FL (ref 82–98)
MONOCYTES # BLD AUTO: 0 K/UL (ref 0.3–1)
MONOCYTES NFR BLD: 0.6 % (ref 4–15)
NEUTROPHILS # BLD AUTO: 3.5 K/UL (ref 1.8–7.7)
NEUTROPHILS NFR BLD: 67.6 % (ref 38–73)
NRBC BLD-RTO: 0 /100 WBC
PLATELET # BLD AUTO: 65 K/UL (ref 150–450)
PMV BLD AUTO: 9.8 FL (ref 9.2–12.9)
POCT GLUCOSE: 153 MG/DL (ref 70–110)
POCT GLUCOSE: 159 MG/DL (ref 70–110)
POCT GLUCOSE: 192 MG/DL (ref 70–110)
POTASSIUM SERPL-SCNC: 4.1 MMOL/L (ref 3.5–5.1)
PROT SERPL-MCNC: 6.4 G/DL (ref 6–8.4)
RBC # BLD AUTO: 2.4 M/UL (ref 4–5.4)
SODIUM SERPL-SCNC: 134 MMOL/L (ref 136–145)
UNIT NUMBER: NORMAL
WBC # BLD AUTO: 5.22 K/UL (ref 3.9–12.7)

## 2022-09-24 PROCEDURE — 99232 SBSQ HOSP IP/OBS MODERATE 35: CPT | Mod: ,,, | Performed by: PHYSICIAN ASSISTANT

## 2022-09-24 PROCEDURE — 85025 COMPLETE CBC W/AUTO DIFF WBC: CPT | Performed by: STUDENT IN AN ORGANIZED HEALTH CARE EDUCATION/TRAINING PROGRAM

## 2022-09-24 PROCEDURE — 25000242 PHARM REV CODE 250 ALT 637 W/ HCPCS: Performed by: STUDENT IN AN ORGANIZED HEALTH CARE EDUCATION/TRAINING PROGRAM

## 2022-09-24 PROCEDURE — 63600175 PHARM REV CODE 636 W HCPCS: Performed by: NURSE PRACTITIONER

## 2022-09-24 PROCEDURE — P9037 PLATE PHERES LEUKOREDU IRRAD: HCPCS

## 2022-09-24 PROCEDURE — 25000003 PHARM REV CODE 250

## 2022-09-24 PROCEDURE — 80053 COMPREHEN METABOLIC PANEL: CPT | Performed by: STUDENT IN AN ORGANIZED HEALTH CARE EDUCATION/TRAINING PROGRAM

## 2022-09-24 PROCEDURE — 99232 PR SUBSEQUENT HOSPITAL CARE,LEVL II: ICD-10-PCS | Mod: ,,, | Performed by: INTERNAL MEDICINE

## 2022-09-24 PROCEDURE — 25000003 PHARM REV CODE 250: Performed by: NURSE PRACTITIONER

## 2022-09-24 PROCEDURE — 25000003 PHARM REV CODE 250: Performed by: STUDENT IN AN ORGANIZED HEALTH CARE EDUCATION/TRAINING PROGRAM

## 2022-09-24 PROCEDURE — 20600001 HC STEP DOWN PRIVATE ROOM

## 2022-09-24 PROCEDURE — 99232 PR SUBSEQUENT HOSPITAL CARE,LEVL II: ICD-10-PCS | Mod: ,,, | Performed by: PHYSICIAN ASSISTANT

## 2022-09-24 PROCEDURE — 36430 TRANSFUSION BLD/BLD COMPNT: CPT

## 2022-09-24 PROCEDURE — 99232 SBSQ HOSP IP/OBS MODERATE 35: CPT | Mod: ,,, | Performed by: INTERNAL MEDICINE

## 2022-09-24 PROCEDURE — 63600175 PHARM REV CODE 636 W HCPCS: Performed by: STUDENT IN AN ORGANIZED HEALTH CARE EDUCATION/TRAINING PROGRAM

## 2022-09-24 RX ORDER — ATOVAQUONE 750 MG/5ML
1500 SUSPENSION ORAL DAILY
Status: DISCONTINUED | OUTPATIENT
Start: 2022-09-24 | End: 2022-10-01 | Stop reason: HOSPADM

## 2022-09-24 RX ORDER — HYDROCODONE BITARTRATE AND ACETAMINOPHEN 500; 5 MG/1; MG/1
TABLET ORAL
Status: DISCONTINUED | OUTPATIENT
Start: 2022-09-24 | End: 2022-09-25

## 2022-09-24 RX ADMIN — HYDROMORPHONE HYDROCHLORIDE 4 MG: 4 TABLET ORAL at 09:09

## 2022-09-24 RX ADMIN — BUTALBITAL, ACETAMINOPHEN, AND CAFFEINE 1 TABLET: 50; 325; 40 TABLET ORAL at 03:09

## 2022-09-24 RX ADMIN — HYDROXYZINE PAMOATE 50 MG: 25 CAPSULE ORAL at 10:09

## 2022-09-24 RX ADMIN — VORTIOXETINE 20 MG: 10 TABLET, FILM COATED ORAL at 09:09

## 2022-09-24 RX ADMIN — POLYETHYLENE GLYCOL 3350 17 G: 17 POWDER, FOR SOLUTION ORAL at 09:09

## 2022-09-24 RX ADMIN — HYDROMORPHONE HYDROCHLORIDE 4 MG: 4 TABLET ORAL at 02:09

## 2022-09-24 RX ADMIN — FENOFIBRATE 160 MG: 160 TABLET ORAL at 02:09

## 2022-09-24 RX ADMIN — ATORVASTATIN CALCIUM 80 MG: 20 TABLET, FILM COATED ORAL at 02:09

## 2022-09-24 RX ADMIN — GABAPENTIN 300 MG: 300 CAPSULE ORAL at 08:09

## 2022-09-24 RX ADMIN — DEXAMETHASONE 4 MG: 4 TABLET ORAL at 02:09

## 2022-09-24 RX ADMIN — DILTIAZEM HYDROCHLORIDE 30 MG: 60 TABLET, FILM COATED ORAL at 05:09

## 2022-09-24 RX ADMIN — DILTIAZEM HYDROCHLORIDE 30 MG: 60 TABLET, FILM COATED ORAL at 02:09

## 2022-09-24 RX ADMIN — SENNOSIDES AND DOCUSATE SODIUM 2 TABLET: 50; 8.6 TABLET ORAL at 10:09

## 2022-09-24 RX ADMIN — HYDROMORPHONE HYDROCHLORIDE 4 MG: 4 TABLET ORAL at 05:09

## 2022-09-24 RX ADMIN — DILTIAZEM HYDROCHLORIDE 30 MG: 60 TABLET, FILM COATED ORAL at 11:09

## 2022-09-24 RX ADMIN — HYDROMORPHONE HYDROCHLORIDE 4 MG: 4 TABLET ORAL at 11:09

## 2022-09-24 RX ADMIN — SENNOSIDES AND DOCUSATE SODIUM 2 TABLET: 50; 8.6 TABLET ORAL at 09:09

## 2022-09-24 RX ADMIN — BUTALBITAL, ACETAMINOPHEN, AND CAFFEINE 1 TABLET: 50; 325; 40 TABLET ORAL at 09:09

## 2022-09-24 RX ADMIN — OXCARBAZEPINE 1200 MG: 600 TABLET, FILM COATED ORAL at 09:09

## 2022-09-24 RX ADMIN — INSULIN DETEMIR 5 UNITS: 100 INJECTION, SOLUTION SUBCUTANEOUS at 08:09

## 2022-09-24 RX ADMIN — DEXAMETHASONE 4 MG: 4 TABLET ORAL at 11:09

## 2022-09-24 RX ADMIN — ACYCLOVIR 400 MG: 200 CAPSULE ORAL at 10:09

## 2022-09-24 RX ADMIN — HYDROMORPHONE HYDROCHLORIDE 4 MG: 4 TABLET ORAL at 08:09

## 2022-09-24 RX ADMIN — BUSPIRONE HYDROCHLORIDE 10 MG: 10 TABLET ORAL at 09:09

## 2022-09-24 RX ADMIN — INSULIN ASPART 2 UNITS: 100 INJECTION, SOLUTION INTRAVENOUS; SUBCUTANEOUS at 09:09

## 2022-09-24 RX ADMIN — ACYCLOVIR 400 MG: 200 CAPSULE ORAL at 09:09

## 2022-09-24 RX ADMIN — OXCARBAZEPINE 1200 MG: 600 TABLET, FILM COATED ORAL at 10:09

## 2022-09-24 RX ADMIN — HYDROXYCHLOROQUINE SULFATE 200 MG: 200 TABLET ORAL at 09:09

## 2022-09-24 RX ADMIN — DEXAMETHASONE 4 MG: 4 TABLET ORAL at 05:09

## 2022-09-24 RX ADMIN — ATOVAQUONE 1500 MG: 750 SUSPENSION ORAL at 02:09

## 2022-09-24 RX ADMIN — BUSPIRONE HYDROCHLORIDE 10 MG: 10 TABLET ORAL at 10:09

## 2022-09-24 RX ADMIN — ONDANSETRON 4 MG: 2 INJECTION INTRAMUSCULAR; INTRAVENOUS at 09:09

## 2022-09-24 RX ADMIN — ALLOPURINOL 300 MG: 300 TABLET ORAL at 09:09

## 2022-09-24 RX ADMIN — QUETIAPINE FUMARATE 200 MG: 200 TABLET ORAL at 10:09

## 2022-09-24 RX ADMIN — GABAPENTIN 300 MG: 300 CAPSULE ORAL at 03:09

## 2022-09-24 RX ADMIN — GABAPENTIN 300 MG: 300 CAPSULE ORAL at 02:09

## 2022-09-24 RX ADMIN — INSULIN ASPART 2 UNITS: 100 INJECTION, SOLUTION INTRAVENOUS; SUBCUTANEOUS at 03:09

## 2022-09-24 RX ADMIN — INSULIN ASPART 1 UNITS: 100 INJECTION, SOLUTION INTRAVENOUS; SUBCUTANEOUS at 08:09

## 2022-09-24 RX ADMIN — ONDANSETRON 4 MG: 2 INJECTION INTRAMUSCULAR; INTRAVENOUS at 03:09

## 2022-09-24 RX ADMIN — PANTOPRAZOLE SODIUM 40 MG: 40 TABLET, DELAYED RELEASE ORAL at 09:09

## 2022-09-24 NOTE — ASSESSMENT & PLAN NOTE
- Follows with Dr. Jenkins.  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  - Was treated with inotuzumab ozogamicin and ponatinib but course complicated by severe cytopenias. Not has been on therapy for past few weeks.  - Not a candidate for allogeneic stem cell transplant at this time.  - Will discuss restarting ponatinib with Dr. Padgett as it is unlikely the cause of vision loss   - LP done 9/19 with CSF + for B ALL  - received IT chemo 9/19 and 9/22 (triple therapy), given CNS involvement ALL will plan for twice weekly IT chemo  - NSGY cancelled Ommaya placement on 9/22 due to plt count. Will plan for Ommaya placement on 9/26 pending plt count >70

## 2022-09-24 NOTE — PLAN OF CARE
Plan of care reviewed with the patient at the beginning of shift. The patient is alert and oriented. GCS 15. Denying complaints at this time. NAEON. Independent and ambulatory. Remained free from falls and injuries throughout shift. Chemo administered without issue. VSS. Bed in low locked position. Call bell and personal items within reach. Will continue to monitor.

## 2022-09-24 NOTE — PROGRESS NOTES
[facility] - Oncology ([facility])  Neurosurgery  Progress Note    Subjective:     History of Present Illness: 51 F Pmhx leukemia, presents with left sided vision loss 5 days ago while watching TV, vision loss began gradually then progressed to complete left eye vision loss over 1 hour. She has not had any improvement since then. She has no headaches, focal weaknesses, confusion, or speech difficulty. She denies any trauma. NSGY consulted for incidentally found small left frontal SAH      Post-Op Info:  Procedure(s) (LRB):  INSERTION, OMMAYA RESERVOIR (N/A)   2 Days Post-Op     Interval History: NAEON. Pt denies any new complaints. Continues with intermittent left sided HA, vision unchanged. Neuro stable. Plt count down to 65k this am. Planning for OR for Ommaya Monday 9/26.     Medications:  Continuous Infusions:  Scheduled Meds:   acyclovir  400 mg Oral BID    allopurinoL  300 mg Oral Daily    atorvastatin  80 mg Oral Daily    busPIRone  10 mg Oral BID    dexAMETHasone  4 mg Oral Q6H    diltiaZEM  30 mg Oral 4 times per day    fenofibrate  160 mg Oral Daily    gabapentin  300 mg Oral TID    hydrOXYchloroQUINE  200 mg Oral Daily    hydrOXYzine pamoate  50 mg Oral Q24H    insulin detemir U-100  5 Units Subcutaneous QHS    OXcarbazepine  1,200 mg Oral QHS    OXcarbazepine  1,200 mg Oral with lunch    pantoprazole  40 mg Oral Daily    polyethylene glycol  17 g Oral BID    QUEtiapine  200 mg Oral QHS    senna-docusate 8.6-50 mg  2 tablet Oral BID    vortioxetine  20 mg Oral Q24H     PRN Meds:sodium chloride, butalbital-acetaminophen-caffeine -40 mg, dextrose 10%, dextrose 10%, diazePAM, duke's soln (benadryl 30 mL, mylanta 30 mL, LIDOcaine 30 mL, nystatin 30 mL) 120 mL, glucagon (human recombinant), glucose, glucose, HYDROmorphone, insulin aspart U-100, magnesium oxide, magnesium oxide, magnesium oxide, naloxone, ondansetron, potassium chloride, potassium chloride, potassium chloride, potassium,  "sodium phosphates, potassium, sodium phosphates, sodium chloride 0.9%, sumatriptan, white petrolatum     Review of Systems  Objective:     Weight: 60.3 kg (132 lb 15 oz)  Body mass index is 22.82 kg/m².  Vital Signs (Most Recent):  Temp: 98.4 °F (36.9 °C) (09/24/22 0744)  Pulse: 66 (09/24/22 0744)  Resp: 18 (09/24/22 0744)  BP: (!) 143/66 (09/24/22 0744)  SpO2: (!) 93 % (09/24/22 0744)   Vital Signs (24h Range):  Temp:  [98.4 °F (36.9 °C)-99.3 °F (37.4 °C)] 98.4 °F (36.9 °C)  Pulse:  [66-85] 66  Resp:  [16-18] 18  SpO2:  [93 %-98 %] 93 %  BP: (123-143)/(61-71) 143/66                          Urethral Catheter 09/07/22 (Active)   Site Assessment Clean;Intact 09/23/22 0246   Collection Container Standard drainage bag 09/23/22 0246   Securement Method secured to top of thigh w/ adhesive device 09/22/22 0851   Catheter Care Performed yes 09/23/22 0246   Reason for Continuing Urinary Catheterization Urinary retention 09/23/22 0246   CAUTI Prevention Bundle Securement Device in place with 1" slack 09/22/22 2148   Output (mL) 700 mL 09/21/22 0400       Physical Exam    Constitutional: Well nourished, well developed. No distress.   HEENT: atraumatic/normocephalic  Cardiovascular: Regular rhythm.   Pulm: aerating well, saturating well, no respiratory distress  Abdominal: Soft, non-distended.   Psych/Behavior: She is alert, thought content appropriate.      Neurosurgery Physical Exam  [id number]  AOx3  EOMI  Face Symmetric  Tongue midline  Vision loss left eye, L pupil non-reactive. R pupil briskly reactive to light.  BUE 5/5  BLE 5/5  No drift    Significant Labs:  Recent Labs   Lab 09/23/22  0458 09/24/22  0415   * 164*    134*   K 3.9 4.1    98   CO2 28 26   BUN 14 16   CREATININE 0.6 0.6   CALCIUM 9.0 8.9       Recent Labs   Lab 09/22/22  1658 09/23/22  0458 09/24/22  0415   WBC 5.44 4.27 5.22   HGB 8.7* 9.3* 9.5*   HCT 25.5* 27.1* 27.4*   PLT 88* 92* 65*       No results for input(s): LABPT, INR, APTT in " the last 48 hours.  Microbiology Results (last 7 days)       Procedure Component Value Units Date/Time    CSF culture [[id number]] Collected: 09/19/22 1245    Order Status: Completed Specimen: CSF (Spinal Fluid) from CSF Tap, Tube 3 Updated: 09/24/22 0650     CSF CULTURE No Growth     Gram Stain Result No WBC's      No organisms seen    Cryptococcal antigen, CSF [[id number]] Collected: 09/19/22 1245    Order Status: Completed Specimen: CSF (Spinal Fluid) from CSF Tap, Tube 3 Updated: 09/20/22 0934     Crypto Ag, CSF Negative    Gram stain [[id number]] Collected: 09/19/22 1245    Order Status: Canceled Specimen: CSF (Spinal Fluid) from CSF Tap, Tube 3           All pertinent labs from the last 24 hours have been reviewed.    Significant Diagnostics:  I have reviewed and interpreted all pertinent imaging results/findings within the past 24 hours.      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 NSGY initially consulted for incidentally found small left frontal SAH. 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 Alberto, canceled 9/22 due to low Plt count   --OR re-scheduled for Monday 9/26   --Will need Plt count >100k x 2 or >70k with intra-op transfusion of platelets; primary team plan to transfuse Sunday night   --reviewed  plan and preop goals with primary team   --risks/benefits discussed, patient consented        Daina Clement PA-C  Neurosurgery  [facility] - Oncology ([facility])

## 2022-09-24 NOTE — PLAN OF CARE
Plan of care reviewed with the patient at the beginning of shift. The patient is alert and oriented. GCS 15. Some complaints of pain overnight. PRN medication administered. Remained free from falls and injuries throughout shift. VSS. Bed in low locked position. Call bell and personal items within reach. Will continue to monitor.

## 2022-09-24 NOTE — ASSESSMENT & PLAN NOTE
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 Concern for possible B-ALL involvement vs side effect of ponatinib vs retrobulbar hemorrhage vs stroke    Plan:  - Consulted Opthomology, appreciate assistance  - LP on 9/19 and 9/22 with IT chemo (cytarabine, hydrocortisone, methotrexate)  - labs pending:   TB DNA by PCR negative  VDRL negative  [place] virus PCR negative  VZV PCR negative  HSV PCR negative  Enterovirus RNA negative  CNS demyelinating dz (AQP-4 IGG/MOG IGG)  CSF bands negative  NMO aquaporin negative  MS profile      Gram stain: no organisms seen  CSF culture NGTD  Flow cytometry analysis c/w B ALL  Cytology in process  CSF cell count 546 WBC, 87% lymphs, 12% others  CSF protein 43  Cryptococcal Ag negative    - repeat DFE with ophthalmology for 9/26  - continue dexamethasone 4 q6h  - Patient with pacemaker and per patient, not compatible with MRI. Would ideally obtain MRI brain w/ w/o contrast. Verified with EP who checked with rep that pacemaker is not MRI compatible.   - CTA completed with delayed venous phase revealing small SAH   - DSA (digitial subtraction angio) recommended by NSGY. Neuro ok with it, but will defer further vasculitis workup for now given that leukemia is a more obvious etiology for her vision loss.

## 2022-09-24 NOTE — SUBJECTIVE & OBJECTIVE
Subjective:     Interval History: plts 65, still has HA and OS vision loss. Ophthal planning for repeat DFE on 9/26, Ommaya placement planned for 9/26 pending plts >70     Objective:     Vital Signs (Most Recent):  Temp: 98.4 °F (36.9 °C) (09/24/22 0744)  Pulse: 66 (09/24/22 0744)  Resp: 18 (09/24/22 0744)  BP: (!) 143/66 (09/24/22 0744)  SpO2: (!) 93 % (09/24/22 0744)   Vital Signs (24h Range):  Temp:  [98.4 °F (36.9 °C)-99.3 °F (37.4 °C)] 98.4 °F (36.9 °C)  Pulse:  [66-85] 66  Resp:  [16-18] 18  SpO2:  [93 %-98 %] 93 %  BP: (123-143)/(61-71) 143/66     Weight: 60.3 kg (132 lb 15 oz)  Body mass index is 22.82 kg/m².  Body surface area is 1.65 meters squared.    ECOG SCORE           [unfilled]    Intake/Output - Last 3 Shifts         09/22 0700  09/23 0659 09/23 0700 09/24 0659 09/24 0700 09/25 0659    Blood 6227.1      Total Intake(mL/kg) 6227.1 (103.3)      Urine (mL/kg/hr) 725 (0.5) 1675 (1.2)     Total Output 725 1675     Net +5502.1 -1675                    Physical Exam  Constitutional:       General: She is not in acute distress.     Appearance: She is well-developed.   HENT:      Head: Normocephalic and atraumatic.      Nose: Nose normal. No congestion.   Eyes:      General: No scleral icterus.     Extraocular Movements: Extraocular movements intact.      Comments: Left eye pupil not reactive. Left eye peripheral vision not intact. Reports eye pain with movement improved.    Cardiovascular:      Rate and Rhythm: Normal rate and regular rhythm.      Heart sounds: No murmur heard.  Pulmonary:      Effort: Pulmonary effort is normal. No respiratory distress.   Abdominal:      General: There is no distension.      Palpations: Abdomen is soft.      Tenderness: There is no abdominal tenderness.   Genitourinary:     Comments: Green in place  Musculoskeletal:         General: Normal range of motion.      Cervical back: Normal range of motion and neck supple.   Skin:     General: Skin is warm and dry.       Comments: Port intact with no redness or drainage   Neurological:      General: No focal deficit present.      Mental Status: She is alert and oriented to person, place, and time.   Psychiatric:         Mood and Affect: Mood normal.         Behavior: Behavior normal.       Significant Labs:   CBC:   Recent Labs   Lab 09/22/22  1658 09/23/22  0458 09/24/22  0415   WBC 5.44 4.27 5.22   HGB 8.7* 9.3* 9.5*   HCT 25.5* 27.1* 27.4*   PLT 88* 92* 65*   , CMP:   Recent Labs   Lab 09/23/22  0458 09/24/22  0415    134*   K 3.9 4.1    98   CO2 28 26   * 164*   BUN 14 16   CREATININE 0.6 0.6   CALCIUM 9.0 8.9   PROT 6.3 6.4   ALBUMIN 3.9 3.9   BILITOT 0.5 0.6   ALKPHOS 94 90   AST 9* 7*   ALT 32 23   ANIONGAP 9 10       Diagnostic Results:  I have reviewed all pertinent imaging results/findings within the past 24 hours.

## 2022-09-24 NOTE — ASSESSMENT & PLAN NOTE
- Monitor CBC  - Transfuse for platelet count <10 or <50k with bleeding  - NSGY requesting platelets >70 k with platelets running during procedure or platelets >100 k x 2 prior to Ommaya placement planned for 9/26 with Dr. Alberto. Will transfuse accordingly

## 2022-09-24 NOTE — PROGRESS NOTES
Chief complaint/Reason for Consult: L eye vision loss since Tuesday     History of Present Illness: Deepti Gonzalez is a 51 y.o. 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 She says she was watching TV on Monday when suddenly she noticed her L eye lost vision. When she closed her R eye, she saw shadowy shapes and figures. There were no associated symptoms or pain of her left eye. An hour later, she lost all vision from her L eye including of light. She saw an outside ophthalmologist on Tuesday who did not see evidence of ocular etiology of the vision loss. Her vision did not improve at all and she began developing increasing pain from her L eye over the last few days. No prior ocular history.    Interval hx:   Patient continues to deny any visual changes in the R eye (unaffected eye) and no changes in the left eye (affected).    Patient was started on intrathecal methotrexate on 9/19 and 9/22. LP was performed and confirmed CNS ALL involvement. She is on dexamethasone 4 mg every 6 hours. Unable to proceed with Ommaya reservoir placement on 9/22 given thrombocytopenia despite replacement. Plan to attempt again on Monday 9/26.    Past Ocular Hx: no past ocular surgeries, wears glasses normally.    Current eye gtts: none      PMHx:  has a past medical history of Cancer, COPD (chronic obstructive pulmonary disease), Hypertension, Rheumatoid arthritis, unspecified, SAH (subarachnoid hemorrhage) (9/17/2022), Seizures, Stroke, and Type 2 diabetes mellitus with circulatory disorder, without long-term current use of insulin.     PSurgHx:  has a past surgical history that includes Appendectomy; Tonsillectomy; Rotator cuff repair (Bilateral); Tubal ligation; and Hysterectomy.     Home Medications:   Prior to Admission medications    Medication Sig Start Date End Date Taking?  Authorizing Provider   acyclovir (ZOVIRAX) 400 MG tablet Take 1 tablet (400 mg total) by mouth 2 (two) times daily. 12/7/21   Kathrine Posey MD   allopurinoL (ZYLOPRIM) 300 MG tablet Take 1 tablet (300 mg total) by mouth once daily. 6/7/22   Connor M Gillies, MD   artificial tears (ISOPTO TEARS) 0.5 % ophthalmic solution Place 1 drop into both eyes 4 (four) times daily as needed. 1/24/22   Mirtha Antonio NP   atorvastatin (LIPITOR) 80 MG tablet Take 80 mg by mouth once daily.    Historical Provider   blood sugar diagnostic Strp SMARTSIG:Via Meter 1 to 2 Times Daily 10/11/21   Historical Provider   busPIRone (BUSPAR) 10 MG tablet Take 10 mg by mouth 2 (two) times daily.    Historical Provider   dapagliflozin (FARXIGA) 10 mg tablet Take 10 mg by mouth once daily.    Historical Provider   diazePAM (VALIUM) 10 MG Tab Take 10 mg by mouth 2 (two) times daily as needed.    Historical Provider   diltiaZEM (CARDIZEM) 90 MG tablet Take 1 tablet (90 mg total) by mouth every 6 (six) hours. 12/7/21 12/7/22  Kathrine Posey MD   almaraz's soln (benadryl 30 mL, mylanta 30 mL, LIDOcaine 30 mL, nystatin 30 mL) 120mL Take 10 mLs by mouth 4 (four) times daily as needed (mouth pain). 4/19/22   Mirtha Antonio NP   fenofibrate 160 MG Tab Take 160 mg by mouth once daily.    Historical Provider   gabapentin (NEURONTIN) 300 MG capsule Take 1 capsule (300 mg total) by mouth 3 (three) times daily. 12/7/21 12/7/22  Kathrine Posey MD   hydrOXYchloroQUINE (PLAQUENIL) 200 mg tablet Take 1 tablet (200 mg total) by mouth once daily. 5/19/22   Mirtha Antonio NP   hydrOXYzine pamoate (VISTARIL) 50 MG Cap Take 50 mg by mouth 2 (two) times daily as needed. 4/29/22   Historical Provider   LIDOcaine (LIDODERM) 5 % Place 1 patch onto the skin once daily. Remove & Discard patch within 12 hours or as directed by MD 6/6/22   Connor M Gillies, MD   losartan (COZAAR) 25 MG tablet Take 25 mg by mouth once daily. 11/12/21   Historical Provider   magnesium oxide  (MAG-OX) 400 mg (241.3 mg magnesium) tablet Take 1 tablet by mouth once daily. 7/12/22   Historical Provider   metFORMIN (GLUCOPHAGE-XR) 500 MG ER 24hr tablet Take 1,000 mg by mouth 2 (two) times daily. 7/22/22   Historical Provider   naloxone (NARCAN) 4 mg/actuation Spry 4mg by nasal route as needed for opioid overdose; may repeat every 2-3 minutes in alternating nostrils until medical help arrives. Call 911 6/8/22   Ines Mccord MD   omeprazole (PRILOSEC) 40 MG capsule Take 40 mg by mouth once daily.    Historical Provider   ondansetron (ZOFRAN-ODT) 8 MG TbDL Take 1 tablet (8 mg total) by mouth every 8 (eight) hours as needed (in case of chemo induced nausea). 8/9/22   Dayron Jenkins MD   ONETOUCH VERIO TEST STRIPS Strp SMARTSIG:Via Meter 1 to 2 Times Daily 4/6/22   Historical Provider   OXcarbazepine (TRILEPTAL) 600 MG Tab Take 1,200 mg by mouth 2 (two) times daily. 10/26/21   Historical Provider   polyethylene glycol (GLYCOLAX) 17 gram/dose powder Dissolve one capful (17 g) in liquid and take by mouth daily as needed (constipation). 12/7/21   Kathrine Posey MD   PONATinib (ICLUSIG) 30 mg Tab Take 1 tablet (30 mg) by mouth once daily. 9/7/22   Dayron Jenkins MD   potassium chloride (K-TAB) 20 mEq Take 20 mEq by mouth 2 (two) times daily. 7/12/22   Historical Provider   prochlorperazine (COMPAZINE) 10 MG tablet Take 1 tablet (10 mg total) by mouth every 6 (six) hours as needed for Nausea. 7/11/22 7/11/23  Dayron Jenkins MD   QUEtiapine (SEROQUEL) 200 MG Tab Take 200 mg by mouth every evening.    Historical Provider   rivaroxaban (XARELTO) 20 mg Tab Take 1 tablet (20 mg total) by mouth daily with dinner or evening meal. Hold until instructed to resume by provider due to low platelet count. 6/6/22   Connor M Gillies, MD   senna-docusate 8.6-50 mg (PERICOLACE) 8.6-50 mg per tablet Take 1 tablet by mouth 2 (two) times daily. 3/19/22   Felisha Goodwin DO   sumatriptan (IMITREX) 50 MG tablet Take 1 tablet (50  mg total) by mouth daily as needed (headache). 8/29/22 9/28/22  Dayron Jenkins MD   TRINTELLIX 20 mg Tab Take 1 tablet by mouth once daily. 3/8/22   Historical Provider   ursodioL (ACTIGALL) 300 mg capsule Take 1 capsule (300 mg total) by mouth 3 (three) times daily. 6/13/22 6/13/23  Dayron Jenkins MD        Medications this encounter:    acyclovir  400 mg Oral BID    allopurinoL  300 mg Oral Daily    atorvastatin  80 mg Oral Daily    busPIRone  10 mg Oral BID    dexAMETHasone  4 mg Oral Q6H    diltiaZEM  30 mg Oral 4 times per day    fenofibrate  160 mg Oral Daily    gabapentin  300 mg Oral TID    hydrOXYchloroQUINE  200 mg Oral Daily    hydrOXYzine pamoate  50 mg Oral Q24H    insulin detemir U-100  5 Units Subcutaneous QHS    OXcarbazepine  1,200 mg Oral QHS    OXcarbazepine  1,200 mg Oral with lunch    pantoprazole  40 mg Oral Daily    polyethylene glycol  17 g Oral BID    QUEtiapine  200 mg Oral QHS    senna-docusate 8.6-50 mg  2 tablet Oral BID    vortioxetine  20 mg Oral Q24H       Allergies: is allergic to levetiracetam.     Social Hx:  reports that she quit smoking about 6 months ago. Her smoking use included cigarettes. She smoked an average of .5 packs per day. She has never used smokeless tobacco. She reports that she does not drink alcohol and does not use drugs.     Family Hx: No family history of glaucoma. family history is not on file.     ROS: As per HPI    Ocular examination/Dilated fundus examination:  Base Eye Exam       Visual Acuity (Snellen - Linear)         Right Left    Dist cc 20/20 NLP              Pupils         Dark Light Shape React APD    Right 4 2 Round Brisk None    Left 4 4 Round Minimal +3              Neuro/Psych       Oriented x3: Yes                  Slit Lamp and Fundus Exam       External Exam         Right Left    External Normal Normal              Slit Lamp Exam         Right Left    Lids/Lashes Normal Normal    Conjunctiva/Sclera White and quiet White and quiet     Cornea Clear Clear    Anterior Chamber Deep and quiet Deep and quiet    Iris Round and reactive Round and minimally reactive    Lens Clear Clear    Anterior Vitreous Normal Normal                  Work-up:  - ESR/CRP to evaluate for GCA (finished: ESR wnl, CRP mildly elevated - disease unlikely in this patient)  - Patient unable to obtain MRI imaging due to pacemaker. CT orbit (thin cuts) and head w/wo with CTA/CTV: Small acute subarachnoid hemorrhage in the left frontal lobe, otherwise unremarkable.  - Lysozyme: 6.1 (normal range 2.6-6.0)  - Syphilis Treponemal Ab: Nonreactive    Assessment/Plan:     Optic neuropathy, left  - Acute, painless progressive vision loss over 1 hour from normal to not seeing light on 9/12/22.   - 9/17/22 exam: right eye unremarkable. Left eye no light perception, nakia (3+) APD, equal pupil sizes, normal IOP. EOM grossly full and equal OU.   - CN exam otherwise full and equal both sides.  - No evidence of optic nerve or retinal pathology on dilated exam.  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  - Ophthalmology to complete repeat DFE 09/26/22    Recommendations:  - Continue current management for CNS involvement by ALL.  - Notify ophthalmology resident on call if patient develops any vision changes in the right eye.    Lama Deepthi MD  PGY2 [employer] [facility]

## 2022-09-24 NOTE — PROGRESS NOTES
Roberto Yepez - Oncology ([facility])  Hematology  Bone Marrow Transplant  Progress Note    Patient Name: Deepti Gonzalez  Admission Date: 9/16/2022  Hospital Length of Stay: 8 days  Code Status: Full Code    Subjective:     Interval History: plts 65, still has HA and OS vision loss. Ophthal planning for repeat DFE on 9/26, Ommaya placement planned for 9/26 pending plts >70     Objective:     Vital Signs (Most Recent):  Temp: 98.4 °F (36.9 °C) (09/24/22 0744)  Pulse: 66 (09/24/22 0744)  Resp: 18 (09/24/22 0744)  BP: (!) 143/66 (09/24/22 0744)  SpO2: (!) 93 % (09/24/22 0744)   Vital Signs (24h Range):  Temp:  [98.4 °F (36.9 °C)-99.3 °F (37.4 °C)] 98.4 °F (36.9 °C)  Pulse:  [66-85] 66  Resp:  [16-18] 18  SpO2:  [93 %-98 %] 93 %  BP: (123-143)/(61-71) 143/66     Weight: 60.3 kg (132 lb 15 oz)  Body mass index is 22.82 kg/m².  Body surface area is 1.65 meters squared.    ECOG SCORE           [unfilled]    Intake/Output - Last 3 Shifts         09/22 0700  09/23 0659 09/23 0700 09/24 0659 09/24 0700 09/25 0659    Blood 6227.1      Total Intake(mL/kg) 6227.1 (103.3)      Urine (mL/kg/hr) 725 (0.5) 1675 (1.2)     Total Output 725 1675     Net +5502.1 -1675                    Physical Exam  Constitutional:       General: She is not in acute distress.     Appearance: She is well-developed.   HENT:      Head: Normocephalic and atraumatic.      Nose: Nose normal. No congestion.   Eyes:      General: No scleral icterus.     Extraocular Movements: Extraocular movements intact.      Comments: Left eye pupil not reactive. Left eye peripheral vision not intact. Reports eye pain with movement improved.    Cardiovascular:      Rate and Rhythm: Normal rate and regular rhythm.      Heart sounds: No murmur heard.  Pulmonary:      Effort: Pulmonary effort is normal. No respiratory distress.   Abdominal:      General: There is no distension.      Palpations: Abdomen is soft.      Tenderness: There is no abdominal tenderness.   Genitourinary:      Comments: Green in place  Musculoskeletal:         General: Normal range of motion.      Cervical back: Normal range of motion and neck supple.   Skin:     General: Skin is warm and dry.      Comments: Port intact with no redness or drainage   Neurological:      General: No focal deficit present.      Mental Status: She is alert and oriented to person, place, and time.   Psychiatric:         Mood and Affect: Mood normal.         Behavior: Behavior normal.       Significant Labs:   CBC:   Recent Labs   Lab 09/22/22  1658 09/23/22  0458 09/24/22  0415   WBC 5.44 4.27 5.22   HGB 8.7* 9.3* 9.5*   HCT 25.5* 27.1* 27.4*   PLT 88* 92* 65*   , CMP:   Recent Labs   Lab 09/23/22  0458 09/24/22  0415    134*   K 3.9 4.1    98   CO2 28 26   * 164*   BUN 14 16   CREATININE 0.6 0.6   CALCIUM 9.0 8.9   PROT 6.3 6.4   ALBUMIN 3.9 3.9   BILITOT 0.5 0.6   ALKPHOS 94 90   AST 9* 7*   ALT 32 23   ANIONGAP 9 10       Diagnostic Results:  I have reviewed all pertinent imaging results/findings within the past 24 hours.    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 Concern for possible B-ALL involvement vs side effect of ponatinib vs retrobulbar hemorrhage vs stroke    Plan:  - Consulted Opthomology, appreciate assistance  - LP on 9/19 and 9/22 with IT chemo (cytarabine, hydrocortisone, methotrexate)  - labs pending:   TB DNA by PCR negative  VDRL negative  [place] virus PCR negative  VZV PCR negative  HSV PCR negative  Enterovirus RNA negative  CNS demyelinating dz (AQP-4 IGG/MOG IGG)  CSF bands negative  NMO aquaporin negative  MS profile      Gram stain: no organisms seen  CSF culture NGTD  Flow cytometry analysis c/w B ALL  Cytology in process  CSF cell count 546 WBC, 87% lymphs, 12% others  CSF protein 43  Cryptococcal Ag negative    - repeat DFE with ophthalmology for 9/26  - Patient with pacemaker  and per patient, not compatible with MRI. Would ideally obtain MRI brain w/ w/o contrast. Verified with EP who checked with rep that pacemaker is not MRI compatible.   - CTA completed with delayed venous phase revealing small SAH   - DSA (digitial subtraction angio) recommended by NSGY. Neuro ok with it, but will defer further vasculitis workup for now given that leukemia is a more obvious etiology for her vision loss.       SAH (subarachnoid hemorrhage)  - Small SAH discovered on CTA. Repeat CTH stable. No focal neuro deficits. Pt has ongoing left sided headache and eye pain.   - Likely incidental finding that is not causing the vision loss.   - NSGY consulted, appreciate recs    Thrombocytopenia  - Monitor CBC  - Transfuse for platelet count <10 or <50k with bleeding  - NSGY requesting platelets >70 k with platelets running during procedure or platelets >100 k x 2 prior to Ommaya placement planned for 9/26 with Dr. Alberto. Will transfuse accordingly    Urinary retention  - Patient reports wolf placed last week and has been that time.  - Attempt voiding trial prior to discharge  - Will need urology follow up at discharge. See little value in consult urolgoy/urogynecology inpatient as they will likely recommend outpt follow up.     Anemia associated with chemotherapy  - Monitor CBC  - Transfuse for Hgb<7    B-cell acute lymphoblastic leukemia  - Follows with Dr. Jenkins.  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  - Was treated with inotuzumab ozogamicin and ponatinib but course complicated by severe cytopenias. Not has been on therapy for past few weeks.  - Not a candidate for allogeneic stem cell transplant at this time.  - Will discuss restarting ponatinib with Dr. Padgett as it is unlikely the cause of vision loss   - LP done 9/19 with CSF + for B ALL  - received IT chemo 9/19 and 9/22 (triple therapy), given CNS involvement ALL will plan for  twice weekly IT chemo  - NSGY cancelled Ommaya placement on 9/22 due to plt count. Will plan for Ommaya placement on 9/26 pending plt count >70    Anxiety  - Continue home buspirone, hydroxyzine and quetiapine.   - Trintellix is nonformulary however pharmacy is attempting to obtain this medication.    Hypertension  - Holding home BP meds in the setting of soft BP.   - Resume when appropriate.    Seizure disorder  - Continue home oxcarbazepine 200mg QHS    Pacemaker  - Patient with pacemaker in place due to SSS. Per EP, it is definitely MRI incompatible.      Rheumatoid arthritis involving multiple sites  - Seronegative RA. Continue home plaquenil    Hyperlipidemia  - Continue home atorvastatin and fenofibrate    Paroxysmal atrial fibrillation  - Holding home xarelto in the setting of thrombocytopenia        VTE Risk Mitigation (From admission, onward)         Ordered     IP VTE HIGH RISK PATIENT  Once         09/16/22 1637     Place sequential compression device  Until discontinued         09/16/22 1637     Reason for No Pharmacological VTE Prophylaxis  Once        Question:  Reasons:  Answer:  Thrombocytopenia    09/16/22 1637                Disposition: continue BMT admission    Pipe Hager MD  Bone Marrow Transplant  [facility] - Oncology ([facility])

## 2022-09-24 NOTE — PLAN OF CARE
Problem: Adult Inpatient Plan of Care  Goal: Plan of Care Review  Outcome: Ongoing, Not Progressing  Goal: Patient-Specific Goal (Individualized)  Outcome: Ongoing, Not Progressing  Goal: Absence of Hospital-Acquired Illness or Injury  Outcome: Ongoing, Not Progressing  Goal: Optimal Comfort and Wellbeing  Outcome: Ongoing, Not Progressing  Goal: Readiness for Transition of Care  Outcome: Ongoing, Not Progressing     Problem: Diabetes Comorbidity  Goal: Blood Glucose Level Within Targeted Range  Outcome: Ongoing, Not Progressing     Problem: Skin Injury Risk Increased  Goal: Skin Health and Integrity  Outcome: Ongoing, Not Progressing     Problem: Infection  Goal: Absence of Infection Signs and Symptoms  Outcome: Ongoing, Not Progressing     Problem: Pain Acute  Goal: Acceptable Pain Control and Functional Ability  Outcome: Ongoing, Not Progressing     Problem: Fall Injury Risk  Goal: Absence of Fall and Fall-Related Injury  Outcome: Ongoing, Not Progressing

## 2022-09-24 NOTE — SUBJECTIVE & OBJECTIVE
Interval History: NAEON. Pt denies any new complaints. Continues with intermittent left sided HA, vision unchanged. Neuro stable. Plt count down to 65k this am. Planning for OR for [facility] Monday 9/26.     Medications:  Continuous Infusions:  Scheduled Meds:   acyclovir  400 mg Oral BID    allopurinoL  300 mg Oral Daily    atorvastatin  80 mg Oral Daily    busPIRone  10 mg Oral BID    dexAMETHasone  4 mg Oral Q6H    diltiaZEM  30 mg Oral 4 times per day    fenofibrate  160 mg Oral Daily    gabapentin  300 mg Oral TID    hydrOXYchloroQUINE  200 mg Oral Daily    hydrOXYzine pamoate  50 mg Oral Q24H    insulin detemir U-100  5 Units Subcutaneous QHS    OXcarbazepine  1,200 mg Oral QHS    OXcarbazepine  1,200 mg Oral with lunch    pantoprazole  40 mg Oral Daily    polyethylene glycol  17 g Oral BID    QUEtiapine  200 mg Oral QHS    senna-docusate 8.6-50 mg  2 tablet Oral BID    vortioxetine  20 mg Oral Q24H     PRN Meds:sodium chloride, butalbital-acetaminophen-caffeine -40 mg, dextrose 10%, dextrose 10%, diazePAM, duke's soln (benadryl 30 mL, mylanta 30 mL, LIDOcaine 30 mL, nystatin 30 mL) 120 mL, glucagon (human recombinant), glucose, glucose, HYDROmorphone, insulin aspart U-100, magnesium oxide, magnesium oxide, magnesium oxide, naloxone, ondansetron, potassium chloride, potassium chloride, potassium chloride, potassium, sodium phosphates, potassium, sodium phosphates, sodium chloride 0.9%, sumatriptan, white petrolatum     Review of Systems  Objective:     Weight: 60.3 kg (132 lb 15 oz)  Body mass index is 22.82 kg/m².  Vital Signs (Most Recent):  Temp: 98.4 °F (36.9 °C) (09/24/22 0744)  Pulse: 66 (09/24/22 0744)  Resp: 18 (09/24/22 0744)  BP: (!) 143/66 (09/24/22 0744)  SpO2: (!) 93 % (09/24/22 0744)   Vital Signs (24h Range):  Temp:  [98.4 °F (36.9 °C)-99.3 °F (37.4 °C)] 98.4 °F (36.9 °C)  Pulse:  [66-85] 66  Resp:  [16-18] 18  SpO2:  [93 %-98 %] 93 %  BP: (123-143)/(61-71) 143/66                           "Urethral Catheter 09/07/22 (Active)   Site Assessment Clean;Intact 09/23/22 0246   Collection Container Standard drainage bag 09/23/22 0246   Securement Method secured to top of thigh w/ adhesive device 09/22/22 0851   Catheter Care Performed yes 09/23/22 0246   Reason for Continuing Urinary Catheterization Urinary retention 09/23/22 0246   CAUTI Prevention Bundle Securement Device in place with 1" slack 09/22/22 2148   Output (mL) 700 mL 09/21/22 0400       Physical Exam    Constitutional: Well nourished, well developed. No distress.   HEENT: atraumatic/normocephalic  Cardiovascular: Regular rhythm.   Pulm: aerating well, saturating well, no respiratory distress  Abdominal: Soft, non-distended.   Psych/Behavior: She is alert, thought content appropriate.      Neurosurgery Physical Exam  E4V5M6  AOx3  EOMI  Face Symmetric  Tongue midline  Vision loss left eye, L pupil non-reactive. R pupil briskly reactive to light.  BUE 5/5  BLE 5/5  No drift    Significant Labs:  Recent Labs   Lab 09/23/22  0458 09/24/22  0415   * 164*    134*   K 3.9 4.1    98   CO2 28 26   BUN 14 16   CREATININE 0.6 0.6   CALCIUM 9.0 8.9       Recent Labs   Lab 09/22/22  1658 09/23/22  0458 09/24/22  0415   WBC 5.44 4.27 5.22   HGB 8.7* 9.3* 9.5*   HCT 25.5* 27.1* 27.4*   PLT 88* 92* 65*       No results for input(s): LABPT, INR, APTT in the last 48 hours.  Microbiology Results (last 7 days)       Procedure Component Value Units Date/Time    CSF culture [[id number]] Collected: 09/19/22 1245    Order Status: Completed Specimen: CSF (Spinal Fluid) from CSF Tap, Tube 3 Updated: 09/24/22 0650     CSF CULTURE No Growth     Gram Stain Result No WBC's      No organisms seen    Cryptococcal antigen, CSF [[id number]] Collected: 09/19/22 1245    Order Status: Completed Specimen: CSF (Spinal Fluid) from CSF Tap, Tube 3 Updated: 09/20/22 0934     Crypto Ag, CSF Negative    Gram stain [[id number]] Collected: 09/19/22 1245    Order Status: " Canceled Specimen: CSF (Spinal Fluid) from CSF Tap, Tube 3           All pertinent labs from the last 24 hours have been reviewed.    Significant Diagnostics:  I have reviewed and interpreted all pertinent imaging results/findings within the past 24 hours.

## 2022-09-25 LAB
ABO + RH BLD: NORMAL
ALBUMIN SERPL BCP-MCNC: 3.8 G/DL (ref 3.5–5.2)
ALP SERPL-CCNC: 82 U/L (ref 55–135)
ALT SERPL W/O P-5'-P-CCNC: 18 U/L (ref 10–44)
ANION GAP SERPL CALC-SCNC: 8 MMOL/L (ref 8–16)
ANISOCYTOSIS BLD QL SMEAR: SLIGHT
AST SERPL-CCNC: 9 U/L (ref 10–40)
BASOPHILS # BLD AUTO: 0 K/UL (ref 0–0.2)
BASOPHILS NFR BLD: 0 % (ref 0–1.9)
BILIRUB SERPL-MCNC: 0.5 MG/DL (ref 0.1–1)
BLD GP AB SCN CELLS X3 SERPL QL: NORMAL
BLD PROD TYP BPU: NORMAL
BLOOD UNIT EXPIRATION DATE: NORMAL
BLOOD UNIT TYPE CODE: 8400
BLOOD UNIT TYPE: NORMAL
BUN SERPL-MCNC: 15 MG/DL (ref 6–20)
C DIFF GDH STL QL: NEGATIVE
C DIFF TOX A+B STL QL IA: NEGATIVE
CALCIUM SERPL-MCNC: 8.9 MG/DL (ref 8.7–10.5)
CHLORIDE SERPL-SCNC: 99 MMOL/L (ref 95–110)
CO2 SERPL-SCNC: 28 MMOL/L (ref 23–29)
CODING SYSTEM: NORMAL
CREAT SERPL-MCNC: 0.6 MG/DL (ref 0.5–1.4)
DACRYOCYTES BLD QL SMEAR: ABNORMAL
DIFFERENTIAL METHOD: ABNORMAL
DISPENSE STATUS: NORMAL
EOSINOPHIL # BLD AUTO: 0 K/UL (ref 0–0.5)
EOSINOPHIL NFR BLD: 0.5 % (ref 0–8)
ERYTHROCYTE [DISTWIDTH] IN BLOOD BY AUTOMATED COUNT: 15.8 % (ref 11.5–14.5)
EST. GFR  (NO RACE VARIABLE): >60 ML/MIN/1.73 M^2
GLUCOSE SERPL-MCNC: 149 MG/DL (ref 70–110)
HCT VFR BLD AUTO: 25.1 % (ref 37–48.5)
HGB BLD-MCNC: 8.6 G/DL (ref 12–16)
HYPOCHROMIA BLD QL SMEAR: ABNORMAL
IMM GRANULOCYTES # BLD AUTO: 0.12 K/UL (ref 0–0.04)
IMM GRANULOCYTES NFR BLD AUTO: 2.8 % (ref 0–0.5)
LYMPHOCYTES # BLD AUTO: 1.2 K/UL (ref 1–4.8)
LYMPHOCYTES NFR BLD: 28.8 % (ref 18–48)
MCH RBC QN AUTO: 39.1 PG (ref 27–31)
MCHC RBC AUTO-ENTMCNC: 34.3 G/DL (ref 32–36)
MCV RBC AUTO: 114 FL (ref 82–98)
MONOCYTES # BLD AUTO: 0 K/UL (ref 0.3–1)
MONOCYTES NFR BLD: 0.2 % (ref 4–15)
NEUTROPHILS # BLD AUTO: 2.9 K/UL (ref 1.8–7.7)
NEUTROPHILS NFR BLD: 67.7 % (ref 38–73)
NRBC BLD-RTO: 0 /100 WBC
OVALOCYTES BLD QL SMEAR: ABNORMAL
PLATELET # BLD AUTO: 86 K/UL (ref 150–450)
PMV BLD AUTO: 10.9 FL (ref 9.2–12.9)
POCT GLUCOSE: 103 MG/DL (ref 70–110)
POCT GLUCOSE: 163 MG/DL (ref 70–110)
POCT GLUCOSE: 164 MG/DL (ref 70–110)
POCT GLUCOSE: 173 MG/DL (ref 70–110)
POCT GLUCOSE: 293 MG/DL (ref 70–110)
POIKILOCYTOSIS BLD QL SMEAR: SLIGHT
POLYCHROMASIA BLD QL SMEAR: ABNORMAL
POTASSIUM SERPL-SCNC: 4.1 MMOL/L (ref 3.5–5.1)
PROT SERPL-MCNC: 6 G/DL (ref 6–8.4)
RBC # BLD AUTO: 2.2 M/UL (ref 4–5.4)
SARS-COV-2 RDRP RESP QL NAA+PROBE: NEGATIVE
SODIUM SERPL-SCNC: 135 MMOL/L (ref 136–145)
UNIT NUMBER: NORMAL
WBC # BLD AUTO: 4.27 K/UL (ref 3.9–12.7)

## 2022-09-25 PROCEDURE — 99232 SBSQ HOSP IP/OBS MODERATE 35: CPT | Mod: ,,, | Performed by: INTERNAL MEDICINE

## 2022-09-25 PROCEDURE — 25000003 PHARM REV CODE 250: Performed by: STUDENT IN AN ORGANIZED HEALTH CARE EDUCATION/TRAINING PROGRAM

## 2022-09-25 PROCEDURE — 80053 COMPREHEN METABOLIC PANEL: CPT | Performed by: STUDENT IN AN ORGANIZED HEALTH CARE EDUCATION/TRAINING PROGRAM

## 2022-09-25 PROCEDURE — 63600175 PHARM REV CODE 636 W HCPCS: Performed by: STUDENT IN AN ORGANIZED HEALTH CARE EDUCATION/TRAINING PROGRAM

## 2022-09-25 PROCEDURE — 20600001 HC STEP DOWN PRIVATE ROOM

## 2022-09-25 PROCEDURE — 63600175 PHARM REV CODE 636 W HCPCS: Performed by: NURSE PRACTITIONER

## 2022-09-25 PROCEDURE — 99232 PR SUBSEQUENT HOSPITAL CARE,LEVL II: ICD-10-PCS | Mod: ,,, | Performed by: INTERNAL MEDICINE

## 2022-09-25 PROCEDURE — U0002 COVID-19 LAB TEST NON-CDC: HCPCS | Performed by: STUDENT IN AN ORGANIZED HEALTH CARE EDUCATION/TRAINING PROGRAM

## 2022-09-25 PROCEDURE — P9037 PLATE PHERES LEUKOREDU IRRAD: HCPCS

## 2022-09-25 PROCEDURE — 85025 COMPLETE CBC W/AUTO DIFF WBC: CPT | Performed by: STUDENT IN AN ORGANIZED HEALTH CARE EDUCATION/TRAINING PROGRAM

## 2022-09-25 PROCEDURE — 25000003 PHARM REV CODE 250: Performed by: NURSE PRACTITIONER

## 2022-09-25 PROCEDURE — 63600175 PHARM REV CODE 636 W HCPCS

## 2022-09-25 PROCEDURE — 27000207 HC ISOLATION

## 2022-09-25 PROCEDURE — 87449 NOS EACH ORGANISM AG IA: CPT

## 2022-09-25 PROCEDURE — 25000003 PHARM REV CODE 250

## 2022-09-25 PROCEDURE — 25000242 PHARM REV CODE 250 ALT 637 W/ HCPCS: Performed by: STUDENT IN AN ORGANIZED HEALTH CARE EDUCATION/TRAINING PROGRAM

## 2022-09-25 PROCEDURE — 86901 BLOOD TYPING SEROLOGIC RH(D): CPT | Performed by: STUDENT IN AN ORGANIZED HEALTH CARE EDUCATION/TRAINING PROGRAM

## 2022-09-25 RX ORDER — HYDROCODONE BITARTRATE AND ACETAMINOPHEN 500; 5 MG/1; MG/1
TABLET ORAL
Status: DISCONTINUED | OUTPATIENT
Start: 2022-09-25 | End: 2022-09-26

## 2022-09-25 RX ORDER — INSULIN ASPART 100 [IU]/ML
2 INJECTION, SOLUTION INTRAVENOUS; SUBCUTANEOUS
Status: DISCONTINUED | OUTPATIENT
Start: 2022-09-25 | End: 2022-10-01 | Stop reason: HOSPADM

## 2022-09-25 RX ORDER — LACTULOSE 10 G/15ML
20 SOLUTION ORAL 3 TIMES DAILY PRN
Status: DISCONTINUED | OUTPATIENT
Start: 2022-09-25 | End: 2022-10-01 | Stop reason: HOSPADM

## 2022-09-25 RX ADMIN — OXCARBAZEPINE 1200 MG: 600 TABLET, FILM COATED ORAL at 09:09

## 2022-09-25 RX ADMIN — ONDANSETRON 4 MG: 2 INJECTION INTRAMUSCULAR; INTRAVENOUS at 09:09

## 2022-09-25 RX ADMIN — DEXAMETHASONE 4 MG: 4 TABLET ORAL at 12:09

## 2022-09-25 RX ADMIN — HYDROMORPHONE HYDROCHLORIDE 4 MG: 4 TABLET ORAL at 11:09

## 2022-09-25 RX ADMIN — BUTALBITAL, ACETAMINOPHEN, AND CAFFEINE 1 TABLET: 50; 325; 40 TABLET ORAL at 09:09

## 2022-09-25 RX ADMIN — ACYCLOVIR 400 MG: 200 CAPSULE ORAL at 09:09

## 2022-09-25 RX ADMIN — BUTALBITAL, ACETAMINOPHEN, AND CAFFEINE 1 TABLET: 50; 325; 40 TABLET ORAL at 04:09

## 2022-09-25 RX ADMIN — QUETIAPINE FUMARATE 200 MG: 200 TABLET ORAL at 09:09

## 2022-09-25 RX ADMIN — SENNOSIDES AND DOCUSATE SODIUM 2 TABLET: 50; 8.6 TABLET ORAL at 09:09

## 2022-09-25 RX ADMIN — INSULIN DETEMIR 5 UNITS: 100 INJECTION, SOLUTION SUBCUTANEOUS at 08:09

## 2022-09-25 RX ADMIN — GABAPENTIN 300 MG: 300 CAPSULE ORAL at 04:09

## 2022-09-25 RX ADMIN — VORTIOXETINE 20 MG: 10 TABLET, FILM COATED ORAL at 12:09

## 2022-09-25 RX ADMIN — PANTOPRAZOLE SODIUM 40 MG: 40 TABLET, DELAYED RELEASE ORAL at 09:09

## 2022-09-25 RX ADMIN — DEXAMETHASONE 4 MG: 4 TABLET ORAL at 11:09

## 2022-09-25 RX ADMIN — GABAPENTIN 300 MG: 300 CAPSULE ORAL at 07:09

## 2022-09-25 RX ADMIN — DEXAMETHASONE 4 MG: 4 TABLET ORAL at 05:09

## 2022-09-25 RX ADMIN — HYDROMORPHONE HYDROCHLORIDE 4 MG: 4 TABLET ORAL at 04:09

## 2022-09-25 RX ADMIN — BUSPIRONE HYDROCHLORIDE 10 MG: 10 TABLET ORAL at 09:09

## 2022-09-25 RX ADMIN — ONDANSETRON 4 MG: 2 INJECTION INTRAMUSCULAR; INTRAVENOUS at 04:09

## 2022-09-25 RX ADMIN — FENOFIBRATE 160 MG: 160 TABLET ORAL at 12:09

## 2022-09-25 RX ADMIN — GABAPENTIN 300 MG: 300 CAPSULE ORAL at 12:09

## 2022-09-25 RX ADMIN — DILTIAZEM HYDROCHLORIDE 30 MG: 60 TABLET, FILM COATED ORAL at 12:09

## 2022-09-25 RX ADMIN — INSULIN ASPART 2 UNITS: 100 INJECTION, SOLUTION INTRAVENOUS; SUBCUTANEOUS at 12:09

## 2022-09-25 RX ADMIN — DILTIAZEM HYDROCHLORIDE 30 MG: 60 TABLET, FILM COATED ORAL at 05:09

## 2022-09-25 RX ADMIN — HYDROXYCHLOROQUINE SULFATE 200 MG: 200 TABLET ORAL at 09:09

## 2022-09-25 RX ADMIN — ALLOPURINOL 300 MG: 300 TABLET ORAL at 09:09

## 2022-09-25 RX ADMIN — HYDROMORPHONE HYDROCHLORIDE 4 MG: 4 TABLET ORAL at 09:09

## 2022-09-25 RX ADMIN — INSULIN ASPART 2 UNITS: 100 INJECTION, SOLUTION INTRAVENOUS; SUBCUTANEOUS at 09:09

## 2022-09-25 RX ADMIN — INSULIN ASPART 2 UNITS: 100 INJECTION, SOLUTION INTRAVENOUS; SUBCUTANEOUS at 04:09

## 2022-09-25 RX ADMIN — INSULIN ASPART 3 UNITS: 100 INJECTION, SOLUTION INTRAVENOUS; SUBCUTANEOUS at 08:09

## 2022-09-25 RX ADMIN — ATORVASTATIN CALCIUM 80 MG: 20 TABLET, FILM COATED ORAL at 12:09

## 2022-09-25 RX ADMIN — ATOVAQUONE 1500 MG: 750 SUSPENSION ORAL at 09:09

## 2022-09-25 RX ADMIN — HYDROMORPHONE HYDROCHLORIDE 4 MG: 4 TABLET ORAL at 07:09

## 2022-09-25 RX ADMIN — HYDROXYZINE PAMOATE 50 MG: 25 CAPSULE ORAL at 09:09

## 2022-09-25 NOTE — PLAN OF CARE
Plan of care reviewed with the patient at the beginning of shift. The patient is alert and oriented. GCS 15. Some complaints of pain overnight. Controlled with PRN medication. Independent and ambulatory. Remained free from falls and injuries throughout shift. VSS. Bed in low locked position. Call bell and personal items within reach. Will continue to monitor

## 2022-09-25 NOTE — ASSESSMENT & PLAN NOTE
- Follows with Dr. Jenkins.  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  - Was treated with inotuzumab ozogamicin and ponatinib but course complicated by severe cytopenias. Not has been on therapy for past few weeks.  - Not a candidate for allogeneic stem cell transplant at this time.  - Will discuss restarting ponatinib with Dr. Padgett as it is unlikely the cause of vision loss   - LP done 9/19 with CSF + for B ALL  - received IT chemo 9/19 and 9/22 (triple therapy), given CNS involvement ALL will plan for twice weekly IT chemo  - NSGY cancelled Ommaya placement on 9/22 due to plt count. Will plan for Ommaya placement on 9/26 pending plt count >70 with plt intraoperatively or >100

## 2022-09-25 NOTE — PROGRESS NOTES
Roberto Yepez - Oncology ([facility])  Neurosurgery  Progress Note    Subjective:     History of Present Illness: 51 F Pmhx leukemia, presents with left sided vision loss 5 days ago while watching TV, vision loss began gradually then progressed to complete left eye vision loss over 1 hour. She has not had any improvement since then. She has no headaches, focal weaknesses, confusion, or speech difficulty. She denies any trauma. NSGY consulted for incidentally found small left frontal SAH      Post-Op Info:  Procedure(s) (LRB):  INSERTION, OMMAYA RESERVOIR (N/A)   3 Days Post-Op     Interval History: Neuro stable, Plt 65 yesterday, f/u AM CBC. Planning for OR for Ommaya Monday 9/26.     Medications:  Continuous Infusions:  Scheduled Meds:   acyclovir  400 mg Oral BID    allopurinoL  300 mg Oral Daily    atorvastatin  80 mg Oral Daily    atovaquone  1,500 mg Oral Daily    busPIRone  10 mg Oral BID    dexAMETHasone  4 mg Oral Q6H    diltiaZEM  30 mg Oral 4 times per day    fenofibrate  160 mg Oral Daily    gabapentin  300 mg Oral TID    hydrOXYchloroQUINE  200 mg Oral Daily    hydrOXYzine pamoate  50 mg Oral Q24H    insulin aspart U-100  2 Units Subcutaneous TIDWM    insulin detemir U-100  5 Units Subcutaneous QHS    OXcarbazepine  1,200 mg Oral QHS    OXcarbazepine  1,200 mg Oral with lunch    pantoprazole  40 mg Oral Daily    polyethylene glycol  17 g Oral BID    QUEtiapine  200 mg Oral QHS    senna-docusate 8.6-50 mg  2 tablet Oral BID    vortioxetine  20 mg Oral Q24H     PRN Meds:sodium chloride, butalbital-acetaminophen-caffeine -40 mg, dextrose 10%, dextrose 10%, diazePAM, duke's soln (benadryl 30 mL, mylanta 30 mL, LIDOcaine 30 mL, nystatin 30 mL) 120 mL, glucagon (human recombinant), glucose, glucose, HYDROmorphone, insulin aspart U-100, lactulose, magnesium oxide, magnesium oxide, magnesium oxide, naloxone, ondansetron, potassium chloride, potassium chloride, potassium chloride, potassium,  "sodium phosphates, potassium, sodium phosphates, sodium chloride 0.9%, sumatriptan, white petrolatum     Review of Systems  Objective:     Weight: 60.3 kg (132 lb 15 oz)  Body mass index is 22.82 kg/m².  Vital Signs (Most Recent):  Temp: 98.2 °F (36.8 °C) (09/25/22 0739)  Pulse: 73 (09/25/22 0739)  Resp: 18 (09/25/22 0920)  BP: 127/81 (09/25/22 0739)  SpO2: (!) 94 % (09/25/22 0739)   Vital Signs (24h Range):  Temp:  [97.5 °F (36.4 °C)-98.8 °F (37.1 °C)] 98.2 °F (36.8 °C)  Pulse:  [69-88] 73  Resp:  [16-20] 18  SpO2:  [93 %-98 %] 94 %  BP: (105-127)/(56-81) 127/81                          Urethral Catheter 09/07/22 (Active)   Site Assessment Clean;Intact 09/23/22 0246   Collection Container Standard drainage bag 09/23/22 0246   Securement Method secured to top of thigh w/ adhesive device 09/22/22 0851   Catheter Care Performed yes 09/23/22 0246   Reason for Continuing Urinary Catheterization Urinary retention 09/23/22 0246   CAUTI Prevention Bundle Securement Device in place with 1" slack 09/22/22 2148   Output (mL) 700 mL 09/21/22 0400       Physical Exam    Constitutional: Well nourished, well developed. No distress.   HEENT: atraumatic/normocephalic  Cardiovascular: Regular rhythm.   Pulm: aerating well, saturating well, no respiratory distress  Abdominal: Soft, non-distended.   Psych/Behavior: She is alert, thought content appropriate.      Neurosurgery Physical Exam  E4V5M6  AOx3  EOMI  Face Symmetric  Tongue midline  Vision loss left eye, L pupil non-reactive. R pupil briskly reactive to light.  BUE 5/5  BLE 5/5  No drift    Significant Labs:  Recent Labs   Lab 09/24/22  0415 09/25/22  0432   * 149*   * 135*   K 4.1 4.1   CL 98 99   CO2 26 28   BUN 16 15   CREATININE 0.6 0.6   CALCIUM 8.9 8.9       Recent Labs   Lab 09/24/22  0415 09/25/22  0432   WBC 5.22 4.27   HGB 9.5* 8.6*   HCT 27.4* 25.1*   PLT 65* 86*       No results for input(s): LABPT, INR, APTT in the last 48 hours.  Microbiology Results " (last 7 days)       Procedure Component Value Units Date/Time    CSF culture [[id number]] Collected: 09/19/22 1245    Order Status: Completed Specimen: CSF (Spinal Fluid) from CSF Tap, Tube 3 Updated: 09/24/22 0650     CSF CULTURE No Growth     Gram Stain Result No WBC's      No organisms seen    Cryptococcal antigen, CSF [[id number]] Collected: 09/19/22 1245    Order Status: Completed Specimen: CSF (Spinal Fluid) from CSF Tap, Tube 3 Updated: 09/20/22 0934     Crypto Ag, CSF Negative    Gram stain [[id number]] Collected: 09/19/22 1245    Order Status: Canceled Specimen: CSF (Spinal Fluid) from CSF Tap, Tube 3           All pertinent labs from the last 24 hours have been reviewed.    Significant Diagnostics:  I have reviewed and interpreted all pertinent imaging results/findings within the past 24 hours.    Neurosurgery Physical Exam    Assessment/Plan:     Rheumatoid arthritis involving multiple sites  51 F with Pmhx B-ALL who presented with sudden onset left sided vision loss 5 days prior to admission while watching TV. NSGY initially consulted for incidentally found small left frontal SAH. 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 Alberto, canceled 9/22 due to low Plt count   --OR re-scheduled for Monday 9/26   --Will need Plt count >100k x 2 or >70k with intra-op transfusion of platelets; primary team plan to transfuse Michael night   --reviewed plan and preop  goals with primary team   --risks/benefits discussed, patient consented        Bryon Hayes MD  Neurosurgery  [facility] - Oncology ([facility])

## 2022-09-25 NOTE — ASSESSMENT & PLAN NOTE
- Small SAH discovered on CTA. Repeat CTH stable. No focal neuro deficits. Pt has ongoing left sided headache and eye pain.   - Likely incidental finding that is not causing the vision loss.   - NSGY consulted, appreciate recs  - CT head with stealth pending.   - CT head on 9/24 shows no new hemorrhage

## 2022-09-25 NOTE — SUBJECTIVE & OBJECTIVE
Subjective:     Interval History: plt 86, will transfuse 1 unit plt, CBC at midnight for possible overnight plt needs. HA still present. OS vision loss the same. Ommaya placement planned for tomorrow. CT head with stealth ordered by NSGY     Objective:     Vital Signs (Most Recent):  Temp: 98.2 °F (36.8 °C) (09/25/22 0739)  Pulse: 73 (09/25/22 0739)  Resp: 18 (09/25/22 0739)  BP: 127/81 (09/25/22 0739)  SpO2: (!) 94 % (09/25/22 0739)   Vital Signs (24h Range):  Temp:  [97.5 °F (36.4 °C)-98.8 °F (37.1 °C)] 98.2 °F (36.8 °C)  Pulse:  [69-88] 73  Resp:  [16-20] 18  SpO2:  [93 %-98 %] 94 %  BP: (105-127)/(56-81) 127/81     Weight: 60.3 kg (132 lb 15 oz)  Body mass index is 22.82 kg/m².  Body surface area is 1.65 meters squared.    ECOG SCORE           [unfilled]    Intake/Output - Last 3 Shifts         09/23 0700  09/24 0659 09/24 0700 09/25 0659 09/25 0700 09/26 0659    Blood  572     Total Intake(mL/kg)  572 (9.5)     Urine (mL/kg/hr) 1675 (1.2) 1100 (0.8)     Total Output 1675 1100     Net -1675 -528            Stool Occurrence  1 x             Physical Exam  Constitutional:       General: She is not in acute distress.     Appearance: She is well-developed.   HENT:      Head: Normocephalic and atraumatic.      Nose: Nose normal. No congestion.   Eyes:      General: No scleral icterus.     Extraocular Movements: Extraocular movements intact.      Comments: Left eye pupil not reactive. Left eye peripheral vision not intact. Reports eye pain with movement improved.    Cardiovascular:      Rate and Rhythm: Normal rate and regular rhythm.      Heart sounds: No murmur heard.  Pulmonary:      Effort: Pulmonary effort is normal. No respiratory distress.   Abdominal:      General: There is no distension.      Palpations: Abdomen is soft.      Tenderness: There is no abdominal tenderness.   Genitourinary:     Comments: Green in place  Musculoskeletal:         General: Normal range of motion.      Cervical back: Normal range  of motion and neck supple.   Skin:     General: Skin is warm and dry.      Comments: Port intact with no redness or drainage   Neurological:      General: No focal deficit present.      Mental Status: She is alert and oriented to person, place, and time.   Psychiatric:         Mood and Affect: Mood normal.         Behavior: Behavior normal.       Significant Labs:   CBC:   Recent Labs   Lab 09/24/22 0415 09/25/22 0432   WBC 5.22 4.27   HGB 9.5* 8.6*   HCT 27.4* 25.1*   PLT 65* 86*   , CMP:   Recent Labs   Lab 09/24/22 0415 09/25/22 0432   * 135*   K 4.1 4.1   CL 98 99   CO2 26 28   * 149*   BUN 16 15   CREATININE 0.6 0.6   CALCIUM 8.9 8.9   PROT 6.4 6.0   ALBUMIN 3.9 3.8   BILITOT 0.6 0.5   ALKPHOS 90 82   AST 7* 9*   ALT 23 18   ANIONGAP 10 8       Diagnostic Results:  I have reviewed all pertinent imaging results/findings within the past 24 hours.

## 2022-09-25 NOTE — SUBJECTIVE & OBJECTIVE
Interval History: Neuro stable, Plt 65 yesterday, f/u AM CBC. [facility].     Medications:  Continuous Infusions:  Scheduled Meds:   acyclovir  400 mg Oral BID    allopurinoL  300 mg Oral Daily    atorvastatin  80 mg Oral Daily    atovaquone  1,500 mg Oral Daily    busPIRone  10 mg Oral BID    dexAMETHasone  4 mg Oral Q6H    diltiaZEM  30 mg Oral 4 times per day    fenofibrate  160 mg Oral Daily    gabapentin  300 mg Oral TID    hydrOXYchloroQUINE  200 mg Oral Daily    hydrOXYzine pamoate  50 mg Oral Q24H    insulin aspart U-100  2 Units Subcutaneous TIDWM    insulin detemir U-100  5 Units Subcutaneous QHS    OXcarbazepine  1,200 mg Oral QHS    OXcarbazepine  1,200 mg Oral with lunch    pantoprazole  40 mg Oral Daily    polyethylene glycol  17 g Oral BID    QUEtiapine  200 mg Oral QHS    senna-docusate 8.6-50 mg  2 tablet Oral BID    vortioxetine  20 mg Oral Q24H     PRN Meds:sodium chloride, butalbital-acetaminophen-caffeine -40 mg, dextrose 10%, dextrose 10%, diazePAM, duke's soln (benadryl 30 mL, mylanta 30 mL, LIDOcaine 30 mL, nystatin 30 mL) 120 mL, glucagon (human recombinant), glucose, glucose, HYDROmorphone, insulin aspart U-100, lactulose, magnesium oxide, magnesium oxide, magnesium oxide, naloxone, ondansetron, potassium chloride, potassium chloride, potassium chloride, potassium, sodium phosphates, potassium, sodium phosphates, sodium chloride 0.9%, sumatriptan, white petrolatum     Review of Systems  Objective:     Weight: 60.3 kg (132 lb 15 oz)  Body mass index is 22.82 kg/m².  Vital Signs (Most Recent):  Temp: 98.2 °F (36.8 °C) (09/25/22 0739)  Pulse: 73 (09/25/22 0739)  Resp: 18 (09/25/22 0920)  BP: 127/81 (09/25/22 0739)  SpO2: (!) 94 % (09/25/22 0739)   Vital Signs (24h Range):  Temp:  [97.5 °F (36.4 °C)-98.8 °F (37.1 °C)] 98.2 °F (36.8 °C)  Pulse:  [69-88] 73  Resp:  [16-20] 18  SpO2:  [93 %-98 %] 94 %  BP: (105-127)/(56-81) 127/81                          Urethral  "Catheter 09/07/22 (Active)   Site Assessment Clean;Intact 09/23/22 0246   Collection Container Standard drainage bag 09/23/22 0246   Securement Method secured to top of thigh w/ adhesive device 09/22/22 0851   Catheter Care Performed yes 09/23/22 0246   Reason for Continuing Urinary Catheterization Urinary retention 09/23/22 0246   CAUTI Prevention Bundle Securement Device in place with 1" slack 09/22/22 2148   Output (mL) 700 mL 09/21/22 0400       Physical Exam    Constitutional: Well nourished, well developed. No distress.   HEENT: atraumatic/normocephalic  Cardiovascular: Regular rhythm.   Pulm: aerating well, saturating well, no respiratory distress  Abdominal: Soft, non-distended.   Psych/Behavior: She is alert, thought content appropriate.      Neurosurgery Physical Exam  [id number]  AOx3  EOMI  Face Symmetric  Tongue midline  Vision loss left eye, L pupil non-reactive. R pupil briskly reactive to light.  BUE 5/5  BLE 5/5  No drift    Significant Labs:  Recent Labs   Lab 09/24/22 0415 09/25/22  0432   * 149*   * 135*   K 4.1 4.1   CL 98 99   CO2 26 28   BUN 16 15   CREATININE 0.6 0.6   CALCIUM 8.9 8.9       Recent Labs   Lab 09/24/22 0415 09/25/22  0432   WBC 5.22 4.27   HGB 9.5* 8.6*   HCT 27.4* 25.1*   PLT 65* 86*       No results for input(s): LABPT, INR, APTT in the last 48 hours.  Microbiology Results (last 7 days)       Procedure Component Value Units Date/Time    CSF culture [[id number]] Collected: 09/19/22 1245    Order Status: Completed Specimen: CSF (Spinal Fluid) from CSF Tap, Tube 3 Updated: 09/24/22 0650     CSF CULTURE No Growth     Gram Stain Result No WBC's      No organisms seen    Cryptococcal antigen, CSF [[id number]] Collected: 09/19/22 1245    Order Status: Completed Specimen: CSF (Spinal Fluid) from CSF Tap, Tube 3 Updated: 09/20/22 0934     Crypto Ag, CSF Negative    Gram stain [[id number]] Collected: 09/19/22 1245    Order Status: Canceled Specimen: CSF (Spinal Fluid) from CSF " Tap, Tube 3           All pertinent labs from the last 24 hours have been reviewed.    Significant Diagnostics:  I have reviewed and interpreted all pertinent imaging results/findings within the past 24 hours.    Neurosurgery Physical Exam

## 2022-09-25 NOTE — PROGRESS NOTES
[facility] - Oncology ([facility])  Hematology  Bone Marrow Transplant  Progress Note    Patient Name: Deepti Gonzalez  Admission Date: 9/16/2022  Hospital Length of Stay: 9 days  Code Status: Full Code    Subjective:     Interval History: plt 86, will transfuse 1 unit plt, CBC at midnight for possible overnight plt needs. HA still present. OS vision loss the same. Ommaya placement planned for tomorrow. CT head with stealth ordered by NSGY     Objective:     Vital Signs (Most Recent):  Temp: 98.2 °F (36.8 °C) (09/25/22 0739)  Pulse: 73 (09/25/22 0739)  Resp: 18 (09/25/22 0739)  BP: 127/81 (09/25/22 0739)  SpO2: (!) 94 % (09/25/22 0739)   Vital Signs (24h Range):  Temp:  [97.5 °F (36.4 °C)-98.8 °F (37.1 °C)] 98.2 °F (36.8 °C)  Pulse:  [69-88] 73  Resp:  [16-20] 18  SpO2:  [93 %-98 %] 94 %  BP: (105-127)/(56-81) 127/81     Weight: 60.3 kg (132 lb 15 oz)  Body mass index is 22.82 kg/m².  Body surface area is 1.65 meters squared.    ECOG SCORE           [unfilled]    Intake/Output - Last 3 Shifts         09/23 0700  09/24 0659 09/24 0700 09/25 0659 09/25 0700 09/26 0659    Blood  572     Total Intake(mL/kg)  572 (9.5)     Urine (mL/kg/hr) 1675 (1.2) 1100 (0.8)     Total Output 1675 1100     Net -1675 -528            Stool Occurrence  1 x             Physical Exam  Constitutional:       General: She is not in acute distress.     Appearance: She is well-developed.   HENT:      Head: Normocephalic and atraumatic.      Nose: Nose normal. No congestion.   Eyes:      General: No scleral icterus.     Extraocular Movements: Extraocular movements intact.      Comments: Left eye pupil not reactive. Left eye peripheral vision not intact. Reports eye pain with movement improved.    Cardiovascular:      Rate and Rhythm: Normal rate and regular rhythm.      Heart sounds: No murmur heard.  Pulmonary:      Effort: Pulmonary effort is normal. No respiratory distress.   Abdominal:      General: There is no distension.      Palpations:  Abdomen is soft.      Tenderness: There is no abdominal tenderness.   Genitourinary:     Comments: Green in place  Musculoskeletal:         General: Normal range of motion.      Cervical back: Normal range of motion and neck supple.   Skin:     General: Skin is warm and dry.      Comments: Port intact with no redness or drainage   Neurological:      General: No focal deficit present.      Mental Status: She is alert and oriented to person, place, and time.   Psychiatric:         Mood and Affect: Mood normal.         Behavior: Behavior normal.       Significant Labs:   CBC:   Recent Labs   Lab 09/24/22 0415 09/25/22 0432   WBC 5.22 4.27   HGB 9.5* 8.6*   HCT 27.4* 25.1*   PLT 65* 86*   , CMP:   Recent Labs   Lab 09/24/22 0415 09/25/22 0432   * 135*   K 4.1 4.1   CL 98 99   CO2 26 28   * 149*   BUN 16 15   CREATININE 0.6 0.6   CALCIUM 8.9 8.9   PROT 6.4 6.0   ALBUMIN 3.9 3.8   BILITOT 0.6 0.5   ALKPHOS 90 82   AST 7* 9*   ALT 23 18   ANIONGAP 10 8       Diagnostic Results:  I have reviewed all pertinent imaging results/findings within the past 24 hours.    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 Concern for possible B-ALL involvement vs side effect of ponatinib vs retrobulbar hemorrhage vs stroke    Plan:  - Consulted Opthomology, appreciate assistance  - LP on 9/19 and 9/22 with IT chemo (cytarabine, hydrocortisone, methotrexate)  - labs pending:   TB DNA by PCR negative  VDRL negative  [place] virus PCR negative  VZV PCR negative  HSV PCR negative  Enterovirus RNA negative  CNS demyelinating dz (AQP-4 IGG/MOG IGG)  CSF bands negative  NMO aquaporin negative  MS profile      Gram stain: no organisms seen  CSF culture NGTD  Flow cytometry analysis c/w B ALL  Cytology in process  CSF cell count 546 WBC, 87% lymphs, 12% others  CSF protein 43  Cryptococcal Ag negative    - repeat DFE  with ophthalmology for 9/26  - continue dexamethasone 4 q6h  - Patient with pacemaker and per patient, not compatible with MRI. Would ideally obtain MRI brain w/ w/o contrast. Verified with EP who checked with rep that pacemaker is not MRI compatible.   - CTA completed with delayed venous phase revealing small SAH   - DSA (digitial subtraction angio) recommended by NSGY. Neuro ok with it, but will defer further vasculitis workup for now given that leukemia is a more obvious etiology for her vision loss.       SAH (subarachnoid hemorrhage)  - Small SAH discovered on CTA. Repeat CTH stable. No focal neuro deficits. Pt has ongoing left sided headache and eye pain.   - Likely incidental finding that is not causing the vision loss.   - NSGY consulted, appreciate recs  - CT head with stealth pending.   - CT head on 9/24 shows no new hemorrhage    Thrombocytopenia  - Monitor CBC  - Transfuse for platelet count <10 or <50k with bleeding  - NSGY requesting platelets >70 k with platelets running during procedure or platelets >100 k x 2 prior to Ommaya placement planned for 9/26 with Dr. Alberto. Will transfuse accordingly    Urinary retention  - Patient reports wolf placed last week and has been that time.  - Attempt voiding trial prior to discharge  - Will need urology follow up at discharge. See little value in consult urolgoy/urogynecology inpatient as they will likely recommend outpt follow up.     Anemia associated with chemotherapy  - Monitor CBC  - Transfuse for Hgb<7    B-cell acute lymphoblastic leukemia  - Follows with Dr. Jenkins.  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  - Was treated with inotuzumab ozogamicin and ponatinib but course complicated by severe cytopenias. Not has been on therapy for past few weeks.  - Not a candidate for allogeneic stem cell transplant at this time.  - Will discuss restarting ponatinib with Dr. Padgett as it is  unlikely the cause of vision loss   - LP done 9/19 with CSF + for B ALL  - received IT chemo 9/19 and 9/22 (triple therapy), given CNS involvement ALL will plan for twice weekly IT chemo  - NSGY cancelled Ommaya placement on 9/22 due to plt count. Will plan for Ommaya placement on 9/26 pending plt count >70 with plt intraoperatively or >100    Anxiety  - Continue home buspirone, hydroxyzine and quetiapine.   - Trintellix is nonformulary however pharmacy is attempting to obtain this medication.    Hypertension  - Holding home BP meds in the setting of soft BP.   - Resume when appropriate.    Seizure disorder  - Continue home oxcarbazepine 200mg QHS    Type 2 diabetes mellitus, without long-term current use of insulin  Diabetes type 2  Last HbA1c:   Lab Results   Component Value Date    HGBA1C 4.4 04/13/2022       PLAN:   - Hold oral anti-hyperglycemic agents  - worsened by IV steroids  - Detemir 5U, Aspart 2U TID WM and SSI  - Diabetic diet  - Hypoglycemia orderset with POCT BG AC/QS  - Goal -180 while inpatient         Pacemaker  - Patient with pacemaker in place due to SSS. Per EP, it is definitely MRI incompatible.      Rheumatoid arthritis involving multiple sites  - Seronegative RA. Continue home plaquenil    Hyperlipidemia  - Continue home atorvastatin and fenofibrate    Paroxysmal atrial fibrillation  - Holding home xarelto in the setting of thrombocytopenia      VTE Risk Mitigation (From admission, onward)           Ordered     IP VTE HIGH RISK PATIENT  Once         09/16/22 1637     Place sequential compression device  Until discontinued         09/16/22 1637     Reason for No Pharmacological VTE Prophylaxis  Once        Question:  Reasons:  Answer:  Thrombocytopenia    09/16/22 1637                    Disposition: continue BMT admission    Pipe Hager MD  Bone Marrow Transplant  [facility] - Oncology (Hospital)        ATTENDING ATTESTATION:  I have personally seen, interviewed, and examined the  patient. I have reviewed and agree with the assessment/plan. In addition:    Platelets transfused to ensure threshold is met per neurosurgery. Recheck tonight and transfuse if <100.      Chaim Armendariz MD  Hematology, Oncology, and Stem Cell Transplantation  [facility]

## 2022-09-25 NOTE — ASSESSMENT & PLAN NOTE
Diabetes type 2  Last HbA1c:   Lab Results   Component Value Date    HGBA1C 4.4 04/13/2022       PLAN:   - Hold oral anti-hyperglycemic agents  - worsened by IV steroids  - Detemir 5U, Aspart 2U TID WM and SSI  - Diabetic diet  - Hypoglycemia orderset with POCT BG AC/QS  - Goal -180 while inpatient

## 2022-09-26 ENCOUNTER — ANESTHESIA (OUTPATIENT)
Dept: SURGERY | Facility: HOSPITAL | Age: 52
DRG: 834 | End: 2022-09-26
Payer: COMMERCIAL

## 2022-09-26 ENCOUNTER — DOCUMENTATION ONLY (OUTPATIENT)
Dept: ELECTROPHYSIOLOGY | Facility: CLINIC | Age: 52
End: 2022-09-26
Payer: COMMERCIAL

## 2022-09-26 LAB
ALBUMIN SERPL BCP-MCNC: 3.8 G/DL (ref 3.5–5.2)
ALP SERPL-CCNC: 73 U/L (ref 55–135)
ALT SERPL W/O P-5'-P-CCNC: 21 U/L (ref 10–44)
ANION GAP SERPL CALC-SCNC: 8 MMOL/L (ref 8–16)
ANISOCYTOSIS BLD QL SMEAR: SLIGHT
APTT BLDCRRT: <21 SEC (ref 21–32)
AST SERPL-CCNC: 11 U/L (ref 10–40)
BASOPHILS # BLD AUTO: 0 K/UL (ref 0–0.2)
BASOPHILS # BLD AUTO: 0 K/UL (ref 0–0.2)
BASOPHILS NFR BLD: 0 % (ref 0–1.9)
BASOPHILS NFR BLD: 0 % (ref 0–1.9)
BILIRUB SERPL-MCNC: 0.6 MG/DL (ref 0.1–1)
BLD PROD TYP BPU: NORMAL
BLOOD UNIT EXPIRATION DATE: NORMAL
BLOOD UNIT TYPE CODE: 7300
BLOOD UNIT TYPE: NORMAL
BUN SERPL-MCNC: 16 MG/DL (ref 6–20)
CALCIUM SERPL-MCNC: 9 MG/DL (ref 8.7–10.5)
CHLORIDE SERPL-SCNC: 97 MMOL/L (ref 95–110)
CNS DEMYELINATING DISEASE EVAL: NORMAL
CO2 SERPL-SCNC: 28 MMOL/L (ref 23–29)
CODING SYSTEM: NORMAL
CREAT SERPL-MCNC: 0.5 MG/DL (ref 0.5–1.4)
DIFFERENTIAL METHOD: ABNORMAL
DIFFERENTIAL METHOD: ABNORMAL
DISPENSE STATUS: NORMAL
EOSINOPHIL # BLD AUTO: 0 K/UL (ref 0–0.5)
EOSINOPHIL # BLD AUTO: 0 K/UL (ref 0–0.5)
EOSINOPHIL NFR BLD: 1 % (ref 0–8)
EOSINOPHIL NFR BLD: 1 % (ref 0–8)
ERYTHROCYTE [DISTWIDTH] IN BLOOD BY AUTOMATED COUNT: 15.5 % (ref 11.5–14.5)
ERYTHROCYTE [DISTWIDTH] IN BLOOD BY AUTOMATED COUNT: 15.8 % (ref 11.5–14.5)
EST. GFR  (NO RACE VARIABLE): >60 ML/MIN/1.73 M^2
GLUCOSE SERPL-MCNC: 111 MG/DL (ref 70–110)
HCT VFR BLD AUTO: 22.3 % (ref 37–48.5)
HCT VFR BLD AUTO: 22.5 % (ref 37–48.5)
HGB BLD-MCNC: 7.8 G/DL (ref 12–16)
HGB BLD-MCNC: 7.9 G/DL (ref 12–16)
HYPOCHROMIA BLD QL SMEAR: ABNORMAL
IMM GRANULOCYTES # BLD AUTO: 0.02 K/UL (ref 0–0.04)
IMM GRANULOCYTES # BLD AUTO: 0.02 K/UL (ref 0–0.04)
IMM GRANULOCYTES NFR BLD AUTO: 0.5 % (ref 0–0.5)
IMM GRANULOCYTES NFR BLD AUTO: 0.5 % (ref 0–0.5)
INR PPP: 1.1 (ref 0.8–1.2)
LYMPHOCYTES # BLD AUTO: 1 K/UL (ref 1–4.8)
LYMPHOCYTES # BLD AUTO: 1.5 K/UL (ref 1–4.8)
LYMPHOCYTES NFR BLD: 24.5 % (ref 18–48)
LYMPHOCYTES NFR BLD: 37.9 % (ref 18–48)
MCH RBC QN AUTO: 38.7 PG (ref 27–31)
MCH RBC QN AUTO: 39.2 PG (ref 27–31)
MCHC RBC AUTO-ENTMCNC: 35 G/DL (ref 32–36)
MCHC RBC AUTO-ENTMCNC: 35.1 G/DL (ref 32–36)
MCV RBC AUTO: 110 FL (ref 82–98)
MCV RBC AUTO: 112 FL (ref 82–98)
MOG-IGG1: NEGATIVE
MONOCYTES # BLD AUTO: 0 K/UL (ref 0.3–1)
MONOCYTES # BLD AUTO: 0 K/UL (ref 0.3–1)
MONOCYTES NFR BLD: 0.2 % (ref 4–15)
MONOCYTES NFR BLD: 0.5 % (ref 4–15)
NEUTROPHILS # BLD AUTO: 2.5 K/UL (ref 1.8–7.7)
NEUTROPHILS # BLD AUTO: 2.9 K/UL (ref 1.8–7.7)
NEUTROPHILS NFR BLD: 60.4 % (ref 38–73)
NEUTROPHILS NFR BLD: 73.5 % (ref 38–73)
NMO/AQP4 FACS,S: NEGATIVE
NRBC BLD-RTO: 0 /100 WBC
NRBC BLD-RTO: 0 /100 WBC
OVALOCYTES BLD QL SMEAR: ABNORMAL
PLATELET # BLD AUTO: 141 K/UL (ref 150–450)
PLATELET # BLD AUTO: 98 K/UL (ref 150–450)
PMV BLD AUTO: 10.1 FL (ref 9.2–12.9)
PMV BLD AUTO: 10.4 FL (ref 9.2–12.9)
POCT GLUCOSE: 141 MG/DL (ref 70–110)
POCT GLUCOSE: 154 MG/DL (ref 70–110)
POCT GLUCOSE: 159 MG/DL (ref 70–110)
POCT GLUCOSE: 161 MG/DL (ref 70–110)
POIKILOCYTOSIS BLD QL SMEAR: SLIGHT
POLYCHROMASIA BLD QL SMEAR: ABNORMAL
POTASSIUM SERPL-SCNC: 4 MMOL/L (ref 3.5–5.1)
PROT SERPL-MCNC: 6.5 G/DL (ref 6–8.4)
PROTHROMBIN TIME: 11.7 SEC (ref 9–12.5)
RBC # BLD AUTO: 1.99 M/UL (ref 4–5.4)
RBC # BLD AUTO: 2.04 M/UL (ref 4–5.4)
SODIUM SERPL-SCNC: 133 MMOL/L (ref 136–145)
SPHEROCYTES BLD QL SMEAR: ABNORMAL
UNIT NUMBER: NORMAL
WBC # BLD AUTO: 3.88 K/UL (ref 3.9–12.7)
WBC # BLD AUTO: 4.06 K/UL (ref 3.9–12.7)

## 2022-09-26 PROCEDURE — P9037 PLATE PHERES LEUKOREDU IRRAD: HCPCS | Performed by: STUDENT IN AN ORGANIZED HEALTH CARE EDUCATION/TRAINING PROGRAM

## 2022-09-26 PROCEDURE — 63600175 PHARM REV CODE 636 W HCPCS: Performed by: NEUROLOGICAL SURGERY

## 2022-09-26 PROCEDURE — 25000003 PHARM REV CODE 250: Performed by: NEUROLOGICAL SURGERY

## 2022-09-26 PROCEDURE — C1729 CATH, DRAINAGE: HCPCS | Performed by: NEUROLOGICAL SURGERY

## 2022-09-26 PROCEDURE — 85730 THROMBOPLASTIN TIME PARTIAL: CPT

## 2022-09-26 PROCEDURE — 25000003 PHARM REV CODE 250

## 2022-09-26 PROCEDURE — 63600175 PHARM REV CODE 636 W HCPCS: Performed by: STUDENT IN AN ORGANIZED HEALTH CARE EDUCATION/TRAINING PROGRAM

## 2022-09-26 PROCEDURE — 36000708 HC OR TIME LEV III 1ST 15 MIN: Performed by: NEUROLOGICAL SURGERY

## 2022-09-26 PROCEDURE — 20600001 HC STEP DOWN PRIVATE ROOM

## 2022-09-26 PROCEDURE — 71000033 HC RECOVERY, INTIAL HOUR: Performed by: NEUROLOGICAL SURGERY

## 2022-09-26 PROCEDURE — 61210 BURR HOLE IMPLT VENTR CATH: CPT | Mod: ,,, | Performed by: NEUROLOGICAL SURGERY

## 2022-09-26 PROCEDURE — 25000003 PHARM REV CODE 250: Performed by: NURSE ANESTHETIST, CERTIFIED REGISTERED

## 2022-09-26 PROCEDURE — D9220A PRA ANESTHESIA: Mod: CRNA,,, | Performed by: NURSE ANESTHETIST, CERTIFIED REGISTERED

## 2022-09-26 PROCEDURE — D9220A PRA ANESTHESIA: Mod: ANES,,, | Performed by: ANESTHESIOLOGY

## 2022-09-26 PROCEDURE — 27201423 OPTIME MED/SURG SUP & DEVICES STERILE SUPPLY: Performed by: NEUROLOGICAL SURGERY

## 2022-09-26 PROCEDURE — 63600175 PHARM REV CODE 636 W HCPCS: Performed by: NURSE ANESTHETIST, CERTIFIED REGISTERED

## 2022-09-26 PROCEDURE — 61210: ICD-10-PCS | Mod: ,,, | Performed by: NEUROLOGICAL SURGERY

## 2022-09-26 PROCEDURE — 82962 GLUCOSE BLOOD TEST: CPT | Performed by: NEUROLOGICAL SURGERY

## 2022-09-26 PROCEDURE — 99232 SBSQ HOSP IP/OBS MODERATE 35: CPT | Mod: ,,, | Performed by: INTERNAL MEDICINE

## 2022-09-26 PROCEDURE — 25000003 PHARM REV CODE 250: Performed by: STUDENT IN AN ORGANIZED HEALTH CARE EDUCATION/TRAINING PROGRAM

## 2022-09-26 PROCEDURE — D9220A PRA ANESTHESIA: ICD-10-PCS | Mod: CRNA,,, | Performed by: NURSE ANESTHETIST, CERTIFIED REGISTERED

## 2022-09-26 PROCEDURE — 63600175 PHARM REV CODE 636 W HCPCS: Performed by: ANESTHESIOLOGY

## 2022-09-26 PROCEDURE — 85025 COMPLETE CBC W/AUTO DIFF WBC: CPT | Mod: 91 | Performed by: STUDENT IN AN ORGANIZED HEALTH CARE EDUCATION/TRAINING PROGRAM

## 2022-09-26 PROCEDURE — 71000016 HC POSTOP RECOV ADDL HR: Performed by: NEUROLOGICAL SURGERY

## 2022-09-26 PROCEDURE — 36000709 HC OR TIME LEV III EA ADD 15 MIN: Performed by: NEUROLOGICAL SURGERY

## 2022-09-26 PROCEDURE — 37000009 HC ANESTHESIA EA ADD 15 MINS: Performed by: NEUROLOGICAL SURGERY

## 2022-09-26 PROCEDURE — 85610 PROTHROMBIN TIME: CPT

## 2022-09-26 PROCEDURE — 36430 TRANSFUSION BLD/BLD COMPNT: CPT

## 2022-09-26 PROCEDURE — 99232 PR SUBSEQUENT HOSPITAL CARE,LEVL II: ICD-10-PCS | Mod: ,,, | Performed by: INTERNAL MEDICINE

## 2022-09-26 PROCEDURE — 71000015 HC POSTOP RECOV 1ST HR: Performed by: NEUROLOGICAL SURGERY

## 2022-09-26 PROCEDURE — 37000008 HC ANESTHESIA 1ST 15 MINUTES: Performed by: NEUROLOGICAL SURGERY

## 2022-09-26 PROCEDURE — 85025 COMPLETE CBC W/AUTO DIFF WBC: CPT

## 2022-09-26 PROCEDURE — D9220A PRA ANESTHESIA: ICD-10-PCS | Mod: ANES,,, | Performed by: ANESTHESIOLOGY

## 2022-09-26 PROCEDURE — 80053 COMPREHEN METABOLIC PANEL: CPT | Performed by: STUDENT IN AN ORGANIZED HEALTH CARE EDUCATION/TRAINING PROGRAM

## 2022-09-26 PROCEDURE — 63600175 PHARM REV CODE 636 W HCPCS: Performed by: NURSE PRACTITIONER

## 2022-09-26 RX ORDER — ONDANSETRON 2 MG/ML
4 INJECTION INTRAMUSCULAR; INTRAVENOUS ONCE AS NEEDED
Status: DISCONTINUED | OUTPATIENT
Start: 2022-09-26 | End: 2022-09-26 | Stop reason: HOSPADM

## 2022-09-26 RX ORDER — FENTANYL CITRATE 50 UG/ML
25 INJECTION, SOLUTION INTRAMUSCULAR; INTRAVENOUS EVERY 5 MIN PRN
Status: DISCONTINUED | OUTPATIENT
Start: 2022-09-26 | End: 2022-09-26 | Stop reason: HOSPADM

## 2022-09-26 RX ORDER — ONDANSETRON 2 MG/ML
INJECTION INTRAMUSCULAR; INTRAVENOUS
Status: DISCONTINUED | OUTPATIENT
Start: 2022-09-26 | End: 2022-09-26

## 2022-09-26 RX ORDER — PROPOFOL 10 MG/ML
VIAL (ML) INTRAVENOUS
Status: DISCONTINUED | OUTPATIENT
Start: 2022-09-26 | End: 2022-09-26

## 2022-09-26 RX ORDER — HYDROMORPHONE HYDROCHLORIDE 1 MG/ML
0.2 INJECTION, SOLUTION INTRAMUSCULAR; INTRAVENOUS; SUBCUTANEOUS EVERY 5 MIN PRN
Status: DISCONTINUED | OUTPATIENT
Start: 2022-09-26 | End: 2022-09-26 | Stop reason: HOSPADM

## 2022-09-26 RX ORDER — ROCURONIUM BROMIDE 10 MG/ML
INJECTION, SOLUTION INTRAVENOUS
Status: DISCONTINUED | OUTPATIENT
Start: 2022-09-26 | End: 2022-09-26

## 2022-09-26 RX ORDER — MIDAZOLAM HYDROCHLORIDE 1 MG/ML
INJECTION, SOLUTION INTRAMUSCULAR; INTRAVENOUS
Status: DISCONTINUED | OUTPATIENT
Start: 2022-09-26 | End: 2022-09-26

## 2022-09-26 RX ORDER — CEFAZOLIN SODIUM 1 G/3ML
INJECTION, POWDER, FOR SOLUTION INTRAMUSCULAR; INTRAVENOUS
Status: DISCONTINUED | OUTPATIENT
Start: 2022-09-26 | End: 2022-09-26

## 2022-09-26 RX ORDER — FENTANYL CITRATE 50 UG/ML
INJECTION, SOLUTION INTRAMUSCULAR; INTRAVENOUS
Status: DISCONTINUED | OUTPATIENT
Start: 2022-09-26 | End: 2022-09-26

## 2022-09-26 RX ORDER — HYDROCODONE BITARTRATE AND ACETAMINOPHEN 500; 5 MG/1; MG/1
TABLET ORAL
Status: DISCONTINUED | OUTPATIENT
Start: 2022-09-26 | End: 2022-09-29

## 2022-09-26 RX ORDER — DEXAMETHASONE SODIUM PHOSPHATE 4 MG/ML
INJECTION, SOLUTION INTRA-ARTICULAR; INTRALESIONAL; INTRAMUSCULAR; INTRAVENOUS; SOFT TISSUE
Status: DISCONTINUED | OUTPATIENT
Start: 2022-09-26 | End: 2022-09-26

## 2022-09-26 RX ORDER — LIDOCAINE HYDROCHLORIDE 20 MG/ML
INJECTION INTRAVENOUS
Status: DISCONTINUED | OUTPATIENT
Start: 2022-09-26 | End: 2022-09-26

## 2022-09-26 RX ADMIN — BUTALBITAL, ACETAMINOPHEN, AND CAFFEINE 1 TABLET: 50; 325; 40 TABLET ORAL at 05:09

## 2022-09-26 RX ADMIN — GABAPENTIN 300 MG: 300 CAPSULE ORAL at 03:09

## 2022-09-26 RX ADMIN — HYDROMORPHONE HYDROCHLORIDE 4 MG: 4 TABLET ORAL at 06:09

## 2022-09-26 RX ADMIN — DEXAMETHASONE 4 MG: 4 TABLET ORAL at 05:09

## 2022-09-26 RX ADMIN — INSULIN ASPART 2 UNITS: 100 INJECTION, SOLUTION INTRAVENOUS; SUBCUTANEOUS at 09:09

## 2022-09-26 RX ADMIN — INSULIN DETEMIR 5 UNITS: 100 INJECTION, SOLUTION SUBCUTANEOUS at 09:09

## 2022-09-26 RX ADMIN — ONDANSETRON 4 MG: 2 INJECTION INTRAMUSCULAR; INTRAVENOUS at 08:09

## 2022-09-26 RX ADMIN — QUETIAPINE FUMARATE 200 MG: 200 TABLET ORAL at 09:09

## 2022-09-26 RX ADMIN — MIDAZOLAM HYDROCHLORIDE 1 MG: 1 INJECTION, SOLUTION INTRAMUSCULAR; INTRAVENOUS at 12:09

## 2022-09-26 RX ADMIN — ONDANSETRON 4 MG: 2 INJECTION INTRAMUSCULAR; INTRAVENOUS at 09:09

## 2022-09-26 RX ADMIN — HYDROXYZINE PAMOATE 50 MG: 25 CAPSULE ORAL at 09:09

## 2022-09-26 RX ADMIN — OXCARBAZEPINE 1200 MG: 600 TABLET, FILM COATED ORAL at 09:09

## 2022-09-26 RX ADMIN — PROPOFOL 100 MG: 10 INJECTION, EMULSION INTRAVENOUS at 12:09

## 2022-09-26 RX ADMIN — DEXAMETHASONE SODIUM PHOSPHATE 4 MG: 4 INJECTION, SOLUTION INTRAMUSCULAR; INTRAVENOUS at 01:09

## 2022-09-26 RX ADMIN — SODIUM CHLORIDE: 0.9 INJECTION, SOLUTION INTRAVENOUS at 12:09

## 2022-09-26 RX ADMIN — DILTIAZEM HYDROCHLORIDE 30 MG: 60 TABLET, FILM COATED ORAL at 04:09

## 2022-09-26 RX ADMIN — CEFAZOLIN 2 G: 330 INJECTION, POWDER, FOR SOLUTION INTRAMUSCULAR; INTRAVENOUS at 01:09

## 2022-09-26 RX ADMIN — LIDOCAINE HYDROCHLORIDE 50 MG: 20 INJECTION INTRAVENOUS at 12:09

## 2022-09-26 RX ADMIN — DILTIAZEM HYDROCHLORIDE 30 MG: 60 TABLET, FILM COATED ORAL at 05:09

## 2022-09-26 RX ADMIN — ROCURONIUM BROMIDE 30 MG: 10 INJECTION INTRAVENOUS at 12:09

## 2022-09-26 RX ADMIN — FENTANYL CITRATE 50 MCG: 50 INJECTION, SOLUTION INTRAMUSCULAR; INTRAVENOUS at 12:09

## 2022-09-26 RX ADMIN — BUSPIRONE HYDROCHLORIDE 10 MG: 10 TABLET ORAL at 09:09

## 2022-09-26 RX ADMIN — VANCOMYCIN HYDROCHLORIDE 10 MG: 500 INJECTION, POWDER, LYOPHILIZED, FOR SOLUTION INTRAVENOUS at 01:09

## 2022-09-26 RX ADMIN — FENTANYL CITRATE 25 MCG: 50 INJECTION, SOLUTION INTRAMUSCULAR; INTRAVENOUS at 03:09

## 2022-09-26 RX ADMIN — ACYCLOVIR 400 MG: 200 CAPSULE ORAL at 09:09

## 2022-09-26 RX ADMIN — GENTAMICIN 4 MG: 10 INJECTION, SOLUTION INTRAMUSCULAR; INTRAVENOUS at 01:09

## 2022-09-26 RX ADMIN — FENTANYL CITRATE 50 MCG: 50 INJECTION, SOLUTION INTRAMUSCULAR; INTRAVENOUS at 01:09

## 2022-09-26 RX ADMIN — HYDROMORPHONE HYDROCHLORIDE 4 MG: 4 TABLET ORAL at 03:09

## 2022-09-26 RX ADMIN — ONDANSETRON 4 MG: 2 INJECTION INTRAMUSCULAR; INTRAVENOUS at 01:09

## 2022-09-26 RX ADMIN — SUGAMMADEX 200 MG: 100 INJECTION, SOLUTION INTRAVENOUS at 03:09

## 2022-09-26 NOTE — SUBJECTIVE & OBJECTIVE
Subjective:     Interval History: No acute events overnight. Received 1u pRBCs overnight. Plt now 141k. Plan for Ommaya placement by NRSY today followed by IT chemo.     Objective:     Vital Signs (Most Recent):  Temp: 99 °F (37.2 °C) (09/26/22 1000)  Pulse: 64 (09/26/22 1000)  Resp: 16 (09/26/22 1000)  BP: 138/65 (09/26/22 1000)  SpO2: 99 % (09/26/22 1000) Vital Signs (24h Range):  Temp:  [96.8 °F (36 °C)-99 °F (37.2 °C)] 99 °F (37.2 °C)  Pulse:  [64-80] 64  Resp:  [16-18] 16  SpO2:  [93 %-99 %] 99 %  BP: (110-151)/(56-68) 138/65     Weight: 59.1 kg (130 lb 4.7 oz)  Body mass index is 22.36 kg/m².  Body surface area is 1.63 meters squared.    ECOG SCORE           [unfilled]    Intake/Output - Last 3 Shifts         09/24 0700  09/25 0659 09/25 0700  09/26 0659 09/26 0700  09/27 0659    P.O.  120 0    Blood 572 882     IV Piggyback   200    Total Intake(mL/kg) 572 (9.5) 1002 (17) 200 (3.4)    Urine (mL/kg/hr) 1100 (0.8) 1050 (0.7) 700 (1.8)    Total Output 1100 1050 700    Net -528 -48 -500           Stool Occurrence 1 x 4 x             Physical Exam  Constitutional:       General: She is not in acute distress.     Appearance: She is well-developed.   HENT:      Head: Normocephalic and atraumatic.      Nose: Nose normal. No congestion.   Eyes:      General: No scleral icterus.     Extraocular Movements: Extraocular movements intact.      Comments: Left eye pupil not reactive. Left eye peripheral vision not intact. Reports eye pain with movement improved.    Cardiovascular:      Rate and Rhythm: Normal rate and regular rhythm.      Heart sounds: No murmur heard.  Pulmonary:      Effort: Pulmonary effort is normal. No respiratory distress.   Abdominal:      General: There is no distension.      Palpations: Abdomen is soft.      Tenderness: There is no abdominal tenderness.   Genitourinary:     Comments: Green in place  Musculoskeletal:         General: Normal range of motion.      Cervical back: Normal range of motion  and neck supple.   Skin:     General: Skin is warm and dry.      Comments: Port intact with no redness or drainage   Neurological:      General: No focal deficit present.      Mental Status: She is alert and oriented to person, place, and time.   Psychiatric:         Mood and Affect: Mood normal.         Behavior: Behavior normal.       Significant Labs:   CBC:   Recent Labs   Lab 09/25/22 0432 09/26/22  0001 09/26/22 0359   WBC 4.27 4.06 3.88*   HGB 8.6* 7.8* 7.9*   HCT 25.1* 22.3* 22.5*   PLT 86* 98* 141*   , CMP:   Recent Labs   Lab 09/25/22 0432 09/26/22 0359   * 133*   K 4.1 4.0   CL 99 97   CO2 28 28   * 111*   BUN 15 16   CREATININE 0.6 0.5   CALCIUM 8.9 9.0   PROT 6.0 6.5   ALBUMIN 3.8 3.8   BILITOT 0.5 0.6   ALKPHOS 82 73   AST 9* 11   ALT 18 21   ANIONGAP 8 8   , and Coagulation:   Recent Labs   Lab 09/26/22 0359   INR 1.1   APTT <21.0       Diagnostic Results:  I have reviewed and interpreted all pertinent imaging results/findings within the past 24 hours.

## 2022-09-26 NOTE — PLAN OF CARE
Pt is AAOx4 and is chairfast. VSS and pt is afebrile. Pt c/o  7/10 HA pain and had alternating Fioricet and po Dilaudid with little to no relief. Neuro checks q2h, WDL. Green draining cloudy, yellow urine, catheter care performed. No b.m.'s overnight and pt is C-Diff neg.  @ bedside, attentive to pt. Pt remained safe and free of injury through the night. Bed in low and locked position, bed rails up x2, call bell in reach, non skid socks worn out of bed. PT NPO after midnight for expected Ommaya Carlos placement. Will continue to monitor.

## 2022-09-26 NOTE — PLAN OF CARE
AAOx4 this morning. Ommaya reservoir placed today. After procedure in afternoon only oriented to self. Pt c/o pain post procedure. Administered Fioricet x1. Pt with nonskid footwear on with bed in lowest position and locked with bed rails up x2.  at bedside. Pt instructed to call prior to getting OOB. Pt with call light within reach and verbalized understanding. Will continue to monitor pt.

## 2022-09-26 NOTE — OP NOTE
[facility] - Surgery ([facility])  Neurosurgery  Operative Note    SUMMARY      Date of Procedure: 9/26/2022     Procedure: Procedure(s) (LRB):  INSERTION, OMMAYA RESERVOIR (N/A)     Surgeon(s) and Role:     * Mj Alberto MD - Primary     * Yamila Cloud MD -Neurosurgery     Pre-Operative Diagnosis: Acute lymphoblastic leukemia (ALL) [C91.00]    Post-Operative Diagnosis: Post-Op Diagnosis Codes:     * Acute lymphoblastic leukemia (ALL) [C91.00]    Anesthesia: General    Indication for the procedure:  51-year-old female with a history of leukemia, and thrombocytopenia.  He initially presented with acute vision loss with a small left frontal subarachnoid hemorrhage.  There is no evidence of vascular pathology.  Given her need for intrathecal therapy for leukemia seizure was made for placement of an Ommaya reservoir for intrathecal treatment.  Patient afebrile appears about risks, benefits, and alternatives to the procedure include not limited to heart attack coma, stroke infection, bleeding, paralysis coma even death.  Informed consent was obtained and secured in chart after patient voiced understanding the risks and decided proceed with the procedure.  Patient was made therapeutic with her platelets, such that the preoperative platelets of 141 the morning of surgery.    Operative Findings : None    Description of Procedures:    Patient brought to the operative theater.  She was intubated by anesthesia team.  Preoperative prophylactic IV antibiotics were given.  We then registered the patient with our neuro navigational system to within 2 mm of accuracy.  We identified important landmarks particularly the superior sagittal sinus, coronal suture, primary motor cortex.  We then pre prepped with alcohol.  We then clipped the hair, in the area of planned incision.  Patient has been prepped and draped in standard sterile fashion.  The skin was opened sharply with a C-shaped fashion with a 10. Blade down  the level of periosteum.  Scalp flap was reflected posteriorly and held in place utilizing sutures.  Then utilizing a high-speed pneumatic drill padmini hole was drilled in the appropriate trajectory after confirmation with the neuronavigation system.  We then widened our cortical bone area , with the high-speed pneumatic drill in order for the Ommaya reservoir to fit.  We then coagulated with bipolar cautery.  The dura was opened sharply with a 11. Blade in a cruciate fashion.  Within gently coagulated the cortex area of the planned trajectory.  The catheter was then advanced such that it was 6-1/2 cm at the level of the bone which was measured from our preoperative assessment.  Then proximally 3 cc of CSF which was under significant pressure.  Vancomycin and gentamicin was placed intrathecally.  The my reservoir was then connected to the proximal catheter.  The cavity was irrigated with copious irrigation perfectly clear.  Silk suture was then used to anchor the suture to the Ommaya reservoir.  The galea was reapproximated utilizing 2-0 Vicryl.  The skin was reapproximated utilizing 3-0 Monocryl.  All sponge needle counts were correct at the end the procedure x2.  Dr. Alberto, was present for the entirety of the case.    Estimated Blood Loss (EBL):  Minimal           Specimens:  None  Specimen (24h ago, onward)      None             Implants:   Implant Name Type Inv. Item Serial No.  Lot No. LRB No. Used Action   STANDARD [employer] CSF RESORVOIR    [employer] [id number] N/A 1 Implanted              Condition: Good    Disposition: PACU - hemodynamically stable.    Attestation: I was present and scrubbed for the entire procedure.

## 2022-09-26 NOTE — PLAN OF CARE
Pt IV flushed. Scalp dressing clean and intact, scant drainage. CT scan complete. Pt AAOx4 at this time. Pt reports pain relief following medication administration

## 2022-09-26 NOTE — PROGRESS NOTES
Patient has been identified as having an implanted cardiac rhythm device (CRD); the implanted device is a SJM pacemaker.      No noted pacer dependency.       PACEMAKERS/NON-DEPENDENT    Per protocol, no pacemaker reprogramming is required in this non-dependent patient.  Can apply a magnet directly over the implanted device during entire surgical procedure if electrocautery will be used.      For additional questions, please contact the Arrhythmia Department at Ext [phone].

## 2022-09-26 NOTE — ASSESSMENT & PLAN NOTE
- Monitor CBC  - Transfuse for platelet count <10 or <50k with bleeding  - improved w/ transfusion

## 2022-09-26 NOTE — PROGRESS NOTES
[facility] - [facility] ([facility])  Hematology  Bone Marrow Transplant  Progress Note    Patient Name: Deepti Gonzalez  Admission Date: 9/16/2022  Hospital Length of Stay: 10 days  Code Status: Full Code    Subjective:     Interval History: No acute events overnight. Received 1u pRBCs overnight. Plt now 141k. Plan for Ommaya placement by [facility] today followed by IT chemo.     Objective:     Vital Signs (Most Recent):  Temp: 99 °F (37.2 °C) (09/26/22 1000)  Pulse: 64 (09/26/22 1000)  Resp: 16 (09/26/22 1000)  BP: 138/65 (09/26/22 1000)  SpO2: 99 % (09/26/22 1000) Vital Signs (24h Range):  Temp:  [96.8 °F (36 °C)-99 °F (37.2 °C)] 99 °F (37.2 °C)  Pulse:  [64-80] 64  Resp:  [16-18] 16  SpO2:  [93 %-99 %] 99 %  BP: (110-151)/(56-68) 138/65     Weight: 59.1 kg (130 lb 4.7 oz)  Body mass index is 22.36 kg/m².  Body surface area is 1.63 meters squared.    ECOG SCORE           [unfilled]    Intake/Output - Last 3 Shifts         09/24 0700 09/25 0659 09/25 0700 09/26 0659 09/26 0700 09/27 0659    P.O.  120 0    Blood 572 882     IV Piggyback   200    Total Intake(mL/kg) 572 (9.5) 1002 (17) 200 (3.4)    Urine (mL/kg/hr) 1100 (0.8) 1050 (0.7) 700 (1.8)    Total Output 1100 1050 700    Net -528 -48 -500           Stool Occurrence 1 x 4 x             Physical Exam  Constitutional:       General: She is not in acute distress.     Appearance: She is well-developed.   HENT:      Head: Normocephalic and atraumatic.      Nose: Nose normal. No congestion.   Eyes:      General: No scleral icterus.     Extraocular Movements: Extraocular movements intact.      Comments: Left eye pupil not reactive. Left eye peripheral vision not intact. Reports eye pain with movement improved.    Cardiovascular:      Rate and Rhythm: Normal rate and regular rhythm.      Heart sounds: No murmur heard.  Pulmonary:      Effort: Pulmonary effort is normal. No respiratory distress.   Abdominal:      General: There is no distension.      Palpations: Abdomen is soft.       Tenderness: There is no abdominal tenderness.   Genitourinary:     Comments: Green in place  Musculoskeletal:         General: Normal range of motion.      Cervical back: Normal range of motion and neck supple.   Skin:     General: Skin is warm and dry.      Comments: Port intact with no redness or drainage   Neurological:      General: No focal deficit present.      Mental Status: She is alert and oriented to person, place, and time.   Psychiatric:         Mood and Affect: Mood normal.         Behavior: Behavior normal.       Significant Labs:   CBC:   Recent Labs   Lab 09/25/22  0432 09/26/22  0001 09/26/22 0359   WBC 4.27 4.06 3.88*   HGB 8.6* 7.8* 7.9*   HCT 25.1* 22.3* 22.5*   PLT 86* 98* 141*   , CMP:   Recent Labs   Lab 09/25/22 0432 09/26/22 0359   * 133*   K 4.1 4.0   CL 99 97   CO2 28 28   * 111*   BUN 15 16   CREATININE 0.6 0.5   CALCIUM 8.9 9.0   PROT 6.0 6.5   ALBUMIN 3.8 3.8   BILITOT 0.5 0.6   ALKPHOS 82 73   AST 9* 11   ALT 18 21   ANIONGAP 8 8   , and Coagulation:   Recent Labs   Lab 09/26/22 0359   INR 1.1   APTT <21.0       Diagnostic Results:  I have reviewed and interpreted all pertinent imaging results/findings within the past 24 hours.    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 Concern for possible B-ALL involvement vs side effect of ponatinib vs retrobulbar hemorrhage vs stroke    Plan:  - Consulted Opthomology, appreciate assistance  - LP on 9/19 and 9/22 with IT chemo (cytarabine, hydrocortisone, methotrexate)  - labs pending:   TB DNA by PCR negative  VDRL negative  [place] virus PCR negative  VZV PCR negative  HSV PCR negative  Enterovirus RNA negative  CNS demyelinating dz (AQP-4 IGG/MOG IGG)  CSF bands negative  NMO aquaporin negative  MS profile      Gram stain: no organisms seen  CSF culture NGTD  Flow cytometry analysis c/w B ALL  Cytology in  process  CSF cell count 546 WBC, 87% lymphs, 12% others  CSF protein 43  Cryptococcal Ag negative    - repeat DFE with ophthalmology for 9/26  - continue dexamethasone 4 q6h  - Patient with pacemaker and per patient, not compatible with MRI. Would ideally obtain MRI brain w/ w/o contrast. Verified with EP who checked with rep that pacemaker is not MRI compatible.   - CTA completed with delayed venous phase revealing small SAH   - DSA (digitial subtraction angio) recommended by NSGY. Neuro ok with it, but will defer further vasculitis workup for now given that leukemia is a more obvious etiology for her vision loss.       Monocular vision loss  - see vision loss     SAH (subarachnoid hemorrhage)  - Small SAH discovered on CTA. Repeat CTH stable. No focal neuro deficits. Pt has ongoing left sided headache and eye pain.   - Likely incidental finding that is not causing the vision loss.   - NSGY consulted, appreciate recs  - CT head with stealth shows resolving SAH  - CT head on 9/24 shows no new hemorrhage    Thrombocytopenia  - Monitor CBC  - Transfuse for platelet count <10 or <50k with bleeding  - improved w/ transfusion      Urinary retention  - Patient reports wolf placed last week and has been that time.  - Attempt voiding trial prior to discharge  - Will need urology follow up at discharge. See little value in consult urolgoy/urogynecology inpatient as they will likely recommend outpt follow up.     Anemia associated with chemotherapy  - Monitor CBC  - Transfuse for Hgb<7    B-cell acute lymphoblastic leukemia  - Follows with Dr. Jenkins.  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  - Was treated with inotuzumab ozogamicin and ponatinib but course complicated by severe cytopenias. Not has been on therapy for past few weeks.  - Not a candidate for allogeneic stem cell transplant at this time.  - Will discuss restarting ponatinib with Dr. Padgett  as it is unlikely the cause of vision loss   - LP done 9/19 with CSF + for B ALL  - received IT chemo 9/19 and 9/22 (triple therapy), given CNS involvement ALL will plan for twice weekly IT chemo  - Ommaya placement planned for today     Anxiety  - Continue home buspirone, hydroxyzine and quetiapine.   - Trintellix is nonformulary however pharmacy is attempting to obtain this medication.    Hypertension  - Holding home BP meds in the setting of soft BP.   - Resume when appropriate.    Seizure disorder  - Continue home oxcarbazepine 200mg QHS    Type 2 diabetes mellitus, without long-term current use of insulin  Diabetes type 2  Last HbA1c:   Lab Results   Component Value Date    HGBA1C 4.4 04/13/2022       PLAN:   - Hold oral anti-hyperglycemic agents  - worsened by IV steroids  - Detemir 5U, Aspart 2U TID WM and SSI  - Diabetic diet  - Hypoglycemia orderset with POCT BG AC/QS  - Goal -180 while inpatient         Pacemaker  - Patient with pacemaker in place due to SSS. Per EP, it is definitely MRI incompatible.      Rheumatoid arthritis involving multiple sites  - Seronegative RA. Continue home plaquenil    Hyperlipidemia  - Continue home atorvastatin and fenofibrate    Paroxysmal atrial fibrillation  - Holding home xarelto in the setting of thrombocytopenia        VTE Risk Mitigation (From admission, onward)         Ordered     IP VTE HIGH RISK PATIENT  Once         09/16/22 1637     Place sequential compression device  Until discontinued         09/16/22 1637     Reason for No Pharmacological VTE Prophylaxis  Once        Question:  Reasons:  Answer:  Thrombocytopenia    09/16/22 1637                Mary Kate Piedra MD  Bone Marrow Transplant  [facility] - Surgery (2nd Fl)

## 2022-09-26 NOTE — ASSESSMENT & PLAN NOTE
- Follows with Dr. Jenkins.  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  - Was treated with inotuzumab ozogamicin and ponatinib but course complicated by severe cytopenias. Not has been on therapy for past few weeks.  - Not a candidate for allogeneic stem cell transplant at this time.  - Will discuss restarting ponatinib with Dr. Padgett as it is unlikely the cause of vision loss   - LP done 9/19 with CSF + for B ALL  - received IT chemo 9/19 and 9/22 (triple therapy), given CNS involvement ALL will plan for twice weekly IT chemo  - Ommaya placement planned for today

## 2022-09-26 NOTE — ASSESSMENT & PLAN NOTE
- Small SAH discovered on CTA. Repeat CTH stable. No focal neuro deficits. Pt has ongoing left sided headache and eye pain.   - Likely incidental finding that is not causing the vision loss.   - NSGY consulted, appreciate recs  - CT head with stealth shows resolving SAH  - CT head on 9/24 shows no new hemorrhage

## 2022-09-26 NOTE — TRANSFER OF CARE
"Anesthesia Transfer of Care Note    Patient: Deepti Gonzalez    Procedure(s) Performed: Procedure(s) (LRB):  INSERTION, OMMAYA RESERVOIR (N/A)    Patient location: PACU    Anesthesia Type: general    Transport from OR: Transported from OR on 6-10 L/min O2 by face mask with adequate spontaneous ventilation    Post pain: adequate analgesia    Post assessment: no apparent anesthetic complications    Post vital signs: stable    Level of consciousness: awake    Nausea/Vomiting: no nausea/vomiting    Complications: none    Transfer of care protocol was followed      Last vitals:   Visit Vitals  /65 (BP Location: Right arm, Patient Position: Lying)   Pulse 64   Temp 37.2 °C (99 °F) (Oral)   Resp 16   Ht 5' 4" (1.626 m)   Wt 59.1 kg (130 lb 4.7 oz)   SpO2 99%   Breastfeeding No   BMI 22.36 kg/m²     "

## 2022-09-26 NOTE — ANESTHESIA PROCEDURE NOTES
Intubation    Date/Time: 9/26/2022 12:53 PM  Performed by: Sally Torres CRNA  Authorized by: Chidi Up MD     Intubation:     Induction:  Intravenous    Intubated:  Postinduction    Mask Ventilation:  Easy mask    Attempts:  1    Attempted By:  KOLTON    Blade:  Pereira 3    Laryngeal View Grade: Grade I - full view of cords      Difficult Airway Encountered?: No      Complications:  None    Airway Device:  Oral endotracheal tube    Airway Device Size:  7.5    Style/Cuff Inflation:  Cuffed (inflated to minimal occlusive pressure)    Tube secured:  21    Secured at:  The teeth    Placement Verified By:  Capnometry    Complicating Factors:  None    Findings Post-Intubation:  BS equal bilateral

## 2022-09-26 NOTE — NURSING TRANSFER
Nursing Transfer Note      9/26/2022     Reason patient is being transferred: Meets criteria for transfer    Transfer To: 808    Transfer via stretcher    Transfer with None    Transported by Transport    Medicines sent: None    Any special needs or follow-up needed: None    Chart send with patient: Yes    Notified: spouse    Patient reassessed at: 9/26

## 2022-09-27 LAB
ALBUMIN SERPL BCP-MCNC: 3.8 G/DL (ref 3.5–5.2)
ALP SERPL-CCNC: 72 U/L (ref 55–135)
ALT SERPL W/O P-5'-P-CCNC: 13 U/L (ref 10–44)
ANION GAP SERPL CALC-SCNC: 11 MMOL/L (ref 8–16)
ANISOCYTOSIS BLD QL SMEAR: SLIGHT
AST SERPL-CCNC: 10 U/L (ref 10–40)
BASOPHILS # BLD AUTO: ABNORMAL K/UL (ref 0–0.2)
BASOPHILS NFR BLD: 0 % (ref 0–1.9)
BILIRUB SERPL-MCNC: 0.7 MG/DL (ref 0.1–1)
BUN SERPL-MCNC: 14 MG/DL (ref 6–20)
CALCIUM SERPL-MCNC: 9 MG/DL (ref 8.7–10.5)
CHLORIDE SERPL-SCNC: 100 MMOL/L (ref 95–110)
CO2 SERPL-SCNC: 23 MMOL/L (ref 23–29)
CREAT SERPL-MCNC: 0.5 MG/DL (ref 0.5–1.4)
DIFFERENTIAL METHOD: ABNORMAL
EOSINOPHIL # BLD AUTO: ABNORMAL K/UL (ref 0–0.5)
EOSINOPHIL NFR BLD: 3 % (ref 0–8)
ERYTHROCYTE [DISTWIDTH] IN BLOOD BY AUTOMATED COUNT: 15.6 % (ref 11.5–14.5)
EST. GFR  (NO RACE VARIABLE): >60 ML/MIN/1.73 M^2
GLUCOSE SERPL-MCNC: 115 MG/DL (ref 70–110)
HCT VFR BLD AUTO: 24.4 % (ref 37–48.5)
HGB BLD-MCNC: 8.4 G/DL (ref 12–16)
IMM GRANULOCYTES # BLD AUTO: ABNORMAL K/UL (ref 0–0.04)
IMM GRANULOCYTES NFR BLD AUTO: ABNORMAL % (ref 0–0.5)
LYMPHOCYTES # BLD AUTO: ABNORMAL K/UL (ref 1–4.8)
LYMPHOCYTES NFR BLD: 32 % (ref 18–48)
MCH RBC QN AUTO: 38.7 PG (ref 27–31)
MCHC RBC AUTO-ENTMCNC: 34.4 G/DL (ref 32–36)
MCV RBC AUTO: 112 FL (ref 82–98)
MONOCYTES # BLD AUTO: ABNORMAL K/UL (ref 0.3–1)
MONOCYTES NFR BLD: 0 % (ref 4–15)
NEUTROPHILS NFR BLD: 63 % (ref 38–73)
NEUTS BAND NFR BLD MANUAL: 2 %
NRBC BLD-RTO: 0 /100 WBC
OVALOCYTES BLD QL SMEAR: ABNORMAL
PLATELET # BLD AUTO: 117 K/UL (ref 150–450)
PLATELET BLD QL SMEAR: ABNORMAL
PMV BLD AUTO: 10.4 FL (ref 9.2–12.9)
POCT GLUCOSE: 107 MG/DL (ref 70–110)
POCT GLUCOSE: 132 MG/DL (ref 70–110)
POCT GLUCOSE: 141 MG/DL (ref 70–110)
POCT GLUCOSE: 154 MG/DL (ref 70–110)
POIKILOCYTOSIS BLD QL SMEAR: SLIGHT
POTASSIUM SERPL-SCNC: 3.6 MMOL/L (ref 3.5–5.1)
PROT SERPL-MCNC: 6.5 G/DL (ref 6–8.4)
RBC # BLD AUTO: 2.17 M/UL (ref 4–5.4)
SODIUM SERPL-SCNC: 134 MMOL/L (ref 136–145)
WBC # BLD AUTO: 2.75 K/UL (ref 3.9–12.7)

## 2022-09-27 PROCEDURE — 63600175 PHARM REV CODE 636 W HCPCS: Performed by: NURSE PRACTITIONER

## 2022-09-27 PROCEDURE — 25000003 PHARM REV CODE 250: Performed by: NURSE PRACTITIONER

## 2022-09-27 PROCEDURE — 25000003 PHARM REV CODE 250: Performed by: STUDENT IN AN ORGANIZED HEALTH CARE EDUCATION/TRAINING PROGRAM

## 2022-09-27 PROCEDURE — 85007 BL SMEAR W/DIFF WBC COUNT: CPT | Performed by: STUDENT IN AN ORGANIZED HEALTH CARE EDUCATION/TRAINING PROGRAM

## 2022-09-27 PROCEDURE — 63600175 PHARM REV CODE 636 W HCPCS: Performed by: STUDENT IN AN ORGANIZED HEALTH CARE EDUCATION/TRAINING PROGRAM

## 2022-09-27 PROCEDURE — 99024 POSTOP FOLLOW-UP VISIT: CPT | Mod: ,,, | Performed by: PHYSICIAN ASSISTANT

## 2022-09-27 PROCEDURE — 99232 PR SUBSEQUENT HOSPITAL CARE,LEVL II: ICD-10-PCS | Mod: ,,, | Performed by: INTERNAL MEDICINE

## 2022-09-27 PROCEDURE — 80053 COMPREHEN METABOLIC PANEL: CPT | Performed by: STUDENT IN AN ORGANIZED HEALTH CARE EDUCATION/TRAINING PROGRAM

## 2022-09-27 PROCEDURE — 99232 SBSQ HOSP IP/OBS MODERATE 35: CPT | Mod: ,,, | Performed by: INTERNAL MEDICINE

## 2022-09-27 PROCEDURE — 25000003 PHARM REV CODE 250

## 2022-09-27 PROCEDURE — 25000242 PHARM REV CODE 250 ALT 637 W/ HCPCS: Performed by: STUDENT IN AN ORGANIZED HEALTH CARE EDUCATION/TRAINING PROGRAM

## 2022-09-27 PROCEDURE — 99024 PR POST-OP FOLLOW-UP VISIT: ICD-10-PCS | Mod: ,,, | Performed by: PHYSICIAN ASSISTANT

## 2022-09-27 PROCEDURE — 85027 COMPLETE CBC AUTOMATED: CPT | Performed by: STUDENT IN AN ORGANIZED HEALTH CARE EDUCATION/TRAINING PROGRAM

## 2022-09-27 PROCEDURE — 63600175 PHARM REV CODE 636 W HCPCS

## 2022-09-27 PROCEDURE — 20600001 HC STEP DOWN PRIVATE ROOM

## 2022-09-27 RX ORDER — PROCHLORPERAZINE EDISYLATE 5 MG/ML
2.5 INJECTION INTRAMUSCULAR; INTRAVENOUS EVERY 6 HOURS PRN
Status: DISCONTINUED | OUTPATIENT
Start: 2022-09-27 | End: 2022-09-28

## 2022-09-27 RX ADMIN — DEXAMETHASONE 4 MG: 4 TABLET ORAL at 12:09

## 2022-09-27 RX ADMIN — HYDROXYZINE PAMOATE 50 MG: 25 CAPSULE ORAL at 09:09

## 2022-09-27 RX ADMIN — PANTOPRAZOLE SODIUM 40 MG: 40 TABLET, DELAYED RELEASE ORAL at 10:09

## 2022-09-27 RX ADMIN — GABAPENTIN 300 MG: 300 CAPSULE ORAL at 12:09

## 2022-09-27 RX ADMIN — GABAPENTIN 300 MG: 300 CAPSULE ORAL at 08:09

## 2022-09-27 RX ADMIN — VORTIOXETINE 20 MG: 10 TABLET, FILM COATED ORAL at 10:09

## 2022-09-27 RX ADMIN — DILTIAZEM HYDROCHLORIDE 30 MG: 60 TABLET, FILM COATED ORAL at 11:09

## 2022-09-27 RX ADMIN — ONDANSETRON 4 MG: 2 INJECTION INTRAMUSCULAR; INTRAVENOUS at 08:09

## 2022-09-27 RX ADMIN — ACYCLOVIR 400 MG: 200 CAPSULE ORAL at 10:09

## 2022-09-27 RX ADMIN — BUSPIRONE HYDROCHLORIDE 10 MG: 10 TABLET ORAL at 10:09

## 2022-09-27 RX ADMIN — ATORVASTATIN CALCIUM 80 MG: 20 TABLET, FILM COATED ORAL at 11:09

## 2022-09-27 RX ADMIN — DEXAMETHASONE 4 MG: 4 TABLET ORAL at 11:09

## 2022-09-27 RX ADMIN — BUTALBITAL, ACETAMINOPHEN, AND CAFFEINE 1 TABLET: 50; 325; 40 TABLET ORAL at 09:09

## 2022-09-27 RX ADMIN — ONDANSETRON 4 MG: 2 INJECTION INTRAMUSCULAR; INTRAVENOUS at 06:09

## 2022-09-27 RX ADMIN — DILTIAZEM HYDROCHLORIDE 30 MG: 60 TABLET, FILM COATED ORAL at 05:09

## 2022-09-27 RX ADMIN — DEXAMETHASONE 4 MG: 4 TABLET ORAL at 05:09

## 2022-09-27 RX ADMIN — GABAPENTIN 300 MG: 300 CAPSULE ORAL at 11:09

## 2022-09-27 RX ADMIN — SUMATRIPTAN SUCCINATE 50 MG: 50 TABLET ORAL at 04:09

## 2022-09-27 RX ADMIN — DEXAMETHASONE 4 MG: 4 TABLET ORAL at 06:09

## 2022-09-27 RX ADMIN — POTASSIUM CHLORIDE 20 MEQ: 1500 TABLET, EXTENDED RELEASE ORAL at 10:09

## 2022-09-27 RX ADMIN — ATOVAQUONE 1500 MG: 750 SUSPENSION ORAL at 10:09

## 2022-09-27 RX ADMIN — PROCHLORPERAZINE EDISYLATE 2.5 MG: 5 INJECTION INTRAMUSCULAR; INTRAVENOUS at 09:09

## 2022-09-27 RX ADMIN — ACYCLOVIR 400 MG: 200 CAPSULE ORAL at 09:09

## 2022-09-27 RX ADMIN — BUSPIRONE HYDROCHLORIDE 10 MG: 10 TABLET ORAL at 09:09

## 2022-09-27 RX ADMIN — QUETIAPINE FUMARATE 200 MG: 200 TABLET ORAL at 09:09

## 2022-09-27 RX ADMIN — INSULIN ASPART 2 UNITS: 100 INJECTION, SOLUTION INTRAVENOUS; SUBCUTANEOUS at 05:09

## 2022-09-27 RX ADMIN — INSULIN ASPART 2 UNITS: 100 INJECTION, SOLUTION INTRAVENOUS; SUBCUTANEOUS at 09:09

## 2022-09-27 RX ADMIN — OXCARBAZEPINE 1200 MG: 600 TABLET, FILM COATED ORAL at 09:09

## 2022-09-27 RX ADMIN — ALLOPURINOL 300 MG: 300 TABLET ORAL at 10:09

## 2022-09-27 RX ADMIN — DILTIAZEM HYDROCHLORIDE 30 MG: 60 TABLET, FILM COATED ORAL at 06:09

## 2022-09-27 RX ADMIN — OXCARBAZEPINE 1200 MG: 600 TABLET, FILM COATED ORAL at 10:09

## 2022-09-27 RX ADMIN — FENOFIBRATE 160 MG: 160 TABLET ORAL at 11:09

## 2022-09-27 RX ADMIN — HYDROMORPHONE HYDROCHLORIDE 4 MG: 4 TABLET ORAL at 06:09

## 2022-09-27 RX ADMIN — INSULIN DETEMIR 5 UNITS: 100 INJECTION, SOLUTION SUBCUTANEOUS at 09:09

## 2022-09-27 RX ADMIN — HYDROXYCHLOROQUINE SULFATE 200 MG: 200 TABLET ORAL at 10:09

## 2022-09-27 RX ADMIN — DILTIAZEM HYDROCHLORIDE 30 MG: 60 TABLET, FILM COATED ORAL at 12:09

## 2022-09-27 RX ADMIN — HYDROMORPHONE HYDROCHLORIDE 4 MG: 4 TABLET ORAL at 09:09

## 2022-09-27 NOTE — PROGRESS NOTES
Chief complaint/Reason for Consult: L eye vision loss since Tuesday     History of Present Illness: Deepti Gonzalez is a 51 y.o. 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 She says she was watching TV on Monday when suddenly she noticed her L eye lost vision. When she closed her R eye, she saw shadowy shapes and figures. There were no associated symptoms or pain of her left eye. An hour later, she lost all vision from her L eye including of light. She saw an outside ophthalmologist on Tuesday who did not see evidence of ocular etiology of the vision loss. Her vision did not improve at all and she began developing increasing pain from her L eye over the last few days. No prior ocular history.    Interval hx:   Patient continues to deny any visual changes in the R eye (unaffected eye) and no changes in the left eye (affected).    Patient was started on intrathecal methotrexate on 9/19 and 9/22. LP was performed and confirmed CNS ALL involvement. She is on dexamethasone 4 mg every 6 hours.       PMHx:  has a past medical history of Cancer, COPD (chronic obstructive pulmonary disease), Hypertension, Rheumatoid arthritis, unspecified, SAH (subarachnoid hemorrhage) (9/17/2022), Seizures, Stroke, and Type 2 diabetes mellitus with circulatory disorder, without long-term current use of insulin.     PSurgHx:  has a past surgical history that includes Appendectomy; Tonsillectomy; Rotator cuff repair (Bilateral); Tubal ligation; Hysterectomy; and Ommaya reservior insertion (N/A, 9/26/2022).     Home Medications:   Prior to Admission medications    Medication Sig Start Date End Date Taking? Authorizing Provider   acyclovir (ZOVIRAX) 400 MG tablet Take 1 tablet (400 mg total) by mouth 2 (two) times daily. 12/7/21   Kathrine Posey MD   allopurinoL (ZYLOPRIM) 300 MG tablet Take  1 tablet (300 mg total) by mouth once daily. 6/7/22   Connor M Gillies, MD   artificial tears (ISOPTO TEARS) 0.5 % ophthalmic solution Place 1 drop into both eyes 4 (four) times daily as needed. 1/24/22   Mirtha Antonio NP   atorvastatin (LIPITOR) 80 MG tablet Take 80 mg by mouth once daily.    Historical Provider   blood sugar diagnostic Strp SMARTSIG:Via Meter 1 to 2 Times Daily 10/11/21   Historical Provider   busPIRone (BUSPAR) 10 MG tablet Take 10 mg by mouth 2 (two) times daily.    Historical Provider   dapagliflozin (FARXIGA) 10 mg tablet Take 10 mg by mouth once daily.    Historical Provider   diazePAM (VALIUM) 10 MG Tab Take 10 mg by mouth 2 (two) times daily as needed.    Historical Provider   diltiaZEM (CARDIZEM) 90 MG tablet Take 1 tablet (90 mg total) by mouth every 6 (six) hours. 12/7/21 12/7/22  Kathrine Posey MD   almaraz's soln (benadryl 30 mL, mylanta 30 mL, LIDOcaine 30 mL, nystatin 30 mL) 120mL Take 10 mLs by mouth 4 (four) times daily as needed (mouth pain). 4/19/22   Mirtha Antonio NP   fenofibrate 160 MG Tab Take 160 mg by mouth once daily.    Historical Provider   gabapentin (NEURONTIN) 300 MG capsule Take 1 capsule (300 mg total) by mouth 3 (three) times daily. 12/7/21 12/7/22  Kathrine Posey MD   hydrOXYchloroQUINE (PLAQUENIL) 200 mg tablet Take 1 tablet (200 mg total) by mouth once daily. 5/19/22   Mirtha Antonio NP   hydrOXYzine pamoate (VISTARIL) 50 MG Cap Take 50 mg by mouth 2 (two) times daily as needed. 4/29/22   Historical Provider   LIDOcaine (LIDODERM) 5 % Place 1 patch onto the skin once daily. Remove & Discard patch within 12 hours or as directed by MD 6/6/22   Connor M Gillies, MD   losartan (COZAAR) 25 MG tablet Take 25 mg by mouth once daily. 11/12/21   Historical Provider   magnesium oxide (MAG-OX) 400 mg (241.3 mg magnesium) tablet Take 1 tablet by mouth once daily. 7/12/22   Historical Provider   metFORMIN (GLUCOPHAGE-XR) 500 MG ER 24hr tablet Take 1,000 mg by mouth 2 (two)  times daily. 7/22/22   Historical Provider   naloxone (NARCAN) 4 mg/actuation Spry 4mg by nasal route as needed for opioid overdose; may repeat every 2-3 minutes in alternating nostrils until medical help arrives. Call 911 6/8/22   Ines Mccord MD   omeprazole (PRILOSEC) 40 MG capsule Take 40 mg by mouth once daily.    Historical Provider   ondansetron (ZOFRAN-ODT) 8 MG TbDL Take 1 tablet (8 mg total) by mouth every 8 (eight) hours as needed (in case of chemo induced nausea). 8/9/22   Dayron Jenkins MD   ONETOUCH VERIO TEST STRIPS Strp SMARTSIG:Via Meter 1 to 2 Times Daily 4/6/22   Historical Provider   OXcarbazepine (TRILEPTAL) 600 MG Tab Take 1,200 mg by mouth 2 (two) times daily. 10/26/21   Historical Provider   polyethylene glycol (GLYCOLAX) 17 gram/dose powder Dissolve one capful (17 g) in liquid and take by mouth daily as needed (constipation). 12/7/21   Kathrine Posey MD   PONATinib (ICLUSIG) 30 mg Tab Take 1 tablet (30 mg) by mouth once daily. 9/7/22   Dayron Jenkins MD   potassium chloride (K-TAB) 20 mEq Take 20 mEq by mouth 2 (two) times daily. 7/12/22   Historical Provider   prochlorperazine (COMPAZINE) 10 MG tablet Take 1 tablet (10 mg total) by mouth every 6 (six) hours as needed for Nausea. 7/11/22 7/11/23  Dayron Jenkins MD   QUEtiapine (SEROQUEL) 200 MG Tab Take 200 mg by mouth every evening.    Historical Provider   rivaroxaban (XARELTO) 20 mg Tab Take 1 tablet (20 mg total) by mouth daily with dinner or evening meal. Hold until instructed to resume by provider due to low platelet count. 6/6/22   Connor M Gillies, MD   senna-docusate 8.6-50 mg (PERICOLACE) 8.6-50 mg per tablet Take 1 tablet by mouth 2 (two) times daily. 3/19/22   Felisha Goodwin DO   sumatriptan (IMITREX) 50 MG tablet Take 1 tablet (50 mg total) by mouth daily as needed (headache). 8/29/22 9/28/22  Dayron Jenkins MD   TRINTELLIX 20 mg Tab Take 1 tablet by mouth once daily. 3/8/22   Historical Provider   ursodioL  (ACTIGALL) 300 mg capsule Take 1 capsule (300 mg total) by mouth 3 (three) times daily. 6/13/22 6/13/23  Dayron Jenkins MD        Medications this encounter:    acyclovir  400 mg Oral BID    allopurinoL  300 mg Oral Daily    atorvastatin  80 mg Oral Daily    atovaquone  1,500 mg Oral Daily    busPIRone  10 mg Oral BID    dexAMETHasone  4 mg Oral Q6H    diltiaZEM  30 mg Oral 4 times per day    fenofibrate  160 mg Oral Daily    gabapentin  300 mg Oral TID    hydrOXYchloroQUINE  200 mg Oral Daily    hydrOXYzine pamoate  50 mg Oral Q24H    insulin aspart U-100  2 Units Subcutaneous TIDWM    insulin detemir U-100  5 Units Subcutaneous QHS    OXcarbazepine  1,200 mg Oral QHS    OXcarbazepine  1,200 mg Oral with lunch    pantoprazole  40 mg Oral Daily    QUEtiapine  200 mg Oral QHS    vortioxetine  20 mg Oral Q24H       Allergies: is allergic to levetiracetam.     Social Hx:  reports that she quit smoking about 6 months ago. Her smoking use included cigarettes. She smoked an average of .5 packs per day. She has never used smokeless tobacco. She reports that she does not drink alcohol and does not use drugs.     Family Hx: No family history of glaucoma. family history is not on file.     ROS: As per HPI    Ocular examination/Dilated fundus examination:  Base Eye Exam       Visual Acuity (Snellen - Linear)         Right Left    Dist cc 20/20 NLP              Tonometry (Applanation, 10:41 AM)         Right Left    Pressure 15 18              Pupils         Dark Light Shape React APD    Right 5 3 Round Brisk None    Left 4 4 Round nonreactive 3+ APD              Extraocular Movement         Right Left     Full Full              Neuro/Psych       Oriented x3: Yes              Dilation       Both eyes: 1% Mydriacyl, 2.5% Phenylephrine @ 09:42 AM                  Slit Lamp and Fundus Exam       External Exam         Right Left    External Normal Normal              Slit Lamp Exam         Right Left    Lids/Lashes Normal Normal     Conjunctiva/Sclera White and quiet White and quiet    Cornea Clear Clear    Anterior Chamber Deep and quiet Deep and quiet    Iris Round and reactive Round and nonreactive    Lens Clear Clear    Anterior Vitreous Normal Normal              Fundus Exam         Right Left    Disc Normal, pink, sharp Normal, pink, sharp    C/D Ratio 0.3 0.3    Macula Normal Normal    Vessels Normal Normal    Periphery Normal Normal                  Work-up:  - ESR/CRP to evaluate for GCA (finished: ESR wnl, CRP mildly elevated - disease unlikely in this patient)  - Patient unable to obtain MRI imaging due to pacemaker. CT orbit (thin cuts) and head w/wo with CTA/CTV: Small acute subarachnoid hemorrhage in the left frontal lobe, otherwise unremarkable.  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   - 9/17/22 exam: right eye unremarkable. Left eye no light perception, nakia (3+) APD, equal pupil sizes, normal IOP. EOM grossly full and equal OU.   - CN exam otherwise full and equal both sides.  - No evidence of optic nerve or retinal pathology on dilated exam.  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  - Repeat DFE 09/27/22 without changes  - Ophthalmology will plan for repeat DFE later this week as it sounds as though patient will be going home Saturday    Recommendations:  - Continue current management for CNS involvement by ALL.  - Notify ophthalmology resident on call if patient develops any vision changes in the right eye.    Ezra Aguilera MD  PGY2 [facility]

## 2022-09-27 NOTE — SUBJECTIVE & OBJECTIVE
Subjective:     Interval History: S/p Ommaya placement by N[facility]. Has endorsed a constant HA since placement along w/ occasional nausea relieved by available PRNs. Will plan for IT chemotherapy today.     Objective:     Vital Signs (Most Recent):  Temp: 99 °F (37.2 °C) (09/27/22 0743)  Pulse: 81 (09/27/22 0743)  Resp: 20 (09/27/22 0743)  BP: 129/60 (09/27/22 0743)  SpO2: 97 % (09/27/22 0743) Vital Signs (24h Range):  Temp:  [97.9 °F (36.6 °C)-99 °F (37.2 °C)] 99 °F (37.2 °C)  Pulse:  [64-81] 81  Resp:  [10-20] 20  SpO2:  [94 %-100 %] 97 %  BP: (129-177)/(55-74) 129/60     Weight: 59.1 kg (130 lb 4.7 oz)  Body mass index is 22.36 kg/m².  Body surface area is 1.63 meters squared.    ECOG SCORE           [unfilled]    Intake/Output - Last 3 Shifts         09/25 0700 09/26 0659 09/26 0700 09/27 0659 09/27 0700 09/28 0659    P.O. 120 360     Blood 882      IV Piggyback  500     Total Intake(mL/kg) 1002 (17) 860 (14.6)     Urine (mL/kg/hr) 1050 (0.7) 1900 (1.3)     Total Output 1050 1900     Net -48 -1040            Stool Occurrence 4 x              Physical Exam    Significant Labs:   CBC:   Recent Labs   Lab 09/26/22  0001 09/26/22  0359 09/27/22 0426   WBC 4.06 3.88* 2.75*   HGB 7.8* 7.9* 8.4*   HCT 22.3* 22.5* 24.4*   PLT 98* 141* 117*    and CMP:   Recent Labs   Lab 09/26/22  0359 09/27/22  0426   * 134*   K 4.0 3.6   CL 97 100   CO2 28 23   * 115*   BUN 16 14   CREATININE 0.5 0.5   CALCIUM 9.0 9.0   PROT 6.5 6.5   ALBUMIN 3.8 3.8   BILITOT 0.6 0.7   ALKPHOS 73 72   AST 11 10   ALT 21 13   ANIONGAP 8 11       Diagnostic Results:  I have reviewed all pertinent imaging results/findings within the past 24 hours.

## 2022-09-27 NOTE — PLAN OF CARE
Pt sitting up in bed, VSS.  Ommaya reservoir in place.  Dressing intact.  Pt c/o nausea and headache.  Relieved with prn medications.  Pt alert and resting comfortably.  Educated on call light and fall precautions.

## 2022-09-27 NOTE — ANESTHESIA POSTPROCEDURE EVALUATION
Anesthesia Post Evaluation    Patient: Deepti Gonzalez    Procedure(s) Performed: Procedure(s) (LRB):  INSERTION, OMMAYA RESERVOIR (N/A)    Final Anesthesia Type: general      Patient location during evaluation: PACU  Patient participation: Yes- Able to Participate  Level of consciousness: awake and alert  Post-procedure vital signs: reviewed and stable  Pain management: adequate  Airway patency: patent    PONV status at discharge: No PONV  Anesthetic complications: no      Cardiovascular status: blood pressure returned to baseline  Respiratory status: unassisted  Hydration status: euvolemic  Follow-up not needed.          Vitals Value Taken Time   /62 09/27/22 0500   Temp 36.8 °C (98.3 °F) 09/27/22 0500   Pulse 78 09/27/22 0500   Resp 18 09/27/22 0634   SpO2 94 % 09/27/22 0005         Event Time   Out of Recovery 09/26/2022 16:00:00         Pain/Nicolas Score: Pain Rating Prior to Med Admin: 7 (9/27/2022  6:34 AM)  Pain Rating Post Med Admin: 5 (9/26/2022  3:57 PM)  Nicolas Score: 10 (9/26/2022  4:00 PM)

## 2022-09-27 NOTE — ASSESSMENT & PLAN NOTE
51 F with Pmhx B-ALL who presented with sudden onset left sided vision loss 5 days prior to admission while watching TV. NSGY initially consulted for incidentally found small left frontal SAH. 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    Subarachnoid Hemorrhage:  -- SAH likely unrelated to vision loss  -- CTA negative for any vascular lesion or occlusion  -- Rpt CTH stable bleed  -- unfortunately patient cannot have MRI due to incompatible pacemaker, so no further imaging recommended at this time    B-cell ALL:  -- LP done on 9/19 and 9/22 with IT chemo, flow cytometry analysis was consistent with B-ALL  -- Leukemia is likely etiology of her vision loss  -- Now s/p Ommaya reservoir for IT chemo with Dr. Alberto on 9/26  -- Postop CTH 9/26 with expected findings, no hemorrhage  -- Will remove surgical dressing POD#2  -- Okay to begin using Ommaya reservoir POD#5, 10/1/22  -- Please contact NSGY team with any questions/concerns     Discussed with attending staff Dr. Alberto

## 2022-09-27 NOTE — PROGRESS NOTES
[facility] ([facility])  Hematology  Bone Marrow Transplant  Progress Note    Patient Name: Deepti Gonzalez  Admission Date: 9/16/2022  Hospital Length of Stay: 11 days  Code Status: Full Code    Subjective:     Interval History: S/p Ommaya placement by NRSY. Has endorsed a constant HA since placement along w/ occasional nausea relieved by available PRNs. Will plan for IT chemotherapy today.     Objective:     Vital Signs (Most Recent):  Temp: 99 °F (37.2 °C) (09/27/22 0743)  Pulse: 81 (09/27/22 0743)  Resp: 20 (09/27/22 0743)  BP: 129/60 (09/27/22 0743)  SpO2: 97 % (09/27/22 0743) Vital Signs (24h Range):  Temp:  [97.9 °F (36.6 °C)-99 °F (37.2 °C)] 99 °F (37.2 °C)  Pulse:  [64-81] 81  Resp:  [10-20] 20  SpO2:  [94 %-100 %] 97 %  BP: (129-177)/(55-74) 129/60     Weight: 59.1 kg (130 lb 4.7 oz)  Body mass index is 22.36 kg/m².  Body surface area is 1.63 meters squared.    ECOG SCORE           [unfilled]    Intake/Output - Last 3 Shifts         09/25 0700 09/26 0659 09/26 0700 09/27 0659 09/27 0700 09/28 0659    P.O. 120 360     Blood 882      IV Piggyback  500     Total Intake(mL/kg) 1002 (17) 860 (14.6)     Urine (mL/kg/hr) 1050 (0.7) 1900 (1.3)     Total Output 1050 1900     Net -48 -1040            Stool Occurrence 4 x              Physical Exam    Significant Labs:   CBC:   Recent Labs   Lab 09/26/22  0001 09/26/22  0359 09/27/22 0426   WBC 4.06 3.88* 2.75*   HGB 7.8* 7.9* 8.4*   HCT 22.3* 22.5* 24.4*   PLT 98* 141* 117*    and CMP:   Recent Labs   Lab 09/26/22  0359 09/27/22 0426   * 134*   K 4.0 3.6   CL 97 100   CO2 28 23   * 115*   BUN 16 14   CREATININE 0.5 0.5   CALCIUM 9.0 9.0   PROT 6.5 6.5   ALBUMIN 3.8 3.8   BILITOT 0.6 0.7   ALKPHOS 73 72   AST 11 10   ALT 21 13   ANIONGAP 8 11       Diagnostic Results:  I have reviewed all pertinent imaging results/findings within the past 24 hours.    Assessment/Plan:     * Vision loss of left eye  Patient is a 51 year old woman with B-ALL who  presents with acute left eye vision loss associated with pain. Concern for possible B-ALL involvement vs side effect of ponatinib vs retrobulbar hemorrhage vs stroke    Plan:  - Consulted Opthomology, appreciate assistance  - LP on 9/19 and 9/22 with IT chemo (cytarabine, hydrocortisone, methotrexate)  - labs pending:   TB DNA by PCR negative  VDRL negative  [place] virus PCR negative  VZV PCR negative  HSV PCR negative  Enterovirus RNA negative  CNS demyelinating dz (AQP-4 IGG/MOG IGG)  CSF bands negative  NMO aquaporin negative  MS profile      Gram stain: no organisms seen  CSF culture NGTD  Flow cytometry analysis c/w B ALL  Cytology in process  CSF cell count 546 WBC, 87% lymphs, 12% others  CSF protein 43  Cryptococcal Ag negative    - repeat DFE with ophthalmology for 9/26  - continue dexamethasone 4 q6h  - Patient with pacemaker and per patient, not compatible with MRI. Would ideally obtain MRI brain w/ w/o contrast. Verified with EP who checked with rep that pacemaker is not MRI compatible.   - CTA completed with delayed venous phase revealing small SAH   - DSA (digitial subtraction angio) recommended by NSGY. Neuro ok with it, but will defer further vasculitis workup for now given that leukemia is a more obvious etiology for her vision loss.       Monocular vision loss  - see vision loss     SAH (subarachnoid hemorrhage)  - Small SAH discovered on CTA. Repeat CTH stable. No focal neuro deficits. Pt has ongoing left sided headache and eye pain.   - Likely incidental finding that is not causing the vision loss.   - NSGY consulted, appreciate recs  - CT head with stealth shows resolving SAH  - CT head on 9/24 shows no new hemorrhage    Thrombocytopenia  - Monitor CBC  - Transfuse for platelet count <10 or <50k with bleeding  - improved w/ transfusion      Urinary retention  - Patient reports wolf placed last week and has been that time.  - Attempt voiding trial prior to discharge  - Will need urology  follow up at discharge. See little value in consult urolgoy/urogynecology inpatient as they will likely recommend outpt follow up.     Anemia associated with chemotherapy  - Monitor CBC  - Transfuse for Hgb<7    B-cell acute lymphoblastic leukemia  - Follows with Dr. Jenkins.  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  - Was treated with inotuzumab ozogamicin and ponatinib but course complicated by severe cytopenias. Not has been on therapy for past few weeks.  - Not a candidate for allogeneic stem cell transplant at this time.  - Will discuss restarting ponatinib with Dr. Padgett as it is unlikely the cause of vision loss   - LP done 9/19 with CSF + for B ALL  - received IT chemo 9/19 and 9/22 (triple therapy), given CNS involvement ALL will plan for twice weekly IT chemo  - S/p Ommaya placement. Will plan for IT chemo today.      Anxiety  - Continue home buspirone, hydroxyzine and quetiapine.   - Trintellix is nonformulary however pharmacy is attempting to obtain this medication.    Hypertension  - Holding home BP meds in the setting of soft BP.   - Resume when appropriate.    Seizure disorder  - Continue home oxcarbazepine 200mg QHS    Type 2 diabetes mellitus, without long-term current use of insulin  Diabetes type 2  Last HbA1c:   Lab Results   Component Value Date    HGBA1C 4.4 04/13/2022       PLAN:   - Hold oral anti-hyperglycemic agents  - worsened by IV steroids  - Detemir 5U, Aspart 2U TID WM and SSI  - Diabetic diet  - Hypoglycemia orderset with POCT BG AC/QS  - Goal -180 while inpatient       Pacemaker  - Patient with pacemaker in place due to SSS. Per EP, it is definitely MRI incompatible.      Rheumatoid arthritis involving multiple sites  - Seronegative RA. Continue home plaquenil    Hyperlipidemia  - Continue home atorvastatin and fenofibrate    Paroxysmal atrial fibrillation  - Holding home xarelto in the setting of  thrombocytopenia        VTE Risk Mitigation (From admission, onward)         Ordered     IP VTE HIGH RISK PATIENT  Once         09/16/22 1637     Place sequential compression device  Until discontinued         09/16/22 1637     Reason for No Pharmacological VTE Prophylaxis  Once        Question:  Reasons:  Answer:  Thrombocytopenia    09/16/22 1637                Mary Kate Piedra MD  Bone Marrow Transplant  [facility] - Oncology ([facility])

## 2022-09-27 NOTE — BRIEF OP NOTE
[facility] ([facility])  Brief Operative Note    SUMMARY     Surgery Date: 9/26/2022     Surgeon(s) and Role:     * Mj Alberto MD - Primary     * Yamila Cloud MD - Resident, Assisting    Pre-op Diagnosis:  Acute lymphoblastic leukemia (ALL) [C91.00]    Post-op Diagnosis:  Post-Op Diagnosis Codes:     * Acute lymphoblastic leukemia (ALL) [C91.00]    Procedure(s) (LRB):  INSERTION, OMMAYA RESERVOIR (N/A)    Anesthesia: General    Operative Findings: Insertion of proximal catheter followed by Ommaya reservoir using STEALTH navigation assistance.    Estimated Blood Loss: * No values recorded between 9/26/2022  1:57 PM and 9/26/2022  3:28 PM *    Estimated Blood Loss has been documented.         Specimens:   Specimen (24h ago, onward)      None            [id number]

## 2022-09-27 NOTE — SUBJECTIVE & OBJECTIVE
Interval History: NAEON. AFVSS. POD#1 s/p Ommaya placement. Postop CTH stable. Pt reports HA and soreness around incision, denies any other new complaints, neuro exam stable. May begin using Ommaya on POD#5. Plan for IT chemo today.    Medications:  Continuous Infusions:  Scheduled Meds:   acyclovir  400 mg Oral BID    allopurinoL  300 mg Oral Daily    atorvastatin  80 mg Oral Daily    atovaquone  1,500 mg Oral Daily    busPIRone  10 mg Oral BID    dexAMETHasone  4 mg Oral Q6H    diltiaZEM  30 mg Oral 4 times per day    fenofibrate  160 mg Oral Daily    gabapentin  300 mg Oral TID    hydrOXYchloroQUINE  200 mg Oral Daily    hydrOXYzine pamoate  50 mg Oral Q24H    insulin aspart U-100  2 Units Subcutaneous TIDWM    insulin detemir U-100  5 Units Subcutaneous QHS    OXcarbazepine  1,200 mg Oral QHS    OXcarbazepine  1,200 mg Oral with lunch    pantoprazole  40 mg Oral Daily    QUEtiapine  200 mg Oral QHS    vortioxetine  20 mg Oral Q24H     PRN Meds:sodium chloride, butalbital-acetaminophen-caffeine -40 mg, dextrose 10%, dextrose 10%, diazePAM, duke's soln (benadryl 30 mL, mylanta 30 mL, LIDOcaine 30 mL, nystatin 30 mL) 120 mL, glucagon (human recombinant), glucose, glucose, HYDROmorphone, insulin aspart U-100, lactulose, magnesium oxide, magnesium oxide, magnesium oxide, naloxone, ondansetron, potassium chloride, potassium chloride, potassium chloride, potassium, sodium phosphates, potassium, sodium phosphates, prochlorperazine, sodium chloride 0.9%, sumatriptan, white petrolatum     Review of Systems  Objective:     Weight: 59.1 kg (130 lb 4.7 oz)  Body mass index is 22.36 kg/m².  Vital Signs (Most Recent):  Temp: 99 °F (37.2 °C) (09/27/22 0743)  Pulse: 81 (09/27/22 0743)  Resp: 20 (09/27/22 0743)  BP: 129/60 (09/27/22 0743)  SpO2: 97 % (09/27/22 0743) Vital Signs (24h Range):  Temp:  [97.9 °F (36.6 °C)-99 °F (37.2 °C)] 99 °F (37.2 °C)  Pulse:  [64-81] 81  Resp:  [10-20] 20  SpO2:  [94 %-100 %] 97 %  BP:  "(129177)/(55-74) 129/60                          Urethral Catheter 09/07/22 (Active)   Site Assessment Clean;Intact 09/26/22 2148   Collection Container Standard drainage bag 09/26/22 2148   Securement Method secured to top of thigh w/ adhesive device 09/26/22 2148   Catheter Care Performed yes 09/26/22 2148   Reason for Continuing Urinary Catheterization Chronic Indwelling Urinary Catheter on Admission 09/26/22 2148   CAUTI Prevention Bundle Securement Device in place with 1" slack 09/26/22 2148   Output (mL) 200 mL 09/26/22 1740       Physical Exam    Neurosurgery Physical Exam    Constitutional: Well nourished, well developed. No distress.   HEENT: atraumatic/normocephalic  Cardiovascular: Regular rhythm.   Pulm: aerating well, saturating well, no respiratory distress  Abdominal: Soft, non-distended.   Psych/Behavior: She is alert, thought content appropriate.      Neurosurgery Physical Exam  E4V5M6  AOx3  EOMI  Face Symmetric  Tongue midline  Vision loss left eye, L pupil non-reactive. R pupil briskly reactive to light.  BUE 5/5  BLE 5/5  No drift    R cranial incision: C/D/I with dissolvable sutures. Skin edges well approximated. No surrounding erythema or edema. No drainage.      Significant Labs:  Recent Labs   Lab 09/26/22  0359 09/27/22  0426   * 115*   * 134*   K 4.0 3.6   CL 97 100   CO2 28 23   BUN 16 14   CREATININE 0.5 0.5   CALCIUM 9.0 9.0     Recent Labs   Lab 09/26/22  0001 09/26/22  0359 09/27/22  0426   WBC 4.06 3.88* 2.75*   HGB 7.8* 7.9* 8.4*   HCT 22.3* 22.5* 24.4*   PLT 98* 141* 117*     Recent Labs   Lab 09/26/22 0359   INR 1.1   APTT <21.0     Microbiology Results (last 7 days)       Procedure Component Value Units Date/Time    Clostridium difficile EIA [[id number]] Collected: 09/25/22 1314    Order Status: Completed Specimen: Stool Updated: 09/25/22 2222     C. diff Antigen Negative     C difficile Toxins A+B, EIA Negative     Comment: Testing not recommended for children <24 " months old.       CSF culture [[id number]] Collected: 09/19/22 1245    Order Status: Completed Specimen: CSF (Spinal Fluid) from CSF Tap, Tube 3 Updated: 09/24/22 0650     CSF CULTURE No Growth     Gram Stain Result No WBC's      No organisms seen    Cryptococcal antigen, CSF [[id number]] Collected: 09/19/22 1245    Order Status: Completed Specimen: CSF (Spinal Fluid) from CSF Tap, Tube 3 Updated: 09/20/22 0934     Crypto Ag, CSF Negative          All pertinent labs from the last 24 hours have been reviewed.    Significant Diagnostics:  I have reviewed and interpreted all pertinent imaging results/findings within the past 24 hours.

## 2022-09-27 NOTE — PLAN OF CARE
AAOx4. Pt c/o nausea and headaches. Administered prochlorperazine x1, Fioricet x1, and sumitriptan x1. VSS.  at bedside. Pt with nonskid footwear on with bed in lowest position and locked with bed rails up x2. Pt instructed to call prior to getting OOB. Pt with call light within reach and verbalized understanding. Will continue to monitor pt.

## 2022-09-27 NOTE — PROGRESS NOTES
[facility] - Oncology ([facility])  Neurosurgery  Progress Note    Subjective:     History of Present Illness: 51 F Pmhx leukemia, presents with left sided vision loss 5 days ago while watching TV, vision loss began gradually then progressed to complete left eye vision loss over 1 hour. She has not had any improvement since then. She has no headaches, focal weaknesses, confusion, or speech difficulty. She denies any trauma. NSGY consulted for incidentally found small left frontal SAH      Post-Op Info:  Procedure(s) (LRB):  INSERTION, OMMAYA RESERVOIR (N/A)   1 Day Post-Op     Interval History: CALE. AFVSS. POD#1 s/p Ommaya placement. Postop CTH stable. Pt reports HA and soreness around incision, denies any other new complaints, neuro exam stable. May begin using Ommaya on POD#5. Plan for IT chemo today.    Medications:  Continuous Infusions:  Scheduled Meds:   acyclovir  400 mg Oral BID    allopurinoL  300 mg Oral Daily    atorvastatin  80 mg Oral Daily    atovaquone  1,500 mg Oral Daily    busPIRone  10 mg Oral BID    dexAMETHasone  4 mg Oral Q6H    diltiaZEM  30 mg Oral 4 times per day    fenofibrate  160 mg Oral Daily    gabapentin  300 mg Oral TID    hydrOXYchloroQUINE  200 mg Oral Daily    hydrOXYzine pamoate  50 mg Oral Q24H    insulin aspart U-100  2 Units Subcutaneous TIDWM    insulin detemir U-100  5 Units Subcutaneous QHS    OXcarbazepine  1,200 mg Oral QHS    OXcarbazepine  1,200 mg Oral with lunch    pantoprazole  40 mg Oral Daily    QUEtiapine  200 mg Oral QHS    vortioxetine  20 mg Oral Q24H     PRN Meds:sodium chloride, butalbital-acetaminophen-caffeine -40 mg, dextrose 10%, dextrose 10%, diazePAM, duke's soln (benadryl 30 mL, mylanta 30 mL, LIDOcaine 30 mL, nystatin 30 mL) 120 mL, glucagon (human recombinant), glucose, glucose, HYDROmorphone, insulin aspart U-100, lactulose, magnesium oxide, magnesium oxide, magnesium oxide, naloxone, ondansetron, potassium chloride, potassium  "chloride, potassium chloride, potassium, sodium phosphates, potassium, sodium phosphates, prochlorperazine, sodium chloride 0.9%, sumatriptan, white petrolatum     Review of Systems  Objective:     Weight: 59.1 kg (130 lb 4.7 oz)  Body mass index is 22.36 kg/m².  Vital Signs (Most Recent):  Temp: 99 °F (37.2 °C) (09/27/22 0743)  Pulse: 81 (09/27/22 0743)  Resp: 20 (09/27/22 0743)  BP: 129/60 (09/27/22 0743)  SpO2: 97 % (09/27/22 0743) Vital Signs (24h Range):  Temp:  [97.9 °F (36.6 °C)-99 °F (37.2 °C)] 99 °F (37.2 °C)  Pulse:  [64-81] 81  Resp:  [10-20] 20  SpO2:  [94 %-100 %] 97 %  BP: (129-177)/(55-74) 129/60                          Urethral Catheter 09/07/22 (Active)   Site Assessment Clean;Intact 09/26/22 2148   Collection Container Standard drainage bag 09/26/22 2148   Securement Method secured to top of thigh w/ adhesive device 09/26/22 2148   Catheter Care Performed yes 09/26/22 2148   Reason for Continuing Urinary Catheterization Chronic Indwelling Urinary Catheter on Admission 09/26/22 2148   CAUTI Prevention Bundle Securement Device in place with 1" slack 09/26/22 2148   Output (mL) 200 mL 09/26/22 1740       Physical Exam    Neurosurgery Physical Exam    Constitutional: Well nourished, well developed. No distress.   HEENT: atraumatic/normocephalic  Cardiovascular: Regular rhythm.   Pulm: aerating well, saturating well, no respiratory distress  Abdominal: Soft, non-distended.   Psych/Behavior: She is alert, thought content appropriate.      Neurosurgery Physical Exam  E4V5M6  AOx3  EOMI  Face Symmetric  Tongue midline  Vision loss left eye, L pupil non-reactive. R pupil briskly reactive to light.  BUE 5/5  BLE 5/5  No drift    R cranial incision: C/D/I with dissolvable sutures. Skin edges well approximated. No surrounding erythema or edema. No drainage.      Significant Labs:  Recent Labs   Lab 09/26/22  0359 09/27/22  0426   * 115*   * 134*   K 4.0 3.6   CL 97 100   CO2 28 23   BUN 16 14 "   CREATININE 0.5 0.5   CALCIUM 9.0 9.0     Recent Labs   Lab 09/26/22  0001 09/26/22  0359 09/27/22  0426   WBC 4.06 3.88* 2.75*   HGB 7.8* 7.9* 8.4*   HCT 22.3* 22.5* 24.4*   PLT 98* 141* 117*     Recent Labs   Lab 09/26/22  0359   INR 1.1   APTT <21.0     Microbiology Results (last 7 days)       Procedure Component Value Units Date/Time    Clostridium difficile EIA [[id number]] Collected: 09/25/22 1314    Order Status: Completed Specimen: Stool Updated: 09/25/22 2222     C. diff Antigen Negative     C difficile Toxins A+B, EIA Negative     Comment: Testing not recommended for children <24 months old.       CSF culture [[id number]] Collected: 09/19/22 1245    Order Status: Completed Specimen: CSF (Spinal Fluid) from CSF Tap, Tube 3 Updated: 09/24/22 0650     CSF CULTURE No Growth     Gram Stain Result No WBC's      No organisms seen    Cryptococcal antigen, CSF [[id number]] Collected: 09/19/22 1245    Order Status: Completed Specimen: CSF (Spinal Fluid) from CSF Tap, Tube 3 Updated: 09/20/22 0934     Crypto Ag, CSF Negative          All pertinent labs from the last 24 hours have been reviewed.    Significant Diagnostics:  I have reviewed and interpreted all pertinent imaging results/findings within the past 24 hours.    Assessment/Plan:     B-cell acute lymphoblastic leukemia  51 F with Pmhx B-ALL who presented with sudden onset left sided vision loss 5 days prior to admission while watching TV. NSGY initially consulted for incidentally found small left frontal SAH. 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    Subarachnoid Hemorrhage:  -- SAH likely unrelated to vision loss  -- CTA negative for any vascular lesion or occlusion  -- Rpt CTH stable bleed  -- unfortunately patient cannot have MRI due to incompatible pacemaker, so no further imaging recommended at this time    B-cell ALL:  -- LP done on  9/19 and 9/22 with IT chemo, flow cytometry analysis was consistent with B-ALL  -- Leukemia is likely etiology of her vision loss  -- Now s/p Ommaya reservoir for IT chemo with Dr. Alberto on 9/26  -- Postop CTH 9/26 with expected findings, no hemorrhage  -- Will remove surgical dressing POD#2  -- Okay to begin using Ommaya reservoir POD#5, 10/1/22  -- Please contact NSGY team with any questions/concerns     Discussed with attending staff Dr. Alberto    Rheumatoid arthritis involving multiple sites  51 F with Pmhx B-ALL who presented with sudden onset left sided vision loss 5 days prior to admission while watching TV. NSGY initially consulted for incidentally found small left frontal SAH. 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 Alberto, canceled 9/22 due to low Plt count   --OR re-scheduled for Monday 9/26   --Will need Plt count >100k x 2 or >70k with intra-op transfusion of platelets; primary team plan to transfuse Michael night   --reviewed plan and preop goals with primary team   --risks/benefits discussed, patient consented        Lyudmila Shea PA-C  Neurosurgery  [facility] - Oncology ([facility])

## 2022-09-27 NOTE — ASSESSMENT & PLAN NOTE
- Follows with Dr. Jenkins.  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  - Was treated with inotuzumab ozogamicin and ponatinib but course complicated by severe cytopenias. Not has been on therapy for past few weeks.  - Not a candidate for allogeneic stem cell transplant at this time.  - Will discuss restarting ponatinib with Dr. Padgett as it is unlikely the cause of vision loss   - LP done 9/19 with CSF + for B ALL  - received IT chemo 9/19 and 9/22 (triple therapy), given CNS involvement ALL will plan for twice weekly IT chemo  - S/p Ommaya placement. Will plan for IT chemo today.

## 2022-09-28 LAB
ALBUMIN SERPL BCP-MCNC: 3.7 G/DL (ref 3.5–5.2)
ALP SERPL-CCNC: 78 U/L (ref 55–135)
ALT SERPL W/O P-5'-P-CCNC: 12 U/L (ref 10–44)
AMPHIPHYSIN AB TITR SER: NEGATIVE TITER
ANION GAP SERPL CALC-SCNC: 11 MMOL/L (ref 8–16)
AST SERPL-CCNC: 11 U/L (ref 10–40)
BACTERIA #/AREA URNS AUTO: ABNORMAL /HPF
BASOPHILS # BLD AUTO: 0 K/UL (ref 0–0.2)
BASOPHILS NFR BLD: 0 % (ref 0–1.9)
BILIRUB SERPL-MCNC: 0.8 MG/DL (ref 0.1–1)
BILIRUB UR QL STRIP: NEGATIVE
BUN SERPL-MCNC: 13 MG/DL (ref 6–20)
CALCIUM SERPL-MCNC: 9.2 MG/DL (ref 8.7–10.5)
CHLORIDE SERPL-SCNC: 97 MMOL/L (ref 95–110)
CLARITY CSF: ABNORMAL
CLARITY UR REFRACT.AUTO: CLEAR
CO2 SERPL-SCNC: 22 MMOL/L (ref 23–29)
COLOR CSF: ABNORMAL
COLOR UR AUTO: YELLOW
CREAT SERPL-MCNC: 0.6 MG/DL (ref 0.5–1.4)
CV2 IGG TITR SER: NEGATIVE TITER
DIFFERENTIAL METHOD: ABNORMAL
EOSINOPHIL # BLD AUTO: 0 K/UL (ref 0–0.5)
EOSINOPHIL NFR BLD: 1.3 % (ref 0–8)
ERYTHROCYTE [DISTWIDTH] IN BLOOD BY AUTOMATED COUNT: 15.3 % (ref 11.5–14.5)
EST. GFR  (NO RACE VARIABLE): >60 ML/MIN/1.73 M^2
GLIAL NUC TYPE 1 AB TITR SER: NEGATIVE TITER
GLUCOSE SERPL-MCNC: 136 MG/DL (ref 70–110)
GLUCOSE UR QL STRIP: ABNORMAL
HCT VFR BLD AUTO: 24.4 % (ref 37–48.5)
HGB BLD-MCNC: 8.5 G/DL (ref 12–16)
HGB UR QL STRIP: ABNORMAL
HU1 AB TITR SER: NEGATIVE TITER
HU2 AB TITR SER IF: NEGATIVE TITER
HU3 AB TITR SER: NEGATIVE TITER
HYALINE CASTS UR QL AUTO: 0 /LPF
IMM GRANULOCYTES # BLD AUTO: 0.05 K/UL (ref 0–0.04)
IMM GRANULOCYTES NFR BLD AUTO: 1.7 % (ref 0–0.5)
IMMUNOLOGIST REVIEW: NORMAL
KETONES UR QL STRIP: ABNORMAL
LEUKOCYTE ESTERASE UR QL STRIP: ABNORMAL
LYMPHOCYTES # BLD AUTO: 1.5 K/UL (ref 1–4.8)
LYMPHOCYTES NFR BLD: 51 % (ref 18–48)
LYMPHOCYTES NFR CSF MANUAL: 67 % (ref 40–80)
MCH RBC QN AUTO: 38.1 PG (ref 27–31)
MCHC RBC AUTO-ENTMCNC: 34.8 G/DL (ref 32–36)
MCV RBC AUTO: 109 FL (ref 82–98)
MICROSCOPIC COMMENT: ABNORMAL
MONOCYTES # BLD AUTO: 0.1 K/UL (ref 0.3–1)
MONOCYTES NFR BLD: 2 % (ref 4–15)
MONOS+MACROS NFR CSF MANUAL: 30 % (ref 15–45)
NEUTROPHILS # BLD AUTO: 1.3 K/UL (ref 1.8–7.7)
NEUTROPHILS NFR BLD: 44 % (ref 38–73)
NEUTROPHILS NFR CSF MANUAL: 3 % (ref 0–6)
NITRITE UR QL STRIP: NEGATIVE
NON-SQ EPI CELLS #/AREA URNS AUTO: 1 /HPF
NRBC BLD-RTO: 0 /100 WBC
PAVAL REFLEX TEST ADDED: NORMAL
PCA-1 AB TITR SER: NEGATIVE TITER
PCA-TR AB TITR SER: NEGATIVE TITER
PH UR STRIP: 6 [PH] (ref 5–8)
PLATELET # BLD AUTO: 82 K/UL (ref 150–450)
PMV BLD AUTO: 10.5 FL (ref 9.2–12.9)
POCT GLUCOSE: 117 MG/DL (ref 70–110)
POCT GLUCOSE: 121 MG/DL (ref 70–110)
POCT GLUCOSE: 136 MG/DL (ref 70–110)
POCT GLUCOSE: 136 MG/DL (ref 70–110)
POTASSIUM SERPL-SCNC: 3.9 MMOL/L (ref 3.5–5.1)
PROT SERPL-MCNC: 6.7 G/DL (ref 6–8.4)
PROT UR QL STRIP: ABNORMAL
PURKINJE CELL CYTOPLASMIC AB TYPE2: NEGATIVE TITER
RBC # BLD AUTO: 2.23 M/UL (ref 4–5.4)
RBC # CSF: 5000 /CU MM
RBC #/AREA URNS AUTO: 11 /HPF (ref 0–4)
SODIUM SERPL-SCNC: 130 MMOL/L (ref 136–145)
SP GR UR STRIP: >1.03 (ref 1–1.03)
SPECIMEN VOL CSF: 2 ML
URN SPEC COLLECT METH UR: ABNORMAL
VGCC-P/Q BIND AB SER-SCNC: 0 NMOL/L
VGKC AB SER-SCNC: 0 NMOL/L
WBC # BLD AUTO: 2.98 K/UL (ref 3.9–12.7)
WBC # CSF: 4 /CU MM (ref 0–5)
WBC #/AREA URNS AUTO: 18 /HPF (ref 0–5)

## 2022-09-28 PROCEDURE — 87040 BLOOD CULTURE FOR BACTERIA: CPT | Mod: 59 | Performed by: STUDENT IN AN ORGANIZED HEALTH CARE EDUCATION/TRAINING PROGRAM

## 2022-09-28 PROCEDURE — 99232 PR SUBSEQUENT HOSPITAL CARE,LEVL II: ICD-10-PCS | Mod: 25,,, | Performed by: INTERNAL MEDICINE

## 2022-09-28 PROCEDURE — 88108 CYTOPATH CONCENTRATE TECH: CPT | Performed by: STUDENT IN AN ORGANIZED HEALTH CARE EDUCATION/TRAINING PROGRAM

## 2022-09-28 PROCEDURE — 94761 N-INVAS EAR/PLS OXIMETRY MLT: CPT

## 2022-09-28 PROCEDURE — 25000003 PHARM REV CODE 250: Performed by: NURSE PRACTITIONER

## 2022-09-28 PROCEDURE — A4216 STERILE WATER/SALINE, 10 ML: HCPCS | Performed by: NURSE PRACTITIONER

## 2022-09-28 PROCEDURE — 36415 COLL VENOUS BLD VENIPUNCTURE: CPT | Performed by: STUDENT IN AN ORGANIZED HEALTH CARE EDUCATION/TRAINING PROGRAM

## 2022-09-28 PROCEDURE — 94760 N-INVAS EAR/PLS OXIMETRY 1: CPT

## 2022-09-28 PROCEDURE — 99024 PR POST-OP FOLLOW-UP VISIT: ICD-10-PCS | Mod: ,,, | Performed by: PHYSICIAN ASSISTANT

## 2022-09-28 PROCEDURE — 96450 CHEMOTHERAPY INTO CNS: CPT | Mod: 52,,, | Performed by: NURSE PRACTITIONER

## 2022-09-28 PROCEDURE — 25000003 PHARM REV CODE 250

## 2022-09-28 PROCEDURE — 99233 PR SUBSEQUENT HOSPITAL CARE,LEVL III: ICD-10-PCS | Mod: ,,, | Performed by: NEUROLOGICAL SURGERY

## 2022-09-28 PROCEDURE — 85025 COMPLETE CBC W/AUTO DIFF WBC: CPT | Performed by: STUDENT IN AN ORGANIZED HEALTH CARE EDUCATION/TRAINING PROGRAM

## 2022-09-28 PROCEDURE — 88108 PR  CYTOPATH FLUIDS,CONCENTRATN,INTERP: ICD-10-PCS | Mod: 26,,, | Performed by: STUDENT IN AN ORGANIZED HEALTH CARE EDUCATION/TRAINING PROGRAM

## 2022-09-28 PROCEDURE — 63600175 PHARM REV CODE 636 W HCPCS: Performed by: STUDENT IN AN ORGANIZED HEALTH CARE EDUCATION/TRAINING PROGRAM

## 2022-09-28 PROCEDURE — 25000003 PHARM REV CODE 250: Performed by: STUDENT IN AN ORGANIZED HEALTH CARE EDUCATION/TRAINING PROGRAM

## 2022-09-28 PROCEDURE — 99024 POSTOP FOLLOW-UP VISIT: CPT | Mod: ,,, | Performed by: PHYSICIAN ASSISTANT

## 2022-09-28 PROCEDURE — 63600175 PHARM REV CODE 636 W HCPCS: Performed by: NURSE PRACTITIONER

## 2022-09-28 PROCEDURE — 99233 SBSQ HOSP IP/OBS HIGH 50: CPT | Mod: ,,, | Performed by: NEUROLOGICAL SURGERY

## 2022-09-28 PROCEDURE — 87086 URINE CULTURE/COLONY COUNT: CPT | Performed by: STUDENT IN AN ORGANIZED HEALTH CARE EDUCATION/TRAINING PROGRAM

## 2022-09-28 PROCEDURE — 80053 COMPREHEN METABOLIC PANEL: CPT | Performed by: STUDENT IN AN ORGANIZED HEALTH CARE EDUCATION/TRAINING PROGRAM

## 2022-09-28 PROCEDURE — 94799 UNLISTED PULMONARY SVC/PX: CPT

## 2022-09-28 PROCEDURE — 99232 SBSQ HOSP IP/OBS MODERATE 35: CPT | Mod: 25,,, | Performed by: INTERNAL MEDICINE

## 2022-09-28 PROCEDURE — 25000242 PHARM REV CODE 250 ALT 637 W/ HCPCS: Performed by: STUDENT IN AN ORGANIZED HEALTH CARE EDUCATION/TRAINING PROGRAM

## 2022-09-28 PROCEDURE — 89051 BODY FLUID CELL COUNT: CPT | Performed by: NURSE PRACTITIONER

## 2022-09-28 PROCEDURE — 96450 PR CHEMOTHER,CNS,W/LUMBAR PUNCTURE: ICD-10-PCS | Mod: 52,,, | Performed by: NURSE PRACTITIONER

## 2022-09-28 PROCEDURE — 63600175 PHARM REV CODE 636 W HCPCS

## 2022-09-28 PROCEDURE — 20600001 HC STEP DOWN PRIVATE ROOM

## 2022-09-28 PROCEDURE — 88108 CYTOPATH CONCENTRATE TECH: CPT | Mod: 26,,, | Performed by: STUDENT IN AN ORGANIZED HEALTH CARE EDUCATION/TRAINING PROGRAM

## 2022-09-28 PROCEDURE — 25000003 PHARM REV CODE 250: Performed by: INTERNAL MEDICINE

## 2022-09-28 PROCEDURE — 81001 URINALYSIS AUTO W/SCOPE: CPT | Performed by: STUDENT IN AN ORGANIZED HEALTH CARE EDUCATION/TRAINING PROGRAM

## 2022-09-28 PROCEDURE — 99900035 HC TECH TIME PER 15 MIN (STAT)

## 2022-09-28 RX ORDER — PROCHLORPERAZINE EDISYLATE 5 MG/ML
2.5 INJECTION INTRAMUSCULAR; INTRAVENOUS EVERY 4 HOURS PRN
Status: DISCONTINUED | OUTPATIENT
Start: 2022-09-28 | End: 2022-10-01 | Stop reason: HOSPADM

## 2022-09-28 RX ORDER — SODIUM CHLORIDE 9 MG/ML
INJECTION, SOLUTION INTRAVENOUS CONTINUOUS
Status: ACTIVE | OUTPATIENT
Start: 2022-09-28 | End: 2022-09-29

## 2022-09-28 RX ORDER — ACETAMINOPHEN 325 MG/1
650 TABLET ORAL EVERY 6 HOURS PRN
Status: DISCONTINUED | OUTPATIENT
Start: 2022-09-28 | End: 2022-10-01 | Stop reason: HOSPADM

## 2022-09-28 RX ORDER — LIDOCAINE HYDROCHLORIDE 10 MG/ML
2 INJECTION INFILTRATION; PERINEURAL ONCE
Status: COMPLETED | OUTPATIENT
Start: 2022-09-28 | End: 2022-09-28

## 2022-09-28 RX ORDER — CEFEPIME HYDROCHLORIDE 1 G/50ML
2 INJECTION, SOLUTION INTRAVENOUS
Status: DISCONTINUED | OUTPATIENT
Start: 2022-09-28 | End: 2022-09-30

## 2022-09-28 RX ADMIN — BUSPIRONE HYDROCHLORIDE 10 MG: 10 TABLET ORAL at 09:09

## 2022-09-28 RX ADMIN — CEFEPIME 2 G: 2 INJECTION, POWDER, FOR SOLUTION INTRAVENOUS at 10:09

## 2022-09-28 RX ADMIN — DEXAMETHASONE 4 MG: 4 TABLET ORAL at 06:09

## 2022-09-28 RX ADMIN — ACYCLOVIR 400 MG: 200 CAPSULE ORAL at 09:09

## 2022-09-28 RX ADMIN — VORTIOXETINE 20 MG: 10 TABLET, FILM COATED ORAL at 10:09

## 2022-09-28 RX ADMIN — GABAPENTIN 300 MG: 300 CAPSULE ORAL at 12:09

## 2022-09-28 RX ADMIN — DILTIAZEM HYDROCHLORIDE 30 MG: 60 TABLET, FILM COATED ORAL at 06:09

## 2022-09-28 RX ADMIN — INSULIN ASPART 2 UNITS: 100 INJECTION, SOLUTION INTRAVENOUS; SUBCUTANEOUS at 02:09

## 2022-09-28 RX ADMIN — GABAPENTIN 300 MG: 300 CAPSULE ORAL at 04:09

## 2022-09-28 RX ADMIN — DEXAMETHASONE 4 MG: 4 TABLET ORAL at 12:09

## 2022-09-28 RX ADMIN — OXCARBAZEPINE 1200 MG: 600 TABLET, FILM COATED ORAL at 10:09

## 2022-09-28 RX ADMIN — ACETAMINOPHEN 650 MG: 325 TABLET ORAL at 08:09

## 2022-09-28 RX ADMIN — LIDOCAINE HYDROCHLORIDE 2 ML: 10 INJECTION, SOLUTION INFILTRATION; PERINEURAL at 03:09

## 2022-09-28 RX ADMIN — SODIUM CHLORIDE, PRESERVATIVE FREE 6 ML: 5 INJECTION INTRAVENOUS at 03:09

## 2022-09-28 RX ADMIN — ATORVASTATIN CALCIUM 80 MG: 20 TABLET, FILM COATED ORAL at 12:09

## 2022-09-28 RX ADMIN — INSULIN ASPART 2 UNITS: 100 INJECTION, SOLUTION INTRAVENOUS; SUBCUTANEOUS at 06:09

## 2022-09-28 RX ADMIN — DILTIAZEM HYDROCHLORIDE 30 MG: 60 TABLET, FILM COATED ORAL at 12:09

## 2022-09-28 RX ADMIN — PROCHLORPERAZINE EDISYLATE 2.5 MG: 5 INJECTION INTRAMUSCULAR; INTRAVENOUS at 08:09

## 2022-09-28 RX ADMIN — BUTALBITAL, ACETAMINOPHEN, AND CAFFEINE 1 TABLET: 50; 325; 40 TABLET ORAL at 09:09

## 2022-09-28 RX ADMIN — PROCHLORPERAZINE EDISYLATE 2.5 MG: 5 INJECTION INTRAMUSCULAR; INTRAVENOUS at 12:09

## 2022-09-28 RX ADMIN — QUETIAPINE FUMARATE 200 MG: 200 TABLET ORAL at 09:09

## 2022-09-28 RX ADMIN — INSULIN ASPART 2 UNITS: 100 INJECTION, SOLUTION INTRAVENOUS; SUBCUTANEOUS at 10:09

## 2022-09-28 RX ADMIN — ONDANSETRON 4 MG: 2 INJECTION INTRAMUSCULAR; INTRAVENOUS at 04:09

## 2022-09-28 RX ADMIN — FENOFIBRATE 160 MG: 160 TABLET ORAL at 12:09

## 2022-09-28 RX ADMIN — HYDROXYZINE PAMOATE 50 MG: 25 CAPSULE ORAL at 09:09

## 2022-09-28 RX ADMIN — ALLOPURINOL 300 MG: 300 TABLET ORAL at 10:09

## 2022-09-28 RX ADMIN — PROMETHAZINE HYDROCHLORIDE 6.25 MG: 25 INJECTION INTRAMUSCULAR; INTRAVENOUS at 09:09

## 2022-09-28 RX ADMIN — SODIUM CHLORIDE: 0.9 INJECTION, SOLUTION INTRAVENOUS at 04:09

## 2022-09-28 RX ADMIN — ATOVAQUONE 1500 MG: 750 SUSPENSION ORAL at 08:09

## 2022-09-28 RX ADMIN — PANTOPRAZOLE SODIUM 40 MG: 40 TABLET, DELAYED RELEASE ORAL at 09:09

## 2022-09-28 RX ADMIN — OXCARBAZEPINE 1200 MG: 600 TABLET, FILM COATED ORAL at 09:09

## 2022-09-28 RX ADMIN — CEFEPIME 2 G: 2 INJECTION, POWDER, FOR SOLUTION INTRAVENOUS at 08:09

## 2022-09-28 RX ADMIN — HYDROXYCHLOROQUINE SULFATE 200 MG: 200 TABLET ORAL at 09:09

## 2022-09-28 RX ADMIN — GABAPENTIN 300 MG: 300 CAPSULE ORAL at 08:09

## 2022-09-28 RX ADMIN — INSULIN DETEMIR 5 UNITS: 100 INJECTION, SOLUTION SUBCUTANEOUS at 09:09

## 2022-09-28 RX ADMIN — ONDANSETRON 4 MG: 2 INJECTION INTRAMUSCULAR; INTRAVENOUS at 10:09

## 2022-09-28 NOTE — ASSESSMENT & PLAN NOTE
- Follows with Dr. Jenkins.  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  - Was treated with inotuzumab ozogamicin and ponatinib but course complicated by severe cytopenias. Not has been on therapy for past few weeks.  - Not a candidate for allogeneic stem cell transplant at this time.  - Will discuss restarting ponatinib with Dr. Padgett as it is unlikely the cause of vision loss   - LP done 9/19 with CSF + for B ALL  - received IT chemo 9/19 and 9/22 (triple therapy), given CNS involvement ALL will plan for twice weekly IT chemo  - S/p Ommaya placement 9/26. As per NRSY, will wait until POD #5 before using Ommaya for IT chemo  - 9/28 Tmax 102.8. Cultures pending. Cefepime started for empiric microbial coverage.

## 2022-09-28 NOTE — PROGRESS NOTES
[facility]to [facility]pez - Oncology ([facility])  Hematology  Bone Marrow Transplant  Progress Note    Patient Name: Deepti Gonzalez  Admission Date: 9/16/2022  Hospital Length of Stay: 12 days  Code Status: Full Code    Subjective:     Interval History: POD #2. Patient febrile overnight w/ Tmax 102.8. Cultures pending. Wolf has been in place >20d; will obtain straight cath and replace wolf. Tachycardia is likely 2/2 febrile episodes. Will continue to hold AC given plan to proceed w/ IC chemo w/ flouro.     Objective:     Vital Signs (Most Recent):  Temp: 100.2 °F (37.9 °C) (09/28/22 0917)  Pulse: (!) 111 (09/28/22 0720)  Resp: 16 (09/28/22 0720)  BP: 135/65 (09/28/22 0720)  SpO2: 98 % (09/28/22 0720)   Vital Signs (24h Range):  Temp:  [98.4 °F (36.9 °C)-102.8 °F (39.3 °C)] 100.2 °F (37.9 °C)  Pulse:  [] 111  Resp:  [14-18] 16  SpO2:  [96 %-99 %] 98 %  BP: (111-137)/(59-78) 135/65     Weight: 59.1 kg (130 lb 4.7 oz)  Body mass index is 22.36 kg/m².  Body surface area is 1.63 meters squared.    ECOG SCORE           [unfilled]    Intake/Output - Last 3 Shifts         09/26 0700 09/27 0659 09/27 0700 09/28 0659 09/28 0700 09/29 0659    P.O. 360 600     Blood       IV Piggyback 500      Total Intake(mL/kg) 860 (14.6) 600 (10.2)     Urine (mL/kg/hr) 1900 (1.3) 1220 (0.9)     Stool  0     Total Output 1900 1220     Net -1040 -620            Stool Occurrence  2 x             Physical Exam  Constitutional:       General: She is not in acute distress.     Appearance: She is well-developed.   HENT:      Head: Normocephalic and atraumatic.      Comments: Ommaya in place      Nose: Nose normal. No congestion.   Eyes:      General: No scleral icterus.     Extraocular Movements: Extraocular movements intact.      Comments: Left eye pupil not reactive. Left eye peripheral vision not intact. Reports eye pain with movement improved.    Cardiovascular:      Rate and Rhythm: Normal rate and regular rhythm.      Heart sounds: No murmur  heard.  Pulmonary:      Effort: Pulmonary effort is normal. No respiratory distress.   Abdominal:      General: There is no distension.      Palpations: Abdomen is soft.      Tenderness: There is no abdominal tenderness.   Genitourinary:     Comments: Green in place  Musculoskeletal:         General: Normal range of motion.      Cervical back: Normal range of motion and neck supple.   Skin:     General: Skin is warm and dry.      Comments: Port intact with no redness or drainage   Neurological:      General: No focal deficit present.      Mental Status: She is alert and oriented to person, place, and time.   Psychiatric:         Mood and Affect: Mood normal.         Behavior: Behavior normal.       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 Concern for possible B-ALL involvement vs side effect of ponatinib vs retrobulbar hemorrhage vs stroke    Plan:  - Consulted Opthomology, appreciate assistance  - LP on 9/19 and 9/22 with IT chemo (cytarabine, hydrocortisone, methotrexate)  - labs pending:   TB DNA by PCR negative  VDRL negative  [place] virus PCR negative  VZV PCR negative  HSV PCR negative  Enterovirus RNA negative  CNS demyelinating dz (AQP-4 IGG/MOG IGG)  CSF bands negative  NMO aquaporin negative  MS profile      Gram stain: no organisms seen  CSF culture NGTD  Flow cytometry analysis c/w B ALL  Cytology in process  CSF cell count 546  WBC, 87% lymphs, 12% others  CSF protein 43  Cryptococcal Ag negative    - repeat DFE with ophthalmology for 9/26  - continue dexamethasone 4 q6h  - Patient with pacemaker and per patient, not compatible with MRI. Would ideally obtain MRI brain w/ w/o contrast. Verified with EP who checked with rep that pacemaker is not MRI compatible.   - CTA completed with delayed venous phase revealing small SAH   - DSA (digitial subtraction angio) recommended by NSGY. Neuro ok with it, but will defer further vasculitis workup for now given that leukemia is a more obvious etiology for her vision loss.       Monocular vision loss  - see vision loss     SAH (subarachnoid hemorrhage)  - Small SAH discovered on CTA. Repeat CTH stable. No focal neuro deficits. Pt has ongoing left sided headache and eye pain.   - Likely incidental finding that is not causing the vision loss.   - NSGY consulted, appreciate recs  - CT head with stealth shows resolving SAH  - CT head on 9/24 shows no new hemorrhage    Thrombocytopenia  - Monitor CBC  - Transfuse for platelet count <10 or <50k with bleeding  - improved w/ transfusion      Urinary retention  - Patient reports wolf placed last week and has been that time.  - Attempt voiding trial prior to discharge  - Will need urology follow up at discharge. See little value in consult urolgoy/urogynecology inpatient as they will likely recommend outpt follow up.     Anemia associated with chemotherapy  - Monitor CBC  - Transfuse for Hgb<7    B-cell acute lymphoblastic leukemia  - Follows with Dr. Jenkins.  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  - Was treated with inotuzumab ozogamicin and ponatinib but course complicated by severe cytopenias. Not has been on therapy for past few weeks.  - Not a candidate for allogeneic stem cell transplant at this time.  - Will discuss restarting ponatinib with Dr. Padgett as it is unlikely the cause  of vision loss   - LP done 9/19 with CSF + for B ALL  - received IT chemo 9/19 and 9/22 (triple therapy), given CNS involvement ALL will plan for twice weekly IT chemo  - S/p Ommaya placement 9/26. As per NRSY, will wait until POD #5 before using Ommaya for IT chemo  - 9/28 Tmax 102.8. Cultures pending. Cefepime started for empiric microbial coverage.     Anxiety  - Continue home buspirone, hydroxyzine and quetiapine.   - Trintellix is nonformulary however pharmacy is attempting to obtain this medication.    Hypertension  - Holding home BP meds in the setting of soft BP.   - Resume when appropriate.    Seizure disorder  - Continue home oxcarbazepine 200mg QHS    Type 2 diabetes mellitus, without long-term current use of insulin  Diabetes type 2  Last HbA1c:   Lab Results   Component Value Date    HGBA1C 4.4 04/13/2022       PLAN:   - Hold oral anti-hyperglycemic agents  - worsened by IV steroids  - Detemir 5U, Aspart 2U TID WM and SSI  - Diabetic diet  - Hypoglycemia orderset with POCT BG AC/QS  - Goal -180 while inpatient         Pacemaker  - Patient with pacemaker in place due to SSS. Per EP, it is definitely MRI incompatible.      Rheumatoid arthritis involving multiple sites  - Seronegative RA. Continue home plaquenil    Hyperlipidemia  - Continue home atorvastatin and fenofibrate    Paroxysmal atrial fibrillation  - Holding home xarelto in the setting of thrombocytopenia and plans for IC chemo         VTE Risk Mitigation (From admission, onward)         Ordered     IP VTE HIGH RISK PATIENT  Once         09/16/22 1637     Place sequential compression device  Until discontinued         09/16/22 1637     Reason for No Pharmacological VTE Prophylaxis  Once        Question:  Reasons:  Answer:  Thrombocytopenia    09/16/22 1637                Mary Kate Piedra MD  Bone Marrow Transplant  [facility] - Oncology ([facility])

## 2022-09-28 NOTE — PROCEDURES
Radiology Post-Procedure Note    Pre Op Diagnosis: ALL    Post Op Diagnosis: Same    Procedure: lumbar puncture    Procedure performed by: Sunil Goddard    Written Informed Consent Obtained: yes    Specimen Removed: 6 mL CSF    Estimated Blood Loss: Minimal    Findings: Following written informed consent and sterile prep and drape, a 22 gauge spinal needle was inserted at L4 - L5 intralaminar space under fluoroscopic surveillance.  6 mL blood-tinged CSF removed and sent to the lab for further analysis. A member of the oncology team administered the chemotherapy. 3 cc of CSF was used to flush the needle. There were no complications.    Patient tolerated procedure well.    Sunil Goddard MD MSCR  PGY-4 Radiology Resident

## 2022-09-28 NOTE — PROGRESS NOTES
[facility]to Yepez - Oncology ([facility])  Neurosurgery  Progress Note    Subjective:     History of Present Illness: 51 F Pmhx leukemia, presents with left sided vision loss 5 days ago while watching TV, vision loss began gradually then progressed to complete left eye vision loss over 1 hour. She has not had any improvement since then. She has no headaches, focal weaknesses, confusion, or speech difficulty. She denies any trauma. NSGY consulted for incidentally found small left frontal SAH      Post-Op Info:  Procedure(s) (LRB):  INSERTION, OMMAYA RESERVOIR (N/A)   2 Days Post-Op     Interval History: POD 2 s/p Ommaya placement. Patient febrile overnight Tmax 102.8. Primary team initiated fever workup. Denies vision changes, fevers, or chills. Reports headache improving. Will follow patient peripherally.    Medications:  Continuous Infusions:  Scheduled Meds:   acyclovir  400 mg Oral BID    allopurinoL  300 mg Oral Daily    atorvastatin  80 mg Oral Daily    atovaquone  1,500 mg Oral Daily    busPIRone  10 mg Oral BID    ceFEPime (MAXIPIME) IVPB  2 g Intravenous Q8H    dexAMETHasone  4 mg Oral Q6H    diltiaZEM  30 mg Oral 4 times per day    fenofibrate  160 mg Oral Daily    gabapentin  300 mg Oral TID    hydrOXYchloroQUINE  200 mg Oral Daily    hydrOXYzine pamoate  50 mg Oral Q24H    insulin aspart U-100  2 Units Subcutaneous TIDWM    insulin detemir U-100  5 Units Subcutaneous QHS    OXcarbazepine  1,200 mg Oral QHS    OXcarbazepine  1,200 mg Oral with lunch    pantoprazole  40 mg Oral Daily    QUEtiapine  200 mg Oral QHS    vortioxetine  20 mg Oral Q24H     PRN Meds:sodium chloride, acetaminophen, butalbital-acetaminophen-caffeine -40 mg, dextrose 10%, dextrose 10%, diazePAM, duke's soln (benadryl 30 mL, mylanta 30 mL, LIDOcaine 30 mL, nystatin 30 mL) 120 mL, glucagon (human recombinant), glucose, glucose, HYDROmorphone, insulin aspart U-100, lactulose, magnesium oxide, magnesium oxide, magnesium oxide, naloxone,  "ondansetron, potassium chloride, potassium chloride, potassium chloride, potassium, sodium phosphates, potassium, sodium phosphates, prochlorperazine, sodium chloride 0.9%, sumatriptan, white petrolatum     Review of Systems  Objective:     Weight: 59.1 kg (130 lb 4.7 oz)  Body mass index is 22.36 kg/m².  Vital Signs (Most Recent):  Temp: 100.2 °F (37.9 °C) (09/28/22 0917)  Pulse: (!) 111 (09/28/22 0720)  Resp: 16 (09/28/22 0720)  BP: 135/65 (09/28/22 0720)  SpO2: 98 % (09/28/22 0720)   Vital Signs (24h Range):  Temp:  [98.4 °F (36.9 °C)-102.8 °F (39.3 °C)] 100.2 °F (37.9 °C)  Pulse:  [] 111  Resp:  [14-18] 16  SpO2:  [96 %-99 %] 98 %  BP: (111-137)/(59-78) 135/65                          Urethral Catheter 09/07/22 (Active)   Site Assessment Clean;Intact;Dry 09/27/22 2000   Collection Container Urimeter 09/27/22 2000   Securement Method secured to top of thigh w/ adhesive device 09/27/22 2000   Catheter Care Performed yes 09/27/22 2000   Reason for Continuing Urinary Catheterization Urinary retention 09/27/22 2000   CAUTI Prevention Bundle Securement Device in place with 1" slack;Intact seal between catheter & drainage tubing;Drainage bag/urimeter off the floor;Sheeting clip in use;No dependent loops or kinks;Drainage bag/urimeter not overfilled (<2/3 full);Drainage bag/urimeter below bladder 09/27/22 2000   Output (mL) 350 mL 09/27/22 1756   Neurosurgery Physical Exam  Constitutional: Well nourished, well developed. No distress.   HEENT: atraumatic/normocephalic  Cardiovascular: Regular rhythm.   Pulm: aerating well, saturating well, no respiratory distress  Abdominal: Soft, non-distended.   Psych/Behavior: She is alert, thought content appropriate.      E4V5M6  AOx3  EOMI  Face Symmetric  Tongue midline  Vision loss left eye, L pupil non-reactive. R pupil briskly reactive to light.  BUE 5/5  BLE 5/5  No drift     R cranial incision: C/D/I with dissolvable sutures. Skin edges well approximated. No surrounding " erythema or edema. No drainage.    Significant Labs:  Recent Labs   Lab 09/27/22  0426 09/28/22  0443   * 136*   * 130*   K 3.6 3.9    97   CO2 23 22*   BUN 14 13   CREATININE 0.5 0.6   CALCIUM 9.0 9.2     Recent Labs   Lab 09/27/22  0426 09/28/22  0443   WBC 2.75* 2.98*   HGB 8.4* 8.5*   HCT 24.4* 24.4*   * 82*     No results for input(s): LABPT, INR, APTT in the last 48 hours.  Microbiology Results (last 7 days)       Procedure Component Value Units Date/Time    Blood culture [[id number]] Collected: 09/28/22 0606    Order Status: Sent Specimen: Blood Updated: 09/28/22 0618    Blood culture [[id number]] Collected: 09/28/22 0607    Order Status: Sent Specimen: Blood Updated: 09/28/22 0618    Clostridium difficile EIA [[id number]] Collected: 09/25/22 1314    Order Status: Completed Specimen: Stool Updated: 09/25/22 2222     C. diff Antigen Negative     C difficile Toxins A+B, EIA Negative     Comment: Testing not recommended for children <24 months old.       CSF culture [[id number]] Collected: 09/19/22 1245    Order Status: Completed Specimen: CSF (Spinal Fluid) from CSF Tap, Tube 3 Updated: 09/24/22 0650     CSF CULTURE No Growth     Gram Stain Result No WBC's      No organisms seen          All pertinent labs from the last 24 hours have been reviewed.    Significant Diagnostics:  I have reviewed all pertinent imaging results/findings within the past 24 hours.    Assessment/Plan:     B-cell acute lymphoblastic leukemia  51 F with Pmhx B-ALL who presented with sudden onset left sided vision loss 5 days prior to admission while watching TV. NSGY initially consulted for incidentally found small left frontal SAH. 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    Subarachnoid Hemorrhage:  -- SAH likely unrelated to vision loss  -- CTA negative for any vascular lesion or occlusion  --  Rpt CTH stable bleed  -- unfortunately patient cannot have MRI due to incompatible pacemaker, so no further imaging recommended at this time    B-cell ALL:  -- LP done on 9/19 and 9/22 with IT chemo, flow cytometry analysis was consistent with B-ALL  -- Leukemia is likely etiology of her vision loss  -- S/p Ommaya reservoir for IT chemo with Dr. Alberto on 9/26  -- Postop CTH 9/26 with expected findings, no hemorrhage  -- Will remove surgical dressing POD#2  -- Okay to begin using Ommaya reservoir POD#5, 10/1/22  -- Will follow patient peripherally. Please contact NSGY team with any questions/concerns     Discussed with attending staff Dr. Dolly Raabgo PA-C  Neurosurgery  [facility] - Oncology ([facility])

## 2022-09-28 NOTE — SUBJECTIVE & OBJECTIVE
Subjective:     Interval History: POD #2. Patient febrile overnight w/ Tmax 102.8. Cultures pending. Wolf has been in place >20d; will obtain straight cath and replace wolf. Tachycardia is likely 2/2 febrile episodes. Will continue to hold AC given plan to proceed w/ IC chemo w/ flouro.     Objective:     Vital Signs (Most Recent):  Temp: 100.2 °F (37.9 °C) (09/28/22 0917)  Pulse: (!) 111 (09/28/22 0720)  Resp: 16 (09/28/22 0720)  BP: 135/65 (09/28/22 0720)  SpO2: 98 % (09/28/22 0720)   Vital Signs (24h Range):  Temp:  [98.4 °F (36.9 °C)-102.8 °F (39.3 °C)] 100.2 °F (37.9 °C)  Pulse:  [] 111  Resp:  [14-18] 16  SpO2:  [96 %-99 %] 98 %  BP: (111-137)/(59-78) 135/65     Weight: 59.1 kg (130 lb 4.7 oz)  Body mass index is 22.36 kg/m².  Body surface area is 1.63 meters squared.    ECOG SCORE           [unfilled]    Intake/Output - Last 3 Shifts         09/26 0700 09/27 0659 09/27 0700 09/28 0659 09/28 0700 09/29 0659    P.O. 360 600     Blood       IV Piggyback 500      Total Intake(mL/kg) 860 (14.6) 600 (10.2)     Urine (mL/kg/hr) 1900 (1.3) 1220 (0.9)     Stool  0     Total Output 1900 1220     Net -1040 -620            Stool Occurrence  2 x             Physical Exam  Constitutional:       General: She is not in acute distress.     Appearance: She is well-developed.   HENT:      Head: Normocephalic and atraumatic.      Comments: Ommaya in place      Nose: Nose normal. No congestion.   Eyes:      General: No scleral icterus.     Extraocular Movements: Extraocular movements intact.      Comments: Left eye pupil not reactive. Left eye peripheral vision not intact. Reports eye pain with movement improved.    Cardiovascular:      Rate and Rhythm: Normal rate and regular rhythm.      Heart sounds: No murmur heard.  Pulmonary:      Effort: Pulmonary effort is normal. No respiratory distress.   Abdominal:      General: There is no distension.      Palpations: Abdomen is soft.      Tenderness: There is no abdominal  tenderness.   Genitourinary:     Comments: Green in place  Musculoskeletal:         General: Normal range of motion.      Cervical back: Normal range of motion and neck supple.   Skin:     General: Skin is warm and dry.      Comments: Port intact with no redness or drainage   Neurological:      General: No focal deficit present.      Mental Status: She is alert and oriented to person, place, and time.   Psychiatric:         Mood and Affect: Mood normal.         Behavior: Behavior normal.       Significant Labs:   CBC:   Recent Labs   Lab 09/27/22  0426 09/28/22  0443   WBC 2.75* 2.98*   HGB 8.4* 8.5*   HCT 24.4* 24.4*   * 82*    and CMP:   Recent Labs   Lab 09/27/22 0426 09/28/22  0443   * 130*   K 3.6 3.9    97   CO2 23 22*   * 136*   BUN 14 13   CREATININE 0.5 0.6   CALCIUM 9.0 9.2   PROT 6.5 6.7   ALBUMIN 3.8 3.7   BILITOT 0.7 0.8   ALKPHOS 72 78   AST 10 11   ALT 13 12   ANIONGAP 11 11       Diagnostic Results:  None

## 2022-09-28 NOTE — SUBJECTIVE & OBJECTIVE
Interval History: POD 2 s/p Ommaya placement. Patient febrile overnight Tmax 102.8. Primary team initiated fever workup. Denies fevers or chills. Reports headache improving.     Medications:  Continuous Infusions:  Scheduled Meds:   acyclovir  400 mg Oral BID    allopurinoL  300 mg Oral Daily    atorvastatin  80 mg Oral Daily    atovaquone  1,500 mg Oral Daily    busPIRone  10 mg Oral BID    ceFEPime (MAXIPIME) IVPB  2 g Intravenous Q8H    dexAMETHasone  4 mg Oral Q6H    diltiaZEM  30 mg Oral 4 times per day    fenofibrate  160 mg Oral Daily    gabapentin  300 mg Oral TID    hydrOXYchloroQUINE  200 mg Oral Daily    hydrOXYzine pamoate  50 mg Oral Q24H    insulin aspart U-100  2 Units Subcutaneous TIDWM    insulin detemir U-100  5 Units Subcutaneous QHS    OXcarbazepine  1,200 mg Oral QHS    OXcarbazepine  1,200 mg Oral with lunch    pantoprazole  40 mg Oral Daily    QUEtiapine  200 mg Oral QHS    vortioxetine  20 mg Oral Q24H     PRN Meds:sodium chloride, acetaminophen, butalbital-acetaminophen-caffeine -40 mg, dextrose 10%, dextrose 10%, diazePAM, duke's soln (benadryl 30 mL, mylanta 30 mL, LIDOcaine 30 mL, nystatin 30 mL) 120 mL, glucagon (human recombinant), glucose, glucose, HYDROmorphone, insulin aspart U-100, lactulose, magnesium oxide, magnesium oxide, magnesium oxide, naloxone, ondansetron, potassium chloride, potassium chloride, potassium chloride, potassium, sodium phosphates, potassium, sodium phosphates, prochlorperazine, sodium chloride 0.9%, sumatriptan, white petrolatum     Review of Systems  Objective:     Weight: 59.1 kg (130 lb 4.7 oz)  Body mass index is 22.36 kg/m².  Vital Signs (Most Recent):  Temp: 100.2 °F (37.9 °C) (09/28/22 0917)  Pulse: (!) 111 (09/28/22 0720)  Resp: 16 (09/28/22 0720)  BP: 135/65 (09/28/22 0720)  SpO2: 98 % (09/28/22 0720)   Vital Signs (24h Range):  Temp:  [98.4 °F (36.9 °C)-102.8 °F (39.3 °C)] 100.2 °F (37.9 °C)  Pulse:  [] 111  Resp:  [14-18] 16  SpO2:  [96  "%-99 %] 98 %  BP: (111-137)/(59-78) 135/65                          Urethral Catheter 09/07/22 (Active)   Site Assessment Clean;Intact;Dry 09/27/22 2000   Collection Container Urimeter 09/27/22 2000   Securement Method secured to top of thigh w/ adhesive device 09/27/22 2000   Catheter Care Performed yes 09/27/22 2000   Reason for Continuing Urinary Catheterization Urinary retention 09/27/22 2000   CAUTI Prevention Bundle Securement Device in place with 1" slack;Intact seal between catheter & drainage tubing;Drainage bag/urimeter off the floor;Sheeting clip in use;No dependent loops or kinks;Drainage bag/urimeter not overfilled (<2/3 full);Drainage bag/urimeter below bladder 09/27/22 2000   Output (mL) 350 mL 09/27/22 1756   Neurosurgery Physical Exam  Constitutional: Well nourished, well developed. No distress.   HEENT: atraumatic/normocephalic  Cardiovascular: Regular rhythm.   Pulm: aerating well, saturating well, no respiratory distress  Abdominal: Soft, non-distended.   Psych/Behavior: She is alert, thought content appropriate.      E4V5M6  AOx3  EOMI  Face Symmetric  Tongue midline  Vision loss left eye, L pupil non-reactive. R pupil briskly reactive to light.  BUE 5/5  BLE 5/5  No drift     R cranial incision: C/D/I with dissolvable sutures. Skin edges well approximated. No surrounding erythema or edema. No drainage.    Significant Labs:  Recent Labs   Lab 09/27/22  0426 09/28/22  0443   * 136*   * 130*   K 3.6 3.9    97   CO2 23 22*   BUN 14 13   CREATININE 0.5 0.6   CALCIUM 9.0 9.2     Recent Labs   Lab 09/27/22  0426 09/28/22  0443   WBC 2.75* 2.98*   HGB 8.4* 8.5*   HCT 24.4* 24.4*   * 82*     No results for input(s): LABPT, INR, APTT in the last 48 hours.  Microbiology Results (last 7 days)       Procedure Component Value Units Date/Time    Blood culture [[id number]] Collected: 09/28/22 0606    Order Status: Sent Specimen: Blood Updated: 09/28/22 0618    Blood culture [[id number]] " Collected: 09/28/22 0607    Order Status: Sent Specimen: Blood Updated: 09/28/22 0618    Clostridium difficile EIA [[id number]] Collected: 09/25/22 1314    Order Status: Completed Specimen: Stool Updated: 09/25/22 2222     C. diff Antigen Negative     C difficile Toxins A+B, EIA Negative     Comment: Testing not recommended for children <24 months old.       CSF culture [[id number]] Collected: 09/19/22 1245    Order Status: Completed Specimen: CSF (Spinal Fluid) from CSF Tap, Tube 3 Updated: 09/24/22 0650     CSF CULTURE No Growth     Gram Stain Result No WBC's      No organisms seen          All pertinent labs from the last 24 hours have been reviewed.    Significant Diagnostics:  I have reviewed all pertinent imaging results/findings within the past 24 hours.

## 2022-09-28 NOTE — CARE UPDATE
RAPID RESPONSE NURSE ROUND       Rounding completed with charge RNLucas for MEWS reports pt to receive IT chemo today, please continue to follow. No additional concerns verbalized at this time. Instructed to call [phone] for further concerns or assistance.

## 2022-09-28 NOTE — PLAN OF CARE
Patient continues on room air. IV abx started. UA collected. IVF started. CT of head compelted. IT Chemo given. N/V today, PRN zofran and compazine given. Green replaced today. TMAX 102.0- tylenol given. Patient stable at this time, no acute changes.       Problem: Adult Inpatient Plan of Care  Goal: Plan of Care Review  Outcome: Ongoing, Progressing  Goal: Patient-Specific Goal (Individualized)  Outcome: Ongoing, Progressing  Goal: Absence of Hospital-Acquired Illness or Injury  Outcome: Ongoing, Progressing  Goal: Optimal Comfort and Wellbeing  Outcome: Ongoing, Progressing  Goal: Readiness for Transition of Care  Outcome: Ongoing, Progressing     Problem: Diabetes Comorbidity  Goal: Blood Glucose Level Within Targeted Range  Outcome: Ongoing, Progressing     Problem: Skin Injury Risk Increased  Goal: Skin Health and Integrity  Outcome: Ongoing, Progressing     Problem: Infection  Goal: Absence of Infection Signs and Symptoms  Outcome: Ongoing, Progressing     Problem: Pain Acute  Goal: Acceptable Pain Control and Functional Ability  Outcome: Ongoing, Progressing     Problem: Fall Injury Risk  Goal: Absence of Fall and Fall-Related Injury  Outcome: Ongoing, Progressing

## 2022-09-28 NOTE — PLAN OF CARE
50 y/o female a/ox4 remains on room air. Denies dyspnea with activity or at rest. Pain reported 7/10 as headache. Pain is intermittent. Interventions/medications administered per MD order. Pt reports interventions Fouchet/Dilaudid to be effective. Pt reports HA improving with the ommaya reservoir. Dressing to forehead/scalp area remains intact. Pt denies pain to site. Gluclose monitoring AC/HS. Schedule Levemir administered at HS, however, no insulin needed using sliding scale. . Indwelling wolf cath remains intact, hanging to gravity, and secured to upper thigh. Urine noted to be yellow with some sediment in cath bag. Pt continues to rest with spouse at bedside. Pt verbalizes understanding to notify RN/staff for all needs, concerns, or problems that may arise. Call light remains within reach, bed in the lowest position, frequent rounding performed throughout shift; fall/safety precautions maintained. Will continue to monitor pt until end of shift and ensure needs are met.     0445- rechecked temp. 101.9 oral. Notified Dr. Brandt regarding temp; orders written in Epic    0605- notified Dr. Brandt regarding UA with the pt having wolf. MD clarified to replace wolf since it was placed 21 days ago to obtain a more accurate Test.     0645- reviewed information with charge nurse, WOO Sanchez and oncoming RNLyudmila. Informed RN cefepime has not been hung pending cultures drawn and wolf replacement with pending lab.

## 2022-09-28 NOTE — H&P
Inpatient Radiology Pre-procedure Note    History of Present Illness:  Deepti Gonzalez is a 51 y.o. female who presents for LP and intrathecal chemotherapy.    Admission H&P reviewed.  Past Medical History:   Diagnosis Date    Cancer     COPD (chronic obstructive pulmonary disease)     Hypertension     Rheumatoid arthritis, unspecified     SAH (subarachnoid hemorrhage) 9/17/2022    Seizures     Stroke     TIA x 4    Type 2 diabetes mellitus with circulatory disorder, without long-term current use of insulin      Past Surgical History:   Procedure Laterality Date    APPENDECTOMY      HYSTERECTOMY      OMMAYA RESERVIOR INSERTION N/A 9/26/2022    Procedure: INSERTION, OMMAYA RESERVOIR;  Surgeon: Mj Alberto MD;  Location: [facility] OR [address] [place] [address];  Service: Neurosurgery;  Laterality: N/A;    ROTATOR CUFF REPAIR Bilateral     TONSILLECTOMY      TUBAL LIGATION         Review of Systems:   As documented in primary team H&P    Home Meds:   Prior to Admission medications    Medication Sig Start Date End Date Taking? Authorizing Provider   acyclovir (ZOVIRAX) 400 MG tablet Take 1 tablet (400 mg total) by mouth 2 (two) times daily. 12/7/21  Yes Kathrine Posey MD   allopurinoL (ZYLOPRIM) 300 MG tablet Take 1 tablet (300 mg total) by mouth once daily. 6/7/22  Yes Connor M Gillies, MD   artificial tears (ISOPTO TEARS) 0.5 % ophthalmic solution Place 1 drop into both eyes 4 (four) times daily as needed. 1/24/22  Yes Mirtha Antonio NP   atorvastatin (LIPITOR) 80 MG tablet Take 80 mg by mouth once daily.   Yes Historical Provider   busPIRone (BUSPAR) 10 MG tablet Take 10 mg by mouth 2 (two) times daily.   Yes Historical Provider   dapagliflozin (FARXIGA) 10 mg tablet Take 10 mg by mouth once daily.   Yes Historical Provider   diazePAM (VALIUM) 10 MG Tab Take 10 mg by mouth 2 (two) times daily as needed.   Yes Historical Provider   diltiaZEM (CARDIZEM) 90 MG tablet Take 1 tablet (90 mg total) by mouth every 6 (six) hours. 12/7/21  12/7/22 Yes Kathrine Posey MD   gabapentin (NEURONTIN) 300 MG capsule Take 1 capsule (300 mg total) by mouth 3 (three) times daily. 12/7/21 12/7/22 Yes Kathrine Posey MD   losartan (COZAAR) 25 MG tablet Take 25 mg by mouth once daily. 11/12/21  Yes Historical Provider   metFORMIN (GLUCOPHAGE-XR) 500 MG ER 24hr tablet Take 1,000 mg by mouth 2 (two) times daily. 7/22/22  Yes Historical Provider   omeprazole (PRILOSEC) 40 MG capsule Take 40 mg by mouth once daily.   Yes Historical Provider   ondansetron (ZOFRAN-ODT) 8 MG TbDL Take 1 tablet (8 mg total) by mouth every 8 (eight) hours as needed (in case of chemo induced nausea). 8/9/22  Yes Dayron Jenkins MD   OXcarbazepine (TRILEPTAL) 600 MG Tab Take 1,200 mg by mouth 2 (two) times daily. 10/26/21  Yes Historical Provider   PONATinib (ICLUSIG) 30 mg Tab Take 1 tablet (30 mg) by mouth once daily. 9/7/22  Yes Dayron Jenkins MD   potassium chloride (K-TAB) 20 mEq Take 20 mEq by mouth 2 (two) times daily. 7/12/22  Yes Historical Provider   prochlorperazine (COMPAZINE) 10 MG tablet Take 1 tablet (10 mg total) by mouth every 6 (six) hours as needed for Nausea. 7/11/22 7/11/23 Yes Dayron Jenkins MD   QUEtiapine (SEROQUEL) 200 MG Tab Take 200 mg by mouth every evening.   Yes Historical Provider   senna-docusate 8.6-50 mg (PERICOLACE) 8.6-50 mg per tablet Take 1 tablet by mouth 2 (two) times daily. 3/19/22  Yes Felisha Goodwin DO   sumatriptan (IMITREX) 50 MG tablet Take 1 tablet (50 mg total) by mouth daily as needed (headache). 8/29/22 9/28/22 Yes Dayron Jenkins MD   TRINTELLIX 20 mg Tab Take 1 tablet by mouth once daily. 3/8/22  Yes Historical Provider   ursodioL (ACTIGALL) 300 mg capsule Take 1 capsule (300 mg total) by mouth 3 (three) times daily. 6/13/22 6/13/23 Yes Dayron Jenkins MD   blood sugar diagnostic Strp SMARTSIG:Via Meter 1 to 2 Times Daily 10/11/21   Historical Provider   duke's soln (benadryl 30 mL, mylanta 30 mL, LIDOcaine 30 mL, nystatin 30 mL)  120mL Take 10 mLs by mouth 4 (four) times daily as needed (mouth pain). 4/19/22   Mirtha Antonio NP   fenofibrate 160 MG Tab Take 160 mg by mouth once daily.    Historical Provider   hydrOXYchloroQUINE (PLAQUENIL) 200 mg tablet Take 1 tablet (200 mg total) by mouth once daily. 5/19/22   Mirtha Antonio NP   hydrOXYzine pamoate (VISTARIL) 50 MG Cap Take 50 mg by mouth 2 (two) times daily as needed. 4/29/22   Historical Provider   LIDOcaine (LIDODERM) 5 % Place 1 patch onto the skin once daily. Remove & Discard patch within 12 hours or as directed by MD 6/6/22   Connor M Gillies, MD   magnesium oxide (MAG-OX) 400 mg (241.3 mg magnesium) tablet Take 1 tablet by mouth once daily. 7/12/22   Historical Provider   naloxone (NARCAN) 4 mg/actuation Spry 4mg by nasal route as needed for opioid overdose; may repeat every 2-3 minutes in alternating nostrils until medical help arrives. Call 911 6/8/22   Ines Mccord MD   ONETOUCH VERIO TEST STRIPS Strp SMARTSIG:Via Meter 1 to 2 Times Daily 4/6/22   Historical Provider   polyethylene glycol (GLYCOLAX) 17 gram/dose powder Dissolve one capful (17 g) in liquid and take by mouth daily as needed (constipation). 12/7/21   Kathrine Posey MD   rivaroxaban (XARELTO) 20 mg Tab Take 1 tablet (20 mg total) by mouth daily with dinner or evening meal. Hold until instructed to resume by provider due to low platelet count. 6/6/22   Connor M Gillies, MD     Scheduled Meds:    acyclovir  400 mg Oral BID    allopurinoL  300 mg Oral Daily    atorvastatin  80 mg Oral Daily    atovaquone  1,500 mg Oral Daily    busPIRone  10 mg Oral BID    ceFEPime (MAXIPIME) IVPB  2 g Intravenous Q8H    cytarabine with hydrocortisone and methotrexate INTRATHECAL chemo injection (PEDS)   Intrathecal 1 time in Clinic/HOD    dexAMETHasone  4 mg Oral Q6H    diltiaZEM  30 mg Oral 4 times per day    fenofibrate  160 mg Oral Daily    gabapentin  300 mg Oral TID    hydrOXYchloroQUINE  200 mg Oral Daily    hydrOXYzine  pamoate  50 mg Oral Q24H    insulin aspart U-100  2 Units Subcutaneous TIDWM    insulin detemir U-100  5 Units Subcutaneous QHS    OXcarbazepine  1,200 mg Oral QHS    OXcarbazepine  1,200 mg Oral with lunch    pantoprazole  40 mg Oral Daily    QUEtiapine  200 mg Oral QHS    vortioxetine  20 mg Oral Q24H     Continuous Infusions:    sodium chloride 0.9%       PRN Meds:sodium chloride, acetaminophen, butalbital-acetaminophen-caffeine -40 mg, dextrose 10%, dextrose 10%, diazePAM, duke's soln (benadryl 30 mL, mylanta 30 mL, LIDOcaine 30 mL, nystatin 30 mL) 120 mL, glucagon (human recombinant), glucose, glucose, HYDROmorphone, insulin aspart U-100, lactulose, magnesium oxide, magnesium oxide, magnesium oxide, naloxone, ondansetron, potassium chloride, potassium chloride, potassium chloride, potassium, sodium phosphates, potassium, sodium phosphates, prochlorperazine, promethazine (PHENERGAN) IVPB, sodium chloride 0.9%, sumatriptan, white petrolatum  Anticoagulants/Antiplatelets: no anticoagulation    Allergies:   Review of patient's allergies indicates:   Allergen Reactions    Levetiracetam      Other reaction(s): Unknown  Other reaction(s): Unknown  Other reaction(s): Unknown     Sedation Hx: have not been any systemic reactions    Labs:  Recent Labs   Lab 09/26/22  0359   INR 1.1       Recent Labs   Lab 09/28/22  0443   WBC 2.98*   HGB 8.5*   HCT 24.4*   *   PLT 82*      Recent Labs   Lab 09/28/22  0443   *   *   K 3.9   CL 97   CO2 22*   BUN 13   CREATININE 0.6   CALCIUM 9.2   ALT 12   AST 11   ALBUMIN 3.7   BILITOT 0.8         Vitals:  Temp: 100.1 °F (37.8 °C) (09/28/22 1200)  Pulse: 104 (09/28/22 1200)  Resp: 16 (09/28/22 1200)  BP: 139/79 (09/28/22 1200)  SpO2: 98 % (09/28/22 1200)     Physical Exam:  ASA: 3  Mallampati: 3    General: no acute distress  Mental Status: alert and oriented to person, place and time  HEENT: normocephalic, atraumatic  Chest: unlabored breathing  Heart: regular  heart rate  Abdomen: nondistended  Extremity: moves all extremities    Plan: LP and intrathecal chemotherapy    Sedation Plan: local    Sunil Goddard MD MSCR  PGY-4 Radiology Resident

## 2022-09-28 NOTE — ASSESSMENT & PLAN NOTE
51 F with Pmhx B-ALL who presented with sudden onset left sided vision loss 5 days prior to admission while watching TV. NSGY initially consulted for incidentally found small left frontal SAH. 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    Subarachnoid Hemorrhage:  -- SAH likely unrelated to vision loss  -- CTA negative for any vascular lesion or occlusion  -- Rpt CTH stable bleed  -- unfortunately patient cannot have MRI due to incompatible pacemaker, so no further imaging recommended at this time    B-cell ALL:  -- LP done on 9/19 and 9/22 with IT chemo, flow cytometry analysis was consistent with B-ALL  -- Leukemia is likely etiology of her vision loss  -- S/p Ommaya reservoir for IT chemo with Dr. Alberto on 9/26  -- Postop CTH 9/26 with expected findings, no hemorrhage  -- Will remove surgical dressing POD#2  -- Okay to begin using Ommaya reservoir POD#5, 10/1/22  -- Please contact NSGY team with any questions/concerns     Discussed with attending staff Dr. Alberto

## 2022-09-28 NOTE — PROGRESS NOTES
Patient seen at Fluoroscopy. LP done per radiology. CSF studies sent. Timeout done. Chemo checked with MD. cytarabine 30 mg, hydrocortisone sod succ 30 mg, methotrexate 15 mg in 6cc of NS given intrathecally without difficulty.    Shanta Love NP  Hematology/Oncology/BMT

## 2022-09-29 PROBLEM — D68.9 COAGULOPATHY: Status: RESOLVED | Noted: 2022-05-18 | Resolved: 2022-09-29

## 2022-09-29 PROBLEM — D89.833 CYTOKINE RELEASE SYNDROME, GRADE 3: Status: RESOLVED | Noted: 2022-05-14 | Resolved: 2022-09-29

## 2022-09-29 PROBLEM — R53.81 PHYSICAL DEBILITY: Status: ACTIVE | Noted: 2022-09-29

## 2022-09-29 PROBLEM — R51.9 HEADACHE: Status: RESOLVED | Noted: 2022-02-17 | Resolved: 2022-09-29

## 2022-09-29 PROBLEM — Z51.5 PALLIATIVE CARE ENCOUNTER: Status: RESOLVED | Noted: 2022-03-18 | Resolved: 2022-09-29

## 2022-09-29 PROBLEM — C91.02 B-CELL ACUTE LYMPHOBLASTIC LEUKEMIA (ALL) IN RELAPSE: Status: ACTIVE | Noted: 2021-11-24

## 2022-09-29 PROBLEM — R19.7 DIARRHEA: Status: RESOLVED | Noted: 2022-01-20 | Resolved: 2022-09-29

## 2022-09-29 PROBLEM — E87.70 VOLUME OVERLOAD: Status: RESOLVED | Noted: 2022-05-25 | Resolved: 2022-09-29

## 2022-09-29 PROBLEM — R11.0 CHEMOTHERAPY-INDUCED NAUSEA: Status: ACTIVE | Noted: 2022-09-29

## 2022-09-29 PROBLEM — T45.1X5A CHEMOTHERAPY-INDUCED NAUSEA: Status: ACTIVE | Noted: 2022-09-29

## 2022-09-29 PROBLEM — R74.01 TRANSAMINITIS: Status: RESOLVED | Noted: 2022-05-18 | Resolved: 2022-09-29

## 2022-09-29 PROBLEM — H54.60 MONOCULAR VISION LOSS: Status: RESOLVED | Noted: 2022-09-18 | Resolved: 2022-09-29

## 2022-09-29 PROBLEM — Z91.89 AT HIGH RISK OF TUMOR LYSIS SYNDROME: Status: RESOLVED | Noted: 2022-05-23 | Resolved: 2022-09-29

## 2022-09-29 PROBLEM — D63.0 ANEMIA IN NEOPLASTIC DISEASE: Status: RESOLVED | Noted: 2022-05-23 | Resolved: 2022-09-29

## 2022-09-29 LAB
ABO + RH BLD: NORMAL
ALBUMIN SERPL BCP-MCNC: 3.5 G/DL (ref 3.5–5.2)
ALP SERPL-CCNC: 67 U/L (ref 55–135)
ALT SERPL W/O P-5'-P-CCNC: 12 U/L (ref 10–44)
ANION GAP SERPL CALC-SCNC: 13 MMOL/L (ref 8–16)
ANISOCYTOSIS BLD QL SMEAR: SLIGHT
AST SERPL-CCNC: 11 U/L (ref 10–40)
BASOPHILS # BLD AUTO: 0.01 K/UL (ref 0–0.2)
BASOPHILS NFR BLD: 0.3 % (ref 0–1.9)
BILIRUB SERPL-MCNC: 0.7 MG/DL (ref 0.1–1)
BLD GP AB SCN CELLS X3 SERPL QL: NORMAL
BLD PROD TYP BPU: NORMAL
BLOOD UNIT EXPIRATION DATE: NORMAL
BLOOD UNIT TYPE CODE: 7300
BLOOD UNIT TYPE: NORMAL
BUN SERPL-MCNC: 13 MG/DL (ref 6–20)
CALCIUM SERPL-MCNC: 9 MG/DL (ref 8.7–10.5)
CHLORIDE SERPL-SCNC: 101 MMOL/L (ref 95–110)
CO2 SERPL-SCNC: 20 MMOL/L (ref 23–29)
CODING SYSTEM: NORMAL
CREAT SERPL-MCNC: 0.5 MG/DL (ref 0.5–1.4)
DACRYOCYTES BLD QL SMEAR: ABNORMAL
DIFFERENTIAL METHOD: ABNORMAL
DISPENSE STATUS: NORMAL
EOSINOPHIL # BLD AUTO: 0 K/UL (ref 0–0.5)
EOSINOPHIL NFR BLD: 0.7 % (ref 0–8)
ERYTHROCYTE [DISTWIDTH] IN BLOOD BY AUTOMATED COUNT: 15 % (ref 11.5–14.5)
EST. GFR  (NO RACE VARIABLE): >60 ML/MIN/1.73 M^2
GLUCOSE SERPL-MCNC: 122 MG/DL (ref 70–110)
HCT VFR BLD AUTO: 21.9 % (ref 37–48.5)
HGB BLD-MCNC: 8.1 G/DL (ref 12–16)
HYPOCHROMIA BLD QL SMEAR: ABNORMAL
IMM GRANULOCYTES # BLD AUTO: 0.05 K/UL (ref 0–0.04)
IMM GRANULOCYTES NFR BLD AUTO: 1.6 % (ref 0–0.5)
LYMPHOCYTES # BLD AUTO: 1.2 K/UL (ref 1–4.8)
LYMPHOCYTES NFR BLD: 38.8 % (ref 18–48)
MCH RBC QN AUTO: 38.8 PG (ref 27–31)
MCHC RBC AUTO-ENTMCNC: 37 G/DL (ref 32–36)
MCV RBC AUTO: 105 FL (ref 82–98)
MONOCYTES # BLD AUTO: 0.2 K/UL (ref 0.3–1)
MONOCYTES NFR BLD: 4.9 % (ref 4–15)
NEUTROPHILS # BLD AUTO: 1.7 K/UL (ref 1.8–7.7)
NEUTROPHILS NFR BLD: 53.7 % (ref 38–73)
NRBC BLD-RTO: 0 /100 WBC
OVALOCYTES BLD QL SMEAR: ABNORMAL
PLATELET # BLD AUTO: 41 K/UL (ref 150–450)
PLATELET BLD QL SMEAR: ABNORMAL
PMV BLD AUTO: 10.2 FL (ref 9.2–12.9)
POCT GLUCOSE: 131 MG/DL (ref 70–110)
POCT GLUCOSE: 133 MG/DL (ref 70–110)
POCT GLUCOSE: 149 MG/DL (ref 70–110)
POCT GLUCOSE: 161 MG/DL (ref 70–110)
POIKILOCYTOSIS BLD QL SMEAR: SLIGHT
POTASSIUM SERPL-SCNC: 3.2 MMOL/L (ref 3.5–5.1)
PROT SERPL-MCNC: 6.4 G/DL (ref 6–8.4)
RBC # BLD AUTO: 2.09 M/UL (ref 4–5.4)
SODIUM SERPL-SCNC: 134 MMOL/L (ref 136–145)
SPHEROCYTES BLD QL SMEAR: ABNORMAL
UNIT NUMBER: NORMAL
WBC # BLD AUTO: 3.07 K/UL (ref 3.9–12.7)

## 2022-09-29 PROCEDURE — 25000003 PHARM REV CODE 250: Performed by: NURSE PRACTITIONER

## 2022-09-29 PROCEDURE — 85025 COMPLETE CBC W/AUTO DIFF WBC: CPT | Performed by: STUDENT IN AN ORGANIZED HEALTH CARE EDUCATION/TRAINING PROGRAM

## 2022-09-29 PROCEDURE — 63600175 PHARM REV CODE 636 W HCPCS: Performed by: STUDENT IN AN ORGANIZED HEALTH CARE EDUCATION/TRAINING PROGRAM

## 2022-09-29 PROCEDURE — 25000003 PHARM REV CODE 250: Performed by: STUDENT IN AN ORGANIZED HEALTH CARE EDUCATION/TRAINING PROGRAM

## 2022-09-29 PROCEDURE — 97530 THERAPEUTIC ACTIVITIES: CPT

## 2022-09-29 PROCEDURE — 63600175 PHARM REV CODE 636 W HCPCS

## 2022-09-29 PROCEDURE — 86850 RBC ANTIBODY SCREEN: CPT | Performed by: INTERNAL MEDICINE

## 2022-09-29 PROCEDURE — P9037 PLATE PHERES LEUKOREDU IRRAD: HCPCS | Performed by: NURSE PRACTITIONER

## 2022-09-29 PROCEDURE — 63600175 PHARM REV CODE 636 W HCPCS: Performed by: NURSE PRACTITIONER

## 2022-09-29 PROCEDURE — 80053 COMPREHEN METABOLIC PANEL: CPT | Performed by: STUDENT IN AN ORGANIZED HEALTH CARE EDUCATION/TRAINING PROGRAM

## 2022-09-29 PROCEDURE — 86920 COMPATIBILITY TEST SPIN: CPT | Performed by: NURSE PRACTITIONER

## 2022-09-29 PROCEDURE — 25000003 PHARM REV CODE 250

## 2022-09-29 PROCEDURE — 99232 SBSQ HOSP IP/OBS MODERATE 35: CPT | Mod: ,,, | Performed by: INTERNAL MEDICINE

## 2022-09-29 PROCEDURE — 97165 OT EVAL LOW COMPLEX 30 MIN: CPT

## 2022-09-29 PROCEDURE — 36430 TRANSFUSION BLD/BLD COMPNT: CPT

## 2022-09-29 PROCEDURE — 25000242 PHARM REV CODE 250 ALT 637 W/ HCPCS: Performed by: STUDENT IN AN ORGANIZED HEALTH CARE EDUCATION/TRAINING PROGRAM

## 2022-09-29 PROCEDURE — 99232 PR SUBSEQUENT HOSPITAL CARE,LEVL II: ICD-10-PCS | Mod: ,,, | Performed by: INTERNAL MEDICINE

## 2022-09-29 PROCEDURE — 20600001 HC STEP DOWN PRIVATE ROOM

## 2022-09-29 RX ORDER — DEXAMETHASONE 4 MG/1
4 TABLET ORAL EVERY 12 HOURS
Status: DISCONTINUED | OUTPATIENT
Start: 2022-09-29 | End: 2022-10-01 | Stop reason: HOSPADM

## 2022-09-29 RX ORDER — ONDANSETRON 2 MG/ML
8 INJECTION INTRAMUSCULAR; INTRAVENOUS EVERY 8 HOURS
Status: DISCONTINUED | OUTPATIENT
Start: 2022-09-29 | End: 2022-10-01 | Stop reason: HOSPADM

## 2022-09-29 RX ADMIN — PROMETHAZINE HYDROCHLORIDE 6.25 MG: 25 INJECTION INTRAMUSCULAR; INTRAVENOUS at 08:09

## 2022-09-29 RX ADMIN — HYDROXYCHLOROQUINE SULFATE 200 MG: 200 TABLET ORAL at 09:09

## 2022-09-29 RX ADMIN — HYDROXYZINE PAMOATE 50 MG: 25 CAPSULE ORAL at 09:09

## 2022-09-29 RX ADMIN — BUTALBITAL, ACETAMINOPHEN, AND CAFFEINE 1 TABLET: 50; 325; 40 TABLET ORAL at 04:09

## 2022-09-29 RX ADMIN — CEFEPIME 2 G: 2 INJECTION, POWDER, FOR SOLUTION INTRAVENOUS at 03:09

## 2022-09-29 RX ADMIN — POTASSIUM CHLORIDE 20 MEQ: 1500 TABLET, EXTENDED RELEASE ORAL at 09:09

## 2022-09-29 RX ADMIN — CEFEPIME 2 G: 2 INJECTION, POWDER, FOR SOLUTION INTRAVENOUS at 06:09

## 2022-09-29 RX ADMIN — GABAPENTIN 300 MG: 300 CAPSULE ORAL at 03:09

## 2022-09-29 RX ADMIN — GABAPENTIN 300 MG: 300 CAPSULE ORAL at 08:09

## 2022-09-29 RX ADMIN — CEFEPIME 2 G: 2 INJECTION, POWDER, FOR SOLUTION INTRAVENOUS at 10:09

## 2022-09-29 RX ADMIN — DILTIAZEM HYDROCHLORIDE 30 MG: 60 TABLET, FILM COATED ORAL at 05:09

## 2022-09-29 RX ADMIN — DILTIAZEM HYDROCHLORIDE 30 MG: 60 TABLET, FILM COATED ORAL at 11:09

## 2022-09-29 RX ADMIN — ALLOPURINOL 300 MG: 300 TABLET ORAL at 09:09

## 2022-09-29 RX ADMIN — BUSPIRONE HYDROCHLORIDE 10 MG: 10 TABLET ORAL at 09:09

## 2022-09-29 RX ADMIN — BUTALBITAL, ACETAMINOPHEN, AND CAFFEINE 1 TABLET: 50; 325; 40 TABLET ORAL at 08:09

## 2022-09-29 RX ADMIN — ONDANSETRON 8 MG: 2 INJECTION INTRAMUSCULAR; INTRAVENOUS at 01:09

## 2022-09-29 RX ADMIN — ACYCLOVIR 400 MG: 200 CAPSULE ORAL at 09:09

## 2022-09-29 RX ADMIN — ONDANSETRON 8 MG: 2 INJECTION INTRAMUSCULAR; INTRAVENOUS at 09:09

## 2022-09-29 RX ADMIN — GABAPENTIN 300 MG: 300 CAPSULE ORAL at 11:09

## 2022-09-29 RX ADMIN — INSULIN ASPART 1 UNITS: 100 INJECTION, SOLUTION INTRAVENOUS; SUBCUTANEOUS at 09:09

## 2022-09-29 RX ADMIN — ATOVAQUONE 1500 MG: 750 SUSPENSION ORAL at 10:09

## 2022-09-29 RX ADMIN — DILTIAZEM HYDROCHLORIDE 30 MG: 60 TABLET, FILM COATED ORAL at 12:09

## 2022-09-29 RX ADMIN — VORTIOXETINE 20 MG: 10 TABLET, FILM COATED ORAL at 09:09

## 2022-09-29 RX ADMIN — PANTOPRAZOLE SODIUM 40 MG: 40 TABLET, DELAYED RELEASE ORAL at 09:09

## 2022-09-29 RX ADMIN — SODIUM CHLORIDE: 0.9 INJECTION, SOLUTION INTRAVENOUS at 05:09

## 2022-09-29 RX ADMIN — SUMATRIPTAN SUCCINATE 50 MG: 50 TABLET ORAL at 01:09

## 2022-09-29 RX ADMIN — BUTALBITAL, ACETAMINOPHEN, AND CAFFEINE 1 TABLET: 50; 325; 40 TABLET ORAL at 11:09

## 2022-09-29 RX ADMIN — INSULIN ASPART 2 UNITS: 100 INJECTION, SOLUTION INTRAVENOUS; SUBCUTANEOUS at 09:09

## 2022-09-29 RX ADMIN — OXCARBAZEPINE 1200 MG: 600 TABLET, FILM COATED ORAL at 09:09

## 2022-09-29 RX ADMIN — ONDANSETRON 4 MG: 2 INJECTION INTRAMUSCULAR; INTRAVENOUS at 04:09

## 2022-09-29 RX ADMIN — QUETIAPINE FUMARATE 200 MG: 200 TABLET ORAL at 09:09

## 2022-09-29 RX ADMIN — PROCHLORPERAZINE EDISYLATE 2.5 MG: 5 INJECTION INTRAMUSCULAR; INTRAVENOUS at 11:09

## 2022-09-29 RX ADMIN — INSULIN ASPART 2 UNITS: 100 INJECTION, SOLUTION INTRAVENOUS; SUBCUTANEOUS at 05:09

## 2022-09-29 RX ADMIN — POTASSIUM CHLORIDE 20 MEQ: 1500 TABLET, EXTENDED RELEASE ORAL at 05:09

## 2022-09-29 RX ADMIN — ATORVASTATIN CALCIUM 80 MG: 20 TABLET, FILM COATED ORAL at 11:09

## 2022-09-29 RX ADMIN — DEXAMETHASONE 4 MG: 4 TABLET ORAL at 11:09

## 2022-09-29 RX ADMIN — DEXAMETHASONE 4 MG: 4 TABLET ORAL at 12:09

## 2022-09-29 RX ADMIN — DEXAMETHASONE 4 MG: 4 TABLET ORAL at 09:09

## 2022-09-29 RX ADMIN — INSULIN ASPART 2 UNITS: 100 INJECTION, SOLUTION INTRAVENOUS; SUBCUTANEOUS at 12:09

## 2022-09-29 RX ADMIN — DEXAMETHASONE 4 MG: 4 TABLET ORAL at 05:09

## 2022-09-29 RX ADMIN — FENOFIBRATE 160 MG: 160 TABLET ORAL at 11:09

## 2022-09-29 RX ADMIN — PROCHLORPERAZINE EDISYLATE 2.5 MG: 5 INJECTION INTRAMUSCULAR; INTRAVENOUS at 06:09

## 2022-09-29 RX ADMIN — INSULIN DETEMIR 5 UNITS: 100 INJECTION, SOLUTION SUBCUTANEOUS at 09:09

## 2022-09-29 NOTE — CARE UPDATE
"RAPID RESPONSE NURSE CHART REVIEW        Chart Reviewed: 09/29/2022, 12:43 AM    MRN: [id number]  Bed: 808/808 A    Dx: Vision loss of left eye    Deepti Gonzalez has a past medical history of Cancer, COPD (chronic obstructive pulmonary disease), Hypertension, Rheumatoid arthritis, unspecified, SAH (subarachnoid hemorrhage), Seizures, Stroke, and Type 2 diabetes mellitus with circulatory disorder, without long-term current use of insulin.    Last VS: /68 (BP Location: Left arm, Patient Position: Lying)   Pulse 107   Temp 99.1 °F (37.3 °C) (Oral)   Resp 17   Ht 5' 4" (1.626 m)   Wt 59.1 kg (130 lb 4.7 oz)   SpO2 97%   Breastfeeding No   BMI 22.36 kg/m²     24H Vital Sign Range:  Temp:  [98.9 °F (37.2 °C)-102.8 °F (39.3 °C)]   Pulse:  [103-113]   Resp:  [15-18]   BP: (117-139)/(65-79)   SpO2:  [96 %-98 %]     Level of Consciousness (AVPU): alert    Recent Labs     09/26/22  0359 09/27/22  0426 09/28/22  0443   WBC 3.88* 2.75* 2.98*   HGB 7.9* 8.4* 8.5*   HCT 22.5* 24.4* 24.4*   * 117* 82*       Recent Labs     09/26/22  0359 09/27/22  0426 09/28/22  0443   * 134* 130*   K 4.0 3.6 3.9   CL 97 100 97   CO2 28 23 22*   CREATININE 0.5 0.5 0.6   * 115* 136*        No results for input(s): PH, PCO2, PO2, HCO3, POCSATURATED, BE in the last 72 hours.     OXYGEN:  Flow (L/min): 6  Oxygen Concentration (%): 97  O2 Device (Oxygen Therapy): room air    MEWS score: 2    Bedside RN, Mariluz  contacted. No concerns verbalized at this time. Instructed to call [phone] for further concerns or assistance.    April Campbell RN        "

## 2022-09-29 NOTE — PLAN OF CARE
Plan of care reviewed with patient and her  at the start of shift. Pt continues to have low grade fevers and nausea despite medications. Other wise VSS. Pt states that she does not feel very well. Neuro checks remain stable thru the shift. Blood sugar remains within target range. Pt stating that she has very little appetite. Green draining dark yellow urine at the start of shift; urine has cleared throughout the shift. Pt medicated for headache x2 this shift. Pt encouraged to reposition more frequently. Will continue to monitor pt.

## 2022-09-29 NOTE — PT/OT/SLP EVAL
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  She presents with the following performance deficits affecting function: weakness, impaired endurance, impaired self care skills, impaired functional mobility, gait instability, impaired balance, visual deficits, decreased upper extremity function, impaired fine motor, impaired cardiopulmonary response to activity.      Pt mainly limited by lightheadedness upon resuming upright seated position on this date. Pt requiring Mod A for bed mobility and SBA for functional mobility (static sitting EOB). Evaluation limited 2/2 pt requesting to return to supine 2/2 lightheadedness. Pt with good motivation and good tolerance for therapy. Pt would benefit from inpatient rehabilitation upon D/C to return to PLOF.    Rehab Prognosis: Good; patient would benefit from acute skilled OT services to address these deficits and reach maximum level of function.         Subjective     Communicated with: RN prior to session. Patient found supine with wolf catheter, telemetry and no family present upon OT entry to room.    Chief Complaint: No chief complaint on file.    Patient/Family Comments/goals: Pt agreeable to  "evaluation.    Pain/Comfort:  Pain Rating 1: 0/10  Pain Rating Post-Intervention 1: 0/10    Patients cultural, spiritual, Voodoo conflicts given the current situation: no    Occupational Profile:  Living Environment: Pt lives with several family members in a H with 0 LUZ. Pt's bathroom has a tub/shower combo with a bath bench in place, as well as a raised toilet.  Prior Level of Function: Patient reports needing assistance with tub transfers and steps. She requires PRN assistance with mobility, dressing, and toileting. Otherwise independent with ADLs.   Patient uses DME as follows: rollator, bath bench, raised toilet, wheelchair (rarely used).   DME owned (not currently used): none.  Roles/Responsibilities:   Hand Dominance: Right  Work: No.   Drive: No.   Managing Medicines/Managing Home: Some light housework.     Patient reports they will have assistance from family upon discharge.      Objective:     Patient found with: wolf catheter, telemetry   General Precautions: Standard, fall   Orthopedic Precautions:N/A   Braces: N/A   Oxygen Device: Room air  Vitals: BP (!) 153/71   Pulse 77   Temp 99.1 °F (37.3 °C) (Oral)   Resp 14   Ht 5' 4" (1.626 m)   Wt 59.1 kg (130 lb 4.7 oz)   SpO2 99%   Breastfeeding No   BMI 22.36 kg/m²     Cognitive and Psychosocial Function:   AxOx Not formally tested, no signs of disorientation noted   Command-Following  Follows commands 100% of time   Communication Clear/fluent   Memory No deficits noted    Safety Awareness Impaired   Affect Pleasant     Hearing: WFL    Vision: Complete vision loss L eye    Physical Exam:     Left UE Right UE   UE Edema Absent Absent   UE ROM WFL WFL   UE Strength Grossly 4/5 Grossly 5/5    Strength WFL WFL   Sensation Intact Intact   Fine Motor Coordination Impaired finger-to-nose Intact   Gross Motor Coordination WFL WFL     Occupational Performance:  Bed Mobility:    Patient completed Rolling/Turning to left with SBA  Scooting to HOB in " supine: SBA via bridging technique  Patient completed supine to sit with Mod A on left side of bed  Patient completed sit to supine with SBA on left side of bed  Pt with lack of controlled descent onto mattress representing poor safety awareness    Functional Mobility/Transfers:  Static Sitting EOB: SBA with BUE support on bed  Pt c/o dizziness, unresolving with ankle pumps at EOB  Further mobility deferred as pt requesting to return to supine position    Activities of Daily Living:  Pt politely declined grooming as she already performed this AM      [facility] 6 Click ADL:  AMPAC Total Score: 17    Treatment & Education:  Educated on discharge recommendation (inpatient rehabilitation)  Educated on role of OT, POC, and goals for this hospital stay  Emphasized importance of OOB ax and level of staff assistance required for transfers and functional mobility (Mod A for bed mobility)  Encouraged pt to alert OT of personal self-care goals and/or comfort-related concerns during future OT sessions  Pt denies any further questions, concerns, or requests at this time        Patient left HOB elevated with all lines intact and call button in reach    GOALS:   Multidisciplinary Problems       Occupational Therapy Goals          Problem: Occupational Therapy    Goal Priority Disciplines Outcome Interventions   Occupational Therapy Goal     OT, PT/OT Ongoing, Progressing    Description: Goals to be met by: 10/13/2022     Patient will increase functional independence with ADLs by performing:    Grooming while standing with Minimal Assistance.  Toileting from bedside commode with Minimal Assistance for hygiene and clothing management.   Sitting at edge of bed x20 minutes with Supervision.  Supine to sit with Stand-by Assistance.  Step transfer with Minimal Assistance using AD PRN.  Toilet transfer to bedside commode with Minimal Assistance using AD PRN.                           History:     Past Medical History:   Diagnosis Date     Cancer     COPD (chronic obstructive pulmonary disease)     Hypertension     Rheumatoid arthritis, unspecified     SAH (subarachnoid hemorrhage) 9/17/2022    Seizures     Stroke     TIA x 4    Type 2 diabetes mellitus with circulatory disorder, without long-term current use of insulin          Past Surgical History:   Procedure Laterality Date    APPENDECTOMY      HYSTERECTOMY      OMMAYA RESERVIOR INSERTION N/A 9/26/2022    Procedure: INSERTION, OMMAYA RESERVOIR;  Surgeon: Mj Alberto MD;  Location: [facility] OR [address] [place] [address];  Service: Neurosurgery;  Laterality: N/A;    ROTATOR CUFF REPAIR Bilateral     TONSILLECTOMY      TUBAL LIGATION           Time Tracking:     OT Date of Treatment: 09/29/22  OT Start Time: 1314  OT Stop Time: 1330  OT Total Time (min): 16 min  Additional staff present: N/A    Billable Minutes:  Evaluation 8  Therapeutic Activity 8      9/29/2022

## 2022-09-29 NOTE — PROGRESS NOTES
Roberto Yepez - Oncology ([facility])  Hematology  Bone Marrow Transplant  Progress Note    Patient Name: Deepti Gonzalez  Admission Date: 9/16/2022  Hospital Length of Stay: 13 days  Code Status: Full Code    Subjective:     Interval History: Will restart ponatinib today. Afebrile overnight, tmax 100.8F. Continues on cefepime. Blood cultures NGTD. UC in process. Patient reports persistent nausea all of yesterday with emesis after the LP. Will schedule IV zofran. CT head shows no new bleeding. POD 3 today of ommaya. Will keep platelets >50K at this time following post op and with previous small SAH. PT/OT consulted for dispo planning. Replacing K. Denies any diarrhea or constipation.    Objective:     Vital Signs (Most Recent):  Temp: 98.6 °F (37 °C) (09/29/22 1118)  Pulse: 108 (09/29/22 1118)  Resp: 15 (09/29/22 1118)  BP: 136/62 (09/29/22 1118)  SpO2: 95 % (09/29/22 1118) Vital Signs (24h Range):  Temp:  [98.6 °F (37 °C)-100.8 °F (38.2 °C)] 98.6 °F (37 °C)  Pulse:  [] 108  Resp:  [15-18] 15  SpO2:  [95 %-98 %] 95 %  BP: (117-147)/(62-79) 136/62     Weight: 59.1 kg (130 lb 4.7 oz)  Body mass index is 22.36 kg/m².  Body surface area is 1.63 meters squared.    ECOG SCORE           [unfilled]    Intake/Output - Last 3 Shifts         09/27 0700  09/28 0659 09/28 0700 09/29 0659 09/29 0700 09/30 0659    P.O. 600 570     I.V. (mL/kg)  1031.5 (17.5)     IV Piggyback  184.4     Total Intake(mL/kg) 600 (10.2) 1785.9 (30.2)     Urine (mL/kg/hr) 1220 (0.9) 1700 (1.2)     Emesis/NG output  400     Stool 0      Total Output 1220 2100     Net -620 -314.1            Stool Occurrence 2 x              Physical Exam  Vitals and nursing note reviewed.   Constitutional:       General: She is not in acute distress.     Appearance: She is well-developed.   HENT:      Head: Normocephalic and atraumatic.      Comments: Ommaya in place      Nose: Nose normal. No congestion.   Eyes:      General: No scleral icterus.     Extraocular  Movements: Extraocular movements intact.      Comments: Reports eye pain with movement improved.    Cardiovascular:      Rate and Rhythm: Normal rate and regular rhythm.      Pulses: Normal pulses.      Heart sounds: No murmur heard.  Pulmonary:      Effort: Pulmonary effort is normal. No respiratory distress.   Abdominal:      General: There is no distension.      Palpations: Abdomen is soft.      Tenderness: There is no abdominal tenderness.   Genitourinary:     Comments: Green in place  Musculoskeletal:         General: Normal range of motion.      Cervical back: Normal range of motion and neck supple.      Right lower leg: No edema.      Left lower leg: No edema.   Skin:     General: Skin is warm and dry.      Findings: No bruising, erythema or rash.      Comments: Port intact with no redness or drainage   Neurological:      General: No focal deficit present.      Mental Status: She is alert and oriented to person, place, and time.      Motor: Weakness present.   Psychiatric:         Mood and Affect: Mood normal.         Behavior: Behavior normal.         Thought Content: Thought content normal.         Judgment: Judgment normal.       Significant Labs:   CBC:   Recent Labs   Lab 09/28/22  0443 09/29/22  0318   WBC 2.98* 3.07*   HGB 8.5* 8.1*   HCT 24.4* 21.9*   PLT 82* 41*    and CMP:   Recent Labs   Lab 09/28/22  0443 09/29/22  0318   * 134*   K 3.9 3.2*   CL 97 101   CO2 22* 20*   * 122*   BUN 13 13   CREATININE 0.6 0.5   CALCIUM 9.2 9.0   PROT 6.7 6.4   ALBUMIN 3.7 3.5   BILITOT 0.8 0.7   ALKPHOS 78 67   AST 11 11   ALT 12 12   ANIONGAP 11 13       Diagnostic Results:  I have reviewed all pertinent imaging results/findings within the past 24 hours.    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 Jenkins. 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  - Was treated with inotuzumab  ozogamicin and ponatinib but course complicated by severe cytopenias. Not has been on therapy for past few weeks.  - Not a candidate for allogeneic stem cell transplant at this time.  - presented 9/16/22 with left eye vision loss and worsening vision of left eye  - Patient with pacemaker and per patient, not compatible with MRI. Would ideally obtain MRI brain w/ w/o contrast. Verified with EP who checked with rep that pacemaker is not MRI compatible.   - CTA completed with delayed venous phase revealing small SAH   - Consulted Opthalmology and ID, appreciate assistance  - full infectious workup sent; all negative thus far  - LP with IT chemo on 9/19 positive for B-cell ALL; will require twice weekly IT until clearance at minimum  - ommaya placed by NSGY on 9/26/22; can use ommaya on POD #5 (10/1)  - will start twice weekly IT chemo with ommaya on 10/3  - continue dexamethasone 4mg q6h; consider wean  - restarted ponatinib on 9/29    Thrombocytopenia  - Monitor CBC  - Transfuse for platelet count <50k given recent ommaya placement  - Holding anticoagulation until platelets persistently above 50K    Chemotherapy-induced nausea  - reports nausea and vomiting following LP with IT chemo on 9/28  - ATC IV zofran scheduled 9/29    Physical debility  - PT/OT consulted; appreciate dispo recs    SAH (subarachnoid hemorrhage)  - Small SAH discovered on CTA. Repeat CTH stable. No focal neuro deficits. Pt has ongoing left sided headache and eye pain.   - Likely incidental finding that is not causing the vision loss.   - NSGY consulted, appreciate recs  - CT head with stealth shows resolving SAH  - CT head on 9/24 shows no new hemorrhage; repeat 9/29 stable    Vision loss of left eye  - presented with acute left eye vision loss associated with pain. Concern for possible B-ALL involvement vs side effect of ponatinib vs retrobulbar hemorrhage vs stroke  - see B-cell ALL in relapse    Fever of unknown origin  - On 9/28, Tmax 102.8F.  blood cultures NGTD. UC pending. Cefepime started for empiric microbial coverage.    Urinary retention  - Chronic wolf prior to admission  - Attempt voiding trial prior to discharge  - Will need urology follow up at discharge. See little value in consult urolgoy/urogynecology inpatient as they will likely recommend outpt follow up.     Hypokalemia  - monitor daily CMP  - replace per prn electrolyte order set    Anemia associated with chemotherapy  - Monitor CBC  - Transfuse for Hgb<7    Anxiety  - Continue home buspirone, hydroxyzine and quetiapine.   - Trintellix is nonformulary however pharmacy is attempting to obtain this medication.    Hypertension  - Holding home BP meds in the setting of soft BP.   - Resume when appropriate.    Seizure disorder  - Continue home oxcarbazepine 200mg QHS    Type 2 diabetes mellitus, without long-term current use of insulin  Diabetes type 2  Last HbA1c:   Lab Results   Component Value Date    HGBA1C 4.4 04/13/2022       PLAN:   - Hold oral anti-hyperglycemic agents  - worsened by IV steroids  - Detemir 5U, Aspart 2U TID WM and SSI  - Diabetic diet  - Hypoglycemia orderset with POCT BG AC/QS  - Goal -180 while inpatient     Pacemaker  - Patient with pacemaker in place due to SSS. Per EP, it is definitely MRI incompatible.      Rheumatoid arthritis involving multiple sites  - Seronegative RA. Continue home plaquenil    Hyperlipidemia  - Continue home atorvastatin and fenofibrate    Paroxysmal atrial fibrillation  - Holding home xarelto in the setting of thrombocytopenia and with new ommaya        VTE Risk Mitigation (From admission, onward)         Ordered     IP VTE HIGH RISK PATIENT  Once         09/16/22 1637     Place sequential compression device  Until discontinued         09/16/22 1637     Reason for No Pharmacological VTE Prophylaxis  Once        Question:  Reasons:  Answer:  Thrombocytopenia    09/16/22 1637                Disposition: Remains inpatient pending PT/OT  dispo connie Love NP  Bone Marrow Transplant  [facility] - Oncology ([facility])

## 2022-09-29 NOTE — SUBJECTIVE & OBJECTIVE
Subjective:     Interval History: Will restart ponatinib today. Afebrile overnight, tmax 100.8F. Continues on cefepime. Blood cultures NGTD. UC in process. Patient reports persistent nausea all of yesterday with emesis after the LP. Will schedule IV zofran. CT head shows no new bleeding. POD 3 today of ommaya. Will keep platelets >50K at this time following post op and with previous small SAH. PT/OT consulted for dispo planning. Replacing K. Denies any diarrhea or constipation.    Objective:     Vital Signs (Most Recent):  Temp: 98.6 °F (37 °C) (09/29/22 1118)  Pulse: 108 (09/29/22 1118)  Resp: 15 (09/29/22 1118)  BP: 136/62 (09/29/22 1118)  SpO2: 95 % (09/29/22 1118) Vital Signs (24h Range):  Temp:  [98.6 °F (37 °C)-100.8 °F (38.2 °C)] 98.6 °F (37 °C)  Pulse:  [] 108  Resp:  [15-18] 15  SpO2:  [95 %-98 %] 95 %  BP: (117-147)/(62-79) 136/62     Weight: 59.1 kg (130 lb 4.7 oz)  Body mass index is 22.36 kg/m².  Body surface area is 1.63 meters squared.    ECOG SCORE           [unfilled]    Intake/Output - Last 3 Shifts         09/27 0700  09/28 0659 09/28 0700 09/29 0659 09/29 0700  09/30 0659    P.O. 600 570     I.V. (mL/kg)  1031.5 (17.5)     IV Piggyback  184.4     Total Intake(mL/kg) 600 (10.2) 1785.9 (30.2)     Urine (mL/kg/hr) 1220 (0.9) 1700 (1.2)     Emesis/NG output  400     Stool 0      Total Output 1220 2100     Net -620 -314.1            Stool Occurrence 2 x              Physical Exam  Vitals and nursing note reviewed.   Constitutional:       General: She is not in acute distress.     Appearance: She is well-developed.   HENT:      Head: Normocephalic and atraumatic.      Comments: Ommaya in place      Nose: Nose normal. No congestion.   Eyes:      General: No scleral icterus.     Extraocular Movements: Extraocular movements intact.      Comments: Reports eye pain with movement improved.    Cardiovascular:      Rate and Rhythm: Normal rate and regular rhythm.      Pulses: Normal pulses.      Heart  sounds: No murmur heard.  Pulmonary:      Effort: Pulmonary effort is normal. No respiratory distress.   Abdominal:      General: There is no distension.      Palpations: Abdomen is soft.      Tenderness: There is no abdominal tenderness.   Genitourinary:     Comments: Green in place  Musculoskeletal:         General: Normal range of motion.      Cervical back: Normal range of motion and neck supple.      Right lower leg: No edema.      Left lower leg: No edema.   Skin:     General: Skin is warm and dry.      Findings: No bruising, erythema or rash.      Comments: Port intact with no redness or drainage   Neurological:      General: No focal deficit present.      Mental Status: She is alert and oriented to person, place, and time.      Motor: Weakness present.   Psychiatric:         Mood and Affect: Mood normal.         Behavior: Behavior normal.         Thought Content: Thought content normal.         Judgment: Judgment normal.       Significant Labs:   CBC:   Recent Labs   Lab 09/28/22  0443 09/29/22 0318   WBC 2.98* 3.07*   HGB 8.5* 8.1*   HCT 24.4* 21.9*   PLT 82* 41*    and CMP:   Recent Labs   Lab 09/28/22  0443 09/29/22  0318   * 134*   K 3.9 3.2*   CL 97 101   CO2 22* 20*   * 122*   BUN 13 13   CREATININE 0.6 0.5   CALCIUM 9.2 9.0   PROT 6.7 6.4   ALBUMIN 3.7 3.5   BILITOT 0.8 0.7   ALKPHOS 78 67   AST 11 11   ALT 12 12   ANIONGAP 11 13       Diagnostic Results:  I have reviewed all pertinent imaging results/findings within the past 24 hours.

## 2022-09-29 NOTE — ASSESSMENT & PLAN NOTE
- Follows with Dr. Jenkins. 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  - Was treated with inotuzumab ozogamicin and ponatinib but course complicated by severe cytopenias. Not has been on therapy for past few weeks.  - Not a candidate for allogeneic stem cell transplant at this time.  - presented 9/16/22 with left eye vision loss and worsening vision of left eye  - Patient with pacemaker and per patient, not compatible with MRI. Would ideally obtain MRI brain w/ w/o contrast. Verified with EP who checked with rep that pacemaker is not MRI compatible.   - CTA completed with delayed venous phase revealing small SAH   - Consulted Opthalmology and ID, appreciate assistance  - full infectious workup sent; all negative thus far  - LP with IT chemo on 9/19 positive for B-cell ALL; will require twice weekly IT until clearance at minimum  - ommaya placed by NSGY on 9/26/22; can use ommaya on POD #5 (10/1)  - will start twice weekly IT chemo with ommaya on 10/3  - continue dexamethasone 4mg q6h; consider wean  - restarted ponatinib on 9/29

## 2022-09-29 NOTE — ASSESSMENT & PLAN NOTE
- Small SAH discovered on CTA. Repeat CTH stable. No focal neuro deficits. Pt has ongoing left sided headache and eye pain.   - Likely incidental finding that is not causing the vision loss.   - NSGY consulted, appreciate recs  - CT head with stealth shows resolving SAH  - CT head on 9/24 shows no new hemorrhage; repeat 9/29 stable

## 2022-09-29 NOTE — ASSESSMENT & PLAN NOTE
- On 9/28, Tmax 102.8F. blood cultures NGTD. UC pending. Cefepime started for empiric microbial coverage.

## 2022-09-29 NOTE — ASSESSMENT & PLAN NOTE
- Monitor CBC  - Transfuse for platelet count <50k given recent ommaya placement  - Holding anticoagulation until platelets persistently above 50K

## 2022-09-29 NOTE — PLAN OF CARE
Problem: Occupational Therapy  Goal: Occupational Therapy Goal  Description: Goals to be met by: 10/13/2022     Patient will increase functional independence with ADLs by performing:    Grooming while standing with Minimal Assistance.  Toileting from bedside commode with Minimal Assistance for hygiene and clothing management.   Sitting at edge of bed x20 minutes with Supervision.  Supine to sit with Stand-by Assistance.  Step transfer with Minimal Assistance using AD PRN.  Toilet transfer to bedside commode with Minimal Assistance using AD PRN.    Outcome: Ongoing, Progressing     Evaluation complete; goals and POC established. Recommending Inpatient Rehabilitation upon discharge to return to PLOF.    9/29/2022

## 2022-09-29 NOTE — PLAN OF CARE
Patient continues on room air. PRN migraine medication given. PRN and scheduled nausea medication given. Patient stated she started getting dizzy with PT/OT today. IV abx continued. Blood glucose continued to be monitored and treated. Green intact. 1 unit of plt given. PO potassium given. Patient stable at this time, no acute changes.       Problem: Adult Inpatient Plan of Care  Goal: Plan of Care Review  Outcome: Ongoing, Progressing  Goal: Patient-Specific Goal (Individualized)  Outcome: Ongoing, Progressing  Goal: Absence of Hospital-Acquired Illness or Injury  Outcome: Ongoing, Progressing  Goal: Optimal Comfort and Wellbeing  Outcome: Ongoing, Progressing  Goal: Readiness for Transition of Care  Outcome: Ongoing, Progressing     Problem: Diabetes Comorbidity  Goal: Blood Glucose Level Within Targeted Range  Outcome: Ongoing, Progressing     Problem: Skin Injury Risk Increased  Goal: Skin Health and Integrity  Outcome: Ongoing, Progressing     Problem: Infection  Goal: Absence of Infection Signs and Symptoms  Outcome: Ongoing, Progressing     Problem: Pain Acute  Goal: Acceptable Pain Control and Functional Ability  Outcome: Ongoing, Progressing     Problem: Fall Injury Risk  Goal: Absence of Fall and Fall-Related Injury  Outcome: Ongoing, Progressing

## 2022-09-29 NOTE — ASSESSMENT & PLAN NOTE
- Chronic wolf prior to admission  - Attempt voiding trial prior to discharge  - Will need urology follow up at discharge. See little value in consult urolgoy/urogynecology inpatient as they will likely recommend outpt follow up.

## 2022-09-29 NOTE — ASSESSMENT & PLAN NOTE
- presented with acute left eye vision loss associated with pain. Concern for possible B-ALL involvement vs side effect of ponatinib vs retrobulbar hemorrhage vs stroke  - see B-cell ALL in relapse

## 2022-09-30 DIAGNOSIS — C91.00 B-CELL ACUTE LYMPHOBLASTIC LEUKEMIA: Primary | ICD-10-CM

## 2022-09-30 PROBLEM — I95.1 ORTHOSTATIC HYPOTENSION: Status: ACTIVE | Noted: 2022-09-30

## 2022-09-30 LAB
ALBUMIN SERPL BCP-MCNC: 3.3 G/DL (ref 3.5–5.2)
ALP SERPL-CCNC: 64 U/L (ref 55–135)
ALT SERPL W/O P-5'-P-CCNC: 14 U/L (ref 10–44)
ANION GAP SERPL CALC-SCNC: 9 MMOL/L (ref 8–16)
ANISOCYTOSIS BLD QL SMEAR: SLIGHT
AST SERPL-CCNC: 10 U/L (ref 10–40)
BACTERIA UR CULT: NO GROWTH
BASOPHILS # BLD AUTO: 0.01 K/UL (ref 0–0.2)
BASOPHILS NFR BLD: 0.4 % (ref 0–1.9)
BILIRUB SERPL-MCNC: 0.4 MG/DL (ref 0.1–1)
BLD PROD TYP BPU: NORMAL
BLOOD UNIT EXPIRATION DATE: NORMAL
BLOOD UNIT TYPE CODE: 7300
BLOOD UNIT TYPE: NORMAL
BUN SERPL-MCNC: 12 MG/DL (ref 6–20)
CALCIUM SERPL-MCNC: 8.6 MG/DL (ref 8.7–10.5)
CHLORIDE SERPL-SCNC: 104 MMOL/L (ref 95–110)
CO2 SERPL-SCNC: 20 MMOL/L (ref 23–29)
CODING SYSTEM: NORMAL
CREAT SERPL-MCNC: 0.6 MG/DL (ref 0.5–1.4)
DACRYOCYTES BLD QL SMEAR: ABNORMAL
DIFFERENTIAL METHOD: ABNORMAL
DISPENSE STATUS: NORMAL
EOSINOPHIL # BLD AUTO: 0 K/UL (ref 0–0.5)
EOSINOPHIL NFR BLD: 1.3 % (ref 0–8)
ERYTHROCYTE [DISTWIDTH] IN BLOOD BY AUTOMATED COUNT: 15.3 % (ref 11.5–14.5)
EST. GFR  (NO RACE VARIABLE): >60 ML/MIN/1.73 M^2
FINAL PATHOLOGIC DIAGNOSIS: ABNORMAL
GLUCOSE SERPL-MCNC: 128 MG/DL (ref 70–110)
HCT VFR BLD AUTO: 19.7 % (ref 37–48.5)
HGB BLD-MCNC: 6.7 G/DL (ref 12–16)
HYPOCHROMIA BLD QL SMEAR: ABNORMAL
IMM GRANULOCYTES # BLD AUTO: 0.04 K/UL (ref 0–0.04)
IMM GRANULOCYTES NFR BLD AUTO: 1.7 % (ref 0–0.5)
LYMPHOCYTES # BLD AUTO: 1.4 K/UL (ref 1–4.8)
LYMPHOCYTES NFR BLD: 59.8 % (ref 18–48)
Lab: ABNORMAL
MAGNESIUM SERPL-MCNC: 1.9 MG/DL (ref 1.6–2.6)
MCH RBC QN AUTO: 37.9 PG (ref 27–31)
MCHC RBC AUTO-ENTMCNC: 34 G/DL (ref 32–36)
MCV RBC AUTO: 111 FL (ref 82–98)
MICROSCOPIC EXAM: ABNORMAL
MONOCYTES # BLD AUTO: 0.1 K/UL (ref 0.3–1)
MONOCYTES NFR BLD: 3 % (ref 4–15)
NEUTROPHILS # BLD AUTO: 0.8 K/UL (ref 1.8–7.7)
NEUTROPHILS NFR BLD: 33.8 % (ref 38–73)
NRBC BLD-RTO: 0 /100 WBC
NUM UNITS TRANS PACKED RBC: NORMAL
PHOSPHATE SERPL-MCNC: 2.4 MG/DL (ref 2.7–4.5)
PLATELET # BLD AUTO: 65 K/UL (ref 150–450)
PLATELET BLD QL SMEAR: ABNORMAL
PMV BLD AUTO: 10.1 FL (ref 9.2–12.9)
POCT GLUCOSE: 101 MG/DL (ref 70–110)
POCT GLUCOSE: 123 MG/DL (ref 70–110)
POCT GLUCOSE: 125 MG/DL (ref 70–110)
POCT GLUCOSE: 158 MG/DL (ref 70–110)
POCT GLUCOSE: 162 MG/DL (ref 70–110)
POCT GLUCOSE: 84 MG/DL (ref 70–110)
POIKILOCYTOSIS BLD QL SMEAR: SLIGHT
POLYCHROMASIA BLD QL SMEAR: ABNORMAL
POTASSIUM SERPL-SCNC: 3.6 MMOL/L (ref 3.5–5.1)
PROT SERPL-MCNC: 6 G/DL (ref 6–8.4)
RBC # BLD AUTO: 1.77 M/UL (ref 4–5.4)
SODIUM SERPL-SCNC: 133 MMOL/L (ref 136–145)
SPHEROCYTES BLD QL SMEAR: ABNORMAL
WBC # BLD AUTO: 2.34 K/UL (ref 3.9–12.7)

## 2022-09-30 PROCEDURE — P9040 RBC LEUKOREDUCED IRRADIATED: HCPCS | Performed by: NURSE PRACTITIONER

## 2022-09-30 PROCEDURE — 85025 COMPLETE CBC W/AUTO DIFF WBC: CPT | Performed by: STUDENT IN AN ORGANIZED HEALTH CARE EDUCATION/TRAINING PROGRAM

## 2022-09-30 PROCEDURE — 83735 ASSAY OF MAGNESIUM: CPT | Performed by: STUDENT IN AN ORGANIZED HEALTH CARE EDUCATION/TRAINING PROGRAM

## 2022-09-30 PROCEDURE — 25000003 PHARM REV CODE 250: Performed by: NURSE PRACTITIONER

## 2022-09-30 PROCEDURE — 25000003 PHARM REV CODE 250: Performed by: STUDENT IN AN ORGANIZED HEALTH CARE EDUCATION/TRAINING PROGRAM

## 2022-09-30 PROCEDURE — 63600175 PHARM REV CODE 636 W HCPCS

## 2022-09-30 PROCEDURE — 97530 THERAPEUTIC ACTIVITIES: CPT

## 2022-09-30 PROCEDURE — 36430 TRANSFUSION BLD/BLD COMPNT: CPT

## 2022-09-30 PROCEDURE — 63600175 PHARM REV CODE 636 W HCPCS: Performed by: STUDENT IN AN ORGANIZED HEALTH CARE EDUCATION/TRAINING PROGRAM

## 2022-09-30 PROCEDURE — 99233 PR SUBSEQUENT HOSPITAL CARE,LEVL III: ICD-10-PCS | Mod: ,,, | Performed by: INTERNAL MEDICINE

## 2022-09-30 PROCEDURE — 51702 INSERT TEMP BLADDER CATH: CPT

## 2022-09-30 PROCEDURE — 25000003 PHARM REV CODE 250

## 2022-09-30 PROCEDURE — 97162 PT EVAL MOD COMPLEX 30 MIN: CPT

## 2022-09-30 PROCEDURE — 84100 ASSAY OF PHOSPHORUS: CPT | Performed by: STUDENT IN AN ORGANIZED HEALTH CARE EDUCATION/TRAINING PROGRAM

## 2022-09-30 PROCEDURE — 63600175 PHARM REV CODE 636 W HCPCS: Performed by: NURSE PRACTITIONER

## 2022-09-30 PROCEDURE — 99900035 HC TECH TIME PER 15 MIN (STAT)

## 2022-09-30 PROCEDURE — 99233 SBSQ HOSP IP/OBS HIGH 50: CPT | Mod: ,,, | Performed by: INTERNAL MEDICINE

## 2022-09-30 PROCEDURE — 25000242 PHARM REV CODE 250 ALT 637 W/ HCPCS: Performed by: STUDENT IN AN ORGANIZED HEALTH CARE EDUCATION/TRAINING PROGRAM

## 2022-09-30 PROCEDURE — 20600001 HC STEP DOWN PRIVATE ROOM

## 2022-09-30 PROCEDURE — 80053 COMPREHEN METABOLIC PANEL: CPT | Performed by: STUDENT IN AN ORGANIZED HEALTH CARE EDUCATION/TRAINING PROGRAM

## 2022-09-30 RX ORDER — SODIUM CHLORIDE 9 MG/ML
INJECTION, SOLUTION INTRAVENOUS CONTINUOUS
Status: DISCONTINUED | OUTPATIENT
Start: 2022-09-30 | End: 2022-10-01 | Stop reason: HOSPADM

## 2022-09-30 RX ADMIN — VORTIOXETINE 20 MG: 10 TABLET, FILM COATED ORAL at 10:09

## 2022-09-30 RX ADMIN — INSULIN ASPART 2 UNITS: 100 INJECTION, SOLUTION INTRAVENOUS; SUBCUTANEOUS at 05:09

## 2022-09-30 RX ADMIN — INSULIN ASPART 2 UNITS: 100 INJECTION, SOLUTION INTRAVENOUS; SUBCUTANEOUS at 10:09

## 2022-09-30 RX ADMIN — FENOFIBRATE 160 MG: 160 TABLET ORAL at 11:09

## 2022-09-30 RX ADMIN — ACYCLOVIR 400 MG: 200 CAPSULE ORAL at 09:09

## 2022-09-30 RX ADMIN — DILTIAZEM HYDROCHLORIDE 30 MG: 60 TABLET, FILM COATED ORAL at 05:09

## 2022-09-30 RX ADMIN — ACYCLOVIR 400 MG: 200 CAPSULE ORAL at 10:09

## 2022-09-30 RX ADMIN — BUTALBITAL, ACETAMINOPHEN, AND CAFFEINE 1 TABLET: 50; 325; 40 TABLET ORAL at 07:09

## 2022-09-30 RX ADMIN — ONDANSETRON 8 MG: 2 INJECTION INTRAMUSCULAR; INTRAVENOUS at 02:09

## 2022-09-30 RX ADMIN — ONDANSETRON 8 MG: 2 INJECTION INTRAMUSCULAR; INTRAVENOUS at 05:09

## 2022-09-30 RX ADMIN — GABAPENTIN 300 MG: 300 CAPSULE ORAL at 11:09

## 2022-09-30 RX ADMIN — PROCHLORPERAZINE EDISYLATE 2.5 MG: 5 INJECTION INTRAMUSCULAR; INTRAVENOUS at 10:09

## 2022-09-30 RX ADMIN — DEXAMETHASONE 4 MG: 4 TABLET ORAL at 08:09

## 2022-09-30 RX ADMIN — SODIUM CHLORIDE: 0.9 INJECTION, SOLUTION INTRAVENOUS at 05:09

## 2022-09-30 RX ADMIN — BUSPIRONE HYDROCHLORIDE 10 MG: 10 TABLET ORAL at 09:09

## 2022-09-30 RX ADMIN — ALLOPURINOL 300 MG: 300 TABLET ORAL at 09:09

## 2022-09-30 RX ADMIN — ATOVAQUONE 1500 MG: 750 SUSPENSION ORAL at 10:09

## 2022-09-30 RX ADMIN — OXCARBAZEPINE 1200 MG: 600 TABLET, FILM COATED ORAL at 10:09

## 2022-09-30 RX ADMIN — BUSPIRONE HYDROCHLORIDE 10 MG: 10 TABLET ORAL at 10:09

## 2022-09-30 RX ADMIN — PANTOPRAZOLE SODIUM 40 MG: 40 TABLET, DELAYED RELEASE ORAL at 09:09

## 2022-09-30 RX ADMIN — PROCHLORPERAZINE EDISYLATE 2.5 MG: 5 INJECTION INTRAMUSCULAR; INTRAVENOUS at 04:09

## 2022-09-30 RX ADMIN — HYDROXYZINE PAMOATE 50 MG: 25 CAPSULE ORAL at 10:09

## 2022-09-30 RX ADMIN — SUMATRIPTAN SUCCINATE 50 MG: 50 TABLET ORAL at 11:09

## 2022-09-30 RX ADMIN — GABAPENTIN 300 MG: 300 CAPSULE ORAL at 07:09

## 2022-09-30 RX ADMIN — INSULIN DETEMIR 5 UNITS: 100 INJECTION, SOLUTION SUBCUTANEOUS at 08:09

## 2022-09-30 RX ADMIN — DIBASIC SODIUM PHOSPHATE, MONOBASIC POTASSIUM PHOSPHATE AND MONOBASIC SODIUM PHOSPHATE 2 TABLET: 852; 155; 130 TABLET ORAL at 09:09

## 2022-09-30 RX ADMIN — POTASSIUM CHLORIDE 20 MEQ: 1500 TABLET, EXTENDED RELEASE ORAL at 06:09

## 2022-09-30 RX ADMIN — ATORVASTATIN CALCIUM 80 MG: 20 TABLET, FILM COATED ORAL at 11:09

## 2022-09-30 RX ADMIN — ONDANSETRON 8 MG: 2 INJECTION INTRAMUSCULAR; INTRAVENOUS at 10:09

## 2022-09-30 RX ADMIN — DILTIAZEM HYDROCHLORIDE 30 MG: 60 TABLET, FILM COATED ORAL at 11:09

## 2022-09-30 RX ADMIN — SODIUM CHLORIDE 1000 ML: 0.9 INJECTION, SOLUTION INTRAVENOUS at 02:09

## 2022-09-30 RX ADMIN — BUTALBITAL, ACETAMINOPHEN, AND CAFFEINE 1 TABLET: 50; 325; 40 TABLET ORAL at 04:09

## 2022-09-30 RX ADMIN — GABAPENTIN 300 MG: 300 CAPSULE ORAL at 04:09

## 2022-09-30 RX ADMIN — DEXAMETHASONE 4 MG: 4 TABLET ORAL at 10:09

## 2022-09-30 RX ADMIN — QUETIAPINE FUMARATE 200 MG: 200 TABLET ORAL at 10:09

## 2022-09-30 RX ADMIN — HYDROXYCHLOROQUINE SULFATE 200 MG: 200 TABLET ORAL at 09:09

## 2022-09-30 RX ADMIN — CEFEPIME 2 G: 2 INJECTION, POWDER, FOR SOLUTION INTRAVENOUS at 01:09

## 2022-09-30 RX ADMIN — POTASSIUM CHLORIDE 20 MEQ: 1500 TABLET, EXTENDED RELEASE ORAL at 05:09

## 2022-09-30 NOTE — PLAN OF CARE
Problem: Physical Therapy  Goal: Physical Therapy Goal  Description: Goals to be met by: 10/14/2022     Patient will increase functional independence with mobility by performin. Supine to sit with [place]  2. Sit to supine with [place]  3. Sit to stand transfer with [place]  4. Gait  x 150 feet with Modified [place] using Rolling Walker.     Outcome: Ongoing, Progressing     Shilpa Burroughs, SPT  2022

## 2022-09-30 NOTE — PLAN OF CARE
Patient continues no room air. PRN migraine medication given. Remains afebrile. 1 unit of blood transfused. PRN and scheduled nausea medication given. Blood glucose continued to be monitored and treated. Patient c/o dizziness when moving-  orthostatic VS checked, + for orthostatic.   1000ml IV bolus given. NS started at 75. Green removed at 1300, 1600 bladder scanned patient 275ml in bladder. Will encourage patient to use BSC. Patient stable at this time, no acute changes.       Problem: Adult Inpatient Plan of Care  Goal: Plan of Care Review  Outcome: Ongoing, Not Progressing  Goal: Patient-Specific Goal (Individualized)  Outcome: Ongoing, Not Progressing  Goal: Absence of Hospital-Acquired Illness or Injury  Outcome: Ongoing, Not Progressing  Goal: Optimal Comfort and Wellbeing  Outcome: Ongoing, Not Progressing  Goal: Readiness for Transition of Care  Outcome: Ongoing, Not Progressing     Problem: Diabetes Comorbidity  Goal: Blood Glucose Level Within Targeted Range  Outcome: Ongoing, Not Progressing     Problem: Skin Injury Risk Increased  Goal: Skin Health and Integrity  Outcome: Ongoing, Not Progressing     Problem: Infection  Goal: Absence of Infection Signs and Symptoms  Outcome: Ongoing, Not Progressing     Problem: Pain Acute  Goal: Acceptable Pain Control and Functional Ability  Outcome: Ongoing, Not Progressing     Problem: Fall Injury Risk  Goal: Absence of Fall and Fall-Related Injury  Outcome: Ongoing, Not Progressing

## 2022-09-30 NOTE — SUBJECTIVE & OBJECTIVE
Subjective:     Interval History: Afebrile last 48hrs. Infectious workup negative. Stopping cefepime.  today. Patient reports dizziness upon standing. Orthostatic vitals positive, given 1L bolus and started on NS. Nausea controlled on scheduled zofran. Denies diarrhea or constipation. Will do voiding trial today with wolf removal. PT/OT recommending rehab however not suitable with requiring twice weekly IT chemotherapy, so will plan for outpatient PT/OT. POD 4 from [facility]. Decreased dex to q12h yesterday. Received 1unit PRBC for Hgb 6.7. Replaced phos. Will repeat BCR ABL p190 tomorrow    Objective:     Vital Signs (Most Recent):  Temp: 98.6 °F (37 °C) (09/30/22 1110)  Pulse: (!) 112 (09/30/22 1316)  Resp: 16 (09/30/22 1110)  BP: 118/60 (09/30/22 1316)  SpO2: 96 % (09/30/22 1110) Vital Signs (24h Range):  Temp:  [98.6 °F (37 °C)-99.3 °F (37.4 °C)] 98.6 °F (37 °C)  Pulse:  [] 112  Resp:  [14-16] 16  SpO2:  [95 %-99 %] 96 %  BP: (111-153)/(58-76) 118/60     Weight: 59.1 kg (130 lb 4.7 oz)  Body mass index is 22.36 kg/m².  Body surface area is 1.63 meters squared.      Intake/Output - Last 3 Shifts         09/28 0700 09/29 0659 09/29 0700 09/30 0659 09/30 0700  10/01 0659    P.O. 570 450     I.V. (mL/kg) 1031.5 (17.5)      Blood  280     IV Piggyback 184.4      Total Intake(mL/kg) 1785.9 (30.2) 730 (12.4)     Urine (mL/kg/hr) 1700 (1.2) 2325 (1.6) 375 (1)    Emesis/NG output 400      Stool       Total Output 2100 2325 375    Net -314.1 -7059 -120                   Physical Exam  Vitals and nursing note reviewed.   Constitutional:       General: She is not in acute distress.     Appearance: She is well-developed.   HENT:      Head: Normocephalic and atraumatic.      Comments: Ommaya in place      Nose: Nose normal. No congestion.   Eyes:      General: No scleral icterus.     Extraocular Movements: Extraocular movements intact.      Comments: Left eye pupil not reactive. Left eye peripheral vision not  intact. Reports eye pain with movement improved.    Cardiovascular:      Rate and Rhythm: Normal rate and regular rhythm.      Pulses: Normal pulses.      Heart sounds: No murmur heard.  Pulmonary:      Effort: Pulmonary effort is normal. No respiratory distress.   Abdominal:      General: There is no distension.      Palpations: Abdomen is soft.      Tenderness: There is no abdominal tenderness.   Genitourinary:     Comments: Green in place  Musculoskeletal:         General: Normal range of motion.      Cervical back: Normal range of motion and neck supple.      Right lower leg: No edema.      Left lower leg: No edema.   Skin:     General: Skin is warm and dry.      Findings: No bruising, erythema or rash.      Comments: Port intact with no redness or drainage   Neurological:      General: No focal deficit present.      Mental Status: She is alert and oriented to person, place, and time.      Motor: Weakness present.   Psychiatric:         Mood and Affect: Mood normal.         Behavior: Behavior normal.         Thought Content: Thought content normal.         Judgment: Judgment normal.       Significant Labs:   CBC:   Recent Labs   Lab 09/29/22  0318 09/30/22  0402   WBC 3.07* 2.34*   HGB 8.1* 6.7*   HCT 21.9* 19.7*   PLT 41* 65*      and CMP:   Recent Labs   Lab 09/29/22  0318 09/30/22  0402   * 133*   K 3.2* 3.6    104   CO2 20* 20*   * 128*   BUN 13 12   CREATININE 0.5 0.6   CALCIUM 9.0 8.6*   PROT 6.4 6.0   ALBUMIN 3.5 3.3*   BILITOT 0.7 0.4   ALKPHOS 67 64   AST 11 10   ALT 12 14   ANIONGAP 13 9         Diagnostic Results:  I have reviewed all pertinent imaging results/findings within the past 24 hours.

## 2022-09-30 NOTE — ASSESSMENT & PLAN NOTE
- Holding home BP meds in the setting of soft BP   - Resume when appropriate  - positive orthostatic hypotension on 9/30; given 1L bolus and started on continuous IVF

## 2022-09-30 NOTE — ASSESSMENT & PLAN NOTE
- reported dizziness upon standing on 9/30  - positive orthostatic VS  - given 1L bolus and started on continuous IVF

## 2022-09-30 NOTE — ASSESSMENT & PLAN NOTE
- On 9/28, Tmax 102.8F. blood cultures NGTD. UC negative. Afebrile last 48hrs so cefepime stopped on 9/30

## 2022-09-30 NOTE — ASSESSMENT & PLAN NOTE
- Chronic wolf prior to admission  - Attempt voiding trial today  - Will likely need urology follow up at discharge

## 2022-09-30 NOTE — PLAN OF CARE
Plan of care reviewed with pt and her  at the start of shift. Pt A/o x4. VSS and Afebrile. Pt continues to have break through nausea requiring medication. Occasional headaches reported. No vision to left eye.. Blood sugar required extra insulin coverage. Will continue to monitor pt.

## 2022-09-30 NOTE — PROGRESS NOTES
Chief complaint/Reason for Consult: L eye vision loss      History of Present Illness: Deepti Gonzalez is a 51 y.o. 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 She says she was watching TV on Monday when suddenly she noticed her L eye lost vision. When she closed her R eye, she saw shadowy shapes and figures. There were no associated symptoms or pain of her left eye. An hour later, she lost all vision from her L eye including of light. She saw an outside ophthalmologist on Tuesday who did not see evidence of ocular etiology of the vision loss. Her vision did not improve at all and she began developing increasing pain from her L eye over the last few days. No prior ocular history.    Interval hx:   Patient continues to deny any visual changes in the R eye (unaffected eye) and no changes in the left eye (affected).    Patient was started on intrathecal methotrexate on 9/19 and 9/22. LP was performed and confirmed CNS ALL involvement. She is on dexamethasone 4 mg every 6 hours.       PMHx:  has a past medical history of Cancer, COPD (chronic obstructive pulmonary disease), Hypertension, Rheumatoid arthritis, unspecified, SAH (subarachnoid hemorrhage) (9/17/2022), Seizures, Stroke, and Type 2 diabetes mellitus with circulatory disorder, without long-term current use of insulin.     PSurgHx:  has a past surgical history that includes Appendectomy; Tonsillectomy; Rotator cuff repair (Bilateral); Tubal ligation; Hysterectomy; and Ommaya reservior insertion (N/A, 9/26/2022).     Home Medications:   Prior to Admission medications    Medication Sig Start Date End Date Taking? Authorizing Provider   acyclovir (ZOVIRAX) 400 MG tablet Take 1 tablet (400 mg total) by mouth 2 (two) times daily. 12/7/21   Kathrine Posey MD   allopurinoL (ZYLOPRIM) 300 MG tablet Take 1 tablet (300  mg total) by mouth once daily. 6/7/22   Connor M Gillies, MD   artificial tears (ISOPTO TEARS) 0.5 % ophthalmic solution Place 1 drop into both eyes 4 (four) times daily as needed. 1/24/22   Mirtha Antonio NP   atorvastatin (LIPITOR) 80 MG tablet Take 80 mg by mouth once daily.    Historical Provider   blood sugar diagnostic Strp SMARTSIG:Via Meter 1 to 2 Times Daily 10/11/21   Historical Provider   busPIRone (BUSPAR) 10 MG tablet Take 10 mg by mouth 2 (two) times daily.    Historical Provider   dapagliflozin (FARXIGA) 10 mg tablet Take 10 mg by mouth once daily.    Historical Provider   diazePAM (VALIUM) 10 MG Tab Take 10 mg by mouth 2 (two) times daily as needed.    Historical Provider   diltiaZEM (CARDIZEM) 90 MG tablet Take 1 tablet (90 mg total) by mouth every 6 (six) hours. 12/7/21 12/7/22  Kathrine Posey MD   almaraz's soln (benadryl 30 mL, mylanta 30 mL, LIDOcaine 30 mL, nystatin 30 mL) 120mL Take 10 mLs by mouth 4 (four) times daily as needed (mouth pain). 4/19/22   Mirtha Antonio NP   fenofibrate 160 MG Tab Take 160 mg by mouth once daily.    Historical Provider   gabapentin (NEURONTIN) 300 MG capsule Take 1 capsule (300 mg total) by mouth 3 (three) times daily. 12/7/21 12/7/22  Kathrine Posey MD   hydrOXYchloroQUINE (PLAQUENIL) 200 mg tablet Take 1 tablet (200 mg total) by mouth once daily. 5/19/22   Mirtha Antonio NP   hydrOXYzine pamoate (VISTARIL) 50 MG Cap Take 50 mg by mouth 2 (two) times daily as needed. 4/29/22   Historical Provider   LIDOcaine (LIDODERM) 5 % Place 1 patch onto the skin once daily. Remove & Discard patch within 12 hours or as directed by MD 6/6/22   Connor M Gillies, MD   losartan (COZAAR) 25 MG tablet Take 25 mg by mouth once daily. 11/12/21   Historical Provider   magnesium oxide (MAG-OX) 400 mg (241.3 mg magnesium) tablet Take 1 tablet by mouth once daily. 7/12/22   Historical Provider   metFORMIN (GLUCOPHAGE-XR) 500 MG ER 24hr tablet Take 1,000 mg by mouth 2 (two) times daily.  7/22/22   Historical Provider   naloxone (NARCAN) 4 mg/actuation Spry 4mg by nasal route as needed for opioid overdose; may repeat every 2-3 minutes in alternating nostrils until medical help arrives. Call 911 6/8/22   Ines Mccord MD   omeprazole (PRILOSEC) 40 MG capsule Take 40 mg by mouth once daily.    Historical Provider   ondansetron (ZOFRAN-ODT) 8 MG TbDL Take 1 tablet (8 mg total) by mouth every 8 (eight) hours as needed (in case of chemo induced nausea). 8/9/22   Dayron Jenkins MD   ONETOUCH VERIO TEST STRIPS Strp SMARTSIG:Via Meter 1 to 2 Times Daily 4/6/22   Historical Provider   OXcarbazepine (TRILEPTAL) 600 MG Tab Take 1,200 mg by mouth 2 (two) times daily. 10/26/21   Historical Provider   polyethylene glycol (GLYCOLAX) 17 gram/dose powder Dissolve one capful (17 g) in liquid and take by mouth daily as needed (constipation). 12/7/21   Kathrine Posey MD   PONATinib (ICLUSIG) 30 mg Tab Take 1 tablet (30 mg) by mouth once daily. 9/7/22   Dayron Jenkins MD   potassium chloride (K-TAB) 20 mEq Take 20 mEq by mouth 2 (two) times daily. 7/12/22   Historical Provider   prochlorperazine (COMPAZINE) 10 MG tablet Take 1 tablet (10 mg total) by mouth every 6 (six) hours as needed for Nausea. 7/11/22 7/11/23  Dayron Jenkins MD   QUEtiapine (SEROQUEL) 200 MG Tab Take 200 mg by mouth every evening.    Historical Provider   rivaroxaban (XARELTO) 20 mg Tab Take 1 tablet (20 mg total) by mouth daily with dinner or evening meal. Hold until instructed to resume by provider due to low platelet count. 6/6/22   Connor M Gillies, MD   senna-docusate 8.6-50 mg (PERICOLACE) 8.6-50 mg per tablet Take 1 tablet by mouth 2 (two) times daily. 3/19/22   Felisha Goodwin DO   sumatriptan (IMITREX) 50 MG tablet Take 1 tablet (50 mg total) by mouth daily as needed (headache). 8/29/22 9/28/22  Dayron Jenkins MD   TRINTELLIX 20 mg Tab Take 1 tablet by mouth once daily. 3/8/22   Historical Provider   ursodioL (ACTIGALL) 300 mg  capsule Take 1 capsule (300 mg total) by mouth 3 (three) times daily. 6/13/22 6/13/23  Dayron Jenkins MD        Medications this encounter:    acyclovir  400 mg Oral BID    allopurinoL  300 mg Oral Daily    atorvastatin  80 mg Oral Daily    atovaquone  1,500 mg Oral Daily    busPIRone  10 mg Oral BID    dexAMETHasone  4 mg Oral Q12H    diltiaZEM  30 mg Oral 4 times per day    fenofibrate  160 mg Oral Daily    gabapentin  300 mg Oral TID    hydrOXYchloroQUINE  200 mg Oral Daily    hydrOXYzine pamoate  50 mg Oral Q24H    insulin aspart U-100  2 Units Subcutaneous TIDWM    insulin detemir U-100  5 Units Subcutaneous QHS    ondansetron  8 mg Intravenous Q8H    OXcarbazepine  1,200 mg Oral QHS    OXcarbazepine  1,200 mg Oral with lunch    pantoprazole  40 mg Oral Daily    QUEtiapine  200 mg Oral QHS    vortioxetine  20 mg Oral Q24H       Allergies: is allergic to levetiracetam.     Social Hx:  reports that she quit smoking about 6 months ago. Her smoking use included cigarettes. She smoked an average of .5 packs per day. She has never used smokeless tobacco. She reports that she does not drink alcohol and does not use drugs.     Family Hx: No family history of glaucoma. family history is not on file.     ROS: As per HPI    Ocular examination/Dilated fundus examination:  Base Eye Exam       Visual Acuity (Snellen - Linear)         Right Left    Dist sc  NLP    Dist cc 20/20               Tonometry (Tonopen, 11:24 AM)         Right Left    Pressure 16 12              Pupils         Dark Light Shape React APD    Right 4 2 Round Brisk None    Left                   Extraocular Movement         Right Left     Full Full              Neuro/Psych       Oriented x3: Yes                  Slit Lamp and Fundus Exam       External Exam         Right Left    External Normal Normal              Slit Lamp Exam         Right Left    Lids/Lashes Normal Normal    Conjunctiva/Sclera White and quiet White and quiet    Cornea Clear Clear     Anterior Chamber Deep and quiet Deep and quiet    Iris Round and reactive Round and nonreactive    Lens Clear Clear    Anterior Vitreous Normal Normal              Fundus Exam       Patient declined DFE                  Work-up:  - ESR/CRP to evaluate for GCA (finished: ESR wnl, CRP mildly elevated - disease unlikely in this patient)  - Patient unable to obtain MRI imaging due to pacemaker. CT orbit (thin cuts) and head w/wo with CTA/CTV: Small acute subarachnoid hemorrhage in the left frontal lobe, otherwise unremarkable.  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   - 9/17/22 exam: right eye unremarkable. Left eye no light perception, nakia (3+) APD, equal pupil sizes, normal IOP. EOM grossly full and equal OU.   - CN exam otherwise full and equal both sides.  - No evidence of optic nerve or retinal pathology on dilated exam.  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  - Repeat DFE 09/27/22 without changes  - Bedside exam on 9/30/22 without changes in anterior segment. Patient declined DFE.    Recommendations:  - Continue current management for CNS involvement by ALL.  - Notify ophthalmology resident on call if patient develops any vision changes in the right eye.

## 2022-09-30 NOTE — ASSESSMENT & PLAN NOTE
- Follows with Dr. Jenkins. 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  - Was treated with inotuzumab ozogamicin and ponatinib but course complicated by severe cytopenias. Not has been on therapy for past few weeks.  - Not a candidate for allogeneic stem cell transplant at this time.  - presented 9/16/22 with left eye vision loss and worsening vision of left eye  - Patient with pacemaker and per patient, not compatible with MRI. Would ideally obtain MRI brain w/ w/o contrast. Verified with EP who checked with rep that pacemaker is not MRI compatible.   - CTA completed with delayed venous phase revealing small SAH   - Consulted Opthalmology and ID, appreciate assistance  - full infectious workup sent; all negative thus far  - LP with IT chemo on 9/19 positive for B-cell ALL; will require twice weekly IT until clearance at minimum  - ommaya placed by NSGY on 9/26/22; can use ommaya on POD #5 (10/1)  - will start twice weekly IT chemo with ommaya on 10/3  - continue dexamethasone 4mg q12h (weaned 9/29)  - will restart ponatinib on 10/1 once brought in by son

## 2022-09-30 NOTE — PROGRESS NOTES
[facility] - Oncology ([facility])  Hematology  Bone Marrow Transplant  Progress Note    Patient Name: Deepti Gonzalez  Admission Date: 9/16/2022  Hospital Length of Stay: 14 days  Code Status: Full Code    Subjective:     Interval History: Afebrile last 48hrs. Infectious workup negative. Stopping cefepime.  today. Patient reports dizziness upon standing. Orthostatic vitals positive, given 1L bolus and started on NS. Nausea controlled on scheduled zofran. Denies diarrhea or constipation. Will do voiding trial today with wolf removal. PT/OT recommending rehab however not suitable with requiring twice weekly IT chemotherapy, so will plan for outpatient PT/OT. POD 4 from [facility]. Decreased dex to q12h yesterday. Received 1unit PRBC for Hgb 6.7. Replaced phos. Will repeat BCR ABL p190 tomorrow    Objective:     Vital Signs (Most Recent):  Temp: 98.6 °F (37 °C) (09/30/22 1110)  Pulse: (!) 112 (09/30/22 1316)  Resp: 16 (09/30/22 1110)  BP: 118/60 (09/30/22 1316)  SpO2: 96 % (09/30/22 1110) Vital Signs (24h Range):  Temp:  [98.6 °F (37 °C)-99.3 °F (37.4 °C)] 98.6 °F (37 °C)  Pulse:  [] 112  Resp:  [14-16] 16  SpO2:  [95 %-99 %] 96 %  BP: (111-153)/(58-76) 118/60     Weight: 59.1 kg (130 lb 4.7 oz)  Body mass index is 22.36 kg/m².  Body surface area is 1.63 meters squared.      Intake/Output - Last 3 Shifts         09/28 0700 09/29 0659 09/29 0700 09/30 0659 09/30 0700  10/01 0659    P.O. 570 450     I.V. (mL/kg) 1031.5 (17.5)      Blood  280     IV Piggyback 184.4      Total Intake(mL/kg) 1785.9 (30.2) 730 (12.4)     Urine (mL/kg/hr) 1700 (1.2) 2325 (1.6) 375 (1)    Emesis/NG output 400      Stool       Total Output 2100 0424 847    [facility]314.1 -1595 -375                   Physical Exam  Vitals and nursing note reviewed.   Constitutional:       General: She is not in acute distress.     Appearance: She is well-developed.   HENT:      Head: Normocephalic and atraumatic.      Comments: Ommaya in place      Nose:  Nose normal. No congestion.   Eyes:      General: No scleral icterus.     Extraocular Movements: Extraocular movements intact.      Comments: Left eye pupil not reactive. Left eye peripheral vision not intact. Reports eye pain with movement improved.    Cardiovascular:      Rate and Rhythm: Normal rate and regular rhythm.      Pulses: Normal pulses.      Heart sounds: No murmur heard.  Pulmonary:      Effort: Pulmonary effort is normal. No respiratory distress.   Abdominal:      General: There is no distension.      Palpations: Abdomen is soft.      Tenderness: There is no abdominal tenderness.   Genitourinary:     Comments: Green in place  Musculoskeletal:         General: Normal range of motion.      Cervical back: Normal range of motion and neck supple.      Right lower leg: No edema.      Left lower leg: No edema.   Skin:     General: Skin is warm and dry.      Findings: No bruising, erythema or rash.      Comments: Port intact with no redness or drainage   Neurological:      General: No focal deficit present.      Mental Status: She is alert and oriented to person, place, and time.      Motor: Weakness present.   Psychiatric:         Mood and Affect: Mood normal.         Behavior: Behavior normal.         Thought Content: Thought content normal.         Judgment: Judgment normal.       Significant Labs:   CBC:   Recent Labs   Lab 09/29/22  0318 09/30/22  0402   WBC 3.07* 2.34*   HGB 8.1* 6.7*   HCT 21.9* 19.7*   PLT 41* 65*      and CMP:   Recent Labs   Lab 09/29/22  0318 09/30/22  0402   * 133*   K 3.2* 3.6    104   CO2 20* 20*   * 128*   BUN 13 12   CREATININE 0.5 0.6   CALCIUM 9.0 8.6*   PROT 6.4 6.0   ALBUMIN 3.5 3.3*   BILITOT 0.7 0.4   ALKPHOS 67 64   AST 11 10   ALT 12 14   ANIONGAP 13 9         Diagnostic Results:  I have reviewed all pertinent imaging results/findings within the past 24 hours.    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 Jenkins. 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  - Was treated with inotuzumab ozogamicin and ponatinib but course complicated by severe cytopenias. Not has been on therapy for past few weeks.  - Not a candidate for allogeneic stem cell transplant at this time.  - presented 9/16/22 with left eye vision loss and worsening vision of left eye  - Patient with pacemaker and per patient, not compatible with MRI. Would ideally obtain MRI brain w/ w/o contrast. Verified with EP who checked with rep that pacemaker is not MRI compatible.   - CTA completed with delayed venous phase revealing small SAH   - Consulted Opthalmology and ID, appreciate assistance  - full infectious workup sent; all negative thus far  - LP with IT chemo on 9/19 positive for B-cell ALL; will require twice weekly IT until clearance at minimum  - ommaya placed by NSGY on 9/26/22; can use ommaya on POD #5 (10/1)  - will start twice weekly IT chemo with ommaya on 10/3  - continue dexamethasone 4mg q12h (weaned 9/29)  - will restart ponatinib on 10/1 once brought in by son    Thrombocytopenia  - Monitor CBC  - Transfuse for platelet count <50k given recent ommaya placement  - Holding anticoagulation until platelets persistently above 50K    Orthostatic hypotension  - reported dizziness upon standing on 9/30  - positive orthostatic VS  - given 1L bolus and started on continuous IVF    Chemotherapy-induced nausea  - reports nausea and vomiting following LP with IT chemo on 9/28  - ATC IV zofran scheduled 9/29    Physical debility  - PT/OT consulted; recommending rehab however not feasible as patient will need twice weekly IT chemotherapy  - plan for outpatient PT/OT unless patient agreeable to HH    SAH (subarachnoid hemorrhage)  - Small SAH discovered on CTA. Repeat CTH stable. No focal neuro deficits. Pt has ongoing left sided headache and eye pain.   - Likely incidental  finding that is not causing the vision loss.   - NSGY consulted, appreciate recs  - CT head with stealth shows resolving SAH  - CT head on 9/24 shows no new hemorrhage; repeat 9/29 stable    Vision loss of left eye  - presented with acute left eye vision loss associated with pain. Concern for possible B-ALL involvement vs side effect of ponatinib vs retrobulbar hemorrhage vs stroke  - see B-cell ALL in relapse    Fever of unknown origin  - On 9/28, Tmax 102.8F. blood cultures NGTD. UC negative. Afebrile last 48hrs so cefepime stopped on 9/30    Urinary retention  - Chronic wolf prior to admission  - Attempt voiding trial today  - Will likely need urology follow up at discharge    Hypokalemia  - monitor daily CMP  - replace per prn electrolyte order set    Anemia associated with chemotherapy  - Monitor CBC  - Transfuse for Hgb<7  - received 1unit PRBC today    Hypophosphatemia  - monitor daily phos  - replace per PRN electrolyte order set    Anxiety  - Continue home buspirone, hydroxyzine and quetiapine.   - Trintellix is nonformulary however pharmacy is attempting to obtain this medication.    Hypertension  - Holding home BP meds in the setting of soft BP   - Resume when appropriate  - positive orthostatic hypotension on 9/30; given 1L bolus and started on continuous IVF    Seizure disorder  - Continue home oxcarbazepine 200mg QHS    Type 2 diabetes mellitus, without long-term current use of insulin  Diabetes type 2  Last HbA1c:   Lab Results   Component Value Date    HGBA1C 4.4 04/13/2022       PLAN:   - Hold oral anti-hyperglycemic agents  - worsened by IV steroids  - Detemir 5U, Aspart 2U TID WM and SSI  - Diabetic diet  - Hypoglycemia orderset with POCT BG AC/QS  - Goal -180 while inpatient     Pacemaker  - Patient with pacemaker in place due to SSS. Per EP, it is definitely MRI incompatible.      Rheumatoid arthritis involving multiple sites  - Seronegative RA. Continue home  plaquenil    Hyperlipidemia  - Continue home atorvastatin and fenofibrate    Paroxysmal atrial fibrillation  - Holding home xarelto in the setting of thrombocytopenia and with new ommaya        VTE Risk Mitigation (From admission, onward)         Ordered     IP VTE HIGH RISK PATIENT  Once         09/16/22 1637     Place sequential compression device  Until discontinued         09/16/22 1637     Reason for No Pharmacological VTE Prophylaxis  Once        Question:  Reasons:  Answer:  Thrombocytopenia    09/16/22 1637                Disposition: Remains inpatient    Shanta Love NP  Bone Marrow Transplant  [facility] - Oncology ([facility])

## 2022-09-30 NOTE — PT/OT/SLP EVAL
"Physical Therapy Evaluation    Patient Name:  Deepti Gonzalez   MRN:  [id number]    Recommendations:     Discharge Recommendations:  home health PT   Discharge Equipment Recommendations: none   Barriers to discharge: Increased level of skilled assistance needed    Assessment:     Deepti Gonzalez is a 51 y.o. 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  She presents with the following impairments/functional limitations:  weakness, impaired endurance, impaired self care skills, impaired functional mobility, gait instability, impaired balance, visual deficits, decreased upper extremity function, impaired fine motor, impaired cardiopulmonary response to activity. Prior to admission,  reported pt was Mod Ind with rollator for mobility and w/c on "bad days". Pt continues to present with dizziness while sitting EOB and standing and monitored BP with nurse present; in supine /67; in sitting /64; and in standing unable to read but when returned to sitting /60. Pt was able to perform bed mobility and transfers with CGA with good sitting balance. Recommend pt discharge to [facility] with the potential to progress when medically stable to improve functional capacity.    Rehab Prognosis: Good; patient would benefit from acute skilled PT services to address these deficits and reach maximum level of function.    Recent Surgery: Procedure(s) (LRB):  INSERTION, OMMAYA RESERVOIR (N/A) 4 Days Post-Op    Plan:     During this hospitalization, patient to be seen 3 x/week to address the identified rehab impairments via gait training, therapeutic activities, therapeutic exercises, neuromuscular re-education and progress toward the following goals:    Plan of Care Expires:  10/14/22    Subjective     Chief Complaint: c/o dizziness when sitting and standing  Patient/Family Comments/goals: agreeable to PT  Pain/Comfort:  Pain Rating 1: 0/10    Patients cultural, " spiritual, Yazdanism conflicts given the current situation: no    Living Environment:  Lives with  and daughter in [facility], [address].   Prior to admission, patients level of function was mod Ind with rollator and rare use of w/c.  Equipment used at home: rollator, bath bench, raised toilet, wheelchair.  DME owned (not currently used): none.  Upon discharge, patient will have assistance from family.    Objective:     Communicated with nursing prior to session.  Patient found HOB elevated with telemetry, peripheral IV  upon PT entry to room.    General Precautions: Standard, fall   Orthopedic Precautions:N/A   Braces: N/A  Respiratory Status: Room air    Exams:  Cognitive Exam:  Patient is oriented to Person, Place, Time, and Situation  RLE ROM: WFL  RLE Strength: WFL  LLE ROM: WFL  LLE Strength: WFL    Functional Mobility:  Bed Mobility:     Supine to Sit: contact guard assistance  Sit to Supine: contact guard assistance  Transfers:     Sit to Stand:  contact guard assistance with hand-held assist  Balance: static sitting EOB ~3 min: good, SBA, c/o dizziness  Static standing ~1 min: fair, CGA with HHA c/o dizziness    Therapeutic Activities and Exercises:  Monitored vitals during session: in supine: /67, HR 88; in sitting: /64, ; standing unable to read; return to sitting /60,   Educated pt on PT POC; pt and  verbalized understanding.    AM-PAC 6 CLICK MOBILITY  Total Score:20     Patient left HOB elevated with all lines intact, call button in reach, and  present.    GOALS:   Multidisciplinary Problems       Physical Therapy Goals          Problem: Physical Therapy    Goal Priority Disciplines Outcome Goal Variances Interventions   Physical Therapy Goal     PT, PT/OT Ongoing, Progressing     Description: Goals to be met by: 10/14/2022     Patient will increase functional independence with mobility by performin. Supine to sit with [place]  2. Sit to supine with  [place]  3. Sit to stand transfer with [place]  4. Gait  x 150 feet with Modified [place] using Rolling Walker.                          History:     Past Medical History:   Diagnosis Date    Cancer     COPD (chronic obstructive pulmonary disease)     Hypertension     Rheumatoid arthritis, unspecified     SAH (subarachnoid hemorrhage) 9/17/2022    Seizures     Stroke     TIA x 4    Type 2 diabetes mellitus with circulatory disorder, without long-term current use of insulin        Past Surgical History:   Procedure Laterality Date    APPENDECTOMY      HYSTERECTOMY      OMMAYA RESERVIOR INSERTION N/A 9/26/2022    Procedure: INSERTION, OMMAYA RESERVOIR;  Surgeon: Mj Alberto MD;  Location: [facility] OR [address] [place] [address];  Service: Neurosurgery;  Laterality: N/A;    ROTATOR CUFF REPAIR Bilateral     TONSILLECTOMY      TUBAL LIGATION         Time Tracking:     PT Received On: 09/30/22  PT Start Time: 1301     PT Stop Time: 1318  PT Total Time (min): 17 min     Billable Minutes: Evaluation 9 min and Therapeutic Activity 8 min      09/30/2022

## 2022-09-30 NOTE — ASSESSMENT & PLAN NOTE
- PT/OT consulted; recommending rehab however not feasible as patient will need twice weekly IT chemotherapy  - plan for outpatient PT/OT unless patient agreeable to HH

## 2022-10-01 VITALS
TEMPERATURE: 98 F | OXYGEN SATURATION: 99 % | DIASTOLIC BLOOD PRESSURE: 70 MMHG | SYSTOLIC BLOOD PRESSURE: 120 MMHG | WEIGHT: 130.31 LBS | HEIGHT: 64 IN | BODY MASS INDEX: 22.25 KG/M2 | RESPIRATION RATE: 18 BRPM | HEART RATE: 78 BPM

## 2022-10-01 LAB
ABO + RH BLD: NORMAL
ALBUMIN SERPL BCP-MCNC: 3.1 G/DL (ref 3.5–5.2)
ALP SERPL-CCNC: 62 U/L (ref 55–135)
ALT SERPL W/O P-5'-P-CCNC: 10 U/L (ref 10–44)
ANION GAP SERPL CALC-SCNC: 7 MMOL/L (ref 8–16)
ANISOCYTOSIS BLD QL SMEAR: SLIGHT
AST SERPL-CCNC: 10 U/L (ref 10–40)
BASOPHILS # BLD AUTO: ABNORMAL K/UL (ref 0–0.2)
BASOPHILS NFR BLD: 0 % (ref 0–1.9)
BILIRUB SERPL-MCNC: 0.6 MG/DL (ref 0.1–1)
BLD GP AB SCN CELLS X3 SERPL QL: NORMAL
BLD PROD TYP BPU: NORMAL
BLOOD UNIT EXPIRATION DATE: NORMAL
BLOOD UNIT TYPE CODE: 7300
BLOOD UNIT TYPE: NORMAL
BUN SERPL-MCNC: 8 MG/DL (ref 6–20)
CALCIUM SERPL-MCNC: 8.4 MG/DL (ref 8.7–10.5)
CHLORIDE SERPL-SCNC: 104 MMOL/L (ref 95–110)
CO2 SERPL-SCNC: 24 MMOL/L (ref 23–29)
CODING SYSTEM: NORMAL
CREAT SERPL-MCNC: 0.5 MG/DL (ref 0.5–1.4)
DIFFERENTIAL METHOD: ABNORMAL
DISPENSE STATUS: NORMAL
EOSINOPHIL # BLD AUTO: ABNORMAL K/UL (ref 0–0.5)
EOSINOPHIL NFR BLD: 0 % (ref 0–8)
ERYTHROCYTE [DISTWIDTH] IN BLOOD BY AUTOMATED COUNT: 20.1 % (ref 11.5–14.5)
EST. GFR  (NO RACE VARIABLE): >60 ML/MIN/1.73 M^2
GLUCOSE SERPL-MCNC: 101 MG/DL (ref 70–110)
HCT VFR BLD AUTO: 21.6 % (ref 37–48.5)
HGB BLD-MCNC: 7.4 G/DL (ref 12–16)
HYPOCHROMIA BLD QL SMEAR: ABNORMAL
IMM GRANULOCYTES # BLD AUTO: ABNORMAL K/UL (ref 0–0.04)
IMM GRANULOCYTES NFR BLD AUTO: ABNORMAL % (ref 0–0.5)
LYMPHOCYTES # BLD AUTO: ABNORMAL K/UL (ref 1–4.8)
LYMPHOCYTES NFR BLD: 80 % (ref 18–48)
MCH RBC QN AUTO: 35.4 PG (ref 27–31)
MCHC RBC AUTO-ENTMCNC: 34.3 G/DL (ref 32–36)
MCV RBC AUTO: 103 FL (ref 82–98)
MONOCYTES # BLD AUTO: ABNORMAL K/UL (ref 0.3–1)
MONOCYTES NFR BLD: 0 % (ref 4–15)
NEUTROPHILS NFR BLD: 20 % (ref 38–73)
NRBC BLD-RTO: 1 /100 WBC
PLATELET # BLD AUTO: 37 K/UL (ref 150–450)
PLATELET BLD QL SMEAR: ABNORMAL
PMV BLD AUTO: 10.4 FL (ref 9.2–12.9)
POCT GLUCOSE: 83 MG/DL (ref 70–110)
POTASSIUM SERPL-SCNC: 3.6 MMOL/L (ref 3.5–5.1)
PROT SERPL-MCNC: 5.3 G/DL (ref 6–8.4)
RBC # BLD AUTO: 2.09 M/UL (ref 4–5.4)
SODIUM SERPL-SCNC: 135 MMOL/L (ref 136–145)
UNIT NUMBER: NORMAL
WBC # BLD AUTO: 1.59 K/UL (ref 3.9–12.7)

## 2022-10-01 PROCEDURE — P9037 PLATE PHERES LEUKOREDU IRRAD: HCPCS | Performed by: STUDENT IN AN ORGANIZED HEALTH CARE EDUCATION/TRAINING PROGRAM

## 2022-10-01 PROCEDURE — 63600175 PHARM REV CODE 636 W HCPCS: Performed by: NURSE PRACTITIONER

## 2022-10-01 PROCEDURE — 25000003 PHARM REV CODE 250: Performed by: STUDENT IN AN ORGANIZED HEALTH CARE EDUCATION/TRAINING PROGRAM

## 2022-10-01 PROCEDURE — 25000003 PHARM REV CODE 250

## 2022-10-01 PROCEDURE — 86901 BLOOD TYPING SEROLOGIC RH(D): CPT | Performed by: NURSE PRACTITIONER

## 2022-10-01 PROCEDURE — 85027 COMPLETE CBC AUTOMATED: CPT | Performed by: STUDENT IN AN ORGANIZED HEALTH CARE EDUCATION/TRAINING PROGRAM

## 2022-10-01 PROCEDURE — 80053 COMPREHEN METABOLIC PANEL: CPT | Performed by: STUDENT IN AN ORGANIZED HEALTH CARE EDUCATION/TRAINING PROGRAM

## 2022-10-01 PROCEDURE — 25000242 PHARM REV CODE 250 ALT 637 W/ HCPCS: Performed by: STUDENT IN AN ORGANIZED HEALTH CARE EDUCATION/TRAINING PROGRAM

## 2022-10-01 PROCEDURE — 99238 HOSP IP/OBS DSCHRG MGMT 30/<: CPT | Mod: ,,, | Performed by: INTERNAL MEDICINE

## 2022-10-01 PROCEDURE — 99238 PR HOSPITAL DISCHARGE DAY,<30 MIN: ICD-10-PCS | Mod: ,,, | Performed by: INTERNAL MEDICINE

## 2022-10-01 PROCEDURE — 25000003 PHARM REV CODE 250: Performed by: NURSE PRACTITIONER

## 2022-10-01 PROCEDURE — 85007 BL SMEAR W/DIFF WBC COUNT: CPT | Performed by: STUDENT IN AN ORGANIZED HEALTH CARE EDUCATION/TRAINING PROGRAM

## 2022-10-01 PROCEDURE — 81207 BCR/ABL1 GENE MINOR BP: CPT | Performed by: NURSE PRACTITIONER

## 2022-10-01 PROCEDURE — 63600175 PHARM REV CODE 636 W HCPCS: Performed by: STUDENT IN AN ORGANIZED HEALTH CARE EDUCATION/TRAINING PROGRAM

## 2022-10-01 RX ORDER — ACYCLOVIR 400 MG/1
400 TABLET ORAL 2 TIMES DAILY
Qty: 60 TABLET | Refills: 0 | Status: ON HOLD | OUTPATIENT
Start: 2022-10-01 | End: 2022-10-13 | Stop reason: HOSPADM

## 2022-10-01 RX ORDER — SULFAMETHOXAZOLE AND TRIMETHOPRIM 800; 160 MG/1; MG/1
1 TABLET ORAL DAILY
Qty: 6 TABLET | Refills: 0 | Status: SHIPPED | OUTPATIENT
Start: 2022-10-01 | End: 2022-10-07

## 2022-10-01 RX ORDER — ACETAMINOPHEN 325 MG/1
650 TABLET ORAL ONCE
Status: COMPLETED | OUTPATIENT
Start: 2022-10-01 | End: 2022-10-01

## 2022-10-01 RX ORDER — FLUCONAZOLE 200 MG/1
400 TABLET ORAL DAILY
Qty: 60 TABLET | Refills: 0 | Status: ON HOLD | OUTPATIENT
Start: 2022-10-01 | End: 2022-10-14 | Stop reason: HOSPADM

## 2022-10-01 RX ORDER — LEVOFLOXACIN 500 MG/1
500 TABLET, FILM COATED ORAL DAILY
Qty: 30 TABLET | Refills: 0 | Status: ON HOLD | OUTPATIENT
Start: 2022-10-01 | End: 2022-10-14 | Stop reason: HOSPADM

## 2022-10-01 RX ORDER — DIPHENHYDRAMINE HCL 25 MG
25 CAPSULE ORAL ONCE
Status: COMPLETED | OUTPATIENT
Start: 2022-10-01 | End: 2022-10-01

## 2022-10-01 RX ORDER — HYDROCODONE BITARTRATE AND ACETAMINOPHEN 500; 5 MG/1; MG/1
TABLET ORAL
Status: DISCONTINUED | OUTPATIENT
Start: 2022-10-01 | End: 2022-10-01 | Stop reason: HOSPADM

## 2022-10-01 RX ORDER — HEPARIN 100 UNIT/ML
5 SYRINGE INTRAVENOUS ONCE
Status: COMPLETED | OUTPATIENT
Start: 2022-10-01 | End: 2022-10-01

## 2022-10-01 RX ORDER — POTASSIUM CHLORIDE 20 MEQ/1
40 TABLET, EXTENDED RELEASE ORAL ONCE
Status: COMPLETED | OUTPATIENT
Start: 2022-10-01 | End: 2022-10-01

## 2022-10-01 RX ORDER — ONDANSETRON 8 MG/1
8 TABLET, ORALLY DISINTEGRATING ORAL EVERY 8 HOURS PRN
Qty: 60 TABLET | Refills: 0 | Status: SHIPPED | OUTPATIENT
Start: 2022-10-01

## 2022-10-01 RX ORDER — DEXAMETHASONE 2 MG/1
2 TABLET ORAL 2 TIMES DAILY WITH MEALS
Qty: 10 TABLET | Refills: 0 | Status: SHIPPED | OUTPATIENT
Start: 2022-10-01 | End: 2022-10-06

## 2022-10-01 RX ORDER — ATOVAQUONE AND PROGUANIL HYDROCHLORIDE 250; 100 MG/1; MG/1
6 TABLET, FILM COATED ORAL DAILY
Qty: 126 TABLET | Refills: 0 | Status: SHIPPED | OUTPATIENT
Start: 2022-10-02 | End: 2022-10-01 | Stop reason: HOSPADM

## 2022-10-01 RX ORDER — BUTALBITAL, ACETAMINOPHEN AND CAFFEINE 50; 325; 40 MG/1; MG/1; MG/1
1 TABLET ORAL EVERY 6 HOURS PRN
Qty: 30 TABLET | Refills: 0 | Status: SHIPPED | OUTPATIENT
Start: 2022-10-01 | End: 2022-10-31

## 2022-10-01 RX ADMIN — GABAPENTIN 300 MG: 300 CAPSULE ORAL at 03:10

## 2022-10-01 RX ADMIN — HYDROXYCHLOROQUINE SULFATE 200 MG: 200 TABLET ORAL at 09:10

## 2022-10-01 RX ADMIN — FENOFIBRATE 160 MG: 160 TABLET ORAL at 12:10

## 2022-10-01 RX ADMIN — BUSPIRONE HYDROCHLORIDE 10 MG: 10 TABLET ORAL at 09:10

## 2022-10-01 RX ADMIN — OXCARBAZEPINE 1200 MG: 600 TABLET, FILM COATED ORAL at 09:10

## 2022-10-01 RX ADMIN — POTASSIUM CHLORIDE 40 MEQ: 1500 TABLET, EXTENDED RELEASE ORAL at 08:10

## 2022-10-01 RX ADMIN — ATOVAQUONE 1500 MG: 750 SUSPENSION ORAL at 10:10

## 2022-10-01 RX ADMIN — DILTIAZEM HYDROCHLORIDE 30 MG: 60 TABLET, FILM COATED ORAL at 12:10

## 2022-10-01 RX ADMIN — DIPHENHYDRAMINE HYDROCHLORIDE 25 MG: 25 CAPSULE ORAL at 09:10

## 2022-10-01 RX ADMIN — DILTIAZEM HYDROCHLORIDE 30 MG: 60 TABLET, FILM COATED ORAL at 05:10

## 2022-10-01 RX ADMIN — SODIUM CHLORIDE: 0.9 INJECTION, SOLUTION INTRAVENOUS at 08:10

## 2022-10-01 RX ADMIN — DEXAMETHASONE 4 MG: 4 TABLET ORAL at 09:10

## 2022-10-01 RX ADMIN — VORTIOXETINE 20 MG: 10 TABLET, FILM COATED ORAL at 09:10

## 2022-10-01 RX ADMIN — ONDANSETRON 8 MG: 2 INJECTION INTRAMUSCULAR; INTRAVENOUS at 02:10

## 2022-10-01 RX ADMIN — HEPARIN 500 UNITS: 100 SYRINGE at 03:10

## 2022-10-01 RX ADMIN — ONDANSETRON 8 MG: 2 INJECTION INTRAMUSCULAR; INTRAVENOUS at 05:10

## 2022-10-01 RX ADMIN — ALLOPURINOL 300 MG: 300 TABLET ORAL at 10:10

## 2022-10-01 RX ADMIN — ACETAMINOPHEN 650 MG: 325 TABLET ORAL at 09:10

## 2022-10-01 RX ADMIN — ACYCLOVIR 400 MG: 200 CAPSULE ORAL at 09:10

## 2022-10-01 RX ADMIN — GABAPENTIN 300 MG: 300 CAPSULE ORAL at 12:10

## 2022-10-01 RX ADMIN — ATORVASTATIN CALCIUM 80 MG: 20 TABLET, FILM COATED ORAL at 12:10

## 2022-10-01 RX ADMIN — PANTOPRAZOLE SODIUM 40 MG: 40 TABLET, DELAYED RELEASE ORAL at 09:10

## 2022-10-01 NOTE — PLAN OF CARE
"Plan of care reviewed with pt and her  at the start of shift. Pt continues to have urinary retention. Green catheter reinserted with initial output of 350 ml. Pt A/O x4. VSS and afebrile. Pt states that she has been experiencing stabbing needle like pains to "the meat of her eyes". Pt asking for medications for pain and nausea as needed. IVF infusing with out difficulty. Pt turning in bed with minimal assistance from her  or staff. Will continue to monitor pt.  "

## 2022-10-01 NOTE — NURSING
Patient being discharged to HOME per orders.     Discharge instructions and AVS reviewed with patient and spouse. Patient and spouse verbalized understanding. All questions addressed. Chest port deaccessed and Green left in per orders    Patient transported via wheelchair to ride home with belongings and home medications. Patient declined transport services. Patient accompanied by spouse with her own wheelchair and belongings.

## 2022-10-01 NOTE — ASSESSMENT & PLAN NOTE
- reported dizziness upon standing on 9/30  - positive orthostatic VS  - given 1L bolus and started on continuous IVF  - Resolved

## 2022-10-01 NOTE — DISCHARGE SUMMARY
"[facility] ([facility])  Hematology  Bone Marrow Transplant  Discharge Summary      Patient Name: Deepti Gonzalez  MRN: [id number]  Admission Date: 9/16/2022  Hospital Length of Stay: 15 days  Discharge Date and Time:  10/01/2022 1:09 PM  Attending Physician: Dayron Jenkins MD   Discharging Provider: Nay Copeland DO  Primary Care Provider: Primary Doctor Rosey    HPI: Ms. Deepti Gonzalez is a 51 year old woman with PH+ B-ALL, RA, COPD, HTN, pAF, PPM, anxiety, MDD who presents with acute left eye vision loss and pain. She states that it started this past Tuesday (4 days ago). It was a sudden onset. Denies any falls or head trauma. She reports seeing "foggy", followed by "white" and then "black". She has not had a return to vision since then. She has associated eye pain. She has no issues with her right eye. She went to her Optometrist on Wednesday.  and patient report that optometrist took pictures of the eye and stated that "eye looked fine". She has associated headaches that have been worsening, especially around the left eye. She reports nausea that has been chronic for the past few months and vomiting. She vomited or retched about 3 times this morning. She denies fevers, chills, night sweats, chest pain, SOB, abdominal pain, diarrhea, leg swelling. She reports that she has not had an episode like this prior.     Per Dr. Jenkins's note (09/12/2022):  1. Precursor B-cell acute lymphoblastic leukemia (Ph+)              A. 11/23/2021: Transferred to [facility] from outside hospital for evaluation for acute leukemia              B. 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               C. 11/30/2021 - 12/23/2021: Vincristine + " imatinib induction; hospital course was complicated by acute hypoxemic respiratory failure which resolved with diuresis and treatment of ALL              D. 1/3/2022: Bone marrow biopsy after induction shows normocellular marrow (60%) with no definite evidence of B-ALL; bcr-abl p190 fusion detected at 0.02% of total ABL1; FISH normal; MRD testing not sent              E. 1/20/2022: Begin consolidation therapy with [id number]-Ph-[id number] protocol              F. 3/16/2022: BCR-ABL p190 transcript on peripheral blood negative              G. 4/13/2022: BCR-ABL p190 transcript on peripheral blood 0.63%              H. 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     We have been treating her with a combination of inotuzumab ozogamicin and ponatinib. She currently has evidence of a major molecular response with bcr-abl transcript of 0.02%. Course has been complicated by severe cytopenias. She has been holding therapy for two weeks. Her thrombocytopenia remains severe. These cytopenias are due to therapy. We will therefore restart ponatinib at 30 mg PO daily and hold further inotuzumab (may be reconsidered should she relapse).      She met with Dr. Armendariz regarding allogeneic stem cell transplant. Risk is not expected to exceed benefit. She is therefore not a candidate for this procedure.       Procedure(s) (LRB):  INSERTION, OMMAYA RESERVOIR (N/A)     Hospital Course: 09/17/2022 vision not improved, confirmed with EP that she cannot have MRI with her pacemaker. Will probably need LP for Monday. STAT CT head w/wo, CT orbit w/wo, CTA ordered per ophthal recs   09/18/2022  vision the same. CTA showed small focal SAH in left frontal lobe. NSGY consulted. Neurology following, vascular neuro commented. Repeat CTH shows stable appearance of SAH  09/19/2022 planning for IT cytarabine, hydrocortisone, MTX administration today. Labs to be drawn for LP  09/20/2022 Received IT chemo, LP significant for WBC count 546, 87% lymphs, CSF shows numerous blastoid atypical cells, correlate with flow cytometry. Discussed with neuro regarding twice weekly lumbar punctures for chemo vs Ommaya device for chemo administration.   09/21/2022 flow from CSF c/w B ALL. Planning for Ommaya placement tomorrow for twice weekly IT chemo. Will need platelets >70 K. Reports HA this am. Receives Fiorcet. Had episode of decreased vision to  right eye yesterday. Seen by ophthalmology, vision improved on its own, possibly due to medications. On Dex q 6, elevated glucose due to steroids, on mod SSI.   09/22/2022 plts at 58 not high enough for Ommaya. Will transfuse 1 more unit and pm CBC. LP scheduled for IT chemo  09/23/2022 plts 92, NSGY having difficulty with finding OR block time for Ommaya placement.   09/24/2022 plts 65, still has HA and OS vision loss. Optho planning for repeat DFE on 9/26, Ommaya placement planned for 9/26 pending plts >70  09/25/2022 plt 86, will transfuse 1 unit, CBC at midnight for possible overnight plt needs. HA still present. OS vision loss the same. Ommaya placement planned for tomorrow. CT head with stealth ordered by NSGY  09/26/2022 plt 98k, will transfuse 1u. Repeat CBC 141k. Plan for Ommaya placement today via NRSY followed by IT chemotherapy.   09/27/2022 now s/p Ommaya placement by NRSY. Will plan to wait until POD #5 to use Ommaya as per NRSY.   09/28/2022 Patient w/ Tmax 102.8 overnight. Cultures drawn and cefepime started for empiric antimicrobial coverage. Plan for IT chemo today by flouro.   09/29/2022 Afebrile overnight, tmax 100.8F. Continues on cefepime. Blood cultures NGTD.  UC in process. Patient reports persistent nausea all of yesterday with emesis after the LP. Will schedule IV zofran. CT head shows no new bleeding. POD 3 today of ommaya. Will keep platelets >50K at this time following post op and with previous small SAH. PT/OT consulted for dispo planning. Replacing K. Denies any diarrhea or constipation.  09/30/2022 Afebrile last 48hrs. Infectious workup negative. Stopping cefepime.  today. Patient reports dizziness upon standing. Orthostatic vitals positive, given 1L bolus and started on NS. Nausea controlled on scheduled zofran. Denies diarrhea or constipation. Will do voiding trial today with wolf removal. PT/OT recommending rehab however not suitable with requiring twice weekly IT chemotherapy, so will plan for outpatient PT/OT. POD 4 from ommaya. Decreased dex to q12h yesterday. Received 1unit PRBC for Hgb 6.7. Replaced phos. Will repeat BCR ABL p190 tomorrow  10/1/2022 NAEON. Afebrile. Plt 37. Transfuse 1 unit today. 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 . Discharge home on ppx (see med rec) with f/u planned for Monday and to start IT chemo via Ommaya. Referral to urology for urinary retention.  for wolf care at home.      Goals of Care Treatment Preferences:  Code Status: Full Code    Health care agent: [facility] agent number: [phone]                   Consults (From admission, onward)        Status Ordering Provider     Inpatient consult to Radiation Oncology  Once        Provider:  (Not yet assigned)    Completed ROSCOE RODAS     Inpatient consult to Neurosurgery  Once        Provider:  (Not yet assigned)    Completed ROSCOE RODAS     Inpatient consult to Vascular (Stroke) Neurology  Once        Provider:  (Not yet assigned)    Completed ALENA READ     Inpatient consult to Neuro Critical Care  Once        Provider:  (Not yet assigned)     Completed ALENA READ     Inpatient consult to Neurology  Once        Provider:  (Not yet assigned)    Completed ROSCOE RODAS     Inpatient consult to Ophthalmology  Once        Provider:  (Not yet assigned)    Completed YISSEL DAVID          Subjective:     Interval History: Afebrile. Plt 37. Transfuse 1 unit today. 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 . Discharge home on ppx (see med rec) with f/u planned for Monday. Referral to urology for urinary retention.  for wolf care at home.    Objective:     Vital Signs (Most Recent):  Temp: 98.4 °F (36.9 °C) (10/01/22 1100)  Pulse: 78 (10/01/22 1100)  Resp: 18 (10/01/22 1100)  BP: 120/70 (10/01/22 1100)  SpO2: 99 % (10/01/22 1100) Vital Signs (24h Range):  Temp:  [98.3 °F (36.8 °C)-99.9 °F (37.7 °C)] 98.4 °F (36.9 °C)  Pulse:  [] 78  Resp:  [16-20] 18  SpO2:  [97 %-99 %] 99 %  BP: (118-152)/(60-74) 120/70     Weight: 59.1 kg (130 lb 4.7 oz)  Body mass index is 22.36 kg/m².  Body surface area is 1.63 meters squared.      Intake/Output - Last 3 Shifts         09/29 0700 09/30 0659 09/30 0700  10/01 0659 10/01 0700  10/02 0659    P.O. 450 430 200    I.V. (mL/kg)       Blood 280 350 220    IV Piggyback       Total Intake(mL/kg) 730 (12.4) 780 (13.2) 420 (7.1)    Urine (mL/kg/hr) 2325 (1.6) 2075 (1.5) 875 (2.5)    Emesis/NG output       Other   0    Stool   0    Total Output 2325 2075 875    Net -8868 -1295 -455           Stool Occurrence   2 x            Physical Exam  Vitals and nursing note reviewed.   Constitutional:       General: She is not in acute distress.     Appearance: She is well-developed.   HENT:      Head: Normocephalic and atraumatic.      Comments: Ommaya in place      Nose: Nose normal. No congestion.   Eyes:      General: No scleral icterus.     Extraocular Movements: Extraocular movements intact.      Comments: Left eye pupil not reactive.  Left eye peripheral vision not intact. Reports eye pain with movement improved.    Cardiovascular:      Rate and Rhythm: Normal rate and regular rhythm.      Pulses: Normal pulses.      Heart sounds: No murmur heard.  Pulmonary:      Effort: Pulmonary effort is normal. No respiratory distress.   Abdominal:      General: There is no distension.      Palpations: Abdomen is soft.      Tenderness: There is no abdominal tenderness.   Genitourinary:     Comments: Green in place  Musculoskeletal:         General: Normal range of motion.      Cervical back: Normal range of motion and neck supple.      Right lower leg: No edema.      Left lower leg: No edema.   Skin:     General: Skin is warm and dry.      Findings: No bruising, erythema or rash.      Comments: Port intact with no redness or drainage   Neurological:      General: No focal deficit present.      Mental Status: She is alert and oriented to person, place, and time.      Motor: Weakness present.   Psychiatric:         Mood and Affect: Mood normal.         Behavior: Behavior normal.         Thought Content: Thought content normal.         Judgment: Judgment normal.       Significant Labs:   CBC:   Recent Labs   Lab 09/30/22  0402 10/01/22  0326   WBC 2.34* 1.59*   HGB 6.7* 7.4*   HCT 19.7* 21.6*   PLT 65* 37*      and CMP:   Recent Labs   Lab 09/30/22  0402 10/01/22  0326   * 135*   K 3.6 3.6    104   CO2 20* 24   * 101   BUN 12 8   CREATININE 0.6 0.5   CALCIUM 8.6* 8.4*   PROT 6.0 5.3*   ALBUMIN 3.3* 3.1*   BILITOT 0.4 0.6   ALKPHOS 64 62   AST 10 10   ALT 14 10   ANIONGAP 9 7*         Diagnostic Results:  I have reviewed all pertinent imaging results/findings within the past 24 hours.      Significant Diagnostic Studies: Labs: All labs within the past 24 hours have been reviewed    Pending Diagnostic Studies:     Procedure Component Value Units Date/Time    COVID-19 Routine Screening [[id number]] Collected: 09/25/22 1209    Order Status: Sent  Lab Status: In process Updated: 09/25/22 1209    Specimen: Nasopharyngeal     Freeze and Hold, [facility] [[id number]] Collected: 09/19/22 1245    Order Status: Sent Lab Status: No result     Specimen: CSF (Spinal Fluid) from Cerebrospinal Fluid         Final Active Diagnoses:    Diagnosis Date Noted POA    PRINCIPAL PROBLEM:  B-cell acute lymphoblastic leukemia (ALL) in relapse [C91.02] 11/24/2021 Yes    Orthostatic hypotension [I95.1] 09/30/2022 Yes    Physical debility [R53.81] 09/29/2022 Yes    Chemotherapy-induced nausea [R11.0, T45.1X5A] 09/29/2022 Yes    SAH (subarachnoid hemorrhage) [I60.9] 09/17/2022 No    Vision loss of left eye [H54.62] 09/16/2022 Yes    Fever of unknown origin [R50.9] 08/27/2022 Yes    Thrombocytopenia [D69.6] 05/13/2022 Yes    Urinary retention [R33.9] 05/13/2022 Yes    Hypokalemia [E87.6] 03/17/2022 Yes    Anemia associated with chemotherapy [D64.81, T45.1X5A] 02/21/2022 Yes    Hypophosphatemia [E83.39] 02/17/2022 Yes    Seizure disorder [G40.909] 11/24/2021 Yes    Hypertension [I10] 11/24/2021 Yes    Anxiety [F41.9] 11/24/2021 Yes    Type 2 diabetes mellitus, without long-term current use of insulin [E11.9] 11/24/2021 Yes    Pacemaker [Z95.0] 03/02/2018 Yes    Paroxysmal atrial fibrillation [I48.0] 12/22/2017 Yes    Hyperlipidemia [E78.5] 12/22/2017 Yes    Rheumatoid arthritis involving multiple sites [M06.9] 12/22/2017 Yes      Problems Resolved During this Admission:    Diagnosis Date Noted Date Resolved POA    Monocular vision loss [H54.60] 09/18/2022 09/29/2022 Yes      Discharged Condition: good    Disposition:     Follow Up:   Follow-up Information     [facility] [address] Follow up.    Specialty: Urology  Contact information:  Dave Yepez  [facility] [address] [phone]  Additional information:  Main Building, 4th Floor   Please park in [address] and take [address] elevator                     Patient Instructions:      CBC Auto  Differential   Standing Status: Future Standing Exp. Date: 11/30/23     Comprehensive Metabolic Panel   Standing Status: Future Standing Exp. Date: 11/29/23     Ambulatory referral/consult to Physical/Occupational Therapy   Standing Status: Future   Referral Priority: Urgent Referral Type: Physical Medicine   Referral Reason: Specialty Services Required   Number of Visits Requested: 1     Ambulatory referral/consult to Urology   Standing Status: Future   Referral Priority: Routine Referral Type: Consultation   Referral Reason: Specialty Services Required   Requested Specialty: Urology   Number of Visits Requested: 1     Type & Screen   Standing Status: Future Standing Exp. Date: 11/30/23     Medications:  Reconciled Home Medications:      Medication List      START taking these medications    butalbital-acetaminophen-caffeine -40 mg -40 mg per tablet  Commonly known as: FIORICET, ESGIC  Take 1 tablet by mouth every 6 (six) hours as needed for Headaches.     dexAMETHasone 2 MG tablet  Commonly known as: DECADRON  Take 1 tablet (2 mg total) by mouth 2 (two) times daily with meals. Take 1 tablet twice daily starting tonight through Tues Oct 4th, then 1 tablet once daily starting Wed Oct 5th until completed for 5 days     fluconazole 200 MG Tab  Commonly known as: DIFLUCAN  Take 2 tablets (400 mg total) by mouth once daily.     levoFLOXacin 500 MG tablet  Commonly known as: LEVAQUIN  Take 1 tablet (500 mg total) by mouth once daily.     sulfamethoxazole-trimethoprim 800-160mg 800-160 mg Tab  Commonly known as: BACTRIM DS  Take 1 tablet by mouth once daily. for 6 days        CHANGE how you take these medications    * acyclovir 400 MG tablet  Commonly known as: ZOVIRAX  Take 1 tablet (400 mg total) by mouth 2 (two) times daily.  What changed: Another medication with the same name was added. Make sure you understand how and when to take each.     * acyclovir 400 MG tablet  Commonly known as: ZOVIRAX  Take 1  tablet (400 mg total) by mouth 2 (two) times daily.  What changed: You were already taking a medication with the same name, and this prescription was added. Make sure you understand how and when to take each.     * ondansetron 8 MG Tbdl  Commonly known as: ZOFRAN-ODT  Take 1 tablet (8 mg total) by mouth every 8 (eight) hours as needed (in case of chemo induced nausea).  What changed: Another medication with the same name was added. Make sure you understand how and when to take each.     * ondansetron 8 MG Tbdl  Commonly known as: ZOFRAN-ODT  Take 1 tablet (8 mg total) by mouth every 8 (eight) hours as needed (nausea or vomiting).  What changed: You were already taking a medication with the same name, and this prescription was added. Make sure you understand how and when to take each.     rivaroxaban 20 mg Tab  Commonly known as: XARELTO  Take 1 tablet (20 mg total) by mouth daily with dinner or evening meal. ** Hold until instructed to resume by provider due to low platelet count. **  What changed: additional instructions         * This list has 4 medication(s) that are the same as other medications prescribed for you. Read the directions carefully, and ask your doctor or other care provider to review them with you.            CONTINUE taking these medications    allopurinoL 300 MG tablet  Commonly known as: ZYLOPRIM  Take 1 tablet (300 mg total) by mouth once daily.     artificial tears 0.5 % ophthalmic solution  Commonly known as: ISOPTO TEARS  Place 1 drop into both eyes 4 (four) times daily as needed.     atorvastatin 80 MG tablet  Commonly known as: LIPITOR  Take 80 mg by mouth once daily.     * blood sugar diagnostic Strp  SMARTSIG:Via Meter 1 to 2 Times Daily     * ONETOUCH VERIO TEST STRIPS Strp  Generic drug: blood sugar diagnostic  SMARTSIG:Via Meter 1 to 2 Times Daily     busPIRone 10 MG tablet  Commonly known as: BUSPAR  Take 10 mg by mouth 2 (two) times daily.     diazePAM 10 MG Tab  Commonly known as:  VALIUM  Take 10 mg by mouth 2 (two) times daily as needed.     diltiaZEM 90 MG tablet  Commonly known as: CARDIZEM  Take 1 tablet (90 mg total) by mouth every 6 (six) hours.     DUKE'S SOLUTION (BENADRYL 30 ML, MYLANTA 30 ML, LIDOCAINE 30 ML, NYSTATIN 30 ML)  Take 10 mLs by mouth 4 (four) times daily as needed (mouth pain).     FARXIGA 10 mg tablet  Generic drug: dapagliflozin  Take 10 mg by mouth once daily.     fenofibrate 160 MG Tab  Take 160 mg by mouth once daily.     gabapentin 300 MG capsule  Commonly known as: NEURONTIN  Take 1 capsule (300 mg total) by mouth 3 (three) times daily.     hydrOXYchloroQUINE 200 mg tablet  Commonly known as: PLAQUENIL  Take 1 tablet (200 mg total) by mouth once daily.     hydrOXYzine pamoate 50 MG Cap  Commonly known as: VISTARIL  Take 50 mg by mouth 2 (two) times daily as needed.     ICLUSIG 30 mg Tab  Generic drug: PONATinib  Take 1 tablet (30 mg) by mouth once daily.     LIDOcaine 5 %  Commonly known as: LIDODERM  Place 1 patch onto the skin once daily. Remove & Discard patch within 12 hours or as directed by MD     losartan 25 MG tablet  Commonly known as: COZAAR  Take 25 mg by mouth once daily.     magnesium oxide 400 mg (241.3 mg magnesium) tablet  Commonly known as: MAG-OX  Take 1 tablet by mouth once daily.     metFORMIN 500 MG ER 24hr tablet  Commonly known as: GLUCOPHAGE-XR  Take 1,000 mg by mouth 2 (two) times daily.     naloxone 4 mg/actuation Spry  Commonly known as: NARCAN  4mg by nasal route as needed for opioid overdose; may repeat every 2-3 minutes in alternating nostrils until medical help arrives. Call 911     omeprazole 40 MG capsule  Commonly known as: PRILOSEC  Take 40 mg by mouth once daily.     OXcarbazepine 600 MG Tab  Commonly known as: TRILEPTAL  Take 1,200 mg by mouth 2 (two) times daily.     polyethylene glycol 17 gram/dose powder  Commonly known as: GLYCOLAX  Dissolve one capful (17 g) in liquid and take by mouth daily as needed (constipation).      potassium chloride 20 mEq  Commonly known as: K-TAB  Take 20 mEq by mouth 2 (two) times daily.     prochlorperazine 10 MG tablet  Commonly known as: COMPAZINE  Take 1 tablet (10 mg total) by mouth every 6 (six) hours as needed for Nausea.     QUEtiapine 200 MG Tab  Commonly known as: SEROQUEL  Take 200 mg by mouth every evening.     SENNA PLUS 8.6-50 mg per tablet  Generic drug: senna-docusate 8.6-50 mg  Take 1 tablet by mouth 2 (two) times daily.     sumatriptan 50 MG tablet  Commonly known as: IMITREX  Take 1 tablet (50 mg total) by mouth daily as needed (headache).     TRINTELLIX 20 mg Tab  Generic drug: vortioxetine  Take 1 tablet by mouth once daily.     ursodioL 300 mg capsule  Commonly known as: ACTIGALL  Take 1 capsule (300 mg total) by mouth 3 (three) times daily.         * This list has 2 medication(s) that are the same as other medications prescribed for you. Read the directions carefully, and ask your doctor or other care provider to review them with you.                Nay Copeland, DO  Bone Marrow Transplant  [facility] - Oncology ([facility])

## 2022-10-01 NOTE — ASSESSMENT & PLAN NOTE
- Follows with Dr. Jenkins. 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  - Was treated with inotuzumab ozogamicin and ponatinib but course complicated by severe cytopenias. Not has been on therapy for past few weeks.  - Not a candidate for allogeneic stem cell transplant at this time.  - presented 9/16/22 with left eye vision loss and worsening vision of left eye  - Patient with pacemaker and per patient, not compatible with MRI. Would ideally obtain MRI brain w/ w/o contrast. Verified with EP who checked with rep that pacemaker is not MRI compatible.   - CTA completed with delayed venous phase revealing small SAH   - Consulted Opthalmology and ID, appreciate assistance  - full infectious workup sent; all negative thus far  - LP with IT chemo on 9/19 positive for B-cell ALL; will require twice weekly IT until clearance at minimum  - ommaya placed by NSGY on 9/26/22; can use ommaya on POD #5 (10/1)  - will start twice weekly IT chemo with ommaya on 10/3  - continue dexamethasone, weaning through 10/7. Bactrim while on steroids  - will restart ponatinib at home  - Ppx with levofloxacin, acyclovir, and fluconazole

## 2022-10-01 NOTE — DISCHARGE INSTRUCTIONS
-- Resume ponatinib tomorrow  -- Follow up appts Monday as scheduled  -- Follow up with urology in 1-2 weeks. Referral placed. They should call you to scheduled. Alternatively, their number is [phone]  -- Home health is being set up to assist with catheter care and other needs

## 2022-10-01 NOTE — NURSING
1630- bladder scanned patient. 235mL in bladder. Placed patient on BSC- unable to urinate.       1840- bladder scanned patient 531ml noted. Placed patient on BSC, unable to urinate. Called BMT resident- orders for wolf to be inserted were placed.   Passed information to oncoming night shift nurse.

## 2022-10-01 NOTE — ASSESSMENT & PLAN NOTE
- On 9/28, Tmax 102.8F. blood cultures NGTD. UC negative. Afebrile last 48hrs so cefepime stopped on 9/30  - Resolved

## 2022-10-01 NOTE — ASSESSMENT & PLAN NOTE
Diabetes type 2  Last HbA1c:   Lab Results   Component Value Date    HGBA1C 4.4 04/13/2022       PLAN:   - Hold oral anti-hyperglycemic agents  - worsened by IV steroids  - Detemir 5U, Aspart 2U TID WM and SSI inpatient. Discharge on home oral anti-diabetic agents  - Diabetic diet  - Hypoglycemia orderset with POCT BG AC/QS  - Goal -180 while inpatient

## 2022-10-01 NOTE — PLAN OF CARE
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]  Yes    Orthostatic hypotension [I95.1]  Yes    Physical debility [R53.81]  Yes    Chemotherapy-induced nausea [R11.0, T45.1X5A]  Yes    SAH (subarachnoid hemorrhage) [I60.9]  No    Vision loss of left eye [H54.62]  Yes    Fever of unknown origin [R50.9]  Yes    Thrombocytopenia [D69.6]  Yes    Urinary retention [R33.9]  Yes    Hypokalemia [E87.6]  Yes    Anemia associated with chemotherapy [D64.81, T45.1X5A]  Yes    Hypophosphatemia [E83.39]  Yes    Seizure disorder [G40.909]  Yes    Hypertension [I10]  Yes    Anxiety [F41.9]  Yes     Long-term generalized anxiety disorder  Potential PTSD (not comprehensively assessed)      Type 2 diabetes mellitus, without long-term current use of insulin [E11.9]  Yes    Pacemaker [Z95.0]  Yes    Paroxysmal atrial fibrillation [I48.0]  Yes    Hyperlipidemia [E78.5]  Yes    Rheumatoid arthritis involving multiple sites [M06.9]  Yes     Hx of seronegative RA. Previously on Adalimumab / HCQ / Leflunomab.        Resolved Hospital Problems    Diagnosis Date Resolved POA    Monocular vision loss [H54.60] 09/29/2022 Yes       Follow Up Appointments:  Future Appointments   Date Time Provider Department Center   10/3/2022  2:00 PM [facility]   10/3/2022  3:00 PM CHEMO 1 [facility]   10/4/2022  2:00 PM Nithya Luna MD [facility]   10/11/2022  7:40 AM [facility]MT [facility]   10/11/2022  8:40 AM Dayron Jenkins MD [facility] HC BMT [facility]   10/17/2022  1:00 PM [facility]MT [facility]   10/17/2022  2:00 PM  NURSE 11, [facility]MO [facility]   10/20/2022  3:00 PM NURSE 6, [facility]   10/21/2022  9:50 AM [facility] LAB BMT [facility]   10/21/2022 11:00 AM Dayron Jenkins MD [facility] HC BMT [facility]   10/24/2022  2:00 PM [facility] LAB BMT [facility]   10/24/2022  3:00 PM NURSE 10, [facility] CHEMO [facility] CHE[facility]   10/27/2022  3:00 PM CHEMO 1 [facility]   11/23/2022  2:00 PM Lissette You MD [facility] NEURO [facility]       Allergies:  Review of patient's allergies indicates:   Allergen Reactions    Levetiracetam      Other reaction(s): Unknown  Other reaction(s): Unknown  Other reaction(s): Unknown       Medications: Review discharge medications with patient and family and provide education.    Current Facility-Administered Medications   Medication Dose Route Frequency Provider Last Rate Last Admin    0.9%  NaCl infusion (for blood administration)   Intravenous Q24H PRN Nay Copeland DO        0.9%  NaCl infusion   Intravenous Continuous Shanta Love NP 75 mL/hr at 10/01/22 0853 New Bag at 10/01/22 0853    acetaminophen tablet 650 mg  650 mg Oral Q6H PRN Fabiano Brandt MD   650 mg at 09/28/22 0808    acyclovir capsule 400 mg  400 mg Oral BID Nayeli Devine MD   400 mg at 10/01/22 0932    allopurinoL tablet 300 mg  300 mg Oral Daily Nayeli Devine MD   300 mg at 10/01/22 1000    atorvastatin tablet 80 mg  80 mg Oral Daily Nayeli Devine MD   80 mg at 09/30/22 1122    atovaquone 750 mg/5 mL oral liquid 1,500 mg  1,500 mg Oral Daily Felisha Goodwin DO   1,500 mg at 10/01/22 1028    busPIRone tablet 10 mg  10 mg Oral BID Nayeli Devine MD   10 mg at 10/01/22 0933    butalbital-acetaminophen-caffeine -40 mg per tablet 1 tablet  1 tablet Oral Q6H PRN Pipe Hager MD   1 tablet at 09/30/22 1651    dexAMETHasone tablet 4 mg  4 mg Oral Q12H Shanta Love NP   4 mg at 10/01/22 0930    dextrose 10% bolus 125 mL  12.5 g Intravenous PRN Pipe Hager MD         dextrose 10% bolus 250 mL  25 g Intravenous PRN Pipe Hager MD        diazePAM tablet 10 mg  10 mg Oral BID PRN Nayeli Devine MD   10 mg at 09/19/22 1039    diltiaZEM tablet 30 mg  30 mg Oral 4 times per day Pipe Hager MD   30 mg at 10/01/22 0529    duke's soln (benadryl 30 mL, mylanta 30 mL, LIDOcaine 30 mL, nystatin 30 mL) 120 mL  10 mL Oral QID PRN Nayeli Devine MD        fenofibrate tablet 160 mg  160 mg Oral Daily Nayeli Devine MD   160 mg at 09/30/22 1122    gabapentin capsule 300 mg  300 mg Oral TID Nayeli Devine MD   300 mg at 10/01/22 0320    glucagon (human recombinant) injection 1 mg  1 mg Intramuscular PRN Pipe Hager MD        glucose chewable tablet 16 g  16 g Oral PRN Pipe Hager MD        glucose chewable tablet 24 g  24 g Oral PRN Pipe Hager MD        HYDROmorphone tablet 4 mg  4 mg Oral Q3H PRN Pipe aHger MD   4 mg at 09/27/22 2158    hydrOXYchloroQUINE tablet 200 mg  200 mg Oral Daily Nayeli Devine MD   200 mg at 10/01/22 0933    hydrOXYzine pamoate capsule 50 mg  50 mg Oral Q24H Pipe Hager MD   50 mg at 09/30/22 2227    insulin aspart U-100 pen 1-10 Units  1-10 Units Subcutaneous QID (AC + HS) PRN Pipe Hager MD   2 Units at 09/30/22 1756    insulin aspart U-100 pen 2 Units  2 Units Subcutaneous TIDWM Pipe Hager MD   2 Units at 09/30/22 1756    insulin detemir U-100 pen 5 Units  5 Units Subcutaneous QHS Pipe Hager MD   5 Units at 09/30/22 2047    lactulose 20 gram/30 mL solution Soln 20 g  20 g Oral TID PRN Piep Hager MD        magnesium oxide tablet 400 mg  400 mg Oral Q4H PRN Carolyn Mchugh NP        magnesium oxide tablet 400 mg  400 mg Oral Q4H PRN Carolyn Mchugh NP        magnesium oxide tablet 800 mg  800 mg Oral Q4H PRN Carolyn Mchugh NP        naloxone 0.4 mg/mL injection 0.02 mg  0.02 mg Intravenous PRN Nayeli Devine MD        ondansetron injection 4 mg  4 mg Intravenous Q6H PRN Andres Gonzalez MD   4 mg at 09/29/22 0417    ondansetron injection 8 mg  8 mg Intravenous  Q8H Shanta Love NP   8 mg at 10/01/22 0529    OXcarbazepine tablet 1,200 mg  1,200 mg Oral QHS Pipe Hager MD   1,200 mg at 09/30/22 2227    OXcarbazepine tablet 1,200 mg  1,200 mg Oral with lunch Pipe Hager MD   1,200 mg at 10/01/22 0934    pantoprazole EC tablet 40 mg  40 mg Oral Daily Pipe Hager MD   40 mg at 10/01/22 0933    [START ON 10/2/2022] PONATinib Tab 30 mg  30 mg Oral Daily Nay G Penfold, DO        potassium chloride SA CR tablet 20 mEq  20 mEq Oral PRN Carolyn Mchugh NP   20 mEq at 09/30/22 0600    potassium chloride SA CR tablet 20 mEq  20 mEq Oral Q2H PRN Carolyn Mchugh NP        potassium chloride SA CR tablet 20 mEq  20 mEq Oral Q2H PRN Carolyn Mchugh NP   20 mEq at 09/30/22 0542    potassium, sodium phosphates 280-160-250 mg packet 1 packet  1 packet Oral Q4H PRN Carolyn Mchugh NP        potassium, sodium phosphates 280-160-250 mg packet 2 packet  2 packet Oral Q4H PRN Carolyn Mchugh NP        prochlorperazine injection Soln 2.5 mg  2.5 mg Intravenous Q4H PRN Mary Kate Piedra MD   2.5 mg at 09/30/22 1651    promethazine (PHENERGAN) 6.25 mg in dextrose 5 % 50 mL IVPB  6.25 mg Intravenous Q6H PRN Mary Kate Piedra MD   Stopped at 09/29/22 2025    QUEtiapine tablet 200 mg  200 mg Oral QHS Nayeli Devine MD   200 mg at 09/30/22 2227    sodium chloride 0.9% flush 10 mL  10 mL Intravenous Q12H PRN Nayeli Devine MD        sumatriptan tablet 50 mg  50 mg Oral Daily PRN Pipe Hager MD   50 mg at 09/30/22 1122    vortioxetine Tab 20 mg  20 mg Oral Q24H Carolyn Mchugh NP   20 mg at 10/01/22 0935    white petrolatum 41 % ointment   Topical (Top) PRN Nayeli Devine MD         Current Discharge Medication List        START taking these medications    Details   !! acyclovir (ZOVIRAX) 400 MG tablet Take 1 tablet (400 mg total) by mouth 2 (two) times daily.  Qty: 60 tablet, Refills: 0      atovaquone (MEPRON) 750 mg/5 mL Susp oral liquid Take 10 mLs (1,500 mg total) by mouth once  daily.  Qty: 210 mL, Refills: 0      butalbital-acetaminophen-caffeine -40 mg (FIORICET, ESGIC) -40 mg per tablet Take 1 tablet by mouth every 6 (six) hours as needed for Headaches.  Qty: 30 tablet, Refills: 0      dexAMETHasone (DECADRON) 2 MG tablet Take 1 tablet (2 mg total) by mouth 2 (two) times daily with meals. Take 1 tablet twice daily starting tonight through Tues Oct 4th, then 1 tablet once daily starting Wed Oct 5th until completed for 5 days  Qty: 10 tablet, Refills: 0      fluconazole (DIFLUCAN) 200 MG Tab Take 2 tablets (400 mg total) by mouth once daily.  Qty: 60 tablet, Refills: 0      levoFLOXacin (LEVAQUIN) 500 MG tablet Take 1 tablet (500 mg total) by mouth once daily.  Qty: 30 tablet, Refills: 0      !! ondansetron (ZOFRAN-ODT) 8 MG TbDL Take 1 tablet (8 mg total) by mouth every 8 (eight) hours as needed (nausea or vomiting).  Qty: 60 tablet, Refills: 0       !! - Potential duplicate medications found. Please discuss with provider.        CONTINUE these medications which have CHANGED    Details   rivaroxaban (XARELTO) 20 mg Tab Take 1 tablet (20 mg total) by mouth daily with dinner or evening meal. ** Hold until instructed to resume by provider due to low platelet count. **    Associated Diagnoses: B-cell acute lymphoblastic leukemia           CONTINUE these medications which have NOT CHANGED    Details   !! acyclovir (ZOVIRAX) 400 MG tablet Take 1 tablet (400 mg total) by mouth 2 (two) times daily.  Qty: 60 tablet, Refills: 11      allopurinoL (ZYLOPRIM) 300 MG tablet Take 1 tablet (300 mg total) by mouth once daily.  Qty: 90 tablet, Refills: 3      artificial tears (ISOPTO TEARS) 0.5 % ophthalmic solution Place 1 drop into both eyes 4 (four) times daily as needed.      atorvastatin (LIPITOR) 80 MG tablet Take 80 mg by mouth once daily.      busPIRone (BUSPAR) 10 MG tablet Take 10 mg by mouth 2 (two) times daily.      dapagliflozin (FARXIGA) 10 mg tablet Take 10 mg by mouth once  daily.      diazePAM (VALIUM) 10 MG Tab Take 10 mg by mouth 2 (two) times daily as needed.      diltiaZEM (CARDIZEM) 90 MG tablet Take 1 tablet (90 mg total) by mouth every 6 (six) hours.  Qty: 120 tablet, Refills: 11      gabapentin (NEURONTIN) 300 MG capsule Take 1 capsule (300 mg total) by mouth 3 (three) times daily.  Qty: 90 capsule, Refills: 11      losartan (COZAAR) 25 MG tablet Take 25 mg by mouth once daily.      metFORMIN (GLUCOPHAGE-XR) 500 MG ER 24hr tablet Take 1,000 mg by mouth 2 (two) times daily.      omeprazole (PRILOSEC) 40 MG capsule Take 40 mg by mouth once daily.      !! ondansetron (ZOFRAN-ODT) 8 MG TbDL Take 1 tablet (8 mg total) by mouth every 8 (eight) hours as needed (in case of chemo induced nausea).  Qty: 30 tablet, Refills: 1    Associated Diagnoses: B-cell acute lymphoblastic leukemia; Nausea      OXcarbazepine (TRILEPTAL) 600 MG Tab Take 1,200 mg by mouth 2 (two) times daily.      PONATinib (ICLUSIG) 30 mg Tab Take 1 tablet (30 mg) by mouth once daily.  Qty: 30 tablet, Refills: 0    Associated Diagnoses: B-cell acute lymphoblastic leukemia      potassium chloride (K-TAB) 20 mEq Take 20 mEq by mouth 2 (two) times daily.      prochlorperazine (COMPAZINE) 10 MG tablet Take 1 tablet (10 mg total) by mouth every 6 (six) hours as needed for Nausea.  Qty: 30 tablet, Refills: 2    Associated Diagnoses: B-cell acute lymphoblastic leukemia      QUEtiapine (SEROQUEL) 200 MG Tab Take 200 mg by mouth every evening.      senna-docusate 8.6-50 mg (PERICOLACE) 8.6-50 mg per tablet Take 1 tablet by mouth 2 (two) times daily.  Qty: 60 tablet, Refills: 3    Associated Diagnoses: B-cell acute lymphoblastic leukemia (ALL)      sumatriptan (IMITREX) 50 MG tablet Take 1 tablet (50 mg total) by mouth daily as needed (headache).  Qty: 30 tablet, Refills: 2    Associated Diagnoses: Intractable episodic headache, unspecified headache type      TRINTELLIX 20 mg Tab Take 1 tablet by mouth once daily.      ursodioL  (ACTIGALL) 300 mg capsule Take 1 capsule (300 mg total) by mouth 3 (three) times daily.  Qty: 90 capsule, Refills: 11    Associated Diagnoses: B-cell acute lymphoblastic leukemia      !! blood sugar diagnostic Strp SMARTSIG:Via Meter 1 to 2 Times Daily      duke's soln (benadryl 30 mL, mylanta 30 mL, LIDOcaine 30 mL, nystatin 30 mL) 120mL Take 10 mLs by mouth 4 (four) times daily as needed (mouth pain).  Qty: 120 mL, Refills: 0      fenofibrate 160 MG Tab Take 160 mg by mouth once daily.      hydrOXYchloroQUINE (PLAQUENIL) 200 mg tablet Take 1 tablet (200 mg total) by mouth once daily.      hydrOXYzine pamoate (VISTARIL) 50 MG Cap Take 50 mg by mouth 2 (two) times daily as needed.      LIDOcaine (LIDODERM) 5 % Place 1 patch onto the skin once daily. Remove & Discard patch within 12 hours or as directed by MD  Qty: 30 patch, Refills: 2      magnesium oxide (MAG-OX) 400 mg (241.3 mg magnesium) tablet Take 1 tablet by mouth once daily.      naloxone (NARCAN) 4 mg/actuation Spry 4mg by nasal route as needed for opioid overdose; may repeat every 2-3 minutes in alternating nostrils until medical help arrives. Call 911  Qty: 1 each, Refills: 11      !! ONETOUCH VERIO TEST STRIPS Strp SMARTSIG:Via Meter 1 to 2 Times Daily      polyethylene glycol (GLYCOLAX) 17 gram/dose powder Dissolve one capful (17 g) in liquid and take by mouth daily as needed (constipation).  Qty: 510 g, Refills: 0       !! - Potential duplicate medications found. Please discuss with provider.            I have seen and examined this patient within the last 30 days. My clinical findings that support the need for the home health skilled services and home bound status are the following:no   Weakness/numbness causing balance and gait disturbance due to Weakness/Debility making it taxing to leave home.     Diet:   diabetic diet 2000 calorie    Labs:  N/a    Referrals/ Consults  Physical Therapy to evaluate and treat. Evaluate for home safety and equipment  needs; Establish/upgrade home exercise program. Perform / instruct on therapeutic exercises, gait training, transfer training, and Range of Motion.  Occupational Therapy to evaluate and treat. Evaluate home environment for safety and equipment needs. Perform/Instruct on transfers, ADL training, ROM, and therapeutic exercises.  Aide to provide assistance with personal care, ADLs, and vital signs.    Activities:   activity as tolerated    Nursing:   Agency to admit patient within 24 hours of hospital discharge unless specified on physician order or at patient request    SN to complete comprehensive assessment including routine vital signs. Instruct on disease process and s/s of complications to report to MD. Review/verify medication list sent home with the patient at time of discharge  and instruct patient/caregiver as needed. Frequency may be adjusted depending on start of care date.     Skilled nurse to perform up to 3 visits PRN for symptoms related to diagnosis    Notify MD if SBP > 160 or < 90; DBP > 90 or < 50; HR > 120 or < 50; Temp > 101; O2 < 88%; Other:       Ok to schedule additional visits based on staff availability and patient request on consecutive days within the home health episode.    When multiple disciplines ordered:    Start of Care occurs on Sunday - Wednesday schedule remaining discipline evaluations as ordered on separate consecutive days following the start of care.    Thursday SOC -schedule subsequent evaluations Friday and Monday the following week.     Friday - Saturday SOC - schedule subsequent discipline evaluations on consecutive days starting Monday of the following week.      Miscellaneous   Green Care: Instruct patient/caregiver to empty Green bag.  Change Green every month.  Routine Skin for Bedridden Patients: Instruct patient/caregiver to apply moisture barrier cream to all skin folds and wet areas in perineal area daily and after baths and all bowel movements.    Home Health  Aide:  Nursing Three times weekly, Physical Therapy Three times weekly, Occupational Therapy Three times weekly, and Home Health Aide Three times weekly    Wound Care Orders  no    I certify that this patient is confined to her home and needs intermittent skilled nursing care, physical therapy, and occupational therapy.

## 2022-10-01 NOTE — PROGRESS NOTES
SW received call from bedside RN. Patient will need HH for catheter care.    SW spoke to patient's  Mannie who had no preference agencies.     SW routed order, progress note, PT note and H&P to [facility], phone: [phone], fax:[phone]. On-call contact is Imelda.     SW request call once paperwork is reviewed. SW notified BMT team that referral is in process but HH is not a barrier to discharge and that patient is good to d/c from SW perspective.

## 2022-10-01 NOTE — ASSESSMENT & PLAN NOTE
- Chronic wolf prior to admission  - Attempt voiding trial, unsuccessful  - Continue wolf on discharge.  for wolf care and referral to urology

## 2022-10-01 NOTE — ASSESSMENT & PLAN NOTE
- PT/OT consulted; recommending rehab however not feasible as patient will need twice weekly IT chemotherapy  -  PT/OT

## 2022-10-01 NOTE — ASSESSMENT & PLAN NOTE
- presented with acute left eye vision loss associated with pain. 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

## 2022-10-01 NOTE — SUBJECTIVE & OBJECTIVE
Subjective:     Interval History: Afebrile. Plt 37. Transfuse 1 unit today. 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 . Discharge home on ppx (see med rec) with f/u planned for Monday. Referral to urology for urinary retention.  for wolf care at home.    Objective:     Vital Signs (Most Recent):  Temp: 98.4 °F (36.9 °C) (10/01/22 1100)  Pulse: 78 (10/01/22 1100)  Resp: 18 (10/01/22 1100)  BP: 120/70 (10/01/22 1100)  SpO2: 99 % (10/01/22 1100) Vital Signs (24h Range):  Temp:  [98.3 °F (36.8 °C)-99.9 °F (37.7 °C)] 98.4 °F (36.9 °C)  Pulse:  [] 78  Resp:  [16-20] 18  SpO2:  [97 %-99 %] 99 %  BP: (118-152)/(60-74) 120/70     Weight: 59.1 kg (130 lb 4.7 oz)  Body mass index is 22.36 kg/m².  Body surface area is 1.63 meters squared.      Intake/Output - Last 3 Shifts         09/29 0700  09/30 0659 09/30 0700  10/01 0659 10/01 0700  10/02 0659    P.O. 450 430 200    I.V. (mL/kg)       Blood 280 350 220    IV Piggyback       Total Intake(mL/kg) 730 (12.4) 780 (13.2) 420 (7.1)    Urine (mL/kg/hr) 2325 (1.6) 2075 (1.5) 875 (2.5)    Emesis/NG output       Other   0    Stool   0    Total Output 2325 2075 875    Net -1594 -1295 -573           Stool Occurrence   2 x            Physical Exam  Vitals and nursing note reviewed.   Constitutional:       General: She is not in acute distress.     Appearance: She is well-developed.   HENT:      Head: Normocephalic and atraumatic.      Comments: Ommaya in place      Nose: Nose normal. No congestion.   Eyes:      General: No scleral icterus.     Extraocular Movements: Extraocular movements intact.      Comments: Left eye pupil not reactive. Left eye peripheral vision not intact. Reports eye pain with movement improved.    Cardiovascular:      Rate and Rhythm: Normal rate and regular rhythm.      Pulses: Normal pulses.      Heart sounds: No murmur heard.  Pulmonary:      Effort:  Pulmonary effort is normal. No respiratory distress.   Abdominal:      General: There is no distension.      Palpations: Abdomen is soft.      Tenderness: There is no abdominal tenderness.   Genitourinary:     Comments: Green in place  Musculoskeletal:         General: Normal range of motion.      Cervical back: Normal range of motion and neck supple.      Right lower leg: No edema.      Left lower leg: No edema.   Skin:     General: Skin is warm and dry.      Findings: No bruising, erythema or rash.      Comments: Port intact with no redness or drainage   Neurological:      General: No focal deficit present.      Mental Status: She is alert and oriented to person, place, and time.      Motor: Weakness present.   Psychiatric:         Mood and Affect: Mood normal.         Behavior: Behavior normal.         Thought Content: Thought content normal.         Judgment: Judgment normal.       Significant Labs:   CBC:   Recent Labs   Lab 09/30/22 0402 10/01/22  0326   WBC 2.34* 1.59*   HGB 6.7* 7.4*   HCT 19.7* 21.6*   PLT 65* 37*      and CMP:   Recent Labs   Lab 09/30/22 0402 10/01/22  0326   * 135*   K 3.6 3.6    104   CO2 20* 24   * 101   BUN 12 8   CREATININE 0.6 0.5   CALCIUM 8.6* 8.4*   PROT 6.0 5.3*   ALBUMIN 3.3* 3.1*   BILITOT 0.4 0.6   ALKPHOS 64 62   AST 10 10   ALT 14 10   ANIONGAP 9 7*         Diagnostic Results:  I have reviewed all pertinent imaging results/findings within the past 24 hours.

## 2022-10-03 ENCOUNTER — DOCUMENTATION ONLY (OUTPATIENT)
Dept: HEMATOLOGY/ONCOLOGY | Facility: CLINIC | Age: 52
End: 2022-10-03
Payer: COMMERCIAL

## 2022-10-03 ENCOUNTER — INFUSION (OUTPATIENT)
Dept: INFUSION THERAPY | Facility: HOSPITAL | Age: 52
End: 2022-10-03
Attending: INTERNAL MEDICINE
Payer: COMMERCIAL

## 2022-10-03 ENCOUNTER — TELEPHONE (OUTPATIENT)
Dept: PALLIATIVE MEDICINE | Facility: CLINIC | Age: 52
End: 2022-10-03
Payer: COMMERCIAL

## 2022-10-03 VITALS
DIASTOLIC BLOOD PRESSURE: 68 MMHG | RESPIRATION RATE: 19 BRPM | HEART RATE: 125 BPM | SYSTOLIC BLOOD PRESSURE: 102 MMHG | TEMPERATURE: 99 F | OXYGEN SATURATION: 100 %

## 2022-10-03 DIAGNOSIS — C91.02 B-CELL ACUTE LYMPHOBLASTIC LEUKEMIA (ALL) IN RELAPSE: ICD-10-CM

## 2022-10-03 DIAGNOSIS — C91.00 B-CELL ACUTE LYMPHOBLASTIC LEUKEMIA: Primary | ICD-10-CM

## 2022-10-03 LAB
BACTERIA BLD CULT: NORMAL
BACTERIA BLD CULT: NORMAL
CLARITY CSF: CLEAR
COLOR CSF: COLORLESS
LYMPHOCYTES NFR CSF MANUAL: 62 % (ref 40–80)
MONOS+MACROS NFR CSF MANUAL: 15 % (ref 15–45)
NEUTROPHILS NFR CSF MANUAL: 8 % (ref 0–6)
OTHER CELLS CSF: 15 %
RBC # CSF: 92 /CU MM
SPECIMEN VOL CSF: 5.3 ML
WBC # CSF: 1 /CU MM (ref 0–5)

## 2022-10-03 PROCEDURE — 88189 FLOWCYTOMETRY/READ 16 & >: CPT | Mod: ,,, | Performed by: PATHOLOGY

## 2022-10-03 PROCEDURE — 25000003 PHARM REV CODE 250: Performed by: NURSE PRACTITIONER

## 2022-10-03 PROCEDURE — 88108 CYTOPATH CONCENTRATE TECH: CPT | Mod: 26,,, | Performed by: STUDENT IN AN ORGANIZED HEALTH CARE EDUCATION/TRAINING PROGRAM

## 2022-10-03 PROCEDURE — 88185 FLOWCYTOMETRY/TC ADD-ON: CPT | Mod: 59 | Performed by: PATHOLOGY

## 2022-10-03 PROCEDURE — 88108 PR  CYTOPATH FLUIDS,CONCENTRATN,INTERP: ICD-10-PCS | Mod: 26,,, | Performed by: STUDENT IN AN ORGANIZED HEALTH CARE EDUCATION/TRAINING PROGRAM

## 2022-10-03 PROCEDURE — 88189 PR  FLOWCYTOMETRY/READ, 16 & > MARKERS: ICD-10-PCS | Mod: ,,, | Performed by: PATHOLOGY

## 2022-10-03 PROCEDURE — 96542 PR CHEMOTHER INJECT,SUBARACH/INTRAVENT: ICD-10-PCS | Mod: ,,, | Performed by: NURSE PRACTITIONER

## 2022-10-03 PROCEDURE — 89051 BODY FLUID CELL COUNT: CPT | Performed by: INTERNAL MEDICINE

## 2022-10-03 PROCEDURE — 96542 CHEMOTHERAPY INJECTION: CPT | Mod: ,,, | Performed by: NURSE PRACTITIONER

## 2022-10-03 PROCEDURE — 63600175 PHARM REV CODE 636 W HCPCS: Performed by: NURSE PRACTITIONER

## 2022-10-03 PROCEDURE — 88184 FLOWCYTOMETRY/ TC 1 MARKER: CPT | Performed by: PATHOLOGY

## 2022-10-03 PROCEDURE — 96542 CHEMOTHERAPY INJECTION: CPT

## 2022-10-03 PROCEDURE — 88108 CYTOPATH CONCENTRATE TECH: CPT | Mod: 59 | Performed by: STUDENT IN AN ORGANIZED HEALTH CARE EDUCATION/TRAINING PROGRAM

## 2022-10-03 RX ORDER — ONDANSETRON 4 MG/1
8 TABLET, ORALLY DISINTEGRATING ORAL
Status: COMPLETED | OUTPATIENT
Start: 2022-10-03 | End: 2022-10-03

## 2022-10-03 RX ADMIN — ONDANSETRON 8 MG: 4 TABLET, ORALLY DISINTEGRATING ORAL at 04:10

## 2022-10-03 RX ADMIN — CYTARABINE 15 MG: 20 INJECTION, SOLUTION INTRATHECAL; INTRAVENOUS; SUBCUTANEOUS at 04:10

## 2022-10-03 NOTE — PROGRESS NOTES
Cytarabine 30 mg, hydrocortisone 30 mg, and MTX 15 mg given intrathecally via ommaya. Patient experienced nausea post administration, and zofran OTC ordered.    Mirtha Antonio, SKIPP  Hematology/Oncology/Bone Marrow Transplant.

## 2022-10-03 NOTE — PLAN OF CARE
"Pt to clinic via [facility] with sig other for Onymata MTX infusion. Pt appears tired and weak. Full assist to chair per sig other. BRANDYN Siddiqi NP at chairside to perform injection. Pt tolerated fairly well. + nausea and "dry heaves" noted. Zofran given as ordered and ice pack provided. Pt rested for 15 minutes post injection. Then was able to leave. From clinic in NAD.   "

## 2022-10-03 NOTE — PROGRESS NOTES
On call MARKELL received a call back from [facility] requesting patient SSN, MARKELL provided. Shortly after SW received another call back. Because patient has [employer] [place] she will need a prior auth to start HH.    MARKELL called [facility] to learn of process. MARKELL then went on [employer] to complete but MD signature is needed. MAKRELL notified and provided instructions to Dr. Copeland who will attempt to complete, if not she will receive assistance in completing on Monday.    MARKELL notified Sha Tran LCSW via teams message and request he follow up.

## 2022-10-03 NOTE — PROGRESS NOTES
called, [facility] at, phone: [phone]. Patient was accepted and scheduled for home health and PT services on 10/03/2022.  No other needs noted at this time.

## 2022-10-04 ENCOUNTER — TELEPHONE (OUTPATIENT)
Dept: HEMATOLOGY/ONCOLOGY | Facility: CLINIC | Age: 52
End: 2022-10-04
Payer: COMMERCIAL

## 2022-10-04 ENCOUNTER — PATIENT MESSAGE (OUTPATIENT)
Dept: PALLIATIVE MEDICINE | Facility: CLINIC | Age: 52
End: 2022-10-04
Payer: COMMERCIAL

## 2022-10-04 LAB
FLOW CYTOMETRY ANTIBODIES ANALYZED - CSF: NORMAL
FLOW CYTOMETRY COMMENT - CSF: NORMAL
FLOW CYTOMETRY INTERPRETATION - CSF: NORMAL
PATH INTERP FLD-IMP: NORMAL

## 2022-10-04 NOTE — TELEPHONE ENCOUNTER
"----- Message from Milli Flores sent at 10/4/2022  2:24 PM CDT -----  Consult/Advisory:           Name Of Caller:  Mannie-     Contact Preference?: [phone]    What is the nature of the call?: requesting a call back in regards to possible side effect. States that pt says skin feels like it's burning and hurts to touch           Additional Notes:  "Thank you for all that you do for our patients'"     "

## 2022-10-04 NOTE — TELEPHONE ENCOUNTER
"Pt's  notes pt is c/o skin feels like "it is on fire" specifically to chest, back, neck, and arm. Pt's  denies the area is erythematous.  denies any lesions. Pt's  states pt denies CP and SOB.  reports pt has dry, flaky skin but reports this is ongoing.    Discussed with GENIA Sharma who will call pt.   "

## 2022-10-05 ENCOUNTER — TELEPHONE (OUTPATIENT)
Dept: HEMATOLOGY/ONCOLOGY | Facility: CLINIC | Age: 52
End: 2022-10-05
Payer: COMMERCIAL

## 2022-10-05 DIAGNOSIS — C91.00 B-CELL ACUTE LYMPHOBLASTIC LEUKEMIA: ICD-10-CM

## 2022-10-05 DIAGNOSIS — L29.9 ITCHING: Primary | ICD-10-CM

## 2022-10-05 RX ORDER — CETIRIZINE HYDROCHLORIDE 10 MG/1
10 TABLET ORAL DAILY
Qty: 60 TABLET | Refills: 3 | Status: ON HOLD | OUTPATIENT
Start: 2022-10-05 | End: 2022-10-14 | Stop reason: HOSPADM

## 2022-10-05 NOTE — TELEPHONE ENCOUNTER
"----- Message from Milli Flores sent at 10/5/2022 11:30 AM CDT -----  Consult/Advisory:           Name Of Caller: Mallory Martishahriar    Contact Preference?: [phone]    What is the nature of the call?: requesting a call back in regards to orders for home health           Additional Notes:  "Thank you for all that you do for our patients'"     "

## 2022-10-05 NOTE — TELEPHONE ENCOUNTER
Returned patient/husbands call on 10/4 regarding burning/itching skin of chest/neck/back/upper extremities. No visibile rash. Not dermatomal. Constant, started while in hospital. No fevers/chills nor other symptoms.     Pt continuing scheduled hydroxyzine and gabepentin.    Returned call 10/5 AM to check in, symptoms improving. D/w Dr. Jenkins, advised H1 Blockade with Cetrizine, rx sent. Pt/ will monitor symptoms and notify us with any worsening/development of rash.     Proceeding with IT chemo as planned.     All questions/concerns addressed.    Jackie Sharma PA-C  Malignant Hematology & Bone Marrow Transplant

## 2022-10-06 ENCOUNTER — TELEPHONE (OUTPATIENT)
Dept: HEMATOLOGY/ONCOLOGY | Facility: CLINIC | Age: 52
End: 2022-10-06
Payer: COMMERCIAL

## 2022-10-06 ENCOUNTER — LAB VISIT (OUTPATIENT)
Dept: LAB | Facility: HOSPITAL | Age: 52
End: 2022-10-06
Payer: COMMERCIAL

## 2022-10-06 ENCOUNTER — INFUSION (OUTPATIENT)
Dept: INFUSION THERAPY | Facility: HOSPITAL | Age: 52
End: 2022-10-06
Attending: INTERNAL MEDICINE
Payer: COMMERCIAL

## 2022-10-06 VITALS
DIASTOLIC BLOOD PRESSURE: 56 MMHG | OXYGEN SATURATION: 100 % | RESPIRATION RATE: 18 BRPM | SYSTOLIC BLOOD PRESSURE: 108 MMHG | HEART RATE: 92 BPM | TEMPERATURE: 99 F

## 2022-10-06 DIAGNOSIS — C91.00 B-CELL ACUTE LYMPHOBLASTIC LEUKEMIA: Primary | ICD-10-CM

## 2022-10-06 DIAGNOSIS — D69.6 THROMBOCYTOPENIA: ICD-10-CM

## 2022-10-06 DIAGNOSIS — D69.6 THROMBOCYTOPENIA: Primary | ICD-10-CM

## 2022-10-06 DIAGNOSIS — C91.00 B-CELL ACUTE LYMPHOBLASTIC LEUKEMIA: ICD-10-CM

## 2022-10-06 LAB
ABO + RH BLD: NORMAL
ALBUMIN SERPL BCP-MCNC: 3.7 G/DL (ref 3.5–5.2)
ALP SERPL-CCNC: 87 U/L (ref 55–135)
ALT SERPL W/O P-5'-P-CCNC: 13 U/L (ref 10–44)
ANION GAP SERPL CALC-SCNC: 14 MMOL/L (ref 8–16)
ANISOCYTOSIS BLD QL SMEAR: SLIGHT
AST SERPL-CCNC: 9 U/L (ref 10–40)
BASOPHILS NFR BLD: 0 % (ref 0–1.9)
BILIRUB SERPL-MCNC: 0.6 MG/DL (ref 0.1–1)
BLD GP AB SCN CELLS X3 SERPL QL: NORMAL
BLD PROD TYP BPU: NORMAL
BLOOD UNIT EXPIRATION DATE: NORMAL
BLOOD UNIT TYPE CODE: 7300
BLOOD UNIT TYPE: NORMAL
BUN SERPL-MCNC: 34 MG/DL (ref 6–20)
CALCIUM SERPL-MCNC: 9.3 MG/DL (ref 8.7–10.5)
CHLORIDE SERPL-SCNC: 107 MMOL/L (ref 95–110)
CLARITY CSF: CLEAR
CO2 SERPL-SCNC: 16 MMOL/L (ref 23–29)
CODING SYSTEM: NORMAL
COLOR CSF: COLORLESS
CREAT SERPL-MCNC: 0.9 MG/DL (ref 0.5–1.4)
DIFFERENTIAL METHOD: ABNORMAL
DISPENSE STATUS: NORMAL
EOSINOPHIL NFR BLD: 3 % (ref 0–8)
ERYTHROCYTE [DISTWIDTH] IN BLOOD BY AUTOMATED COUNT: 17.8 % (ref 11.5–14.5)
EST. GFR  (NO RACE VARIABLE): >60 ML/MIN/1.73 M^2
FINAL PATHOLOGIC DIAGNOSIS: NORMAL
GLUCOSE SERPL-MCNC: 173 MG/DL (ref 70–110)
HCT VFR BLD AUTO: 25.7 % (ref 37–48.5)
HGB BLD-MCNC: 9.1 G/DL (ref 12–16)
HYPOCHROMIA BLD QL SMEAR: ABNORMAL
IMM GRANULOCYTES # BLD AUTO: ABNORMAL K/UL (ref 0–0.04)
IMM GRANULOCYTES NFR BLD AUTO: ABNORMAL % (ref 0–0.5)
LYMPHOCYTES NFR BLD: 86 % (ref 18–48)
LYMPHOCYTES NFR CSF MANUAL: 35 % (ref 40–80)
Lab: NORMAL
MCH RBC QN AUTO: 36.1 PG (ref 27–31)
MCHC RBC AUTO-ENTMCNC: 35.4 G/DL (ref 32–36)
MCV RBC AUTO: 102 FL (ref 82–98)
METAMYELOCYTES NFR BLD MANUAL: 1 %
MICROSCOPIC EXAM: NORMAL
MONOCYTES NFR BLD: 1 % (ref 4–15)
MONOS+MACROS NFR CSF MANUAL: 1 % (ref 15–45)
NEUTROPHILS NFR BLD: 9 % (ref 38–73)
NEUTROPHILS NFR CSF MANUAL: 64 % (ref 0–6)
NRBC BLD-RTO: 2 /100 WBC
OVALOCYTES BLD QL SMEAR: ABNORMAL
PLATELET # BLD AUTO: 10 K/UL (ref 150–450)
PLATELET BLD QL SMEAR: ABNORMAL
PMV BLD AUTO: 10.1 FL (ref 9.2–12.9)
POIKILOCYTOSIS BLD QL SMEAR: SLIGHT
POLYCHROMASIA BLD QL SMEAR: ABNORMAL
POTASSIUM SERPL-SCNC: 4.4 MMOL/L (ref 3.5–5.1)
PROT SERPL-MCNC: 7.2 G/DL (ref 6–8.4)
RBC # BLD AUTO: 2.52 M/UL (ref 4–5.4)
RBC # CSF: 150 /CU MM
SODIUM SERPL-SCNC: 137 MMOL/L (ref 136–145)
SPECIMEN VOL CSF: 2.5 ML
UNIT NUMBER: NORMAL
WBC # BLD AUTO: 1.12 K/UL (ref 3.9–12.7)
WBC # CSF: 1 /CU MM (ref 0–5)

## 2022-10-06 PROCEDURE — 63600175 PHARM REV CODE 636 W HCPCS: Performed by: NURSE PRACTITIONER

## 2022-10-06 PROCEDURE — 88108 CYTOPATH CONCENTRATE TECH: CPT | Mod: 26,,, | Performed by: PATHOLOGY

## 2022-10-06 PROCEDURE — 96542 CHEMOTHERAPY INJECTION: CPT | Mod: ,,, | Performed by: NURSE PRACTITIONER

## 2022-10-06 PROCEDURE — 88108 CYTOPATH CONCENTRATE TECH: CPT | Performed by: PATHOLOGY

## 2022-10-06 PROCEDURE — P9037 PLATE PHERES LEUKOREDU IRRAD: HCPCS | Performed by: INTERNAL MEDICINE

## 2022-10-06 PROCEDURE — 88185 FLOWCYTOMETRY/TC ADD-ON: CPT | Mod: 59 | Performed by: PATHOLOGY

## 2022-10-06 PROCEDURE — 36430 TRANSFUSION BLD/BLD COMPNT: CPT

## 2022-10-06 PROCEDURE — 88189 PR  FLOWCYTOMETRY/READ, 16 & > MARKERS: ICD-10-PCS | Mod: ,,, | Performed by: PATHOLOGY

## 2022-10-06 PROCEDURE — 36415 COLL VENOUS BLD VENIPUNCTURE: CPT | Performed by: INTERNAL MEDICINE

## 2022-10-06 PROCEDURE — 85007 BL SMEAR W/DIFF WBC COUNT: CPT | Performed by: INTERNAL MEDICINE

## 2022-10-06 PROCEDURE — 88189 FLOWCYTOMETRY/READ 16 & >: CPT | Mod: ,,, | Performed by: PATHOLOGY

## 2022-10-06 PROCEDURE — 88108 PR  CYTOPATH FLUIDS,CONCENTRATN,INTERP: ICD-10-PCS | Mod: 26,,, | Performed by: PATHOLOGY

## 2022-10-06 PROCEDURE — 25000003 PHARM REV CODE 250: Performed by: NURSE PRACTITIONER

## 2022-10-06 PROCEDURE — 89051 BODY FLUID CELL COUNT: CPT | Performed by: NURSE PRACTITIONER

## 2022-10-06 PROCEDURE — 96542 CHEMOTHERAPY INJECTION: CPT

## 2022-10-06 PROCEDURE — 96542 PR CHEMOTHER INJECT,SUBARACH/INTRAVENT: ICD-10-PCS | Mod: ,,, | Performed by: NURSE PRACTITIONER

## 2022-10-06 PROCEDURE — 80053 COMPREHEN METABOLIC PANEL: CPT | Performed by: INTERNAL MEDICINE

## 2022-10-06 PROCEDURE — 88184 FLOWCYTOMETRY/ TC 1 MARKER: CPT | Performed by: PATHOLOGY

## 2022-10-06 PROCEDURE — 85027 COMPLETE CBC AUTOMATED: CPT | Performed by: INTERNAL MEDICINE

## 2022-10-06 PROCEDURE — 86850 RBC ANTIBODY SCREEN: CPT | Performed by: INTERNAL MEDICINE

## 2022-10-06 PROCEDURE — 25000003 PHARM REV CODE 250: Performed by: INTERNAL MEDICINE

## 2022-10-06 RX ORDER — HYDROCODONE BITARTRATE AND ACETAMINOPHEN 500; 5 MG/1; MG/1
TABLET ORAL ONCE
Status: DISCONTINUED | OUTPATIENT
Start: 2022-10-06 | End: 2022-10-06 | Stop reason: HOSPADM

## 2022-10-06 RX ORDER — DIPHENHYDRAMINE HCL 25 MG
25 CAPSULE ORAL
Status: CANCELLED | OUTPATIENT
Start: 2022-10-06

## 2022-10-06 RX ORDER — HYDROCODONE BITARTRATE AND ACETAMINOPHEN 500; 5 MG/1; MG/1
TABLET ORAL ONCE
Status: CANCELLED | OUTPATIENT
Start: 2022-10-06 | End: 2022-10-06

## 2022-10-06 RX ORDER — DIPHENHYDRAMINE HCL 25 MG
25 CAPSULE ORAL
Status: COMPLETED | OUTPATIENT
Start: 2022-10-06 | End: 2022-10-06

## 2022-10-06 RX ORDER — ACETAMINOPHEN 325 MG/1
650 TABLET ORAL
Status: CANCELLED | OUTPATIENT
Start: 2022-10-06

## 2022-10-06 RX ORDER — ACETAMINOPHEN 325 MG/1
650 TABLET ORAL
Status: COMPLETED | OUTPATIENT
Start: 2022-10-06 | End: 2022-10-06

## 2022-10-06 RX ADMIN — ACETAMINOPHEN 650 MG: 325 TABLET ORAL at 02:10

## 2022-10-06 RX ADMIN — DIPHENHYDRAMINE HYDROCHLORIDE 25 MG: 25 CAPSULE ORAL at 02:10

## 2022-10-06 RX ADMIN — CYTARABINE 30 MG: 20 INJECTION, SOLUTION INTRATHECAL; INTRAVENOUS; SUBCUTANEOUS at 04:10

## 2022-10-06 NOTE — PROGRESS NOTES
Cytarabine 30 mg, hydrocortisone 30 mg, and MTX 15 mg given intrathecally via ommaya. Patient pre-medicated with zofran due to nausea with previous administration. Tolerated well.      Mirtha Antonio, SKIPP  Hematology/Oncology/Bone Marrow Transplant.

## 2022-10-06 NOTE — PLAN OF CARE
1500 Labs, hx, and medications reviewed, pt meets parameters for treatment today. Assessment completed and plan of care reviewed. Pt verbalized understanding. PAC accessed with no complications. Pt voices no new complaints or concerns, will continue to monitor for safety.

## 2022-10-06 NOTE — PLAN OF CARE
1630 Pt tolerated 1 unit platelets well, reviewed s/s of post transfusion reaction, knows when to contact MD, when to report to ER; AVS declined, pt verbalized understanding, aware of upcoming appts. Pt ambulatory from clinic with steady gait, no distress noted.

## 2022-10-06 NOTE — NURSING
3496 Mirtha Siddiqi NP at the chairside to administer intrathecal injection. Pt tolerated well. Took Zofran 30 minutes prior to treatment to prevent nausea.

## 2022-10-06 NOTE — TELEPHONE ENCOUNTER
"----- Message from Gretta Velazquez sent at 10/6/2022 10:42 AM CDT -----  Regarding: Consult/Advisory:      Name Of Caller:   Mallory with HomeHealth      Contact Preference?:  Phone: [phone], Fax: [phone]      What is the nature of the call?: Calling to speak w/ nurse. Requesting the pt's chart notes from their last appt.        "Thank you for all that you do for our patients"     "

## 2022-10-07 ENCOUNTER — SPECIALTY PHARMACY (OUTPATIENT)
Dept: PHARMACY | Facility: CLINIC | Age: 52
End: 2022-10-07
Payer: COMMERCIAL

## 2022-10-07 DIAGNOSIS — C91.00 B-CELL ACUTE LYMPHOBLASTIC LEUKEMIA: ICD-10-CM

## 2022-10-07 RX ORDER — PONATINIB HYDROCHLORIDE 30 MG/1
30 TABLET, FILM COATED ORAL DAILY
Qty: 30 TABLET | Refills: 0 | Status: ON HOLD | OUTPATIENT
Start: 2022-10-07 | End: 2022-10-13 | Stop reason: HOSPADM

## 2022-10-07 NOTE — TELEPHONE ENCOUNTER
Patient's  returned call and reports that patient has about 20 tablets on hand due to being hospitalized recently. Adjusting refill call date. Will notify assigned [facility] for follow up.

## 2022-10-10 ENCOUNTER — SPECIALTY PHARMACY (OUTPATIENT)
Dept: PHARMACY | Facility: CLINIC | Age: 52
End: 2022-10-10
Payer: COMMERCIAL

## 2022-10-10 LAB
FINAL PATHOLOGIC DIAGNOSIS: NORMAL
FLOW CYTOMETRY ANTIBODIES ANALYZED - CSF: NORMAL
FLOW CYTOMETRY COMMENT - CSF: NORMAL
FLOW CYTOMETRY INTERPRETATION - CSF: NORMAL
Lab: NORMAL
PATH INTERP FLD-IMP: NORMAL

## 2022-10-10 NOTE — TELEPHONE ENCOUNTER
Reached out to provider based on pt's recent labs to determine status of Iclusig. Pt has follow up appt with labs scheduled tomorrow, 10/11.

## 2022-10-11 ENCOUNTER — DOCUMENTATION ONLY (OUTPATIENT)
Dept: HEMATOLOGY/ONCOLOGY | Facility: CLINIC | Age: 52
End: 2022-10-11
Payer: COMMERCIAL

## 2022-10-11 ENCOUNTER — HOSPITAL ENCOUNTER (INPATIENT)
Facility: HOSPITAL | Age: 52
LOS: 3 days | Discharge: HOSPICE | DRG: 070 | End: 2022-10-14
Attending: INTERNAL MEDICINE | Admitting: INTERNAL MEDICINE
Payer: COMMERCIAL

## 2022-10-11 ENCOUNTER — OFFICE VISIT (OUTPATIENT)
Dept: HEMATOLOGY/ONCOLOGY | Facility: CLINIC | Age: 52
End: 2022-10-11
Payer: COMMERCIAL

## 2022-10-11 VITALS
HEART RATE: 122 BPM | TEMPERATURE: 99 F | SYSTOLIC BLOOD PRESSURE: 115 MMHG | DIASTOLIC BLOOD PRESSURE: 58 MMHG | RESPIRATION RATE: 18 BRPM | BODY MASS INDEX: 22.36 KG/M2 | HEIGHT: 64 IN | OXYGEN SATURATION: 99 %

## 2022-10-11 DIAGNOSIS — R53.83 FATIGUE, UNSPECIFIED TYPE: ICD-10-CM

## 2022-10-11 DIAGNOSIS — Z51.5 PALLIATIVE CARE ENCOUNTER: ICD-10-CM

## 2022-10-11 DIAGNOSIS — C91.00 B-CELL ACUTE LYMPHOBLASTIC LEUKEMIA: ICD-10-CM

## 2022-10-11 DIAGNOSIS — K12.30 MUCOSITIS: ICD-10-CM

## 2022-10-11 DIAGNOSIS — R07.9 CHEST PAIN: ICD-10-CM

## 2022-10-11 DIAGNOSIS — R53.81 DEBILITY: ICD-10-CM

## 2022-10-11 DIAGNOSIS — L30.8 DERMATITIS ASSOCIATED WITH MOISTURE: ICD-10-CM

## 2022-10-11 DIAGNOSIS — C91.02 B-CELL ACUTE LYMPHOBLASTIC LEUKEMIA (ALL) IN RELAPSE: Primary | ICD-10-CM

## 2022-10-11 DIAGNOSIS — R56.9 SEIZURE: ICD-10-CM

## 2022-10-11 DIAGNOSIS — D61.818 PANCYTOPENIA: ICD-10-CM

## 2022-10-11 DIAGNOSIS — Z13.9 ENCOUNTER FOR SCREENING: ICD-10-CM

## 2022-10-11 DIAGNOSIS — R41.82 ALTERED MENTAL STATUS: ICD-10-CM

## 2022-10-11 DIAGNOSIS — C91.00 B-CELL ACUTE LYMPHOBLASTIC LEUKEMIA: Primary | ICD-10-CM

## 2022-10-11 DIAGNOSIS — Z71.89 ADVANCE CARE PLANNING: ICD-10-CM

## 2022-10-11 DIAGNOSIS — R52 PAIN: ICD-10-CM

## 2022-10-11 PROBLEM — R50.9 FEVER OF UNKNOWN ORIGIN: Status: RESOLVED | Noted: 2022-08-27 | Resolved: 2022-10-11

## 2022-10-11 PROBLEM — E87.6 HYPOKALEMIA: Status: RESOLVED | Noted: 2022-03-17 | Resolved: 2022-10-11

## 2022-10-11 PROBLEM — T45.1X5A ANEMIA ASSOCIATED WITH CHEMOTHERAPY: Status: RESOLVED | Noted: 2022-02-21 | Resolved: 2022-10-11

## 2022-10-11 PROBLEM — D69.6 THROMBOCYTOPENIA: Status: RESOLVED | Noted: 2022-05-13 | Resolved: 2022-10-11

## 2022-10-11 PROBLEM — I95.1 ORTHOSTATIC HYPOTENSION: Status: RESOLVED | Noted: 2022-09-30 | Resolved: 2022-10-11

## 2022-10-11 PROBLEM — E83.39 HYPOPHOSPHATEMIA: Status: RESOLVED | Noted: 2022-02-17 | Resolved: 2022-10-11

## 2022-10-11 PROBLEM — K12.31 MUCOSITIS DUE TO CHEMOTHERAPY: Status: ACTIVE | Noted: 2022-10-11

## 2022-10-11 PROBLEM — R55 SYNCOPE: Status: RESOLVED | Noted: 2017-12-22 | Resolved: 2022-10-11

## 2022-10-11 PROBLEM — D72.829 LEUKOCYTOSIS: Status: RESOLVED | Noted: 2022-05-17 | Resolved: 2022-10-11

## 2022-10-11 PROBLEM — D64.81 ANEMIA ASSOCIATED WITH CHEMOTHERAPY: Status: RESOLVED | Noted: 2022-02-21 | Resolved: 2022-10-11

## 2022-10-11 LAB
ABO + RH BLD: NORMAL
BLD GP AB SCN CELLS X3 SERPL QL: NORMAL
INR PPP: 1.5 (ref 0.8–1.2)
POCT GLUCOSE: 114 MG/DL (ref 70–110)
POCT GLUCOSE: 137 MG/DL (ref 70–110)
POCT GLUCOSE: 145 MG/DL (ref 70–110)
PROTHROMBIN TIME: 15.1 SEC (ref 9–12.5)
SARS-COV-2 RDRP RESP QL NAA+PROBE: NEGATIVE

## 2022-10-11 PROCEDURE — 3074F PR MOST RECENT SYSTOLIC BLOOD PRESSURE < 130 MM HG: ICD-10-PCS | Mod: CPTII,S$GLB,, | Performed by: INTERNAL MEDICINE

## 2022-10-11 PROCEDURE — 99499 NO LOS: ICD-10-PCS | Mod: S$GLB,,, | Performed by: INTERNAL MEDICINE

## 2022-10-11 PROCEDURE — 80183 DRUG SCRN QUANT OXCARBAZEPIN: CPT | Performed by: NURSE PRACTITIONER

## 2022-10-11 PROCEDURE — 3008F PR BODY MASS INDEX (BMI) DOCUMENTED: ICD-10-PCS | Mod: CPTII,S$GLB,, | Performed by: INTERNAL MEDICINE

## 2022-10-11 PROCEDURE — 99223 PR INITIAL HOSPITAL CARE,LEVL III: ICD-10-PCS | Mod: ,,, | Performed by: PSYCHIATRY & NEUROLOGY

## 2022-10-11 PROCEDURE — 86920 COMPATIBILITY TEST SPIN: CPT | Performed by: NURSE PRACTITIONER

## 2022-10-11 PROCEDURE — 99999 PR PBB SHADOW E&M-EST. PATIENT-LVL V: CPT | Mod: PBBFAC,,, | Performed by: INTERNAL MEDICINE

## 2022-10-11 PROCEDURE — 4010F ACE/ARB THERAPY RXD/TAKEN: CPT | Mod: CPTII,S$GLB,, | Performed by: INTERNAL MEDICINE

## 2022-10-11 PROCEDURE — 1160F PR REVIEW ALL MEDS BY PRESCRIBER/CLIN PHARMACIST DOCUMENTED: ICD-10-PCS | Mod: CPTII,S$GLB,, | Performed by: INTERNAL MEDICINE

## 2022-10-11 PROCEDURE — 20600001 HC STEP DOWN PRIVATE ROOM

## 2022-10-11 PROCEDURE — 3044F HG A1C LEVEL LT 7.0%: CPT | Mod: CPTII,S$GLB,, | Performed by: INTERNAL MEDICINE

## 2022-10-11 PROCEDURE — 3044F PR MOST RECENT HEMOGLOBIN A1C LEVEL <7.0%: ICD-10-PCS | Mod: CPTII,S$GLB,, | Performed by: INTERNAL MEDICINE

## 2022-10-11 PROCEDURE — 99223 1ST HOSP IP/OBS HIGH 75: CPT | Mod: ,,, | Performed by: INTERNAL MEDICINE

## 2022-10-11 PROCEDURE — 1111F DSCHRG MED/CURRENT MED MERGE: CPT | Mod: CPTII,S$GLB,, | Performed by: INTERNAL MEDICINE

## 2022-10-11 PROCEDURE — 99223 PR INITIAL HOSPITAL CARE,LEVL III: ICD-10-PCS | Mod: ,,, | Performed by: INTERNAL MEDICINE

## 2022-10-11 PROCEDURE — 99223 1ST HOSP IP/OBS HIGH 75: CPT | Mod: ,,, | Performed by: PSYCHIATRY & NEUROLOGY

## 2022-10-11 PROCEDURE — 1111F PR DISCHARGE MEDS RECONCILED W/ CURRENT OUTPATIENT MED LIST: ICD-10-PCS | Mod: CPTII,S$GLB,, | Performed by: INTERNAL MEDICINE

## 2022-10-11 PROCEDURE — 25500020 PHARM REV CODE 255: Performed by: INTERNAL MEDICINE

## 2022-10-11 PROCEDURE — 1159F MED LIST DOCD IN RCRD: CPT | Mod: CPTII,S$GLB,, | Performed by: INTERNAL MEDICINE

## 2022-10-11 PROCEDURE — U0002 COVID-19 LAB TEST NON-CDC: HCPCS | Performed by: INTERNAL MEDICINE

## 2022-10-11 PROCEDURE — 99499 UNLISTED E&M SERVICE: CPT | Mod: S$GLB,,, | Performed by: INTERNAL MEDICINE

## 2022-10-11 PROCEDURE — 25000003 PHARM REV CODE 250: Performed by: NURSE PRACTITIONER

## 2022-10-11 PROCEDURE — 3078F DIAST BP <80 MM HG: CPT | Mod: CPTII,S$GLB,, | Performed by: INTERNAL MEDICINE

## 2022-10-11 PROCEDURE — 4010F PR ACE/ARB THEARPY RXD/TAKEN: ICD-10-PCS | Mod: CPTII,S$GLB,, | Performed by: INTERNAL MEDICINE

## 2022-10-11 PROCEDURE — 99999 PR PBB SHADOW E&M-EST. PATIENT-LVL V: ICD-10-PCS | Mod: PBBFAC,,, | Performed by: INTERNAL MEDICINE

## 2022-10-11 PROCEDURE — 3078F PR MOST RECENT DIASTOLIC BLOOD PRESSURE < 80 MM HG: ICD-10-PCS | Mod: CPTII,S$GLB,, | Performed by: INTERNAL MEDICINE

## 2022-10-11 PROCEDURE — 1160F RVW MEDS BY RX/DR IN RCRD: CPT | Mod: CPTII,S$GLB,, | Performed by: INTERNAL MEDICINE

## 2022-10-11 PROCEDURE — 86850 RBC ANTIBODY SCREEN: CPT | Mod: 91 | Performed by: NURSE PRACTITIONER

## 2022-10-11 PROCEDURE — 85610 PROTHROMBIN TIME: CPT

## 2022-10-11 PROCEDURE — 3008F BODY MASS INDEX DOCD: CPT | Mod: CPTII,S$GLB,, | Performed by: INTERNAL MEDICINE

## 2022-10-11 PROCEDURE — 3074F SYST BP LT 130 MM HG: CPT | Mod: CPTII,S$GLB,, | Performed by: INTERNAL MEDICINE

## 2022-10-11 PROCEDURE — 1159F PR MEDICATION LIST DOCUMENTED IN MEDICAL RECORD: ICD-10-PCS | Mod: CPTII,S$GLB,, | Performed by: INTERNAL MEDICINE

## 2022-10-11 RX ORDER — IBUPROFEN 200 MG
24 TABLET ORAL
Status: DISCONTINUED | OUTPATIENT
Start: 2022-10-11 | End: 2022-10-11

## 2022-10-11 RX ORDER — HEPARIN 100 UNIT/ML
5 SYRINGE INTRAVENOUS
Status: DISCONTINUED | OUTPATIENT
Start: 2022-10-11 | End: 2022-10-14 | Stop reason: HOSPADM

## 2022-10-11 RX ORDER — AMOXICILLIN 250 MG
1 CAPSULE ORAL 2 TIMES DAILY
Status: DISCONTINUED | OUTPATIENT
Start: 2022-10-11 | End: 2022-10-14 | Stop reason: HOSPADM

## 2022-10-11 RX ORDER — ACYCLOVIR 200 MG/1
400 CAPSULE ORAL 2 TIMES DAILY
Status: DISCONTINUED | OUTPATIENT
Start: 2022-10-11 | End: 2022-10-14

## 2022-10-11 RX ORDER — IBUPROFEN 200 MG
16 TABLET ORAL
Status: DISCONTINUED | OUTPATIENT
Start: 2022-10-11 | End: 2022-10-14 | Stop reason: HOSPADM

## 2022-10-11 RX ORDER — IBUPROFEN 200 MG
24 TABLET ORAL
Status: DISCONTINUED | OUTPATIENT
Start: 2022-10-11 | End: 2022-10-14 | Stop reason: HOSPADM

## 2022-10-11 RX ORDER — POLYETHYLENE GLYCOL 3350 17 G/17G
17 POWDER, FOR SOLUTION ORAL 2 TIMES DAILY PRN
Status: DISCONTINUED | OUTPATIENT
Start: 2022-10-11 | End: 2022-10-14 | Stop reason: HOSPADM

## 2022-10-11 RX ORDER — LEVOFLOXACIN 500 MG/1
500 TABLET, FILM COATED ORAL DAILY
Status: DISCONTINUED | OUTPATIENT
Start: 2022-10-12 | End: 2022-10-14

## 2022-10-11 RX ORDER — IBUPROFEN 200 MG
16 TABLET ORAL
Status: DISCONTINUED | OUTPATIENT
Start: 2022-10-11 | End: 2022-10-11

## 2022-10-11 RX ORDER — GLUCAGON 1 MG
1 KIT INJECTION
Status: DISCONTINUED | OUTPATIENT
Start: 2022-10-11 | End: 2022-10-14 | Stop reason: HOSPADM

## 2022-10-11 RX ORDER — INSULIN ASPART 100 [IU]/ML
1-10 INJECTION, SOLUTION INTRAVENOUS; SUBCUTANEOUS
Status: DISCONTINUED | OUTPATIENT
Start: 2022-10-11 | End: 2022-10-14 | Stop reason: HOSPADM

## 2022-10-11 RX ORDER — ALLOPURINOL 300 MG/1
300 TABLET ORAL DAILY
Status: DISCONTINUED | OUTPATIENT
Start: 2022-10-11 | End: 2022-10-14

## 2022-10-11 RX ORDER — PANTOPRAZOLE SODIUM 40 MG/1
40 TABLET, DELAYED RELEASE ORAL DAILY
Status: DISCONTINUED | OUTPATIENT
Start: 2022-10-12 | End: 2022-10-14 | Stop reason: HOSPADM

## 2022-10-11 RX ORDER — GLUCAGON 1 MG
1 KIT INJECTION
Status: DISCONTINUED | OUTPATIENT
Start: 2022-10-11 | End: 2022-10-11

## 2022-10-11 RX ORDER — LANOLIN ALCOHOL/MO/W.PET/CERES
400 CREAM (GRAM) TOPICAL DAILY
Status: DISCONTINUED | OUTPATIENT
Start: 2022-10-11 | End: 2022-10-14 | Stop reason: HOSPADM

## 2022-10-11 RX ORDER — FLUCONAZOLE 200 MG/1
400 TABLET ORAL DAILY
Status: DISCONTINUED | OUTPATIENT
Start: 2022-10-12 | End: 2022-10-14

## 2022-10-11 RX ORDER — NALOXONE HCL 0.4 MG/ML
0.02 VIAL (ML) INJECTION
Status: DISCONTINUED | OUTPATIENT
Start: 2022-10-11 | End: 2022-10-14 | Stop reason: HOSPADM

## 2022-10-11 RX ORDER — ONDANSETRON 8 MG/1
8 TABLET, ORALLY DISINTEGRATING ORAL EVERY 8 HOURS PRN
Status: DISCONTINUED | OUTPATIENT
Start: 2022-10-11 | End: 2022-10-14 | Stop reason: HOSPADM

## 2022-10-11 RX ORDER — SODIUM CHLORIDE 0.9 % (FLUSH) 0.9 %
10 SYRINGE (ML) INJECTION EVERY 12 HOURS PRN
Status: DISCONTINUED | OUTPATIENT
Start: 2022-10-11 | End: 2022-10-14 | Stop reason: HOSPADM

## 2022-10-11 RX ORDER — OXCARBAZEPINE 600 MG/1
1200 TABLET, FILM COATED ORAL 2 TIMES DAILY
Status: DISCONTINUED | OUTPATIENT
Start: 2022-10-11 | End: 2022-10-14 | Stop reason: HOSPADM

## 2022-10-11 RX ORDER — GABAPENTIN 300 MG/1
300 CAPSULE ORAL 3 TIMES DAILY
Status: DISCONTINUED | OUTPATIENT
Start: 2022-10-11 | End: 2022-10-14 | Stop reason: HOSPADM

## 2022-10-11 RX ORDER — PROCHLORPERAZINE MALEATE 5 MG
10 TABLET ORAL EVERY 6 HOURS PRN
Status: DISCONTINUED | OUTPATIENT
Start: 2022-10-11 | End: 2022-10-14 | Stop reason: HOSPADM

## 2022-10-11 RX ORDER — QUETIAPINE FUMARATE 200 MG/1
200 TABLET, FILM COATED ORAL NIGHTLY
Status: DISCONTINUED | OUTPATIENT
Start: 2022-10-11 | End: 2022-10-14 | Stop reason: HOSPADM

## 2022-10-11 RX ORDER — SODIUM CHLORIDE 9 MG/ML
INJECTION, SOLUTION INTRAVENOUS CONTINUOUS
Status: DISCONTINUED | OUTPATIENT
Start: 2022-10-11 | End: 2022-10-14 | Stop reason: HOSPADM

## 2022-10-11 RX ORDER — BUSPIRONE HYDROCHLORIDE 10 MG/1
10 TABLET ORAL 2 TIMES DAILY
Status: DISCONTINUED | OUTPATIENT
Start: 2022-10-11 | End: 2022-10-14 | Stop reason: HOSPADM

## 2022-10-11 RX ORDER — ATORVASTATIN CALCIUM 20 MG/1
80 TABLET, FILM COATED ORAL DAILY
Status: DISCONTINUED | OUTPATIENT
Start: 2022-10-11 | End: 2022-10-14

## 2022-10-11 RX ORDER — CETIRIZINE HYDROCHLORIDE 10 MG/1
10 TABLET ORAL DAILY
Status: DISCONTINUED | OUTPATIENT
Start: 2022-10-11 | End: 2022-10-14 | Stop reason: HOSPADM

## 2022-10-11 RX ORDER — LOSARTAN POTASSIUM 25 MG/1
25 TABLET ORAL DAILY
Status: DISCONTINUED | OUTPATIENT
Start: 2022-10-11 | End: 2022-10-14 | Stop reason: HOSPADM

## 2022-10-11 RX ADMIN — SODIUM CHLORIDE: 0.9 INJECTION, SOLUTION INTRAVENOUS at 10:10

## 2022-10-11 RX ADMIN — Medication 400 MG: at 03:10

## 2022-10-11 RX ADMIN — IOHEXOL 100 ML: 350 INJECTION, SOLUTION INTRAVENOUS at 10:10

## 2022-10-11 RX ADMIN — ACYCLOVIR 400 MG: 200 CAPSULE ORAL at 10:10

## 2022-10-11 RX ADMIN — ATORVASTATIN CALCIUM 80 MG: 20 TABLET, FILM COATED ORAL at 03:10

## 2022-10-11 RX ADMIN — LOSARTAN POTASSIUM 25 MG: 25 TABLET, FILM COATED ORAL at 03:10

## 2022-10-11 RX ADMIN — ALUMINUM HYDROXIDE, MAGNESIUM HYDROXIDE, AND DIMETHICONE 10 ML: 400; 400; 40 SUSPENSION ORAL at 08:10

## 2022-10-11 RX ADMIN — CETIRIZINE HYDROCHLORIDE 10 MG: 10 TABLET, FILM COATED ORAL at 03:10

## 2022-10-11 RX ADMIN — DILTIAZEM HYDROCHLORIDE 90 MG: 60 TABLET, FILM COATED ORAL at 04:10

## 2022-10-11 RX ADMIN — ALUMINUM HYDROXIDE, MAGNESIUM HYDROXIDE, AND DIMETHICONE 10 ML: 400; 400; 40 SUSPENSION ORAL at 12:10

## 2022-10-11 RX ADMIN — QUETIAPINE FUMARATE 200 MG: 200 TABLET ORAL at 08:10

## 2022-10-11 RX ADMIN — ALLOPURINOL 300 MG: 300 TABLET ORAL at 03:10

## 2022-10-11 RX ADMIN — DILTIAZEM HYDROCHLORIDE 90 MG: 60 TABLET, FILM COATED ORAL at 10:10

## 2022-10-11 RX ADMIN — BUSPIRONE HYDROCHLORIDE 10 MG: 10 TABLET ORAL at 10:10

## 2022-10-11 RX ADMIN — GABAPENTIN 300 MG: 300 CAPSULE ORAL at 07:10

## 2022-10-11 RX ADMIN — SODIUM CHLORIDE: 0.9 INJECTION, SOLUTION INTRAVENOUS at 11:10

## 2022-10-11 RX ADMIN — OXCARBAZEPINE 1200 MG: 600 TABLET, FILM COATED ORAL at 10:10

## 2022-10-11 RX ADMIN — ONDANSETRON 8 MG: 8 TABLET, ORALLY DISINTEGRATING ORAL at 07:10

## 2022-10-11 RX ADMIN — ALUMINUM HYDROXIDE, MAGNESIUM HYDROXIDE, AND DIMETHICONE 10 ML: 400; 400; 40 SUSPENSION ORAL at 04:10

## 2022-10-11 NOTE — CONSULTS
[facility] - Oncology ([facility])  Vascular Neurology  Comprehensive Stroke Center  Consult Note    Inpatient consult to Vascular (Stroke) Neurology  Consult performed by: Miriam Ortiz PA-C  Consult ordered by: Shanta Love NP        Assessment/Plan:     Patient is a 51 y.o. year old female with:    * Altered mental status  Ms. Gonzalez is a 51 year old female with extensive medical history including Precursor-B-Cell-ALL, pancytopenia, paroxysmal AFIB (on AC), HLD, DM, HTN, known seizure disorder, and recent small SAH (resolved), presented to [facility] today as direct transfer from [facility] for AMS, confusion and increased lethargy while at her appointment. Decision was made by outpatient and inpatient heme/onc teams to direct admit to [facility]. Stroke code activated initially, however, code was cancelled and regular consult requested due to a LKN approximately 48-72 hours ago, putting her out of window for any intervention. On arrival of stroke team, NIHSS 0, patient is alert and oriented. No focal deficits appreciated. STAT CTA Stroke MP obtained, no evidence of LVO or high grade stenosis. No evidence of hemorrhage or acute infarct.      Given negative imaging and no evidence of focal deficits, it is unlikely that patient's symptoms are related to a cerebrovascular origin. Unable to complete MRI at this time due to presence of pacemaker. We recommend exploring other possible etiologies as cause for symptoms such as hematology/oncology, metabolic, or alternative neurologic causes. Recommend consult with General Neurology. Recommend continuing with treatment in place for stroke risk factors such as HTN, DM, HLD, and AFIB. Stroke team will sign off at this time. Please do not hesitate to contact stroke team for any further questions or concerns. Thank you for your consult.     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 Level of Consciousness: 0-->Alert, keenly responsive  1b. LOC Questions: 0-->Answers both questions correctly  1c. LOC Commands: 0-->Performs both tasks correctly  2. Best Gaze: 0-->Normal  3. Visual: 0-->No visual loss  4. Facial Palsy: 0-->Normal symmetrical movements  5a. Motor Arm, Left: 0-->No drift, limb holds 90 (or 45) degrees for full 10 secs  5b. Motor Arm, Right: 0-->No drift, limb holds 90 (or 45) degrees for full 10 secs  6a. Motor Leg, Left: 0-->No drift, leg holds 30 degree position for full 5 secs  6b. Motor Leg, Right: 0-->No drift, leg holds 30 degree position for full 5 secs  7. Limb Ataxia: 0-->Absent  8. Sensory: 0-->Normal, no sensory loss  9. Best Language: 0-->No aphasia, normal  10. Dysarthria: 0-->Normal  11. Extinction and Inattention (formerly Neglect): 0-->No abnormality  Total (NIH Stroke Scale): 0    Modified [place]    [place] Coma Scale:    ABCD2 Score:    [id number] Score:   HAS -BLED Score:   ICH Score:   Hunt & Amado Classification:       Thrombolysis Candidate? No, Out of window , Non-disabling symptoms - Low NIHSS , Strong suspicion for stroke mimic or alternative diagnosis     Delays to Thrombolysis?  Not Applicable    Interventional Revascularization Candidate?   Is the patient eligible for mechanical endovascular reperfusion (YAMILKA)?  No; No large vessel occlusion identified on imaging  and OOW    Delays to Thrombectomy? Not Applicable    Hemorrhagic change of an Ischemic Stroke: Does this patient have an ischemic stroke with hemorrhagic changes? No     Subjective:     History of Present Illness:  Ms. Gonzalez is a 51 year old female with extensive medical history including Precursor-B-Cell-ALL,  pancytopenia, paroxysmal AFIB (on AC), HLD, DM, HTN, known seizure disorder, and recent small SAH (resolved), presented to [facility] today as direct transfer from [facility] for AMS, confusion and increased lethargy while at her appointment. Decision was made by outpatient and inpatient heme/onc teams to direct admit to [facility]. Stroke code activated initially, however, code was cancelled and regular consult requested due to a LKN approximately 48-72 hours ago, putting her out of window for any intervention. On arrival of stroke team, NIHSS 0, patient is alert and oriented. No focal deficits appreciated. STAT CTA Stroke MP obtained, no evidence of LVO or high grade stenosis. No evidence of hemorrhage or acute infarct.            Past Medical History:   Diagnosis Date    Cancer     COPD (chronic obstructive pulmonary disease)     Hypertension     Rheumatoid arthritis, unspecified     SAH (subarachnoid hemorrhage) 2022    Seizures     Stroke     TIA x 4    Type 2 diabetes mellitus with circulatory disorder, without long-term current use of insulin      Past Surgical History:   Procedure Laterality Date    APPENDECTOMY      HYSTERECTOMY      OMMAYA RESERVIOR INSERTION N/A 2022    Procedure: INSERTION, OMMAYA RESERVOIR;  Surgeon: Mj Alberto MD;  Location: [facility] OR [address] [place] [address];  Service: Neurosurgery;  Laterality: N/A;    ROTATOR CUFF REPAIR Bilateral     TONSILLECTOMY      TUBAL LIGATION       No family history on file.  Social History     Tobacco Use    Smoking status: Former     Packs/day: 0.50     Types: Cigarettes     Quit date: 3/16/2022     Years since quittin.5    Smokeless tobacco: Never   Substance Use Topics    Alcohol use: No    Drug use: Never     Review of patient's allergies indicates:   Allergen Reactions    Levetiracetam      Other reaction(s): Unknown  Other reaction(s): Unknown  Other reaction(s): Unknown       Medications: I have reviewed the current medication  administration record.    Medications Prior to Admission   Medication Sig Dispense Refill Last Dose    acyclovir (ZOVIRAX) 400 MG tablet Take 1 tablet (400 mg total) by mouth 2 (two) times daily. 60 tablet 11     acyclovir (ZOVIRAX) 400 MG tablet Take 1 tablet (400 mg total) by mouth 2 (two) times daily. 60 tablet 0     allopurinoL (ZYLOPRIM) 300 MG tablet Take 1 tablet (300 mg total) by mouth once daily. 90 tablet 3     artificial tears (ISOPTO TEARS) 0.5 % ophthalmic solution Place 1 drop into both eyes 4 (four) times daily as needed.       atorvastatin (LIPITOR) 80 MG tablet Take 80 mg by mouth once daily.       blood sugar diagnostic Strp SMARTSIG:Via Meter 1 to 2 Times Daily       busPIRone (BUSPAR) 10 MG tablet Take 10 mg by mouth 2 (two) times daily.       butalbital-acetaminophen-caffeine -40 mg (FIORICET, ESGIC) -40 mg per tablet Take 1 tablet by mouth every 6 (six) hours as needed for Headaches. 30 tablet 0     cetirizine (ZYRTEC) 10 MG tablet Take 1 tablet (10 mg total) by mouth once daily. 60 tablet 3     dapagliflozin (FARXIGA) 10 mg tablet Take 10 mg by mouth once daily.       diazePAM (VALIUM) 10 MG Tab Take 10 mg by mouth 2 (two) times daily as needed.       diltiaZEM (CARDIZEM) 90 MG tablet Take 1 tablet (90 mg total) by mouth every 6 (six) hours. 120 tablet 11     duke's soln (benadryl 30 mL, mylanta 30 mL, LIDOcaine 30 mL, nystatin 30 mL) 120mL Take 10 mLs by mouth 4 (four) times daily as needed (mouth pain). 120 mL 0     fenofibrate 160 MG Tab Take 160 mg by mouth once daily.       fluconazole (DIFLUCAN) 200 MG Tab Take 2 tablets (400 mg total) by mouth once daily. 60 tablet 0     gabapentin (NEURONTIN) 300 MG capsule Take 1 capsule (300 mg total) by mouth 3 (three) times daily. 90 capsule 11     hydrOXYchloroQUINE (PLAQUENIL) 200 mg tablet Take 1 tablet (200 mg total) by mouth once daily.       hydrOXYzine pamoate (VISTARIL) 50 MG Cap Take 50 mg by mouth 2 (two)  times daily as needed.       levoFLOXacin (LEVAQUIN) 500 MG tablet Take 1 tablet (500 mg total) by mouth once daily. 30 tablet 0     LIDOcaine (LIDODERM) 5 % Place 1 patch onto the skin once daily. Remove & Discard patch within 12 hours or as directed by MD 30 patch 2     losartan (COZAAR) 25 MG tablet Take 25 mg by mouth once daily.       magnesium oxide (MAG-OX) 400 mg (241.3 mg magnesium) tablet Take 1 tablet by mouth once daily.       metFORMIN (GLUCOPHAGE-XR) 500 MG ER 24hr tablet Take 1,000 mg by mouth 2 (two) times daily.       naloxone (NARCAN) 4 mg/actuation Spry 4mg by nasal route as needed for opioid overdose; may repeat every 2-3 minutes in alternating nostrils until medical help arrives. Call 911 1 each 11     omeprazole (PRILOSEC) 40 MG capsule Take 40 mg by mouth once daily.       ondansetron (ZOFRAN-ODT) 8 MG TbDL Take 1 tablet (8 mg total) by mouth every 8 (eight) hours as needed (in case of chemo induced nausea). 30 tablet 1     ondansetron (ZOFRAN-ODT) 8 MG TbDL Take 1 tablet (8 mg total) by mouth every 8 (eight) hours as needed (nausea or vomiting). 60 tablet 0     ONETOUCH VERIO TEST STRIPS Strp SMARTSIG:Via Meter 1 to 2 Times Daily       OXcarbazepine (TRILEPTAL) 600 MG Tab Take 1,200 mg by mouth 2 (two) times daily.       polyethylene glycol (GLYCOLAX) 17 gram/dose powder Dissolve one capful (17 g) in liquid and take by mouth daily as needed (constipation). 510 g 0     PONATinib (ICLUSIG) 30 mg Tab Take 1 tablet (30 mg) by mouth once daily. 30 tablet 0     potassium chloride (K-TAB) 20 mEq Take 20 mEq by mouth 2 (two) times daily.       prochlorperazine (COMPAZINE) 10 MG tablet Take 1 tablet (10 mg total) by mouth every 6 (six) hours as needed for Nausea. 30 tablet 2     QUEtiapine (SEROQUEL) 200 MG Tab Take 200 mg by mouth every evening.       rivaroxaban (XARELTO) 20 mg Tab Take 1 tablet (20 mg total) by mouth daily with dinner or evening meal. ** Hold until instructed to  resume by provider due to low platelet count. **       senna-docusate 8.6-50 mg (PERICOLACE) 8.6-50 mg per tablet Take 1 tablet by mouth 2 (two) times daily. 60 tablet 3     sumatriptan (IMITREX) 50 MG tablet Take 1 tablet (50 mg total) by mouth daily as needed (headache). 30 tablet 2     TRINTELLIX 20 mg Tab Take 1 tablet by mouth once daily.       ursodioL (ACTIGALL) 300 mg capsule Take 1 capsule (300 mg total) by mouth 3 (three) times daily. 90 capsule 11        Review of Systems   Constitutional:  Positive for activity change and fatigue. Negative for fever.        Lethargy   HENT:  Negative for drooling and trouble swallowing.    Eyes:  Negative for redness and visual disturbance.   Respiratory:  Negative for cough.    Cardiovascular:  Negative for chest pain and leg swelling.   Gastrointestinal:  Negative for nausea and vomiting.   Genitourinary:  Negative for dysuria.   Musculoskeletal:  Negative for arthralgias and myalgias.   Skin:  Negative for rash.   Neurological:  Positive for weakness (Generalized). Negative for seizures, syncope, facial asymmetry, speech difficulty and numbness.   Psychiatric/Behavioral:  Negative for agitation and confusion. The patient is not nervous/anxious.    Objective:     Vital Signs (Most Recent):  Temp: 97.9 °F (36.6 °C) (10/11/22 1013)  Pulse: (!) 113 (10/11/22 1033)  Resp: 20 (10/11/22 1033)  BP: 126/71 (10/11/22 1033)  SpO2: 100 % (10/11/22 1033)    Vital Signs Range (Last 24H):  Temp:  [97.9 °F (36.6 °C)-99.4 °F (37.4 °C)]   Pulse:  [113-122]   Resp:  [18-20]   BP: (115-126)/(58-91)   SpO2:  [95 %-100 %]     Physical Exam  Vitals reviewed.   Constitutional:       Appearance: She is normal weight. She is ill-appearing.   HENT:      Head: Normocephalic and atraumatic.      Comments: Ommaya reservoir in place     Right Ear: External ear normal.      Left Ear: External ear normal.      Nose: Nose normal.   Eyes:      General: No visual field deficit or scleral icterus.      Extraocular Movements: Extraocular movements intact.      Pupils: Pupils are equal, round, and reactive to light.   Cardiovascular:      Rate and Rhythm: Regular rhythm. Tachycardia present.   Pulmonary:      Effort: Pulmonary effort is normal. No respiratory distress.   Abdominal:      General: There is no distension.   Musculoskeletal:      Cervical back: Normal range of motion.      Right lower leg: No edema.      Left lower leg: No edema.   Skin:     General: Skin is warm and dry.   Neurological:      General: No focal deficit present.      Mental Status: She is alert and oriented to person, place, and time.      Cranial Nerves: No dysarthria or facial asymmetry.      Sensory: No sensory deficit.      Motor: Weakness present. No pronator drift.      Coordination: Finger-Nose-Finger Test normal.   Psychiatric:         Mood and Affect: Mood normal.         Behavior: Behavior normal.     Neurological Exam:   LOC: alert  Attention Span: Good   Language: No aphasia  Articulation: No dysarthria  Orientation: Person, Place, Time   Visual Fields: Full  Facial Movement (CN VII): Symmetric facial expression    Motor: Moves all extremities spontaneously and to command; generalized weakness 4/5 throughout  Cerebellum: No evidence of appendicular or axial ataxia  Sensation: Intact to light touch      Laboratory:  CMP:   Recent Labs   Lab 10/11/22  0756   CALCIUM 9.8   ALBUMIN 2.9*   PROT 7.5   *   K 4.3   CO2 14*      BUN 42*   CREATININE 1.0   ALKPHOS 93   ALT 7*   AST 8*   BILITOT 0.6     CBC:   Recent Labs   Lab 10/11/22  0756   WBC 1.27*   RBC 2.27*   HGB 7.9*   HCT 23.1*   PLT 15*   *   MCH 34.8*   MCHC 34.2     Lipid Panel: No results for input(s): CHOL, LDLCALC, HDL, TRIG in the last 168 hours.  Coagulation: No results for input(s): PT, INR, APTT in the last 168 hours.  Hgb A1C: No results for input(s): HGBA1C in the last 168 hours.  TSH: No results for input(s): TSH in the last 168  hours.    Diagnostic Results:      Brain/Vessel Imaging:  CTA Stroke MP 10/11/2022  No evidence of LVO or high grade stenosis. No hemorrhage. No evidence of acute infarct.           Miriam Ortiz PA-C  Comprehensive Stroke Center  Department of Vascular Neurology   [facility] [place])

## 2022-10-11 NOTE — ASSESSMENT & PLAN NOTE
- Continue home buspirone and quetiapine.   - Trintellix is nonformulary however pharmacy is attempting to obtain this medication.

## 2022-10-11 NOTE — ASSESSMENT & PLAN NOTE
- daily CBC while inpatient  - presented 10/11 with WBC 1.27, Hgb 7.9, Plt 15K and ANC 10  - transfuse for Hgb <7, Plt <10K or bleeding  - continue ppx acyclovir, fluconazole, levaquin  - consider bone marrow biopsy in coming days to assess disease status

## 2022-10-11 NOTE — ASSESSMENT & PLAN NOTE
- Continue home diltiazem  - Continue to hold home xarelto in the setting of severe thrombocytopenia

## 2022-10-11 NOTE — NURSING
"Patient admitted to room 803 from oncology clinic; accompanied by  Mannie, representing as a good historian. Tachy in 110s but otherwise vital signs stable on room air. Oriented to self and situation; disorganized speech initially but later saying she is at "[facility]";  reports no change in mental status since 48 hours prior including her disorganized speech. ncreased fatigue and extremity weakness secondary to being bed-ridden x2-3 weeks. Moisture-derm bed-sores x2 noted to buttocks present on admission; patient's  reports performing wound care at home but with no improvement; inpatient WC consulted for evaluation. LDAs: wolf catheter, unaccessed chest wall port, and healing ommaya installation site to right head. Informed of unit routines. Addressed all needs for comfort and orientation to floor. MD notified of arrival to room; Shanta STEWART performing assessment at this time. WCTM.    "

## 2022-10-11 NOTE — HPI
Ms. Gonzalez is a 51 year old female with extensive medical history including Precursor-B-Cell-ALL, pancytopenia, paroxysmal AFIB (on AC), HLD, DM, HTN, known seizure disorder, and recent small SAH (resolved), presented to [facility] today as direct transfer from [facility] for AMS, confusion and increased lethargy while at her appointment. Decision was made by outpatient and inpatient heme/onc teams to direct admit to [facility]. Stroke code activated initially, however, code was cancelled and regular consult requested due to a LKN approximately 48-72 hours ago, putting her out of window for any intervention. On arrival of stroke team, NIHSS 0, patient is alert and oriented. No focal deficits appreciated. STAT CTA Stroke MP obtained, no evidence of LVO or high grade stenosis. No evidence of hemorrhage or acute infarct.

## 2022-10-11 NOTE — SUBJECTIVE & OBJECTIVE
Past Medical History:   Diagnosis Date    Cancer     COPD (chronic obstructive pulmonary disease)     Hypertension     Rheumatoid arthritis, unspecified     SAH (subarachnoid hemorrhage) 9/17/2022    Seizures     Stroke     TIA x 4    Type 2 diabetes mellitus with circulatory disorder, without long-term current use of insulin      Past Surgical History:   Procedure Laterality Date    APPENDECTOMY      HYSTERECTOMY      OMMAYA RESERVIOR INSERTION N/A 9/26/2022    Procedure: INSERTION, OMMAYA RESERVOIR;  Surgeon: Mj Alberto MD;  Location: [facility] OR [address] [place] [address];  Service: Neurosurgery;  Laterality: N/A;    ROTATOR CUFF REPAIR Bilateral     TONSILLECTOMY      TUBAL LIGATION       Review of patient's allergies indicates:   Allergen Reactions    Levetiracetam      Other reaction(s): Unknown  Other reaction(s): Unknown  Other reaction(s): Unknown     No current facility-administered medications on file prior to encounter.     Current Outpatient Medications on File Prior to Encounter   Medication Sig    acyclovir (ZOVIRAX) 400 MG tablet Take 1 tablet (400 mg total) by mouth 2 (two) times daily.    acyclovir (ZOVIRAX) 400 MG tablet Take 1 tablet (400 mg total) by mouth 2 (two) times daily.    allopurinoL (ZYLOPRIM) 300 MG tablet Take 1 tablet (300 mg total) by mouth once daily.    artificial tears (ISOPTO TEARS) 0.5 % ophthalmic solution Place 1 drop into both eyes 4 (four) times daily as needed.    atorvastatin (LIPITOR) 80 MG tablet Take 80 mg by mouth once daily.    blood sugar diagnostic Strp SMARTSIG:Via Meter 1 to 2 Times Daily    busPIRone (BUSPAR) 10 MG tablet Take 10 mg by mouth 2 (two) times daily.    butalbital-acetaminophen-caffeine -40 mg (FIORICET, ESGIC) -40 mg per tablet Take 1 tablet by mouth every 6 (six) hours as needed for Headaches.    cetirizine (ZYRTEC) 10 MG tablet Take 1 tablet (10 mg total) by mouth once daily.    dapagliflozin (FARXIGA) 10 mg tablet Take 10 mg by mouth once daily.     diazePAM (VALIUM) 10 MG Tab Take 10 mg by mouth 2 (two) times daily as needed.    diltiaZEM (CARDIZEM) 90 MG tablet Take 1 tablet (90 mg total) by mouth every 6 (six) hours.    duke's soln (benadryl 30 mL, mylanta 30 mL, LIDOcaine 30 mL, nystatin 30 mL) 120mL Take 10 mLs by mouth 4 (four) times daily as needed (mouth pain).    fenofibrate 160 MG Tab Take 160 mg by mouth once daily.    fluconazole (DIFLUCAN) 200 MG Tab Take 2 tablets (400 mg total) by mouth once daily.    gabapentin (NEURONTIN) 300 MG capsule Take 1 capsule (300 mg total) by mouth 3 (three) times daily.    hydrOXYchloroQUINE (PLAQUENIL) 200 mg tablet Take 1 tablet (200 mg total) by mouth once daily.    hydrOXYzine pamoate (VISTARIL) 50 MG Cap Take 50 mg by mouth 2 (two) times daily as needed.    levoFLOXacin (LEVAQUIN) 500 MG tablet Take 1 tablet (500 mg total) by mouth once daily.    LIDOcaine (LIDODERM) 5 % Place 1 patch onto the skin once daily. Remove & Discard patch within 12 hours or as directed by MD    losartan (COZAAR) 25 MG tablet Take 25 mg by mouth once daily.    magnesium oxide (MAG-OX) 400 mg (241.3 mg magnesium) tablet Take 1 tablet by mouth once daily.    metFORMIN (GLUCOPHAGE-XR) 500 MG ER 24hr tablet Take 1,000 mg by mouth 2 (two) times daily.    naloxone (NARCAN) 4 mg/actuation Spry 4mg by nasal route as needed for opioid overdose; may repeat every 2-3 minutes in alternating nostrils until medical help arrives. Call 911    omeprazole (PRILOSEC) 40 MG capsule Take 40 mg by mouth once daily.    ondansetron (ZOFRAN-ODT) 8 MG TbDL Take 1 tablet (8 mg total) by mouth every 8 (eight) hours as needed (in case of chemo induced nausea).    ondansetron (ZOFRAN-ODT) 8 MG TbDL Take 1 tablet (8 mg total) by mouth every 8 (eight) hours as needed (nausea or vomiting).    ONETOUCH VERIO TEST STRIPS Strp SMARTSIG:Via Meter 1 to 2 Times Daily    OXcarbazepine (TRILEPTAL) 600 MG Tab Take 1,200 mg by mouth 2 (two) times daily.    polyethylene glycol  (GLYCOLAX) 17 gram/dose powder Dissolve one capful (17 g) in liquid and take by mouth daily as needed (constipation).    PONATinib (ICLUSIG) 30 mg Tab Take 1 tablet (30 mg) by mouth once daily.    potassium chloride (K-TAB) 20 mEq Take 20 mEq by mouth 2 (two) times daily.    prochlorperazine (COMPAZINE) 10 MG tablet Take 1 tablet (10 mg total) by mouth every 6 (six) hours as needed for Nausea.    QUEtiapine (SEROQUEL) 200 MG Tab Take 200 mg by mouth every evening.    rivaroxaban (XARELTO) 20 mg Tab Take 1 tablet (20 mg total) by mouth daily with dinner or evening meal. ** Hold until instructed to resume by provider due to low platelet count. **    senna-docusate 8.6-50 mg (PERICOLACE) 8.6-50 mg per tablet Take 1 tablet by mouth 2 (two) times daily.    sumatriptan (IMITREX) 50 MG tablet Take 1 tablet (50 mg total) by mouth daily as needed (headache).    TRINTELLIX 20 mg Tab Take 1 tablet by mouth once daily.    ursodioL (ACTIGALL) 300 mg capsule Take 1 capsule (300 mg total) by mouth 3 (three) times daily.     Family History    None       Tobacco Use    Smoking status: Former     Packs/day: 0.50     Types: Cigarettes     Quit date: 3/16/2022     Years since quittin.5    Smokeless tobacco: Never   Substance and Sexual Activity    Alcohol use: No    Drug use: Never    Sexual activity: Not on file     Review of Systems   Unable to perform ROS: Mental status change   Constitutional:  Positive for activity change and fatigue. Negative for fever.   HENT:  Positive for voice change.    Eyes:  Positive for visual disturbance.   Respiratory:  Negative for shortness of breath.    Cardiovascular:  Negative for chest pain.   Gastrointestinal:  Positive for nausea. Negative for vomiting.   Musculoskeletal:  Positive for gait problem.   Skin:  Negative for rash.   Allergic/Immunologic: Positive for immunocompromised state.   Neurological:  Positive for speech difficulty, weakness and headaches.   Psychiatric/Behavioral:   Positive for confusion, decreased concentration, dysphoric mood and sleep disturbance.    Objective:     Vital Signs (Most Recent):  Temp: 99 °F (37.2 °C) (10/11/22 1155)  Pulse: 107 (10/11/22 1501)  Resp: 18 (10/11/22 1155)  BP: 120/68 (10/11/22 1155)  SpO2: 99 % (10/11/22 1155) Vital Signs (24h Range):  Temp:  [97.9 °F (36.6 °C)-99.4 °F (37.4 °C)] 99 °F (37.2 °C)  Pulse:  [107-122] 107  Resp:  [18-20] 18  SpO2:  [95 %-100 %] 99 %  BP: (115-126)/(58-91) 120/68     Weight: 53 kg (116 lb 13.5 oz)  Body mass index is 20.06 kg/m².    Physical Exam  Constitutional:       Appearance: She is ill-appearing and toxic-appearing.      Comments: Mouth sores present    Eyes:      Pupils: Pupils are equal, round, and reactive to light.   Pulmonary:      Effort: No respiratory distress.   Musculoskeletal:      Cervical back: Normal range of motion.   Neurological:      Coordination: Finger-Nose-Finger Test abnormal (L>R).   Psychiatric:         Speech: Speech is slurred.     NEUROLOGICAL EXAMINATION:     MENTAL STATUS   Oriented to month.   Follows 1 step commands.   Attention: decreased. Concentration: decreased.   Speech: slurred   Level of consciousness: drowsy ,  arousable by verbal stimuli       Oriented to self and  at bedside  Able to name [facility]      CRANIAL NERVES     CN III, IV, VI   Pupils are equal, round, and reactive to light.    CN V   Facial sensation intact.     CN VIII   Hearing: intact    CN IX, X   Palate: symmetric    CN XI   CN XI normal.     CN XII   CN XII normal.        No light perception in left eye  Visual fields appear intact in R eye   Restricted eye movements  Multidirectional nystagmus   Square wave jerks present      MOTOR EXAM   Muscle bulk: decreased  Overall muscle tone: normal       Generalized weakness 4/5 throughout   No focal deficits noted      GAIT AND COORDINATION     Gait  Gait: (deferred)     Coordination   Finger to nose coordination: abnormal  (L>R)    Tremor   Resting tremor: absent    Significant Labs: All pertinent lab results from the past 24 hours have been reviewed.    Significant Imaging: I have reviewed all pertinent imaging results/findings within the past 24 hours.    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 Atherosclerotic plaquing cavernous ICAs with mild narrowing.  No evidence for high-grade proximal stenosis or occlusion throughout the intracranial arterial circulation.     CTA neck: Less than 50% proximal ICA stenosis by NASCET criteria. 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     CT head: No evidence for acute intracranial hemorrhage or sulcal effacement to suggest large territory recent infarction allowing for artifact from motion. Operative change with right frontal coursing Ommaya reservoir catheter without evidence for hydrocephalus  Nonspecific hypodensity along the right frontal coursing parenchymal catheter tract which may be related to post treatment change and clinical correlation advised.

## 2022-10-11 NOTE — ASSESSMENT & PLAN NOTE
Ms. Gonzalez is a 51 year old female with extensive medical history including Precursor-B-Cell-ALL, pancytopenia, paroxysmal AFIB (on AC), HLD, DM, HTN, known seizure disorder, and recent small SAH (resolved), [place] [facility] [place] [facility] for AMS, confusion and increased lethargy while at her appointment. Decision was made by outpatient and inpatient heme/onc teams to [place] [facility] Stroke code activated initially, however, code was cancelled and regular consult requested due to a LKN approximately 48-72 hours ago, putting her out of window for any intervention. On arrival of stroke team, NIHSS 0, patient is alert and oriented. No focal deficits appreciated. STAT CTA Stroke MP obtained, no evidence of LVO or high grade stenosis. No evidence of hemorrhage or acute infarct.      Given negative imaging and no evidence of focal deficits, it is unlikely that patient's symptoms are related to a cerebrovascular origin. Unable to complete MRI at this time due to presence of pacemaker. We recommend exploring other possible etiologies as cause for symptoms such as hematology/oncology, metabolic, or alternative neurologic causes. Recommend consult with General Neurology. Recommend continuing with treatment in place for stroke risk factors such as HTN, DM, HLD, and AFIB. Stroke team will sign off at this time. Please do not hesitate to contact stroke team for any further questions or concerns. Thank you for your consult.

## 2022-10-11 NOTE — ASSESSMENT & PLAN NOTE
51 y.o. female with HTN, HLD, DM, COPD, pAfib s/p implantable loop recorder/pacemaker, B cell ALL with known CNS involvement and seizure disorder was directly admitted 10/11/22 from [facility] for AMS and progressive functional decline. Recent admission (9/16-10/1/22). for acute, painless progressive vision loss in left eye. Exam with APD and CSF 9/19 with lymphocytic pleocytosis, elevated protein and numerous blastoid atypical cells. 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 Received Ommaya reservoir (9/27/22) and started on bi-weekly IT chemotherapy with cytarabine, hydrocortisone and MTX.     This admission, stroke code was activated. CTA MP without large vessel occlusion or high grade stenosis. No acute intracranial pathology. Unable to obtain MRI due to non compatible pacemaker. General Neurology was consulted 10/11/22. Regarding seizure hx,  says last known clinical seizure was April 2022. On Oxcarb 1200 mg BID at home, reports adherence to regimen.     Presentation concerning for infection in immunocompromised patient, progression of known CNS leukemia, neurotoxicity of chemotherapy, seizures     Recommendations:  --continue home oxcarb 1200 mg BID  Check level  --EEG >1 hr, continue seizure precautions  --pancytopenia, continue management per primary  Will discuss when safe to get CSF from ommaya reservoir with oncology  --B vitamins, folate pending, poor PO intake at home  --consider ophthalmology consult given new progression of eye symptoms since last admission   --palliative care consulted for goals of care discussion

## 2022-10-11 NOTE — ASSESSMENT & PLAN NOTE
- Follows with Dr. Jenkins. 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 Not has been on therapy for past few weeks.  - Not a candidate for allogeneic stem cell transplant at this time.  - Presented 9/16/22 with left eye vision loss and worsening vision of left eye  - Patient with pacemaker and per patient, not compatible with MRI. Would ideally obtain MRI brain w/ w/o contrast. Verified with EP who checked with rep that pacemaker is not MRI compatible.   - CTA completed with delayed venous phase revealing small SAH   - LP with IT chemo on 9/19 positive for B-cell ALL; will require twice weekly IT until clearance at minimum  - Ommaya placed by NSGY on 9/26/22; can use ommaya on POD #5 (10/1)  - Started twice weekly IT chemo with ommaya on 10/3  - Currently on ponatinib, hold at this time   - Hold IT therapy until she is deemed appropriate for additional therapy  - Plan for bone marrow biopsy to assess disease response given pancytopenia (send ALL MRD testing, as well as quantitative bcr/abl p190 transcript)  - Consider consulting palliative care with worsening clinical status

## 2022-10-11 NOTE — PLAN OF CARE
Patient resting in bed since events during admission; stroke r/o and stroke code cancelled. Afebrile & VSS on room air. Continuous telemetry with mild tachycardia (HR 110s in sinus rhythm). AA+Oriented x3. EEG monitoring ordered; awaiting set up from technicians. NS infusing continuously. PRNs given for moderate mucositis; poor oral intake 2/2 pain but swallows safely. Turning frequently; awaiting wound care evaluation for buttocks wounds (present on arrival). BG monitored AC/HS. Green draining clear yellow urine. POC reviewed with patient and  at bedside. All needs addressed. Will continue to monitor with frequent rounds and clustered care to promote rest.

## 2022-10-11 NOTE — PROGRESS NOTES
HEMATOLOGIC MALIGNANCIES PROGRESS NOTE    IDENTIFYING STATEMENT   Deepti Davison) is a 51 y.o. female with a  of 1970 from [place], [place] with the diagnosis of B-ALL.     ONCOLOGY HISTORY:    1. Precursor B-cell acute lymphoblastic leukemia (Ph+)   A. 2021: Transferred to [facility] from outside hospital for evaluation for acute leukemia   B. 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    C. 2021 - 2021: Vincristine + imatinib induction; hospital course was complicated by acute hypoxemic respiratory failure which resolved with diuresis and treatment of ALL   D. 1/3/2022: Bone marrow biopsy after induction shows normocellular marrow (60%) with no definite evidence of B-ALL; bcr-abl p190 fusion detected at 0.02% of total ABL1; FISH normal; MRD testing not sent   E. 2022: Begin consolidation therapy with [id number] protocol   F. 3/16/2022: BCR-ABL p190 transcript on peripheral blood negative   G. 2022: BCR-ABL p190 transcript on peripheral blood 0.63%   H. 2022: Bone marrow biopsy shows 50% cellular marrow with relapsed B-ALL; bone marrow aspirate shows 23.5% blasts; cytogenetics 46,XX,-9,t(9;22)(q34;q11.2),+isaias(22)t(9;22)[5] /46,XX[15]; FISH shows 9.5% nuclei with bcr/abl1 fusion; bcr/abl1 quantitative PCR shows 47% of total ABL1  I. Subsequently attempted blinatumomab but had to stop therapy due to Grade 3 CRS  J. 2022 - 2022: inotuzumab ozogamicin + ponatinib therapy  K. 2022: Bone marrow biopsy shows hypocellular marrow with less than 5% cellularity and no morphologic or immunophenotypic evidence of B-ALL; cytogenetics 46,XX; bcr/abl PCR detects p190 fusion form (0.03%).   L.  8/8/2022: Inotuzumab discontinued. Therapy held.   M. 9/19/2022: During hospital presentation for neurologic changes - CSF shows involvement by B-ALL (CNS relapse).     2. History of TIA  3. Seizure disorder  4. Anxiety and depression  5. Chronic obstructive pulmonary disease  6. Paroxysmal atrial fibrillation  7. Implantable loop recorder and pacemaker in place  8. Peripheral artery disease  9. Diabetes mellitus, type 2  10. Gastroesophageal reflux disease  11. Gastroparesis  12. Rheumatoid arthritis  13. Osteoporosis  14. History of tobacco use     INTERVAL HISTORY:      Ms. Gonzalez presents today for follow-up of relapsed Ph+ B-ALL. She is presenting in hospital follow-up from her hospitalization from 9/16/2022 - 10/1/2022. During this hospitalization, she was diagnosed with CNS relapse of B-ALL. Ommaya reservoir was placed, and she ahs been receiving twice weekly IT chemotherapy with hydrocortisone, methotrexate, and cytarabine.    Her  states she has been progressively declining over the past week. He has noticed changes in coordination. No new vision changes. She has mouth sores and poor oral intake. Very little fluid intake. She is extremely fatigued.     Past Medical History, Past Social History and Past Family History have been reviewed and are unchanged except as noted in the interval history.    MEDICATIONS:     Current Outpatient Medications on File Prior to Visit   Medication Sig Dispense Refill    acyclovir (ZOVIRAX) 400 MG tablet Take 1 tablet (400 mg total) by mouth 2 (two) times daily. 60 tablet 11    acyclovir (ZOVIRAX) 400 MG tablet Take 1 tablet (400 mg total) by mouth 2 (two) times daily. 60 tablet 0    allopurinoL (ZYLOPRIM) 300 MG tablet Take 1 tablet (300 mg total) by mouth once daily. 90 tablet 3    artificial tears (ISOPTO TEARS) 0.5 % ophthalmic solution Place 1 drop into both eyes 4 (four) times daily as needed.      atorvastatin (LIPITOR) 80 MG tablet Take 80 mg by mouth once daily.       blood sugar diagnostic Strp SMARTSIG:Via Meter 1 to 2 Times Daily      busPIRone (BUSPAR) 10 MG tablet Take 10 mg by mouth 2 (two) times daily.      butalbital-acetaminophen-caffeine -40 mg (FIORICET, ESGIC) -40 mg per tablet Take 1 tablet by mouth every 6 (six) hours as needed for Headaches. 30 tablet 0    cetirizine (ZYRTEC) 10 MG tablet Take 1 tablet (10 mg total) by mouth once daily. 60 tablet 3    dapagliflozin (FARXIGA) 10 mg tablet Take 10 mg by mouth once daily.      diazePAM (VALIUM) 10 MG Tab Take 10 mg by mouth 2 (two) times daily as needed.      diltiaZEM (CARDIZEM) 90 MG tablet Take 1 tablet (90 mg total) by mouth every 6 (six) hours. 120 tablet 11    duke's soln (benadryl 30 mL, mylanta 30 mL, LIDOcaine 30 mL, nystatin 30 mL) 120mL Take 10 mLs by mouth 4 (four) times daily as needed (mouth pain). 120 mL 0    fenofibrate 160 MG Tab Take 160 mg by mouth once daily.      fluconazole (DIFLUCAN) 200 MG Tab Take 2 tablets (400 mg total) by mouth once daily. 60 tablet 0    gabapentin (NEURONTIN) 300 MG capsule Take 1 capsule (300 mg total) by mouth 3 (three) times daily. 90 capsule 11    hydrOXYchloroQUINE (PLAQUENIL) 200 mg tablet Take 1 tablet (200 mg total) by mouth once daily.      hydrOXYzine pamoate (VISTARIL) 50 MG Cap Take 50 mg by mouth 2 (two) times daily as needed.      levoFLOXacin (LEVAQUIN) 500 MG tablet Take 1 tablet (500 mg total) by mouth once daily. 30 tablet 0    LIDOcaine (LIDODERM) 5 % Place 1 patch onto the skin once daily. Remove & Discard patch within 12 hours or as directed by MD 30 patch 2    losartan (COZAAR) 25 MG tablet Take 25 mg by mouth once daily.      magnesium oxide (MAG-OX) 400 mg (241.3 mg magnesium) tablet Take 1 tablet by mouth once daily.      metFORMIN (GLUCOPHAGE-XR) 500 MG ER 24hr tablet Take 1,000 mg by mouth 2 (two) times daily.      naloxone (NARCAN) 4 mg/actuation Spry 4mg by nasal route as needed for opioid overdose; may repeat every 2-3 minutes  in alternating nostrils until medical help arrives. Call 911 1 each 11    omeprazole (PRILOSEC) 40 MG capsule Take 40 mg by mouth once daily.      ondansetron (ZOFRAN-ODT) 8 MG TbDL Take 1 tablet (8 mg total) by mouth every 8 (eight) hours as needed (in case of chemo induced nausea). 30 tablet 1    ondansetron (ZOFRAN-ODT) 8 MG TbDL Take 1 tablet (8 mg total) by mouth every 8 (eight) hours as needed (nausea or vomiting). 60 tablet 0    ONETOUCH VERIO TEST STRIPS Strp SMARTSIG:Via Meter 1 to 2 Times Daily      OXcarbazepine (TRILEPTAL) 600 MG Tab Take 1,200 mg by mouth 2 (two) times daily.      polyethylene glycol (GLYCOLAX) 17 gram/dose powder Dissolve one capful (17 g) in liquid and take by mouth daily as needed (constipation). 510 g 0    PONATinib (ICLUSIG) 30 mg Tab Take 1 tablet (30 mg) by mouth once daily. 30 tablet 0    potassium chloride (K-TAB) 20 mEq Take 20 mEq by mouth 2 (two) times daily.      prochlorperazine (COMPAZINE) 10 MG tablet Take 1 tablet (10 mg total) by mouth every 6 (six) hours as needed for Nausea. 30 tablet 2    QUEtiapine (SEROQUEL) 200 MG Tab Take 200 mg by mouth every evening.      rivaroxaban (XARELTO) 20 mg Tab Take 1 tablet (20 mg total) by mouth daily with dinner or evening meal. ** Hold until instructed to resume by provider due to low platelet count. **      senna-docusate 8.6-50 mg (PERICOLACE) 8.6-50 mg per tablet Take 1 tablet by mouth 2 (two) times daily. 60 tablet 3    TRINTELLIX 20 mg Tab Take 1 tablet by mouth once daily.      ursodioL (ACTIGALL) 300 mg capsule Take 1 capsule (300 mg total) by mouth 3 (three) times daily. 90 capsule 11    sumatriptan (IMITREX) 50 MG tablet Take 1 tablet (50 mg total) by mouth daily as needed (headache). 30 tablet 2     No current facility-administered medications on file prior to visit.       ALLERGIES:   Review of patient's allergies indicates:   Allergen Reactions    Levetiracetam      Other reaction(s): Unknown  Other reaction(s):  "Unknown  Other reaction(s): Unknown        ROS:       Review of Systems   Constitutional:  Positive for fatigue (improved significantly) and unexpected weight change (weight loss). Negative for diaphoresis and fever.   HENT:   Positive for mouth sores. Negative for lump/mass and sore throat.    Eyes:  Negative for icterus.   Respiratory:  Negative for cough and shortness of breath.    Cardiovascular:  Negative for palpitations.   Gastrointestinal:  Negative for abdominal distention, abdominal pain, constipation, diarrhea, nausea and vomiting.   Genitourinary:  Negative for difficulty urinating, dysuria and frequency.    Musculoskeletal:  Positive for back pain (chronic). Negative for arthralgias, gait problem and myalgias.   Skin:  Negative for rash.   Neurological:  Negative for dizziness, gait problem and headaches.        Coordination problems   Hematological:  Negative for adenopathy. Bruises/bleeds easily.   Psychiatric/Behavioral:  The patient is not nervous/anxious.      PHYSICAL EXAM:  Vitals:    10/11/22 0757   BP: (!) 115/58   Pulse: (!) 122   Resp: 18   Temp: 99.4 °F (37.4 °C)   TempSrc: Oral   SpO2: 99%   Height: 5' 4" (1.626 m)   PainSc:   3   PainLoc: Mouth       KARNOFSKY PERFORMANCE STATUS 70%  ECOG 1    Physical Exam  Constitutional:       Appearance: She is ill-appearing.   HENT:      Head: Normocephalic and atraumatic.      Right Ear: External ear normal.      Left Ear: External ear normal.      Nose: Nose normal.      Mouth/Throat:      Comments: Multiple clean-based oral ulcers are present  Eyes:      Comments: Fundoscopic exam shows no retinal hemorrhages   Cardiovascular:      Rate and Rhythm: Tachycardia present.   Pulmonary:      Effort: Pulmonary effort is normal.   Abdominal:      General: Abdomen is flat. There is no distension.      Palpations: Abdomen is soft. There is no mass.      Tenderness: There is no abdominal tenderness. There is no guarding or rebound.   Musculoskeletal:         " General: Normal range of motion.      Cervical back: Normal range of motion and neck supple.   Skin:     Findings: No rash.   Neurological:      General: No focal deficit present.      Mental Status: She is alert and oriented to person, place, and time.   Psychiatric:         Mood and Affect: Mood normal.         Thought Content: Thought content normal.        LAB:       Results for orders placed or performed in visit on 10/11/22   CBC Auto Differential   Result Value Ref Range    WBC 1.27 (LL) 3.90 - 12.70 K/uL    RBC 2.27 (L) 4.00 - 5.40 M/uL    Hemoglobin 7.9 (L) 12.0 - 16.0 g/dL    Hematocrit 23.1 (L) 37.0 - 48.5 %     (H) 82 - 98 fL    MCH 34.8 (H) 27.0 - 31.0 pg    MCHC 34.2 32.0 - 36.0 g/dL    RDW 17.9 (H) 11.5 - 14.5 %    Platelets 15 (LL) 150 - 450 K/uL    MPV 12.1 9.2 - 12.9 fL    Immature Granulocytes 0.0 0.0 - 0.5 %    Gran # (ANC) 0.0 (L) 1.8 - 7.7 K/uL    Immature Grans (Abs) 0.00 0.00 - 0.04 K/uL    Lymph # 1.2 1.0 - 4.8 K/uL    Mono # 0.0 (L) 0.3 - 1.0 K/uL    Eos # 0.1 0.0 - 0.5 K/uL    Baso # 0.00 0.00 - 0.20 K/uL    nRBC 0 0 /100 WBC    Gran % 0.8 (L) 38.0 - 73.0 %    Lymph % 94.5 (H) 18.0 - 48.0 %    Mono % 0.0 (L) 4.0 - 15.0 %    Eosinophil % 4.7 0.0 - 8.0 %    Basophil % 0.0 0.0 - 1.9 %    Differential Method Automated    Comprehensive Metabolic Panel   Result Value Ref Range    Sodium 133 (L) 136 - 145 mmol/L    Potassium 4.3 3.5 - 5.1 mmol/L    Chloride 103 95 - 110 mmol/L    CO2 14 (L) 23 - 29 mmol/L    Glucose 124 (H) 70 - 110 mg/dL    BUN 42 (H) 6 - 20 mg/dL    Creatinine 1.0 0.5 - 1.4 mg/dL    Calcium 9.8 8.7 - 10.5 mg/dL    Total Protein 7.5 6.0 - 8.4 g/dL    Albumin 2.9 (L) 3.5 - 5.2 g/dL    Total Bilirubin 0.6 0.1 - 1.0 mg/dL    Alkaline Phosphatase 93 55 - 135 U/L    AST 8 (L) 10 - 40 U/L    ALT 7 (L) 10 - 44 U/L    Anion Gap 16 8 - 16 mmol/L    eGFR >60.0 >60 mL/min/1.73 m^2       PROBLEMS ASSESSED THIS VISIT:    1. B-cell acute lymphoblastic leukemia    2. Encounter for  screening    3. Pancytopenia    4. Mucositis        PLAN:       Ph+ B-ALL   Ms. 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 She was treated with inotuzumab and ponatinib, followed by cessation of inotuzumab (due to cytopenias). She achieved molecular response on ponatinib.    Her course most recently was complicated by CNS relapse, diagnosed during hospitalization from 9/16-10/1. She had Ommaya reservoir placed and is now receiving triple IT chemotherapy. Most recent CSF shows clearance of leukemic cells.    Unfortunately, she is having new coordination difficulties today. She is severely pancytopenic. Hold all therapy. We will admit to evaluate. I recommend the following:  - Hold ponatinib  - Head CT without contrast; MRI brain with contrast if no acute findings  - Bone marrow biopsy to assess disease response (send ALL MRD testing, as well as quantitative bcr/abl p190 transcript)  - IV fluids for mucositis  - Pain control as needed  - Consult palliative care  - Transfuse platelets  - Hold IT therapy until the above are complete and she is deemed appropriate for additional therapy    Follow-up  Pending hospital discharge    Route Chart for Scheduling    BMT Chart Routing      Follow up with physician . Patient being admitted - will have follow-up arranged by discharge team   Follow up with DANUTA    Infusion scheduling note    Injection scheduling note    Labs    Imaging    Pharmacy appointment    Other referrals          Supportive Plan Information  OP TRIPLE THERAPY INTRATHECAL (METHOTREXATE CYTARABINE HYDROCORTISONE)   Yusuf Joseph MD   Upcoming Treatment Dates - OP TRIPLE THERAPY INTRATHECAL (METHOTREXATE CYTARABINE HYDROCORTISONE)    10/10/2022       Intrathecal Administration       cytarabine (PF) (CYTOSAR) 30 mg, hydrocortisone sod succ (PF) 30 mg, methotrexate (PF) 15 mg in sodium chloride 0.9% 6 mL INTRATHECAL chemo  injection  10/13/2022       Intrathecal Administration       cytarabine (PF) (CYTOSAR) 30 mg, hydrocortisone sod succ (PF) 30 mg, methotrexate (PF) 15 mg in sodium chloride 0.9% 6 mL INTRATHECAL chemo injection  10/17/2022       Intrathecal Administration       cytarabine (PF) (CYTOSAR) 30 mg, hydrocortisone sod succ (PF) 30 mg, methotrexate (PF) 15 mg in sodium chloride 0.9% 6 mL INTRATHECAL chemo injection    Therapy Plan Information  Flushes  heparin, porcine (PF) 100 unit/mL injection flush 500 Units  500 Units, Intravenous, Every visit  sodium chloride 0.9% flush 10 mL  10 mL, Intravenous, Every visit      Dayron Jenkins MD  Hematology and Stem Cell Transplant

## 2022-10-11 NOTE — HPI
51 y.o. female with HTN, HLD, DM, COPD, pAfib s/p implantable loop recorder/pacemaker, B cell ALL with known CNS involvement and seizure disorder was directly admitted 10/11/22 from [facility] for AMS described as lethargy at appointment.  also notes progressive functional decline since last admission (9/16-10/1/22). During recent admission, she reported acute, painless progressive vision loss in left eye. No light perception on 9/12/22. Found to have incidental small focal SAH L frontal lobe felt noncontributory to visual disturbance. She was evaluated by Ophthalmology and exam remarkable for nakia APD. CSF 9/19 with lymphocytic pleocytosis with elevated protein (43), pathology with numerous blastoid atypical cells. 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 Received Ommaya reservoir (9/27/22) and started on bi-weekly IT chemotherapy with cytarabine, hydrocortisone and MTX. Hospital course further complicated by fevers, started on empiric Cefepime until infectious workup resulted unremarkable. Positive orthostatic vitals improved after IVFs. Ongoing nausea with prn zofran. Was discharged home 10/1 with home PT. Following discharge,  reports she has largely been bed bound and sleeping most of the time. Having coordination difficulty using phone and remote control. This admission, stroke code was activated. CTA MP without large vessel occlusion or high grade stenosis. No acute intracranial pathology. Unable to obtain MRI due to non compatible pacemaker. Vascular Neurology felt presentation more likely due to progression of CNS ALL vs. Neurotoxicity of chemotherapy vs. Alternative neurologic causes, so General Neurology was consulted 10/11/22. Regarding seizure hx,  says last known clinical seizure was April 2022. On Oxcarb 1200 mg BID at home, reports adherence to regimen. Managed by Neurologist in [place]. Per chart review, regarding  "oncology hx, she was treated with "inotuzumab and ponatinib, followed by cessation of inotuzumab (due to cytopenias). She achieved molecular response on ponatinib. Unfortunately, she is now diagnosed with CNS relapse as of 9/19/22. She had an Ommaya placed on 9/26/22 and has been receiving twice weekly IT triple chemotherapy. Most recent CSF (10/6/22) shows clearance of leukemic cells."                            Ms. Gonzalez presents today for follow-up of relapsed Ph+ B-ALL. She is presenting in hospital follow-up from her hospitalization from 9/16/2022 - 10/1/2022. During this hospitalization, she was diagnosed with CNS relapse of B-ALL. Ommaya reservoir was placed, and she ahs been receiving twice weekly IT chemotherapy with hydrocortisone, methotrexate, and cytarabine.     Her  states she has been progressively declining over the past week. He has noticed changes in coordination. No new vision changes. She has mouth sores and poor oral intake. Very little fluid intake. She is extremely fatigued.      "

## 2022-10-11 NOTE — ASSESSMENT & PLAN NOTE
- Start IVF for gentle hydration   - Doug QID  - Holding any further sedating medications (opioids) at this time

## 2022-10-11 NOTE — HPI
Mrs. Gonzalez is a 50 yo F with CNS relapse of Ph+ B cell ALL. Other PMH significant for paroxsymal A-fib with an implantable loop recorder/pacemaker in place, anxiety/depression, COPD, DM2, gastroparesis, GERD, osteoporosis, PAD, RA, seizures, TIA, tobacco use. 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 She was treated with inotuzumab and ponatinib, followed by cessation of inotuzumab (due to cytopenias). She achieved molecular response on ponatinib. Unfortunately, she is now diagnosed with CNS relapse as of 9/19/22. She had an Ommaya placed on 9/26/22 and has been receiving twice weekly IT triple chemotherapy. Most recent CSF shows clearance of leukemic cells.     She presented to outpatient BMT clinic for follow-up today where her  stated that Mrs. Gonzalez had been declining over the 72hrs. She now has coordination difficulties and is very fatigued. She denies new vision changes (still with complete left eye vision loss). Also of note, she is severely pancytopenic, has mouth sores, and poor oral intake/nausea. Denies any recent fevers, chills, chest pain, sob, diarrhea, constipation, bleeding or bruising. Admitted for workup of new coordination difficulties in setting of relapsed CNS Ph+ ALL.

## 2022-10-11 NOTE — CODE/ RAPID DOCUMENTATION
RAPID RESPONSE NURSE STROKE CODE NOTE         Admit Date: 10/11/2022  LOS: 0  Code Status: Full Code   Date of Consult: 10/11/2022  : 1970  Age: 51 y.o.  Weight:   Wt Readings from Last 1 Encounters:   10/11/22 53 kg (116 lb 13.5 oz)     Sex: female  Race: White   Bed: [address] [place] [address] A:   MRN: [id number]  Time Rapid Response Team page Received: 1035  Time Rapid Response Team at Bedside: 1039  Time Rapid Response Team left Bedside: 1100  Was the patient discharged from an ICU this admission? No   Was the patient discharged from a PACU within last 24 hours? No   Did the patient receive conscious sedation/general anesthesia in last 24 hours? No  Was the patient in the ED within the past 24 hours? No  Was the patient on NIPPV within the past 24 hours? No   Did this progress into an ARC or CPA: no  Attending Physician: Kathrine Posey MD  Primary Service: [facility]       SITUATION    Notified by pager.  Called to evaluate the patient for Neuro    BACKGROUND    Why is the patient in the hospital?: <principal problem not specified>  Patient has a past medical history of Cancer, COPD (chronic obstructive pulmonary disease), Hypertension, Rheumatoid arthritis, unspecified, SAH (subarachnoid hemorrhage), Seizures, Stroke, and Type 2 diabetes mellitus with circulatory disorder, without long-term current use of insulin.    ASSESSMENT    Last VS: /71 (BP Location: Right arm, Patient Position: Lying)   Pulse (!) 113   Temp 97.9 °F (36.6 °C) (Axillary)   Resp 20   Wt 53 kg (116 lb 13.5 oz)   SpO2 100%   BMI 20.06 kg/m²     24H Vital Sign Range:  Temp:  [97.9 °F (36.6 °C)-99.4 °F (37.4 °C)]   Pulse:  [113-122]   Resp:  [18-20]   BP: (115-126)/(58-91)   SpO2:  [95 %-100 %]     Last know well time: Per  LKW 48-72h prior    Glucose time:   Glucose result:     Physical Exam    Time Stroke Code initiated:   Stroke team Arrival time: 103    Stroke Code activation triggers: AMS  Vascular Neurology provider  you spoke with:  Shanta Love    Time arrived at CT: 1045  Time CT completed: 1050    VAN positive or negative: negative    Thrombolytic decision: no  Thrombolytic bolus (mg and time): n/a  Thrombolytic infusion (mg and time): n/a  Thrombolytic end time: n/a    IR decision: no  IR arrival: n/a  IR end time: n/a     RECOMMENDATIONS    We recommend: As per primary team and vascular neurology f/u with neurology.    FOLLOW-UP/PLAN    Call the Rapid Response Nurse, Chikis Hogan RN at [phone] for additional questions or concerns.    PHYSICIAN ESCALATION    Orders received and case discussed with NP Shanta Love    Disposition: Remain in room 803.

## 2022-10-11 NOTE — SUBJECTIVE & OBJECTIVE
Past Medical History:   Diagnosis Date    Cancer     COPD (chronic obstructive pulmonary disease)     Hypertension     Rheumatoid arthritis, unspecified     SAH (subarachnoid hemorrhage) 2022    Seizures     Stroke     TIA x 4    Type 2 diabetes mellitus with circulatory disorder, without long-term current use of insulin      Past Surgical History:   Procedure Laterality Date    APPENDECTOMY      HYSTERECTOMY      OMMAYA RESERVIOR INSERTION N/A 2022    Procedure: INSERTION, OMMAYA RESERVOIR;  Surgeon: Mj Alberto MD;  Location: [facility] OR [address] [place] [address];  Service: Neurosurgery;  Laterality: N/A;    ROTATOR CUFF REPAIR Bilateral     TONSILLECTOMY      TUBAL LIGATION       No family history on file.  Social History     Tobacco Use    Smoking status: Former     Packs/day: 0.50     Types: Cigarettes     Quit date: 3/16/2022     Years since quittin.5    Smokeless tobacco: Never   Substance Use Topics    Alcohol use: No    Drug use: Never     Review of patient's allergies indicates:   Allergen Reactions    Levetiracetam      Other reaction(s): Unknown  Other reaction(s): Unknown  Other reaction(s): Unknown       Medications: I have reviewed the current medication administration record.    Medications Prior to Admission   Medication Sig Dispense Refill Last Dose    acyclovir (ZOVIRAX) 400 MG tablet Take 1 tablet (400 mg total) by mouth 2 (two) times daily. 60 tablet 11     acyclovir (ZOVIRAX) 400 MG tablet Take 1 tablet (400 mg total) by mouth 2 (two) times daily. 60 tablet 0     allopurinoL (ZYLOPRIM) 300 MG tablet Take 1 tablet (300 mg total) by mouth once daily. 90 tablet 3     artificial tears (ISOPTO TEARS) 0.5 % ophthalmic solution Place 1 drop into both eyes 4 (four) times daily as needed.       atorvastatin (LIPITOR) 80 MG tablet Take 80 mg by mouth once daily.       blood sugar diagnostic Strp SMARTSIG:Via Meter 1 to 2 Times Daily       busPIRone (BUSPAR) 10 MG tablet Take  10 mg by mouth 2 (two) times daily.       butalbital-acetaminophen-caffeine -40 mg (FIORICET, ESGIC) -40 mg per tablet Take 1 tablet by mouth every 6 (six) hours as needed for Headaches. 30 tablet 0     cetirizine (ZYRTEC) 10 MG tablet Take 1 tablet (10 mg total) by mouth once daily. 60 tablet 3     dapagliflozin (FARXIGA) 10 mg tablet Take 10 mg by mouth once daily.       diazePAM (VALIUM) 10 MG Tab Take 10 mg by mouth 2 (two) times daily as needed.       diltiaZEM (CARDIZEM) 90 MG tablet Take 1 tablet (90 mg total) by mouth every 6 (six) hours. 120 tablet 11     duke's soln (benadryl 30 mL, mylanta 30 mL, LIDOcaine 30 mL, nystatin 30 mL) 120mL Take 10 mLs by mouth 4 (four) times daily as needed (mouth pain). 120 mL 0     fenofibrate 160 MG Tab Take 160 mg by mouth once daily.       fluconazole (DIFLUCAN) 200 MG Tab Take 2 tablets (400 mg total) by mouth once daily. 60 tablet 0     gabapentin (NEURONTIN) 300 MG capsule Take 1 capsule (300 mg total) by mouth 3 (three) times daily. 90 capsule 11     hydrOXYchloroQUINE (PLAQUENIL) 200 mg tablet Take 1 tablet (200 mg total) by mouth once daily.       hydrOXYzine pamoate (VISTARIL) 50 MG Cap Take 50 mg by mouth 2 (two) times daily as needed.       levoFLOXacin (LEVAQUIN) 500 MG tablet Take 1 tablet (500 mg total) by mouth once daily. 30 tablet 0     LIDOcaine (LIDODERM) 5 % Place 1 patch onto the skin once daily. Remove & Discard patch within 12 hours or as directed by MD 30 patch 2     losartan (COZAAR) 25 MG tablet Take 25 mg by mouth once daily.       magnesium oxide (MAG-OX) 400 mg (241.3 mg magnesium) tablet Take 1 tablet by mouth once daily.       metFORMIN (GLUCOPHAGE-XR) 500 MG ER 24hr tablet Take 1,000 mg by mouth 2 (two) times daily.       naloxone (NARCAN) 4 mg/actuation Spry 4mg by nasal route as needed for opioid overdose; may repeat every 2-3 minutes in alternating nostrils until medical help arrives. Call 911 1 each 11      omeprazole (PRILOSEC) 40 MG capsule Take 40 mg by mouth once daily.       ondansetron (ZOFRAN-ODT) 8 MG TbDL Take 1 tablet (8 mg total) by mouth every 8 (eight) hours as needed (in case of chemo induced nausea). 30 tablet 1     ondansetron (ZOFRAN-ODT) 8 MG TbDL Take 1 tablet (8 mg total) by mouth every 8 (eight) hours as needed (nausea or vomiting). 60 tablet 0     ONETOUCH VERIO TEST STRIPS Strp SMARTSIG:Via Meter 1 to 2 Times Daily       OXcarbazepine (TRILEPTAL) 600 MG Tab Take 1,200 mg by mouth 2 (two) times daily.       polyethylene glycol (GLYCOLAX) 17 gram/dose powder Dissolve one capful (17 g) in liquid and take by mouth daily as needed (constipation). 510 g 0     PONATinib (ICLUSIG) 30 mg Tab Take 1 tablet (30 mg) by mouth once daily. 30 tablet 0     potassium chloride (K-TAB) 20 mEq Take 20 mEq by mouth 2 (two) times daily.       prochlorperazine (COMPAZINE) 10 MG tablet Take 1 tablet (10 mg total) by mouth every 6 (six) hours as needed for Nausea. 30 tablet 2     QUEtiapine (SEROQUEL) 200 MG Tab Take 200 mg by mouth every evening.       rivaroxaban (XARELTO) 20 mg Tab Take 1 tablet (20 mg total) by mouth daily with dinner or evening meal. ** Hold until instructed to resume by provider due to low platelet count. **       senna-docusate 8.6-50 mg (PERICOLACE) 8.6-50 mg per tablet Take 1 tablet by mouth 2 (two) times daily. 60 tablet 3     sumatriptan (IMITREX) 50 MG tablet Take 1 tablet (50 mg total) by mouth daily as needed (headache). 30 tablet 2     TRINTELLIX 20 mg Tab Take 1 tablet by mouth once daily.       ursodioL (ACTIGALL) 300 mg capsule Take 1 capsule (300 mg total) by mouth 3 (three) times daily. 90 capsule 11        Review of Systems   Constitutional:  Positive for activity change and fatigue. Negative for fever.        Lethargy   HENT:  Negative for drooling and trouble swallowing.    Eyes:  Negative for redness and visual disturbance.   Respiratory:  Negative for cough.     Cardiovascular:  Negative for chest pain and leg swelling.   Gastrointestinal:  Negative for nausea and vomiting.   Genitourinary:  Negative for dysuria.   Musculoskeletal:  Negative for arthralgias and myalgias.   Skin:  Negative for rash.   Neurological:  Positive for weakness (Generalized). Negative for seizures, syncope, facial asymmetry, speech difficulty and numbness.   Psychiatric/Behavioral:  Negative for agitation and confusion. The patient is not nervous/anxious.    Objective:     Vital Signs (Most Recent):  Temp: 97.9 °F (36.6 °C) (10/11/22 1013)  Pulse: (!) 113 (10/11/22 1033)  Resp: 20 (10/11/22 1033)  BP: 126/71 (10/11/22 1033)  SpO2: 100 % (10/11/22 1033)    Vital Signs Range (Last 24H):  Temp:  [97.9 °F (36.6 °C)-99.4 °F (37.4 °C)]   Pulse:  [113-122]   Resp:  [18-20]   BP: (115-126)/(58-91)   SpO2:  [95 %-100 %]     Physical Exam  Vitals reviewed.   Constitutional:       Appearance: She is normal weight. She is ill-appearing.   HENT:      Head: Normocephalic and atraumatic.      Comments: Ommaya reservoir in place     Right Ear: External ear normal.      Left Ear: External ear normal.      Nose: Nose normal.   Eyes:      General: No visual field deficit or scleral icterus.     Extraocular Movements: Extraocular movements intact.      Pupils: Pupils are equal, round, and reactive to light.   Cardiovascular:      Rate and Rhythm: Regular rhythm. Tachycardia present.   Pulmonary:      Effort: Pulmonary effort is normal. No respiratory distress.   Abdominal:      General: There is no distension.   Musculoskeletal:      Cervical back: Normal range of motion.      Right lower leg: No edema.      Left lower leg: No edema.   Skin:     General: Skin is warm and dry.   Neurological:      General: No focal deficit present.      Mental Status: She is alert and oriented to person, place, and time.      Cranial Nerves: No dysarthria or facial asymmetry.      Sensory: No sensory deficit.      Motor: Weakness  present. No pronator drift.      Coordination: Finger-Nose-Finger Test normal.   Psychiatric:         Mood and Affect: Mood normal.         Behavior: Behavior normal.     Neurological Exam:   LOC: alert  Attention Span: Good   Language: No aphasia  Articulation: No dysarthria  Orientation: Person, Place, Time   Visual Fields: Full  Facial Movement (CN VII): Symmetric facial expression    Motor: Moves all extremities spontaneously and to command; generalized weakness 4/5 throughout  Cerebellum: No evidence of appendicular or axial ataxia  Sensation: Intact to light touch      Laboratory:  CMP:   Recent Labs   Lab 10/11/22  0756   CALCIUM 9.8   ALBUMIN 2.9*   PROT 7.5   *   K 4.3   CO2 14*      BUN 42*   CREATININE 1.0   ALKPHOS 93   ALT 7*   AST 8*   BILITOT 0.6     CBC:   Recent Labs   Lab 10/11/22  0756   WBC 1.27*   RBC 2.27*   HGB 7.9*   HCT 23.1*   PLT 15*   *   MCH 34.8*   MCHC 34.2     Lipid Panel: No results for input(s): CHOL, LDLCALC, HDL, TRIG in the last 168 hours.  Coagulation: No results for input(s): PT, INR, APTT in the last 168 hours.  Hgb A1C: No results for input(s): HGBA1C in the last 168 hours.  TSH: No results for input(s): TSH in the last 168 hours.    Diagnostic Results:      Brain/Vessel Imaging:  CTA Stroke  10/11/2022  No evidence of LVO or high grade stenosis. No hemorrhage. No evidence of acute infarct.

## 2022-10-11 NOTE — H&P
Roberto Yepez - Oncology ([facility])  Hematology  Bone Marrow Transplant  H&P    Subjective:     Principal Problem: Altered mental status    HPI: Mrs. 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 Other PMH significant for paroxsymal A-fib with an implantable loop recorder/pacemaker in place, anxiety/depression, COPD, DM2, gastroparesis, GERD, osteoporosis, PAD, RA, seizures, TIA, tobacco use. 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 She was treated with inotuzumab and ponatinib, followed by cessation of inotuzumab (due to cytopenias). She achieved molecular response on ponatinib. Unfortunately, she is now diagnosed with CNS relapse as of 9/19/22. She had an Ommaya placed on 9/26/22 and has been receiving twice weekly IT triple chemotherapy. Most recent CSF shows clearance of leukemic cells.     She presented to outpatient BMT clinic for follow-up today where her  stated that Mrs. Gonzalez had been declining over the 72hrs. She now has coordination difficulties and is very fatigued. She denies new vision changes (still with complete left eye vision loss). Also of note, she is severely pancytopenic, has mouth sores, and poor oral intake/nausea. Denies any recent fevers, chills, chest pain, sob, diarrhea, constipation, bleeding or bruising. Admitted for workup of new coordination difficulties in setting of relapsed CNS Ph+ ALL.       Patient information was obtained from patient, spouse/SO, and past medical records.     Oncology History:  Patient of Dr. Dayron Jenkins  1. Precursor B-cell acute lymphoblastic leukemia (Ph+)              A. 11/23/2021: Transferred to [facility] from outside hospital for evaluation for acute leukemia              B. 11/24/2021: Bone marrow biopsy shows % cellular marrow nearly completely replaced by B-ALL; ALL FISH shows bcr-abl fusion and monosomy 9; cytogenetics 45,XX,-9,t(9;22)(q34;q11.2)[3]/46,sl,+isaias(22)t(9;22)[15]/46,XX[2]; BCR/ABL1 PCR  shows p190 kD protein (e1-a2 fusion form), estiamted to represent 57.7% of total abl.               C. 11/30/2021 - 12/23/2021: Vincristine + imatinib induction; hospital course was complicated by acute hypoxemic respiratory failure which resolved with diuresis and treatment of ALL              D. 1/3/2022: Bone marrow biopsy after induction shows normocellular marrow (60%) with no definite evidence of B-ALL; bcr-abl p190 fusion detected at 0.02% of total ABL1; FISH normal; MRD testing not sent              E. 1/20/2022: Begin consolidation therapy with [id number] protocol              F. 3/16/2022: BCR-ABL p190 transcript on peripheral blood negative              G. 4/13/2022: BCR-ABL p190 transcript on peripheral blood 0.63%              H. 5/4/2022: Bone marrow biopsy shows 50% cellular marrow with relapsed B-ALL; bone marrow aspirate shows 23.5% blasts; cytogenetics 46,XX,-9,t(9;22)(q34;q11.2),+isaias(22)t(9;22)[5] /46,XX[15]; FISH shows 9.5% nuclei with bcr/abl1 fusion; bcr/abl1 quantitative PCR shows 47% of total ABL1  I. Subsequently attempted blinatumomab but had to stop therapy due to Grade 3 CRS  J. 6/13/2022 - 8/8/2022: inotuzumab ozogamicin + ponatinib therapy  K. 6/28/2022: Bone marrow biopsy shows hypocellular marrow with less than 5% cellularity and no morphologic or immunophenotypic evidence of B-ALL; cytogenetics 46,XX; bcr/abl PCR detects p190 fusion form (0.03%).   L. 8/8/2022: Inotuzumab discontinued. Therapy held.             M. 9/19/2022: During hospital presentation for neurologic changes - CSF shows involvement by B-ALL (CNS relapse).     Medications Prior to Admission   Medication Sig Dispense Refill Last Dose    acyclovir (ZOVIRAX) 400 MG tablet Take 1 tablet (400 mg total) by mouth 2 (two) times daily. 60 tablet 11     acyclovir (ZOVIRAX) 400 MG tablet Take 1 tablet (400 mg total) by mouth 2 (two) times daily. 60 tablet 0     allopurinoL (ZYLOPRIM) 300 MG tablet Take 1 tablet (300 mg  total) by mouth once daily. 90 tablet 3     artificial tears (ISOPTO TEARS) 0.5 % ophthalmic solution Place 1 drop into both eyes 4 (four) times daily as needed.       atorvastatin (LIPITOR) 80 MG tablet Take 80 mg by mouth once daily.       blood sugar diagnostic Strp SMARTSIG:Via Meter 1 to 2 Times Daily       busPIRone (BUSPAR) 10 MG tablet Take 10 mg by mouth 2 (two) times daily.       butalbital-acetaminophen-caffeine -40 mg (FIORICET, ESGIC) -40 mg per tablet Take 1 tablet by mouth every 6 (six) hours as needed for Headaches. 30 tablet 0     cetirizine (ZYRTEC) 10 MG tablet Take 1 tablet (10 mg total) by mouth once daily. 60 tablet 3     dapagliflozin (FARXIGA) 10 mg tablet Take 10 mg by mouth once daily.       diazePAM (VALIUM) 10 MG Tab Take 10 mg by mouth 2 (two) times daily as needed.       diltiaZEM (CARDIZEM) 90 MG tablet Take 1 tablet (90 mg total) by mouth every 6 (six) hours. 120 tablet 11     duke's soln (benadryl 30 mL, mylanta 30 mL, LIDOcaine 30 mL, nystatin 30 mL) 120mL Take 10 mLs by mouth 4 (four) times daily as needed (mouth pain). 120 mL 0     fenofibrate 160 MG Tab Take 160 mg by mouth once daily.       fluconazole (DIFLUCAN) 200 MG Tab Take 2 tablets (400 mg total) by mouth once daily. 60 tablet 0     gabapentin (NEURONTIN) 300 MG capsule Take 1 capsule (300 mg total) by mouth 3 (three) times daily. 90 capsule 11     hydrOXYchloroQUINE (PLAQUENIL) 200 mg tablet Take 1 tablet (200 mg total) by mouth once daily.       hydrOXYzine pamoate (VISTARIL) 50 MG Cap Take 50 mg by mouth 2 (two) times daily as needed.       levoFLOXacin (LEVAQUIN) 500 MG tablet Take 1 tablet (500 mg total) by mouth once daily. 30 tablet 0     LIDOcaine (LIDODERM) 5 % Place 1 patch onto the skin once daily. Remove & Discard patch within 12 hours or as directed by MD 30 patch 2     losartan (COZAAR) 25 MG tablet Take 25 mg by mouth once daily.       magnesium oxide (MAG-OX) 400 mg (241.3 mg  magnesium) tablet Take 1 tablet by mouth once daily.       metFORMIN (GLUCOPHAGE-XR) 500 MG ER 24hr tablet Take 1,000 mg by mouth 2 (two) times daily.       naloxone (NARCAN) 4 mg/actuation Spry 4mg by nasal route as needed for opioid overdose; may repeat every 2-3 minutes in alternating nostrils until medical help arrives. Call 911 1 each 11     omeprazole (PRILOSEC) 40 MG capsule Take 40 mg by mouth once daily.       ondansetron (ZOFRAN-ODT) 8 MG TbDL Take 1 tablet (8 mg total) by mouth every 8 (eight) hours as needed (in case of chemo induced nausea). 30 tablet 1     ondansetron (ZOFRAN-ODT) 8 MG TbDL Take 1 tablet (8 mg total) by mouth every 8 (eight) hours as needed (nausea or vomiting). 60 tablet 0     ONETOUCH VERIO TEST STRIPS Strp SMARTSIG:Via Meter 1 to 2 Times Daily       OXcarbazepine (TRILEPTAL) 600 MG Tab Take 1,200 mg by mouth 2 (two) times daily.       polyethylene glycol (GLYCOLAX) 17 gram/dose powder Dissolve one capful (17 g) in liquid and take by mouth daily as needed (constipation). 510 g 0     PONATinib (ICLUSIG) 30 mg Tab Take 1 tablet (30 mg) by mouth once daily. 30 tablet 0     potassium chloride (K-TAB) 20 mEq Take 20 mEq by mouth 2 (two) times daily.       prochlorperazine (COMPAZINE) 10 MG tablet Take 1 tablet (10 mg total) by mouth every 6 (six) hours as needed for Nausea. 30 tablet 2     QUEtiapine (SEROQUEL) 200 MG Tab Take 200 mg by mouth every evening.       rivaroxaban (XARELTO) 20 mg Tab Take 1 tablet (20 mg total) by mouth daily with dinner or evening meal. ** Hold until instructed to resume by provider due to low platelet count. **       senna-docusate 8.6-50 mg (PERICOLACE) 8.6-50 mg per tablet Take 1 tablet by mouth 2 (two) times daily. 60 tablet 3     sumatriptan (IMITREX) 50 MG tablet Take 1 tablet (50 mg total) by mouth daily as needed (headache). 30 tablet 2     TRINTELLIX 20 mg Tab Take 1 tablet by mouth once daily.       ursodioL (ACTIGALL) 300 mg capsule  Take 1 capsule (300 mg total) by mouth 3 (three) times daily. 90 capsule 11        Levetiracetam     Past Medical History:   Diagnosis Date    Cancer     COPD (chronic obstructive pulmonary disease)     Hypertension     Rheumatoid arthritis, unspecified     SAH (subarachnoid hemorrhage) 2022    Seizures     Stroke     TIA x 4    Type 2 diabetes mellitus with circulatory disorder, without long-term current use of insulin      Past Surgical History:   Procedure Laterality Date    APPENDECTOMY      HYSTERECTOMY      OMMAYA RESERVIOR INSERTION N/A 2022    Procedure: INSERTION, OMMAYA RESERVOIR;  Surgeon: Mj Alberto MD;  [facility] OR [address] [place] [address];  Service: Neurosurgery;  Laterality: N/A;    ROTATOR CUFF REPAIR Bilateral     TONSILLECTOMY      TUBAL LIGATION       Family History    None       Tobacco Use    Smoking status: Former     Packs/day: 0.50     Types: Cigarettes     Quit date: 3/16/2022     Years since quittin.5    Smokeless tobacco: Never   Substance and Sexual Activity    Alcohol use: No    Drug use: Never    Sexual activity: Not on file       Review of Systems   Constitutional:  Positive for activity change, appetite change and fatigue. Negative for chills, diaphoresis and fever.   HENT:  Positive for mouth sores. Negative for congestion, ear pain, nosebleeds, postnasal drip, rhinorrhea, sinus pressure, sinus pain, sore throat and trouble swallowing.    Eyes:  Negative for pain, discharge and redness.   Respiratory:  Negative for apnea, cough, chest tightness and shortness of breath.    Cardiovascular:  Negative for chest pain, palpitations and leg swelling.   Gastrointestinal:  Positive for nausea. Negative for abdominal distention, abdominal pain, blood in stool, constipation, diarrhea and vomiting.   Genitourinary:  Negative for dysuria and hematuria.   Musculoskeletal:  Negative for back pain, gait problem and joint swelling.   Skin:  Positive for wound (coccyx).  Negative for rash.   Neurological:  Negative for dizziness, tremors, syncope, numbness and headaches.   Hematological:  Negative for adenopathy. Does not bruise/bleed easily.   Psychiatric/Behavioral:  Negative for behavioral problems and confusion.    Objective:     Vital Signs (Most Recent):  Temp: 99 °F (37.2 °C) (10/11/22 1155)  Pulse: 110 (10/11/22 1155)  Resp: 18 (10/11/22 1155)  BP: 120/68 (10/11/22 1155)  SpO2: 99 % (10/11/22 1155) Vital Signs (24h Range):  Temp:  [97.9 °F (36.6 °C)-99.4 °F (37.4 °C)] 99 °F (37.2 °C)  Pulse:  [110-122] 110  Resp:  [18-20] 18  SpO2:  [95 %-100 %] 99 %  BP: (115-126)/(58-91) 120/68     Weight: 53 kg (116 lb 13.5 oz)  Body mass index is 20.06 kg/m².  Body surface area is 1.55 meters squared.    ECOG SCORE           [unfilled]    Lines/Drains/Airways       Central Venous Catheter Line  Duration                  PowerPort A Cath Single Lumen 03/16/22 1128 right internal jugular 209 days              Drain  Duration                  Urethral Catheter 10/01/22 0700 10 days                    Physical Exam  Vitals reviewed.   Constitutional:       Appearance: Normal appearance. She is well-developed. She is ill-appearing and toxic-appearing.   HENT:      Head: Normocephalic and atraumatic.      Right Ear: External ear normal.      Left Ear: External ear normal.      Mouth/Throat:      Pharynx: Posterior oropharyngeal erythema present.   Eyes:      General: Visual field deficit (left eye blindness) present.      Extraocular Movements: Extraocular movements intact.      Conjunctiva/sclera: Conjunctivae normal.   Cardiovascular:      Rate and Rhythm: Normal rate and regular rhythm.      Pulses: Normal pulses.      Heart sounds: Normal heart sounds. No murmur heard.  Pulmonary:      Effort: Pulmonary effort is normal. No respiratory distress.      Breath sounds: Normal breath sounds. No stridor. No wheezing or rales.   Abdominal:      General: Bowel sounds are normal. There is no  distension.      Palpations: Abdomen is soft.      Tenderness: There is no abdominal tenderness.   Genitourinary:     Comments: Chronic wolf  Musculoskeletal:         General: No swelling. Normal range of motion.      Cervical back: Normal range of motion.      Right lower leg: No edema.      Left lower leg: No edema.   Skin:     General: Skin is warm and dry.      Findings: No bruising, erythema or rash.      Comments: RCWP clean, dry and intact with no signs of infection   Neurological:      Mental Status: She is alert. She is confused.      Motor: Weakness present.      Gait: Gait abnormal.   Psychiatric:         Mood and Affect: Mood normal.         Thought Content: Thought content normal.         Judgment: Judgment normal.       Significant Labs:   CBC:   Recent Labs   Lab 10/11/22  0756   WBC 1.27*   HGB 7.9*   HCT 23.1*   PLT 15*    and CMP:   Recent Labs   Lab 10/11/22  0756   *   K 4.3      CO2 14*   *   BUN 42*   CREATININE 1.0   CALCIUM 9.8   PROT 7.5   ALBUMIN 2.9*   BILITOT 0.6   ALKPHOS 93   AST 8*   ALT 7*   ANIONGAP 16       Diagnostic Results:  I have reviewed all pertinent imaging results/findings within the past 24 hours.    Assessment/Plan:     * Altered mental status  - CT head 10/11/22 revealed Persistent pneumocephalus in the setting recent Ommaya reservoir placement. No new hemorrhage or other significant detrimental interval change.   - Unable to perform MRI d/t pacemaker   - previous infectious workup on LP 09/19 negative  - Hold sedating medications   - neurology consulted for further recs/safety of using ommaya again  - Consider continued AMS workup with ammonia, thiamine level, folate, Vitamin B12    B-cell acute lymphoblastic leukemia (ALL) in relapse  - Follows with Dr. Jenkins. 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 She then developed T315I bcr-abl resistance mutation  - Was treated with inotuzumab ozogamicin and ponatinib but course complicated  by severe cytopenias. Not has been on therapy for past few weeks.  - Not a candidate for allogeneic stem cell transplant at this time.  - Presented 9/16/22 with left eye vision loss and worsening vision of left eye  - Patient with pacemaker and per patient, not compatible with MRI. Would ideally obtain MRI brain w/ w/o contrast. Verified with EP who checked with rep that pacemaker is not MRI compatible.   - CTA completed with delayed venous phase revealing small SAH   - LP with IT chemo on 9/19 positive for B-cell ALL; will require twice weekly IT until clearance at minimum  - Ommaya placed by NSGY on 9/26/22; can use ommaya on POD #5 (10/1)  - Started twice weekly IT chemo with ommaya on 10/3  - Currently on ponatinib, hold at this time   - Hold IT therapy until she is deemed appropriate for additional therapy  - Plan for bone marrow biopsy to assess disease response given pancytopenia (send ALL MRD testing, as well as quantitative bcr/abl p190 transcript)  - Consider consulting palliative care with worsening clinical status    Mucositis due to chemotherapy  - Start IVF for gentle hydration   - [place] QID  - Holding any further sedating medications (opioids) at this time    Other pancytopenia  - daily CBC while inpatient  - presented 10/11 with WBC 1.27, Hgb 7.9, Plt 15K and ANC 10  - transfuse for Hgb <7, Plt <10K or bleeding  - continue ppx acyclovir, fluconazole, levaquin  - consider bone marrow biopsy in coming days to assess disease status    Physical debility  - progressive weakness with worsening physical debility;  states patient has not walked in over 3 weeks  - PT/OT consulted on admission    Vision loss of left eye  - continues with total vision loss of left eye following diagnosis of CNS relapse of B-ALL    Urinary retention  - chronic wolf present on admission  - failed voiding trial previous admission  - will need urology follow up at discharge    Migraine  - PRN Imitrex    Cancer associated  pain  - continue home gabapentin    Hypomagnesemia  - continue home daily mag    Peripheral arterial occlusive disease  - Holding home Xarelto due to severe thrombocytopenia     Anxiety  - Continue home buspirone and quetiapine.   - Trintellix is nonformulary however pharmacy is attempting to obtain this medication.    GERD (gastroesophageal reflux disease)  - hold home omeprazole as not on inpatient formulary; replace with protonix    Hypertension  - Continue home Losartan    Seizure disorder  - Continue home oxcarbazepine 200mg QHS    Type 2 diabetes mellitus, without long-term current use of insulin  - Blood glucose monitoring TID with meals and nightly   - Moderate dose SSI PRN  - Goal -180 while inpatient   - Diabetic diet once able to have PO intake    Pacemaker  - Patient with pacemaker in place due to SSS. Per note/EP, it is definitely MRI incompatible.      Rheumatoid arthritis involving multiple sites  - Seronegative RA  - has been off plaquenil for some time now; continuing to hold    History of TIA (transient ischemic attack)  - Hold home Xarelto given severe thrombocytopenia      Hyperlipidemia  - Continue home atorvastatin; states no longer using fenofibrate    Paroxysmal atrial fibrillation  - Continue home diltiazem  - Continue to hold home xarelto in the setting of severe thrombocytopenia        VTE Risk Mitigation (From admission, onward)         Ordered     heparin, porcine (PF) 100 unit/mL injection flush 500 Units  As needed (PRN)         10/11/22 1049     Reason for No Pharmacological VTE Prophylaxis  Once        Question:  Reasons:  Answer:  Thrombocytopenia    10/11/22 1049     IP VTE HIGH RISK PATIENT  Once         10/11/22 1049     Place sequential compression device  Until discontinued         10/11/22 1049                Disposition: Admit to inpatient BMT service for altered mental status and worsening weakness/physical debility    Shanta Love NP  Bone Marrow  Transplant  Hematology  Roberto Yepez - Oncology ([facility])

## 2022-10-11 NOTE — CONSULTS
[facility] ([facility])  Neurology  Consult Note    Patient Name: Deepti Gonzalez  MRN: [id number]  Admission Date: 10/11/2022  Hospital Length of Stay: 0 days  Code Status: Full Code   Attending Provider: Kathrine Posey MD   Consulting Provider: Chikis Keenan PA-C  Primary Care Physician: Primary Doctor No  Principal Problem:Altered mental status    Inpatient consult to Neurology  Consult performed by: Chikis Keenan PA-C  Consult ordered by: Shanta Love NP         Subjective:     Chief Complaint:  AMS     HPI:   51 y.o. 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  also notes progressive functional decline since last admission (9/16-10/1/22). During recent admission, she reported acute, painless progressive vision loss in left eye. No light perception on 9/12/22. Found to have incidental small focal SAH L frontal lobe felt noncontributory to visual disturbance. She was evaluated by Ophthalmology and exam remarkable for nakia APD. CSF 9/19 with lymphocytic pleocytosis with elevated protein (43), pathology with numerous blastoid atypical cells. 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 Received Ommaya reservoir (9/27/22) and started on bi-weekly IT chemotherapy with cytarabine, hydrocortisone and MTX. Hospital course further complicated by fevers, started on empiric Cefepime until infectious workup resulted unremarkable. Positive orthostatic vitals improved after IVFs. Ongoing nausea with prn zofran. Was discharged home 10/1 with home PT. Following discharge,  reports she has largely been bed bound and sleeping most of the time. Having coordination difficulty using phone and remote control. This admission, stroke code was activated. CTA MP without  "large vessel occlusion or high grade stenosis. No acute intracranial pathology. Unable to obtain MRI due to non compatible pacemaker. Vascular Neurology felt presentation more likely due to progression of CNS ALL vs. Neurotoxicity of chemotherapy vs. Alternative neurologic causes, so General Neurology was consulted 10/11/22. Regarding seizure hx,  says last known clinical seizure was April 2022. On Oxcarb 1200 mg BID at home, reports adherence to regimen. Managed by Neurologist in [place], [place]. Per chart review, regarding oncology hx, she was treated with "inotuzumab and ponatinib, followed by cessation of inotuzumab (due to cytopenias). She achieved molecular response on ponatinib. Unfortunately, she is now diagnosed with CNS relapse as of 9/19/22. She had an Ommaya placed on 9/26/22 and has been receiving twice weekly IT triple chemotherapy. Most recent CSF (10/6/22) shows clearance of leukemic cells."      Past Medical History:   Diagnosis Date    Cancer     COPD (chronic obstructive pulmonary disease)     Hypertension     Rheumatoid arthritis, unspecified     SAH (subarachnoid hemorrhage) 9/17/2022    Seizures     Stroke     TIA x 4    Type 2 diabetes mellitus with circulatory disorder, without long-term current use of insulin      Past Surgical History:   Procedure Laterality Date    APPENDECTOMY      HYSTERECTOMY      OMMAYA RESERVIOR INSERTION N/A 9/26/2022    Procedure: INSERTION, OMMAYA RESERVOIR;  Surgeon: Mj Alberto MD;  Location: [facility] OR [address] [place] [address];  Service: Neurosurgery;  Laterality: N/A;    ROTATOR CUFF REPAIR Bilateral     TONSILLECTOMY      TUBAL LIGATION       Review of patient's allergies indicates:   Allergen Reactions    Levetiracetam      Other reaction(s): Unknown  Other reaction(s): Unknown  Other reaction(s): Unknown     No current facility-administered medications on file prior to encounter.     Current Outpatient Medications on File Prior to Encounter "   Medication Sig    acyclovir (ZOVIRAX) 400 MG tablet Take 1 tablet (400 mg total) by mouth 2 (two) times daily.    acyclovir (ZOVIRAX) 400 MG tablet Take 1 tablet (400 mg total) by mouth 2 (two) times daily.    allopurinoL (ZYLOPRIM) 300 MG tablet Take 1 tablet (300 mg total) by mouth once daily.    artificial tears (ISOPTO TEARS) 0.5 % ophthalmic solution Place 1 drop into both eyes 4 (four) times daily as needed.    atorvastatin (LIPITOR) 80 MG tablet Take 80 mg by mouth once daily.    blood sugar diagnostic Strp SMARTSIG:Via Meter 1 to 2 Times Daily    busPIRone (BUSPAR) 10 MG tablet Take 10 mg by mouth 2 (two) times daily.    butalbital-acetaminophen-caffeine -40 mg (FIORICET, ESGIC) -40 mg per tablet Take 1 tablet by mouth every 6 (six) hours as needed for Headaches.    cetirizine (ZYRTEC) 10 MG tablet Take 1 tablet (10 mg total) by mouth once daily.    dapagliflozin (FARXIGA) 10 mg tablet Take 10 mg by mouth once daily.    diazePAM (VALIUM) 10 MG Tab Take 10 mg by mouth 2 (two) times daily as needed.    diltiaZEM (CARDIZEM) 90 MG tablet Take 1 tablet (90 mg total) by mouth every 6 (six) hours.    duke's soln (benadryl 30 mL, mylanta 30 mL, LIDOcaine 30 mL, nystatin 30 mL) 120mL Take 10 mLs by mouth 4 (four) times daily as needed (mouth pain).    fenofibrate 160 MG Tab Take 160 mg by mouth once daily.    fluconazole (DIFLUCAN) 200 MG Tab Take 2 tablets (400 mg total) by mouth once daily.    gabapentin (NEURONTIN) 300 MG capsule Take 1 capsule (300 mg total) by mouth 3 (three) times daily.    hydrOXYchloroQUINE (PLAQUENIL) 200 mg tablet Take 1 tablet (200 mg total) by mouth once daily.    hydrOXYzine pamoate (VISTARIL) 50 MG Cap Take 50 mg by mouth 2 (two) times daily as needed.    levoFLOXacin (LEVAQUIN) 500 MG tablet Take 1 tablet (500 mg total) by mouth once daily.    LIDOcaine (LIDODERM) 5 % Place 1 patch onto the skin once daily. Remove & Discard patch within 12 hours or as  directed by MD    losartan (COZAAR) 25 MG tablet Take 25 mg by mouth once daily.    magnesium oxide (MAG-OX) 400 mg (241.3 mg magnesium) tablet Take 1 tablet by mouth once daily.    metFORMIN (GLUCOPHAGE-XR) 500 MG ER 24hr tablet Take 1,000 mg by mouth 2 (two) times daily.    naloxone (NARCAN) 4 mg/actuation Spry 4mg by nasal route as needed for opioid overdose; may repeat every 2-3 minutes in alternating nostrils until medical help arrives. Call 911    omeprazole (PRILOSEC) 40 MG capsule Take 40 mg by mouth once daily.    ondansetron (ZOFRAN-ODT) 8 MG TbDL Take 1 tablet (8 mg total) by mouth every 8 (eight) hours as needed (in case of chemo induced nausea).    ondansetron (ZOFRAN-ODT) 8 MG TbDL Take 1 tablet (8 mg total) by mouth every 8 (eight) hours as needed (nausea or vomiting).    ONETOUCH VERIO TEST STRIPS Strp SMARTSIG:Via Meter 1 to 2 Times Daily    OXcarbazepine (TRILEPTAL) 600 MG Tab Take 1,200 mg by mouth 2 (two) times daily.    polyethylene glycol (GLYCOLAX) 17 gram/dose powder Dissolve one capful (17 g) in liquid and take by mouth daily as needed (constipation).    PONATinib (ICLUSIG) 30 mg Tab Take 1 tablet (30 mg) by mouth once daily.    potassium chloride (K-TAB) 20 mEq Take 20 mEq by mouth 2 (two) times daily.    prochlorperazine (COMPAZINE) 10 MG tablet Take 1 tablet (10 mg total) by mouth every 6 (six) hours as needed for Nausea.    QUEtiapine (SEROQUEL) 200 MG Tab Take 200 mg by mouth every evening.    rivaroxaban (XARELTO) 20 mg Tab Take 1 tablet (20 mg total) by mouth daily with dinner or evening meal. ** Hold until instructed to resume by provider due to low platelet count. **    senna-docusate 8.6-50 mg (PERICOLACE) 8.6-50 mg per tablet Take 1 tablet by mouth 2 (two) times daily.    sumatriptan (IMITREX) 50 MG tablet Take 1 tablet (50 mg total) by mouth daily as needed (headache).    TRINTELLIX 20 mg Tab Take 1 tablet by mouth once daily.    ursodioL (ACTIGALL) 300 mg  capsule Take 1 capsule (300 mg total) by mouth 3 (three) times daily.     Family History    None       Tobacco Use    Smoking status: Former     Packs/day: 0.50     Types: Cigarettes     Quit date: 3/16/2022     Years since quittin.5    Smokeless tobacco: Never   Substance and Sexual Activity    Alcohol use: No    Drug use: Never    Sexual activity: Not on file     Review of Systems   Unable to perform ROS: Mental status change   Constitutional:  Positive for activity change and fatigue. Negative for fever.   HENT:  Positive for voice change.    Eyes:  Positive for visual disturbance.   Respiratory:  Negative for shortness of breath.    Cardiovascular:  Negative for chest pain.   Gastrointestinal:  Positive for nausea. Negative for vomiting.   Musculoskeletal:  Positive for gait problem.   Skin:  Negative for rash.   Allergic/Immunologic: Positive for immunocompromised state.   Neurological:  Positive for speech difficulty, weakness and headaches.   Psychiatric/Behavioral:  Positive for confusion, decreased concentration, dysphoric mood and sleep disturbance.    Objective:     Vital Signs (Most Recent):  Temp: 99 °F (37.2 °C) (10/11/22 1155)  Pulse: 107 (10/11/22 1501)  Resp: 18 (10/11/22 1155)  BP: 120/68 (10/11/22 1155)  SpO2: 99 % (10/11/22 1155) Vital Signs (24h Range):  Temp:  [97.9 °F (36.6 °C)-99.4 °F (37.4 °C)] 99 °F (37.2 °C)  Pulse:  [107-122] 107  Resp:  [18-20] 18  SpO2:  [95 %-100 %] 99 %  BP: (115-126)/(58-91) 120/68     Weight: 53 kg (116 lb 13.5 oz)  Body mass index is 20.06 kg/m².    Physical Exam  Constitutional:       Appearance: She is ill-appearing and toxic-appearing.      Comments: Mouth sores present    Eyes:      Pupils: Pupils are equal, round, and reactive to light.   Pulmonary:      Effort: No respiratory distress.   Musculoskeletal:      Cervical back: Normal range of motion.   Neurological:      Coordination: Finger-Nose-Finger Test abnormal (L>R).   Psychiatric:          Speech: Speech is slurred.     NEUROLOGICAL EXAMINATION:     MENTAL STATUS   Oriented to month.   Follows 1 step commands.   Attention: decreased. Concentration: decreased.   Speech: slurred   Level of consciousness: drowsy ,  arousable by verbal stimuli       Oriented to self and  at bedside  Able to name [facility]      CRANIAL NERVES     CN III, IV, VI   Pupils are equal, round, and reactive to light.    CN V   Facial sensation intact.     CN VIII   Hearing: intact    CN IX, X   Palate: symmetric    CN XI   CN XI normal.     CN XII   CN XII normal.        No light perception in left eye  Visual fields appear intact in R eye   Restricted eye movements  Multidirectional nystagmus   Square wave jerks present      MOTOR EXAM   Muscle bulk: decreased  Overall muscle tone: normal       Generalized weakness 4/5 throughout   No focal deficits noted      GAIT AND COORDINATION     Gait  Gait: (deferred)     Coordination   Finger to nose coordination: abnormal (L>R)    Tremor   Resting tremor: absent    Significant Labs: All pertinent lab results from the past 24 hours have been reviewed.    Significant Imaging: I have reviewed all pertinent imaging results/findings within the past 24 hours.    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 Atherosclerotic plaquing cavernous ICAs with mild narrowing.  No evidence for high-grade proximal stenosis or occlusion throughout the intracranial arterial circulation.     CTA neck: Less than 50% proximal ICA stenosis by NASCET criteria. 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     CT head: No evidence for acute intracranial hemorrhage or sulcal effacement to suggest large territory recent infarction allowing for artifact from motion. Operative change with right frontal coursing Ommaya reservoir catheter  without evidence for hydrocephalus  Nonspecific hypodensity along the right frontal coursing parenchymal catheter tract which may be related to post treatment change and clinical correlation advised.    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 Recent admission (9/16-10/1/22). for acute, painless progressive vision loss in left eye. Exam with APD and CSF 9/19 with lymphocytic pleocytosis, elevated protein and numerous blastoid atypical cells. 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 Received Ommaya reservoir (9/27/22) and started on bi-weekly IT chemotherapy with cytarabine, hydrocortisone and MTX.    This admission, stroke code was activated. CTA MP without large vessel occlusion or high grade stenosis. No acute intracranial pathology. Unable to obtain MRI due to non compatible pacemaker. General Neurology was consulted 10/11/22. Regarding seizure hx,  says last known clinical seizure was April 2022. On Oxcarb 1200 mg BID at home, reports adherence to regimen.     Presentation concerning for infection in immunocompromised patient, progression of known CNS leukemia, neurotoxicity of chemotherapy, seizures     Recommendations:  --continue home oxcarb 1200 mg BID  Check level  --EEG >1 hr, continue seizure precautions  --pancytopenia, continue management per primary  Evaluate CSF from ommaya reservoir when able given plt ct  --primary team considering bone marrow bx   --B vitamins, folate pending, poor PO intake at home  --consider ophthalmology consult given new progression of eye symptoms since last admission   --palliative care consulted for goals of care discussion  --PT/OT as able      VTE Risk Mitigation (From admission, onward)         Ordered     heparin, porcine (PF) 100 unit/mL injection flush 500 Units  As needed (PRN)         10/11/22 1049     Reason for No Pharmacological VTE Prophylaxis  Once        Question:  Reasons:  Answer:  Thrombocytopenia    10/11/22 1049     IP VTE HIGH RISK PATIENT  Once         10/11/22 1049     Place sequential compression device  Until discontinued         10/11/22 1049              Thank you for your consult. I will follow-up with patient. Please contact us if you have any additional questions.    Chikis Keenan PA-C  General Neurology Consult

## 2022-10-11 NOTE — SUBJECTIVE & OBJECTIVE
Patient information was obtained from patient, spouse/SO, and past medical records.     Oncology History:  Patient of Dr. Dayron Jenkins  1. Precursor B-cell acute lymphoblastic leukemia (Ph+)              A. [place]23[place]2021: [place] [facility] [place]              B. 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               C. 11/30/2021 - 12/23/2021: Vincristine + imatinib induction; hospital course was complicated by acute hypoxemic respiratory failure which resolved with diuresis and treatment of ALL              D. 1/3/2022: Bone marrow biopsy after induction shows normocellular marrow (60%) with no definite evidence of B-ALL; bcr-abl p190 fusion detected at 0.02% of total ABL1; FISH normal; MRD testing not sent              E. 1/20/2022: Begin consolidation therapy with [id number] protocol              F. 3/16/2022: BCR-ABL p190 transcript on peripheral blood negative              G. 4/13/2022: BCR-ABL p190 transcript on peripheral blood 0.63%              H. 5/4/2022: Bone marrow biopsy shows 50% cellular marrow with relapsed B-ALL; bone marrow aspirate shows 23.5% blasts; cytogenetics 46,XX,-9,t(9;22)(q34;q11.2),+isaias(22)t(9;22)[5] /46,XX[15]; FISH shows 9.5% nuclei with bcr/abl1 fusion; bcr/abl1 quantitative PCR shows 47% of total ABL1  I. Subsequently attempted blinatumomab but had to stop therapy due to Grade 3 CRS  J. 6/13/2022 - 8/8/2022: inotuzumab ozogamicin + ponatinib therapy  K. 6/28/2022: Bone marrow biopsy shows hypocellular marrow with less than 5% cellularity and no morphologic or immunophenotypic evidence of B-ALL; cytogenetics 46,XX; bcr/abl PCR detects p190 fusion form (0.03%).    L. 8/8/2022: Inotuzumab discontinued. Therapy held.             M. 9/19/2022: During hospital presentation for neurologic changes - CSF shows involvement by B-ALL (CNS relapse).     Medications Prior to Admission   Medication Sig Dispense Refill Last Dose    acyclovir (ZOVIRAX) 400 MG tablet Take 1 tablet (400 mg total) by mouth 2 (two) times daily. 60 tablet 11     acyclovir (ZOVIRAX) 400 MG tablet Take 1 tablet (400 mg total) by mouth 2 (two) times daily. 60 tablet 0     allopurinoL (ZYLOPRIM) 300 MG tablet Take 1 tablet (300 mg total) by mouth once daily. 90 tablet 3     artificial tears (ISOPTO TEARS) 0.5 % ophthalmic solution Place 1 drop into both eyes 4 (four) times daily as needed.       atorvastatin (LIPITOR) 80 MG tablet Take 80 mg by mouth once daily.       blood sugar diagnostic Strp SMARTSIG:Via Meter 1 to 2 Times Daily       busPIRone (BUSPAR) 10 MG tablet Take 10 mg by mouth 2 (two) times daily.       butalbital-acetaminophen-caffeine -40 mg (FIORICET, ESGIC) -40 mg per tablet Take 1 tablet by mouth every 6 (six) hours as needed for Headaches. 30 tablet 0     cetirizine (ZYRTEC) 10 MG tablet Take 1 tablet (10 mg total) by mouth once daily. 60 tablet 3     dapagliflozin (FARXIGA) 10 mg tablet Take 10 mg by mouth once daily.       diazePAM (VALIUM) 10 MG Tab Take 10 mg by mouth 2 (two) times daily as needed.       diltiaZEM (CARDIZEM) 90 MG tablet Take 1 tablet (90 mg total) by mouth every 6 (six) hours. 120 tablet 11     duke's soln (benadryl 30 mL, mylanta 30 mL, LIDOcaine 30 mL, nystatin 30 mL) 120mL Take 10 mLs by mouth 4 (four) times daily as needed (mouth pain). 120 mL 0     fenofibrate 160 MG Tab Take 160 mg by mouth once daily.       fluconazole (DIFLUCAN) 200 MG Tab Take 2 tablets (400 mg total) by mouth once daily. 60 tablet 0     gabapentin (NEURONTIN) 300 MG capsule Take 1 capsule (300 mg total) by mouth 3 (three) times daily. 90 capsule 11     hydrOXYchloroQUINE (PLAQUENIL)  200 mg tablet Take 1 tablet (200 mg total) by mouth once daily.       hydrOXYzine pamoate (VISTARIL) 50 MG Cap Take 50 mg by mouth 2 (two) times daily as needed.       levoFLOXacin (LEVAQUIN) 500 MG tablet Take 1 tablet (500 mg total) by mouth once daily. 30 tablet 0     LIDOcaine (LIDODERM) 5 % Place 1 patch onto the skin once daily. Remove & Discard patch within 12 hours or as directed by MD 30 patch 2     losartan (COZAAR) 25 MG tablet Take 25 mg by mouth once daily.       magnesium oxide (MAG-OX) 400 mg (241.3 mg magnesium) tablet Take 1 tablet by mouth once daily.       metFORMIN (GLUCOPHAGE-XR) 500 MG ER 24hr tablet Take 1,000 mg by mouth 2 (two) times daily.       naloxone (NARCAN) 4 mg/actuation Spry 4mg by nasal route as needed for opioid overdose; may repeat every 2-3 minutes in alternating nostrils until medical help arrives. Call 911 1 each 11     omeprazole (PRILOSEC) 40 MG capsule Take 40 mg by mouth once daily.       ondansetron (ZOFRAN-ODT) 8 MG TbDL Take 1 tablet (8 mg total) by mouth every 8 (eight) hours as needed (in case of chemo induced nausea). 30 tablet 1     ondansetron (ZOFRAN-ODT) 8 MG TbDL Take 1 tablet (8 mg total) by mouth every 8 (eight) hours as needed (nausea or vomiting). 60 tablet 0     ONETOUCH VERIO TEST STRIPS Strp SMARTSIG:Via Meter 1 to 2 Times Daily       OXcarbazepine (TRILEPTAL) 600 MG Tab Take 1,200 mg by mouth 2 (two) times daily.       polyethylene glycol (GLYCOLAX) 17 gram/dose powder Dissolve one capful (17 g) in liquid and take by mouth daily as needed (constipation). 510 g 0     PONATinib (ICLUSIG) 30 mg Tab Take 1 tablet (30 mg) by mouth once daily. 30 tablet 0     potassium chloride (K-TAB) 20 mEq Take 20 mEq by mouth 2 (two) times daily.       prochlorperazine (COMPAZINE) 10 MG tablet Take 1 tablet (10 mg total) by mouth every 6 (six) hours as needed for Nausea. 30 tablet 2     QUEtiapine (SEROQUEL) 200 MG Tab Take 200 mg by mouth every evening.       rivaroxaban  (XARELTO) 20 mg Tab Take 1 tablet (20 mg total) by mouth daily with dinner or evening meal. ** Hold until instructed to resume by provider due to low platelet count. **       senna-docusate 8.6-50 mg (PERICOLACE) 8.6-50 mg per tablet Take 1 tablet by mouth 2 (two) times daily. 60 tablet 3     sumatriptan (IMITREX) 50 MG tablet Take 1 tablet (50 mg total) by mouth daily as needed (headache). 30 tablet 2     TRINTELLIX 20 mg Tab Take 1 tablet by mouth once daily.       ursodioL (ACTIGALL) 300 mg capsule Take 1 capsule (300 mg total) by mouth 3 (three) times daily. 90 capsule 11        Levetiracetam     Past Medical History:   Diagnosis Date    Cancer     COPD (chronic obstructive pulmonary disease)     Hypertension     Rheumatoid arthritis, unspecified     SAH (subarachnoid hemorrhage) 2022    Seizures     Stroke     TIA x 4    Type 2 diabetes mellitus with circulatory disorder, without long-term current use of insulin      Past Surgical History:   Procedure Laterality Date    APPENDECTOMY      HYSTERECTOMY      OMMAYA RESERVIOR INSERTION N/A 2022    Procedure: INSERTION, OMMAYA RESERVOIR;  Surgeon: Mj Alberto MD;  Location: [facility] OR [address] [place] [address];  Service: Neurosurgery;  Laterality: N/A;    ROTATOR CUFF REPAIR Bilateral     TONSILLECTOMY      TUBAL LIGATION       Family History    None       Tobacco Use    Smoking status: Former     Packs/day: 0.50     Types: Cigarettes     Quit date: 3/16/2022     Years since quittin.5    Smokeless tobacco: Never   Substance and Sexual Activity    Alcohol use: No    Drug use: Never    Sexual activity: Not on file       Review of Systems   Constitutional:  Positive for activity change, appetite change and fatigue. Negative for chills, diaphoresis and fever.   HENT:  Positive for mouth sores. Negative for congestion, ear pain, nosebleeds, postnasal drip, rhinorrhea, sinus pressure, sinus pain, sore throat and trouble swallowing.    Eyes:  Negative for pain, discharge  and redness.   Respiratory:  Negative for apnea, cough, chest tightness and shortness of breath.    Cardiovascular:  Negative for chest pain, palpitations and leg swelling.   Gastrointestinal:  Positive for nausea. Negative for abdominal distention, abdominal pain, blood in stool, constipation, diarrhea and vomiting.   Genitourinary:  Negative for dysuria and hematuria.   Musculoskeletal:  Negative for back pain, gait problem and joint swelling.   Skin:  Positive for wound (coccyx). Negative for rash.   Neurological:  Negative for dizziness, tremors, syncope, numbness and headaches.   Hematological:  Negative for adenopathy. Does not bruise/bleed easily.   Psychiatric/Behavioral:  Negative for behavioral problems and confusion.    Objective:     Vital Signs (Most Recent):  Temp: 99 °F (37.2 °C) (10/11/22 1155)  Pulse: 110 (10/11/22 1155)  Resp: 18 (10/11/22 1155)  BP: 120/68 (10/11/22 1155)  SpO2: 99 % (10/11/22 1155) Vital Signs (24h Range):  Temp:  [97.9 °F (36.6 °C)-99.4 °F (37.4 °C)] 99 °F (37.2 °C)  Pulse:  [110-122] 110  Resp:  [18-20] 18  SpO2:  [95 %-100 %] 99 %  BP: (115-126)/(58-91) 120/68     Weight: 53 kg (116 lb 13.5 oz)  Body mass index is 20.06 kg/m².  Body surface area is 1.55 meters squared.    ECOG SCORE           [unfilled]    Lines/Drains/Airways       Central Venous Catheter Line  Duration                  PowerPort A Cath Single Lumen 03/16/22 1128 right internal jugular 209 days              Drain  Duration                  Urethral Catheter 10/01/22 0700 10 days                    Physical Exam  Vitals reviewed.   Constitutional:       Appearance: Normal appearance. She is well-developed. She is ill-appearing and toxic-appearing.   HENT:      Head: Normocephalic and atraumatic.      Right Ear: External ear normal.      Left Ear: External ear normal.      Mouth/Throat:      Pharynx: Posterior oropharyngeal erythema present.   Eyes:      General: Visual field deficit (left eye blindness) present.       Extraocular Movements: Extraocular movements intact.      Conjunctiva/sclera: Conjunctivae normal.   Cardiovascular:      Rate and Rhythm: Normal rate and regular rhythm.      Pulses: Normal pulses.      Heart sounds: Normal heart sounds. No murmur heard.  Pulmonary:      Effort: Pulmonary effort is normal. No respiratory distress.      Breath sounds: Normal breath sounds. No stridor. No wheezing or rales.   Abdominal:      General: Bowel sounds are normal. There is no distension.      Palpations: Abdomen is soft.      Tenderness: There is no abdominal tenderness.   Genitourinary:     Comments: Chronic wolf  Musculoskeletal:         General: No swelling. Normal range of motion.      Cervical back: Normal range of motion.      Right lower leg: No edema.      Left lower leg: No edema.   Skin:     General: Skin is warm and dry.      Findings: No bruising, erythema or rash.      Comments: RCWP clean, dry and intact with no signs of infection   Neurological:      Mental Status: She is alert. She is confused.      Motor: Weakness present.      Gait: Gait abnormal.   Psychiatric:         Mood and Affect: Mood normal.         Thought Content: Thought content normal.         Judgment: Judgment normal.       Significant Labs:   CBC:   Recent Labs   Lab 10/11/22  0756   WBC 1.27*   HGB 7.9*   HCT 23.1*   PLT 15*    and CMP:   Recent Labs   Lab 10/11/22  0756   *   K 4.3      CO2 14*   *   BUN 42*   CREATININE 1.0   CALCIUM 9.8   PROT 7.5   ALBUMIN 2.9*   BILITOT 0.6   ALKPHOS 93   AST 8*   ALT 7*   ANIONGAP 16       Diagnostic Results:  I have reviewed all pertinent imaging results/findings within the past 24 hours.

## 2022-10-11 NOTE — ASSESSMENT & PLAN NOTE
- chronic wolf present on admission  - failed voiding trial previous admission  - will need urology follow up at discharge

## 2022-10-11 NOTE — ASSESSMENT & PLAN NOTE
- Blood glucose monitoring TID with meals and nightly   - Moderate dose SSI PRN  - Goal -180 while inpatient   - Diabetic diet once able to have PO intake

## 2022-10-11 NOTE — ASSESSMENT & PLAN NOTE
- CT head 10/11/22 revealed Persistent pneumocephalus in the setting recent Ommaya reservoir placement. No new hemorrhage or other significant detrimental interval change.   - Unable to perform MRI d/t pacemaker   - previous infectious workup on LP 09/19 negative  - Hold sedating medications   - neurology consulted for further recs/safety of using ommaya again  - Consider continued AMS workup with ammonia, thiamine level, folate, Vitamin B12

## 2022-10-11 NOTE — ASSESSMENT & PLAN NOTE
- progressive weakness with worsening physical debility;  states patient has not walked in over 3 weeks  - PT/OT consulted on admission

## 2022-10-12 PROBLEM — Z51.5 PALLIATIVE CARE ENCOUNTER: Status: ACTIVE | Noted: 2022-10-12

## 2022-10-12 PROBLEM — R53.81 DEBILITY: Status: ACTIVE | Noted: 2022-10-12

## 2022-10-12 PROBLEM — Z71.89 ADVANCE CARE PLANNING: Status: ACTIVE | Noted: 2022-10-12

## 2022-10-12 PROBLEM — E43 SEVERE PROTEIN-CALORIE MALNUTRITION: Chronic | Status: ACTIVE | Noted: 2022-10-12

## 2022-10-12 LAB
ALBUMIN SERPL BCP-MCNC: 2.2 G/DL (ref 3.5–5.2)
ALP SERPL-CCNC: 78 U/L (ref 55–135)
ALT SERPL W/O P-5'-P-CCNC: 8 U/L (ref 10–44)
ANION GAP SERPL CALC-SCNC: 11 MMOL/L (ref 8–16)
ANISOCYTOSIS BLD QL SMEAR: SLIGHT
AST SERPL-CCNC: 9 U/L (ref 10–40)
BASOPHILS # BLD AUTO: 0 K/UL (ref 0–0.2)
BASOPHILS NFR BLD: 0 % (ref 0–1.9)
BILIRUB SERPL-MCNC: 0.4 MG/DL (ref 0.1–1)
BLD PROD TYP BPU: NORMAL
BLOOD UNIT EXPIRATION DATE: NORMAL
BLOOD UNIT TYPE CODE: 5100
BLOOD UNIT TYPE CODE: 7300
BLOOD UNIT TYPE CODE: 7300
BLOOD UNIT TYPE: NORMAL
BUN SERPL-MCNC: 32 MG/DL (ref 6–20)
CALCIUM SERPL-MCNC: 9.2 MG/DL (ref 8.7–10.5)
CHLORIDE SERPL-SCNC: 109 MMOL/L (ref 95–110)
CO2 SERPL-SCNC: 18 MMOL/L (ref 23–29)
CODING SYSTEM: NORMAL
CREAT SERPL-MCNC: 0.7 MG/DL (ref 0.5–1.4)
DIFFERENTIAL METHOD: ABNORMAL
DISPENSE STATUS: NORMAL
EOSINOPHIL # BLD AUTO: 0 K/UL (ref 0–0.5)
EOSINOPHIL NFR BLD: 8.2 % (ref 0–8)
ERYTHROCYTE [DISTWIDTH] IN BLOOD BY AUTOMATED COUNT: 18.6 % (ref 11.5–14.5)
EST. GFR  (NO RACE VARIABLE): >60 ML/MIN/1.73 M^2
FOLATE SERPL-MCNC: 2.3 NG/ML (ref 4–24)
GLUCOSE SERPL-MCNC: 98 MG/DL (ref 70–110)
HCT VFR BLD AUTO: 16.3 % (ref 37–48.5)
HGB BLD-MCNC: 5.5 G/DL (ref 12–16)
HYPOCHROMIA BLD QL SMEAR: ABNORMAL
IMM GRANULOCYTES # BLD AUTO: 0 K/UL (ref 0–0.04)
IMM GRANULOCYTES NFR BLD AUTO: 0 % (ref 0–0.5)
LYMPHOCYTES # BLD AUTO: 0.4 K/UL (ref 1–4.8)
LYMPHOCYTES NFR BLD: 89.8 % (ref 18–48)
MAGNESIUM SERPL-MCNC: 2.3 MG/DL (ref 1.6–2.6)
MCH RBC QN AUTO: 36.2 PG (ref 27–31)
MCHC RBC AUTO-ENTMCNC: 33.7 G/DL (ref 32–36)
MCV RBC AUTO: 107 FL (ref 82–98)
MONOCYTES # BLD AUTO: 0 K/UL (ref 0.3–1)
MONOCYTES NFR BLD: 0 % (ref 4–15)
NEUTROPHILS # BLD AUTO: 0 K/UL (ref 1.8–7.7)
NEUTROPHILS NFR BLD: 2 % (ref 38–73)
NRBC BLD-RTO: 0 /100 WBC
NUM UNITS TRANS PACKED RBC: NORMAL
NUM UNITS TRANS PACKED RBC: NORMAL
OVALOCYTES BLD QL SMEAR: ABNORMAL
PHOSPHATE SERPL-MCNC: 3 MG/DL (ref 2.7–4.5)
PLATELET # BLD AUTO: 4 K/UL (ref 150–450)
PMV BLD AUTO: ABNORMAL FL (ref 9.2–12.9)
POCT GLUCOSE: 83 MG/DL (ref 70–110)
POCT GLUCOSE: 91 MG/DL (ref 70–110)
POCT GLUCOSE: 91 MG/DL (ref 70–110)
POCT GLUCOSE: 98 MG/DL (ref 70–110)
POIKILOCYTOSIS BLD QL SMEAR: SLIGHT
POLYCHROMASIA BLD QL SMEAR: ABNORMAL
POTASSIUM SERPL-SCNC: 3.6 MMOL/L (ref 3.5–5.1)
PROT SERPL-MCNC: 6.2 G/DL (ref 6–8.4)
RBC # BLD AUTO: 1.52 M/UL (ref 4–5.4)
SODIUM SERPL-SCNC: 138 MMOL/L (ref 136–145)
SPHEROCYTES BLD QL SMEAR: ABNORMAL
UNIT NUMBER: NORMAL
URATE SERPL-MCNC: 1.6 MG/DL (ref 2.4–5.7)
WBC # BLD AUTO: 0.49 K/UL (ref 3.9–12.7)

## 2022-10-12 PROCEDURE — P9040 RBC LEUKOREDUCED IRRADIATED: HCPCS | Performed by: NURSE PRACTITIONER

## 2022-10-12 PROCEDURE — 99233 SBSQ HOSP IP/OBS HIGH 50: CPT | Mod: ,,, | Performed by: INTERNAL MEDICINE

## 2022-10-12 PROCEDURE — 99223 PR INITIAL HOSPITAL CARE,LEVL III: ICD-10-PCS | Mod: ,,, | Performed by: CLINICAL NURSE SPECIALIST

## 2022-10-12 PROCEDURE — 84550 ASSAY OF BLOOD/URIC ACID: CPT | Performed by: NURSE PRACTITIONER

## 2022-10-12 PROCEDURE — P9037 PLATE PHERES LEUKOREDU IRRAD: HCPCS | Performed by: NURSE PRACTITIONER

## 2022-10-12 PROCEDURE — 25000003 PHARM REV CODE 250: Performed by: NURSE PRACTITIONER

## 2022-10-12 PROCEDURE — 82746 ASSAY OF FOLIC ACID SERUM: CPT | Performed by: NURSE PRACTITIONER

## 2022-10-12 PROCEDURE — 36430 TRANSFUSION BLD/BLD COMPNT: CPT

## 2022-10-12 PROCEDURE — 99223 1ST HOSP IP/OBS HIGH 75: CPT | Mod: ,,, | Performed by: CLINICAL NURSE SPECIALIST

## 2022-10-12 PROCEDURE — 85025 COMPLETE CBC W/AUTO DIFF WBC: CPT | Performed by: NURSE PRACTITIONER

## 2022-10-12 PROCEDURE — 20600001 HC STEP DOWN PRIVATE ROOM

## 2022-10-12 PROCEDURE — 87040 BLOOD CULTURE FOR BACTERIA: CPT | Performed by: NURSE PRACTITIONER

## 2022-10-12 PROCEDURE — 82607 VITAMIN B-12: CPT | Performed by: NURSE PRACTITIONER

## 2022-10-12 PROCEDURE — 94761 N-INVAS EAR/PLS OXIMETRY MLT: CPT

## 2022-10-12 PROCEDURE — 83735 ASSAY OF MAGNESIUM: CPT | Performed by: NURSE PRACTITIONER

## 2022-10-12 PROCEDURE — 84100 ASSAY OF PHOSPHORUS: CPT | Performed by: NURSE PRACTITIONER

## 2022-10-12 PROCEDURE — 99233 PR SUBSEQUENT HOSPITAL CARE,LEVL III: ICD-10-PCS | Mod: ,,, | Performed by: INTERNAL MEDICINE

## 2022-10-12 PROCEDURE — 80053 COMPREHEN METABOLIC PANEL: CPT | Performed by: NURSE PRACTITIONER

## 2022-10-12 PROCEDURE — 83921 ORGANIC ACID SINGLE QUANT: CPT | Performed by: NURSE PRACTITIONER

## 2022-10-12 RX ORDER — HYDROCODONE BITARTRATE AND ACETAMINOPHEN 500; 5 MG/1; MG/1
TABLET ORAL
Status: DISCONTINUED | OUTPATIENT
Start: 2022-10-12 | End: 2022-10-14

## 2022-10-12 RX ORDER — SUMATRIPTAN 50 MG/1
50 TABLET, FILM COATED ORAL DAILY PRN
Status: DISCONTINUED | OUTPATIENT
Start: 2022-10-12 | End: 2022-10-14 | Stop reason: HOSPADM

## 2022-10-12 RX ORDER — DILTIAZEM HYDROCHLORIDE 60 MG/1
60 TABLET, FILM COATED ORAL EVERY 6 HOURS
Status: DISCONTINUED | OUTPATIENT
Start: 2022-10-12 | End: 2022-10-14 | Stop reason: HOSPADM

## 2022-10-12 RX ORDER — TRAMADOL HYDROCHLORIDE 50 MG/1
50 TABLET ORAL ONCE
Status: COMPLETED | OUTPATIENT
Start: 2022-10-12 | End: 2022-10-12

## 2022-10-12 RX ADMIN — TRAMADOL HYDROCHLORIDE 50 MG: 50 TABLET, COATED ORAL at 12:10

## 2022-10-12 RX ADMIN — LOSARTAN POTASSIUM 25 MG: 25 TABLET, FILM COATED ORAL at 12:10

## 2022-10-12 RX ADMIN — DILTIAZEM HYDROCHLORIDE 60 MG: 60 TABLET, FILM COATED ORAL at 10:10

## 2022-10-12 RX ADMIN — OXCARBAZEPINE 1200 MG: 600 TABLET, FILM COATED ORAL at 09:10

## 2022-10-12 RX ADMIN — Medication 400 MG: at 09:10

## 2022-10-12 RX ADMIN — LEVOFLOXACIN 500 MG: 500 TABLET, FILM COATED ORAL at 03:10

## 2022-10-12 RX ADMIN — DILTIAZEM HYDROCHLORIDE 60 MG: 60 TABLET, FILM COATED ORAL at 09:10

## 2022-10-12 RX ADMIN — ALUMINUM HYDROXIDE, MAGNESIUM HYDROXIDE, AND DIMETHICONE 10 ML: 400; 400; 40 SUSPENSION ORAL at 04:10

## 2022-10-12 RX ADMIN — ACYCLOVIR 400 MG: 200 CAPSULE ORAL at 10:10

## 2022-10-12 RX ADMIN — ATORVASTATIN CALCIUM 80 MG: 20 TABLET, FILM COATED ORAL at 09:10

## 2022-10-12 RX ADMIN — DILTIAZEM HYDROCHLORIDE 90 MG: 60 TABLET, FILM COATED ORAL at 03:10

## 2022-10-12 RX ADMIN — ALUMINUM HYDROXIDE, MAGNESIUM HYDROXIDE, AND DIMETHICONE 10 ML: 400; 400; 40 SUSPENSION ORAL at 09:10

## 2022-10-12 RX ADMIN — BUSPIRONE HYDROCHLORIDE 10 MG: 10 TABLET ORAL at 10:10

## 2022-10-12 RX ADMIN — CETIRIZINE HYDROCHLORIDE 10 MG: 10 TABLET, FILM COATED ORAL at 09:10

## 2022-10-12 RX ADMIN — QUETIAPINE FUMARATE 200 MG: 200 TABLET ORAL at 08:10

## 2022-10-12 RX ADMIN — GABAPENTIN 300 MG: 300 CAPSULE ORAL at 12:10

## 2022-10-12 RX ADMIN — ACYCLOVIR 400 MG: 200 CAPSULE ORAL at 09:10

## 2022-10-12 RX ADMIN — SODIUM CHLORIDE: 0.9 INJECTION, SOLUTION INTRAVENOUS at 12:10

## 2022-10-12 RX ADMIN — PANTOPRAZOLE SODIUM 40 MG: 40 TABLET, DELAYED RELEASE ORAL at 09:10

## 2022-10-12 RX ADMIN — ALLOPURINOL 300 MG: 300 TABLET ORAL at 09:10

## 2022-10-12 RX ADMIN — GABAPENTIN 300 MG: 300 CAPSULE ORAL at 08:10

## 2022-10-12 RX ADMIN — FLUCONAZOLE 400 MG: 200 TABLET ORAL at 03:10

## 2022-10-12 RX ADMIN — DILTIAZEM HYDROCHLORIDE 60 MG: 60 TABLET, FILM COATED ORAL at 04:10

## 2022-10-12 RX ADMIN — BUSPIRONE HYDROCHLORIDE 10 MG: 10 TABLET ORAL at 09:10

## 2022-10-12 RX ADMIN — ALUMINUM HYDROXIDE, MAGNESIUM HYDROXIDE, AND DIMETHICONE 10 ML: 400; 400; 40 SUSPENSION ORAL at 12:10

## 2022-10-12 RX ADMIN — OXCARBAZEPINE 1200 MG: 600 TABLET, FILM COATED ORAL at 10:10

## 2022-10-12 RX ADMIN — SODIUM CHLORIDE: 0.9 INJECTION, SOLUTION INTRAVENOUS at 09:10

## 2022-10-12 RX ADMIN — ALUMINUM HYDROXIDE, MAGNESIUM HYDROXIDE, AND DIMETHICONE 10 ML: 400; 400; 40 SUSPENSION ORAL at 08:10

## 2022-10-12 RX ADMIN — SENNOSIDES AND DOCUSATE SODIUM 1 TABLET: 50; 8.6 TABLET ORAL at 08:10

## 2022-10-12 RX ADMIN — GABAPENTIN 300 MG: 300 CAPSULE ORAL at 03:10

## 2022-10-12 NOTE — HPI
Pt is a 50 yo F with CNS relapse of Ph+ B cell ALL. Other PMH significant for paroxsymal A-fib with an implantable loop recorder/pacemaker in place, anxiety/depression, COPD, DM2, gastroparesis, GERD, osteoporosis, PAD, RA, seizures, TIA, tobacco use. 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 She was treated with inotuzumab and ponatinib, followed by cessation of inotuzumab (due to cytopenias). She achieved molecular response on ponatinib. Unfortunately, she is now diagnosed with CNS relapse as of 9/19/22. She had an Ommaya placed on 9/26/22 and has been receiving twice weekly IT triple chemotherapy. Most recent CSF shows clearance of leukemic cells.      Per chart review, pt presented to outpatient BMT clinic for follow-up today where her  stated that Mrs. Gonzalez had been declining over the 72hrs. She now has coordination difficulties and is very fatigued. She denies new vision changes (still with complete left eye vision loss). Also of note, she is severely pancytopenic, has mouth sores, and poor oral intake/nausea. Denies any recent fevers, chills, chest pain, sob, diarrhea, constipation, bleeding or bruising. Admitted for workup of new coordination difficulties in setting of relapsed CNS Ph+ ALL.         Patient information was obtained from patient, spouse/SO, and past medical records.

## 2022-10-12 NOTE — ASSESSMENT & PLAN NOTE
- daily CBC while inpatient  - presented 10/11 with WBC 1.27, Hgb 7.9, Plt 15K and ANC 10  - transfuse for Hgb <7, Plt <10K or bleeding  - continue ppx acyclovir, fluconazole, levaquin  - consider bone marrow biopsy in coming days to assess disease status  - precipitous drop in blood counts today. Transfusing 2 units prbc and 1 unit plts.

## 2022-10-12 NOTE — PROGRESS NOTES
[facility] - Oncology ([facility])  Hematology  Bone Marrow Transplant  Progress Note    Patient Name: Deepti Gonzalez  Admission Date: 10/11/2022  Hospital Length of Stay: 1 days  Code Status: Full Code    Subjective:     Interval History: Awake and alert on exam today. Following commands. Neuro following. Rec ophtho cx for new right eye changes. Will defer for now but will consult based on how aggressively patient/family would like to be regarding ALL treatment. Palliative Care consulted for GOC discussion. Blood counts dropped significantly. Transfusing 2 units prbc and 1 unit plts. Resuming home ponatinib per Dr. Posey. Infection work ordered to r/o as source of neuro/cognitive changes. In process.    Objective:     Vital Signs (Most Recent):  Temp: 98.3 °F (36.8 °C) (10/12/22 1053)  Pulse: 98 (10/12/22 1110)  Resp: 16 (10/12/22 1053)  BP: (!) 115/55 (10/12/22 1053)  SpO2: 95 % (10/12/22 1053)   Vital Signs (24h Range):  Temp:  [97.9 °F (36.6 °C)-99.5 °F (37.5 °C)] 98.3 °F (36.8 °C)  Pulse:  [] 98  Resp:  [16-18] 16  SpO2:  [94 %-99 %] 95 %  BP: ()/(55-60) 115/55     Weight: 52.7 kg (116 lb 4.3 oz)  Body mass index is 19.96 kg/m².  Body surface area is 1.54 meters squared.    ECOG SCORE           [unfilled]    Intake/Output - Last 3 Shifts         10/10 0700  10/11 0659 10/11 0700  10/12 0659 10/12 0700  10/13 0659    P.O.  590     Blood   152    Total Intake(mL/kg)  590 (11.2) 152 (2.9)    Urine (mL/kg/hr)  775     Total Output  775     Net  -185 +152                   Physical Exam  Constitutional:       Appearance: She is well-developed. She is ill-appearing.   HENT:      Head: Normocephalic and atraumatic.      Mouth/Throat:      Pharynx: No oropharyngeal exudate.   Eyes:      Conjunctiva/sclera: Conjunctivae normal.      Comments: Pupils unequal and sluggish  Left eye blindness   Cardiovascular:      Rate and Rhythm: Normal rate and regular rhythm.      Heart sounds: Normal heart sounds. No murmur  heard.  Pulmonary:      Effort: Pulmonary effort is normal.      Breath sounds: Normal breath sounds.   Abdominal:      General: Bowel sounds are normal. There is no distension.      Palpations: Abdomen is soft.      Tenderness: There is no abdominal tenderness.   Musculoskeletal:         General: No deformity. Normal range of motion.      Cervical back: Normal range of motion and neck supple.   Skin:     General: Skin is warm and dry.      Findings: Bruising present. No erythema or rash.   Neurological:      Mental Status: She is alert and oriented to person, place, and time.      Comments: Generally weak   Psychiatric:         Behavior: Behavior normal.         Thought Content: Thought content normal.         Judgment: Judgment normal.       Significant Labs:   CBC:   Recent Labs   Lab 10/11/22  0756 10/12/22  0353   WBC 1.27* 0.49*   HGB 7.9* 5.5*   HCT 23.1* 16.3*   PLT 15* 4*    and CMP:   Recent Labs   Lab 10/11/22  0756 10/12/22  0353   * 138   K 4.3 3.6    109   CO2 14* 18*   * 98   BUN 42* 32*   CREATININE 1.0 0.7   CALCIUM 9.8 9.2   PROT 7.5 6.2   ALBUMIN 2.9* 2.2*   BILITOT 0.6 0.4   ALKPHOS 93 78   AST 8* 9*   ALT 7* 8*   ANIONGAP 16 11       Diagnostic Results:  I have reviewed all pertinent imaging results/findings within the past 24 hours.    Assessment/Plan:     * Altered mental status  - CT head 10/11/22 revealed Persistent pneumocephalus in the setting recent Ommaya reservoir placement. No new hemorrhage or other significant detrimental interval change.   - Unable to perform MRI d/t pacemaker   - previous infectious workup on LP 09/19 negative  - Hold sedating medications   - neurology consulted for further recs/safety of using ommaya again  - checing folate, Vitamin B12 panel, and oxcarb level per neuro rec  - infection w/u ordered to r/o infection as source. Pending.  - considering EEG    B-cell acute lymphoblastic leukemia (ALL) in relapse  - Follows with Dr. Jenkins. Ph+ B-ALL  that was primary refractory to 1st line therapy. She then developed T315I bcr-abl resistance mutation  - Was treated with inotuzumab ozogamicin and ponatinib but course complicated by severe cytopenias. Not has been on therapy for past few weeks.  - Not a candidate for allogeneic stem cell transplant at this time.  - Presented 9/16/22 with left eye vision loss and worsening vision of left eye  - Patient with pacemaker and per patient, not compatible with MRI. Would ideally obtain MRI brain w/ w/o contrast. Verified with EP who checked with rep that pacemaker is not MRI compatible.   - CTA completed with delayed venous phase revealing small SAH   - LP with IT chemo on 9/19 positive for B-cell ALL; will require twice weekly IT until clearance at minimum  - Ommaya placed by NSGY on 9/26/22; can use ommaya on POD #5 (10/1)  - Started twice weekly IT chemo with ommaya on 10/3  - Currently on ponatinib, hold at this time   - Hold IT therapy until she is deemed appropriate for additional therapy  - Considering bone marrow biopsy to assess disease response given pancytopenia (send ALL MRD testing, as well as quantitative bcr/abl p190 transcript)  - Consulted palliative care for GOC discussion  - Resumed home ponatinib inpatient per Dr. Posey      Other pancytopenia  - daily CBC while inpatient  - presented 10/11 with WBC 1.27, Hgb 7.9, Plt 15K and ANC 10  - transfuse for Hgb <7, Plt <10K or bleeding  - continue ppx acyclovir, fluconazole, levaquin  - consider bone marrow biopsy in coming days to assess disease status  - precipitous drop in blood counts today. Transfusing 2 units prbc and 1 unit plts.    Mucositis due to chemotherapy  - Start IVF for gentle hydration   - [place] QID  - Holding any further sedating medications (opioids) at this time    Fatigue  - progressive weakness with worsening physical debility;  states patient has not walked in over 3 weeks  - PT/OT consulted on admission    Vision loss of left  eye  - continues with total vision loss of left eye following diagnosis of CNS relapse of B-ALL    Urinary retention  - chronic wolf present on admission  - failed voiding trial previous admission  - will need urology follow up at discharge    Migraine  - PRN Imitrex    Cancer associated pain  - continue home gabapentin    Hypomagnesemia  - continue home daily mag    Peripheral arterial occlusive disease  - Holding home Xarelto due to severe thrombocytopenia     Anxiety  - Continue home buspirone and quetiapine.   - Trintellix is nonformulary however pharmacy is attempting to obtain this medication.    GERD (gastroesophageal reflux disease)  - hold home omeprazole as not on inpatient formulary; replace with protonix    Hypertension  - Continue home Losartan    Seizure disorder  - Continue home oxcarbazepine 200mg QHS  - oxcarbazepine level pending    Type 2 diabetes mellitus, without long-term current use of insulin  - Blood glucose monitoring TID with meals and nightly   - Moderate dose SSI PRN  - Goal -180 while inpatient   - Diabetic diet    Pacemaker  - Patient with pacemaker in place due to SSS. Per note/EP, it is definitely MRI incompatible.      Rheumatoid arthritis involving multiple sites  - Seronegative RA  - has been off plaquenil for some time now; continuing to hold    History of TIA (transient ischemic attack)  - Holding home Xarelto given severe thrombocytopenia      Hyperlipidemia  - Continue home atorvastatin; states no longer using fenofibrate    Paroxysmal atrial fibrillation  - Continue home diltiazem  - Holding home xarelto in the setting of severe thrombocytopenia        VTE Risk Mitigation (From admission, onward)         Ordered     heparin, porcine (PF) 100 unit/mL injection flush 500 Units  As needed (PRN)         10/11/22 1049     Reason for No Pharmacological VTE Prophylaxis  Once        Question:  Reasons:  Answer:  Thrombocytopenia    10/11/22 1049     IP VTE HIGH RISK PATIENT   Once         10/11/22 1049     Place sequential compression device  Until discontinued         10/11/22 1049                Disposition: Inpatient.    Mirtha Antonio NP  Bone Marrow Transplant  [facility] - Oncology ([facility])

## 2022-10-12 NOTE — CONSULTS
[facility] - Oncology ([facility])  Palliative Medicine  Consult Note    Patient Name: Deepti Gonzalez  MRN: [id number]  Admission Date: 10/11/2022  Hospital Length of Stay: 1 days  Code Status: Full Code   Attending Provider: Kathrine Posey MD  Consulting Provider: LAKSHMI Mulligan  Primary Care Physician: Primary Doctor No  Principal Problem:Altered mental status    Patient information was obtained from patient, spouse/SO and primary team.      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 Other PMH significant for paroxsymal A-fib with an implantable loop recorder/pacemaker in place, anxiety/depression, COPD, DM2, gastroparesis, GERD, osteoporosis, PAD, RA, seizures, TIA, tobacco use. Pt is resting at this time. Pt and  report pt has headche and just received medication. Pt is a full code.     Reason for consult: ACP, Pt has been seen by palliative care prior.  Communicated with TERRY Shannon    Goals of care/ACP:   Met with pt's  who is at bedside. Pt resting due to headache. Pt known to Palliative care inpt team and is scheduled for clinic visit virtual. Per pt's  team had updated them on pt's relapse and getting bone marrow biopsy to confirm. Per pt's  he has questions concerning if pt is a candidate for anymore treatment if she wants to continue. TERRY Shannon to come back and speak to pt and . Per , code status was also discussed today. Questions answered concerning DNR vs full code. Prior to pt/ making decisions, they wanted to speak to BMT team again.     Pt's  open to more discussions with Pal care.  to speak to TERRY Shannon thi afternoon concerning questions he has about treatment. Will f/u.    Symptom management:     Debility:   Would have PT/Ot  evaluate and treat.     Headache:   Pt on sumatriptan prn daily prn.   Pt just received meds.     Plan:   Will continue to follow for advance care planning/GOC.                   Thank you for your consult. I will follow-up with patient. Please contact us if you have any additional questions.    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 Other PMH significant for paroxsymal A-fib with an implantable loop recorder/pacemaker in place, anxiety/depression, COPD, DM2, gastroparesis, GERD, osteoporosis, PAD, RA, seizures, TIA, tobacco use. 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 She was treated with inotuzumab and ponatinib, followed by cessation of inotuzumab (due to cytopenias). She achieved molecular response on ponatinib. Unfortunately, she is now diagnosed with CNS relapse as of 9/19/22. She had an Ommaya placed on 9/26/22 and has been receiving twice weekly IT triple chemotherapy. Most recent CSF shows clearance of leukemic cells.      Per chart review, pt presented to outpatient BMT clinic for follow-up today where her  stated that Mrs. Gonzalez had been declining over the 72hrs. She now has coordination difficulties and is very fatigued. She denies new vision changes (still with complete left eye vision loss). Also of note, she is severely pancytopenic, has mouth sores, and poor oral intake/nausea. Denies any recent fevers, chills, chest pain, sob, diarrhea, constipation, bleeding or bruising. Admitted for workup of new coordination difficulties in setting of relapsed CNS Ph+ ALL.         Patient information was obtained from patient, spouse/SO, and past medical records.          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    Past Medical History:   Diagnosis Date    Cancer     COPD (chronic obstructive pulmonary disease)     Hypertension      Rheumatoid arthritis, unspecified     SAH (subarachnoid hemorrhage) 2022    Seizures     Stroke     TIA x 4    Type 2 diabetes mellitus with circulatory disorder, without long-term current use of insulin        Past Surgical History:   Procedure Laterality Date    APPENDECTOMY      HYSTERECTOMY      OMMAYA RESERVIOR INSERTION N/A 2022    Procedure: INSERTION, OMMAYA RESERVOIR;  Surgeon: Mj Alberto MD;  Location: [facility] OR [address] [place] [address];  Service: Neurosurgery;  Laterality: N/A;    ROTATOR CUFF REPAIR Bilateral     TONSILLECTOMY      TUBAL LIGATION         Review of patient's allergies indicates:   Allergen Reactions    Levetiracetam      Other reaction(s): Unknown  Other reaction(s): Unknown  Other reaction(s): Unknown       Medications:  Continuous Infusions:   sodium chloride 0.9% Stopped (10/12/22 1214)     Scheduled Meds:   acyclovir  400 mg Oral BID    allopurinoL  300 mg Oral Daily    atorvastatin  80 mg Oral Daily    busPIRone  10 mg Oral BID    cetirizine  10 mg Oral Daily    diltiaZEM  60 mg Oral Q6H    duke's soln (benadryl 30 mL, mylanta 30 mL, LIDOcaine 30 mL, nystatin 30 mL) 120 mL  10 mL Oral QID    fluconazole  400 mg Oral Daily    gabapentin  300 mg Oral TID    levoFLOXacin  500 mg Oral Daily    losartan  25 mg Oral Daily    magnesium oxide  400 mg Oral Daily    OXcarbazepine  1,200 mg Oral BID    pantoprazole  40 mg Oral Daily    QUEtiapine  200 mg Oral QHS    senna-docusate 8.6-50 mg  1 tablet Oral BID     PRN Meds:sodium chloride, sodium chloride, sodium chloride, artificial tears, dextrose 10%, dextrose 10%, glucagon (human recombinant), glucose, glucose, heparin, porcine (PF), insulin aspart U-100, naloxone, ondansetron, polyethylene glycol, prochlorperazine, sodium chloride 0.9%, sumatriptan    Family History    None       Tobacco Use    Smoking status: Former     Packs/day: 0.50     Types: Cigarettes     Quit date: 3/16/2022     Years since quittin.5    Smokeless tobacco: Never    Substance and Sexual Activity    Alcohol use: No    Drug use: Never    Sexual activity: Not on file       Review of Systems   Constitutional:  Positive for activity change and fatigue.   Respiratory:  Negative for shortness of breath.    Gastrointestinal: Negative.    Skin:  Positive for pallor.   Neurological:  Positive for weakness.   Psychiatric/Behavioral: Negative.     Objective:     Vital Signs (Most Recent):  Temp: 98.4 °F (36.9 °C) (10/12/22 1324)  Pulse: 98 (10/12/22 1324)  Resp: 16 (10/12/22 1324)  BP: (!) 109/59 (10/12/22 1324)  SpO2: (!) 94 % (10/12/22 1324)   Vital Signs (24h Range):  Temp:  [97.9 °F (36.6 °C)-99.5 °F (37.5 °C)] 98.4 °F (36.9 °C)  Pulse:  [] 98  Resp:  [16-18] 16  SpO2:  [94 %-99 %] 94 %  BP: ()/(55-60) 109/59     Weight: 52.6 kg (115 lb 15.4 oz)  Body mass index is 19.9 kg/m².    Physical Exam  Constitutional:       Comments: Resting. Pt has headache. Pt just received medication.    HENT:      Head: Normocephalic and atraumatic.   Eyes:      General: Lids are normal.      Conjunctiva/sclera: Conjunctivae normal.      Comments: Blind in Left eye   Pulmonary:      Effort: Pulmonary effort is normal. No respiratory distress.   Abdominal:      General: There is no distension.   Musculoskeletal:      Cervical back: Full passive range of motion without pain and normal range of motion.      Comments: No edema noted.    Skin:     General: Skin is warm and dry.      Coloration: Skin is pale.   Neurological:      Comments: Resting heachache       Review of Symptoms      Symptom Assessment (ESAS 0-10 Scale)  Unable to complete assessment due to Other         Pain Assessment in Advanced Demential Scale (PAINAD)   Breathing - Independent of vocalization:  0  Negative vocalization:  0  Facial expression:  0  Body language:  0  Consolability:  0  Total:  0    Performance Status:  40    Living Arrangements:  Lives with family    Psychosocial/Cultural: Pt living at brother's house with   due to someone being with pt 24/7.  reports good support system at home. Pt has a 13 y/o and a 30 y/o old. Pt has two grandchildren.       Advance Care Planning   Advance Directives:   Living Will: No    Do Not Resuscitate Status: No    Agent's Name:   is NOK  Goals of Care: See note        Significant Labs: All pertinent labs within the past 24 hours have been reviewed.  CBC:   Recent Labs   Lab 10/12/22  0353   WBC 0.49*   HGB 5.5*   HCT 16.3*   *   PLT 4*     BMP:  Recent Labs   Lab 10/12/22  0353   GLU 98      K 3.6      CO2 18*   BUN 32*   CREATININE 0.7   CALCIUM 9.2   MG 2.3     LFT:  Lab Results   Component Value Date    AST 9 (L) 10/12/2022    ALKPHOS 78 10/12/2022    BILITOT 0.4 10/12/2022     Albumin:   Albumin   Date Value Ref Range Status   10/12/2022 2.2 (L) 3.5 - 5.2 g/dL Final     Protein:   Total Protein   Date Value Ref Range Status   10/12/2022 6.2 6.0 - 8.4 g/dL Final     Lactic acid:   Lab Results   Component Value Date    LACTATE 1.9 08/27/2022    LACTATE 3.2 (H) 08/27/2022       Significant Imaging: I have reviewed all pertinent imaging results/findings within the past 24 hours.        > 50% of 70 min visit spent in chart review, face to face discussion of goals of care,  symptom assessment, coordination of care and emotional support.    Akilah Malik, CNS  Palliative Medicine  [facility] - Oncology ([facility])

## 2022-10-12 NOTE — SUBJECTIVE & OBJECTIVE
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    Past Medical History:   Diagnosis Date    Cancer     COPD (chronic obstructive pulmonary disease)     Hypertension     Rheumatoid arthritis, unspecified     SAH (subarachnoid hemorrhage) 9/17/2022    Seizures     Stroke     TIA x 4    Type 2 diabetes mellitus with circulatory disorder, without long-term current use of insulin        Past Surgical History:   Procedure Laterality Date    APPENDECTOMY      HYSTERECTOMY      OMMAYA RESERVIOR INSERTION N/A 9/26/2022    Procedure: INSERTION, OMMAYA RESERVOIR;  Surgeon: Mj Alberto MD;  Location: [facility] OR [address] [place] [address];  Service: Neurosurgery;  Laterality: N/A;    ROTATOR CUFF REPAIR Bilateral     TONSILLECTOMY      TUBAL LIGATION         Review of patient's allergies indicates:   Allergen Reactions    Levetiracetam      Other reaction(s): Unknown  Other reaction(s): Unknown  Other reaction(s): Unknown       Medications:  Continuous Infusions:   sodium chloride 0.9% Stopped (10/12/22 1214)     Scheduled Meds:   acyclovir  400 mg Oral BID    allopurinoL  300 mg Oral Daily    atorvastatin  80 mg Oral Daily    busPIRone  10 mg Oral BID    cetirizine  10 mg Oral Daily    diltiaZEM  60 mg Oral Q6H    duke's soln (benadryl 30 mL, mylanta 30 mL, LIDOcaine 30 mL, nystatin 30 mL) 120 mL  10 mL Oral QID    fluconazole  400 mg Oral Daily    gabapentin  300 mg Oral TID    levoFLOXacin  500 mg Oral Daily    losartan  25 mg Oral Daily    magnesium oxide  400 mg Oral Daily    OXcarbazepine  1,200 mg Oral BID    pantoprazole  40 mg Oral Daily    QUEtiapine  200 mg Oral QHS    senna-docusate 8.6-50 mg  1 tablet Oral BID     PRN Meds:sodium chloride, sodium chloride, sodium chloride, artificial tears, dextrose 10%, dextrose 10%, glucagon (human recombinant), glucose, glucose, heparin, porcine (PF), insulin aspart U-100, naloxone, ondansetron, polyethylene glycol, prochlorperazine, sodium chloride 0.9%,  sumatriptan    Family History    None       Tobacco Use    Smoking status: Former     Packs/day: 0.50     Types: Cigarettes     Quit date: 3/16/2022     Years since quittin.5    Smokeless tobacco: Never   Substance and Sexual Activity    Alcohol use: No    Drug use: Never    Sexual activity: Not on file       Review of Systems   Constitutional:  Positive for activity change and fatigue.   Respiratory:  Negative for shortness of breath.    Gastrointestinal: Negative.    Skin:  Positive for pallor.   Neurological:  Positive for weakness.   Psychiatric/Behavioral: Negative.     Objective:     Vital Signs (Most Recent):  Temp: 98.4 °F (36.9 °C) (10/12/22 1324)  Pulse: 98 (10/12/22 1324)  Resp: 16 (10/12/22 1324)  BP: (!) 109/59 (10/12/22 1324)  SpO2: (!) 94 % (10/12/22 1324)   Vital Signs (24h Range):  Temp:  [97.9 °F (36.6 °C)-99.5 °F (37.5 °C)] 98.4 °F (36.9 °C)  Pulse:  [] 98  Resp:  [16-18] 16  SpO2:  [94 %-99 %] 94 %  BP: ()/(55-60) 109/59     Weight: 52.6 kg (115 lb 15.4 oz)  Body mass index is 19.9 kg/m².    Physical Exam  Constitutional:       Comments: Resting. Pt has headache. Pt just received medication.    HENT:      Head: Normocephalic and atraumatic.   Eyes:      General: Lids are normal.      Conjunctiva/sclera: Conjunctivae normal.      Comments: Blind in Left eye   Pulmonary:      Effort: Pulmonary effort is normal. No respiratory distress.   Abdominal:      General: There is no distension.   Musculoskeletal:      Cervical back: Full passive range of motion without pain and normal range of motion.      Comments: No edema noted.    Skin:     General: Skin is warm and dry.      Coloration: Skin is pale.   Neurological:      Comments: Resting heachache       Review of Symptoms      Symptom Assessment (ESAS 0-10 Scale)  Unable to complete assessment due to Other         Pain Assessment in Advanced Demential Scale (PAINAD)   Breathing - Independent of vocalization:  0  Negative vocalization:   0  Facial expression:  0  Body language:  0  Consolability:  0  Total:  0    Performance Status:  40    Living Arrangements:  Lives with family    Psychosocial/Cultural: Pt living at brother's house with  due to someone being with pt 24/7.  reports good support system at home. Pt has a 15 y/o and a 32 y/o old. Pt has two grandchildren.       Advance Care Planning   Advance Directives:   Living Will: No    Do Not Resuscitate Status: No    Agent's Name:   is NOK  Goals of Care: See note        Significant Labs: All pertinent labs within the past 24 hours have been reviewed.  CBC:   Recent Labs   Lab 10/12/22  0353   WBC 0.49*   HGB 5.5*   HCT 16.3*   *   PLT 4*     BMP:  Recent Labs   Lab 10/12/22  0353   GLU 98      K 3.6      CO2 18*   BUN 32*   CREATININE 0.7   CALCIUM 9.2   MG 2.3     LFT:  Lab Results   Component Value Date    AST 9 (L) 10/12/2022    ALKPHOS 78 10/12/2022    BILITOT 0.4 10/12/2022     Albumin:   Albumin   Date Value Ref Range Status   10/12/2022 2.2 (L) 3.5 - 5.2 g/dL Final     Protein:   Total Protein   Date Value Ref Range Status   10/12/2022 6.2 6.0 - 8.4 g/dL Final     Lactic acid:   Lab Results   Component Value Date    LACTATE 1.9 08/27/2022    LACTATE 3.2 (H) 08/27/2022       Significant Imaging: I have reviewed all pertinent imaging results/findings within the past 24 hours.

## 2022-10-12 NOTE — PLAN OF CARE
Patient resting in bed; Afebrile & VSS on room air. Continuous telemetry with mild tachycardia (HR low 100's in sinus rhythm). AA+Oriented x3. Pt received 1 unit platelets and 1 unit of blood, tolerated well. Second unit of blood currently infusing. PRN Tramadol given x1 for headache. Dukes solution given for moderate mucositis; poor oral intake 2/2 pain but swallows safely. Seizure precautions maintained and heel boots applied. Turning q2 and BG monitored AC/HS. Green draining dark yellow urine. Wound care performed. POC reviewed with patient and  at bedside. All needs addressed. Will continue to monitor with frequent rounds and clustered care to promote rest.

## 2022-10-12 NOTE — ASSESSMENT & PLAN NOTE
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 Other PMH significant for paroxsymal A-fib with an implantable loop recorder/pacemaker in place, anxiety/depression, COPD, DM2, gastroparesis, GERD, osteoporosis, PAD, RA, seizures, TIA, tobacco use. Pt is resting at this time. Pt and  report pt has headche and just received medication. Pt is a full code.     Reason for consult: ACP, Pt has been seen by palliative care prior.  Communicated with TERRY Shannon    Goals of care/ACP:   Met with pt's  who is at bedside. Pt resting due to headache. Pt known to Palliative care inpt team and is scheduled for clinic visit virtual. Per pt's  team had updated them on pt's relapse and getting bone marrow biopsy to confirm. Per pt's  he has questions concerning if pt is a candidate for anymore treatment if she wants to continue. TERRY Shannon to come back and speak to pt and . Per , code status was also discussed today. Questions answered concerning DNR vs full code. Prior to pt/ making decisions, they wanted to speak to BMT team again.     Pt's  open to more discussions with Pal care.  to speak to TERRY Shannon thi afternoon concerning questions he has about treatment. Will f/u.    Symptom management:     Debility:   Would have PT/Ot evaluate and treat.     Headache:   Pt on sumatriptan prn daily prn.   Pt just received meds.     Plan:   Will continue to follow for advance care planning/GOC.

## 2022-10-12 NOTE — ASSESSMENT & PLAN NOTE
- CT head 10/11/22 revealed Persistent pneumocephalus in the setting recent Ommaya reservoir placement. No new hemorrhage or other significant detrimental interval change.   - Unable to perform MRI d/t pacemaker   - previous infectious workup on LP 09/19 negative  - Hold sedating medications   - neurology consulted for further recs/safety of using ommaya again  - checing folate, Vitamin B12 panel, and oxcarb level per neuro rec  - infection w/u ordered to r/o infection as source. Pending.  - considering EEG

## 2022-10-12 NOTE — PLAN OF CARE
Recommendations     1. Continue diabetic diet- encourage adequate PO intake.  2. Continue Boost Glucose Control TID.   3. RD following.     Goals: Meet % EEN/EPN by RD follow up date  Nutrition Goal Status: new  Communication of RD Recs: other (comment) (POC)    Nica Brian PL-LDN

## 2022-10-12 NOTE — PLAN OF CARE
Patient alert with occasional drowsiness throughout shift. VSS on RAIR with slight tachycardia ( 100-108). PRN zofran given per order see eMAR. Green catheter remains in place draining without difficulty. Poor oral intake observed. Q2H turns with rounding to promote skin integrity.Family at bedside. No complaints verbalized this shift. Call light and personal belongings remain in reach. Bed in lowest position and locked.

## 2022-10-12 NOTE — PT/OT/SLP PROGRESS
Physical Therapy      Patient Name:  Deepti Gonzalez   MRN:  [id number]    Patient  (Initiated eval but unable to complete 2/2 low platelets and pt actively bleeding from R antecubital site and pt c/o dizziness while laying down. Nurse notified and held for eval.). Will follow-up 10/13/2022.

## 2022-10-12 NOTE — PLAN OF CARE
51 y.o. 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 Received Ommaya and started on IT chemo twice weekly. This admission, some change in neuro exam with square wave jerks, multidirectional horizontal nystagmus, disconjugate gaze and decreased eye movements. Presentation concerning for progression of B cell ALL, neurotoxicity from medications, metabolic derangements from poor PO intake or infection in immunocompromised patient. Overnight, patient had drop in blood counts, transfused pRBCs and platelets per primary team. Palliative care consulted for goals of care discussion before further interventions pursued. Neurology will sign off at this time, please call back with any questions or clarifications.     Chikis Keenan PA-C  General Neurology Consult

## 2022-10-12 NOTE — ASSESSMENT & PLAN NOTE
- Blood glucose monitoring TID with meals and nightly   - Moderate dose SSI PRN  - Goal -180 while inpatient   - Diabetic diet

## 2022-10-12 NOTE — SUBJECTIVE & OBJECTIVE
Subjective:     Interval History: Awake and alert on exam today. Following commands. Neuro following. Rec ophtho cx for new right eye changes. Will defer for now but will consult based on how aggressively patient/family would like to be regarding ALL treatment. Palliative Care consulted for GOC discussion. Blood counts dropped significantly. Transfusing 2 units prbc and 1 unit plts. Resuming home ponatinib per Dr. Posey. Infection work ordered to r/o as source of neuro/cognitive changes. In process.    Objective:     Vital Signs (Most Recent):  Temp: 98.3 °F (36.8 °C) (10/12/22 1053)  Pulse: 98 (10/12/22 1110)  Resp: 16 (10/12/22 1053)  BP: (!) 115/55 (10/12/22 1053)  SpO2: 95 % (10/12/22 1053)   Vital Signs (24h Range):  Temp:  [97.9 °F (36.6 °C)-99.5 °F (37.5 °C)] 98.3 °F (36.8 °C)  Pulse:  [] 98  Resp:  [16-18] 16  SpO2:  [94 %-99 %] 95 %  BP: ()/(55-60) 115/55     Weight: 52.7 kg (116 lb 4.3 oz)  Body mass index is 19.96 kg/m².  Body surface area is 1.54 meters squared.    ECOG SCORE           [unfilled]    Intake/Output - Last 3 Shifts         10/10 0700  10/11 0659 10/11 0700  10/12 0659 10/12 0700  10/13 0659    P.O.  590     Blood   152    Total Intake(mL/kg)  590 (11.2) 152 (2.9)    Urine (mL/kg/hr)  775     Total Output  775     Net  -185 +152                   Physical Exam  Constitutional:       Appearance: She is well-developed. She is ill-appearing.   HENT:      Head: Normocephalic and atraumatic.      Mouth/Throat:      Pharynx: No oropharyngeal exudate.   Eyes:      Conjunctiva/sclera: Conjunctivae normal.      Comments: Pupils unequal and sluggish  Left eye blindness   Cardiovascular:      Rate and Rhythm: Normal rate and regular rhythm.      Heart sounds: Normal heart sounds. No murmur heard.  Pulmonary:      Effort: Pulmonary effort is normal.      Breath sounds: Normal breath sounds.   Abdominal:      General: Bowel sounds are normal. There is no distension.      Palpations: Abdomen  is soft.      Tenderness: There is no abdominal tenderness.   Musculoskeletal:         General: No deformity. Normal range of motion.      Cervical back: Normal range of motion and neck supple.   Skin:     General: Skin is warm and dry.      Findings: Bruising present. No erythema or rash.   Neurological:      Mental Status: She is alert and oriented to person, place, and time.      Comments: Generally weak   Psychiatric:         Behavior: Behavior normal.         Thought Content: Thought content normal.         Judgment: Judgment normal.       Significant Labs:   CBC:   Recent Labs   Lab 10/11/22  0756 10/12/22  0353   WBC 1.27* 0.49*   HGB 7.9* 5.5*   HCT 23.1* 16.3*   PLT 15* 4*    and CMP:   Recent Labs   Lab 10/11/22  0756 10/12/22  0353   * 138   K 4.3 3.6    109   CO2 14* 18*   * 98   BUN 42* 32*   CREATININE 1.0 0.7   CALCIUM 9.8 9.2   PROT 7.5 6.2   ALBUMIN 2.9* 2.2*   BILITOT 0.6 0.4   ALKPHOS 93 78   AST 8* 9*   ALT 7* 8*   ANIONGAP 16 11       Diagnostic Results:  I have reviewed all pertinent imaging results/findings within the past 24 hours.

## 2022-10-12 NOTE — PT/OT/SLP PROGRESS
Occupational Therapy      Patient Name:  Deepti Gonzalez   MRN:  [id number]    Patient not seen today secondary to attempted at 938am w partial eval complete. Unable to complete eval 2/2 pt discovered to have perfuse bleeding coming from R antecubital fossa in addition to c/o dizziness while supine and low platelet levels. RN notified and eval. Will follow-up when appropriate.    10/12/2022

## 2022-10-12 NOTE — CONSULTS
Roberto Yepez - Oncology ([facility])  Adult Nutrition  Consult Note    SUMMARY     Recommendations    1. Continue diabetic diet- encourage adequate PO intake.  2. Continue Boost Glucose Control TID.   3. RD following.    Goals: Meet % EEN/EPN by RD follow up date  Nutrition Goal Status: new  Communication of RD Recs: other (comment) (POC)    Assessment and Plan    Severe Protein-Calorie Malnutrition    Nutrition Problem:  Severe Malnutrition in the context of Chronic Illness/Injury    Related to (etiology):  Inadequate PO intake     Signs and Symptoms (as evidenced by):  Energy Intake: suspecting less than or equal to 75% of estimated energy requirement for greater than or equal to 1 month  Weight Loss: 11% x 1 month     Interventions(treatment strategy):  Collaboration of nutrition care with other providers  Encourage adequate PO intake    Nutrition Diagnosis Status:  New     Malnutrition Assessment    Weight Loss (Malnutrition):  (11% x 1 month)  Energy Intake (Malnutrition): less than or equal to 75% for greater than or equal to 1 month     Reason for Assessment    Reason For Assessment: consult  Diagnosis: other (see comments) (altered mental status)  Relevant Medical History: HLD, T2DM, GERD, HTN, Paroxysmal atrial fibrillation  Interdisciplinary Rounds: did not attend  General Information Comments: RD consulted for Pt at risk of developing Pinj. Pt not appropriate to speak to today d/t being with another team at first attempt and being asleep during second attempt. Per 8/23/22 note, had poor PO intake. Noted 0% PO intake per RD documentation. Per chart review, experienced 11% wt loss x 1 month. CBW - 116#. Unable to complete NFPE at this time. Despite being unable to complete NFPE, pt still meets ASPEN criteria for severe malnutrition in context of chronic illness at this time (see PES statement for details). LBM noted - 10/4.  Nutrition Discharge Planning: Pending clinical course    Nutrition Risk  "Screen    Nutrition Risk Screen: no indicators present    Anthropometrics    Temp: 98 °F (36.7 °C)  Height Method: Stated  Height: 5' 4" (162.6 cm)  Height (inches): 64 in  Weight Method: Bed Scale  Weight: 52.6 kg (115 lb 15.4 oz)  Weight (lb): 115.96 lb  Ideal Body Weight (IBW), Female: 120 lb  % Ideal Body Weight, Female (lb): 96.63 %  BMI (Calculated): 19.9     Lab/Procedures/Meds    Pertinent Labs Reviewed: reviewed  Pertinent Labs Comments: BUN 32  Pertinent Medications Reviewed: reviewed  Pertinent Medications Comments: atorvastatin, heparin, insulin, gabapentin, fluconazole    Estimated/Assessed Needs    Weight Used For Calorie Calculations: 52.6 kg (116 lb)  Energy Calorie Requirements (kcal): 1578 - 1841  Energy Need Method: Kcal/kg (30-35 kcal/kg)  Protein Requirements:  g (1.5-2.0 g/kg)  Weight Used For Protein Calculations: 52.6 kg (116 lb)  Fluid Requirements (mL): per MD or 1ml/kcal  Estimated Fluid Requirement Method: RDA Method  RDA Method (mL): 1578  CHO Requirement: 178 g (45% of kcal needs)    Nutrition Prescription Ordered    Current Diet Order: Diabetic Diet  Oral Nutrition Supplement: Boost Glucose Control TID    Evaluation of Received Nutrient/Fluid Intake    I/O: -1.1 L  Energy Calories Required: not meeting needs  Protein Required: not meeting needs  % Intake of Estimated Energy Needs: 0%  % Meal Intake: 0%    Nutrition Risk    Level of Risk/Frequency of Follow-up:  (1xweek)     Monitor and Evaluation    Food and Nutrient Intake: energy intake, food and beverage intake  Food and Nutrient Adminstration: diet order  Knowledge/Beliefs/Attitudes: food and nutrition knowledge/skill, beliefs and attitudes  Physical Activity and Function: nutrition-related ADLs and IADLs  Anthropometric Measurements: height/length, weight, weight change, body mass index  Biochemical Data, Medical Tests and Procedures: electrolyte and renal panel, gastrointestinal profile, glucose/endocrine profile, lipid " profile, inflammatory profile  Nutrition-Focused Physical Findings: overall appearance     Nutrition Follow-Up    RD Follow-up?: Yes    Shantel Choi- Dietetic Intern

## 2022-10-12 NOTE — ASSESSMENT & PLAN NOTE
- Follows with Dr. Jenkins. 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 Not has been on therapy for past few weeks.  - Not a candidate for allogeneic stem cell transplant at this time.  - Presented 9/16/22 with left eye vision loss and worsening vision of left eye  - Patient with pacemaker and per patient, not compatible with MRI. Would ideally obtain MRI brain w/ w/o contrast. Verified with EP who checked with rep that pacemaker is not MRI compatible.   - CTA completed with delayed venous phase revealing small SAH   - LP with IT chemo on 9/19 positive for B-cell ALL; will require twice weekly IT until clearance at minimum  - Ommaya placed by NSGY on 9/26/22; can use ommaya on POD #5 (10/1)  - Started twice weekly IT chemo with ommaya on 10/3  - Currently on ponatinib, hold at this time   - Hold IT therapy until she is deemed appropriate for additional therapy  - Considering bone marrow biopsy to assess disease response given pancytopenia (send ALL MRD testing, as well as quantitative bcr/abl p190 transcript)  - Consulted palliative care for GOC discussion  - Resumed home ponatinib inpatient per Dr. Posey

## 2022-10-12 NOTE — HOSPITAL COURSE
10/12/2022: Awake and alert on exam today. Following commands. Neuro following. Rec ophtho cx for new right eye changes. Will defer for now but will consult based on how aggressively patient/family would like to be regarding ALL treatment. Palliative Care consulted for GOC discussion. Blood counts dropped significantly. Transfusing 2 units prbc and 1 unit plts. Resuming home ponatinib per Dr. Posey. Infection work ordered to r/o as source of neuro/cognitive changes. In process.  10/13/2022: Spoke extensively with patient and  regarding prognosis, amount of time of life should patient continue treatment, quality of life should patient continue treatment, apparent intolerance of current treatment, and fact that there are no additional treatments available to her. Did tell patient and  that there is some room to dose reduce ponatinib further, which may or may not improve tolerance. Patient and  stated understanding and were left to discuss amongst themselves, they know that I am available to return if they would like to discuss further. ANC is 0 today. Does not require transfusions today. Ponatinib will be resumed today if patient wished to continue treatment. Replacing K+ and phos and will repeat BMP and phos level this afternoon. Giving Vit K for INR of 1.6. starting folate supplement. Will get echo today if patient chooses to continue with Ponatinib.

## 2022-10-12 NOTE — PROGRESS NOTES
Roberto Yepez - Oncology ([facility])  Wound Care    Patient Name:  Deepti Gonzalez   MRN:  [id number]  Date: 10/12/2022  Diagnosis: Altered mental status    History:     Past Medical History:   Diagnosis Date    Cancer     COPD (chronic obstructive pulmonary disease)     Hypertension     Rheumatoid arthritis, unspecified     SAH (subarachnoid hemorrhage) 2022    Seizures     Stroke     TIA x 4    Type 2 diabetes mellitus with circulatory disorder, without long-term current use of insulin        Social History     Socioeconomic History    Marital status:    Tobacco Use    Smoking status: Former     Packs/day: 0.50     Types: Cigarettes     Quit date: 3/16/2022     Years since quittin.5    Smokeless tobacco: Never   Substance and Sexual Activity    Alcohol use: No    Drug use: Never     Social Determinants of Health     Financial Resource Strain: High Risk    Difficulty of Paying Living Expenses: Hard   Food Insecurity: Food Insecurity Present    Worried About Running Out of Food in the Last Year: Sometimes true    Ran Out of Food in the Last Year: Sometimes true   Transportation Needs: No Transportation Needs    Lack of Transportation (Medical): No    Lack of Transportation (Non-Medical): No   Physical Activity: Insufficiently Active    Days of Exercise per Week: 1 day    Minutes of Exercise per Session: 10 min   Stress: No Stress Concern Present    Feeling of Stress : Only a little   Social Connections: Unknown    Frequency of Communication with Friends and Family: More than three times a week    Frequency of Social Gatherings with Friends and Family: Twice a week    Active Member of Clubs or Organizations: No    Attends Club or Organization Meetings: Never    Marital Status:    Housing Stability: Low Risk     Unable to Pay for Housing in the Last Year: No    Number of Places Lived in the Last Year: 1    Unstable Housing in the Last Year: No       Precautions:     Allergies as of 10/11/2022 -  Reviewed 10/11/2022   Allergen Reaction Noted    Levetiracetam  08/16/2021       WO Assessment Details/Treatment      10/12/22 1245   WOCN Assessment   WOCN Total Time (mins) 30   Visit Date 10/12/22   Visit Time 1245   Consult Type New   WOCN Speciality Wound   Intervention assessed;applied;chart review;coordination of care;orders   Teaching on-going        Altered Skin Integrity 10/12/22 0900 Left lower Buttocks #1 Incontinence associated dermatitis Partial thickness tissue loss. Shallow open ulcer with a red or pink wound bed, without slough. Intact or Open/Ruptured Serum-filled blister.   Date First Assessed/Time First Assessed: 10/12/22 0900   Altered Skin Integrity Present on Admission: yes  Side: Left  Orientation: lower  Location: Buttocks  Wound Number: #1  Is this injury device related?: No  Primary Wound Type: Incontinence assoc...   Dressing Appearance Dry;Intact   Drainage Amount None   Appearance Pink;Purple   Red (%), Wound Tissue Color 75 %   Periwound Area Dry   Care Cleansed with:;Sterile normal saline   Dressing Other (comment)  (Triad applied)        Altered Skin Integrity 10/12/22 0900 Right lower Buttocks #2 Incontinence associated dermatitis Partial thickness tissue loss. Shallow open ulcer with a red or pink wound bed, without slough. Intact or Open/Ruptured Serum-filled blister.   Date First Assessed/Time First Assessed: 10/12/22 0900   Altered Skin Integrity Present on Admission: yes  Side: Right  Orientation: lower  Location: Buttocks  Wound Number: #2  Is this injury device related?: No  Primary Wound Type: Incontinence asso...   Wound Image    Description of Altered Skin Integrity Partial thickness tissue loss. Shallow open ulcer with a red or pink wound bed, without slough. Intact or Open/Ruptured Serum-filled blister.   Dressing Appearance Intact;Dry   Drainage Amount None   Appearance Pink;Purple   Red (%), Wound Tissue Color 75 %   Periwound Area Dry   Wound Edges Irregular   Care  Cleansed with:;Sterile normal saline   Dressing Other (comment)  (Triad applied)   Wound consult for bilateral buttocks performed.  Bilateral buttocks with open areas noted.  Purple tissue to surrounding tissue. 5x9x0.1 measured together.  Triad applied.  Tolerated well.  Recommendations made to primary team for Triad BID and prn, turn every 2 hours, waffle overlay, and wheel chair cushion.  Family member along with primary nurse at bedside and made aware.  Both educated on turning every 2 hours.  Verbalized understanding.   Orders placed.  Will follow up.  Skin integrity NPRELL made aware.    10/12/2022

## 2022-10-13 PROBLEM — R52 PAIN: Status: ACTIVE | Noted: 2022-10-13

## 2022-10-13 PROBLEM — L30.8 DERMATITIS ASSOCIATED WITH MOISTURE: Status: ACTIVE | Noted: 2022-10-13

## 2022-10-13 LAB
ALBUMIN SERPL BCP-MCNC: 1.9 G/DL (ref 3.5–5.2)
ALBUMIN SERPL BCP-MCNC: 1.9 G/DL (ref 3.5–5.2)
ALP SERPL-CCNC: 97 U/L (ref 55–135)
ALT SERPL W/O P-5'-P-CCNC: 14 U/L (ref 10–44)
ANION GAP SERPL CALC-SCNC: 11 MMOL/L (ref 8–16)
ANION GAP SERPL CALC-SCNC: 13 MMOL/L (ref 8–16)
ANION GAP SERPL CALC-SCNC: 13 MMOL/L (ref 8–16)
ANISOCYTOSIS BLD QL SMEAR: SLIGHT
APTT BLDCRRT: 31.4 SEC (ref 21–32)
AST SERPL-CCNC: 17 U/L (ref 10–40)
BASOPHILS # BLD AUTO: 0 K/UL (ref 0–0.2)
BASOPHILS NFR BLD: 0 % (ref 0–1.9)
BILIRUB SERPL-MCNC: 0.5 MG/DL (ref 0.1–1)
BUN SERPL-MCNC: 14 MG/DL (ref 6–20)
BUN SERPL-MCNC: 9 MG/DL (ref 6–20)
BUN SERPL-MCNC: 9 MG/DL (ref 6–20)
CALCIUM SERPL-MCNC: 8.7 MG/DL (ref 8.7–10.5)
CALCIUM SERPL-MCNC: 8.8 MG/DL (ref 8.7–10.5)
CALCIUM SERPL-MCNC: 8.9 MG/DL (ref 8.7–10.5)
CHLORIDE SERPL-SCNC: 107 MMOL/L (ref 95–110)
CHLORIDE SERPL-SCNC: 107 MMOL/L (ref 95–110)
CHLORIDE SERPL-SCNC: 111 MMOL/L (ref 95–110)
CO2 SERPL-SCNC: 18 MMOL/L (ref 23–29)
CO2 SERPL-SCNC: 19 MMOL/L (ref 23–29)
CO2 SERPL-SCNC: 21 MMOL/L (ref 23–29)
CREAT SERPL-MCNC: 0.5 MG/DL (ref 0.5–1.4)
CREAT SERPL-MCNC: 0.6 MG/DL (ref 0.5–1.4)
CREAT SERPL-MCNC: 0.6 MG/DL (ref 0.5–1.4)
DIFFERENTIAL METHOD: ABNORMAL
EOSINOPHIL # BLD AUTO: 0 K/UL (ref 0–0.5)
EOSINOPHIL NFR BLD: 10.5 % (ref 0–8)
ERYTHROCYTE [DISTWIDTH] IN BLOOD BY AUTOMATED COUNT: 26.3 % (ref 11.5–14.5)
EST. GFR  (NO RACE VARIABLE): >60 ML/MIN/1.73 M^2
FIBRINOGEN PPP-MCNC: >800 MG/DL (ref 182–400)
GLUCOSE SERPL-MCNC: 110 MG/DL (ref 70–110)
GLUCOSE SERPL-MCNC: 111 MG/DL (ref 70–110)
GLUCOSE SERPL-MCNC: 78 MG/DL (ref 70–110)
HCT VFR BLD AUTO: 24 % (ref 37–48.5)
HGB BLD-MCNC: 8 G/DL (ref 12–16)
HYPOCHROMIA BLD QL SMEAR: ABNORMAL
IMM GRANULOCYTES # BLD AUTO: 0 K/UL (ref 0–0.04)
IMM GRANULOCYTES NFR BLD AUTO: 0 % (ref 0–0.5)
INR PPP: 1.6 (ref 0.8–1.2)
LYMPHOCYTES # BLD AUTO: 0.3 K/UL (ref 1–4.8)
LYMPHOCYTES NFR BLD: 89.5 % (ref 18–48)
MAGNESIUM SERPL-MCNC: 1.8 MG/DL (ref 1.6–2.6)
MCH RBC QN AUTO: 29.5 PG (ref 27–31)
MCHC RBC AUTO-ENTMCNC: 33.3 G/DL (ref 32–36)
MCV RBC AUTO: 89 FL (ref 82–98)
MONOCYTES # BLD AUTO: 0 K/UL (ref 0.3–1)
MONOCYTES NFR BLD: 0 % (ref 4–15)
NEUTROPHILS # BLD AUTO: 0 K/UL (ref 1.8–7.7)
NEUTROPHILS NFR BLD: 0 % (ref 38–73)
NRBC BLD-RTO: 0 /100 WBC
PHOSPHATE SERPL-MCNC: 2.1 MG/DL (ref 2.7–4.5)
PHOSPHATE SERPL-MCNC: 3.9 MG/DL (ref 2.7–4.5)
PHOSPHATE SERPL-MCNC: 3.9 MG/DL (ref 2.7–4.5)
PLATELET # BLD AUTO: 17 K/UL (ref 150–450)
PLATELET BLD QL SMEAR: ABNORMAL
PMV BLD AUTO: ABNORMAL FL (ref 9.2–12.9)
POCT GLUCOSE: 102 MG/DL (ref 70–110)
POCT GLUCOSE: 72 MG/DL (ref 70–110)
POCT GLUCOSE: 72 MG/DL (ref 70–110)
POCT GLUCOSE: 85 MG/DL (ref 70–110)
POTASSIUM SERPL-SCNC: 2.8 MMOL/L (ref 3.5–5.1)
POTASSIUM SERPL-SCNC: 3.3 MMOL/L (ref 3.5–5.1)
POTASSIUM SERPL-SCNC: 3.3 MMOL/L (ref 3.5–5.1)
PROT SERPL-MCNC: 5.9 G/DL (ref 6–8.4)
PROTHROMBIN TIME: 16.2 SEC (ref 9–12.5)
RBC # BLD AUTO: 2.71 M/UL (ref 4–5.4)
SODIUM SERPL-SCNC: 139 MMOL/L (ref 136–145)
SODIUM SERPL-SCNC: 139 MMOL/L (ref 136–145)
SODIUM SERPL-SCNC: 142 MMOL/L (ref 136–145)
VIT B12 SERPL-MCNC: 159 NG/L (ref 180–914)
WBC # BLD AUTO: 0.38 K/UL (ref 3.9–12.7)

## 2022-10-13 PROCEDURE — 99223 PR INITIAL HOSPITAL CARE,LEVL III: ICD-10-PCS | Mod: ,,, | Performed by: NURSE PRACTITIONER

## 2022-10-13 PROCEDURE — 20600001 HC STEP DOWN PRIVATE ROOM

## 2022-10-13 PROCEDURE — 99497 ADVNCD CARE PLAN 30 MIN: CPT | Mod: 25,,, | Performed by: CLINICAL NURSE SPECIALIST

## 2022-10-13 PROCEDURE — 25000003 PHARM REV CODE 250: Performed by: NURSE PRACTITIONER

## 2022-10-13 PROCEDURE — 84100 ASSAY OF PHOSPHORUS: CPT | Mod: 91 | Performed by: NURSE PRACTITIONER

## 2022-10-13 PROCEDURE — 99233 PR SUBSEQUENT HOSPITAL CARE,LEVL III: ICD-10-PCS | Mod: GT,,, | Performed by: CLINICAL NURSE SPECIALIST

## 2022-10-13 PROCEDURE — 63600175 PHARM REV CODE 636 W HCPCS: Performed by: STUDENT IN AN ORGANIZED HEALTH CARE EDUCATION/TRAINING PROGRAM

## 2022-10-13 PROCEDURE — 99233 PR SUBSEQUENT HOSPITAL CARE,LEVL III: ICD-10-PCS | Mod: ,,, | Performed by: INTERNAL MEDICINE

## 2022-10-13 PROCEDURE — 85730 THROMBOPLASTIN TIME PARTIAL: CPT | Performed by: INTERNAL MEDICINE

## 2022-10-13 PROCEDURE — 85384 FIBRINOGEN ACTIVITY: CPT | Performed by: INTERNAL MEDICINE

## 2022-10-13 PROCEDURE — 63600175 PHARM REV CODE 636 W HCPCS: Performed by: NURSE PRACTITIONER

## 2022-10-13 PROCEDURE — 99233 SBSQ HOSP IP/OBS HIGH 50: CPT | Mod: GT,,, | Performed by: CLINICAL NURSE SPECIALIST

## 2022-10-13 PROCEDURE — 25000003 PHARM REV CODE 250: Performed by: STUDENT IN AN ORGANIZED HEALTH CARE EDUCATION/TRAINING PROGRAM

## 2022-10-13 PROCEDURE — 85610 PROTHROMBIN TIME: CPT | Performed by: INTERNAL MEDICINE

## 2022-10-13 PROCEDURE — 99233 SBSQ HOSP IP/OBS HIGH 50: CPT | Mod: ,,, | Performed by: INTERNAL MEDICINE

## 2022-10-13 PROCEDURE — 80048 BASIC METABOLIC PNL TOTAL CA: CPT | Mod: XB | Performed by: NURSE PRACTITIONER

## 2022-10-13 PROCEDURE — 99223 1ST HOSP IP/OBS HIGH 75: CPT | Mod: ,,, | Performed by: NURSE PRACTITIONER

## 2022-10-13 PROCEDURE — 84100 ASSAY OF PHOSPHORUS: CPT | Performed by: NURSE PRACTITIONER

## 2022-10-13 PROCEDURE — 80053 COMPREHEN METABOLIC PANEL: CPT | Performed by: NURSE PRACTITIONER

## 2022-10-13 PROCEDURE — 85025 COMPLETE CBC W/AUTO DIFF WBC: CPT | Performed by: NURSE PRACTITIONER

## 2022-10-13 PROCEDURE — 99497 PR ADVNCD CARE PLAN 30 MIN: ICD-10-PCS | Mod: 25,,, | Performed by: CLINICAL NURSE SPECIALIST

## 2022-10-13 PROCEDURE — 80069 RENAL FUNCTION PANEL: CPT | Performed by: STUDENT IN AN ORGANIZED HEALTH CARE EDUCATION/TRAINING PROGRAM

## 2022-10-13 PROCEDURE — 83735 ASSAY OF MAGNESIUM: CPT | Performed by: NURSE PRACTITIONER

## 2022-10-13 RX ORDER — POTASSIUM CHLORIDE 7.45 MG/ML
30 INJECTION INTRAVENOUS ONCE
Status: COMPLETED | OUTPATIENT
Start: 2022-10-13 | End: 2022-10-13

## 2022-10-13 RX ORDER — MAGNESIUM SULFATE HEPTAHYDRATE 40 MG/ML
2 INJECTION, SOLUTION INTRAVENOUS ONCE
Status: COMPLETED | OUTPATIENT
Start: 2022-10-13 | End: 2022-10-13

## 2022-10-13 RX ORDER — TRAMADOL HYDROCHLORIDE 50 MG/1
50 TABLET ORAL EVERY 6 HOURS PRN
Status: DISCONTINUED | OUTPATIENT
Start: 2022-10-13 | End: 2022-10-14 | Stop reason: HOSPADM

## 2022-10-13 RX ORDER — FOLIC ACID 1 MG/1
1 TABLET ORAL DAILY
Status: DISCONTINUED | OUTPATIENT
Start: 2022-10-13 | End: 2022-10-14

## 2022-10-13 RX ADMIN — DILTIAZEM HYDROCHLORIDE 60 MG: 60 TABLET, FILM COATED ORAL at 09:10

## 2022-10-13 RX ADMIN — FLUCONAZOLE 400 MG: 200 TABLET ORAL at 04:10

## 2022-10-13 RX ADMIN — FOLIC ACID 1 MG: 1 TABLET ORAL at 09:10

## 2022-10-13 RX ADMIN — OXCARBAZEPINE 1200 MG: 600 TABLET, FILM COATED ORAL at 09:10

## 2022-10-13 RX ADMIN — DILTIAZEM HYDROCHLORIDE 60 MG: 60 TABLET, FILM COATED ORAL at 04:10

## 2022-10-13 RX ADMIN — ATORVASTATIN CALCIUM 80 MG: 20 TABLET, FILM COATED ORAL at 08:10

## 2022-10-13 RX ADMIN — GABAPENTIN 300 MG: 300 CAPSULE ORAL at 04:10

## 2022-10-13 RX ADMIN — POTASSIUM BICARBONATE 50 MEQ: 977.5 TABLET, EFFERVESCENT ORAL at 09:10

## 2022-10-13 RX ADMIN — POTASSIUM BICARBONATE 50 MEQ: 977.5 TABLET, EFFERVESCENT ORAL at 02:10

## 2022-10-13 RX ADMIN — ONDANSETRON 8 MG: 8 TABLET, ORALLY DISINTEGRATING ORAL at 11:10

## 2022-10-13 RX ADMIN — ALUMINUM HYDROXIDE, MAGNESIUM HYDROXIDE, AND DIMETHICONE 10 ML: 400; 400; 40 SUSPENSION ORAL at 08:10

## 2022-10-13 RX ADMIN — POTASSIUM PHOSPHATE, MONOBASIC POTASSIUM PHOSPHATE, DIBASIC 30 MMOL: 224; 236 INJECTION, SOLUTION, CONCENTRATE INTRAVENOUS at 05:10

## 2022-10-13 RX ADMIN — SENNOSIDES AND DOCUSATE SODIUM 1 TABLET: 50; 8.6 TABLET ORAL at 08:10

## 2022-10-13 RX ADMIN — ACYCLOVIR 400 MG: 200 CAPSULE ORAL at 09:10

## 2022-10-13 RX ADMIN — SODIUM CHLORIDE: 0.9 INJECTION, SOLUTION INTRAVENOUS at 02:10

## 2022-10-13 RX ADMIN — BUSPIRONE HYDROCHLORIDE 10 MG: 10 TABLET ORAL at 09:10

## 2022-10-13 RX ADMIN — QUETIAPINE FUMARATE 200 MG: 200 TABLET ORAL at 08:10

## 2022-10-13 RX ADMIN — MAGNESIUM SULFATE 2 G: 2 INJECTION INTRAVENOUS at 06:10

## 2022-10-13 RX ADMIN — Medication 400 MG: at 08:10

## 2022-10-13 RX ADMIN — LOSARTAN POTASSIUM 25 MG: 25 TABLET, FILM COATED ORAL at 12:10

## 2022-10-13 RX ADMIN — SUMATRIPTAN SUCCINATE 50 MG: 50 TABLET ORAL at 12:10

## 2022-10-13 RX ADMIN — GABAPENTIN 300 MG: 300 CAPSULE ORAL at 12:10

## 2022-10-13 RX ADMIN — LEVOFLOXACIN 500 MG: 500 TABLET, FILM COATED ORAL at 04:10

## 2022-10-13 RX ADMIN — ALLOPURINOL 300 MG: 300 TABLET ORAL at 08:10

## 2022-10-13 RX ADMIN — POTASSIUM CHLORIDE 30 MEQ: 7.46 INJECTION, SOLUTION INTRAVENOUS at 05:10

## 2022-10-13 RX ADMIN — CETIRIZINE HYDROCHLORIDE 10 MG: 10 TABLET, FILM COATED ORAL at 08:10

## 2022-10-13 RX ADMIN — PANTOPRAZOLE SODIUM 40 MG: 40 TABLET, DELAYED RELEASE ORAL at 08:10

## 2022-10-13 RX ADMIN — GABAPENTIN 300 MG: 300 CAPSULE ORAL at 07:10

## 2022-10-13 NOTE — ASSESSMENT & PLAN NOTE
- chronic wolf present on admission  - failed voiding trial previous admission  - will need urology follow up at discharge unless she goes hospice route, in which case catheter will be a comfort measure

## 2022-10-13 NOTE — ASSESSMENT & PLAN NOTE
- CT head 10/11/22 revealed Persistent pneumocephalus in the setting recent Ommaya reservoir placement. No new hemorrhage or other significant detrimental interval change.   - Unable to perform MRI d/t pacemaker   - previous infectious workup on LP 09/19 negative  - Hold sedating medications   - neurology consulted for further recs/safety of using ommaya again  - checing folate, Vitamin B12 panel, and oxcarb level per neuro rec  - infection w/u ordered to r/o infection as source. Pending.  - considering EEG  - improved today

## 2022-10-13 NOTE — ASSESSMENT & PLAN NOTE
- consult received for evaluation of skin breakdown.  - pt presented to outpatient BMT clinic for follow-up where her  stated the patient had been declining over the 72hrs. She now has coordination difficulties and is very fatigued.   - pt was bedridden x 2-3 weeks 2/2 fatigues/weakness with incontinence issues. Wounds POA.  - bilateral buttocks wounds with pale pink and red wound beds with jagged irregular edges appear to be moisture, friction and shearing related.  - continue Triad bid/prn.  - pt now able to assist with turns in bed.  - RN at bedside also and performed wound care.  - ayo surface with waffle overlay in place.  - heel boots and wedge/pillows for offloading.  - wolf catheter in place.  - pt is wearing a brief. Please avoid briefs if possible.  - turn q2h.  - nursing to maintain pressure injury prevention measures and continue wound care per orders.

## 2022-10-13 NOTE — ASSESSMENT & PLAN NOTE
Impression: Pt is a 52 y/o female with CNS relapse of Ph+ B cell ALL. Other PMH significant for paroxsymal A-fib with an implantable loop recorder/pacemaker in place, anxiety/depression, COPD, DM2, gastroparesis, GERD, osteoporosis, PAD, RA, seizures, TIA, tobacco use. Pt is resting at this time. Pt and  report pt has headche and just received medication. Pt is a full code.     Reason for consult: ACP, Pt has been seen by palliative care prior.  Communicated with TERRY Shannon    Goals of care/ACP:     Today: Met with pt and pt's . Per pt and  TERRY Shannon was able to speak to them this morning about plan of care. Per pt continuing treatment vs transitioning to comfort care was discussed. Per pt she and her  will talk about her wishes. Pt had questions about what comfort care would look like. Education provided. Pt and  verbalized understanding.     10/12/22  Met with pt's  who is at bedside. Pt resting due to headache. Pt known to Palliative care inpt team and is scheduled for clinic visit virtual. Per pt's  team had updated them on pt's relapse and getting bone marrow biopsy to confirm. Per pt's  he has questions concerning if pt is a candidate for anymore treatment if she wants to continue. TERRY Shannon to come back and speak to pt and . Per , code status was also discussed today. Questions answered concerning DNR vs full code. Prior to pt/ making decisions, they wanted to speak to BMT team again.     Pt's  open to more discussions with Pal care.  to speak to TERRY Shannon thi afternoon concerning questions he has about treatment. Will f/u.    Symptom management:     Debility:   Would have PT/Ot evaluate and treat.     Headache:   Pt on sumatriptan prn daily prn.   Pt just received meds.     Pain:   Pt reports pain to leg and joints that she describes as aching. Per pt she has taken opioids in past (last admit) that helped with pain.      Recs:  Consider oxycodone 5 mg q 4 hrs prn pain.         Plan:   Will continue to follow for advance care planning/GOC.   Support given.

## 2022-10-13 NOTE — ASSESSMENT & PLAN NOTE
- likely 2/2 poor oral dietary intake  - replacing via IV  - will repeat phos level this afternoon

## 2022-10-13 NOTE — CONSULTS
Roberto Yepez - Oncology ([facility])  Skin Integrity DANUTA  Consult Note    Patient Name: Deepti Gonzalez  MRN: [id number]  Admission Date: 10/11/2022  Hospital Length of Stay: 2 days  Attending Physician: Kathrine Posey MD  Primary Care Provider: Primary Doctor No     Consults  Subjective:     History of Present Illness:  Deepti Gonzalez is a 51 year old female with CNS relapse of Ph+ B cell ALL. Other PMH significant for paroxsymal A-fib with an implantable loop recorder/pacemaker in place, anxiety/depression, COPD, DM2, gastroparesis, GERD, osteoporosis, PAD, RA, seizures, TIA, tobacco use. 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 She was treated with inotuzumab and ponatinib, followed by cessation of inotuzumab (due to cytopenias). She achieved molecular response on ponatinib. Unfortunately, she is now diagnosed with CNS relapse as of 9/19/22. She had an Ommaya placed on 9/26/22 and has been receiving twice weekly IT triple chemotherapy. Most recent CSF shows clearance of leukemic cells.      She presented to outpatient BMT clinic for follow-up today where her  stated that Mrs. Gonzalez had been declining over the 72hrs. She now has coordination difficulties and is very fatigued. She denies new vision changes (still with complete left eye vision loss). Also of note, she is severely pancytopenic, has mouth sores, and poor oral intake/nausea. Denies any recent fevers, chills, chest pain, sob, diarrhea, constipation, bleeding or bruising. Admitted for workup of new coordination difficulties in setting of relapsed CNS Ph+ ALL. Skin integrity DANUTA consulted for evaluation of skin breakdown.      Scheduled Meds:   acyclovir  400 mg Oral BID    allopurinoL  300 mg Oral Daily    atorvastatin  80 mg Oral Daily    busPIRone  10 mg Oral BID    cetirizine  10 mg Oral Daily    diltiaZEM  60 mg Oral Q6H    duke's soln (benadryl 30 mL, mylanta 30 mL, LIDOcaine 30 mL, nystatin 30 mL)  120 mL  10 mL Oral QID    fluconazole  400 mg Oral Daily    folic acid  1 mg Oral Daily    gabapentin  300 mg Oral TID    levoFLOXacin  500 mg Oral Daily    losartan  25 mg Oral Daily    magnesium oxide  400 mg Oral Daily    OXcarbazepine  1,200 mg Oral BID    pantoprazole  40 mg Oral Daily    [START ON 10/14/2022] phytonadione ((AQUA-MEPHYTON) IVPB  5 mg Intravenous Daily    QUEtiapine  200 mg Oral QHS    senna-docusate 8.6-50 mg  1 tablet Oral BID     Continuous Infusions:   sodium chloride 0.9% 100 mL/hr at 10/13/22 1428     PRN Meds:sodium chloride, sodium chloride, sodium chloride, artificial tears, dextrose 10%, dextrose 10%, glucagon (human recombinant), glucose, glucose, heparin, porcine (PF), insulin aspart U-100, naloxone, ondansetron, polyethylene glycol, prochlorperazine, sodium chloride 0.9%, sumatriptan    Review of patient's allergies indicates:   Allergen Reactions    Levetiracetam      Other reaction(s): Unknown  Other reaction(s): Unknown  Other reaction(s): Unknown        Past Medical History:   Diagnosis Date    Cancer     COPD (chronic obstructive pulmonary disease)     Hypertension     Rheumatoid arthritis, unspecified     SAH (subarachnoid hemorrhage) 2022    Seizures     Stroke     TIA x 4    Type 2 diabetes mellitus with circulatory disorder, without long-term current use of insulin      Past Surgical History:   Procedure Laterality Date    APPENDECTOMY      HYSTERECTOMY      OMMAYA RESERVIOR INSERTION N/A 2022    Procedure: INSERTION, OMMAYA RESERVOIR;  Surgeon: Mj Alberto MD;  Location: [facility] OR [address] [place] [address];  Service: Neurosurgery;  Laterality: N/A;    ROTATOR CUFF REPAIR Bilateral     TONSILLECTOMY      TUBAL LIGATION         Family History    None       Tobacco Use    Smoking status: Former     Packs/day: 0.50     Types: Cigarettes     Quit date: 3/16/2022     Years since quittin.5    Smokeless tobacco: Never   Substance and Sexual Activity     Alcohol use: No    Drug use: Never    Sexual activity: Not on file     Review of Systems   Skin:  Positive for wound.     Objective:     Vital Signs (Most Recent):  Temp: 98.5 °F (36.9 °C) (10/13/22 1242)  Pulse: 109 (10/13/22 1242)  Resp: 16 (10/13/22 1242)  BP: 125/69 (10/13/22 1242)  SpO2: (!) 92 % (10/13/22 1242)   Vital Signs (24h Range):  Temp:  [96.7 °F (35.9 °C)-99.2 °F (37.3 °C)] 98.5 °F (36.9 °C)  Pulse:  [] 109  Resp:  [16-18] 16  SpO2:  [92 %-97 %] 92 %  BP: (113-139)/(55-77) 125/69     Weight: 52.6 kg (115 lb 15.4 oz)  Body mass index is 19.9 kg/m².  Physical Exam  Constitutional:       Appearance: Normal appearance.   Skin:     General: Skin is warm and dry.      Findings: Lesion present.   Neurological:      Mental Status: She is alert.       Laboratory:  All pertinent labs reviewed within the last 24 hours.    Diagnostic Results:  None        Assessment/Plan:            DANUTA Skin Integrity Evaluation    Skin Integrity DANUTA evaluation of patient as part of the comprehensive skin care team.   She has been admitted for 2 days. Skin injury was noted on 10/12/22. POA yes.    Bilateral buttocks/sacrum            Dermatitis associated with moisture  - consult received for evaluation of skin breakdown.  - pt presented to outpatient BMT clinic for follow-up where her  stated the patient had been declining over the 72hrs. She now has coordination difficulties and is very fatigued.   - pt was bedridden x 2-3 weeks 2/2 fatigues/weakness with incontinence issues. Wounds POA.  - bilateral buttocks wounds with pale pink and red wound beds with jagged irregular edges appear to be moisture, friction and shearing related.  - continue Triad bid/prn.  - pt now able to assist with turns in bed.  - RN at bedside also and performed wound care.  - ayo surface with waffle overlay in place.  - heel boots and wedge/pillows for offloading.  - wolf catheter in place.  - pt is wearing a brief. Please avoid  briefs if possible.  - turn q2h.  - nursing to maintain pressure injury prevention measures and continue wound care per orders.         Thank you for your consult. I will follow-up with patient. Please contact us if you have any additional questions.       Thea Barroso NP  Skin Integrity DANUTA  Roberto Yepez - Oncology ([facility])

## 2022-10-13 NOTE — PLAN OF CARE
Patient alert with occasional drowsiness throughout shift. VSS on RAIR with slight tachycardia 110s. Green catheter remains in place draining without difficulty. Poor oral intake observed. Q2H turns with rounding to promote skin integrity.Waffle mattress placed under patient to promote wound healing. Family at bedside. Some difficulty this morning with swallowing pills. Medications administered one at a time. Electrolytes low this morning and currently being replaced. Call light and personal belongings remain in reach. Bed in lowest position and locked.

## 2022-10-13 NOTE — ASSESSMENT & PLAN NOTE
- Follows with Dr. Jenkins. 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 Not has been on therapy for past few weeks.  - Not a candidate for allogeneic stem cell transplant at this time.  - Presented 9/16/22 with left eye vision loss and worsening vision of left eye  - Patient with pacemaker and per patient, not compatible with MRI. Would ideally obtain MRI brain w/ w/o contrast. Verified with EP who checked with rep that pacemaker is not MRI compatible.   - CTA completed with delayed venous phase revealing small SAH   - LP with IT chemo on 9/19 positive for B-cell ALL; will require twice weekly IT until clearance at minimum  - Ommaya placed by NSGY on 9/26/22; can use ommaya on POD #5 (10/1)  - Started twice weekly IT chemo with ommaya on 10/3  - Currently on ponatinib, hold at this time   - Hold IT therapy until she is deemed appropriate for additional therapy  - BCR/ABL on peripheral blood neg  - Will defer BMBx at this time as it will not change treatment  - Consulted palliative care for GOC discussion  - Will resume home ponatinib today if patient wishes to continue treatment

## 2022-10-13 NOTE — PLAN OF CARE
Patient resting in bed; Afebrile & VSS on room air. Continuous telemetry with mild tachycardia (HR low 100's in sinus rhythm). AA+Oriented x3, disoriented to situation. NS infusing at 100ml/hr. PRN Imitrex given x1 for headache. Nausea controlled with PRN zofran x1. Dukes solution given for moderate mucositis; poor oral intake 2/2 pain but swallows safely. Potassium replaced PO and IV. Seizure precautions maintained and heel boots applied. Turning q2 and BG monitored AC/HS. Green draining dark yellow urine. Wound care performed. POC reviewed with patient and  at bedside, pt to transition to home hospice. All needs addressed. Will continue to monitor with frequent rounds and clustered care to promote rest.

## 2022-10-13 NOTE — PROGRESS NOTES
[facility] ([facility])  Hematology  Bone Marrow Transplant  Progress Note    Patient Name: Deepti Gonzalez  Admission Date: 10/11/2022  Hospital Length of Stay: 2 days  Code Status: Full Code    Subjective:     Interval History: Spoke extensively with patient and  regarding prognosis, amount of time of life should patient continue treatment, quality of life should patient continue treatment, apparent intolerance of current treatment, and fact that there are no additional treatments available to her. Did tell patient and  that there is some room to dose reduce ponatinib further, which may or may not improve tolerance. Patient and  stated understanding and were left to discuss amongst themselves, they know that I am available to return if they would like to discuss further. ANC is 0 today. Does not require transfusions today. Ponatinib will be resumed today if patient wished to continue treatment. Replacing K+ and phos and will repeat BMP and phos level this afternoon. Giving Vit K for INR of 1.6. starting folate supplement. Will get echo today if patient chooses to continue with Ponatinib.    Objective:     Vital Signs (Most Recent):  Temp: 98.5 °F (36.9 °C) (10/13/22 1242)  Pulse: 109 (10/13/22 1242)  Resp: 16 (10/13/22 1242)  BP: 125/69 (10/13/22 1242)  SpO2: (!) 92 % (10/13/22 1242)   Vital Signs (24h Range):  Temp:  [96.7 °F (35.9 °C)-99.2 °F (37.3 °C)] 98.5 °F (36.9 °C)  Pulse:  [] 109  Resp:  [16-18] 16  SpO2:  [92 %-97 %] 92 %  BP: (109-139)/(55-77) 125/69     Weight: 52.6 kg (115 lb 15.4 oz)  Body mass index is 19.9 kg/m².  Body surface area is 1.54 meters squared.    ECOG SCORE           [unfilled]    Intake/Output - Last 3 Shifts         10/11 0700  10/12 0659 10/12 0700  10/13 0659 10/13 0700  10/14 0659    P.O. 590 350 180    Blood  1702.5     Total Intake(mL/kg) 590 (11.2) 2052.5 (39) 180 (3.4)    Urine (mL/kg/hr) 775 1325 (1) 350 (1.2)    Stool  0 0    Total Output 775  1325 350    Net -185 +727.5 -170           Urine Occurrence  600 x     Stool Occurrence   0 x            Physical Exam  Constitutional:       Appearance: She is well-developed. She is ill-appearing.      Comments: cachectic   HENT:      Head: Normocephalic and atraumatic.      Mouth/Throat:      Pharynx: No oropharyngeal exudate.   Eyes:      Conjunctiva/sclera: Conjunctivae normal.      Comments: Pupils unequal and sluggish  Left eye blindness   Cardiovascular:      Rate and Rhythm: Normal rate and regular rhythm.      Heart sounds: Normal heart sounds. No murmur heard.  Pulmonary:      Effort: Pulmonary effort is normal.      Breath sounds: Normal breath sounds.   Abdominal:      General: Bowel sounds are normal. There is no distension.      Palpations: Abdomen is soft.      Tenderness: There is no abdominal tenderness.   Musculoskeletal:         General: No deformity. Normal range of motion.      Cervical back: Normal range of motion and neck supple.   Skin:     General: Skin is warm and dry.      Findings: Bruising present. No erythema or rash.   Neurological:      Mental Status: She is alert and oriented to person, place, and time.      Comments: Generally weak   Psychiatric:         Behavior: Behavior normal.         Thought Content: Thought content normal.         Judgment: Judgment normal.       Significant Labs:   CBC:   Recent Labs   Lab 10/12/22  0353 10/13/22  0355   WBC 0.49* 0.38*   HGB 5.5* 8.0*   HCT 16.3* 24.0*   PLT 4* 17*      and CMP:   Recent Labs   Lab 10/12/22  0353 10/13/22  0355 10/13/22  1201    142 139   K 3.6 2.8* 3.3*    111* 107   CO2 18* 18* 21*   GLU 98 78 111*   BUN 32* 14 9   CREATININE 0.7 0.6 0.6   CALCIUM 9.2 8.9 8.7   PROT 6.2 5.9*  --    ALBUMIN 2.2* 1.9*  --    BILITOT 0.4 0.5  --    ALKPHOS 78 97  --    AST 9* 17  --    ALT 8* 14  --    ANIONGAP 11 13 11         Diagnostic Results:  I have reviewed all pertinent imaging results/findings within the past 24  hours.    Assessment/Plan:     * Altered mental status  - CT head 10/11/22 revealed Persistent pneumocephalus in the setting recent Ommaya reservoir placement. No new hemorrhage or other significant detrimental interval change.   - Unable to perform MRI d/t pacemaker   - previous infectious workup on LP 09/19 negative  - Hold sedating medications   - neurology consulted for further recs/safety of using ommaya again  - checing folate, Vitamin B12 panel, and oxcarb level per neuro rec  - infection w/u ordered to r/o infection as source. Pending.  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 Jenkins. 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 Not has been on therapy for past few weeks.  - Not a candidate for allogeneic stem cell transplant at this time.  - Presented 9/16/22 with left eye vision loss and worsening vision of left eye  - Patient with pacemaker and per patient, not compatible with MRI. Would ideally obtain MRI brain w/ w/o contrast. Verified with EP who checked with rep that pacemaker is not MRI compatible.   - CTA completed with delayed venous phase revealing small SAH   - LP with IT chemo on 9/19 positive for B-cell ALL; will require twice weekly IT until clearance at minimum  - Ommaya placed by NSGY on 9/26/22; can use ommaya on POD #5 (10/1)  - Started twice weekly IT chemo with ommaya on 10/3  - Currently on ponatinib, hold at this time   - Hold IT therapy until she is deemed appropriate for additional therapy  - BCR/ABL on peripheral blood neg  - Will defer BMBx at this time as it will not change treatment  - Consulted palliative care for GOC discussion  - Will resume home ponatinib today if patient wishes to continue treatment    Other pancytopenia  - daily CBC while  inpatient  - presented 10/11 with WBC 1.27, Hgb 7.9, Plt 15K and ANC 10  - transfuse for Hgb <7, Plt <10K or bleeding  - continue ppx acyclovir, fluconazole, levaquin  - BCR/ABL from peripheral blood neg. Will defer BMBx as it will not change treatment.  - precipitous drop in blood counts on 10/12. May be 2/2 ponatinib.    Palliative care encounter  - palliative care following for GOC    Severe protein-calorie malnutrition  Nutrition consulted. Most recent weight and BMI monitored-          Malnutrition (Moderate to Severe)  Weight Loss (Malnutrition):  (11% x 1 month)  Energy Intake (Malnutrition): less than or equal to 75% for greater than or equal to 1 month              Measurements:  Wt Readings from Last 1 Encounters:   10/12/22 52.6 kg (115 lb 15.4 oz)   Body mass index is 19.9 kg/m².    Recommendations: Recommendation/Intervention: 1. Continue Diabetic Diet 2. Continue Boost Glucose Control Tid 3. RD following  Goals: Meet % EEN/EPN by RD follow up date    Patient has been screened and assessed by RD. RD will follow patient.      Mucositis due to chemotherapy  - Start IVF for gentle hydration   - Dukes QIBENNY      Fatigue  - progressive weakness with worsening physical debility;  states patient has not walked in over 3 weeks  - PT/OT consulted on admission    Vision loss of left eye  - continues with total vision loss of left eye following diagnosis of CNS relapse of B-ALL    Urinary retention  - chronic wolf present on admission  - failed voiding trial previous admission  - will need urology follow up at discharge unless she goes hospice route, in which case catheter will be a comfort measure    Migraine  - PRN Imitrex  - given a dose of tramadol on 10/12 for headache    Cancer associated pain  - continue home gabapentin    Hypokalemia  - likely 2/2 poor oral dietary intake  - K+ 2.8  - Repleting and will check BMP this afternoon    Hypomagnesemia  - continue home daily mag    Hypophosphatasia  -  likely 2/2 poor oral dietary intake  - replacing via IV  - will repeat phos level this afternoon    Peripheral arterial occlusive disease  - Holding home Xarelto due to severe thrombocytopenia     Anxiety  - Continue home buspirone and quetiapine.   - Trintellix is nonformulary however pharmacy is attempting to obtain this medication.    GERD (gastroesophageal reflux disease)  - hold home omeprazole as not on inpatient formulary; replace with protonix    Hypertension  - Continue home Losartan    Seizure disorder  - Continue home oxcarbazepine 200mg QHS  - oxcarbazepine level pending    Type 2 diabetes mellitus, without long-term current use of insulin  - Blood glucose monitoring TID with meals and nightly   - Moderate dose SSI PRN  - Goal -180 while inpatient   - Diabetic diet    Pacemaker  - Patient with pacemaker in place due to SSS. Per note/EP, it is definitely MRI incompatible.      Rheumatoid arthritis involving multiple sites  - Seronegative RA  - has been off plaquenil for some time now; continuing to hold    History of TIA (transient ischemic attack)  - Holding home Xarelto given severe thrombocytopenia      Hyperlipidemia  - Continue home atorvastatin; states no longer using fenofibrate    Paroxysmal atrial fibrillation  - Continue home diltiazem  - Holding home xarelto in the setting of severe thrombocytopenia        VTE Risk Mitigation (From admission, onward)         Ordered     heparin, porcine (PF) 100 unit/mL injection flush 500 Units  As needed (PRN)         10/11/22 1049     Reason for No Pharmacological VTE Prophylaxis  Once        Question:  Reasons:  Answer:  Thrombocytopenia    10/11/22 1049     IP VTE HIGH RISK PATIENT  Once         10/11/22 1049     Place sequential compression device  Until discontinued         10/11/22 1049                Disposition: Inpatient.    Mirtha Antonio, NP  Bone Marrow Transplant  [facility] ([facility])

## 2022-10-13 NOTE — CARE UPDATE
RAPID RESPONSE NURSE ROUND       Rounding completed with charge RN, Carlos ulongribethany, hr 104, continuing plan of care discussions, reports stable at this time. No additional concerns verbalized at this time. Instructed to call [phone] for further concerns or assistance.

## 2022-10-13 NOTE — SUBJECTIVE & OBJECTIVE
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    Past Medical History:   Diagnosis Date    Cancer     COPD (chronic obstructive pulmonary disease)     Hypertension     Rheumatoid arthritis, unspecified     SAH (subarachnoid hemorrhage) 9/17/2022    Seizures     Stroke     TIA x 4    Type 2 diabetes mellitus with circulatory disorder, without long-term current use of insulin        Past Surgical History:   Procedure Laterality Date    APPENDECTOMY      HYSTERECTOMY      OMMAYA RESERVIOR INSERTION N/A 9/26/2022    Procedure: INSERTION, OMMAYA RESERVOIR;  Surgeon: Mj Alberto MD;  Location: [facility] OR [address] [place] [address];  Service: Neurosurgery;  Laterality: N/A;    ROTATOR CUFF REPAIR Bilateral     TONSILLECTOMY      TUBAL LIGATION         Review of patient's allergies indicates:   Allergen Reactions    Levetiracetam      Other reaction(s): Unknown  Other reaction(s): Unknown  Other reaction(s): Unknown       Medications:  Continuous Infusions:   sodium chloride 0.9% Stopped (10/12/22 1618)     Scheduled Meds:   acyclovir  400 mg Oral BID    allopurinoL  300 mg Oral Daily    atorvastatin  80 mg Oral Daily    busPIRone  10 mg Oral BID    cetirizine  10 mg Oral Daily    diltiaZEM  60 mg Oral Q6H    duke's soln (benadryl 30 mL, mylanta 30 mL, LIDOcaine 30 mL, nystatin 30 mL) 120 mL  10 mL Oral QID    fluconazole  400 mg Oral Daily    folic acid  1 mg Oral Daily    gabapentin  300 mg Oral TID    levoFLOXacin  500 mg Oral Daily    losartan  25 mg Oral Daily    magnesium oxide  400 mg Oral Daily    OXcarbazepine  1,200 mg Oral BID    pantoprazole  40 mg Oral Daily    [START ON 10/14/2022] phytonadione ((AQUA-MEPHYTON) IVPB  5 mg Intravenous Daily    potassium bicarbonate  50 mEq Oral Q4H    potassium phosphate IVPB  30 mmol Intravenous Once    QUEtiapine  200 mg Oral QHS    senna-docusate 8.6-50 mg  1 tablet Oral BID     PRN Meds:sodium chloride, sodium chloride, sodium chloride, artificial tears,  dextrose 10%, dextrose 10%, glucagon (human recombinant), glucose, glucose, heparin, porcine (PF), insulin aspart U-100, naloxone, ondansetron, polyethylene glycol, prochlorperazine, sodium chloride 0.9%, sumatriptan    Family History    None       Tobacco Use    Smoking status: Former     Packs/day: 0.50     Types: Cigarettes     Quit date: 3/16/2022     Years since quittin.5    Smokeless tobacco: Never   Substance and Sexual Activity    Alcohol use: No    Drug use: Never    Sexual activity: Not on file       Review of Systems   Constitutional:  Positive for activity change and fatigue.   Respiratory:  Negative for shortness of breath.    Gastrointestinal: Negative.    Skin:  Positive for pallor.   Neurological:  Positive for weakness.   Psychiatric/Behavioral: Negative.     Objective:     Vital Signs (Most Recent):  Temp: 99.2 °F (37.3 °C) (10/13/22 0734)  Pulse: 108 (10/13/22 08)  Resp: 18 (10/13/22 0734)  BP: 124/65 (10/13/22 0734)  SpO2: 95 % (10/13/22 0734)   Vital Signs (24h Range):  Temp:  [96.7 °F (35.9 °C)-99.2 °F (37.3 °C)] 99.2 °F (37.3 °C)  Pulse:  [] 108  Resp:  [16-18] 18  SpO2:  [94 %-97 %] 95 %  BP: (109-139)/(55-77) 124/65     Weight: 52.6 kg (115 lb 15.4 oz)  Body mass index is 19.9 kg/m².    Physical Exam  HENT:      Head: Normocephalic and atraumatic.   Eyes:      General: Lids are normal.      Conjunctiva/sclera: Conjunctivae normal.      Comments: Blind in Left eye   Pulmonary:      Effort: Pulmonary effort is normal. No respiratory distress.   Abdominal:      General: There is no distension.   Musculoskeletal:      Cervical back: Full passive range of motion without pain and normal range of motion.      Comments: No edema noted.    Skin:     General: Skin is warm and dry.      Coloration: Skin is pale.   Neurological:      Mental Status: She is alert and oriented to person, place, and time.   Psychiatric:         Behavior: Behavior is cooperative.       Review of  Symptoms      Symptom Assessment (ESAS 0-10 Scale)  Pain:  0  Dyspnea:  0  Anxiety:  0  Nausea:  0  Depression:  0  Anorexia:  0  Fatigue:  0  Insomnia:  0  Restlessness:  0  Agitation:  0 due to Other         Pain Assessment in Advanced Demential Scale (PAINAD)   Breathing - Independent of vocalization:  0  Negative vocalization:  0  Facial expression:  0  Body language:  0  Consolability:  0  Total:  0    Performance Status:  40    Living Arrangements:  Lives with family    Psychosocial/Cultural: Pt living at brother's house with  due to someone being with pt 24/7.  reports good support system at home. Pt has a 15 y/o and a 32 y/o old. Pt has two grandchildren.       Advance Care Planning     Significant Labs: All pertinent labs within the past 24 hours have been reviewed.  CBC:   Recent Labs   Lab 10/13/22  0355   WBC 0.38*   HGB 8.0*   HCT 24.0*   MCV 89   PLT 17*       BMP:  Recent Labs   Lab 10/13/22  0355   GLU 78      K 2.8*   *   CO2 18*   BUN 14   CREATININE 0.6   CALCIUM 8.9   MG 1.8       LFT:  Lab Results   Component Value Date    AST 17 10/13/2022    ALKPHOS 97 10/13/2022    BILITOT 0.5 10/13/2022     Albumin:   Albumin   Date Value Ref Range Status   10/13/2022 1.9 (L) 3.5 - 5.2 g/dL Final     Protein:   Total Protein   Date Value Ref Range Status   10/13/2022 5.9 (L) 6.0 - 8.4 g/dL Final     Lactic acid:   Lab Results   Component Value Date    LACTATE 1.9 08/27/2022    LACTATE 3.2 (H) 08/27/2022       Significant Imaging: I have reviewed all pertinent imaging results/findings within the past 24 hours.

## 2022-10-13 NOTE — PLAN OF CARE
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]  Yes     Priority: 2     Other pancytopenia [D61.818]  Yes     Priority: 3     Pain [R52]  Unknown    Severe protein-calorie malnutrition [E43]  Yes     Chronic     Severe Protein-Calorie Malnutrition     Nutrition Problem:  Severe Malnutrition in the context of Chronic Illness/Injury     Related to (etiology):  Inadequate PO intake      Signs and Symptoms (as evidenced by):  Energy Intake: suspecting less than or equal to 75% of estimated energy requirement for greater than or equal to 1 month  Weight Loss: 11% x 1 month      Interventions(treatment strategy):  Collaboration of nutrition care with other providers  Encourage adequate PO intake     Nutrition Diagnosis Status:  New       Palliative care encounter [Z51.5]  Not Applicable    Debility [R53.81]  Unknown    Advance care planning [Z71.89]  Not Applicable    Mucositis due to chemotherapy [K12.31]  Yes    Fatigue [R53.83]  Yes    Vision loss of left eye [H54.62]  Yes    Urinary retention [R33.9]  Yes    Migraine [G43.909]  Yes    Cancer associated pain [G89.3]  Yes    Hypokalemia [E87.6]  No    Hypomagnesemia [E83.42]  Yes    Hypophosphatasia [E83.39]  No    Peripheral arterial occlusive disease [I77.9]  Yes    Type 2 diabetes mellitus, without long-term current use of insulin [E11.9]  Yes    Hypertension [I10]  Yes    Seizure disorder [G40.909]  Yes    Anxiety [F41.9]  Yes     Long-term generalized anxiety disorder  Potential PTSD (not comprehensively assessed)      GERD (gastroesophageal reflux disease) [K21.9]  Yes    Pacemaker [Z95.0]  Yes     Paroxysmal atrial fibrillation [I48.0]  Yes    History of TIA (transient ischemic attack) [Z86.73]  Not Applicable    Hyperlipidemia [E78.5]  Yes    Rheumatoid arthritis involving multiple sites [M06.9]  Yes     Hx of seronegative RA. Previously on Adalimumab / HCQ / Leflunomab.        Resolved Hospital Problems   No resolved problems to display.       Hospice Qualifying Diagnoses:        Patient has a life expectancy < 6 months due to:  ALL with CNS involvement  Comorbid Conditions Contributing to Decline: malnutrution, debility, seizure disorder    Vital Signs: Routine per Hospice Protocol.    Code Status: Full    Allergies:   Review of patient's allergies indicates:   Allergen Reactions    Levetiracetam      Other reaction(s): Unknown  Other reaction(s): Unknown  Other reaction(s): Unknown       Diet: Regular   Activities: As tolerated    Goals of Care Treatment Preferences:  Code Status: Full Code    Health care agent:  is PAULA  Health care agent number: [phone]    Nursing: Per Hospice Routine.      Green Care: Empty Green bag Q shift and PRN.  Change Green every month.    Routine Skin for Bedridden Patients: Apply moisture barrier cream to all skin folds and   wet areas in perineal area daily and after baths and all bowel movements.    Wound Care: Bilateral buttocks with open areas noted.  Purple tissue to surrounding tissue. 5x9x0.1 measured together.  Triad applied.  Tolerated well.  Recommendations made to primary team for Triad BID and prn, turn every 2 hours, waffle overlay, and wheel chair cushion.    Medications:        Medication List        CHANGE how you take these medications      acyclovir 400 MG tablet  Commonly known as: ZOVIRAX  Take 1 tablet (400 mg total) by mouth 2 (two) times daily.  What changed: Another medication with the same name was removed. Continue taking this medication, and follow the directions you see here.            CONTINUE taking these medications       allopurinoL 300 MG tablet  Commonly known as: ZYLOPRIM  Take 1 tablet (300 mg total) by mouth once daily.     artificial tears 0.5 % ophthalmic solution  Commonly known as: ISOPTO TEARS  Place 1 drop into both eyes 4 (four) times daily as needed.     atorvastatin 80 MG tablet  Commonly known as: LIPITOR  Take 80 mg by mouth once daily.     * blood sugar diagnostic Strp  SMARTSIG:Via Meter 1 to 2 Times Daily     * ONETOUCH VERIO TEST STRIPS Strp  Generic drug: blood sugar diagnostic  SMARTSIG:Via Meter 1 to 2 Times Daily     busPIRone 10 MG tablet  Commonly known as: BUSPAR  Take 10 mg by mouth 2 (two) times daily.     butalbital-acetaminophen-caffeine -40 mg -40 mg per tablet  Commonly known as: FIORICET, ESGIC  Take 1 tablet by mouth every 6 (six) hours as needed for Headaches.     cetirizine 10 MG tablet  Commonly known as: ZYRTEC  Take 1 tablet (10 mg total) by mouth once daily.     diazePAM 10 MG Tab  Commonly known as: VALIUM  Take 10 mg by mouth 2 (two) times daily as needed.     diltiaZEM 90 MG tablet  Commonly known as: CARDIZEM  Take 1 tablet (90 mg total) by mouth every 6 (six) hours.     DUKE'S SOLUTION (BENADRYL 30 ML, MYLANTA 30 ML, LIDOCAINE 30 ML, NYSTATIN 30 ML)  Take 10 mLs by mouth 4 (four) times daily as needed (mouth pain).     FARXIGA 10 mg tablet  Generic drug: dapagliflozin  Take 10 mg by mouth once daily.     fluconazole 200 MG Tab  Commonly known as: DIFLUCAN  Take 2 tablets (400 mg total) by mouth once daily.     gabapentin 300 MG capsule  Commonly known as: NEURONTIN  Take 1 capsule (300 mg total) by mouth 3 (three) times daily.     levoFLOXacin 500 MG tablet  Commonly known as: LEVAQUIN  Take 1 tablet (500 mg total) by mouth once daily.     LIDOcaine 5 %  Commonly known as: LIDODERM  Place 1 patch onto the skin once daily. Remove & Discard patch within 12 hours or as directed by MD     losartan 25 MG tablet  Commonly known as: COZAAR  Take 25 mg by mouth once daily.      metFORMIN 500 MG ER 24hr tablet  Commonly known as: GLUCOPHAGE-XR  Take 1,000 mg by mouth 2 (two) times daily.     naloxone 4 mg/actuation Spry  Commonly known as: NARCAN  4mg by nasal route as needed for opioid overdose; may repeat every 2-3 minutes in alternating nostrils until medical help arrives. Call 911     omeprazole 40 MG capsule  Commonly known as: PRILOSEC  Take 40 mg by mouth once daily.     * ondansetron 8 MG Tbdl  Commonly known as: ZOFRAN-ODT  Take 1 tablet (8 mg total) by mouth every 8 (eight) hours as needed (in case of chemo induced nausea).     * ondansetron 8 MG Tbdl  Commonly known as: ZOFRAN-ODT  Take 1 tablet (8 mg total) by mouth every 8 (eight) hours as needed (nausea or vomiting).     OXcarbazepine 600 MG Tab  Commonly known as: TRILEPTAL  Take 1,200 mg by mouth 2 (two) times daily.     polyethylene glycol 17 gram/dose powder  Commonly known as: GLYCOLAX  Dissolve one capful (17 g) in liquid and take by mouth daily as needed (constipation).     prochlorperazine 10 MG tablet  Commonly known as: COMPAZINE  Take 1 tablet (10 mg total) by mouth every 6 (six) hours as needed for Nausea.     QUEtiapine 200 MG Tab  Commonly known as: SEROQUEL  Take 200 mg by mouth every evening.     rivaroxaban 20 mg Tab  Commonly known as: XARELTO  Take 1 tablet (20 mg total) by mouth daily with dinner or evening meal. ** Hold until instructed to resume by provider due to low platelet count. **     SENNA PLUS 8.6-50 mg per tablet  Generic drug: senna-docusate 8.6-50 mg  Take 1 tablet by mouth 2 (two) times daily.     sumatriptan 50 MG tablet  Commonly known as: IMITREX  Take 1 tablet (50 mg total) by mouth daily as needed (headache).     TRINTELLIX 20 mg Tab  Generic drug: vortioxetine  Take 1 tablet by mouth once daily.     ursodioL 300 mg capsule  Commonly known as: ACTIGALL  Take 1 capsule (300 mg total) by mouth 3 (three) times daily.           * This list has 4 medication(s) that are the same as other  medications prescribed for you. Read the directions carefully, and ask your doctor or other care provider to review them with you.                STOP taking these medications      fenofibrate 160 MG Tab     hydrOXYchloroQUINE 200 mg tablet  Commonly known as: PLAQUENIL     hydrOXYzine pamoate 50 MG Cap  Commonly known as: VISTARIL     ICLUSIG 30 mg Tab  Generic drug: PONATinib     magnesium oxide 400 mg (241.3 mg magnesium) tablet  Commonly known as: MAG-OX     potassium chloride 20 mEq  Commonly known as: K-TAB                DIABETES CARE:   Nurse to perform and educate diabetic management with blood glucose monitoring:          Fingerstick blood sugar a.m. and p.m.         Fingerstick blood sugar every 6 hours if patient is unable to eat    Report CBG < 60 or > 350 to physician.      Future Orders:  Hospice Medical Director may dictate new orders for comfortable care measures & sign death certificate.        _________________________________  Mirtha Antonio NP  10/13/2022

## 2022-10-13 NOTE — ASSESSMENT & PLAN NOTE
- daily CBC while inpatient  - presented 10/11 with WBC 1.27, Hgb 7.9, Plt 15K and ANC 10  - transfuse for Hgb <7, Plt <10K or bleeding  - continue ppx acyclovir, fluconazole, levaquin  - BCR/ABL from peripheral blood neg. Will defer BMBx as it will not change treatment.  - precipitous drop in blood counts on 10/12. May be 2/2 ponatinib.

## 2022-10-13 NOTE — SUBJECTIVE & OBJECTIVE
Subjective:     Interval History: Spoke extensively with patient and  regarding prognosis, amount of time of life should patient continue treatment, quality of life should patient continue treatment, apparent intolerance of current treatment, and fact that there are no additional treatments available to her. Did tell patient and  that there is some room to dose reduce ponatinib further, which may or may not improve tolerance. Patient and  stated understanding and were left to discuss amongst themselves, they know that I am available to return if they would like to discuss further. ANC is 0 today. Does not require transfusions today. Ponatinib will be resumed today if patient wished to continue treatment. Replacing K+ and phos and will repeat BMP and phos level this afternoon. Giving Vit K for INR of 1.6. starting folate supplement. Will get echo today if patient chooses to continue with Ponatinib.    Objective:     Vital Signs (Most Recent):  Temp: 98.5 °F (36.9 °C) (10/13/22 1242)  Pulse: 109 (10/13/22 1242)  Resp: 16 (10/13/22 1242)  BP: 125/69 (10/13/22 1242)  SpO2: (!) 92 % (10/13/22 1242)   Vital Signs (24h Range):  Temp:  [96.7 °F (35.9 °C)-99.2 °F (37.3 °C)] 98.5 °F (36.9 °C)  Pulse:  [] 109  Resp:  [16-18] 16  SpO2:  [92 %-97 %] 92 %  BP: (109-139)/(55-77) 125/69     Weight: 52.6 kg (115 lb 15.4 oz)  Body mass index is 19.9 kg/m².  Body surface area is 1.54 meters squared.    ECOG SCORE           [unfilled]    Intake/Output - Last 3 Shifts         10/11 0700  10/12 0659 10/12 0700  10/13 0659 10/13 0700  10/14 0659    P.O. 590 350 180    Blood  1702.5     Total Intake(mL/kg) 590 (11.2) 2052.5 (39) 180 (3.4)    Urine (mL/kg/hr) 775 1325 (1) 350 (1.2)    Stool  0 0    Total Output 775 1325 350    Net -185 +727.5 -170           Urine Occurrence  600 x     Stool Occurrence   0 x            Physical Exam  Constitutional:       Appearance: She is well-developed. She is ill-appearing.       Comments: cachectic   HENT:      Head: Normocephalic and atraumatic.      Mouth/Throat:      Pharynx: No oropharyngeal exudate.   Eyes:      Conjunctiva/sclera: Conjunctivae normal.      Comments: Pupils unequal and sluggish  Left eye blindness   Cardiovascular:      Rate and Rhythm: Normal rate and regular rhythm.      Heart sounds: Normal heart sounds. No murmur heard.  Pulmonary:      Effort: Pulmonary effort is normal.      Breath sounds: Normal breath sounds.   Abdominal:      General: Bowel sounds are normal. There is no distension.      Palpations: Abdomen is soft.      Tenderness: There is no abdominal tenderness.   Musculoskeletal:         General: No deformity. Normal range of motion.      Cervical back: Normal range of motion and neck supple.   Skin:     General: Skin is warm and dry.      Findings: Bruising present. No erythema or rash.   Neurological:      Mental Status: She is alert and oriented to person, place, and time.      Comments: Generally weak   Psychiatric:         Behavior: Behavior normal.         Thought Content: Thought content normal.         Judgment: Judgment normal.       Significant Labs:   CBC:   Recent Labs   Lab 10/12/22  0353 10/13/22  0355   WBC 0.49* 0.38*   HGB 5.5* 8.0*   HCT 16.3* 24.0*   PLT 4* 17*      and CMP:   Recent Labs   Lab 10/12/22  0353 10/13/22  0355 10/13/22  1201    142 139   K 3.6 2.8* 3.3*    111* 107   CO2 18* 18* 21*   GLU 98 78 111*   BUN 32* 14 9   CREATININE 0.7 0.6 0.6   CALCIUM 9.2 8.9 8.7   PROT 6.2 5.9*  --    ALBUMIN 2.2* 1.9*  --    BILITOT 0.4 0.5  --    ALKPHOS 78 97  --    AST 9* 17  --    ALT 8* 14  --    ANIONGAP 11 13 11         Diagnostic Results:  I have reviewed all pertinent imaging results/findings within the past 24 hours.

## 2022-10-13 NOTE — CONSULTS
[facility] ([facility])  Palliative Medicine  Consult Note    Patient Name: Deepti Gonzalez  MRN: [id number]  Admission Date: 10/11/2022  Hospital Length of Stay: 2 days  Code Status: Full Code   Attending Provider: Kathrine Posey MD  Consulting Provider: LAKSHMI Mulligan  Primary Care Physician: Primary Doctor No  Principal Problem:Altered mental status    Patient information was obtained from patient, spouse/SO and primary team.      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 Other PMH significant for paroxsymal A-fib with an implantable loop recorder/pacemaker in place, anxiety/depression, COPD, DM2, gastroparesis, GERD, osteoporosis, PAD, RA, seizures, TIA, tobacco use. Pt is resting at this time. Pt and  report pt has headche and just received medication. Pt is a full code.     Reason for consult: ACP, Pt has been seen by palliative care prior.  Communicated with TERRY Shannon    Goals of care/ACP:     Today: Met with pt and pt's . Per pt and  TERRY Shannon was able to speak to them this morning about plan of care. Per pt continuing treatment vs transitioning to comfort care was discussed. Per pt she and her  will talk about her wishes. Pt had questions about what comfort care would look like. Education provided. Pt and  verbalized understanding. If     Also let pt know that if plan is to continue medical management, she can f/u with outpt palliative care with virtual visit.     10/12/22  Met with pt's  who is at bedside. Pt resting due to headache. Pt known to Palliative care inpt team and is scheduled for clinic visit virtual. Per pt's  team had updated them on pt's relapse and getting bone marrow biopsy to confirm. Per pt's  he has questions concerning if pt is a candidate for anymore treatment if she wants to continue. TERRY Shannon to come back and speak to pt and  . Per , code status was also discussed today. Questions answered concerning DNR vs full code. Prior to pt/ making decisions, they wanted to speak to BMT team again.     Pt's  open to more discussions with Pal care.  to speak to TERRY Shannon thi afternoon concerning questions he has about treatment. Will f/u.    Symptom management:     Debility:   Would have PT/Ot evaluate and treat.     Headache:   Pt on sumatriptan prn daily prn.   Pt just received meds.     Pain:   Pt reports pain to leg and joints that she describes as aching. Per pt she has taken opioids in past (last admit) that helped with pain.     Recs:  Consider oxycodone 5 mg q 4 hrs prn pain.         Plan:   Will continue to follow for advance care planning/GOC.   Support given.                   Thank you for your consult. I will follow-up with patient. Please contact us if you have any additional questions.    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 Other PMH significant for paroxsymal A-fib with an implantable loop recorder/pacemaker in place, anxiety/depression, COPD, DM2, gastroparesis, GERD, osteoporosis, PAD, RA, seizures, TIA, tobacco use. 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 She was treated with inotuzumab and ponatinib, followed by cessation of inotuzumab (due to cytopenias). She achieved molecular response on ponatinib. Unfortunately, she is now diagnosed with CNS relapse as of 9/19/22. She had an Ommaya placed on 9/26/22 and has been receiving twice weekly IT triple chemotherapy. Most recent CSF shows clearance of leukemic cells.      Per chart review, pt presented to outpatient BMT clinic for follow-up today where her  stated that Mrs. Gonzalez had been declining over the 72hrs. She now has coordination difficulties and is very fatigued. She denies new vision changes (still with complete left eye vision loss).  Also of note, she is severely pancytopenic, has mouth sores, and poor oral intake/nausea. Denies any recent fevers, chills, chest pain, sob, diarrhea, constipation, bleeding or bruising. Admitted for workup of new coordination difficulties in setting of relapsed CNS Ph+ ALL.         Patient information was obtained from patient, spouse/SO, and past medical records.          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    Past Medical History:   Diagnosis Date    Cancer     COPD (chronic obstructive pulmonary disease)     Hypertension     Rheumatoid arthritis, unspecified     SAH (subarachnoid hemorrhage) 9/17/2022    Seizures     Stroke     TIA x 4    Type 2 diabetes mellitus with circulatory disorder, without long-term current use of insulin        Past Surgical History:   Procedure Laterality Date    APPENDECTOMY      HYSTERECTOMY      OMMAYA RESERVIOR INSERTION N/A 9/26/2022    Procedure: INSERTION, OMMAYA RESERVOIR;  Surgeon: Mj Alberto MD;  Location: [facility] OR [address] [place] [address];  Service: Neurosurgery;  Laterality: N/A;    ROTATOR CUFF REPAIR Bilateral     TONSILLECTOMY      TUBAL LIGATION         Review of patient's allergies indicates:   Allergen Reactions    Levetiracetam      Other reaction(s): Unknown  Other reaction(s): Unknown  Other reaction(s): Unknown       Medications:  Continuous Infusions:   sodium chloride 0.9% Stopped (10/12/22 1618)     Scheduled Meds:   acyclovir  400 mg Oral BID    allopurinoL  300 mg Oral Daily    atorvastatin  80 mg Oral Daily    busPIRone  10 mg Oral BID    cetirizine  10 mg Oral Daily    diltiaZEM  60 mg Oral Q6H    duke's soln (benadryl 30 mL, mylanta 30 mL, LIDOcaine 30 mL, nystatin 30 mL) 120 mL  10 mL Oral QID    fluconazole  400 mg Oral Daily    folic acid  1 mg Oral Daily    gabapentin  300 mg Oral TID    levoFLOXacin  500 mg Oral Daily    losartan  25 mg Oral Daily     magnesium oxide  400 mg Oral Daily    OXcarbazepine  1,200 mg Oral BID    pantoprazole  40 mg Oral Daily    [START ON 10/14/2022] phytonadione ((AQUA-MEPHYTON) IVPB  5 mg Intravenous Daily    potassium bicarbonate  50 mEq Oral Q4H    potassium phosphate IVPB  30 mmol Intravenous Once    QUEtiapine  200 mg Oral QHS    senna-docusate 8.6-50 mg  1 tablet Oral BID     PRN Meds:sodium chloride, sodium chloride, sodium chloride, artificial tears, dextrose 10%, dextrose 10%, glucagon (human recombinant), glucose, glucose, heparin, porcine (PF), insulin aspart U-100, naloxone, ondansetron, polyethylene glycol, prochlorperazine, sodium chloride 0.9%, sumatriptan    Family History    None       Tobacco Use    Smoking status: Former     Packs/day: 0.50     Types: Cigarettes     Quit date: 3/16/2022     Years since quittin.5    Smokeless tobacco: Never   Substance and Sexual Activity    Alcohol use: No    Drug use: Never    Sexual activity: Not on file       Review of Systems   Constitutional:  Positive for activity change and fatigue.   Respiratory:  Negative for shortness of breath.    Gastrointestinal: Negative.    Skin:  Positive for pallor.   Neurological:  Positive for weakness.   Psychiatric/Behavioral: Negative.     Objective:     Vital Signs (Most Recent):  Temp: 99.2 °F (37.3 °C) (10/13/22 07)  Pulse: 108 (10/13/22 0819)  Resp: 18 (10/13/22 0734)  BP: 124/65 (10/13/22 0734)  SpO2: 95 % (10/13/22 0734)   Vital Signs (24h Range):  Temp:  [96.7 °F (35.9 °C)-99.2 °F (37.3 °C)] 99.2 °F (37.3 °C)  Pulse:  [] 108  Resp:  [16-18] 18  SpO2:  [94 %-97 %] 95 %  BP: (109-139)/(55-77) 124/65     Weight: 52.6 kg (115 lb 15.4 oz)  Body mass index is 19.9 kg/m².    Physical Exam  HENT:      Head: Normocephalic and atraumatic.   Eyes:      General: Lids are normal.      Conjunctiva/sclera: Conjunctivae normal.      Comments: Blind in Left eye   Pulmonary:      Effort: Pulmonary effort is normal. No respiratory  distress.   Abdominal:      General: There is no distension.   Musculoskeletal:      Cervical back: Full passive range of motion without pain and normal range of motion.      Comments: No edema noted.    Skin:     General: Skin is warm and dry.      Coloration: Skin is pale.   Neurological:      Mental Status: She is alert and oriented to person, place, and time.   Psychiatric:         Behavior: Behavior is cooperative.       Review of Symptoms      Symptom Assessment (ESAS 0-10 Scale)  Pain:  0  Dyspnea:  0  Anxiety:  0  Nausea:  0  Depression:  0  Anorexia:  0  Fatigue:  0  Insomnia:  0  Restlessness:  0  Agitation:  0 due to Other         Pain Assessment in Advanced Demential Scale (PAINAD)   Breathing - Independent of vocalization:  0  Negative vocalization:  0  Facial expression:  0  Body language:  0  Consolability:  0  Total:  0    Performance Status:  40    Living Arrangements:  Lives with family    Psychosocial/Cultural: Pt living at brother's house with  due to someone being with pt 24/7.  reports good support system at home. Pt has a 15 y/o and a 30 y/o old. Pt has two grandchildren.       Advance Care Planning     Significant Labs: All pertinent labs within the past 24 hours have been reviewed.  CBC:   Recent Labs   Lab 10/13/22  0355   WBC 0.38*   HGB 8.0*   HCT 24.0*   MCV 89   PLT 17*       BMP:  Recent Labs   Lab 10/13/22  0355   GLU 78      K 2.8*   *   CO2 18*   BUN 14   CREATININE 0.6   CALCIUM 8.9   MG 1.8       LFT:  Lab Results   Component Value Date    AST 17 10/13/2022    ALKPHOS 97 10/13/2022    BILITOT 0.5 10/13/2022     Albumin:   Albumin   Date Value Ref Range Status   10/13/2022 1.9 (L) 3.5 - 5.2 g/dL Final     Protein:   Total Protein   Date Value Ref Range Status   10/13/2022 5.9 (L) 6.0 - 8.4 g/dL Final     Lactic acid:   Lab Results   Component Value Date    LACTATE 1.9 08/27/2022    LACTATE 3.2 (H) 08/27/2022       Significant Imaging: I have reviewed all  pertinent imaging results/findings within the past 24 hours.        20 minutes of advance care planning completed.       Akilah Malik, CNS  Palliative Medicine  [facility] - Oncology ([facility])

## 2022-10-13 NOTE — PT/OT/SLP PROGRESS
Occupational Therapy      Patient Name:  Deepti Gonzalez   MRN:  [id number]    Patient not seen today secondary to Other (Comment) (spoke w/ spouse who stated they decided to go home w/ hospice and OT was not necesary at this time. Spouse educated on role of OT and OT is always available if necessary. Spouse was agreeable). Will follow-up as scheduled.    10/13/2022

## 2022-10-13 NOTE — PT/OT/SLP PROGRESS
Physical Therapy      Patient Name:  Deepti Gonzalez   MRN:  [id number]    Patient not seen today secondary to Nausea/vomiting, Other (Comment) (and pt reported headache). Will follow-up 10/14/2022.

## 2022-10-13 NOTE — PROGRESS NOTES
Received request to assist with home hospice.  Met with spouse while patient resting; they will continue to stay with her brother at [address] , [place] [address] while their home is undergoing repairs.  They will discuss their plan with him this evening before any equipment is delivered.  He agreed to transitioning service line with their current home health provider, [facility].    Spoke to Mallory,  at [facility] ([phone] fax) who requested full orders/faxed referral.  Faxed to [facility] with 's above plan noted; awaiting response.  Will update following Lourdes Tran on his return.

## 2022-10-13 NOTE — HPI
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 Other PMH significant for paroxsymal A-fib with an implantable loop recorder/pacemaker in place, anxiety/depression, COPD, DM2, gastroparesis, GERD, osteoporosis, PAD, RA, seizures, TIA, tobacco use. 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 She was treated with inotuzumab and ponatinib, followed by cessation of inotuzumab (due to cytopenias). She achieved molecular response on ponatinib. Unfortunately, she is now diagnosed with CNS relapse as of 9/19/22. She had an Ommaya placed on 9/26/22 and has been receiving twice weekly IT triple chemotherapy. Most recent CSF shows clearance of leukemic cells.      She presented to outpatient BMT clinic for follow-up today where her  stated that Mrs. Gonzalez had been declining over the 72hrs. She now has coordination difficulties and is very fatigued. She denies new vision changes (still with complete left eye vision loss). Also of note, she is severely pancytopenic, has mouth sores, and poor oral intake/nausea. Denies any recent fevers, chills, chest pain, sob, diarrhea, constipation, bleeding or bruising. Admitted for workup of new coordination difficulties in setting of relapsed CNS Ph+ ALL. Skin integrity DANUTA consulted for evaluation of skin breakdown.

## 2022-10-13 NOTE — ASSESSMENT & PLAN NOTE
Nutrition consulted. Most recent weight and BMI monitored-          Malnutrition (Moderate to Severe)  Weight Loss (Malnutrition):  (11% x 1 month)  Energy Intake (Malnutrition): less than or equal to 75% for greater than or equal to 1 month              Measurements:  Wt Readings from Last 1 Encounters:   10/12/22 52.6 kg (115 lb 15.4 oz)   Body mass index is 19.9 kg/m².    Recommendations: Recommendation/Intervention: 1. Continue Diabetic Diet 2. Continue Boost Glucose Control Tid 3. RD following  Goals: Meet % EEN/EPN by RD follow up date    Patient has been screened and assessed by RD. RD will follow patient.

## 2022-10-13 NOTE — SUBJECTIVE & OBJECTIVE
Scheduled Meds:   acyclovir  400 mg Oral BID    allopurinoL  300 mg Oral Daily    atorvastatin  80 mg Oral Daily    busPIRone  10 mg Oral BID    cetirizine  10 mg Oral Daily    diltiaZEM  60 mg Oral Q6H    duke's soln (benadryl 30 mL, mylanta 30 mL, LIDOcaine 30 mL, nystatin 30 mL) 120 mL  10 mL Oral QID    fluconazole  400 mg Oral Daily    folic acid  1 mg Oral Daily    gabapentin  300 mg Oral TID    levoFLOXacin  500 mg Oral Daily    losartan  25 mg Oral Daily    magnesium oxide  400 mg Oral Daily    OXcarbazepine  1,200 mg Oral BID    pantoprazole  40 mg Oral Daily    [START ON 10/14/2022] phytonadione ((AQUA-MEPHYTON) IVPB  5 mg Intravenous Daily    QUEtiapine  200 mg Oral QHS    senna-docusate 8.6-50 mg  1 tablet Oral BID     Continuous Infusions:   sodium chloride 0.9% 100 mL/hr at 10/13/22 1428     PRN Meds:sodium chloride, sodium chloride, sodium chloride, artificial tears, dextrose 10%, dextrose 10%, glucagon (human recombinant), glucose, glucose, heparin, porcine (PF), insulin aspart U-100, naloxone, ondansetron, polyethylene glycol, prochlorperazine, sodium chloride 0.9%, sumatriptan    Review of patient's allergies indicates:   Allergen Reactions    Levetiracetam      Other reaction(s): Unknown  Other reaction(s): Unknown  Other reaction(s): Unknown        Past Medical History:   Diagnosis Date    Cancer     COPD (chronic obstructive pulmonary disease)     Hypertension     Rheumatoid arthritis, unspecified     SAH (subarachnoid hemorrhage) 9/17/2022    Seizures     Stroke     TIA x 4    Type 2 diabetes mellitus with circulatory disorder, without long-term current use of insulin      Past Surgical History:   Procedure Laterality Date    APPENDECTOMY      HYSTERECTOMY      OMMAYA RESERVIOR INSERTION N/A 9/26/2022    Procedure: INSERTION, OMMAYA RESERVOIR;  Surgeon: Mj Alberto MD;  Location: [facility] OR [address] [place] [address];  Service: Neurosurgery;  Laterality: N/A;    ROTATOR CUFF REPAIR Bilateral      TONSILLECTOMY      TUBAL LIGATION         Family History    None       Tobacco Use    Smoking status: Former     Packs/day: 0.50     Types: Cigarettes     Quit date: 3/16/2022     Years since quittin.5    Smokeless tobacco: Never   Substance and Sexual Activity    Alcohol use: No    Drug use: Never    Sexual activity: Not on file     Review of Systems   Skin:  Positive for wound.     Objective:     Vital Signs (Most Recent):  Temp: 98.5 °F (36.9 °C) (10/13/22 1242)  Pulse: 109 (10/13/22 1242)  Resp: 16 (10/13/22 1242)  BP: 125/69 (10/13/22 1242)  SpO2: (!) 92 % (10/13/22 124)   Vital Signs (24h Range):  Temp:  [96.7 °F (35.9 °C)-99.2 °F (37.3 °C)] 98.5 °F (36.9 °C)  Pulse:  [] 109  Resp:  [16-18] 16  SpO2:  [92 %-97 %] 92 %  BP: (113-139)/(55-77) 125/69     Weight: 52.6 kg (115 lb 15.4 oz)  Body mass index is 19.9 kg/m².  Physical Exam  Constitutional:       Appearance: Normal appearance.   Skin:     General: Skin is warm and dry.      Findings: Lesion present.   Neurological:      Mental Status: She is alert.       Laboratory:  All pertinent labs reviewed within the last 24 hours.    Diagnostic Results:  None

## 2022-10-14 VITALS
DIASTOLIC BLOOD PRESSURE: 65 MMHG | WEIGHT: 115.94 LBS | HEIGHT: 64 IN | OXYGEN SATURATION: 93 % | TEMPERATURE: 98 F | HEART RATE: 113 BPM | BODY MASS INDEX: 19.79 KG/M2 | RESPIRATION RATE: 18 BRPM | SYSTOLIC BLOOD PRESSURE: 130 MMHG

## 2022-10-14 LAB
ALBUMIN SERPL BCP-MCNC: 1.7 G/DL (ref 3.5–5.2)
ALP SERPL-CCNC: 195 U/L (ref 55–135)
ALT SERPL W/O P-5'-P-CCNC: 34 U/L (ref 10–44)
ANION GAP SERPL CALC-SCNC: 16 MMOL/L (ref 8–16)
ANISOCYTOSIS BLD QL SMEAR: SLIGHT
AST SERPL-CCNC: 42 U/L (ref 10–40)
BASOPHILS # BLD AUTO: 0 K/UL (ref 0–0.2)
BASOPHILS NFR BLD: 0 % (ref 0–1.9)
BILIRUB SERPL-MCNC: 0.6 MG/DL (ref 0.1–1)
BLD PROD TYP BPU: NORMAL
BLOOD UNIT EXPIRATION DATE: NORMAL
BLOOD UNIT TYPE CODE: 5100
BLOOD UNIT TYPE: NORMAL
BUN SERPL-MCNC: 7 MG/DL (ref 6–20)
CALCIUM SERPL-MCNC: 8.5 MG/DL (ref 8.7–10.5)
CHLORIDE SERPL-SCNC: 106 MMOL/L (ref 95–110)
CO2 SERPL-SCNC: 16 MMOL/L (ref 23–29)
CODING SYSTEM: NORMAL
CREAT SERPL-MCNC: 0.5 MG/DL (ref 0.5–1.4)
DIFFERENTIAL METHOD: ABNORMAL
DISPENSE STATUS: NORMAL
EOSINOPHIL # BLD AUTO: 0 K/UL (ref 0–0.5)
EOSINOPHIL NFR BLD: 5.1 % (ref 0–8)
ERYTHROCYTE [DISTWIDTH] IN BLOOD BY AUTOMATED COUNT: 27.1 % (ref 11.5–14.5)
EST. GFR  (NO RACE VARIABLE): >60 ML/MIN/1.73 M^2
GLUCOSE SERPL-MCNC: 64 MG/DL (ref 70–110)
HCT VFR BLD AUTO: 22.8 % (ref 37–48.5)
HGB BLD-MCNC: 7.8 G/DL (ref 12–16)
IMM GRANULOCYTES # BLD AUTO: 0 K/UL (ref 0–0.04)
IMM GRANULOCYTES NFR BLD AUTO: 0 % (ref 0–0.5)
LYMPHOCYTES # BLD AUTO: 0.4 K/UL (ref 1–4.8)
LYMPHOCYTES NFR BLD: 92.3 % (ref 18–48)
MAGNESIUM SERPL-MCNC: 1.5 MG/DL (ref 1.6–2.6)
MCH RBC QN AUTO: 29.1 PG (ref 27–31)
MCHC RBC AUTO-ENTMCNC: 34.2 G/DL (ref 32–36)
MCV RBC AUTO: 85 FL (ref 82–98)
METHYLMALONATE SERPL-SCNC: 0.08 NMOL/ML
MONOCYTES # BLD AUTO: 0 K/UL (ref 0.3–1)
MONOCYTES NFR BLD: 2.6 % (ref 4–15)
NEUTROPHILS # BLD AUTO: 0 K/UL (ref 1.8–7.7)
NEUTROPHILS NFR BLD: 0 % (ref 38–73)
NRBC BLD-RTO: 0 /100 WBC
OVALOCYTES BLD QL SMEAR: ABNORMAL
OXCARBAZEPINE METABOLITE: 58 MCG/ML (ref 10–35)
PHOSPHATE SERPL-MCNC: 1.8 MG/DL (ref 2.7–4.5)
PLATELET # BLD AUTO: 8 K/UL (ref 150–450)
PLATELET BLD QL SMEAR: ABNORMAL
PMV BLD AUTO: 10.3 FL (ref 9.2–12.9)
POCT GLUCOSE: 83 MG/DL (ref 70–110)
POTASSIUM SERPL-SCNC: 3.1 MMOL/L (ref 3.5–5.1)
PROT SERPL-MCNC: 5.5 G/DL (ref 6–8.4)
RBC # BLD AUTO: 2.68 M/UL (ref 4–5.4)
SODIUM SERPL-SCNC: 138 MMOL/L (ref 136–145)
UNIT NUMBER: NORMAL
WBC # BLD AUTO: 0.39 K/UL (ref 3.9–12.7)

## 2022-10-14 PROCEDURE — 25000003 PHARM REV CODE 250: Performed by: STUDENT IN AN ORGANIZED HEALTH CARE EDUCATION/TRAINING PROGRAM

## 2022-10-14 PROCEDURE — 36430 TRANSFUSION BLD/BLD COMPNT: CPT

## 2022-10-14 PROCEDURE — 83735 ASSAY OF MAGNESIUM: CPT | Performed by: NURSE PRACTITIONER

## 2022-10-14 PROCEDURE — 63600175 PHARM REV CODE 636 W HCPCS: Performed by: NURSE PRACTITIONER

## 2022-10-14 PROCEDURE — 85025 COMPLETE CBC W/AUTO DIFF WBC: CPT | Performed by: NURSE PRACTITIONER

## 2022-10-14 PROCEDURE — 84100 ASSAY OF PHOSPHORUS: CPT | Performed by: NURSE PRACTITIONER

## 2022-10-14 PROCEDURE — 99239 HOSP IP/OBS DSCHRG MGMT >30: CPT | Mod: ,,, | Performed by: INTERNAL MEDICINE

## 2022-10-14 PROCEDURE — P9037 PLATE PHERES LEUKOREDU IRRAD: HCPCS | Performed by: STUDENT IN AN ORGANIZED HEALTH CARE EDUCATION/TRAINING PROGRAM

## 2022-10-14 PROCEDURE — 25000003 PHARM REV CODE 250: Performed by: NURSE PRACTITIONER

## 2022-10-14 PROCEDURE — 80053 COMPREHEN METABOLIC PANEL: CPT | Performed by: NURSE PRACTITIONER

## 2022-10-14 PROCEDURE — 99239 PR HOSPITAL DISCHARGE DAY,>30 MIN: ICD-10-PCS | Mod: ,,, | Performed by: INTERNAL MEDICINE

## 2022-10-14 PROCEDURE — 63600175 PHARM REV CODE 636 W HCPCS: Performed by: STUDENT IN AN ORGANIZED HEALTH CARE EDUCATION/TRAINING PROGRAM

## 2022-10-14 RX ORDER — MAGNESIUM SULFATE HEPTAHYDRATE 40 MG/ML
2 INJECTION, SOLUTION INTRAVENOUS ONCE
Status: COMPLETED | OUTPATIENT
Start: 2022-10-14 | End: 2022-10-14

## 2022-10-14 RX ORDER — MORPHINE SULFATE 2 MG/ML
2 INJECTION, SOLUTION INTRAMUSCULAR; INTRAVENOUS ONCE
Status: COMPLETED | OUTPATIENT
Start: 2022-10-14 | End: 2022-10-14

## 2022-10-14 RX ORDER — ONDANSETRON 2 MG/ML
8 INJECTION INTRAMUSCULAR; INTRAVENOUS ONCE
Status: COMPLETED | OUTPATIENT
Start: 2022-10-14 | End: 2022-10-14

## 2022-10-14 RX ORDER — HYDROCODONE BITARTRATE AND ACETAMINOPHEN 500; 5 MG/1; MG/1
TABLET ORAL
Status: DISCONTINUED | OUTPATIENT
Start: 2022-10-14 | End: 2022-10-14 | Stop reason: HOSPADM

## 2022-10-14 RX ORDER — MUPIROCIN 20 MG/G
OINTMENT TOPICAL 2 TIMES DAILY
Status: DISCONTINUED | OUTPATIENT
Start: 2022-10-14 | End: 2022-10-14 | Stop reason: HOSPADM

## 2022-10-14 RX ORDER — POTASSIUM CHLORIDE 7.45 MG/ML
40 INJECTION INTRAVENOUS ONCE
Status: COMPLETED | OUTPATIENT
Start: 2022-10-14 | End: 2022-10-14

## 2022-10-14 RX ORDER — OXYMETAZOLINE HCL 0.05 %
2 SPRAY, NON-AEROSOL (ML) NASAL 2 TIMES DAILY
Status: DISCONTINUED | OUTPATIENT
Start: 2022-10-14 | End: 2022-10-14 | Stop reason: HOSPADM

## 2022-10-14 RX ADMIN — SODIUM CHLORIDE: 0.9 INJECTION, SOLUTION INTRAVENOUS at 08:10

## 2022-10-14 RX ADMIN — POTASSIUM CHLORIDE 40 MEQ: 7.46 INJECTION, SOLUTION INTRAVENOUS at 07:10

## 2022-10-14 RX ADMIN — PANTOPRAZOLE SODIUM 40 MG: 40 TABLET, DELAYED RELEASE ORAL at 09:10

## 2022-10-14 RX ADMIN — OXYMETAZOLINE HCL 2 SPRAY: 0.05 SPRAY NASAL at 04:10

## 2022-10-14 RX ADMIN — MAGNESIUM SULFATE 2 G: 2 INJECTION INTRAVENOUS at 05:10

## 2022-10-14 RX ADMIN — POTASSIUM PHOSPHATE, MONOBASIC AND POTASSIUM PHOSPHATE, DIBASIC 30 MMOL: 224; 236 INJECTION, SOLUTION, CONCENTRATE INTRAVENOUS at 05:10

## 2022-10-14 RX ADMIN — OXCARBAZEPINE 1200 MG: 600 TABLET, FILM COATED ORAL at 09:10

## 2022-10-14 RX ADMIN — GABAPENTIN 300 MG: 300 CAPSULE ORAL at 03:10

## 2022-10-14 RX ADMIN — LEVOFLOXACIN 500 MG: 500 TABLET, FILM COATED ORAL at 03:10

## 2022-10-14 RX ADMIN — CETIRIZINE HYDROCHLORIDE 10 MG: 10 TABLET, FILM COATED ORAL at 09:10

## 2022-10-14 RX ADMIN — Medication 400 MG: at 09:10

## 2022-10-14 RX ADMIN — HEPARIN 500 UNITS: 100 SYRINGE at 12:10

## 2022-10-14 RX ADMIN — MORPHINE SULFATE 2 MG: 2 INJECTION, SOLUTION INTRAMUSCULAR; INTRAVENOUS at 12:10

## 2022-10-14 RX ADMIN — FLUCONAZOLE 400 MG: 200 TABLET ORAL at 03:10

## 2022-10-14 RX ADMIN — DILTIAZEM HYDROCHLORIDE 60 MG: 60 TABLET, FILM COATED ORAL at 03:10

## 2022-10-14 RX ADMIN — BUSPIRONE HYDROCHLORIDE 10 MG: 10 TABLET ORAL at 09:10

## 2022-10-14 RX ADMIN — ALUMINUM HYDROXIDE, MAGNESIUM HYDROXIDE, AND DIMETHICONE 10 ML: 400; 400; 40 SUSPENSION ORAL at 09:10

## 2022-10-14 RX ADMIN — ONDANSETRON 8 MG: 2 INJECTION INTRAMUSCULAR; INTRAVENOUS at 12:10

## 2022-10-14 RX ADMIN — SENNOSIDES AND DOCUSATE SODIUM 1 TABLET: 50; 8.6 TABLET ORAL at 09:10

## 2022-10-14 RX ADMIN — DILTIAZEM HYDROCHLORIDE 60 MG: 60 TABLET, FILM COATED ORAL at 09:10

## 2022-10-14 NOTE — PLAN OF CARE
Patient A/O x3 during shift. Disoriented to time. VSS on RAIR with some tachycardia (-115). Seizure precautions maintained. PO medications administered slowly one at a time.  remains at bedside. Q2H turns with rounding to promote wound healing. BM this shift. Wounds cleansed and ointment applied. Nose bleed this morning. Notified Dr. ANAI Corrales and order received for afrin. Administered to patient with relief of bleed. Electrolytes low this am. Notified ANAI Corrales and orders given to replace. Potassium phos and magnesium infusing currently. Plt count 8,000 this morning. Platelets ordered and transfused on this shift. Patient tolerated without difficulties. Call light and personal belongings within reach. Bed locked and in lowest position throughout shift.

## 2022-10-14 NOTE — DISCHARGE SUMMARY
[facility] - Oncology ([facility]  Hematology  Bone Marrow Transplant  Discharge Summary      Patient Name: Deepti Gonzalez  MRN: [id number]  Admission Date: 10/11/2022  Hospital Length of Stay: 3 days  Discharge Date and Time:  10/14/2022 12:15 PM  Attending Physician: April Aldridge MD   Discharging Provider: Shanta Love NP  Primary Care Provider: Primary Doctor No    HPI: Mrs. 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 Other PMH significant for paroxsymal A-fib with an implantable loop recorder/pacemaker in place, anxiety/depression, COPD, DM2, gastroparesis, GERD, osteoporosis, PAD, RA, seizures, TIA, tobacco use. 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 She was treated with inotuzumab and ponatinib, followed by cessation of inotuzumab (due to cytopenias). She achieved molecular response on ponatinib. Unfortunately, she is now diagnosed with CNS relapse as of 9/19/22. She had an Ommaya placed on 9/26/22 and has been receiving twice weekly IT triple chemotherapy. Most recent CSF shows clearance of leukemic cells.     She presented to outpatient BMT clinic for follow-up today where her  stated that Mrs. Gonzalez had been declining over the 72hrs. She now has coordination difficulties and is very fatigued. She denies new vision changes (still with complete left eye vision loss). Also of note, she is severely pancytopenic, has mouth sores, and poor oral intake/nausea. Denies any recent fevers, chills, chest pain, sob, diarrhea, constipation, bleeding or bruising. Admitted for workup of new coordination difficulties in setting of relapsed CNS Ph+ ALL.       * No surgery found *     Hospital Course: 10/12/2022: Awake and alert on exam today. Following commands. Neuro following. Rec ophtho cx for new right eye changes. Will defer for now but will consult based on how aggressively patient/family would like to be regarding ALL treatment. Palliative Care  consulted for GOC discussion. Blood counts dropped significantly. Transfusing 2 units prbc and 1 unit plts. Resuming home ponatinib per Dr. Posey. Infection work ordered to r/o as source of neuro/cognitive changes. In process.  10/13/2022: Spoke extensively with patient and  regarding prognosis, amount of time of life should patient continue treatment, quality of life should patient continue treatment, apparent intolerance of current treatment, and fact that there are no additional treatments available to her. Did tell patient and  that there is some room to dose reduce ponatinib further, which may or may not improve tolerance. Patient and  stated understanding and were left to discuss amongst themselves, they know that I am available to return if they would like to discuss further. ANC is 0 today. Does not require transfusions today. Ponatinib will be resumed today if patient wished to continue treatment. Replacing K+ and phos and will repeat BMP and phos level this afternoon. Giving Vit K for INR of 1.6. starting folate supplement. Will get echo today if patient chooses to continue with Ponatinib.     Patient discharged home with hospice on 10/14/2022    Goals of Care Treatment Preferences:  Code Status: DNR    Health care agent:  [email or web address]  Health [facility] agent number: [phone]                   Consults (From admission, onward)        Status Ordering Provider     Inpatient consult to Palliative Care  Once        Provider:  (Not yet assigned)    Completed LISA VILLELA     Inpatient consult to Registered Dietitian/Nutritionist  Once        Provider:  (Not yet assigned)    Completed CHAO POSEY     Inpatient consult to Neurology  Once        Provider:  (Not yet assigned)    Completed ANTONIO LYNCH     Inpatient consult to Vascular (Stroke) Neurology  Once        Provider:  (Not yet assigned)    Completed ANTONIO LYNCH          Significant Diagnostic Studies: Labs:   CMP    Recent Labs   Lab 10/13/22  0355 10/13/22  1201 10/14/22  0324    139  139 138   K 2.8* 3.3*  3.3* 3.1*   * 107  107 106   CO2 18* 19*  21* 16*   GLU 78 110  111* 64*   BUN 14 9  9 7   CREATININE 0.6 0.5  0.6 0.5   CALCIUM 8.9 8.8  8.7 8.5*   PROT 5.9*  --  5.5*   ALBUMIN 1.9* 1.9* 1.7*   BILITOT 0.5  --  0.6   ALKPHOS 97  --  195*   AST 17  --  42*   ALT 14  --  34   ANIONGAP 13 13  11 16    and CBC   Recent Labs   Lab 10/13/22  0355 10/14/22  0324   WBC 0.38* 0.39*   HGB 8.0* 7.8*   HCT 24.0* 22.8*   PLT 17* 8*       Pending Diagnostic Studies:     None        Final Active Diagnoses:    Diagnosis Date Noted POA    PRINCIPAL PROBLEM:  Altered mental status [R41.82] 10/11/2022 Yes    B-cell acute lymphoblastic leukemia (ALL) in relapse [C91.02] 11/24/2021 Yes    Mucositis due to chemotherapy [K12.31] 10/11/2022 Yes    Other pancytopenia [D61.818] 10/11/2022 Yes    Fatigue [R53.83] 09/29/2022 Yes    Pain [R52] 10/13/2022 Unknown    Dermatitis associated with moisture [L30.8] 10/13/2022 Yes    Severe protein-calorie malnutrition [E43] 10/12/2022 Yes     Chronic    Palliative care encounter [Z51.5] 10/12/2022 Not Applicable    Debility [R53.81] 10/12/2022 Unknown    Advance care planning [Z71.89] 10/12/2022 Not Applicable    Vision loss of left eye [H54.62] 09/16/2022 Yes    Urinary retention [R33.9] 05/13/2022 Yes    Migraine [G43.909] 04/15/2022 Yes    Cancer associated pain [G89.3]  Yes    Hypokalemia [E87.6] 03/17/2022 No    Hypomagnesemia [E83.42] 02/18/2022 Yes    Hypophosphatasia [E83.39] 02/17/2022 No    Peripheral arterial occlusive disease [I77.9] 01/04/2022 Yes    Type 2 diabetes mellitus, without long-term current use of insulin [E11.9] 11/24/2021 Yes    Hypertension [I10] 11/24/2021 Yes    Seizure disorder [G40.909] 11/24/2021 Yes    Anxiety [F41.9] 11/24/2021 Yes    GERD (gastroesophageal reflux disease) [K21.9] 11/24/2021 Yes    Pacemaker [Z95.0] 03/02/2018  Yes    Paroxysmal atrial fibrillation [I48.0] 12/22/2017 Yes    History of TIA (transient ischemic attack) [Z86.73] 12/22/2017 Not Applicable    Hyperlipidemia [E78.5] 12/22/2017 Yes    Rheumatoid arthritis involving multiple sites [M06.9] 12/22/2017 Yes      Problems Resolved During this Admission:      Discharged Condition: poor    Disposition: Hospice/Home    Follow Up: None. Discharged home with hospice. Patient and spouse aware they can call BMT clinic at any time should they need anything in the future    Patient Instructions:      Diet Adult Regular     Notify your health care provider if you experience any of the following:  temperature >100.4     Notify your health care provider if you experience any of the following:  persistent nausea and vomiting or diarrhea     Notify your health care provider if you experience any of the following:  severe uncontrolled pain     Notify your health care provider if you experience any of the following:  redness, tenderness, or signs of infection (pain, swelling, redness, odor or green/yellow discharge around incision site)     Notify your health care provider if you experience any of the following:  difficulty breathing or increased cough     Notify your health care provider if you experience any of the following:  severe persistent headache     Notify your health care provider if you experience any of the following:  worsening rash     Notify your health care provider if you experience any of the following:  persistent dizziness, light-headedness, or visual disturbances     Notify your health care provider if you experience any of the following:  increased confusion or weakness     Activity as tolerated     Medications:  Reconciled Home Medications:      Medication List      CONTINUE taking these medications    artificial tears 0.5 % ophthalmic solution  Commonly known as: ISOPTO TEARS  Place 1 drop into both eyes 4 (four) times daily as needed.     busPIRone 10 MG  tablet  Commonly known as: BUSPAR  Take 10 mg by mouth 2 (two) times daily.     butalbital-acetaminophen-caffeine -40 mg -40 mg per tablet  Commonly known as: FIORICET, ESGIC  Take 1 tablet by mouth every 6 (six) hours as needed for Headaches.     diazePAM 10 MG Tab  Commonly known as: VALIUM  Take 10 mg by mouth 2 (two) times daily as needed.     diltiaZEM 90 MG tablet  Commonly known as: CARDIZEM  Take 1 tablet (90 mg total) by mouth every 6 (six) hours.     DUKE'S SOLUTION (BENADRYL 30 ML, MYLANTA 30 ML, LIDOCAINE 30 ML, NYSTATIN 30 ML)  Take 10 mLs by mouth 4 (four) times daily as needed (mouth pain).     gabapentin 300 MG capsule  Commonly known as: NEURONTIN  Take 1 capsule (300 mg total) by mouth 3 (three) times daily.     naloxone 4 mg/actuation Spry  Commonly known as: NARCAN  4mg by nasal route as needed for opioid overdose; may repeat every 2-3 minutes in alternating nostrils until medical help arrives. Call 911     omeprazole 40 MG capsule  Commonly known as: PRILOSEC  Take 40 mg by mouth once daily.     * ondansetron 8 MG Tbdl  Commonly known as: ZOFRAN-ODT  Take 1 tablet (8 mg total) by mouth every 8 (eight) hours as needed (in case of chemo induced nausea).     * ondansetron 8 MG Tbdl  Commonly known as: ZOFRAN-ODT  Take 1 tablet (8 mg total) by mouth every 8 (eight) hours as needed (nausea or vomiting).     OXcarbazepine 600 MG Tab  Commonly known as: TRILEPTAL  Take 1,200 mg by mouth 2 (two) times daily.     polyethylene glycol 17 gram/dose powder  Commonly known as: GLYCOLAX  Dissolve one capful (17 g) in liquid and take by mouth daily as needed (constipation).     prochlorperazine 10 MG tablet  Commonly known as: COMPAZINE  Take 1 tablet (10 mg total) by mouth every 6 (six) hours as needed for Nausea.     QUEtiapine 200 MG Tab  Commonly known as: SEROQUEL  Take 200 mg by mouth every evening.     SENNA PLUS 8.6-50 mg per tablet  Generic drug: senna-docusate 8.6-50 mg  Take 1 tablet by  mouth 2 (two) times daily.     sumatriptan 50 MG tablet  Commonly known as: IMITREX  Take 1 tablet (50 mg total) by mouth daily as needed (headache).     TRINTELLIX 20 mg Tab  Generic drug: vortioxetine  Take 1 tablet by mouth once daily.         * This list has 2 medication(s) that are the same as other medications prescribed for you. Read the directions carefully, and ask your doctor or other care provider to review them with you.            STOP taking these medications    acyclovir 400 MG tablet  Commonly known as: ZOVIRAX     allopurinoL 300 MG tablet  Commonly known as: ZYLOPRIM     atorvastatin 80 MG tablet  Commonly known as: LIPITOR     blood sugar diagnostic Strp     cetirizine 10 MG tablet  Commonly known as: ZYRTEC     FARXIGA 10 mg tablet  Generic drug: dapagliflozin     fenofibrate 160 MG Tab     fluconazole 200 MG Tab  Commonly known as: DIFLUCAN     hydrOXYchloroQUINE 200 mg tablet  Commonly known as: PLAQUENIL     hydrOXYzine pamoate 50 MG Cap  Commonly known as: VISTARIL     ICLUSIG 30 mg Tab  Generic drug: PONATinib     levoFLOXacin 500 MG tablet  Commonly known as: LEVAQUIN     LIDOcaine 5 %  Commonly known as: LIDODERM     losartan 25 MG tablet  Commonly known as: COZAAR     magnesium oxide 400 mg (241.3 mg magnesium) tablet  Commonly known as: MAG-OX     metFORMIN 500 MG ER 24hr tablet  Commonly known as: GLUCOPHAGE-XR     ONETOUCH VERIO TEST STRIPS Strp  Generic drug: blood sugar diagnostic     potassium chloride 20 mEq  Commonly known as: K-TAB     rivaroxaban 20 mg Tab  Commonly known as: XARELTO     ursodioL 300 mg capsule  Commonly known as: JERSEY Love NP  Bone Marrow Transplant  [facility] - Oncology [facility])

## 2022-10-14 NOTE — PROGRESS NOTES
Advance Care Planning     Date: 10/14/2022    Today a meeting took place: bedside    Patient Participation: Patient is able to participate     Attendees (Name and  Relationship to patient):      Staff attendees (Name and  Role): Mirtha Antonio NP    ACP Conversation (General): Understanding of advance care planning and role of health care agent defined    Understanding of current condition , ppor prognosis, lack of treatments, and quality of life with continue treatment  Experience with serious illness    'Living well': What does living well mean to you?       Code Status: DNR; status confirmed/order placed in chart    ACP Documents: None    Goals of care: The patient and family endorses that what is most important right now is to focus on spending time at home, avoiding the hospital, symptom/pain control, quality of life, even if it means sacrificing a little time, extending life as long as possible, even it it means sacrificing quality, curative/life-prolongation (regardless of treatment burdens), improvement in condition but with limits to invasive therapies, and comfort and QOL     Accordingly, we have decided that the best plan to meet the patient's goals includes enrolling in hospice care      Recommendations/  Follow-up tasks: Other (specify below) patient will go home with hospice       Length of ACP   conversation in minutes: 20 minutes

## 2022-10-14 NOTE — PT/OT/SLP PROGRESS
Physical Therapy      Patient Name:  Deepti Gonzalez   MRN:  [id number]    Patient not seen today secondary to patient transitioning to hospice, denied therapy needs at this time. Discharge acute PT orders at this time. Please re-consult should patient experience a decline in functional mobility.

## 2022-10-14 NOTE — NURSING
Pt is being discharged. All questions have been answered and discharge education gone over with pt and pt spouse at bedside. Chest port has been removed and heparin locked. Peripheral IV removed with catheter intact. Pt leaving floor with personal wheelchair and personal belongings present. Spoke with NP Shanta Love on phone whom confirmed Hospice will see pt at home.   WOO Erickson

## 2022-10-14 NOTE — PLAN OF CARE
Plan of care reviewed with pt and pt spouse at bedside. Pt is adequate for discharge with Home Hospice.   Problem: Adult Inpatient Plan of Care  Goal: Plan of Care Review  Outcome: Met  Goal: Patient-Specific Goal (Individualized)  Outcome: Met  Goal: Absence of Hospital-Acquired Illness or Injury  Outcome: Met  Goal: Optimal Comfort and Wellbeing  Outcome: Met  Goal: Readiness for Transition of Care  Outcome: Met     Problem: Diabetes Comorbidity  Goal: Blood Glucose Level Within Targeted Range  Outcome: Met     Problem: Infection  Goal: Absence of Infection Signs and Symptoms  Outcome: Met     Problem: Skin Injury Risk Increased  Goal: Skin Health and Integrity  Outcome: Met     Problem: Coping Ineffective  Goal: Effective Coping  Outcome: Met     Problem: Impaired Wound Healing  Goal: Optimal Wound Healing  Outcome: Met     Problem: Fall Injury Risk  Goal: Absence of Fall and Fall-Related Injury  Outcome: Met

## 2022-10-17 LAB
BACTERIA BLD CULT: NORMAL
BACTERIA BLD CULT: NORMAL

## 2022-10-17 NOTE — PHYSICIAN QUERY
PT Name: Deepti Gonzalez  MR #: [id number]    DOCUMENTATION CLARIFICATION     CDS: Ladan Bob RN         Contact information: [email or web address] or (cell) [phone]     This form is a permanent document in the medical record.     Query Date: October 17, 2022    Dear Provider,  By submitting this query, we are merely seeking further clarification of documentation. Please utilize your independent clinical judgment when addressing the question(s) below.    The medical record contains the following:  Supporting Clinical Findings Location in Medical Record   Altered mental status  - CT head 10/11/22 revealed Persistent pneumocephalus in the setting recent Ommaya reservoir placement. No new hemorrhage or other significant detrimental interval change.   - Unable to perform MRI d/t pacemaker   - previous infectious workup on LP 09/19 negative  - Hold sedating medications   - neurology consulted for further recs/safety of using ommaya again  - Consider continued AMS workup with ammonia, thiamine level, folate, Vitamin B12   H&P 10/11       Please clarify if the persistent pneumocephalus (as it relates to the Ommaya [place] Placement) is:      [  ] Inherent/Integral to the procedure     [  ] Complication of the procedure     [  ] Present, but not a complication of the procedure     [  ] Incidental finding, not clinically significant     [  ] Other (please specify): __________________     [ x ] Clinically Undetermined

## 2022-10-18 NOTE — TELEPHONE ENCOUNTER
Confirmed with pt  that pt no longer taking Iclusig and enrolled in hospice. Provided information on drug disposal.

## 2022-12-07 DIAGNOSIS — C72.32: ICD-10-CM

## 2022-12-07 DIAGNOSIS — C91.02 B-CELL ACUTE LYMPHOBLASTIC LEUKEMIA (ALL) IN RELAPSE: Primary | ICD-10-CM

## 2023-08-18 NOTE — ASSESSMENT & PLAN NOTE
- continue home gabapentin   [2 - 4 times a month (2 pts)] : 2-4 times a month (2 points) [3 or 4 (1 pt)] : 3 or 4  (1 point) [Never (0 pts)] : Never (0 points) [Yes] : In the past 12 months have you used drugs other than those required for medical reasons? Yes [Medical reason not done] : Medical reason not done [With Family] : lives with family [Employed] : employed [Sexually Active] : sexually active [Smoke Detector] : smoke detector [Carbon Monoxide Detector] : carbon monoxide detector [Seat Belt] :  uses seat belt [Never] : Never [Audit-CScore] : 3 [de-identified] : Marijuana 1-2 times a month [de-identified] : On her feet for work [de-identified] : Breakfast: leftover udon noodles, yogurt. Lunch: Chicken, pastry.  [de-identified] : On treatment [Reports changes in hearing] : Reports no changes in hearing [Reports changes in vision] : Reports no changes in vision [Reports changes in dental health] : Reports no changes in dental health [FreeTextEntry2] :

## 2024-04-01 NOTE — ASSESSMENT & PLAN NOTE
Given leukocytosis with WBC 136K and plan for rapid reduction with PO dex and vincristine, patient at high risk for TLS  - continue allopurinol 300mg BID  - monitor TLS labs BID   [FreeTextEntry1] : I sat down with the patient to discuss the imaging findings & her symptoms which warrant surgical intervention. We reviewed that there is no proven medical benefit to keeping the cervix, and removal in the future is more difficult. I recommend definitive hysterectomy with bilateral salpingectomy since she wants definitive treatment via a minimally invasive approach.   The options for surgical approach including open, vaginal, and laparoscopic with or without robotic assistance were discussed and the patient agrees with plan for laparoscopic hysterectomy with bilateral salpingectomy. The differential diagnosis was discussed in detail. The indications, risks, benefits and alternatives were discussed. but not limited to, conversion to laparotomy, bleeding, infection, injury to surrounding organs was discussed at length. Chance of occult injury and need for future surgery. Patient expressed an understanding of the treatment rationale and her questions were answered to her apparent satisfaction. She was given written information about postoperative care and diagrams of the pelvic anatomy.

## 2024-10-09 NOTE — SUBJECTIVE & OBJECTIVE
Meds for patient to take am of surgery: Flovent, PRN albuterol.    Parent to review pre surgical instruction packet in email. All questions answered at this time. Encouraged to call PST for further questions.    Patient information was obtained from patient and past medical records.     Oncology History (Most info from Dr. Jenkins's most recent clinic note):   Precursor B-cell acute lymphoblastic leukemia (Ph+)              A. 11/23/2021: Transferred to [facility] [place] from outside hospital for evaluation for acute leukemia              B. 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               C. 11/30/2021 - 12/23/2021: Vincristine + imatinib induction; hospital course was complicated by acute hypoxemic respiratory failure which resolved with diuresis and treatment of ALL                D. 1/19/2022 - 1/24/2022: Admitted for C1 of consolidation chemotherapy with EWALL               E. 2/16/2022 - 2/22/2022: Admitted for C2 of consolidation chemotherapy with JOHANAALL    Medications Prior to Admission   Medication Sig Dispense Refill Last Dose    acyclovir (ZOVIRAX) 400 MG tablet Take 1 tablet (400 mg total) by mouth 2 (two) times daily. 60 tablet 11     artificial tears (ISOPTO TEARS) 0.5 % ophthalmic solution Place 1 drop into both eyes 4 (four) times daily as needed.       aspirin (ECOTRIN) 81 MG EC tablet Take 1 tablet (81 mg total) by mouth once daily. Hold for platelet count less than 50 thousand. (Patient not taking: Reported on 3/16/2022)  0     atorvastatin (LIPITOR) 80 MG tablet Take 80 mg by mouth once daily.       busPIRone (BUSPAR) 10 MG tablet Take 10 mg by mouth 2 (two) times daily.       dapagliflozin (FARXIGA) 10 mg tablet Take 10 mg by mouth once daily.       diazePAM (VALIUM) 10 MG Tab Take 10 mg by mouth 2 (two) times daily as needed.       diltiaZEM (CARDIZEM) 90 MG tablet Take 1 tablet (90 mg total) by mouth every 6 (six) hours. 120 tablet 11      duke's soln (benadryl 30 mL, mylanta 30 mL, LIDOcaine 30 mL, nystatin 30 mL) 120mL Take 10 mLs by mouth 4 (four) times daily. (Patient not taking: No sig reported) 120 mL 0     fenofibrate 160 MG Tab Take 160 mg by mouth once daily.       fluconazole (DIFLUCAN) 200 MG Tab Take 2 tablets (400 mg total) by mouth once daily. 60 tablet 2     gabapentin (NEURONTIN) 300 MG capsule Take 1 capsule (300 mg total) by mouth 3 (three) times daily. 90 capsule 11     hydrOXYchloroQUINE (PLAQUENIL) 200 mg tablet Take 200 mg by mouth once daily.       hydrOXYzine (ATARAX) 50 MG tablet Take 50 mg by mouth 2 (two) times a day.       imatinib (GLEEVEC) 100 MG Tab Take 2 tablets (200 mg total) by mouth once daily with 1 other imatinib prescription for 600 mg total.Take with a meal and large glass of water 180 tablet 3     imatinib (GLEEVEC) 400 MG Tab Take 1 tablet (400 mg total) by mouth once daily with 1 other imatinib prescription for 600 mg total. Take with a meal and large glass of water 90 tablet 3     levoFLOXacin (LEVAQUIN) 500 MG tablet Take 1 tablet (500 mg total) by mouth once daily. 30 tablet 2     losartan (COZAAR) 25 MG tablet Take 25 mg by mouth once daily.       methocarbamoL (ROBAXIN) 500 MG Tab Take 1 tablet (500 mg total) by mouth 3 (three) times daily as needed (muscle spasms). (Patient not taking: Reported on 3/16/2022) 90 tablet 2     omeprazole (PRILOSEC) 40 MG capsule Take 40 mg by mouth once daily.       ondansetron (ZOFRAN-ODT) 8 MG TbDL Dissolve 1 tablet (8 mg total) by mouth every 12 (twelve) hours as needed (in case of chemo induced nausea). (Patient not taking: Reported on 3/16/2022) 30 tablet 1     OXcarbazepine (TRILEPTAL) 600 MG Tab Take 1,200 mg by mouth 2 (two) times daily.       polyethylene glycol (GLYCOLAX) 17 gram/dose powder Dissolve one capful (17 g) in liquid and take by mouth daily as needed (constipation). (Patient not taking: Reported on 3/16/2022) 510 g 0     potassium chloride SA  (K-DUR,KLOR-CON) 20 MEQ tablet Take 20 mEq by mouth once daily.       prochlorperazine (COMPAZINE) 5 MG tablet Take 1 tablet (5 mg total) by mouth every 6 (six) hours as needed for Nausea. 30 tablet 2     QUEtiapine (SEROQUEL) 200 MG Tab Take 200 mg by mouth every evening.       rivaroxaban (XARELTO) 20 mg Tab Take 1 tablet (20 mg total) by mouth daily with dinner or evening meal. Hold for platelets less than 50 thousand. (Patient not taking: Reported on 3/16/2022)       senna-docusate 8.6-50 mg (PERICOLACE) 8.6-50 mg per tablet Take 1 tablet by mouth 2 (two) times daily. 60 tablet 3     sumatriptan (IMITREX) 50 MG tablet Take 1 tablet (50 mg total) by mouth daily as needed (headache). 30 tablet 0     traMADoL (ULTRAM) 50 mg tablet Take 1 tablet (50 mg total) by mouth every 6 (six) hours as needed for Pain. 30 tablet 0     TRINTELLIX 10 mg Tab Take 1 tablet by mouth once daily.       VISTARIL 50 mg Cap Take by mouth.          Levetiracetam     Past Medical History:   Diagnosis Date    Arthritis     Cancer     COPD (chronic obstructive pulmonary disease)     Hypertension     Stroke     TIA x 4     Past Surgical History:   Procedure Laterality Date    APPENDECTOMY      HYSTERECTOMY      ROTATOR CUFF REPAIR Bilateral     TONSILLECTOMY      TUBAL LIGATION       Family History    None       Tobacco Use    Smoking status: Current Every Day Smoker     Packs/day: 0.50     Types: Cigarettes    Smokeless tobacco: Never Used   Substance and Sexual Activity    Alcohol use: No    Drug use: Never    Sexual activity: Not on file       Review of Systems   Constitutional:  Positive for fatigue. Negative for activity change, appetite change, chills, fever and unexpected weight change.   HENT:  Negative for congestion, mouth sores, nosebleeds, rhinorrhea, sinus pressure, sinus pain, sore throat and trouble swallowing.    Eyes:  Negative for photophobia, discharge, redness, itching and visual disturbance.   Respiratory:  Negative for  cough, chest tightness, shortness of breath and wheezing.    Cardiovascular:  Negative for chest pain, palpitations and leg swelling.   Gastrointestinal:  Negative for abdominal distention, abdominal pain, constipation, diarrhea, nausea and vomiting.   Endocrine: Negative for cold intolerance, heat intolerance, polydipsia, polyphagia and polyuria.   Genitourinary:  Negative for decreased urine volume, difficulty urinating, dysuria, frequency, hematuria, urgency, vaginal bleeding and vaginal discharge.   Musculoskeletal:  Negative for arthralgias, back pain, gait problem, joint swelling, myalgias, neck pain and neck stiffness.   Skin:  Negative for pallor, rash and wound.   Allergic/Immunologic: Negative for environmental allergies, food allergies and immunocompromised state.   Neurological:  Negative for dizziness, tremors, seizures, syncope, weakness, light-headedness, numbness and headaches.   Hematological:  Negative for adenopathy. Does not bruise/bleed easily.   Psychiatric/Behavioral:  Negative for agitation, confusion, hallucinations, sleep disturbance and suicidal ideas. The patient is not nervous/anxious.    Objective:     Vital Signs (Most Recent):  Temp: 98.2 °F (36.8 °C) (03/16/22 1634)  Pulse: 98 (03/16/22 1634)  Resp: 17 (03/16/22 1634)  BP: (Abnormal) 148/73 (03/16/22 1634)  SpO2: 95 % (03/16/22 1634) Vital Signs (24h Range):  Temp:  [97.5 °F (36.4 °C)-98.6 °F (37 °C)] 98.2 °F (36.8 °C)  Pulse:  [] 98  Resp:  [11-20] 17  SpO2:  [95 %-99 %] 95 %  BP: (131-148)/(61-79) 148/73     Weight: 69.2 kg (152 lb 9.3 oz)  Body mass index is 26.19 kg/m².  Body surface area is 1.77 meters squared.    ECOG SCORE             Lines/Drains/Airways       Peripherally Inserted Central Catheter Line       Name Duration    PICC Double Lumen 11/30/21 1033 right basilic 106 days              Central Venous Catheter Line       Name Duration         PowerPort A Cath Single Lumen 03/16/22 1128 right internal jugular <1  day                    Physical Exam  Constitutional:       Appearance: She is well-developed.   HENT:      Head: Normocephalic and atraumatic.      Mouth/Throat:      Pharynx: No oropharyngeal exudate.   Eyes:      Conjunctiva/sclera: Conjunctivae normal.      Pupils: Pupils are equal, round, and reactive to light.   Cardiovascular:      Rate and Rhythm: Normal rate and regular rhythm.      Heart sounds: Normal heart sounds. No murmur heard.  Pulmonary:      Effort: Pulmonary effort is normal.      Breath sounds: Normal breath sounds.   Abdominal:      General: Bowel sounds are normal. There is no distension.      Palpations: Abdomen is soft.      Tenderness: There is no abdominal tenderness.   Musculoskeletal:         General: No deformity. Normal range of motion.      Cervical back: Normal range of motion and neck supple.   Skin:     General: Skin is warm and dry.      Findings: No erythema or rash.      Comments: RUE PICC. Dressing c/d/i. No sign of infection to site.  Right chest wall port-a-cath. Dressing c/d/i. No sign of infection to site.   Neurological:      Mental Status: She is alert and oriented to person, place, and time.   Psychiatric:         Behavior: Behavior normal.         Thought Content: Thought content normal.         Judgment: Judgment normal.       Significant Labs:   CBC:   Recent Labs   Lab 03/16/22  0739   WBC 11.88   HGB 9.4*   HCT 29.5*   *    and CMP:   Recent Labs   Lab 03/16/22  0739      K 3.8      CO2 22*   *   BUN 12   CREATININE 0.8   CALCIUM 9.0   PROT 6.7   ALBUMIN 3.6   BILITOT 0.3   ALKPHOS 72   AST 13   ALT 14   ANIONGAP 12   EGFRNONAA >60.0       Diagnostic Results:  I have reviewed all pertinent imaging results/findings within the past 24 hours.

## 2025-01-09 NOTE — ASSESSMENT & PLAN NOTE
- Small SAH discovered on CTA. Repeat CTH stable. No focal neuro deficits. Pt has ongoing left sided headache and eye pain.   - Likely incidental finding that is not causing the vision loss.   - NSGY consulted, appreciate recs   Number Of Freeze-Thaw Cycles: 3 freeze-thaw cycles Render Post-Care Instructions In Note?: yes Duration Of Freeze Thaw-Cycle (Seconds): 3 Consent: The patient's consent was obtained including but not limited to risks of crusting, scabbing, blistering, scarring, darker or lighter pigmentary change, recurrence, incomplete removal and infection. Detail Level: Detailed Post-Care Instructions: I reviewed with the patient in detail post-care instructions. Patient is to wear sunprotection, and avoid picking at any of the treated lesions. Pt may apply Vaseline to crusted or scabbing areas. Patient has also received a handout with instructions on caring for the wound and office contact information. Post-Care Instructions: I reviewed with the patient in detail post-care instructions. Patient is to wear sunprotection, and avoid picking at any of the treated lesions. Pt may apply Vaseline to crusted or scabbing areas. Add 52 Modifier (Optional): no Application Tool (Optional): Cry-AC Spray Paint Text: The liquid nitrogen was applied to the skin utilizing a spray paint frosting technique. Medical Necessity Information: It is in your best interest to select a reason for this procedure from the list below. All of these items fulfill various CMS LCD requirements except the new and changing color options. Number Of Freeze-Thaw Cycles: 4 freeze-thaw cycles

## 2025-04-15 NOTE — ASSESSMENT & PLAN NOTE
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    SUBJECTIVE                                                                                                               Patient reporting she feels like she doesn't dip as much at the hips when she is walking.  Current Functional Limitations:  - Ambulation: community distances, using a quadripose cane without assist   - Stairs: using 1 handrail   - Curb steps: using a quadripose cane    Pain / Symptoms  - Patient denies pain / symptoms.     OBJECTIVE                                                                                                                           Balance Tests   5 Times Sit to Stand (seconds) 9.29    Norms: 50-59 years old - 4.4 - 9.1 sec    Interpretation:        > 12 seconds indicates need for further assessment for fall risk for community dwelling elderly      > 16 seconds indicative of falls risk for Parkinson's disease  10 Meter Walk Test - Comfortable Pace      - Trial 1: 12.74 sec, Trial 2: 11.82 sec, Trial 3: 9.79 sec     - 1 Trial: 0.47 M/sec     - Average of 3 Trials: 0.52 M/sec     Norms: Female 50-59 comfortable gait speed 1.313 M/s, fast gait speed 2.01 M/s, Minimally Clinically Important Difference: small meaningful     change: 0.05m/s, substantial meaningful change: 0.13m/s   Functional Gait Assessment (FGA):   Score: 17  See flowsheet for complete test.             Outcome/Assessments  Lower Extremity Raw Score: 37, Lower Extremity T Score: 46.7       Treatment     Neuromuscular Re-Education  5 Times Sit to Stand  10 Meter Walk Test  Functional Gait Assessment    At stairs; single UE support  - 6\" step taps x10  - 6\" lateral step taps with hip abduction  x10    Ambulating over cobble stones x8 - contact guard assist to minimal assistance for balance and cueing for ankle positioning    Skilled input: verbal instruction/cues, tactile instruction/cues and as detailed above    Writer verbally educated and received verbal consent for hand placement, positioning of patient, and techniques to be performed today from patient for hand placement and palpation for techniques and therapist position for techniques as described above and how they are pertinent to the patient's plan of care.  Home Exercise Program  Access Code: [email or web address]  URL: [email or web address]  Date: 03/13/2025  Prepared by: Nusrat Almodovar    Exercises  - Supine Bridge  - 1 x daily - 7 x weekly - 2 sets - 10 reps  - Supine March  - 1 x daily - 7 x weekly - 2 sets - 10 reps  - Hooklying Clamshell with Resistance  - 1 x daily - 7 x weekly - 2 sets - 10 reps  - Supine Lower Trunk Rotation  - 1 x daily - 7 x weekly - 2 sets - 10 reps  - Clamshell  - 1 x daily - 7 x weekly - 2 sets - 10 reps  - Prone Knee Flexion  - 1 x daily - 7 x weekly - 2 sets - 10 reps  - Standing March with Counter Support  - 1 x daily - 7 x weekly - 2 sets - 10 reps  - Standing Hip Abduction with Counter Support  - 1 x daily - 7 x weekly - 2 sets - 10 reps  - Standing Hip Extension with Counter Support  - 1 x daily - 7 x weekly - 2 sets - 10 reps  - Standing Heel Raise with Support  - 1 x daily - 7 x weekly - 2 sets - 10 reps  - Lunge with Counter Support  - 1 x daily - 7 x weekly - 2 sets - 10 reps  - Side Lunge with Counter Support  - 1 x daily - 7 x weekly - 2 sets - 10 reps      ASSESSMENT                                                                                                            Gains in skilled therapy to date as expected as patient demonstrating improved hip flexion, foot clearance, and decreased knee hyperextension. Patient improving all of her standardized test scores and able to ambulate  around the house with no AFO and no device. Patient with difficulty on compliant surfaces and uneven surfaces requiring hands on assist with no brace or device.  Patient attends therapy as recommended.  Patient reports performing HEP as prescribed.  Progress toward discharge/long term goals (see below for specific status updates): good progress  Medically necessary skilled therapy interventions continue to be required to allow the patient to maximize their functional abilities as noted above.  Pain/symptoms after session (out of 10): 0  Education:   - Results of above outlined education: Verbalizes understanding and Demonstrates understanding    PLAN                                                                                                                          Continue interventions established at initial evalution. and Extend frequency and duration per below.  Frequency / Duration  2 times per week tapering as patient progresses for 10 weeks for an estimated total of 15 visits    Suggestions for next session as indicated: Progress per plan of care    Goals  Long Term Goals: to be met by end of plan of care  1. Patient will complete Five Times Sit-to-Stand Test in less than 9.1 seconds, indicating a reduction of fall risk.  Status: - progressing/ongoing - Patient improved score from 14.25 seconds to 9.29 seconds.  2. Patient will complete 10 meter walk test with an increase in gait velocity of 0.53 m/s or higher to demonstrate a signifcant increase in ability to walk and move around. (minimal clinically important difference: 0.1 m/s)  Status:- progressing/ongoing - Patient improved score from 0.43 m/s to 0.52 m/s.  3. Patient will score 17/30 or higher on Functional Gait Assessment to indicate a significant decrease in the risk of falling. (minimal clinically important difference: 4 points)  Status: met  Patient improved score from 13/30 to 17/30.  4. Patient will be independent with progressed and modified  home exercise program.  Status: - progressing/ongoing  5. Patient will bend and lift small object off floor 5/5 objects without loss of balance for self care and home maintenance. Status: - progressing/ongoing - Patient able to  3/5 objects.  6. Patient will walk 200 meters/656 feet or greater without reported difficulty without an assistive device modified independently to facilitate participation in community ambulation. Status: met       Therapy procedure time and total treatment time can be found documented on the Time Entry flowsheet

## 2025-05-21 NOTE — ASSESSMENT & PLAN NOTE
- continue home trintellix while inpatient   Rescheduled appt on 5/30/25 from 12:00 to 11:20. Called Pt mother and LVM that appt was rescheduled also called Pt and spoke with her.  Alexander MCCONNELL

## (undated) DEVICE — SUT MCRYL PLUS 4-0 PS2 27IN

## (undated) DEVICE — SYR SLIP TIP 1CC

## (undated) DEVICE — DIFFUSER

## (undated) DEVICE — SUT VICRYL PLUS 3-0 SH 18IN

## (undated) DEVICE — HEMOSTAT SURGICEL 2X14IN

## (undated) DEVICE — GAUZE SPONGE 4X4 12PLY

## (undated) DEVICE — BLADE SURG CARBON STEEL SZ11

## (undated) DEVICE — SUT 2-0 12-18IN SILK

## (undated) DEVICE — BLADE ELECTRO EDGE INSULATED

## (undated) DEVICE — DRESSING TELFA N ADH 3X8

## (undated) DEVICE — SEE MEDLINE ITEM 156905

## (undated) DEVICE — DRESSING SURGICAL 1/2X1/2

## (undated) DEVICE — TUBE FRAZIER 5MM 2FT SOFT TIP

## (undated) DEVICE — Device

## (undated) DEVICE — MARKER SKIN STND TIP BLUE BARR

## (undated) DEVICE — BLADE CLIPPER NEURO / MDL 4411

## (undated) DEVICE — TRACKER PATIENT NON INVASIVE

## (undated) DEVICE — STAPLER SKIN PROXIMATE WIDE

## (undated) DEVICE — POINTER AXIEM CRANIAL TRACER

## (undated) DEVICE — DURAPREP SURG SCRUB 26ML

## (undated) DEVICE — CORD BIPOLAR 12 FOOT

## (undated) DEVICE — DRAPE STERI INSTRUMENT 1018

## (undated) DEVICE — BIT PERFORATOR LARGE

## (undated) DEVICE — CARTRIDGE OIL

## (undated) DEVICE — ELECTRODE REM PLYHSV RETURN 9

## (undated) DEVICE — DRAPE CRANIOTOMY T SURG STRL

## (undated) DEVICE — DRAPE THREE-QTR REINF 53X77IN

## (undated) DEVICE — DRESSING TRANS 4X4 TEGADERM